# Patient Record
Sex: MALE | Race: BLACK OR AFRICAN AMERICAN | Employment: OTHER | URBAN - METROPOLITAN AREA
[De-identification: names, ages, dates, MRNs, and addresses within clinical notes are randomized per-mention and may not be internally consistent; named-entity substitution may affect disease eponyms.]

---

## 2017-01-04 ENCOUNTER — CARE COORDINATION (OUTPATIENT)
Dept: CASE MANAGEMENT | Age: 62
End: 2017-01-04

## 2017-01-11 ENCOUNTER — CARE COORDINATION (OUTPATIENT)
Dept: CASE MANAGEMENT | Age: 62
End: 2017-01-11

## 2017-01-13 ENCOUNTER — CARE COORDINATION (OUTPATIENT)
Dept: CASE MANAGEMENT | Age: 62
End: 2017-01-13

## 2017-01-19 ENCOUNTER — HOSPITAL ENCOUNTER (OUTPATIENT)
Age: 62
Discharge: HOME | End: 2017-01-19
Payer: COMMERCIAL

## 2017-01-19 ENCOUNTER — HOSPITAL ENCOUNTER (OUTPATIENT)
Dept: GENERAL RADIOLOGY | Age: 62
Discharge: HOME | End: 2017-01-19
Payer: COMMERCIAL

## 2017-01-19 DIAGNOSIS — R05.9 COUGH: ICD-10-CM

## 2017-01-19 PROCEDURE — 71020 XR CHEST STANDARD TWO VW: CPT

## 2017-01-27 ENCOUNTER — TELEPHONE (OUTPATIENT)
Dept: FAMILY MEDICINE CLINIC | Age: 62
End: 2017-01-27

## 2017-01-27 ENCOUNTER — OFFICE VISIT (OUTPATIENT)
Dept: FAMILY MEDICINE CLINIC | Age: 62
End: 2017-01-27

## 2017-01-27 VITALS
RESPIRATION RATE: 20 BRPM | BODY MASS INDEX: 36.58 KG/M2 | WEIGHT: 276 LBS | TEMPERATURE: 98.4 F | OXYGEN SATURATION: 96 % | DIASTOLIC BLOOD PRESSURE: 76 MMHG | HEIGHT: 73 IN | HEART RATE: 99 BPM | SYSTOLIC BLOOD PRESSURE: 128 MMHG

## 2017-01-27 DIAGNOSIS — J44.9 CHRONIC OBSTRUCTIVE PULMONARY DISEASE, UNSPECIFIED COPD TYPE (HCC): Primary | Chronic | ICD-10-CM

## 2017-01-27 DIAGNOSIS — R93.89 ABNORMAL FINDING ON CHEST XRAY: ICD-10-CM

## 2017-01-27 DIAGNOSIS — K21.9 GASTROESOPHAGEAL REFLUX DISEASE, ESOPHAGITIS PRESENCE NOT SPECIFIED: ICD-10-CM

## 2017-01-27 DIAGNOSIS — E78.5 HYPERLIPIDEMIA, UNSPECIFIED HYPERLIPIDEMIA TYPE: Chronic | ICD-10-CM

## 2017-01-27 DIAGNOSIS — R91.1 NODULE OF RIGHT LUNG: ICD-10-CM

## 2017-01-27 DIAGNOSIS — I10 ESSENTIAL HYPERTENSION: Chronic | ICD-10-CM

## 2017-01-27 DIAGNOSIS — R73.03 PRE-DIABETES: Chronic | ICD-10-CM

## 2017-01-27 DIAGNOSIS — Z12.11 SCREENING FOR COLON CANCER: Primary | ICD-10-CM

## 2017-01-27 PROCEDURE — 99214 OFFICE O/P EST MOD 30 MIN: CPT | Performed by: FAMILY MEDICINE

## 2017-01-27 RX ORDER — OMEPRAZOLE 20 MG/1
20 CAPSULE, DELAYED RELEASE ORAL DAILY
Qty: 30 CAPSULE | Refills: 1 | Status: SHIPPED | OUTPATIENT
Start: 2017-01-27 | End: 2017-04-17 | Stop reason: SDUPTHER

## 2017-01-27 ASSESSMENT — ENCOUNTER SYMPTOMS
DIARRHEA: 0
WHEEZING: 1
CONSTIPATION: 0
COUGH: 1
VOMITING: 0
CHEST TIGHTNESS: 0
NAUSEA: 0
ABDOMINAL PAIN: 0
SHORTNESS OF BREATH: 0
BLOOD IN STOOL: 0

## 2017-03-10 ENCOUNTER — HOSPITAL ENCOUNTER (OUTPATIENT)
Dept: CT IMAGING | Age: 62
Discharge: HOME | End: 2017-03-10
Payer: COMMERCIAL

## 2017-03-10 VITALS — BODY MASS INDEX: 35.78 KG/M2 | HEIGHT: 73 IN | WEIGHT: 270 LBS

## 2017-03-10 DIAGNOSIS — R93.89 ABNORMAL FINDING ON CHEST XRAY: ICD-10-CM

## 2017-03-10 DIAGNOSIS — R91.1 NODULE OF RIGHT LUNG: ICD-10-CM

## 2017-03-10 DIAGNOSIS — R91.8 ABNORMAL CT SCAN OF LUNG: Primary | ICD-10-CM

## 2017-03-10 PROCEDURE — 71250 CT THORAX DX C-: CPT

## 2017-03-13 DIAGNOSIS — J44.9 CHRONIC OBSTRUCTIVE PULMONARY DISEASE, UNSPECIFIED COPD TYPE (HCC): ICD-10-CM

## 2017-03-13 RX ORDER — ALBUTEROL SULFATE 2.5 MG/3ML
SOLUTION RESPIRATORY (INHALATION)
Qty: 150 ML | Refills: 2 | Status: SHIPPED | OUTPATIENT
Start: 2017-03-13 | End: 2017-06-02 | Stop reason: SDUPTHER

## 2017-03-15 ENCOUNTER — TELEPHONE (OUTPATIENT)
Dept: FAMILY MEDICINE CLINIC | Age: 62
End: 2017-03-15

## 2017-04-05 ENCOUNTER — HOSPITAL ENCOUNTER (OUTPATIENT)
Dept: GENERAL RADIOLOGY | Age: 62
Discharge: HOME | End: 2017-04-05
Payer: COMMERCIAL

## 2017-04-05 DIAGNOSIS — M17.12 PRIMARY OSTEOARTHRITIS OF LEFT KNEE: ICD-10-CM

## 2017-04-05 PROCEDURE — 73560 X-RAY EXAM OF KNEE 1 OR 2: CPT

## 2017-04-12 ENCOUNTER — OFFICE VISIT (OUTPATIENT)
Dept: FAMILY MEDICINE CLINIC | Age: 62
End: 2017-04-12

## 2017-04-12 VITALS
HEART RATE: 80 BPM | RESPIRATION RATE: 22 BRPM | OXYGEN SATURATION: 97 % | WEIGHT: 288.8 LBS | HEIGHT: 73 IN | BODY MASS INDEX: 38.28 KG/M2 | TEMPERATURE: 98.1 F | DIASTOLIC BLOOD PRESSURE: 82 MMHG | SYSTOLIC BLOOD PRESSURE: 136 MMHG

## 2017-04-12 DIAGNOSIS — Z91.09 HOUSE DUST MITE ALLERGY: ICD-10-CM

## 2017-04-12 DIAGNOSIS — J44.9 CHRONIC OBSTRUCTIVE PULMONARY DISEASE, UNSPECIFIED COPD TYPE (HCC): Primary | Chronic | ICD-10-CM

## 2017-04-12 DIAGNOSIS — G47.30 SEVERE SLEEP APNEA: Chronic | ICD-10-CM

## 2017-04-12 DIAGNOSIS — Z12.11 COLON CANCER SCREENING: ICD-10-CM

## 2017-04-12 DIAGNOSIS — R93.89 ABNORMAL FINDING ON CT SCAN: ICD-10-CM

## 2017-04-12 PROCEDURE — 99213 OFFICE O/P EST LOW 20 MIN: CPT | Performed by: FAMILY MEDICINE

## 2017-04-12 RX ORDER — HYDROXYZINE HYDROCHLORIDE 25 MG/1
25 TABLET, FILM COATED ORAL EVERY 8 HOURS PRN
Qty: 90 TABLET | Refills: 3 | Status: SHIPPED | OUTPATIENT
Start: 2017-04-12 | End: 2017-10-03

## 2017-04-12 RX ORDER — TADALAFIL 20 MG
TABLET ORAL
Refills: 5 | COMMUNITY
Start: 2017-03-27 | End: 2017-04-25 | Stop reason: SDUPTHER

## 2017-04-12 RX ORDER — IPRATROPIUM BROMIDE AND ALBUTEROL SULFATE 2.5; .5 MG/3ML; MG/3ML
1 SOLUTION RESPIRATORY (INHALATION) EVERY 6 HOURS PRN
Qty: 360 ML | Refills: 3 | Status: SHIPPED | OUTPATIENT
Start: 2017-04-12 | End: 2017-07-31 | Stop reason: SDUPTHER

## 2017-04-12 ASSESSMENT — ENCOUNTER SYMPTOMS
COUGH: 0
NAUSEA: 0
DIARRHEA: 0
WHEEZING: 0
CHEST TIGHTNESS: 0
SHORTNESS OF BREATH: 0
ABDOMINAL PAIN: 0
VOMITING: 0

## 2017-04-21 DIAGNOSIS — J45.52 SEVERE PERSISTENT ASTHMA WITH STATUS ASTHMATICUS: ICD-10-CM

## 2017-04-25 RX ORDER — TADALAFIL 20 MG
TABLET ORAL
Qty: 12 TABLET | Refills: 3 | Status: SHIPPED | OUTPATIENT
Start: 2017-04-25 | End: 2017-09-14 | Stop reason: SDUPTHER

## 2017-05-26 ENCOUNTER — TELEPHONE (OUTPATIENT)
Dept: FAMILY MEDICINE CLINIC | Age: 62
End: 2017-05-26

## 2017-06-02 DIAGNOSIS — J44.9 CHRONIC OBSTRUCTIVE PULMONARY DISEASE, UNSPECIFIED COPD TYPE (HCC): ICD-10-CM

## 2017-06-02 RX ORDER — ALBUTEROL SULFATE 2.5 MG/3ML
SOLUTION RESPIRATORY (INHALATION)
Qty: 150 ML | Refills: 2 | Status: SHIPPED | OUTPATIENT
Start: 2017-06-02 | End: 2017-07-31 | Stop reason: SDUPTHER

## 2017-06-14 ENCOUNTER — HOSPITAL ENCOUNTER (EMERGENCY)
Age: 62
Discharge: HOME | End: 2017-06-14
Attending: EMERGENCY MEDICINE
Payer: COMMERCIAL

## 2017-06-14 VITALS
DIASTOLIC BLOOD PRESSURE: 88 MMHG | OXYGEN SATURATION: 96 % | BODY MASS INDEX: 36.57 KG/M2 | HEIGHT: 72 IN | WEIGHT: 270 LBS | SYSTOLIC BLOOD PRESSURE: 154 MMHG | TEMPERATURE: 98.4 F | RESPIRATION RATE: 22 BRPM | HEART RATE: 87 BPM

## 2017-06-14 DIAGNOSIS — J45.901 ASTHMA FLARE: Primary | ICD-10-CM

## 2017-06-14 DIAGNOSIS — Z76.0 ENCOUNTER FOR MEDICATION REFILL: ICD-10-CM

## 2017-06-14 DIAGNOSIS — J44.9 CHRONIC OBSTRUCTIVE PULMONARY DISEASE, UNSPECIFIED COPD TYPE (HCC): Primary | Chronic | ICD-10-CM

## 2017-06-14 PROCEDURE — 6370000000 HC RX 637 (ALT 250 FOR IP): Performed by: EMERGENCY MEDICINE

## 2017-06-14 PROCEDURE — 99284 EMERGENCY DEPT VISIT MOD MDM: CPT

## 2017-06-14 PROCEDURE — 6360000002 HC RX W HCPCS: Performed by: EMERGENCY MEDICINE

## 2017-06-14 PROCEDURE — 94640 AIRWAY INHALATION TREATMENT: CPT

## 2017-06-14 RX ORDER — IPRATROPIUM BROMIDE AND ALBUTEROL SULFATE 2.5; .5 MG/3ML; MG/3ML
1 SOLUTION RESPIRATORY (INHALATION) ONCE
Status: COMPLETED | OUTPATIENT
Start: 2017-06-14 | End: 2017-06-14

## 2017-06-14 RX ORDER — DEXAMETHASONE 4 MG/1
8 TABLET ORAL ONCE
Status: COMPLETED | OUTPATIENT
Start: 2017-06-14 | End: 2017-06-14

## 2017-06-14 RX ORDER — PREDNISONE 20 MG/1
20 TABLET ORAL DAILY
Qty: 5 TABLET | Refills: 0 | Status: SHIPPED | OUTPATIENT
Start: 2017-06-14 | End: 2017-06-19

## 2017-06-14 RX ADMIN — IPRATROPIUM BROMIDE AND ALBUTEROL SULFATE 1 AMPULE: .5; 3 SOLUTION RESPIRATORY (INHALATION) at 14:53

## 2017-06-14 RX ADMIN — DEXAMETHASONE 8 MG: 4 TABLET ORAL at 14:50

## 2017-06-14 ASSESSMENT — ENCOUNTER SYMPTOMS
CHEST TIGHTNESS: 0
BLOOD IN STOOL: 0
VOICE CHANGE: 0
EYE DISCHARGE: 0
DIARRHEA: 0
STRIDOR: 0
SINUS PRESSURE: 0
WHEEZING: 1
FACIAL SWELLING: 0
SORE THROAT: 0
TROUBLE SWALLOWING: 0
EYE PAIN: 0
VOMITING: 0
ABDOMINAL PAIN: 0
COUGH: 1
SHORTNESS OF BREATH: 1
CHOKING: 0
EYE REDNESS: 0
BACK PAIN: 0
CONSTIPATION: 0

## 2017-06-15 RX ORDER — BUDESONIDE AND FORMOTEROL FUMARATE DIHYDRATE 160; 4.5 UG/1; UG/1
AEROSOL RESPIRATORY (INHALATION)
Qty: 10.2 INHALER | Refills: 2 | OUTPATIENT
Start: 2017-06-15

## 2017-06-15 RX ORDER — BUDESONIDE AND FORMOTEROL FUMARATE DIHYDRATE 160; 4.5 UG/1; UG/1
2 AEROSOL RESPIRATORY (INHALATION) 2 TIMES DAILY
Qty: 1 INHALER | Refills: 5 | Status: SHIPPED | OUTPATIENT
Start: 2017-06-15 | End: 2017-07-31 | Stop reason: SDUPTHER

## 2017-06-20 ENCOUNTER — HOSPITAL ENCOUNTER (EMERGENCY)
Age: 62
Discharge: HOME | End: 2017-06-20
Payer: COMMERCIAL

## 2017-06-20 VITALS
OXYGEN SATURATION: 95 % | DIASTOLIC BLOOD PRESSURE: 81 MMHG | TEMPERATURE: 98.7 F | HEART RATE: 88 BPM | BODY MASS INDEX: 36.98 KG/M2 | WEIGHT: 273 LBS | SYSTOLIC BLOOD PRESSURE: 126 MMHG | RESPIRATION RATE: 14 BRPM | HEIGHT: 72 IN

## 2017-06-20 DIAGNOSIS — K02.9 PAIN DUE TO DENTAL CARIES: Primary | ICD-10-CM

## 2017-06-20 PROCEDURE — 99282 EMERGENCY DEPT VISIT SF MDM: CPT

## 2017-06-20 RX ORDER — CLINDAMYCIN HYDROCHLORIDE 300 MG/1
300 CAPSULE ORAL 4 TIMES DAILY
Qty: 40 CAPSULE | Refills: 0 | Status: SHIPPED | OUTPATIENT
Start: 2017-06-20 | End: 2017-06-30

## 2017-06-20 RX ORDER — NAPROXEN 500 MG/1
500 TABLET ORAL 2 TIMES DAILY
Qty: 20 TABLET | Refills: 0 | Status: SHIPPED | OUTPATIENT
Start: 2017-06-20 | End: 2017-07-31 | Stop reason: ALTCHOICE

## 2017-06-20 ASSESSMENT — PAIN SCALES - GENERAL: PAINLEVEL_OUTOF10: 10

## 2017-06-20 ASSESSMENT — PAIN DESCRIPTION - ORIENTATION: ORIENTATION: LEFT;LOWER

## 2017-06-20 ASSESSMENT — ENCOUNTER SYMPTOMS
TROUBLE SWALLOWING: 0
SHORTNESS OF BREATH: 0
SORE THROAT: 0
VOICE CHANGE: 0
ABDOMINAL PAIN: 0
COUGH: 0
BACK PAIN: 0

## 2017-06-20 ASSESSMENT — PAIN DESCRIPTION - PAIN TYPE: TYPE: ACUTE PAIN

## 2017-06-20 ASSESSMENT — PAIN DESCRIPTION - FREQUENCY: FREQUENCY: CONTINUOUS

## 2017-06-20 ASSESSMENT — PAIN DESCRIPTION - LOCATION: LOCATION: TEETH

## 2017-06-22 ASSESSMENT — ENCOUNTER SYMPTOMS
VOMITING: 0
EYE DISCHARGE: 0
CONSTIPATION: 0
FACIAL SWELLING: 0
VOICE CHANGE: 0
CHOKING: 0
COUGH: 1
SHORTNESS OF BREATH: 1
SINUS PRESSURE: 0
BLOOD IN STOOL: 0
DIARRHEA: 0
STRIDOR: 0
EYE REDNESS: 0
BACK PAIN: 0
EYE PAIN: 0
ABDOMINAL PAIN: 0
CHEST TIGHTNESS: 0
SORE THROAT: 0
WHEEZING: 1
TROUBLE SWALLOWING: 0

## 2017-07-15 DIAGNOSIS — J45.52 SEVERE PERSISTENT ASTHMA WITH STATUS ASTHMATICUS: ICD-10-CM

## 2017-07-31 ENCOUNTER — OFFICE VISIT (OUTPATIENT)
Dept: FAMILY MEDICINE CLINIC | Age: 62
End: 2017-07-31

## 2017-07-31 ENCOUNTER — TELEPHONE (OUTPATIENT)
Dept: FAMILY MEDICINE CLINIC | Age: 62
End: 2017-07-31

## 2017-07-31 VITALS
DIASTOLIC BLOOD PRESSURE: 80 MMHG | BODY MASS INDEX: 33.93 KG/M2 | SYSTOLIC BLOOD PRESSURE: 136 MMHG | HEART RATE: 100 BPM | HEIGHT: 73 IN | RESPIRATION RATE: 20 BRPM | OXYGEN SATURATION: 95 % | WEIGHT: 256 LBS

## 2017-07-31 DIAGNOSIS — Z01.818 PREOPERATIVE CLEARANCE: ICD-10-CM

## 2017-07-31 DIAGNOSIS — J92.9 PLEURAL THICKENING: ICD-10-CM

## 2017-07-31 DIAGNOSIS — E66.01 MORBID OBESITY DUE TO EXCESS CALORIES (HCC): Chronic | ICD-10-CM

## 2017-07-31 DIAGNOSIS — R73.03 PRE-DIABETES: Chronic | ICD-10-CM

## 2017-07-31 DIAGNOSIS — J44.9 CHRONIC OBSTRUCTIVE PULMONARY DISEASE, UNSPECIFIED COPD TYPE (HCC): ICD-10-CM

## 2017-07-31 DIAGNOSIS — Z13.220 SCREENING CHOLESTEROL LEVEL: ICD-10-CM

## 2017-07-31 DIAGNOSIS — I10 ESSENTIAL HYPERTENSION: Chronic | ICD-10-CM

## 2017-07-31 DIAGNOSIS — I47.1 SUPRAVENTRICULAR TACHYCARDIA (HCC): Chronic | ICD-10-CM

## 2017-07-31 DIAGNOSIS — M17.0 PRIMARY OSTEOARTHRITIS OF BOTH KNEES: Primary | Chronic | ICD-10-CM

## 2017-07-31 DIAGNOSIS — G47.30 SEVERE SLEEP APNEA: Chronic | ICD-10-CM

## 2017-07-31 DIAGNOSIS — J44.9 CHRONIC OBSTRUCTIVE PULMONARY DISEASE, UNSPECIFIED COPD TYPE (HCC): Chronic | ICD-10-CM

## 2017-07-31 DIAGNOSIS — Z12.11 COLON CANCER SCREENING: ICD-10-CM

## 2017-07-31 DIAGNOSIS — R91.8 ABNORMAL CT SCAN OF LUNG: ICD-10-CM

## 2017-07-31 PROCEDURE — 99214 OFFICE O/P EST MOD 30 MIN: CPT | Performed by: FAMILY MEDICINE

## 2017-07-31 RX ORDER — BUDESONIDE AND FORMOTEROL FUMARATE DIHYDRATE 160; 4.5 UG/1; UG/1
2 AEROSOL RESPIRATORY (INHALATION) 2 TIMES DAILY
Qty: 1 INHALER | Refills: 11 | Status: SHIPPED | OUTPATIENT
Start: 2017-07-31 | End: 2017-10-03

## 2017-07-31 RX ORDER — IPRATROPIUM BROMIDE AND ALBUTEROL SULFATE 2.5; .5 MG/3ML; MG/3ML
1 SOLUTION RESPIRATORY (INHALATION) EVERY 6 HOURS PRN
Qty: 360 ML | Refills: 1 | Status: SHIPPED | OUTPATIENT
Start: 2017-07-31 | End: 2017-10-02 | Stop reason: SDUPTHER

## 2017-07-31 ASSESSMENT — ENCOUNTER SYMPTOMS
NAUSEA: 0
SINUS PRESSURE: 0
SHORTNESS OF BREATH: 0
CHEST TIGHTNESS: 0
ABDOMINAL PAIN: 0
TROUBLE SWALLOWING: 0
APNEA: 1
BLOOD IN STOOL: 0
WHEEZING: 0
CONSTIPATION: 0
DIARRHEA: 0
COUGH: 0
VOMITING: 0
SORE THROAT: 0

## 2017-08-01 RX ORDER — ALBUTEROL SULFATE 2.5 MG/3ML
SOLUTION RESPIRATORY (INHALATION)
Qty: 150 ML | Refills: 1 | Status: SHIPPED | OUTPATIENT
Start: 2017-08-01 | End: 2017-10-03

## 2017-08-07 ENCOUNTER — TELEPHONE (OUTPATIENT)
Dept: FAMILY MEDICINE CLINIC | Age: 62
End: 2017-08-07

## 2017-08-08 RX ORDER — NEBULIZER ACCESSORIES
1 KIT MISCELLANEOUS DAILY PRN
Qty: 1 KIT | Refills: 1 | Status: SHIPPED | OUTPATIENT
Start: 2017-08-08 | End: 2017-10-03

## 2017-08-10 ENCOUNTER — HOSPITAL ENCOUNTER (OUTPATIENT)
Dept: CT IMAGING | Age: 62
Discharge: HOME | End: 2017-08-10
Payer: COMMERCIAL

## 2017-08-10 VITALS — HEIGHT: 73 IN | BODY MASS INDEX: 34.85 KG/M2 | WEIGHT: 263 LBS

## 2017-08-10 DIAGNOSIS — Z12.11 COLON CANCER SCREENING: ICD-10-CM

## 2017-08-10 DIAGNOSIS — R91.8 ABNORMAL CT SCAN OF LUNG: ICD-10-CM

## 2017-08-10 PROCEDURE — 71250 CT THORAX DX C-: CPT

## 2017-08-15 DIAGNOSIS — J44.9 CHRONIC OBSTRUCTIVE PULMONARY DISEASE, UNSPECIFIED COPD TYPE (HCC): Primary | Chronic | ICD-10-CM

## 2017-08-21 ENCOUNTER — HOSPITAL ENCOUNTER (OUTPATIENT)
Dept: LAB | Age: 62
Discharge: HOME | End: 2017-08-21
Payer: COMMERCIAL

## 2017-08-21 DIAGNOSIS — R73.03 PRE-DIABETES: Chronic | ICD-10-CM

## 2017-08-21 DIAGNOSIS — Z13.220 SCREENING CHOLESTEROL LEVEL: ICD-10-CM

## 2017-08-21 DIAGNOSIS — Z01.818 PREOPERATIVE CLEARANCE: ICD-10-CM

## 2017-08-21 LAB
ALBUMIN SERPL-MCNC: 3.8 G/DL (ref 3.9–4.9)
ALP BLD-CCNC: 94 U/L (ref 35–104)
ALT SERPL-CCNC: 12 U/L (ref 0–41)
ANION GAP SERPL CALCULATED.3IONS-SCNC: 16 MEQ/L (ref 7–13)
AST SERPL-CCNC: 16 U/L (ref 0–40)
BACTERIA: NORMAL /HPF
BASOPHILS ABSOLUTE: 0.1 K/UL (ref 0–0.2)
BASOPHILS RELATIVE PERCENT: 0.6 %
BILIRUB SERPL-MCNC: 0.3 MG/DL (ref 0–1.2)
BILIRUBIN URINE: NEGATIVE
BLOOD, URINE: NEGATIVE
BUN BLDV-MCNC: 16 MG/DL (ref 6–20)
CALCIUM SERPL-MCNC: 9 MG/DL (ref 8.6–10.2)
CHLORIDE BLD-SCNC: 106 MEQ/L (ref 98–107)
CHOLESTEROL, TOTAL: 183 MG/DL (ref 0–199)
CLARITY: CLEAR
CO2: 22 MEQ/L (ref 22–29)
COLOR: YELLOW
CREAT SERPL-MCNC: 1.01 MG/DL (ref 0.7–1.2)
EOSINOPHILS ABSOLUTE: 0.4 K/UL (ref 0–0.7)
EOSINOPHILS RELATIVE PERCENT: 3.3 %
GFR AFRICAN AMERICAN: >60
GFR NON-AFRICAN AMERICAN: >60
GLOBULIN: 3.2 G/DL (ref 2.3–3.5)
GLUCOSE BLD-MCNC: 93 MG/DL (ref 74–109)
GLUCOSE URINE: NEGATIVE MG/DL
HBA1C MFR BLD: 5.3 % (ref 4.8–5.9)
HCT VFR BLD CALC: 49.6 % (ref 42–52)
HDLC SERPL-MCNC: 50 MG/DL (ref 40–59)
HEMOGLOBIN: 15.8 G/DL (ref 14–18)
INR BLD: 1
KETONES, URINE: NEGATIVE MG/DL
LDL CHOLESTEROL CALCULATED: 115 MG/DL (ref 0–129)
LEUKOCYTE ESTERASE, URINE: ABNORMAL
LYMPHOCYTES ABSOLUTE: 1.8 K/UL (ref 1–4.8)
LYMPHOCYTES RELATIVE PERCENT: 16.6 %
MCH RBC QN AUTO: 31.9 PG (ref 27–31.3)
MCHC RBC AUTO-ENTMCNC: 31.9 % (ref 33–37)
MCV RBC AUTO: 99.8 FL (ref 80–100)
MONOCYTES ABSOLUTE: 0.9 K/UL (ref 0.2–0.8)
MONOCYTES RELATIVE PERCENT: 8.4 %
MUCUS: PRESENT
NEUTROPHILS ABSOLUTE: 7.5 K/UL (ref 1.4–6.5)
NEUTROPHILS RELATIVE PERCENT: 71.1 %
NITRITE, URINE: NEGATIVE
PDW BLD-RTO: 16 % (ref 11.5–14.5)
PH UA: 5.5 (ref 5–9)
PLATELET # BLD: 354 K/UL (ref 130–400)
POTASSIUM SERPL-SCNC: 4.5 MEQ/L (ref 3.5–5.1)
PROTEIN UA: NEGATIVE MG/DL
PROTHROMBIN TIME: 10.7 SEC (ref 8.1–13.7)
RBC # BLD: 4.97 M/UL (ref 4.7–6.1)
RBC UA: NORMAL /HPF (ref 0–2)
SODIUM BLD-SCNC: 144 MEQ/L (ref 132–144)
SPECIFIC GRAVITY UA: 1.02 (ref 1–1.03)
TOTAL PROTEIN: 7 G/DL (ref 6.4–8.1)
TRIGL SERPL-MCNC: 91 MG/DL (ref 0–200)
URINE REFLEX TO CULTURE: YES
UROBILINOGEN, URINE: 0.2 E.U./DL
WBC # BLD: 10.6 K/UL (ref 4.8–10.8)
WBC UA: NORMAL /HPF (ref 0–5)

## 2017-08-21 PROCEDURE — 85610 PROTHROMBIN TIME: CPT

## 2017-08-21 PROCEDURE — 83036 HEMOGLOBIN GLYCOSYLATED A1C: CPT

## 2017-08-21 PROCEDURE — 80053 COMPREHEN METABOLIC PANEL: CPT

## 2017-08-21 PROCEDURE — 80061 LIPID PANEL: CPT

## 2017-08-21 PROCEDURE — 36415 COLL VENOUS BLD VENIPUNCTURE: CPT

## 2017-08-21 PROCEDURE — 81001 URINALYSIS AUTO W/SCOPE: CPT

## 2017-08-21 PROCEDURE — 85025 COMPLETE CBC W/AUTO DIFF WBC: CPT

## 2017-09-05 DIAGNOSIS — J45.52 SEVERE PERSISTENT ASTHMA WITH STATUS ASTHMATICUS: ICD-10-CM

## 2017-09-05 RX ORDER — MONTELUKAST SODIUM 10 MG/1
TABLET ORAL
Qty: 30 TABLET | Refills: 5 | Status: SHIPPED | OUTPATIENT
Start: 2017-09-05 | End: 2017-10-03

## 2017-09-13 DIAGNOSIS — E78.5 HYPERLIPIDEMIA, UNSPECIFIED HYPERLIPIDEMIA TYPE: Chronic | ICD-10-CM

## 2017-09-13 RX ORDER — LOVASTATIN 10 MG/1
TABLET ORAL
Qty: 30 TABLET | Refills: 5 | Status: SHIPPED | OUTPATIENT
Start: 2017-09-13 | End: 2017-10-03

## 2017-09-14 RX ORDER — TADALAFIL 20 MG
TABLET ORAL
Qty: 12 TABLET | Refills: 3 | Status: SHIPPED | OUTPATIENT
Start: 2017-09-14 | End: 2017-10-03

## 2017-09-20 ENCOUNTER — OFFICE VISIT (OUTPATIENT)
Dept: FAMILY MEDICINE CLINIC | Age: 62
End: 2017-09-20

## 2017-09-20 VITALS
TEMPERATURE: 97.7 F | RESPIRATION RATE: 18 BRPM | BODY MASS INDEX: 32.58 KG/M2 | WEIGHT: 245.8 LBS | DIASTOLIC BLOOD PRESSURE: 76 MMHG | SYSTOLIC BLOOD PRESSURE: 110 MMHG | HEIGHT: 73 IN | HEART RATE: 60 BPM

## 2017-09-20 DIAGNOSIS — J44.9 CHRONIC OBSTRUCTIVE PULMONARY DISEASE, UNSPECIFIED COPD TYPE (HCC): Chronic | ICD-10-CM

## 2017-09-20 DIAGNOSIS — Z12.11 COLON CANCER SCREENING: ICD-10-CM

## 2017-09-20 DIAGNOSIS — L73.2 HIDRADENITIS SUPPURATIVA OF LEFT AXILLA: Primary | ICD-10-CM

## 2017-09-20 DIAGNOSIS — F14.10 COCAINE ABUSE (HCC): Chronic | ICD-10-CM

## 2017-09-20 DIAGNOSIS — Z23 NEED FOR VACCINATION: ICD-10-CM

## 2017-09-20 DIAGNOSIS — I10 ESSENTIAL HYPERTENSION: Chronic | ICD-10-CM

## 2017-09-20 DIAGNOSIS — F10.10 ALCOHOL ABUSE: Chronic | ICD-10-CM

## 2017-09-20 DIAGNOSIS — I47.1 SUPRAVENTRICULAR TACHYCARDIA (HCC): Chronic | ICD-10-CM

## 2017-09-20 PROCEDURE — 90471 IMMUNIZATION ADMIN: CPT | Performed by: FAMILY MEDICINE

## 2017-09-20 PROCEDURE — 90688 IIV4 VACCINE SPLT 0.5 ML IM: CPT | Performed by: FAMILY MEDICINE

## 2017-09-20 PROCEDURE — 99214 OFFICE O/P EST MOD 30 MIN: CPT | Performed by: FAMILY MEDICINE

## 2017-09-20 RX ORDER — DOXYCYCLINE HYCLATE 100 MG
100 TABLET ORAL 2 TIMES DAILY
Qty: 56 TABLET | Refills: 0 | Status: SHIPPED | OUTPATIENT
Start: 2017-09-20 | End: 2017-10-03

## 2017-09-20 ASSESSMENT — ENCOUNTER SYMPTOMS
WHEEZING: 0
VOMITING: 0
CHEST TIGHTNESS: 0
COUGH: 0
SHORTNESS OF BREATH: 0
DIARRHEA: 0
NAUSEA: 0
ABDOMINAL PAIN: 0

## 2017-09-28 ENCOUNTER — TELEPHONE (OUTPATIENT)
Dept: FAMILY MEDICINE CLINIC | Age: 62
End: 2017-09-28

## 2017-09-28 DIAGNOSIS — Z02.71 ENCOUNTER FOR DISABILITY DETERMINATION: Primary | ICD-10-CM

## 2017-10-02 DIAGNOSIS — J44.9 CHRONIC OBSTRUCTIVE PULMONARY DISEASE, UNSPECIFIED COPD TYPE (HCC): Chronic | ICD-10-CM

## 2017-10-02 RX ORDER — IPRATROPIUM BROMIDE AND ALBUTEROL SULFATE 2.5; .5 MG/3ML; MG/3ML
SOLUTION RESPIRATORY (INHALATION)
Qty: 360 ML | Refills: 1 | Status: SHIPPED | OUTPATIENT
Start: 2017-10-02 | End: 2017-10-03

## 2017-10-03 ENCOUNTER — OFFICE VISIT (OUTPATIENT)
Dept: FAMILY MEDICINE CLINIC | Age: 62
End: 2017-10-03

## 2017-10-03 VITALS
HEART RATE: 98 BPM | SYSTOLIC BLOOD PRESSURE: 122 MMHG | HEIGHT: 73 IN | OXYGEN SATURATION: 98 % | DIASTOLIC BLOOD PRESSURE: 80 MMHG | RESPIRATION RATE: 18 BRPM | TEMPERATURE: 96.6 F | WEIGHT: 245 LBS | BODY MASS INDEX: 32.47 KG/M2

## 2017-10-03 DIAGNOSIS — Z01.818 PREOPERATIVE CLEARANCE: Primary | ICD-10-CM

## 2017-10-03 DIAGNOSIS — N52.9 ERECTILE DYSFUNCTION, UNSPECIFIED ERECTILE DYSFUNCTION TYPE: Chronic | ICD-10-CM

## 2017-10-03 DIAGNOSIS — M17.0 PRIMARY OSTEOARTHRITIS OF BOTH KNEES: Chronic | ICD-10-CM

## 2017-10-03 DIAGNOSIS — K21.9 GASTROESOPHAGEAL REFLUX DISEASE, ESOPHAGITIS PRESENCE NOT SPECIFIED: ICD-10-CM

## 2017-10-03 DIAGNOSIS — J44.9 CHRONIC OBSTRUCTIVE PULMONARY DISEASE, UNSPECIFIED COPD TYPE (HCC): ICD-10-CM

## 2017-10-03 DIAGNOSIS — E78.5 HYPERLIPIDEMIA, UNSPECIFIED HYPERLIPIDEMIA TYPE: Chronic | ICD-10-CM

## 2017-10-03 DIAGNOSIS — G47.30 SEVERE SLEEP APNEA: Chronic | ICD-10-CM

## 2017-10-03 DIAGNOSIS — Z91.09 HOUSE DUST MITE ALLERGY: ICD-10-CM

## 2017-10-03 DIAGNOSIS — I10 ESSENTIAL HYPERTENSION: ICD-10-CM

## 2017-10-03 DIAGNOSIS — D64.9 ANEMIA, UNSPECIFIED TYPE: Chronic | ICD-10-CM

## 2017-10-03 DIAGNOSIS — F10.10 ALCOHOL ABUSE: Chronic | ICD-10-CM

## 2017-10-03 DIAGNOSIS — M10.9 GOUT, UNSPECIFIED CAUSE, UNSPECIFIED CHRONICITY, UNSPECIFIED SITE: Chronic | ICD-10-CM

## 2017-10-03 DIAGNOSIS — R73.03 PRE-DIABETES: Chronic | ICD-10-CM

## 2017-10-03 DIAGNOSIS — J30.2 SEASONAL ALLERGIC RHINITIS, UNSPECIFIED ALLERGIC RHINITIS TRIGGER: Chronic | ICD-10-CM

## 2017-10-03 DIAGNOSIS — I47.1 SUPRAVENTRICULAR TACHYCARDIA (HCC): Chronic | ICD-10-CM

## 2017-10-03 DIAGNOSIS — E66.01 MORBID OBESITY DUE TO EXCESS CALORIES (HCC): Chronic | ICD-10-CM

## 2017-10-03 DIAGNOSIS — L73.2 HIDRADENITIS SUPPURATIVA OF LEFT AXILLA: ICD-10-CM

## 2017-10-03 DIAGNOSIS — F14.10 COCAINE ABUSE (HCC): Chronic | ICD-10-CM

## 2017-10-03 PROCEDURE — 99214 OFFICE O/P EST MOD 30 MIN: CPT | Performed by: FAMILY MEDICINE

## 2017-10-03 RX ORDER — BUDESONIDE AND FORMOTEROL FUMARATE DIHYDRATE 160; 4.5 UG/1; UG/1
2 AEROSOL RESPIRATORY (INHALATION) 2 TIMES DAILY
Qty: 3 INHALER | Refills: 3 | Status: SHIPPED | OUTPATIENT
Start: 2017-10-03 | End: 2018-09-24 | Stop reason: SDUPTHER

## 2017-10-03 RX ORDER — ALBUTEROL SULFATE 90 UG/1
2 AEROSOL, METERED RESPIRATORY (INHALATION) EVERY 6 HOURS PRN
COMMUNITY
End: 2017-10-03 | Stop reason: SDUPTHER

## 2017-10-03 RX ORDER — MONTELUKAST SODIUM 10 MG/1
TABLET ORAL
Qty: 90 TABLET | Refills: 3 | Status: SHIPPED | OUTPATIENT
Start: 2017-10-03 | End: 2018-01-15 | Stop reason: SDUPTHER

## 2017-10-03 RX ORDER — HYDROXYZINE HYDROCHLORIDE 25 MG/1
25 TABLET, FILM COATED ORAL 3 TIMES DAILY PRN
COMMUNITY
End: 2017-10-03 | Stop reason: SDUPTHER

## 2017-10-03 RX ORDER — NEBULIZER ACCESSORIES
1 KIT MISCELLANEOUS DAILY PRN
Qty: 1 KIT | Refills: 1 | Status: CANCELLED | OUTPATIENT
Start: 2017-10-03

## 2017-10-03 RX ORDER — AMLODIPINE BESYLATE 5 MG/1
5 TABLET ORAL DAILY
COMMUNITY
End: 2017-10-03 | Stop reason: SDUPTHER

## 2017-10-03 RX ORDER — ALBUTEROL SULFATE 2.5 MG/3ML
2.5 SOLUTION RESPIRATORY (INHALATION) EVERY 4 HOURS PRN
COMMUNITY
End: 2017-10-03 | Stop reason: SDUPTHER

## 2017-10-03 RX ORDER — BUDESONIDE AND FORMOTEROL FUMARATE DIHYDRATE 160; 4.5 UG/1; UG/1
2 AEROSOL RESPIRATORY (INHALATION) 2 TIMES DAILY
COMMUNITY
End: 2017-10-03 | Stop reason: SDUPTHER

## 2017-10-03 RX ORDER — ALBUTEROL SULFATE 2.5 MG/3ML
SOLUTION RESPIRATORY (INHALATION)
Qty: 150 ML | Refills: 1 | Status: SHIPPED | OUTPATIENT
Start: 2017-10-03 | End: 2017-12-22 | Stop reason: SDUPTHER

## 2017-10-03 RX ORDER — OMEPRAZOLE 20 MG/1
20 CAPSULE, DELAYED RELEASE ORAL DAILY
COMMUNITY
End: 2017-10-03 | Stop reason: SDUPTHER

## 2017-10-03 RX ORDER — TADALAFIL 20 MG
20 TABLET ORAL PRN
Qty: 12 TABLET | Refills: 3 | Status: SHIPPED | OUTPATIENT
Start: 2017-10-03 | End: 2018-12-27 | Stop reason: SDUPTHER

## 2017-10-03 RX ORDER — FLUTICASONE PROPIONATE 50 MCG
2 SPRAY, SUSPENSION (ML) NASAL DAILY
Qty: 3 BOTTLE | Refills: 3 | Status: SHIPPED | OUTPATIENT
Start: 2017-10-03 | End: 2018-03-09 | Stop reason: SDUPTHER

## 2017-10-03 RX ORDER — MONTELUKAST SODIUM 10 MG/1
10 TABLET ORAL NIGHTLY
COMMUNITY
End: 2017-10-03 | Stop reason: SDUPTHER

## 2017-10-03 RX ORDER — DOXYCYCLINE HYCLATE 100 MG
100 TABLET ORAL 2 TIMES DAILY
COMMUNITY
End: 2018-01-15 | Stop reason: ALTCHOICE

## 2017-10-03 RX ORDER — FLUTICASONE PROPIONATE 50 MCG
2 SPRAY, SUSPENSION (ML) NASAL DAILY
COMMUNITY
End: 2017-10-03 | Stop reason: SDUPTHER

## 2017-10-03 RX ORDER — TADALAFIL 20 MG/1
20 TABLET ORAL PRN
COMMUNITY
End: 2017-10-03 | Stop reason: SDUPTHER

## 2017-10-03 RX ORDER — AMLODIPINE BESYLATE 5 MG/1
5 TABLET ORAL DAILY
Qty: 90 TABLET | Refills: 3 | Status: ON HOLD | OUTPATIENT
Start: 2017-10-03 | End: 2018-06-28

## 2017-10-03 RX ORDER — HYDROXYZINE HYDROCHLORIDE 25 MG/1
25 TABLET, FILM COATED ORAL EVERY 8 HOURS PRN
Qty: 90 TABLET | Refills: 3 | Status: SHIPPED | OUTPATIENT
Start: 2017-10-03 | End: 2018-07-29 | Stop reason: SDUPTHER

## 2017-10-03 RX ORDER — LOVASTATIN 10 MG/1
10 TABLET ORAL NIGHTLY
COMMUNITY
End: 2017-10-03 | Stop reason: SDUPTHER

## 2017-10-03 RX ORDER — LOVASTATIN 10 MG/1
TABLET ORAL
Qty: 90 TABLET | Refills: 3 | Status: SHIPPED | OUTPATIENT
Start: 2017-10-03 | End: 2018-09-24 | Stop reason: SDUPTHER

## 2017-10-03 RX ORDER — OMEPRAZOLE 20 MG/1
20 CAPSULE, DELAYED RELEASE ORAL DAILY
Qty: 90 CAPSULE | Refills: 3 | Status: SHIPPED | OUTPATIENT
Start: 2017-10-03 | End: 2018-01-15

## 2017-10-03 ASSESSMENT — ENCOUNTER SYMPTOMS
BLOOD IN STOOL: 0
VOMITING: 0
WHEEZING: 0
ABDOMINAL PAIN: 0
NAUSEA: 0
CHEST TIGHTNESS: 0
COUGH: 0
DIARRHEA: 0
ANAL BLEEDING: 0
CONSTIPATION: 0
SHORTNESS OF BREATH: 0
SINUS PRESSURE: 0
SORE THROAT: 0

## 2017-10-03 NOTE — PROGRESS NOTES
Subjective:      Patient ID: Vaibhav Garcia is a 63 y.o. male who presents today for:  Chief Complaint   Patient presents with    Pre-op Exam     pt is having left knee replacement by dr Oscar Jones on 10/13/2017 at [address] [place] [address]     Surgical Clearance     The patient is being seen at the request of the surgeon for a preoperative evaluation. NEED FOR SURGERY IS routine. Denise [place]en[place] - Dr. Merline Myers - Left total knee arthroplasty - scheduled 10/13/2017  ANESTHESIA REACTIONS  - Patient denies any prior history of reactions to anesthesia, Patient denies any family history of reactions to anesthesia  INFECTION  - Patient denies cough, dysuria, fevers, chills. He is on a course of doxycycline for hidradenitis suppurativa of left axilla and reports great improvement with resolution of pain and swelling to the axilla. BLEEDING - Patient currently denies any source of blood loss (epistaxis, hematuria, hematochezia, hematemesis, or melena). Patient denies any bleeding tendencies, bruising, or bleeding gums. Patient denies any family history of bleeding dyscrasias. PULMONARY - Patient denies cough, sputum production, fevers   CARDIAC  - patient denies chest pain, dyspnea, orthopnea, edema  MAJOR CLINICAL PREDICTORS - none (no unstable coronary syndromes, decompensated HF, significant arrhythmias, or severe valvular disease). INTERMEDIATE CLINICAL PREDICTORS - none (no angina, prior MI, HF, diabetes, or renal insufficiency). MINOR CLINICAL PREDICTORS - rhythym other than sinus. - intermittent SVT  SURGICAL RISK  - Intermediate (carotid endarterectomy, head and neck surgery, intraabdominal and intrathoracic procedures, orthopedic surgeries, prostate surgery).    FUNCTIONAL CAPACITY - 3 to 6 METs (moderate intensity physical activity such as swimming, scrubbing floors, and walking briskly)           Past Medical History:   Diagnosis Date    Alcohol abuse 10/27/2016    Anemia     Asthma     Cocaine abuse 10/27/2016    COPD [facility] obstructive [facility]) [facility]     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    Drug-seeking behavior 4/14/2015    Edema 12/10/2015    Erectile dysfunction     Gout 10/27/2016    History of arthroscopy of both knees 10/27/2016    House dust mite allergy 4/21/2014    Hyperlipidemia     Hypertension 10/27/2016    Injury to heart 10/27/2016    Medical non-compliance 2/22/2014    Morbid obesity due to excess calories ([facility] 12/8/2016    Osteoarthritis of both knees 12/8/2016    Pneumonia 12/04/2016    caused hospital admission    Pre-diabetes 10/27/2016    Seasonal allergies 4/21/2014    Severe persistent asthma 10/27/2016    Severe sleep apnea 4/14/2015    Supraventricular tachycardia ([facility] 10/27/2016    SV[facility]ricular tachycardia) ([facility]      Past Surgical History:   Procedure Laterality Date    ANTERIOR CRUCIATE LIGAMENT REPAIR      CARDIAC CATHETERIZATION       Family History   Problem Relation Age of Onset    Arthritis Mother     Asthma Mother     High Cholesterol Mother     Diabetes Father     Stroke Maternal Grandmother     Cancer Maternal Grandfather     Hypertension Other     COPD Neg Hx      Social History     Social History    Marital status: Single     Spouse name: n/a    Number of children: 1    Years of education: 12     Occupational History    Disability None     Social History Main Topics    Smoking status: Former Smoker     Packs/day: 0.25     Types: Cigarettes, Cigars     Quit date: 10/1/2013    Smokeless tobacco: Never Used    Alcohol use 6.0 oz/week     10 Cans of beer per week    Drug use: Yes     Special: Cocaine      Comment: intermittent use    Sexual activity: Not Currently     Partners: Female     Other Topics Concern    Not on file     Social History Narrative     No current outpatient prescriptions on file prior to visit. No current facility-administered medications on file prior to visit.         Allergies:  Pcn [penicillins]    Review of Systems   Constitutional: Negative for appetite change, chills, diaphoresis, fatigue, fever and unexpected weight change. HENT: Negative for congestion, ear pain, sinus pressure and sore throat. Eyes: Negative for visual disturbance. Respiratory: Negative for cough, chest tightness, shortness of breath and wheezing. Cardiovascular: Negative for chest pain, palpitations and leg swelling. No orthopnea, No PND   Gastrointestinal: Negative for abdominal pain, anal bleeding, blood in stool, constipation, diarrhea, nausea and vomiting. No heartburn, No melena   Endocrine: Negative for cold intolerance, heat intolerance, polydipsia, polyphagia and polyuria. Genitourinary: Negative for dysuria and hematuria. Musculoskeletal: Positive for arthralgias. Negative for myalgias. Skin: Negative for rash. Neurological: Negative for dizziness, syncope, speech difficulty, weakness, light-headedness, numbness and headaches. Objective:     /80 (Site: Left Arm, Position: Sitting, Cuff Size: Large Adult)  Pulse 98  Temp 96.6 °F (35.9 °C) (Temporal)   Resp 18  Ht 6' 1\" (1.854 m)  Wt 245 lb (111.1 kg)  SpO2 98%  BMI 32.32 kg/m2    Physical Exam   Constitutional: He is oriented to person, place, and time. He appears well-developed and well-nourished. HENT:   Head: Normocephalic and atraumatic. Right Ear: Tympanic membrane, external ear and ear canal normal. No middle ear effusion. Left Ear: Tympanic membrane, external ear and ear canal normal.  No middle ear effusion. Nose: Nose normal. Right sinus exhibits no maxillary sinus tenderness and no frontal sinus tenderness. Left sinus exhibits no maxillary sinus tenderness and no frontal sinus tenderness. Mouth/Throat: Uvula is midline, oropharynx is clear and moist and mucous membranes are normal. No oropharyngeal exudate or posterior oropharyngeal erythema.    Eyes: Conjunctivae are normal. Pupils are equal, round, and reactive to light. Right eye exhibits no discharge. Left eye exhibits no discharge. No scleral icterus. Neck: Neck supple. Carotid bruit is not present. No thyromegaly present. Cardiovascular: Normal rate, regular rhythm, normal heart sounds and intact distal pulses. No murmur heard. Pulmonary/Chest: Effort normal and breath sounds normal. No accessory muscle usage. No tachypnea. No respiratory distress. He has no decreased breath sounds. He has no wheezes. He has no rhonchi. He has no rales. Abdominal: Soft. Bowel sounds are normal. He exhibits no distension. There is no tenderness. There is no rebound and no guarding. Musculoskeletal: Normal range of motion. He exhibits no edema. Lymphadenopathy:     He has no cervical adenopathy. Neurological: He is alert and oriented to person, place, and time. Skin: Skin is warm. No rash noted. Psychiatric: He has a normal mood and affect. His behavior is normal. Thought content normal.       Ortho Exam (If Applicable)      Assessment & Plan:      1. Preoperative clearance  Pending stable labs and EKG results the patient is medically cleared for surgery. I discussed with the patient the ACC peroperative clearance guidelines. The patient is low risk for cardiovascular events. I discussed with the patient that there will always be a risk of fatal cardiopulmonary events. I advised the patient to obtain complete informed consent from the surgeon, but in general all surgical procedures have a risk of infection, non-healing, bleeding, death, and other undesired outcomes. I advised patient to abstain from all NSAID's and ASA products until cleared by the surgeon to use them. I advised the patient to contact me or the surgeon for any new complaints of fevers, chills, cough, chest pain, dyspnea, nausea, or vomiting. 2. Primary osteoarthritis of both knees  Further definitive management pending per orthopedic surgeon    3.  Chronic obstructive pulmonary disease, unspecified COPD type ([facility]  No reported respiratory complaints today in the office. Continue current medication    - albuterol (PROVENTIL) (2.5 MG/3ML) 0.083% nebulizer solution; INHALE 1 VIAL VIA NEBULIZER EVERY 6 HOURS AS NEEDED FOR WHEEZING OR DYSPNEA  Dispense: 150 mL; Refill: 1  - budesonide-formoterol (SYMBICORT) 160-4.5 MCG/ACT AERO; Inhale 2 puffs into the lungs 2 times daily  Dispense: 3 Inhaler; Refill: 3  - fluticasone (FLONASE) 50 MCG/ACT nasal spray; 2 sprays by Nasal route daily  Dispense: 3 Bottle; Refill: 3  - PROAIR  (90 Base) MCG/ACT inhaler; Inhale 2 puffs into the lungs every 6 hours as needed for Wheezing or Shortness of Breath  Dispense: 1 Inhaler; Refill: 1  - tiotropium (SPIRIVA HANDIHALER) 18 MCG inhalation capsule; Inhale 1 capsule into the lungs daily  Dispense: 90 capsule; Refill: 3  - montelukast (SINGULAIR) 10 MG tablet; TAKE 1 TABLET BY MOUTH NIGHTLY  Dispense: 90 tablet; Refill: 3    4. Severe sleep apnea  Patient refuses any further management of sleep apnea with use of the CPAP machine. I reviewed with him the dangers of untreated sleep apnea long-term including risk for worsening arrhythmias, pulmonary hypertension, heart failure, and more difficult to manage hypertension. Patient was instructed to notify the office should he change his mind    5. Supraventricular tachycardia (HCC)  Currently asymptomatic, no reported palpitations or chest discomfort    6. Essential hypertension  Well-controlled. Continue current medication    - amLODIPine (NORVASC) 5 MG tablet; Take 1 tablet by mouth daily  Dispense: 90 tablet; Refill: 3    7. Gastroesophageal reflux disease, esophagitis presence not specified  No reported gastrointestinal complaints. Continue current management    - omeprazole (PRILOSEC) 20 MG delayed release capsule; Take 1 capsule by mouth Daily  Dispense: 90 capsule; Refill: 3    8.  Hyperlipidemia, unspecified hyperlipidemia type  Most recent lipid panel

## 2017-10-05 ENCOUNTER — HOSPITAL ENCOUNTER (OUTPATIENT)
Dept: PREADMISSION TESTING | Age: 62
Discharge: HOME | End: 2017-10-05
Payer: COMMERCIAL

## 2017-10-05 VITALS
HEART RATE: 84 BPM | DIASTOLIC BLOOD PRESSURE: 81 MMHG | WEIGHT: 245 LBS | BODY MASS INDEX: 33.18 KG/M2 | SYSTOLIC BLOOD PRESSURE: 134 MMHG | RESPIRATION RATE: 16 BRPM | HEIGHT: 72 IN | OXYGEN SATURATION: 95 % | TEMPERATURE: 98.1 F

## 2017-10-05 LAB
ABO/RH: NORMAL
ANION GAP SERPL CALCULATED.3IONS-SCNC: 12 MEQ/L (ref 7–13)
ANTIBODY SCREEN: NORMAL
BACTERIA: NORMAL /HPF
BILIRUBIN URINE: NEGATIVE
BLOOD, URINE: ABNORMAL
BUN BLDV-MCNC: 10 MG/DL (ref 6–20)
CALCIUM SERPL-MCNC: 9.8 MG/DL (ref 8.6–10.2)
CHLORIDE BLD-SCNC: 104 MEQ/L (ref 98–107)
CLARITY: CLEAR
CO2: 27 MEQ/L (ref 22–29)
COLOR: YELLOW
CREAT SERPL-MCNC: 1.08 MG/DL (ref 0.7–1.2)
EKG ATRIAL RATE: 74 BPM
EKG P AXIS: 28 DEGREES
EKG P-R INTERVAL: 148 MS
EKG Q-T INTERVAL: 402 MS
EKG QRS DURATION: 96 MS
EKG QTC CALCULATION (BAZETT): 446 MS
EKG R AXIS: 56 DEGREES
EKG T AXIS: 69 DEGREES
EKG VENTRICULAR RATE: 74 BPM
GFR AFRICAN AMERICAN: >60
GFR NON-AFRICAN AMERICAN: >60
GLUCOSE BLD-MCNC: 101 MG/DL (ref 74–109)
GLUCOSE URINE: NEGATIVE MG/DL
HCT VFR BLD CALC: 49 % (ref 42–52)
HEMOGLOBIN: 16 G/DL (ref 14–18)
KETONES, URINE: NEGATIVE MG/DL
LEUKOCYTE ESTERASE, URINE: NEGATIVE
MCH RBC QN AUTO: 32.4 PG (ref 27–31.3)
MCHC RBC AUTO-ENTMCNC: 32.8 % (ref 33–37)
MCV RBC AUTO: 98.8 FL (ref 80–100)
MUCUS: PRESENT
NITRITE, URINE: NEGATIVE
PDW BLD-RTO: 15.1 % (ref 11.5–14.5)
PH UA: 5.5 (ref 5–9)
PLATELET # BLD: 346 K/UL (ref 130–400)
POTASSIUM SERPL-SCNC: 4.4 MEQ/L (ref 3.5–5.1)
PROTEIN UA: NEGATIVE MG/DL
RBC # BLD: 4.95 M/UL (ref 4.7–6.1)
RBC UA: NORMAL /HPF (ref 0–2)
SODIUM BLD-SCNC: 143 MEQ/L (ref 132–144)
SPECIFIC GRAVITY UA: 1.02 (ref 1–1.03)
URINE REFLEX TO CULTURE: YES
URINE TYPE: ABNORMAL
UROBILINOGEN, URINE: 0.2 E.U./DL
WBC # BLD: 10.1 K/UL (ref 4.8–10.8)
WBC UA: NORMAL /HPF (ref 0–5)

## 2017-10-05 PROCEDURE — 86900 BLOOD TYPING SEROLOGIC ABO: CPT

## 2017-10-05 PROCEDURE — 81001 URINALYSIS AUTO W/SCOPE: CPT

## 2017-10-05 PROCEDURE — 86850 RBC ANTIBODY SCREEN: CPT

## 2017-10-05 PROCEDURE — 85027 COMPLETE CBC AUTOMATED: CPT

## 2017-10-05 PROCEDURE — 80048 BASIC METABOLIC PNL TOTAL CA: CPT

## 2017-10-05 PROCEDURE — 93005 ELECTROCARDIOGRAM TRACING: CPT

## 2017-10-05 PROCEDURE — 86901 BLOOD TYPING SEROLOGIC RH(D): CPT

## 2017-10-05 PROCEDURE — 87086 URINE CULTURE/COLONY COUNT: CPT

## 2017-10-05 RX ORDER — SODIUM CHLORIDE 0.9 % (FLUSH) 0.9 %
10 SYRINGE (ML) INJECTION EVERY 12 HOURS SCHEDULED
Status: CANCELLED | OUTPATIENT
Start: 2017-10-05

## 2017-10-05 RX ORDER — LIDOCAINE HYDROCHLORIDE 10 MG/ML
1 INJECTION, SOLUTION EPIDURAL; INFILTRATION; INTRACAUDAL; PERINEURAL
Status: CANCELLED | OUTPATIENT
Start: 2017-10-05 | End: 2017-10-05

## 2017-10-05 RX ORDER — SODIUM CHLORIDE, SODIUM LACTATE, POTASSIUM CHLORIDE, CALCIUM CHLORIDE 600; 310; 30; 20 MG/100ML; MG/100ML; MG/100ML; MG/100ML
INJECTION, SOLUTION INTRAVENOUS CONTINUOUS
Status: CANCELLED | OUTPATIENT
Start: 2017-10-05

## 2017-10-05 RX ORDER — SODIUM CHLORIDE 0.9 % (FLUSH) 0.9 %
10 SYRINGE (ML) INJECTION PRN
Status: CANCELLED | OUTPATIENT
Start: 2017-10-05

## 2017-10-05 NOTE — PROGRESS NOTES
Intermittent cocaine and marijuana user. Pt. States last cocaine was one month ago. Contraindications to TXA Therapy  no  1) Hypersensitivity to tranexamic acid?    no  2) Defective color vision at baseline?    no  3) Chronic degenerative conjunctivitis with fibrin deposits?    no  4) Blood clot WITHIN the vessels of the eye?     no  5) Recent hx of thromboembolism (DVT, PE, MI, CABG, stents, CVA, etc.)?     no  6) Heart valve disease?    no  7) Hemorrhage in the subarachnoid space of brain?  no  8) Fluid accumulation in the brain?  no  9) Recent hx of seizures (well controlled seizures on meds are not a contraindication)?  no  10) Hypercoagulable state?  no  11) Kidney impairment?   no   1. Do you have a recent history of cancer?    no  2. Recent chemotherapy?    no  3. Low Ejection Fraction?    no  4. Pulmonary Hypertension?          Patients with serum creat gr than 1.5 will receive ONLY a single dose of TXA in O.R

## 2017-10-07 LAB — URINE CULTURE, ROUTINE: NORMAL

## 2017-11-08 RX ORDER — ASPIRIN 81 MG/1
81 TABLET ORAL DAILY
Qty: 30 TABLET | Refills: 5 | Status: SHIPPED | OUTPATIENT
Start: 2017-11-08 | End: 2018-07-05 | Stop reason: SDUPTHER

## 2017-11-15 ENCOUNTER — TELEPHONE (OUTPATIENT)
Dept: OTHER | Facility: CLINIC | Age: 62
End: 2017-11-15

## 2017-11-29 ENCOUNTER — TELEPHONE (OUTPATIENT)
Dept: CARDIOLOGY | Age: 62
End: 2017-11-29

## 2017-11-29 NOTE — TELEPHONE ENCOUNTER
Patient came in about 10:30am requesting a same day appointment. I put him on the schedule for 11:00 and asked how he wanted to do his copay. He asked how much time he had before his appointment and when I told him it would be about 1/2 hour he said he would take care of it when he came back. Then he left and never came back. Around 11:30 I tried calling his number and his emergency contact number but was unable to find a way to reach him. Klever Mcghee sent him an active [employer] as well.

## 2017-12-02 DIAGNOSIS — J44.9 CHRONIC OBSTRUCTIVE PULMONARY DISEASE, UNSPECIFIED COPD TYPE (HCC): Chronic | ICD-10-CM

## 2017-12-02 NOTE — LETTER
[address]  [address]  Phone: [phone]  Fax: [phone]      December 7, 2017    Carmen Sarmiento  [address]  [place] [address]      Dear Mika Murphy,         We have been unable to contact you by telephone to discuss a Medication Question. Please contact our office to discuss this matter as soon as possible.           Sincerely,    [facility]

## 2017-12-03 NOTE — TELEPHONE ENCOUNTER
Last seen 10/3/2017. Has follow up scheduled 12/4/2017. Medication is pending if okay. Please advise.

## 2017-12-04 RX ORDER — IPRATROPIUM BROMIDE AND ALBUTEROL SULFATE 2.5; .5 MG/3ML; MG/3ML
SOLUTION RESPIRATORY (INHALATION)
Qty: 360 ML | Refills: 1 | OUTPATIENT
Start: 2017-12-04

## 2017-12-04 NOTE — TELEPHONE ENCOUNTER
Patient is using albuterol nebulizer at home and should not be doing both the albuterol and the DuoNeb

## 2017-12-22 DIAGNOSIS — J44.9 CHRONIC OBSTRUCTIVE PULMONARY DISEASE, UNSPECIFIED COPD TYPE (HCC): ICD-10-CM

## 2017-12-22 RX ORDER — ALBUTEROL SULFATE 2.5 MG/3ML
SOLUTION RESPIRATORY (INHALATION)
Qty: 150 ML | Refills: 1 | Status: SHIPPED | OUTPATIENT
Start: 2017-12-22 | End: 2018-01-15

## 2018-01-15 ENCOUNTER — OFFICE VISIT (OUTPATIENT)
Dept: FAMILY MEDICINE CLINIC | Age: 63
End: 2018-01-15

## 2018-01-15 ENCOUNTER — TELEPHONE (OUTPATIENT)
Dept: FAMILY MEDICINE CLINIC | Age: 63
End: 2018-01-15

## 2018-01-15 VITALS
HEIGHT: 72 IN | BODY MASS INDEX: 32.15 KG/M2 | HEART RATE: 106 BPM | SYSTOLIC BLOOD PRESSURE: 138 MMHG | OXYGEN SATURATION: 98 % | TEMPERATURE: 97.9 F | DIASTOLIC BLOOD PRESSURE: 86 MMHG | RESPIRATION RATE: 16 BRPM | WEIGHT: 237.4 LBS

## 2018-01-15 DIAGNOSIS — F14.10 COCAINE ABUSE (HCC): Chronic | ICD-10-CM

## 2018-01-15 DIAGNOSIS — D64.9 ANEMIA, UNSPECIFIED TYPE: Chronic | ICD-10-CM

## 2018-01-15 DIAGNOSIS — J30.2 CHRONIC SEASONAL ALLERGIC RHINITIS, UNSPECIFIED TRIGGER: Chronic | ICD-10-CM

## 2018-01-15 DIAGNOSIS — R91.8 ABNORMAL CT SCAN OF LUNG: ICD-10-CM

## 2018-01-15 DIAGNOSIS — M10.9 GOUT, UNSPECIFIED CAUSE, UNSPECIFIED CHRONICITY, UNSPECIFIED SITE: Chronic | ICD-10-CM

## 2018-01-15 DIAGNOSIS — R73.03 PRE-DIABETES: Chronic | ICD-10-CM

## 2018-01-15 DIAGNOSIS — F10.10 ALCOHOL ABUSE: Chronic | ICD-10-CM

## 2018-01-15 DIAGNOSIS — Z91.09 HOUSE DUST MITE ALLERGY: Chronic | ICD-10-CM

## 2018-01-15 DIAGNOSIS — G47.30 SEVERE SLEEP APNEA: Chronic | ICD-10-CM

## 2018-01-15 DIAGNOSIS — Z13.31 POSITIVE DEPRESSION SCREENING: Primary | ICD-10-CM

## 2018-01-15 DIAGNOSIS — I10 ESSENTIAL HYPERTENSION: Chronic | ICD-10-CM

## 2018-01-15 DIAGNOSIS — I47.1 SUPRAVENTRICULAR TACHYCARDIA (HCC): Chronic | ICD-10-CM

## 2018-01-15 DIAGNOSIS — E78.5 HYPERLIPIDEMIA, UNSPECIFIED HYPERLIPIDEMIA TYPE: Chronic | ICD-10-CM

## 2018-01-15 DIAGNOSIS — E66.01 MORBID OBESITY DUE TO EXCESS CALORIES (HCC): Chronic | ICD-10-CM

## 2018-01-15 DIAGNOSIS — J44.9 CHRONIC OBSTRUCTIVE PULMONARY DISEASE, UNSPECIFIED COPD TYPE (HCC): Chronic | ICD-10-CM

## 2018-01-15 PROCEDURE — 99214 OFFICE O/P EST MOD 30 MIN: CPT | Performed by: FAMILY MEDICINE

## 2018-01-15 PROCEDURE — G8431 POS CLIN DEPRES SCRN F/U DOC: HCPCS | Performed by: FAMILY MEDICINE

## 2018-01-15 RX ORDER — IPRATROPIUM BROMIDE AND ALBUTEROL SULFATE 2.5; .5 MG/3ML; MG/3ML
1 SOLUTION RESPIRATORY (INHALATION) EVERY 6 HOURS PRN
Qty: 360 ML | Refills: 1 | Status: SHIPPED | OUTPATIENT
Start: 2018-01-15 | End: 2018-04-09 | Stop reason: SDUPTHER

## 2018-01-15 RX ORDER — MONTELUKAST SODIUM 10 MG/1
10 TABLET ORAL NIGHTLY
Qty: 90 TABLET | Refills: 3 | Status: SHIPPED | OUTPATIENT
Start: 2018-01-15 | End: 2018-09-24 | Stop reason: SDUPTHER

## 2018-01-15 ASSESSMENT — ENCOUNTER SYMPTOMS
NAUSEA: 0
SHORTNESS OF BREATH: 0
VOMITING: 0
DIARRHEA: 0
CHEST TIGHTNESS: 0
WHEEZING: 1
ABDOMINAL PAIN: 0
COUGH: 1

## 2018-01-15 ASSESSMENT — PATIENT HEALTH QUESTIONNAIRE - PHQ9
SUM OF ALL RESPONSES TO PHQ9 QUESTIONS 1 & 2: 6
4. FEELING TIRED OR HAVING LITTLE ENERGY: 1
7. TROUBLE CONCENTRATING ON THINGS, SUCH AS READING THE NEWSPAPER OR WATCHING TELEVISION: 3
6. FEELING BAD ABOUT YOURSELF - OR THAT YOU ARE A FAILURE OR HAVE LET YOURSELF OR YOUR FAMILY DOWN: 2
8. MOVING OR SPEAKING SO SLOWLY THAT OTHER PEOPLE COULD HAVE NOTICED. OR THE OPPOSITE, BEING SO FIGETY OR RESTLESS THAT YOU HAVE BEEN MOVING AROUND A LOT MORE THAN USUAL: 2
2. FEELING DOWN, DEPRESSED OR HOPELESS: 3
SUM OF ALL RESPONSES TO PHQ QUESTIONS 1-9: 17
5. POOR APPETITE OR OVEREATING: 0
9. THOUGHTS THAT YOU WOULD BE BETTER OFF DEAD, OR OF HURTING YOURSELF: 1
3. TROUBLE FALLING OR STAYING ASLEEP: 2
1. LITTLE INTEREST OR PLEASURE IN DOING THINGS: 3
10. IF YOU CHECKED OFF ANY PROBLEMS, HOW DIFFICULT HAVE THESE PROBLEMS MADE IT FOR YOU TO DO YOUR WORK, TAKE CARE OF THINGS AT HOME, OR GET ALONG WITH OTHER PEOPLE: 0

## 2018-01-15 NOTE — TELEPHONE ENCOUNTER
Dr. Oksana Farley wanted me to find out if there is a patient assistance program that we could refer patient to to help with Spiriva. Maegan Dash do you have any suggestions?

## 2018-01-16 NOTE — TELEPHONE ENCOUNTER
No phone number on file for patient. No personal rep on file. I have contacted Ariana Muñoz his Emergency Contact and she does not have a way of getting ahold of patient either but, said that she would let him know to call us if she heard from him.

## 2018-01-16 NOTE — TELEPHONE ENCOUNTER
Form has been sign. Prescription has been printed and signed.  Please contact patient to finish the forms and provide the necessary information

## 2018-03-09 DIAGNOSIS — J30.2 CHRONIC SEASONAL ALLERGIC RHINITIS, UNSPECIFIED TRIGGER: Primary | Chronic | ICD-10-CM

## 2018-03-09 RX ORDER — FLUTICASONE PROPIONATE 50 MCG
2 SPRAY, SUSPENSION (ML) NASAL DAILY
Qty: 1 BOTTLE | Refills: 5 | OUTPATIENT
Start: 2018-03-09

## 2018-03-09 RX ORDER — FLUTICASONE PROPIONATE 50 MCG
2 SPRAY, SUSPENSION (ML) NASAL DAILY
Qty: 3 BOTTLE | Refills: 3 | Status: SHIPPED | OUTPATIENT
Start: 2018-03-09 | End: 2018-09-24 | Stop reason: SDUPTHER

## 2018-03-14 DIAGNOSIS — J44.9 CHRONIC OBSTRUCTIVE PULMONARY DISEASE, UNSPECIFIED COPD TYPE (HCC): ICD-10-CM

## 2018-03-14 RX ORDER — ALBUTEROL SULFATE 2.5 MG/3ML
SOLUTION RESPIRATORY (INHALATION)
Qty: 150 ML | Refills: 1 | OUTPATIENT
Start: 2018-03-14

## 2018-03-14 NOTE — TELEPHONE ENCOUNTER
Patient should be using duoneb with his nebulizer and not albuterol.  He was given rx for duoneb in January 2018

## 2018-03-20 DIAGNOSIS — J44.9 CHRONIC OBSTRUCTIVE PULMONARY DISEASE, UNSPECIFIED COPD TYPE (HCC): Primary | Chronic | ICD-10-CM

## 2018-03-20 RX ORDER — ALBUTEROL SULFATE 90 UG/1
2 AEROSOL, METERED RESPIRATORY (INHALATION) EVERY 6 HOURS PRN
Qty: 1 INHALER | Refills: 0 | Status: SHIPPED | OUTPATIENT
Start: 2018-03-20 | End: 2018-06-06 | Stop reason: SDUPTHER

## 2018-03-20 NOTE — TELEPHONE ENCOUNTER
We received a fax from the pharmacy that the pt is requesting a refill on pro air--this requires a prior auth--they prefer ventolin, if this is okay can you send in or do you want me to do prior auth---please advise----ah

## 2018-03-20 NOTE — TELEPHONE ENCOUNTER
Prescription for Ventolin sent to the pharmacy.   Please call patient to inform him that this is the same medication just a different

## 2018-05-06 ENCOUNTER — HOSPITAL ENCOUNTER (EMERGENCY)
Age: 63
Discharge: HOME | End: 2018-05-06
Attending: EMERGENCY MEDICINE
Payer: COMMERCIAL

## 2018-05-06 VITALS
SYSTOLIC BLOOD PRESSURE: 142 MMHG | BODY MASS INDEX: 35.55 KG/M2 | DIASTOLIC BLOOD PRESSURE: 81 MMHG | RESPIRATION RATE: 24 BRPM | WEIGHT: 240 LBS | HEART RATE: 83 BPM | OXYGEN SATURATION: 96 % | TEMPERATURE: 98.9 F | HEIGHT: 69 IN

## 2018-05-06 DIAGNOSIS — J01.11 ACUTE RECURRENT FRONTAL SINUSITIS: ICD-10-CM

## 2018-05-06 DIAGNOSIS — J44.1 COPD EXACERBATION (HCC): Primary | ICD-10-CM

## 2018-05-06 PROCEDURE — 6360000002 HC RX W HCPCS: Performed by: EMERGENCY MEDICINE

## 2018-05-06 PROCEDURE — 99284 EMERGENCY DEPT VISIT MOD MDM: CPT

## 2018-05-06 PROCEDURE — 6370000000 HC RX 637 (ALT 250 FOR IP): Performed by: EMERGENCY MEDICINE

## 2018-05-06 PROCEDURE — 94640 AIRWAY INHALATION TREATMENT: CPT

## 2018-05-06 RX ORDER — ALBUTEROL SULFATE 90 UG/1
AEROSOL, METERED RESPIRATORY (INHALATION)
Qty: 1 INHALER | Refills: 0 | Status: SHIPPED | OUTPATIENT
Start: 2018-05-06 | End: 2018-06-07 | Stop reason: SDUPTHER

## 2018-05-06 RX ORDER — PREDNISONE 20 MG/1
20 TABLET ORAL DAILY
Qty: 5 TABLET | Refills: 0 | Status: SHIPPED | OUTPATIENT
Start: 2018-05-06 | End: 2018-05-11

## 2018-05-06 RX ORDER — AZITHROMYCIN 250 MG/1
TABLET, FILM COATED ORAL
Qty: 6 TABLET | Refills: 0 | Status: SHIPPED | OUTPATIENT
Start: 2018-05-06 | End: 2018-05-14 | Stop reason: ALTCHOICE

## 2018-05-06 RX ORDER — IPRATROPIUM BROMIDE AND ALBUTEROL SULFATE 2.5; .5 MG/3ML; MG/3ML
1 SOLUTION RESPIRATORY (INHALATION) ONCE
Status: COMPLETED | OUTPATIENT
Start: 2018-05-06 | End: 2018-05-06

## 2018-05-06 RX ORDER — DEXAMETHASONE 4 MG/1
8 TABLET ORAL ONCE
Status: COMPLETED | OUTPATIENT
Start: 2018-05-06 | End: 2018-05-06

## 2018-05-06 RX ORDER — AZITHROMYCIN 250 MG/1
500 TABLET, FILM COATED ORAL ONCE
Status: COMPLETED | OUTPATIENT
Start: 2018-05-06 | End: 2018-05-06

## 2018-05-06 RX ADMIN — IPRATROPIUM BROMIDE AND ALBUTEROL SULFATE 1 AMPULE: .5; 3 SOLUTION RESPIRATORY (INHALATION) at 02:47

## 2018-05-06 RX ADMIN — AZITHROMYCIN 500 MG: 250 TABLET, FILM COATED ORAL at 02:55

## 2018-05-06 RX ADMIN — DEXAMETHASONE 8 MG: 4 TABLET ORAL at 02:55

## 2018-05-06 ASSESSMENT — ENCOUNTER SYMPTOMS
EYE REDNESS: 0
EYE PAIN: 0
TROUBLE SWALLOWING: 0
VOMITING: 0
EYE DISCHARGE: 0
STRIDOR: 0
COUGH: 1
ABDOMINAL PAIN: 0
BACK PAIN: 0
WHEEZING: 1
FACIAL SWELLING: 0
VOICE CHANGE: 0
CONSTIPATION: 0
BLOOD IN STOOL: 0
SORE THROAT: 0
DIARRHEA: 0
RHINORRHEA: 1
SHORTNESS OF BREATH: 1
CHEST TIGHTNESS: 0
SINUS PRESSURE: 1
SINUS PAIN: 1
CHOKING: 0

## 2018-05-06 ASSESSMENT — PAIN DESCRIPTION - DESCRIPTORS: DESCRIPTORS: ACHING

## 2018-05-06 ASSESSMENT — PAIN DESCRIPTION - FREQUENCY: FREQUENCY: CONTINUOUS

## 2018-05-06 ASSESSMENT — PAIN SCALES - GENERAL: PAINLEVEL_OUTOF10: 8

## 2018-05-06 ASSESSMENT — PAIN DESCRIPTION - PAIN TYPE: TYPE: CHRONIC PAIN

## 2018-05-06 ASSESSMENT — PAIN DESCRIPTION - LOCATION: LOCATION: KNEE

## 2018-05-06 ASSESSMENT — PAIN DESCRIPTION - ORIENTATION: ORIENTATION: LEFT

## 2018-05-11 RX ORDER — IPRATROPIUM BROMIDE AND ALBUTEROL SULFATE 2.5; .5 MG/3ML; MG/3ML
1 SOLUTION RESPIRATORY (INHALATION) EVERY 6 HOURS PRN
Qty: 360 ML | Refills: 0 | Status: SHIPPED | OUTPATIENT
Start: 2018-05-11 | End: 2018-06-12 | Stop reason: SDUPTHER

## 2018-05-14 ENCOUNTER — OFFICE VISIT (OUTPATIENT)
Dept: FAMILY MEDICINE CLINIC | Age: 63
End: 2018-05-14
Payer: COMMERCIAL

## 2018-05-14 VITALS
BODY MASS INDEX: 33.41 KG/M2 | DIASTOLIC BLOOD PRESSURE: 68 MMHG | HEART RATE: 100 BPM | WEIGHT: 225.6 LBS | OXYGEN SATURATION: 96 % | HEIGHT: 69 IN | SYSTOLIC BLOOD PRESSURE: 124 MMHG | RESPIRATION RATE: 16 BRPM | TEMPERATURE: 96.8 F

## 2018-05-14 DIAGNOSIS — I47.1 SUPRAVENTRICULAR TACHYCARDIA (HCC): Chronic | ICD-10-CM

## 2018-05-14 DIAGNOSIS — J44.9 CHRONIC OBSTRUCTIVE PULMONARY DISEASE, UNSPECIFIED COPD TYPE (HCC): Primary | Chronic | ICD-10-CM

## 2018-05-14 DIAGNOSIS — E66.01 MORBID OBESITY DUE TO EXCESS CALORIES (HCC): Chronic | ICD-10-CM

## 2018-05-14 DIAGNOSIS — I10 ESSENTIAL HYPERTENSION: Chronic | ICD-10-CM

## 2018-05-14 DIAGNOSIS — R91.8 ABNORMAL CT SCAN OF LUNG: ICD-10-CM

## 2018-05-14 DIAGNOSIS — E78.5 HYPERLIPIDEMIA, UNSPECIFIED HYPERLIPIDEMIA TYPE: Chronic | ICD-10-CM

## 2018-05-14 DIAGNOSIS — R73.03 PRE-DIABETES: Chronic | ICD-10-CM

## 2018-05-14 DIAGNOSIS — M10.9 GOUT, UNSPECIFIED CAUSE, UNSPECIFIED CHRONICITY, UNSPECIFIED SITE: Chronic | ICD-10-CM

## 2018-05-14 DIAGNOSIS — F10.10 ALCOHOL ABUSE: Chronic | ICD-10-CM

## 2018-05-14 DIAGNOSIS — F14.10 COCAINE ABUSE (HCC): Chronic | ICD-10-CM

## 2018-05-14 DIAGNOSIS — Z12.11 COLON CANCER SCREENING: ICD-10-CM

## 2018-05-14 PROCEDURE — 99214 OFFICE O/P EST MOD 30 MIN: CPT | Performed by: FAMILY MEDICINE

## 2018-05-14 RX ORDER — BUDESONIDE AND FORMOTEROL FUMARATE DIHYDRATE 160; 4.5 UG/1; UG/1
2 AEROSOL RESPIRATORY (INHALATION) 2 TIMES DAILY
Qty: 3 INHALER | Refills: 3 | Status: CANCELLED | OUTPATIENT
Start: 2018-05-14

## 2018-05-14 ASSESSMENT — ENCOUNTER SYMPTOMS
NAUSEA: 0
CONSTIPATION: 0
BLOOD IN STOOL: 0
VOMITING: 0
WHEEZING: 1
SHORTNESS OF BREATH: 1
CHEST TIGHTNESS: 0
ABDOMINAL PAIN: 0
DIARRHEA: 0
COUGH: 0
ANAL BLEEDING: 0

## 2018-05-18 ENCOUNTER — NURSE ONLY (OUTPATIENT)
Dept: FAMILY MEDICINE CLINIC | Age: 63
End: 2018-05-18
Payer: COMMERCIAL

## 2018-05-18 DIAGNOSIS — Z12.11 COLON CANCER SCREENING: ICD-10-CM

## 2018-05-18 LAB
CONTROL: NORMAL
HEMOCCULT STL QL: POSITIVE

## 2018-05-18 PROCEDURE — 82274 ASSAY TEST FOR BLOOD FECAL: CPT | Performed by: FAMILY MEDICINE

## 2018-05-19 ENCOUNTER — HOSPITAL ENCOUNTER (EMERGENCY)
Age: 63
Discharge: HOME | End: 2018-05-19
Attending: EMERGENCY MEDICINE
Payer: COMMERCIAL

## 2018-05-19 VITALS
SYSTOLIC BLOOD PRESSURE: 137 MMHG | RESPIRATION RATE: 20 BRPM | DIASTOLIC BLOOD PRESSURE: 83 MMHG | HEART RATE: 70 BPM | BODY MASS INDEX: 31.7 KG/M2 | HEIGHT: 69 IN | WEIGHT: 214 LBS | TEMPERATURE: 98.6 F | OXYGEN SATURATION: 95 %

## 2018-05-19 DIAGNOSIS — J44.1 COPD EXACERBATION (HCC): Primary | ICD-10-CM

## 2018-05-19 PROCEDURE — 99284 EMERGENCY DEPT VISIT MOD MDM: CPT

## 2018-05-19 PROCEDURE — 6370000000 HC RX 637 (ALT 250 FOR IP): Performed by: EMERGENCY MEDICINE

## 2018-05-19 PROCEDURE — 6360000002 HC RX W HCPCS: Performed by: EMERGENCY MEDICINE

## 2018-05-19 PROCEDURE — 94640 AIRWAY INHALATION TREATMENT: CPT

## 2018-05-19 RX ORDER — IPRATROPIUM BROMIDE AND ALBUTEROL SULFATE 2.5; .5 MG/3ML; MG/3ML
1 SOLUTION RESPIRATORY (INHALATION) ONCE
Status: COMPLETED | OUTPATIENT
Start: 2018-05-19 | End: 2018-05-19

## 2018-05-19 RX ORDER — AZITHROMYCIN 250 MG/1
TABLET, FILM COATED ORAL
Qty: 6 TABLET | Refills: 0 | Status: SHIPPED | OUTPATIENT
Start: 2018-05-19 | End: 2018-05-29

## 2018-05-19 RX ORDER — BENZONATATE 100 MG/1
100 CAPSULE ORAL 3 TIMES DAILY PRN
Qty: 20 CAPSULE | Refills: 0 | Status: SHIPPED | OUTPATIENT
Start: 2018-05-19 | End: 2019-05-20

## 2018-05-19 RX ORDER — DEXAMETHASONE 4 MG/1
8 TABLET ORAL ONCE
Status: COMPLETED | OUTPATIENT
Start: 2018-05-19 | End: 2018-05-19

## 2018-05-19 RX ORDER — PREDNISONE 20 MG/1
20 TABLET ORAL DAILY
Qty: 5 TABLET | Refills: 0 | Status: SHIPPED | OUTPATIENT
Start: 2018-05-19 | End: 2018-05-24

## 2018-05-19 RX ADMIN — IPRATROPIUM BROMIDE AND ALBUTEROL SULFATE 1 AMPULE: .5; 3 SOLUTION RESPIRATORY (INHALATION) at 09:54

## 2018-05-19 RX ADMIN — DEXAMETHASONE 8 MG: 4 TABLET ORAL at 09:52

## 2018-05-19 ASSESSMENT — PAIN DESCRIPTION - PAIN TYPE: TYPE: CHRONIC PAIN

## 2018-05-19 ASSESSMENT — ENCOUNTER SYMPTOMS
TROUBLE SWALLOWING: 0
BACK PAIN: 0
COUGH: 1
EYE PAIN: 0
DIARRHEA: 0
SHORTNESS OF BREATH: 1
VOICE CHANGE: 0
CONSTIPATION: 0
SINUS PRESSURE: 0
FACIAL SWELLING: 0
CHOKING: 0
EYE DISCHARGE: 0
VOMITING: 0
CHEST TIGHTNESS: 0
BLOOD IN STOOL: 0
SORE THROAT: 0
WHEEZING: 1
STRIDOR: 0
ABDOMINAL PAIN: 0
EYE REDNESS: 0
RHINORRHEA: 1

## 2018-05-19 ASSESSMENT — PAIN SCALES - GENERAL: PAINLEVEL_OUTOF10: 4

## 2018-05-19 ASSESSMENT — PAIN DESCRIPTION - DESCRIPTORS: DESCRIPTORS: ACHING

## 2018-05-19 ASSESSMENT — PAIN DESCRIPTION - ORIENTATION: ORIENTATION: LEFT

## 2018-05-19 ASSESSMENT — PAIN DESCRIPTION - LOCATION: LOCATION: KNEE

## 2018-05-20 DIAGNOSIS — R19.5 POSITIVE FIT (FECAL IMMUNOCHEMICAL TEST): Primary | ICD-10-CM

## 2018-05-29 ENCOUNTER — HOSPITAL ENCOUNTER (EMERGENCY)
Age: 63
Discharge: HOME | End: 2018-05-29
Attending: EMERGENCY MEDICINE
Payer: COMMERCIAL

## 2018-05-29 ENCOUNTER — APPOINTMENT (OUTPATIENT)
Dept: GENERAL RADIOLOGY | Age: 63
End: 2018-05-29
Payer: COMMERCIAL

## 2018-05-29 VITALS
WEIGHT: 240 LBS | TEMPERATURE: 98.7 F | OXYGEN SATURATION: 92 % | SYSTOLIC BLOOD PRESSURE: 135 MMHG | RESPIRATION RATE: 20 BRPM | HEIGHT: 73 IN | HEART RATE: 63 BPM | BODY MASS INDEX: 31.81 KG/M2 | DIASTOLIC BLOOD PRESSURE: 72 MMHG

## 2018-05-29 DIAGNOSIS — J44.1 COPD, FREQUENT EXACERBATIONS (HCC): Primary | ICD-10-CM

## 2018-05-29 PROCEDURE — 6370000000 HC RX 637 (ALT 250 FOR IP): Performed by: EMERGENCY MEDICINE

## 2018-05-29 PROCEDURE — 6360000002 HC RX W HCPCS: Performed by: EMERGENCY MEDICINE

## 2018-05-29 PROCEDURE — 71046 X-RAY EXAM CHEST 2 VIEWS: CPT

## 2018-05-29 PROCEDURE — 94640 AIRWAY INHALATION TREATMENT: CPT

## 2018-05-29 PROCEDURE — 99283 EMERGENCY DEPT VISIT LOW MDM: CPT

## 2018-05-29 PROCEDURE — 96372 THER/PROPH/DIAG INJ SC/IM: CPT

## 2018-05-29 RX ORDER — PREDNISONE 10 MG/1
60 TABLET ORAL DAILY
Qty: 30 TABLET | Refills: 0 | Status: SHIPPED | OUTPATIENT
Start: 2018-05-29 | End: 2018-06-03

## 2018-05-29 RX ORDER — GUAIFENESIN AND PSEUDOEPHEDRINE HCL 1200; 120 MG/1; MG/1
1 TABLET, EXTENDED RELEASE ORAL 2 TIMES DAILY
Qty: 20 TABLET | Refills: 0 | Status: SHIPPED | OUTPATIENT
Start: 2018-05-29 | End: 2018-11-09 | Stop reason: ALTCHOICE

## 2018-05-29 RX ORDER — IPRATROPIUM BROMIDE AND ALBUTEROL SULFATE 2.5; .5 MG/3ML; MG/3ML
1 SOLUTION RESPIRATORY (INHALATION) ONCE
Status: COMPLETED | OUTPATIENT
Start: 2018-05-29 | End: 2018-05-29

## 2018-05-29 RX ORDER — METHYLPREDNISOLONE SODIUM SUCCINATE 125 MG/2ML
125 INJECTION, POWDER, LYOPHILIZED, FOR SOLUTION INTRAMUSCULAR; INTRAVENOUS ONCE
Status: COMPLETED | OUTPATIENT
Start: 2018-05-29 | End: 2018-05-29

## 2018-05-29 RX ADMIN — IPRATROPIUM BROMIDE AND ALBUTEROL SULFATE 1 AMPULE: .5; 3 SOLUTION RESPIRATORY (INHALATION) at 05:58

## 2018-05-29 RX ADMIN — METHYLPREDNISOLONE SODIUM SUCCINATE 125 MG: 125 INJECTION, POWDER, FOR SOLUTION INTRAMUSCULAR; INTRAVENOUS at 05:53

## 2018-05-29 ASSESSMENT — ENCOUNTER SYMPTOMS
WHEEZING: 1
SINUS PRESSURE: 0
ABDOMINAL DISTENTION: 0
APNEA: 0
VOMITING: 0
BACK PAIN: 0
PHOTOPHOBIA: 0
CHEST TIGHTNESS: 1
RHINORRHEA: 0
NAUSEA: 0
COUGH: 1
COLOR CHANGE: 0
ABDOMINAL PAIN: 0
SHORTNESS OF BREATH: 1
EYE PAIN: 0
DIARRHEA: 0
CONSTIPATION: 0
SORE THROAT: 0

## 2018-06-06 DIAGNOSIS — J44.9 CHRONIC OBSTRUCTIVE PULMONARY DISEASE, UNSPECIFIED COPD TYPE (HCC): Chronic | ICD-10-CM

## 2018-06-07 RX ORDER — ALBUTEROL SULFATE 90 UG/1
2 AEROSOL, METERED RESPIRATORY (INHALATION) EVERY 6 HOURS PRN
Qty: 1 INHALER | Refills: 0 | Status: ON HOLD | OUTPATIENT
Start: 2018-06-07 | End: 2018-06-28

## 2018-06-12 RX ORDER — IPRATROPIUM BROMIDE AND ALBUTEROL SULFATE 2.5; .5 MG/3ML; MG/3ML
SOLUTION RESPIRATORY (INHALATION)
Qty: 360 ML | Refills: 0 | Status: SHIPPED | OUTPATIENT
Start: 2018-06-12 | End: 2018-07-30 | Stop reason: SDUPTHER

## 2018-06-24 ENCOUNTER — HOSPITAL ENCOUNTER (INPATIENT)
Age: 63
LOS: 4 days | Discharge: HOME | DRG: 190 | End: 2018-06-28
Attending: EMERGENCY MEDICINE | Admitting: INTERNAL MEDICINE
Payer: COMMERCIAL

## 2018-06-24 ENCOUNTER — APPOINTMENT (OUTPATIENT)
Dept: GENERAL RADIOLOGY | Age: 63
DRG: 190 | End: 2018-06-24
Payer: COMMERCIAL

## 2018-06-24 DIAGNOSIS — R09.02 HYPOXIA: ICD-10-CM

## 2018-06-24 DIAGNOSIS — J44.9 CHRONIC OBSTRUCTIVE PULMONARY DISEASE, UNSPECIFIED COPD TYPE (HCC): Chronic | ICD-10-CM

## 2018-06-24 DIAGNOSIS — J18.9 PNEUMONIA DUE TO ORGANISM: Primary | ICD-10-CM

## 2018-06-24 DIAGNOSIS — S20.211A CONTUSION OF RIGHT CHEST WALL, INITIAL ENCOUNTER: ICD-10-CM

## 2018-06-24 DIAGNOSIS — I10 ESSENTIAL HYPERTENSION: ICD-10-CM

## 2018-06-24 DIAGNOSIS — J44.1 COPD EXACERBATION (HCC): ICD-10-CM

## 2018-06-24 LAB
ALBUMIN SERPL-MCNC: 3.4 G/DL (ref 3.9–4.9)
ALP BLD-CCNC: 107 U/L (ref 35–104)
ALT SERPL-CCNC: 16 U/L (ref 0–41)
AMPHETAMINE SCREEN, URINE: ABNORMAL
ANION GAP SERPL CALCULATED.3IONS-SCNC: 15 MEQ/L (ref 7–13)
AST SERPL-CCNC: 19 U/L (ref 0–40)
BARBITURATE SCREEN URINE: ABNORMAL
BASE EXCESS ARTERIAL: 0
BASOPHILS ABSOLUTE: 0.1 K/UL (ref 0–0.2)
BASOPHILS RELATIVE PERCENT: 0.4 %
BENZODIAZEPINE SCREEN, URINE: ABNORMAL
BILIRUB SERPL-MCNC: 0.6 MG/DL (ref 0–1.2)
BUN BLDV-MCNC: 9 MG/DL (ref 8–23)
CALCIUM SERPL-MCNC: 9.2 MG/DL (ref 8.6–10.2)
CANNABINOID SCREEN URINE: ABNORMAL
CHLORIDE BLD-SCNC: 102 MEQ/L (ref 98–107)
CO2: 24 MEQ/L (ref 22–29)
COCAINE METABOLITE SCREEN URINE: POSITIVE
CREAT SERPL-MCNC: 0.9 MG/DL (ref 0.7–1.2)
EKG ATRIAL RATE: 108 BPM
EKG P AXIS: 85 DEGREES
EKG P-R INTERVAL: 144 MS
EKG Q-T INTERVAL: 340 MS
EKG QRS DURATION: 90 MS
EKG QTC CALCULATION (BAZETT): 455 MS
EKG R AXIS: 82 DEGREES
EKG T AXIS: 78 DEGREES
EKG VENTRICULAR RATE: 108 BPM
EOSINOPHILS ABSOLUTE: 0.3 K/UL (ref 0–0.7)
EOSINOPHILS RELATIVE PERCENT: 1.6 %
GFR AFRICAN AMERICAN: >60
GFR NON-AFRICAN AMERICAN: >60
GLOBULIN: 4.3 G/DL (ref 2.3–3.5)
GLUCOSE BLD-MCNC: 146 MG/DL (ref 74–109)
HCO3 ARTERIAL: 24.3 MMOL/L (ref 21–29)
HCT VFR BLD CALC: 43.7 % (ref 42–52)
HEMOGLOBIN: 14.7 G/DL (ref 14–18)
LACTIC ACID: 0.9 MMOL/L (ref 0.5–2.2)
LYMPHOCYTES ABSOLUTE: 1.9 K/UL (ref 1–4.8)
LYMPHOCYTES RELATIVE PERCENT: 11.9 %
Lab: ABNORMAL
MCH RBC QN AUTO: 32.4 PG (ref 27–31.3)
MCHC RBC AUTO-ENTMCNC: 33.6 % (ref 33–37)
MCV RBC AUTO: 96.4 FL (ref 80–100)
MONOCYTES ABSOLUTE: 1.5 K/UL (ref 0.2–0.8)
MONOCYTES RELATIVE PERCENT: 9.1 %
NEUTROPHILS ABSOLUTE: 12.4 K/UL (ref 1.4–6.5)
NEUTROPHILS RELATIVE PERCENT: 77 %
O2 SAT, ARTERIAL: 92 % (ref 93–100)
OPIATE SCREEN URINE: ABNORMAL
PCO2 ARTERIAL: 36 MM HG (ref 35–45)
PDW BLD-RTO: 16.1 % (ref 11.5–14.5)
PERFORMED ON: ABNORMAL
PH ARTERIAL: 7.44 (ref 7.35–7.45)
PHENCYCLIDINE SCREEN URINE: ABNORMAL
PLATELET # BLD: 527 K/UL (ref 130–400)
PO2 ARTERIAL: 61 MM HG (ref 75–108)
POC FIO2: 50
POC SAMPLE TYPE: ABNORMAL
POTASSIUM SERPL-SCNC: 3.7 MEQ/L (ref 3.5–5.1)
RBC # BLD: 4.54 M/UL (ref 4.7–6.1)
SODIUM BLD-SCNC: 141 MEQ/L (ref 132–144)
TCO2 ARTERIAL: 25
TOTAL PROTEIN: 7.7 G/DL (ref 6.4–8.1)
TRICYCLIC, URINE: ABNORMAL
TROPONIN: <0.01 NG/ML (ref 0–0.01)
WBC # BLD: 16.1 K/UL (ref 4.8–10.8)

## 2018-06-24 PROCEDURE — 93005 ELECTROCARDIOGRAM TRACING: CPT

## 2018-06-24 PROCEDURE — 82803 BLOOD GASES ANY COMBINATION: CPT

## 2018-06-24 PROCEDURE — 94640 AIRWAY INHALATION TREATMENT: CPT

## 2018-06-24 PROCEDURE — 36415 COLL VENOUS BLD VENIPUNCTURE: CPT

## 2018-06-24 PROCEDURE — 2580000003 HC RX 258: Performed by: EMERGENCY MEDICINE

## 2018-06-24 PROCEDURE — 84484 ASSAY OF TROPONIN QUANT: CPT

## 2018-06-24 PROCEDURE — 83605 ASSAY OF LACTIC ACID: CPT

## 2018-06-24 PROCEDURE — 80053 COMPREHEN METABOLIC PANEL: CPT

## 2018-06-24 PROCEDURE — 36600 WITHDRAWAL OF ARTERIAL BLOOD: CPT

## 2018-06-24 PROCEDURE — 99285 EMERGENCY DEPT VISIT HI MDM: CPT

## 2018-06-24 PROCEDURE — 87040 BLOOD CULTURE FOR BACTERIA: CPT

## 2018-06-24 PROCEDURE — 6360000002 HC RX W HCPCS: Performed by: INTERNAL MEDICINE

## 2018-06-24 PROCEDURE — 1210000000 HC MED SURG R&B

## 2018-06-24 PROCEDURE — 6370000000 HC RX 637 (ALT 250 FOR IP): Performed by: INTERNAL MEDICINE

## 2018-06-24 PROCEDURE — 6360000002 HC RX W HCPCS: Performed by: EMERGENCY MEDICINE

## 2018-06-24 PROCEDURE — 71046 X-RAY EXAM CHEST 2 VIEWS: CPT

## 2018-06-24 PROCEDURE — 96375 TX/PRO/DX INJ NEW DRUG ADDON: CPT

## 2018-06-24 PROCEDURE — 73130 X-RAY EXAM OF HAND: CPT

## 2018-06-24 PROCEDURE — 2580000003 HC RX 258: Performed by: INTERNAL MEDICINE

## 2018-06-24 PROCEDURE — 80307 DRUG TEST PRSMV CHEM ANLYZR: CPT

## 2018-06-24 PROCEDURE — 96374 THER/PROPH/DIAG INJ IV PUSH: CPT

## 2018-06-24 PROCEDURE — 2700000000 HC OXYGEN THERAPY PER DAY

## 2018-06-24 PROCEDURE — 6370000000 HC RX 637 (ALT 250 FOR IP): Performed by: EMERGENCY MEDICINE

## 2018-06-24 PROCEDURE — 85025 COMPLETE CBC W/AUTO DIFF WBC: CPT

## 2018-06-24 RX ORDER — TRAMADOL HYDROCHLORIDE 50 MG/1
50 TABLET ORAL EVERY 6 HOURS PRN
Status: DISCONTINUED | OUTPATIENT
Start: 2018-06-24 | End: 2018-06-28 | Stop reason: HOSPADM

## 2018-06-24 RX ORDER — SODIUM CHLORIDE 0.9 % (FLUSH) 0.9 %
10 SYRINGE (ML) INJECTION PRN
Status: DISCONTINUED | OUTPATIENT
Start: 2018-06-24 | End: 2018-06-28 | Stop reason: HOSPADM

## 2018-06-24 RX ORDER — SIMVASTATIN 5 MG
5 TABLET ORAL NIGHTLY
Status: DISCONTINUED | OUTPATIENT
Start: 2018-06-24 | End: 2018-06-28 | Stop reason: HOSPADM

## 2018-06-24 RX ORDER — GUAIFENESIN AND PSEUDOEPHEDRINE HCL 1200; 120 MG/1; MG/1
1 TABLET, EXTENDED RELEASE ORAL 2 TIMES DAILY
Status: DISCONTINUED | OUTPATIENT
Start: 2018-06-24 | End: 2018-06-24 | Stop reason: RX

## 2018-06-24 RX ORDER — IPRATROPIUM BROMIDE AND ALBUTEROL SULFATE 2.5; .5 MG/3ML; MG/3ML
1 SOLUTION RESPIRATORY (INHALATION)
Status: DISCONTINUED | OUTPATIENT
Start: 2018-06-24 | End: 2018-06-28 | Stop reason: HOSPADM

## 2018-06-24 RX ORDER — MONTELUKAST SODIUM 10 MG/1
10 TABLET ORAL NIGHTLY
Status: DISCONTINUED | OUTPATIENT
Start: 2018-06-24 | End: 2018-06-28 | Stop reason: HOSPADM

## 2018-06-24 RX ORDER — IPRATROPIUM BROMIDE AND ALBUTEROL SULFATE 2.5; .5 MG/3ML; MG/3ML
1 SOLUTION RESPIRATORY (INHALATION) ONCE
Status: COMPLETED | OUTPATIENT
Start: 2018-06-24 | End: 2018-06-24

## 2018-06-24 RX ORDER — SODIUM CHLORIDE 0.9 % (FLUSH) 0.9 %
10 SYRINGE (ML) INJECTION EVERY 12 HOURS SCHEDULED
Status: DISCONTINUED | OUTPATIENT
Start: 2018-06-24 | End: 2018-06-28 | Stop reason: HOSPADM

## 2018-06-24 RX ORDER — METHYLPREDNISOLONE SODIUM SUCCINATE 40 MG/ML
40 INJECTION, POWDER, LYOPHILIZED, FOR SOLUTION INTRAMUSCULAR; INTRAVENOUS ONCE
Status: DISCONTINUED | OUTPATIENT
Start: 2018-06-24 | End: 2018-06-24

## 2018-06-24 RX ORDER — ASPIRIN 81 MG/1
81 TABLET ORAL DAILY
Status: DISCONTINUED | OUTPATIENT
Start: 2018-06-24 | End: 2018-06-28 | Stop reason: HOSPADM

## 2018-06-24 RX ORDER — HYDROXYZINE HYDROCHLORIDE 25 MG/1
25 TABLET, FILM COATED ORAL EVERY 8 HOURS PRN
Status: DISCONTINUED | OUTPATIENT
Start: 2018-06-24 | End: 2018-06-28 | Stop reason: HOSPADM

## 2018-06-24 RX ORDER — AMLODIPINE BESYLATE 5 MG/1
5 TABLET ORAL DAILY
Status: DISCONTINUED | OUTPATIENT
Start: 2018-06-24 | End: 2018-06-26

## 2018-06-24 RX ORDER — METHYLPREDNISOLONE SODIUM SUCCINATE 125 MG/2ML
80 INJECTION, POWDER, LYOPHILIZED, FOR SOLUTION INTRAMUSCULAR; INTRAVENOUS EVERY 8 HOURS
Status: DISCONTINUED | OUTPATIENT
Start: 2018-06-24 | End: 2018-06-27

## 2018-06-24 RX ORDER — ACETAMINOPHEN 325 MG/1
650 TABLET ORAL EVERY 4 HOURS PRN
Status: DISCONTINUED | OUTPATIENT
Start: 2018-06-24 | End: 2018-06-28 | Stop reason: HOSPADM

## 2018-06-24 RX ORDER — ONDANSETRON 2 MG/ML
4 INJECTION INTRAMUSCULAR; INTRAVENOUS EVERY 6 HOURS PRN
Status: DISCONTINUED | OUTPATIENT
Start: 2018-06-24 | End: 2018-06-28 | Stop reason: HOSPADM

## 2018-06-24 RX ORDER — BENZONATATE 100 MG/1
100 CAPSULE ORAL 3 TIMES DAILY PRN
Status: DISCONTINUED | OUTPATIENT
Start: 2018-06-24 | End: 2018-06-28 | Stop reason: HOSPADM

## 2018-06-24 RX ADMIN — MONTELUKAST SODIUM 10 MG: 10 TABLET, FILM COATED ORAL at 21:15

## 2018-06-24 RX ADMIN — METHYLPREDNISOLONE SODIUM SUCCINATE 80 MG: 125 INJECTION, POWDER, FOR SOLUTION INTRAMUSCULAR; INTRAVENOUS at 21:16

## 2018-06-24 RX ADMIN — IPRATROPIUM BROMIDE AND ALBUTEROL SULFATE 1 AMPULE: .5; 3 SOLUTION RESPIRATORY (INHALATION) at 18:25

## 2018-06-24 RX ADMIN — SIMVASTATIN 5 MG: 5 TABLET, FILM COATED ORAL at 21:15

## 2018-06-24 RX ADMIN — IPRATROPIUM BROMIDE AND ALBUTEROL SULFATE 1 AMPULE: .5; 3 SOLUTION RESPIRATORY (INHALATION) at 11:09

## 2018-06-24 RX ADMIN — WATER 1 G: 1 INJECTION INTRAMUSCULAR; INTRAVENOUS; SUBCUTANEOUS at 12:12

## 2018-06-24 RX ADMIN — ASPIRIN 81 MG: 81 TABLET, COATED ORAL at 17:16

## 2018-06-24 RX ADMIN — AMLODIPINE BESYLATE 5 MG: 5 TABLET ORAL at 17:16

## 2018-06-24 RX ADMIN — IPRATROPIUM BROMIDE AND ALBUTEROL SULFATE 1 AMPULE: .5; 3 SOLUTION RESPIRATORY (INHALATION) at 22:20

## 2018-06-24 RX ADMIN — ENOXAPARIN SODIUM 40 MG: 100 INJECTION SUBCUTANEOUS at 17:15

## 2018-06-24 RX ADMIN — Medication 10 ML: at 21:15

## 2018-06-24 RX ADMIN — ALBUTEROL SULFATE 5 MG: 2.5 SOLUTION RESPIRATORY (INHALATION) at 12:58

## 2018-06-24 RX ADMIN — TRAMADOL HYDROCHLORIDE 50 MG: 50 TABLET, FILM COATED ORAL at 23:45

## 2018-06-24 RX ADMIN — TRAMADOL HYDROCHLORIDE 50 MG: 50 TABLET, FILM COATED ORAL at 17:44

## 2018-06-24 RX ADMIN — AZITHROMYCIN MONOHYDRATE 500 MG: 500 INJECTION, POWDER, LYOPHILIZED, FOR SOLUTION INTRAVENOUS at 12:17

## 2018-06-24 ASSESSMENT — PAIN SCALES - GENERAL
PAINLEVEL_OUTOF10: 8
PAINLEVEL_OUTOF10: 8
PAINLEVEL_OUTOF10: 10
PAINLEVEL_OUTOF10: 6
PAINLEVEL_OUTOF10: 8
PAINLEVEL_OUTOF10: 6
PAINLEVEL_OUTOF10: 10
PAINLEVEL_OUTOF10: 10
PAINLEVEL_OUTOF10: 6

## 2018-06-24 ASSESSMENT — ENCOUNTER SYMPTOMS
COLOR CHANGE: 0
BACK PAIN: 1
ABDOMINAL PAIN: 0
COUGH: 1
SHORTNESS OF BREATH: 1

## 2018-06-24 ASSESSMENT — PAIN DESCRIPTION - DESCRIPTORS
DESCRIPTORS_2: CONSTANT
DESCRIPTORS: ACHING;CONSTANT
DESCRIPTORS: ACHING;CONSTANT
DESCRIPTORS: CONSTANT
DESCRIPTORS: ACHING;CONSTANT
DESCRIPTORS: ACHING;CONSTANT
DESCRIPTORS: ACHING;SORE
DESCRIPTORS: ACHING;CONSTANT
DESCRIPTORS_2: ACHING;CONSTANT
DESCRIPTORS_2: ACHING;CONSTANT
DESCRIPTORS: ACHING;CONSTANT
DESCRIPTORS: ACHING;SORE
DESCRIPTORS_2: ACHING;CONSTANT

## 2018-06-24 ASSESSMENT — PAIN DESCRIPTION - LOCATION
LOCATION_2: WRIST
LOCATION: CHEST;WRIST
LOCATION_2: WRIST
LOCATION: CHEST
LOCATION_2: HAND
LOCATION_2: HAND
LOCATION: CHEST

## 2018-06-24 ASSESSMENT — PAIN DESCRIPTION - ONSET
ONSET: SUDDEN
ONSET: ON-GOING
ONSET_2: ON-GOING
ONSET_2: ON-GOING
ONSET: ON-GOING
ONSET: ON-GOING
ONSET_2: SUDDEN
ONSET: ON-GOING

## 2018-06-24 ASSESSMENT — PAIN DESCRIPTION - ORIENTATION
ORIENTATION: RIGHT
ORIENTATION_2: RIGHT
ORIENTATION: RIGHT
ORIENTATION_2: RIGHT

## 2018-06-24 ASSESSMENT — PAIN DESCRIPTION - DURATION
DURATION_2: CONTINUOUS

## 2018-06-24 ASSESSMENT — PAIN DESCRIPTION - FREQUENCY
FREQUENCY: CONTINUOUS

## 2018-06-24 ASSESSMENT — PAIN DESCRIPTION - INTENSITY
RATING_2: 6
RATING_2: 8
RATING_2: 6
RATING_2: 10

## 2018-06-24 ASSESSMENT — PAIN DESCRIPTION - PROGRESSION
CLINICAL_PROGRESSION_2: NOT CHANGED
CLINICAL_PROGRESSION_2: NOT CHANGED
CLINICAL_PROGRESSION: OTHER (COMMENT)
CLINICAL_PROGRESSION_2: GRADUALLY IMPROVING

## 2018-06-24 ASSESSMENT — PAIN DESCRIPTION - PAIN TYPE
TYPE: ACUTE PAIN
TYPE_2: ACUTE PAIN
TYPE: ACUTE PAIN
TYPE: ACUTE PAIN
TYPE_2: ACUTE PAIN
TYPE: ACUTE PAIN
TYPE_2: ACUTE PAIN
TYPE: ACUTE PAIN

## 2018-06-24 NOTE — ED NOTES
Attempt to reach [facility], [facility] on patients behalf. The office is closed.       Tere Lehman RN  06/24/18 [id number]

## 2018-06-24 NOTE — ED NOTES
Report to [employer].  All belongings sent with patient to Wilbert Pritchett at [facility]  06/24/18 [id number]
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
## 2018-06-25 LAB
ANION GAP SERPL CALCULATED.3IONS-SCNC: 15 MEQ/L (ref 7–13)
BUN BLDV-MCNC: 10 MG/DL (ref 8–23)
CALCIUM SERPL-MCNC: 9.3 MG/DL (ref 8.6–10.2)
CHLORIDE BLD-SCNC: 102 MEQ/L (ref 98–107)
CO2: 24 MEQ/L (ref 22–29)
CREAT SERPL-MCNC: 0.77 MG/DL (ref 0.7–1.2)
GFR AFRICAN AMERICAN: >60
GFR NON-AFRICAN AMERICAN: >60
GLUCOSE BLD-MCNC: 164 MG/DL (ref 74–109)
HCT VFR BLD CALC: 43.6 % (ref 42–52)
HEMOGLOBIN: 14 G/DL (ref 14–18)
INR BLD: 1.1
MCH RBC QN AUTO: 31.3 PG (ref 27–31.3)
MCHC RBC AUTO-ENTMCNC: 32.1 % (ref 33–37)
MCV RBC AUTO: 97.4 FL (ref 80–100)
PDW BLD-RTO: 16 % (ref 11.5–14.5)
PLATELET # BLD: 520 K/UL (ref 130–400)
POTASSIUM REFLEX MAGNESIUM: 4.3 MEQ/L (ref 3.5–5.1)
PROTHROMBIN TIME: 10.8 SEC (ref 9.6–12.3)
RBC # BLD: 4.48 M/UL (ref 4.7–6.1)
SODIUM BLD-SCNC: 141 MEQ/L (ref 132–144)
WBC # BLD: 16.8 K/UL (ref 4.8–10.8)

## 2018-06-25 PROCEDURE — 6370000000 HC RX 637 (ALT 250 FOR IP): Performed by: INTERNAL MEDICINE

## 2018-06-25 PROCEDURE — 97162 PT EVAL MOD COMPLEX 30 MIN: CPT

## 2018-06-25 PROCEDURE — G8979 MOBILITY GOAL STATUS: HCPCS

## 2018-06-25 PROCEDURE — 85610 PROTHROMBIN TIME: CPT

## 2018-06-25 PROCEDURE — 94761 N-INVAS EAR/PLS OXIMETRY MLT: CPT

## 2018-06-25 PROCEDURE — G8978 MOBILITY CURRENT STATUS: HCPCS

## 2018-06-25 PROCEDURE — 2700000000 HC OXYGEN THERAPY PER DAY

## 2018-06-25 PROCEDURE — 2580000003 HC RX 258: Performed by: INTERNAL MEDICINE

## 2018-06-25 PROCEDURE — 94640 AIRWAY INHALATION TREATMENT: CPT

## 2018-06-25 PROCEDURE — 85027 COMPLETE CBC AUTOMATED: CPT

## 2018-06-25 PROCEDURE — 97530 THERAPEUTIC ACTIVITIES: CPT

## 2018-06-25 PROCEDURE — 36415 COLL VENOUS BLD VENIPUNCTURE: CPT

## 2018-06-25 PROCEDURE — 1210000000 HC MED SURG R&B

## 2018-06-25 PROCEDURE — 80048 BASIC METABOLIC PNL TOTAL CA: CPT

## 2018-06-25 PROCEDURE — 6360000002 HC RX W HCPCS: Performed by: INTERNAL MEDICINE

## 2018-06-25 RX ORDER — GUAIFENESIN 600 MG/1
600 TABLET, EXTENDED RELEASE ORAL 2 TIMES DAILY
Status: DISCONTINUED | OUTPATIENT
Start: 2018-06-25 | End: 2018-06-28 | Stop reason: HOSPADM

## 2018-06-25 RX ORDER — LORAZEPAM 0.5 MG/1
0.5 TABLET ORAL EVERY 4 HOURS PRN
Status: DISCONTINUED | OUTPATIENT
Start: 2018-06-25 | End: 2018-06-28 | Stop reason: HOSPADM

## 2018-06-25 RX ADMIN — IPRATROPIUM BROMIDE AND ALBUTEROL SULFATE 1 AMPULE: .5; 3 SOLUTION RESPIRATORY (INHALATION) at 13:52

## 2018-06-25 RX ADMIN — GUAIFENESIN 600 MG: 600 TABLET, EXTENDED RELEASE ORAL at 20:00

## 2018-06-25 RX ADMIN — GUAIFENESIN 600 MG: 600 TABLET, EXTENDED RELEASE ORAL at 12:17

## 2018-06-25 RX ADMIN — TRAMADOL HYDROCHLORIDE 50 MG: 50 TABLET, FILM COATED ORAL at 20:00

## 2018-06-25 RX ADMIN — IPRATROPIUM BROMIDE AND ALBUTEROL SULFATE 1 AMPULE: .5; 3 SOLUTION RESPIRATORY (INHALATION) at 22:23

## 2018-06-25 RX ADMIN — AZITHROMYCIN MONOHYDRATE 500 MG: 500 INJECTION, POWDER, LYOPHILIZED, FOR SOLUTION INTRAVENOUS at 08:38

## 2018-06-25 RX ADMIN — IPRATROPIUM BROMIDE AND ALBUTEROL SULFATE 1 AMPULE: .5; 3 SOLUTION RESPIRATORY (INHALATION) at 18:02

## 2018-06-25 RX ADMIN — METHYLPREDNISOLONE SODIUM SUCCINATE 80 MG: 125 INJECTION, POWDER, FOR SOLUTION INTRAMUSCULAR; INTRAVENOUS at 13:21

## 2018-06-25 RX ADMIN — ENOXAPARIN SODIUM 40 MG: 100 INJECTION SUBCUTANEOUS at 20:07

## 2018-06-25 RX ADMIN — CEFTRIAXONE 1 G: 1 INJECTION, POWDER, FOR SOLUTION INTRAMUSCULAR; INTRAVENOUS at 11:30

## 2018-06-25 RX ADMIN — MONTELUKAST SODIUM 10 MG: 10 TABLET, FILM COATED ORAL at 20:00

## 2018-06-25 RX ADMIN — Medication 10 ML: at 08:38

## 2018-06-25 RX ADMIN — METHYLPREDNISOLONE SODIUM SUCCINATE 80 MG: 125 INJECTION, POWDER, FOR SOLUTION INTRAMUSCULAR; INTRAVENOUS at 05:07

## 2018-06-25 RX ADMIN — AMLODIPINE BESYLATE 5 MG: 5 TABLET ORAL at 08:39

## 2018-06-25 RX ADMIN — Medication 10 ML: at 20:00

## 2018-06-25 RX ADMIN — IPRATROPIUM BROMIDE AND ALBUTEROL SULFATE 1 AMPULE: .5; 3 SOLUTION RESPIRATORY (INHALATION) at 06:04

## 2018-06-25 RX ADMIN — TIOTROPIUM BROMIDE 18 MCG: 18 CAPSULE ORAL; RESPIRATORY (INHALATION) at 09:54

## 2018-06-25 RX ADMIN — BENZONATATE 100 MG: 100 CAPSULE ORAL at 08:39

## 2018-06-25 RX ADMIN — IPRATROPIUM BROMIDE AND ALBUTEROL SULFATE 1 AMPULE: .5; 3 SOLUTION RESPIRATORY (INHALATION) at 09:53

## 2018-06-25 RX ADMIN — ASPIRIN 81 MG: 81 TABLET, COATED ORAL at 08:39

## 2018-06-25 RX ADMIN — TRAMADOL HYDROCHLORIDE 50 MG: 50 TABLET, FILM COATED ORAL at 08:39

## 2018-06-25 RX ADMIN — METHYLPREDNISOLONE SODIUM SUCCINATE 80 MG: 125 INJECTION, POWDER, FOR SOLUTION INTRAMUSCULAR; INTRAVENOUS at 19:59

## 2018-06-25 RX ADMIN — SIMVASTATIN 5 MG: 5 TABLET, FILM COATED ORAL at 19:59

## 2018-06-25 ASSESSMENT — PAIN DESCRIPTION - ONSET
ONSET: ON-GOING
ONSET: ON-GOING

## 2018-06-25 ASSESSMENT — PAIN SCALES - GENERAL
PAINLEVEL_OUTOF10: 8
PAINLEVEL_OUTOF10: 8
PAINLEVEL_OUTOF10: 4

## 2018-06-25 ASSESSMENT — PAIN DESCRIPTION - LOCATION
LOCATION: CHEST
LOCATION: CHEST

## 2018-06-25 ASSESSMENT — PAIN DESCRIPTION - ORIENTATION: ORIENTATION: RIGHT

## 2018-06-25 ASSESSMENT — PAIN DESCRIPTION - PAIN TYPE
TYPE: ACUTE PAIN
TYPE: ACUTE PAIN

## 2018-06-25 ASSESSMENT — PAIN DESCRIPTION - DESCRIPTORS
DESCRIPTORS: ACHING
DESCRIPTORS: ACHING

## 2018-06-25 ASSESSMENT — PAIN DESCRIPTION - FREQUENCY
FREQUENCY: INTERMITTENT
FREQUENCY: INTERMITTENT
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
## 2018-06-25 NOTE — PROGRESS NOTES
Occupational Therapy  Facility/Department: [facility] MED SURG UNIT  Daily Treatment Note  NAME: Nataliia Moore  : 1955  MRN: [id number]    Date of Service: 2018    Discharge Recommendations:          Patient Diagnosis(es): The primary encounter diagnosis was Pneumonia due to organism. Diagnoses of COPD exacerbation ([address] Hypoxia, and Contusion of right chest wall, initial encounter were also pertinent to this visit. has a past medical history of Alcohol abuse; Anemia; Asthma; Cocaine abuse; COPD (chronic obstructive pulmonary disease) ([address] Disorder of pharynx; Drug-seeking behavior; Edema; Erectile dysfunction; Gout; History of arthroscopy of both knees; House dust mite allergy; Hyperlipidemia; Hypertension; Injury to heart; Medical non-compliance; Morbid obesity due to excess calories ([address] Osteoarthritis of both knees; Pneumonia; Pre-diabetes; Seasonal allergies; Severe persistent asthma; Severe sleep apnea; Supraventricular tachycardia ([address] and SVT (supraventricular tachycardia) ([address] has a past surgical history that includes Anterior cruciate ligament repair and Cardiac catheterization. Restrictions  Position Activity Restriction  Other position/activity restrictions: drug seeking behavior history, telemetry  Subjective   General  Referring Practitioner: Dr. Tim Nicole  Diagnosis: Pneumonia (primary), COPD exacerbation  \"I don't want anymore therapy no more\"  Orientation     Objective           Pt agitated and refused OT eval/initial assessment stating he did not want therapy anymore and \"I already did that and walked all around\", \"I don't want anymore therapy no more\".  Therapist educated on purpose of OT; however, pt still refused and stated \"Come back tomorrow, I'm done today\"                          Assessment                 Plan      G-Code     OutComes Score                                           AM-PAC Score             Goals          Therapy Time   Individual Concurrent Group Co-treatment   Time In  0         Time Out  0         Minutes  [place], [place]
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
## 2018-06-26 PROCEDURE — 1210000000 HC MED SURG R&B

## 2018-06-26 PROCEDURE — 6360000002 HC RX W HCPCS: Performed by: INTERNAL MEDICINE

## 2018-06-26 PROCEDURE — 2700000000 HC OXYGEN THERAPY PER DAY

## 2018-06-26 PROCEDURE — G8998 SWALLOW D/C STATUS: HCPCS

## 2018-06-26 PROCEDURE — 2580000003 HC RX 258: Performed by: INTERNAL MEDICINE

## 2018-06-26 PROCEDURE — 6370000000 HC RX 637 (ALT 250 FOR IP): Performed by: INTERNAL MEDICINE

## 2018-06-26 PROCEDURE — 94762 N-INVAS EAR/PLS OXIMTRY CONT: CPT

## 2018-06-26 PROCEDURE — 92610 EVALUATE SWALLOWING FUNCTION: CPT

## 2018-06-26 PROCEDURE — G8996 SWALLOW CURRENT STATUS: HCPCS

## 2018-06-26 PROCEDURE — 94640 AIRWAY INHALATION TREATMENT: CPT

## 2018-06-26 PROCEDURE — 94761 N-INVAS EAR/PLS OXIMETRY MLT: CPT

## 2018-06-26 RX ORDER — AMLODIPINE BESYLATE 10 MG/1
10 TABLET ORAL DAILY
Status: DISCONTINUED | OUTPATIENT
Start: 2018-06-26 | End: 2018-06-28 | Stop reason: HOSPADM

## 2018-06-26 RX ORDER — ZOLPIDEM TARTRATE 5 MG/1
5 TABLET ORAL NIGHTLY PRN
Status: DISCONTINUED | OUTPATIENT
Start: 2018-06-26 | End: 2018-06-28 | Stop reason: HOSPADM

## 2018-06-26 RX ADMIN — GUAIFENESIN 600 MG: 600 TABLET, EXTENDED RELEASE ORAL at 09:34

## 2018-06-26 RX ADMIN — Medication 10 ML: at 09:34

## 2018-06-26 RX ADMIN — TIOTROPIUM BROMIDE 18 MCG: 18 CAPSULE ORAL; RESPIRATORY (INHALATION) at 06:27

## 2018-06-26 RX ADMIN — MONTELUKAST SODIUM 10 MG: 10 TABLET, FILM COATED ORAL at 20:44

## 2018-06-26 RX ADMIN — METHYLPREDNISOLONE SODIUM SUCCINATE 80 MG: 125 INJECTION, POWDER, FOR SOLUTION INTRAMUSCULAR; INTRAVENOUS at 06:05

## 2018-06-26 RX ADMIN — ASPIRIN 81 MG: 81 TABLET, COATED ORAL at 09:34

## 2018-06-26 RX ADMIN — GUAIFENESIN 600 MG: 600 TABLET, EXTENDED RELEASE ORAL at 20:44

## 2018-06-26 RX ADMIN — IPRATROPIUM BROMIDE AND ALBUTEROL SULFATE 1 AMPULE: .5; 3 SOLUTION RESPIRATORY (INHALATION) at 13:58

## 2018-06-26 RX ADMIN — BENZONATATE 100 MG: 100 CAPSULE ORAL at 09:34

## 2018-06-26 RX ADMIN — TRAMADOL HYDROCHLORIDE 50 MG: 50 TABLET, FILM COATED ORAL at 02:36

## 2018-06-26 RX ADMIN — TRAMADOL HYDROCHLORIDE 50 MG: 50 TABLET, FILM COATED ORAL at 20:44

## 2018-06-26 RX ADMIN — IPRATROPIUM BROMIDE AND ALBUTEROL SULFATE 1 AMPULE: .5; 3 SOLUTION RESPIRATORY (INHALATION) at 18:09

## 2018-06-26 RX ADMIN — AMLODIPINE BESYLATE 10 MG: 10 TABLET ORAL at 09:34

## 2018-06-26 RX ADMIN — SIMVASTATIN 5 MG: 5 TABLET, FILM COATED ORAL at 20:44

## 2018-06-26 RX ADMIN — Medication 10 ML: at 20:45

## 2018-06-26 RX ADMIN — IPRATROPIUM BROMIDE AND ALBUTEROL SULFATE 1 AMPULE: .5; 3 SOLUTION RESPIRATORY (INHALATION) at 06:27

## 2018-06-26 RX ADMIN — AZITHROMYCIN MONOHYDRATE 500 MG: 500 INJECTION, POWDER, LYOPHILIZED, FOR SOLUTION INTRAVENOUS at 09:34

## 2018-06-26 RX ADMIN — ENOXAPARIN SODIUM 40 MG: 100 INJECTION SUBCUTANEOUS at 23:59

## 2018-06-26 RX ADMIN — IPRATROPIUM BROMIDE AND ALBUTEROL SULFATE 1 AMPULE: .5; 3 SOLUTION RESPIRATORY (INHALATION) at 22:05

## 2018-06-26 RX ADMIN — CEFTRIAXONE 1 G: 1 INJECTION, POWDER, FOR SOLUTION INTRAMUSCULAR; INTRAVENOUS at 11:32

## 2018-06-26 RX ADMIN — IPRATROPIUM BROMIDE AND ALBUTEROL SULFATE 1 AMPULE: .5; 3 SOLUTION RESPIRATORY (INHALATION) at 09:57

## 2018-06-26 RX ADMIN — METHYLPREDNISOLONE SODIUM SUCCINATE 80 MG: 125 INJECTION, POWDER, FOR SOLUTION INTRAMUSCULAR; INTRAVENOUS at 20:44

## 2018-06-26 RX ADMIN — METHYLPREDNISOLONE SODIUM SUCCINATE 80 MG: 125 INJECTION, POWDER, FOR SOLUTION INTRAMUSCULAR; INTRAVENOUS at 13:22

## 2018-06-26 RX ADMIN — TRAMADOL HYDROCHLORIDE 50 MG: 50 TABLET, FILM COATED ORAL at 09:34

## 2018-06-26 ASSESSMENT — PAIN DESCRIPTION - DESCRIPTORS: DESCRIPTORS: ACHING

## 2018-06-26 ASSESSMENT — PAIN SCALES - GENERAL
PAINLEVEL_OUTOF10: 0
PAINLEVEL_OUTOF10: 6
PAINLEVEL_OUTOF10: 7
PAINLEVEL_OUTOF10: 6
PAINLEVEL_OUTOF10: 6

## 2018-06-26 ASSESSMENT — PAIN DESCRIPTION - PAIN TYPE: TYPE: ACUTE PAIN

## 2018-06-26 ASSESSMENT — PAIN DESCRIPTION - ONSET: ONSET: ON-GOING

## 2018-06-26 ASSESSMENT — PAIN DESCRIPTION - LOCATION: LOCATION: CHEST

## 2018-06-26 NOTE — PROGRESS NOTES
G-Code     OutComes Score                                                    AM-PAC Score             Goals  Short term goals  Time Frame for Short term goals: 3-5 days, medical patient  Short term goal 1: transfers and bed mobility modified independent  Short term goal 2: ambulate >= 100ft without AD modified indpendent improved pace and mild SOB post  Short term goal 3: particpate in 1-2 sets of LE WILLARD to increase strength and endurance  Short term goal 4: assist with discharge planning if needed  Long term goals  Time Frame for Long term goals : same as STG medical patient  Patient Goals   Patient goals : Get better and get to rehab.     Plan    Plan  Times per week: 5-7 x/wk   Times per day:  (1-2 x/day )  Plan weeks: <1 medical patient  Current Treatment Recommendations: Strengthening, Gait Training, Balance Training, Endurance Training, Transfer Training, Functional Mobility Training, Home Exercise Program, Patient/Caregiver Education & Training, Safety Education & Training  Safety Devices  Type of devices: Call light within reach  Restraints  Initially in place: Yes     Therapy Time   Individual Concurrent Group Co-treatment   Time In           Time Out           Minutes  [address] [place], [place]  License and [address] Number: [id number]

## 2018-06-26 NOTE — PROGRESS NOTES
Speech Language Pathology  Facility/Department: [facility] MED SURG UNIT   BEDSIDE SWALLOW EVALUATION    NAME: German Love  : 1955  MRN: [id number]    ADMISSION DATE: 2018  ADMITTING DIAGNOSIS: has Anemia; Medical non-compliance; House dust mite allergy; Seasonal allergies; Severe sleep apnea; Drug-seeking behavior; Hyperlipidemia; Alcohol abuse; Cocaine abuse; Gout; History of arthroscopy of both knees; Hypertension; Supraventricular tachycardia ([address] Erectile dysfunction; Pre-diabetes; Morbid obesity due to excess calories ([address] Osteoarthritis of both knees; COPD (chronic obstructive pulmonary disease) ([address] Chest wall pain; Pleurisy;  Loculated pleural effusion; and COPD with acute exacerbation ([address] on his problem list.  ONSET DATE:     Recent Chest Xray/CT of Chest: ( Date  )    Date of Eval: 2018  Evaluating Therapist: Laurita Marking    Current Diet level:     Regular with thin    Primary Complaint   pt reports occasional difficulty swallowing    Pain:  Pain Assessment  Patient Currently in Pain: Yes  Pain Assessment: 0-10  Pain Level: 7  Pain Type: Acute pain  Pain Location: Chest  Pain Orientation: Right  Pain Radiating Towards:  (chest right, left knee and low back)  Pain Descriptors: Aching  Pain Frequency: Intermittent  Clinical Progression:  (worse with cough or deep breathing)  Patient's Stated Pain Goal: No pain  Effect of Pain on Daily Activities: worse with deep breath or cough  Pain Intervention(s): Emotional support, Rest  Multiple Pain Sites: Yes  Pain 2  Pain Rating 2: 8  Pain Type 2: Acute Pain  Pain Location 2: Wrist  Pain Orientation 2: Right  Pain Descriptors 2: Aching, Constant  Pain Duration 2: Continuous  Pain Onset 2: On-going  Clinical Progression 2: Gradually improving  Patient's Stated Pain Goal 2: no pain    Reason for Referral  German Love was referred for a bedside swallow evaluation to assess the efficiency of his swallow function, identify signs and symptoms of aspiration and make recommendations regarding safe dietary consistencies, effective compensatory strategies, and safe eating environment. Impression  Dysphagia Diagnosis: Swallow function appears grossly intact  Dysphagia Outcome Severity Scale: Level 6: Within functional limits/Modified independence     Treatment Plan  Requires SLP Intervention: No             Recommended Diet and Intervention  Diet Solids Recommendation: Regular  Liquid Consistency Recommendation: Thin          Compensatory Swallowing Strategies  Compensatory Swallowing Strategies: Alternate solids and liquids, limit distractions while eating/drinking, add moisture to dry foods when able    Treatment/Goals       General   pt may need further evaluation by GI if continues to display difficulty with swallowing. Appears to be texture specific (dry foods) and when distracted. Vision/Hearing       Oral Motor Deficits  Oral/Motor  Oral Motor:  Within functional limits    Oral Phase Dysfunction  Oral Phase  Oral Phase: WNL     Indicators of Pharyngeal Phase Dysfunction   Pharyngeal Phase  Pharyngeal Phase: WNL    Prognosis       Education  Patient Education Response: Verbalizes understanding      G-Code            Therapy Time               REINA Arambula  6/26/2018 4:11 PM

## 2018-06-26 NOTE — PROGRESS NOTES
Hospitalist Progress Note      PCP: Elba Pruitt MD    Date of Admission: 6/24/2018    Chief Complaint: dyspnea/SOB    Subjective: pt awake/alert     Medications:  Reviewed    Infusion Medications   Scheduled Medications    guaiFENesin  600 mg Oral BID    amLODIPine  5 mg Oral Daily    aspirin  81 mg Oral Daily    simvastatin  5 mg Oral Nightly    montelukast  10 mg Oral Nightly    tiotropium  18 mcg Inhalation Daily    cefTRIAXone (ROCEPHIN) IV  1 g Intravenous Q24H    azithromycin  500 mg Intravenous Q24H    methylPREDNISolone  80 mg Intravenous Q8H    ipratropium-albuterol  1 ampule Inhalation Q4H WA    sodium chloride flush  10 mL Intravenous 2 times per day    enoxaparin  40 mg Subcutaneous Daily     PRN Meds: zolpidem, LORazepam, benzonatate, hydrOXYzine, sodium chloride flush, magnesium hydroxide, ondansetron, traMADol, acetaminophen      Intake/Output Summary (Last 24 hours) at 06/26/18 0903  Last data filed at 06/25/18 1232   Gross per 24 hour   Intake              480 ml   Output                0 ml   Net              480 ml       Exam:    BP (!) 150/91   Pulse 68   Temp 98.1 °F (36.7 °C) (Oral)   Resp 20   Ht 6' 3\" (1.905 m)   Wt 245 lb (111.1 kg)   SpO2 97%   BMI 30.62 kg/m²     General appearance: No apparent distress, appears stated age and cooperative. HEENT: Pupils equal, round, and reactive to light. Conjunctivae/corneas clear. Neck: Supple, with full range of motion. No jugular venous distention. Trachea midline. Respiratory:  Normal respiratory effort. bilaterally with Rhonchi. Cardiovascular: Regular rate and rhythm with normal S1/S2 without murmurs, rubs or gallops. Abdomen: Soft, non-tender, non-distended with normal bowel sounds. Musculoskeletal: No clubbing, cyanosis or edema bilaterally. Full range of motion without deformity. Skin: Skin color, texture, turgor normal.  No rashes or lesions.   Neurologic:  Neurovascularly intact without any focal sensory/motor deficits. Cranial nerves: II-XII intact, grossly non-focal.  Psychiatric: Alert and oriented, thought content appropriate, normal insight  Capillary Refill: Brisk,< 3 seconds   Peripheral Pulses: +2 palpable, equal bilaterally       Labs:   Recent Labs      06/24/18   1101  06/25/18   0527   WBC  16.1*  16.8*   HGB  14.7  14.0   HCT  43.7  43.6   PLT  527*  520*     Recent Labs      06/24/18   1101  06/25/18   0527   NA  141  141   K  3.7  4.3   CL  102  102   CO2  24  24   BUN  9  10   CREATININE  0.90  0.77   CALCIUM  9.2  9.3     Recent Labs      06/24/18   1101   AST  19   ALT  16   BILITOT  0.6   ALKPHOS  107*     Recent Labs      06/25/18   0527   INR  1.1     Recent Labs      06/24/18   1101   TROPONINI  <0.010       Urinalysis:    Lab Results   Component Value Date    NITRU Negative 10/05/2017    WBCUA None seen 10/05/2017    BACTERIA None 10/05/2017    RBCUA 0-2 10/05/2017    BLOODU TRACE 10/05/2017    SPECGRAV 1.025 10/05/2017    GLUCOSEU Negative 10/05/2017       Radiology:          Assessment/Plan:    Active Hospital Problems    Diagnosis Date Noted    COPD with acute exacerbation ([facility] [address] [facility] [J44.1] 06/24/2018         DVT Prophylaxis: lovenox  Diet: DIET GENERAL;  Code Status: Full Code    PT/OT Eval Status:     Dispo - Dyspnea, COB- continue with treatment for COPD exacerbation aggravated by CAP- better,.  Continue atbs/O2, steroids   HTN-increase norvasc to 10 mg  Cocaine abuse- advise to stop  CP- atypical, reproducible, negative troponins   Respiratory failure- continue with respiratory Tx, O2, home O2 evaluation before DC  Pt needs to follow with pulmonary service after DC  Stable for admission at [facility] from medical stand point       Electronically signed by Blima Goodell, MD on 6/26/2018 at 9:03 AM

## 2018-06-27 ENCOUNTER — APPOINTMENT (OUTPATIENT)
Dept: GENERAL RADIOLOGY | Age: 63
DRG: 190 | End: 2018-06-27
Payer: COMMERCIAL

## 2018-06-27 LAB
BASOPHILS ABSOLUTE: 0 K/UL (ref 0–0.2)
BASOPHILS RELATIVE PERCENT: 0 %
EOSINOPHILS ABSOLUTE: 0 K/UL (ref 0–0.7)
EOSINOPHILS RELATIVE PERCENT: 0 %
HCT VFR BLD CALC: 42.2 % (ref 42–52)
HEMOGLOBIN: 13.7 G/DL (ref 14–18)
LYMPHOCYTES ABSOLUTE: 1 K/UL (ref 1–4.8)
LYMPHOCYTES RELATIVE PERCENT: 6.1 %
MCH RBC QN AUTO: 31.6 PG (ref 27–31.3)
MCHC RBC AUTO-ENTMCNC: 32.5 % (ref 33–37)
MCV RBC AUTO: 97.4 FL (ref 80–100)
MONOCYTES ABSOLUTE: 0.6 K/UL (ref 0.2–0.8)
MONOCYTES RELATIVE PERCENT: 3.6 %
NEUTROPHILS ABSOLUTE: 15.3 K/UL (ref 1.4–6.5)
NEUTROPHILS RELATIVE PERCENT: 90.3 %
PDW BLD-RTO: 15.4 % (ref 11.5–14.5)
PLATELET # BLD: 496 K/UL (ref 130–400)
RBC # BLD: 4.33 M/UL (ref 4.7–6.1)
WBC # BLD: 17 K/UL (ref 4.8–10.8)

## 2018-06-27 PROCEDURE — 2580000003 HC RX 258: Performed by: INTERNAL MEDICINE

## 2018-06-27 PROCEDURE — 71046 X-RAY EXAM CHEST 2 VIEWS: CPT

## 2018-06-27 PROCEDURE — 6370000000 HC RX 637 (ALT 250 FOR IP): Performed by: INTERNAL MEDICINE

## 2018-06-27 PROCEDURE — 85025 COMPLETE CBC W/AUTO DIFF WBC: CPT

## 2018-06-27 PROCEDURE — 36415 COLL VENOUS BLD VENIPUNCTURE: CPT

## 2018-06-27 PROCEDURE — 6360000002 HC RX W HCPCS: Performed by: INTERNAL MEDICINE

## 2018-06-27 PROCEDURE — 1210000000 HC MED SURG R&B

## 2018-06-27 PROCEDURE — 94640 AIRWAY INHALATION TREATMENT: CPT

## 2018-06-27 RX ORDER — PREDNISONE 20 MG/1
20 TABLET ORAL DAILY
Status: DISCONTINUED | OUTPATIENT
Start: 2018-06-27 | End: 2018-06-28 | Stop reason: HOSPADM

## 2018-06-27 RX ADMIN — SIMVASTATIN 5 MG: 5 TABLET, FILM COATED ORAL at 20:33

## 2018-06-27 RX ADMIN — AMLODIPINE BESYLATE 10 MG: 10 TABLET ORAL at 09:28

## 2018-06-27 RX ADMIN — TRAMADOL HYDROCHLORIDE 50 MG: 50 TABLET, FILM COATED ORAL at 09:27

## 2018-06-27 RX ADMIN — IPRATROPIUM BROMIDE AND ALBUTEROL SULFATE 1 AMPULE: .5; 3 SOLUTION RESPIRATORY (INHALATION) at 18:10

## 2018-06-27 RX ADMIN — Medication 10 ML: at 09:30

## 2018-06-27 RX ADMIN — MONTELUKAST SODIUM 10 MG: 10 TABLET, FILM COATED ORAL at 20:33

## 2018-06-27 RX ADMIN — ENOXAPARIN SODIUM 40 MG: 100 INJECTION SUBCUTANEOUS at 20:32

## 2018-06-27 RX ADMIN — ASPIRIN 81 MG: 81 TABLET, COATED ORAL at 09:27

## 2018-06-27 RX ADMIN — CEFTRIAXONE 1 G: 1 INJECTION, POWDER, FOR SOLUTION INTRAMUSCULAR; INTRAVENOUS at 13:37

## 2018-06-27 RX ADMIN — GUAIFENESIN 600 MG: 600 TABLET, EXTENDED RELEASE ORAL at 09:28

## 2018-06-27 RX ADMIN — METHYLPREDNISOLONE SODIUM SUCCINATE 80 MG: 125 INJECTION, POWDER, FOR SOLUTION INTRAMUSCULAR; INTRAVENOUS at 05:27

## 2018-06-27 RX ADMIN — TIOTROPIUM BROMIDE 18 MCG: 18 CAPSULE ORAL; RESPIRATORY (INHALATION) at 06:13

## 2018-06-27 RX ADMIN — IPRATROPIUM BROMIDE AND ALBUTEROL SULFATE 1 AMPULE: .5; 3 SOLUTION RESPIRATORY (INHALATION) at 06:13

## 2018-06-27 RX ADMIN — LORAZEPAM 0.5 MG: 0.5 TABLET ORAL at 15:38

## 2018-06-27 RX ADMIN — GUAIFENESIN 600 MG: 600 TABLET, EXTENDED RELEASE ORAL at 20:33

## 2018-06-27 RX ADMIN — TRAMADOL HYDROCHLORIDE 50 MG: 50 TABLET, FILM COATED ORAL at 22:32

## 2018-06-27 RX ADMIN — IPRATROPIUM BROMIDE AND ALBUTEROL SULFATE 1 AMPULE: .5; 3 SOLUTION RESPIRATORY (INHALATION) at 22:06

## 2018-06-27 RX ADMIN — IPRATROPIUM BROMIDE AND ALBUTEROL SULFATE 1 AMPULE: .5; 3 SOLUTION RESPIRATORY (INHALATION) at 09:37

## 2018-06-27 RX ADMIN — Medication 10 ML: at 20:33

## 2018-06-27 RX ADMIN — PREDNISONE 20 MG: 20 TABLET ORAL at 13:35

## 2018-06-27 RX ADMIN — IPRATROPIUM BROMIDE AND ALBUTEROL SULFATE 1 AMPULE: .5; 3 SOLUTION RESPIRATORY (INHALATION) at 14:39

## 2018-06-27 RX ADMIN — AZITHROMYCIN MONOHYDRATE 500 MG: 500 INJECTION, POWDER, LYOPHILIZED, FOR SOLUTION INTRAVENOUS at 09:27

## 2018-06-27 RX ADMIN — TRAMADOL HYDROCHLORIDE 50 MG: 50 TABLET, FILM COATED ORAL at 15:37

## 2018-06-27 ASSESSMENT — PAIN SCALES - GENERAL
PAINLEVEL_OUTOF10: 0
PAINLEVEL_OUTOF10: 7
PAINLEVEL_OUTOF10: 6
PAINLEVEL_OUTOF10: 7
PAINLEVEL_OUTOF10: 7

## 2018-06-27 ASSESSMENT — PAIN DESCRIPTION - ORIENTATION
ORIENTATION: RIGHT
ORIENTATION: RIGHT

## 2018-06-27 ASSESSMENT — PAIN DESCRIPTION - DESCRIPTORS
DESCRIPTORS: ACHING
DESCRIPTORS: ACHING

## 2018-06-27 ASSESSMENT — PAIN DESCRIPTION - PAIN TYPE
TYPE: ACUTE PAIN
TYPE: ACUTE PAIN

## 2018-06-27 ASSESSMENT — PAIN DESCRIPTION - LOCATION
LOCATION: CHEST
LOCATION: CHEST

## 2018-06-27 ASSESSMENT — PAIN DESCRIPTION - ONSET: ONSET: ON-GOING
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
## 2018-06-27 NOTE — PROGRESS NOTES
Occupational Therapy  Facility/Department: [facility] MED SURG UNIT  Daily Treatment Note  NAME: Chiqui Goss  : 1955  MRN: [id number]    Date of Service: 2018    Discharge Recommendations:          Patient Diagnosis(es): The primary encounter diagnosis was Pneumonia due to organism. Diagnoses of COPD exacerbation ([address] Hypoxia, and Contusion of right chest wall, initial encounter were also pertinent to this visit. has a past medical history of Alcohol abuse; Anemia; Asthma; Cocaine abuse; COPD (chronic obstructive pulmonary disease) ([address] Disorder of pharynx; Drug-seeking behavior; Edema; Erectile dysfunction; Gout; History of arthroscopy of both knees; House dust mite allergy; Hyperlipidemia; Hypertension; Injury to heart; Medical non-compliance; Morbid obesity due to excess calories ([address] Osteoarthritis of both knees; Pneumonia; Pre-diabetes; Seasonal allergies; Severe persistent asthma; Severe sleep apnea; Supraventricular tachycardia ([address] and SVT (supraventricular tachycardia) ([address] has a past surgical history that includes Anterior cruciate ligament repair and Cardiac catheterization. Restrictions  Position Activity Restriction  Other position/activity restrictions: drug seeking behavior history, telemetry  Subjective   General  Referring Practitioner: Dr. Armida Martins  Diagnosis: Pneumonia (primary), COPD exacerbation   \"No, not really\"       Objective              3rd day in a row attempting for OT eval- pt refused for 3rd time.  As a result, OT eval cancelled 2* pt stating he does not want OT, \"No not really, No I don't\"                                                                       Assessment                 Plan      G-Code     OutComes Score                                           AM-PAC Score             Goals          Therapy Time   Individual Concurrent Group Co-treatment   Time In  0         Time Out  0         Minutes  [address] [place], [place]

## 2018-06-27 NOTE — PROGRESS NOTES
Nocturnal pulse ox study performed on room air 6/26/18. Results show patient had a saturation period where pulse ox was below 89% for more than 21 minutes consistently.

## 2018-06-28 VITALS
WEIGHT: 245 LBS | TEMPERATURE: 97.7 F | HEIGHT: 75 IN | RESPIRATION RATE: 20 BRPM | OXYGEN SATURATION: 100 % | HEART RATE: 71 BPM | SYSTOLIC BLOOD PRESSURE: 145 MMHG | BODY MASS INDEX: 30.46 KG/M2 | DIASTOLIC BLOOD PRESSURE: 80 MMHG

## 2018-06-28 PROCEDURE — 6370000000 HC RX 637 (ALT 250 FOR IP): Performed by: INTERNAL MEDICINE

## 2018-06-28 PROCEDURE — 94640 AIRWAY INHALATION TREATMENT: CPT

## 2018-06-28 PROCEDURE — 6360000002 HC RX W HCPCS: Performed by: INTERNAL MEDICINE

## 2018-06-28 PROCEDURE — 2580000003 HC RX 258: Performed by: INTERNAL MEDICINE

## 2018-06-28 RX ORDER — PREDNISONE 20 MG/1
20 TABLET ORAL DAILY
Qty: 10 TABLET | Refills: 0 | Status: SHIPPED | OUTPATIENT
Start: 2018-06-29 | End: 2018-07-05 | Stop reason: ALTCHOICE

## 2018-06-28 RX ORDER — AZITHROMYCIN 250 MG/1
250 TABLET, FILM COATED ORAL DAILY
Qty: 7 TABLET | Refills: 0 | Status: SHIPPED | OUTPATIENT
Start: 2018-06-28 | End: 2018-07-05

## 2018-06-28 RX ORDER — IPRATROPIUM BROMIDE AND ALBUTEROL SULFATE 2.5; .5 MG/3ML; MG/3ML
1 SOLUTION RESPIRATORY (INHALATION) ONCE
Status: COMPLETED | OUTPATIENT
Start: 2018-06-28 | End: 2018-06-28

## 2018-06-28 RX ORDER — AMLODIPINE BESYLATE 10 MG/1
10 TABLET ORAL DAILY
Qty: 30 TABLET | Refills: 0 | Status: SHIPPED | OUTPATIENT
Start: 2018-06-28 | End: 2018-08-01 | Stop reason: SDUPTHER

## 2018-06-28 RX ORDER — ALBUTEROL SULFATE 90 UG/1
2 AEROSOL, METERED RESPIRATORY (INHALATION) EVERY 6 HOURS PRN
Qty: 3 INHALER | Refills: 3 | Status: SHIPPED | OUTPATIENT
Start: 2018-06-28 | End: 2018-07-02 | Stop reason: SDUPTHER

## 2018-06-28 RX ADMIN — GUAIFENESIN 600 MG: 600 TABLET, EXTENDED RELEASE ORAL at 08:37

## 2018-06-28 RX ADMIN — TIOTROPIUM BROMIDE 18 MCG: 18 CAPSULE ORAL; RESPIRATORY (INHALATION) at 05:54

## 2018-06-28 RX ADMIN — IPRATROPIUM BROMIDE AND ALBUTEROL SULFATE 1 AMPULE: .5; 3 SOLUTION RESPIRATORY (INHALATION) at 05:54

## 2018-06-28 RX ADMIN — IPRATROPIUM BROMIDE AND ALBUTEROL SULFATE 1 AMPULE: .5; 3 SOLUTION RESPIRATORY (INHALATION) at 12:02

## 2018-06-28 RX ADMIN — Medication 10 ML: at 08:37

## 2018-06-28 RX ADMIN — AZITHROMYCIN MONOHYDRATE 500 MG: 500 INJECTION, POWDER, LYOPHILIZED, FOR SOLUTION INTRAVENOUS at 08:37

## 2018-06-28 RX ADMIN — ASPIRIN 81 MG: 81 TABLET, COATED ORAL at 08:37

## 2018-06-28 RX ADMIN — PREDNISONE 20 MG: 20 TABLET ORAL at 08:37

## 2018-06-28 RX ADMIN — IPRATROPIUM BROMIDE AND ALBUTEROL SULFATE 1 AMPULE: .5; 3 SOLUTION RESPIRATORY (INHALATION) at 09:27

## 2018-06-28 RX ADMIN — AMLODIPINE BESYLATE 10 MG: 10 TABLET ORAL at 08:37

## 2018-06-28 RX ADMIN — ENOXAPARIN SODIUM 40 MG: 100 INJECTION SUBCUTANEOUS at 08:37

## 2018-06-28 NOTE — PROGRESS NOTES
Pt requests IV be removed so that he may shower. Asked him where he will be going to stay after discharge so that we can provide a taxi voucher. Pt states he will be going to [employer]. Requests that he can have an hour to get ready, then leave between 1130 and 1200. Will continue to monitor.
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
## 2018-06-29 LAB
BLOOD CULTURE, ROUTINE: NORMAL
CULTURE, BLOOD 2: NORMAL

## 2018-07-01 DIAGNOSIS — J44.9 CHRONIC OBSTRUCTIVE PULMONARY DISEASE, UNSPECIFIED COPD TYPE (HCC): Chronic | ICD-10-CM

## 2018-07-02 RX ORDER — ALBUTEROL SULFATE 90 UG/1
2 AEROSOL, METERED RESPIRATORY (INHALATION) EVERY 6 HOURS PRN
Qty: 1 INHALER | Refills: 0 | Status: SHIPPED | OUTPATIENT
Start: 2018-07-02 | End: 2018-07-05 | Stop reason: SDUPTHER

## 2018-07-05 ENCOUNTER — OFFICE VISIT (OUTPATIENT)
Dept: FAMILY MEDICINE CLINIC | Age: 63
End: 2018-07-05
Payer: COMMERCIAL

## 2018-07-05 VITALS
BODY MASS INDEX: 30.96 KG/M2 | DIASTOLIC BLOOD PRESSURE: 80 MMHG | OXYGEN SATURATION: 95 % | HEART RATE: 74 BPM | HEIGHT: 75 IN | RESPIRATION RATE: 20 BRPM | TEMPERATURE: 96.4 F | WEIGHT: 249 LBS | SYSTOLIC BLOOD PRESSURE: 136 MMHG

## 2018-07-05 DIAGNOSIS — E78.5 HYPERLIPIDEMIA, UNSPECIFIED HYPERLIPIDEMIA TYPE: Chronic | ICD-10-CM

## 2018-07-05 DIAGNOSIS — Z23 NEED FOR VACCINATION: ICD-10-CM

## 2018-07-05 DIAGNOSIS — R73.03 PRE-DIABETES: Chronic | ICD-10-CM

## 2018-07-05 DIAGNOSIS — J44.9 CHRONIC OBSTRUCTIVE PULMONARY DISEASE, UNSPECIFIED COPD TYPE (HCC): Chronic | ICD-10-CM

## 2018-07-05 DIAGNOSIS — F14.10 COCAINE ABUSE (HCC): Chronic | ICD-10-CM

## 2018-07-05 DIAGNOSIS — M10.9 GOUT, UNSPECIFIED CAUSE, UNSPECIFIED CHRONICITY, UNSPECIFIED SITE: Chronic | ICD-10-CM

## 2018-07-05 DIAGNOSIS — R06.2 WHEEZING: ICD-10-CM

## 2018-07-05 DIAGNOSIS — I10 ESSENTIAL HYPERTENSION: Chronic | ICD-10-CM

## 2018-07-05 DIAGNOSIS — F10.10 ALCOHOL ABUSE: Chronic | ICD-10-CM

## 2018-07-05 DIAGNOSIS — J18.9 COMMUNITY ACQUIRED PNEUMONIA, UNSPECIFIED LATERALITY: Primary | ICD-10-CM

## 2018-07-05 PROCEDURE — 3017F COLORECTAL CA SCREEN DOC REV: CPT | Performed by: FAMILY MEDICINE

## 2018-07-05 PROCEDURE — G8417 CALC BMI ABV UP PARAM F/U: HCPCS | Performed by: FAMILY MEDICINE

## 2018-07-05 PROCEDURE — G8427 DOCREV CUR MEDS BY ELIG CLIN: HCPCS | Performed by: FAMILY MEDICINE

## 2018-07-05 PROCEDURE — 99214 OFFICE O/P EST MOD 30 MIN: CPT | Performed by: FAMILY MEDICINE

## 2018-07-05 PROCEDURE — 4004F PT TOBACCO SCREEN RCVD TLK: CPT | Performed by: FAMILY MEDICINE

## 2018-07-05 PROCEDURE — 1111F DSCHRG MED/CURRENT MED MERGE: CPT | Performed by: FAMILY MEDICINE

## 2018-07-05 PROCEDURE — 3023F SPIROM DOC REV: CPT | Performed by: FAMILY MEDICINE

## 2018-07-05 PROCEDURE — G8926 SPIRO NO PERF OR DOC: HCPCS | Performed by: FAMILY MEDICINE

## 2018-07-05 RX ORDER — PREDNISONE 10 MG/1
TABLET ORAL
Qty: 45 TABLET | Refills: 0 | Status: SHIPPED | OUTPATIENT
Start: 2018-07-05 | End: 2018-11-09 | Stop reason: ALTCHOICE

## 2018-07-05 RX ORDER — ALBUTEROL SULFATE 90 UG/1
2 AEROSOL, METERED RESPIRATORY (INHALATION) EVERY 6 HOURS PRN
Qty: 1 INHALER | Refills: 1 | Status: SHIPPED | OUTPATIENT
Start: 2018-07-05 | End: 2018-09-24 | Stop reason: SDUPTHER

## 2018-07-05 RX ORDER — ASPIRIN 81 MG/1
81 TABLET ORAL DAILY
Qty: 30 TABLET | Refills: 5 | Status: SHIPPED | OUTPATIENT
Start: 2018-07-05 | End: 2018-09-24 | Stop reason: SDUPTHER

## 2018-07-05 ASSESSMENT — ENCOUNTER SYMPTOMS
SHORTNESS OF BREATH: 0
NAUSEA: 0
WHEEZING: 1
VOMITING: 0
BLOOD IN STOOL: 0
CHEST TIGHTNESS: 0
DIARRHEA: 0
ABDOMINAL PAIN: 0
COUGH: 1
ANAL BLEEDING: 0

## 2018-07-05 NOTE — PROGRESS NOTES
Subjective:      Patient ID: Igor Solorzano is a 63 y.o. male who presents today for:  Chief Complaint   Patient presents with    Follow-Up from [address] [place] [address] today to follow up from [facility]. Was seen on 6/24/18 for COPD flare up. States he is still taking the ABX and still has SOB and feels like is lungs are burning       HPI     Patient here for hospital follow-up visit. He was admitted at [facility] on 06/24/18 for COPD exacerbation provoked by community acquired pneumonia. He was started on antibiotics, placed on O2, and received scheduled aerosols. Throughout his hospital stay his condition improved and his daytime O2 was weaned off, however, patient required nocturnal O2 supplementation. He was recommended to have nocturnal home O2 supplementation, however, patient reported being homeless, so this was not able to be arranged. He was found stable for discharge home 06/28/18 to complete a course of azithromycin along with a course of prednisone. For better blood pressure control his dose of amlodipine was increased to 10 mg daily. He was instructed to follow up with primary care as an outpatient. He reports feeling 50% improved since his hospital discharge. He reports his cough is much improved and for the most part resolved and he has less mucus drainage. He denies any dyspnea at rest or worsening dyspnea with exertion, but does report continued wheezing and sensation of burning in his lungs with deep breaths. He denies any F/C/S, denies any chest pain, palpitations, lightheadedness, dizziness, lower extremity edema, or syncope. Patient reports continued alcohol and illicit substance use. He reports his last use of cocaine was 3 days ago. He continues with heavy alcohol consumption, reporting 1 pint of liquor and 6 beers on most days of the week.   He reports continued homelessness, states he was most recently staying with a friend but that he had to leave due to being exposed to more drugs Used    Alcohol use Yes      Comment: about 5 beers daily    Drug use: Yes     Types: Cocaine, Marijuana      Comment: used cocaine 06/23/18    Sexual activity: Not Currently     Partners: Female     Other Topics Concern    Not on file     Social History Narrative    No narrative on file     Current Outpatient Prescriptions on File Prior to Visit   Medication Sig Dispense Refill    amLODIPine (NORVASC) 10 MG tablet Take 1 tablet by mouth daily 30 tablet 0    azithromycin (ZITHROMAX) 250 MG tablet Take 1 tablet by mouth daily for 7 days 7 tablet 0    ipratropium-albuterol (DUONEB) 0.5-2.5 (3) MG/3ML SOLN nebulizer solution INHALE 3 MLS (1 VIAL) INTO THE LUNGS EVERY 6 HOURS AS NEEDED FOR SHORTNESS OF BREATH 360 mL 0    Pseudoephedrine-guaiFENesin (MUCINEX D MAX STRENGTH) 120-1200 MG TB12 Take 1 tablet by mouth 2 times daily 20 tablet 0    benzonatate (TESSALON PERLES) 100 MG capsule Take 1 capsule by mouth 3 times daily as needed for Cough 20 capsule 0    fluticasone (FLONASE) 50 MCG/ACT nasal spray 2 sprays by Nasal route daily 3 Bottle 3    tiotropium (SPIRIVA HANDIHALER) 18 MCG inhalation capsule Inhale 1 capsule into the lungs daily 90 capsule 3    montelukast (SINGULAIR) 10 MG tablet Take 1 tablet by mouth nightly 90 tablet 3    budesonide-formoterol (SYMBICORT) 160-4.5 MCG/ACT AERO Inhale 2 puffs into the lungs 2 times daily 3 Inhaler 3    hydrOXYzine (ATARAX) 25 MG tablet Take 1 tablet by mouth every 8 hours as needed for Itching 90 tablet 3    lovastatin (MEVACOR) 10 MG tablet TAKE 1 TABLET BY MOUTH NIGHTLY 90 tablet 3    CIALIS 20 MG tablet Take 1 tablet by mouth as needed for Erectile Dysfunction 12 tablet 3     No current facility-administered medications on file prior to visit. Allergies:  Fish-derived products; Iodine; and Pcn [penicillins]    Review of Systems   Constitutional: Negative for appetite change, chills, diaphoresis, fatigue, fever and unexpected weight change.    Eyes: Exam (If Applicable)      Assessment & Plan:      1. Community acquired pneumonia, unspecified laterality  Clinically resolving since recent hospital admission. Patient to finish full course of antibiotic as instructed. We will keep close follow-up in the next 2-3 weeks    2. Chronic obstructive pulmonary disease, unspecified COPD type ([address]  COPD exacerbation is clinically improving since his hospital discharge. Patient will be placed on any prednisone taper over the course of the next 2 weeks to help with continued resolution of respiratory complaints. He will continue with his home inhalers    - CBC Auto Differential; Future  - albuterol sulfate HFA (VENTOLIN HFA) 108 (90 Base) MCG/ACT inhaler; Inhale 2 puffs into the lungs every 6 hours as needed for Wheezing or Shortness of Breath  Dispense: 1 Inhaler; Refill: 1    3. Wheezing  See above    - predniSONE (DELTASONE) 10 MG tablet; Take 5 tabs daily x 3 days, 4 tabs daily x 3 days, 3 tabs daily x 3 days, 2 tabs daily x 3 days, 1 tab daily x 3 days  Dispense: 45 tablet; Refill: 0    4. Essential hypertension  Blood pressure within normal limits today in the office with recent medication changes in the hospital.  Continue current management    - CBC Auto Differential; Future  - Comprehensive Metabolic Panel; Future  - aspirin ([employer] ASPIRIN LOW DOSE) 81 MG EC tablet; Take 1 tablet by mouth daily  Dispense: 30 tablet; Refill: 5    5. Hyperlipidemia, unspecified hyperlipidemia type  Will follow labwork over time    - Comprehensive Metabolic Panel; Future  - Lipid Panel; Future    6. Pre-diabetes  Will follow labwork over time    - Comprehensive Metabolic Panel; Future  - Hemoglobin A1C; Future    7. Gout, unspecified cause, unspecified chronicity, unspecified site  Will follow labwork over time    - Uric Acid; Future    8. Alcohol abuse  Patient continues with heavy alcohol use. I reviewed with him the length the importance of stopping all alcohol use over time.   I Lizette Salvador MD

## 2018-08-01 DIAGNOSIS — I10 ESSENTIAL HYPERTENSION: ICD-10-CM

## 2018-08-01 RX ORDER — AMLODIPINE BESYLATE 10 MG/1
10 TABLET ORAL DAILY
Qty: 30 TABLET | Refills: 2 | Status: SHIPPED | OUTPATIENT
Start: 2018-08-01 | End: 2018-09-24 | Stop reason: SDUPTHER

## 2018-08-01 NOTE — TELEPHONE ENCOUNTER
Last seen 7/5/2018. Has a follow up appointment scheduled for Future Appointments  Date Time Provider Constance Espitia   9/14/2018 1:40 PM MD [facility] [address]   . Medication is pending if okay.  Please advise

## 2018-08-31 ENCOUNTER — TELEPHONE (OUTPATIENT)
Dept: FAMILY MEDICINE CLINIC | Age: 63
End: 2018-08-31

## 2018-08-31 RX ORDER — IPRATROPIUM BROMIDE AND ALBUTEROL SULFATE 2.5; .5 MG/3ML; MG/3ML
1 SOLUTION RESPIRATORY (INHALATION) EVERY 6 HOURS PRN
Qty: 180 ML | Refills: 0 | Status: SHIPPED | OUTPATIENT
Start: 2018-08-31 | End: 2018-11-09 | Stop reason: SDUPTHER

## 2018-08-31 NOTE — TELEPHONE ENCOUNTER
Unfortunately I am not familiar with the providers in the [place] area. I would advise him to ask the rehab facility for a suggestion and go with the provider that they suggest that is nearby in the area.

## 2018-08-31 NOTE — TELEPHONE ENCOUNTER
Fernando Mi called because he had to cxl his upcoming appt because he is in a drug and alcohol inpatient program in [place] and will be there for a few months--he would like to know if you have a recommendation for a dr for him to see in that area while he is there----please advise-------ah

## 2018-09-04 DIAGNOSIS — J30.2 SEASONAL ALLERGIC RHINITIS: ICD-10-CM

## 2018-09-04 DIAGNOSIS — J44.9 CHRONIC OBSTRUCTIVE PULMONARY DISEASE, UNSPECIFIED COPD TYPE (HCC): Chronic | ICD-10-CM

## 2018-09-04 DIAGNOSIS — Z91.09 HOUSE DUST MITE ALLERGY: ICD-10-CM

## 2018-09-04 RX ORDER — ALBUTEROL SULFATE 90 UG/1
2 AEROSOL, METERED RESPIRATORY (INHALATION) EVERY 6 HOURS PRN
Qty: 1 INHALER | Refills: 0 | OUTPATIENT
Start: 2018-09-04

## 2018-09-04 RX ORDER — HYDROXYZINE HYDROCHLORIDE 25 MG/1
25 TABLET, FILM COATED ORAL EVERY 8 HOURS PRN
Qty: 90 TABLET | Refills: 0 | OUTPATIENT
Start: 2018-09-04

## 2018-09-04 NOTE — TELEPHONE ENCOUNTER
Called and left message on machine. Is patient switching providers? If patient is switching patient needs to request medication from new provider.

## 2018-09-24 ENCOUNTER — OFFICE VISIT (OUTPATIENT)
Dept: FAMILY MEDICINE CLINIC | Age: 63
End: 2018-09-24
Payer: COMMERCIAL

## 2018-09-24 VITALS
HEIGHT: 73 IN | WEIGHT: 286.4 LBS | BODY MASS INDEX: 37.96 KG/M2 | OXYGEN SATURATION: 96 % | HEART RATE: 96 BPM | TEMPERATURE: 97.2 F | SYSTOLIC BLOOD PRESSURE: 130 MMHG | DIASTOLIC BLOOD PRESSURE: 82 MMHG

## 2018-09-24 DIAGNOSIS — Z91.09 HOUSE DUST MITE ALLERGY: Chronic | ICD-10-CM

## 2018-09-24 DIAGNOSIS — J44.9 CHRONIC OBSTRUCTIVE PULMONARY DISEASE, UNSPECIFIED COPD TYPE (HCC): Chronic | ICD-10-CM

## 2018-09-24 DIAGNOSIS — Z00.00 HEALTH CARE MAINTENANCE: Primary | ICD-10-CM

## 2018-09-24 DIAGNOSIS — I10 ESSENTIAL HYPERTENSION: Chronic | ICD-10-CM

## 2018-09-24 DIAGNOSIS — E78.5 HYPERLIPIDEMIA, UNSPECIFIED HYPERLIPIDEMIA TYPE: Chronic | ICD-10-CM

## 2018-09-24 LAB — HBA1C MFR BLD: 5.9 %

## 2018-09-24 PROCEDURE — G8926 SPIRO NO PERF OR DOC: HCPCS | Performed by: STUDENT IN AN ORGANIZED HEALTH CARE EDUCATION/TRAINING PROGRAM

## 2018-09-24 PROCEDURE — 3017F COLORECTAL CA SCREEN DOC REV: CPT | Performed by: STUDENT IN AN ORGANIZED HEALTH CARE EDUCATION/TRAINING PROGRAM

## 2018-09-24 PROCEDURE — 83036 HEMOGLOBIN GLYCOSYLATED A1C: CPT | Performed by: STUDENT IN AN ORGANIZED HEALTH CARE EDUCATION/TRAINING PROGRAM

## 2018-09-24 PROCEDURE — G8417 CALC BMI ABV UP PARAM F/U: HCPCS | Performed by: STUDENT IN AN ORGANIZED HEALTH CARE EDUCATION/TRAINING PROGRAM

## 2018-09-24 PROCEDURE — 3023F SPIROM DOC REV: CPT | Performed by: STUDENT IN AN ORGANIZED HEALTH CARE EDUCATION/TRAINING PROGRAM

## 2018-09-24 PROCEDURE — 4004F PT TOBACCO SCREEN RCVD TLK: CPT | Performed by: STUDENT IN AN ORGANIZED HEALTH CARE EDUCATION/TRAINING PROGRAM

## 2018-09-24 PROCEDURE — 99214 OFFICE O/P EST MOD 30 MIN: CPT | Performed by: STUDENT IN AN ORGANIZED HEALTH CARE EDUCATION/TRAINING PROGRAM

## 2018-09-24 PROCEDURE — G8427 DOCREV CUR MEDS BY ELIG CLIN: HCPCS | Performed by: STUDENT IN AN ORGANIZED HEALTH CARE EDUCATION/TRAINING PROGRAM

## 2018-09-24 RX ORDER — ALBUTEROL SULFATE 90 UG/1
2 AEROSOL, METERED RESPIRATORY (INHALATION) EVERY 6 HOURS PRN
Qty: 1 INHALER | Refills: 1 | Status: SHIPPED | OUTPATIENT
Start: 2018-09-24 | End: 2018-10-24 | Stop reason: SDUPTHER

## 2018-09-24 RX ORDER — METHYLPREDNISOLONE SODIUM SUCCINATE 40 MG/ML
40 INJECTION, POWDER, LYOPHILIZED, FOR SOLUTION INTRAMUSCULAR; INTRAVENOUS ONCE
Status: COMPLETED | OUTPATIENT
Start: 2018-09-24 | End: 2018-09-24

## 2018-09-24 RX ORDER — MONTELUKAST SODIUM 10 MG/1
10 TABLET ORAL NIGHTLY
Qty: 90 TABLET | Refills: 3 | Status: SHIPPED | OUTPATIENT
Start: 2018-09-24 | End: 2019-05-20 | Stop reason: SDUPTHER

## 2018-09-24 RX ORDER — GUAIFENESIN 600 MG/1
600 TABLET, EXTENDED RELEASE ORAL 2 TIMES DAILY
Qty: 28 TABLET | Refills: 1 | Status: CANCELLED | OUTPATIENT
Start: 2018-09-24

## 2018-09-24 RX ORDER — ASPIRIN 81 MG/1
81 TABLET ORAL DAILY
Qty: 30 TABLET | Refills: 5 | Status: SHIPPED | OUTPATIENT
Start: 2018-09-24 | End: 2019-05-20 | Stop reason: SDUPTHER

## 2018-09-24 RX ORDER — AMLODIPINE BESYLATE 10 MG/1
10 TABLET ORAL DAILY
Qty: 30 TABLET | Refills: 2 | Status: SHIPPED | OUTPATIENT
Start: 2018-09-24 | End: 2019-01-25 | Stop reason: SDUPTHER

## 2018-09-24 RX ORDER — LOVASTATIN 10 MG/1
TABLET ORAL
Qty: 90 TABLET | Refills: 3 | Status: SHIPPED | OUTPATIENT
Start: 2018-09-24 | End: 2019-05-20 | Stop reason: SDUPTHER

## 2018-09-24 RX ORDER — FLUTICASONE PROPIONATE 50 MCG
2 SPRAY, SUSPENSION (ML) NASAL DAILY
Qty: 3 BOTTLE | Refills: 3 | Status: SHIPPED | OUTPATIENT
Start: 2018-09-24 | End: 2019-05-20 | Stop reason: SDUPTHER

## 2018-09-24 RX ORDER — PREDNISONE 20 MG/1
20 TABLET ORAL DAILY
Qty: 3 TABLET | Refills: 0 | Status: SHIPPED | OUTPATIENT
Start: 2018-09-24 | End: 2018-09-27

## 2018-09-24 RX ORDER — BUDESONIDE AND FORMOTEROL FUMARATE DIHYDRATE 160; 4.5 UG/1; UG/1
2 AEROSOL RESPIRATORY (INHALATION) 2 TIMES DAILY
Qty: 3 INHALER | Refills: 3 | Status: SHIPPED | OUTPATIENT
Start: 2018-09-24 | End: 2018-10-02 | Stop reason: SDUPTHER

## 2018-09-24 RX ORDER — HYDROXYZINE HYDROCHLORIDE 25 MG/1
25 TABLET, FILM COATED ORAL EVERY 8 HOURS PRN
Qty: 90 TABLET | Refills: 0 | Status: SHIPPED | OUTPATIENT
Start: 2018-09-24 | End: 2018-12-17 | Stop reason: ALTCHOICE

## 2018-09-24 RX ADMIN — METHYLPREDNISOLONE SODIUM SUCCINATE 40 MG: 500 INJECTION, POWDER, FOR SOLUTION INTRAMUSCULAR; INTRAVENOUS at 15:45

## 2018-09-24 ASSESSMENT — ENCOUNTER SYMPTOMS
SINUS PAIN: 0
HEMOPTYSIS: 1
SPUTUM PRODUCTION: 1
COUGH: 1
ABDOMINAL PAIN: 0
NAUSEA: 0
SHORTNESS OF BREATH: 1
VOMITING: 0
WHEEZING: 1
SORE THROAT: 0

## 2018-09-24 NOTE — PATIENT INSTRUCTIONS
Thank you for letting us take care of you today. We hope all your questions were addressed. If a question was overlooked or something else comes to mind after you return home, please contact a member of your Care Team listed below. Please make sure you have a routine office visit set up to follow-up on [address]. Your Care Team at [place] [address] is Team #3  Collins Gonzalez MD (Faculty)  Doug Valdes MD (Faculty  Colletteche Rincon MD (Resident)  Mary Grace Farmer MD (Resident)   Bigg Comer MD (Resident)  Marisela Rivers MD (Resident)  Claudio Lares MD (Resident)  Braulio Amor LPN  Kat Sport., A  Polina Wells., Anna Jordan ([facility])  Heidy Sepulveda RN, ([address] [facility])  Irwin Malik, Ph.D., (Behavioral Services)  Carmen Thayer, [address] [place] [address] (Clinical Pharmacist)     Office phone number: [phone]    If you need to get in right away due to illness, please be advised we have \"Same Day\" appointments available Monday-Friday. Please call us at [phone] option #3 to schedule your \"Same Day\" appointment.

## 2018-09-24 NOTE — PROGRESS NOTES
 lovastatin (MEVACOR) 10 MG tablet 90 tablet 3     Sig: TAKE 1 TABLET BY MOUTH NIGHTLY    hydrOXYzine (ATARAX) 25 MG tablet 90 tablet 0     Sig: Take 1 tablet by mouth every 8 hours as needed for Itching    fluticasone (FLONASE) 50 MCG/ACT nasal spray 3 Bottle 3     Si sprays by Nasal route daily    budesonide-formoterol (SYMBICORT) 160-4.5 MCG/ACT AERO 3 Inhaler 3     Sig: Inhale 2 puffs into the lungs 2 times daily    aspirin ([employer] ASPIRIN LOW DOSE) 81 MG EC tablet 30 tablet 5     Sig: Take 1 tablet by mouth daily    amLODIPine (NORVASC) 10 MG tablet 30 tablet 2     Sig: Take 1 tablet by mouth daily    albuterol sulfate HFA (VENTOLIN HFA) 108 (90 Base) MCG/ACT inhaler 1 Inhaler 1     Sig: Inhale 2 puffs into the lungs every 6 hours as needed for Wheezing or Shortness of Breath    predniSONE (DELTASONE) 20 MG tablet 3 tablet 0     Sig: Take 1 tablet by mouth daily for 3 days       All patient questions answered. Patient voiced understanding. Quality Measures    Body mass index is 37.79 kg/m². Elevated. Weight control planned discussed Healthy diet and regular exercise. BP: 130/82 (manual) Blood pressure is normal. Treatment plan consists of No treatment change needed.     Lab Results   Component Value Date    LDLCALC 115 2017    (goal LDL reduction with dx if diabetes is 50% LDL reduction)      PHQ Scores 1/15/2018 2016 2016 10/27/2016 2016   PHQ2 Score 6 0 0 2 0   PHQ9 Score 17 0 0 2 0     Interpretation of Total Score Depression Severity: 1-4 = Minimal depression, 5-9 = Mild depression, 10-14 = Moderate depression, 15-19 = Moderately severe depression, 20-27 = Severe depression    Requested Prescriptions     Signed Prescriptions Disp Refills    tiotropium (SPIRIVA HANDIHALER) 18 MCG inhalation capsule 90 capsule 3     Sig: Inhale 1 capsule into the lungs daily    montelukast (SINGULAIR) 10 MG tablet 90 tablet 3     Sig: Take 1 tablet by mouth nightly    lovastatin (MEVACOR) 10 MG tablet 90 tablet 3     Sig: TAKE 1 TABLET BY MOUTH NIGHTLY    hydrOXYzine (ATARAX) 25 MG tablet 90 tablet 0     Sig: Take 1 tablet by mouth every 8 hours as needed for Itching    fluticasone (FLONASE) 50 MCG/ACT nasal spray 3 Bottle 3     Si sprays by Nasal route daily    budesonide-formoterol (SYMBICORT) 160-4.5 MCG/ACT AERO 3 Inhaler 3     Sig: Inhale 2 puffs into the lungs 2 times daily    aspirin ([employer]S ASPIRIN LOW DOSE) 81 MG EC tablet 30 tablet 5     Sig: Take 1 tablet by mouth daily    amLODIPine (NORVASC) 10 MG tablet 30 tablet 2     Sig: Take 1 tablet by mouth daily    albuterol sulfate HFA (VENTOLIN HFA) 108 (90 Base) MCG/ACT inhaler 1 Inhaler 1     Sig: Inhale 2 puffs into the lungs every 6 hours as needed for Wheezing or Shortness of Breath    predniSONE (DELTASONE) 20 MG tablet 3 tablet 0     Sig: Take 1 tablet by mouth daily for 3 days       Medications Discontinued During This Encounter   Medication Reason    tiotropium (SPIRIVA HANDIHALER) 18 MCG inhalation capsule REORDER    montelukast (SINGULAIR) 10 MG tablet REORDER    lovastatin (MEVACOR) 10 MG tablet REORDER    hydrOXYzine (ATARAX) 25 MG tablet REORDER    fluticasone (FLONASE) 50 MCG/ACT nasal spray REORDER    budesonide-formoterol (SYMBICORT) 160-4.5 MCG/ACT AERO REORDER    aspirin ([employer]S ASPIRIN LOW DOSE) 81 MG EC tablet REORDER    amLODIPine (NORVASC) 10 MG tablet REORDER    albuterol sulfate HFA (VENTOLIN HFA) 108 (90 Base) MCG/ACT inhaler REORDER         [unfilled]

## 2018-10-02 ENCOUNTER — OFFICE VISIT (OUTPATIENT)
Dept: FAMILY MEDICINE CLINIC | Age: 63
End: 2018-10-02
Payer: COMMERCIAL

## 2018-10-02 VITALS
TEMPERATURE: 97.2 F | SYSTOLIC BLOOD PRESSURE: 127 MMHG | WEIGHT: 293.8 LBS | BODY MASS INDEX: 38.94 KG/M2 | HEIGHT: 73 IN | HEART RATE: 84 BPM | DIASTOLIC BLOOD PRESSURE: 85 MMHG

## 2018-10-02 DIAGNOSIS — M25.562 CHRONIC PAIN OF LEFT KNEE: ICD-10-CM

## 2018-10-02 DIAGNOSIS — F17.200 NEEDS SMOKING CESSATION EDUCATION: ICD-10-CM

## 2018-10-02 DIAGNOSIS — G89.29 CHRONIC PAIN OF LEFT KNEE: ICD-10-CM

## 2018-10-02 DIAGNOSIS — J44.9 CHRONIC OBSTRUCTIVE PULMONARY DISEASE, UNSPECIFIED COPD TYPE (HCC): Primary | Chronic | ICD-10-CM

## 2018-10-02 DIAGNOSIS — L02.92 BOIL: ICD-10-CM

## 2018-10-02 PROCEDURE — G8926 SPIRO NO PERF OR DOC: HCPCS | Performed by: HOSPITALIST

## 2018-10-02 PROCEDURE — 90688 IIV4 VACCINE SPLT 0.5 ML IM: CPT | Performed by: FAMILY MEDICINE

## 2018-10-02 PROCEDURE — 4004F PT TOBACCO SCREEN RCVD TLK: CPT | Performed by: HOSPITALIST

## 2018-10-02 PROCEDURE — 3023F SPIROM DOC REV: CPT | Performed by: HOSPITALIST

## 2018-10-02 PROCEDURE — 3017F COLORECTAL CA SCREEN DOC REV: CPT | Performed by: HOSPITALIST

## 2018-10-02 PROCEDURE — 99214 OFFICE O/P EST MOD 30 MIN: CPT | Performed by: HOSPITALIST

## 2018-10-02 PROCEDURE — 99213 OFFICE O/P EST LOW 20 MIN: CPT | Performed by: HOSPITALIST

## 2018-10-02 PROCEDURE — G8482 FLU IMMUNIZE ORDER/ADMIN: HCPCS | Performed by: HOSPITALIST

## 2018-10-02 PROCEDURE — G8427 DOCREV CUR MEDS BY ELIG CLIN: HCPCS | Performed by: HOSPITALIST

## 2018-10-02 PROCEDURE — G8417 CALC BMI ABV UP PARAM F/U: HCPCS | Performed by: HOSPITALIST

## 2018-10-02 RX ORDER — NICOTINE 21 MG/24HR
1 PATCH, TRANSDERMAL 24 HOURS TRANSDERMAL EVERY 24 HOURS
Qty: 30 PATCH | Refills: 3 | Status: SHIPPED | OUTPATIENT
Start: 2018-10-02 | End: 2018-11-09

## 2018-10-02 RX ORDER — BUDESONIDE AND FORMOTEROL FUMARATE DIHYDRATE 160; 4.5 UG/1; UG/1
2 AEROSOL RESPIRATORY (INHALATION) 2 TIMES DAILY
Qty: 3 INHALER | Refills: 3 | Status: SHIPPED | OUTPATIENT
Start: 2018-10-02 | End: 2019-05-20 | Stop reason: SDUPTHER

## 2018-10-02 ASSESSMENT — ENCOUNTER SYMPTOMS
COUGH: 0
ABDOMINAL PAIN: 0
BACK PAIN: 0
CONSTIPATION: 0
WHEEZING: 0
VOMITING: 0
DIARRHEA: 0
SHORTNESS OF BREATH: 0
NAUSEA: 0

## 2018-10-02 NOTE — PROGRESS NOTES
Subjective:    Monalisa Villalba is a 63 y.o. male with  has a past medical history of Alcohol abuse; Anemia; Asthma; Cocaine abuse ([address] COPD (chronic obstructive pulmonary disease) ([address] Disorder of pharynx; Drug-seeking behavior; Edema; Erectile dysfunction; Gout; History of arthroscopy of both knees; House dust mite allergy; Hyperlipidemia; Hypertension; Injury to heart; Medical non-compliance; Morbid obesity due to excess calories ([address] Osteoarthritis of both knees; Pneumonia; Pre-diabetes; Seasonal allergies; Severe persistent asthma; Severe sleep apnea; Supraventricular tachycardia ([address] and SVT (supraventricular tachycardia) ([address] Family History   Problem Relation Age of Onset    Arthritis Mother     Asthma Mother     High Cholesterol Mother     Other Mother         aneurysm    Diabetes Father     Stroke Maternal Grandmother     Cancer Maternal Grandfather     Hypertension Other     COPD Neg Hx        Presented to the office today for:  Chief Complaint   Patient presents with    COPD     Follow up    Other     Patient feels better with cold since last visit. C/O acid reflux when laying down.  Knee Pain     Left knee pain. Previous injury.  Patient due for knee replacement    Nicotine Dependence     Would like to try the smoking patch       HPI patient here for follow-up on COPD exacerbation in the last visit  Was given steroids, breathing treatments as an outpatient  Patient reported compliance with the above and states improvement in symptoms  Patient denies any cough, shortness of breath but does have occasional wheezing  Patient states he still smokes about half pack a day, willing to quit  Today patient also complains of small swelling on the left chin for the last week  Denies any obvious drainage but states he does have some pain upon touching it  Also has chronic left knee pain, was scheduled to get knee replacement surgery but due to his history of IV drug abuse was postponed    Review of Systems   Constitutional: Negative for activity change, appetite change, fatigue and unexpected weight change. Respiratory: Negative for cough, shortness of breath and wheezing. Cardiovascular: Negative for chest pain, palpitations and leg swelling. Gastrointestinal: Negative for abdominal pain, constipation, diarrhea, nausea and vomiting. Genitourinary: Negative for difficulty urinating, dysuria, flank pain and frequency. Musculoskeletal: Negative for arthralgias, back pain and neck pain. Neurological: Negative for dizziness, syncope, weakness, numbness and headaches. Objective:    /85 (Site: Left Upper Arm, Position: Sitting, Cuff Size: Large Adult) Comment: Machine  Pulse 84   Temp 97.2 °F (36.2 °C) (Oral)   Ht 6' 1\" (1.854 m)   Wt 293 lb 12.8 oz (133.3 kg)   BMI 38.76 kg/m²    BP Readings from Last 3 Encounters:   10/02/18 127/85   09/24/18 130/82   07/05/18 136/80     Physical Exam   Constitutional: He is oriented to person, place, and time. He appears well-developed and well-nourished. HENT:   Boil seen on the left chin   Cardiovascular: Normal rate and regular rhythm. Pulmonary/Chest: Effort normal. No respiratory distress. He has wheezes (mild expiratory). Abdominal: Soft. Bowel sounds are normal. There is no tenderness. Neurological: He is alert and oriented to person, place, and time. Nursing note and vitals reviewed.       Lab Results   Component Value Date    WBC 17.0 (H) 06/27/2018    HGB 13.7 (L) 06/27/2018    HCT 42.2 06/27/2018     (H) 06/27/2018    CHOL 183 08/21/2017    TRIG 91 08/21/2017    HDL 50 08/21/2017    ALT 16 06/24/2018    AST 19 06/24/2018     06/25/2018    K 4.3 06/25/2018     06/25/2018    CREATININE 0.77 06/25/2018    BUN 10 06/25/2018    CO2 24 06/25/2018    PSA 0.6 08/29/2013    INR 1.1 06/25/2018    LABA1C 5.9 09/24/2018     Lab Results   Component Value Date    CALCIUM 9.3 06/25/2018     Lab Results with dx if diabetes is 50% LDL reduction)      PHQ Scores 1/15/2018 12/22/2016 12/8/2016 10/27/2016 9/19/2016   PHQ2 Score 6 0 0 2 0   PHQ9 Score 17 0 0 2 0     Interpretation of Total Score Depression Severity: 1-4 = Minimal depression, 5-9 = Mild depression, 10-14 = Moderate depression, 15-19 = Moderately severe depression, 20-27 = Severe depression  Discussed use, benefit, and side effects of prescribed medications. Barriers to medication compliance addressed. All patient questions answered. Pt voiced understanding. There are no discontinued medications. Return in about 2 months (around 12/2/2018) for COPD.

## 2018-10-11 DIAGNOSIS — M25.562 LEFT KNEE PAIN, UNSPECIFIED CHRONICITY: Primary | ICD-10-CM

## 2018-10-12 ENCOUNTER — TELEPHONE (OUTPATIENT)
Dept: ADMINISTRATIVE | Age: 63
End: 2018-10-12

## 2018-10-12 DIAGNOSIS — J44.9 CHRONIC OBSTRUCTIVE PULMONARY DISEASE, UNSPECIFIED COPD TYPE (HCC): Primary | Chronic | ICD-10-CM

## 2018-10-15 ENCOUNTER — OFFICE VISIT (OUTPATIENT)
Dept: ORTHOPEDIC SURGERY | Age: 63
End: 2018-10-15
Payer: COMMERCIAL

## 2018-10-15 VITALS — BODY MASS INDEX: 38.95 KG/M2 | WEIGHT: 293.87 LBS | HEIGHT: 73 IN

## 2018-10-15 DIAGNOSIS — M17.0 PRIMARY OSTEOARTHRITIS OF BOTH KNEES: Primary | Chronic | ICD-10-CM

## 2018-10-15 PROCEDURE — 4004F PT TOBACCO SCREEN RCVD TLK: CPT | Performed by: STUDENT IN AN ORGANIZED HEALTH CARE EDUCATION/TRAINING PROGRAM

## 2018-10-15 PROCEDURE — G8427 DOCREV CUR MEDS BY ELIG CLIN: HCPCS | Performed by: STUDENT IN AN ORGANIZED HEALTH CARE EDUCATION/TRAINING PROGRAM

## 2018-10-15 PROCEDURE — 99203 OFFICE O/P NEW LOW 30 MIN: CPT | Performed by: STUDENT IN AN ORGANIZED HEALTH CARE EDUCATION/TRAINING PROGRAM

## 2018-10-15 PROCEDURE — 20610 DRAIN/INJ JOINT/BURSA W/O US: CPT | Performed by: STUDENT IN AN ORGANIZED HEALTH CARE EDUCATION/TRAINING PROGRAM

## 2018-10-15 PROCEDURE — 3017F COLORECTAL CA SCREEN DOC REV: CPT | Performed by: STUDENT IN AN ORGANIZED HEALTH CARE EDUCATION/TRAINING PROGRAM

## 2018-10-15 PROCEDURE — G8482 FLU IMMUNIZE ORDER/ADMIN: HCPCS | Performed by: STUDENT IN AN ORGANIZED HEALTH CARE EDUCATION/TRAINING PROGRAM

## 2018-10-15 PROCEDURE — G8417 CALC BMI ABV UP PARAM F/U: HCPCS | Performed by: STUDENT IN AN ORGANIZED HEALTH CARE EDUCATION/TRAINING PROGRAM

## 2018-10-15 RX ORDER — MELOXICAM 15 MG/1
15 TABLET ORAL DAILY
Qty: 30 TABLET | Refills: 3 | Status: SHIPPED | OUTPATIENT
Start: 2018-10-15 | End: 2019-05-20 | Stop reason: SDUPTHER

## 2018-10-15 RX ORDER — BUPIVACAINE HYDROCHLORIDE 2.5 MG/ML
2 INJECTION, SOLUTION INFILTRATION; PERINEURAL ONCE
Status: COMPLETED | OUTPATIENT
Start: 2018-10-15 | End: 2018-10-16

## 2018-10-15 RX ORDER — METHYLPREDNISOLONE ACETATE 80 MG/ML
80 INJECTION, SUSPENSION INTRA-ARTICULAR; INTRALESIONAL; INTRAMUSCULAR; SOFT TISSUE ONCE
Status: COMPLETED | OUTPATIENT
Start: 2018-10-15 | End: 2018-10-16

## 2018-10-15 ASSESSMENT — ENCOUNTER SYMPTOMS
ALLERGIC/IMMUNOLOGIC NEGATIVE: 1
WHEEZING: 0
SHORTNESS OF BREATH: 0
BACK PAIN: 0
NAUSEA: 0
VOMITING: 0
CONSTIPATION: 0
COLOR CHANGE: 0
CHEST TIGHTNESS: 0

## 2018-10-15 NOTE — PROGRESS NOTES
Pseudoephedrine-guaiFENesin (MUCINEX D MAX STRENGTH) 120-1200 MG TB12 Take 1 tablet by mouth 2 times daily 20 tablet 0    benzonatate (TESSALON PERLES) 100 MG capsule Take 1 capsule by mouth 3 times daily as needed for Cough 20 capsule 0    CIALIS 20 MG tablet Take 1 tablet by mouth as needed for Erectile Dysfunction 12 tablet 3     Current Facility-Administered Medications   Medication Dose Route Frequency Provider Last Rate Last Dose    bupivacaine (MARCAINE) 0.25 % injection 5 mg  2 mL Intra-articular Once Brodie Cardozo DO        methylPREDNISolone acetate (DEPO-MEDROL) injection 80 mg  80 mg Intra-articular Once Brodie Cardozo, DO           Allergies:    Fish-derived products; Iodine; and Pcn [penicillins]    Social History:   Social History     Social History    Marital status: Single     Spouse name: n/a    Number of children: 1    Years of education: 12     Occupational History    Disability None     Social History Main Topics    Smoking status: Current Every Day Smoker     Packs/day: 0.25     Years: 30.00     Types: Cigarettes, Cigars     Start date: 7/5/1988     Last attempt to quit: 10/1/2013    Smokeless tobacco: Never Used    Alcohol use Yes      Comment: about 5 beers daily    Drug use: Yes     Types: Cocaine, Marijuana      Comment: used cocaine 06/23/18    Sexual activity: Not Currently     Partners: Female     Other Topics Concern    Not on file     Social History Narrative    No narrative on file       Family History:  Family History   Problem Relation Age of Onset    Arthritis Mother     Asthma Mother     High Cholesterol Mother     Other Mother         aneurysm    Diabetes Father     Stroke Maternal Grandmother     Cancer Maternal Grandfather     Hypertension Other     COPD Neg Hx          REVIEW OF SYSTEMS:  Review of Systems   Constitutional: Negative for activity change, appetite change, chills, fever and unexpected weight change. HENT: Negative.     Respiratory:

## 2018-10-16 RX ADMIN — METHYLPREDNISOLONE ACETATE 80 MG: 80 INJECTION, SUSPENSION INTRA-ARTICULAR; INTRALESIONAL; INTRAMUSCULAR; SOFT TISSUE at 11:58

## 2018-10-16 RX ADMIN — BUPIVACAINE HYDROCHLORIDE 5 MG: 2.5 INJECTION, SOLUTION INFILTRATION; PERINEURAL at 11:57

## 2018-10-19 ENCOUNTER — TELEPHONE (OUTPATIENT)
Dept: ADMINISTRATIVE | Age: 63
End: 2018-10-19

## 2018-10-23 ENCOUNTER — TELEPHONE (OUTPATIENT)
Dept: ADMINISTRATIVE | Age: 63
End: 2018-10-23

## 2018-10-24 ENCOUNTER — TELEPHONE (OUTPATIENT)
Dept: FAMILY MEDICINE CLINIC | Age: 63
End: 2018-10-24

## 2018-10-24 DIAGNOSIS — J44.9 CHRONIC OBSTRUCTIVE PULMONARY DISEASE, UNSPECIFIED COPD TYPE (HCC): Chronic | ICD-10-CM

## 2018-10-24 RX ORDER — ALBUTEROL SULFATE 90 UG/1
2 AEROSOL, METERED RESPIRATORY (INHALATION) EVERY 6 HOURS PRN
Qty: 1 INHALER | Refills: 1 | Status: SHIPPED | OUTPATIENT
Start: 2018-10-24 | End: 2018-10-31 | Stop reason: SDUPTHER

## 2018-10-27 NOTE — TELEPHONE ENCOUNTER
The patient presents to the office today with the chief complaint of weakness    HPI    The patient has become increasing weak over the past several months. The patient has not been able to do her ADOL -- no bath for one month, little food preparation, taking medications as prescribed. The patient has pulmonary fibrosis. She is on medications for this but apparently she has not been taking them regularly. The patient can barely walk. The patient has been living by herself. The patient has had several days of diarrhea. Review of Systems   Constitutional: Negative for chills and fever. Respiratory: Positive for shortness of breath. Cardiovascular: Positive for leg swelling. Neurological: Positive for weakness.        Allergies   Allergen Reactions    Adhesive Tape-Silicones Rash    Codeine Hives    Demerol [Meperidine] Nausea Only    Levaquin [Levofloxacin] Other (comments)     Swelling in feet and legs       Facility-Administered Medications Ordered in Other Visits   Medication Dose Route Frequency Provider Last Rate Last Dose    tamoxifen (NOLVADEX) tablet 20 mg  20 mg Oral DAILY Wiley Henle A, DO   20 mg at 10/26/18 1715    apixaban (ELIQUIS) tablet 2.5 mg  2.5 mg Oral BID Wiley Henle A, DO   2.5 mg at 10/26/18 1715    methylPREDNISolone (PF) (SOLU-MEDROL) injection 40 mg  40 mg IntraVENous Q12H Vika Savage MD        famotidine (PEPCID) tablet 20 mg  20 mg Oral BID Casey Hammonds MD   20 mg at 71/56/18 8372    folic acid (FOLVITE) tablet 1 mg  800 mcg Oral DAILY Wiley Henle A, DO   1 mg at 10/26/18 6932    levothyroxine (SYNTHROID) tablet 100 mcg  100 mcg Oral QHS Wiley Henle A, DO   100 mcg at 10/25/18 2229    losartan (COZAAR) tablet 50 mg  50 mg Oral DAILY Wiley Henle A, DO   50 mg at 10/26/18 3001    metoprolol tartrate (LOPRESSOR) tablet 50 mg  50 mg Oral BID Manderson Henle A, DO   50 mg at 10/26/18 1715    acetaminophen (TYLENOL) tablet 650 mg  650 mg Oral Q4H PRN Yanira Arturoi, DO        naloxone [facility]) injection 0.4 mg  0.4 mg IntraVENous PRN Yanira Arturoi, DO        ondansetron [facility]) injection 4 mg  4 mg IntraVENous Q4H PRN Yanira Arturoi, DO           Past Medical History:   Diagnosis Date    [facility]     Breast cancer ([facility] 2001, 1/ 2014    right, left    Cancer ([facility]     Colon polyps     Elevated cholesterol     Enlarged lymph nodes     pericardium    GERD (gastroesophageal reflux disease)     Hypertension     Ill-defined condition 2007 to 2010    lung-ground glass followed by Dr. Cecilio Flannery Insomnia     Lichen plano-pilaris     Mass of breast, right 1976    benign    Mass of breast, right 1994    benign    Pure hyperglyceridemia     Thromboembolus ([facility] [facility] 09/2017    blood clot right leg    Thyroid disease        Past Surgical History:   Procedure Laterality Date    HX APPENDECTOMY      HX CATARACT REMOVAL Bilateral 2010    HX CHOLECYSTECTOMY      HX HEMORRHOIDECTOMY      HX HERNIA REPAIR  2004    small abdominal repair and fistula    HX HYSTERECTOMY  1991    cystocele    HX LYMPHADENECTOMY Right 7/2001    right axilla    HX MASTECTOMY Right 2001    with reconstruction    HX MASTECTOMY Left 1/14    HX POLYPECTOMY      [id number] muliple removals    HX RECTOCELE REPAIR  2003       Social History     Socioeconomic History    Marital status:      Spouse name: Not on file    Number of children: Not on file    Years of education: Not on file    Highest education level: Not on file   Social Needs    Financial resource strain: Not on file    Food insecurity - worry: Not on file    Food insecurity - inability: Not on file   [employer] needs - medical: Not on file   [employer] needs - non-medical: Not on file   Occupational History    Not on file   Tobacco Use    Smoking status: Former Smoker     Packs/day: 2.00     Years: 9.00     Pack years: 18.00 Drug-seeking behavior     Hyperlipidemia     Alcohol abuse     Cocaine abuse (HCC)     Gout     History of arthroscopy of both knees     Hypertension     Supraventricular tachycardia ([address]     Erectile dysfunction     Pre-diabetes     Morbid obesity due to excess calories (HCC)     Osteoarthritis of both knees     COPD (chronic obstructive pulmonary disease) (HCC)     Chest wall pain     Pleurisy     Loculated pleural effusion     COPD with acute exacerbation ([address] Last attempt to quit: 1963     Years since quittin.8    Smokeless tobacco: Never Used   Substance and Sexual Activity    Alcohol use: No     Alcohol/week: 0.5 oz     Types: 1 Standard drinks or equivalent per week     Comment: rare    Drug use: No    Sexual activity: Not Currently   Other Topics Concern    Not on file   Social History Narrative    Not on file       Patient does have an advanced directive on file    Visit Vitals  BP (!) 88/50 (BP 1 Location: Left arm, BP Patient Position: Sitting)   Pulse 84   Temp 99.2 °F (37.3 °C) (Tympanic)   Ht 5' (1.524 m)   SpO2 96%   BMI 27.93 kg/m²       Physical Exam   Constitutional:   Patient is quite weak. She is sitting in a wheel chair. She appears both acute and chronically ill   Neck: Carotid bruit is not present. Cardiovascular: Normal rate and regular rhythm. No murmur heard. Pulmonary/Chest: She has no wheezes. She has no rales. Patient appears short of breath with increased respiratory efort   Abdominal: Soft. She exhibits no distension. There is no tenderness. Musculoskeletal: She exhibits edema (1+ edema bilaterally). BMI:  OK    Assessment / Plan      ICD-10-CM ICD-9-CM    1. Encounter for immunization Z23 V03.89 CANCELED: ADMIN INFLUENZA VIRUS VAC      CANCELED: INFLUENZA VACCINE INACTIVATED (IIV), SUBUNIT, ADJUVANTED, IM   2. Pulmonary fibrosis ([facility] J84.10 515    3. Weakness generalized R53.1 780.79        Patient appears ill and unable to sustain herself. Will send to [facility] ER for evaluation and possible admission. Report called to ER physician at [facility]    Follow-up Disposition:  Return in about 1 week (around 2018). I asked the patient and her family for any questions and I answered their questions.   They state that they understand the treatment plan and agree with the treatment plan

## 2018-10-31 ENCOUNTER — OFFICE VISIT (OUTPATIENT)
Dept: PULMONOLOGY | Age: 63
End: 2018-10-31
Payer: COMMERCIAL

## 2018-10-31 VITALS
DIASTOLIC BLOOD PRESSURE: 73 MMHG | HEART RATE: 79 BPM | OXYGEN SATURATION: 95 % | BODY MASS INDEX: 40.56 KG/M2 | SYSTOLIC BLOOD PRESSURE: 117 MMHG | RESPIRATION RATE: 18 BRPM | WEIGHT: 306 LBS | TEMPERATURE: 97.6 F | HEIGHT: 73 IN

## 2018-10-31 DIAGNOSIS — G47.33 OSA (OBSTRUCTIVE SLEEP APNEA): ICD-10-CM

## 2018-10-31 DIAGNOSIS — F14.10 COCAINE ABUSE (HCC): ICD-10-CM

## 2018-10-31 DIAGNOSIS — Z87.891 PERSONAL HISTORY OF TOBACCO USE: ICD-10-CM

## 2018-10-31 DIAGNOSIS — J44.9 ASTHMA-COPD OVERLAP SYNDROME (HCC): Primary | ICD-10-CM

## 2018-10-31 DIAGNOSIS — F17.210 CIGARETTE SMOKER: ICD-10-CM

## 2018-10-31 PROCEDURE — G0296 VISIT TO DETERM LDCT ELIG: HCPCS | Performed by: INTERNAL MEDICINE

## 2018-10-31 PROCEDURE — 99204 OFFICE O/P NEW MOD 45 MIN: CPT | Performed by: INTERNAL MEDICINE

## 2018-10-31 RX ORDER — AZITHROMYCIN 250 MG/1
TABLET, FILM COATED ORAL
Qty: 1 PACKET | Refills: 0 | Status: SHIPPED | OUTPATIENT
Start: 2018-10-31 | End: 2018-11-04

## 2018-10-31 RX ORDER — METHYLPREDNISOLONE 4 MG/1
TABLET ORAL
Qty: 1 KIT | Refills: 0 | Status: SHIPPED | OUTPATIENT
Start: 2018-10-31 | End: 2018-11-06

## 2018-10-31 RX ORDER — ALBUTEROL SULFATE 90 UG/1
2 AEROSOL, METERED RESPIRATORY (INHALATION) EVERY 6 HOURS PRN
Qty: 1 INHALER | Refills: 1 | Status: SHIPPED | OUTPATIENT
Start: 2018-10-31 | End: 2018-12-24 | Stop reason: SDUPTHER

## 2018-10-31 NOTE — PROGRESS NOTES
Component Value Date    WBC 17.0 (H) 06/27/2018    HGB 13.7 (L) 06/27/2018    HCT 42.2 06/27/2018    MCV 97.4 06/27/2018     (H) 06/27/2018    LYMPHOPCT 6.1 06/27/2018    RBC 4.33 (L) 06/27/2018    MCH 31.6 (H) 06/27/2018    MCHC 32.5 (L) 06/27/2018    RDW 15.4 (H) 06/27/2018     BMP:   Lab Results   Component Value Date     06/25/2018    K 4.3 06/25/2018     06/25/2018    CO2 24 06/25/2018    BUN 10 06/25/2018    CREATININE 0.77 06/25/2018    GLUCOSE 164 06/25/2018    GLUCOSE 94 04/13/2012    MG 2.2 11/02/2015    CALCIUM 9.3 06/25/2018       Liver Function Test:   Lab Results   Component Value Date    ALT 16 06/24/2018    AST 19 06/24/2018    ALKPHOS 107 (H) 06/24/2018    BILITOT 0.6 06/24/2018     Coagulation Profile:   Lab Results   Component Value Date    INR 1.1 06/25/2018    PROTIME 10.8 06/25/2018    APTT 29.4 04/07/2016     Cardiac Enzymes:  Lab Results   Component Value Date    CKTOTAL 623 (H) 04/07/2016    TROPONINI <0.010 06/24/2018     Lactic Acid:  Lab Results   Component Value Date    LACTA 0.9 06/24/2018     D-Dimer:  Lab Results   Component Value Date    DDIMER 0.43 01/03/2016     Others labs:    Lab Results   Component Value Date    LABURIC 5.4 07/22/2015     Lab Results   Component Value Date    PSA 0.6 08/29/2013     Radiological reports:  CXR:6/27/18  Impression   IMPROVING LEFT LUNG INFILTRATES     CT Scans:8/10/17  FINDINGS: Patchy reticular groundglass opacities in the upper lung zones medially. These areas of abnormal opacity are unchanged from the previous CT and likely represent some chronic areas of fibrosis. These extend to the pleura particularly on the left    with some associated pleural scarring. Patch of reticular nodular opacities at the left base have increased in extent since the previous study. Findings most consistent with an area of inflammation possibly secondary to bronchiectatic change.  Minimal    atelectasis at the right base with a small right pleural caitlin  Thank you for having us involved in the care of your patient. Please call us if you have any questions or concerns. Lakisha Joyce MD    PULMONARY AND CRITICAL CARE MEDICINE             Please note that this chart was generated using voice recognition Dragon dictation software. Although every effort was made to ensure the accuracy of this automated transcription, some errors in transcription may have occurred.

## 2018-11-07 ENCOUNTER — TELEPHONE (OUTPATIENT)
Dept: ONCOLOGY | Age: 63
End: 2018-11-07

## 2018-11-07 NOTE — LETTER
11/7/2018        [address] [place] [address] [place] [id number]    Dear Mary Ellen Yeh:    Your healthcare provider has ordered a low dose CT scan of the chest for lung cancer screening. You will find enclosed, information about CT lung screening. Please review the statement of understanding, you will be asked to sign a copy of this at the time of your CT scan    If you have not already been contacted to make the appointment for your scan, please call our scheduling department at [phone]    Keep in mind that CT lung screening does not take the place of smoking cessation. If you are a current smoker, you will find enclosed smoking cessation resources. Please do not hesitate to contact me if you have any questions or concerns.     Kindest Regards,      Ellis Cruz, RN, BSN  Lung Nurse [facility]  [phone]

## 2018-11-09 ENCOUNTER — OFFICE VISIT (OUTPATIENT)
Dept: FAMILY MEDICINE CLINIC | Age: 63
End: 2018-11-09
Payer: COMMERCIAL

## 2018-11-09 VITALS
OXYGEN SATURATION: 94 % | RESPIRATION RATE: 22 BRPM | HEIGHT: 73 IN | TEMPERATURE: 99.1 F | BODY MASS INDEX: 40.08 KG/M2 | DIASTOLIC BLOOD PRESSURE: 78 MMHG | WEIGHT: 302.4 LBS | SYSTOLIC BLOOD PRESSURE: 118 MMHG | HEART RATE: 104 BPM

## 2018-11-09 DIAGNOSIS — F14.10 COCAINE ABUSE (HCC): Chronic | ICD-10-CM

## 2018-11-09 DIAGNOSIS — I47.1 SUPRAVENTRICULAR TACHYCARDIA (HCC): Chronic | ICD-10-CM

## 2018-11-09 DIAGNOSIS — Z23 NEED FOR VACCINATION: ICD-10-CM

## 2018-11-09 DIAGNOSIS — E78.5 HYPERLIPIDEMIA, UNSPECIFIED HYPERLIPIDEMIA TYPE: Chronic | ICD-10-CM

## 2018-11-09 DIAGNOSIS — Z87.891 PERSONAL HISTORY OF TOBACCO USE: ICD-10-CM

## 2018-11-09 DIAGNOSIS — R73.03 PRE-DIABETES: Chronic | ICD-10-CM

## 2018-11-09 DIAGNOSIS — J44.9 CHRONIC OBSTRUCTIVE PULMONARY DISEASE, UNSPECIFIED COPD TYPE (HCC): Primary | Chronic | ICD-10-CM

## 2018-11-09 DIAGNOSIS — E66.01 MORBID OBESITY DUE TO EXCESS CALORIES (HCC): Chronic | ICD-10-CM

## 2018-11-09 DIAGNOSIS — D64.9 ANEMIA, UNSPECIFIED TYPE: Chronic | ICD-10-CM

## 2018-11-09 DIAGNOSIS — G47.30 SEVERE SLEEP APNEA: Chronic | ICD-10-CM

## 2018-11-09 DIAGNOSIS — Z72.0 TOBACCO USE: ICD-10-CM

## 2018-11-09 DIAGNOSIS — F10.10 ALCOHOL ABUSE: Chronic | ICD-10-CM

## 2018-11-09 DIAGNOSIS — I10 ESSENTIAL HYPERTENSION: Chronic | ICD-10-CM

## 2018-11-09 DIAGNOSIS — M10.9 GOUT, UNSPECIFIED CAUSE, UNSPECIFIED CHRONICITY, UNSPECIFIED SITE: Chronic | ICD-10-CM

## 2018-11-09 PROCEDURE — 3017F COLORECTAL CA SCREEN DOC REV: CPT | Performed by: FAMILY MEDICINE

## 2018-11-09 PROCEDURE — G0296 VISIT TO DETERM LDCT ELIG: HCPCS | Performed by: FAMILY MEDICINE

## 2018-11-09 PROCEDURE — G8417 CALC BMI ABV UP PARAM F/U: HCPCS | Performed by: FAMILY MEDICINE

## 2018-11-09 PROCEDURE — 4004F PT TOBACCO SCREEN RCVD TLK: CPT | Performed by: FAMILY MEDICINE

## 2018-11-09 PROCEDURE — G8427 DOCREV CUR MEDS BY ELIG CLIN: HCPCS | Performed by: FAMILY MEDICINE

## 2018-11-09 PROCEDURE — 3023F SPIROM DOC REV: CPT | Performed by: FAMILY MEDICINE

## 2018-11-09 PROCEDURE — 99214 OFFICE O/P EST MOD 30 MIN: CPT | Performed by: FAMILY MEDICINE

## 2018-11-09 PROCEDURE — G8926 SPIRO NO PERF OR DOC: HCPCS | Performed by: FAMILY MEDICINE

## 2018-11-09 PROCEDURE — G8482 FLU IMMUNIZE ORDER/ADMIN: HCPCS | Performed by: FAMILY MEDICINE

## 2018-11-09 RX ORDER — IPRATROPIUM BROMIDE AND ALBUTEROL SULFATE 2.5; .5 MG/3ML; MG/3ML
1 SOLUTION RESPIRATORY (INHALATION) EVERY 6 HOURS PRN
Qty: 180 ML | Refills: 0 | Status: SHIPPED | OUTPATIENT
Start: 2018-11-09 | End: 2018-12-17 | Stop reason: SDUPTHER

## 2018-11-09 ASSESSMENT — ENCOUNTER SYMPTOMS
DIARRHEA: 0
CONSTIPATION: 0
CHEST TIGHTNESS: 0
WHEEZING: 1
COUGH: 1
ABDOMINAL PAIN: 0
SHORTNESS OF BREATH: 1
VOMITING: 0
NAUSEA: 0

## 2018-11-09 NOTE — PROGRESS NOTES
(LDCT) Lung Screening criteria met   Age 50-69   Pack year smoking >30   Still smoking or less than 15 year since quit   No sign or symptoms of lung cancer   > 11 months since last LDCT     Risks and benefits of lung cancer screening with LDCT scans discussed:    Significance of positive screen - False-positive LDCT results often occur. 95% of all positive results do not lead to a diagnosis of cancer. Usually further imaging can resolve most false-positive results; however, some patients may require invasive procedures. Over diagnosis risk - 10% to 12% of screen-detected lung cancer cases are over diagnosed--that is, the cancer would not have been detected in the patient's lifetime without the screening. Need for follow up screens annually to continue lung cancer screening effectiveness     Risks associated with radiation from annual LDCT- Radiation exposure is about the same as for a mammogram, which is about 1/3 of the annual background radiation exposure from everyday life. Starting screening at age 54 is not likely to increase cancer risk from radiation exposure. Patients with comorbidities resulting in life expectancy of < 10 years, or that would preclude treatment of an abnormality identified on CT, should not be screened due to lack of benefit. To obtain maximal benefit from this screening, smoking cessation and long-term abstinence from smoking is critical      - VT VISIT TO DISCUSS LUNG CA SCREEN W LDCT  - CT Lung Screening (Annual); Future  - Ambulatory referral to Pulmonology    4. Essential hypertension  Blood pressure within normal limits today in the office. Continue current medication    - Comprehensive Metabolic Panel; Future    5. Supraventricular tachycardia ([address]  No reported issues with palpitations, chest pain, or lightheadedness. We will continue to follow up time    6.  Hyperlipidemia, unspecified hyperlipidemia type  Will follow labwork over time    - Comprehensive Metabolic Panel; Future  - Lipid Panel; Future    7. Pre-diabetes  Will follow labwork over time    - Comprehensive Metabolic Panel; Future    8. Morbid obesity due to excess calories ([address]  Pt counseled on healthy dietary choices and the benefits of a lower salt and carbohydrate diet. Pt also counseled on benefits of moderate intensity cardiovascular exercise for 20-30 minutes at least 3-5 days a week. Advice was given to make small changes over time, setting smaller achievable goals until recommended lifestyle changes are reached. He refuses referral to a dietitian      9. Anemia, unspecified type  Will follow labwork over time    - CBC Auto Differential; Future    10. Gout, unspecified cause, unspecified chronicity, unspecified site  Will follow labwork over time    - Uric Acid; Future    11. Alcohol abuse  Patient reports sobriety since August 15, 2008. He was congratulated and encouraged to continue with abstinence    12. Cocaine abuse [facility])  Patient reports sobriety since August 15, 2008. He was congratulated and encouraged to continue with abstinence    13. Tobacco use  Patient pre-contemplative and is aware that smoking cessation would be the best thing for overall health. We reviewed cessation aids including medication and nicotine replacement therapy. Will continue to encourage complete smoking cessation at subsequent visits. - Ambulatory referral to Pulmonology    14. Need for vaccination    - zoster recombinant adjuvanted vaccine [facility]) 50 MCG/0.5ML SUSR injection;  Inject 0.5 mLs into the muscle once for 1 dose - dose #2 indicated 2-6 months after dose #1  Dispense: 0.5 mL; Refill: 0      Modified Medications    Modified Medication Previous Medication    IPRATROPIUM-ALBUTEROL (DUONEB) 0.5-2.5 (3) MG/3ML SOLN NEBULIZER SOLUTION ipratropium-albuterol (DUONEB) 0.5-2.5 (3) MG/3ML SOLN nebulizer solution       Take 3 mLs by nebulization every 6 hours as needed for Shortness of Breath    Take 3 mLs by

## 2018-12-07 LAB
ANION GAP SERPL CALCULATED.3IONS-SCNC: 9 MMOL/L (ref 10–20)
BICARBONATE: 31 MMOL/L (ref 21–32)
BUN / CREAT RATIO: 17 (ref 5–25)
CALCIUM SERPL-MCNC: 9.6 MG/DL (ref 8.6–10.3)
CHLORIDE BLD-SCNC: 105 MMOL/L (ref 98–107)
CREAT SERPL-MCNC: 0.94 MG/DL (ref 0.5–1.3)
ERYTHROCYTE [DISTWIDTH] IN BLOOD BY AUTOMATED COUNT: 16.3 % (ref 12–15.4)
ERYTHROCYTE [DISTWIDTH] IN BLOOD BY AUTOMATED COUNT: 58.4 FL (ref 39.3–48.6)
GFR CALCULATED: >60
GLUCOSE: 90 MG/DL (ref 70–100)
HCT VFR BLD CALC: 37.8 % (ref 38.4–54.9)
HEMOGLOBIN: 11.8 G/DL (ref 12.8–17.7)
MCH RBC QN AUTO: 31.1 PG (ref 27.5–32.9)
MCHC RBC AUTO-ENTMCNC: 31.2 G/DL (ref 30.5–35.4)
MCV RBC AUTO: 99.5 FL (ref 83.3–98.2)
NUCLEATED RBCS: 0 /100{WBCS}
PLATELET # BLD: 380 10*3/UL (ref 155–404)
PMV BLD AUTO: 9.5 FL (ref 9.9–12.1)
POTASSIUM SERPL-SCNC: 4 MMOL/L (ref 3.5–5.1)
RBC: 3.8 10*6/UL (ref 4.08–6.37)
RBCS COUNTED: 0 10*3/UL
SODIUM BLD-SCNC: 141 MMOL/L (ref 136–145)
UREA NITROGEN: 16 MG/DL (ref 6–23)
WBC: 16.9 10*3/UL (ref 4.2–11)

## 2018-12-14 ENCOUNTER — APPOINTMENT (OUTPATIENT)
Dept: GENERAL RADIOLOGY | Age: 63
End: 2018-12-14
Payer: COMMERCIAL

## 2018-12-14 ENCOUNTER — TELEPHONE (OUTPATIENT)
Dept: FAMILY MEDICINE CLINIC | Age: 63
End: 2018-12-14

## 2018-12-14 ENCOUNTER — HOSPITAL ENCOUNTER (EMERGENCY)
Age: 63
Discharge: HOME | End: 2018-12-14
Attending: EMERGENCY MEDICINE
Payer: COMMERCIAL

## 2018-12-14 VITALS
DIASTOLIC BLOOD PRESSURE: 88 MMHG | OXYGEN SATURATION: 95 % | WEIGHT: 300 LBS | BODY MASS INDEX: 40.63 KG/M2 | SYSTOLIC BLOOD PRESSURE: 138 MMHG | HEIGHT: 72 IN | TEMPERATURE: 98 F | HEART RATE: 89 BPM | RESPIRATION RATE: 18 BRPM

## 2018-12-14 DIAGNOSIS — J44.1 COPD EXACERBATION (HCC): Primary | ICD-10-CM

## 2018-12-14 LAB
ANION GAP SERPL CALCULATED.3IONS-SCNC: 14 MMOL/L (ref 10–20)
BASOPHILS ABSOLUTE: 0.1 K/UL (ref 0–0.2)
BASOPHILS RELATIVE PERCENT: 0.5 %
BICARBONATE: 28 MMOL/L (ref 21–32)
BUN / CREAT RATIO: 14 (ref 5–25)
CALCIUM SERPL-MCNC: 10.1 MG/DL (ref 8.6–10.3)
CHLORIDE BLD-SCNC: 103 MMOL/L (ref 98–107)
CREAT SERPL-MCNC: 1 MG/DL (ref 0.5–1.3)
EOSINOPHILS ABSOLUTE: 0.1 K/UL (ref 0–0.7)
EOSINOPHILS RELATIVE PERCENT: 1.4 %
ERYTHROCYTE [DISTWIDTH] IN BLOOD BY AUTOMATED COUNT: 16.1 % (ref 12–15.4)
ERYTHROCYTE [DISTWIDTH] IN BLOOD BY AUTOMATED COUNT: 59.3 FL (ref 39.3–48.6)
GFR CALCULATED: >60
GLUCOSE: 141 MG/DL (ref 70–100)
HCT VFR BLD CALC: 35.4 % (ref 42–52)
HCT VFR BLD CALC: 37.8 % (ref 38.4–54.9)
HEMOGLOBIN: 11.7 G/DL (ref 14–18)
HEMOGLOBIN: 11.9 G/DL (ref 12.8–17.7)
LYMPHOCYTES ABSOLUTE: 2 K/UL (ref 1–4.8)
LYMPHOCYTES RELATIVE PERCENT: 20.3 %
MCH RBC QN AUTO: 31.2 PG (ref 27–31.3)
MCH RBC QN AUTO: 31.3 PG (ref 27.5–32.9)
MCHC RBC AUTO-ENTMCNC: 31.5 G/DL (ref 30.5–35.4)
MCHC RBC AUTO-ENTMCNC: 33.1 % (ref 33–37)
MCV RBC AUTO: 94.4 FL (ref 80–100)
MCV RBC AUTO: 99.5 FL (ref 83.3–98.2)
MONOCYTES ABSOLUTE: 0.9 K/UL (ref 0.2–0.8)
MONOCYTES RELATIVE PERCENT: 8.8 %
NEUTROPHILS ABSOLUTE: 6.8 K/UL (ref 1.4–6.5)
NEUTROPHILS RELATIVE PERCENT: 69 %
NUCLEATED RBCS: 0 /100{WBCS}
PDW BLD-RTO: 16.9 % (ref 11.5–14.5)
PLATELET # BLD: 207 10*3/UL (ref 155–404)
PLATELET # BLD: 240 K/UL (ref 130–400)
PMV BLD AUTO: 9.7 FL (ref 9.9–12.1)
POTASSIUM SERPL-SCNC: 4.1 MMOL/L (ref 3.5–5.1)
RBC # BLD: 3.75 M/UL (ref 4.7–6.1)
RBC: 3.8 10*6/UL (ref 4.08–6.37)
RBCS COUNTED: 0 10*3/UL
SODIUM BLD-SCNC: 141 MMOL/L (ref 136–145)
UREA NITROGEN: 14 MG/DL (ref 6–23)
WBC # BLD: 9.9 K/UL (ref 4.8–10.8)
WBC: 9.8 10*3/UL (ref 4.2–11)

## 2018-12-14 PROCEDURE — 96374 THER/PROPH/DIAG INJ IV PUSH: CPT

## 2018-12-14 PROCEDURE — 85025 COMPLETE CBC W/AUTO DIFF WBC: CPT

## 2018-12-14 PROCEDURE — 6360000002 HC RX W HCPCS: Performed by: EMERGENCY MEDICINE

## 2018-12-14 PROCEDURE — 99285 EMERGENCY DEPT VISIT HI MDM: CPT

## 2018-12-14 PROCEDURE — 94640 AIRWAY INHALATION TREATMENT: CPT

## 2018-12-14 PROCEDURE — 71045 X-RAY EXAM CHEST 1 VIEW: CPT

## 2018-12-14 PROCEDURE — 36415 COLL VENOUS BLD VENIPUNCTURE: CPT

## 2018-12-14 PROCEDURE — 6370000000 HC RX 637 (ALT 250 FOR IP): Performed by: EMERGENCY MEDICINE

## 2018-12-14 RX ORDER — METHYLPREDNISOLONE 4 MG/1
TABLET ORAL
Qty: 1 KIT | Refills: 0 | Status: SHIPPED | OUTPATIENT
Start: 2018-12-14 | End: 2018-12-20

## 2018-12-14 RX ORDER — FAMOTIDINE 40 MG/1
40 TABLET, FILM COATED ORAL DAILY
Qty: 30 TABLET | Refills: 0 | Status: SHIPPED | OUTPATIENT
Start: 2018-12-14 | End: 2018-12-17 | Stop reason: SDUPTHER

## 2018-12-14 RX ORDER — IPRATROPIUM BROMIDE AND ALBUTEROL SULFATE 2.5; .5 MG/3ML; MG/3ML
1 SOLUTION RESPIRATORY (INHALATION) ONCE
Status: COMPLETED | OUTPATIENT
Start: 2018-12-14 | End: 2018-12-14

## 2018-12-14 RX ORDER — METHYLPREDNISOLONE SODIUM SUCCINATE 125 MG/2ML
125 INJECTION, POWDER, LYOPHILIZED, FOR SOLUTION INTRAMUSCULAR; INTRAVENOUS ONCE
Status: COMPLETED | OUTPATIENT
Start: 2018-12-14 | End: 2018-12-14

## 2018-12-14 RX ADMIN — METHYLPREDNISOLONE SODIUM SUCCINATE 125 MG: 125 INJECTION, POWDER, FOR SOLUTION INTRAMUSCULAR; INTRAVENOUS at 15:30

## 2018-12-14 RX ADMIN — IPRATROPIUM BROMIDE AND ALBUTEROL SULFATE 1 AMPULE: .5; 3 SOLUTION RESPIRATORY (INHALATION) at 15:29

## 2018-12-14 ASSESSMENT — ENCOUNTER SYMPTOMS
CONSTIPATION: 0
CHEST TIGHTNESS: 0
EYE REDNESS: 0
COUGH: 0
WHEEZING: 1
ABDOMINAL PAIN: 0
FACIAL SWELLING: 0
BLOOD IN STOOL: 0
SHORTNESS OF BREATH: 1
SINUS PRESSURE: 0
CHOKING: 0
DIARRHEA: 0
TROUBLE SWALLOWING: 0
SORE THROAT: 0
VOICE CHANGE: 0
EYE DISCHARGE: 0
BACK PAIN: 0
EYE PAIN: 0
STRIDOR: 0
VOMITING: 0

## 2018-12-14 ASSESSMENT — PAIN DESCRIPTION - FREQUENCY: FREQUENCY: INTERMITTENT

## 2018-12-14 ASSESSMENT — PAIN SCALES - GENERAL: PAINLEVEL_OUTOF10: 5

## 2018-12-14 ASSESSMENT — PAIN DESCRIPTION - PAIN TYPE: TYPE: ACUTE PAIN

## 2018-12-14 ASSESSMENT — PAIN DESCRIPTION - DESCRIPTORS: DESCRIPTORS: STABBING

## 2018-12-14 ASSESSMENT — PAIN DESCRIPTION - ORIENTATION: ORIENTATION: RIGHT

## 2018-12-14 ASSESSMENT — PAIN DESCRIPTION - LOCATION: LOCATION: RIB CAGE

## 2018-12-14 NOTE — ED PROVIDER NOTES
pain, joint swelling, neck pain and neck stiffness. Skin: Negative for pallor and rash. Neurological: Negative for tremors, seizures, syncope, weakness, numbness and headaches. Hematological: Negative for adenopathy. Does not bruise/bleed easily. Psychiatric/Behavioral: Negative for agitation, behavioral problems, hallucinations and sleep disturbance. The patient is not hyperactive. All other systems reviewed and are negative. Except as noted above the remainder of the review of systems was reviewed and negative.        PAST MEDICAL HISTORY     Past Medical History:   Diagnosis Date    Alcohol abuse 10/27/2016    Anemia     Asthma     Cocaine abuse ([facility] 10/27/2016    COPD (chronic obstructive pulmonary disease) ([facility]     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    Drug-seeking behavior 4/14/2015    Edema 12/10/2015    Erectile dysfunction     Gout 10/27/2016    History of arthroscopy of both knees 10/27/2016    House dust mite allergy 4/21/2014    Hyperlipidemia     Hypertension 10/27/2016    Injury to heart 10/27/2016    Medical non-compliance 2/22/2014    Morbid obesity due to excess calories ([facility] 12/8/2016    Osteoarthritis of both knees 12/8/2016    Personal history of tobacco use     Pneumonia 12/04/2016    caused hospital admission    Pre-diabetes 10/27/2016    Seasonal allergies 4/21/2014    Severe persistent asthma 10/27/2016    Severe sleep apnea 4/14/2015    Supraventricular tachycardia ([address] 10/27/2016    SVT (supraventricular tachycardia) (HCC)          SURGICALHISTORY       Past Surgical History:   Procedure Laterality Date    ANTERIOR CRUCIATE LIGAMENT REPAIR      CARDIAC CATHETERIZATION           CURRENT MEDICATIONS       Previous Medications    ALBUTEROL SULFATE HFA (VENTOLIN HFA) 108 (90 BASE) MCG/ACT INHALER    Inhale 2 puffs into the lungs every 6 hours as needed for Wheezing or Shortness of Breath    AMLODIPINE (NORVASC) 10 MG TABLET    Take 1 tablet by mouth daily    ASPIRIN ([employer] ASPIRIN LOW DOSE) 81 MG EC TABLET    Take 1 tablet by mouth daily    BENZONATATE (TESSALON PERLES) 100 MG CAPSULE    Take 1 capsule by mouth 3 times daily as needed for Cough    BUDESONIDE-FORMOTEROL (SYMBICORT) 160-4.5 MCG/ACT AERO    Inhale 2 puffs into the lungs 2 times daily    CIALIS 20 MG TABLET    Take 1 tablet by mouth as needed for Erectile Dysfunction    FLUTICASONE (FLONASE) 50 MCG/ACT NASAL SPRAY    2 sprays by Nasal route daily    HYDROXYZINE (ATARAX) 25 MG TABLET    Take 1 tablet by mouth every 8 hours as needed for Itching    IPRATROPIUM-ALBUTEROL (DUONEB) 0.5-2.5 (3) MG/3ML SOLN NEBULIZER SOLUTION    Take 3 mLs by nebulization every 6 hours as needed for Shortness of Breath    LOVASTATIN (MEVACOR) 10 MG TABLET    TAKE 1 TABLET BY MOUTH NIGHTLY    MELOXICAM (MOBIC) 15 MG TABLET    Take 1 tablet by mouth daily    MONTELUKAST (SINGULAIR) 10 MG TABLET    Take 1 tablet by mouth nightly    TIOTROPIUM (SPIRIVA HANDIHALER) 18 MCG INHALATION CAPSULE    Inhale 1 capsule into the lungs daily       ALLERGIES     Fish-derived products;  Iodine; and Pcn [penicillins]    FAMILY HISTORY       Family History   Problem Relation Age of Onset    Arthritis Mother     Asthma Mother     High Cholesterol Mother     Other Mother         aneurysm    Diabetes Father     Stroke Maternal Grandmother     Cancer Maternal Grandfather     Hypertension Other     COPD Neg Hx           SOCIAL HISTORY       Social History     Social History    Marital status: Single     Spouse name: n/a    Number of children: 1    Years of education: 15     Occupational History    Disability None     Social History Main Topics    Smoking status: Current Every Day Smoker     Packs/day: 1.00     Years: 30.00     Types: Cigarettes, Cigars     Start date: 7/5/1988     Last attempt to quit: 10/1/2013    Smokeless tobacco: Never Used    Alcohol use Yes      Comment: about 5 beers daily    Drug use: Yes     Types: Cocaine, Marijuana      Comment: used cocaine 06/23/18    Sexual activity: Not Currently     Partners: Female     Other Topics Concern    Not on file     Social History Narrative    No narrative on file       SCREENINGS      @[facility]([id number])@      PHYSICAL EXAM    (up to 7 for level 4, 8 or more for level 5)     ED Triage Vitals   BP Temp Temp Source Pulse Resp SpO2 Height Weight   12/14/18 1500 12/14/18 1503 12/14/18 1500 12/14/18 1500 12/14/18 1500 12/14/18 1500 12/14/18 1500 12/14/18 1500   (!) 143/72 97.9 °F (36.6 °C) Oral 89 18 97 % 6' (1.829 m) 300 lb (136.1 kg)       Physical Exam   Constitutional: He is oriented to person, place, and time. He appears well-developed. HENT:   Head: Normocephalic. Right Ear: External ear normal.   Left Ear: External ear normal.   Mouth/Throat: Oropharynx is clear and moist.   Eyes: Pupils are equal, round, and reactive to light. Neck: Normal range of motion. Neck supple. Cardiovascular: Normal rate and normal heart sounds. Exam reveals no gallop. No murmur heard. Pulmonary/Chest: No respiratory distress. He has wheezes. Abdominal: Soft. Bowel sounds are normal. He exhibits no mass. There is no rebound. Musculoskeletal: Normal range of motion. He exhibits no edema or tenderness. Neurological: He is alert and oriented to person, place, and time. No cranial nerve deficit. He exhibits normal muscle tone. Skin: Skin is warm. No rash noted. No erythema.    Psychiatric: His behavior is normal. Thought content normal.       DIAGNOSTIC RESULTS     EKG: All EKG's are interpreted by the Emergency Department Physician who either signs or Co-signsthis chart in the absence of a cardiologist.        RADIOLOGY:   Non-plain filmimages such as CT, Ultrasound and MRI are read by the radiologist. Plain radiographic images are visualized and preliminarily interpreted by the emergency physician with the below findings:        Interpretation per the Radiologist below, if available at the

## 2018-12-14 NOTE — TELEPHONE ENCOUNTER
We received the records,you do not have any availability until January, can he wait that long or did you want to squeeze him in??? Please advise--------------ah

## 2018-12-14 NOTE — TELEPHONE ENCOUNTER
Patient needs to follow up from two hospital visits at [facility]; one from middle of November for double pneumonia/ 2nd hospital discharge was 12/1/2018; for cyst in lung; patient needs to be scheduled 40 minutes, where should I schedule appointment; I don't see any availability until January. I called [facility] to get medical records.  Please call patient back at [phone]

## 2018-12-17 ENCOUNTER — TELEPHONE (OUTPATIENT)
Dept: FAMILY MEDICINE CLINIC | Age: 63
End: 2018-12-17

## 2018-12-17 ENCOUNTER — OFFICE VISIT (OUTPATIENT)
Dept: FAMILY MEDICINE CLINIC | Age: 63
End: 2018-12-17
Payer: COMMERCIAL

## 2018-12-17 VITALS
BODY MASS INDEX: 41.75 KG/M2 | DIASTOLIC BLOOD PRESSURE: 80 MMHG | TEMPERATURE: 98.7 F | HEART RATE: 86 BPM | RESPIRATION RATE: 22 BRPM | OXYGEN SATURATION: 96 % | WEIGHT: 315 LBS | SYSTOLIC BLOOD PRESSURE: 158 MMHG | HEIGHT: 73 IN

## 2018-12-17 DIAGNOSIS — K21.9 GASTROESOPHAGEAL REFLUX DISEASE, ESOPHAGITIS PRESENCE NOT SPECIFIED: ICD-10-CM

## 2018-12-17 DIAGNOSIS — F19.10 POLYSUBSTANCE ABUSE (HCC): ICD-10-CM

## 2018-12-17 DIAGNOSIS — Z45.2 PICC (PERIPHERALLY INSERTED CENTRAL CATHETER) IN PLACE: ICD-10-CM

## 2018-12-17 DIAGNOSIS — J44.1 COPD WITH ACUTE EXACERBATION (HCC): ICD-10-CM

## 2018-12-17 DIAGNOSIS — J85.1 ABSCESS OF LOWER LOBE OF RIGHT LUNG WITH PNEUMONIA (HCC): ICD-10-CM

## 2018-12-17 DIAGNOSIS — J18.9 PNEUMONIA DUE TO INFECTIOUS ORGANISM, UNSPECIFIED LATERALITY, UNSPECIFIED PART OF LUNG: Primary | ICD-10-CM

## 2018-12-17 DIAGNOSIS — I10 ESSENTIAL HYPERTENSION: Chronic | ICD-10-CM

## 2018-12-17 DIAGNOSIS — G47.00 INSOMNIA, UNSPECIFIED TYPE: ICD-10-CM

## 2018-12-17 DIAGNOSIS — G47.30 SEVERE SLEEP APNEA: Chronic | ICD-10-CM

## 2018-12-17 PROCEDURE — G8417 CALC BMI ABV UP PARAM F/U: HCPCS | Performed by: FAMILY MEDICINE

## 2018-12-17 PROCEDURE — G8926 SPIRO NO PERF OR DOC: HCPCS | Performed by: FAMILY MEDICINE

## 2018-12-17 PROCEDURE — 3017F COLORECTAL CA SCREEN DOC REV: CPT | Performed by: FAMILY MEDICINE

## 2018-12-17 PROCEDURE — 4004F PT TOBACCO SCREEN RCVD TLK: CPT | Performed by: FAMILY MEDICINE

## 2018-12-17 PROCEDURE — 99214 OFFICE O/P EST MOD 30 MIN: CPT | Performed by: FAMILY MEDICINE

## 2018-12-17 PROCEDURE — G8482 FLU IMMUNIZE ORDER/ADMIN: HCPCS | Performed by: FAMILY MEDICINE

## 2018-12-17 PROCEDURE — G8427 DOCREV CUR MEDS BY ELIG CLIN: HCPCS | Performed by: FAMILY MEDICINE

## 2018-12-17 PROCEDURE — 3023F SPIROM DOC REV: CPT | Performed by: FAMILY MEDICINE

## 2018-12-17 RX ORDER — IPRATROPIUM BROMIDE AND ALBUTEROL SULFATE 2.5; .5 MG/3ML; MG/3ML
1 SOLUTION RESPIRATORY (INHALATION) EVERY 6 HOURS PRN
Qty: 180 ML | Refills: 0 | Status: SHIPPED | OUTPATIENT
Start: 2018-12-17 | End: 2018-12-18 | Stop reason: SDUPTHER

## 2018-12-17 RX ORDER — TRAZODONE HYDROCHLORIDE 50 MG/1
50 TABLET ORAL NIGHTLY
Qty: 60 TABLET | Refills: 2 | Status: SHIPPED | OUTPATIENT
Start: 2018-12-17 | End: 2019-04-18 | Stop reason: SDUPTHER

## 2018-12-17 RX ORDER — FAMOTIDINE 40 MG/1
40 TABLET, FILM COATED ORAL DAILY
Qty: 30 TABLET | Refills: 2 | Status: SHIPPED | OUTPATIENT
Start: 2018-12-17 | End: 2019-05-20 | Stop reason: SDUPTHER

## 2018-12-17 RX ORDER — UREA 10 %
1 LOTION (ML) TOPICAL NIGHTLY PRN
Qty: 30 TABLET | Refills: 2 | Status: SHIPPED | OUTPATIENT
Start: 2018-12-17 | End: 2019-05-20 | Stop reason: SDUPTHER

## 2018-12-17 ASSESSMENT — ENCOUNTER SYMPTOMS
SHORTNESS OF BREATH: 0
VOMITING: 0
CHEST TIGHTNESS: 0
NAUSEA: 0
DIARRHEA: 0
BLOOD IN STOOL: 0
WHEEZING: 0
COUGH: 0
ABDOMINAL PAIN: 0

## 2018-12-17 NOTE — PROGRESS NOTES
Subjective:      Patient ID: Arvin Dumont is a 66 y.o. male who presents today for:  Chief Complaint   Patient presents with    Follow-Up from Hospital     Patient presents with f/u from [facility] 12/14/2018 for COPD exacerbation. HPI     Patient presents for multiple Hospital visit follow-up. He was initially admitted to [facility] on 11/12/18 for bibasilar pneumonia on CXR with acute COPD exacerbation. He was placed on IV antibiotics, steroids, and given breathing treatments. He improved over time was transitioned to oral medication was found stable for discharge home on 11/17/18 to finish course of doxycycline and a Medrol Dosepak. He subsequently presented again to [facility] ER and was admitted to [facility] on 11/26/18 with continued dyspnea and cough. He was found to have pulmonary infiltrates similar to findings on his recent admission and a CT scan of the chest showed interval development of a 7.3 x 6.8 x 6.8 cm multi chambered gas and fluid containing space occupying lesion in the right lower lobe of the lung suggesting abscess versus necrotic neoplasm with superimposed infection with bronchial wall thickening and layering of fluid in the right mainstem bronchus. He was seen by pulmonology (Dr. Ying Maciel) and by infectious disease and started on IV antibiotics. A PICC line was placed for long-term antibiotic treatment. He was found stable for discharge home on 12/01/18 on daily Invanz IV and a medrol dosepack to F/U with Dr. Ying Maciel (pulm) in 2-4 weeks, Dr. Margo Pickett (ID) in 2-3 weeks, and primary care as an outpatient. Patient reports feeling improved overall since his hospital admission. He continues living at sober living home in [facility] and continued with AA/NA meetings and group therapy. He reports taking his medication as prescribed since his most recent visit and reports continued sobriety from alcohol or ilicit drug use since 08/15/18.  He reports going for daily IV antibiotic treatments at [facility] and has visit scheduled with ID (Dr. Rei Cleary) in 3 days. He has pulmonology visit scheduled later today with his chronic pulmonologist (Dr. Chaya Gutiérrez) at [facility]. He denies any F/C/S, chest pain, dyspnea, worsening wheezing, chest tightness, or cough. He denies any N/V or diarrhea, no reported abdominal pain. He denies any palpitations, lightheadedness, dizziness, lower extremity edema, or syncope. He has a sleep study scheduled 12/18/18 for CPAP titration.        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    Drug-seeking behavior 4/14/2015    Edema 12/10/2015    Erectile dysfunction     Gastroesophageal reflux disease 12/17/2018    Gout 10/27/2016    History of arthroscopy of both knees 10/27/2016    House dust mite allergy 4/21/2014    Hyperlipidemia     Hypertension 10/27/2016    Injury to heart 10/27/2016    Insomnia 12/17/2018    Medical non-compliance 2/22/2014    Morbid obesity due to excess calories ([address] 12/8/2016    Osteoarthritis of both knees 12/8/2016    Personal history of tobacco use     Pneumonia 12/04/2016    caused hospital admission    Pre-diabetes 10/27/2016    Seasonal allergies 4/21/2014    Severe persistent asthma 10/27/2016    Severe sleep apnea 4/14/2015    Supraventricular tachycardia ([address] 10/27/2016    SVT [facility]supraventricular tachycardia) ([facility])      Past Surgical History:   Procedure Laterality Date    ANTERIOR CRUCIATE LIGAMENT REPAIR      CARDIAC CATHETERIZATION       Family History   Problem Relation Age of Onset    Arthritis Mother     Asthma Mother     High Cholesterol Mother     Other Mother         aneurysm    Diabetes Father     Stroke Maternal Grandmother     Cancer Maternal Grandfather     Hypertension Other     COPD Neg Hx      Social History     Social History    Marital

## 2018-12-18 DIAGNOSIS — J44.9 CHRONIC OBSTRUCTIVE PULMONARY DISEASE, UNSPECIFIED COPD TYPE (HCC): Primary | Chronic | ICD-10-CM

## 2018-12-18 RX ORDER — IPRATROPIUM BROMIDE AND ALBUTEROL SULFATE 2.5; .5 MG/3ML; MG/3ML
1 SOLUTION RESPIRATORY (INHALATION) EVERY 6 HOURS PRN
Qty: 180 ML | Refills: 0 | Status: SHIPPED | OUTPATIENT
Start: 2018-12-18 | End: 2019-01-21 | Stop reason: SDUPTHER

## 2018-12-18 RX ORDER — IPRATROPIUM BROMIDE AND ALBUTEROL SULFATE 2.5; .5 MG/3ML; MG/3ML
1 SOLUTION RESPIRATORY (INHALATION) EVERY 6 HOURS PRN
Qty: 360 ML | Refills: 1 | OUTPATIENT
Start: 2018-12-18

## 2018-12-20 ENCOUNTER — OFFICE VISIT (OUTPATIENT)
Dept: INFECTIOUS DISEASES | Age: 63
End: 2018-12-20
Payer: COMMERCIAL

## 2018-12-20 VITALS
RESPIRATION RATE: 20 BRPM | TEMPERATURE: 98.7 F | BODY MASS INDEX: 42.66 KG/M2 | WEIGHT: 315 LBS | OXYGEN SATURATION: 97 % | DIASTOLIC BLOOD PRESSURE: 75 MMHG | HEART RATE: 74 BPM | SYSTOLIC BLOOD PRESSURE: 139 MMHG | HEIGHT: 72 IN

## 2018-12-20 DIAGNOSIS — J85.1 ABSCESS OF LOWER LOBE OF RIGHT LUNG WITH PNEUMONIA (HCC): Primary | ICD-10-CM

## 2018-12-20 PROCEDURE — 99213 OFFICE O/P EST LOW 20 MIN: CPT | Performed by: INTERNAL MEDICINE

## 2018-12-20 PROCEDURE — G8482 FLU IMMUNIZE ORDER/ADMIN: HCPCS | Performed by: INTERNAL MEDICINE

## 2018-12-20 PROCEDURE — 4004F PT TOBACCO SCREEN RCVD TLK: CPT | Performed by: INTERNAL MEDICINE

## 2018-12-20 PROCEDURE — 3017F COLORECTAL CA SCREEN DOC REV: CPT | Performed by: INTERNAL MEDICINE

## 2018-12-20 PROCEDURE — G8427 DOCREV CUR MEDS BY ELIG CLIN: HCPCS | Performed by: INTERNAL MEDICINE

## 2018-12-20 PROCEDURE — G8417 CALC BMI ABV UP PARAM F/U: HCPCS | Performed by: INTERNAL MEDICINE

## 2018-12-20 ASSESSMENT — ENCOUNTER SYMPTOMS
GASTROINTESTINAL NEGATIVE: 1
WHEEZING: 1
SHORTNESS OF BREATH: 1

## 2018-12-21 LAB
ANION GAP SERPL CALCULATED.3IONS-SCNC: 10 MMOL/L (ref 10–20)
BICARBONATE: 29 MMOL/L (ref 21–32)
BUN / CREAT RATIO: 14 (ref 5–25)
CALCIUM SERPL-MCNC: 8.8 MG/DL (ref 8.6–10.3)
CHLORIDE BLD-SCNC: 104 MMOL/L (ref 98–107)
CREAT SERPL-MCNC: 0.93 MG/DL (ref 0.5–1.3)
ERYTHROCYTE [DISTWIDTH] IN BLOOD BY AUTOMATED COUNT: 16.5 % (ref 12–15.4)
ERYTHROCYTE [DISTWIDTH] IN BLOOD BY AUTOMATED COUNT: 61 FL (ref 39.3–48.6)
GFR CALCULATED: >60
GLUCOSE: 126 MG/DL (ref 70–100)
HCT VFR BLD CALC: 36.5 % (ref 38.4–54.9)
HEMOGLOBIN: 11.1 G/DL (ref 12.8–17.7)
MCH RBC QN AUTO: 30.7 PG (ref 27.5–32.9)
MCHC RBC AUTO-ENTMCNC: 30.4 G/DL (ref 30.5–35.4)
MCV RBC AUTO: 100.8 FL (ref 83.3–98.2)
NUCLEATED RBCS: 0 /100{WBCS}
PLATELET # BLD: 143 10*3/UL (ref 155–404)
PMV BLD AUTO: 10.6 FL (ref 9.9–12.1)
POTASSIUM SERPL-SCNC: 3.4 MMOL/L (ref 3.5–5.1)
RBC: 3.62 10*6/UL (ref 4.08–6.37)
RBCS COUNTED: 0 10*3/UL
SODIUM BLD-SCNC: 140 MMOL/L (ref 136–145)
UREA NITROGEN: 13 MG/DL (ref 6–23)
WBC: 11.4 10*3/UL (ref 4.2–11)

## 2018-12-27 ENCOUNTER — TELEPHONE (OUTPATIENT)
Dept: FAMILY MEDICINE CLINIC | Age: 63
End: 2018-12-27

## 2018-12-27 ENCOUNTER — NURSE ONLY (OUTPATIENT)
Dept: FAMILY MEDICINE CLINIC | Age: 63
End: 2018-12-27

## 2018-12-27 VITALS — SYSTOLIC BLOOD PRESSURE: 130 MMHG | DIASTOLIC BLOOD PRESSURE: 78 MMHG

## 2018-12-27 DIAGNOSIS — N52.9 ERECTILE DYSFUNCTION, UNSPECIFIED ERECTILE DYSFUNCTION TYPE: Chronic | ICD-10-CM

## 2018-12-27 DIAGNOSIS — Z01.30 BLOOD PRESSURE CHECK: Primary | ICD-10-CM

## 2018-12-27 RX ORDER — TADALAFIL 20 MG
TABLET ORAL
Qty: 12 TABLET | Refills: 0 | Status: SHIPPED | OUTPATIENT
Start: 2018-12-27 | End: 2019-01-30 | Stop reason: SDUPTHER

## 2018-12-28 LAB
ANION GAP SERPL CALCULATED.3IONS-SCNC: 8 MMOL/L (ref 10–20)
BICARBONATE: 27 MMOL/L (ref 21–32)
BUN / CREAT RATIO: 18 (ref 5–25)
CALCIUM SERPL-MCNC: 8.9 MG/DL (ref 8.6–10.3)
CHLORIDE BLD-SCNC: 110 MMOL/L (ref 98–107)
CREAT SERPL-MCNC: 1.13 MG/DL (ref 0.5–1.3)
ERYTHROCYTE [DISTWIDTH] IN BLOOD BY AUTOMATED COUNT: 16.1 % (ref 12–15.4)
ERYTHROCYTE [DISTWIDTH] IN BLOOD BY AUTOMATED COUNT: 60.1 FL (ref 39.3–48.6)
GFR CALCULATED: >60
GLUCOSE: 120 MG/DL (ref 70–100)
HCT VFR BLD CALC: 36.9 % (ref 38.4–54.9)
HEMOGLOBIN: 11.3 G/DL (ref 12.8–17.7)
MCH RBC QN AUTO: 30.8 PG (ref 27.5–32.9)
MCHC RBC AUTO-ENTMCNC: 30.6 G/DL (ref 30.5–35.4)
MCV RBC AUTO: 100.5 FL (ref 83.3–98.2)
NUCLEATED RBCS: 0 /100{WBCS}
PLATELET # BLD: 251 10*3/UL (ref 155–404)
PMV BLD AUTO: 10.2 FL (ref 9.9–12.1)
POTASSIUM SERPL-SCNC: 3.9 MMOL/L (ref 3.5–5.1)
RBC: 3.67 10*6/UL (ref 4.08–6.37)
RBCS COUNTED: 0 10*3/UL
SODIUM BLD-SCNC: 141 MMOL/L (ref 136–145)
UREA NITROGEN: 20 MG/DL (ref 6–23)
WBC: 9.9 10*3/UL (ref 4.2–11)

## 2019-01-02 ENCOUNTER — TELEPHONE (OUTPATIENT)
Dept: INFECTIOUS DISEASES | Age: 64
End: 2019-01-02

## 2019-01-02 DIAGNOSIS — J85.1 ABSCESS OF LOWER LOBE OF RIGHT LUNG WITH PNEUMONIA (HCC): Primary | ICD-10-CM

## 2019-01-04 LAB
ANION GAP SERPL CALCULATED.3IONS-SCNC: 11 MMOL/L (ref 10–20)
BICARBONATE: 27 MMOL/L (ref 21–32)
BUN / CREAT RATIO: 12 (ref 5–25)
CALCIUM SERPL-MCNC: 9.2 MG/DL (ref 8.6–10.3)
CHLORIDE BLD-SCNC: 107 MMOL/L (ref 98–107)
CREAT SERPL-MCNC: 0.96 MG/DL (ref 0.5–1.3)
ERYTHROCYTE [DISTWIDTH] IN BLOOD BY AUTOMATED COUNT: 16 % (ref 12–15.4)
ERYTHROCYTE [DISTWIDTH] IN BLOOD BY AUTOMATED COUNT: 59.1 FL (ref 39.3–48.6)
GFR CALCULATED: >60
GLUCOSE: 78 MG/DL (ref 70–100)
HCT VFR BLD CALC: 39.3 % (ref 38.4–54.9)
HEMOGLOBIN: 12 G/DL (ref 12.8–17.7)
MCH RBC QN AUTO: 30.5 PG (ref 27.5–32.9)
MCHC RBC AUTO-ENTMCNC: 30.5 G/DL (ref 30.5–35.4)
MCV RBC AUTO: 99.7 FL (ref 83.3–98.2)
NUCLEATED RBCS: 0 /100{WBCS}
PLATELET # BLD: 259 10*3/UL (ref 155–404)
PMV BLD AUTO: 10.5 FL (ref 9.9–12.1)
POTASSIUM SERPL-SCNC: 4 MMOL/L (ref 3.5–5.1)
RBC: 3.94 10*6/UL (ref 4.08–6.37)
RBCS COUNTED: 0 10*3/UL
SODIUM BLD-SCNC: 141 MMOL/L (ref 136–145)
UREA NITROGEN: 12 MG/DL (ref 6–23)
WBC: 8.9 10*3/UL (ref 4.2–11)

## 2019-01-07 ENCOUNTER — HOSPITAL ENCOUNTER (OUTPATIENT)
Dept: CT IMAGING | Age: 64
Discharge: HOME | End: 2019-01-09

## 2019-01-07 DIAGNOSIS — J85.1 ABSCESS OF LOWER LOBE OF RIGHT LUNG WITH PNEUMONIA (HCC): ICD-10-CM

## 2019-01-07 PROCEDURE — 71250 CT THORAX DX C-: CPT

## 2019-01-10 ENCOUNTER — OFFICE VISIT (OUTPATIENT)
Dept: INFECTIOUS DISEASES | Age: 64
End: 2019-01-10

## 2019-01-10 VITALS
DIASTOLIC BLOOD PRESSURE: 79 MMHG | SYSTOLIC BLOOD PRESSURE: 134 MMHG | TEMPERATURE: 99 F | WEIGHT: 315 LBS | HEART RATE: 77 BPM | RESPIRATION RATE: 22 BRPM | BODY MASS INDEX: 41.75 KG/M2 | HEIGHT: 73 IN

## 2019-01-10 DIAGNOSIS — J85.1 ABSCESS OF LOWER LOBE OF RIGHT LUNG WITH PNEUMONIA (HCC): Primary | ICD-10-CM

## 2019-01-10 PROCEDURE — 99213 OFFICE O/P EST LOW 20 MIN: CPT | Performed by: INTERNAL MEDICINE

## 2019-01-10 ASSESSMENT — ENCOUNTER SYMPTOMS
WHEEZING: 1
SHORTNESS OF BREATH: 1
GASTROINTESTINAL NEGATIVE: 1

## 2019-01-21 DIAGNOSIS — J44.9 CHRONIC OBSTRUCTIVE PULMONARY DISEASE, UNSPECIFIED COPD TYPE (HCC): Chronic | ICD-10-CM

## 2019-01-21 RX ORDER — IPRATROPIUM BROMIDE AND ALBUTEROL SULFATE 2.5; .5 MG/3ML; MG/3ML
1 SOLUTION RESPIRATORY (INHALATION) EVERY 6 HOURS PRN
Qty: 180 ML | Refills: 1 | Status: SHIPPED | OUTPATIENT
Start: 2019-01-21 | End: 2019-01-30 | Stop reason: SDUPTHER

## 2019-01-25 DIAGNOSIS — I10 ESSENTIAL HYPERTENSION: Chronic | ICD-10-CM

## 2019-01-25 RX ORDER — AMLODIPINE BESYLATE 10 MG/1
10 TABLET ORAL DAILY
Qty: 30 TABLET | Refills: 5 | Status: SHIPPED | OUTPATIENT
Start: 2019-01-25 | End: 2019-05-20 | Stop reason: SDUPTHER

## 2019-01-30 ENCOUNTER — TELEPHONE (OUTPATIENT)
Dept: FAMILY MEDICINE CLINIC | Age: 64
End: 2019-01-30

## 2019-01-30 DIAGNOSIS — M17.0 PRIMARY OSTEOARTHRITIS OF BOTH KNEES: Primary | Chronic | ICD-10-CM

## 2019-01-30 DIAGNOSIS — J44.9 CHRONIC OBSTRUCTIVE PULMONARY DISEASE, UNSPECIFIED COPD TYPE (HCC): Chronic | ICD-10-CM

## 2019-01-30 DIAGNOSIS — N52.9 ERECTILE DYSFUNCTION, UNSPECIFIED ERECTILE DYSFUNCTION TYPE: Chronic | ICD-10-CM

## 2019-01-30 RX ORDER — TADALAFIL 20 MG/1
20 TABLET ORAL PRN
Qty: 12 TABLET | Refills: 0 | Status: SHIPPED | OUTPATIENT
Start: 2019-01-30 | End: 2019-07-22 | Stop reason: CLARIF

## 2019-01-30 RX ORDER — ALBUTEROL SULFATE 90 UG/1
2 AEROSOL, METERED RESPIRATORY (INHALATION) EVERY 6 HOURS PRN
Qty: 1 INHALER | Refills: 1 | Status: SHIPPED | OUTPATIENT
Start: 2019-01-30 | End: 2019-04-15 | Stop reason: SDUPTHER

## 2019-02-01 RX ORDER — IPRATROPIUM BROMIDE AND ALBUTEROL SULFATE 2.5; .5 MG/3ML; MG/3ML
1 SOLUTION RESPIRATORY (INHALATION) EVERY 6 HOURS PRN
Qty: 180 ML | Refills: 0 | Status: SHIPPED | OUTPATIENT
Start: 2019-02-01 | End: 2019-02-18 | Stop reason: SDUPTHER

## 2019-02-18 DIAGNOSIS — J44.9 CHRONIC OBSTRUCTIVE PULMONARY DISEASE, UNSPECIFIED COPD TYPE (HCC): Chronic | ICD-10-CM

## 2019-02-18 RX ORDER — IPRATROPIUM BROMIDE AND ALBUTEROL SULFATE 2.5; .5 MG/3ML; MG/3ML
1 SOLUTION RESPIRATORY (INHALATION) EVERY 6 HOURS PRN
Qty: 180 ML | Refills: 1 | Status: SHIPPED | OUTPATIENT
Start: 2019-02-18 | End: 2019-04-15 | Stop reason: SDUPTHER

## 2019-04-15 DIAGNOSIS — G47.00 INSOMNIA, UNSPECIFIED TYPE: ICD-10-CM

## 2019-04-15 DIAGNOSIS — J44.9 CHRONIC OBSTRUCTIVE PULMONARY DISEASE, UNSPECIFIED COPD TYPE (HCC): Chronic | ICD-10-CM

## 2019-04-15 NOTE — TELEPHONE ENCOUNTER
Pharmacy requesting medication refill.  Please approve or deny this request.    Rx requested:  Requested Prescriptions     Pending Prescriptions Disp Refills    ipratropium-albuterol (DUONEB) 0.5-2.5 (3) MG/3ML SOLN nebulizer solution [Pharmacy Med Name: Ipratropium-Albuterol Inhalation Solution 0.5-2.5 (3) MG/3ML] 180 mL 0     Sig: INHALE ONE VIAL VIA NEBULZIER EVERY 6 HOURS AS NEEDED FOR SHORTNESS OF BREATH         Last Office Visit:   12/17/2018      Next Visit Date:  Future Appointments   Date Time Provider Constance Espitia   5/20/2019  4:00 PM Madi Lopez MD [facility]

## 2019-04-15 NOTE — TELEPHONE ENCOUNTER
Patient is requesting medication refill. Please approve or deny this request.    Rx requested:  Requested Prescriptions     Pending Prescriptions Disp Refills    VENTOLIN  (90 Base) MCG/ACT inhaler [Pharmacy Med Name: Ventolin HFA Inhalation Aerosol Solution 108 (90 Base) MCG/ACT] 18 g 0     Sig: INHALE TWO PUFFS BY MOUTH INTO THE LUNGS EVERY 6 HOURS AS NEEDED FOR WHEEZING OR SHORTNESS OF BREATH    traZODone (DESYREL) 50 MG tablet 60 tablet 2     Sig: Take 1 tablet by mouth nightly    ipratropium-albuterol (DUONEB) 0.5-2.5 (3) MG/3ML SOLN nebulizer solution 180 mL 1     Sig: Take 3 mLs by nebulization every 6 hours as needed for Shortness of Breath         Last Office Visit:   10/31/2018      Next Visit Date:  Future Appointments   Date Time Provider Constance Espitia   5/20/2019  4:00 PM MD Romero Skinner [address]       Patient called didn't know that he had insurance now so he needs to get his medication. cp

## 2019-04-16 RX ORDER — IPRATROPIUM BROMIDE AND ALBUTEROL SULFATE 2.5; .5 MG/3ML; MG/3ML
SOLUTION RESPIRATORY (INHALATION)
Qty: 180 ML | Refills: 0 | Status: SHIPPED | OUTPATIENT
Start: 2019-04-16 | End: 2019-05-21 | Stop reason: SDUPTHER

## 2019-04-18 RX ORDER — IPRATROPIUM BROMIDE AND ALBUTEROL SULFATE 2.5; .5 MG/3ML; MG/3ML
1 SOLUTION RESPIRATORY (INHALATION) EVERY 6 HOURS PRN
Qty: 180 ML | Refills: 1 | Status: SHIPPED | OUTPATIENT
Start: 2019-04-18 | End: 2019-05-20 | Stop reason: SDUPTHER

## 2019-04-18 RX ORDER — TRAZODONE HYDROCHLORIDE 50 MG/1
50 TABLET ORAL NIGHTLY
Qty: 60 TABLET | Refills: 2 | Status: SHIPPED | OUTPATIENT
Start: 2019-04-18 | End: 2019-05-20 | Stop reason: SDUPTHER

## 2019-05-20 ENCOUNTER — OFFICE VISIT (OUTPATIENT)
Dept: FAMILY MEDICINE CLINIC | Age: 64
End: 2019-05-20
Payer: MEDICARE

## 2019-05-20 VITALS
OXYGEN SATURATION: 97 % | HEIGHT: 73 IN | WEIGHT: 302.8 LBS | SYSTOLIC BLOOD PRESSURE: 138 MMHG | HEART RATE: 89 BPM | DIASTOLIC BLOOD PRESSURE: 82 MMHG | BODY MASS INDEX: 40.13 KG/M2

## 2019-05-20 DIAGNOSIS — K21.9 GASTROESOPHAGEAL REFLUX DISEASE, ESOPHAGITIS PRESENCE NOT SPECIFIED: ICD-10-CM

## 2019-05-20 DIAGNOSIS — J44.9 CHRONIC OBSTRUCTIVE PULMONARY DISEASE, UNSPECIFIED COPD TYPE (HCC): Chronic | ICD-10-CM

## 2019-05-20 DIAGNOSIS — E78.5 HYPERLIPIDEMIA, UNSPECIFIED HYPERLIPIDEMIA TYPE: Chronic | ICD-10-CM

## 2019-05-20 DIAGNOSIS — I47.1 SUPRAVENTRICULAR TACHYCARDIA (HCC): Chronic | ICD-10-CM

## 2019-05-20 DIAGNOSIS — M17.0 PRIMARY OSTEOARTHRITIS OF BOTH KNEES: Chronic | ICD-10-CM

## 2019-05-20 DIAGNOSIS — D64.9 ANEMIA, UNSPECIFIED TYPE: Chronic | ICD-10-CM

## 2019-05-20 DIAGNOSIS — I10 ESSENTIAL HYPERTENSION: Primary | Chronic | ICD-10-CM

## 2019-05-20 DIAGNOSIS — G47.30 SEVERE SLEEP APNEA: Chronic | ICD-10-CM

## 2019-05-20 DIAGNOSIS — M10.9 GOUT, UNSPECIFIED CAUSE, UNSPECIFIED CHRONICITY, UNSPECIFIED SITE: Chronic | ICD-10-CM

## 2019-05-20 DIAGNOSIS — R73.03 PRE-DIABETES: Chronic | ICD-10-CM

## 2019-05-20 DIAGNOSIS — G47.00 INSOMNIA, UNSPECIFIED TYPE: ICD-10-CM

## 2019-05-20 DIAGNOSIS — Z91.09 HOUSE DUST MITE ALLERGY: Chronic | ICD-10-CM

## 2019-05-20 DIAGNOSIS — Z72.0 TOBACCO USE: ICD-10-CM

## 2019-05-20 PROCEDURE — G8417 CALC BMI ABV UP PARAM F/U: HCPCS | Performed by: FAMILY MEDICINE

## 2019-05-20 PROCEDURE — G8926 SPIRO NO PERF OR DOC: HCPCS | Performed by: FAMILY MEDICINE

## 2019-05-20 PROCEDURE — 3023F SPIROM DOC REV: CPT | Performed by: FAMILY MEDICINE

## 2019-05-20 PROCEDURE — 3017F COLORECTAL CA SCREEN DOC REV: CPT | Performed by: FAMILY MEDICINE

## 2019-05-20 PROCEDURE — 99214 OFFICE O/P EST MOD 30 MIN: CPT | Performed by: FAMILY MEDICINE

## 2019-05-20 PROCEDURE — 4004F PT TOBACCO SCREEN RCVD TLK: CPT | Performed by: FAMILY MEDICINE

## 2019-05-20 PROCEDURE — G8427 DOCREV CUR MEDS BY ELIG CLIN: HCPCS | Performed by: FAMILY MEDICINE

## 2019-05-20 RX ORDER — ASPIRIN 81 MG/1
81 TABLET ORAL DAILY
Qty: 90 TABLET | Refills: 1 | Status: SHIPPED | OUTPATIENT
Start: 2019-05-20 | End: 2019-07-22 | Stop reason: SDUPTHER

## 2019-05-20 RX ORDER — NICOTINE 21 MG/24HR
1 PATCH, TRANSDERMAL 24 HOURS TRANSDERMAL EVERY 24 HOURS
Qty: 42 PATCH | Refills: 0 | Status: SHIPPED | OUTPATIENT
Start: 2019-05-20 | End: 2019-07-22 | Stop reason: ALTCHOICE

## 2019-05-20 RX ORDER — ALBUTEROL SULFATE 90 UG/1
AEROSOL, METERED RESPIRATORY (INHALATION)
Qty: 3 INHALER | Refills: 1 | Status: CANCELLED | OUTPATIENT
Start: 2019-05-20

## 2019-05-20 RX ORDER — BUDESONIDE AND FORMOTEROL FUMARATE DIHYDRATE 160; 4.5 UG/1; UG/1
2 AEROSOL RESPIRATORY (INHALATION) 2 TIMES DAILY
Qty: 3 INHALER | Refills: 1 | Status: SHIPPED | OUTPATIENT
Start: 2019-05-20 | End: 2019-07-22 | Stop reason: SDUPTHER

## 2019-05-20 RX ORDER — AMLODIPINE BESYLATE 10 MG/1
10 TABLET ORAL DAILY
Qty: 90 TABLET | Refills: 1 | Status: SHIPPED | OUTPATIENT
Start: 2019-05-20 | End: 2019-07-22 | Stop reason: SDUPTHER

## 2019-05-20 RX ORDER — MONTELUKAST SODIUM 10 MG/1
10 TABLET ORAL NIGHTLY
Qty: 90 TABLET | Refills: 1 | Status: SHIPPED | OUTPATIENT
Start: 2019-05-20 | End: 2019-07-22 | Stop reason: SDUPTHER

## 2019-05-20 RX ORDER — ALBUTEROL SULFATE 90 UG/1
2 AEROSOL, METERED RESPIRATORY (INHALATION) EVERY 6 HOURS PRN
Qty: 3 INHALER | Refills: 0 | Status: SHIPPED | OUTPATIENT
Start: 2019-05-20 | End: 2019-06-12 | Stop reason: SDUPTHER

## 2019-05-20 RX ORDER — UREA 10 %
1 LOTION (ML) TOPICAL NIGHTLY PRN
Qty: 90 TABLET | Refills: 1 | Status: SHIPPED | OUTPATIENT
Start: 2019-05-20 | End: 2020-03-23 | Stop reason: SDUPTHER

## 2019-05-20 RX ORDER — FAMOTIDINE 40 MG/1
40 TABLET, FILM COATED ORAL DAILY
Qty: 90 TABLET | Refills: 1 | Status: SHIPPED | OUTPATIENT
Start: 2019-05-20 | End: 2019-07-22 | Stop reason: SDUPTHER

## 2019-05-20 RX ORDER — LOVASTATIN 10 MG/1
10 TABLET ORAL NIGHTLY
Qty: 90 TABLET | Refills: 1 | Status: SHIPPED | OUTPATIENT
Start: 2019-05-20 | End: 2019-07-22 | Stop reason: SDUPTHER

## 2019-05-20 RX ORDER — MELOXICAM 15 MG/1
15 TABLET ORAL DAILY
Qty: 90 TABLET | Refills: 1 | Status: SHIPPED | OUTPATIENT
Start: 2019-05-20 | End: 2019-07-22 | Stop reason: SDUPTHER

## 2019-05-20 RX ORDER — TRAZODONE HYDROCHLORIDE 50 MG/1
50 TABLET ORAL NIGHTLY
Qty: 90 TABLET | Refills: 1 | Status: SHIPPED | OUTPATIENT
Start: 2019-05-20 | End: 2019-07-22 | Stop reason: SDUPTHER

## 2019-05-20 RX ORDER — FLUTICASONE PROPIONATE 50 MCG
2 SPRAY, SUSPENSION (ML) NASAL DAILY
Qty: 3 BOTTLE | Refills: 1 | Status: SHIPPED | OUTPATIENT
Start: 2019-05-20 | End: 2019-12-17 | Stop reason: SDUPTHER

## 2019-05-20 ASSESSMENT — ENCOUNTER SYMPTOMS
BLOOD IN STOOL: 0
ANAL BLEEDING: 0
COUGH: 0
WHEEZING: 0
NAUSEA: 0
DIARRHEA: 0
VOMITING: 0
CONSTIPATION: 0
SHORTNESS OF BREATH: 0
ABDOMINAL PAIN: 0
CHEST TIGHTNESS: 0

## 2019-05-20 ASSESSMENT — PATIENT HEALTH QUESTIONNAIRE - PHQ9
SUM OF ALL RESPONSES TO PHQ9 QUESTIONS 1 & 2: 0
SUM OF ALL RESPONSES TO PHQ QUESTIONS 1-9: 0
1. LITTLE INTEREST OR PLEASURE IN DOING THINGS: 0
SUM OF ALL RESPONSES TO PHQ QUESTIONS 1-9: 0
2. FEELING DOWN, DEPRESSED OR HOPELESS: 0

## 2019-05-20 NOTE — TELEPHONE ENCOUNTER
Patient came in today for his office visit. Patient stated that he needs a prior autho on his nebulizer solution medication. sylvester

## 2019-05-20 NOTE — PROGRESS NOTES
calories ([facility] 2016    Osteoarthritis of both knees 2016    Personal history of tobacco use     Pneumonia 2016    caused hospital admission    Pre-diabetes 10/27/2016    Seasonal allergies 2014    Severe persistent asthma 10/27/2016    Severe sleep apnea 2015    [facility] 10/27/2016    SVT (supraventricular tachycardia) (HCC)      Past Surgical History:   Procedure Laterality Date    ANTERIOR CRUCIATE LIGAMENT REPAIR      CARDIAC CATHETERIZATION       Family History   Problem Relation Age of Onset    Arthritis Mother     Asthma Mother     High Cholesterol Mother     Other Mother         aneurysm    Diabetes Father     Stroke Maternal Grandmother     Cancer Maternal Grandfather     Hypertension Other     COPD Neg Hx      Social History     Socioeconomic History    Marital status: Single     Spouse name: n/a    Number of children: 1    Years of education: 15    Highest education level: Not on file   Occupational History    Occupation: Disability     Employer: NONE   Social Needs    Financial resource strain: Not on file    Food insecurity:     Worry: Not on file     Inability: Not on file    Transportation needs:     Medical: Not on file     Non-medical: Not on file   Tobacco Use    Smoking status: Current Every Day Smoker     Packs/day: 0.50     Years: 30.00     Pack years: 15.00     Types: Cigarettes, Cigars     Start date: 1988     Last attempt to quit: 10/1/2013     Years since quittin.6    Smokeless tobacco: Never Used   Substance and Sexual Activity    Alcohol use: No     Comment: In Recovery    Drug use: Yes     Types: Cocaine, Marijuana     Comment: used cocaine 18/ In recovery sober over 130 days(18    Sexual activity: Not Currently     Partners: Female   Lifestyle    Physical activity:     Days per week: Not on file     Minutes per session: Not on file    Stress: Not on file   Relationships    Social connections:     Talks on phone: Not on file     Gets together: Not on file     Attends Judaism service: Not on file     Active member of club or organization: Not on file     Attends meetings of clubs or organizations: Not on file     Relationship status: Not on file    Intimate partner violence:     Fear of current or ex partner: Not on file     Emotionally abused: Not on file     Physically abused: Not on file     Forced sexual activity: Not on file   Other Topics Concern    Not on file   Social History Narrative    Not on file     Current Outpatient Medications on File Prior to Visit   Medication Sig Dispense Refill    Mitra Gomez [id number] [facility] by Does not apply route DX: OSTEOARTHRITIS OF BOTH KNEES (M17.0), COPD (J44.9)     EXPIRES: 02/2024 1 each 0    tadalafil (CIALIS) 20 MG tablet Take 1 tablet by mouth as needed for Erectile Dysfunction 12 tablet 0    ipratropium-albuterol (DUONEB) 0.5-2.5 (3) MG/3ML SOLN nebulizer solution INHALE ONE VIAL VIA NEBULZIER EVERY 6 HOURS AS NEEDED FOR SHORTNESS OF BREATH  180 mL 0     No current facility-administered medications on file prior to visit. Allergies:  Fish-derived products; Iodine; and Pcn [penicillins]    Review of Systems   Constitutional: Negative for appetite change, chills, diaphoresis, fatigue, fever and unexpected weight change. Eyes: Negative for visual disturbance. Respiratory: Negative for cough, chest tightness, shortness of breath and wheezing. Cardiovascular: Negative for chest pain, palpitations and leg swelling. No orthopnea, No PND   Gastrointestinal: Negative for abdominal pain, anal bleeding, blood in stool, constipation, diarrhea, nausea and vomiting. No heartburn, No melena   Genitourinary: Negative for dysuria. Neurological: Negative for dizziness, syncope, weakness, light-headedness, numbness and headaches. Psychiatric/Behavioral: Negative for dysphoric mood and sleep disturbance.        Objective:     BP 138/82 (Site: Left Upper Arm, Position: Sitting, Cuff Size: Large Adult)   Pulse 89   Ht 6' 1\" (1.854 m)   Wt (!) 302 lb 12.8 oz (137.3 kg)   SpO2 97%   BMI 39.95 kg/m²     Physical Exam   Constitutional: He is oriented to person, place, and time. He appears well-developed and well-nourished. Neck: Neck supple. Carotid bruit is not present. No thyromegaly present. Cardiovascular: Normal rate, regular rhythm, normal heart sounds and intact distal pulses. No murmur heard. Pulmonary/Chest: Effort normal. No accessory muscle usage. No tachypnea. No respiratory distress. He has no decreased breath sounds. He has wheezes (expiratory wheezing throughout). He has no rhonchi. He has no rales. Abdominal: Soft. Bowel sounds are normal. He exhibits no distension. There is no tenderness. There is no rebound and no guarding. Musculoskeletal: Normal range of motion. He exhibits no edema (bilateral lower extremities). Lymphadenopathy:     He has no cervical adenopathy. Neurological: He is alert and oriented to person, place, and time. Skin: Skin is warm. No rash noted. Psychiatric: He has a normal mood and affect. His speech is normal and behavior is normal. Thought content normal.        Ortho Exam (If Applicable)      Assessment & Plan:      1. Essential hypertension  Blood pressure within normal limits today in the office. Continue current medication    - amLODIPine (NORVASC) 10 MG tablet; Take 1 tablet by mouth daily  Dispense: 90 tablet; Refill: 1  - aspirin ([facility] ASPIRIN LOW DOSE) 81 MG EC tablet; Take 1 tablet by mouth daily  Dispense: 90 tablet; Refill: 1  - CBC Auto Differential; Future  - Comprehensive Metabolic Panel; Future    2. Supraventricular tachycardia (HCC)  Currently asymptomatic. Stable. We will continue to follow over time    - CBC Auto Differential; Future    3. Hyperlipidemia, unspecified hyperlipidemia type    - lovastatin (MEVACOR) 10 MG tablet;  Take 1 tablet by mouth nightly Dispense: 90 tablet; Refill: 1  - Comprehensive Metabolic Panel; Future  - Lipid Panel; Future    4. Chronic obstructive pulmonary disease, unspecified [facility] ([facility]  Will start patient on Tudorza and continue with Symbicort. Patient instructed to call the office if he is not able to  this new prescription at the pharmacy. - budesonide-formoterol (SYMBICORT) 160-4.5 MCG/ACT AERO; Inhale 2 puffs into the lungs 2 times daily  Dispense: 3 Inhaler; Refill: 1  - montelukast (SINGULAIR) 10 MG tablet; Take 1 tablet by mouth nightly  Dispense: 90 tablet; Refill: 1  - aclidinium (TUDORZA PRESSAIR) 400 MCG/ACT AEPB inhaler; Inhale 1 puff into the lungs 2 times daily  Dispense: 3 each; Refill: 1  - albuterol sulfate HFA (VENTOLIN HFA) 108 (90 Base) MCG/ACT inhaler; Inhale 2 puffs into the lungs every 6 hours as needed for Wheezing or Shortness of Breath  Dispense: 3 Inhaler; Refill: 0    5. Severe sleep apnea  I stressed with patient the importance of managing sleep apnea long-term. I reviewed with him the risks of untreated sleep apnea including pulmonary hypertension, cardiac arrhythmias, and increased risk for heart failure. 6. Insomnia, unspecified type    - traZODone (DESYREL) 50 MG tablet; Take 1 tablet by mouth nightly  Dispense: 90 tablet; Refill: 1  - melatonin 1 MG tablet; Take 1 tablet by mouth nightly as needed for Sleep  Dispense: 90 tablet; Refill: 1    7. Gastroesophageal reflux disease, esophagitis presence not specified    - famotidine (PEPCID) 40 MG tablet; Take 1 tablet by mouth daily  Dispense: 90 tablet; Refill: 1    8. Primary osteoarthritis of both knees    - meloxicam (MOBIC) 15 MG tablet; Take 1 tablet by mouth daily  Dispense: 90 tablet; Refill: 1    9. House dust mite allergy    - montelukast (SINGULAIR) 10 MG tablet; Take 1 tablet by mouth nightly  Dispense: 90 tablet;  Refill: 1  - fluticasone (FLONASE) 50 MCG/ACT nasal spray; 2 sprays by Nasal route daily  Dispense: 3 Bottle; Refill: 1    10. Gout, unspecified cause, unspecified chronicity, unspecified site    - Uric Acid; Future    11. Anemia, unspecified type    - CBC Auto Differential; Future    12. Pre-diabetes    - Comprehensive Metabolic Panel; Future  - Hemoglobin A1C; Future    13. Tobacco use  Patient contemplative and is aware that smoking cessation would be the best thing for overall health. We reviewed cessation aids including medication and nicotine replacement therapy. He requests prescriptions for nicotine patches. Will continue to encourage complete smoking cessation at subsequent visits. - nicotine (NICODERM CQ) 21 MG/24HR; Place 1 patch onto the skin every 24 hours  Dispense: 42 patch; Refill: 0  - nicotine (NICODERM CQ) 14 MG/24HR; Place 1 patch onto the skin every 24 hours  Dispense: 42 patch; Refill: 0  - nicotine (NICODERM CQ) 7 MG/24HR; Place 1 patch onto the skin every 24 hours  Dispense: 42 patch;  Refill: 0      Modified Medications    Modified Medication Previous Medication    ALBUTEROL SULFATE HFA (VENTOLIN HFA) 108 (90 BASE) MCG/ACT INHALER VENTOLIN  (90 Base) MCG/ACT inhaler       Inhale 2 puffs into the lungs every 6 hours as needed for Wheezing or Shortness of Breath    INHALE TWO PUFFS BY MOUTH INTO THE LUNGS EVERY 6 HOURS AS NEEDED FOR WHEEZING OR SHORTNESS OF BREATH    AMLODIPINE (NORVASC) 10 MG TABLET amLODIPine (NORVASC) 10 MG tablet       Take 1 tablet by mouth daily    Take 1 tablet by mouth daily    ASPIRIN ([facility] ASPIRIN LOW DOSE) 81 MG EC TABLET aspirin ([facility] ASPIRIN LOW DOSE) 81 MG EC tablet       Take 1 tablet by mouth daily    Take 1 tablet by mouth daily    BUDESONIDE-FORMOTEROL (SYMBICORT) 160-4.5 MCG/ACT AERO budesonide-formoterol (SYMBICORT) 160-4.5 MCG/ACT AERO       Inhale 2 puffs into the lungs 2 times daily    Inhale 2 puffs into the lungs 2 times daily    FAMOTIDINE (PEPCID) 40 MG TABLET famotidine (PEPCID) 40 MG tablet       Take 1 tablet by mouth daily    Take 1 tablet by mouth daily    FLUTICASONE (FLONASE) 50 MCG/ACT NASAL SPRAY fluticasone (FLONASE) 50 MCG/ACT nasal spray       2 sprays by Nasal route daily    2 sprays by Nasal route daily    LOVASTATIN (MEVACOR) 10 MG TABLET lovastatin (MEVACOR) 10 MG tablet       Take 1 tablet by mouth nightly    TAKE 1 TABLET BY MOUTH NIGHTLY    MELATONIN 1 MG TABLET melatonin 1 MG tablet       Take 1 tablet by mouth nightly as needed for Sleep    Take 1 tablet by mouth nightly as needed for Sleep    MELOXICAM (MOBIC) 15 MG TABLET meloxicam (MOBIC) 15 MG tablet       Take 1 tablet by mouth daily    Take 1 tablet by mouth daily    MONTELUKAST (SINGULAIR) 10 MG TABLET montelukast (SINGULAIR) 10 MG tablet       Take 1 tablet by mouth nightly    Take 1 tablet by mouth nightly    TRAZODONE (DESYREL) 50 MG TABLET traZODone (DESYREL) 50 MG tablet       Take 1 tablet by mouth nightly    Take 1 tablet by mouth nightly          New Prescriptions    ACLIDINIUM (TUDORZA PRESSAIR) 400 MCG/ACT AEPB INHALER    Inhale 1 puff into the lungs 2 times daily    NICOTINE (NICODERM CQ) 14 MG/24HR    Place 1 patch onto the skin every 24 hours    NICOTINE (NICODERM CQ) 21 MG/24HR    Place 1 patch onto the skin every 24 hours    NICOTINE (NICODERM CQ) 7 MG/24HR    Place 1 patch onto the skin every 24 hours          Medications Discontinued During This Encounter   Medication Reason    ipratropium-albuterol (DUONEB) 0.5-2.5 (3) MG/3ML SOLN nebulizer solution DUPLICATE    tiotropium (SPIRIVA HANDIHALER) 18 MCG inhalation capsule Formulary change    Ertapenem Sodium (INVANZ IV) Therapy completed    benzonatate (TESSALON PERLES) 100 MG capsule LIST CLEANUP    traZODone (DESYREL) 50 MG tablet REORDER    amLODIPine (NORVASC) 10 MG tablet REORDER    famotidine (PEPCID) 40 MG tablet REORDER    melatonin 1 MG tablet REORDER    meloxicam (MOBIC) 15 MG tablet REORDER    budesonide-formoterol (SYMBICORT) 160-4.5 MCG/ACT AERO REORDER    montelukast (SINGULAIR) 10 MG tablet

## 2019-05-21 RX ORDER — IPRATROPIUM BROMIDE AND ALBUTEROL SULFATE 2.5; .5 MG/3ML; MG/3ML
SOLUTION RESPIRATORY (INHALATION)
Qty: 180 ML | Refills: 0 | Status: SHIPPED | OUTPATIENT
Start: 2019-05-21 | End: 2019-07-22 | Stop reason: SDUPTHER

## 2019-05-22 NOTE — TELEPHONE ENCOUNTER
Patient called said that the pharmacy does not have all of his medication so I called and spoke with Chris Cote at [facility] she said there is 4 for him to  and he has gotten some of the inhaler but there is one that they are going to fax over that he needs to try first before he can get this one filled aclidinium (TUDORZA PRESSAIR) 400 MCG/ACT AEPB inhaler. cp

## 2019-05-28 NOTE — TELEPHONE ENCOUNTER
Patient called to advise his insurance needs us to call Customer Service at [phone] to advise patient needs medication ipratropium-albuterol, patient states cost is $65.00

## 2019-06-07 NOTE — TELEPHONE ENCOUNTER
I called pharmacy and and the med goes through on his insurance with a 65$ co pay---it does not require a prior auth----also pharmacy ran it under there discount card instead so it was only 30$---I called and explained this to patient and he still would like for me to call [employer] to see if he can get it cheaper-- I called [employer] and they said that this is not covered under [employer] it should be billed under medicare part b--I called pharmacy back and they said they are billing b and its 65$---pt notiified----ah

## 2019-06-12 DIAGNOSIS — J44.9 CHRONIC OBSTRUCTIVE PULMONARY DISEASE, UNSPECIFIED COPD TYPE (HCC): Chronic | ICD-10-CM

## 2019-06-12 RX ORDER — ALBUTEROL SULFATE 90 UG/1
2 AEROSOL, METERED RESPIRATORY (INHALATION) EVERY 6 HOURS PRN
Qty: 3 INHALER | Refills: 0 | Status: SHIPPED | OUTPATIENT
Start: 2019-06-12 | End: 2019-07-22 | Stop reason: SDUPTHER

## 2019-07-22 ENCOUNTER — OFFICE VISIT (OUTPATIENT)
Dept: FAMILY MEDICINE CLINIC | Age: 64
End: 2019-07-22
Payer: MEDICARE

## 2019-07-22 VITALS
HEART RATE: 80 BPM | BODY MASS INDEX: 40.69 KG/M2 | HEIGHT: 73 IN | DIASTOLIC BLOOD PRESSURE: 80 MMHG | OXYGEN SATURATION: 96 % | WEIGHT: 307 LBS | SYSTOLIC BLOOD PRESSURE: 126 MMHG | TEMPERATURE: 97.2 F | RESPIRATION RATE: 20 BRPM

## 2019-07-22 DIAGNOSIS — E78.5 HYPERLIPIDEMIA, UNSPECIFIED HYPERLIPIDEMIA TYPE: Chronic | ICD-10-CM

## 2019-07-22 DIAGNOSIS — M10.9 GOUT, UNSPECIFIED CAUSE, UNSPECIFIED CHRONICITY, UNSPECIFIED SITE: Chronic | ICD-10-CM

## 2019-07-22 DIAGNOSIS — Z91.09 HOUSE DUST MITE ALLERGY: Chronic | ICD-10-CM

## 2019-07-22 DIAGNOSIS — G47.00 INSOMNIA, UNSPECIFIED TYPE: ICD-10-CM

## 2019-07-22 DIAGNOSIS — D64.9 ANEMIA, UNSPECIFIED TYPE: Chronic | ICD-10-CM

## 2019-07-22 DIAGNOSIS — G47.30 SEVERE SLEEP APNEA: Chronic | ICD-10-CM

## 2019-07-22 DIAGNOSIS — I10 ESSENTIAL HYPERTENSION: Chronic | ICD-10-CM

## 2019-07-22 DIAGNOSIS — E66.01 MORBID OBESITY DUE TO EXCESS CALORIES (HCC): Chronic | ICD-10-CM

## 2019-07-22 DIAGNOSIS — J44.9 CHRONIC OBSTRUCTIVE PULMONARY DISEASE, UNSPECIFIED COPD TYPE (HCC): Chronic | ICD-10-CM

## 2019-07-22 DIAGNOSIS — F10.10 ALCOHOL ABUSE: Chronic | ICD-10-CM

## 2019-07-22 DIAGNOSIS — K21.9 GASTROESOPHAGEAL REFLUX DISEASE, ESOPHAGITIS PRESENCE NOT SPECIFIED: ICD-10-CM

## 2019-07-22 DIAGNOSIS — I47.1 SUPRAVENTRICULAR TACHYCARDIA (HCC): Chronic | ICD-10-CM

## 2019-07-22 DIAGNOSIS — R73.03 PRE-DIABETES: Chronic | ICD-10-CM

## 2019-07-22 DIAGNOSIS — M17.0 PRIMARY OSTEOARTHRITIS OF BOTH KNEES: Primary | Chronic | ICD-10-CM

## 2019-07-22 DIAGNOSIS — Z01.818 PRE-OPERATIVE CLEARANCE: ICD-10-CM

## 2019-07-22 DIAGNOSIS — Z72.0 TOBACCO USE: ICD-10-CM

## 2019-07-22 DIAGNOSIS — F14.10 COCAINE ABUSE (HCC): Chronic | ICD-10-CM

## 2019-07-22 PROCEDURE — 93000 ELECTROCARDIOGRAM COMPLETE: CPT | Performed by: FAMILY MEDICINE

## 2019-07-22 PROCEDURE — 4004F PT TOBACCO SCREEN RCVD TLK: CPT | Performed by: FAMILY MEDICINE

## 2019-07-22 PROCEDURE — 3023F SPIROM DOC REV: CPT | Performed by: FAMILY MEDICINE

## 2019-07-22 PROCEDURE — G8926 SPIRO NO PERF OR DOC: HCPCS | Performed by: FAMILY MEDICINE

## 2019-07-22 PROCEDURE — G8417 CALC BMI ABV UP PARAM F/U: HCPCS | Performed by: FAMILY MEDICINE

## 2019-07-22 PROCEDURE — G8427 DOCREV CUR MEDS BY ELIG CLIN: HCPCS | Performed by: FAMILY MEDICINE

## 2019-07-22 PROCEDURE — 99214 OFFICE O/P EST MOD 30 MIN: CPT | Performed by: FAMILY MEDICINE

## 2019-07-22 PROCEDURE — 3017F COLORECTAL CA SCREEN DOC REV: CPT | Performed by: FAMILY MEDICINE

## 2019-07-22 RX ORDER — ALBUTEROL SULFATE 90 UG/1
2 AEROSOL, METERED RESPIRATORY (INHALATION) EVERY 6 HOURS PRN
Qty: 3 INHALER | Refills: 0 | Status: SHIPPED | OUTPATIENT
Start: 2019-07-22 | End: 2019-11-01 | Stop reason: SDUPTHER

## 2019-07-22 RX ORDER — LOVASTATIN 10 MG/1
10 TABLET ORAL NIGHTLY
Qty: 90 TABLET | Refills: 1 | Status: SHIPPED | OUTPATIENT
Start: 2019-07-22 | End: 2019-12-17 | Stop reason: SDUPTHER

## 2019-07-22 RX ORDER — MONTELUKAST SODIUM 10 MG/1
10 TABLET ORAL NIGHTLY
Qty: 90 TABLET | Refills: 1 | Status: SHIPPED | OUTPATIENT
Start: 2019-07-22 | End: 2019-12-17 | Stop reason: SDUPTHER

## 2019-07-22 RX ORDER — ASPIRIN 81 MG/1
81 TABLET ORAL DAILY
Qty: 90 TABLET | Refills: 1 | Status: ON HOLD | OUTPATIENT
Start: 2019-07-22 | End: 2019-08-12 | Stop reason: HOSPADM

## 2019-07-22 RX ORDER — IPRATROPIUM BROMIDE AND ALBUTEROL SULFATE 2.5; .5 MG/3ML; MG/3ML
SOLUTION RESPIRATORY (INHALATION)
Qty: 180 ML | Refills: 0 | Status: SHIPPED | OUTPATIENT
Start: 2019-07-22 | End: 2019-12-17 | Stop reason: SDUPTHER

## 2019-07-22 RX ORDER — MELOXICAM 15 MG/1
15 TABLET ORAL DAILY
Qty: 90 TABLET | Refills: 1 | Status: ON HOLD | OUTPATIENT
Start: 2019-07-22 | End: 2019-08-12 | Stop reason: HOSPADM

## 2019-07-22 RX ORDER — FAMOTIDINE 40 MG/1
40 TABLET, FILM COATED ORAL DAILY
Qty: 90 TABLET | Refills: 1 | Status: SHIPPED | OUTPATIENT
Start: 2019-07-22 | End: 2019-12-17 | Stop reason: SDUPTHER

## 2019-07-22 RX ORDER — AMLODIPINE BESYLATE 10 MG/1
10 TABLET ORAL DAILY
Qty: 90 TABLET | Refills: 1 | Status: SHIPPED | OUTPATIENT
Start: 2019-07-22 | End: 2019-12-17 | Stop reason: SDUPTHER

## 2019-07-22 RX ORDER — SILDENAFIL 100 MG/1
100 TABLET, FILM COATED ORAL PRN
Qty: 30 TABLET | Refills: 3 | Status: CANCELLED | OUTPATIENT
Start: 2019-07-22

## 2019-07-22 RX ORDER — BUDESONIDE AND FORMOTEROL FUMARATE DIHYDRATE 160; 4.5 UG/1; UG/1
2 AEROSOL RESPIRATORY (INHALATION) 2 TIMES DAILY
Qty: 3 INHALER | Refills: 1 | Status: SHIPPED | OUTPATIENT
Start: 2019-07-22 | End: 2019-12-17 | Stop reason: SDUPTHER

## 2019-07-22 RX ORDER — TRAZODONE HYDROCHLORIDE 50 MG/1
50 TABLET ORAL NIGHTLY
Qty: 90 TABLET | Refills: 1 | Status: SHIPPED | OUTPATIENT
Start: 2019-07-22 | End: 2019-12-17 | Stop reason: SDUPTHER

## 2019-07-22 ASSESSMENT — ENCOUNTER SYMPTOMS
RHINORRHEA: 0
CONSTIPATION: 0
EYE PAIN: 0
SORE THROAT: 0
ANAL BLEEDING: 0
ABDOMINAL PAIN: 0
EYE DISCHARGE: 0
COUGH: 0
DIARRHEA: 0
SHORTNESS OF BREATH: 0
CHEST TIGHTNESS: 0
NAUSEA: 0
VOMITING: 0
BLOOD IN STOOL: 0
SINUS PRESSURE: 0
FACIAL SWELLING: 0
PHOTOPHOBIA: 0
WHEEZING: 1

## 2019-07-22 NOTE — PROGRESS NOTES
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    Drug-seeking behavior 4/14/2015    Edema 12/10/2015    Erectile dysfunction     Gastroesophageal reflux disease 12/17/2018    Gout 10/27/2016    History of arthroscopy of both knees 10/27/2016    House dust mite allergy 4/21/2014    Hyperlipidemia     Hypertension 10/27/2016    Injury to heart 10/27/2016    Insomnia 12/17/2018    Medical non-compliance 2/22/2014    Morbid obesity due to excess calories ([address] 12/8/2016    Osteoarthritis of both knees 12/8/2016    Personal history of tobacco use     Pneumonia 12/04/2016    caused hospital admission    Pre-diabetes 10/27/2016    Seasonal allergies 4/21/2014    Severe persistent asthma 10/27/2016    Severe sleep apnea 4/14/2015    Supraventricular tachycardia ([address] 10/27/2016    SVT (supraventricular tachycardia) ([facility])      Past Surgical History:   Procedure Laterality Date    ANTERIOR CRUCIATE LIGAMENT REPAIR      CARDIAC CATHETERIZATION       Family History   Problem Relation Age of Onset    Arthritis Mother     Asthma Mother     High Cholesterol Mother     Other Mother         aneurysm    Diabetes Father     Stroke Maternal Grandmother     Cancer Maternal Grandfather     Hypertension Other     COPD Neg Hx      Social History     Socioeconomic History    Marital status: Single     Spouse name: n/a    Number of children: 1    Years of education: 15    Highest education level: Not on file   Occupational History    Occupation: Disability     Employer: NONE   Social Needs    Financial resource strain: Not on file    Food insecurity:     Worry: Not on file and fever. HENT: Negative for congestion, ear discharge, ear pain, facial swelling, nosebleeds, postnasal drip, rhinorrhea, sinus pressure and sore throat. Eyes: Negative for photophobia, pain, discharge and visual disturbance. Respiratory: Positive for wheezing (intermittent, chronic). Negative for cough, chest tightness and shortness of breath. Cardiovascular: Negative for chest pain, palpitations and leg swelling. No orthopnea, No PND   Gastrointestinal: Negative for abdominal pain, anal bleeding, blood in stool, constipation, diarrhea, nausea and vomiting. No heartburn, No melena   Endocrine: Negative for cold intolerance, heat intolerance, polydipsia, polyphagia and polyuria. Genitourinary: Negative for dysuria, flank pain, frequency, hematuria and urgency. Musculoskeletal: Negative for myalgias. Skin: Negative for rash and wound. Neurological: Negative for dizziness, syncope, weakness, light-headedness, numbness and headaches. Psychiatric/Behavioral: Negative for dysphoric mood and sleep disturbance. The patient is not nervous/anxious. Objective:     /80 (Site: Left Upper Arm, Position: Sitting, Cuff Size: Large Adult)   Pulse 80   Temp 97.2 °F (36.2 °C) (Temporal)   Resp 20   Ht 6' 1\" (1.854 m)   Wt (!) 307 lb (139.3 kg)   SpO2 96%   BMI 40.50 kg/m²     Physical Exam   Constitutional: He is oriented to person, place, and time. He appears well-developed and well-nourished. HENT:   Head: Normocephalic and atraumatic. Right Ear: Tympanic membrane, external ear and ear canal normal. Tympanic membrane is not erythematous. Left Ear: Tympanic membrane, external ear and ear canal normal. Tympanic membrane is not erythematous. Nose: No mucosal edema. Right sinus exhibits no maxillary sinus tenderness and no frontal sinus tenderness. Left sinus exhibits no maxillary sinus tenderness and no frontal sinus tenderness.    Mouth/Throat: Uvula is midline and mucous membranes are normal. No oral lesions. No oropharyngeal exudate or posterior oropharyngeal erythema. Eyes: Pupils are equal, round, and reactive to light. Conjunctivae and EOM are normal. Right eye exhibits no discharge. Left eye exhibits no discharge. Neck: Neck supple. Carotid bruit is not present. No thyromegaly present. Cardiovascular: Normal rate, regular rhythm, normal heart sounds and intact distal pulses. No murmur heard. Pulmonary/Chest: Effort normal. No accessory muscle usage. No tachypnea. No respiratory distress. He has no decreased breath sounds. He has wheezes (expiratory wheezing scattered throughout). He has no rhonchi. He has no rales. Abdominal: Soft. Bowel sounds are normal. He exhibits no distension. There is no tenderness. There is no rebound and no guarding. Musculoskeletal: Normal range of motion. He exhibits no edema (bilateral lower extremities). Lymphadenopathy:     He has no cervical adenopathy. Neurological: He is alert and oriented to person, place, and time. He has normal strength. No sensory deficit. Gait normal.   Skin: Skin is warm. No rash noted. Psychiatric: He has a normal mood and affect. His speech is normal and behavior is normal. Thought content normal.        Ortho Exam (If Applicable)      Assessment & Plan:      1. Primary osteoarthritis of both knees  Further definitive management plan per orthopedic surgeon in the next 2 weeks. See below    - meloxicam (MOBIC) 15 MG tablet; Take 1 tablet by mouth daily  Dispense: 90 tablet; Refill: 1    2. Pre-operative clearance  I discussed with the patient the [facility] peroperative clearance guidelines. With unremarkable/stable labwork, CXR, and EKG the patient would be acceptable risk for surgery. I discussed with the patient that there will always be a risk of fatal cardiopulmonary events.  I advised the patient to obtain complete informed consent from the surgeon, but in general all surgical procedures have a risk of

## 2019-07-24 ENCOUNTER — HOSPITAL ENCOUNTER (OUTPATIENT)
Dept: GENERAL RADIOLOGY | Age: 64
Discharge: HOME | End: 2019-07-26
Payer: MEDICARE

## 2019-07-24 ENCOUNTER — HOSPITAL ENCOUNTER (OUTPATIENT)
Dept: PREADMISSION TESTING | Age: 64
Discharge: HOME | End: 2019-07-28
Payer: MEDICARE

## 2019-07-24 VITALS
SYSTOLIC BLOOD PRESSURE: 151 MMHG | BODY MASS INDEX: 39.89 KG/M2 | HEART RATE: 71 BPM | RESPIRATION RATE: 20 BRPM | WEIGHT: 301 LBS | OXYGEN SATURATION: 97 % | DIASTOLIC BLOOD PRESSURE: 78 MMHG | HEIGHT: 73 IN | TEMPERATURE: 97.8 F

## 2019-07-24 DIAGNOSIS — Z01.818 PRE-OPERATIVE CLEARANCE: ICD-10-CM

## 2019-07-24 DIAGNOSIS — E78.5 HYPERLIPIDEMIA, UNSPECIFIED HYPERLIPIDEMIA TYPE: Chronic | ICD-10-CM

## 2019-07-24 DIAGNOSIS — I10 ESSENTIAL HYPERTENSION: Chronic | ICD-10-CM

## 2019-07-24 DIAGNOSIS — D64.9 ANEMIA, UNSPECIFIED TYPE: Chronic | ICD-10-CM

## 2019-07-24 DIAGNOSIS — J44.9 CHRONIC OBSTRUCTIVE PULMONARY DISEASE, UNSPECIFIED COPD TYPE (HCC): Chronic | ICD-10-CM

## 2019-07-24 DIAGNOSIS — M10.9 GOUT, UNSPECIFIED CAUSE, UNSPECIFIED CHRONICITY, UNSPECIFIED SITE: Chronic | ICD-10-CM

## 2019-07-24 DIAGNOSIS — F10.10 ALCOHOL ABUSE: Chronic | ICD-10-CM

## 2019-07-24 DIAGNOSIS — I47.1 SUPRAVENTRICULAR TACHYCARDIA (HCC): Chronic | ICD-10-CM

## 2019-07-24 DIAGNOSIS — R73.03 PRE-DIABETES: Chronic | ICD-10-CM

## 2019-07-24 LAB
ALBUMIN SERPL-MCNC: 3.9 G/DL (ref 3.5–4.6)
ALP BLD-CCNC: 103 U/L (ref 35–104)
ALT SERPL-CCNC: 21 U/L (ref 0–41)
ANION GAP SERPL CALCULATED.3IONS-SCNC: 11 MEQ/L (ref 9–15)
AST SERPL-CCNC: 18 U/L (ref 0–40)
BASOPHILS ABSOLUTE: 0 K/UL (ref 0–0.2)
BASOPHILS RELATIVE PERCENT: 0.4 %
BILIRUB SERPL-MCNC: 0.3 MG/DL (ref 0.2–0.7)
BILIRUBIN URINE: NEGATIVE
BLOOD, URINE: NEGATIVE
BUN BLDV-MCNC: 14 MG/DL (ref 8–23)
CALCIUM SERPL-MCNC: 9.1 MG/DL (ref 8.5–9.9)
CHLORIDE BLD-SCNC: 108 MEQ/L (ref 95–107)
CHOLESTEROL, TOTAL: 176 MG/DL (ref 0–199)
CLARITY: CLEAR
CO2: 25 MEQ/L (ref 20–31)
COLOR: YELLOW
CREAT SERPL-MCNC: 0.98 MG/DL (ref 0.7–1.2)
EOSINOPHILS ABSOLUTE: 0.2 K/UL (ref 0–0.7)
EOSINOPHILS RELATIVE PERCENT: 1.7 %
GFR AFRICAN AMERICAN: >60
GFR NON-AFRICAN AMERICAN: >60
GLOBULIN: 3.1 G/DL (ref 2.3–3.5)
GLUCOSE BLD-MCNC: 90 MG/DL (ref 70–99)
GLUCOSE URINE: NEGATIVE MG/DL
HCT VFR BLD CALC: 38.7 % (ref 42–52)
HDLC SERPL-MCNC: 38 MG/DL (ref 40–59)
HEMOGLOBIN: 13.3 G/DL (ref 14–18)
KETONES, URINE: NEGATIVE MG/DL
LDL CHOLESTEROL CALCULATED: 115 MG/DL (ref 0–129)
LEUKOCYTE ESTERASE, URINE: NEGATIVE
LYMPHOCYTES ABSOLUTE: 1.6 K/UL (ref 1–4.8)
LYMPHOCYTES RELATIVE PERCENT: 16.8 %
MCH RBC QN AUTO: 32.2 PG (ref 27–31.3)
MCHC RBC AUTO-ENTMCNC: 34.4 % (ref 33–37)
MCV RBC AUTO: 93.6 FL (ref 80–100)
MONOCYTES ABSOLUTE: 0.8 K/UL (ref 0.2–0.8)
MONOCYTES RELATIVE PERCENT: 8.8 %
NEUTROPHILS ABSOLUTE: 6.9 K/UL (ref 1.4–6.5)
NEUTROPHILS RELATIVE PERCENT: 72.3 %
NITRITE, URINE: NEGATIVE
PDW BLD-RTO: 16.2 % (ref 11.5–14.5)
PH UA: 5 (ref 5–9)
PLATELET # BLD: 327 K/UL (ref 130–400)
POTASSIUM SERPL-SCNC: 4.1 MEQ/L (ref 3.4–4.9)
PROTEIN UA: NEGATIVE MG/DL
RBC # BLD: 4.13 M/UL (ref 4.7–6.1)
SODIUM BLD-SCNC: 144 MEQ/L (ref 135–144)
SPECIFIC GRAVITY UA: 1.02 (ref 1–1.03)
TOTAL PROTEIN: 7 G/DL (ref 6.3–8)
TRIGL SERPL-MCNC: 115 MG/DL (ref 0–150)
URIC ACID, SERUM: 5 MG/DL (ref 3.4–7)
UROBILINOGEN, URINE: 0.2 E.U./DL
WBC # BLD: 9.6 K/UL (ref 4.8–10.8)

## 2019-07-24 PROCEDURE — 80053 COMPREHEN METABOLIC PANEL: CPT

## 2019-07-24 PROCEDURE — 87086 URINE CULTURE/COLONY COUNT: CPT

## 2019-07-24 PROCEDURE — 84550 ASSAY OF BLOOD/URIC ACID: CPT

## 2019-07-24 PROCEDURE — 80061 LIPID PANEL: CPT

## 2019-07-24 PROCEDURE — 71046 X-RAY EXAM CHEST 2 VIEWS: CPT

## 2019-07-24 PROCEDURE — 85025 COMPLETE CBC W/AUTO DIFF WBC: CPT

## 2019-07-24 PROCEDURE — 81003 URINALYSIS AUTO W/O SCOPE: CPT

## 2019-07-24 RX ORDER — SODIUM CHLORIDE 0.9 % (FLUSH) 0.9 %
10 SYRINGE (ML) INJECTION PRN
Status: CANCELLED | OUTPATIENT
Start: 2019-08-09

## 2019-07-24 RX ORDER — IBUPROFEN 200 MG
200 TABLET ORAL EVERY 6 HOURS PRN
Status: ON HOLD | COMMUNITY
End: 2019-08-12 | Stop reason: HOSPADM

## 2019-07-24 RX ORDER — SODIUM CHLORIDE 0.9 % (FLUSH) 0.9 %
10 SYRINGE (ML) INJECTION EVERY 12 HOURS SCHEDULED
Status: CANCELLED | OUTPATIENT
Start: 2019-08-09

## 2019-07-24 RX ORDER — SODIUM CHLORIDE, SODIUM LACTATE, POTASSIUM CHLORIDE, CALCIUM CHLORIDE 600; 310; 30; 20 MG/100ML; MG/100ML; MG/100ML; MG/100ML
INJECTION, SOLUTION INTRAVENOUS CONTINUOUS
Status: CANCELLED | OUTPATIENT
Start: 2019-08-09

## 2019-07-24 RX ORDER — LIDOCAINE HYDROCHLORIDE 10 MG/ML
1 INJECTION, SOLUTION EPIDURAL; INFILTRATION; INTRACAUDAL; PERINEURAL
Status: CANCELLED | OUTPATIENT
Start: 2019-08-09 | End: 2019-08-09

## 2019-07-26 LAB — URINE CULTURE, ROUTINE: NORMAL

## 2019-08-09 ENCOUNTER — TELEPHONE (OUTPATIENT)
Dept: FAMILY MEDICINE CLINIC | Age: 64
End: 2019-08-09

## 2019-08-09 ENCOUNTER — ANESTHESIA (OUTPATIENT)
Dept: OPERATING ROOM | Age: 64
DRG: 470 | End: 2019-08-09
Payer: MEDICARE

## 2019-08-09 ENCOUNTER — ANESTHESIA EVENT (OUTPATIENT)
Dept: OPERATING ROOM | Age: 64
DRG: 470 | End: 2019-08-09
Payer: MEDICARE

## 2019-08-09 ENCOUNTER — HOSPITAL ENCOUNTER (INPATIENT)
Age: 64
LOS: 3 days | Discharge: NURSING, REHAB OR LONG-TERM CARE | DRG: 470 | End: 2019-08-12
Attending: ORTHOPAEDIC SURGERY | Admitting: ORTHOPAEDIC SURGERY
Payer: MEDICARE

## 2019-08-09 ENCOUNTER — APPOINTMENT (OUTPATIENT)
Dept: GENERAL RADIOLOGY | Age: 64
DRG: 470 | End: 2019-08-09
Attending: ORTHOPAEDIC SURGERY
Payer: MEDICARE

## 2019-08-09 VITALS — SYSTOLIC BLOOD PRESSURE: 121 MMHG | OXYGEN SATURATION: 99 % | DIASTOLIC BLOOD PRESSURE: 62 MMHG

## 2019-08-09 DIAGNOSIS — N52.9 ERECTILE DYSFUNCTION, UNSPECIFIED ERECTILE DYSFUNCTION TYPE: Primary | Chronic | ICD-10-CM

## 2019-08-09 PROBLEM — Z96.652 STATUS POST TOTAL KNEE REPLACEMENT, LEFT: Status: ACTIVE | Noted: 2019-08-09

## 2019-08-09 LAB
ABO/RH: NORMAL
AMPHETAMINE SCREEN, URINE: NORMAL
ANTIBODY SCREEN: NORMAL
BARBITURATE SCREEN URINE: NORMAL
BENZODIAZEPINE SCREEN, URINE: NORMAL
CANNABINOID SCREEN URINE: NORMAL
COCAINE METABOLITE SCREEN URINE: NORMAL
Lab: NORMAL
OPIATE SCREEN URINE: NORMAL
PHENCYCLIDINE SCREEN URINE: NORMAL
TRICYCLIC, URINE: NORMAL

## 2019-08-09 PROCEDURE — 97162 PT EVAL MOD COMPLEX 30 MIN: CPT

## 2019-08-09 PROCEDURE — 86850 RBC ANTIBODY SCREEN: CPT

## 2019-08-09 PROCEDURE — 2500000003 HC RX 250 WO HCPCS: Performed by: NURSE ANESTHETIST, CERTIFIED REGISTERED

## 2019-08-09 PROCEDURE — 7100000001 HC PACU RECOVERY - ADDTL 15 MIN: Performed by: ORTHOPAEDIC SURGERY

## 2019-08-09 PROCEDURE — 6370000000 HC RX 637 (ALT 250 FOR IP): Performed by: ORTHOPAEDIC SURGERY

## 2019-08-09 PROCEDURE — 3600000004 HC SURGERY LEVEL 4 BASE: Performed by: ORTHOPAEDIC SURGERY

## 2019-08-09 PROCEDURE — 6370000000 HC RX 637 (ALT 250 FOR IP): Performed by: INTERNAL MEDICINE

## 2019-08-09 PROCEDURE — 86900 BLOOD TYPING SEROLOGIC ABO: CPT

## 2019-08-09 PROCEDURE — 2709999900 HC NON-CHARGEABLE SUPPLY: Performed by: ORTHOPAEDIC SURGERY

## 2019-08-09 PROCEDURE — 2580000003 HC RX 258: Performed by: ORTHOPAEDIC SURGERY

## 2019-08-09 PROCEDURE — 73560 X-RAY EXAM OF KNEE 1 OR 2: CPT

## 2019-08-09 PROCEDURE — C1776 JOINT DEVICE (IMPLANTABLE): HCPCS | Performed by: ORTHOPAEDIC SURGERY

## 2019-08-09 PROCEDURE — 0SRD0J9 REPLACEMENT OF LEFT KNEE JOINT WITH SYNTHETIC SUBSTITUTE, CEMENTED, OPEN APPROACH: ICD-10-PCS | Performed by: ORTHOPAEDIC SURGERY

## 2019-08-09 PROCEDURE — 97166 OT EVAL MOD COMPLEX 45 MIN: CPT

## 2019-08-09 PROCEDURE — 97530 THERAPEUTIC ACTIVITIES: CPT

## 2019-08-09 PROCEDURE — 6360000002 HC RX W HCPCS: Performed by: ANESTHESIOLOGY

## 2019-08-09 PROCEDURE — 80306 DRUG TEST PRSMV INSTRMNT: CPT

## 2019-08-09 PROCEDURE — 6360000002 HC RX W HCPCS: Performed by: NURSE ANESTHETIST, CERTIFIED REGISTERED

## 2019-08-09 PROCEDURE — 7100000000 HC PACU RECOVERY - FIRST 15 MIN: Performed by: ORTHOPAEDIC SURGERY

## 2019-08-09 PROCEDURE — 94640 AIRWAY INHALATION TREATMENT: CPT

## 2019-08-09 PROCEDURE — 6360000002 HC RX W HCPCS: Performed by: ORTHOPAEDIC SURGERY

## 2019-08-09 PROCEDURE — 2720000010 HC SURG SUPPLY STERILE: Performed by: ORTHOPAEDIC SURGERY

## 2019-08-09 PROCEDURE — 3700000000 HC ANESTHESIA ATTENDED CARE: Performed by: ORTHOPAEDIC SURGERY

## 2019-08-09 PROCEDURE — C1713 ANCHOR/SCREW BN/BN,TIS/BN: HCPCS | Performed by: ORTHOPAEDIC SURGERY

## 2019-08-09 PROCEDURE — 3700000001 HC ADD 15 MINUTES (ANESTHESIA): Performed by: ORTHOPAEDIC SURGERY

## 2019-08-09 PROCEDURE — 3600000014 HC SURGERY LEVEL 4 ADDTL 15MIN: Performed by: ORTHOPAEDIC SURGERY

## 2019-08-09 PROCEDURE — 2580000003 HC RX 258: Performed by: NURSE ANESTHETIST, CERTIFIED REGISTERED

## 2019-08-09 PROCEDURE — 64447 NJX AA&/STRD FEMORAL NRV IMG: CPT | Performed by: ANESTHESIOLOGY

## 2019-08-09 PROCEDURE — 86901 BLOOD TYPING SEROLOGIC RH(D): CPT

## 2019-08-09 PROCEDURE — 6360000002 HC RX W HCPCS

## 2019-08-09 PROCEDURE — 1210000000 HC MED SURG R&B

## 2019-08-09 DEVICE — IMPL KNEE PSN ALL POLY PAT 38MM: Type: IMPLANTABLE DEVICE | Site: PATELLA | Status: FUNCTIONAL

## 2019-08-09 DEVICE — EXTENSION STEM SZ G 5DEG L TIBL KNEE CEM NAT TIB: Type: IMPLANTABLE DEVICE | Site: KNEE | Status: FUNCTIONAL

## 2019-08-09 DEVICE — COMPONENT FEM SZ 10 STD L KNEE CO CHROM CEM POST STBL MID: Type: IMPLANTABLE DEVICE | Site: KNEE | Status: FUNCTIONAL

## 2019-08-09 DEVICE — SYSTEM TOT KNEE: Type: IMPLANTABLE DEVICE | Site: KNEE | Status: FUNCTIONAL

## 2019-08-09 DEVICE — CEMENT BNE 40GM HI VISC RADPQ FOR REV SURG: Type: IMPLANTABLE DEVICE | Site: KNEE | Status: FUNCTIONAL

## 2019-08-09 DEVICE — EXTENSION STEM L30MM DIA14MM KNEE TAPR CEM PERSONA: Type: IMPLANTABLE DEVICE | Site: KNEE | Status: FUNCTIONAL

## 2019-08-09 DEVICE — IMPLANTABLE DEVICE: Type: IMPLANTABLE DEVICE | Site: KNEE | Status: FUNCTIONAL

## 2019-08-09 RX ORDER — SODIUM CHLORIDE 0.9 % (FLUSH) 0.9 %
10 SYRINGE (ML) INJECTION EVERY 12 HOURS SCHEDULED
Status: DISCONTINUED | OUTPATIENT
Start: 2019-08-09 | End: 2019-08-09 | Stop reason: HOSPADM

## 2019-08-09 RX ORDER — TRANEXAMIC ACID 650 1/1
1950 TABLET ORAL ONCE
Status: COMPLETED | OUTPATIENT
Start: 2019-08-09 | End: 2019-08-09

## 2019-08-09 RX ORDER — MIDAZOLAM HYDROCHLORIDE 1 MG/ML
INJECTION INTRAMUSCULAR; INTRAVENOUS PRN
Status: DISCONTINUED | OUTPATIENT
Start: 2019-08-09 | End: 2019-08-09 | Stop reason: SDUPTHER

## 2019-08-09 RX ORDER — TRAZODONE HYDROCHLORIDE 50 MG/1
50 TABLET ORAL NIGHTLY
Status: DISCONTINUED | OUTPATIENT
Start: 2019-08-09 | End: 2019-08-12 | Stop reason: HOSPADM

## 2019-08-09 RX ORDER — TRANEXAMIC ACID 650 1/1
1950 TABLET ORAL EVERY 8 HOURS
Status: DISCONTINUED | OUTPATIENT
Start: 2019-08-09 | End: 2019-08-09

## 2019-08-09 RX ORDER — ONDANSETRON 2 MG/ML
4 INJECTION INTRAMUSCULAR; INTRAVENOUS EVERY 6 HOURS PRN
Status: DISCONTINUED | OUTPATIENT
Start: 2019-08-09 | End: 2019-08-12 | Stop reason: HOSPADM

## 2019-08-09 RX ORDER — LIDOCAINE HYDROCHLORIDE 20 MG/ML
INJECTION, SOLUTION INFILTRATION; PERINEURAL PRN
Status: DISCONTINUED | OUTPATIENT
Start: 2019-08-09 | End: 2019-08-09 | Stop reason: SDUPTHER

## 2019-08-09 RX ORDER — SENNA AND DOCUSATE SODIUM 50; 8.6 MG/1; MG/1
1 TABLET, FILM COATED ORAL 2 TIMES DAILY
Status: DISCONTINUED | OUTPATIENT
Start: 2019-08-09 | End: 2019-08-10 | Stop reason: SDUPTHER

## 2019-08-09 RX ORDER — ROPIVACAINE HYDROCHLORIDE 5 MG/ML
INJECTION, SOLUTION EPIDURAL; INFILTRATION; PERINEURAL
Status: COMPLETED
Start: 2019-08-09 | End: 2019-08-09

## 2019-08-09 RX ORDER — MEPERIDINE HYDROCHLORIDE 50 MG/ML
12.5 INJECTION INTRAMUSCULAR; INTRAVENOUS; SUBCUTANEOUS EVERY 5 MIN PRN
Status: DISCONTINUED | OUTPATIENT
Start: 2019-08-09 | End: 2019-08-09

## 2019-08-09 RX ORDER — DIPHENHYDRAMINE HYDROCHLORIDE 50 MG/ML
12.5 INJECTION INTRAMUSCULAR; INTRAVENOUS
Status: DISCONTINUED | OUTPATIENT
Start: 2019-08-09 | End: 2019-08-09

## 2019-08-09 RX ORDER — SODIUM CHLORIDE 9 MG/ML
INJECTION, SOLUTION INTRAVENOUS CONTINUOUS
Status: DISCONTINUED | OUTPATIENT
Start: 2019-08-09 | End: 2019-08-10

## 2019-08-09 RX ORDER — HYDROCODONE BITARTRATE AND ACETAMINOPHEN 5; 325 MG/1; MG/1
2 TABLET ORAL PRN
Status: DISCONTINUED | OUTPATIENT
Start: 2019-08-09 | End: 2019-08-09

## 2019-08-09 RX ORDER — DEXAMETHASONE SODIUM PHOSPHATE 10 MG/ML
INJECTION INTRAMUSCULAR; INTRAVENOUS PRN
Status: DISCONTINUED | OUTPATIENT
Start: 2019-08-09 | End: 2019-08-09 | Stop reason: SDUPTHER

## 2019-08-09 RX ORDER — BUPIVACAINE HYDROCHLORIDE 7.5 MG/ML
INJECTION, SOLUTION INTRASPINAL PRN
Status: DISCONTINUED | OUTPATIENT
Start: 2019-08-09 | End: 2019-08-09 | Stop reason: SDUPTHER

## 2019-08-09 RX ORDER — MORPHINE SULFATE 2 MG/ML
2 INJECTION, SOLUTION INTRAMUSCULAR; INTRAVENOUS
Status: DISCONTINUED | OUTPATIENT
Start: 2019-08-09 | End: 2019-08-12 | Stop reason: HOSPADM

## 2019-08-09 RX ORDER — MIDAZOLAM HYDROCHLORIDE 2 MG/ML
SYRUP ORAL
Status: DISPENSED
Start: 2019-08-09 | End: 2019-08-09

## 2019-08-09 RX ORDER — IPRATROPIUM BROMIDE AND ALBUTEROL SULFATE 2.5; .5 MG/3ML; MG/3ML
1 SOLUTION RESPIRATORY (INHALATION) 3 TIMES DAILY
Status: DISCONTINUED | OUTPATIENT
Start: 2019-08-09 | End: 2019-08-09

## 2019-08-09 RX ORDER — SODIUM CHLORIDE 0.9 % (FLUSH) 0.9 %
10 SYRINGE (ML) INJECTION PRN
Status: DISCONTINUED | OUTPATIENT
Start: 2019-08-09 | End: 2019-08-09 | Stop reason: HOSPADM

## 2019-08-09 RX ORDER — MONTELUKAST SODIUM 10 MG/1
10 TABLET ORAL NIGHTLY
Status: DISCONTINUED | OUTPATIENT
Start: 2019-08-09 | End: 2019-08-12 | Stop reason: HOSPADM

## 2019-08-09 RX ORDER — SODIUM CHLORIDE, SODIUM LACTATE, POTASSIUM CHLORIDE, CALCIUM CHLORIDE 600; 310; 30; 20 MG/100ML; MG/100ML; MG/100ML; MG/100ML
INJECTION, SOLUTION INTRAVENOUS CONTINUOUS PRN
Status: DISCONTINUED | OUTPATIENT
Start: 2019-08-09 | End: 2019-08-09 | Stop reason: SDUPTHER

## 2019-08-09 RX ORDER — TRANEXAMIC ACID 650 1/1
1950 TABLET ORAL ONCE
Status: COMPLETED | OUTPATIENT
Start: 2019-08-10 | End: 2019-08-10

## 2019-08-09 RX ORDER — ALBUTEROL SULFATE 2.5 MG/3ML
SOLUTION RESPIRATORY (INHALATION)
Status: COMPLETED
Start: 2019-08-09 | End: 2019-08-09

## 2019-08-09 RX ORDER — LIDOCAINE HYDROCHLORIDE 10 MG/ML
INJECTION, SOLUTION EPIDURAL; INFILTRATION; INTRACAUDAL; PERINEURAL PRN
Status: DISCONTINUED | OUTPATIENT
Start: 2019-08-09 | End: 2019-08-09 | Stop reason: SDUPTHER

## 2019-08-09 RX ORDER — FAMOTIDINE 20 MG/1
40 TABLET, FILM COATED ORAL DAILY
Status: DISCONTINUED | OUTPATIENT
Start: 2019-08-09 | End: 2019-08-12 | Stop reason: HOSPADM

## 2019-08-09 RX ORDER — FENTANYL CITRATE 50 UG/ML
50 INJECTION, SOLUTION INTRAMUSCULAR; INTRAVENOUS EVERY 10 MIN PRN
Status: DISCONTINUED | OUTPATIENT
Start: 2019-08-09 | End: 2019-08-09

## 2019-08-09 RX ORDER — SODIUM CHLORIDE 0.9 % (FLUSH) 0.9 %
10 SYRINGE (ML) INJECTION EVERY 12 HOURS SCHEDULED
Status: DISCONTINUED | OUTPATIENT
Start: 2019-08-09 | End: 2019-08-12 | Stop reason: HOSPADM

## 2019-08-09 RX ORDER — SODIUM CHLORIDE 0.9 % (FLUSH) 0.9 %
10 SYRINGE (ML) INJECTION PRN
Status: DISCONTINUED | OUTPATIENT
Start: 2019-08-09 | End: 2019-08-12 | Stop reason: HOSPADM

## 2019-08-09 RX ORDER — AMLODIPINE BESYLATE 10 MG/1
10 TABLET ORAL DAILY
Status: DISCONTINUED | OUTPATIENT
Start: 2019-08-09 | End: 2019-08-12 | Stop reason: HOSPADM

## 2019-08-09 RX ORDER — ALBUTEROL SULFATE 2.5 MG/3ML
2.5 SOLUTION RESPIRATORY (INHALATION) ONCE
Status: COMPLETED | OUTPATIENT
Start: 2019-08-09 | End: 2019-08-09

## 2019-08-09 RX ORDER — OXYCODONE HYDROCHLORIDE 5 MG/1
5 TABLET ORAL EVERY 4 HOURS PRN
Status: DISCONTINUED | OUTPATIENT
Start: 2019-08-09 | End: 2019-08-12 | Stop reason: HOSPADM

## 2019-08-09 RX ORDER — ONDANSETRON 2 MG/ML
4 INJECTION INTRAMUSCULAR; INTRAVENOUS
Status: DISCONTINUED | OUTPATIENT
Start: 2019-08-09 | End: 2019-08-09

## 2019-08-09 RX ORDER — LIDOCAINE HYDROCHLORIDE 10 MG/ML
1 INJECTION, SOLUTION EPIDURAL; INFILTRATION; INTRACAUDAL; PERINEURAL
Status: DISCONTINUED | OUTPATIENT
Start: 2019-08-09 | End: 2019-08-09 | Stop reason: HOSPADM

## 2019-08-09 RX ORDER — METOCLOPRAMIDE HYDROCHLORIDE 5 MG/ML
10 INJECTION INTRAMUSCULAR; INTRAVENOUS
Status: DISCONTINUED | OUTPATIENT
Start: 2019-08-09 | End: 2019-08-09

## 2019-08-09 RX ORDER — HYDROMORPHONE HCL 110MG/55ML
0.5 PATIENT CONTROLLED ANALGESIA SYRINGE INTRAVENOUS EVERY 10 MIN PRN
Status: DISCONTINUED | OUTPATIENT
Start: 2019-08-09 | End: 2019-08-09

## 2019-08-09 RX ORDER — MORPHINE SULFATE 4 MG/ML
4 INJECTION, SOLUTION INTRAMUSCULAR; INTRAVENOUS
Status: DISCONTINUED | OUTPATIENT
Start: 2019-08-09 | End: 2019-08-12 | Stop reason: HOSPADM

## 2019-08-09 RX ORDER — HYDROCODONE BITARTRATE AND ACETAMINOPHEN 5; 325 MG/1; MG/1
1 TABLET ORAL PRN
Status: DISCONTINUED | OUTPATIENT
Start: 2019-08-09 | End: 2019-08-09

## 2019-08-09 RX ORDER — ROPIVACAINE HYDROCHLORIDE 5 MG/ML
INJECTION, SOLUTION EPIDURAL; INFILTRATION; PERINEURAL PRN
Status: DISCONTINUED | OUTPATIENT
Start: 2019-08-09 | End: 2019-08-09 | Stop reason: SDUPTHER

## 2019-08-09 RX ORDER — TRANEXAMIC ACID 650 1/1
1950 TABLET ORAL ONCE
Status: DISCONTINUED | OUTPATIENT
Start: 2019-08-09 | End: 2019-08-09

## 2019-08-09 RX ORDER — PROPOFOL 10 MG/ML
INJECTION, EMULSION INTRAVENOUS CONTINUOUS PRN
Status: DISCONTINUED | OUTPATIENT
Start: 2019-08-09 | End: 2019-08-09 | Stop reason: SDUPTHER

## 2019-08-09 RX ORDER — IPRATROPIUM BROMIDE AND ALBUTEROL SULFATE 2.5; .5 MG/3ML; MG/3ML
1 SOLUTION RESPIRATORY (INHALATION)
Status: DISCONTINUED | OUTPATIENT
Start: 2019-08-09 | End: 2019-08-09

## 2019-08-09 RX ORDER — OXYCODONE HYDROCHLORIDE 5 MG/1
10 TABLET ORAL EVERY 4 HOURS PRN
Status: DISCONTINUED | OUTPATIENT
Start: 2019-08-09 | End: 2019-08-12 | Stop reason: HOSPADM

## 2019-08-09 RX ORDER — SIMVASTATIN 5 MG
5 TABLET ORAL NIGHTLY
Status: DISCONTINUED | OUTPATIENT
Start: 2019-08-09 | End: 2019-08-12 | Stop reason: HOSPADM

## 2019-08-09 RX ORDER — ROPIVACAINE HYDROCHLORIDE 5 MG/ML
INJECTION, SOLUTION EPIDURAL; INFILTRATION; PERINEURAL
Status: DISCONTINUED | OUTPATIENT
Start: 2019-08-09 | End: 2019-08-09

## 2019-08-09 RX ORDER — MAGNESIUM HYDROXIDE 1200 MG/15ML
LIQUID ORAL CONTINUOUS PRN
Status: COMPLETED | OUTPATIENT
Start: 2019-08-09 | End: 2019-08-09

## 2019-08-09 RX ORDER — IPRATROPIUM BROMIDE AND ALBUTEROL SULFATE 2.5; .5 MG/3ML; MG/3ML
1 SOLUTION RESPIRATORY (INHALATION) 3 TIMES DAILY
Status: DISCONTINUED | OUTPATIENT
Start: 2019-08-09 | End: 2019-08-12 | Stop reason: HOSPADM

## 2019-08-09 RX ORDER — SODIUM CHLORIDE, SODIUM LACTATE, POTASSIUM CHLORIDE, CALCIUM CHLORIDE 600; 310; 30; 20 MG/100ML; MG/100ML; MG/100ML; MG/100ML
INJECTION, SOLUTION INTRAVENOUS CONTINUOUS
Status: DISCONTINUED | OUTPATIENT
Start: 2019-08-09 | End: 2019-08-09

## 2019-08-09 RX ORDER — LIDOCAINE HYDROCHLORIDE 10 MG/ML
INJECTION, SOLUTION INFILTRATION; PERINEURAL
Status: DISPENSED
Start: 2019-08-09 | End: 2019-08-09

## 2019-08-09 RX ORDER — ASPIRIN 81 MG/1
81 TABLET ORAL 2 TIMES DAILY
Status: DISCONTINUED | OUTPATIENT
Start: 2019-08-10 | End: 2019-08-12 | Stop reason: HOSPADM

## 2019-08-09 RX ADMIN — IPRATROPIUM BROMIDE AND ALBUTEROL SULFATE 1 AMPULE: .5; 3 SOLUTION RESPIRATORY (INHALATION) at 18:08

## 2019-08-09 RX ADMIN — OXYCODONE HYDROCHLORIDE 10 MG: 5 TABLET ORAL at 21:27

## 2019-08-09 RX ADMIN — OXYCODONE HYDROCHLORIDE 10 MG: 5 TABLET ORAL at 12:10

## 2019-08-09 RX ADMIN — FAMOTIDINE 40 MG: 20 TABLET, FILM COATED ORAL at 17:12

## 2019-08-09 RX ADMIN — PROPOFOL 50 MCG/KG/MIN: 10 INJECTION, EMULSION INTRAVENOUS at 08:28

## 2019-08-09 RX ADMIN — LIDOCAINE HYDROCHLORIDE 3 ML: 10 INJECTION, SOLUTION EPIDURAL; INFILTRATION; INTRACAUDAL; PERINEURAL at 08:22

## 2019-08-09 RX ADMIN — SODIUM CHLORIDE, POTASSIUM CHLORIDE, SODIUM LACTATE AND CALCIUM CHLORIDE: 600; 310; 30; 20 INJECTION, SOLUTION INTRAVENOUS at 08:15

## 2019-08-09 RX ADMIN — VANCOMYCIN HYDROCHLORIDE 2000 MG: 1 INJECTION, POWDER, LYOPHILIZED, FOR SOLUTION INTRAVENOUS at 21:27

## 2019-08-09 RX ADMIN — SENNOSIDES AND DOCUSATE SODIUM 1 TABLET: 8.6; 5 TABLET ORAL at 21:28

## 2019-08-09 RX ADMIN — SIMVASTATIN 5 MG: 5 TABLET, FILM COATED ORAL at 21:27

## 2019-08-09 RX ADMIN — AMLODIPINE BESYLATE 10 MG: 10 TABLET ORAL at 17:12

## 2019-08-09 RX ADMIN — ROPIVACAINE HYDROCHLORIDE 20 ML: 5 INJECTION, SOLUTION EPIDURAL; INFILTRATION; PERINEURAL at 07:50

## 2019-08-09 RX ADMIN — ALBUTEROL SULFATE 2.5 MG: 2.5 SOLUTION RESPIRATORY (INHALATION) at 10:33

## 2019-08-09 RX ADMIN — MORPHINE SULFATE 4 MG: 4 INJECTION INTRAVENOUS at 15:57

## 2019-08-09 RX ADMIN — BUPIVACAINE HYDROCHLORIDE IN DEXTROSE 2 ML: 7.5 INJECTION, SOLUTION SUBARACHNOID at 08:22

## 2019-08-09 RX ADMIN — VANCOMYCIN HYDROCHLORIDE 1000 MG: 1 INJECTION, POWDER, LYOPHILIZED, FOR SOLUTION INTRAVENOUS at 08:24

## 2019-08-09 RX ADMIN — TRANEXAMIC ACID 1950 MG: 650 TABLET ORAL at 15:45

## 2019-08-09 RX ADMIN — TRAZODONE HYDROCHLORIDE 50 MG: 50 TABLET ORAL at 21:27

## 2019-08-09 RX ADMIN — OXYCODONE HYDROCHLORIDE 10 MG: 5 TABLET ORAL at 17:13

## 2019-08-09 RX ADMIN — MOMETASONE FUROATE AND FORMOTEROL FUMARATE DIHYDRATE 2 PUFF: 100; 5 AEROSOL RESPIRATORY (INHALATION) at 18:08

## 2019-08-09 RX ADMIN — LIDOCAINE HYDROCHLORIDE 50 MG: 20 INJECTION, SOLUTION INFILTRATION; PERINEURAL at 08:28

## 2019-08-09 RX ADMIN — SODIUM CHLORIDE: 9 INJECTION, SOLUTION INTRAVENOUS at 12:10

## 2019-08-09 RX ADMIN — MIDAZOLAM HYDROCHLORIDE 4 MG: 2 INJECTION, SOLUTION INTRAMUSCULAR; INTRAVENOUS at 07:45

## 2019-08-09 RX ADMIN — SODIUM CHLORIDE, POTASSIUM CHLORIDE, SODIUM LACTATE AND CALCIUM CHLORIDE: 600; 310; 30; 20 INJECTION, SOLUTION INTRAVENOUS at 10:02

## 2019-08-09 RX ADMIN — TRANEXAMIC ACID 1950 MG: 650 TABLET ORAL at 07:06

## 2019-08-09 RX ADMIN — DEXAMETHASONE SODIUM PHOSPHATE 10 MG: 10 INJECTION INTRAMUSCULAR; INTRAVENOUS at 07:50

## 2019-08-09 RX ADMIN — SODIUM CHLORIDE, POTASSIUM CHLORIDE, SODIUM LACTATE AND CALCIUM CHLORIDE: 600; 310; 30; 20 INJECTION, SOLUTION INTRAVENOUS at 09:19

## 2019-08-09 RX ADMIN — MONTELUKAST SODIUM 10 MG: 10 TABLET, FILM COATED ORAL at 21:28

## 2019-08-09 RX ADMIN — SENNOSIDES AND DOCUSATE SODIUM 1 TABLET: 8.6; 5 TABLET ORAL at 12:11

## 2019-08-09 ASSESSMENT — PULMONARY FUNCTION TESTS
PIF_VALUE: 1
PIF_VALUE: 1
PIF_VALUE: 0
PIF_VALUE: 1
PIF_VALUE: 0
PIF_VALUE: 0
PIF_VALUE: 1
PIF_VALUE: 0
PIF_VALUE: 1
PIF_VALUE: 0
PIF_VALUE: 1
PIF_VALUE: 0
PIF_VALUE: 1
PIF_VALUE: 0
PIF_VALUE: 1
PIF_VALUE: 1
PIF_VALUE: 0
PIF_VALUE: 1
PIF_VALUE: 0
PIF_VALUE: 1
PIF_VALUE: 0
PIF_VALUE: 1
PIF_VALUE: 0
PIF_VALUE: 1
PIF_VALUE: 0
PIF_VALUE: 1

## 2019-08-09 ASSESSMENT — PAIN DESCRIPTION - DESCRIPTORS
DESCRIPTORS: CONSTANT
DESCRIPTORS: DULL

## 2019-08-09 ASSESSMENT — PAIN DESCRIPTION - PAIN TYPE: TYPE: SURGICAL PAIN

## 2019-08-09 ASSESSMENT — PAIN SCALES - GENERAL
PAINLEVEL_OUTOF10: 0
PAINLEVEL_OUTOF10: 8
PAINLEVEL_OUTOF10: 9
PAINLEVEL_OUTOF10: 8
PAINLEVEL_OUTOF10: 7
PAINLEVEL_OUTOF10: 9
PAINLEVEL_OUTOF10: 0
PAINLEVEL_OUTOF10: 8

## 2019-08-09 ASSESSMENT — PAIN DESCRIPTION - FREQUENCY: FREQUENCY: CONTINUOUS

## 2019-08-09 ASSESSMENT — PAIN DESCRIPTION - ONSET: ONSET: ON-GOING

## 2019-08-09 ASSESSMENT — PAIN DESCRIPTION - ORIENTATION: ORIENTATION: LEFT

## 2019-08-09 ASSESSMENT — PAIN DESCRIPTION - PROGRESSION: CLINICAL_PROGRESSION: NOT CHANGED

## 2019-08-09 ASSESSMENT — PAIN - FUNCTIONAL ASSESSMENT
PAIN_FUNCTIONAL_ASSESSMENT: 0-10
PAIN_FUNCTIONAL_ASSESSMENT: PREVENTS OR INTERFERES SOME ACTIVE ACTIVITIES AND ADLS

## 2019-08-09 ASSESSMENT — PAIN DESCRIPTION - LOCATION: LOCATION: KNEE

## 2019-08-10 LAB
ANION GAP SERPL CALCULATED.3IONS-SCNC: 11 MEQ/L (ref 9–15)
BUN BLDV-MCNC: 16 MG/DL (ref 8–23)
CALCIUM SERPL-MCNC: 9 MG/DL (ref 8.5–9.9)
CHLORIDE BLD-SCNC: 103 MEQ/L (ref 95–107)
CO2: 24 MEQ/L (ref 20–31)
CREAT SERPL-MCNC: 0.89 MG/DL (ref 0.7–1.2)
GFR AFRICAN AMERICAN: >60
GFR NON-AFRICAN AMERICAN: >60
GLUCOSE BLD-MCNC: 148 MG/DL (ref 60–115)
GLUCOSE BLD-MCNC: 187 MG/DL (ref 70–99)
GLUCOSE BLD-MCNC: 201 MG/DL (ref 60–115)
HBA1C MFR BLD: 5.9 % (ref 4.8–5.9)
HCT VFR BLD CALC: 33.9 % (ref 42–52)
HEMOGLOBIN: 11 G/DL (ref 14–18)
MCH RBC QN AUTO: 30.7 PG (ref 27–31.3)
MCHC RBC AUTO-ENTMCNC: 32.4 % (ref 33–37)
MCV RBC AUTO: 94.6 FL (ref 80–100)
PDW BLD-RTO: 16.2 % (ref 11.5–14.5)
PERFORMED ON: ABNORMAL
PERFORMED ON: ABNORMAL
PLATELET # BLD: 279 K/UL (ref 130–400)
POTASSIUM REFLEX MAGNESIUM: 4.3 MEQ/L (ref 3.4–4.9)
RBC # BLD: 3.58 M/UL (ref 4.7–6.1)
SODIUM BLD-SCNC: 138 MEQ/L (ref 135–144)
WBC # BLD: 18.1 K/UL (ref 4.8–10.8)

## 2019-08-10 PROCEDURE — 6370000000 HC RX 637 (ALT 250 FOR IP): Performed by: ORTHOPAEDIC SURGERY

## 2019-08-10 PROCEDURE — 6370000000 HC RX 637 (ALT 250 FOR IP): Performed by: INTERNAL MEDICINE

## 2019-08-10 PROCEDURE — 80048 BASIC METABOLIC PNL TOTAL CA: CPT

## 2019-08-10 PROCEDURE — 94640 AIRWAY INHALATION TREATMENT: CPT

## 2019-08-10 PROCEDURE — 85027 COMPLETE CBC AUTOMATED: CPT

## 2019-08-10 PROCEDURE — 6360000002 HC RX W HCPCS: Performed by: ORTHOPAEDIC SURGERY

## 2019-08-10 PROCEDURE — 97116 GAIT TRAINING THERAPY: CPT

## 2019-08-10 PROCEDURE — 1210000000 HC MED SURG R&B

## 2019-08-10 PROCEDURE — 2580000003 HC RX 258: Performed by: ORTHOPAEDIC SURGERY

## 2019-08-10 PROCEDURE — 36415 COLL VENOUS BLD VENIPUNCTURE: CPT

## 2019-08-10 PROCEDURE — 97110 THERAPEUTIC EXERCISES: CPT

## 2019-08-10 PROCEDURE — 83036 HEMOGLOBIN GLYCOSYLATED A1C: CPT

## 2019-08-10 RX ORDER — SENNA PLUS 8.6 MG/1
1 TABLET ORAL NIGHTLY
Status: DISCONTINUED | OUTPATIENT
Start: 2019-08-10 | End: 2019-08-12 | Stop reason: HOSPADM

## 2019-08-10 RX ORDER — HYDROCHLOROTHIAZIDE 25 MG/1
25 TABLET ORAL DAILY
Status: DISCONTINUED | OUTPATIENT
Start: 2019-08-10 | End: 2019-08-12 | Stop reason: HOSPADM

## 2019-08-10 RX ORDER — NICOTINE POLACRILEX 4 MG
15 LOZENGE BUCCAL PRN
Status: DISCONTINUED | OUTPATIENT
Start: 2019-08-10 | End: 2019-08-12 | Stop reason: HOSPADM

## 2019-08-10 RX ORDER — NICOTINE 21 MG/24HR
1 PATCH, TRANSDERMAL 24 HOURS TRANSDERMAL DAILY
Status: DISCONTINUED | OUTPATIENT
Start: 2019-08-10 | End: 2019-08-12 | Stop reason: HOSPADM

## 2019-08-10 RX ORDER — POLYETHYLENE GLYCOL 3350 17 G/17G
17 POWDER, FOR SOLUTION ORAL DAILY
Status: DISCONTINUED | OUTPATIENT
Start: 2019-08-10 | End: 2019-08-12 | Stop reason: HOSPADM

## 2019-08-10 RX ORDER — DEXTROSE MONOHYDRATE 25 G/50ML
12.5 INJECTION, SOLUTION INTRAVENOUS PRN
Status: DISCONTINUED | OUTPATIENT
Start: 2019-08-10 | End: 2019-08-12 | Stop reason: HOSPADM

## 2019-08-10 RX ORDER — DEXTROSE MONOHYDRATE 50 MG/ML
100 INJECTION, SOLUTION INTRAVENOUS PRN
Status: DISCONTINUED | OUTPATIENT
Start: 2019-08-10 | End: 2019-08-12 | Stop reason: HOSPADM

## 2019-08-10 RX ORDER — FLUTICASONE PROPIONATE 50 MCG
1 SPRAY, SUSPENSION (ML) NASAL DAILY
Status: DISCONTINUED | OUTPATIENT
Start: 2019-08-10 | End: 2019-08-12 | Stop reason: HOSPADM

## 2019-08-10 RX ORDER — PREDNISONE 20 MG/1
20 TABLET ORAL 2 TIMES DAILY
Status: DISCONTINUED | OUTPATIENT
Start: 2019-08-10 | End: 2019-08-12 | Stop reason: HOSPADM

## 2019-08-10 RX ADMIN — OXYCODONE HYDROCHLORIDE 10 MG: 5 TABLET ORAL at 17:28

## 2019-08-10 RX ADMIN — TRAZODONE HYDROCHLORIDE 50 MG: 50 TABLET ORAL at 20:53

## 2019-08-10 RX ADMIN — AMLODIPINE BESYLATE 10 MG: 10 TABLET ORAL at 08:30

## 2019-08-10 RX ADMIN — MAGNESIUM HYDROXIDE 30 ML: 400 SUSPENSION ORAL at 13:04

## 2019-08-10 RX ADMIN — PREDNISONE 20 MG: 20 TABLET ORAL at 20:53

## 2019-08-10 RX ADMIN — IPRATROPIUM BROMIDE AND ALBUTEROL SULFATE 1 AMPULE: .5; 3 SOLUTION RESPIRATORY (INHALATION) at 06:08

## 2019-08-10 RX ADMIN — FAMOTIDINE 40 MG: 20 TABLET, FILM COATED ORAL at 08:30

## 2019-08-10 RX ADMIN — ASPIRIN 81 MG: 81 TABLET ORAL at 08:30

## 2019-08-10 RX ADMIN — PREDNISONE 20 MG: 20 TABLET ORAL at 13:05

## 2019-08-10 RX ADMIN — MORPHINE SULFATE 4 MG: 4 INJECTION INTRAVENOUS at 05:03

## 2019-08-10 RX ADMIN — SENNOSIDES 8.6 MG: 8.6 TABLET, FILM COATED ORAL at 20:53

## 2019-08-10 RX ADMIN — TRANEXAMIC ACID 1950 MG: 650 TABLET ORAL at 05:04

## 2019-08-10 RX ADMIN — HYDROCHLOROTHIAZIDE 25 MG: 25 TABLET ORAL at 13:04

## 2019-08-10 RX ADMIN — MORPHINE SULFATE 4 MG: 4 INJECTION INTRAVENOUS at 19:43

## 2019-08-10 RX ADMIN — MORPHINE SULFATE 4 MG: 4 INJECTION INTRAVENOUS at 13:16

## 2019-08-10 RX ADMIN — IPRATROPIUM BROMIDE AND ALBUTEROL SULFATE 1 AMPULE: .5; 3 SOLUTION RESPIRATORY (INHALATION) at 18:00

## 2019-08-10 RX ADMIN — MONTELUKAST SODIUM 10 MG: 10 TABLET, FILM COATED ORAL at 20:53

## 2019-08-10 RX ADMIN — Medication 10 ML: at 20:55

## 2019-08-10 RX ADMIN — MORPHINE SULFATE 4 MG: 4 INJECTION INTRAVENOUS at 00:12

## 2019-08-10 RX ADMIN — OXYCODONE HYDROCHLORIDE 10 MG: 5 TABLET ORAL at 08:30

## 2019-08-10 RX ADMIN — MOMETASONE FUROATE AND FORMOTEROL FUMARATE DIHYDRATE 2 PUFF: 100; 5 AEROSOL RESPIRATORY (INHALATION) at 18:00

## 2019-08-10 RX ADMIN — ASPIRIN 81 MG: 81 TABLET ORAL at 20:53

## 2019-08-10 RX ADMIN — IPRATROPIUM BROMIDE AND ALBUTEROL SULFATE 1 AMPULE: .5; 3 SOLUTION RESPIRATORY (INHALATION) at 11:51

## 2019-08-10 RX ADMIN — OXYCODONE HYDROCHLORIDE 10 MG: 5 TABLET ORAL at 23:41

## 2019-08-10 RX ADMIN — MOMETASONE FUROATE AND FORMOTEROL FUMARATE DIHYDRATE 2 PUFF: 100; 5 AEROSOL RESPIRATORY (INHALATION) at 06:08

## 2019-08-10 RX ADMIN — SIMVASTATIN 5 MG: 5 TABLET, FILM COATED ORAL at 20:53

## 2019-08-10 RX ADMIN — SENNOSIDES AND DOCUSATE SODIUM 1 TABLET: 8.6; 5 TABLET ORAL at 08:30

## 2019-08-10 ASSESSMENT — PAIN DESCRIPTION - ONSET
ONSET: ON-GOING
ONSET: ON-GOING

## 2019-08-10 ASSESSMENT — PAIN DESCRIPTION - FREQUENCY
FREQUENCY: CONTINUOUS

## 2019-08-10 ASSESSMENT — PAIN DESCRIPTION - PROGRESSION
CLINICAL_PROGRESSION: NOT CHANGED

## 2019-08-10 ASSESSMENT — PAIN DESCRIPTION - DESCRIPTORS
DESCRIPTORS: ACHING;CONSTANT
DESCRIPTORS: DULL
DESCRIPTORS: DULL
DESCRIPTORS: CONSTANT
DESCRIPTORS: CONSTANT;ACHING

## 2019-08-10 ASSESSMENT — PAIN DESCRIPTION - ORIENTATION
ORIENTATION: LEFT

## 2019-08-10 ASSESSMENT — PAIN SCALES - GENERAL
PAINLEVEL_OUTOF10: 10
PAINLEVEL_OUTOF10: 8
PAINLEVEL_OUTOF10: 9
PAINLEVEL_OUTOF10: 8
PAINLEVEL_OUTOF10: 9
PAINLEVEL_OUTOF10: 8
PAINLEVEL_OUTOF10: 10
PAINLEVEL_OUTOF10: 10
PAINLEVEL_OUTOF10: 8
PAINLEVEL_OUTOF10: 9
PAINLEVEL_OUTOF10: 10
PAINLEVEL_OUTOF10: 9

## 2019-08-10 ASSESSMENT — PAIN - FUNCTIONAL ASSESSMENT
PAIN_FUNCTIONAL_ASSESSMENT: PREVENTS OR INTERFERES SOME ACTIVE ACTIVITIES AND ADLS

## 2019-08-10 ASSESSMENT — PAIN DESCRIPTION - PAIN TYPE
TYPE: SURGICAL PAIN

## 2019-08-10 ASSESSMENT — PAIN DESCRIPTION - LOCATION
LOCATION: KNEE

## 2019-08-10 NOTE — PROGRESS NOTES
Physical Therapy  Facility/Department: [facility] MED SURG UNIT  Daily Treatment Note  NAME: Theo Nation  : 1955  MRN: [id number]    Date of Service: 8/10/2019    Discharge Recommendations:  Continue to assess pending progress, Subacute/Skilled Nursing Facility        Assessment   Body structures, Functions, Activity limitations: Decreased functional mobility ; Decreased ROM; Decreased strength;Decreased safe awareness;Decreased endurance  Assessment: Pt ambulated further distance  for 15' 'x 2 trials with  follow for safety. Pt transfered [facility] and [facility] mobility with supervision. Pt verbilizes 8-10/10 L knee pain this visit and able to complete seated ther ex. Pt left in seated reclined position with ice wrap around knee pack plugged to machine. Treatment Diagnosis: S/P L TKA difficulty walking, weakness  Prognosis: Good  REQUIRES PT FOLLOW UP: Yes     Patient Diagnosis(es): There were no encounter diagnoses. has a past medical history of Alcohol abuse, Anemia, Asthma, Cocaine abuse ([address] COPD (chronic obstructive pulmonary disease) ([address] Disorder of pharynx, Drug-seeking behavior, Edema, Erectile dysfunction, Gastroesophageal reflux disease, Gout, History of arthroscopy of both knees, House dust mite allergy, Hyperlipidemia, Hypertension, Injury to heart, Insomnia, Medical non-compliance, Morbid obesity due to excess calories ([address] Osteoarthritis of both knees, Personal history of tobacco use, Pneumonia, Pre-diabetes, Seasonal allergies, Severe persistent asthma, Severe sleep apnea, Supraventricular tachycardia ([address] and SVT (supraventricular tachycardia) ([address] has a past surgical history that includes Anterior cruciate ligament repair and Cardiac catheterization.     Restrictions  Restrictions/Precautions  Restrictions/Precautions: Weight Bearing  Required Braces or Orthoses?: Yes  Implants present? : Metal implants  Lower Extremity Weight Bearing Restrictions  Left Lower Extremity Weight Bearing: Weight Bearing As Tolerated  Required Braces or Orthoses  Left Lower Extremity Brace: Knee Immobilizer  Subjective   General  Chart Reviewed: Yes  Pain Screening  Patient Currently in Pain: Yes  Pain Assessment  Pain Assessment: 0-10  Pain Level: 10  Pain Type: Surgical pain  Pain Location: Knee  Pain Orientation: Left  Pain Descriptors: Constant  Pain Frequency: Continuous  Clinical Progression: Not changed  Functional Pain Assessment: Prevents or interferes some active activities and ADLs  Response to Pain Intervention: Asleep with RR greater than 10  Vital Signs  Patient Currently in Pain: Yes       Orientation     Cognition      Objective      Transfers  Sit to Stand: Contact guard assistance  Stand to sit: Contact guard assistance  Bed to Chair: Contact guard assistance  Ambulation  Ambulation?: Yes  Ambulation 1  Surface: level tile  Device: Rolling Walker  Other Apparatus: Knee Immobilizer; Wheelchair follow  Assistance: Contact guard assistance  Quality of Gait: slow cadance, step to gait pattern  Gait Deviations: Decreased step length  Distance: 15' x 2  Comments: pt leads with LLE, VC's required for upright posture.   Stairs/Curb  Stairs?: No        Exercises  Quad Sets: 2x10 w 5 sec H  Heelslides: x10 LLE  Gluteal Sets: 20 x w/ 5 sec H  Ankle Pumps: x20   Other exercises  Other exercises?: Yes  Other exercises 1: pillow squeeze x20 w/ 5 sec H   AROM LLE (degrees)  LLE General AROM: lacking 13 degrees l knee extension in reclined seated and 69 degrees l knee flexion in seated                    G-Code     OutComes Score                                                     AM-PAC Score             Goals  Short term goals  Time Frame for Short term goals: 3days  Short term goal 1: Transfers with CGA  Short term goal 2: Ambulate 50 feet with ww CGA  Short term goal 3: AROM 10-50 deg knee flex or greater  Long term goals  Long term goal 1: Determine D/C plan- probable [facility]  Patient Goals   Patient goals : Go home    Plan    Plan  Times per week: 5-7  Times per day: (1-2)  Plan weeks: Recommend [facility] stay 1-2 weeks  Current Treatment Recommendations: Strengthening, ROM, Transfer Training, Functional Mobility Training, Stair training, Gait Training, Neuromuscular Re-education, Home Exercise Program, Safety Education & Training, Patient/Caregiver Education & Training, Manual Therapy - Soft Tissue Mobilization, Modalities, Positioning  Safety Devices  Type of devices: Chair alarm in place, Call light within reach, Patient at risk for falls, Gait belt, Left in chair, Nurse notified     Therapy Time   Individual Concurrent Group Co-treatment   Time In  1040         Time Out  1125         Minutes  [address], [place]

## 2019-08-10 NOTE — CONSULTS
nasal spray 2 sprays by Nasal route daily 3 Bottle 1    Handicap Placard MISC by Does not apply route DX: OSTEOARTHRITIS OF BOTH KNEES (M17.0), COPD (J44.9)     EXPIRES: 02/2024 1 each 0    melatonin 1 MG tablet Take 1 tablet by mouth nightly as needed for Sleep 90 tablet 1       Allergies:  Fish-derived products; Iodine; and Pcn [penicillins]    Social History:    reports that he has been smoking cigarettes and cigars. He started smoking about 31 years ago. He has a 15.00 pack-year smoking history. He has never used smokeless tobacco. He reports that he drank alcohol. He reports that he has current or past drug history. Drugs: Cocaine and Marijuana. Occupation:     Family History:       Problem Relation Age of Onset    Arthritis Mother     Asthma Mother     High Cholesterol Mother     Other Mother         aneurysm    Diabetes Father     Stroke Maternal Grandmother     Cancer Maternal Grandfather     Hypertension Other     COPD Neg Hx        Review of systems:  Constitutional: no fever, no night sweats, no fatigue  Head: no headache, no head injury, no migranes. Eye: no blurring of vision, no double vision.   Ears: no hearing difficulty, no tinnitus  Mouth/throat: no ulceration, dental caries, dysphagia  Lungs:   wheeze  CVS: no palpitation, no chest pain, no shortness of breath  GI: no abdominal pain, no nausea , no vomiting, no constipation  KEIKO: no dysuria, frequency and urgency, no hematuria, no kidney stones  Musculoskeletal: no joint pain, swelling , stiffness  Endocrine: no polyuria, polydypsia, no cold or heat intolerence  Hematology: no anemia, no easy brusing or bleeding, no hx of clotting disorder  Dermatology: no skin rash, no eczema, no prurities,  Psychiatry: no depression, no anxiety,no panic attacks, no suicide ideation  Neurology: no syncope, no seizures, no numbness or tingling of hands, no numbness or tingling of feet, no paresis               PHYSICAL EXAM:  BP (!) 150/76   Pulse

## 2019-08-11 ENCOUNTER — APPOINTMENT (OUTPATIENT)
Dept: ULTRASOUND IMAGING | Age: 64
DRG: 470 | End: 2019-08-11
Attending: ORTHOPAEDIC SURGERY
Payer: MEDICARE

## 2019-08-11 LAB
GLUCOSE BLD-MCNC: 128 MG/DL (ref 60–115)
GLUCOSE BLD-MCNC: 143 MG/DL (ref 60–115)
GLUCOSE BLD-MCNC: 159 MG/DL (ref 60–115)
GLUCOSE BLD-MCNC: 183 MG/DL (ref 60–115)
GLUCOSE BLD-MCNC: 202 MG/DL (ref 60–115)
HCT VFR BLD CALC: 34.5 % (ref 42–52)
HEMOGLOBIN: 11.3 G/DL (ref 14–18)
MCH RBC QN AUTO: 30.9 PG (ref 27–31.3)
MCHC RBC AUTO-ENTMCNC: 32.7 % (ref 33–37)
MCV RBC AUTO: 94.6 FL (ref 80–100)
PDW BLD-RTO: 15.9 % (ref 11.5–14.5)
PERFORMED ON: ABNORMAL
PLATELET # BLD: 281 K/UL (ref 130–400)
RBC # BLD: 3.65 M/UL (ref 4.7–6.1)
WBC # BLD: 18.1 K/UL (ref 4.8–10.8)

## 2019-08-11 PROCEDURE — 2580000003 HC RX 258: Performed by: ORTHOPAEDIC SURGERY

## 2019-08-11 PROCEDURE — 97116 GAIT TRAINING THERAPY: CPT

## 2019-08-11 PROCEDURE — 36415 COLL VENOUS BLD VENIPUNCTURE: CPT

## 2019-08-11 PROCEDURE — 6370000000 HC RX 637 (ALT 250 FOR IP): Performed by: INTERNAL MEDICINE

## 2019-08-11 PROCEDURE — 93970 EXTREMITY STUDY: CPT

## 2019-08-11 PROCEDURE — 6370000000 HC RX 637 (ALT 250 FOR IP): Performed by: ORTHOPAEDIC SURGERY

## 2019-08-11 PROCEDURE — 97110 THERAPEUTIC EXERCISES: CPT

## 2019-08-11 PROCEDURE — 94640 AIRWAY INHALATION TREATMENT: CPT

## 2019-08-11 PROCEDURE — 85027 COMPLETE CBC AUTOMATED: CPT

## 2019-08-11 PROCEDURE — 1210000000 HC MED SURG R&B

## 2019-08-11 PROCEDURE — 97530 THERAPEUTIC ACTIVITIES: CPT

## 2019-08-11 RX ADMIN — ASPIRIN 81 MG: 81 TABLET ORAL at 20:46

## 2019-08-11 RX ADMIN — ASPIRIN 81 MG: 81 TABLET ORAL at 08:46

## 2019-08-11 RX ADMIN — AMLODIPINE BESYLATE 10 MG: 10 TABLET ORAL at 08:47

## 2019-08-11 RX ADMIN — OXYCODONE HYDROCHLORIDE 10 MG: 5 TABLET ORAL at 18:37

## 2019-08-11 RX ADMIN — OXYCODONE HYDROCHLORIDE 10 MG: 5 TABLET ORAL at 13:53

## 2019-08-11 RX ADMIN — MAGNESIUM CITRATE 150 ML: 1.75 LIQUID ORAL at 17:20

## 2019-08-11 RX ADMIN — IPRATROPIUM BROMIDE AND ALBUTEROL SULFATE 1 AMPULE: .5; 3 SOLUTION RESPIRATORY (INHALATION) at 11:53

## 2019-08-11 RX ADMIN — FAMOTIDINE 40 MG: 20 TABLET, FILM COATED ORAL at 08:46

## 2019-08-11 RX ADMIN — SIMVASTATIN 5 MG: 5 TABLET, FILM COATED ORAL at 20:46

## 2019-08-11 RX ADMIN — OXYCODONE HYDROCHLORIDE 10 MG: 5 TABLET ORAL at 09:38

## 2019-08-11 RX ADMIN — METOPROLOL TARTRATE 25 MG: 25 TABLET ORAL at 20:46

## 2019-08-11 RX ADMIN — MOMETASONE FUROATE AND FORMOTEROL FUMARATE DIHYDRATE 2 PUFF: 100; 5 AEROSOL RESPIRATORY (INHALATION) at 06:21

## 2019-08-11 RX ADMIN — PREDNISONE 20 MG: 20 TABLET ORAL at 08:46

## 2019-08-11 RX ADMIN — Medication 10 ML: at 20:58

## 2019-08-11 RX ADMIN — SENNOSIDES 8.6 MG: 8.6 TABLET, FILM COATED ORAL at 20:46

## 2019-08-11 RX ADMIN — PREDNISONE 20 MG: 20 TABLET ORAL at 20:46

## 2019-08-11 RX ADMIN — MOMETASONE FUROATE AND FORMOTEROL FUMARATE DIHYDRATE 2 PUFF: 100; 5 AEROSOL RESPIRATORY (INHALATION) at 18:01

## 2019-08-11 RX ADMIN — OXYCODONE HYDROCHLORIDE 10 MG: 5 TABLET ORAL at 05:18

## 2019-08-11 RX ADMIN — TRAZODONE HYDROCHLORIDE 50 MG: 50 TABLET ORAL at 20:46

## 2019-08-11 RX ADMIN — IPRATROPIUM BROMIDE AND ALBUTEROL SULFATE 1 AMPULE: .5; 3 SOLUTION RESPIRATORY (INHALATION) at 18:01

## 2019-08-11 RX ADMIN — IPRATROPIUM BROMIDE AND ALBUTEROL SULFATE 1 AMPULE: .5; 3 SOLUTION RESPIRATORY (INHALATION) at 06:21

## 2019-08-11 RX ADMIN — HYDROCHLOROTHIAZIDE 25 MG: 25 TABLET ORAL at 08:47

## 2019-08-11 RX ADMIN — OXYCODONE HYDROCHLORIDE 10 MG: 5 TABLET ORAL at 22:37

## 2019-08-11 RX ADMIN — POLYETHYLENE GLYCOL 3350 17 G: 17 POWDER, FOR SOLUTION ORAL at 08:46

## 2019-08-11 RX ADMIN — MONTELUKAST SODIUM 10 MG: 10 TABLET, FILM COATED ORAL at 20:46

## 2019-08-11 RX ADMIN — METOPROLOL TARTRATE 25 MG: 25 TABLET ORAL at 11:03

## 2019-08-11 ASSESSMENT — PAIN SCALES - GENERAL
PAINLEVEL_OUTOF10: 8
PAINLEVEL_OUTOF10: 7
PAINLEVEL_OUTOF10: 10
PAINLEVEL_OUTOF10: 7
PAINLEVEL_OUTOF10: 8
PAINLEVEL_OUTOF10: 7

## 2019-08-11 NOTE — PROGRESS NOTES
Physical Therapy  Facility/Department: [facility] MED SURG UNIT  Daily Treatment Note  NAME: Guillaume Paz  : 1955  MRN: [id number]    Date of Service: 2019    Discharge Recommendations:  Continue to assess pending progress, Subacute/Skilled Nursing Facility        Assessment   Body structures, Functions, Activity limitations: Decreased functional mobility ; Decreased strength  Assessment: Patient able to stand with fair balance while immobilizer was applied ; pain limited ambulation and needed light assistance (min) to get into bed following walk,   Activity Tolerance  Activity Tolerance: Patient limited by pain     Patient Diagnosis(es): There were no encounter diagnoses. has a past medical history of Alcohol abuse, Anemia, Asthma, Cocaine abuse ([address] COPD (chronic obstructive pulmonary disease) ([address] Disorder of pharynx, Drug-seeking behavior, Edema, Erectile dysfunction, Gastroesophageal reflux disease, Gout, History of arthroscopy of both knees, House dust mite allergy, Hyperlipidemia, Hypertension, Injury to heart, Insomnia, Medical non-compliance, Morbid obesity due to excess calories ([address] Osteoarthritis of both knees, Personal history of tobacco use, Pneumonia, Pre-diabetes, Seasonal allergies, Severe persistent asthma, Severe sleep apnea, Supraventricular tachycardia ([address] and SVT (supraventricular tachycardia) ([address] has a past surgical history that includes Anterior cruciate ligament repair and Cardiac catheterization. Restrictions  Restrictions/Precautions  Restrictions/Precautions: Weight Bearing  Required Braces or Orthoses?: Yes  Implants present? : Metal implants  Lower Extremity Weight Bearing Restrictions  Left Lower Extremity Weight Bearing: Weight Bearing As Tolerated  Required Braces or Orthoses  Left Lower Extremity Brace: Knee Immobilizer  Subjective   Subjective  Subjective: Patient states that he hasn't been doing much due to pain level ; sitting up in chair at present.

## 2019-08-11 NOTE — PROGRESS NOTES
Pt pm assessment complete. Pt alert and oriented. Pt states he has pain in his left leg. Pt up to the chair with a walker and a one assist. Medicated for pain per mar. Will continue to monitor pt.   Electronically signed by Charmaine Mcbride RN on 8/11/2019 at 12:06 AM

## 2019-08-11 NOTE — PROGRESS NOTES
Patient status post total knee arthroplasty, left. Issues with pain control yesterday and and specific issues with pain to the calf. He reports that this is better today. He denies fevers chills chest pain or shortness of breath        Dressing clean and dry. Distally neurovascular intact. Nontender to palpation to the left calf. No calf pain with passive dorsiflexion of the ankle. Status post left total knee arthroplasty, doing well      Continue current orthopedic care  Venous duplex was ordered yesterday but still has not been done. Apparently is being done this morning. Await results. Discharge planning with plans for transition to [facility] rehab tomorrow.

## 2019-08-12 VITALS
OXYGEN SATURATION: 94 % | HEART RATE: 80 BPM | DIASTOLIC BLOOD PRESSURE: 86 MMHG | RESPIRATION RATE: 18 BRPM | TEMPERATURE: 98.9 F | SYSTOLIC BLOOD PRESSURE: 146 MMHG

## 2019-08-12 LAB
ANION GAP SERPL CALCULATED.3IONS-SCNC: 10 MEQ/L (ref 9–15)
BUN BLDV-MCNC: 17 MG/DL (ref 8–23)
CALCIUM SERPL-MCNC: 9.4 MG/DL (ref 8.5–9.9)
CHLORIDE BLD-SCNC: 98 MEQ/L (ref 95–107)
CO2: 30 MEQ/L (ref 20–31)
CREAT SERPL-MCNC: 0.87 MG/DL (ref 0.7–1.2)
GFR AFRICAN AMERICAN: >60
GFR NON-AFRICAN AMERICAN: >60
GLUCOSE BLD-MCNC: 134 MG/DL (ref 60–115)
GLUCOSE BLD-MCNC: 156 MG/DL (ref 60–115)
GLUCOSE BLD-MCNC: 156 MG/DL (ref 70–99)
HCT VFR BLD CALC: 33.3 % (ref 42–52)
HEMOGLOBIN: 10.9 G/DL (ref 14–18)
MCH RBC QN AUTO: 31 PG (ref 27–31.3)
MCHC RBC AUTO-ENTMCNC: 32.8 % (ref 33–37)
MCV RBC AUTO: 94.4 FL (ref 80–100)
PDW BLD-RTO: 16.3 % (ref 11.5–14.5)
PERFORMED ON: ABNORMAL
PERFORMED ON: ABNORMAL
PLATELET # BLD: 281 K/UL (ref 130–400)
POTASSIUM SERPL-SCNC: 4.5 MEQ/L (ref 3.4–4.9)
RBC # BLD: 3.53 M/UL (ref 4.7–6.1)
SODIUM BLD-SCNC: 138 MEQ/L (ref 135–144)
WBC # BLD: 16.3 K/UL (ref 4.8–10.8)

## 2019-08-12 PROCEDURE — 97535 SELF CARE MNGMENT TRAINING: CPT

## 2019-08-12 PROCEDURE — 6370000000 HC RX 637 (ALT 250 FOR IP): Performed by: INTERNAL MEDICINE

## 2019-08-12 PROCEDURE — 97530 THERAPEUTIC ACTIVITIES: CPT

## 2019-08-12 PROCEDURE — 6370000000 HC RX 637 (ALT 250 FOR IP): Performed by: ORTHOPAEDIC SURGERY

## 2019-08-12 PROCEDURE — 94640 AIRWAY INHALATION TREATMENT: CPT

## 2019-08-12 PROCEDURE — 97116 GAIT TRAINING THERAPY: CPT

## 2019-08-12 PROCEDURE — 2580000003 HC RX 258: Performed by: ORTHOPAEDIC SURGERY

## 2019-08-12 PROCEDURE — 80048 BASIC METABOLIC PNL TOTAL CA: CPT

## 2019-08-12 PROCEDURE — 97110 THERAPEUTIC EXERCISES: CPT

## 2019-08-12 PROCEDURE — 85027 COMPLETE CBC AUTOMATED: CPT

## 2019-08-12 PROCEDURE — 36415 COLL VENOUS BLD VENIPUNCTURE: CPT

## 2019-08-12 RX ORDER — OXYCODONE HYDROCHLORIDE 5 MG/1
10 TABLET ORAL EVERY 4 HOURS PRN
Status: CANCELLED | OUTPATIENT
Start: 2019-08-12

## 2019-08-12 RX ORDER — ASPIRIN 81 MG/1
81 TABLET ORAL 2 TIMES DAILY
Status: CANCELLED | OUTPATIENT
Start: 2019-08-12 | End: 2019-09-09

## 2019-08-12 RX ORDER — SODIUM CHLORIDE 0.9 % (FLUSH) 0.9 %
10 SYRINGE (ML) INJECTION EVERY 12 HOURS SCHEDULED
Status: CANCELLED | OUTPATIENT
Start: 2019-08-12

## 2019-08-12 RX ORDER — SENNA PLUS 8.6 MG/1
1 TABLET ORAL NIGHTLY
Status: CANCELLED | OUTPATIENT
Start: 2019-08-12

## 2019-08-12 RX ORDER — NICOTINE 21 MG/24HR
1 PATCH, TRANSDERMAL 24 HOURS TRANSDERMAL DAILY
Status: CANCELLED | OUTPATIENT
Start: 2019-08-12

## 2019-08-12 RX ORDER — POLYETHYLENE GLYCOL 3350 17 G/17G
17 POWDER, FOR SOLUTION ORAL DAILY
Status: CANCELLED | OUTPATIENT
Start: 2019-08-12

## 2019-08-12 RX ORDER — MONTELUKAST SODIUM 10 MG/1
10 TABLET ORAL NIGHTLY
Status: CANCELLED | OUTPATIENT
Start: 2019-08-12

## 2019-08-12 RX ORDER — SODIUM CHLORIDE 0.9 % (FLUSH) 0.9 %
10 SYRINGE (ML) INJECTION PRN
Status: CANCELLED | OUTPATIENT
Start: 2019-08-12

## 2019-08-12 RX ORDER — HYDROCHLOROTHIAZIDE 25 MG/1
25 TABLET ORAL DAILY
Status: CANCELLED | OUTPATIENT
Start: 2019-08-12

## 2019-08-12 RX ORDER — NICOTINE POLACRILEX 4 MG
15 LOZENGE BUCCAL PRN
Status: CANCELLED | OUTPATIENT
Start: 2019-08-12

## 2019-08-12 RX ORDER — ONDANSETRON 2 MG/ML
4 INJECTION INTRAMUSCULAR; INTRAVENOUS EVERY 6 HOURS PRN
Status: CANCELLED | OUTPATIENT
Start: 2019-08-12

## 2019-08-12 RX ORDER — IPRATROPIUM BROMIDE AND ALBUTEROL SULFATE 2.5; .5 MG/3ML; MG/3ML
1 SOLUTION RESPIRATORY (INHALATION) 3 TIMES DAILY
Status: CANCELLED | OUTPATIENT
Start: 2019-08-12

## 2019-08-12 RX ORDER — DEXTROSE MONOHYDRATE 25 G/50ML
12.5 INJECTION, SOLUTION INTRAVENOUS PRN
Status: CANCELLED | OUTPATIENT
Start: 2019-08-12

## 2019-08-12 RX ORDER — PREDNISONE 20 MG/1
20 TABLET ORAL 2 TIMES DAILY
Status: CANCELLED | OUTPATIENT
Start: 2019-08-12

## 2019-08-12 RX ORDER — DEXTROSE MONOHYDRATE 50 MG/ML
100 INJECTION, SOLUTION INTRAVENOUS PRN
Status: CANCELLED | OUTPATIENT
Start: 2019-08-12

## 2019-08-12 RX ORDER — FLUTICASONE PROPIONATE 50 MCG
1 SPRAY, SUSPENSION (ML) NASAL DAILY
Status: CANCELLED | OUTPATIENT
Start: 2019-08-12

## 2019-08-12 RX ORDER — ASPIRIN 81 MG/1
81 TABLET ORAL 2 TIMES DAILY
Qty: 28 TABLET | Refills: 0 | Status: SHIPPED | OUTPATIENT
Start: 2019-08-12 | End: 2019-09-03 | Stop reason: ALTCHOICE

## 2019-08-12 RX ORDER — AMLODIPINE BESYLATE 10 MG/1
10 TABLET ORAL DAILY
Status: CANCELLED | OUTPATIENT
Start: 2019-08-12

## 2019-08-12 RX ORDER — TRAZODONE HYDROCHLORIDE 50 MG/1
50 TABLET ORAL NIGHTLY
Status: CANCELLED | OUTPATIENT
Start: 2019-08-12

## 2019-08-12 RX ORDER — OXYCODONE HYDROCHLORIDE 5 MG/1
5 TABLET ORAL EVERY 4 HOURS PRN
Status: CANCELLED | OUTPATIENT
Start: 2019-08-12

## 2019-08-12 RX ORDER — FAMOTIDINE 20 MG/1
40 TABLET, FILM COATED ORAL DAILY
Status: CANCELLED | OUTPATIENT
Start: 2019-08-12

## 2019-08-12 RX ORDER — SIMVASTATIN 5 MG
5 TABLET ORAL NIGHTLY
Status: CANCELLED | OUTPATIENT
Start: 2019-08-12

## 2019-08-12 RX ADMIN — OXYCODONE HYDROCHLORIDE 10 MG: 5 TABLET ORAL at 09:03

## 2019-08-12 RX ADMIN — MOMETASONE FUROATE AND FORMOTEROL FUMARATE DIHYDRATE 2 PUFF: 100; 5 AEROSOL RESPIRATORY (INHALATION) at 06:27

## 2019-08-12 RX ADMIN — POLYETHYLENE GLYCOL 3350 17 G: 17 POWDER, FOR SOLUTION ORAL at 09:00

## 2019-08-12 RX ADMIN — PREDNISONE 20 MG: 20 TABLET ORAL at 09:04

## 2019-08-12 RX ADMIN — AMLODIPINE BESYLATE 10 MG: 10 TABLET ORAL at 09:04

## 2019-08-12 RX ADMIN — OXYCODONE HYDROCHLORIDE 10 MG: 5 TABLET ORAL at 13:04

## 2019-08-12 RX ADMIN — METOPROLOL TARTRATE 25 MG: 25 TABLET ORAL at 09:04

## 2019-08-12 RX ADMIN — IPRATROPIUM BROMIDE AND ALBUTEROL SULFATE 1 AMPULE: .5; 3 SOLUTION RESPIRATORY (INHALATION) at 06:27

## 2019-08-12 RX ADMIN — IPRATROPIUM BROMIDE AND ALBUTEROL SULFATE 1 AMPULE: .5; 3 SOLUTION RESPIRATORY (INHALATION) at 11:35

## 2019-08-12 RX ADMIN — FAMOTIDINE 40 MG: 20 TABLET, FILM COATED ORAL at 09:04

## 2019-08-12 RX ADMIN — Medication 10 ML: at 09:06

## 2019-08-12 RX ADMIN — HYDROCHLOROTHIAZIDE 25 MG: 25 TABLET ORAL at 09:04

## 2019-08-12 RX ADMIN — MAGNESIUM CITRATE 150 ML: 1.75 LIQUID ORAL at 09:03

## 2019-08-12 RX ADMIN — OXYCODONE HYDROCHLORIDE 10 MG: 5 TABLET ORAL at 03:03

## 2019-08-12 RX ADMIN — ASPIRIN 81 MG: 81 TABLET ORAL at 09:04

## 2019-08-12 ASSESSMENT — PAIN SCALES - GENERAL
PAINLEVEL_OUTOF10: 8
PAINLEVEL_OUTOF10: 8
PAINLEVEL_OUTOF10: 7
PAINLEVEL_OUTOF10: 9

## 2019-08-12 ASSESSMENT — PAIN DESCRIPTION - LOCATION: LOCATION: KNEE

## 2019-08-12 ASSESSMENT — PAIN DESCRIPTION - PAIN TYPE: TYPE: SURGICAL PAIN

## 2019-08-12 ASSESSMENT — PAIN DESCRIPTION - ORIENTATION: ORIENTATION: LEFT

## 2019-08-12 NOTE — PROGRESS NOTES
Patient resting comfortably. Seated in the chair eating lunch. Dressing clean and dry. Distally neurovascular intact        Status post total knee arthroplasty, doing well      Plan for discharge later today to skilled nursing facility. Continue current orthopedic care and postoperative protocols. Follow-up with Dr. Staral Tabares as previously scheduled.

## 2019-08-12 NOTE — PROGRESS NOTES
Pt refusing to use ice machine on his leg at this time. Will continue to monitor pt.   Electronically signed by Elena Palm RN on 8/12/2019 at 3:02 AM

## 2019-08-12 NOTE — PROGRESS NOTES
Physical Therapy  Facility/Department: [facility] MED SURG UNIT  Daily Treatment Note  NAME: Joss Lisa  : 1955  MRN: [id number]    Date of Service: 2019    Discharge Recommendations:  Continue to assess pending progress, Subacute/Skilled Nursing Facility        Assessment   Body structures, Functions, Activity limitations: Decreased functional mobility ; Decreased ROM; Decreased strength;Decreased safe awareness;Decreased endurance  Assessment: Pt performed supine and seated exercises with mod increase in pain. Pt almost has +SLR. Pt limited with ambulation distance due to increased pain. Treatment Diagnosis: S/P L TKA difficulty walking, weakness  Prognosis: Good  REQUIRES PT FOLLOW UP: Yes     Patient Diagnosis(es): There were no encounter diagnoses. has a past medical history of Alcohol abuse, Anemia, Asthma, Cocaine abuse ([address] COPD (chronic obstructive pulmonary disease) ([address] Disorder of pharynx, Drug-seeking behavior, Edema, Erectile dysfunction, Gastroesophageal reflux disease, Gout, History of arthroscopy of both knees, House dust mite allergy, Hyperlipidemia, Hypertension, Injury to heart, Insomnia, Medical non-compliance, Morbid obesity due to excess calories ([address] Osteoarthritis of both knees, Personal history of tobacco use, Pneumonia, Pre-diabetes, Seasonal allergies, Severe persistent asthma, Severe sleep apnea, Supraventricular tachycardia ([address] and SVT (supraventricular tachycardia) ([address] has a past surgical history that includes Anterior cruciate ligament repair and Cardiac catheterization.     Restrictions  Restrictions/Precautions  Restrictions/Precautions: Weight Bearing  Required Braces or Orthoses?: Yes  Implants present? : Metal implants(L TKR)  Lower Extremity Weight Bearing Restrictions  Left Lower Extremity Weight Bearing: Weight Bearing As Tolerated  Required Braces or Orthoses  Left Lower Extremity Brace: Knee Immobilizer(until +SLR)  Subjective   General  Chart

## 2019-08-12 NOTE — PROGRESS NOTES
Per patient's request this  assisted patient in gathering belongings. This  looked in each drawer wardrobe, bathroom and room for belongings. Patient has a clear plastic bag from hospital with belongings along with a duffel bag with belonging. Patient cell phone , head phones, walker extensions and books were gathered and put in patient's duffle bag. Patient reports not having any more belongings and no other personal items observed in room.

## 2019-08-13 RX ORDER — SILDENAFIL 100 MG/1
100 TABLET, FILM COATED ORAL DAILY PRN
Qty: 10 TABLET | Refills: 5 | Status: SHIPPED | OUTPATIENT
Start: 2019-08-13 | End: 2019-12-17 | Stop reason: SDUPTHER

## 2019-08-14 NOTE — DISCHARGE SUMMARY
Discharge summary  This patient Cathy Lin was admitted to the hospital on 8/9/2019  after undergoing Procedure(s) (LRB):  LEFT TOTAL KNEE ARTHROPLASTY (Left) without complications that morning. During the postoperative period,while in hospital, patient was medically managed by the hospitalist. Please see medial notes and H&P for patients additional diagnoses. Ortho agrees with all medical diagnoses and treatments while patient in hospital.  No significant or unexpected findings or abnormal ortho imaging were noted during the hospital stay    Hospital course  Patient tolerated surgical procedure well and there was no complications. Patient progressed adequtly through their recovery during hospital stay including PT and rehabilitation. DVT prophylaxis was implemented POD#1  Patient was then D/C on 8/12/2019 to skilled nursing facility in stable condition. Patient was instructed on the use of pain medications, the signs and symptoms of infection, and was given our number to call should they have any questions or concerns following discharge.

## 2019-09-03 ENCOUNTER — OFFICE VISIT (OUTPATIENT)
Dept: FAMILY MEDICINE CLINIC | Age: 64
End: 2019-09-03
Payer: MEDICARE

## 2019-09-03 VITALS
BODY MASS INDEX: 39.49 KG/M2 | HEIGHT: 73 IN | RESPIRATION RATE: 18 BRPM | TEMPERATURE: 97.8 F | WEIGHT: 298 LBS | OXYGEN SATURATION: 96 % | DIASTOLIC BLOOD PRESSURE: 68 MMHG | SYSTOLIC BLOOD PRESSURE: 112 MMHG | HEART RATE: 73 BPM

## 2019-09-03 DIAGNOSIS — Z96.652 STATUS POST TOTAL KNEE REPLACEMENT, LEFT: Primary | ICD-10-CM

## 2019-09-03 PROCEDURE — 3017F COLORECTAL CA SCREEN DOC REV: CPT | Performed by: FAMILY MEDICINE

## 2019-09-03 PROCEDURE — 1111F DSCHRG MED/CURRENT MED MERGE: CPT | Performed by: FAMILY MEDICINE

## 2019-09-03 PROCEDURE — G8427 DOCREV CUR MEDS BY ELIG CLIN: HCPCS | Performed by: FAMILY MEDICINE

## 2019-09-03 PROCEDURE — G8417 CALC BMI ABV UP PARAM F/U: HCPCS | Performed by: FAMILY MEDICINE

## 2019-09-03 PROCEDURE — 99214 OFFICE O/P EST MOD 30 MIN: CPT | Performed by: FAMILY MEDICINE

## 2019-09-03 PROCEDURE — 4004F PT TOBACCO SCREEN RCVD TLK: CPT | Performed by: FAMILY MEDICINE

## 2019-09-08 ASSESSMENT — ENCOUNTER SYMPTOMS
NAUSEA: 0
DIARRHEA: 0
BLOOD IN STOOL: 0
APNEA: 0
SHORTNESS OF BREATH: 0
CHEST TIGHTNESS: 0
CONSTIPATION: 0
COUGH: 0
ABDOMINAL PAIN: 0
VOMITING: 0

## 2019-09-08 NOTE — PROGRESS NOTES
Emotionally abused: Not on file     Physically abused: Not on file     Forced sexual activity: Not on file   Other Topics Concern    Not on file   Social History Narrative    Not on file     Current Outpatient Medications on File Prior to Visit   Medication Sig Dispense Refill    Handicap Placard MISC by NOT APPLICABLE route      sildenafil (VIAGRA) 100 MG tablet Take 1 tablet by mouth daily as needed for Erectile Dysfunction 10 tablet 5    metoprolol tartrate (LOPRESSOR) 25 MG tablet Take 1 tablet by mouth 2 times daily 60 tablet 3    albuterol sulfate HFA (VENTOLIN HFA) 108 (90 Base) MCG/ACT inhaler Inhale 2 puffs into the lungs every 6 hours as needed for Wheezing or Shortness of Breath 3 Inhaler 0    aclidinium (TUDORZA PRESSAIR) 400 MCG/ACT AEPB inhaler Inhale 1 puff into the lungs 2 times daily 3 each 1    ipratropium-albuterol (DUONEB) 0.5-2.5 (3) MG/3ML SOLN nebulizer solution INHALE ONE VIAL VIA NEBULZIER EVERY 6 HOURS AS NEEDED FOR SHORTNESS OF BREATH 180 mL 0    traZODone (DESYREL) 50 MG tablet Take 1 tablet by mouth nightly 90 tablet 1    amLODIPine (NORVASC) 10 MG tablet Take 1 tablet by mouth daily 90 tablet 1    famotidine (PEPCID) 40 MG tablet Take 1 tablet by mouth daily 90 tablet 1    budesonide-formoterol (SYMBICORT) 160-4.5 MCG/ACT AERO Inhale 2 puffs into the lungs 2 times daily 3 Inhaler 1    montelukast (SINGULAIR) 10 MG tablet Take 1 tablet by mouth nightly 90 tablet 1    lovastatin (MEVACOR) 10 MG tablet Take 1 tablet by mouth nightly 90 tablet 1    melatonin 1 MG tablet Take 1 tablet by mouth nightly as needed for Sleep 90 tablet 1    fluticasone (FLONASE) 50 MCG/ACT nasal spray 2 sprays by Nasal route daily 3 Bottle 1    Handicap Placard MISC by Does not apply route DX: OSTEOARTHRITIS OF BOTH KNEES (M17.0), COPD (J44.9)     EXPIRES: 02/2024 1 each 0     No current facility-administered medications on file prior to visit. Allergies:  Fish-derived products;  Iodine;

## 2019-10-07 ENCOUNTER — OFFICE VISIT (OUTPATIENT)
Dept: FAMILY MEDICINE CLINIC | Age: 64
End: 2019-10-07
Payer: MEDICARE

## 2019-10-07 VITALS
HEIGHT: 73 IN | SYSTOLIC BLOOD PRESSURE: 130 MMHG | BODY MASS INDEX: 40.85 KG/M2 | TEMPERATURE: 97.2 F | WEIGHT: 308.2 LBS | DIASTOLIC BLOOD PRESSURE: 80 MMHG | OXYGEN SATURATION: 96 % | RESPIRATION RATE: 20 BRPM | HEART RATE: 90 BPM

## 2019-10-07 DIAGNOSIS — J44.1 CHRONIC OBSTRUCTIVE PULMONARY DISEASE WITH ACUTE EXACERBATION (HCC): Chronic | ICD-10-CM

## 2019-10-07 DIAGNOSIS — F10.10 ALCOHOL ABUSE: Chronic | ICD-10-CM

## 2019-10-07 DIAGNOSIS — E78.5 HYPERLIPIDEMIA, UNSPECIFIED HYPERLIPIDEMIA TYPE: Chronic | ICD-10-CM

## 2019-10-07 DIAGNOSIS — I10 ESSENTIAL HYPERTENSION: Primary | Chronic | ICD-10-CM

## 2019-10-07 DIAGNOSIS — F14.10 COCAINE ABUSE (HCC): Chronic | ICD-10-CM

## 2019-10-07 DIAGNOSIS — Z23 NEED FOR VACCINATION: ICD-10-CM

## 2019-10-07 DIAGNOSIS — I47.1 SUPRAVENTRICULAR TACHYCARDIA (HCC): Chronic | ICD-10-CM

## 2019-10-07 DIAGNOSIS — Z72.0 TOBACCO USE: ICD-10-CM

## 2019-10-07 DIAGNOSIS — R73.03 PRE-DIABETES: Chronic | ICD-10-CM

## 2019-10-07 PROCEDURE — 99214 OFFICE O/P EST MOD 30 MIN: CPT | Performed by: FAMILY MEDICINE

## 2019-10-07 PROCEDURE — G0008 ADMIN INFLUENZA VIRUS VAC: HCPCS | Performed by: FAMILY MEDICINE

## 2019-10-07 PROCEDURE — G8417 CALC BMI ABV UP PARAM F/U: HCPCS | Performed by: FAMILY MEDICINE

## 2019-10-07 PROCEDURE — G8482 FLU IMMUNIZE ORDER/ADMIN: HCPCS | Performed by: FAMILY MEDICINE

## 2019-10-07 PROCEDURE — G8427 DOCREV CUR MEDS BY ELIG CLIN: HCPCS | Performed by: FAMILY MEDICINE

## 2019-10-07 PROCEDURE — 90688 IIV4 VACCINE SPLT 0.5 ML IM: CPT | Performed by: FAMILY MEDICINE

## 2019-10-07 PROCEDURE — 3017F COLORECTAL CA SCREEN DOC REV: CPT | Performed by: FAMILY MEDICINE

## 2019-10-07 PROCEDURE — 3023F SPIROM DOC REV: CPT | Performed by: FAMILY MEDICINE

## 2019-10-07 PROCEDURE — 4004F PT TOBACCO SCREEN RCVD TLK: CPT | Performed by: FAMILY MEDICINE

## 2019-10-07 PROCEDURE — G8926 SPIRO NO PERF OR DOC: HCPCS | Performed by: FAMILY MEDICINE

## 2019-10-07 RX ORDER — SILDENAFIL 100 MG/1
100 TABLET, FILM COATED ORAL DAILY PRN
Qty: 10 TABLET | Refills: 5 | Status: CANCELLED | OUTPATIENT
Start: 2019-10-07

## 2019-10-07 RX ORDER — PREDNISONE 50 MG/1
50 TABLET ORAL DAILY
Qty: 5 TABLET | Refills: 0 | Status: SHIPPED | OUTPATIENT
Start: 2019-10-07 | End: 2019-10-12

## 2019-10-07 RX ORDER — AZITHROMYCIN 250 MG/1
250 TABLET, FILM COATED ORAL SEE ADMIN INSTRUCTIONS
Qty: 6 TABLET | Refills: 0 | Status: SHIPPED | OUTPATIENT
Start: 2019-10-07 | End: 2019-10-12

## 2019-10-07 ASSESSMENT — ENCOUNTER SYMPTOMS
DIARRHEA: 0
BLOOD IN STOOL: 0
TROUBLE SWALLOWING: 0
SHORTNESS OF BREATH: 1
ANAL BLEEDING: 0
SINUS PAIN: 0
RHINORRHEA: 0
WHEEZING: 1
CONSTIPATION: 0
ABDOMINAL PAIN: 0
VOMITING: 0
SINUS PRESSURE: 0
CHEST TIGHTNESS: 1
SORE THROAT: 0
COUGH: 1
NAUSEA: 0

## 2019-11-01 DIAGNOSIS — J44.9 CHRONIC OBSTRUCTIVE PULMONARY DISEASE, UNSPECIFIED COPD TYPE (HCC): Chronic | ICD-10-CM

## 2019-11-03 RX ORDER — ALBUTEROL SULFATE 90 UG/1
2 AEROSOL, METERED RESPIRATORY (INHALATION) EVERY 6 HOURS PRN
Qty: 1 INHALER | Refills: 0 | Status: SHIPPED | OUTPATIENT
Start: 2019-11-03 | End: 2019-12-06 | Stop reason: SDUPTHER

## 2019-12-16 ENCOUNTER — NURSE TRIAGE (OUTPATIENT)
Dept: OTHER | Facility: CLINIC | Age: 64
End: 2019-12-16

## 2019-12-17 ENCOUNTER — OFFICE VISIT (OUTPATIENT)
Dept: FAMILY MEDICINE CLINIC | Age: 64
End: 2019-12-17
Payer: MEDICARE

## 2019-12-17 VITALS
SYSTOLIC BLOOD PRESSURE: 126 MMHG | TEMPERATURE: 97.1 F | RESPIRATION RATE: 16 BRPM | HEART RATE: 72 BPM | OXYGEN SATURATION: 95 % | WEIGHT: 315 LBS | DIASTOLIC BLOOD PRESSURE: 70 MMHG | BODY MASS INDEX: 42.8 KG/M2

## 2019-12-17 DIAGNOSIS — J44.9 CHRONIC OBSTRUCTIVE PULMONARY DISEASE, UNSPECIFIED COPD TYPE (HCC): Primary | Chronic | ICD-10-CM

## 2019-12-17 DIAGNOSIS — Z76.0 MEDICATION REFILL: ICD-10-CM

## 2019-12-17 DIAGNOSIS — G47.00 INSOMNIA, UNSPECIFIED TYPE: ICD-10-CM

## 2019-12-17 DIAGNOSIS — R06.2 WHEEZING: ICD-10-CM

## 2019-12-17 DIAGNOSIS — N52.9 ERECTILE DYSFUNCTION, UNSPECIFIED ERECTILE DYSFUNCTION TYPE: Chronic | ICD-10-CM

## 2019-12-17 DIAGNOSIS — K21.9 GASTROESOPHAGEAL REFLUX DISEASE, ESOPHAGITIS PRESENCE NOT SPECIFIED: ICD-10-CM

## 2019-12-17 DIAGNOSIS — J44.9 CHRONIC OBSTRUCTIVE PULMONARY DISEASE, UNSPECIFIED COPD TYPE (HCC): Chronic | ICD-10-CM

## 2019-12-17 DIAGNOSIS — Z91.09 HOUSE DUST MITE ALLERGY: Chronic | ICD-10-CM

## 2019-12-17 DIAGNOSIS — E78.5 HYPERLIPIDEMIA, UNSPECIFIED HYPERLIPIDEMIA TYPE: Chronic | ICD-10-CM

## 2019-12-17 DIAGNOSIS — I10 ESSENTIAL HYPERTENSION: Chronic | ICD-10-CM

## 2019-12-17 PROCEDURE — 4004F PT TOBACCO SCREEN RCVD TLK: CPT | Performed by: PHYSICIAN ASSISTANT

## 2019-12-17 PROCEDURE — G8482 FLU IMMUNIZE ORDER/ADMIN: HCPCS | Performed by: PHYSICIAN ASSISTANT

## 2019-12-17 PROCEDURE — 3023F SPIROM DOC REV: CPT | Performed by: PHYSICIAN ASSISTANT

## 2019-12-17 PROCEDURE — 96372 THER/PROPH/DIAG INJ SC/IM: CPT | Performed by: PHYSICIAN ASSISTANT

## 2019-12-17 PROCEDURE — G8417 CALC BMI ABV UP PARAM F/U: HCPCS | Performed by: PHYSICIAN ASSISTANT

## 2019-12-17 PROCEDURE — G8926 SPIRO NO PERF OR DOC: HCPCS | Performed by: PHYSICIAN ASSISTANT

## 2019-12-17 PROCEDURE — 3017F COLORECTAL CA SCREEN DOC REV: CPT | Performed by: PHYSICIAN ASSISTANT

## 2019-12-17 PROCEDURE — 99213 OFFICE O/P EST LOW 20 MIN: CPT | Performed by: PHYSICIAN ASSISTANT

## 2019-12-17 PROCEDURE — G8427 DOCREV CUR MEDS BY ELIG CLIN: HCPCS | Performed by: PHYSICIAN ASSISTANT

## 2019-12-17 RX ORDER — FLUTICASONE PROPIONATE 50 MCG
2 SPRAY, SUSPENSION (ML) NASAL DAILY
Qty: 3 BOTTLE | Refills: 1 | Status: SHIPPED | OUTPATIENT
Start: 2019-12-17 | End: 2020-03-23 | Stop reason: SDUPTHER

## 2019-12-17 RX ORDER — ALBUTEROL SULFATE 90 UG/1
2 AEROSOL, METERED RESPIRATORY (INHALATION) EVERY 6 HOURS PRN
Qty: 18 G | Refills: 0 | Status: SHIPPED | OUTPATIENT
Start: 2019-12-17 | End: 2020-02-10 | Stop reason: SDUPTHER

## 2019-12-17 RX ORDER — MONTELUKAST SODIUM 10 MG/1
10 TABLET ORAL NIGHTLY
Qty: 90 TABLET | Refills: 1 | Status: SHIPPED | OUTPATIENT
Start: 2019-12-17 | End: 2020-03-23 | Stop reason: SDUPTHER

## 2019-12-17 RX ORDER — ALBUTEROL SULFATE 90 UG/1
2 AEROSOL, METERED RESPIRATORY (INHALATION) EVERY 6 HOURS PRN
Qty: 18 G | Refills: 0 | Status: SHIPPED | OUTPATIENT
Start: 2019-12-17 | End: 2019-12-17 | Stop reason: SDUPTHER

## 2019-12-17 RX ORDER — LOVASTATIN 10 MG/1
10 TABLET ORAL NIGHTLY
Qty: 90 TABLET | Refills: 1 | Status: SHIPPED | OUTPATIENT
Start: 2019-12-17 | End: 2020-02-18 | Stop reason: SDUPTHER

## 2019-12-17 RX ORDER — TRAZODONE HYDROCHLORIDE 50 MG/1
50 TABLET ORAL NIGHTLY
Qty: 90 TABLET | Refills: 1 | Status: SHIPPED | OUTPATIENT
Start: 2019-12-17 | End: 2020-02-10 | Stop reason: SDUPTHER

## 2019-12-17 RX ORDER — BUDESONIDE AND FORMOTEROL FUMARATE DIHYDRATE 160; 4.5 UG/1; UG/1
2 AEROSOL RESPIRATORY (INHALATION) 2 TIMES DAILY
Qty: 3 INHALER | Refills: 1 | Status: SHIPPED | OUTPATIENT
Start: 2019-12-17 | End: 2020-03-23 | Stop reason: SDUPTHER

## 2019-12-17 RX ORDER — TRIAMCINOLONE ACETONIDE 40 MG/ML
80 INJECTION, SUSPENSION INTRA-ARTICULAR; INTRAMUSCULAR ONCE
Status: COMPLETED | OUTPATIENT
Start: 2019-12-17 | End: 2019-12-17

## 2019-12-17 RX ORDER — IPRATROPIUM BROMIDE AND ALBUTEROL SULFATE 2.5; .5 MG/3ML; MG/3ML
SOLUTION RESPIRATORY (INHALATION)
Qty: 180 ML | Refills: 0 | Status: SHIPPED | OUTPATIENT
Start: 2019-12-17 | End: 2020-01-13

## 2019-12-17 RX ORDER — AMLODIPINE BESYLATE 10 MG/1
10 TABLET ORAL DAILY
Qty: 90 TABLET | Refills: 1 | Status: SHIPPED | OUTPATIENT
Start: 2019-12-17 | End: 2020-03-23 | Stop reason: SDUPTHER

## 2019-12-17 RX ORDER — SILDENAFIL 100 MG/1
100 TABLET, FILM COATED ORAL DAILY PRN
Qty: 10 TABLET | Refills: 5 | Status: SHIPPED | OUTPATIENT
Start: 2019-12-17 | End: 2020-03-23 | Stop reason: SDUPTHER

## 2019-12-17 RX ORDER — FAMOTIDINE 40 MG/1
40 TABLET, FILM COATED ORAL DAILY
Qty: 90 TABLET | Refills: 1 | Status: SHIPPED | OUTPATIENT
Start: 2019-12-17 | End: 2019-12-17 | Stop reason: SDUPTHER

## 2019-12-17 RX ORDER — FAMOTIDINE 40 MG/1
40 TABLET, FILM COATED ORAL DAILY
Qty: 90 TABLET | Refills: 1 | Status: SHIPPED | OUTPATIENT
Start: 2019-12-17 | End: 2020-01-08 | Stop reason: ALTCHOICE

## 2019-12-17 RX ORDER — AZITHROMYCIN 250 MG/1
TABLET, FILM COATED ORAL
Qty: 1 PACKET | Refills: 0 | Status: SHIPPED | OUTPATIENT
Start: 2019-12-17 | End: 2020-02-10 | Stop reason: ALTCHOICE

## 2019-12-17 RX ORDER — BUDESONIDE AND FORMOTEROL FUMARATE DIHYDRATE 160; 4.5 UG/1; UG/1
2 AEROSOL RESPIRATORY (INHALATION) 2 TIMES DAILY
Qty: 3 INHALER | Refills: 1 | Status: SHIPPED | OUTPATIENT
Start: 2019-12-17 | End: 2019-12-17 | Stop reason: SDUPTHER

## 2019-12-17 RX ORDER — PREDNISONE 10 MG/1
TABLET ORAL
Qty: 45 TABLET | Refills: 0 | Status: SHIPPED | OUTPATIENT
Start: 2019-12-17 | End: 2020-02-10 | Stop reason: ALTCHOICE

## 2019-12-17 RX ADMIN — TRIAMCINOLONE ACETONIDE 80 MG: 40 INJECTION, SUSPENSION INTRA-ARTICULAR; INTRAMUSCULAR at 12:41

## 2019-12-17 ASSESSMENT — ENCOUNTER SYMPTOMS
RHINORRHEA: 0
COUGH: 1
WHEEZING: 1
SHORTNESS OF BREATH: 1
STRIDOR: 0
CHOKING: 0
SORE THROAT: 0
ABDOMINAL PAIN: 0

## 2020-01-07 NOTE — TELEPHONE ENCOUNTER
Miguel Angel Clifton called to request alternative for famotidine (PEPCID) 40 MG tablet, famotidine is on back order, please call pharmacist back at [phone]

## 2020-01-08 RX ORDER — PANTOPRAZOLE SODIUM 40 MG/1
40 TABLET, DELAYED RELEASE ORAL
Qty: 30 TABLET | Refills: 5 | Status: SHIPPED | OUTPATIENT
Start: 2020-01-08 | End: 2020-03-23 | Stop reason: SDUPTHER

## 2020-01-08 NOTE — TELEPHONE ENCOUNTER
Patient called back, he wants to get an alternative prescription so prescription could be paid by insurance, he stated he can't afford over the counter medications.

## 2020-01-13 RX ORDER — IPRATROPIUM BROMIDE AND ALBUTEROL SULFATE 2.5; .5 MG/3ML; MG/3ML
SOLUTION RESPIRATORY (INHALATION)
Qty: 180 ML | Refills: 0 | Status: SHIPPED | OUTPATIENT
Start: 2020-01-13 | End: 2020-02-10 | Stop reason: SDUPTHER

## 2020-01-14 NOTE — TELEPHONE ENCOUNTER
1st attempt to reach patient. Called patient @ [phone]    and left message on machine for patient to return call during normal business hours of 8:30 AM and 5 PM @ [phone] option 2.

## 2020-01-17 NOTE — TELEPHONE ENCOUNTER
3rd attempt to reach patient. Called patient @ [phone] and left message on machine for patient to return call during normal business hours of 8:30 AM and 5 PM @ [phone] option 2. Unable to reach letter sent.

## 2020-02-10 ENCOUNTER — OFFICE VISIT (OUTPATIENT)
Dept: FAMILY MEDICINE CLINIC | Age: 65
End: 2020-02-10
Payer: MEDICARE

## 2020-02-10 VITALS
OXYGEN SATURATION: 97 % | HEIGHT: 73 IN | DIASTOLIC BLOOD PRESSURE: 80 MMHG | SYSTOLIC BLOOD PRESSURE: 130 MMHG | RESPIRATION RATE: 18 BRPM | HEART RATE: 75 BPM | BODY MASS INDEX: 41.75 KG/M2 | TEMPERATURE: 98.3 F | WEIGHT: 315 LBS

## 2020-02-10 PROCEDURE — 99214 OFFICE O/P EST MOD 30 MIN: CPT | Performed by: FAMILY MEDICINE

## 2020-02-10 RX ORDER — TRAZODONE HYDROCHLORIDE 50 MG/1
50 TABLET ORAL NIGHTLY
Qty: 90 TABLET | Refills: 1 | Status: SHIPPED | OUTPATIENT
Start: 2020-02-10 | End: 2020-03-23 | Stop reason: SDUPTHER

## 2020-02-10 RX ORDER — ALBUTEROL SULFATE 90 UG/1
2 AEROSOL, METERED RESPIRATORY (INHALATION) EVERY 6 HOURS PRN
Qty: 18 G | Refills: 1 | Status: SHIPPED | OUTPATIENT
Start: 2020-02-10 | End: 2020-03-23 | Stop reason: SDUPTHER

## 2020-02-10 RX ORDER — IPRATROPIUM BROMIDE AND ALBUTEROL SULFATE 2.5; .5 MG/3ML; MG/3ML
1 SOLUTION RESPIRATORY (INHALATION) EVERY 6 HOURS PRN
Qty: 180 ML | Refills: 1 | Status: SHIPPED | OUTPATIENT
Start: 2020-02-10 | End: 2020-03-05

## 2020-02-10 ASSESSMENT — ENCOUNTER SYMPTOMS
WHEEZING: 1
ABDOMINAL PAIN: 0
SHORTNESS OF BREATH: 0
ANAL BLEEDING: 0
CONSTIPATION: 0
BLOOD IN STOOL: 0
DIARRHEA: 0
NAUSEA: 0
VOMITING: 0
COUGH: 0
CHEST TIGHTNESS: 0

## 2020-02-10 ASSESSMENT — PATIENT HEALTH QUESTIONNAIRE - PHQ9
SUM OF ALL RESPONSES TO PHQ QUESTIONS 1-9: 1
SUM OF ALL RESPONSES TO PHQ9 QUESTIONS 1 & 2: 1
1. LITTLE INTEREST OR PLEASURE IN DOING THINGS: 0
2. FEELING DOWN, DEPRESSED OR HOPELESS: 1
SUM OF ALL RESPONSES TO PHQ QUESTIONS 1-9: 1

## 2020-02-10 NOTE — PROGRESS NOTES
Subjective:      Patient ID: Monica Winn is a 60 y.o. male who presents today for:     Chief Complaint   Patient presents with    Hypertension     Presents today for his routine chronic check up    Hyperlipidemia       HPI     Patient presents for hospital F/U visit. He was admitted at [facility] for influenza with COPD exacerbation. He was discharged home 02/04/2020 with continuous home O2. He reports feeling much improved overall and states he is only using his O2 with more activity and to sleep at night. He was seen by pulmonology earlier today (Dr. Gabi Flores) and he denies any changes were made to his medication. He states that he was advised again to get a sleep study done, but denies that this has been ordered. Patient presents for chronic hypertension, supraventricular tachycardia, hyperlipidemia, COPD, sleep apnea, anemia, prediabetes, gout follow-up visit. Patient reports taking medications as prescribed since the most recent visit. They deny adhering to any specific lower carbohydrate or lower salt diet. They deny any routine exercise outside of normal day-to-day activity. Patient denies any chest pain, palpitations, lightheadedness, dizziness, worsening lower extremity edema, or syncope.       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    Drug-seeking behavior 4/14/2015    Edema 12/10/2015    Erectile dysfunction     Gastroesophageal reflux disease 12/17/2018    Gout 10/27/2016    History of arthroscopy of both knees 10/27/2016    House dust mite allergy 4/21/2014    Hyperlipidemia     Hypertension 10/27/2016    Injury to heart 10/27/2016    Insomnia 12/17/2018    Medical non-compliance 2/22/2014    Morbid obesity due to excess calories ([address] 12/8/2016    Osteoarthritis of both knees 12/8/2016    Personal Relationships    Social connections:     Talks on phone: Not on file     Gets together: Not on file     Attends Congregation service: Not on file     Active member of club or organization: Not on file     Attends meetings of clubs or organizations: Not on file     Relationship status: Not on file    Intimate partner violence:     Fear of current or ex partner: Not on file     Emotionally abused: Not on file     Physically abused: Not on file     Forced sexual activity: Not on file   Other Topics Concern    Not on file   Social History Narrative    Not on file     Current Outpatient Medications on File Prior to Visit   Medication Sig Dispense Refill    pantoprazole (PROTONIX) 40 MG tablet Take 1 tablet by mouth every morning (before breakfast) 30 tablet 5    montelukast (SINGULAIR) 10 MG tablet Take 1 tablet by mouth nightly 90 tablet 1    aclidinium (TUDORZA PRESSAIR) 400 MCG/ACT AEPB inhaler Inhale 1 puff into the lungs 2 times daily 3 each 1    amLODIPine (NORVASC) 10 MG tablet Take 1 tablet by mouth daily 90 tablet 1    budesonide-formoterol (SYMBICORT) 160-4.5 MCG/ACT AERO Inhale 2 puffs into the lungs 2 times daily 3 Inhaler 1    fluticasone (FLONASE) 50 MCG/ACT nasal spray 2 sprays by Nasal route daily 3 Bottle 1    lovastatin (MEVACOR) 10 MG tablet Take 1 tablet by mouth nightly 90 tablet 1    metoprolol tartrate (LOPRESSOR) 25 MG tablet Take 1 tablet by mouth 2 times daily 60 tablet 3    sildenafil (VIAGRA) 100 MG tablet Take 1 tablet by mouth daily as needed for Erectile Dysfunction 10 tablet 5    melatonin 1 MG tablet Take 1 tablet by mouth nightly as needed for Sleep 90 tablet 1    Handicap Placard MISC by Does not apply route DX: OSTEOARTHRITIS OF BOTH KNEES (M17.0), COPD (J44.9)     EXPIRES: 02/2024 1 each 0     No current facility-administered medications on file prior to visit. Allergies:  Fish-derived products;  Iodine; and Pcn [penicillins]    Review of Systems   Constitutional: There is no abdominal tenderness. There is no right CVA tenderness, left CVA tenderness, guarding or rebound. Musculoskeletal: Normal range of motion. Right lower leg: No edema. Left lower leg: No edema. Lymphadenopathy:      Cervical: No cervical adenopathy. Skin:     General: Skin is warm. Findings: No rash. Neurological:      Mental Status: He is alert and oriented to person, place, and time. Psychiatric:         Mood and Affect: Mood normal.         Behavior: Behavior normal.          Ortho Exam (If Applicable)      Assessment & Plan:      1. Chronic obstructive pulmonary disease, unspecified COPD type ([facility]  Patient reports feeling back to his usual baseline in terms of breathing following his recent hospital admission for influenza and COPD exacerbation. He is established with pulmonology and was seen earlier today. No changes were made to his medication. We will continue to follow peripherally over time    - albuterol sulfate  (90 Base) MCG/ACT inhaler; Inhale 2 puffs into the lungs every 6 hours as needed for Wheezing or Shortness of Breath  Dispense: 18 g; Refill: 1  - ipratropium-albuterol (DUONEB) 0.5-2.5 (3) MG/3ML SOLN nebulizer solution; Take 3 mLs by nebulization every 6 hours as needed for Shortness of Breath  Dispense: 180 mL; Refill: 1    2. Severe sleep apnea  I stressed with patient the importance of having sleep study done and the need to treat sleep apnea if it is confirmed once again. New order was given again today in the office.    - Baseline Diagnostic Sleep Study; Future    3. Essential hypertension  Blood pressure within normal limits today in the office. Continue current medication    4. Supraventricular tachycardia (HCC)  Stable. Patient currently asymptomatic and denies any episodes of palpitations. 5. Hyperlipidemia, unspecified hyperlipidemia type  Most recent lipid panel stable. We will follow lab work over time.   Continue current

## 2020-02-18 RX ORDER — LOVASTATIN 10 MG/1
10 TABLET ORAL NIGHTLY
Qty: 90 TABLET | Refills: 1 | Status: SHIPPED | OUTPATIENT
Start: 2020-02-18 | End: 2020-03-23 | Stop reason: SDUPTHER

## 2020-03-03 ENCOUNTER — TELEPHONE (OUTPATIENT)
Dept: FAMILY MEDICINE CLINIC | Age: 65
End: 2020-03-03

## 2020-03-03 NOTE — TELEPHONE ENCOUNTER
If patient is having an acute COPD exacerbation he needs seen by a provider.   Please instruct him to come to the walk-in clinic for urgent evaluation and management

## 2020-03-04 NOTE — TELEPHONE ENCOUNTER
Patient reports sx not bad at this moment but if the don't improve he's going to come to the walk in clinic or call to schedule an appointment

## 2020-03-17 ENCOUNTER — HOSPITAL ENCOUNTER (EMERGENCY)
Age: 65
Discharge: HOME | End: 2020-03-18
Attending: EMERGENCY MEDICINE
Payer: MEDICARE

## 2020-03-17 ENCOUNTER — APPOINTMENT (OUTPATIENT)
Dept: GENERAL RADIOLOGY | Age: 65
End: 2020-03-17
Payer: MEDICARE

## 2020-03-17 PROCEDURE — 71045 X-RAY EXAM CHEST 1 VIEW: CPT

## 2020-03-17 PROCEDURE — 99283 EMERGENCY DEPT VISIT LOW MDM: CPT

## 2020-03-17 PROCEDURE — 6360000002 HC RX W HCPCS: Performed by: EMERGENCY MEDICINE

## 2020-03-17 PROCEDURE — 94640 AIRWAY INHALATION TREATMENT: CPT

## 2020-03-17 RX ORDER — SODIUM CHLORIDE 0.9 % (FLUSH) 0.9 %
3 SYRINGE (ML) INJECTION EVERY 8 HOURS
Status: DISCONTINUED | OUTPATIENT
Start: 2020-03-17 | End: 2020-03-18 | Stop reason: HOSPADM

## 2020-03-17 RX ORDER — AZITHROMYCIN 250 MG/1
500 TABLET, FILM COATED ORAL ONCE
Status: COMPLETED | OUTPATIENT
Start: 2020-03-17 | End: 2020-03-18

## 2020-03-17 RX ORDER — METHYLPREDNISOLONE SODIUM SUCCINATE 125 MG/2ML
125 INJECTION, POWDER, LYOPHILIZED, FOR SOLUTION INTRAMUSCULAR; INTRAVENOUS ONCE
Status: COMPLETED | OUTPATIENT
Start: 2020-03-17 | End: 2020-03-18

## 2020-03-17 RX ORDER — 0.9 % SODIUM CHLORIDE 0.9 %
500 INTRAVENOUS SOLUTION INTRAVENOUS ONCE
Status: COMPLETED | OUTPATIENT
Start: 2020-03-17 | End: 2020-03-18

## 2020-03-17 RX ADMIN — ALBUTEROL SULFATE 5 MG: 2.5 SOLUTION RESPIRATORY (INHALATION) at 23:40

## 2020-03-18 ENCOUNTER — TELEPHONE (OUTPATIENT)
Dept: FAMILY MEDICINE CLINIC | Age: 65
End: 2020-03-18

## 2020-03-18 ENCOUNTER — CARE COORDINATION (OUTPATIENT)
Dept: CASE MANAGEMENT | Age: 65
End: 2020-03-18

## 2020-03-18 VITALS
HEIGHT: 73 IN | OXYGEN SATURATION: 93 % | WEIGHT: 315 LBS | HEART RATE: 107 BPM | TEMPERATURE: 101.2 F | DIASTOLIC BLOOD PRESSURE: 70 MMHG | SYSTOLIC BLOOD PRESSURE: 122 MMHG | BODY MASS INDEX: 41.75 KG/M2 | RESPIRATION RATE: 22 BRPM

## 2020-03-18 LAB
ALBUMIN SERPL-MCNC: 3.1 G/DL (ref 3.5–4.6)
ALP BLD-CCNC: 80 U/L (ref 35–104)
ALT SERPL-CCNC: 17 U/L (ref 0–41)
ANION GAP SERPL CALCULATED.3IONS-SCNC: 10 MEQ/L (ref 9–15)
AST SERPL-CCNC: 30 U/L (ref 0–40)
BASOPHILS ABSOLUTE: 0 K/UL (ref 0–0.2)
BASOPHILS RELATIVE PERCENT: 0.3 %
BILIRUB SERPL-MCNC: 0.4 MG/DL (ref 0.2–0.7)
BUN BLDV-MCNC: 10 MG/DL (ref 8–23)
CALCIUM SERPL-MCNC: 9.3 MG/DL (ref 8.5–9.9)
CHLORIDE BLD-SCNC: 101 MEQ/L (ref 95–107)
CO2: 28 MEQ/L (ref 20–31)
CREAT SERPL-MCNC: 0.96 MG/DL (ref 0.7–1.2)
EOSINOPHILS ABSOLUTE: 0.3 K/UL (ref 0–0.7)
EOSINOPHILS RELATIVE PERCENT: 2.3 %
GFR AFRICAN AMERICAN: >60
GFR NON-AFRICAN AMERICAN: >60
GLOBULIN: 4 G/DL (ref 2.3–3.5)
GLUCOSE BLD-MCNC: 123 MG/DL (ref 70–99)
HCT VFR BLD CALC: 30.5 % (ref 42–52)
HEMOGLOBIN: 9.8 G/DL (ref 14–18)
INFLUENZA A BY PCR: NEGATIVE
INFLUENZA B BY PCR: NEGATIVE
LYMPHOCYTES ABSOLUTE: 1.8 K/UL (ref 1–4.8)
LYMPHOCYTES RELATIVE PERCENT: 11.8 %
MCH RBC QN AUTO: 30 PG (ref 27–31.3)
MCHC RBC AUTO-ENTMCNC: 32.2 % (ref 33–37)
MCV RBC AUTO: 93.2 FL (ref 80–100)
MONOCYTES ABSOLUTE: 1.7 K/UL (ref 0.2–0.8)
MONOCYTES RELATIVE PERCENT: 10.8 %
NEUTROPHILS ABSOLUTE: 11.4 K/UL (ref 1.4–6.5)
NEUTROPHILS RELATIVE PERCENT: 74.8 %
PDW BLD-RTO: 17.1 % (ref 11.5–14.5)
PLATELET # BLD: 434 K/UL (ref 130–400)
POTASSIUM SERPL-SCNC: 4.4 MEQ/L (ref 3.4–4.9)
RBC # BLD: 3.27 M/UL (ref 4.7–6.1)
SODIUM BLD-SCNC: 139 MEQ/L (ref 135–144)
TOTAL PROTEIN: 7.1 G/DL (ref 6.3–8)
WBC # BLD: 15.3 K/UL (ref 4.8–10.8)

## 2020-03-18 PROCEDURE — 87040 BLOOD CULTURE FOR BACTERIA: CPT

## 2020-03-18 PROCEDURE — 96365 THER/PROPH/DIAG IV INF INIT: CPT

## 2020-03-18 PROCEDURE — 87502 INFLUENZA DNA AMP PROBE: CPT

## 2020-03-18 PROCEDURE — 6370000000 HC RX 637 (ALT 250 FOR IP): Performed by: EMERGENCY MEDICINE

## 2020-03-18 PROCEDURE — 96375 TX/PRO/DX INJ NEW DRUG ADDON: CPT

## 2020-03-18 PROCEDURE — 2580000003 HC RX 258: Performed by: EMERGENCY MEDICINE

## 2020-03-18 PROCEDURE — 85025 COMPLETE CBC W/AUTO DIFF WBC: CPT

## 2020-03-18 PROCEDURE — 6360000002 HC RX W HCPCS: Performed by: EMERGENCY MEDICINE

## 2020-03-18 PROCEDURE — 80053 COMPREHEN METABOLIC PANEL: CPT

## 2020-03-18 RX ORDER — METHYLPREDNISOLONE 4 MG/1
TABLET ORAL
Qty: 1 KIT | Refills: 0 | Status: SHIPPED | OUTPATIENT
Start: 2020-03-18 | End: 2020-03-23 | Stop reason: SDUPTHER

## 2020-03-18 RX ORDER — ALBUTEROL SULFATE 90 UG/1
2 AEROSOL, METERED RESPIRATORY (INHALATION) EVERY 6 HOURS PRN
Qty: 1 INHALER | Refills: 3 | Status: SHIPPED | OUTPATIENT
Start: 2020-03-18 | End: 2020-03-23 | Stop reason: SDUPTHER

## 2020-03-18 RX ORDER — AZITHROMYCIN 250 MG/1
TABLET, FILM COATED ORAL
Qty: 1 PACKET | Refills: 0 | Status: SHIPPED | OUTPATIENT
Start: 2020-03-18 | End: 2020-03-23 | Stop reason: SDUPTHER

## 2020-03-18 RX ADMIN — ALBUTEROL SULFATE 5 MG: 2.5 SOLUTION RESPIRATORY (INHALATION) at 00:51

## 2020-03-18 RX ADMIN — Medication 3 ML: at 00:07

## 2020-03-18 RX ADMIN — AZITHROMYCIN MONOHYDRATE 500 MG: 250 TABLET ORAL at 00:06

## 2020-03-18 RX ADMIN — CEFTRIAXONE 1 G: 1 INJECTION, POWDER, FOR SOLUTION INTRAMUSCULAR; INTRAVENOUS at 00:06

## 2020-03-18 RX ADMIN — SODIUM CHLORIDE 500 ML: 9 INJECTION, SOLUTION INTRAVENOUS at 00:06

## 2020-03-18 RX ADMIN — METHYLPREDNISOLONE SODIUM SUCCINATE 125 MG: 125 INJECTION, POWDER, FOR SOLUTION INTRAMUSCULAR; INTRAVENOUS at 00:05

## 2020-03-18 NOTE — ED PROVIDER NOTES
([facility]     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    Drug-seeking behavior 4/14/2015    Edema 12/10/2015    Erectile dysfunction     Gastroesophageal reflux disease 12/17/2018    Gout 10/27/2016    History of arthroscopy of both knees 10/27/2016    House dust mite allergy 4/21/2014    Hyperlipidemia     Hypertension 10/27/2016    Injury to heart 10/27/2016    Insomnia 12/17/2018    Medical non-compliance 2/22/2014    Morbid obesity due to excess calories ([facility] 12/8/2016    Osteoarthritis of both knees 12/8/2016    Personal history of tobacco use     Pneumonia 12/04/2016    caused hospital admission    Pre-diabetes 10/27/2016    Seasonal allergies 4/21/2014    Severe persistent asthma 10/27/2016    Severe sleep apnea 4/14/2015    Supra[facility]ricular tachycardia ([facility] 10/27/2016    [facility]ricular tachy[facility] ([facility]          SURGICALHISTORY       Past Surgical History:   Procedure Laterality Date    ANTERIOR CRUCIATE LIGAMENT REPAIR      CARDIAC CATHETERIZATION      TOTAL KNEE ARTHROPLASTY Left 8/9/2019    LEFT TOTAL KNEE ARTHROPLASTY performed by Zaid Cortez MD at [facility]       Previous Medications    ACLIDINIUM (TUDORZA PRESSAIR) 400 MCG/ACT AEPB INHALER    Inhale 1 puff into the lungs 2 times daily    ALBUTEROL SULFATE  (90 BASE) MCG/ACT INHALER    Inhale 2 puffs into the lungs every 6 hours as needed for Wheezing or Shortness of Breath    AMLODIPINE (NORVASC) 10 MG TABLET    Take 1 tablet by mouth daily    BUDESONIDE-FORMOTEROL (SYMBICORT) 160-4.5 MCG/ACT AERO    Inhale 2 puffs into the lungs 2 times daily    FLUTICASONE (FLONASE) 50 MCG/ACT NASAL SPRAY    2 sprays by Nasal route daily    HANDICAP PLACARD MISC    by Does not apply route DX: OSTEOARTHRITIS OF BOTH KNEES (M17.0), COPD (J44.9)     EXPIRES: 02/2024    IPRATROPIUM-ALBUTEROL (DUONEB) 0.5-2.5 (3) MG/3ML SOLN NEBULIZER SOLUTION    Take 3 mLs by nebulization every 6 hours as needed for Shortness Physical activity     Days per week: None     Minutes per session: None    Stress: None   Relationships    Social connections     Talks on phone: None     Gets together: None     Attends Shinto service: None     Active member of club or organization: None     Attends meetings of clubs or organizations: None     Relationship status: None    Intimate partner violence     Fear of current or ex partner: None     Emotionally abused: None     Physically abused: None     Forced sexual activity: None   Other Topics Concern    None   Social History Narrative    None       SCREENINGS      @FLOW([id number])@      PHYSICAL EXAM    (up to 7 for level 4, 8 or more for level 5)     ED Triage Vitals   BP Temp Temp Source Pulse Resp SpO2 Height Weight   03/17/20 2325 03/17/20 2318 03/17/20 2318 03/17/20 2318 03/17/20 2318 03/17/20 2318 03/17/20 2318 03/17/20 2318   122/70 101.2 °F (38.4 °C) Temporal 113 20 91 % 6' 1\" (1.854 m) (!) 333 lb (151 kg)       Physical Exam     CONST: -Well-developed well-nourished ;                -In no acute distress. -Vitals reviewed. EYES: -EOM intact, BRADY:              -Sclera normal and conjunctiva: clear bilaterally. ENT: - Normal pharynx pink and moist.    NECK: -Supple (chin-to-chest). CARD: -Rate and rhythm: Regular              -Murmurs: No  RESP: -Respiratory effort and chest excursion with respirations: Normal             -Breath sounds equal bilaterally: Slight wheezing             -Wheezes: Positive             -Rales: No    BACK: -Flank pain: No              -Pain on palpation: No    ABD: -Distended: No           -Bruits: No           -Bowel sounds: Normal.           -Deep palpation: Non-tender           -Organomegaly palpable: No           -Abnormal masses: No    EXT: Gross appearance and use of all four extremities: Normal     SKIN: -Good turgor warm and dry.             -Apparent lesions or rashes: No    NEURO: -Patient: alert                -Oriented to: DEPARTMENT COURSE and DIFFERENTIAL DIAGNOSIS/MDM:   Vitals:    Vitals:    03/17/20 2318 03/17/20 2325 03/18/20 0042   BP:  122/70    Pulse: 113  107   Resp: 20  22   Temp: 101.2 °F (38.4 °C)     TempSrc: Temporal     SpO2: 91%  91%   Weight: (!) 333 lb (151 kg)     Height: 6' 1\" (1.854 m)             MDM    CRITICAL CARE TIME   Total Critical Care time was  minutes, excluding separately reportableprocedures. There was a high probability of clinicallysignificant/life threatening deterioration in the patient's condition which required my urgent intervention. CONSULTS:  None    PROCEDURES:  Unless otherwise noted below, none     Procedures    FINAL IMPRESSION      1. Pneumonia due to organism          DISPOSITION/PLAN   DISPOSITION Decision To Discharge 03/18/2020 01:23:32 AM      PATIENT REFERRED TO:  Mariana Davis MD  [address] [place] [address],[address] [place] [address] Road  [phone]    In 2 days  Return for weakening, worsening, persistent fevers      DISCHARGE MEDICATIONS:  New Prescriptions    ALBUTEROL (PROVENTIL) (5 MG/ML) 0.5% NEBULIZER SOLUTION    Take 0.5 mLs by nebulization every 6 hours as needed for Wheezing    ALBUTEROL SULFATE HFA (PROVENTIL HFA) 108 (90 BASE) MCG/ACT INHALER    Inhale 2 puffs into the lungs every 6 hours as needed for Wheezing    AZITHROMYCIN (ZITHROMAX) 250 MG TABLET    Take 2 tablets (500 mg) on Day 1, followed by 1 tablet (250 mg) once daily on Days 2 through 5. METHYLPREDNISOLONE (MEDROL, PORTER,) 4 MG TABLET    Take by mouth.           (Please note that portions of this note were completed with a voice recognition program.  Efforts were made to edit the dictations but occasionally words are mis-transcribed.)    Lauren Abad MD (electronically signed)  Attending Emergency Physician          Lauren Abad MD  03/18/20 0618

## 2020-03-18 NOTE — TELEPHONE ENCOUNTER
Patient would like an appointment to be seen for a hospital f/u due to SOB. Would you like patient to come in or can you do a telephone visit. Please advise.

## 2020-03-18 NOTE — CARE COORDINATION
Denies fever, current chills, or flu-like symptoms. Influenza results negative. Denies sore throat, runny nose, or joint pain. Advised on symptoms to report to physician and provided Influenza Clinic information. Denies any needs/concerns.

## 2020-03-19 ENCOUNTER — CARE COORDINATION (OUTPATIENT)
Dept: CARE COORDINATION | Age: 65
End: 2020-03-19

## 2020-03-19 NOTE — TELEPHONE ENCOUNTER
Chronic steroids would be prescribed by pulmonology if they are indicated.  He should contact pulmonology office to discuss treatment options long term RE: steroids

## 2020-03-22 ENCOUNTER — HOSPITAL ENCOUNTER (EMERGENCY)
Age: 65
Discharge: HOME | End: 2020-03-22
Attending: EMERGENCY MEDICINE
Payer: MEDICARE

## 2020-03-22 ENCOUNTER — APPOINTMENT (OUTPATIENT)
Dept: GENERAL RADIOLOGY | Age: 65
End: 2020-03-22
Payer: MEDICARE

## 2020-03-22 VITALS
DIASTOLIC BLOOD PRESSURE: 85 MMHG | SYSTOLIC BLOOD PRESSURE: 168 MMHG | RESPIRATION RATE: 20 BRPM | HEIGHT: 78 IN | HEART RATE: 94 BPM | BODY MASS INDEX: 36.45 KG/M2 | OXYGEN SATURATION: 92 % | TEMPERATURE: 98.4 F | WEIGHT: 315 LBS

## 2020-03-22 LAB
ALBUMIN SERPL-MCNC: 3.6 G/DL (ref 3.5–4.6)
ALP BLD-CCNC: 83 U/L (ref 35–104)
ALT SERPL-CCNC: 29 U/L (ref 0–41)
ANION GAP SERPL CALCULATED.3IONS-SCNC: 11 MEQ/L (ref 9–15)
AST SERPL-CCNC: 25 U/L (ref 0–40)
BASOPHILS ABSOLUTE: 0.1 K/UL (ref 0–0.2)
BASOPHILS RELATIVE PERCENT: 0.4 %
BILIRUB SERPL-MCNC: <0.2 MG/DL (ref 0.2–0.7)
BUN BLDV-MCNC: 14 MG/DL (ref 8–23)
CALCIUM SERPL-MCNC: 9.7 MG/DL (ref 8.5–9.9)
CHLORIDE BLD-SCNC: 103 MEQ/L (ref 95–107)
CO2: 29 MEQ/L (ref 20–31)
CREAT SERPL-MCNC: 0.89 MG/DL (ref 0.7–1.2)
EKG ATRIAL RATE: 89 BPM
EKG P AXIS: 79 DEGREES
EKG P-R INTERVAL: 144 MS
EKG Q-T INTERVAL: 370 MS
EKG QRS DURATION: 94 MS
EKG QTC CALCULATION (BAZETT): 450 MS
EKG R AXIS: 63 DEGREES
EKG T AXIS: 63 DEGREES
EKG VENTRICULAR RATE: 89 BPM
EOSINOPHILS ABSOLUTE: 0.3 K/UL (ref 0–0.7)
EOSINOPHILS RELATIVE PERCENT: 2 %
GFR AFRICAN AMERICAN: >60
GFR NON-AFRICAN AMERICAN: >60
GLOBULIN: 3.8 G/DL (ref 2.3–3.5)
GLUCOSE BLD-MCNC: 110 MG/DL (ref 70–99)
HCT VFR BLD CALC: 35.7 % (ref 42–52)
HEMOGLOBIN: 11.4 G/DL (ref 14–18)
INFLUENZA A BY PCR: NEGATIVE
INFLUENZA B BY PCR: NEGATIVE
LACTIC ACID: 0.9 MMOL/L (ref 0.5–2.2)
LYMPHOCYTES ABSOLUTE: 1.9 K/UL (ref 1–4.8)
LYMPHOCYTES RELATIVE PERCENT: 11.7 %
MAGNESIUM: 2.3 MG/DL (ref 1.7–2.4)
MCH RBC QN AUTO: 29.9 PG (ref 27–31.3)
MCHC RBC AUTO-ENTMCNC: 31.9 % (ref 33–37)
MCV RBC AUTO: 93.6 FL (ref 80–100)
MONOCYTES ABSOLUTE: 1 K/UL (ref 0.2–0.8)
MONOCYTES RELATIVE PERCENT: 6.4 %
NEUTROPHILS ABSOLUTE: 12.6 K/UL (ref 1.4–6.5)
NEUTROPHILS RELATIVE PERCENT: 79.5 %
PDW BLD-RTO: 17.8 % (ref 11.5–14.5)
PLATELET # BLD: 571 K/UL (ref 130–400)
POTASSIUM SERPL-SCNC: 4 MEQ/L (ref 3.4–4.9)
RBC # BLD: 3.81 M/UL (ref 4.7–6.1)
SODIUM BLD-SCNC: 143 MEQ/L (ref 135–144)
TOTAL PROTEIN: 7.4 G/DL (ref 6.3–8)
TROPONIN: <0.01 NG/ML (ref 0–0.01)
WBC # BLD: 15.8 K/UL (ref 4.8–10.8)

## 2020-03-22 PROCEDURE — 80053 COMPREHEN METABOLIC PANEL: CPT

## 2020-03-22 PROCEDURE — 96374 THER/PROPH/DIAG INJ IV PUSH: CPT

## 2020-03-22 PROCEDURE — 93005 ELECTROCARDIOGRAM TRACING: CPT

## 2020-03-22 PROCEDURE — 94640 AIRWAY INHALATION TREATMENT: CPT

## 2020-03-22 PROCEDURE — 2580000003 HC RX 258: Performed by: EMERGENCY MEDICINE

## 2020-03-22 PROCEDURE — 6370000000 HC RX 637 (ALT 250 FOR IP): Performed by: EMERGENCY MEDICINE

## 2020-03-22 PROCEDURE — 85025 COMPLETE CBC W/AUTO DIFF WBC: CPT

## 2020-03-22 PROCEDURE — 71045 X-RAY EXAM CHEST 1 VIEW: CPT

## 2020-03-22 PROCEDURE — 87040 BLOOD CULTURE FOR BACTERIA: CPT

## 2020-03-22 PROCEDURE — 84484 ASSAY OF TROPONIN QUANT: CPT

## 2020-03-22 PROCEDURE — 83735 ASSAY OF MAGNESIUM: CPT

## 2020-03-22 PROCEDURE — 87502 INFLUENZA DNA AMP PROBE: CPT

## 2020-03-22 PROCEDURE — 99285 EMERGENCY DEPT VISIT HI MDM: CPT

## 2020-03-22 PROCEDURE — 6360000002 HC RX W HCPCS: Performed by: EMERGENCY MEDICINE

## 2020-03-22 PROCEDURE — 83605 ASSAY OF LACTIC ACID: CPT

## 2020-03-22 RX ORDER — LEVOFLOXACIN 750 MG/1
750 TABLET ORAL DAILY
Qty: 7 TABLET | Refills: 0 | Status: SHIPPED | OUTPATIENT
Start: 2020-03-22 | End: 2020-03-23 | Stop reason: SDUPTHER

## 2020-03-22 RX ORDER — METHYLPREDNISOLONE SODIUM SUCCINATE 125 MG/2ML
125 INJECTION, POWDER, LYOPHILIZED, FOR SOLUTION INTRAMUSCULAR; INTRAVENOUS ONCE
Status: COMPLETED | OUTPATIENT
Start: 2020-03-22 | End: 2020-03-22

## 2020-03-22 RX ORDER — IPRATROPIUM BROMIDE AND ALBUTEROL SULFATE 2.5; .5 MG/3ML; MG/3ML
1 SOLUTION RESPIRATORY (INHALATION) ONCE
Status: COMPLETED | OUTPATIENT
Start: 2020-03-22 | End: 2020-03-22

## 2020-03-22 RX ORDER — PREDNISONE 10 MG/1
60 TABLET ORAL DAILY
Qty: 30 TABLET | Refills: 0 | Status: SHIPPED | OUTPATIENT
Start: 2020-03-22 | End: 2020-03-23 | Stop reason: SDUPTHER

## 2020-03-22 RX ORDER — 0.9 % SODIUM CHLORIDE 0.9 %
1000 INTRAVENOUS SOLUTION INTRAVENOUS ONCE
Status: COMPLETED | OUTPATIENT
Start: 2020-03-22 | End: 2020-03-22

## 2020-03-22 RX ORDER — GUAIFENESIN 600 MG/1
1200 TABLET, EXTENDED RELEASE ORAL 2 TIMES DAILY
Qty: 40 TABLET | Refills: 0 | Status: SHIPPED | OUTPATIENT
Start: 2020-03-22 | End: 2020-03-23 | Stop reason: SDUPTHER

## 2020-03-22 RX ADMIN — LEVOFLOXACIN 750 MG: 500 TABLET, FILM COATED ORAL at 21:26

## 2020-03-22 RX ADMIN — METHYLPREDNISOLONE SODIUM SUCCINATE 125 MG: 125 INJECTION, POWDER, FOR SOLUTION INTRAMUSCULAR; INTRAVENOUS at 19:28

## 2020-03-22 RX ADMIN — IPRATROPIUM BROMIDE AND ALBUTEROL SULFATE 1 AMPULE: .5; 3 SOLUTION RESPIRATORY (INHALATION) at 19:28

## 2020-03-22 RX ADMIN — SODIUM CHLORIDE 1000 ML: 9 INJECTION, SOLUTION INTRAVENOUS at 19:28

## 2020-03-22 ASSESSMENT — ENCOUNTER SYMPTOMS
SORE THROAT: 0
VOMITING: 0
APNEA: 0
ABDOMINAL PAIN: 0
ABDOMINAL DISTENTION: 0
CONSTIPATION: 0
RHINORRHEA: 0
SHORTNESS OF BREATH: 0
CHEST TIGHTNESS: 1
EYE PAIN: 0
DIARRHEA: 0
NAUSEA: 0
COLOR CHANGE: 0
WHEEZING: 1
BACK PAIN: 0
SINUS PRESSURE: 0
PHOTOPHOBIA: 0
COUGH: 1

## 2020-03-22 NOTE — ED PROVIDER NOTES
[facility] ED  eMERGENCY dEPARTMENT eNCOUnter      Pt Name: Donzell Sicard  MRN: [id number]  [place] 1955  Date of evaluation: 3/22/2020  Provider: Rita Steven MD    CHIEF COMPLAINT       Chief Complaint   Patient presents with    Shortness of Breath     HX COPD been feeling SOB for the last week          HISTORY OF PRESENT ILLNESS   (Location/Symptom, Timing/Onset,Context/Setting, Quality, Duration, Modifying Factors, Severity)  Note limiting factors. Donzell Sicard is a 60 y.o. male who presents to the emergency department with complaint of shortness of breath, cough with expectoration of purulent sputum. Was seen here 2 days ago and diagnosed with acute exacerbation of COPD, at that time he refused admission. He has history of COPD and is on home oxygen 3 L 24 hours. Denies chest pain, palpitations, orthopnea, PND. He denies fever or chills. Denies any other systemic symptoms. HPI    Nursing Notes were reviewed. REVIEW OF SYSTEMS    (2-9 systems for level 4, 10 or more for level 5)     Review of Systems   Constitutional: Positive for activity change and fatigue. Negative for appetite change, chills and fever. HENT: Positive for congestion. Negative for ear discharge, ear pain, hearing loss, rhinorrhea, sinus pressure and sore throat. Eyes: Negative for photophobia, pain and visual disturbance. Respiratory: Positive for cough, chest tightness and wheezing. Negative for apnea and shortness of breath. Cardiovascular: Negative for chest pain, palpitations and leg swelling. Gastrointestinal: Negative for abdominal distention, abdominal pain, constipation, diarrhea, nausea and vomiting. Endocrine: Negative for cold intolerance, heat intolerance and polyuria. Genitourinary: Negative for dysuria, flank pain, frequency and urgency. Musculoskeletal: Positive for arthralgias and myalgias. Negative for back pain, gait problem and neck stiffness.    Skin: Negative for color change, pallor and rash. Allergic/Immunologic: Negative for food allergies and immunocompromised state. Neurological: Negative for dizziness, tremors, syncope, weakness, light-headedness and headaches. Psychiatric/Behavioral: Negative for agitation, confusion and hallucinations. All other systems reviewed and are negative. Except as noted above the remainder of the review of systems was reviewed and negative.        PAST MEDICAL HISTORY     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    Drug-seeking behavior 4/14/2015    Edema 12/10/2015    Erectile dysfunction     Gastroesophageal reflux disease 12/17/2018    Gout 10/27/2016    History of arthroscopy of both knees 10/27/2016    House dust mite allergy 4/21/2014    Hyperlipidemia     Hypertension 10/27/2016    Injury to heart 10/27/2016    Insomnia 12/17/2018    Medical non-compliance 2/22/2014    Morbid obesity due to excess calories ([address] 12/8/2016    Osteoarthritis of both knees 12/8/2016    Personal history of tobacco use     Pneumonia 12/04/2016    caused hospital admission    Pre-diabetes 10/27/2016    Seasonal allergies 4/21/2014    Severe persistent asthma 10/27/2016    Severe sleep apnea 4/14/2015    Supraventricular tachycardia ([address] 10/27/2016    SVT [facility]supraventricular tachycardia) ([facility])          SURGICAL HISTORY       Past Surgical History:   Procedure Laterality Date    ANTERIOR CRUCIATE LIGAMENT REPAIR      CARDIAC CATHETERIZATION      TOTAL KNEE ARTHROPLASTY Left 8/9/2019    LEFT TOTAL KNEE ARTHROPLASTY performed by Tomi Tolliver MD at [address] [place] [address]       Discharge Medication List as of 3/22/2020  9:16 PM      CONTINUE these medications which have NOT CHANGED    Details   azithromycin (ZITHROMAX) 250 MG tablet for Erectile Dysfunction, Disp-10 tablet, R-5Normal      melatonin 1 MG tablet Take 1 tablet by mouth nightly as needed for Sleep, Disp-90 tablet, R-1Normal      Handicap Placard MISC Starting 2019, Disp-1 each, R-0, PrintDX: OSTEOARTHRITIS OF BOTH KNEES (M17.0), COPD (J44.9)     EXPIRES: 2024       !! - Potential duplicate medications found. Please discuss with provider. ALLERGIES     Fish-derived products;  Iodine; and Pcn [penicillins]    FAMILY HISTORY       Family History   Problem Relation Age of Onset    Arthritis Mother     Asthma Mother     High Cholesterol Mother     Other Mother         aneurysm    Diabetes Father     Stroke Maternal Grandmother     Cancer Maternal Grandfather     Hypertension Other     COPD Neg Hx           SOCIAL HISTORY       Social History     Socioeconomic History    Marital status: Single     Spouse name: n/a    Number of children: 1    Years of education: 15    Highest education level: Not on file   Occupational History    Occupation: Disability     Employer: NONE   Social Needs    Financial resource strain: Not on file    Food insecurity     Worry: Not on file     Inability: Not on file   [employer] needs     Medical: Not on file     Non-medical: Not on file   Tobacco Use    Smoking status: Current Every Day Smoker     Packs/day: 0.50     Years: 30.00     Pack years: 15.00     Types: Cigarettes, Cigars     Start date: 1988     Last attempt to quit: 10/1/2013     Years since quittin.4    Smokeless tobacco: Never Used   Substance and Sexual Activity    Alcohol use: Not Currently     Comment: sober since 2018    Drug use: Not Currently     Types: Cocaine, Marijuana     Comment: sober since 2018    Sexual activity: Not Currently     Partners: Female   Lifestyle    Physical activity     Days per week: Not on file     Minutes per session: Not on file    Stress: Not on file   Relationships    Social connections     Talks on phone: Not on file     Gets together: Not on file     Attends Quaker service: Not on file     Active member of club or organization: Not on file     Attends meetings of clubs or organizations: Not on file     Relationship status: Not on file    Intimate partner violence     Fear of current or ex partner: Not on file     Emotionally abused: Not on file     Physically abused: Not on file     Forced sexual activity: Not on file   Other Topics Concern    Not on file   Social History Narrative    Not on file       SCREENINGS             PHYSICAL EXAM    (up to 7 for level 4, 8 or more for level 5)     ED Triage Vitals [03/22/20 1908]   BP Temp Temp Source Pulse Resp SpO2 Height Weight   (!) 168/85 98.2 °F (36.8 °C) Oral 104 24 90 % 6' 6\" (1.981 m) (!) 333 lb (151 kg)       Physical Exam  Vitals signs and nursing note reviewed. Constitutional:       General: He is not in acute distress. Appearance: He is well-developed. He is obese. He is not ill-appearing, toxic-appearing or diaphoretic. HENT:      Head: Normocephalic and atraumatic. Nose: Nose normal. No congestion or rhinorrhea. Mouth/Throat:      Mouth: Mucous membranes are moist.      Pharynx: Oropharynx is clear. No oropharyngeal exudate or posterior oropharyngeal erythema. Eyes:      General: No scleral icterus. Right eye: No discharge. Left eye: No discharge. Extraocular Movements: Extraocular movements intact. Conjunctiva/sclera: Conjunctivae normal.      Pupils: Pupils are equal, round, and reactive to light. Neck:      Musculoskeletal: Normal range of motion and neck supple. No neck rigidity or muscular tenderness. Thyroid: No thyromegaly. Vascular: No carotid bruit or JVD. Trachea: No tracheal deviation. Cardiovascular:      Rate and Rhythm: Normal rate and regular rhythm. Heart sounds: Normal heart sounds. No murmur. No friction rub. No gallop.     Pulmonary:      Effort: Pulmonary effort is normal. No respiratory distress. Breath sounds: No stridor. Wheezing present. No rhonchi or rales. Comments: Prolonged expiration  Chest:      Chest wall: No tenderness. Abdominal:      General: Bowel sounds are normal. There is no distension. Palpations: Abdomen is soft. There is no mass. Tenderness: There is no abdominal tenderness. There is no right CVA tenderness, left CVA tenderness, guarding or rebound. Hernia: No hernia is present. Musculoskeletal: Normal range of motion. General: No swelling, tenderness, deformity or signs of injury. Right lower leg: No edema. Left lower leg: No edema. Lymphadenopathy:      Cervical: No cervical adenopathy. Skin:     General: Skin is warm and dry. Capillary Refill: Capillary refill takes less than 2 seconds. Coloration: Skin is not jaundiced or pale. Findings: No bruising, erythema, lesion or rash. Neurological:      Mental Status: He is alert and oriented to person, place, and time. Cranial Nerves: No cranial nerve deficit. Sensory: No sensory deficit. Motor: No weakness or abnormal muscle tone. Coordination: Coordination normal.      Gait: Gait normal.      Deep Tendon Reflexes: Reflexes are normal and symmetric. Reflexes normal.   Psychiatric:         Behavior: Behavior normal.         Thought Content: Thought content normal.         Judgment: Judgment normal.         DIAGNOSTIC RESULTS     EKG: All EKG's are interpreted by the Emergency Department Physician who either signs or Co-signs this chart in the absence of a cardiologist.    Twelve-lead EKG shows normal sinus rhythm, rate 89 bpm, normal electrical axis, normal intervals, no acute ST-T wave changes.     RADIOLOGY:   Non-plain film images such as CT, Ultrasound and MRI are read by the radiologist. Iline Elliot radiographicimages are visualized and preliminarily interpreted by the emergency physician with the below findings:    Chest x-ray showed no acute cardiopulmonary process. Interpretation per the Radiologist below, if available at the time of this note:    XR CHEST PORTABLE   Final Result    [facility]. RADIOGRAPHIC FINDINGS OF COPD            ED BEDSIDE ULTRASOUND:   Performed by ED Physician - none    LABS:  Labs Reviewed   COMPREHENSIVE METABOLIC PANEL - Abnormal; Notable for the following components:       Result Value    Glucose 110 (*)     Globulin 3.8 (*)     All other components within normal limits   CBC WITH AUTO DIFFERENTIAL - Abnormal; Notable for the following components:    WBC 15.8 (*)     RBC 3.81 (*)     Hemoglobin 11.4 (*)     Hematocrit 35.7 (*)     MCHC 31.9 (*)     RDW 17.8 (*)     Platelets 524 (*)     Neutrophils Absolute 12.6 (*)     Monocytes Absolute 1.0 (*)     All other components within normal limits   RAPID INFLUENZA A/B ANTIGENS   CULTURE, BLOOD 1   LACTIC ACID, PLASMA   MAGNESIUM   TROPONIN       All other labs were within normal range or not returned as of this dictation. EMERGENCY DEPARTMENT COURSE and DIFFERENTIALDIAGNOSIS/MDM:   Vitals:    Vitals:    03/22/20 1908 03/22/20 2219   BP: (!) 168/85    Pulse: 104 94   Resp: 24 20   Temp: 98.2 °F (36.8 °C) 98.4 °F (36.9 °C)   TempSrc: Oral Oral   SpO2: 90% 92%   Weight: (!) 333 lb (151 kg)    Height: 6' 6\" (1.981 m)            MDM  Number of Diagnoses or Management Options     Amount and/or Complexity of Data Reviewed  Clinical lab tests: reviewed and ordered  Tests in the radiology section of CPT®: reviewed and ordered  Tests in the medicine section of CPT®: ordered and reviewed    Risk of Complications, Morbidity, and/or Mortality  Presenting problems: moderate  Diagnostic procedures: moderate  Management options: moderate    Patient Progress  Patient progress: improved      CRITICAL CARE TIME   Total Critical Care time was  minutes, excluding separately reportable procedures.   There was a high probability of clinically significant/life threatening deterioration in the patient's condition which required my urgentintervention. CONSULTS:  None    PROCEDURES:  Unless otherwise noted below, none     Procedures    FINAL IMPRESSION      1. Acute bronchitis, unspecified organism    2.  COPD exacerbation [facility])          DISPOSITION/PLAN   DISPOSITION        PATIENT REFERRED TO:  Asher Santiago MD  [address]  [phone]    In 3 days        DISCHARGE MEDICATIONS:  Discharge Medication List as of 3/22/2020  9:16 PM      START taking these medications    Details   levoFLOXacin (LEVAQUIN) 750 MG tablet Take 1 tablet by mouth daily for 7 days, Disp-7 tablet, R-0Print      predniSONE (DELTASONE) 10 MG tablet Take 6 tablets by mouth daily for 5 doses, Disp-30 tablet, R-0Print      guaiFENesin (MUCINEX) 600 MG extended release tablet Take 2 tablets by mouth 2 times daily for 10 days, Disp-40 tablet, R-0Print                (Please note that portions of this note were completed with a voice recognitionprogram.  Efforts were made to edit the dictations but occasionally words are mis-transcribed.)    Jayla Mckeon MD (electronically signed)  Attending Emergency Physician         Jyala Mckeon MD  03/23/20 5256

## 2020-03-23 ENCOUNTER — CARE COORDINATION (OUTPATIENT)
Dept: CARE COORDINATION | Age: 65
End: 2020-03-23

## 2020-03-23 LAB
BLOOD CULTURE, ROUTINE: NORMAL
CULTURE, BLOOD 2: NORMAL

## 2020-03-23 RX ORDER — METHYLPREDNISOLONE 4 MG/1
TABLET ORAL
Qty: 1 KIT | Refills: 0 | Status: SHIPPED | OUTPATIENT
Start: 2020-03-23 | End: 2020-04-27 | Stop reason: ALTCHOICE

## 2020-03-23 RX ORDER — UREA 10 %
1 LOTION (ML) TOPICAL NIGHTLY PRN
Qty: 90 TABLET | Refills: 1 | Status: SHIPPED | OUTPATIENT
Start: 2020-03-23 | End: 2020-06-09

## 2020-03-23 RX ORDER — SILDENAFIL 100 MG/1
100 TABLET, FILM COATED ORAL DAILY PRN
Qty: 10 TABLET | Refills: 5 | Status: SHIPPED | OUTPATIENT
Start: 2020-03-23 | End: 2020-06-29

## 2020-03-23 RX ORDER — AMLODIPINE BESYLATE 10 MG/1
10 TABLET ORAL DAILY
Qty: 90 TABLET | Refills: 1 | Status: SHIPPED | OUTPATIENT
Start: 2020-03-23 | End: 2020-07-14

## 2020-03-23 RX ORDER — LEVOFLOXACIN 750 MG/1
750 TABLET ORAL DAILY
Qty: 7 TABLET | Refills: 0 | Status: SHIPPED | OUTPATIENT
Start: 2020-03-23 | End: 2020-03-30

## 2020-03-23 RX ORDER — BUDESONIDE AND FORMOTEROL FUMARATE DIHYDRATE 160; 4.5 UG/1; UG/1
2 AEROSOL RESPIRATORY (INHALATION) 2 TIMES DAILY
Qty: 3 INHALER | Refills: 1 | Status: SHIPPED | OUTPATIENT
Start: 2020-03-23 | End: 2020-12-15 | Stop reason: SDUPTHER

## 2020-03-23 RX ORDER — PANTOPRAZOLE SODIUM 40 MG/1
40 TABLET, DELAYED RELEASE ORAL
Qty: 30 TABLET | Refills: 5 | Status: SHIPPED | OUTPATIENT
Start: 2020-03-23 | End: 2020-09-29

## 2020-03-23 RX ORDER — ALBUTEROL SULFATE 90 UG/1
2 AEROSOL, METERED RESPIRATORY (INHALATION) EVERY 6 HOURS PRN
Qty: 1 INHALER | Refills: 3 | Status: SHIPPED | OUTPATIENT
Start: 2020-03-23 | End: 2020-07-30 | Stop reason: SDUPTHER

## 2020-03-23 RX ORDER — LOVASTATIN 10 MG/1
10 TABLET ORAL NIGHTLY
Qty: 90 TABLET | Refills: 1 | Status: SHIPPED | OUTPATIENT
Start: 2020-03-23 | End: 2020-08-21

## 2020-03-23 RX ORDER — ACLIDINIUM BROMIDE 400 UG/1
1 POWDER, METERED RESPIRATORY (INHALATION) 2 TIMES DAILY
Qty: 3 EACH | Refills: 1 | Status: SHIPPED | OUTPATIENT
Start: 2020-03-23 | End: 2020-06-09 | Stop reason: SDUPTHER

## 2020-03-23 RX ORDER — AZITHROMYCIN 250 MG/1
TABLET, FILM COATED ORAL
Qty: 1 PACKET | Refills: 0 | Status: ON HOLD | OUTPATIENT
Start: 2020-03-23 | End: 2020-05-27 | Stop reason: HOSPADM

## 2020-03-23 RX ORDER — GUAIFENESIN 600 MG/1
1200 TABLET, EXTENDED RELEASE ORAL 2 TIMES DAILY
Qty: 40 TABLET | Refills: 0 | Status: SHIPPED | OUTPATIENT
Start: 2020-03-23 | End: 2020-04-02

## 2020-03-23 RX ORDER — FLUTICASONE PROPIONATE 50 MCG
2 SPRAY, SUSPENSION (ML) NASAL DAILY
Qty: 3 BOTTLE | Refills: 1 | Status: SHIPPED | OUTPATIENT
Start: 2020-03-23 | End: 2020-06-09 | Stop reason: SDUPTHER

## 2020-03-23 RX ORDER — MONTELUKAST SODIUM 10 MG/1
10 TABLET ORAL NIGHTLY
Qty: 90 TABLET | Refills: 1 | Status: SHIPPED | OUTPATIENT
Start: 2020-03-23 | End: 2020-12-08

## 2020-03-23 RX ORDER — IPRATROPIUM BROMIDE AND ALBUTEROL SULFATE 2.5; .5 MG/3ML; MG/3ML
1 SOLUTION RESPIRATORY (INHALATION) EVERY 6 HOURS PRN
Qty: 180 ML | Refills: 0 | Status: SHIPPED | OUTPATIENT
Start: 2020-03-23 | End: 2020-04-13

## 2020-03-23 RX ORDER — PREDNISONE 10 MG/1
60 TABLET ORAL DAILY
Qty: 30 TABLET | Refills: 0 | Status: SHIPPED | OUTPATIENT
Start: 2020-03-23 | End: 2020-03-28

## 2020-03-23 RX ORDER — ALBUTEROL SULFATE 90 UG/1
2 AEROSOL, METERED RESPIRATORY (INHALATION) EVERY 6 HOURS PRN
Qty: 18 G | Refills: 1 | Status: SHIPPED | OUTPATIENT
Start: 2020-03-23 | End: 2020-04-27 | Stop reason: SDUPTHER

## 2020-03-23 RX ORDER — TRAZODONE HYDROCHLORIDE 50 MG/1
50 TABLET ORAL NIGHTLY
Qty: 90 TABLET | Refills: 1 | Status: SHIPPED | OUTPATIENT
Start: 2020-03-23 | End: 2020-12-15 | Stop reason: SDUPTHER

## 2020-03-23 NOTE — CARE COORDINATION
1 tablet (250 mg) once daily on Days 2 through 5. 3/18/20   Mary Damon MD   albuterol sulfate HFA (PROVENTIL HFA) 108 (90 Base) MCG/ACT inhaler Inhale 2 puffs into the lungs every 6 hours as needed for Wheezing 3/18/20   Mary Damon MD   albuterol (PROVENTIL) (5 MG/ML) 0.5% nebulizer solution Take 0.5 mLs by nebulization every 6 hours as needed for Wheezing 3/18/20   Mary Damon MD   methylPREDNISolone (MEDROL, PORTER,) 4 MG tablet Take by mouth.  3/18/20   Mary Damon MD   ipratropium-albuterol (DUONEB) 0.5-2.5 (3) MG/3ML SOLN nebulizer solution Take 3 mLs by nebulization every 6 hours as needed for Shortness of Breath 3/5/20   [place], MD   lovastatin (MEVACOR) 10 MG tablet Take 1 tablet by mouth nightly 2/18/20   [place], MD   albuterol sulfate  (90 Base) MCG/ACT inhaler Inhale 2 puffs into the lungs every 6 hours as needed for Wheezing or Shortness of Breath 2/10/20   [place], MD   traZODone (DESYREL) 50 MG tablet Take 1 tablet by mouth nightly 2/10/20   [place], MD   pantoprazole (PROTONIX) 40 MG tablet Take 1 tablet by mouth every morning (before breakfast) 1/8/20   [facility], MD   montelukast (SINGULAIR) 10 MG tablet Take 1 tablet by mouth nightly 12/17/19   VITALIY Rosenberg   aclidinium (TUDORZA PRESSAIR) 400 MCG/ACT AEPB inhaler Inhale 1 puff into the lungs 2 times daily 12/17/19   [place], MD   amLODIPine (NORVASC) 10 MG tablet Take 1 tablet by mouth daily 12/17/19   [place], MD   budesonide-formoterol [facility]) 160-4.5 MCG/ACT AERO Inhale 2 puffs into the lungs 2 times daily 12/17/19   [place], MD   fluticasone (FLONASE) 50 MCG/ACT nasal spray 2 sprays by Nasal route daily 12/17/19   [place], MD   metoprolol tartrate (LOPRESSOR) 25 MG tablet Take 1 tablet by mouth 2 times daily 12/17/19   [place], MD   sildenafil (VIAGRA) 100 MG tablet Take 1 tablet by mouth daily as needed for Erectile

## 2020-03-23 NOTE — TELEPHONE ENCOUNTER
Patient requesting medication refill. Please approve or deny this request.    Patient is now living in [facility] and is requesting 90 day supplies of his medications. Rx requested:  Requested Prescriptions     Pending Prescriptions Disp Refills    albuterol sulfate HFA (PROVENTIL HFA) 108 (90 Base) MCG/ACT inhaler 1 Inhaler 3     Sig: Inhale 2 puffs into the lungs every 6 hours as needed for Wheezing    albuterol sulfate  (90 Base) MCG/ACT inhaler 18 g 1     Sig: Inhale 2 puffs into the lungs every 6 hours as needed for Wheezing or Shortness of Breath    aclidinium (TUDORZA PRESSAIR) 400 MCG/ACT AEPB inhaler 3 each 1     Sig: Inhale 1 puff into the lungs 2 times daily    albuterol (PROVENTIL) (5 MG/ML) 0.5% nebulizer solution 120 each 0     Sig: Take 0.5 mLs by nebulization every 6 hours as needed for Wheezing    traZODone (DESYREL) 50 MG tablet 90 tablet 1     Sig: Take 1 tablet by mouth nightly    sildenafil (VIAGRA) 100 MG tablet 10 tablet 5     Sig: Take 1 tablet by mouth daily as needed for Erectile Dysfunction    predniSONE (DELTASONE) 10 MG tablet 30 tablet 0     Sig: Take 6 tablets by mouth daily for 5 doses    azithromycin (ZITHROMAX) 250 MG tablet 1 packet 0     Sig: Take 2 tablets (500 mg) on Day 1, followed by 1 tablet (250 mg) once daily on Days 2 through 5.  pantoprazole (PROTONIX) 40 MG tablet 30 tablet 5     Sig: Take 1 tablet by mouth every morning (before breakfast)    montelukast (SINGULAIR) 10 MG tablet 90 tablet 1     Sig: Take 1 tablet by mouth nightly    metoprolol tartrate (LOPRESSOR) 25 MG tablet 60 tablet 3     Sig: Take 1 tablet by mouth 2 times daily    methylPREDNISolone (MEDROL, PORTER,) 4 MG tablet 1 kit 0     Sig: Take by mouth.     melatonin 1 MG tablet 90 tablet 1     Sig: Take 1 tablet by mouth nightly as needed for Sleep    lovastatin (MEVACOR) 10 MG tablet 90 tablet 1     Sig: Take 1 tablet by mouth nightly    levoFLOXacin (LEVAQUIN) 750 MG tablet 7 tablet 0 Sig: Take 1 tablet by mouth daily for 7 days    ipratropium-albuterol (DUONEB) 0.5-2.5 (3) MG/3ML SOLN nebulizer solution 180 mL 0     Sig: Take 3 mLs by nebulization every 6 hours as needed for Shortness of Breath    guaiFENesin (MUCINEX) 600 MG extended release tablet 40 tablet 0     Sig: Take 2 tablets by mouth 2 times daily for 10 days    fluticasone (FLONASE) 50 MCG/ACT nasal spray 3 Bottle 1     Si sprays by Nasal route daily    budesonide-formoterol (SYMBICORT) 160-4.5 MCG/ACT AERO 3 Inhaler 1     Sig: Inhale 2 puffs into the lungs 2 times daily    amLODIPine (NORVASC) 10 MG tablet 90 tablet 1     Sig: Take 1 tablet by mouth daily         Last Office Visit:   2/10/2020      Next Visit Date:  Future Appointments   Date Time Provider Constance Espitia   2020  5:40 PM [place] [facility]

## 2020-03-28 LAB — BLOOD CULTURE, ROUTINE: NORMAL

## 2020-04-01 ENCOUNTER — CARE COORDINATION (OUTPATIENT)
Dept: CARE COORDINATION | Age: 65
End: 2020-04-01

## 2020-04-01 NOTE — CARE COORDINATION
You Patient resolved from the Care Transitions episode on  4/1/2020  Patient/family has been provided the following resources and education related to COVID-19:                         Signs, symptoms and red flags related to COVID-19            CDC exposure and quarantine guidelines            Conduit exposure contact - [phone]            Contact for their local Department of Health                 Patient currently reports that the following symptoms have improved:  no new/worsening symptoms     No further outreach scheduled with this CTN/ACM. Episode of Care resolved. Patient has this CTN/ACM contact information if future needs arise.

## 2020-04-23 ENCOUNTER — CARE COORDINATION (OUTPATIENT)
Dept: CARE COORDINATION | Age: 65
End: 2020-04-23

## 2020-04-23 NOTE — CARE COORDINATION
Leena Montilla tells me that his heartburn \"is acting up again. \"  He asks if I can have Dr Jackie Hubbard reorder his \"stomach pills. \"  I reviewed the chart and made him aware that he does have pantoprazole order and there are 5 refills. He tells me that he threw the bottle out. He will call the pharmacy to have them refill.

## 2020-04-27 ENCOUNTER — TELEPHONE (OUTPATIENT)
Dept: FAMILY MEDICINE CLINIC | Age: 65
End: 2020-04-27

## 2020-04-27 PROBLEM — Z91.013 ALLERGY TO SEAFOOD: Status: ACTIVE | Noted: 2018-05-20

## 2020-04-27 PROBLEM — F32.A DEPRESSION: Status: ACTIVE | Noted: 2020-04-27

## 2020-04-27 RX ORDER — IPRATROPIUM BROMIDE AND ALBUTEROL SULFATE 2.5; .5 MG/3ML; MG/3ML
1 SOLUTION RESPIRATORY (INHALATION) EVERY 6 HOURS PRN
Qty: 180 ML | Refills: 1 | Status: SHIPPED | OUTPATIENT
Start: 2020-04-27 | End: 2020-05-14

## 2020-05-13 NOTE — TELEPHONE ENCOUNTER
pharmacy requesting medication refill.  Please approve or deny this request.    Rx requested:  Requested Prescriptions     Pending Prescriptions Disp Refills    ipratropium-albuterol (DUONEB) 0.5-2.5 (3) MG/3ML SOLN nebulizer solution [Pharmacy Med Name: IPRAT-ALBUT 0.5-3(2.5) MG/3 ML] 180 mL 1     Sig: INHALE 1 VIAL VIA NEBULIZER EVERY 6 HOURS AS NEEDED FOR SHORTNESS OF BREATH         Last Office Visit:   2/10/2020      Next Visit Date:  Future Appointments   Date Time Provider Department Center   6/11/2020  5:40 PM MD [facility]   6/15/2020  3:40 PM MD [facility] [address]

## 2020-05-14 RX ORDER — IPRATROPIUM BROMIDE AND ALBUTEROL SULFATE 2.5; .5 MG/3ML; MG/3ML
SOLUTION RESPIRATORY (INHALATION)
Qty: 180 ML | Refills: 1 | Status: SHIPPED | OUTPATIENT
Start: 2020-05-14 | End: 2020-07-14 | Stop reason: SDUPTHER

## 2020-05-22 ENCOUNTER — CARE COORDINATION (OUTPATIENT)
Dept: CASE MANAGEMENT | Age: 65
End: 2020-05-22

## 2020-05-22 NOTE — CARE COORDINATION
Although there have not been reports of pets or other animals becoming sick with COVID-19, it is still recommended that people sick with COVID-19 limit contact with animals until more information is known about the virus. When possible, have another member of your household care for your animals while you are sick. If you are sick with COVID-19, avoid contact with your pet, including petting, snuggling, being kissed or licked, and sharing food. If you must care for your pet or be around animals while you are sick, wash your hands before and after you interact with pets and wear a facemask. Call ahead before visiting your doctor  If you have a medical appointment, call the healthcare provider and tell them that you have or may have COVID-19. This will help the healthcare providers office take steps to keep other people from getting infected or exposed. Wear a facemask  You should wear a facemask when you are around other people (e.g., sharing a room or vehicle) or pets and before you enter a healthcare providers office. If you are not able to wear a facemask (for example, because it causes trouble breathing), then people who live with you should not stay in the same room with you, or they should wear a facemask if they enter your room. Cover your coughs and sneezes  Cover your mouth and nose with a tissue when you cough or sneeze. Throw used tissues in a lined trash can. Immediately wash your hands with soap and water for at least 20 seconds or, if soap and water are not available, clean your hands with an alcohol-based hand  that contains at least 60% alcohol. Clean your hands often  Wash your hands often with soap and water for at least 20 seconds, especially after blowing your nose, coughing, or sneezing; going to the bathroom; and before eating or preparing food.  If soap and water are not readily available, use an alcohol-based hand  with at least 60% alcohol, covering all surfaces of your emergency medical services arrive. Discontinuing home isolation  Patients with confirmed COVID-19 should remain under home isolation precautions until the risk of secondary transmission to others is thought to be low. The decision to discontinue home isolation precautions should be made on a case-by-case basis, in consultation with healthcare providers and state and local health departments.

## 2020-05-25 ENCOUNTER — HOSPITAL ENCOUNTER (INPATIENT)
Age: 65
LOS: 2 days | Discharge: HOME | DRG: 192 | End: 2020-05-27
Attending: EMERGENCY MEDICINE | Admitting: INTERNAL MEDICINE
Payer: MEDICARE

## 2020-05-25 ENCOUNTER — APPOINTMENT (OUTPATIENT)
Dept: GENERAL RADIOLOGY | Age: 65
DRG: 192 | End: 2020-05-25
Payer: MEDICARE

## 2020-05-25 PROBLEM — J45.901 ASTHMA WITH ACUTE EXACERBATION: Status: ACTIVE | Noted: 2020-05-25

## 2020-05-25 LAB
ALBUMIN SERPL-MCNC: 3.2 G/DL (ref 3.5–4.6)
ALP BLD-CCNC: 101 U/L (ref 35–104)
ALT SERPL-CCNC: 16 U/L (ref 0–41)
AMORPHOUS: NORMAL
AMPHETAMINE SCREEN, URINE: ABNORMAL
ANION GAP SERPL CALCULATED.3IONS-SCNC: 13 MEQ/L (ref 9–15)
AST SERPL-CCNC: 13 U/L (ref 0–40)
BARBITURATE SCREEN URINE: ABNORMAL
BASOPHILS ABSOLUTE: 0.1 K/UL (ref 0–0.2)
BASOPHILS RELATIVE PERCENT: 0.3 %
BENZODIAZEPINE SCREEN, URINE: ABNORMAL
BILIRUB SERPL-MCNC: 1.2 MG/DL (ref 0.2–0.7)
BILIRUBIN URINE: ABNORMAL
BLOOD, URINE: NEGATIVE
BUN BLDV-MCNC: 10 MG/DL (ref 8–23)
CALCIUM SERPL-MCNC: 9.7 MG/DL (ref 8.5–9.9)
CANNABINOID SCREEN URINE: ABNORMAL
CHLORIDE BLD-SCNC: 101 MEQ/L (ref 95–107)
CLARITY: CLEAR
CO2: 25 MEQ/L (ref 20–31)
COCAINE METABOLITE SCREEN URINE: POSITIVE
COLOR: ABNORMAL
CREAT SERPL-MCNC: 0.97 MG/DL (ref 0.7–1.2)
EKG ATRIAL RATE: 82 BPM
EKG P AXIS: 67 DEGREES
EKG P-R INTERVAL: 146 MS
EKG Q-T INTERVAL: 372 MS
EKG QRS DURATION: 102 MS
EKG QTC CALCULATION (BAZETT): 434 MS
EKG R AXIS: 53 DEGREES
EKG T AXIS: 51 DEGREES
EKG VENTRICULAR RATE: 82 BPM
EOSINOPHILS ABSOLUTE: 0.2 K/UL (ref 0–0.7)
EOSINOPHILS RELATIVE PERCENT: 1.4 %
EPITHELIAL CELLS, UA: NORMAL /HPF
GFR AFRICAN AMERICAN: >60
GFR NON-AFRICAN AMERICAN: >60
GLOBULIN: 4 G/DL (ref 2.3–3.5)
GLUCOSE BLD-MCNC: 107 MG/DL (ref 70–99)
GLUCOSE URINE: NEGATIVE MG/DL
HCT VFR BLD CALC: 37.9 % (ref 42–52)
HEMOGLOBIN: 12.2 G/DL (ref 14–18)
INR BLD: 1.1
KETONES, URINE: 40 MG/DL
LACTIC ACID: 1.1 MMOL/L (ref 0.5–2.2)
LEUKOCYTE ESTERASE, URINE: NEGATIVE
LYMPHOCYTES ABSOLUTE: 1.5 K/UL (ref 1–4.8)
LYMPHOCYTES RELATIVE PERCENT: 8.7 %
Lab: ABNORMAL
MAGNESIUM: 2 MG/DL (ref 1.7–2.4)
MCH RBC QN AUTO: 30.6 PG (ref 27–31.3)
MCHC RBC AUTO-ENTMCNC: 32.1 % (ref 33–37)
MCV RBC AUTO: 95.3 FL (ref 80–100)
MONOCYTES ABSOLUTE: 1.7 K/UL (ref 0.2–0.8)
MONOCYTES RELATIVE PERCENT: 10.4 %
MUCUS: PRESENT /LPF
NEUTROPHILS ABSOLUTE: 13.3 K/UL (ref 1.4–6.5)
NEUTROPHILS RELATIVE PERCENT: 79.2 %
NITRITE, URINE: NEGATIVE
OPIATE SCREEN URINE: ABNORMAL
PDW BLD-RTO: 16.8 % (ref 11.5–14.5)
PH UA: 5.5 (ref 5–9)
PHENCYCLIDINE SCREEN URINE: ABNORMAL
PLATELET # BLD: 434 K/UL (ref 130–400)
POTASSIUM SERPL-SCNC: 3.4 MEQ/L (ref 3.4–4.9)
PRO-BNP: 93 PG/ML
PROTEIN UA: 100 MG/DL
PROTHROMBIN TIME: 14.1 SEC (ref 12.3–14.9)
RBC # BLD: 3.98 M/UL (ref 4.7–6.1)
RBC UA: NORMAL /HPF (ref 0–2)
SARS-COV-2, NAAT: NOT DETECTED
SODIUM BLD-SCNC: 139 MEQ/L (ref 135–144)
SPECIFIC GRAVITY UA: 1.02 (ref 1–1.03)
TOTAL PROTEIN: 7.2 G/DL (ref 6.3–8)
TRICYCLIC, URINE: ABNORMAL
TROPONIN: 0.01 NG/ML (ref 0–0.01)
TROPONIN: <0.01 NG/ML (ref 0–0.01)
TROPONIN: <0.01 NG/ML (ref 0–0.01)
UROBILINOGEN, URINE: >=8 E.U./DL
WBC # BLD: 16.8 K/UL (ref 4.8–10.8)

## 2020-05-25 PROCEDURE — 83880 ASSAY OF NATRIURETIC PEPTIDE: CPT

## 2020-05-25 PROCEDURE — 81001 URINALYSIS AUTO W/SCOPE: CPT

## 2020-05-25 PROCEDURE — 36415 COLL VENOUS BLD VENIPUNCTURE: CPT

## 2020-05-25 PROCEDURE — 6370000000 HC RX 637 (ALT 250 FOR IP): Performed by: INTERNAL MEDICINE

## 2020-05-25 PROCEDURE — 80053 COMPREHEN METABOLIC PANEL: CPT

## 2020-05-25 PROCEDURE — 94640 AIRWAY INHALATION TREATMENT: CPT

## 2020-05-25 PROCEDURE — 96375 TX/PRO/DX INJ NEW DRUG ADDON: CPT

## 2020-05-25 PROCEDURE — 83735 ASSAY OF MAGNESIUM: CPT

## 2020-05-25 PROCEDURE — 6370000000 HC RX 637 (ALT 250 FOR IP): Performed by: EMERGENCY MEDICINE

## 2020-05-25 PROCEDURE — U0002 COVID-19 LAB TEST NON-CDC: HCPCS

## 2020-05-25 PROCEDURE — 84484 ASSAY OF TROPONIN QUANT: CPT

## 2020-05-25 PROCEDURE — 71045 X-RAY EXAM CHEST 1 VIEW: CPT

## 2020-05-25 PROCEDURE — 83605 ASSAY OF LACTIC ACID: CPT

## 2020-05-25 PROCEDURE — 1210000000 HC MED SURG R&B

## 2020-05-25 PROCEDURE — 99222 1ST HOSP IP/OBS MODERATE 55: CPT | Performed by: INTERNAL MEDICINE

## 2020-05-25 PROCEDURE — 85025 COMPLETE CBC W/AUTO DIFF WBC: CPT

## 2020-05-25 PROCEDURE — 93010 ELECTROCARDIOGRAM REPORT: CPT | Performed by: INTERNAL MEDICINE

## 2020-05-25 PROCEDURE — 93005 ELECTROCARDIOGRAM TRACING: CPT

## 2020-05-25 PROCEDURE — 6360000002 HC RX W HCPCS: Performed by: EMERGENCY MEDICINE

## 2020-05-25 PROCEDURE — 2580000003 HC RX 258: Performed by: INTERNAL MEDICINE

## 2020-05-25 PROCEDURE — 85610 PROTHROMBIN TIME: CPT

## 2020-05-25 PROCEDURE — 6360000002 HC RX W HCPCS: Performed by: INTERNAL MEDICINE

## 2020-05-25 PROCEDURE — 80306 DRUG TEST PRSMV INSTRMNT: CPT

## 2020-05-25 PROCEDURE — 2580000003 HC RX 258: Performed by: EMERGENCY MEDICINE

## 2020-05-25 PROCEDURE — 87040 BLOOD CULTURE FOR BACTERIA: CPT

## 2020-05-25 PROCEDURE — 99285 EMERGENCY DEPT VISIT HI MDM: CPT

## 2020-05-25 PROCEDURE — 96365 THER/PROPH/DIAG IV INF INIT: CPT

## 2020-05-25 RX ORDER — IPRATROPIUM BROMIDE AND ALBUTEROL SULFATE 2.5; .5 MG/3ML; MG/3ML
1 SOLUTION RESPIRATORY (INHALATION) ONCE
Status: COMPLETED | OUTPATIENT
Start: 2020-05-25 | End: 2020-05-25

## 2020-05-25 RX ORDER — SODIUM CHLORIDE 0.9 % (FLUSH) 0.9 %
10 SYRINGE (ML) INJECTION PRN
Status: DISCONTINUED | OUTPATIENT
Start: 2020-05-25 | End: 2020-05-27 | Stop reason: HOSPADM

## 2020-05-25 RX ORDER — AMLODIPINE BESYLATE 10 MG/1
10 TABLET ORAL DAILY
Status: DISCONTINUED | OUTPATIENT
Start: 2020-05-25 | End: 2020-05-27 | Stop reason: HOSPADM

## 2020-05-25 RX ORDER — TRAZODONE HYDROCHLORIDE 50 MG/1
50 TABLET ORAL NIGHTLY
Status: DISCONTINUED | OUTPATIENT
Start: 2020-05-25 | End: 2020-05-27 | Stop reason: HOSPADM

## 2020-05-25 RX ORDER — SODIUM CHLORIDE 0.9 % (FLUSH) 0.9 %
10 SYRINGE (ML) INJECTION EVERY 12 HOURS SCHEDULED
Status: DISCONTINUED | OUTPATIENT
Start: 2020-05-25 | End: 2020-05-27 | Stop reason: HOSPADM

## 2020-05-25 RX ORDER — BUDESONIDE AND FORMOTEROL FUMARATE DIHYDRATE 160; 4.5 UG/1; UG/1
2 AEROSOL RESPIRATORY (INHALATION) 2 TIMES DAILY
Status: DISCONTINUED | OUTPATIENT
Start: 2020-05-25 | End: 2020-05-27 | Stop reason: HOSPADM

## 2020-05-25 RX ORDER — PROMETHAZINE HYDROCHLORIDE 12.5 MG/1
12.5 TABLET ORAL EVERY 6 HOURS PRN
Status: DISCONTINUED | OUTPATIENT
Start: 2020-05-25 | End: 2020-05-27 | Stop reason: HOSPADM

## 2020-05-25 RX ORDER — ONDANSETRON 2 MG/ML
4 INJECTION INTRAMUSCULAR; INTRAVENOUS EVERY 6 HOURS PRN
Status: DISCONTINUED | OUTPATIENT
Start: 2020-05-25 | End: 2020-05-27 | Stop reason: HOSPADM

## 2020-05-25 RX ORDER — MONTELUKAST SODIUM 10 MG/1
10 TABLET ORAL NIGHTLY
Status: DISCONTINUED | OUTPATIENT
Start: 2020-05-25 | End: 2020-05-27 | Stop reason: HOSPADM

## 2020-05-25 RX ORDER — METHYLPREDNISOLONE SODIUM SUCCINATE 40 MG/ML
40 INJECTION, POWDER, LYOPHILIZED, FOR SOLUTION INTRAMUSCULAR; INTRAVENOUS EVERY 8 HOURS
Status: DISCONTINUED | OUTPATIENT
Start: 2020-05-25 | End: 2020-05-27 | Stop reason: HOSPADM

## 2020-05-25 RX ORDER — IPRATROPIUM BROMIDE AND ALBUTEROL SULFATE 2.5; .5 MG/3ML; MG/3ML
1 SOLUTION RESPIRATORY (INHALATION) 3 TIMES DAILY
Status: DISCONTINUED | OUTPATIENT
Start: 2020-05-25 | End: 2020-05-27 | Stop reason: HOSPADM

## 2020-05-25 RX ORDER — UREA 10 %
1 LOTION (ML) TOPICAL NIGHTLY PRN
Status: DISCONTINUED | OUTPATIENT
Start: 2020-05-25 | End: 2020-05-27 | Stop reason: HOSPADM

## 2020-05-25 RX ORDER — ACETAMINOPHEN 650 MG/1
650 SUPPOSITORY RECTAL EVERY 6 HOURS PRN
Status: DISCONTINUED | OUTPATIENT
Start: 2020-05-25 | End: 2020-05-27 | Stop reason: HOSPADM

## 2020-05-25 RX ORDER — PANTOPRAZOLE SODIUM 40 MG/1
40 TABLET, DELAYED RELEASE ORAL
Status: DISCONTINUED | OUTPATIENT
Start: 2020-05-26 | End: 2020-05-27 | Stop reason: HOSPADM

## 2020-05-25 RX ORDER — METHYLPREDNISOLONE SODIUM SUCCINATE 125 MG/2ML
125 INJECTION, POWDER, LYOPHILIZED, FOR SOLUTION INTRAMUSCULAR; INTRAVENOUS ONCE
Status: COMPLETED | OUTPATIENT
Start: 2020-05-25 | End: 2020-05-25

## 2020-05-25 RX ORDER — IPRATROPIUM BROMIDE AND ALBUTEROL SULFATE 2.5; .5 MG/3ML; MG/3ML
1 SOLUTION RESPIRATORY (INHALATION) EVERY 4 HOURS PRN
Status: DISCONTINUED | OUTPATIENT
Start: 2020-05-25 | End: 2020-05-27 | Stop reason: HOSPADM

## 2020-05-25 RX ORDER — POLYETHYLENE GLYCOL 3350 17 G/17G
17 POWDER, FOR SOLUTION ORAL DAILY PRN
Status: DISCONTINUED | OUTPATIENT
Start: 2020-05-25 | End: 2020-05-27 | Stop reason: HOSPADM

## 2020-05-25 RX ORDER — ATORVASTATIN CALCIUM 10 MG/1
10 TABLET, FILM COATED ORAL DAILY
Status: DISCONTINUED | OUTPATIENT
Start: 2020-05-25 | End: 2020-05-27 | Stop reason: HOSPADM

## 2020-05-25 RX ORDER — ACETAMINOPHEN 325 MG/1
650 TABLET ORAL EVERY 6 HOURS PRN
Status: DISCONTINUED | OUTPATIENT
Start: 2020-05-25 | End: 2020-05-27 | Stop reason: HOSPADM

## 2020-05-25 RX ADMIN — IPRATROPIUM BROMIDE AND ALBUTEROL SULFATE 1 AMPULE: .5; 3 SOLUTION RESPIRATORY (INHALATION) at 14:12

## 2020-05-25 RX ADMIN — ATORVASTATIN CALCIUM 10 MG: 10 TABLET, FILM COATED ORAL at 20:04

## 2020-05-25 RX ADMIN — IPRATROPIUM BROMIDE AND ALBUTEROL SULFATE 1 AMPULE: .5; 3 SOLUTION RESPIRATORY (INHALATION) at 17:40

## 2020-05-25 RX ADMIN — ENOXAPARIN SODIUM 40 MG: 40 INJECTION SUBCUTANEOUS at 20:04

## 2020-05-25 RX ADMIN — DEXTROSE MONOHYDRATE 1 G: 50 INJECTION, SOLUTION INTRAVENOUS at 14:54

## 2020-05-25 RX ADMIN — METHYLPREDNISOLONE SODIUM SUCCINATE 125 MG: 125 INJECTION, POWDER, FOR SOLUTION INTRAMUSCULAR; INTRAVENOUS at 13:39

## 2020-05-25 RX ADMIN — MONTELUKAST SODIUM 10 MG: 10 TABLET, FILM COATED ORAL at 20:35

## 2020-05-25 RX ADMIN — ACETAMINOPHEN 650 MG: 325 TABLET ORAL at 18:29

## 2020-05-25 RX ADMIN — IPRATROPIUM BROMIDE AND ALBUTEROL SULFATE 1 AMPULE: .5; 3 SOLUTION RESPIRATORY (INHALATION) at 13:19

## 2020-05-25 RX ADMIN — Medication 10 ML: at 20:04

## 2020-05-25 RX ADMIN — TRAZODONE HYDROCHLORIDE 50 MG: 50 TABLET ORAL at 20:04

## 2020-05-25 ASSESSMENT — ENCOUNTER SYMPTOMS
BACK PAIN: 0
FACIAL SWELLING: 0
DIARRHEA: 0
ABDOMINAL PAIN: 0
CHEST TIGHTNESS: 0
VOMITING: 0
COUGH: 0
CHOKING: 0
SORE THROAT: 0
COUGH: 1
CONSTIPATION: 0
EYE PAIN: 0
EYE REDNESS: 0
WHEEZING: 0
NAUSEA: 0
SHORTNESS OF BREATH: 1
STRIDOR: 0
WHEEZING: 1
SINUS PRESSURE: 0
VOICE CHANGE: 0
EYE DISCHARGE: 0
COLOR CHANGE: 0
ABDOMINAL DISTENTION: 0
TROUBLE SWALLOWING: 0
BLOOD IN STOOL: 0
APNEA: 0

## 2020-05-25 ASSESSMENT — PAIN SCALES - GENERAL
PAINLEVEL_OUTOF10: 0
PAINLEVEL_OUTOF10: 7

## 2020-05-25 NOTE — CONSULTS
[facility] CONSULT NOTE    Patient: Zoltan Ashby  Unit/Bed: 4905/9372-26  YOB: 1955  MRN: [id number]  Acct: [de-identified]   Admitting Diagnosis: Asthma with acute exacerbation [J45.901]  Admit Date:  5/25/2020  Hospital Day: 0      Chief Complaint: dyspnea    History of Present Illness: 60year old male with history of tobacco, COPD, HTN, HPL, cocaine user, recently used. Patient reports recent chills, shortness of breath, fatigue and chest tightness. No fevers, COVID test negative.     PMHx:  Past Medical History:   Diagnosis Date    Alcohol abuse 10/27/2016    Anemia     Asthma     Cocaine abuse ([address] 10/27/2016    [facility]     Depression 4/27/2020    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    Drug-seeking behavior 4/14/2015    Edema 12/10/2015    Erectile dysfunction     Gastroesophageal reflux disease 12/17/2018    Gout 10/27/2016    History of arthroscopy of both knees 10/27/2016    House dust mite allergy 4/21/2014    Hyperlipidemia     Hypertension 10/27/2016    Injury to heart 10/27/2016    Insomnia 12/17/2018    Medical non-compliance 2/22/2014    Morbid obesity due to excess calories ([address] 12/8/2016    Osteoarthritis of both knees 12/8/2016    Personal history of tobacco use     Pneumonia 12/04/2016    caused hospital admission    Pre-diabetes 10/27/2016    Seasonal allergies 4/21/2014    Severe persistent asthma 10/27/2016    Severe sleep apnea 4/14/2015    Supraventricular tachycardia ([address] 10/27/2016    SVT [facility]supraventricular tachycardia) ([facility])        PSHx:  Past Surgical History:   Procedure Laterality Date    ANTERIOR CRUCIATE LIGAMENT REPAIR      CARDIAC CATHETERIZATION      TOTAL KNEE ARTHROPLASTY Left 8/9/2019    LEFT TOTAL KNEE ARTHROPLASTY performed by Morelia Caraballo MD at [address] [place] [address] Hx:  Social History     Socioeconomic History    Marital status: Single     Spouse name: n/a    Number of children: 1    Years of education: 15    Highest education level: None   Occupational History    Occupation: Disability     Employer: NONE   Social Needs    Financial resource strain: None    Food insecurity     Worry: None     Inability: None    Transportation needs     Medical: None     Non-medical: None   Tobacco Use    Smoking status: Current Every Day Smoker     Packs/day: 0.50     Years: 30.00     Pack years: 15.00     Types: Cigarettes, Cigars     Start date: 1988     Last attempt to quit: 10/1/2013     Years since quittin.6    Smokeless tobacco: Never Used   Substance and Sexual Activity    Alcohol use: Yes     Comment: social    Drug use: Not Currently     Types: Cocaine, Marijuana     Comment: social    Sexual activity: Not Currently     Partners: Female   Lifestyle    Physical activity     Days per week: None     Minutes per session: None    Stress: None   Relationships    Social connections     Talks on phone: None     Gets together: None     Attends Baptism service: None     Active member of club or organization: None     Attends meetings of clubs or organizations: None     Relationship status: None    Intimate partner violence     Fear of current or ex partner: None     Emotionally abused: None     Physically abused: None     Forced sexual activity: None   Other Topics Concern    None   Social History Narrative    None       Family Hx:  Family History   Problem Relation Age of Onset    Arthritis Mother     Asthma Mother     High Cholesterol Mother     Other Mother         aneurysm    Diabetes Father     Stroke Maternal Grandmother     Cancer Maternal Grandfather     Hypertension Other     COPD Neg Hx        Review of Systems:   Review of Systems   Constitutional: Negative. HENT: Negative for congestion and trouble swallowing. Eyes: Negative for pain, discharge and visual disturbance. Respiratory: Positive for shortness of breath.  Negative for apnea, cough, choking, in 50 mL D5W minibag, 1 g, Intravenous, Q24H, Mague Fitzpatrick MD    sodium chloride flush 0.9 % injection 10 mL, 10 mL, Intravenous, 2 times per day, Mague Fitzpatrick MD    sodium chloride flush 0.9 % injection 10 mL, 10 mL, Intravenous, PRN, Mague Fitzpatrick MD    acetaminophen (TYLENOL) tablet 650 mg, 650 mg, Oral, Q6H PRN **OR** acetaminophen (TYLENOL) suppository 650 mg, 650 mg, Rectal, Q6H PRN, Mague Fitzpatrick MD    polyethylene glycol [facility]) packet 17 g, 17 g, Oral, Daily PRN, Mague Fitzpatrick MD    promethazine (PHENERGAN) tablet 12.5 mg, 12.5 mg, Oral, Q6H PRN **OR** ondansetron (ZOFRAN) injection 4 mg, 4 mg, Intravenous, Q6H PRN, Mague Fitzpatrick MD    enoxaparin (LOVENOX) injection 40 mg, 40 mg, Subcutaneous, Daily, Mague Fitzpatrick MD    Physical Examination:    BP (!) 134/57   Pulse 80   Temp 98.7 °F (37.1 °C) (Oral)   Resp 18   Ht 6' (1.829 m)   Wt 290 lb (131.5 kg)   SpO2 98%   BMI 39.33 kg/m²    Physical Exam  Constitutional:       General: He is not in acute distress. Appearance: He is well-developed. He is not diaphoretic. HENT:      Head: Normocephalic and atraumatic. Eyes:      General:         Right eye: No discharge. Conjunctiva/sclera: Conjunctivae normal.      Pupils: Pupils are equal, round, and reactive to light. Neck:      Musculoskeletal: Normal range of motion and neck supple. Vascular: No JVD. Trachea: No tracheal deviation. Cardiovascular:      Rate and Rhythm: Normal rate and regular rhythm. Heart sounds: Normal heart sounds. No murmur. No friction rub. No gallop. Pulmonary:      Effort: Pulmonary effort is normal. No respiratory distress. Breath sounds: Wheezing and rhonchi present. No rales. Abdominal:      General: Bowel sounds are normal. There is no distension. Palpations: Abdomen is soft. Tenderness: There is no abdominal tenderness. Musculoskeletal: Normal range of motion. Skin:     General: Skin is warm. Findings: No rash. Component Value Date    TROPONINI 0.014 05/25/2020       EKG: EKG: normal EKG, normal sinus rhythm. ASSESSMENT/PLAN:         Active Hospital Problems    Diagnosis Date Noted    Asthma with acute exacerbation [J45.901] 05/25/2020        Elevated troponin could be related to recent cocaine use. Could also be due to demand ischemia from hypoxia. I have ordered an echo. Recommending tobacco, alcohol and cocaine cessation. Electronically signed by Tonny Tyler MD [facility] Director of Cardiology Services and CardiacCatheterization Laboratory  [facility], [place]   on 5/25/2020 at 6:06 PM

## 2020-05-25 NOTE — ED NOTES
Report given to Zackary Becerril RN at bedside. Patient transported to Med/Surg via wheelchair with belongings.      Emily Machuca RN  05/25/20 2396

## 2020-05-25 NOTE — ED PROVIDER NOTES
Mother         aneurysm    Diabetes Father     Stroke Maternal Grandmother     Cancer Maternal Grandfather     Hypertension Other     COPD Neg Hx           SOCIAL HISTORY       Social History     Socioeconomic History    Marital status: Single     Spouse name: n/a    Number of children: 1    Years of education: 15    Highest education level: None   Occupational History    Occupation: Disability     Employer: NONE   Social Needs    Financial resource strain: None    Food insecurity     Worry: None     Inability: None    Transportation needs     Medical: None     Non-medical: None   Tobacco Use    Smoking status: Current Every Day Smoker     Packs/day: 0.50     Years: 30.00     Pack years: 15.00     Types: Cigarettes, Cigars     Start date: 1988     Last attempt to quit: 10/1/2013     Years since quittin.6    Smokeless tobacco: Never Used   Substance and Sexual Activity    Alcohol use: Yes     Comment: social    Drug use: Not Currently     Types: Cocaine, Marijuana     Comment: social    Sexual activity: Not Currently     Partners: Female   Lifestyle    Physical activity     Days per week: None     Minutes per session: None    Stress: None   Relationships    Social connections     Talks on phone: None     Gets together: None     Attends Restoration service: None     Active member of club or organization: None     Attends meetings of clubs or organizations: None     Relationship status: None    Intimate partner violence     Fear of current or ex partner: None     Emotionally abused: None     Physically abused: None     Forced sexual activity: None   Other Topics Concern    None   Social History Narrative    None       SCREENINGS      @FLOW([id number])@      PHYSICAL EXAM    (up to 7 for level 4, 8 or more for level 5)     ED Triage Vitals [20 1246]   BP Temp Temp Source Pulse Resp SpO2 Height Weight   121/61 98.6 °F (37 °C) Oral 95 18 95 % 6' (1.829 m) 290 lb (131.5 kg)       Physical Exam  Vitals signs and nursing note reviewed. Constitutional:       Appearance: Normal appearance. He is obese. HENT:      Head: Normocephalic and atraumatic. Right Ear: Tympanic membrane, ear canal and external ear normal. There is no impacted cerumen. Left Ear: Tympanic membrane, ear canal and external ear normal. There is no impacted cerumen. Nose: Nose normal. No congestion. Mouth/Throat:      Mouth: Mucous membranes are moist.      Pharynx: No oropharyngeal exudate or posterior oropharyngeal erythema. Eyes:      General:         Right eye: No discharge. Left eye: Discharge present. Extraocular Movements: Extraocular movements intact. Pupils: Pupils are equal, round, and reactive to light. Neck:      Musculoskeletal: Normal range of motion and neck supple. No neck rigidity. Cardiovascular:      Rate and Rhythm: Normal rate and regular rhythm. Pulses: Normal pulses. Heart sounds: Normal heart sounds. Pulmonary:      Effort: No respiratory distress. Breath sounds: Wheezing present. Abdominal:      General: There is no distension. Tenderness: There is no abdominal tenderness. There is no right CVA tenderness or guarding. Lymphadenopathy:      Cervical: No cervical adenopathy. Skin:     General: Skin is warm. Capillary Refill: Capillary refill takes less than 2 seconds. Neurological:      General: No focal deficit present. Mental Status: He is alert. Cranial Nerves: No cranial nerve deficit. Motor: No weakness.    Psychiatric:         Mood and Affect: Mood normal.         DIAGNOSTIC RESULTS     EKG: All EKG's are interpreted by the Emergency Department Physician who either signs or Co-signsthis chart in the absence of a cardiologist.        RADIOLOGY:   Non-plain filmimages such as CT, Ultrasound and MRI are read by the radiologist. Plain radiographic images are visualized and preliminarily interpreted by the emergency physician with the below findings:        Interpretation per the Radiologist below, if available at the time ofthis note:    XR CHEST PORTABLE    (Results Pending)         ED BEDSIDE ULTRASOUND:   Performed by ED Physician - none    LABS:  Labs Reviewed   COMPREHENSIVE METABOLIC PANEL - Abnormal; Notable for the following components:       Result Value    Glucose 107 (*)     Alb 3.2 (*)     Total Bilirubin 1.2 (*)     Globulin 4.0 (*)     All other components within normal limits   CBC WITH AUTO DIFFERENTIAL - Abnormal; Notable for the following components:    WBC 16.8 (*)     RBC 3.98 (*)     Hemoglobin 12.2 (*)     Hematocrit 37.9 (*)     MCHC 32.1 (*)     RDW 16.8 (*)     Platelets 426 (*)     Neutrophils Absolute 13.3 (*)     Monocytes Absolute 1.7 (*)     All other components within normal limits   TROPONIN - Abnormal; Notable for the following components:    Troponin 0.014 (*)     All other components within normal limits    Narrative:     CALL  [facility] tel. [phone],  Trop results called to and read back by [facility], 05/25/2020 13:41, by [facility]   URINE RT REFLEX TO CULTURE - Abnormal; Notable for the following components:    Ketones, Urine 40 (*)     Protein,  (*)     Urobilinogen, Urine >=8.0 (*)     All other components within normal limits   URINE DRUG SCREEN, COMPREHENSIVE - Abnormal; Notable for the following components:    Cocaine Metabolite Screen, Urine POSITIVE (*)     All other components within normal limits   CULTURE, BLOOD 1   CULTURE, BLOOD 2   MAGNESIUM   BRAIN NATRIURETIC PEPTIDE   LACTIC ACID, PLASMA   PROTIME-INR   MICROSCOPIC URINALYSIS   COVID-19       All other labs were within normal range or not returned as of this dictation.     EMERGENCY DEPARTMENT COURSE and DIFFERENTIAL DIAGNOSIS/MDM:   Vitals:    Vitals:    05/25/20 1246 05/25/20 1456   BP: 121/61 132/71   Pulse: 95 87   Resp: 18 19   Temp: 98.6 °F (37 °C)    TempSrc: Oral    SpO2: 95% 95%   Weight: 290 lb (131.5 kg) Height: 6' (1.829 m)            MDM  Number of Diagnoses or Management Options  COPD exacerbation ([facility]):   Nondependent cocaine abuse [facility]):   Diagnosis management comments: Patient well-known to me from previous encounters to this emergency for similar situation bilateral wheezing short of breath for the last 3 to 4 to 4 days time no documented fever mostly dry cough not able to produce much phlegm no chest tightness no chest pain on exertion but slightly short of breath more on exertion patient does take oxygen at nighttime history of sleep apnea EKG performed patient has normal sinus rhythm rate of 82/min VA interval 146 ms QRS duration 102 ms and QT interval 372 ms has no PVCs no ST elevation ischemia on EKG patient received 2 aerosol treatment much better with the breathing at this time talks in full sentences patient tropes are slightly +0.014 patient without any EKG changes patient does have history of cocaine abuse in the past patient drug screening is pending at this time as well as coronavirus testing. Patient educated about their test results which include potential coronavirus. Given that the patient has fever/chills, cough, n/v, myalgias, sob coronavirus was listed as one of her potential etiologies. Patient informed that the current [facility] Board recommendations are that if you symptoms are mild to go home and self quarantine for 2 weeks. Patient understands this and is in agreement of this plan. Patient given prescription for , given infection warning signs and will go home and self quarantine. Follow up with pcp. Amount and/or Complexity of Data Reviewed  Clinical lab tests: ordered and reviewed  Tests in the radiology section of CPT®: ordered and reviewed        CRITICAL CARE TIME   Total Critical Care time was  minutes, excluding separately reportableprocedures.   There was a high probability of clinicallysignificant/life threatening deterioration in the patient's condition

## 2020-05-25 NOTE — ED TRIAGE NOTES
Patient in with SOB on exhurstion  x 2 weeks. He has allergies asthma and copd hx.  Lungs bilat wheeze

## 2020-05-26 LAB
ANION GAP SERPL CALCULATED.3IONS-SCNC: 14 MEQ/L (ref 9–15)
BUN BLDV-MCNC: 11 MG/DL (ref 8–23)
CALCIUM SERPL-MCNC: 9.5 MG/DL (ref 8.5–9.9)
CHLORIDE BLD-SCNC: 101 MEQ/L (ref 95–107)
CO2: 24 MEQ/L (ref 20–31)
CREAT SERPL-MCNC: 0.84 MG/DL (ref 0.7–1.2)
GFR AFRICAN AMERICAN: >60
GFR NON-AFRICAN AMERICAN: >60
GLUCOSE BLD-MCNC: 118 MG/DL (ref 60–115)
GLUCOSE BLD-MCNC: 196 MG/DL (ref 60–115)
GLUCOSE BLD-MCNC: 227 MG/DL (ref 60–115)
GLUCOSE BLD-MCNC: 250 MG/DL (ref 70–99)
HCT VFR BLD CALC: 37.5 % (ref 42–52)
HEMOGLOBIN: 12.1 G/DL (ref 14–18)
INR BLD: 1.1
LV EF: 60 %
LVEF MODALITY: NORMAL
MCH RBC QN AUTO: 30.6 PG (ref 27–31.3)
MCHC RBC AUTO-ENTMCNC: 32.2 % (ref 33–37)
MCV RBC AUTO: 95 FL (ref 80–100)
PDW BLD-RTO: 16.8 % (ref 11.5–14.5)
PERFORMED ON: ABNORMAL
PLATELET # BLD: 456 K/UL (ref 130–400)
POTASSIUM REFLEX MAGNESIUM: 3.6 MEQ/L (ref 3.4–4.9)
PROTHROMBIN TIME: 14.4 SEC (ref 12.3–14.9)
RBC # BLD: 3.95 M/UL (ref 4.7–6.1)
SODIUM BLD-SCNC: 139 MEQ/L (ref 135–144)
TROPONIN: <0.01 NG/ML (ref 0–0.01)
WBC # BLD: 11.4 K/UL (ref 4.8–10.8)

## 2020-05-26 PROCEDURE — 85610 PROTHROMBIN TIME: CPT

## 2020-05-26 PROCEDURE — 6370000000 HC RX 637 (ALT 250 FOR IP): Performed by: INTERNAL MEDICINE

## 2020-05-26 PROCEDURE — 97530 THERAPEUTIC ACTIVITIES: CPT

## 2020-05-26 PROCEDURE — 94760 N-INVAS EAR/PLS OXIMETRY 1: CPT

## 2020-05-26 PROCEDURE — 2580000003 HC RX 258: Performed by: INTERNAL MEDICINE

## 2020-05-26 PROCEDURE — 85027 COMPLETE CBC AUTOMATED: CPT

## 2020-05-26 PROCEDURE — 1210000000 HC MED SURG R&B

## 2020-05-26 PROCEDURE — 94761 N-INVAS EAR/PLS OXIMETRY MLT: CPT

## 2020-05-26 PROCEDURE — 36415 COLL VENOUS BLD VENIPUNCTURE: CPT

## 2020-05-26 PROCEDURE — 97161 PT EVAL LOW COMPLEX 20 MIN: CPT

## 2020-05-26 PROCEDURE — 80048 BASIC METABOLIC PNL TOTAL CA: CPT

## 2020-05-26 PROCEDURE — 97166 OT EVAL MOD COMPLEX 45 MIN: CPT

## 2020-05-26 PROCEDURE — 84484 ASSAY OF TROPONIN QUANT: CPT

## 2020-05-26 PROCEDURE — 6360000002 HC RX W HCPCS: Performed by: INTERNAL MEDICINE

## 2020-05-26 PROCEDURE — 97116 GAIT TRAINING THERAPY: CPT

## 2020-05-26 PROCEDURE — 93306 TTE W/DOPPLER COMPLETE: CPT

## 2020-05-26 PROCEDURE — 94640 AIRWAY INHALATION TREATMENT: CPT

## 2020-05-26 RX ORDER — DEXTROSE MONOHYDRATE 25 G/50ML
12.5 INJECTION, SOLUTION INTRAVENOUS PRN
Status: DISCONTINUED | OUTPATIENT
Start: 2020-05-26 | End: 2020-05-27 | Stop reason: HOSPADM

## 2020-05-26 RX ORDER — DEXTROSE MONOHYDRATE 50 MG/ML
100 INJECTION, SOLUTION INTRAVENOUS PRN
Status: DISCONTINUED | OUTPATIENT
Start: 2020-05-26 | End: 2020-05-27 | Stop reason: HOSPADM

## 2020-05-26 RX ORDER — NICOTINE POLACRILEX 4 MG
15 LOZENGE BUCCAL PRN
Status: DISCONTINUED | OUTPATIENT
Start: 2020-05-26 | End: 2020-05-27 | Stop reason: HOSPADM

## 2020-05-26 RX ADMIN — Medication 10 ML: at 20:53

## 2020-05-26 RX ADMIN — ATORVASTATIN CALCIUM 10 MG: 10 TABLET, FILM COATED ORAL at 20:51

## 2020-05-26 RX ADMIN — CEFTRIAXONE 1 G: 1 INJECTION, POWDER, FOR SOLUTION INTRAMUSCULAR; INTRAVENOUS at 15:41

## 2020-05-26 RX ADMIN — ENOXAPARIN SODIUM 40 MG: 40 INJECTION SUBCUTANEOUS at 20:53

## 2020-05-26 RX ADMIN — METHYLPREDNISOLONE SODIUM SUCCINATE 40 MG: 40 INJECTION, POWDER, FOR SOLUTION INTRAMUSCULAR; INTRAVENOUS at 08:43

## 2020-05-26 RX ADMIN — BUDESONIDE AND FORMOTEROL FUMARATE DIHYDRATE 2 PUFF: 160; 4.5 AEROSOL RESPIRATORY (INHALATION) at 17:57

## 2020-05-26 RX ADMIN — BUDESONIDE AND FORMOTEROL FUMARATE DIHYDRATE 2 PUFF: 160; 4.5 AEROSOL RESPIRATORY (INHALATION) at 11:42

## 2020-05-26 RX ADMIN — METHYLPREDNISOLONE SODIUM SUCCINATE 40 MG: 40 INJECTION, POWDER, FOR SOLUTION INTRAMUSCULAR; INTRAVENOUS at 17:31

## 2020-05-26 RX ADMIN — MONTELUKAST SODIUM 10 MG: 10 TABLET, FILM COATED ORAL at 20:51

## 2020-05-26 RX ADMIN — IPRATROPIUM BROMIDE AND ALBUTEROL SULFATE 1 AMPULE: .5; 3 SOLUTION RESPIRATORY (INHALATION) at 17:57

## 2020-05-26 RX ADMIN — METHYLPREDNISOLONE SODIUM SUCCINATE 40 MG: 40 INJECTION, POWDER, FOR SOLUTION INTRAMUSCULAR; INTRAVENOUS at 01:55

## 2020-05-26 RX ADMIN — PANTOPRAZOLE SODIUM 40 MG: 40 TABLET, DELAYED RELEASE ORAL at 06:04

## 2020-05-26 RX ADMIN — IPRATROPIUM BROMIDE AND ALBUTEROL SULFATE 1 AMPULE: .5; 3 SOLUTION RESPIRATORY (INHALATION) at 11:42

## 2020-05-26 RX ADMIN — AMLODIPINE BESYLATE 10 MG: 10 TABLET ORAL at 08:43

## 2020-05-26 RX ADMIN — Medication 10 ML: at 08:45

## 2020-05-26 RX ADMIN — TRAZODONE HYDROCHLORIDE 50 MG: 50 TABLET ORAL at 20:51

## 2020-05-26 RX ADMIN — IPRATROPIUM BROMIDE AND ALBUTEROL SULFATE 1 AMPULE: .5; 3 SOLUTION RESPIRATORY (INHALATION) at 04:01

## 2020-05-26 RX ADMIN — IPRATROPIUM BROMIDE AND ALBUTEROL SULFATE 1 AMPULE: .5; 3 SOLUTION RESPIRATORY (INHALATION) at 06:21

## 2020-05-26 ASSESSMENT — PAIN SCALES - GENERAL
PAINLEVEL_OUTOF10: 0
PAINLEVEL_OUTOF10: 0

## 2020-05-26 NOTE — H&P
Hospital Medicine History & Physical      PCP: Alem Man MD    Date of Admission: 5/25/2020    Date of Service: 5/26/20      Chief Complaint:  Dyspnea/SOB      History Of Present Illness:  60 y.o. male who presented to [facility] with above complains for the past 3-4 days without CP. Was using nebulizer at home QID with minimal improvement. Denied fever/CP. Since his dyspnea wasn't improved came to ER for further evaluation and treatment     Past Medical History:          Diagnosis Date    Alcohol abuse 10/27/2016    Anemia     Asthma     Cocaine abuse ([address] 10/27/2016    COPD (chronic obstructive [facility] disease) ([facility]     Depression 4/27/2020    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    Drug-seeking behavior 4/14/2015    Edema 12/10/2015    Erectile dysfunction     Gastroesophageal reflux disease 12/17/2018    Gout 10/27/2016    History of arthroscopy of both knees 10/27/2016    House dust mite allergy 4/21/2014    Hyperlipidemia     Hypertension 10/27/2016    Injury to heart 10/27/2016    Insomnia 12/17/2018    Medical non-compliance 2/22/2014    Morbid obesity due to excess calories ([address] 12/8/2016    Osteoarthritis of both knees 12/8/2016    Personal history of tobacco use     Pneumonia 12/04/2016    caused hospital admission    Pre-diabetes 10/27/2016    Seasonal allergies 4/21/2014    Severe persistent asthma 10/27/2016    Severe sleep apnea 4/14/2015    Supraventricular tachycardia ([facility] 10/27/2016    SVT (supraventricular tachycardia) ([facility])        Past Surgical History:          Procedure Laterality Date    ANTERIOR CRUCIATE LIGAMENT REPAIR      CARDIAC CATHETERIZATION      TOTAL KNEE ARTHROPLASTY Left 8/9/2019    LEFT TOTAL KNEE ARTHROPLASTY performed by Carl Nunes MD at [facility]       Medications Prior to Admission:      Prior to Admission medications    Medication Sig Start Date End Date Taking?  Authorizing Provider   ipratropium-albuterol (DUONEB) 0.5-2.5

## 2020-05-27 ENCOUNTER — CARE COORDINATION (OUTPATIENT)
Dept: CARE COORDINATION | Age: 65
End: 2020-05-27

## 2020-05-27 VITALS
BODY MASS INDEX: 39.28 KG/M2 | HEIGHT: 72 IN | HEART RATE: 70 BPM | SYSTOLIC BLOOD PRESSURE: 139 MMHG | DIASTOLIC BLOOD PRESSURE: 67 MMHG | RESPIRATION RATE: 18 BRPM | OXYGEN SATURATION: 98 % | WEIGHT: 290 LBS | TEMPERATURE: 97.5 F

## 2020-05-27 LAB
GLUCOSE BLD-MCNC: 129 MG/DL (ref 60–115)
GLUCOSE BLD-MCNC: 195 MG/DL (ref 60–115)
PERFORMED ON: ABNORMAL
PERFORMED ON: ABNORMAL

## 2020-05-27 PROCEDURE — 94640 AIRWAY INHALATION TREATMENT: CPT

## 2020-05-27 PROCEDURE — 97530 THERAPEUTIC ACTIVITIES: CPT

## 2020-05-27 PROCEDURE — 97116 GAIT TRAINING THERAPY: CPT

## 2020-05-27 PROCEDURE — 6370000000 HC RX 637 (ALT 250 FOR IP): Performed by: INTERNAL MEDICINE

## 2020-05-27 PROCEDURE — 6360000002 HC RX W HCPCS: Performed by: INTERNAL MEDICINE

## 2020-05-27 PROCEDURE — 2580000003 HC RX 258: Performed by: INTERNAL MEDICINE

## 2020-05-27 RX ORDER — PREDNISONE 20 MG/1
20 TABLET ORAL 2 TIMES DAILY
Qty: 14 TABLET | Refills: 0 | Status: SHIPPED | OUTPATIENT
Start: 2020-05-27 | End: 2020-06-03

## 2020-05-27 RX ORDER — AZITHROMYCIN 500 MG/1
500 TABLET, FILM COATED ORAL DAILY
Qty: 3 TABLET | Refills: 0 | Status: SHIPPED | OUTPATIENT
Start: 2020-05-27 | End: 2020-05-30

## 2020-05-27 RX ADMIN — IPRATROPIUM BROMIDE AND ALBUTEROL SULFATE 1 AMPULE: .5; 3 SOLUTION RESPIRATORY (INHALATION) at 11:49

## 2020-05-27 RX ADMIN — METHYLPREDNISOLONE SODIUM SUCCINATE 40 MG: 40 INJECTION, POWDER, FOR SOLUTION INTRAMUSCULAR; INTRAVENOUS at 01:58

## 2020-05-27 RX ADMIN — METHYLPREDNISOLONE SODIUM SUCCINATE 40 MG: 40 INJECTION, POWDER, FOR SOLUTION INTRAMUSCULAR; INTRAVENOUS at 07:54

## 2020-05-27 RX ADMIN — AMLODIPINE BESYLATE 10 MG: 10 TABLET ORAL at 07:55

## 2020-05-27 RX ADMIN — IPRATROPIUM BROMIDE AND ALBUTEROL SULFATE 1 AMPULE: .5; 3 SOLUTION RESPIRATORY (INHALATION) at 07:55

## 2020-05-27 RX ADMIN — ACETAMINOPHEN 650 MG: 325 TABLET ORAL at 11:54

## 2020-05-27 RX ADMIN — BUDESONIDE AND FORMOTEROL FUMARATE DIHYDRATE 2 PUFF: 160; 4.5 AEROSOL RESPIRATORY (INHALATION) at 05:12

## 2020-05-27 RX ADMIN — PANTOPRAZOLE SODIUM 40 MG: 40 TABLET, DELAYED RELEASE ORAL at 06:02

## 2020-05-27 RX ADMIN — ENOXAPARIN SODIUM 40 MG: 40 INJECTION SUBCUTANEOUS at 07:56

## 2020-05-27 RX ADMIN — Medication 10 ML: at 07:56

## 2020-05-27 RX ADMIN — IPRATROPIUM BROMIDE AND ALBUTEROL SULFATE 1 AMPULE: .5; 3 SOLUTION RESPIRATORY (INHALATION) at 05:12

## 2020-05-27 ASSESSMENT — PAIN SCALES - GENERAL: PAINLEVEL_OUTOF10: 6

## 2020-05-27 NOTE — CARE COORDINATION
Call to pt to initiate CTN referral for medication assistance. Pt reported that he receives samples of Symbicort from physician, however needs assistance with Albuterol and Spiriva. Indicated that Albuterol is $35 and unsure of Spiriva cost due to not filling rx. Inquired about use of community programs that offers prescription assistance, pt aware of community center that offers occasional assistance. SW offered to identify additional local and rx assistance programs and f/u.   REGINALDO Montero, [facility]   [phone]

## 2020-05-27 NOTE — DISCHARGE SUMMARY
Urine Negative Negative    Leukocyte Esterase, Urine Negative Negative   Urine Drug Screen, Comprehensive   Result Value Ref Range    PCP Screen, Urine Neg Negative <25 ng/mL    Benzodiazepine Screen, Urine Neg Negative <150 ng/mL    Cocaine Metabolite Screen, Urine POSITIVE (A) Negative <150 ng/mL    Amphetamine Screen, Urine Neg Negative <500 ng/mL    Cannabinoid Scrn, Ur Neg Negative <50 ng/mL    Opiate Scrn, Ur Neg Negative <100 ng/mL    Barbiturate Screen, Ur Neg Negative <734 ng/mL    Tricyclic Neg Negative <966 ng/mL    Drug Screen Comment: see below    COVID-19   Result Value Ref Range    SARS-CoV-2, NAAT Not Detected Not Detected   Microscopic Urinalysis   Result Value Ref Range    Mucus, UA Present None Seen /LPF    RBC, UA 0-2 0 - 2 /HPF    Epithelial Cells, UA 0-2 /HPF    Amorphous, UA 2+    Troponin   Result Value Ref Range    Troponin <0.010 0.000 - 0.010 ng/mL   Troponin   Result Value Ref Range    Troponin <0.010 0.000 - 0.010 ng/mL   Troponin   Result Value Ref Range    Troponin <0.010 0.000 - 0.010 ng/mL   CBC   Result Value Ref Range    WBC 11.4 (H) 4.8 - 10.8 K/uL    RBC 3.95 (L) 4.70 - 6.10 M/uL    Hemoglobin 12.1 (L) 14.0 - 18.0 g/dL    Hematocrit 37.5 (L) 42.0 - 52.0 %    MCV 95.0 80.0 - 100.0 fL    MCH 30.6 27.0 - 31.3 pg    MCHC 32.2 (L) 33.0 - 37.0 %    RDW 16.8 (H) 11.5 - 14.5 %    Platelets 199 (H) 368 - 400 K/uL   Protime-INR   Result Value Ref Range    Protime 14.4 12.3 - 14.9 sec    INR 1.1    Basic Metabolic Panel w/ Reflex to MG   Result Value Ref Range    Sodium 139 135 - 144 mEq/L    Potassium reflex Magnesium 3.6 3.4 - 4.9 mEq/L    Chloride 101 95 - 107 mEq/L    CO2 24 20 - 31 mEq/L    Anion Gap 14 9 - 15 mEq/L    Glucose 250 (H) 70 - 99 mg/dL    BUN 11 8 - 23 mg/dL    CREATININE 0.84 0.70 - 1.20 mg/dL    GFR Non-African American >60.0 >60    GFR  >60.0 >60    Calcium 9.5 8.5 - 9.9 mg/dL   POCT Glucose   Result Value Ref Range    POC Glucose 118 (H) 60 - 115 mg/dl

## 2020-05-27 NOTE — PROGRESS NOTES
Physical Therapy  Medical Daily Treatment Note  NAME: Abbe Oconnor  : 1955  MRN: [id number]    Date of Service: 2020    Patient Diagnosis(es):   Patient Active Problem List    Diagnosis Date Noted    Severe sleep apnea 2015     Priority: High    Drug-seeking behavior 2015     Priority: Medium    Medical non-compliance 2014     Priority: Medium    Asthma with acute exacerbation 2020    Depression 2020    Status post total knee replacement, left 2019    Tobacco use 2019    Insomnia 2018    Gastroesophageal reflux disease 2018    Allergy to seafood 2018    Loculated pleural effusion     Chest wall pain 2016    Pleurisy 2016    Morbid obesity due to excess calories ([address] 2016    COPD (chronic obstructive pulmonary disease) ([address]     Alcohol abuse 10/27/2016    Cocaine abuse ([address] 10/27/2016    Gout 10/27/2016    History of arthroscopy of both knees 10/27/2016    Hypertension 10/27/2016    Supraventricular tachycardia ([address] 10/27/2016    Erectile dysfunction 10/27/2016    Pre-diabetes 10/27/2016    Hyperlipidemia 12/10/2015    House dust mite allergy 2014    Seasonal allergies 2014    Anemia 2013       Past Medical History:   Diagnosis Date    Alcohol abuse 10/27/2016    Anemia     Asthma     Cocaine abuse ([address] 10/27/2016    COPD (chronic obstructive pulmonary disease) ([address]     Depression 2020    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    Drug-seeking behavior 2015    Edema 12/10/2015    Erectile dysfunction     Gastroesophageal reflux disease 2018    Gout 10/27/2016    History of arthroscopy of both knees 10/27/2016    House dust mite allergy 2014    Hyperlipidemia     Hypertension 10/27/2016    Injury to heart 10/27/2016    Insomnia 2018    Medical non-compliance 2014    Morbid obesity due to excess calories ([address] 2016   
catheterization; and Total knee arthroplasty (Left, 8/9/2019). Restrictions  Restrictions/Precautions  Implants present? : Metal implants(L TKA ~1yr ago)  Vision/Hearing        Subjective  General  Chart Reviewed: Yes  Patient assessed for rehabilitation services?: Yes  Family / Caregiver Present: No  Referring Practitioner: Dr Jack Garcia   Referral Date : 05/26/20  Diagnosis: COPD exacerbation   Pain Screening  Patient Currently in Pain: No  Pain Assessment  Pain Assessment: 0-10  Pain Level: 0  Vital Signs  Patient Currently in Pain: No       Orientation     Social/Functional History  Social/Functional History  Lives With: Friend(s)  Type of Home: House  Home Layout: One level  Home Access: Stairs to enter with rails  Entrance Stairs - Number of Steps: 2-3  Entrance Stairs - Rails: Both  Bathroom Shower/Tub: Tub/Shower unit  Bathroom Toilet: Standard  Bathroom Equipment: (none)  Bathroom Accessibility: Walker accessible  Home Equipment: [address] Help From: (None)  ADL Assistance: Independent  Homemaking Assistance: Independent  Homemaking Responsibilities: Yes  Ambulation Assistance: Independent(w/o AD)  Transfer Assistance: Independent  Active : No  Occupation: On disability  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[employer]: \"Just trying to survive\"  Additional Comments: PLOF on 3L O2 via NC at night  Cognition        Objective          AROM RLE (degrees)  RLE AROM: WNL  AROM LLE (degrees)  LLE AROM : WNL  AROM RUE (degrees)  RUE AROM : WNL  AROM LUE (degrees)  LUE AROM : WNL  Strength RLE  Strength RLE: WFL  Strength LLE  Comment: Grossly 4+/5   Strength RUE  Strength RUE: WFL  Strength LUE  Strength LUE: WFL           Transfers  Sit to Stand: Modified independent  Stand to sit:  Independent  Ambulation  Ambulation?: Yes  Ambulation 1  Surface: level tile  Device: No Device  Assistance: Modified Independent  Quality of Gait: Pt ambulated with a slow but steady gait

## 2020-05-28 ENCOUNTER — CARE COORDINATION (OUTPATIENT)
Dept: CASE MANAGEMENT | Age: 65
End: 2020-05-28

## 2020-05-28 PROCEDURE — 1111F DSCHRG MED/CURRENT MED MERGE: CPT | Performed by: FAMILY MEDICINE

## 2020-05-28 NOTE — CARE COORDINATION
often.   Avoid close contact (6 feet, which is about two arm lengths) with people who are sick.  Put distance between yourself and other people if COVID-19 is spreading in your community.  Clean and disinfect frequently touched surfaces.  Avoid all cruise travel and non-essential air travel.  Call your healthcare professional if you have concerns about COVID-19 and your underlying condition or if you are sick. For more information on steps you can take to protect yourself, see CDC's How to [address] [place] [address] for follow-up call in 5-7 days based on severity of symptoms and risk factors. Denies fevr, chills, or flu-like symptoms. Advance Care Planning  People with COVID-19 may have no symptoms, mild symptoms, such as fever, cough, and shortness of breath or they may have more severe illness, developing severe and fatal pneumonia. As a result, Advance Care Planning with attention to naming a health care decision maker (someone you trust to make healthcare decisions for you if you could not speak for yourself) and sharing other health care preferences is important BEFORE a possible health crisis. Please contact your Primary Care Provider to discuss Advance Care Planning. Preventing the Spread of Coronavirus Disease 2019 in Homes and Residential Communities  For the most recent information go to [email or web address]    Prevention steps for People with confirmed or suspected COVID-19 (including persons under investigation) who do not need to be hospitalized  and   People with confirmed COVID-19 who were hospitalized and determined to be medically stable to go home    Your healthcare provider and public health staff will evaluate whether you can be cared for at home. If it is determined that you do not need to be hospitalized and can be isolated at home, you will be monitored by staff from your local or state health department.  You should breathing), then people who live with you should not stay in the same room with you, or they should wear a facemask if they enter your room. Cover your coughs and sneezes  Cover your mouth and nose with a tissue when you cough or sneeze. Throw used tissues in a lined trash can. Immediately wash your hands with soap and water for at least 20 seconds or, if soap and water are not available, clean your hands with an alcohol-based hand  that contains at least 60% alcohol. Clean your hands often  Wash your hands often with soap and water for at least 20 seconds, especially after blowing your nose, coughing, or sneezing; going to the bathroom; and before eating or preparing food. If soap and water are not readily available, use an alcohol-based hand  with at least 60% alcohol, covering all surfaces of your hands and rubbing them together until they feel dry. Soap and water are the best option if hands are visibly dirty. Avoid touching your eyes, nose, and mouth with unwashed hands. Avoid sharing personal household items  You should not share dishes, drinking glasses, cups, eating utensils, towels, or bedding with other people or pets in your home. After using these items, they should be washed thoroughly with soap and water. Clean all high-touch surfaces everyday  High touch surfaces include counters, tabletops, doorknobs, bathroom fixtures, toilets, phones, keyboards, tablets, and bedside tables. Also, clean any surfaces that may have blood, stool, or body fluids on them. Use a household cleaning spray or wipe, according to the label instructions. Labels contain instructions for safe and effective use of the cleaning product including precautions you should take when applying the product, such as wearing gloves and making sure you have good ventilation during use of the product. Monitor your symptoms  Seek prompt medical attention if your illness is worsening (e.g., difficulty breathing).  Before seeking care, call your healthcare provider and tell them that you have, or are being evaluated for, COVID-19. Put on a facemask before you enter the facility. These steps will help the healthcare providers office to keep other people in the office or waiting room from getting infected or exposed. Ask your healthcare provider to call the local or state health department. Persons who are placed under active monitoring or facilitated self-monitoring should follow instructions provided by their local health department or occupational health professionals, as appropriate. When working with your local health department check their available hours. If you have a medical emergency and need to call 911, notify the dispatch personnel that you have, or are being evaluated for COVID-19. If possible, put on a facemask before emergency medical services arrive. Discontinuing home isolation  Patients with confirmed COVID-19 should remain under home isolation precautions until the risk of secondary transmission to others is thought to be low. The decision to discontinue home isolation precautions should be made on a case-by-case basis, in consultation with healthcare providers and state and local health departments.

## 2020-05-30 LAB
BLOOD CULTURE, ROUTINE: NORMAL
CULTURE, BLOOD 2: NORMAL

## 2020-06-02 ENCOUNTER — CARE COORDINATION (OUTPATIENT)
Dept: CARE COORDINATION | Age: 65
End: 2020-06-02

## 2020-06-03 NOTE — CARE COORDINATION
F/u call to pt. Educated him on Kimber. Offered to assist him calling to obtain info on medication assistance programs and Extra Help. Pt stated that he would like to call tomorrow. SW will assist pt with calling tomorrow.   Blayne Britton MSW, [facility]   [phone]

## 2020-06-05 ENCOUNTER — CARE COORDINATION (OUTPATIENT)
Dept: CASE MANAGEMENT | Age: 65
End: 2020-06-05

## 2020-06-05 NOTE — CARE COORDINATION
sick.    For more information on steps you can take to protect yourself, see CDC's How to Protect Yourself      Denies fever, chills, cough, wheezing, or flu-like symptoms. CTN s/o.

## 2020-06-09 ENCOUNTER — HOSPITAL ENCOUNTER (OUTPATIENT)
Dept: LAB | Age: 65
Discharge: HOME | End: 2020-06-09
Payer: MEDICARE

## 2020-06-09 ENCOUNTER — OFFICE VISIT (OUTPATIENT)
Dept: FAMILY MEDICINE CLINIC | Age: 65
End: 2020-06-09
Payer: MEDICARE

## 2020-06-09 VITALS
HEART RATE: 98 BPM | OXYGEN SATURATION: 93 % | TEMPERATURE: 96.7 F | SYSTOLIC BLOOD PRESSURE: 118 MMHG | HEIGHT: 72 IN | RESPIRATION RATE: 18 BRPM | BODY MASS INDEX: 38.49 KG/M2 | DIASTOLIC BLOOD PRESSURE: 70 MMHG | WEIGHT: 284.2 LBS

## 2020-06-09 LAB — HBA1C MFR BLD: 6.1 %

## 2020-06-09 PROCEDURE — 1111F DSCHRG MED/CURRENT MED MERGE: CPT | Performed by: FAMILY MEDICINE

## 2020-06-09 PROCEDURE — 99495 TRANSJ CARE MGMT MOD F2F 14D: CPT | Performed by: FAMILY MEDICINE

## 2020-06-09 PROCEDURE — 83036 HEMOGLOBIN GLYCOSYLATED A1C: CPT | Performed by: FAMILY MEDICINE

## 2020-06-09 RX ORDER — ACLIDINIUM BROMIDE 400 UG/1
1 POWDER, METERED RESPIRATORY (INHALATION) 2 TIMES DAILY
Qty: 3 EACH | Refills: 1 | Status: SHIPPED | OUTPATIENT
Start: 2020-06-09 | End: 2020-07-30 | Stop reason: ALTCHOICE

## 2020-06-09 RX ORDER — FLUTICASONE PROPIONATE 50 MCG
2 SPRAY, SUSPENSION (ML) NASAL DAILY
Qty: 3 BOTTLE | Refills: 1 | Status: SHIPPED | OUTPATIENT
Start: 2020-06-09 | End: 2020-12-15 | Stop reason: SDUPTHER

## 2020-06-09 ASSESSMENT — ENCOUNTER SYMPTOMS
BLOOD IN STOOL: 0
VOMITING: 0
COUGH: 0
DIARRHEA: 0
NAUSEA: 0
ANAL BLEEDING: 0
ABDOMINAL PAIN: 0
CHEST TIGHTNESS: 0
SHORTNESS OF BREATH: 1
WHEEZING: 1
CONSTIPATION: 0

## 2020-06-09 ASSESSMENT — PATIENT HEALTH QUESTIONNAIRE - PHQ9
1. LITTLE INTEREST OR PLEASURE IN DOING THINGS: 0
2. FEELING DOWN, DEPRESSED OR HOPELESS: 1
SUM OF ALL RESPONSES TO PHQ QUESTIONS 1-9: 1
SUM OF ALL RESPONSES TO PHQ9 QUESTIONS 1 & 2: 1
SUM OF ALL RESPONSES TO PHQ QUESTIONS 1-9: 1

## 2020-06-10 ENCOUNTER — HOSPITAL ENCOUNTER (OUTPATIENT)
Dept: LAB | Age: 65
Discharge: HOME | End: 2020-06-10
Payer: MEDICARE

## 2020-06-10 ENCOUNTER — CARE COORDINATION (OUTPATIENT)
Dept: CARE COORDINATION | Age: 65
End: 2020-06-10

## 2020-06-10 LAB
CHOLESTEROL, TOTAL: 184 MG/DL (ref 0–199)
HDLC SERPL-MCNC: 44 MG/DL (ref 40–59)
LDL CHOLESTEROL CALCULATED: 112 MG/DL (ref 0–129)
TRIGL SERPL-MCNC: 141 MG/DL (ref 0–150)
URIC ACID, SERUM: 8.7 MG/DL (ref 3.4–7)

## 2020-06-10 PROCEDURE — 84550 ASSAY OF BLOOD/URIC ACID: CPT

## 2020-06-10 PROCEDURE — 80061 LIPID PANEL: CPT

## 2020-06-10 PROCEDURE — 36415 COLL VENOUS BLD VENIPUNCTURE: CPT

## 2020-06-10 NOTE — CARE COORDINATION
F/u call attempted, unable to leave VM, mailbox full.   Blayne Britton, REGINALDO, [facility]   [phone]

## 2020-06-11 ENCOUNTER — TELEPHONE (OUTPATIENT)
Dept: FAMILY MEDICINE CLINIC | Age: 65
End: 2020-06-11

## 2020-06-11 NOTE — TELEPHONE ENCOUNTER
Patient is requesting medication refill. Please approve or deny this request.    Rx requested:  Requested Prescriptions     Pending Prescriptions Disp Refills    predniSONE (DELTASONE) 10 MG tablet 30 tablet 0     Sig: Take 6 tablets by mouth daily for 5 doses         Last Office Visit:   6/9/2020      Next Visit Date:  Future Appointments   Date Time Provider Constance Nupur   6/15/2020  3:40 PM Tristen Bro MD [place] [id number]   6/17/2020 11:15 AM Dennys Dowd MD [address] [place] [address]   7/9/2020  3:00 PM SCHEDULE, [address]       Patient asthma is acting up and he is wheezing left his windows open last night. cp

## 2020-06-12 RX ORDER — PREDNISONE 10 MG/1
60 TABLET ORAL DAILY
Qty: 30 TABLET | Refills: 0 | OUTPATIENT
Start: 2020-06-12 | End: 2020-06-17

## 2020-06-15 ENCOUNTER — NURSE TRIAGE (OUTPATIENT)
Dept: OTHER | Facility: CLINIC | Age: 65
End: 2020-06-15

## 2020-06-15 ENCOUNTER — TELEPHONE (OUTPATIENT)
Dept: OTHER | Facility: CLINIC | Age: 65
End: 2020-06-15

## 2020-06-15 ENCOUNTER — HOSPITAL ENCOUNTER (EMERGENCY)
Age: 65
Discharge: HOME | End: 2020-06-15
Attending: EMERGENCY MEDICINE
Payer: MEDICARE

## 2020-06-15 ENCOUNTER — TELEPHONE (OUTPATIENT)
Dept: FAMILY MEDICINE CLINIC | Age: 65
End: 2020-06-15

## 2020-06-15 VITALS
WEIGHT: 285 LBS | BODY MASS INDEX: 38.6 KG/M2 | HEART RATE: 69 BPM | TEMPERATURE: 98.2 F | HEIGHT: 72 IN | RESPIRATION RATE: 22 BRPM | OXYGEN SATURATION: 97 % | SYSTOLIC BLOOD PRESSURE: 114 MMHG | DIASTOLIC BLOOD PRESSURE: 74 MMHG

## 2020-06-15 PROCEDURE — 99284 EMERGENCY DEPT VISIT MOD MDM: CPT

## 2020-06-15 RX ORDER — PREDNISONE 20 MG/1
40 TABLET ORAL DAILY
Qty: 10 TABLET | Refills: 0 | Status: SHIPPED | OUTPATIENT
Start: 2020-06-15 | End: 2020-06-20

## 2020-06-15 RX ORDER — AZITHROMYCIN 250 MG/1
TABLET, FILM COATED ORAL
Qty: 6 TABLET | Refills: 0 | Status: SHIPPED | OUTPATIENT
Start: 2020-06-15 | End: 2020-06-25 | Stop reason: ALTCHOICE

## 2020-06-15 ASSESSMENT — ENCOUNTER SYMPTOMS
TROUBLE SWALLOWING: 0
DIARRHEA: 0
BACK PAIN: 0
FACIAL SWELLING: 0
CHEST TIGHTNESS: 0
EYE REDNESS: 0
EYE DISCHARGE: 0
CHOKING: 0
ABDOMINAL PAIN: 0
CONSTIPATION: 0
SINUS PRESSURE: 0
VOMITING: 0
WHEEZING: 1
SHORTNESS OF BREATH: 1
VOICE CHANGE: 0
BLOOD IN STOOL: 0
SORE THROAT: 0
COUGH: 0
EYE PAIN: 0
STRIDOR: 0

## 2020-06-15 NOTE — TELEPHONE ENCOUNTER
RN Access contacted Dr. Kaylah Crump Office to schedule ED f/u appt. Spoke with Lynette Rich, appt scheduled for Thursday 6-25 @11:20am with Dr. Kaylah Crump.

## 2020-06-15 NOTE — TELEPHONE ENCOUNTER
I called patient per Dr. Jeancarlos Schafer. Patient had called the nurse line and wants to be prescribed chronic steroids. He was on our schedule at 3:40 today for his AWV. Dr. Jeancarlos Schafer said that he can not prescribe him chronic steroids and needs to be seen with Pulmonology ASAP. I called patient to find out if he is having any worsening SOB and to help him schedule with pulmonology. He states that yes he is having worsening SOB and is at the ER now and has to keep going to the ER for treatment and this is getting very expensive. States he can't get in with  pulmonology until July.        FYI

## 2020-06-15 NOTE — TELEPHONE ENCOUNTER
1st attempt to reach patient. I tried calling patient at [phone] And also to let him know this message and there is another message about his SOB/Pulmmonology in the chart I needed to let him know about. His phone went straight to voicemail and mailbox has not been set up yet.

## 2020-06-15 NOTE — ED PROVIDER NOTES
pain, joint swelling, neck pain and neck stiffness. Skin: Negative for pallor and rash. Neurological: Negative for tremors, seizures, syncope, weakness, numbness and headaches. Hematological: Negative for adenopathy. Does not bruise/bleed easily. Psychiatric/Behavioral: Negative for agitation, behavioral problems, hallucinations and sleep disturbance. The patient is not hyperactive. All other systems reviewed and are negative. Except as noted above the remainder of the review of systems was reviewed and negative.        PAST MEDICAL HISTORY     Past Medical History:   Diagnosis Date    Alcohol abuse 10/27/2016    Anemia     Asthma     Cocaine abuse ([address] 10/27/2016    COPD (chronic obstructive pulmonary disease) ([address]     Depression 4/27/2020    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    Drug-seeking behavior 4/14/2015    Edema 12/10/2015    Erectile dysfunction     Gastroesophageal reflux disease 12/17/2018    Gout 10/27/2016    History of arthroscopy of both knees 10/27/2016    House dust mite allergy 4/21/2014    Hyperlipidemia     Hypertension 10/27/2016    Injury to heart 10/27/2016    Insomnia 12/17/2018    Medical non-compliance 2/22/2014    Morbid obesity due to excess calories ([address] 12/8/2016    Osteoarthritis of both knees 12/8/2016    Personal history of tobacco use     Pneumonia 12/04/2016    caused hospital admission    Pre-diabetes 10/27/2016    Seasonal allergies 4/21/2014    Severe persistent asthma 10/27/2016    Severe sleep apnea 4/14/2015    Supraventricular tachycardia ([address] 10/27/2016    SVT (supraventricular tachycardia) (HCC)          SURGICALHISTORY       Past Surgical History:   Procedure Laterality Date    ANTERIOR CRUCIATE LIGAMENT REPAIR      CARDIAC CATHETERIZATION      TOTAL KNEE ARTHROPLASTY Left 8/9/2019    LEFT TOTAL KNEE ARTHROPLASTY performed by Philomena Aguilar MD at [address] [facility]       Previous Medications    ACLIDINIUM (TUDORZA PRESSAIR) 400 MCG/ACT AEPB INHALER    Inhale 1 puff into the lungs 2 times daily    ALBUTEROL (PROVENTIL) (5 MG/ML) 0.5% NEBULIZER SOLUTION    Take 0.5 mLs by nebulization every 6 hours as needed for Wheezing or Shortness of Breath    ALBUTEROL SULFATE HFA (PROVENTIL HFA) 108 (90 BASE) MCG/ACT INHALER    Inhale 2 puffs into the lungs every 6 hours as needed for Wheezing    AMLODIPINE (NORVASC) 10 MG TABLET    Take 1 tablet by mouth daily    BUDESONIDE-FORMOTEROL (SYMBICORT) 160-4.5 MCG/ACT AERO    Inhale 2 puffs into the lungs 2 times daily    FLUTICASONE (FLONASE) 50 MCG/ACT NASAL SPRAY    2 sprays by Nasal route daily    HANDICAP PLACARD MISC    by Does not apply route DX: OSTEOARTHRITIS OF BOTH KNEES (M17.0), COPD (J44.9)     EXPIRES: 02/2024    IPRATROPIUM-ALBUTEROL (DUONEB) 0.5-2.5 (3) MG/3ML SOLN NEBULIZER SOLUTION    INHALE 1 VIAL VIA NEBULIZER EVERY 6 HOURS AS NEEDED FOR SHORTNESS OF BREATH    LOVASTATIN (MEVACOR) 10 MG TABLET    Take 1 tablet by mouth nightly    MONTELUKAST (SINGULAIR) 10 MG TABLET    Take 1 tablet by mouth nightly    PANTOPRAZOLE (PROTONIX) 40 MG TABLET    Take 1 tablet by mouth every morning (before breakfast)    SILDENAFIL (VIAGRA) 100 MG TABLET    Take 1 tablet by mouth daily as needed for Erectile Dysfunction    TRAZODONE (DESYREL) 50 MG TABLET    Take 1 tablet by mouth nightly       ALLERGIES     Fish-derived products;  Iodine; and Pcn [penicillins]    FAMILY HISTORY       Family History   Problem Relation Age of Onset    Arthritis Mother     Asthma Mother     High Cholesterol Mother     Other Mother         aneurysm    Diabetes Father     Stroke Maternal Grandmother     Cancer Maternal Grandfather     Hypertension Other     COPD Neg Hx           SOCIAL HISTORY       Social History     Socioeconomic History    Marital status: Single     Spouse name: n/a    Number of children: 1    Years of education: 15    Highest education level: None   Occupational History    Occupation: Disability     Employer: NONE   Social Needs    Financial resource strain: None    Food insecurity     Worry: None     Inability: None    Transportation needs     Medical: None     Non-medical: None   Tobacco Use    Smoking status: Current Every Day Smoker     Packs/day: 0.50     Years: 30.00     Pack years: 15.00     Types: Cigarettes, Cigars     Start date: 1988     Last attempt to quit: 10/1/2013     Years since quittin.7    Smokeless tobacco: Never Used   Substance and Sexual Activity    Alcohol use: Yes     Comment: social    Drug use: Not Currently     Types: Cocaine, Marijuana     Comment: social    Sexual activity: Not Currently     Partners: Female   Lifestyle    Physical activity     Days per week: None     Minutes per session: None    Stress: None   Relationships    Social connections     Talks on phone: None     Gets together: None     Attends Protestant service: None     Active member of club or organization: None     Attends meetings of clubs or organizations: None     Relationship status: None    Intimate partner violence     Fear of current or ex partner: None     Emotionally abused: None     Physically abused: None     Forced sexual activity: None   Other Topics Concern    None   Social History Narrative    None       SCREENINGS      @FLOW([id number])@      PHYSICAL EXAM    (up to 7 for level 4, 8 or more for level 5)     ED Triage Vitals [06/15/20 1406]   BP Temp Temp Source Pulse Resp SpO2 Height Weight   114/74 98.2 °F (36.8 °C) Oral 69 22 97 % 6' (1.829 m) 285 lb (129.3 kg)       Physical Exam  Vitals signs and nursing note reviewed. Constitutional:       Appearance: Normal appearance. He is well-developed. He is obese. Comments: Active alert cooperative patient talks in full sentences at this time ambulatory   HENT:      Head: Normocephalic and atraumatic.       Right Ear: Tympanic membrane, ear canal and external ear normal.      Left Ear: Tympanic

## 2020-06-15 NOTE — ED TRIAGE NOTES
Pt c/o SOB with congestion and cough x 3 days. Pt took Allergy medicine with minimal relief. Pt denies sinus drainage at this time. Pt took Ibuprofen 400 mg today for pain, lower back pian and knee pain rated 7/10. Pt asking for a steroid and Z-pack to help with symptoms.

## 2020-06-15 NOTE — TELEPHONE ENCOUNTER
1st attempt to reach patient. I tried calling patient at [phone] to confirm that his Pulmonologist is Dr. Neal Castaneda as we have in his are teams. And also to let him know this message and there is another message about his refll in the chart I needed to let him know about. His phone went straight to voicemail and mailbox has not been set up yet.

## 2020-06-16 ENCOUNTER — CARE COORDINATION (OUTPATIENT)
Dept: CASE MANAGEMENT | Age: 65
End: 2020-06-16

## 2020-06-16 ENCOUNTER — CARE COORDINATION (OUTPATIENT)
Dept: CARE COORDINATION | Age: 65
End: 2020-06-16

## 2020-06-16 NOTE — CARE COORDINATION
[facility]. Telephone call to patient. He indicated that he would like an air conditioner to help his breathing. He indicated that he does have a window which an air conditioner would fit in. Discussed plan to do some research regarding obtaining an air conditioner.

## 2020-06-16 NOTE — CARE COORDINATION
Telephone call to [employer] [place]. Representative indicated there are no organizations who are providing air conditioners at this time.

## 2020-06-16 NOTE — CARE COORDINATION
other people in your home. Also, you should use a separate bathroom, if available. Animals: You should restrict contact with pets and other animals while you are sick with COVID-19, just like you would around other people. Although there have not been reports of pets or other animals becoming sick with COVID-19, it is still recommended that people sick with COVID-19 limit contact with animals until more information is known about the virus. When possible, have another member of your household care for your animals while you are sick. If you are sick with COVID-19, avoid contact with your pet, including petting, snuggling, being kissed or licked, and sharing food. If you must care for your pet or be around animals while you are sick, wash your hands before and after you interact with pets and wear a facemask. Call ahead before visiting your doctor  If you have a medical appointment, call the healthcare provider and tell them that you have or may have COVID-19. This will help the healthcare providers office take steps to keep other people from getting infected or exposed. Wear a facemask  You should wear a facemask when you are around other people (e.g., sharing a room or vehicle) or pets and before you enter a healthcare providers office. If you are not able to wear a facemask (for example, because it causes trouble breathing), then people who live with you should not stay in the same room with you, or they should wear a facemask if they enter your room. Cover your coughs and sneezes  Cover your mouth and nose with a tissue when you cough or sneeze. Throw used tissues in a lined trash can. Immediately wash your hands with soap and water for at least 20 seconds or, if soap and water are not available, clean your hands with an alcohol-based hand  that contains at least 60% alcohol.   Clean your hands often  Wash your hands often with soap and water for at least 20 seconds, especially after blowing your nose, coughing, or sneezing; going to the bathroom; and before eating or preparing food. If soap and water are not readily available, use an alcohol-based hand  with at least 60% alcohol, covering all surfaces of your hands and rubbing them together until they feel dry. Soap and water are the best option if hands are visibly dirty. Avoid touching your eyes, nose, and mouth with unwashed hands. Avoid sharing personal household items  You should not share dishes, drinking glasses, cups, eating utensils, towels, or bedding with other people or pets in your home. After using these items, they should be washed thoroughly with soap and water. Clean all high-touch surfaces everyday  High touch surfaces include counters, tabletops, doorknobs, bathroom fixtures, toilets, phones, keyboards, tablets, and bedside tables. Also, clean any surfaces that may have blood, stool, or body fluids on them. Use a household cleaning spray or wipe, according to the label instructions. Labels contain instructions for safe and effective use of the cleaning product including precautions you should take when applying the product, such as wearing gloves and making sure you have good ventilation during use of the product. Monitor your symptoms  Seek prompt medical attention if your illness is worsening (e.g., difficulty breathing). Before seeking care, call your healthcare provider and tell them that you have, or are being evaluated for, COVID-19. Put on a facemask before you enter the facility. These steps will help the healthcare providers office to keep other people in the office or waiting room from getting infected or exposed. Ask your healthcare provider to call the local or [facility] health department. Persons who are placed under active monitoring or facilitated self-monitoring should follow instructions provided by their local health department or occupational health professionals, as appropriate.  When working with your local

## 2020-06-17 ENCOUNTER — OFFICE VISIT (OUTPATIENT)
Dept: CARDIOLOGY CLINIC | Age: 65
End: 2020-06-17
Payer: MEDICARE

## 2020-06-17 VITALS
DIASTOLIC BLOOD PRESSURE: 74 MMHG | BODY MASS INDEX: 41.61 KG/M2 | OXYGEN SATURATION: 95 % | SYSTOLIC BLOOD PRESSURE: 122 MMHG | HEART RATE: 74 BPM | WEIGHT: 306.8 LBS | RESPIRATION RATE: 18 BRPM

## 2020-06-17 PROCEDURE — 3017F COLORECTAL CA SCREEN DOC REV: CPT | Performed by: INTERNAL MEDICINE

## 2020-06-17 PROCEDURE — 1111F DSCHRG MED/CURRENT MED MERGE: CPT | Performed by: INTERNAL MEDICINE

## 2020-06-17 PROCEDURE — 99213 OFFICE O/P EST LOW 20 MIN: CPT | Performed by: INTERNAL MEDICINE

## 2020-06-17 PROCEDURE — G8427 DOCREV CUR MEDS BY ELIG CLIN: HCPCS | Performed by: INTERNAL MEDICINE

## 2020-06-17 PROCEDURE — G8417 CALC BMI ABV UP PARAM F/U: HCPCS | Performed by: INTERNAL MEDICINE

## 2020-06-17 PROCEDURE — 4004F PT TOBACCO SCREEN RCVD TLK: CPT | Performed by: INTERNAL MEDICINE

## 2020-06-17 NOTE — PROGRESS NOTES
LEFT TOTAL KNEE ARTHROPLASTY performed by Verena Hardy MD at [address] History   Problem Relation Age of Onset    Arthritis Mother     Asthma Mother     High Cholesterol Mother     Other Mother         aneurysm    Diabetes Father     Stroke Maternal Grandmother     Cancer Maternal Grandfather     Hypertension Other     COPD Neg Hx        Social History     Socioeconomic History    Marital status: Single     Spouse name: n/a    Number of children: 1    Years of education: 15    Highest education level: None   Occupational History    Occupation: Disability     Employer: NONE   Social Needs    Financial resource strain: None    Food insecurity     Worry: None     Inability: None    Transportation needs     Medical: None     Non-medical: None   Tobacco Use    Smoking status: Current Every Day Smoker     Packs/day: 0.50     Years: 30.00     Pack years: 15.00     Types: Cigarettes, Cigars     Start date: 1988     Last attempt to quit: 10/1/2013     Years since quittin.7    Smokeless tobacco: Never Used   Substance and Sexual Activity    Alcohol use: Yes     Comment: social    Drug use: Not Currently     Types: Cocaine, Marijuana     Comment: social    Sexual activity: Not Currently     Partners: Female   Lifestyle    Physical activity     Days per week: None     Minutes per session: None    Stress: None   Relationships    Social connections     Talks on phone: None     Gets together: None     Attends Congregational service: None     Active member of club or organization: None     Attends meetings of clubs or organizations: None     Relationship status: None    Intimate partner violence     Fear of current or ex partner: None     Emotionally abused: None     Physically abused: None     Forced sexual activity: None   Other Topics Concern    None   Social History Narrative    None       Allergies   Allergen Reactions    Fish-Derived Products Anaphylaxis    Iodine Anaphylaxis 05/26/2020     05/26/2020    MPV 10.5 01/04/2019     Lipids:  Lab Results   Component Value Date    CHOL 184 06/10/2020    CHOL 176 07/24/2019    CHOL 183 08/21/2017     Lab Results   Component Value Date    TRIG 141 06/10/2020    TRIG 115 07/24/2019    TRIG 91 08/21/2017     Lab Results   Component Value Date    HDL 44 06/10/2020    HDL 38 (L) 07/24/2019    HDL 50 08/21/2017     Lab Results   Component Value Date    LDLCALC 112 06/10/2020    LDLCALC 115 07/24/2019    LDLCALC 115 08/21/2017     No results found for: LABVLDL, VLDL  Lab Results   Component Value Date    CHOLHDLRATIO 4.1 04/13/2012     CMP:    Lab Results   Component Value Date     05/26/2020    K 3.6 05/26/2020     05/26/2020    CO2 24 05/26/2020    BUN 11 05/26/2020    CREATININE 0.84 05/26/2020    GFRAA >60.0 05/26/2020    LABGLOM >60.0 05/26/2020    GLUCOSE 250 05/26/2020    GLUCOSE 211 02/02/2020    PROT 7.2 05/25/2020    LABALBU 3.2 05/25/2020    LABALBU 3.7 01/28/2020    CALCIUM 9.5 05/26/2020    BILITOT 1.2 05/25/2020    ALKPHOS 101 05/25/2020    AST 13 05/25/2020    ALT 16 05/25/2020     BMP:    Lab Results   Component Value Date     05/26/2020    K 3.6 05/26/2020     05/26/2020    CO2 24 05/26/2020    BUN 11 05/26/2020    LABALBU 3.2 05/25/2020    LABALBU 3.7 01/28/2020    CREATININE 0.84 05/26/2020    CALCIUM 9.5 05/26/2020    GFRAA >60.0 05/26/2020    LABGLOM >60.0 05/26/2020    GLUCOSE 250 05/26/2020    GLUCOSE 211 02/02/2020     Magnesium:    Lab Results   Component Value Date    MG 2.0 05/25/2020     TSH:No results found for: TSHFT4, TSH    Patient Active Problem List   Diagnosis    Anemia    Medical non-compliance    House dust mite allergy    Seasonal allergies    Severe sleep apnea    Drug-seeking behavior    Hyperlipidemia    Alcohol abuse    Cocaine abuse ([address]    Gout    History of arthroscopy of both knees    Hypertension    Supraventricular tachycardia ([address]    Erectile dysfunction    Pre-diabetes    Morbid obesity due to excess calories [facility])    COPD (chronic obstructive pulmonary disease) ([facility])    Chest wall pain    Pleurisy    Loculated pleural effusion    Insomnia    Gastroesophageal reflux disease    Tobacco use    Status post total knee replacement, left    Allergy to seafood    Depression    Asthma with acute exacerbation       Medications:  Current Outpatient Medications   Medication Sig Dispense Refill    azithromycin (ZITHROMAX) 250 MG tablet 2 TABS DAY 1 THEN 1 TAB DAYS 2-5 6 tablet 0    predniSONE (DELTASONE) 20 MG tablet Take 2 tablets by mouth daily for 5 doses 10 tablet 0    fluticasone (FLONASE) 50 MCG/ACT nasal spray 2 sprays by Nasal route daily 3 Bottle 1    aclidinium (TUDORZA PRESSAIR) 400 MCG/ACT AEPB inhaler Inhale 1 puff into the lungs 2 times daily 3 each 1    ipratropium-albuterol (DUONEB) 0.5-2.5 (3) MG/3ML SOLN nebulizer solution INHALE 1 VIAL VIA NEBULIZER EVERY 6 HOURS AS NEEDED FOR SHORTNESS OF BREATH 180 mL 1    albuterol (PROVENTIL) (5 MG/ML) 0.5% nebulizer solution Take 0.5 mLs by nebulization every 6 hours as needed for Wheezing or Shortness of Breath 120 each 1    albuterol sulfate HFA (PROVENTIL HFA) 108 (90 Base) MCG/ACT inhaler Inhale 2 puffs into the lungs every 6 hours as needed for Wheezing 1 Inhaler 3    traZODone (DESYREL) 50 MG tablet Take 1 tablet by mouth nightly 90 tablet 1    sildenafil (VIAGRA) 100 MG tablet Take 1 tablet by mouth daily as needed for Erectile Dysfunction 10 tablet 5    pantoprazole (PROTONIX) 40 MG tablet Take 1 tablet by mouth every morning (before breakfast) 30 tablet 5    montelukast (SINGULAIR) 10 MG tablet Take 1 tablet by mouth nightly 90 tablet 1    lovastatin (MEVACOR) 10 MG tablet Take 1 tablet by mouth nightly 90 tablet 1    budesonide-formoterol (SYMBICORT) 160-4.5 MCG/ACT AERO Inhale 2 puffs into the lungs 2 times daily 3 Inhaler 1    amLODIPine (NORVASC) 10 MG tablet Take 1 tablet by mouth daily 90 tablet 1    Handicap Placard MISC by Does not apply route DX: OSTEOARTHRITIS OF BOTH KNEES (M17.0), COPD (J44.9)     EXPIRES: 02/2024 1 each 0     No current facility-administered medications for this visit. Assessment/Plan:    1. Essential hypertension    - NM MYOCARDIAL SPECT REST EXERCISE OR RX; Future    2. Hyperlipidemia, unspecified hyperlipidemia type      3. Supraventricular tachycardia (HCC)      4. Elevated troponin    - NM MYOCARDIAL SPECT REST EXERCISE OR RX; Future    5. Abnormal electrocardiogram (ECG) (EKG)     - NM MYOCARDIAL SPECT REST EXERCISE OR RX; Future       Counseling: the patient was counseled regarding diet, exercise, weight control and heart healthy lifestyle. Return in about 4 weeks (around 7/15/2020). Electronically signed by   Arleen Guajardo.  Micah Salvador MD [facility] Director of Cardiology Services and Cardiac Catheterization Laboratory  [facility], [place]    on 6/17/2020 at 11:46 AM

## 2020-06-23 ENCOUNTER — HOSPITAL ENCOUNTER (OUTPATIENT)
Dept: SLEEP CENTER | Age: 65
Discharge: HOME | End: 2020-06-25
Payer: MEDICARE

## 2020-06-23 ENCOUNTER — TELEPHONE (OUTPATIENT)
Dept: FAMILY MEDICINE CLINIC | Age: 65
End: 2020-06-23

## 2020-06-23 ENCOUNTER — CARE COORDINATION (OUTPATIENT)
Dept: CARE COORDINATION | Age: 65
End: 2020-06-23

## 2020-06-23 ENCOUNTER — CARE COORDINATION (OUTPATIENT)
Dept: CASE MANAGEMENT | Age: 65
End: 2020-06-23

## 2020-06-23 PROCEDURE — 95810 POLYSOM 6/> YRS 4/> PARAM: CPT

## 2020-06-23 PROCEDURE — 95810 POLYSOM 6/> YRS 4/> PARAM: CPT | Performed by: INTERNAL MEDICINE

## 2020-06-23 NOTE — CARE COORDINATION
community.  Clean and disinfect frequently touched surfaces.  Avoid all cruise travel and non-essential air travel.  Call your healthcare professional if you have concerns about COVID-19 and your underlying condition or if you are sick. For more information on steps you can take to protect yourself, see CDC's How to Protect Yourself      MSW referral for med cost assist. CTN s/o.

## 2020-06-23 NOTE — CARE COORDINATION
Requested by RYAN Mello to contact patient about insurance question. Telephone call to patient who indicated that he is in need of [place] [employer] [place]. His insurance is denying to pay for it. He believes they need a prior authorization for this medication. He switched insurance providers and his previous insurance required a prior authorization. Discussed plan to contact Dr. Joey Zheng office about prior authorization for [place] [employer] [place].

## 2020-06-23 NOTE — TELEPHONE ENCOUNTER
I spoke to patient . Contacted Dr. Kaya Enriquez office and they will work on prior authorization for his Hailey Sosa.

## 2020-06-23 NOTE — TELEPHONE ENCOUNTER
Max Aquino ( for [facility]) called to state that she spoke with patient and he needs a PA for his Tudorza inhaler.

## 2020-06-25 ENCOUNTER — OFFICE VISIT (OUTPATIENT)
Dept: FAMILY MEDICINE CLINIC | Age: 65
End: 2020-06-25
Payer: MEDICARE

## 2020-06-25 ENCOUNTER — TELEPHONE (OUTPATIENT)
Dept: FAMILY MEDICINE CLINIC | Age: 65
End: 2020-06-25

## 2020-06-25 VITALS
TEMPERATURE: 98 F | HEIGHT: 72 IN | RESPIRATION RATE: 22 BRPM | WEIGHT: 306 LBS | HEART RATE: 68 BPM | OXYGEN SATURATION: 93 % | DIASTOLIC BLOOD PRESSURE: 80 MMHG | SYSTOLIC BLOOD PRESSURE: 124 MMHG | BODY MASS INDEX: 41.45 KG/M2

## 2020-06-25 PROCEDURE — 1111F DSCHRG MED/CURRENT MED MERGE: CPT | Performed by: FAMILY MEDICINE

## 2020-06-25 PROCEDURE — 99214 OFFICE O/P EST MOD 30 MIN: CPT | Performed by: FAMILY MEDICINE

## 2020-06-25 RX ORDER — METHYLPREDNISOLONE 4 MG/1
TABLET ORAL
COMMUNITY
Start: 2020-06-22 | End: 2020-07-30 | Stop reason: ALTCHOICE

## 2020-06-25 ASSESSMENT — ENCOUNTER SYMPTOMS
CHEST TIGHTNESS: 0
ABDOMINAL PAIN: 0
NAUSEA: 0
CONSTIPATION: 0
ANAL BLEEDING: 0
SHORTNESS OF BREATH: 1
COUGH: 0
WHEEZING: 1
DIARRHEA: 0
BLOOD IN STOOL: 0
VOMITING: 0

## 2020-06-25 NOTE — PROGRESS NOTES
1 puff into the lungs 2 times daily             albuterol (PROVENTIL) (5 MG/ML) 0.5% nebulizer solution  Take 0.5 mLs by nebulization every 6 hours as needed for Wheezing or Shortness of Breath             albuterol sulfate HFA (PROVENTIL HFA) 108 (90 Base) MCG/ACT inhaler  Inhale 2 puffs into the lungs every 6 hours as needed for Wheezing             amLODIPine (NORVASC) 10 MG tablet  Take 1 tablet by mouth daily             budesonide-formoterol (SYMBICORT) 160-4.5 MCG/ACT AERO  Inhale 2 puffs into the lungs 2 times daily             fluticasone (FLONASE) 50 MCG/ACT nasal spray  2 sprays by Nasal route daily             Handicap Placard MISC  by Does not apply route DX: OSTEOARTHRITIS OF BOTH KNEES (M17.0), COPD (J44.9)     EXPIRES: 02/2024             ipratropium-albuterol (DUONEB) 0.5-2.5 (3) MG/3ML SOLN nebulizer solution  INHALE 1 VIAL VIA NEBULIZER EVERY 6 HOURS AS NEEDED FOR SHORTNESS OF BREATH             lovastatin (MEVACOR) 10 MG tablet  Take 1 tablet by mouth nightly             methylPREDNISolone (MEDROL DOSEPACK) 4 MG tablet               montelukast (SINGULAIR) 10 MG tablet  Take 1 tablet by mouth nightly             pantoprazole (PROTONIX) 40 MG tablet  Take 1 tablet by mouth every morning (before breakfast)             sertraline (ZOLOFT) 50 MG tablet  Take 1 tablet by mouth daily             sildenafil (VIAGRA) 100 MG tablet  Take 1 tablet by mouth daily as needed for Erectile Dysfunction             traZODone (DESYREL) 50 MG tablet  Take 1 tablet by mouth nightly                   Medications marked \"taking\" at this time  Outpatient Medications Marked as Taking for the 6/25/20 encounter (Office Visit) with [place], MD   Medication Sig Dispense Refill    methylPREDNISolone (MEDROL DOSEPACK) 4 MG tablet       sertraline (ZOLOFT) 50 MG tablet Take 1 tablet by mouth daily 30 tablet 1    fluticasone (FLONASE) 50 MCG/ACT nasal spray 2 sprays by Nasal route daily 3 Bottle 1   

## 2020-06-29 ENCOUNTER — TELEPHONE (OUTPATIENT)
Dept: FAMILY MEDICINE CLINIC | Age: 65
End: 2020-06-29

## 2020-06-29 PROBLEM — F41.8 ANXIETY WITH DEPRESSION: Status: ACTIVE | Noted: 2020-04-27

## 2020-06-29 RX ORDER — SILDENAFIL 100 MG/1
100 TABLET, FILM COATED ORAL DAILY PRN
Qty: 10 TABLET | Refills: 1 | Status: SHIPPED | OUTPATIENT
Start: 2020-06-29 | End: 2020-08-18

## 2020-06-29 RX ORDER — ESCITALOPRAM OXALATE 10 MG/1
10 TABLET ORAL DAILY
Qty: 30 TABLET | Refills: 1 | Status: SHIPPED | OUTPATIENT
Start: 2020-06-29 | End: 2020-07-30 | Stop reason: SDUPTHER

## 2020-06-30 ENCOUNTER — CARE COORDINATION (OUTPATIENT)
Dept: CARE COORDINATION | Age: 65
End: 2020-06-30

## 2020-07-07 ENCOUNTER — TELEPHONE (OUTPATIENT)
Dept: ENDOSCOPY | Age: 65
End: 2020-07-07

## 2020-07-07 NOTE — TELEPHONE ENCOUNTER
Problem: Self Care Deficits Care Plan (Adult)  Goal: *Acute Goals and Plan of Care (Insert Text)  Description    FUNCTIONAL STATUS PRIOR TO ADMISSION: Patient required up to maximal assistance for basic ADLs and total A I for instrumental ADLs. HOME SUPPORT: The patient lived with family and has a caregiver who comes 8-630 everyday to assist with needs. Patient has all needed DME. Occupational Therapy Goals  Initiated 12/29/2019  1. Patient will perform self-feeding with supervision/set-up within 7 day(s). MET 12/30/19  2. Patient will perform upper body dressing with supervision/set-up within 7 day(s). 3.  Patient will perform lower body dressing with minimal assistance/contact guard assist within 7 day(s). 4.  Patient will perform toilet transfers with supervision/set-up within 7 day(s). 5.  Patient will perform all aspects of toileting with minimal assistance/contact guard assist within 7 day(s). 6.  Patient will participate in upper extremity therapeutic exercise/activities with independence for 5 minutes within 7 day(s). 7.  Patient will utilize energy conservation techniques during functional activities with verbal cues within 7 day(s). Outcome: Progressing Towards Goal   OCCUPATIONAL THERAPY TREATMENT  Patient: Muna Ibanez (82 y.o. female)  Date: 1/3/2020  Diagnosis: Septic shock ([facility] [address] [A41.9, R65.21]   <principal problem not specified>       Precautions: Fall  Chart, occupational therapy assessment, plan of care, and goals were reviewed. ASSESSMENT  Patient continues with skilled OT services and is progressing towards goals. Participation impacted by impaired static and dynamic standing balance, impaired reach to LEs, impaired ability to manage bowel hygiene, impaired memory.       Current Level of Function Impacting Discharge (ADLs): Self care with set up to mod assist, functional transfers with min to CGA    Other factors to consider for discharge: Lives with son who called in prescription for Trilyte bowel prep kit to [facility] pharmacy in [place] 7/7/20 at 8:01am travels for work per son         PLAN :  Patient continues to benefit from skilled intervention to address the above impairments. Continue treatment per established plan of care. to address goals. Recommend with staff: Osiris  in chair for all meals, self care with set up to max assist, functional transfers with min to mod assist, BSC over toilet to increase height for ease of transfer    Recommend next OT session: dynamic standing, use of AE for LE dressing    Recommendation for discharge: (in order for the patient to meet his/her long term goals)  Therapy up to 5 days/week in SNF setting    This discharge recommendation:  Has been made in collaboration with the attending provider and/or case management    IF patient discharges home will need the following DME: none       SUBJECTIVE:   Patient stated I couldn't change it before. That's why I got a urinary infection.  referring to her brief    OBJECTIVE DATA SUMMARY:   Cognitive/Behavioral Status:  Neurologic State: Alert  Orientation Level: Oriented X4  Cognition: Follows commands; Appropriate for age attention/concentration  Perception: Appears intact  Perseveration: No perseveration noted  Safety/Judgement: Awareness of environment    Functional Mobility and Transfers for ADLs:  Bed Mobility:  Supine to Sit: Stand-by assistance  Sit to Supine: Stand-by assistance  Scooting: Stand-by assistance    Transfers:  Sit to Stand: Contact guard assistance;Assist x1  Functional Transfers  Toilet Transfer : Moderate assistance;Assist x1(low seat; BSC added to toilet to elevate seat and ease sit to/from stand)  Bed to Chair: Contact guard assistance    Balance:  Sitting: Intact; Without support  Standing: Impaired; With support  Standing - Static: Good;Constant support  Standing - Dynamic : Fair;Constant support    ADL Intervention:       Grooming  Position Performed: Seated in chair  Washing Hands: Modified independent       Lower Body Dressing Assistance  Protective Undergarmet: Moderate assistance(pull up)  Leg Crossed Method Used: No  Position Performed: Bending forward method;Standing  Adaptive Equipment Used: Walker    Toileting  Bladder Hygiene: Modified independent  Bowel Hygiene: Total assistance (dependent)  Clothing Management: Moderate assistance  Cues: Verbal cues provided  Adaptive Equipment: Grab bars; Walker    Cognitive Retraining  Safety/Judgement: Awareness of environment      Activity Tolerance:   Fair  Please refer to the flowsheet for vital signs taken during this treatment.     After treatment patient left in no apparent distress:   Sitting in chair, Call bell within reach, and Caregiver / family present    COMMUNICATION/COLLABORATION:   The patients plan of care was discussed with: Physical Therapist and Registered Nurse    ENEDELIA Fair  Time Calculation: 26 mins

## 2020-07-09 ENCOUNTER — TELEPHONE (OUTPATIENT)
Dept: PULMONOLOGY | Age: 65
End: 2020-07-09

## 2020-07-09 ENCOUNTER — NURSE ONLY (OUTPATIENT)
Dept: PRIMARY CARE CLINIC | Age: 65
End: 2020-07-09

## 2020-07-09 ENCOUNTER — CARE COORDINATION (OUTPATIENT)
Dept: CARE COORDINATION | Age: 65
End: 2020-07-09

## 2020-07-09 NOTE — CARE COORDINATION
Telephone call to patient. Unable to leave message as voicemail was full and not accepting messages at this time.

## 2020-07-09 NOTE — TELEPHONE ENCOUNTER
LEFT MESSAGE FOR PT TO CALL BACK TO MAKE AN APPOINTMENT TO GO OVER SLEEP STUDY RESULTS WITH DR MORALES [facility]

## 2020-07-10 ENCOUNTER — HOSPITAL ENCOUNTER (OUTPATIENT)
Age: 65
Setting detail: SPECIMEN
Discharge: HOME | End: 2020-07-10
Payer: MEDICARE

## 2020-07-10 PROCEDURE — U0003 INFECTIOUS AGENT DETECTION BY NUCLEIC ACID (DNA OR RNA); SEVERE ACUTE RESPIRATORY SYNDROME CORONAVIRUS 2 (SARS-COV-2) (CORONAVIRUS DISEASE [COVID-19]), AMPLIFIED PROBE TECHNIQUE, MAKING USE OF HIGH THROUGHPUT TECHNOLOGIES AS DESCRIBED BY CMS-2020-01-R: HCPCS

## 2020-07-14 ENCOUNTER — ANESTHESIA EVENT (OUTPATIENT)
Dept: OPERATING ROOM | Age: 65
End: 2020-07-14
Payer: MEDICARE

## 2020-07-14 LAB
SARS-COV-2: NOT DETECTED
SOURCE: NORMAL

## 2020-07-14 RX ORDER — AMLODIPINE BESYLATE 10 MG/1
10 TABLET ORAL DAILY
Qty: 90 TABLET | Refills: 1 | Status: SHIPPED | OUTPATIENT
Start: 2020-07-14 | End: 2020-11-05 | Stop reason: SDUPTHER

## 2020-07-14 NOTE — TELEPHONE ENCOUNTER
Patient requesting medication refill. Please approve or deny this request.    Patient requests a higher dosage as he is taking more doses with the weather and with his apnea.     Rx requested:  Requested Prescriptions     Pending Prescriptions Disp Refills    ipratropium-albuterol (DUONEB) 0.5-2.5 (3) MG/3ML SOLN nebulizer solution 180 mL 1         Last Office Visit:   6/25/2020      Next Visit Date:  Future Appointments   Date Time Provider \[facility] [place] [facility]\""   7/22/2020 11:00 AM Alfredo Jeffers MD [address] [place] [address]   7/30/2020  2:40 PM Annie Gamez MD [place] [id number]

## 2020-07-15 ENCOUNTER — HOSPITAL ENCOUNTER (OUTPATIENT)
Age: 65
Setting detail: OUTPATIENT SURGERY
Discharge: HOME | End: 2020-07-15
Attending: SPECIALIST | Admitting: SPECIALIST
Payer: MEDICARE

## 2020-07-15 ENCOUNTER — ANESTHESIA (OUTPATIENT)
Dept: OPERATING ROOM | Age: 65
End: 2020-07-15
Payer: MEDICARE

## 2020-07-15 ENCOUNTER — CARE COORDINATION (OUTPATIENT)
Dept: CARE COORDINATION | Age: 65
End: 2020-07-15

## 2020-07-15 VITALS
WEIGHT: 309 LBS | TEMPERATURE: 98.8 F | BODY MASS INDEX: 41.85 KG/M2 | SYSTOLIC BLOOD PRESSURE: 193 MMHG | OXYGEN SATURATION: 94 % | HEART RATE: 73 BPM | HEIGHT: 72 IN | DIASTOLIC BLOOD PRESSURE: 92 MMHG

## 2020-07-15 LAB
AMPHETAMINE SCREEN, URINE: ABNORMAL
BARBITURATE SCREEN URINE: ABNORMAL
BENZODIAZEPINE SCREEN, URINE: ABNORMAL
CANNABINOID SCREEN URINE: ABNORMAL
COCAINE METABOLITE SCREEN URINE: POSITIVE
Lab: ABNORMAL
OPIATE SCREEN URINE: ABNORMAL
PHENCYCLIDINE SCREEN URINE: ABNORMAL
TRICYCLIC, URINE: ABNORMAL

## 2020-07-15 PROCEDURE — 80306 DRUG TEST PRSMV INSTRMNT: CPT

## 2020-07-15 PROCEDURE — 45380 COLONOSCOPY AND BIOPSY: CPT | Performed by: SPECIALIST

## 2020-07-15 PROCEDURE — 7100000011 HC PHASE II RECOVERY - ADDTL 15 MIN: Performed by: SPECIALIST

## 2020-07-15 PROCEDURE — 2580000003 HC RX 258: Performed by: SPECIALIST

## 2020-07-15 PROCEDURE — 88305 TISSUE EXAM BY PATHOLOGIST: CPT

## 2020-07-15 PROCEDURE — 3609027000 HC COLONOSCOPY: Performed by: SPECIALIST

## 2020-07-15 PROCEDURE — 45385 COLONOSCOPY W/LESION REMOVAL: CPT | Performed by: SPECIALIST

## 2020-07-15 PROCEDURE — 2720000010 HC SURG SUPPLY STERILE: Performed by: SPECIALIST

## 2020-07-15 PROCEDURE — 6370000000 HC RX 637 (ALT 250 FOR IP): Performed by: SPECIALIST

## 2020-07-15 PROCEDURE — 7100000010 HC PHASE II RECOVERY - FIRST 15 MIN: Performed by: SPECIALIST

## 2020-07-15 PROCEDURE — 2709999900 HC NON-CHARGEABLE SUPPLY: Performed by: SPECIALIST

## 2020-07-15 RX ORDER — MAGNESIUM HYDROXIDE 1200 MG/15ML
LIQUID ORAL PRN
Status: DISCONTINUED | OUTPATIENT
Start: 2020-07-15 | End: 2020-07-15 | Stop reason: ALTCHOICE

## 2020-07-15 RX ORDER — SODIUM CHLORIDE, SODIUM LACTATE, POTASSIUM CHLORIDE, CALCIUM CHLORIDE 600; 310; 30; 20 MG/100ML; MG/100ML; MG/100ML; MG/100ML
INJECTION, SOLUTION INTRAVENOUS CONTINUOUS
Status: DISCONTINUED | OUTPATIENT
Start: 2020-07-15 | End: 2020-07-15 | Stop reason: HOSPADM

## 2020-07-15 RX ORDER — SODIUM CHLORIDE, SODIUM LACTATE, POTASSIUM CHLORIDE, CALCIUM CHLORIDE 600; 310; 30; 20 MG/100ML; MG/100ML; MG/100ML; MG/100ML
INJECTION, SOLUTION INTRAVENOUS
Status: DISCONTINUED
Start: 2020-07-15 | End: 2020-07-15 | Stop reason: HOSPADM

## 2020-07-15 RX ORDER — SIMETHICONE 20 MG/.3ML
EMULSION ORAL PRN
Status: DISCONTINUED | OUTPATIENT
Start: 2020-07-15 | End: 2020-07-15 | Stop reason: ALTCHOICE

## 2020-07-15 RX ADMIN — SODIUM CHLORIDE, POTASSIUM CHLORIDE, SODIUM LACTATE AND CALCIUM CHLORIDE: 600; 310; 30; 20 INJECTION, SOLUTION INTRAVENOUS at 11:54

## 2020-07-15 ASSESSMENT — PAIN - FUNCTIONAL ASSESSMENT: PAIN_FUNCTIONAL_ASSESSMENT: 0-10

## 2020-07-15 NOTE — PROGRESS NOTES
Dr Thu Trivedi spoke with patient regarding positive drug screen. Pt given option to proceed with procedure without anesthesia or pain meds. Pt agreed. Consent updated and initialed by pt, Dr Thu Trivedi and pre-op nurse. Otezla Counseling: The side effects of Otezla were discussed with the patient, including but not limited to worsening or new depression, weight loss, diarrhea, nausea, upper respiratory tract infection, and headache. Patient instructed to call the office should any adverse effect occur.  The patient verbalized understanding of the proper use and possible adverse effects of Otezla.  All the patient's questions and concerns were addressed.

## 2020-07-15 NOTE — PROGRESS NOTES
Dr. Modesta Kingsley, [facility] are aware of patient's positive urine drug screen. Dr. Mukherjee See spoke with patient regarding the results & options for procedure.

## 2020-07-16 RX ORDER — IPRATROPIUM BROMIDE AND ALBUTEROL SULFATE 2.5; .5 MG/3ML; MG/3ML
1 SOLUTION RESPIRATORY (INHALATION) EVERY 6 HOURS PRN
Qty: 180 ML | Refills: 1 | Status: SHIPPED | OUTPATIENT
Start: 2020-07-16 | End: 2020-08-07 | Stop reason: SDUPTHER

## 2020-07-20 ENCOUNTER — TELEPHONE (OUTPATIENT)
Dept: FAMILY MEDICINE CLINIC | Age: 65
End: 2020-07-20

## 2020-07-24 ENCOUNTER — CARE COORDINATION (OUTPATIENT)
Dept: CARE COORDINATION | Age: 65
End: 2020-07-24

## 2020-07-24 NOTE — CARE COORDINATION
Telephone call to patient. He claimed that he has not heard anything about his [place] of [place]. Reviewed patient's chart. Explained that patient's insurance will not cover [place] [employer] [place]. Drug formulary alternatives were relayed. Explained he needs to check with his pulmonologist about which alternative would be recommended. Discussed investigating drug manufacture of [place] [employer] [place] to see they have a patient assistance program if none of the alternatives work. Also, patient relayed his income is about 1,300 a month. Discussed option of Social Security Extra Help Program to assist with medication expenses. Patient is to inform this writer about what her finds about formulary alternatives.

## 2020-07-28 ENCOUNTER — HOSPITAL ENCOUNTER (OUTPATIENT)
Dept: SLEEP CENTER | Age: 65
Discharge: HOME | End: 2020-07-30
Payer: MEDICARE

## 2020-07-28 PROCEDURE — 95811 POLYSOM 6/>YRS CPAP 4/> PARM: CPT | Performed by: INTERNAL MEDICINE

## 2020-07-28 PROCEDURE — 95811 POLYSOM 6/>YRS CPAP 4/> PARM: CPT

## 2020-07-29 ENCOUNTER — CARE COORDINATION (OUTPATIENT)
Dept: CARE COORDINATION | Age: 65
End: 2020-07-29

## 2020-07-29 NOTE — CARE COORDINATION
Final f/u attempt to reach pt. Unable to leave message. SW signing off.   REGINALDO Gallagher, [facility]   [phone]

## 2020-07-29 NOTE — CARE COORDINATION
Incoming call from pt. He reported that he received a letter from [employer] requesting proof of income and he did not respond. Explained that he meets income requirement however he just needs to show proof of income. Indicated that he has all his meds however wishes they were cheaper. Will ask physician for cheaper alternatives and samples.

## 2020-07-30 ENCOUNTER — OFFICE VISIT (OUTPATIENT)
Dept: FAMILY MEDICINE CLINIC | Age: 65
End: 2020-07-30
Payer: MEDICARE

## 2020-07-30 VITALS
WEIGHT: 306 LBS | HEART RATE: 69 BPM | RESPIRATION RATE: 18 BRPM | OXYGEN SATURATION: 94 % | BODY MASS INDEX: 41.45 KG/M2 | TEMPERATURE: 98.1 F | SYSTOLIC BLOOD PRESSURE: 110 MMHG | HEIGHT: 72 IN | DIASTOLIC BLOOD PRESSURE: 60 MMHG

## 2020-07-30 PROCEDURE — 99214 OFFICE O/P EST MOD 30 MIN: CPT | Performed by: FAMILY MEDICINE

## 2020-07-30 RX ORDER — ESCITALOPRAM OXALATE 10 MG/1
10 TABLET ORAL DAILY
Qty: 90 TABLET | Refills: 1 | Status: SHIPPED | OUTPATIENT
Start: 2020-07-30 | End: 2020-12-15 | Stop reason: SDUPTHER

## 2020-07-30 RX ORDER — ACETAMINOPHEN 500 MG
500 TABLET ORAL EVERY 8 HOURS PRN
Qty: 120 TABLET | Refills: 1 | Status: SHIPPED | OUTPATIENT
Start: 2020-07-30 | End: 2020-11-05 | Stop reason: SDUPTHER

## 2020-07-30 RX ORDER — PREDNISONE 10 MG/1
TABLET ORAL
Qty: 45 TABLET | Refills: 0 | Status: SHIPPED | OUTPATIENT
Start: 2020-07-30 | End: 2020-10-05 | Stop reason: ALTCHOICE

## 2020-07-30 RX ORDER — TIOTROPIUM BROMIDE 18 UG/1
18 CAPSULE ORAL; RESPIRATORY (INHALATION) DAILY
Qty: 90 CAPSULE | Refills: 1 | Status: SHIPPED | OUTPATIENT
Start: 2020-07-30 | End: 2020-12-15 | Stop reason: SDUPTHER

## 2020-07-30 RX ORDER — ALBUTEROL SULFATE 90 UG/1
2 AEROSOL, METERED RESPIRATORY (INHALATION) EVERY 6 HOURS PRN
Qty: 1 INHALER | Refills: 1 | Status: SHIPPED | OUTPATIENT
Start: 2020-07-30 | End: 2020-10-03

## 2020-07-30 ASSESSMENT — ENCOUNTER SYMPTOMS
COUGH: 0
DIARRHEA: 0
ABDOMINAL PAIN: 0
WHEEZING: 0
CHEST TIGHTNESS: 0
SHORTNESS OF BREATH: 0
NAUSEA: 0
BACK PAIN: 1
VOMITING: 0

## 2020-07-30 ASSESSMENT — PATIENT HEALTH QUESTIONNAIRE - PHQ9
2. FEELING DOWN, DEPRESSED OR HOPELESS: 0
SUM OF ALL RESPONSES TO PHQ9 QUESTIONS 1 & 2: 0
1. LITTLE INTEREST OR PLEASURE IN DOING THINGS: 0
SUM OF ALL RESPONSES TO PHQ QUESTIONS 1-9: 0
SUM OF ALL RESPONSES TO PHQ QUESTIONS 1-9: 0

## 2020-07-30 NOTE — PROGRESS NOTES
Subjective:      Patient ID: Nikki Santiago is a 60 y.o. male who presents today for:     Chief Complaint   Patient presents with    Anxiety    Depression    Discuss Medications     patient would like to discuss the spiriva and the turdoza inhalers.  Lower Back Pain     patient lower back pain has been getting worse. HPI     Patient presents for follow-up visit regarding depression and anxiety. He reports feeling much improved with use of lexapro on a daily basis since his most recent visit. He reports his ability to fall sleep is improved and he has more energy overall. He denies any problems with worsening depression, SI/HI, or self harming behaviors. He denies any problems with worsening anxiety and denies any panic attacks. He states he has tolerated the medication well with no noted side effects. He reports 1 month history of worsening low back pain, bilateral, described as aching and rated 5-9/10 in severity and non-radiating. He reports pain is worse with prolonged walking and standing and with lifting/carrying. He denies any paresthesias or weakness to the lower extremities. He denies any F/C/S, N/V, dysuria, hematuria, flank pain, or loss of bowel or bladder control. Patient also reports being told by his insurance that Darrold Dearth is no longer covered under his formulary and that he will need a medication alternative. He states he was told that Spiriva would be covered. He has continued with his Symbicort as previously prescribed and denies any current respiratory complaints. \"My breathing is actually better than it has been in a lot of years. \"        Past Medical History:   Diagnosis Date    Alcohol abuse 10/27/2016    Anemia     Asthma     Cocaine abuse ([facility] 10/27/2016    COPD (chronic obstructive pulmonary disease) ([facility]     Depression 4/27/2020    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    Drug-seeking behavior 4/14/2015    Edema 12/10/2015    Erectile dysfunction     Gastroesophageal reflux disease 12/17/2018    Gout 10/27/2016    History of arthroscopy of both knees 10/27/2016    House dust mite allergy 4/21/2014    Hyperlipidemia     Hypertension 10/27/2016    Injury to heart 10/27/2016    Insomnia 12/17/2018    Medical non-compliance 2/22/2014    Morbid obesity due to excess calories ([address] 12/8/2016    Osteoarthritis of both knees 12/8/2016    Personal history of tobacco use     Pneumonia 12/04/2016    caused hospital admission    Pre-diabetes 10/27/2016    Seasonal allergies 4/21/2014    Severe persistent asthma 10/27/2016    Severe sleep apnea 4/14/2015    Supraventricular tachycardia ([address] 10/27/2016    SVT [facility]supraventricular tachycardia) ([facility])      Past Surgical History:   Procedure Laterality Date    ANTERIOR CRUCIATE LIGAMENT REPAIR      CARDIAC CATHETERIZATION      COLONOSCOPY N/A 7/15/2020    COLONOSCOPY WITH POLYPECTOMY performed by Dione Ramey MD at [facility] Left 8/9/2019    LEFT TOTAL KNEE ARTHROPLASTY performed by Manjula Wilks MD at [place] [address] History   Problem Relation Age of Onset    Arthritis Mother     Asthma Mother     High Cholesterol Mother     Other Mother         aneurysm    Diabetes Father     Stroke Maternal Grandmother     Cancer Maternal Grandfather     Hypertension Other     COPD Neg Hx      Social History     Socioeconomic History    Marital status: Single     Spouse name: n/a    Number of children: 1    Years of education: 15    Highest education level: Not on file   Occupational History    Occupation: Disability     Employer: NONE   Social Needs    Financial resource strain: Not on file    Food insecurity     Worry: Not on file     Inability: Not on file   [employer] needs     Medical: Not on file     Non-medical: Not on file   Tobacco Use    Smoking status: Current Every Day Smoker     Packs/day: 0.50     Years: 30.00     Pack years: 15.00     Types: Cigarettes, Cigars     Start date: 1988     Last attempt to quit: 10/1/2013     Years since quittin.8    Smokeless tobacco: Never Used   Substance and Sexual Activity    Alcohol use: Yes     Comment: social    Drug use: Not Currently     Types: Cocaine, Marijuana     Comment: social    Sexual activity: Not Currently     Partners: Female   Lifestyle    Physical activity     Days per week: Not on file     Minutes per session: Not on file    Stress: Not on file   Relationships    Social connections     Talks on phone: Not on file     Gets together: Not on file     Attends Orthodox service: Not on file     Active member of club or organization: Not on file     Attends meetings of clubs or organizations: Not on file     Relationship status: Not on file    Intimate partner violence     Fear of current or ex partner: Not on file     Emotionally abused: Not on file     Physically abused: Not on file     Forced sexual activity: Not on file   Other Topics Concern    Not on file   Social History Narrative    Not on file     Current Outpatient Medications on File Prior to Visit   Medication Sig Dispense Refill    ipratropium-albuterol (DUONEB) 0.5-2.5 (3) MG/3ML SOLN nebulizer solution Take 3 mLs by nebulization every 6 hours as needed for Shortness of Breath 180 mL 1    amLODIPine (NORVASC) 10 MG tablet Take 1 tablet by mouth daily 90 tablet 1    sildenafil (VIAGRA) 100 MG tablet Take 1 tablet by mouth daily as needed for Erectile Dysfunction 10 tablet 1    fluticasone (FLONASE) 50 MCG/ACT nasal spray 2 sprays by Nasal route daily 3 Bottle 1    albuterol (PROVENTIL) (5 MG/ML) 0.5% nebulizer solution Take 0.5 mLs by nebulization every 6 hours as needed for Wheezing or Shortness of Breath 120 each 1    traZODone (DESYREL) 50 MG tablet Take 1 tablet by mouth nightly 90 tablet 1    pantoprazole (PROTONIX) 40 MG tablet Take 1 tablet by mouth every morning (before breakfast) 30 tablet 5    montelukast (SINGULAIR) 10 MG tablet Take 1 tablet by mouth nightly 90 tablet 1    lovastatin (MEVACOR) 10 MG tablet Take 1 tablet by mouth nightly 90 tablet 1    budesonide-formoterol (SYMBICORT) 160-4.5 MCG/ACT AERO Inhale 2 puffs into the lungs 2 times daily 3 Inhaler 1    Handicap Placard MISC by Does not apply route DX: OSTEOARTHRITIS OF BOTH KNEES (M17.0), COPD (J44.9)     EXPIRES: 02/2024 1 each 0     No current facility-administered medications on file prior to visit. Allergies:  Fish-derived products; Iodine; and Pcn [penicillins]    Review of Systems   Constitutional: Negative for appetite change, chills, diaphoresis, fatigue, fever and unexpected weight change. Respiratory: Negative for cough, chest tightness, shortness of breath and wheezing. Cardiovascular: Negative for chest pain and palpitations. Gastrointestinal: Negative for abdominal pain, diarrhea, nausea and vomiting. Genitourinary: Negative for dysuria, flank pain and hematuria. Musculoskeletal: Positive for back pain. Negative for gait problem and neck pain. Skin: Negative for rash. Neurological: Negative for weakness and numbness. Psychiatric/Behavioral: Negative for dysphoric mood, hallucinations, self-injury, sleep disturbance and suicidal ideas. The patient is not nervous/anxious. Objective:     /60 (Site: Right Upper Arm, Position: Sitting, Cuff Size: Large Adult)   Pulse 69   Temp 98.1 °F (36.7 °C) (Temporal)   Resp 18   Ht 6' (1.829 m)   Wt (!) 306 lb (138.8 kg)   SpO2 94%   BMI 41.50 kg/m²     Physical Exam  Vitals signs reviewed. Constitutional:       General: He is not in acute distress. Appearance: He is well-developed. Neck:      Musculoskeletal: Neck supple. Thyroid: No thyromegaly. Vascular: No carotid bruit. Cardiovascular:      Rate and Rhythm: Normal rate and regular rhythm. Heart sounds: Normal heart sounds. No murmur.    Pulmonary:      Effort: Pulmonary effort is (PROVENTIL HFA) 108 (90 Base) MCG/ACT inhaler; Inhale 2 puffs into the lungs every 6 hours as needed for Wheezing or Shortness of Breath  Dispense: 1 Inhaler; Refill: 1  - tiotropium (SPIRIVA HANDIHALER) 18 MCG inhalation capsule; Inhale 1 capsule into the lungs daily  Dispense: 90 capsule; Refill: 1      Modified Medications    Modified Medication Previous Medication    ALBUTEROL SULFATE HFA (PROVENTIL HFA) 108 (90 BASE) MCG/ACT INHALER albuterol sulfate HFA (PROVENTIL HFA) 108 (90 Base) MCG/ACT inhaler       Inhale 2 puffs into the lungs every 6 hours as needed for Wheezing or Shortness of Breath    Inhale 2 puffs into the lungs every 6 hours as needed for Wheezing    ESCITALOPRAM (LEXAPRO) 10 MG TABLET escitalopram (LEXAPRO) 10 MG tablet       Take 1 tablet by mouth daily    Take 1 tablet by mouth daily          New Prescriptions    ACETAMINOPHEN (TYLENOL) 500 MG TABLET    Take 1 tablet by mouth every 8 hours as needed for Pain    PREDNISONE (DELTASONE) 10 MG TABLET    Take 5 tabs daily x 3 days, 4 tabs daily x 3 days, 3 tabs daily x 3 days, 2 tabs daily x 3 days, 1 tab daily x 3 days    TIOTROPIUM (SPIRIVA HANDIHALER) 18 MCG INHALATION CAPSULE    Inhale 1 capsule into the lungs daily          Medications Discontinued During This Encounter   Medication Reason    tiotropium (SPIRIVA) 18 MCG inhalation capsule Therapy completed    aclidinium (TUDORZA PRESSAIR) 400 MCG/ACT AEPB inhaler Therapy completed    methylPREDNISolone (MEDROL DOSEPACK) 4 MG tablet Therapy completed    albuterol sulfate HFA (PROVENTIL HFA) 108 (90 Base) MCG/ACT inhaler REORDER    escitalopram (LEXAPRO) 10 MG tablet REORDER       Return in about 4 months (around 11/30/2020) for Chronic Disease Check.     Syl Clarke MD

## 2020-08-03 ENCOUNTER — CARE COORDINATION (OUTPATIENT)
Dept: CARE COORDINATION | Age: 65
End: 2020-08-03

## 2020-08-03 NOTE — LETTER
[facility]      Dear Adiel Scherer,    I hope this letter finds you doing well! I am sending you resource information on the Social Security Extra Help Application confirmation. I hope you find the information helpful. Please look them over and let me know if you have any questions or need further assistance. You can contact me at any of the numbers listed below. Sincerely,    Mihai Liriano, [address]  [place]  Cell Phone: [phone]  Email: [email or web address]

## 2020-08-07 RX ORDER — IPRATROPIUM BROMIDE AND ALBUTEROL SULFATE 2.5; .5 MG/3ML; MG/3ML
1 SOLUTION RESPIRATORY (INHALATION) EVERY 6 HOURS PRN
Qty: 180 ML | Refills: 1 | Status: SHIPPED | OUTPATIENT
Start: 2020-08-07 | End: 2020-12-15 | Stop reason: SDUPTHER

## 2020-08-07 NOTE — TELEPHONE ENCOUNTER
Patient called and stated he would like a 90 day supply of his nebulizer solutions. Please advise.     Rx requested:  Requested Prescriptions     Pending Prescriptions Disp Refills    ipratropium-albuterol (DUONEB) 0.5-2.5 (3) MG/3ML SOLN nebulizer solution 180 mL 1     Sig: Take 3 mLs by nebulization every 6 hours as needed for Shortness of Breath         Last Office Visit:   7/30/2020      Next Visit Date:  Future Appointments   Date Time Provider Constance Cardonai   9/2/2020 11:45 AM Dex Keys MD [address] [place] [address]   12/1/2020  4:20 PM David Naylor MD [place] [address]

## 2020-08-12 ENCOUNTER — TELEPHONE (OUTPATIENT)
Dept: PULMONOLOGY | Age: 65
End: 2020-08-12

## 2020-08-18 NOTE — TELEPHONE ENCOUNTER
Pharmacy is requesting medication refill.  Please approve or deny this request.    Rx requested:  Requested Prescriptions     Pending Prescriptions Disp Refills    sildenafil (VIAGRA) 100 MG tablet [Pharmacy Med Name: Sildenafil Citrate Oral Tablet 100 MG] 10 tablet 0     Sig: Take 1 tablet by mouth as needed for Erectile Dysfunction         Last Office Visit:   7/30/2020      Next Visit Date:  Future Appointments   Date Time Provider Constance Cardonai   9/2/2020 11:45 AM Ashutosh Choudhury MD [address] [place] [address]   12/1/2020  4:20 PM Hitesh Dick MD [facility]

## 2020-08-19 RX ORDER — SILDENAFIL 100 MG/1
100 TABLET, FILM COATED ORAL PRN
Qty: 10 TABLET | Refills: 0 | Status: SHIPPED | OUTPATIENT
Start: 2020-08-19 | End: 2020-09-22 | Stop reason: SDUPTHER

## 2020-08-22 ENCOUNTER — HOSPITAL ENCOUNTER (EMERGENCY)
Age: 65
Discharge: HOME | End: 2020-08-22
Attending: EMERGENCY MEDICINE
Payer: MEDICARE

## 2020-08-22 VITALS
OXYGEN SATURATION: 98 % | SYSTOLIC BLOOD PRESSURE: 176 MMHG | TEMPERATURE: 98.4 F | HEART RATE: 85 BPM | RESPIRATION RATE: 22 BRPM | WEIGHT: 290 LBS | BODY MASS INDEX: 39.28 KG/M2 | HEIGHT: 72 IN | DIASTOLIC BLOOD PRESSURE: 84 MMHG

## 2020-08-22 PROCEDURE — 99284 EMERGENCY DEPT VISIT MOD MDM: CPT

## 2020-08-22 RX ORDER — POLYETHYLENE GLYCOL 3350 17 G/17G
17 POWDER, FOR SOLUTION ORAL DAILY
Qty: 1 BOTTLE | Refills: 1 | Status: SHIPPED | OUTPATIENT
Start: 2020-08-22 | End: 2020-08-29

## 2020-08-22 ASSESSMENT — ENCOUNTER SYMPTOMS
CHOKING: 0
CONSTIPATION: 0
VOMITING: 0
FACIAL SWELLING: 0
WHEEZING: 0
DIARRHEA: 0
ABDOMINAL PAIN: 1
EYE DISCHARGE: 0
BLOOD IN STOOL: 0
SINUS PRESSURE: 0
EYE PAIN: 0
TROUBLE SWALLOWING: 0
EYE REDNESS: 0
CHEST TIGHTNESS: 0
SHORTNESS OF BREATH: 1
SORE THROAT: 0
COUGH: 0
STRIDOR: 0
BACK PAIN: 0
VOICE CHANGE: 0

## 2020-08-22 NOTE — ED PROVIDER NOTES
[facility] ED  eMERGENCY dEPARTMENT eNCOUnter      Pt Name: Lewis Roldan  MRN: [id number]  [place] 1955  Date of evaluation: 8/22/2020  Provider: Xuan Andrews MD    [address] [place] [address]       Chief Complaint   Patient presents with    Hernia     patient states he felt a bubble by his belly button.  COPD     excerbation of patients COPD         HISTORY OF PRESENT ILLNESS   (Location/Symptom, Timing/Onset,Context/Setting, Quality, Duration, Modifying Factors, Severity)  Note limiting factors. Lewis Roldan is a 60 y.o. male who presents to the emergency department patient well-known to me from previous multiple encounter for mostly short of breath history of obstructive sleep apnea obesity anemia substance abuse alcohol abuse hyperlipidemia SVT and COPD come to this emergency because of thinking that he is getting a abdominal wall bellybutton hernia as he felt a bubble and increased swelling to the bellybutton no nausea no vomiting no projectile vomiting no fever no chills denies any chest tightness chest pain    HPI    NursingNotes were reviewed. REVIEW OF SYSTEMS    (2-9 systems for level 4, 10 or more for level 5)     Review of Systems   Constitutional: Negative. Negative for activity change and fever. HENT: Negative for congestion, drooling, facial swelling, mouth sores, nosebleeds, sinus pressure, sore throat, trouble swallowing and voice change. Eyes: Negative for pain, discharge, redness and visual disturbance. Respiratory: Positive for shortness of breath. Negative for cough, choking, chest tightness, wheezing and stridor. Cardiovascular: Negative for chest pain, palpitations and leg swelling. Gastrointestinal: Positive for abdominal pain. Negative for blood in stool, constipation, diarrhea and vomiting. Endocrine: Negative for cold intolerance, polyphagia and polyuria. Genitourinary: Negative for dysuria, flank pain, frequency, genital sores and urgency. Musculoskeletal: Negative for back pain, joint swelling, neck pain and neck stiffness. Skin: Negative for pallor and rash. Neurological: Negative for tremors, seizures, syncope, weakness, numbness and headaches. Hematological: Negative for adenopathy. Does not bruise/bleed easily. Psychiatric/Behavioral: Negative for agitation, behavioral problems, hallucinations and sleep disturbance. The patient is not hyperactive. All other systems reviewed and are negative. Except as noted above the remainder of the review of systems was reviewed and negative.        PAST MEDICAL HISTORY     Past Medical History:   Diagnosis Date    Alcohol abuse 10/27/2016    Anemia     Asthma     Cocaine abuse ([address] 10/27/2016    COPD (chronic obstructive pulmonary disease) ([address]     Depression 4/27/2020    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    Drug-seeking behavior 4/14/2015    Edema 12/10/2015    Erectile dysfunction     Gastroesophageal reflux disease 12/17/2018    Gout 10/27/2016    History of arthroscopy of both knees 10/27/2016    House dust mite allergy 4/21/2014    Hyperlipidemia     Hypertension 10/27/2016    Injury to heart 10/27/2016    Insomnia 12/17/2018    Medical non-compliance 2/22/2014    Morbid obesity due to excess calories ([address] 12/8/2016    Osteoarthritis of both knees 12/8/2016    Personal history of tobacco use     Pneumonia 12/04/2016    caused hospital admission    Pre-diabetes 10/27/2016    Seasonal allergies 4/21/2014    Severe persistent asthma 10/27/2016    Severe sleep apnea 4/14/2015    Supraventricular tachycardia ([address] 10/27/2016    SVT (supraventricular tachycardia) ([facility])          SURGICALHISTORY       Past Surgical History:   Procedure Laterality Date    ANTERIOR CRUCIATE LIGAMENT REPAIR      CARDIAC CATHETERIZATION      COLONOSCOPY N/A 7/15/2020    COLONOSCOPY WITH POLYPECTOMY performed by Lobito Rausch MD at [address] [place] [address] 8/9/2019 LEFT TOTAL KNEE ARTHROPLASTY performed by Kristin Tovar MD at [facility]       Previous Medications    ACETAMINOPHEN (TYLENOL) 500 MG TABLET    Take 1 tablet by mouth every 8 hours as needed for Pain    ALBUTEROL (PROVENTIL) (5 MG/ML) 0.5% NEBULIZER SOLUTION    Take 0.5 mLs by nebulization every 6 hours as needed for Wheezing or Shortness of Breath    ALBUTEROL SULFATE HFA (PROVENTIL HFA) 108 (90 BASE) MCG/ACT INHALER    Inhale 2 puffs into the lungs every 6 hours as needed for Wheezing or Shortness of Breath    AMLODIPINE (NORVASC) 10 MG TABLET    Take 1 tablet by mouth daily    BUDESONIDE-FORMOTEROL (SYMBICORT) 160-4.5 MCG/ACT AERO    Inhale 2 puffs into the lungs 2 times daily    ESCITALOPRAM (LEXAPRO) 10 MG TABLET    Take 1 tablet by mouth daily    FLUTICASONE (FLONASE) 50 MCG/ACT NASAL SPRAY    2 sprays by Nasal route daily    HANDICAP PLACARD MISC    by Does not apply route DX: OSTEOARTHRITIS OF BOTH KNEES (M17.0), COPD (J44.9)     EXPIRES: 02/2024    IPRATROPIUM-ALBUTEROL (DUONEB) 0.5-2.5 (3) MG/3ML SOLN NEBULIZER SOLUTION    Take 3 mLs by nebulization every 6 hours as needed for Shortness of Breath    LOVASTATIN (MEVACOR) 10 MG TABLET    Take 1 tablet by mouth nightly    MONTELUKAST (SINGULAIR) 10 MG TABLET    Take 1 tablet by mouth nightly    PANTOPRAZOLE (PROTONIX) 40 MG TABLET    Take 1 tablet by mouth every morning (before breakfast)    PREDNISONE (DELTASONE) 10 MG TABLET    Take 5 tabs daily x 3 days, 4 tabs daily x 3 days, 3 tabs daily x 3 days, 2 tabs daily x 3 days, 1 tab daily x 3 days    SILDENAFIL (VIAGRA) 100 MG TABLET    Take 1 tablet by mouth as needed for Erectile Dysfunction    TIOTROPIUM (SPIRIVA HANDIHALER) 18 MCG INHALATION CAPSULE    Inhale 1 capsule into the lungs daily    TRAZODONE (DESYREL) 50 MG TABLET    Take 1 tablet by mouth nightly       ALLERGIES     Fish-derived products;  Iodine; and Pcn [penicillins]    FAMILY HISTORY       Family History   Problem Relation Age of Onset    Arthritis Mother     Asthma Mother     High Cholesterol Mother     Other Mother         aneurysm    Diabetes Father     Stroke Maternal Grandmother     Cancer Maternal Grandfather     Hypertension Other     COPD Neg Hx           SOCIAL HISTORY       Social History     Socioeconomic History    Marital status: Single     Spouse name: n/a    Number of children: 1    Years of education: 15    Highest education level: None   Occupational History    Occupation: Disability     Employer: NONE   Social Needs    Financial resource strain: None    Food insecurity     Worry: None     Inability: None    Transportation needs     Medical: None     Non-medical: None   Tobacco Use    Smoking status: Current Every Day Smoker     Packs/day: 0.50     Years: 30.00     Pack years: 15.00     Types: Cigarettes, Cigars     Start date: 1988     Last attempt to quit: 10/1/2013     Years since quittin.8    Smokeless tobacco: Never Used   Substance and Sexual Activity    Alcohol use: Yes     Comment: social    Drug use: Not Currently     Types: Cocaine, Marijuana     Comment: social    Sexual activity: Not Currently     Partners: Female   Lifestyle    Physical activity     Days per week: None     Minutes per session: None    Stress: None   Relationships    Social connections     Talks on phone: None     Gets together: None     Attends Taoist service: None     Active member of club or organization: None     Attends meetings of clubs or organizations: None     Relationship status: None    Intimate partner violence     Fear of current or ex partner: None     Emotionally abused: None     Physically abused: None     Forced sexual activity: None   Other Topics Concern    None   Social History Narrative    None       SCREENINGS      @FLOW([id number])@      PHYSICAL EXAM    (up to 7 for level 4, 8 or more for level 5)     ED Triage Vitals   BP Temp Temp src Pulse Resp SpO2 Height Weight   -- -- -- -- -- -- -- --       Physical Exam  Vitals signs and nursing note reviewed. Constitutional:       General: He is not in acute distress. Appearance: He is well-developed. He is obese. He is not toxic-appearing. Comments: Alert cooperative patient obesity noted ambulatory   HENT:      Head: Normocephalic and atraumatic. Mouth/Throat:      Pharynx: No pharyngeal swelling. Eyes:      Extraocular Movements: Extraocular movements intact. Pupils: Pupils are equal, round, and reactive to light. Neck:      Musculoskeletal: Neck supple. Thyroid: No thyromegaly. Vascular: No hepatojugular reflux. Trachea: No tracheal deviation. Cardiovascular:      Rate and Rhythm: Normal rate and regular rhythm. Heart sounds: Normal heart sounds. No murmur. No gallop. Pulmonary:      Effort: Pulmonary effort is normal. No respiratory distress. Breath sounds: Normal breath sounds. No decreased breath sounds or rales. Chest:      Chest wall: No mass, tenderness, crepitus or edema. There is no dullness to percussion. Abdominal:      General: Bowel sounds are normal.      Palpations: Abdomen is soft. There is no mass. Tenderness: There is abdominal tenderness. There is no rebound. Comments: Patient going to the abdomen patient is abdomen is soft minimal distention due to obesity patient had no previous scar of the old surgery patient has a small umbilical hernia easily reducible   Musculoskeletal: Normal range of motion. General: No tenderness. Right lower leg: He exhibits no tenderness. No edema. Left lower leg: No edema. Skin:     General: Skin is warm. Capillary Refill: Capillary refill takes less than 2 seconds. Findings: No erythema or rash. Neurological:      General: No focal deficit present. Mental Status: He is alert and oriented to person, place, and time. Cranial Nerves: No cranial nerve deficit.       Motor: No abnormal muscle tone. Psychiatric:         Mood and Affect: Mood is not anxious. Behavior: Behavior normal.         Thought Content: Thought content normal.       DIAGNOSTIC RESULTS     EKG: All EKG's are interpreted by the Emergency Department Physician who either signs or Co-signsthis chart in the absence of a cardiologist.        RADIOLOGY:   Laretta Cosier such as CT, Ultrasound and MRI are read by the radiologist. Plain radiographic images are visualized and preliminarily interpreted by the emergency physician with the below findings:        Interpretation per the Radiologist below, if available at the time ofthis note:    No orders to display         ED BEDSIDE ULTRASOUND:   Performed by ED Physician - none    LABS:  Labs Reviewed - No data to display    All other labs were within normal range or not returned as of this dictation. EMERGENCY DEPARTMENT COURSE and DIFFERENTIAL DIAGNOSIS/MDM:   Vitals:    Vitals:    08/22/20 1332   BP: (!) 176/84   Pulse: 85   Resp: 22   Temp: 98.4 °F (36.9 °C)   TempSrc: Oral   SpO2: 98%   Weight: 290 lb (131.5 kg)   Height: 6' (1.829 m)           MDM    CRITICAL CARE TIME   Total Critical Care time was minutes, excluding separately reportableprocedures. There was a high probability of clinicallysignificant/life threatening deterioration in the patient's condition which required my urgent intervention. CONSULTS:  None    PROCEDURES:  Unless otherwise noted below, none     Procedures    FINAL IMPRESSION      1. Chronic bronchitis, unspecified chronic bronchitis type ([address]    2.  Umbilical hernia without obstruction and without gangrene          DISPOSITION/PLAN   DISPOSITION Decision To Discharge 08/22/2020 01:55:50 PM      PATIENT REFERRED TO:  Alla Spangler MD  [place] [address]  [phone]      As needed    Mary Ortez MD  [address] [facility] [phone]    In 1 week        DISCHARGE MEDICATIONS:  New Prescriptions    POLYETHYLENE GLYCOL (MIRALAX) 17 GM/SCOOP POWDER    Take 17 g by mouth daily for 7 days PRN constipation          (Please note that portions of this note were completed with a voice recognition program.  Efforts were made to edit the dictations but occasionally words are mis-transcribed.)    Polina Wilson MD (electronically signed)  Attending Emergency Physician       Polina Wilson MD  08/22/20 1400

## 2020-08-22 NOTE — ED NOTES
Patient presents with umbilical hernia and shortness of breath, patient states he has COPD and because of the am weather being cooler he gets short of breath. Patient denies pain. Noted approx 2 cm umbilical hernia, patient states he does not really know when he noticed. The patient denies problems with bowel or bladder. Patient denies nausea.       Christopher Victoria RN  08/22/20 0443

## 2020-08-24 ENCOUNTER — CARE COORDINATION (OUTPATIENT)
Dept: CARE COORDINATION | Age: 65
End: 2020-08-24

## 2020-08-24 NOTE — CARE COORDINATION
Patient contacted regarding Edgar Aguayo. Discussed COVID-19 related testing which was available at this time. Test results were negative. Patient informed of results, if available? Yes and Tests completed 2020 and 7/10/2020 both test results = Not detected    Care Transition Nurse/ Ambulatory Care Manager contacted the patient by telephone to perform post discharge assessment. Verified name and  with patient as identifiers. Provided introduction to self, and explanation of the CTN/ACM role, and reason for call due to risk factors for infection and/or exposure to COVID-19. Symptoms reviewed with patient who verbalized the following symptoms: no new symptoms and no worsening symptoms. Due to no new or worsening symptoms encounter was not routed to provider for escalation. Discussed follow-up appointments. If no appointment was previously scheduled, appointment scheduling offered: Yes  [facility] follow up appointment(s):   Future Appointments   Date Time Provider Constance Espitia   2020 11:45 AM Jaylin Shah  [facility]   2020  4:20 PM MD Romero Pinon [place] [address]     [id number] [address] follow up appointment(s):      Advance Care Planning:   Does patient have an Advance Directive:  reviewed and current. Patient has following risk factors of: COPD and asthma. CTN/ACM reviewed discharge instructions, medical action plan and red flags such as increased shortness of breath, increasing fever and signs of decompensation with patient who verbalized understanding. Discussed exposure protocols and quarantine with CDC Guidelines What to do if you are sick with coronavirus disease .  Patient was given an opportunity for questions and concerns. The patient agrees to contact the [facility] exposure line [phone], [facility]: ([phone]) and PCP office for questions related to their healthcare.  CTN/ACM provided contact information for future needs. Reviewed and educated patient on any new and changed medications related to discharge diagnosis     Patient/family/caregiver given information for [employer] Loop and agrees to enroll no  Patient's preferred e-mail:  Patient's preferred phone number:   Based on Loop alert triggers, patient will be contacted by nurse care manager for worsening symptoms. ACM to follow up in 14 days based on severity of symptoms and risk factors.

## 2020-08-25 ENCOUNTER — CARE COORDINATION (OUTPATIENT)
Dept: CARE COORDINATION | Age: 65
End: 2020-08-25

## 2020-08-25 NOTE — CARE COORDINATION
Telephone call to [address] [place] [address]. Left voicemail of nature of call with request for return phone call. Call back number was provided.

## 2020-08-25 NOTE — CARE COORDINATION
Telephone call to patient. Explained that referral to [facility] was made and he maybe hearing from Ld Romero. Also, discussed that this writer investigated patient assistance program [place] Symbicort ([place] need to have spent 2% of income on prescriptions, [address]). Patient believes he has spent 2% on income on medications. Discussed if [facility] cannot assist with inhalers will look at patient assistance programs.

## 2020-08-26 NOTE — TELEPHONE ENCOUNTER
Patient requesting medication refill. Please approve or deny this request.    Patient would like a 90 day refill.     Rx requested:  Requested Prescriptions     Pending Prescriptions Disp Refills    albuterol (PROVENTIL) (5 MG/ML) 0.5% nebulizer solution 120 each 1     Sig: Take 0.5 mLs by nebulization every 6 hours as needed for Wheezing or Shortness of Breath         Last Office Visit:   7/30/2020      Next Visit Date:  Future Appointments   Date Time Provider Constance Cardonai   9/2/2020 11:45 AM Tom Bills  [facility]   9/4/2020  3:00 PM Leroy Daugherty  [address]   12/1/2020  4:20 PM Mary Ellen Moody MD [facility] 94

## 2020-08-27 RX ORDER — IPRATROPIUM BROMIDE AND ALBUTEROL SULFATE 2.5; .5 MG/3ML; MG/3ML
1 SOLUTION RESPIRATORY (INHALATION) EVERY 6 HOURS PRN
Qty: 180 ML | Refills: 1 | OUTPATIENT
Start: 2020-08-27

## 2020-08-27 NOTE — TELEPHONE ENCOUNTER
Patient is requesting medication refill.  Please approve or deny this request.    Rx requested:  Requested Prescriptions     Pending Prescriptions Disp Refills    ipratropium-albuterol (DUONEB) 0.5-2.5 (3) MG/3ML SOLN nebulizer solution 180 mL 1     Sig: Take 3 mLs by nebulization every 6 hours as needed for Shortness of Breath         Last Office Visit:   7/30/2020      Next Visit Date:  Future Appointments   Date Time Provider Constance Nupur   9/2/2020 11:45 AM Alfredo Jeffers  [facility]   9/4/2020  3:00 PM Julian Kelley MD [address] [place] [address]   12/1/2020  4:20 PM [facility]

## 2020-08-30 ENCOUNTER — CARE COORDINATION (OUTPATIENT)
Dept: CARE COORDINATION | Age: 65
End: 2020-08-30

## 2020-09-03 ENCOUNTER — CARE COORDINATION (OUTPATIENT)
Dept: CARE COORDINATION | Age: 65
End: 2020-09-03

## 2020-09-04 ENCOUNTER — OFFICE VISIT (OUTPATIENT)
Dept: SURGERY | Age: 65
End: 2020-09-04
Payer: MEDICARE

## 2020-09-04 VITALS
BODY MASS INDEX: 35.89 KG/M2 | WEIGHT: 304 LBS | DIASTOLIC BLOOD PRESSURE: 76 MMHG | SYSTOLIC BLOOD PRESSURE: 128 MMHG | HEIGHT: 77 IN | TEMPERATURE: 97.6 F

## 2020-09-04 PROCEDURE — G8417 CALC BMI ABV UP PARAM F/U: HCPCS | Performed by: SURGERY

## 2020-09-04 PROCEDURE — G8427 DOCREV CUR MEDS BY ELIG CLIN: HCPCS | Performed by: SURGERY

## 2020-09-04 PROCEDURE — 3017F COLORECTAL CA SCREEN DOC REV: CPT | Performed by: SURGERY

## 2020-09-04 PROCEDURE — 99203 OFFICE O/P NEW LOW 30 MIN: CPT | Performed by: SURGERY

## 2020-09-04 PROCEDURE — 4004F PT TOBACCO SCREEN RCVD TLK: CPT | Performed by: SURGERY

## 2020-09-04 NOTE — PROGRESS NOTES
2014    Hyperlipidemia     Hypertension 10/27/2016    Injury to heart 10/27/2016    Insomnia 2018    Medical non-compliance 2014    Morbid obesity due to excess calories ([facility] [address] 2016    Osteoarthritis of both knees 2016    Personal history of tobacco use     Pneumonia 2016    caused hospital admission    Pre-diabetes 10/27/2016    Seasonal allergies 2014    Severe persistent asthma 10/27/2016    Severe sleep apnea 2015    Supraventricular tachycardia ([facility] 10/27/2016    SVT (supraventricular tachycardia) (HCC)      Past Surgical History:   Procedure Laterality Date    ANTERIOR CRUCIATE LIGAMENT REPAIR      CARDIAC CATHETERIZATION      COLONOSCOPY N/A 7/15/2020    COLONOSCOPY WITH POLYPECTOMY performed by Jordy Burdick MD at [address] [place] [address] Left 2019    LEFT TOTAL KNEE ARTHROPLASTY performed by Connie Marquez MD at [address] [place] [address] History     Tobacco Use    Smoking status: Current Every Day Smoker     Packs/day: 0.50     Years: 30.00     Pack years: 15.00     Types: Cigarettes, Cigars     Start date: 1988     Last attempt to quit: 10/1/2013     Years since quittin.9    Smokeless tobacco: Never Used   Substance Use Topics    Alcohol use: Yes     Comment: social    Drug use: Not Currently     Types: Cocaine, Marijuana     Comment: social     Family History   Problem Relation Age of Onset    Arthritis Mother     Asthma Mother     High Cholesterol Mother     Other Mother         aneurysm    Diabetes Father     Stroke Maternal Grandmother     Cancer Maternal Grandfather     Hypertension Other     COPD Neg Hx      Fish-derived products;  Iodine; and Pcn [penicillins]  Allergies   Allergen Reactions    Fish-Derived Products Anaphylaxis    Iodine Anaphylaxis     Iodine-containing products, dyes, contrast dye    Pcn [Penicillins] Nausea And Vomiting     Current Outpatient Medications   Medication Sig Dispense Refill    albuterol (PROVENTIL) (5 MG/ML) 0.5% nebulizer solution Take 0.5 mLs by nebulization every 6 hours as needed for Wheezing or Shortness of Breath 120 each 1    lovastatin (MEVACOR) 10 MG tablet Take 1 tablet by mouth nightly 90 tablet 1    sildenafil (VIAGRA) 100 MG tablet Take 1 tablet by mouth as needed for Erectile Dysfunction 10 tablet 0    ipratropium-albuterol (DUONEB) 0.5-2.5 (3) MG/3ML SOLN nebulizer solution Take 3 mLs by nebulization every 6 hours as needed for Shortness of Breath 180 mL 1    albuterol sulfate HFA (PROVENTIL HFA) 108 (90 Base) MCG/ACT inhaler Inhale 2 puffs into the lungs every 6 hours as needed for Wheezing or Shortness of Breath 1 Inhaler 1    tiotropium (SPIRIVA HANDIHALER) 18 MCG inhalation capsule Inhale 1 capsule into the lungs daily 90 capsule 1    predniSONE (DELTASONE) 10 MG tablet Take 5 tabs daily x 3 days, 4 tabs daily x 3 days, 3 tabs daily x 3 days, 2 tabs daily x 3 days, 1 tab daily x 3 days 45 tablet 0    acetaminophen (TYLENOL) 500 MG tablet Take 1 tablet by mouth every 8 hours as needed for Pain 120 tablet 1    escitalopram (LEXAPRO) 10 MG tablet Take 1 tablet by mouth daily 90 tablet 1    amLODIPine (NORVASC) 10 MG tablet Take 1 tablet by mouth daily 90 tablet 1    fluticasone (FLONASE) 50 MCG/ACT nasal spray 2 sprays by Nasal route daily 3 Bottle 1    traZODone (DESYREL) 50 MG tablet Take 1 tablet by mouth nightly 90 tablet 1    pantoprazole (PROTONIX) 40 MG tablet Take 1 tablet by mouth every morning (before breakfast) 30 tablet 5    montelukast (SINGULAIR) 10 MG tablet Take 1 tablet by mouth nightly 90 tablet 1    budesonide-formoterol (SYMBICORT) 160-4.5 MCG/ACT AERO Inhale 2 puffs into the lungs 2 times daily 3 Inhaler 1    Handicap Placard MISC by Does not apply route DX: OSTEOARTHRITIS OF BOTH KNEES (M17.0), COPD (J44.9)     EXPIRES: 02/2024 1 each 0     No current facility-administered medications for this visit.       /76   Temp their procedure. The patient was made aware that steven Covid-19  may worsen their prognosis for recovering from their procedure  and lend to a higher morbidity and/or mortality risk. The patient was given the options of postponing their procedure. All material risks, benefits, and alternatives were discussed. The patient does wish to proceed with the procedure at this time.        Catracho Ceron MD

## 2020-09-06 ENCOUNTER — PREP FOR PROCEDURE (OUTPATIENT)
Dept: SURGERY | Age: 65
End: 2020-09-06

## 2020-09-06 PROBLEM — K42.9 UMBILICAL HERNIA WITHOUT OBSTRUCTION AND WITHOUT GANGRENE: Status: ACTIVE | Noted: 2020-09-06

## 2020-09-06 ASSESSMENT — ENCOUNTER SYMPTOMS
SHORTNESS OF BREATH: 1
COUGH: 1
ABDOMINAL DISTENTION: 0
CONSTIPATION: 0
BLOOD IN STOOL: 0
ABDOMINAL PAIN: 1
COLOR CHANGE: 0
CHEST TIGHTNESS: 0
RHINORRHEA: 0
ALLERGIC/IMMUNOLOGIC NEGATIVE: 1
EYES NEGATIVE: 1

## 2020-09-06 NOTE — H&P (VIEW-ONLY)
LANNY Roldan is a 60 y.o. male  who is requested to be seen for [facility] ER  for an umbilical hernia. The has been present 6 month(s). . The patient has pain. Pain is rated as a 3  It's size is increasing. The pain is increasing. Patient does not have nausea/vomiting. The hernia mass is reducible. The hernia does not effect normal everyday activities. The patient has had no abdominal surgery. He has had no radiologic imaging done. He is not on anticoagulants. Review of Systems   Constitutional: Negative for activity change, appetite change and unexpected weight change. HENT: Negative for congestion, nosebleeds, postnasal drip, rhinorrhea and sneezing. Eyes: Negative. Negative for visual disturbance. Respiratory: Positive for cough and shortness of breath. Negative for chest tightness. Cardiovascular: Negative for chest pain and leg swelling. Gastrointestinal: Positive for abdominal pain. Negative for abdominal distention, blood in stool and constipation. Endocrine: Negative. Genitourinary: Negative for difficulty urinating. Musculoskeletal: Negative for arthralgias, gait problem and myalgias. Skin: Negative for color change. Allergic/Immunologic: Negative. Neurological: Negative for dizziness, light-headedness, numbness and headaches. Hematological: Does not bruise/bleed easily. Psychiatric/Behavioral: Negative for sleep disturbance.      Past Medical History:   Diagnosis Date    Alcohol abuse 10/27/2016    Anemia     Asthma     Cocaine abuse ([facility] [address] 10/27/2016    COPD (chronic obstructive pulmonary disease) ([facility] [address] [facility]     Depression 4/27/2020    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    Drug-seeking behavior 4/14/2015    Edema 12/10/2015    Erectile dysfunction     Gastroesophageal reflux disease 12/17/2018    Gout 10/27/2016    History of arthroscopy of both knees 10/27/2016    House dust mite allergy 4/21/2014    Hyperlipidemia     Hypertension 10/27/2016    Injury to heart 10/27/2016    Insomnia 2018    Medical non-compliance 2014    Morbid obesity due to excess calories ([facility] [address] 2016    Osteoarthritis of both knees 2016    Personal history of tobacco use     Pneumonia 2016    caused hospital admission    Pre-diabetes 10/27/2016    Seasonal allergies 2014    Severe persistent asthma 10/27/2016    Severe sleep apnea 2015    Supraventricular tachycardia ([address] 10/27/2016    SVT (supraventricular tachycardia) (HCC)      Past Surgical History:   Procedure Laterality Date    ANTERIOR CRUCIATE LIGAMENT REPAIR      CARDIAC CATHETERIZATION      COLONOSCOPY N/A 7/15/2020    COLONOSCOPY WITH POLYPECTOMY performed by Lara Johansen MD at [address] [place] [address] 2019    LEFT TOTAL KNEE ARTHROPLASTY performed by Kristin Tovar MD at [address] [place] [address] History     Tobacco Use    Smoking status: Current Every Day Smoker     Packs/day: 0.50     Years: 30.00     Pack years: 15.00     Types: Cigarettes, Cigars     Start date: 1988     Last attempt to quit: 10/1/2013     Years since quittin.9    Smokeless tobacco: Never Used   Substance Use Topics    Alcohol use: Yes     Comment: social    Drug use: Not Currently     Types: Cocaine, Marijuana     Comment: social     Family History   Problem Relation Age of Onset    Arthritis Mother     Asthma Mother     High Cholesterol Mother     Other Mother         aneurysm    Diabetes Father     Stroke Maternal Grandmother     Cancer Maternal Grandfather     Hypertension Other     COPD Neg Hx      Fish-derived products;  Iodine; and Pcn [penicillins]  Allergies   Allergen Reactions    Fish-Derived Products Anaphylaxis    Iodine Anaphylaxis     Iodine-containing products, dyes, contrast dye    Pcn [Penicillins] Nausea And Vomiting     Current Outpatient Medications   Medication Sig Dispense Refill    albuterol (PROVENTIL) (5 MG/ML) 0.5% nebulizer solution Take 0.5

## 2020-09-08 ENCOUNTER — CARE COORDINATION (OUTPATIENT)
Dept: CARE COORDINATION | Age: 65
End: 2020-09-08

## 2020-09-08 ENCOUNTER — HOSPITAL ENCOUNTER (OUTPATIENT)
Dept: NON INVASIVE DIAGNOSTICS | Age: 65
Discharge: HOME | End: 2020-09-08
Payer: MEDICARE

## 2020-09-08 ENCOUNTER — HOSPITAL ENCOUNTER (OUTPATIENT)
Dept: NUCLEAR MEDICINE | Age: 65
Discharge: HOME | End: 2020-09-10
Payer: MEDICARE

## 2020-09-08 VITALS
HEIGHT: 73 IN | DIASTOLIC BLOOD PRESSURE: 80 MMHG | HEART RATE: 75 BPM | BODY MASS INDEX: 40.95 KG/M2 | SYSTOLIC BLOOD PRESSURE: 144 MMHG | WEIGHT: 309 LBS

## 2020-09-08 PROCEDURE — 3430000000 HC RX DIAGNOSTIC RADIOPHARMACEUTICAL: Performed by: INTERNAL MEDICINE

## 2020-09-08 PROCEDURE — 6360000002 HC RX W HCPCS: Performed by: INTERNAL MEDICINE

## 2020-09-08 PROCEDURE — 2580000003 HC RX 258: Performed by: INTERNAL MEDICINE

## 2020-09-08 PROCEDURE — 93017 CV STRESS TEST TRACING ONLY: CPT

## 2020-09-08 PROCEDURE — 78452 HT MUSCLE IMAGE SPECT MULT: CPT

## 2020-09-08 PROCEDURE — A9502 TC99M TETROFOSMIN: HCPCS | Performed by: INTERNAL MEDICINE

## 2020-09-08 RX ORDER — SODIUM CHLORIDE 0.9 % (FLUSH) 0.9 %
10 SYRINGE (ML) INJECTION PRN
Status: DISCONTINUED | OUTPATIENT
Start: 2020-09-08 | End: 2020-09-11 | Stop reason: HOSPADM

## 2020-09-08 RX ADMIN — Medication 10 ML: at 10:58

## 2020-09-08 RX ADMIN — TETROFOSMIN 10.7 MILLICURIE: 1.38 INJECTION, POWDER, LYOPHILIZED, FOR SOLUTION INTRAVENOUS at 10:58

## 2020-09-08 RX ADMIN — REGADENOSON 0.4 MG: 0.08 INJECTION, SOLUTION INTRAVENOUS at 12:25

## 2020-09-08 RX ADMIN — Medication 10 ML: at 12:26

## 2020-09-08 RX ADMIN — Medication 10 ML: at 12:25

## 2020-09-08 RX ADMIN — TETROFOSMIN 33.4 MILLICURIE: 1.38 INJECTION, POWDER, LYOPHILIZED, FOR SOLUTION INTRAVENOUS at 12:26

## 2020-09-09 ENCOUNTER — CARE COORDINATION (OUTPATIENT)
Dept: CARE COORDINATION | Age: 65
End: 2020-09-09

## 2020-09-09 NOTE — CARE COORDINATION
Telephone call with patient. He indicated that he is still working on application for [employer] and [employer]. He also stated that he is going to contact his medical insurance about decreasing his co-pays. Discussed Social Security Extra Help Program and he claims he still needs to contact Social Security. He has contact information for [employer]. Discussed Advance Directives and patient did not want to complete at this time.

## 2020-09-09 NOTE — CARE COORDINATION
You Patient resolved from the Care Transitions episode on 9/9/2020  Discussed COVID-19 related testing which was available at this time. Test results were negative. Patient informed of results, if available? Yes and Testing completed 5/25/2020 and 7/10/2020 but resulted not detected. Patient/family has been provided the following resources and education related to COVID-19:                         Signs, symptoms and red flags related to COVID-19            CDC exposure and quarantine guidelines            [facility] exposure contact - [phone]            Contact for their local Department of Health                 Patient currently reports that the following symptoms have improved:  no new/worsening symptoms     No further outreach scheduled with this CTN/ACM. Episode of Care resolved. Patient has this CTN/ACM contact information if future needs arise.

## 2020-09-10 ENCOUNTER — TELEPHONE (OUTPATIENT)
Dept: FAMILY MEDICINE CLINIC | Age: 65
End: 2020-09-10

## 2020-09-10 PROCEDURE — 93018 CV STRESS TEST I&R ONLY: CPT | Performed by: INTERNAL MEDICINE

## 2020-09-10 PROCEDURE — 93016 CV STRESS TEST SUPVJ ONLY: CPT | Performed by: INTERNAL MEDICINE

## 2020-09-10 NOTE — TELEPHONE ENCOUNTER
Received a fax from [facility] requesting an alternative for albuterol 5 mg/ml solution. Alternative is Duoneb 3 MG/3 ML Soln.

## 2020-09-12 NOTE — TELEPHONE ENCOUNTER
Spoke with pharmcist and she does not know why the fax was sent over.  She was unable to find the fax information due a computer update

## 2020-09-15 ENCOUNTER — TELEPHONE (OUTPATIENT)
Dept: FAMILY MEDICINE CLINIC | Age: 65
End: 2020-09-15

## 2020-09-15 NOTE — TELEPHONE ENCOUNTER
Patient is stating that he has been having shortness of breath and coughing. He is requesting a Rx for prednisone. He uses [facility] in [place]. Please advise.

## 2020-09-15 NOTE — TELEPHONE ENCOUNTER
As I have discussed with him in the past, if he is having respiratory symptoms that are acute he needs to be evaluated either with a virtual visit, in office visit, or through the walk-in clinic. We need to make sure that he receives the proper treatment. Please help him schedule a visit for evaluation.

## 2020-09-21 ENCOUNTER — CARE COORDINATION (OUTPATIENT)
Dept: CARE COORDINATION | Age: 65
End: 2020-09-21

## 2020-09-21 ENCOUNTER — TELEPHONE (OUTPATIENT)
Dept: SURGERY | Age: 65
End: 2020-09-21

## 2020-09-21 ENCOUNTER — HOSPITAL ENCOUNTER (OUTPATIENT)
Dept: PREADMISSION TESTING | Age: 65
Discharge: HOME | End: 2020-09-25
Payer: MEDICARE

## 2020-09-21 VITALS
HEART RATE: 96 BPM | RESPIRATION RATE: 16 BRPM | DIASTOLIC BLOOD PRESSURE: 78 MMHG | SYSTOLIC BLOOD PRESSURE: 143 MMHG | HEIGHT: 72 IN | WEIGHT: 305.8 LBS | OXYGEN SATURATION: 97 % | BODY MASS INDEX: 41.42 KG/M2 | TEMPERATURE: 99.2 F

## 2020-09-21 LAB
ANION GAP SERPL CALCULATED.3IONS-SCNC: 10 MEQ/L (ref 9–15)
BUN BLDV-MCNC: 17 MG/DL (ref 8–23)
CALCIUM SERPL-MCNC: 9.6 MG/DL (ref 8.5–9.9)
CHLORIDE BLD-SCNC: 104 MEQ/L (ref 95–107)
CO2: 25 MEQ/L (ref 20–31)
CREAT SERPL-MCNC: 1.01 MG/DL (ref 0.7–1.2)
GFR AFRICAN AMERICAN: >60
GFR NON-AFRICAN AMERICAN: >60
GLUCOSE BLD-MCNC: 94 MG/DL (ref 70–99)
HCT VFR BLD CALC: 40.9 % (ref 42–52)
HEMOGLOBIN: 13.2 G/DL (ref 14–18)
MCH RBC QN AUTO: 30.2 PG (ref 27–31.3)
MCHC RBC AUTO-ENTMCNC: 32.3 % (ref 33–37)
MCV RBC AUTO: 93.5 FL (ref 80–100)
PDW BLD-RTO: 16.4 % (ref 11.5–14.5)
PLATELET # BLD: 370 K/UL (ref 130–400)
POTASSIUM SERPL-SCNC: 4.3 MEQ/L (ref 3.4–4.9)
RBC # BLD: 4.37 M/UL (ref 4.7–6.1)
SODIUM BLD-SCNC: 139 MEQ/L (ref 135–144)
WBC # BLD: 10.1 K/UL (ref 4.8–10.8)

## 2020-09-21 PROCEDURE — 80048 BASIC METABOLIC PNL TOTAL CA: CPT

## 2020-09-21 PROCEDURE — 85027 COMPLETE CBC AUTOMATED: CPT

## 2020-09-21 PROCEDURE — U0003 INFECTIOUS AGENT DETECTION BY NUCLEIC ACID (DNA OR RNA); SEVERE ACUTE RESPIRATORY SYNDROME CORONAVIRUS 2 (SARS-COV-2) (CORONAVIRUS DISEASE [COVID-19]), AMPLIFIED PROBE TECHNIQUE, MAKING USE OF HIGH THROUGHPUT TECHNOLOGIES AS DESCRIBED BY CMS-2020-01-R: HCPCS

## 2020-09-21 RX ORDER — LIDOCAINE HYDROCHLORIDE 10 MG/ML
1 INJECTION, SOLUTION EPIDURAL; INFILTRATION; INTRACAUDAL; PERINEURAL
Status: CANCELLED | OUTPATIENT
Start: 2020-09-25 | End: 2020-09-25

## 2020-09-21 RX ORDER — SODIUM CHLORIDE, SODIUM LACTATE, POTASSIUM CHLORIDE, CALCIUM CHLORIDE 600; 310; 30; 20 MG/100ML; MG/100ML; MG/100ML; MG/100ML
INJECTION, SOLUTION INTRAVENOUS CONTINUOUS
Status: CANCELLED | OUTPATIENT
Start: 2020-09-25

## 2020-09-21 RX ORDER — SODIUM CHLORIDE 0.9 % (FLUSH) 0.9 %
10 SYRINGE (ML) INJECTION EVERY 12 HOURS SCHEDULED
Status: CANCELLED | OUTPATIENT
Start: 2020-09-25

## 2020-09-21 RX ORDER — CEFAZOLIN SODIUM 2 G/50ML
2 SOLUTION INTRAVENOUS
Status: CANCELLED | OUTPATIENT
Start: 2020-09-25 | End: 2020-09-25

## 2020-09-21 RX ORDER — SODIUM CHLORIDE 0.9 % (FLUSH) 0.9 %
10 SYRINGE (ML) INJECTION PRN
Status: CANCELLED | OUTPATIENT
Start: 2020-09-25

## 2020-09-21 RX ORDER — KETOROLAC TROMETHAMINE 30 MG/ML
30 INJECTION, SOLUTION INTRAMUSCULAR; INTRAVENOUS ONCE
Status: CANCELLED | OUTPATIENT
Start: 2020-09-25 | End: 2020-09-30

## 2020-09-21 NOTE — PROGRESS NOTES
Yellow PAT instructions reviewed with patient, who verbalizes understanding. Sent for COVID test, and will self Quarantine until [employer].

## 2020-09-22 DIAGNOSIS — N52.9 ERECTILE DYSFUNCTION, UNSPECIFIED ERECTILE DYSFUNCTION TYPE: Chronic | ICD-10-CM

## 2020-09-22 RX ORDER — SILDENAFIL 100 MG/1
100 TABLET, FILM COATED ORAL PRN
Qty: 10 TABLET | Refills: 2 | Status: SHIPPED | OUTPATIENT
Start: 2020-09-22 | End: 2020-12-15 | Stop reason: SDUPTHER

## 2020-09-23 LAB
SARS-COV-2: NOT DETECTED
SOURCE: NORMAL

## 2020-09-25 ENCOUNTER — ANESTHESIA EVENT (OUTPATIENT)
Dept: OPERATING ROOM | Age: 65
End: 2020-09-25

## 2020-09-25 ENCOUNTER — HOSPITAL ENCOUNTER (OUTPATIENT)
Age: 65
Setting detail: OUTPATIENT SURGERY
Discharge: HOME | End: 2020-09-25
Attending: SURGERY | Admitting: SURGERY
Payer: MEDICARE

## 2020-09-25 ENCOUNTER — ANESTHESIA (OUTPATIENT)
Dept: OPERATING ROOM | Age: 65
End: 2020-09-25

## 2020-09-25 VITALS — OXYGEN SATURATION: 97 %

## 2020-09-25 LAB
AMPHETAMINE SCREEN, URINE: ABNORMAL
BARBITURATE SCREEN URINE: ABNORMAL
BENZODIAZEPINE SCREEN, URINE: ABNORMAL
CANNABINOID SCREEN URINE: ABNORMAL
COCAINE METABOLITE SCREEN URINE: POSITIVE
Lab: ABNORMAL
METHADONE SCREEN, URINE: ABNORMAL
OPIATE SCREEN URINE: ABNORMAL
OXYCODONE URINE: ABNORMAL
PHENCYCLIDINE SCREEN URINE: ABNORMAL
PROPOXYPHENE SCREEN: ABNORMAL

## 2020-09-25 PROCEDURE — 94640 AIRWAY INHALATION TREATMENT: CPT

## 2020-09-25 PROCEDURE — 6360000002 HC RX W HCPCS: Performed by: STUDENT IN AN ORGANIZED HEALTH CARE EDUCATION/TRAINING PROGRAM

## 2020-09-25 PROCEDURE — 2580000003 HC RX 258: Performed by: NURSE PRACTITIONER

## 2020-09-25 PROCEDURE — 94761 N-INVAS EAR/PLS OXIMETRY MLT: CPT

## 2020-09-25 PROCEDURE — 80307 DRUG TEST PRSMV CHEM ANLYZR: CPT

## 2020-09-25 PROCEDURE — 6360000002 HC RX W HCPCS

## 2020-09-25 RX ORDER — KETOROLAC TROMETHAMINE 30 MG/ML
30 INJECTION, SOLUTION INTRAMUSCULAR; INTRAVENOUS ONCE
Status: DISCONTINUED | OUTPATIENT
Start: 2020-09-25 | End: 2020-09-25 | Stop reason: HOSPADM

## 2020-09-25 RX ORDER — SODIUM CHLORIDE, SODIUM LACTATE, POTASSIUM CHLORIDE, CALCIUM CHLORIDE 600; 310; 30; 20 MG/100ML; MG/100ML; MG/100ML; MG/100ML
INJECTION, SOLUTION INTRAVENOUS CONTINUOUS
Status: DISCONTINUED | OUTPATIENT
Start: 2020-09-25 | End: 2020-09-25 | Stop reason: HOSPADM

## 2020-09-25 RX ORDER — ALBUTEROL SULFATE 2.5 MG/3ML
2.5 SOLUTION RESPIRATORY (INHALATION) EVERY 6 HOURS PRN
Status: DISCONTINUED | OUTPATIENT
Start: 2020-09-25 | End: 2020-09-25 | Stop reason: HOSPADM

## 2020-09-25 RX ORDER — SODIUM CHLORIDE 0.9 % (FLUSH) 0.9 %
10 SYRINGE (ML) INJECTION PRN
Status: DISCONTINUED | OUTPATIENT
Start: 2020-09-25 | End: 2020-09-25 | Stop reason: HOSPADM

## 2020-09-25 RX ORDER — LIDOCAINE HYDROCHLORIDE 10 MG/ML
1 INJECTION, SOLUTION EPIDURAL; INFILTRATION; INTRACAUDAL; PERINEURAL
Status: DISCONTINUED | OUTPATIENT
Start: 2020-09-25 | End: 2020-09-25 | Stop reason: HOSPADM

## 2020-09-25 RX ORDER — ALBUTEROL SULFATE 2.5 MG/3ML
SOLUTION RESPIRATORY (INHALATION)
Status: COMPLETED
Start: 2020-09-25 | End: 2020-09-25

## 2020-09-25 RX ORDER — SODIUM CHLORIDE 0.9 % (FLUSH) 0.9 %
10 SYRINGE (ML) INJECTION EVERY 12 HOURS SCHEDULED
Status: DISCONTINUED | OUTPATIENT
Start: 2020-09-25 | End: 2020-09-25 | Stop reason: HOSPADM

## 2020-09-25 RX ADMIN — SODIUM CHLORIDE, POTASSIUM CHLORIDE, SODIUM LACTATE AND CALCIUM CHLORIDE: 600; 310; 30; 20 INJECTION, SOLUTION INTRAVENOUS at 09:41

## 2020-09-25 RX ADMIN — ALBUTEROL SULFATE 2.5 MG: 2.5 SOLUTION RESPIRATORY (INHALATION) at 09:39

## 2020-09-25 RX ADMIN — ALBUTEROL SULFATE 2.5 MG: 2.5 SOLUTION RESPIRATORY (INHALATION) at 09:40

## 2020-09-25 NOTE — PROGRESS NOTES
Patient states that his breathing does not feel very well today. Lungs have rhonchi and wheezing through out. Oxygen sats on room air are 98%. Dr. Malini Gilliland from anesthesia called and to come see patient.

## 2020-09-25 NOTE — PROGRESS NOTES
During patient's hair clipping, a red area with some drainage noted in the right groin. Patient  States that the area is tender and he did notice some blood on his underwear today.

## 2020-09-25 NOTE — INTERVAL H&P NOTE
Update History & Physical    History and Physical exam reviewed, the patient interviewed and re examined. There have been no interval changes. Patient admits to cocaine use and test positive on urine test for cocaine. Surgery is canceled and he can return at a later date if he can stay off the cocaine for at least a week.   Electronically signed by Michelle Jacinto MD on 9/25/2020 at 10:19 AM

## 2020-09-25 NOTE — PROGRESS NOTES
Patient has tested positive for cocaine and surgery has been canceled. He will reschedule with the office when he can stay off his cocaine for at least 7 days.

## 2020-10-03 NOTE — TELEPHONE ENCOUNTER
Pharmacy is requesting medication refill.  Please approve or deny this request.    Rx requested:  Requested Prescriptions     Pending Prescriptions Disp Refills    albuterol sulfate  (90 Base) MCG/ACT inhaler [Pharmacy Med Name: ALBUTEROL HFA (PROVENTIL) INH] 6.7 Inhaler 3     Sig: Inhale 2 puffs into the lungs every 6 hours as needed for Wheezing         Last Office Visit:   Visit date not found      Next Visit Date:  Future Appointments   Date Time Provider [facility]   10/5/2020  3:30 PM Judie Espinoza  [address]   10/12/2020 10:30 AM Kam Isabel  [facility]   12/1/2020  4:20 PM Ros Diaz MD [facility] 94

## 2020-10-05 ENCOUNTER — OFFICE VISIT (OUTPATIENT)
Dept: SURGERY | Age: 65
End: 2020-10-05
Payer: MEDICARE

## 2020-10-05 ENCOUNTER — CARE COORDINATION (OUTPATIENT)
Dept: CARE COORDINATION | Age: 65
End: 2020-10-05

## 2020-10-05 VITALS
DIASTOLIC BLOOD PRESSURE: 82 MMHG | BODY MASS INDEX: 42.39 KG/M2 | SYSTOLIC BLOOD PRESSURE: 124 MMHG | TEMPERATURE: 97.4 F | HEIGHT: 72 IN | WEIGHT: 313 LBS

## 2020-10-05 PROCEDURE — 99213 OFFICE O/P EST LOW 20 MIN: CPT | Performed by: SURGERY

## 2020-10-05 PROCEDURE — 4004F PT TOBACCO SCREEN RCVD TLK: CPT | Performed by: SURGERY

## 2020-10-05 PROCEDURE — 3017F COLORECTAL CA SCREEN DOC REV: CPT | Performed by: SURGERY

## 2020-10-05 PROCEDURE — G8484 FLU IMMUNIZE NO ADMIN: HCPCS | Performed by: SURGERY

## 2020-10-05 PROCEDURE — G8427 DOCREV CUR MEDS BY ELIG CLIN: HCPCS | Performed by: SURGERY

## 2020-10-05 PROCEDURE — G8417 CALC BMI ABV UP PARAM F/U: HCPCS | Performed by: SURGERY

## 2020-10-05 ASSESSMENT — ENCOUNTER SYMPTOMS
COLOR CHANGE: 0
RHINORRHEA: 0
ALLERGIC/IMMUNOLOGIC NEGATIVE: 1
COUGH: 1
SHORTNESS OF BREATH: 1
BLOOD IN STOOL: 0
EYES NEGATIVE: 1
ABDOMINAL PAIN: 1
CONSTIPATION: 0
ABDOMINAL DISTENTION: 0
CHEST TIGHTNESS: 0

## 2020-10-05 NOTE — PROGRESS NOTES
Subjective:      Patient ID: Melissa Armas is a 60 y.o. male. Melissa Armas is a 60 y.o. male  who is being seen in follow-up for an umbilical hernia. The has been present 6 month(s). . The patient has pain. Pain is rated as a 3  It's size is increasing. The pain is increasing. Patient does not have nausea/vomiting. The hernia mass is reducible. The hernia does not effect normal everyday activities. The patient has had no abdominal surgery. He has had no radiologic imaging done. He is not on anticoagulants. He was originally scheduled for surgery last week but he tested positive for cocaine the morning of surgery. His surgery has been postponed. Review of Systems   Constitutional: Negative for activity change, appetite change and unexpected weight change. HENT: Negative for congestion, nosebleeds, postnasal drip, rhinorrhea and sneezing. Eyes: Negative. Negative for visual disturbance. Respiratory: Positive for cough and shortness of breath. Negative for chest tightness. Cardiovascular: Negative for chest pain and leg swelling. Gastrointestinal: Positive for abdominal pain. Negative for abdominal distention, blood in stool and constipation. Endocrine: Negative. Genitourinary: Negative for difficulty urinating. Musculoskeletal: Negative for arthralgias, gait problem and myalgias. Skin: Negative for color change. Allergic/Immunologic: Negative. Neurological: Negative for dizziness, light-headedness, numbness and headaches. Hematological: Does not bruise/bleed easily. Psychiatric/Behavioral: Negative for sleep disturbance.      Past Medical History:   Diagnosis Date    Alcohol abuse 10/27/2016    Anemia     Asthma     Cocaine abuse ([facility] 10/27/2016    COPD (chronic obstructive pulmonary disease) ([facility]     Depression 4/27/2020    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    Drug-seeking behavior 4/14/2015    Edema 12/10/2015    Erectile dysfunction     Gastroesophageal reflux disease 2018    Gout 10/27/2016    History of arthroscopy of both knees 10/27/2016    House dust mite allergy 2014    Hyperlipidemia     Hypertension 10/27/2016    Injury to heart 10/27/2016    Insomnia 2018    Medical non-compliance 2014    Morbid obesity due to excess calories ([address] 2016    Osteoarthritis of both knees 2016    Personal history of tobacco use     Pneumonia 2016    caused hospital admission    Pre-diabetes 10/27/2016    Seasonal allergies 2014    Severe persistent asthma 10/27/2016    Severe sleep apnea 2015    Supraventricular tachycardia ([address] 10/27/2016    SVT [facility]ricular tachy[facility]) [facility])      Past Surgical History:   Procedure Laterality Date    ANTERIOR CRUCIATE LIGAMENT REPAIR      CARDIAC CATHETERIZATION      COLONOSCOPY N/A 7/15/2020    COLONOSCOPY WITH POLYPECTOMY performed by Mika Lizarraga MD at [address] [place] [address] 2019    LEFT TOTAL KNEE ARTHROPLASTY performed by Claudean Glaser, MD at [address] [place] [address] History     Tobacco Use    Smoking status: Current Every Day Smoker     Packs/day: 0.50     Years: 30.00     Pack years: 15.00     Types: Cigarettes, Cigars     Start date: 1988     Last attempt to quit: 10/1/2013     Years since quittin.0    Smokeless tobacco: Never Used   Substance Use Topics    Alcohol use: Yes     Comment: social 1 -2 x week    Drug use: Not Currently     Types: Cocaine, Marijuana     Comment: social     Family History   Problem Relation Age of Onset    Arthritis Mother     Asthma Mother     High Cholesterol Mother     Other Mother         aneurysm    Diabetes Father     Stroke Maternal Grandmother     Cancer Maternal Grandfather     Hypertension Other     COPD Neg Hx      Fish-derived products;  Iodine; and Pcn [penicillins]  Allergies   Allergen Reactions    Fish-Derived Products Anaphylaxis    Iodine Anaphylaxis     Iodine-containing products, dyes, contrast dye    Pcn [Penicillins] Nausea And Vomiting     Current Outpatient Medications   Medication Sig Dispense Refill    pantoprazole (PROTONIX) 40 MG tablet Take 1 tablet by mouth daily 90 tablet 1    sildenafil (VIAGRA) 100 MG tablet Take 1 tablet by mouth as needed for Erectile Dysfunction 10 tablet 2    albuterol (PROVENTIL) (5 MG/ML) 0.5% nebulizer solution Take 0.5 mLs by nebulization every 6 hours as needed for Wheezing or Shortness of Breath 120 each 1    lovastatin (MEVACOR) 10 MG tablet Take 1 tablet by mouth nightly 90 tablet 1    ipratropium-albuterol (DUONEB) 0.5-2.5 (3) MG/3ML SOLN nebulizer solution Take 3 mLs by nebulization every 6 hours as needed for Shortness of Breath 180 mL 1    albuterol sulfate HFA (PROVENTIL HFA) 108 (90 Base) MCG/ACT inhaler Inhale 2 puffs into the lungs every 6 hours as needed for Wheezing or Shortness of Breath 1 Inhaler 1    tiotropium (SPIRIVA HANDIHALER) 18 MCG inhalation capsule Inhale 1 capsule into the lungs daily 90 capsule 1    acetaminophen (TYLENOL) 500 MG tablet Take 1 tablet by mouth every 8 hours as needed for Pain 120 tablet 1    escitalopram (LEXAPRO) 10 MG tablet Take 1 tablet by mouth daily 90 tablet 1    amLODIPine (NORVASC) 10 MG tablet Take 1 tablet by mouth daily 90 tablet 1    fluticasone (FLONASE) 50 MCG/ACT nasal spray 2 sprays by Nasal route daily 3 Bottle 1    traZODone (DESYREL) 50 MG tablet Take 1 tablet by mouth nightly 90 tablet 1    montelukast (SINGULAIR) 10 MG tablet Take 1 tablet by mouth nightly 90 tablet 1    budesonide-formoterol (SYMBICORT) 160-4.5 MCG/ACT AERO Inhale 2 puffs into the lungs 2 times daily 3 Inhaler 1    Handicap Placard MISC by Does not apply route DX: OSTEOARTHRITIS OF BOTH KNEES (M17.0), COPD (J44.9)     EXPIRES: 02/2024 1 each 0     No current facility-administered medications for this visit.       /82   Temp 97.4 °F (36.3 °C) (Temporal)   Ht 6' (1.829 m)   Wt (!) 313 lb (142 kg)   BMI 42.45 kg/m²     Objective:   Physical Exam  Constitutional:       General: He is not in acute distress. Appearance: Normal appearance. He is obese. HENT:      Mouth/Throat:      Mouth: Mucous membranes are moist.      Pharynx: Oropharynx is clear. Eyes:      Pupils: Pupils are equal, round, and reactive to light. Neck:      Comments: Neck is supple without any masses, no thyromegaly, trachea midline  Cardiovascular:      Rate and Rhythm: Normal rate and regular rhythm. Heart sounds: No murmur. Pulmonary:      Effort: Pulmonary effort is normal. No respiratory distress. Breath sounds: Normal breath sounds. Abdominal:      Palpations: There is no hepatomegaly or splenomegaly. Tenderness: There is abdominal tenderness in the periumbilical area. Hernia: A hernia is present. Hernia is present in the umbilical area (mildly tender reducible hernia). Musculoskeletal:      Comments: Normal gait   Skin:     Findings: No bruising, lesion or rash. Neurological:      Mental Status: He is alert and oriented to person, place, and time. Psychiatric:         Mood and Affect: Mood normal.         Judgment: Judgment normal.         Assessment:      Umbilical hernia  History of cocaine abuse      Plan:      Umbilical hernia repair    The options of therapy were discussed with the patient. The procedure of the repair with the probable use of mesh was discussed. The potential risks and complications including but not exclusive to infection, blood loss, damage to surrounding structures and chronic pain. If any of these occur it could require another surgery. The expected recovery was discussed. The patient's questions were answered and has decided to proceed with hernia repair. It will be scheduled at their convenience. The patient was counseled at length about the risks of steven Covid-19 in the perioperative period and any recovery window from their procedure.   The patient was made aware that steven Covid-19  may worsen their prognosis for recovering from their procedure  and lend to a higher morbidity and/or mortality risk. The patient was given the options of postponing their procedure. All material risks, benefits, and alternatives were discussed. The patient does wish to proceed with the procedure at this time.        Mariella Koroma MD

## 2020-10-08 RX ORDER — ALBUTEROL SULFATE 90 UG/1
2 AEROSOL, METERED RESPIRATORY (INHALATION) EVERY 6 HOURS PRN
Qty: 18 G | Refills: 1 | Status: SHIPPED | OUTPATIENT
Start: 2020-10-08 | End: 2020-10-12 | Stop reason: SDUPTHER

## 2020-10-12 ENCOUNTER — HOSPITAL ENCOUNTER (EMERGENCY)
Age: 65
Discharge: HOME | End: 2020-10-12
Attending: EMERGENCY MEDICINE
Payer: MEDICARE

## 2020-10-12 ENCOUNTER — TELEPHONE (OUTPATIENT)
Dept: FAMILY MEDICINE CLINIC | Age: 65
End: 2020-10-12

## 2020-10-12 ENCOUNTER — HOSPITAL ENCOUNTER (OUTPATIENT)
Dept: GENERAL RADIOLOGY | Age: 65
Discharge: HOME | End: 2020-10-14
Payer: MEDICARE

## 2020-10-12 ENCOUNTER — APPOINTMENT (OUTPATIENT)
Dept: GENERAL RADIOLOGY | Age: 65
End: 2020-10-12
Payer: MEDICARE

## 2020-10-12 ENCOUNTER — OFFICE VISIT (OUTPATIENT)
Dept: CARDIOLOGY CLINIC | Age: 65
End: 2020-10-12
Payer: MEDICARE

## 2020-10-12 VITALS
WEIGHT: 308 LBS | SYSTOLIC BLOOD PRESSURE: 120 MMHG | DIASTOLIC BLOOD PRESSURE: 80 MMHG | HEART RATE: 79 BPM | OXYGEN SATURATION: 95 % | BODY MASS INDEX: 41.77 KG/M2

## 2020-10-12 VITALS
HEART RATE: 84 BPM | OXYGEN SATURATION: 94 % | DIASTOLIC BLOOD PRESSURE: 84 MMHG | BODY MASS INDEX: 41.85 KG/M2 | TEMPERATURE: 98.5 F | RESPIRATION RATE: 19 BRPM | HEIGHT: 72 IN | WEIGHT: 309 LBS | SYSTOLIC BLOOD PRESSURE: 179 MMHG

## 2020-10-12 LAB
ALBUMIN SERPL-MCNC: 3.9 G/DL (ref 3.5–4.6)
ALP BLD-CCNC: 101 U/L (ref 35–104)
ALT SERPL-CCNC: 41 U/L (ref 0–41)
AMPHETAMINE SCREEN, URINE: ABNORMAL
ANION GAP SERPL CALCULATED.3IONS-SCNC: 15 MEQ/L (ref 9–15)
AST SERPL-CCNC: 25 U/L (ref 0–40)
BARBITURATE SCREEN URINE: ABNORMAL
BASOPHILS ABSOLUTE: 0 K/UL (ref 0–0.2)
BASOPHILS RELATIVE PERCENT: 0.3 %
BENZODIAZEPINE SCREEN, URINE: ABNORMAL
BILIRUB SERPL-MCNC: <0.2 MG/DL (ref 0.2–0.7)
BILIRUBIN URINE: NEGATIVE
BLOOD, URINE: NEGATIVE
BUN BLDV-MCNC: 17 MG/DL (ref 8–23)
CALCIUM SERPL-MCNC: 8.8 MG/DL (ref 8.5–9.9)
CANNABINOID SCREEN URINE: ABNORMAL
CHLORIDE BLD-SCNC: 103 MEQ/L (ref 95–107)
CLARITY: CLEAR
CO2: 22 MEQ/L (ref 20–31)
COCAINE METABOLITE SCREEN URINE: POSITIVE
COLOR: YELLOW
CREAT SERPL-MCNC: 1.01 MG/DL (ref 0.7–1.2)
EKG ATRIAL RATE: 87 BPM
EKG P AXIS: 73 DEGREES
EKG P-R INTERVAL: 156 MS
EKG Q-T INTERVAL: 378 MS
EKG QRS DURATION: 98 MS
EKG QTC CALCULATION (BAZETT): 454 MS
EKG R AXIS: 51 DEGREES
EKG T AXIS: 65 DEGREES
EKG VENTRICULAR RATE: 87 BPM
EOSINOPHILS ABSOLUTE: 0.2 K/UL (ref 0–0.7)
EOSINOPHILS RELATIVE PERCENT: 1.5 %
GFR AFRICAN AMERICAN: >60
GFR NON-AFRICAN AMERICAN: >60
GLOBULIN: 2.9 G/DL (ref 2.3–3.5)
GLUCOSE BLD-MCNC: 126 MG/DL (ref 70–99)
GLUCOSE URINE: NEGATIVE MG/DL
HCT VFR BLD CALC: 38.3 % (ref 42–52)
HEMOGLOBIN: 12.8 G/DL (ref 14–18)
KETONES, URINE: NEGATIVE MG/DL
LEUKOCYTE ESTERASE, URINE: NEGATIVE
LYMPHOCYTES ABSOLUTE: 2.5 K/UL (ref 1–4.8)
LYMPHOCYTES RELATIVE PERCENT: 16.3 %
Lab: ABNORMAL
MAGNESIUM: 2.2 MG/DL (ref 1.7–2.4)
MCH RBC QN AUTO: 31.1 PG (ref 27–31.3)
MCHC RBC AUTO-ENTMCNC: 33.3 % (ref 33–37)
MCV RBC AUTO: 93.3 FL (ref 80–100)
METHADONE SCREEN, URINE: ABNORMAL
METHAMPHETAMINE, URINE: ABNORMAL
MONOCYTES ABSOLUTE: 0.8 K/UL (ref 0.2–0.8)
MONOCYTES RELATIVE PERCENT: 5.6 %
NEUTROPHILS ABSOLUTE: 11.5 K/UL (ref 1.4–6.5)
NEUTROPHILS RELATIVE PERCENT: 76.3 %
NITRITE, URINE: NEGATIVE
OPIATE SCREEN URINE: ABNORMAL
PDW BLD-RTO: 16.3 % (ref 11.5–14.5)
PH UA: 5 (ref 5–9)
PHENCYCLIDINE SCREEN URINE: ABNORMAL
PLATELET # BLD: 356 K/UL (ref 130–400)
POTASSIUM REFLEX MAGNESIUM: 3.4 MEQ/L (ref 3.4–4.9)
PRO-BNP: 12 PG/ML
PROPOXYPHENE SCREEN, URINE: ABNORMAL
PROTEIN UA: NEGATIVE MG/DL
RBC # BLD: 4.11 M/UL (ref 4.7–6.1)
SODIUM BLD-SCNC: 140 MEQ/L (ref 135–144)
SPECIFIC GRAVITY UA: 1.02 (ref 1–1.03)
TOTAL PROTEIN: 6.8 G/DL (ref 6.3–8)
TRICYCLIC, URINE: ABNORMAL
TROPONIN: <0.01 NG/ML (ref 0–0.01)
UR OXYCODONE RAPID SCREEN: ABNORMAL
UROBILINOGEN, URINE: 0.2 E.U./DL
WBC # BLD: 15.1 K/UL (ref 4.8–10.8)

## 2020-10-12 PROCEDURE — G8417 CALC BMI ABV UP PARAM F/U: HCPCS | Performed by: INTERNAL MEDICINE

## 2020-10-12 PROCEDURE — 36415 COLL VENOUS BLD VENIPUNCTURE: CPT

## 2020-10-12 PROCEDURE — 93005 ELECTROCARDIOGRAM TRACING: CPT

## 2020-10-12 PROCEDURE — G8926 SPIRO NO PERF OR DOC: HCPCS | Performed by: INTERNAL MEDICINE

## 2020-10-12 PROCEDURE — 71046 X-RAY EXAM CHEST 2 VIEWS: CPT

## 2020-10-12 PROCEDURE — 3023F SPIROM DOC REV: CPT | Performed by: INTERNAL MEDICINE

## 2020-10-12 PROCEDURE — 3017F COLORECTAL CA SCREEN DOC REV: CPT | Performed by: INTERNAL MEDICINE

## 2020-10-12 PROCEDURE — 4004F PT TOBACCO SCREEN RCVD TLK: CPT | Performed by: INTERNAL MEDICINE

## 2020-10-12 PROCEDURE — G8427 DOCREV CUR MEDS BY ELIG CLIN: HCPCS | Performed by: INTERNAL MEDICINE

## 2020-10-12 PROCEDURE — 85025 COMPLETE CBC W/AUTO DIFF WBC: CPT

## 2020-10-12 PROCEDURE — 80053 COMPREHEN METABOLIC PANEL: CPT

## 2020-10-12 PROCEDURE — 6370000000 HC RX 637 (ALT 250 FOR IP): Performed by: EMERGENCY MEDICINE

## 2020-10-12 PROCEDURE — 84484 ASSAY OF TROPONIN QUANT: CPT

## 2020-10-12 PROCEDURE — 96374 THER/PROPH/DIAG INJ IV PUSH: CPT

## 2020-10-12 PROCEDURE — 83880 ASSAY OF NATRIURETIC PEPTIDE: CPT

## 2020-10-12 PROCEDURE — 99213 OFFICE O/P EST LOW 20 MIN: CPT | Performed by: INTERNAL MEDICINE

## 2020-10-12 PROCEDURE — G8484 FLU IMMUNIZE NO ADMIN: HCPCS | Performed by: INTERNAL MEDICINE

## 2020-10-12 PROCEDURE — 81003 URINALYSIS AUTO W/O SCOPE: CPT

## 2020-10-12 PROCEDURE — 80306 DRUG TEST PRSMV INSTRMNT: CPT

## 2020-10-12 PROCEDURE — 99282 EMERGENCY DEPT VISIT SF MDM: CPT

## 2020-10-12 PROCEDURE — 6360000002 HC RX W HCPCS: Performed by: EMERGENCY MEDICINE

## 2020-10-12 PROCEDURE — 99283 EMERGENCY DEPT VISIT LOW MDM: CPT

## 2020-10-12 PROCEDURE — 94640 AIRWAY INHALATION TREATMENT: CPT

## 2020-10-12 PROCEDURE — 83735 ASSAY OF MAGNESIUM: CPT

## 2020-10-12 RX ORDER — ALBUTEROL SULFATE 90 UG/1
2 AEROSOL, METERED RESPIRATORY (INHALATION) EVERY 4 HOURS PRN
Qty: 1 INHALER | Refills: 0 | Status: SHIPPED | OUTPATIENT
Start: 2020-10-12 | End: 2020-11-18

## 2020-10-12 RX ORDER — LEVOFLOXACIN 750 MG/1
750 TABLET ORAL DAILY
Qty: 7 TABLET | Refills: 0 | Status: SHIPPED | OUTPATIENT
Start: 2020-10-12 | End: 2020-10-19

## 2020-10-12 RX ORDER — PREDNISONE 20 MG/1
40 TABLET ORAL DAILY
Qty: 10 TABLET | Refills: 0 | Status: SHIPPED | OUTPATIENT
Start: 2020-10-12 | End: 2020-10-17

## 2020-10-12 RX ORDER — BENZONATATE 100 MG/1
100 CAPSULE ORAL 3 TIMES DAILY PRN
Qty: 20 CAPSULE | Refills: 0 | Status: SHIPPED | OUTPATIENT
Start: 2020-10-12 | End: 2020-10-19

## 2020-10-12 RX ORDER — IPRATROPIUM BROMIDE AND ALBUTEROL SULFATE 2.5; .5 MG/3ML; MG/3ML
1 SOLUTION RESPIRATORY (INHALATION)
Status: DISCONTINUED | OUTPATIENT
Start: 2020-10-12 | End: 2020-10-12 | Stop reason: HOSPADM

## 2020-10-12 RX ORDER — METHYLPREDNISOLONE SODIUM SUCCINATE 125 MG/2ML
125 INJECTION, POWDER, LYOPHILIZED, FOR SOLUTION INTRAMUSCULAR; INTRAVENOUS ONCE
Status: COMPLETED | OUTPATIENT
Start: 2020-10-12 | End: 2020-10-12

## 2020-10-12 RX ADMIN — IPRATROPIUM BROMIDE AND ALBUTEROL SULFATE 1 AMPULE: .5; 3 SOLUTION RESPIRATORY (INHALATION) at 20:04

## 2020-10-12 RX ADMIN — LEVOFLOXACIN 750 MG: 500 TABLET, FILM COATED ORAL at 21:23

## 2020-10-12 RX ADMIN — METHYLPREDNISOLONE SODIUM SUCCINATE 125 MG: 125 INJECTION, POWDER, FOR SOLUTION INTRAMUSCULAR; INTRAVENOUS at 20:13

## 2020-10-12 NOTE — TELEPHONE ENCOUNTER
CXR result reviewed. Please make sure patient has visit with pulmonology scheduled in the near future. We will discuss further at his next scheduled office visit in the next 1-2 months.

## 2020-10-12 NOTE — TELEPHONE ENCOUNTER
I received this over the fax machine I asked Chas Chinchilla if I should scanned this to you and so I did [place] went to see Dr Frankey Magnus cardiology and he did a chest x-ray and wants [place] to do a f/u with you I have scanned this in med[facility] and put the original on your desk. cp

## 2020-10-12 NOTE — PROGRESS NOTES
[facility] CARDIOLOGY OFFICE FOLLOW-UP      Patient: Hyacinth Kruse  YOB: 1955  MRN: [id number]    Chief Complaint:  Chief Complaint   Patient presents with    Results       Subjective/HPI    The patient is followed in the cardiology office chronically for the following problems: HTN urgency, elevated troponin, cocaine use    The last encounter focused on the following: followup    Testing/hospitalizations/procedures performed since last encounter: stress test negative with inferior attenuation artifact    Since the last encounter, the patient has not had new symptoms/events.     Past Medical History:   Diagnosis Date    Alcohol abuse 10/27/2016    Anemia     Asthma     Cocaine abuse ([address] 10/27/2016    COPD (chronic obstructive pulmonary disease) ([address]     Depression 4/27/2020    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    Drug-seeking behavior 4/14/2015    Edema 12/10/2015    Erectile dysfunction     Gastroesophageal reflux disease 12/17/2018    Gout 10/27/2016    History of arthroscopy of both knees 10/27/2016    House dust mite allergy 4/21/2014    Hyperlipidemia     Hypertension 10/27/2016    Injury to heart 10/27/2016    Insomnia 12/17/2018    Medical non-compliance 2/22/2014    Morbid obesity due to excess calories ([address] 12/8/2016    Osteoarthritis of both knees 12/8/2016    Personal history of tobacco use     Pneumonia 12/04/2016    caused hospital admission    Pre-diabetes 10/27/2016    Seasonal allergies 4/21/2014    Severe persistent asthma 10/27/2016    Severe sleep apnea 4/14/2015    Supraventricular tachycardia ([address] 10/27/2016    SVT (supraventricular tachycardia) ([facility])        Past Surgical History:   Procedure Laterality Date    ANTERIOR CRUCIATE LIGAMENT REPAIR      CARDIAC CATHETERIZATION      COLONOSCOPY N/A 7/15/2020    COLONOSCOPY WITH POLYPECTOMY performed by Corine De Oliveira MD at [address] [place] [address] Left 8/9/2019    LEFT TOTAL KNEE Date    WBC 15.1 10/12/2020    RBC 4.11 10/12/2020    RBC 3.94 01/04/2019    HGB 12.8 10/12/2020    HCT 38.3 10/12/2020    MCV 93.3 10/12/2020    MCH 31.1 10/12/2020    MCHC 33.3 10/12/2020    RDW 16.3 10/12/2020     10/12/2020    MPV 10.5 01/04/2019     Lipids:  Lab Results   Component Value Date    CHOL 184 06/10/2020    CHOL 176 07/24/2019    CHOL 183 08/21/2017     Lab Results   Component Value Date    TRIG 141 06/10/2020    TRIG 115 07/24/2019    TRIG 91 08/21/2017     Lab Results   Component Value Date    HDL 44 06/10/2020    HDL 38 (L) 07/24/2019    HDL 50 08/21/2017     Lab Results   Component Value Date    LDLCALC 112 06/10/2020    LDLCALC 115 07/24/2019    LDLCALC 115 08/21/2017     No results found for: LABVLDL, VLDL  Lab Results   Component Value Date    CHOLHDLRATIO 4.1 04/13/2012     CMP:    Lab Results   Component Value Date     10/12/2020    K 3.4 10/12/2020     10/12/2020    CO2 22 10/12/2020    BUN 17 10/12/2020    CREATININE 1.01 10/12/2020    GFRAA >60.0 10/12/2020    LABGLOM >60.0 10/12/2020    GLUCOSE 126 10/12/2020    GLUCOSE 211 02/02/2020    PROT 6.8 10/12/2020    LABALBU 3.9 10/12/2020    LABALBU 3.7 01/28/2020    CALCIUM 8.8 10/12/2020    BILITOT <0.2 10/12/2020    ALKPHOS 101 10/12/2020    AST 25 10/12/2020    ALT 41 10/12/2020     BMP:    Lab Results   Component Value Date     10/12/2020    K 3.4 10/12/2020     10/12/2020    CO2 22 10/12/2020    BUN 17 10/12/2020    LABALBU 3.9 10/12/2020    LABALBU 3.7 01/28/2020    CREATININE 1.01 10/12/2020    CALCIUM 8.8 10/12/2020    GFRAA >60.0 10/12/2020    LABGLOM >60.0 10/12/2020    GLUCOSE 126 10/12/2020    GLUCOSE 211 02/02/2020     Magnesium:    Lab Results   Component Value Date    MG 2.2 10/12/2020     TSH:No results found for: TSHFT4, TSH    Patient Active Problem List   Diagnosis    Anemia    Medical non-compliance    House dust mite allergy    Seasonal allergies    GASPER (obstructive sleep apnea)    Drug-seeking behavior    Hyperlipidemia    Alcohol abuse    Cocaine abuse ([facility]    Gout    History of arthroscopy of both knees    Hypertension    Supraventricular tachycardia ([facility]    Erectile dysfunction    Pre-diabetes    [facility]bid [facility] to [facility] calories [facility]    COPD (chronic obstructive pulmonary disease) ([facility])    Chest wall pain    Pleurisy    Loculated pleural effusion    Insomnia    Gastroesophageal reflux disease    Tobacco use    Status post total knee replacement, left    Allergy to seafood    Anxiety with depression    Asthma with acute exacerbation    Adenomatous polyp of sigmoid colon    Adenomatous polyp of descending colon    Adenomatous polyp of colon    Umbilical hernia without obstruction and without gangrene       Medications:  Current Outpatient Medications   Medication Sig Dispense Refill    pantoprazole (PROTONIX) 40 MG tablet Take 1 tablet by mouth daily 90 tablet 1    sildenafil (VIAGRA) 100 MG tablet Take 1 tablet by mouth as needed for Erectile Dysfunction (Patient not taking: Reported on 10/16/2020) 10 tablet 2    lovastatin (MEVACOR) 10 MG tablet Take 1 tablet by mouth nightly 90 tablet 1    ipratropium-albuterol (DUONEB) 0.5-2.5 (3) MG/3ML SOLN nebulizer solution Take 3 mLs by nebulization every 6 hours as needed for Shortness of Breath 180 mL 1    tiotropium (SPIRIVA HANDIHALER) 18 MCG inhalation capsule Inhale 1 capsule into the lungs daily 90 capsule 1    acetaminophen (TYLENOL) 500 MG tablet Take 1 tablet by mouth every 8 hours as needed for Pain 120 tablet 1    escitalopram (LEXAPRO) 10 MG tablet Take 1 tablet by mouth daily 90 tablet 1    amLODIPine (NORVASC) 10 MG tablet Take 1 tablet by mouth daily 90 tablet 1    fluticasone (FLONASE) 50 MCG/ACT nasal spray 2 sprays by Nasal route daily 3 Bottle 1    traZODone (DESYREL) 50 MG tablet Take 1 tablet by mouth nightly 90 tablet 1    montelukast (SINGULAIR) 10 MG tablet Take 1 tablet by mouth nightly 90 tablet 1    budesonide-formoterol (SYMBICORT) 160-4.5 MCG/ACT AERO Inhale 2 puffs into the lungs 2 times daily 3 Inhaler 1    Handicap Placard MISC by Does not apply route DX: OSTEOARTHRITIS OF BOTH KNEES (M17.0), COPD (J44.9)     EXPIRES: 02/2024 1 each 0    albuterol sulfate  (90 Base) MCG/ACT inhaler Inhale 2 puffs into the lungs every 4 hours as needed for Wheezing 1 Inhaler 0    benzonatate (TESSALON) 100 MG capsule Take 1 capsule by mouth 3 times daily as needed for Cough 20 capsule 0    levoFLOXacin (LEVAQUIN) 750 MG tablet Take 1 tablet by mouth daily for 7 days 7 tablet 0     No current facility-administered medications for this visit. Assessment/Plan:    1. Essential hypertension      2. Hyperlipidemia, unspecified hyperlipidemia type      3. Elevated troponin      4. Abnormal lung sounds    - XR CHEST (2 VW); Future    5. Chronic bronchitis, unspecified chronic bronchitis type (HCC)    - XR CHEST (2 VW); Future       Counseling: the patient was counseled regarding diet, exercise, weight control and heart healthy lifestyle. Return in about 6 months (around 4/12/2021). Electronically signed by   Ronnell Shukla.  Mark Collins MD [facility] Director of Cardiology Services and Cardiac Catheterization Laboratory  [facility], [place]    on 10/18/2020 at 11:53 PM

## 2020-10-13 ENCOUNTER — TELEPHONE (OUTPATIENT)
Dept: SURGERY | Age: 65
End: 2020-10-13

## 2020-10-13 ENCOUNTER — CARE COORDINATION (OUTPATIENT)
Dept: CARE COORDINATION | Age: 65
End: 2020-10-13

## 2020-10-13 NOTE — ED PROVIDER NOTES
eMERGENCY dEPARTMENT eNCOUnter      [facility]    Chief Complaint   Patient presents with    Shortness of Breath     hx of asthma and COPD, sob x a couple weeks       HPI    Raj Curry is a 60 y.o. male who presentsto ED from home  By private car  With complaint of shortness of breath  Onset 2 weeks  Intensity of symptoms moderate  Patient has a history of asthma and COPD. Patient called his pulmonologist on Friday and was started on a course of prednisone. Patient continues to have cough with yellowish expectoration. Patient also continues to smoke. Patient is scheduled for an umbilical hernia surgery and wants to be started on some antibiotics. Patient denies any chest pain, fever, chills. Patient denies any  recent contacts positive with corona virus or recent travel outside of [facility].       PAST MEDICAL HISTORY    Past Medical History:   Diagnosis Date    Alcohol abuse 10/27/2016    Anemia     Asthma     Cocaine abuse ([address] 10/27/2016    COPD (chronic obstructive pulmonary disease) ([address]     Depression 4/27/2020    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    Drug-seeking behavior 4/14/2015    Edema 12/10/2015    Erectile dysfunction     Gastroesophageal reflux disease 12/17/2018    Gout 10/27/2016    History of arthroscopy of both knees 10/27/2016    House dust mite allergy 4/21/2014    Hyperlipidemia     Hypertension 10/27/2016    Injury to heart 10/27/2016    Insomnia 12/17/2018    Medical non-compliance 2/22/2014    Morbid obesity due to excess calories ([address] 12/8/2016    Osteoarthritis of both knees 12/8/2016    Personal history of tobacco use     Pneumonia 12/04/2016    caused hospital admission    Pre-diabetes 10/27/2016    Seasonal allergies 4/21/2014    Severe persistent asthma 10/27/2016    Severe sleep apnea 4/14/2015    Supraventricular tachycardia ([address] 10/27/2016    SVT [facility]supraventricular tachycardia) ([facility])        SURGICAL HISTORY    Past Surgical History: Procedure Laterality Date    ANTERIOR CRUCIATE LIGAMENT REPAIR      CARDIAC CATHETERIZATION      COLONOSCOPY N/A 7/15/2020    COLONOSCOPY WITH POLYPECTOMY performed by Ashwin Estrada MD at [address] Left 8/9/2019    LEFT TOTAL KNEE ARTHROPLASTY performed by Collins Holliday MD at [address] [place] [address]    Current Outpatient Rx   Medication Sig Dispense Refill    albuterol sulfate  (90 Base) MCG/ACT inhaler Inhale 2 puffs into the lungs every 4 hours as needed for Wheezing 1 Inhaler 0    benzonatate (TESSALON) 100 MG capsule Take 1 capsule by mouth 3 times daily as needed for Cough 20 capsule 0    levoFLOXacin (LEVAQUIN) 750 MG tablet Take 1 tablet by mouth daily for 7 days 7 tablet 0    pantoprazole (PROTONIX) 40 MG tablet Take 1 tablet by mouth daily 90 tablet 1    sildenafil (VIAGRA) 100 MG tablet Take 1 tablet by mouth as needed for Erectile Dysfunction (Patient not taking: Reported on 10/16/2020) 10 tablet 2    lovastatin (MEVACOR) 10 MG tablet Take 1 tablet by mouth nightly 90 tablet 1    ipratropium-albuterol (DUONEB) 0.5-2.5 (3) MG/3ML SOLN nebulizer solution Take 3 mLs by nebulization every 6 hours as needed for Shortness of Breath 180 mL 1    tiotropium (SPIRIVA HANDIHALER) 18 MCG inhalation capsule Inhale 1 capsule into the lungs daily 90 capsule 1    acetaminophen (TYLENOL) 500 MG tablet Take 1 tablet by mouth every 8 hours as needed for Pain 120 tablet 1    escitalopram (LEXAPRO) 10 MG tablet Take 1 tablet by mouth daily 90 tablet 1    amLODIPine (NORVASC) 10 MG tablet Take 1 tablet by mouth daily 90 tablet 1    fluticasone (FLONASE) 50 MCG/ACT nasal spray 2 sprays by Nasal route daily 3 Bottle 1    traZODone (DESYREL) 50 MG tablet Take 1 tablet by mouth nightly 90 tablet 1    montelukast (SINGULAIR) 10 MG tablet Take 1 tablet by mouth nightly 90 tablet 1    budesonide-formoterol (SYMBICORT) 160-4.5 MCG/ACT AERO Inhale 2 puffs into the lungs 2 times daily 3 Inhaler 1    Handicap Placard MISC by Does not apply route DX: OSTEOARTHRITIS OF BOTH KNEES (M17.0), COPD (J44.9)     EXPIRES: 2024 1 each 0       ALLERGIES    Allergies   Allergen Reactions    Fish-Derived Products Anaphylaxis    Iodine Anaphylaxis     Iodine-containing products, dyes, contrast dye    Seasonal Shortness Of Breath    Pcn [Penicillins] Nausea And Vomiting       FAMILY HISTORY    Family History   Problem Relation Age of Onset    Arthritis Mother     Asthma Mother     High Cholesterol Mother     Other Mother         aneurysm    Diabetes Father     Stroke Maternal Grandmother     Cancer Maternal Grandfather     Hypertension Other     COPD Neg Hx        SOCIAL HISTORY    Social History     Socioeconomic History    Marital status: Single     Spouse name: n/a    Number of children: 1    Years of education: 15    Highest education level: None   Occupational History    Occupation: Disability     Employer: NONE   Social Needs    Financial resource strain: None    Food insecurity     Worry: None     Inability: None    Transportation needs     Medical: None     Non-medical: None   Tobacco Use    Smoking status: Current Every Day Smoker     Packs/day: 0.50     Years: 30.00     Pack years: 15.00     Types: Cigarettes, Cigars     Start date: 1988     Last attempt to quit: 10/1/2013     Years since quittin.0    Smokeless tobacco: Never Used   Substance and Sexual Activity    Alcohol use: Yes     Comment: social 1 -2 x week    Drug use: Not Currently     Types: Cocaine, Marijuana     Comment: social    Sexual activity: Not Currently     Partners: Female   Lifestyle    Physical activity     Days per week: None     Minutes per session: None    Stress: None   Relationships    Social connections     Talks on phone: None     Gets together: None     Attends Latter day service: None     Active member of club or organization: None     Attends meetings of clubs or organizations: None     Relationship status: None    Intimate partner violence     Fear of current or ex partner: None     Emotionally abused: None     Physically abused: None     Forced sexual activity: None   Other Topics Concern    None   Social History Narrative    None       REVIEW OF SYSTEMS    Constitutional:  Denies fever, chills, weight loss or weakness   Eyes:  Denies photophobia or discharge   HENT:  Denies sore throat or ear pain   Respiratory: Complains of cough and shortness of breath   Cardiovascular:  Denies chest pain, palpitations or swelling   GI:  Denies abdominal pain, nausea, vomiting, or diarrhea   Musculoskeletal:  Denies back pain   Skin:  Denies rash   Neurologic:  Denies headache, focal weakness or sensory changes   Endocrine:  Denies polyuria or polydypsia   Lymphatic:  Denies swollen glands   Psychiatric:  Denies depression, suicidal ideation or homicidal ideation   All systems negative except as marked. PHYSICAL EXAM    VITAL SIGNS: BP (!) 179/84   Pulse 84   Temp 98.5 °F (36.9 °C) (Oral)   Resp 19   Ht 6' (1.829 m)   Wt (!) 309 lb (140.2 kg)   SpO2 94%   BMI 41.91 kg/m²    Constitutional:  Obese, No acute distress, Non-toxic appearance. HENT:  Normocephalic, Atraumatic, Bilateral external ears normal, Oropharynx moist, No oral exudates, Nose normal. Neck- Normal range of motion, No tenderness, Supple, No stridor. Eyes:  PERRL, EOMI, Conjunctiva normal, No discharge. Respiratory:  Normal breath sounds, No respiratory distress, mild diffuse wheezing, No chest tenderness. Cardiovascular:  Normal heart rate, Normal rhythm, No murmurs, No rubs, No gallops. GI:  Bowel sounds normal, Soft, umbilical hernia present, no tenderness around the hernia site, no tenderness, No masses, No pulsatile masses. :  No CVA tenderness. Musculoskeletal:  Intact distal pulses, No edema, No tenderness, No cyanosis, No clubbing. Good range of motion in all major joints.  No tenderness to palpation or major deformities noted. Back- No tenderness. Integument:  Warm, Dry, No erythema, No rash. Lymphatic:  No lymphadenopathy noted. Neurologic:  Alert & oriented x 3, Normal motor function, Normal sensory function, No focal deficits noted. Psychiatric:  Affect normal, Judgment normal, Mood normal.     EKG    NSR, HR 87 , Normal Axis, No ST- T wave changes, QTc 454    RADIOLOGY    No orders to display       REEVALUATION   Patient was updated the results of labs and Radiology. Patient feels better, wants to go home. Patient was counseled to quit smoking including medical help . The patient verbalized understanding, but is not ready to quit smoking.       Labs  Labs Reviewed   CBC WITH AUTO DIFFERENTIAL - Abnormal; Notable for the following components:       Result Value    WBC 15.1 (*)     RBC 4.11 (*)     Hemoglobin 12.8 (*)     Hematocrit 38.3 (*)     RDW 16.3 (*)     Neutrophils Absolute 11.5 (*)     All other components within normal limits   COMPREHENSIVE METABOLIC PANEL W/ REFLEX TO MG FOR LOW K - Abnormal; Notable for the following components:    Glucose 126 (*)     All other components within normal limits   URINE DRUG SCREEN, COMPREHENSIVE - Abnormal; Notable for the following components:    Cocaine Metabolite Screen, Urine POSITIVE (*)     All other components within normal limits   TROPONIN   BRAIN NATRIURETIC PEPTIDE   URINALYSIS   MAGNESIUM             Summation      Patient Course:     ED Medications administered this visit:    Medications   methylPREDNISolone sodium (SOLU-MEDROL) injection 125 mg (125 mg Intravenous Given 10/12/20 2013)   levoFLOXacin (LEVAQUIN) tablet 750 mg (750 mg Oral Given 10/12/20 2123)       New Prescriptions from this visit:    Discharge Medication List as of 10/12/2020  9:22 PM      START taking these medications    Details   albuterol sulfate  (90 Base) MCG/ACT inhaler Inhale 2 puffs into the lungs every 4 hours as needed for Wheezing, Disp-1 Inhaler,R-0Print      benzonatate (TESSALON) 100 MG capsule Take 1 capsule by mouth 3 times daily as needed for Cough, Disp-20 capsule,R-0Print      predniSONE (DELTASONE) 20 MG tablet Take 2 tablets by mouth daily for 5 days, Disp-10 tablet,R-0Print      levoFLOXacin (LEVAQUIN) 750 MG tablet Take 1 tablet by mouth daily for 7 days, Disp-7 tablet,R-0Print             Follow-up:  Maty Novak MD  [place] [address]  [phone]    Call in 1 day          Final Impression:   1. Bronchitis    2.  COPD exacerbation ([facility]    3. Smoking               (Please note that portions of this note were completed with a voice recognition program.  Efforts were made to edit the dictations but occasionally words are mis-transcribed.)          Shannan Cramer MD  10/18/20 0939

## 2020-10-13 NOTE — CARE COORDINATION
Patient contacted regarding Madonna Ha. Discussed COVID-19 related testing which was available at this time. Test results were negative. Patient informed of results, if available? Yes and Testing completed 2020, 2020, and 2020 = Not Detected    Care Transition Nurse/ Ambulatory Care Manager contacted the patient by telephone to perform post discharge assessment. Call within 2 business days of discharge: Yes. Verified name and  with patient as identifiers. Provided introduction to self, and explanation of the CTN/ACM role, and reason for call due to risk factors for infection and/or exposure to COVID-19. Symptoms reviewed with patient who verbalized the following symptoms: cough, shortness of breath, sweating, no new symptoms and no worsening symptoms. Due to no new or worsening symptoms encounter was not routed to provider for escalation. Discussed follow-up appointments. If no appointment was previously scheduled, appointment scheduling offered: Yes and Declined  [facility] follow up appointment(s):   Future Appointments   Date Time Provider Constance Espitia   10/14/2020  9:00 AM MLOZ PAT RM 3 MLOZ PAT [facility]   10/14/2020 10:00 AM SCHEDULE, [facility] [place] [facility]   2020  4:20 PM [facility]   2021 10:15 AM Delisa Thacker MD [facility]     [address] [address]  follow up appointment(s):     Non-face-to-face services provided:  Scheduled appointment with PCP-Declined  Obtained and reviewed discharge summary and/or continuity of care documents     Advance Care Planning:   Does patient have an Advance Directive:  reviewed and current. Patient has following risk factors of: COPD. CTN/ACM reviewed discharge instructions, medical action plan and red flags such as increased shortness of breath, increasing fever and signs of decompensation with patient who verbalized understanding.    Discussed exposure protocols and quarantine with CDC Guidelines What to do if you are sick with coronavirus disease 2019.  Patient was given an opportunity for questions and concerns. The patient agrees to contact the [facility] exposure line [phone], local [facility] department [place] [facility]: ([phone]) and PCP office for questions related to their healthcare. CTN/ACM provided contact information for future needs. Reviewed and educated patient on any new and changed medications related to discharge diagnosis     Patient/family/caregiver given information for [employer] Loop and agrees to enroll no  Patient's preferred e-mail:   Patient's preferred phone number:   Based on Loop alert triggers, patient will be contacted by nurse care manager for worsening symptoms. Plan for follow-up call in 3-5 days based on severity of symptoms and risk factors. Tara Moreno tells me that he is feeling better. He continues to have fatigue, sweating, cough, and SOB. He has not picked up his prescriptions as the pharmacy was closed at his release from ER. He will  his prescriptions later this morning. I again reviewed symptoms related to COVID along with preventative protocols. He does have the phone numbers for [facility] ([facility]) and [employer] for Flu Screening. He declines the [place] Symptom Tracker. He verbalizes understanding of the information discussed.

## 2020-10-13 NOTE — TELEPHONE ENCOUNTER
Mr.Jackson called P.BOOstating he was ill, he went to the ER on 10-13-20 where he was diagnosed with Bronchitis, Exacerbation and COPD. P.SEN.T. 20-12 20 & Umbilical Hernia repair [id number] have been cancelled until he is feeling better. He will call us when he is ready to reschedule surgery.

## 2020-10-15 PROCEDURE — 93010 ELECTROCARDIOGRAM REPORT: CPT | Performed by: INTERNAL MEDICINE

## 2020-10-15 NOTE — TELEPHONE ENCOUNTER
Patient reports sx are improving and currently taking antibiotics .  Patient will call pulmonology office  tomorrow

## 2020-10-15 NOTE — TELEPHONE ENCOUNTER
If he is having more baseline respiratory complaints then please help him move up his pulmonology visit and help him reschedule for next available

## 2020-10-16 ENCOUNTER — CARE COORDINATION (OUTPATIENT)
Dept: CARE COORDINATION | Age: 65
End: 2020-10-16

## 2020-10-16 ENCOUNTER — TELEPHONE (OUTPATIENT)
Dept: FAMILY MEDICINE CLINIC | Age: 65
End: 2020-10-16

## 2020-10-16 ASSESSMENT — PATIENT HEALTH QUESTIONNAIRE - PHQ9
SUM OF ALL RESPONSES TO PHQ QUESTIONS 1-9: 9
SUM OF ALL RESPONSES TO PHQ QUESTIONS 1-9: 9

## 2020-10-16 NOTE — CARE COORDINATION
that much better. He was due to have his PAT completed Wednesday and this was canceled along with surgery which was scheduled for this coming Tuesday. He says that he is very fatigued, has a frequent cough and at times when he is able to bring mucus up it is yellow in color. He says that he is SOB and has issues with walking to the door because of the SOB. He is speaking in short simple sentences due to his SOB. He called Dr Justine Hall and was told to call pulmonology. He tells me that the earliest appointment that he can get is November 2nd.

## 2020-10-16 NOTE — TELEPHONE ENCOUNTER
Prednisone is being prescribed by pulmonology office. He should call them to discuss any potential refill of this medication even before his scheduled visit.

## 2020-10-16 NOTE — TELEPHONE ENCOUNTER
If he is more short of breath and not able to speak in normal complete sentences then he should be seen in ER ASAP for evaluation of potential lower respiratory tract infection +/- airway exacerbation.

## 2020-10-16 NOTE — CARE COORDINATION
Ambulatory Care Coordination Note  10/16/2020  CM Risk Score: 6  Charlson 10 Year Mortality Risk Score: 23%     ACC: Mary Joy RN    Summary Note:     I discussed care coordination and the process of care coordination. Zainab Capps is interested in this program.  We reviewed current allergies, current medical history, medications, depression screening completed PHQ-9 score = 9 = Mildly depressed. We have reviewed travel screening and advanced care planning,  He does not have Advanced directives but will think about this process as we continue to work together. Pharmacy referral and  referral initiated. COPD zone tool and education packet to mailed to Zainab Capps. I have discussed his breathing issues at length. I offered to call 911 regarding his SOB. He tells me \"I can do that myself. \"  He adds that he wants to wait until tomorrow and if he is not improving he will go to the ER. I have provided him with my contact information and I have asked him to call me with any questions or concerns. Care Coordination Interventions    Program Enrollment:  Complex Care  Referral from Primary Care Provider:  No  Suggested Interventions and Community Resources  Disease Association: In Process  Medication Assistance Program:  In Process  Pharmacist:  In Process  Social Work:  In Process  Zone Management Tools: In Process  Other Services or Interventions:  Pharmacy referral initiate,  referral re: medication assistance and home needs  COPD zone tool and education packet initiated,           Goals Addressed    None         Prior to Admission medications    Medication Sig Start Date End Date Taking?  Authorizing Provider   albuterol sulfate  (90 Base) MCG/ACT inhaler Inhale 2 puffs into the lungs every 4 hours as needed for Wheezing 10/12/20 10/19/20 Yes Herman Angeles MD   predniSONE (DELTASONE) 20 MG tablet Take 2 tablets by mouth daily for 5 days 10/12/20 10/17/20 Yes Alex Rosa Mayra Dewitt MD   levoFLOXacin (LEVAQUIN) 750 MG tablet Take 1 tablet by mouth daily for 7 days 10/12/20 10/19/20 Yes Tone Cox MD   pantoprazole (PROTONIX) 40 MG tablet Take 1 tablet by mouth daily 9/29/20  Yes Lidia Maldonado MD   lovastatin (MEVACOR) 10 MG tablet Take 1 tablet by mouth nightly 8/21/20  Yes Lidia Maldonado MD   ipratropium-albuterol (DUONEB) 0.5-2.5 (3) MG/3ML SOLN nebulizer solution Take 3 mLs by nebulization every 6 hours as needed for Shortness of Breath 8/7/20  Yes Lidia Maldonado MD   tiotropium (Carmencita Tara) 18 MCG inhalation capsule Inhale 1 capsule into the lungs daily 7/30/20  Yes Lidia Maldonado MD   acetaminophen (TYLENOL) 500 MG tablet Take 1 tablet by mouth every 8 hours as needed for Pain 7/30/20  Yes Lidia Maldonado MD   escitalopram (LEXAPRO) 10 MG tablet Take 1 tablet by mouth daily 7/30/20  Yes Lidia Maldonado MD   amLODIPine (NORVASC) 10 MG tablet Take 1 tablet by mouth daily 7/14/20  Yes Lidia Maldonado MD   fluticasone [facility]) 50 MCG/ACT nasal spray 2 sprays by Nasal route daily 6/9/20  Yes Lidia Maldonado MD   traZODone (DESYREL) 50 MG tablet Take 1 tablet by mouth nightly 3/23/20  Yes BHARATI Joseph CNP   montelukast (SINGULAIR) 10 MG tablet Take 1 tablet by mouth nightly 3/23/20  Yes BHARATI Joseph CNP   budesonide-formoterol (SYMBICORT) 160-4.5 MCG/ACT AERO Inhale 2 puffs into the lungs 2 times daily 3/23/20  Yes BHARATI Joseph CNP   Handicap Placard MISC by Does not apply route DX: OSTEOARTHRITIS OF BOTH KNEES (M17.0), COPD (J44.9)     EXPIRES: 02/2024 2/1/19  Yes Lidia Maldonado MD   benzonatate (TESSALON) 100 MG capsule Take 1 capsule by mouth 3 times daily as needed for Cough 10/12/20 10/19/20  Tone Cox MD   sildenafil (VIAGRA) 100 MG tablet Take 1 tablet by mouth as needed for Erectile Dysfunction  Patient not taking: Reported on 10/16/2020 9/22/20   Lidia Maldonado MD       Future Appointments Date Time Provider [facility] Nupur   12/1/2020  4:20 PM Luis Walls MD \[facility] [place] [facility]\""ro 94   4/14/2021 10:15 AM [facility]

## 2020-10-16 NOTE — CARE COORDINATION
Attempted to contact patient for care coordination enrollment at specified time per patient request.   Unable to reach patient by phone. Unable to leave a message as the mailbox is full. I will attempt to call Toro Avila back on Monday.

## 2020-10-16 NOTE — TELEPHONE ENCOUNTER
Patient wants to apprise Dr Aminata Arredondo of his upcoming Pulmonary visit. His appt is set for Nov 2nd. He is concerned that he may need a refill of prednisone before he can be seen by pulmonary. Please advise.

## 2020-10-16 NOTE — TELEPHONE ENCOUNTER
Patient reports he will see how is sx are over the weekend if any changes he will schedule an appointment or come to the walk in clinic to get more prednisone

## 2020-10-19 ENCOUNTER — TELEPHONE (OUTPATIENT)
Dept: PHARMACY | Facility: CLINIC | Age: 65
End: 2020-10-19

## 2020-10-19 ENCOUNTER — CARE COORDINATION (OUTPATIENT)
Dept: CARE COORDINATION | Age: 65
End: 2020-10-19

## 2020-10-19 NOTE — TELEPHONE ENCOUNTER
----- Message from Bryanna Rivas RN sent at 10/16/2020  4:51 PM EDT -----  Regarding: Medication Reconciliation  Please review medications and make any recommendations. He does rely on symbicort samples from the PCP due to cost.  He adds his rescue inhaler is costly and he tries to use this medication \"only when I absolutely have to. \"     Thank you for your help,  Sharmon Lennox

## 2020-10-19 NOTE — CARE COORDINATION
Ambulatory Care Coordination Note  10/19/2020  CM Risk Score: 6  Charlson 10 Year Mortality Risk Score: 23%     ACC: Chapo Doherty RN    Summary Note:     I called to continue care coordination with [facility]. He states that he has had a couple of calls today and asks that I call back in a couple of days. He states that his breathing is much better although I still hear him have some issues with breathing while I am speaking with him. He does have a lot of background noise and he tells me that he has company. I will call him back in 2-3 days. Care Coordination Interventions    Program Enrollment:  Complex Care  Referral from Primary Care Provider:  No  Suggested Interventions and Community Resources  Disease Association: In Process  Medication Assistance Program:  In Process  Pharmacist:  In Process  Social Work:  In Process  Zone Management Tools: In Process  Other Services or Interventions:  Pharmacy referral initiate,  referral re: medication assistance and home needs  COPD zone tool and education packet initiated,           Goals Addressed    None         Prior to Admission medications    Medication Sig Start Date End Date Taking?  Authorizing Provider   albuterol sulfate  (90 Base) MCG/ACT inhaler Inhale 2 puffs into the lungs every 4 hours as needed for Wheezing 10/12/20 10/19/20  Nick Orosco MD   benzonatate (TESSALON) 100 MG capsule Take 1 capsule by mouth 3 times daily as needed for Cough 10/12/20 10/19/20  Nick Orosco MD   levoFLOXacin (LEVAQUIN) 750 MG tablet Take 1 tablet by mouth daily for 7 days 10/12/20 10/19/20  Nick Orosco MD   pantoprazole (PROTONIX) 40 MG tablet Take 1 tablet by mouth daily 9/29/20   Luis Walls MD   sildenafil (VIAGRA) 100 MG tablet Take 1 tablet by mouth as needed for Erectile Dysfunction  Patient not taking: Reported on 10/16/2020 9/22/20   Luis Walls MD   lovastatin (MEVACOR) 10 MG tablet Take 1 tablet by mouth nightly 8/21/20   Lidia Maldonado MD   ipratropium-albuterol (DUONEB) 0.5-2.5 (3) MG/3ML SOLN nebulizer solution Take 3 mLs by nebulization every 6 hours as needed for Shortness of Breath 8/7/20   Lidia Maldonado MD   tiotropium (Carmencita Tara) 18 MCG inhalation capsule Inhale 1 capsule into the lungs daily 7/30/20   Lidia Maldonado MD   acetaminophen (TYLENOL) 500 MG tablet Take 1 tablet by mouth every 8 hours as needed for Pain 7/30/20   Lidia Maldonado MD   escitalopram (LEXAPRO) 10 MG tablet Take 1 tablet by mouth daily 7/30/20   Lidia Maldonado MD   amLODIPine (NORVASC) 10 MG tablet Take 1 tablet by mouth daily 7/14/20   Lidia Maldonado MD   fluticasone [facility]) 50 MCG/ACT nasal spray 2 sprays by Nasal route daily 6/9/20   Lidia Maldonado MD   traZODone (DESYREL) 50 MG tablet Take 1 tablet by mouth nightly 3/23/20   BHARATI Joseph CNP   montelukast (SINGULAIR) 10 MG tablet Take 1 tablet by mouth nightly 3/23/20   BHARATI Joseph CNP   budesonide-formoterol [facility]) 160-4.5 MCG/ACT AERO Inhale 2 puffs into the lungs 2 times daily 3/23/20   BHARATI Joseph CNP   Handicap Placard MISC by Does not apply route DX: OSTEOARTHRITIS OF BOTH KNEES (M17.0), COPD (J44.9)     EXPIRES: 02/2024 2/1/19   Lidia Maldonado MD       Future Appointments   Date Time Provider Constance Espitia   10/20/2020 10:00 AM SCHEDULE, [facility] CLINICAL PHARMACY [facility] Clin Rx None   12/1/2020  4:20 PM Lidia Maldonado MD [address]   4/14/2021 10:15 AM Ashlee Chahal MD [facility]

## 2020-10-19 NOTE — CARE COORDINATION
Telephone call with patient. He indicated that he never completed the application for [address] [place] [address]. He claims that he has all of his prescribed medications. His one physician is supplying him with an inhaler with samples. He stated that he changed insurance to [facility] and is hoping this will help with cost of medications. He did not contact Social Security about Extra Help Application and need for proof of income. Jose Tellez

## 2020-10-19 NOTE — TELEPHONE ENCOUNTER
Received a referral:  from Care Coordinator to review patients medications. Called patient to schedule a time to speak with a pharmacist over the telephone. Spoke to patient and advised them of the above message. Patient verified understanding and scheduled their appointment: tomorrow at [address]   Clinical Pharmacy   Toll free: [phone], option 7

## 2020-10-20 NOTE — TELEPHONE ENCOUNTER
CLINICAL PHARMACY NOTE - Medication Review    Chanel Mccann is a 60 y.o. male referred to a clinical pharmacy specialist by care coordination. Spoke with Flor Yang at appointment time 10/20/2020. He did not seem interested in completing medication review and stated then was not a good time. Agreeable to reschedule to next day. Called 10/21/2020 at predetermined time and was told right now is not a good time he just woke up try back in a little bit. 2 more attempts made to contact patient. Unable to leave voice message. YongChehart message sent. No further outreach planned at this time.     Korin Baxter, PharmD, [address], [address] [place] [address] Pharmacist  O: [phone]  Department, toll free: [phone], option 7

## 2020-10-21 ENCOUNTER — SCHEDULED TELEPHONE ENCOUNTER (OUTPATIENT)
Dept: PHARMACY | Facility: CLINIC | Age: 65
End: 2020-10-21

## 2020-10-21 ENCOUNTER — CARE COORDINATION (OUTPATIENT)
Dept: CARE COORDINATION | Age: 65
End: 2020-10-21

## 2020-10-21 SDOH — HEALTH STABILITY: PHYSICAL HEALTH: ON AVERAGE, HOW MANY MINUTES DO YOU ENGAGE IN EXERCISE AT THIS LEVEL?: 0 MIN

## 2020-10-21 SDOH — HEALTH STABILITY: PHYSICAL HEALTH: ON AVERAGE, HOW MANY DAYS PER WEEK DO YOU ENGAGE IN MODERATE TO STRENUOUS EXERCISE (LIKE A BRISK WALK)?: 0 DAYS

## 2020-10-21 SDOH — SOCIAL STABILITY: SOCIAL NETWORK
DO YOU BELONG TO ANY CLUBS OR ORGANIZATIONS SUCH AS CHURCH GROUPS UNIONS, FRATERNAL OR ATHLETIC GROUPS, OR SCHOOL GROUPS?: NOT ASKED

## 2020-10-21 SDOH — ECONOMIC STABILITY: FOOD INSECURITY: WITHIN THE PAST 12 MONTHS, YOU WORRIED THAT YOUR FOOD WOULD RUN OUT BEFORE YOU GOT MONEY TO BUY MORE.: NEVER TRUE

## 2020-10-21 SDOH — SOCIAL STABILITY: SOCIAL NETWORK: IN A TYPICAL WEEK, HOW MANY TIMES DO YOU TALK ON THE PHONE WITH FAMILY, FRIENDS, OR NEIGHBORS?: ONCE A WEEK

## 2020-10-21 SDOH — HEALTH STABILITY: MENTAL HEALTH: HOW OFTEN DO YOU HAVE A DRINK CONTAINING ALCOHOL?: 4 OR MORE TIMES A WEEK

## 2020-10-21 SDOH — HEALTH STABILITY: MENTAL HEALTH: HOW MANY STANDARD DRINKS CONTAINING ALCOHOL DO YOU HAVE ON A TYPICAL DAY?: 1 OR 2

## 2020-10-21 SDOH — ECONOMIC STABILITY: FOOD INSECURITY: WITHIN THE PAST 12 MONTHS, THE FOOD YOU BOUGHT JUST DIDN'T LAST AND YOU DIDN'T HAVE MONEY TO GET MORE.: NEVER TRUE

## 2020-10-21 SDOH — SOCIAL STABILITY: SOCIAL NETWORK: HOW OFTEN DO YOU GET TOGETHER WITH FRIENDS OR RELATIVES?: ONCE A WEEK

## 2020-10-21 SDOH — ECONOMIC STABILITY: TRANSPORTATION INSECURITY
IN THE PAST 12 MONTHS, HAS THE LACK OF TRANSPORTATION KEPT YOU FROM MEDICAL APPOINTMENTS OR FROM GETTING MEDICATIONS?: NO

## 2020-10-21 SDOH — SOCIAL STABILITY: SOCIAL NETWORK: HOW OFTEN DO YOU ATTENT MEETINGS OF THE CLUB OR ORGANIZATION YOU BELONG TO?: NEVER

## 2020-10-21 SDOH — ECONOMIC STABILITY: TRANSPORTATION INSECURITY
IN THE PAST 12 MONTHS, HAS LACK OF TRANSPORTATION KEPT YOU FROM MEETINGS, WORK, OR FROM GETTING THINGS NEEDED FOR DAILY LIVING?: NO

## 2020-10-21 SDOH — ECONOMIC STABILITY: INCOME INSECURITY: HOW HARD IS IT FOR YOU TO PAY FOR THE VERY BASICS LIKE FOOD, HOUSING, MEDICAL CARE, AND HEATING?: SOMEWHAT HARD

## 2020-10-21 SDOH — SOCIAL STABILITY: SOCIAL NETWORK: HOW OFTEN DO YOU ATTEND CHURCH OR RELIGIOUS SERVICES?: 1 TO 4 TIMES PER YEAR

## 2020-10-21 SDOH — HEALTH STABILITY: MENTAL HEALTH
STRESS IS WHEN SOMEONE FEELS TENSE, NERVOUS, ANXIOUS, OR CAN'T SLEEP AT NIGHT BECAUSE THEIR MIND IS TROUBLED. HOW STRESSED ARE YOU?: TO SOME EXTENT

## 2020-10-21 SDOH — SOCIAL STABILITY: SOCIAL NETWORK: ARE YOU MARRIED, WIDOWED, DIVORCED, SEPARATED, NEVER MARRIED, OR LIVING WITH A PARTNER?: DIVORCED

## 2020-10-21 NOTE — TELEPHONE ENCOUNTER
CLINICAL PHARMACY NOTE - Medication Review  Patient outreach to review medications - Spoke with patient. SUBJECTIVE/OBJECTIVE:   Lincoln Hough is a 60 y.o. male referred to a clinical pharmacy specialist by care coordination. Medications:  Medication Sig Notes    albuterol sulfate  (90 Base) MCG/ACT inhaler Inhale 2 puffs into the lungs every 4 hours as needed for Wheezing     pantoprazole (PROTONIX) 40 MG tablet Take 1 tablet by mouth daily     sildenafil (VIAGRA) 100 MG tablet Take 1 tablet by mouth as needed for Erectile Dysfunction  States has on hand for prn use    lovastatin (MEVACOR) 10 MG tablet Take 1 tablet by mouth nightly     ipratropium-albuterol (DUONEB) 0.5-2.5 (3) MG/3ML SOLN nebulizer solution Take 3 mLs by nebulization every 6 hours as needed for Shortness of Breath     tiotropium (SPIRIVA HANDIHALER) 18 MCG inhalation capsule Inhale 1 capsule into the lungs daily     acetaminophen (TYLENOL) 500 MG tablet Take 1 tablet by mouth every 8 hours as needed for Pain     escitalopram (LEXAPRO) 10 MG tablet Take 1 tablet by mouth daily     amLODIPine (NORVASC) 10 MG tablet Take 1 tablet by mouth daily     fluticasone (FLONASE) 50 MCG/ACT nasal spray 2 sprays by Nasal route daily     traZODone (DESYREL) 50 MG tablet Take 1 tablet by mouth nightly     montelukast (SINGULAIR) 10 MG tablet Take 1 tablet by mouth nightly     budesonide-formoterol (SYMBICORT) 160-4.5 MCG/ACT AERO Inhale 2 puffs into the lungs 2 times daily      He has completed prednisone and antibiotic from 10/12/2020 ED visit. He states his breathing is improved. Additional Medications (including OTC/Herbal Supplements):  · None per patient    Allergies:    Allergies   Allergen Reactions    Fish-Derived Products Anaphylaxis    Iodine Anaphylaxis     Iodine-containing products, dyes, contrast dye    Seasonal Shortness Of Breath    Pcn [Penicillins] Nausea And Vomiting       Pertinent Labs/Vitals:  BP Readings from Last 3 Encounters:   10/12/20 (!) 179/84   10/12/20 120/80   10/05/20 124/82     No results found for: Keshia Huang  Lab Results   Component Value Date    LABA1C 6.1 (H) 2020    LABA1C 5.9 08/10/2019    LABA1C 5.8 2019     Lab Results   Component Value Date    CHOL 184 06/10/2020    TRIG 141 06/10/2020    HDL 44 06/10/2020    LDLCALC 112 06/10/2020     ALT   Date Value Ref Range Status   10/12/2020 41 0 - 41 U/L Final     AST   Date Value Ref Range Status   10/12/2020 25 0 - 40 U/L Final     The 10-year ASCVD risk score (Berta Canales, et al., 2013) is: 40.6%    Values used to calculate the score:      Age: 60 years      Sex: Male      Is Non- : Yes      Diabetic: No      Tobacco smoker: Yes      Systolic Blood Pressure: 582 mmHg      Is BP treated: Yes      HDL Cholesterol: 44 mg/dL      Total Cholesterol: 184 mg/dL     Lab Results   Component Value Date    CREATININE 1.01 10/12/2020       CrCl cannot be calculated (Unknown ideal weight. ).   eGFR: >60 mL/min/1.73 m^2    Social History:   Social History     Tobacco Use    Smoking status: Current Every Day Smoker     Packs/day: 0.50     Years: 30.00     Pack years: 15.00     Types: Cigarettes, Cigars     Start date: 1988     Last attempt to quit: 10/1/2013     Years since quittin.0    Smokeless tobacco: Never Used   Substance Use Topics    Alcohol use: Yes     Comment: social 1 -2 x week       Immunizations:   Most Recent Immunizations   Administered Date(s) Administered    Influenza Virus Vaccine 2015    Influenza, Quadv, 6 mo and older, IM (Fluzone, Flulaval) 10/02/2018    Influenza, Quadv, IM, (6 mo and older Fluzone, Flulaval, Fluarix and 3 yrs and older Afluria) 10/07/2019    Influenza, Quadv, IM, PF (6 mo and older Fluzone, Flulaval, Fluarix, and 3 yrs and older Afluria) 10/27/2016    Pneumococcal Polysaccharide ([id number]) 10/27/2016    Tdap (Boostrix, Adacel) 10/27/2016    Zoster Recombinant (Shingrix) 11/10/2019     ASSESSMENT/PLAN:   - General Assessment:   · Cost: States currently no troubles. Brought up how inhalers can be expensive, patient states it is currently no trouble with him and he has never missed a dose due to cost of medication. [facility] and Medicare Extra Help Application (from social work note). Patient declines the need for these at this time. · Current pharmacy/pharmacies: [facility]  · Drug interactions: No clinically significant interactions identified via [employer] Interaction Analysis as category D or higher. · Renal dosing: No renal adjustments necessary. · Immunizations: Reminded to get influenza vaccine. He states he has a follow up with pulmonology 11/3 and plans to get then. If his office does not have in stock, he plans to get at his [facility] pharmacy  · Smoking status: currently smokes 4-5 cigarettes per day. Would like to quit. States he has nicotine patches at home. Reviewed use of the patches. Patient unsure when will start. Gave encouragement, told to contact provider if needs any assistance. - COPD:   Inhaler technique: reviewed each inhaler with patient.  He states he is using correctly   Pneumonia vaccine: [id number] 10/27/16   States has follow-up with pulmonology 11/3/2020.    - Upcoming appointments:   Future Appointments   Date Time Provider Constance Nupur   12/1/2020  4:20 PM Apryl Rudolph MD [place] [id number]   4/14/2021 10:15 AM Jean Pickard MD [facility], PharmD, [address] [place], [id number]  [facility] Pharmacist  O: [phone]  Department, toll free: [phone], option 7     CLINICAL PHARMACY CONSULT: MED RECONCILIATION/REVIEW ADDENDUM  For Pharmacy Admin Tracking Only  PHSO: Yes  Recommended intervention potential cost savings: 1  Time Spent (min): 25

## 2020-10-21 NOTE — CARE COORDINATION
Ambulatory Care Coordination Note  10/21/2020  CM Risk Score: 6  Charlson 10 Year Mortality Risk Score: 23%     ACC: Markos Kunz RN    Summary Note:     Care coordination protocol completed including:  SDOH, education evaluation, and goal setting. Damaris Jhaveri is looking for a few additional support items at home including a hospital bed to help with his breathing and a shower chair. He does have some financial insecurity. He does received food stamps which have been increased from $15.00/ month to $178.00. He says that he is stocking up on canned goods. I spoke with him about COPD education and the zone tool sheet. He says that he has not received this in the mail. I will have Liane resend this to him. He says that once he completes this second round of antibiotics he will try to get his surgery rescheduled. I again have provided him with my contact information and I have asked him to call me with any questions or concerns. Ambulatory Care Coordination Assessment    Care Coordination Protocol  Program Enrollment:  Complex Care  Referral from Primary Care Provider:  No  Week 1 - Initial Assessment     Do you have all of your prescriptions and are they filled?:  Yes  Barriers to medication adherence:  None  Are you able to afford your medications?:  No  How often do you have trouble taking your medications the way you have been told to take them?:  I always take them as prescribed. Do you have Home O2 Therapy?:  Yes   Oxygen Regimen: At night/Sleep Flow - Enter rate/FIO2:  3   Method of Delivery:  Nasal Cannula, Concentrater   CPAP Use:  CPAP      Ability to seek help/take action for Emergent Urgent situations i.e. fire, crime, inclement weather or health crisis. :  Independent  Ability to ambulate to restroom:  Independent  Ability handle personal hygeine needs (bathing/dressing/grooming): Independent  Ability to manage Medications:   Independent  Ability to prepare Food Preparation: Independent  Ability to maintain home (clean home, laundry):  Needs Assistance  Ability to drive and/or has transportation:  Dependent  Ability to do shopping:  Independent  Ability to manage finances:  Needs Assistance     Current Housing:  Apartment        Per the Fall Risk Screening, did the patient have 2 or more falls or 1 fall with injury in the past year?:  No     Frequent urination at night?:  Yes  Do you use rails/bars?:  No  Do you have a non-slip tub mat?:  No     Are you experiencing loss of meaning?:  No  Are you experiencing loss of hope and peace?:  No     Thinking about your patient's physical health needs, are there any symptoms or problems (risk indicators) you are unsure about that require further investigation?:  Mild vague physical symptoms or problems; but do not impact on daily life or are not of concern to patient   Are the patients physical health problems impacting on their mental well-being?:  Mild impact on mental well-being e.g. \"\"feeling fed-up\"\", \"\"reduced enjoyment\"\"   Are there any problems with your patients lifestyle behaviors (alcohol, drugs, diet, exercise) that are impacting on physical or mental well-being?:  Some mild concern of potential negative impact on well-being   Do you have any other concerns about your patients mental well-being? How would you rate their severity and impact on the patient?:  Mild problems - don't interfere with function   How would you rate their home environment in terms of safety and stability (including domestic violence, insecure housing, neighbor harassment)?:  Safety/stability questionable   How do daily activities impact on the patient's well-being? (include current or anticipated unemployment, work, caregiving, access to transportation or other):   Contributes to low mood or stress at times   How would you rate their social network (family, work, friends)?:  Restricted participation with some degree of social isolation   How would you rate their financial resources (including ability to afford all required medical care)?:  Financially insecure, some resource challenges   How wells does the patient now understand their health and well-being (symptoms, signs or risk factors) and what they need to do to manage their health?:  Little understanding which impacts on their ability to undertake better management   How well do you think your patient can engage in healthcare discussions? (Barriers include language, deafness, aphasia, alcohol or drug problems, learning difficulties, concentration): Adequate communication, with or without minor barriers   Do other services need to be involved to help this patient?:  Other care/services in place and adequate   Suggested Interventions and Community Resources   Disease Assocation: In Process   Other Services or Interventions:  Pharmacy referral initiate,  referral re: medication assistance and home needs  COPD zone tool and education packet initiated,     Medication Assistance Program:  In Process   Pharmacist:  In Process   Social Work:  In Process   Zone Management Tools: In Process         Set up/Review an Education Plan, Set up/Review Goals              Prior to Admission medications    Medication Sig Start Date End Date Taking?  Authorizing Provider   albuterol sulfate  (90 Base) MCG/ACT inhaler Inhale 2 puffs into the lungs every 4 hours as needed for Wheezing 10/12/20 10/19/20  [place] MD Jnoatan   pantoprazole (PROTONIX) 40 MG tablet Take 1 tablet by mouth daily 9/29/20   Apryl Rudolph MD   sildenafil (VIAGRA) 100 MG tablet Take 1 tablet by mouth as needed for Erectile Dysfunction  Patient not taking: Reported on 10/16/2020 9/22/20   Apryl Rudolph MD   lovastatin (MEVACOR) 10 MG tablet Take 1 tablet by mouth nightly 8/21/20   Apryl Rudolph MD   ipratropium-albuterol (DUONEB) 0.5-2.5 (3) MG/3ML SOLN nebulizer solution Take 3 mLs by nebulization every 6 hours as needed for Shortness of Breath 8/7/20   Eden Reina MD   tiotropium (SPIRIVA HANDIHALER) 18 MCG inhalation capsule Inhale 1 capsule into the lungs daily 7/30/20   Eden Reina MD   acetaminophen (TYLENOL) 500 MG tablet Take 1 tablet by mouth every 8 hours as needed for Pain 7/30/20   Eden Reina MD   escitalopram (LEXAPRO) 10 MG tablet Take 1 tablet by mouth daily 7/30/20   Eden Reina MD   amLODIPine (NORVASC) 10 MG tablet Take 1 tablet by mouth daily 7/14/20   Eden Reina MD   fluticasone Tiff Charity) 50 MCG/ACT nasal spray 2 sprays by Nasal route daily 6/9/20   Eden Reina MD   traZODone (DESYREL) 50 MG tablet Take 1 tablet by mouth nightly 3/23/20   Rea Opitz Riedy, APRN - CNP   montelukast (SINGULAIR) 10 MG tablet Take 1 tablet by mouth nightly 3/23/20   Rea Opitz Riedy, APRN - SANDRA   budesonide-formoterol [facility]) 160-4.5 MCG/ACT AERO Inhale 2 puffs into the lungs 2 times daily 3/23/20   Rea Opitz Riedy, APRN - CNP   Handicap Placard MISC by Does not apply route DX: OSTEOARTHRITIS OF BOTH KNEES (M17.0), COPD (J44.9)     EXPIRES: 02/2024 2/1/19   Eden Reina MD       Future Appointments   Date Time Provider Constance Wrightisti   12/1/2020  4:20 PM Eden Reina MD [place] [id number]   4/14/2021 10:15 AM Cheryle Boas, MD [facility]      and   COPD Assessment    Does the patient understand envrionmental exposure?:  No  Is the patient able to verbalize Rescue vs. Long Acting medications?:  Yes  Does the patient have a nebulizer?:  Yes  Does the patient use a space with inhaled medications?:  No            Symptoms:      Change in chronic cough?:  Increased  Change in sputum?:  Increased  Sputum characteristics:  Clear  Self Monitoring - SaO2:  No

## 2020-10-27 ENCOUNTER — CARE COORDINATION (OUTPATIENT)
Dept: CARE COORDINATION | Age: 65
End: 2020-10-27

## 2020-10-27 NOTE — CARE COORDINATION
You Patient resolved from the Care Transitions episode on 10/27/2020  Discussed COVID-19 related testing which was available at this time. Test results were negative. Patient informed of results, if available? Yes and Test completed 5/25/2020 and 7/10/2020 = Not Detected. Patient/family has been provided the following resources and education related to COVID-19:                         Signs, symptoms and red flags related to COVID-19            CDC exposure and quarantine guidelines            [facility] exposure contact - [phone]            Contact for their local Department of Health                 Patient currently reports that the following symptoms have improved:  no new/worsening symptoms     No further outreach scheduled with this CTN/ACM. Episode of Care resolved. Patient has this CTN/ACM contact information if future needs arise. Jose Tellez

## 2020-10-28 ENCOUNTER — CARE COORDINATION (OUTPATIENT)
Dept: CARE COORDINATION | Age: 65
End: 2020-10-28

## 2020-11-02 ENCOUNTER — CARE COORDINATION (OUTPATIENT)
Dept: CARE COORDINATION | Age: 65
End: 2020-11-02

## 2020-11-02 NOTE — CARE COORDINATION
Telephone call to patient. Discussed medication affordability. He stated that he has all of his prescribed medications. He stated that the only medication which is difficult to afford is Spiriva. He stated that a lady friend gives him Spiriva. Discussed him contacting [address] [place] [address], about doing application for assistance with medications. Discussed doing this instead of relying on others for medication. Patient stated that he had contact information for [facility].

## 2020-11-05 RX ORDER — ACETAMINOPHEN 500 MG
500 TABLET ORAL EVERY 8 HOURS PRN
Qty: 120 TABLET | Refills: 1 | Status: SHIPPED | OUTPATIENT
Start: 2020-11-05 | End: 2020-12-15 | Stop reason: SDUPTHER

## 2020-11-05 RX ORDER — LOVASTATIN 10 MG/1
10 TABLET ORAL NIGHTLY
Qty: 90 TABLET | Refills: 1 | Status: SHIPPED | OUTPATIENT
Start: 2020-11-05 | End: 2020-12-15 | Stop reason: SDUPTHER

## 2020-11-05 RX ORDER — AMLODIPINE BESYLATE 10 MG/1
10 TABLET ORAL DAILY
Qty: 90 TABLET | Refills: 1 | Status: SHIPPED | OUTPATIENT
Start: 2020-11-05 | End: 2020-12-15 | Stop reason: SDUPTHER

## 2020-11-05 NOTE — TELEPHONE ENCOUNTER
Patient is requesting medication refill.  Please approve or deny this request.    Rx requested:  Requested Prescriptions     Pending Prescriptions Disp Refills    amLODIPine (NORVASC) 10 MG tablet 90 tablet 1     Sig: Take 1 tablet by mouth daily    lovastatin (MEVACOR) 10 MG tablet 90 tablet 1     Sig: Take 1 tablet by mouth nightly    acetaminophen (TYLENOL) 500 MG tablet 120 tablet 1     Sig: Take 1 tablet by mouth every 8 hours as needed for Pain         Last Office Visit:   7/30/2020      Next Visit Date:  Future Appointments   Date Time Provider Constance Wrightisti   11/19/2020  2:45 PM Heather Velasquez  [address] [place] [address]   12/1/2020  4:20 PM Enmanuel Siomn MD [facility]   4/14/2021 10:15 AM Lauren Hunt MD [facility]

## 2020-11-09 ENCOUNTER — CARE COORDINATION (OUTPATIENT)
Dept: CARE COORDINATION | Age: 65
End: 2020-11-09

## 2020-11-09 ASSESSMENT — ENCOUNTER SYMPTOMS: DYSPNEA ASSOCIATED WITH: EXERTION

## 2020-11-09 NOTE — CARE COORDINATION
Ambulatory Care Coordination Note  11/9/2020  CM Risk Score: 6  Charlson 10 Year Mortality Risk Score: 23%     ACC: Alex Becerril, RN    Summary Note:     Tracy oNlan states that he is struggling with SOB in the morning. He tells me that he is wearing the CPAP every night for 4-6 hours. He denies any issues with wearing the CPAP. He states that he is SOB in the morning and with activity. He is using his nebulizer 4 times each day to help with the SOB  He states that he had a follow up appointment with Dr Luis Jones last week. He states that he noted the SOB as well and discontinued his Symbicort and spriva   He states that he is now on another inhaler which he started a couple of days ago and he says \"it doesn't seem to be helping. \"  He then tells me that he is \"cheating using my old inhalers. \"   He denies fever, chills, change in mucus. I have asked him to call Dr Luis Jones back but he wants to wait for a couple of days. PLAN:   Tracy Nolan will review and monitor his symptoms using the COPD zone tool sheet. Tracy Nolan will call me with any worsening of symptoms. I will follow up with Tracy Nolan in one week due to changes in symptoms    Care Coordination Interventions    Program Enrollment:  Complex Care  Referral from Primary Care Provider:  No  Suggested Interventions and Community Resources  Disease Association: In Process  Medication Assistance Program:  In Process  Pharmacist:  In Process  Social Work:  In Process  Zone Management Tools: In Process  Other Services or Interventions:  Pharmacy referral initiate,  referral re: medication assistance and home needs  COPD zone tool and education packet initiated,           Goals Addressed                 This Visit's Progress     Conditions and Symptoms   Improving     I will schedule office visits, as directed by my provider. I will keep my appointment or reschedule if I have to cancel. I will notify my provider of any barriers to my plan of care.   I will follow my Zone Management tool to seek urgent or emergent care. I will notify my provider of any symptoms that indicate a worsening of my condition. Barriers: lack of motivation, lack of support, overwhelmed by complexity of regimen, and lack of education  Plan for overcoming my barriers: I will work with my ACM to increase my knowledge and self management skills for my overall health focusing on COPD. Confidence: 8/10  Anticipated Goal Completion Date: 03/30/2021              Prior to Admission medications    Medication Sig Start Date End Date Taking?  Authorizing Provider   amLODIPine (NORVASC) 10 MG tablet Take 1 tablet by mouth daily 11/5/20   Mimi Pennington MD   lovastatin (MEVACOR) 10 MG tablet Take 1 tablet by mouth nightly 11/5/20   Mimi Pennington MD   acetaminophen (TYLENOL) 500 MG tablet Take 1 tablet by mouth every 8 hours as needed for Pain 11/5/20   Mimi Pennington MD   albuterol sulfate  (90 Base) MCG/ACT inhaler Inhale 2 puffs into the lungs every 4 hours as needed for Wheezing 10/12/20 10/19/20  José Bone MD   pantoprazole (PROTONIX) 40 MG tablet Take 1 tablet by mouth daily 9/29/20   Mimi Pennington MD   sildenafil (VIAGRA) 100 MG tablet Take 1 tablet by mouth as needed for Erectile Dysfunction  Patient not taking: Reported on 10/16/2020 9/22/20   Mimi Pennington MD   ipratropium-albuterol (DUONEB) 0.5-2.5 (3) MG/3ML SOLN nebulizer solution Take 3 mLs by nebulization every 6 hours as needed for Shortness of Breath 8/7/20   Mimi Pennington MD   tiotropium (SPIRIVA HANDIHALER) 18 MCG inhalation capsule Inhale 1 capsule into the lungs daily 7/30/20   Mimi Pennington MD   escitalopram (LEXAPRO) 10 MG tablet Take 1 tablet by mouth daily 7/30/20   Mimi Pennington MD   fluticasone [facility]) 50 MCG/ACT nasal spray 2 sprays by Nasal route daily 6/9/20   Mimi Pennington MD   traZODone (DESYREL) 50 MG tablet Take 1 tablet by mouth nightly 3/23/20   William Weiss

## 2020-11-12 ENCOUNTER — CARE COORDINATION (OUTPATIENT)
Dept: CARE COORDINATION | Age: 65
End: 2020-11-12

## 2020-11-12 NOTE — CARE COORDINATION
Attempted to contact patient for care coordination and COPD follow up. Unable to reach patient by phone. Mailbox is full and I am unable to leave a message. I will attempt a follow up call next week.

## 2020-11-19 ENCOUNTER — PREP FOR PROCEDURE (OUTPATIENT)
Dept: SURGERY | Age: 65
End: 2020-11-19

## 2020-11-19 ENCOUNTER — OFFICE VISIT (OUTPATIENT)
Dept: SURGERY | Age: 65
End: 2020-11-19
Payer: MEDICARE

## 2020-11-19 VITALS
TEMPERATURE: 98.2 F | SYSTOLIC BLOOD PRESSURE: 132 MMHG | WEIGHT: 309 LBS | HEIGHT: 73 IN | DIASTOLIC BLOOD PRESSURE: 80 MMHG | BODY MASS INDEX: 40.95 KG/M2

## 2020-11-19 PROCEDURE — 3017F COLORECTAL CA SCREEN DOC REV: CPT | Performed by: SURGERY

## 2020-11-19 PROCEDURE — G8427 DOCREV CUR MEDS BY ELIG CLIN: HCPCS | Performed by: SURGERY

## 2020-11-19 PROCEDURE — 4004F PT TOBACCO SCREEN RCVD TLK: CPT | Performed by: SURGERY

## 2020-11-19 PROCEDURE — G8484 FLU IMMUNIZE NO ADMIN: HCPCS | Performed by: SURGERY

## 2020-11-19 PROCEDURE — 99214 OFFICE O/P EST MOD 30 MIN: CPT | Performed by: SURGERY

## 2020-11-19 PROCEDURE — G8417 CALC BMI ABV UP PARAM F/U: HCPCS | Performed by: SURGERY

## 2020-11-19 RX ORDER — SULFAMETHOXAZOLE AND TRIMETHOPRIM 800; 160 MG/1; MG/1
1 TABLET ORAL 2 TIMES DAILY
Qty: 28 TABLET | Refills: 1 | Status: SHIPPED | OUTPATIENT
Start: 2020-11-19 | End: 2020-12-03

## 2020-11-19 ASSESSMENT — ENCOUNTER SYMPTOMS
COLOR CHANGE: 0
BLOOD IN STOOL: 0
CONSTIPATION: 0
ALLERGIC/IMMUNOLOGIC NEGATIVE: 1
COUGH: 1
ABDOMINAL DISTENTION: 0
CHEST TIGHTNESS: 0
ABDOMINAL PAIN: 1
EYES NEGATIVE: 1
RHINORRHEA: 0
SHORTNESS OF BREATH: 1

## 2020-11-19 NOTE — PROGRESS NOTES
Subjective:      Patient ID: Delaney Flores is a 60 y.o. male. Delaney Flores is a 60 y.o. male  who is being seen in follow-up for an umbilical hernia. The has been present 6 month(s). . The patient has pain. Pain is rated as a 3  It's size is increasing. The pain is increasing. Patient does not have nausea/vomiting. The hernia mass is reducible. The hernia does not effect normal everyday activities. The patient has had no abdominal surgery. He has had no radiologic imaging done. He is not on anticoagulants. He was originally scheduled for surgery but he tested positive for cocaine the morning of surgery. His surgery has been postponed. Review of Systems   Constitutional: Negative for activity change, appetite change and unexpected weight change. HENT: Negative for congestion, nosebleeds, postnasal drip, rhinorrhea and sneezing. Eyes: Negative. Negative for visual disturbance. Respiratory: Positive for cough and shortness of breath. Negative for chest tightness. Cardiovascular: Negative for chest pain and leg swelling. Gastrointestinal: Positive for abdominal pain. Negative for abdominal distention, blood in stool and constipation. Endocrine: Negative. Genitourinary: Negative for difficulty urinating. Musculoskeletal: Negative for arthralgias, gait problem and myalgias. Skin: Negative for color change. Allergic/Immunologic: Negative. Neurological: Negative for dizziness, light-headedness, numbness and headaches. Hematological: Does not bruise/bleed easily. Psychiatric/Behavioral: Negative for sleep disturbance.      Past Medical History:   Diagnosis Date    Alcohol abuse 10/27/2016    Anemia     Asthma     Cocaine abuse ([facility] [address] 10/27/2016    COPD (chronic obstructive pulmonary disease) ([facility] [address]     Depression 4/27/2020    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    Drug-seeking behavior 4/14/2015    Edema 12/10/2015    Erectile dysfunction     Gastroesophageal reflux disease 2018    Gout 10/27/2016    History of arthroscopy of both knees 10/27/2016    House dust mite allergy 2014    Hyperlipidemia     Hypertension 10/27/2016    Injury to heart 10/27/2016    Insomnia 2018    Medical non-compliance 2014    Morbid obesity due to excess calories ([facility] [address] 2016    Osteoarthritis of both knees 2016    Personal history of tobacco use     Pneumonia 2016    caused hospital admission    Pre-diabetes 10/27/2016    Seasonal allergies 2014    Severe persistent asthma 10/27/2016    Severe sleep apnea 2015    Supraventricular tachycardia ([facility] [address] 10/27/2016    SVT (supraventricular tachycardia) (HCC)      Past Surgical History:   Procedure Laterality Date    ANTERIOR CRUCIATE LIGAMENT REPAIR      CARDIAC CATHETERIZATION      COLONOSCOPY N/A 7/15/2020    COLONOSCOPY WITH POLYPECTOMY performed by Binu Mark MD at [facility] 32 Left 2019    LEFT TOTAL KNEE ARTHROPLASTY performed by Javid Panda MD at [address] [place] [address] History     Tobacco Use    Smoking status: Current Every Day Smoker     Packs/day: 0.25     Years: 30.00     Pack years: 7.50     Types: Cigarettes, Cigars     Start date: 1988     Last attempt to quit: 10/1/2013     Years since quittin.1    Smokeless tobacco: Never Used   Substance Use Topics    Alcohol use:  Yes     Alcohol/week: 4.0 standard drinks     Types: 2 Cans of beer, 2 Shots of liquor per week     Frequency: 4 or more times a week     Drinks per session: 1 or 2     Comment: social 1 -2 x week    Drug use: Not Currently     Types: Cocaine, Marijuana     Comment: social     Family History   Problem Relation Age of Onset    Arthritis Mother     Asthma Mother     High Cholesterol Mother     Other Mother         aneurysm    Diabetes Father     Stroke Maternal Grandmother     Cancer Maternal Grandfather     Hypertension Other     COPD Neg Hx      Fish-derived products; Iodine; Seasonal; and Pcn [penicillins]  Allergies   Allergen Reactions    Fish-Derived Products Anaphylaxis    Iodine Anaphylaxis     Iodine-containing products, dyes, contrast dye    Seasonal Shortness Of Breath    Pcn [Penicillins] Nausea And Vomiting     Current Outpatient Medications   Medication Sig Dispense Refill    amLODIPine (NORVASC) 10 MG tablet Take 1 tablet by mouth daily 90 tablet 1    lovastatin (MEVACOR) 10 MG tablet Take 1 tablet by mouth nightly 90 tablet 1    acetaminophen (TYLENOL) 500 MG tablet Take 1 tablet by mouth every 8 hours as needed for Pain 120 tablet 1    pantoprazole (PROTONIX) 40 MG tablet Take 1 tablet by mouth daily 90 tablet 1    sildenafil (VIAGRA) 100 MG tablet Take 1 tablet by mouth as needed for Erectile Dysfunction 10 tablet 2    ipratropium-albuterol (DUONEB) 0.5-2.5 (3) MG/3ML SOLN nebulizer solution Take 3 mLs by nebulization every 6 hours as needed for Shortness of Breath 180 mL 1    tiotropium (SPIRIVA HANDIHALER) 18 MCG inhalation capsule Inhale 1 capsule into the lungs daily 90 capsule 1    escitalopram (LEXAPRO) 10 MG tablet Take 1 tablet by mouth daily 90 tablet 1    fluticasone (FLONASE) 50 MCG/ACT nasal spray 2 sprays by Nasal route daily 3 Bottle 1    traZODone (DESYREL) 50 MG tablet Take 1 tablet by mouth nightly 90 tablet 1    montelukast (SINGULAIR) 10 MG tablet Take 1 tablet by mouth nightly 90 tablet 1    budesonide-formoterol (SYMBICORT) 160-4.5 MCG/ACT AERO Inhale 2 puffs into the lungs 2 times daily 3 Inhaler 1    Handicap Placard MISC by Does not apply route DX: OSTEOARTHRITIS OF BOTH KNEES (M17.0), COPD (J44.9)     EXPIRES: 02/2024 1 each 0    albuterol sulfate  (90 Base) MCG/ACT inhaler Inhale 2 puffs into the lungs every 4 hours as needed for Wheezing 1 Inhaler 0     No current facility-administered medications for this visit.       /80   Temp 98.2 °F (36.8 °C) (Temporal)   Ht 6' 1\" (1.854 m)   Wt (!) 309 lb (140.2 kg)   BMI 40.77 kg/m²     Objective:   Physical Exam  Constitutional:       General: He is not in acute distress. Appearance: Normal appearance. He is obese. HENT:      Mouth/Throat:      Mouth: Mucous membranes are moist.      Pharynx: Oropharynx is clear. Eyes:      Pupils: Pupils are equal, round, and reactive to light. Neck:      Comments: Neck is supple without any masses, no thyromegaly, trachea midline  Cardiovascular:      Rate and Rhythm: Normal rate and regular rhythm. Heart sounds: No murmur. Pulmonary:      Effort: Pulmonary effort is normal. No respiratory distress. Breath sounds: Normal breath sounds. Abdominal:      Palpations: There is no hepatomegaly or splenomegaly. Tenderness: There is abdominal tenderness in the periumbilical area. Hernia: A hernia is present. Hernia is present in the umbilical area (mildly tender reducible hernia). Musculoskeletal:      Comments: Normal gait   Skin:     Findings: No bruising, lesion or rash. Neurological:      Mental Status: He is alert and oriented to person, place, and time. Psychiatric:         Mood and Affect: Mood normal.         Judgment: Judgment normal.         Assessment:      Umbilical hernia  History of cocaine abuse      Plan:      Umbilical hernia repair    The options of therapy were discussed with the patient. The procedure of the repair with the probable use of mesh was discussed. The potential risks and complications including but not exclusive to infection, blood loss, damage to surrounding structures and chronic pain. If any of these occur it could require another surgery. The expected recovery was discussed. The patient's questions were answered and has decided to proceed with hernia repair. It will be scheduled at their convenience. The patient was counseled at length about the risks of steven Covid-19 in the perioperative period and any recovery window from their procedure.   The

## 2020-11-20 ENCOUNTER — CARE COORDINATION (OUTPATIENT)
Dept: CARE COORDINATION | Age: 65
End: 2020-11-20

## 2020-11-20 ASSESSMENT — ENCOUNTER SYMPTOMS: DYSPNEA ASSOCIATED WITH: MINIMAL EXERTION

## 2020-11-20 NOTE — CARE COORDINATION
Ambulatory Care Coordination Note  11/20/2020  CM Risk Score: 10  Charlson 10 Year Mortality Risk Score: 23%     ACC: Tavia Jimenez RN    Summary Note:     Edu Paulino says that he is doing okay. He states that he had his follow up with Dr Maria Alejandra Atkinson and he will have the hernia repair on 11/30/2020. He says that he will have his COVID testing, pre op testing next week and then he will quarantine. He says that he is SOB with walking and it is much worse in the morning. He states that he has times when he cannot sleep because laying flat is difficult and adds to his SOB  He tells me that he feels he needs a hospital bed so that he can have his head elevated to help with breathing. He does sleep with three pillows and he tells me that he is wearing his CPAP every night. He that walking short distances from the bed to bathroom cause him to be SOB. He states that he had a follow up with Dr Radha Mcmanus  (his pulmonologist) at [facility] and he did change his daily inhaler to an inhaler that has 3 products in one and it is helping. He says that his breathing gradually improves throughout the day. I spoke with him about pulmonary rehab but he does not want to do this at this time. He says that he is using his nebulizer 3-4 times per day and his rescue inhaler 3-4 times per day depending on his breathing. He denies fever, chills, or colored mucus. He tells me that he did get his flu shot this week as well. PLAN:  Edu Paulino will continue to take all medication as prescribed. Edu Paulino will continue to monitor his symptoms using the COPD zone tools  If he has any worsening of symptoms he will call myself or the office for recommendations    Care Coordination Interventions    Program Enrollment:  Complex Care  Referral from Primary Care Provider:  No  Suggested Interventions and Community Resources  Disease Association:   In Process  Medication Assistance Program:  In Process  Pharmacist:  In Process  Social Work:  In Process  Zone Management Tools: In Process  Other Services or Interventions:  Pharmacy referral initiate,  referral re: medication assistance and home needs  COPD zone tool and education packet initiated,           Goals Addressed    None         Prior to Admission medications    Medication Sig Start Date End Date Taking?  Authorizing Provider   albuterol sulfate  (90 Base) MCG/ACT inhaler Inhale 2 puffs into the lungs every 6 hours as needed for Wheezing or Shortness of Breath 11/19/20   Patricia Dominguez MD   polyethylene glycol [facility]) 17 GM/SCOOP powder Take 17 g by mouth daily 11/19/20   Patricia Dominguez MD   sulfamethoxazole-trimethoprim (BACTRIM DS) 800-160 MG per tablet Take 1 tablet by mouth 2 times daily for 14 days 11/19/20 12/3/20  Navdeep Holden MD   amLODIPine (NORVASC) 10 MG tablet Take 1 tablet by mouth daily 11/5/20   Patricia Dominguez MD   lovastatin (MEVACOR) 10 MG tablet Take 1 tablet by mouth nightly 11/5/20   Patricia Dominguez MD   acetaminophen (TYLENOL) 500 MG tablet Take 1 tablet by mouth every 8 hours as needed for Pain 11/5/20   Patricia Dominguez MD   pantoprazole (PROTONIX) 40 MG tablet Take 1 tablet by mouth daily 9/29/20   Patricia Dominguez MD   sildenafil (VIAGRA) 100 MG tablet Take 1 tablet by mouth as needed for Erectile Dysfunction 9/22/20   Patricia Dominguez MD   ipratropium-albuterol (DUONEB) 0.5-2.5 (3) MG/3ML SOLN nebulizer solution Take 3 mLs by nebulization every 6 hours as needed for Shortness of Breath 8/7/20   Patricia Dominguez MD   tiotropium (SPIRIVA HANDIHALER) 18 MCG inhalation capsule Inhale 1 capsule into the lungs daily 7/30/20   Patricia Dominguez MD   escitalopram (LEXAPRO) 10 MG tablet Take 1 tablet by mouth daily 7/30/20   Patricia Dominguez MD   fluticasone [facility]) 50 MCG/ACT nasal spray 2 sprays by Nasal route daily 6/9/20   Patricia Dominguez MD   traZODone (DESYREL) 50 MG tablet Take 1 tablet by mouth nightly 3/23/20   BHARATI Gamboa CNP montelukast (SINGULAIR) 10 MG tablet Take 1 tablet by mouth nightly 3/23/20   BHARATI Smith - SANDRA   budesonide-formoterol [facility]) 160-4.5 MCG/ACT AERO Inhale 2 puffs into the lungs 2 times daily 3/23/20   BHARATI Smith CNP   Handicap Placard MISC by Does not apply route DX: OSTEOARTHRITIS OF BOTH KNEES (M17.0), COPD (J44.9)     EXPIRES: 02/2024 2/1/19   Luis Walls MD       Future Appointments   Date Time Provider Constance Nupur   11/24/2020  9:00 AM MLOZ PAT [facility] [address]   11/24/2020 10:15 AM SCHEDULE, [facility] [place] [facility]   12[facility]/2020  4:20 [facility]   4/14/2021 10:15 AM Socrates Kim MD [facility]      and   COPD Assessment    Does the patient understand envrionmental exposure?:  No  Is the patient able to verbalize Rescue vs. Long Acting medications?:  Yes  Does the patient have a nebulizer?:  Yes  Does the patient use a space with inhaled medications?:  No     No patient-reported symptoms         Symptoms:      Symptom course:  no change  Breathlessness:  minimal exertion  Increase use of rapid acting/rescue inhaled medications?:  Yes  Change in chronic cough?:  No/At Baseline  Change in sputum?:  No/At Baseline  Sputum characteristics:  White  Self Monitoring - SaO2:  No

## 2020-11-20 NOTE — CARE COORDINATION
Telephone call with patient. Discussed medication affordability and he feels that he can afford his medications at this time. He claimed that he had all of his prescribed medications at time of phone call. He stated that he never contact [address] [place] [address]/[facility]. Discussed rent and utilities and he denied having problems affording. However, he did mention that he was trying to get on [employer] Housing. Offered to send him list of affordable housing in [facility] and he did not want this information at this time. He feels that he is comfortable where he is living. Discussed food and he feels that he has adequate food supply.

## 2020-11-20 NOTE — H&P (VIEW-ONLY)
Evita Elizabeth is a 60 y.o. male  who is being seen in follow-up for an umbilical hernia. The has been present 6 month(s). . The patient has pain. Pain is rated as a 3  It's size is increasing. The pain is increasing. Patient does not have nausea/vomiting. The hernia mass is reducible. The hernia does not effect normal everyday activities. The patient has had no abdominal surgery. He has had no radiologic imaging done. He is not on anticoagulants. He was originally scheduled for surgery but he tested positive for cocaine the morning of surgery. His surgery has been postponed. Review of Systems   Constitutional: Negative for activity change, appetite change and unexpected weight change. HENT: Negative for congestion, nosebleeds, postnasal drip, rhinorrhea and sneezing. Eyes: Negative. Negative for visual disturbance. Respiratory: Positive for cough and shortness of breath. Negative for chest tightness. Cardiovascular: Negative for chest pain and leg swelling. Gastrointestinal: Positive for abdominal pain. Negative for abdominal distention, blood in stool and constipation. Endocrine: Negative. Genitourinary: Negative for difficulty urinating. Musculoskeletal: Negative for arthralgias, gait problem and myalgias. Skin: Negative for color change. Allergic/Immunologic: Negative. Neurological: Negative for dizziness, light-headedness, numbness and headaches. Hematological: Does not bruise/bleed easily. Psychiatric/Behavioral: Negative for sleep disturbance.      Past Medical History:   Diagnosis Date    Alcohol abuse 10/27/2016    Anemia     Asthma     Cocaine abuse ([facility] 10/27/2016    COPD (chronic obstructive pulmonary disease) ([facility]     Depression 4/27/2020    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    Drug-seeking behavior 4/14/2015    Edema 12/10/2015    Erectile dysfunction     Gastroesophageal reflux disease 12/17/2018    Gout 10/27/2016    History of arthroscopy of both Reactions    Fish-Derived Products Anaphylaxis    Iodine Anaphylaxis     Iodine-containing products, dyes, contrast dye    Seasonal Shortness Of Breath    Pcn [Penicillins] Nausea And Vomiting     Current Outpatient Medications   Medication Sig Dispense Refill    amLODIPine (NORVASC) 10 MG tablet Take 1 tablet by mouth daily 90 tablet 1    lovastatin (MEVACOR) 10 MG tablet Take 1 tablet by mouth nightly 90 tablet 1    acetaminophen (TYLENOL) 500 MG tablet Take 1 tablet by mouth every 8 hours as needed for Pain 120 tablet 1    pantoprazole (PROTONIX) 40 MG tablet Take 1 tablet by mouth daily 90 tablet 1    sildenafil (VIAGRA) 100 MG tablet Take 1 tablet by mouth as needed for Erectile Dysfunction 10 tablet 2    ipratropium-albuterol (DUONEB) 0.5-2.5 (3) MG/3ML SOLN nebulizer solution Take 3 mLs by nebulization every 6 hours as needed for Shortness of Breath 180 mL 1    tiotropium (SPIRIVA HANDIHALER) 18 MCG inhalation capsule Inhale 1 capsule into the lungs daily 90 capsule 1    escitalopram (LEXAPRO) 10 MG tablet Take 1 tablet by mouth daily 90 tablet 1    fluticasone (FLONASE) 50 MCG/ACT nasal spray 2 sprays by Nasal route daily 3 Bottle 1    traZODone (DESYREL) 50 MG tablet Take 1 tablet by mouth nightly 90 tablet 1    montelukast (SINGULAIR) 10 MG tablet Take 1 tablet by mouth nightly 90 tablet 1    budesonide-formoterol (SYMBICORT) 160-4.5 MCG/ACT AERO Inhale 2 puffs into the lungs 2 times daily 3 Inhaler 1    Handicap Placard MISC by Does not apply route DX: OSTEOARTHRITIS OF BOTH KNEES (M17.0), COPD (J44.9)     EXPIRES: 02/2024 1 each 0    albuterol sulfate  (90 Base) MCG/ACT inhaler Inhale 2 puffs into the lungs every 4 hours as needed for Wheezing 1 Inhaler 0     No current facility-administered medications for this visit.       /80   Temp 98.2 °F (36.8 °C) (Temporal)   Ht 6' 1\" (1.854 m)   Wt (!) 309 lb (140.2 kg)   BMI 40.77 kg/m²     Objective:   Physical Exam  Constitutional:       General: He is not in acute distress. Appearance: Normal appearance. He is obese. HENT:      Mouth/Throat:      Mouth: Mucous membranes are moist.      Pharynx: Oropharynx is clear. Eyes:      Pupils: Pupils are equal, round, and reactive to light. Neck:      Comments: Neck is supple without any masses, no thyromegaly, trachea midline  Cardiovascular:      Rate and Rhythm: Normal rate and regular rhythm. Heart sounds: No murmur. Pulmonary:      Effort: Pulmonary effort is normal. No respiratory distress. Breath sounds: Normal breath sounds. Abdominal:      Palpations: There is no hepatomegaly or splenomegaly. Tenderness: There is abdominal tenderness in the periumbilical area. Hernia: A hernia is present. Hernia is present in the umbilical area (mildly tender reducible hernia). Musculoskeletal:      Comments: Normal gait   Skin:     Findings: No bruising, lesion or rash. Neurological:      Mental Status: He is alert and oriented to person, place, and time. Psychiatric:         Mood and Affect: Mood normal.         Judgment: Judgment normal.         Assessment:      Umbilical hernia  History of cocaine abuse      Plan:      Umbilical hernia repair    The options of therapy were discussed with the patient. The procedure of the repair with the probable use of mesh was discussed. The potential risks and complications including but not exclusive to infection, blood loss, damage to surrounding structures and chronic pain. If any of these occur it could require another surgery. The expected recovery was discussed. The patient's questions were answered and has decided to proceed with hernia repair. It will be scheduled at their convenience. The patient was counseled at length about the risks of steven Covid-19 in the perioperative period and any recovery window from their procedure.   The patient was made aware that steven Covid-19  may worsen their prognosis for recovering from their procedure  and lend to a higher morbidity and/or mortality risk. The patient was given the options of postponing their procedure. All material risks, benefits, and alternatives were discussed. The patient does wish to proceed with the procedure at this time. Please note this report has been partially produced using speech recognition software and may cause contain errors related to that system including grammar, punctuation and spelling as well as words and phrases that may seem inappropriate.  If there are questions or concerns please feel free to contact me to clarify       Heather Velasquez MD

## 2020-11-24 ENCOUNTER — HOSPITAL ENCOUNTER (OUTPATIENT)
Dept: PREADMISSION TESTING | Age: 65
Discharge: HOME | End: 2020-11-28
Payer: MEDICARE

## 2020-11-24 VITALS
OXYGEN SATURATION: 98 % | RESPIRATION RATE: 16 BRPM | HEIGHT: 72 IN | TEMPERATURE: 98.6 F | HEART RATE: 67 BPM | WEIGHT: 309.6 LBS | DIASTOLIC BLOOD PRESSURE: 78 MMHG | SYSTOLIC BLOOD PRESSURE: 138 MMHG | BODY MASS INDEX: 41.93 KG/M2

## 2020-11-24 LAB
ANION GAP SERPL CALCULATED.3IONS-SCNC: 9 MEQ/L (ref 9–15)
BUN BLDV-MCNC: 15 MG/DL (ref 8–23)
CALCIUM SERPL-MCNC: 9.1 MG/DL (ref 8.5–9.9)
CHLORIDE BLD-SCNC: 107 MEQ/L (ref 95–107)
CO2: 25 MEQ/L (ref 20–31)
CREAT SERPL-MCNC: 1.17 MG/DL (ref 0.7–1.2)
GFR AFRICAN AMERICAN: >60
GFR NON-AFRICAN AMERICAN: >60
GLUCOSE BLD-MCNC: 88 MG/DL (ref 70–99)
HCT VFR BLD CALC: 39.8 % (ref 42–52)
HEMOGLOBIN: 13.1 G/DL (ref 14–18)
MCH RBC QN AUTO: 31.2 PG (ref 27–31.3)
MCHC RBC AUTO-ENTMCNC: 32.8 % (ref 33–37)
MCV RBC AUTO: 95 FL (ref 80–100)
PDW BLD-RTO: 17 % (ref 11.5–14.5)
PLATELET # BLD: 333 K/UL (ref 130–400)
POTASSIUM SERPL-SCNC: 4.3 MEQ/L (ref 3.4–4.9)
RBC # BLD: 4.19 M/UL (ref 4.7–6.1)
SODIUM BLD-SCNC: 141 MEQ/L (ref 135–144)
WBC # BLD: 10.1 K/UL (ref 4.8–10.8)

## 2020-11-24 PROCEDURE — 80048 BASIC METABOLIC PNL TOTAL CA: CPT

## 2020-11-24 PROCEDURE — 85027 COMPLETE CBC AUTOMATED: CPT

## 2020-11-24 PROCEDURE — U0003 INFECTIOUS AGENT DETECTION BY NUCLEIC ACID (DNA OR RNA); SEVERE ACUTE RESPIRATORY SYNDROME CORONAVIRUS 2 (SARS-COV-2) (CORONAVIRUS DISEASE [COVID-19]), AMPLIFIED PROBE TECHNIQUE, MAKING USE OF HIGH THROUGHPUT TECHNOLOGIES AS DESCRIBED BY CMS-2020-01-R: HCPCS

## 2020-11-24 RX ORDER — SODIUM CHLORIDE 0.9 % (FLUSH) 0.9 %
10 SYRINGE (ML) INJECTION PRN
Status: CANCELLED | OUTPATIENT
Start: 2020-11-30

## 2020-11-24 RX ORDER — LIDOCAINE HYDROCHLORIDE 10 MG/ML
1 INJECTION, SOLUTION EPIDURAL; INFILTRATION; INTRACAUDAL; PERINEURAL
Status: CANCELLED | OUTPATIENT
Start: 2020-11-30 | End: 2020-11-30

## 2020-11-24 RX ORDER — SODIUM CHLORIDE, SODIUM LACTATE, POTASSIUM CHLORIDE, CALCIUM CHLORIDE 600; 310; 30; 20 MG/100ML; MG/100ML; MG/100ML; MG/100ML
INJECTION, SOLUTION INTRAVENOUS CONTINUOUS
Status: CANCELLED | OUTPATIENT
Start: 2020-11-30

## 2020-11-24 RX ORDER — SODIUM CHLORIDE 0.9 % (FLUSH) 0.9 %
10 SYRINGE (ML) INJECTION EVERY 12 HOURS SCHEDULED
Status: CANCELLED | OUTPATIENT
Start: 2020-11-30

## 2020-11-24 NOTE — PROGRESS NOTES
Yellow PAT instruction sheet reviewed with patient , who verbalized understanding. Sent for COVID test and will self Quarantine until [employer].

## 2020-11-27 LAB
SARS-COV-2: NOT DETECTED
SOURCE: NORMAL

## 2020-11-30 ENCOUNTER — ANESTHESIA EVENT (OUTPATIENT)
Dept: OPERATING ROOM | Age: 65
End: 2020-11-30
Payer: MEDICARE

## 2020-11-30 ENCOUNTER — ANESTHESIA (OUTPATIENT)
Dept: OPERATING ROOM | Age: 65
End: 2020-11-30
Payer: MEDICARE

## 2020-11-30 ENCOUNTER — HOSPITAL ENCOUNTER (OUTPATIENT)
Age: 65
Setting detail: OUTPATIENT SURGERY
Discharge: HOME | End: 2020-11-30
Attending: SURGERY | Admitting: SURGERY
Payer: MEDICARE

## 2020-11-30 VITALS
DIASTOLIC BLOOD PRESSURE: 87 MMHG | SYSTOLIC BLOOD PRESSURE: 175 MMHG | TEMPERATURE: 97.5 F | OXYGEN SATURATION: 95 % | HEART RATE: 78 BPM | RESPIRATION RATE: 18 BRPM

## 2020-11-30 VITALS — OXYGEN SATURATION: 100 % | TEMPERATURE: 96.1 F | SYSTOLIC BLOOD PRESSURE: 109 MMHG | DIASTOLIC BLOOD PRESSURE: 83 MMHG

## 2020-11-30 LAB
AMPHETAMINE SCREEN, URINE: NORMAL
BARBITURATE SCREEN URINE: NORMAL
BENZODIAZEPINE SCREEN, URINE: NORMAL
CANNABINOID SCREEN URINE: NORMAL
COCAINE METABOLITE SCREEN URINE: NORMAL
GLUCOSE BLD-MCNC: 93 MG/DL (ref 60–115)
GLUCOSE BLD-MCNC: 95 MG/DL (ref 60–115)
Lab: NORMAL
METHADONE SCREEN, URINE: NORMAL
OPIATE SCREEN URINE: NORMAL
OXYCODONE URINE: NORMAL
PERFORMED ON: NORMAL
PERFORMED ON: NORMAL
PHENCYCLIDINE SCREEN URINE: NORMAL
PROPOXYPHENE SCREEN: NORMAL

## 2020-11-30 PROCEDURE — C1781 MESH (IMPLANTABLE): HCPCS | Performed by: SURGERY

## 2020-11-30 PROCEDURE — 6360000002 HC RX W HCPCS: Performed by: SURGERY

## 2020-11-30 PROCEDURE — 7100000000 HC PACU RECOVERY - FIRST 15 MIN: Performed by: SURGERY

## 2020-11-30 PROCEDURE — 7100000010 HC PHASE II RECOVERY - FIRST 15 MIN: Performed by: SURGERY

## 2020-11-30 PROCEDURE — 6370000000 HC RX 637 (ALT 250 FOR IP): Performed by: ANESTHESIOLOGY

## 2020-11-30 PROCEDURE — 80307 DRUG TEST PRSMV CHEM ANLYZR: CPT

## 2020-11-30 PROCEDURE — 6360000002 HC RX W HCPCS: Performed by: NURSE ANESTHETIST, CERTIFIED REGISTERED

## 2020-11-30 PROCEDURE — 3600000013 HC SURGERY LEVEL 3 ADDTL 15MIN: Performed by: SURGERY

## 2020-11-30 PROCEDURE — 49585 REPAIR UMBILICAL HERN,5+Y/O,REDUC: CPT | Performed by: SURGERY

## 2020-11-30 PROCEDURE — 2580000003 HC RX 258: Performed by: NURSE PRACTITIONER

## 2020-11-30 PROCEDURE — 2580000003 HC RX 258: Performed by: SURGERY

## 2020-11-30 PROCEDURE — 7100000011 HC PHASE II RECOVERY - ADDTL 15 MIN: Performed by: SURGERY

## 2020-11-30 PROCEDURE — 2709999900 HC NON-CHARGEABLE SUPPLY: Performed by: SURGERY

## 2020-11-30 PROCEDURE — 3700000000 HC ANESTHESIA ATTENDED CARE: Performed by: SURGERY

## 2020-11-30 PROCEDURE — 3600000003 HC SURGERY LEVEL 3 BASE: Performed by: SURGERY

## 2020-11-30 PROCEDURE — 7100000001 HC PACU RECOVERY - ADDTL 15 MIN: Performed by: SURGERY

## 2020-11-30 PROCEDURE — 6370000000 HC RX 637 (ALT 250 FOR IP): Performed by: SURGERY

## 2020-11-30 PROCEDURE — 3700000001 HC ADD 15 MINUTES (ANESTHESIA): Performed by: SURGERY

## 2020-11-30 PROCEDURE — 2500000003 HC RX 250 WO HCPCS: Performed by: NURSE ANESTHETIST, CERTIFIED REGISTERED

## 2020-11-30 PROCEDURE — 6360000002 HC RX W HCPCS: Performed by: ANESTHESIOLOGY

## 2020-11-30 DEVICE — IMPLANTABLE DEVICE: Type: IMPLANTABLE DEVICE | Status: FUNCTIONAL

## 2020-11-30 RX ORDER — SODIUM CHLORIDE 0.9 % (FLUSH) 0.9 %
10 SYRINGE (ML) INJECTION PRN
Status: DISCONTINUED | OUTPATIENT
Start: 2020-11-30 | End: 2020-11-30 | Stop reason: HOSPADM

## 2020-11-30 RX ORDER — SODIUM CHLORIDE 0.9 % (FLUSH) 0.9 %
10 SYRINGE (ML) INJECTION EVERY 12 HOURS SCHEDULED
Status: DISCONTINUED | OUTPATIENT
Start: 2020-11-30 | End: 2020-11-30 | Stop reason: HOSPADM

## 2020-11-30 RX ORDER — MAGNESIUM HYDROXIDE 1200 MG/15ML
LIQUID ORAL CONTINUOUS PRN
Status: COMPLETED | OUTPATIENT
Start: 2020-11-30 | End: 2020-11-30

## 2020-11-30 RX ORDER — MEPERIDINE HYDROCHLORIDE 25 MG/ML
12.5 INJECTION INTRAMUSCULAR; INTRAVENOUS; SUBCUTANEOUS EVERY 5 MIN PRN
Status: DISCONTINUED | OUTPATIENT
Start: 2020-11-30 | End: 2020-11-30 | Stop reason: HOSPADM

## 2020-11-30 RX ORDER — ONDANSETRON 2 MG/ML
4 INJECTION INTRAMUSCULAR; INTRAVENOUS EVERY 6 HOURS PRN
Status: DISCONTINUED | OUTPATIENT
Start: 2020-11-30 | End: 2020-11-30 | Stop reason: HOSPADM

## 2020-11-30 RX ORDER — LIDOCAINE HYDROCHLORIDE 20 MG/ML
INJECTION, SOLUTION EPIDURAL; INFILTRATION; INTRACAUDAL; PERINEURAL PRN
Status: DISCONTINUED | OUTPATIENT
Start: 2020-11-30 | End: 2020-11-30 | Stop reason: SDUPTHER

## 2020-11-30 RX ORDER — HYDROCODONE BITARTRATE AND ACETAMINOPHEN 5; 325 MG/1; MG/1
2 TABLET ORAL PRN
Status: COMPLETED | OUTPATIENT
Start: 2020-11-30 | End: 2020-11-30

## 2020-11-30 RX ORDER — LABETALOL HYDROCHLORIDE 5 MG/ML
5 INJECTION, SOLUTION INTRAVENOUS EVERY 10 MIN PRN
Status: DISCONTINUED | OUTPATIENT
Start: 2020-11-30 | End: 2020-11-30 | Stop reason: HOSPADM

## 2020-11-30 RX ORDER — LIDOCAINE HYDROCHLORIDE 10 MG/ML
1 INJECTION, SOLUTION EPIDURAL; INFILTRATION; INTRACAUDAL; PERINEURAL
Status: DISCONTINUED | OUTPATIENT
Start: 2020-11-30 | End: 2020-11-30 | Stop reason: HOSPADM

## 2020-11-30 RX ORDER — SODIUM CHLORIDE, SODIUM LACTATE, POTASSIUM CHLORIDE, CALCIUM CHLORIDE 600; 310; 30; 20 MG/100ML; MG/100ML; MG/100ML; MG/100ML
INJECTION, SOLUTION INTRAVENOUS CONTINUOUS
Status: DISCONTINUED | OUTPATIENT
Start: 2020-11-30 | End: 2020-11-30 | Stop reason: HOSPADM

## 2020-11-30 RX ORDER — OXYCODONE HYDROCHLORIDE 5 MG/1
5 TABLET ORAL EVERY 4 HOURS PRN
Status: DISCONTINUED | OUTPATIENT
Start: 2020-11-30 | End: 2020-11-30 | Stop reason: HOSPADM

## 2020-11-30 RX ORDER — 0.9 % SODIUM CHLORIDE 0.9 %
500 INTRAVENOUS SOLUTION INTRAVENOUS
Status: DISCONTINUED | OUTPATIENT
Start: 2020-11-30 | End: 2020-11-30 | Stop reason: HOSPADM

## 2020-11-30 RX ORDER — METOCLOPRAMIDE HYDROCHLORIDE 5 MG/ML
10 INJECTION INTRAMUSCULAR; INTRAVENOUS
Status: DISCONTINUED | OUTPATIENT
Start: 2020-11-30 | End: 2020-11-30 | Stop reason: HOSPADM

## 2020-11-30 RX ORDER — EPHEDRINE SULFATE 50 MG/ML
INJECTION, SOLUTION INTRAVENOUS PRN
Status: DISCONTINUED | OUTPATIENT
Start: 2020-11-30 | End: 2020-11-30 | Stop reason: SDUPTHER

## 2020-11-30 RX ORDER — MORPHINE SULFATE 2 MG/ML
1 INJECTION, SOLUTION INTRAMUSCULAR; INTRAVENOUS EVERY 5 MIN PRN
Status: DISCONTINUED | OUTPATIENT
Start: 2020-11-30 | End: 2020-11-30 | Stop reason: HOSPADM

## 2020-11-30 RX ORDER — OXYCODONE HYDROCHLORIDE AND ACETAMINOPHEN 5; 325 MG/1; MG/1
1 TABLET ORAL EVERY 6 HOURS PRN
Qty: 25 TABLET | Refills: 0 | Status: SHIPPED | OUTPATIENT
Start: 2020-11-30 | End: 2020-12-07

## 2020-11-30 RX ORDER — ROCURONIUM BROMIDE 10 MG/ML
INJECTION, SOLUTION INTRAVENOUS PRN
Status: DISCONTINUED | OUTPATIENT
Start: 2020-11-30 | End: 2020-11-30 | Stop reason: SDUPTHER

## 2020-11-30 RX ORDER — MORPHINE SULFATE 2 MG/ML
1 INJECTION, SOLUTION INTRAMUSCULAR; INTRAVENOUS
Status: DISCONTINUED | OUTPATIENT
Start: 2020-11-30 | End: 2020-11-30 | Stop reason: HOSPADM

## 2020-11-30 RX ORDER — PROMETHAZINE HYDROCHLORIDE 12.5 MG/1
12.5 TABLET ORAL EVERY 6 HOURS PRN
Status: DISCONTINUED | OUTPATIENT
Start: 2020-11-30 | End: 2020-11-30 | Stop reason: HOSPADM

## 2020-11-30 RX ORDER — FENTANYL CITRATE 50 UG/ML
25 INJECTION, SOLUTION INTRAMUSCULAR; INTRAVENOUS EVERY 5 MIN PRN
Status: DISCONTINUED | OUTPATIENT
Start: 2020-11-30 | End: 2020-11-30 | Stop reason: HOSPADM

## 2020-11-30 RX ORDER — ONDANSETRON 2 MG/ML
INJECTION INTRAMUSCULAR; INTRAVENOUS PRN
Status: DISCONTINUED | OUTPATIENT
Start: 2020-11-30 | End: 2020-11-30 | Stop reason: SDUPTHER

## 2020-11-30 RX ORDER — ONDANSETRON 2 MG/ML
4 INJECTION INTRAMUSCULAR; INTRAVENOUS
Status: DISCONTINUED | OUTPATIENT
Start: 2020-11-30 | End: 2020-11-30 | Stop reason: HOSPADM

## 2020-11-30 RX ORDER — FENTANYL CITRATE 50 UG/ML
INJECTION, SOLUTION INTRAMUSCULAR; INTRAVENOUS PRN
Status: DISCONTINUED | OUTPATIENT
Start: 2020-11-30 | End: 2020-11-30 | Stop reason: SDUPTHER

## 2020-11-30 RX ORDER — HYDROCODONE BITARTRATE AND ACETAMINOPHEN 5; 325 MG/1; MG/1
1 TABLET ORAL PRN
Status: COMPLETED | OUTPATIENT
Start: 2020-11-30 | End: 2020-11-30

## 2020-11-30 RX ORDER — PROPOFOL 10 MG/ML
INJECTION, EMULSION INTRAVENOUS PRN
Status: DISCONTINUED | OUTPATIENT
Start: 2020-11-30 | End: 2020-11-30 | Stop reason: SDUPTHER

## 2020-11-30 RX ORDER — DIPHENHYDRAMINE HYDROCHLORIDE 50 MG/ML
12.5 INJECTION INTRAMUSCULAR; INTRAVENOUS
Status: DISCONTINUED | OUTPATIENT
Start: 2020-11-30 | End: 2020-11-30 | Stop reason: HOSPADM

## 2020-11-30 RX ADMIN — SODIUM CHLORIDE, POTASSIUM CHLORIDE, SODIUM LACTATE AND CALCIUM CHLORIDE: 600; 310; 30; 20 INJECTION, SOLUTION INTRAVENOUS at 09:48

## 2020-11-30 RX ADMIN — FENTANYL CITRATE 50 MCG: 50 INJECTION, SOLUTION INTRAMUSCULAR; INTRAVENOUS at 08:45

## 2020-11-30 RX ADMIN — ROCURONIUM BROMIDE 50 MG: 10 INJECTION INTRAVENOUS at 08:45

## 2020-11-30 RX ADMIN — PROPOFOL 250 MG: 10 INJECTION, EMULSION INTRAVENOUS at 08:45

## 2020-11-30 RX ADMIN — ONDANSETRON 4 MG: 2 INJECTION INTRAMUSCULAR; INTRAVENOUS at 09:09

## 2020-11-30 RX ADMIN — HYDROCODONE BITARTRATE AND ACETAMINOPHEN 2 TABLET: 5; 325 TABLET ORAL at 10:47

## 2020-11-30 RX ADMIN — EPHEDRINE SULFATE 10 MG: 50 INJECTION, SOLUTION INTRAVENOUS at 09:27

## 2020-11-30 RX ADMIN — SUGAMMADEX 200 MG: 100 INJECTION, SOLUTION INTRAVENOUS at 09:33

## 2020-11-30 RX ADMIN — SUGAMMADEX 200 MG: 100 INJECTION, SOLUTION INTRAVENOUS at 09:24

## 2020-11-30 RX ADMIN — SODIUM CHLORIDE, POTASSIUM CHLORIDE, SODIUM LACTATE AND CALCIUM CHLORIDE: 600; 310; 30; 20 INJECTION, SOLUTION INTRAVENOUS at 07:49

## 2020-11-30 RX ADMIN — LIDOCAINE HYDROCHLORIDE 50 MG: 20 INJECTION, SOLUTION EPIDURAL; INFILTRATION; INTRACAUDAL; PERINEURAL at 08:45

## 2020-11-30 RX ADMIN — CEFAZOLIN 3 G: 10 INJECTION, POWDER, FOR SOLUTION INTRAVENOUS at 08:42

## 2020-11-30 RX ADMIN — HYDROMORPHONE HYDROCHLORIDE 0.5 MG: 1 INJECTION, SOLUTION INTRAMUSCULAR; INTRAVENOUS; SUBCUTANEOUS at 10:14

## 2020-11-30 RX ADMIN — EPHEDRINE SULFATE 10 MG: 50 INJECTION, SOLUTION INTRAVENOUS at 09:33

## 2020-11-30 RX ADMIN — HYDROMORPHONE HYDROCHLORIDE 0.5 MG: 1 INJECTION, SOLUTION INTRAMUSCULAR; INTRAVENOUS; SUBCUTANEOUS at 09:57

## 2020-11-30 RX ADMIN — SODIUM CHLORIDE, POTASSIUM CHLORIDE, SODIUM LACTATE AND CALCIUM CHLORIDE: 600; 310; 30; 20 INJECTION, SOLUTION INTRAVENOUS at 08:23

## 2020-11-30 ASSESSMENT — PAIN DESCRIPTION - LOCATION
LOCATION: ABDOMEN
LOCATION: ABDOMEN

## 2020-11-30 ASSESSMENT — PULMONARY FUNCTION TESTS
PIF_VALUE: 23
PIF_VALUE: 24
PIF_VALUE: 2
PIF_VALUE: 23
PIF_VALUE: 23
PIF_VALUE: 41
PIF_VALUE: 22
PIF_VALUE: 23
PIF_VALUE: 25
PIF_VALUE: 21
PIF_VALUE: 22
PIF_VALUE: 24
PIF_VALUE: 24
PIF_VALUE: 28
PIF_VALUE: 23
PIF_VALUE: 23
PIF_VALUE: 24
PIF_VALUE: 14
PIF_VALUE: 22
PIF_VALUE: 2
PIF_VALUE: 24
PIF_VALUE: 25
PIF_VALUE: 22
PIF_VALUE: 25
PIF_VALUE: 18
PIF_VALUE: 20
PIF_VALUE: 24
PIF_VALUE: 20
PIF_VALUE: 3
PIF_VALUE: 23
PIF_VALUE: 2
PIF_VALUE: 2
PIF_VALUE: 12
PIF_VALUE: 25
PIF_VALUE: 22
PIF_VALUE: 25
PIF_VALUE: 23
PIF_VALUE: 3
PIF_VALUE: 20
PIF_VALUE: 2
PIF_VALUE: 24
PIF_VALUE: 26
PIF_VALUE: 0
PIF_VALUE: 22
PIF_VALUE: 18
PIF_VALUE: 1
PIF_VALUE: 22
PIF_VALUE: 2
PIF_VALUE: 25
PIF_VALUE: 27
PIF_VALUE: 25
PIF_VALUE: 24
PIF_VALUE: 21
PIF_VALUE: 29
PIF_VALUE: 23
PIF_VALUE: 5
PIF_VALUE: 24
PIF_VALUE: 25
PIF_VALUE: 26

## 2020-11-30 ASSESSMENT — PAIN SCALES - GENERAL
PAINLEVEL_OUTOF10: 10
PAINLEVEL_OUTOF10: 10
PAINLEVEL_OUTOF10: 7
PAINLEVEL_OUTOF10: 10
PAINLEVEL_OUTOF10: 10
PAINLEVEL_OUTOF10: 7

## 2020-11-30 ASSESSMENT — PAIN DESCRIPTION - PAIN TYPE
TYPE: SURGICAL PAIN
TYPE: SURGICAL PAIN

## 2020-11-30 NOTE — OP NOTE
Operative Note      PATIENT NAME: Fish Mitchell  MEDICAL RECORD NO. [id number]  DATE: 11/30/2020  SURGEON: Kade Ray MD [facility]  PRIMARY CARE PHYSICIAN: Alpa Weinberg MD     PREOPERATIVE DIAGNOSIS:  Umbilical hernia. POSTOPERATIVE DIAGNOSIS:  Umbilical hernia. PROCEDURE PERFORMED:  Umbilical hernia repair with mesh. SURGEON:  Dr. Gildardo Fregoso: General with tap block  ESTIMATED BLOOD LOSS: Minimal  SPECIMEN:  None. COMPLICATIONS:  None immediately appreciated. DISCUSSION: Ani Feliciano is a 60y.o.-year-old male who presented to my office and seen in evaluation at the request of Alpa Weinberg MD regarding umbilical hernia. After history and physical examination was performed, potential diagnostic and therapeutic modalities discussed with the patient, operative and nonoperative management was discussed, risks, complications, and benefits reviewed. He was given the opportunity to ask questions, and once answered, informed consent was obtained. He was brought to the Operating Room on 11/30/2020 for the procedure. OPERATIVE FINDINGS: At the time of exploration, the patient had a 1.5 cm defect in the fascia with herniated preperitoneal fat which was noted to be viable and a mesh repair was performed as described below. PROCEDURE:  The patient was brought to the Operating Room and placed in a supine position, placed under continuous cardiac telemetry, blood pressure, and pulse oximetry monitoring and placed under general by the Anesthesia Department. The anterior abdominal wall was prepped and draped in a sterile fashion. A time out called and the patient and the procedure were properly identified. A curvilinear incision was made along the right border of the umbilicus and carried down to subcutaneous tissues. Subcutaneous tissue dissection with electrocautery, elevating the umbilicus off of the underlying fascia.  The herniated contents were dissected free from surrounding tissues and reduced back within the defect. The defect was then approximated using a small Bard plug in the properitoneal space using 0 Prolene to secure it. Good approximation was appreciated using interrupted figure-of-eight's of 0 Prolene. No additional pathology identified. The umbilicus was reattached to fascia using 2-0 Vicryl suture and deep tissue approximated using 3-0 Vicryl suture and the skin closed using 4-0 Monocryl suture in a running subcuticular fashion. Skin glue was applied to the skin edges. The patient was brought out of anesthesia, transferred to PACU in stable and condition. No immediate complication evident. All sponge, instrument and needle counts were correct at the completion of the procedure. Operative findings were discussed with the patient's friend by phone with the patient's permission. He was given discharge instructions, prescription for analgesics and will follow up in my office in a 2 weeks period of time for reevaluation.       Electronically signed by Calvin Diamond MD on 11/30/20 at 9:18 AM EST

## 2020-11-30 NOTE — ANESTHESIA PRE PROCEDURE
Department of Anesthesiology  Preprocedure Note       Name:  Asim Gilbert   Age:  60 y.o.  :  1955                                          MRN:  [id number]         Date:  2020      Surgeon: Justin Garrison):  Margot Kamara MD    Procedure: Procedure(s): UMBILICAL HERNIA REPAIR    Medications prior to admission:   Prior to Admission medications    Medication Sig Start Date End Date Taking?  Authorizing Provider   albuterol sulfate  (90 Base) MCG/ACT inhaler Inhale 2 puffs into the lungs every 6 hours as needed for Wheezing or Shortness of Breath 20  Yes Maty Novak MD   polyethylene glycol [facility]) 17 GM/SCOOP powder Take 17 g by mouth daily 20  Yes Maty Novak MD   sulfamethoxazole-trimethoprim (BACTRIM DS) 800-160 MG per tablet Take 1 tablet by mouth 2 times daily for 14 days 11/19/20 12/3/20 Yes Margot Kamara MD   amLODIPine (NORVASC) 10 MG tablet Take 1 tablet by mouth daily 20  Yes Maty Novak MD   lovastatin (MEVACOR) 10 MG tablet Take 1 tablet by mouth nightly 20  Yes Maty Novak MD   acetaminophen (TYLENOL) 500 MG tablet Take 1 tablet by mouth every 8 hours as needed for Pain 20  Yes Maty Novak MD   pantoprazole (PROTONIX) 40 MG tablet Take 1 tablet by mouth daily 20  Yes Maty Novak MD   sildenafil (VIAGRA) 100 MG tablet Take 1 tablet by mouth as needed for Erectile Dysfunction 20  Yes Maty Novak MD   ipratropium-albuterol (DUONEB) 0.5-2.5 (3) MG/3ML SOLN nebulizer solution Take 3 mLs by nebulization every 6 hours as needed for Shortness of Breath 20  Yes Maty Novak MD   escitalopram (LEXAPRO) 10 MG tablet Take 1 tablet by mouth daily 20  Yes Maty Novak MD   fluticasone [place]) 50 MCG/ACT nasal spray 2 sprays by Nasal route daily 20  Yes Maty Novak MD   traZODone (DESYREL) 50 MG tablet Take 1 tablet by mouth nightly 3/23/20  Yes BHARATI Zurita - SANDRA sanchez (SINGULAIR) 10 MG tablet Take 1 tablet by mouth nightly 3/23/20  Yes BHARATI Hernandez CNP   budesonide-formoterol (SYMBICORT) 160-4.5 MCG/ACT AERO Inhale 2 puffs into the lungs 2 times daily 3/23/20  Yes BHARATI Hernandez - CNP   tiotropium (Rojas Ling) 18 MCG inhalation capsule Inhale 1 capsule into the lungs daily 7/30/20   Domingo Wick MD   Handicap Placard MISC by Does not apply route DX: OSTEOARTHRITIS OF BOTH KNEES (M17.0), COPD (J44.9)     EXPIRES: 02/2024 2/1/19   Domingo Wick MD       Current medications:    Current Facility-Administered Medications   Medication Dose Route Frequency Provider Last Rate Last Dose    lactated ringers infusion   Intravenous Continuous Claudean Drop, APRN -  mL/hr at 11/30/20 0749      lidocaine PF 1 % injection 1 mL  1 mL Intradermal Once PRN Claudean Drop, APRN - CNP        sodium chloride flush 0.9 % injection 10 mL  10 mL Intravenous 2 times per day Claudean Drop, APRN - CNP        sodium chloride flush 0.9 % injection 10 mL  10 mL Intravenous PRN Claudean Drop, APRN - CNP        ceFAZolin (ANCEF) 3 g in dextrose 5 % 100 mL IVPB  3 g Intravenous On Call to Ermias Solano MD           Allergies:     Allergies   Allergen Reactions    Fish-Derived Products Anaphylaxis    Iodine Anaphylaxis     Iodine-containing products, dyes, contrast dye    Seasonal Shortness Of Breath    Pcn [Penicillins] Nausea And Vomiting       Problem List:    Patient Active Problem List   Diagnosis Code    Anemia D64.9    Medical non-compliance Z91.19    House dust mite allergy Z91.09    Seasonal allergies J30.2    GASPER (obstructive sleep apnea) G47.33    Drug-seeking behavior Z76.5    Hyperlipidemia E78.5    Alcohol abuse F10.10    Cocaine abuse ([address] F14.10    Gout M10.9    History of arthroscopy of both knees Z98.890    Hypertension I10    Supraventricular tachycardia (HCC) I47.1    Erectile dysfunction N52.9    Pre-diabetes R73.03    Morbid obesity due to excess calories ([facility]) E66.01    COPD (chronic obstructive pulmonary disease) ([facility]) J44.9    Chest wall pain R07.89    Pleurisy R09.1    Loculated pleural effusion J90    Insomnia G47.00    Gastroesophageal reflux disease K21.9    Tobacco use Z72.0    Status post total knee replacement, left Y90.474    Allergy to seafood Z91.013    Anxiety with depression F41.8    Asthma with acute exacerbation J45. 0    Adenomatous polyp of sigmoid colon D12.5    Adenomatous polyp of descending colon D12.4    Adenomatous polyp of colon X68.2    Umbilical hernia without obstruction and without gangrene K42.9       Past Medical History:        Diagnosis Date    Alcohol abuse 10/27/2016    Anemia     Asthma     Cocaine abuse ([address] 10/27/2016    COPD (chronic obstructive pulmonary disease) ([address]     Depression 4/27/2020    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    Drug-seeking behavior 4/14/2015    Edema 12/10/2015    Erectile dysfunction     Gastroesophageal reflux disease 12/17/2018    Gout 10/27/2016    History of arthroscopy of both knees 10/27/2016    House dust mite allergy 4/21/2014    Hyperlipidemia     Hypertension 10/27/2016    Injury to heart 10/27/2016    Insomnia 12/17/2018    Medical non-compliance 2/22/2014    Morbid obesity due to excess calories ([address] 12/8/2016    Osteoarthritis of both knees 12/8/2016    Personal history of tobacco use     Pneumonia 12/04/2016    caused hospital admission    Pre-diabetes 10/27/2016    Seasonal allergies 4/21/2014    Severe persistent asthma 10/27/2016    Severe sleep apnea 4/14/2015    Supraventricular tachycardia ([address] 10/27/2016    SVT (supraventricular tachycardia) ([facility])        Past Surgical History:        Procedure Laterality Date    ANTERIOR CRUCIATE LIGAMENT REPAIR      CARDIAC CATHETERIZATION      COLONOSCOPY N/A 7/15/2020    COLONOSCOPY WITH POLYPECTOMY performed by Antonette Cristina MD [facility] OR    TOTAL KNEE ARTHROPLASTY Left 2019    LEFT TOTAL KNEE ARTHROPLASTY performed by Ubaldo Gil MD at [address] History:    Social History     Tobacco Use    Smoking status: Current Every Day Smoker     Packs/day: 0.25     Years: 30.00     Pack years: 7.50     Types: Cigarettes, Cigars     Start date: 1988     Last attempt to quit: 10/1/2013     Years since quittin.1    Smokeless tobacco: Never Used   Substance Use Topics    Alcohol use:  Yes     Alcohol/week: 4.0 standard drinks     Types: 2 Cans of beer, 2 Shots of liquor per week     Frequency: 4 or more times a week     Drinks per session: 1 or 2     Comment: social 1 -2 x week                                Ready to quit: Not Answered  Counseling given: Not Answered      Vital Signs (Current):   Vitals:    20 0700   BP: (!) 144/83   Pulse: 70   Resp: 16   Temp: 98 °F (36.7 °C)   TempSrc: Temporal   SpO2: 96%                                              BP Readings from Last 3 Encounters:   20 (!) 144/83   20 138/78   20 132/80       NPO Status: Time of last liquid consumption: 2200                        Time of last solid consumption: 1800                        Date of last liquid consumption: 20                        Date of last solid food consumption: 20    BMI:   Wt Readings from Last 3 Encounters:   20 (!) 309 lb 9.6 oz (140.4 kg)   20 (!) 309 lb (140.2 kg)   10/12/20 (!) 309 lb (140.2 kg)     There is no height or weight on file to calculate BMI.    CBC:   Lab Results   Component Value Date    WBC 10.1 2020    RBC 4.19 2020    RBC 3.94 2019    HGB 13.1 2020    HCT 39.8 2020    MCV 95.0 2020    RDW 17.0 2020     2020       CMP:   Lab Results   Component Value Date     2020    K 4.3 2020    K 3.4 10/12/2020     2020    CO2 25 2020    BUN 15 2020    CREATININE 1.17 2020 GFRAA >60.0 11/24/2020    LABGLOM >60.0 11/24/2020    GLUCOSE 88 11/24/2020    GLUCOSE 211 02/02/2020    PROT 6.8 10/12/2020    CALCIUM 9.1 11/24/2020    BILITOT <0.2 10/12/2020    ALKPHOS 101 10/12/2020    AST 25 10/12/2020    ALT 41 10/12/2020       POC Tests: No results for input(s): POCGLU, POCNA, POCK, POCCL, POCBUN, POCHEMO, POCHCT in the last 72 hours. Coags:   Lab Results   Component Value Date    PROTIME 14.4 05/26/2020    INR 1.1 05/26/2020    APTT 29.4 04/07/2016       HCG (If Applicable): No results found for: PREGTESTUR, PREGSERUM, HCG, HCGQUANT     ABGs:   Lab Results   Component Value Date    PHART 7.42 01/28/2020    PO2ART 68 01/28/2020    DKX3VDR 41 01/28/2020    XVU7DQI 24.3 06/24/2018    BEART 0 06/24/2018    G6CKPJWQ 94 01/28/2020        Type & Screen (If Applicable):  No results found for: LABABO, LABRH    Drug/Infectious Status (If Applicable):  No results found for: HIV, HEPCAB    COVID-19 Screening (If Applicable):   Lab Results   Component Value Date    COVID19 Not Detected 11/24/2020         Anesthesia Evaluation  Patient summary reviewed and Nursing notes reviewed no history of anesthetic complications:   Airway: Mallampati: II  TM distance: >3 FB   Neck ROM: full  Mouth opening: > = 3 FB Dental: normal exam         Pulmonary:Negative Pulmonary ROS and normal exam    (+) COPD:  asthma:                            Cardiovascular:Negative CV ROS  Exercise tolerance: good (>4 METS),   (+) hypertension:,       ECG reviewed      Echocardiogram reviewed  Stress test reviewed       Beta Blocker:  Not on Beta Blocker         Neuro/Psych:   Negative Neuro/Psych ROS              GI/Hepatic/Renal: Neg GI/Hepatic/Renal ROS  (+) GERD:,           Endo/Other: Negative Endo/Other ROS             Pt had PAT visit. Abdominal:           Vascular: negative vascular ROS. Anesthesia Plan      general and regional     ASA 3     (ETT)  Induction: intravenous.     MIPS: Postoperative opioids intended and Prophylactic antiemetics administered. Anesthetic plan and risks discussed with patient. Plan discussed with CRNA.     Attending anesthesiologist reviewed and agrees with Pre Eval content              Mali Ahuja MD   11/30/2020

## 2020-11-30 NOTE — PROGRESS NOTES
Pt states his pain is a 10 but thinks it may be because hes hungry, pt is laughing and having normal convo and does not appear to be in 10/10 pain, will cont to monitor , pt is interested in going to post op so he can have a snack and drink

## 2020-11-30 NOTE — PROGRESS NOTES
Pt resting comfortably after bilat tap block by dr Monty Dover, pox on finger, o2 2l nc infusing, pt denies needs at this time

## 2020-11-30 NOTE — ANESTHESIA POSTPROCEDURE EVALUATION
Department of Anesthesiology  Postprocedure Note    Patient: Mahesh Tellez  MRN: [id number]  YOB: 1955  Date of evaluation: 11/30/2020  Time:  9:44 AM     Procedure Summary     Date:  11/30/20 Room / Location:  [address]    Anesthesia Start:  0236 Anesthesia Stop:      Procedure:  UMBILICAL HERNIA REPAIR WITH MESH (N/A Abdomen) Diagnosis:  (UMBILICAL HERNIA)    Surgeon:  Calvin Diamond MD Responsible Provider:  BHARATI Thacker CRNA    Anesthesia Type:  general, regional ASA Status:  3          Anesthesia Type: general, regional    Lor Phase I:      Lor Phase II:      Last vitals: Reviewed and per EMR flowsheets.        Anesthesia Post Evaluation    Patient location during evaluation: PACU  Patient participation: complete - patient participated  Level of consciousness: awake and alert and sleepy but conscious  Pain score: 1  Airway patency: patent  Nausea & Vomiting: no nausea and no vomiting  Complications: no  Cardiovascular status: hemodynamically stable  Respiratory status: acceptable, oral airway and face mask  Hydration status: euvolemic  Comments: Report to RN, normal sinus rhythm

## 2020-11-30 NOTE — BRIEF OP NOTE
Brief Postoperative Note      Patient: Laure Baldwin  YOB: 1955  MRN: [id number]    Date of Procedure: 11/30/2020    Pre-Op Diagnosis: UMBILICAL HERNIA    Post-Op Diagnosis: Same       Procedure(s): UMBILICAL HERNIA REPAIR WITH MESH    Surgeon(s):  Patriciaann Denver, MD    Assistant:  First Assistant: Anabell Osborne    Anesthesia: General    Estimated Blood Loss (mL): Minimal    Complications: None    Specimens:   * No specimens in log *    Implants:  Implant Name Type Inv. Item Serial No.  Lot No. LRB No. Used Action   PLUG MARIA A  [id number]. 35IN INGUINAL POLYPR REP PRESHAPED ONLAY  PLUG MARIA A  W1XL1. 35IN INGUINAL POLYPR REP PRESHAPED ONLAY  [facility] [id number] N/A 1 Implanted         Drains: * No LDAs found *    Findings: 1.5 cm defect    Electronically signed by Patriciaann Denver, MD on 11/30/2020 at 9:16 AM

## 2020-12-07 ENCOUNTER — TELEPHONE (OUTPATIENT)
Dept: SURGERY | Age: 65
End: 2020-12-07

## 2020-12-07 ENCOUNTER — CARE COORDINATION (OUTPATIENT)
Dept: CARE COORDINATION | Age: 65
End: 2020-12-07

## 2020-12-07 RX ORDER — OXYCODONE HYDROCHLORIDE AND ACETAMINOPHEN 5; 325 MG/1; MG/1
1 TABLET ORAL EVERY 6 HOURS PRN
Qty: 25 TABLET | Refills: 0 | Status: SHIPPED | OUTPATIENT
Start: 2020-12-07 | End: 2020-12-14 | Stop reason: SDUPTHER

## 2020-12-07 NOTE — TELEPHONE ENCOUNTER
Patient needs refill on pain medication sent to [facility] in [place].     Last Rx:  11/30/20  Percocet 5-325mg 1 q6h prn pain for up to 7 days, #25

## 2020-12-07 NOTE — CARE COORDINATION
Telephone call with patient. He denied having problems affording medications. He claims that he has all of his prescribed medications. No problems affording food or rent at time of phone call. No other concerns or needs were voiced at time of phone call. Discussed plan to discharge from [address] [place] [address]  and patient was in agreement. Confirmed that patient had this writer's contact information should needs change.

## 2020-12-08 ENCOUNTER — CARE COORDINATION (OUTPATIENT)
Dept: CARE COORDINATION | Age: 65
End: 2020-12-08

## 2020-12-08 RX ORDER — MONTELUKAST SODIUM 10 MG/1
10 TABLET ORAL NIGHTLY
Qty: 90 TABLET | Refills: 1 | Status: SHIPPED | OUTPATIENT
Start: 2020-12-08 | End: 2020-12-15 | Stop reason: SDUPTHER

## 2020-12-08 NOTE — TELEPHONE ENCOUNTER
Pharmacy is requesting medication refill.  Please approve or deny this request.    Rx requested:  Requested Prescriptions     Pending Prescriptions Disp Refills    montelukast (SINGULAIR) 10 MG tablet [Pharmacy Med Name: MONTELUKAST SOD 10 MG TABLET] 90 tablet 1     Sig: Take 1 tablet by mouth nightly         Last Office Visit:   Visit date not found      Next Visit Date:  Future Appointments   Date Time Provider [facility] Nupur   12/14/2020  1:45 PM Dillon Aguilar  [address] [place] [address]   12/15/2020 10:00 AM Ole Jordan MD [place] [address]   4/14/2021 10:15 AM Chata Rivers MD [facility]

## 2020-12-08 NOTE — CARE COORDINATION
Attempted to contact patient for Surgical follow up, COPD follow up and general care coordination. Unable to reach patient by phone. Mailbox is full. I will attempt to follow up with the patient next week.

## 2020-12-09 ENCOUNTER — APPOINTMENT (OUTPATIENT)
Dept: CT IMAGING | Age: 65
End: 2020-12-09
Payer: MEDICARE

## 2020-12-09 ENCOUNTER — APPOINTMENT (OUTPATIENT)
Dept: GENERAL RADIOLOGY | Age: 65
End: 2020-12-09
Payer: MEDICARE

## 2020-12-09 ENCOUNTER — HOSPITAL ENCOUNTER (EMERGENCY)
Age: 65
Discharge: HOME | End: 2020-12-09
Attending: EMERGENCY MEDICINE
Payer: MEDICARE

## 2020-12-09 ENCOUNTER — CARE COORDINATION (OUTPATIENT)
Dept: CARE COORDINATION | Age: 65
End: 2020-12-09

## 2020-12-09 VITALS
SYSTOLIC BLOOD PRESSURE: 154 MMHG | HEART RATE: 82 BPM | BODY MASS INDEX: 37.63 KG/M2 | TEMPERATURE: 98.5 F | OXYGEN SATURATION: 96 % | RESPIRATION RATE: 18 BRPM | DIASTOLIC BLOOD PRESSURE: 92 MMHG | WEIGHT: 309 LBS | HEIGHT: 76 IN

## 2020-12-09 LAB
ALBUMIN SERPL-MCNC: 3.9 G/DL (ref 3.5–4.6)
ALP BLD-CCNC: 108 U/L (ref 35–104)
ALT SERPL-CCNC: 28 U/L (ref 0–41)
ANION GAP SERPL CALCULATED.3IONS-SCNC: 11 MEQ/L (ref 9–15)
AST SERPL-CCNC: 26 U/L (ref 0–40)
BASOPHILS ABSOLUTE: 0 K/UL (ref 0–0.2)
BASOPHILS RELATIVE PERCENT: 0.5 %
BILIRUB SERPL-MCNC: 0.3 MG/DL (ref 0.2–0.7)
BUN BLDV-MCNC: 13 MG/DL (ref 8–23)
CALCIUM SERPL-MCNC: 9.4 MG/DL (ref 8.5–9.9)
CHLORIDE BLD-SCNC: 106 MEQ/L (ref 95–107)
CO2: 22 MEQ/L (ref 20–31)
CREAT SERPL-MCNC: 1.18 MG/DL (ref 0.7–1.2)
EKG ATRIAL RATE: 79 BPM
EKG P AXIS: 61 DEGREES
EKG P-R INTERVAL: 162 MS
EKG Q-T INTERVAL: 390 MS
EKG QRS DURATION: 96 MS
EKG QTC CALCULATION (BAZETT): 447 MS
EKG R AXIS: 61 DEGREES
EKG T AXIS: 65 DEGREES
EKG VENTRICULAR RATE: 79 BPM
EOSINOPHILS ABSOLUTE: 0.3 K/UL (ref 0–0.7)
EOSINOPHILS RELATIVE PERCENT: 3.7 %
GFR AFRICAN AMERICAN: >60
GFR NON-AFRICAN AMERICAN: >60
GLOBULIN: 3.3 G/DL (ref 2.3–3.5)
GLUCOSE BLD-MCNC: 111 MG/DL (ref 70–99)
HCT VFR BLD CALC: 38.7 % (ref 42–52)
HEMOGLOBIN: 12.8 G/DL (ref 14–18)
INR BLD: 1
LYMPHOCYTES ABSOLUTE: 1.4 K/UL (ref 1–4.8)
LYMPHOCYTES RELATIVE PERCENT: 14.9 %
MAGNESIUM: 2.2 MG/DL (ref 1.7–2.4)
MCH RBC QN AUTO: 31.1 PG (ref 27–31.3)
MCHC RBC AUTO-ENTMCNC: 33.1 % (ref 33–37)
MCV RBC AUTO: 94.1 FL (ref 80–100)
MONOCYTES ABSOLUTE: 0.7 K/UL (ref 0.2–0.8)
MONOCYTES RELATIVE PERCENT: 7.6 %
NEUTROPHILS ABSOLUTE: 6.8 K/UL (ref 1.4–6.5)
NEUTROPHILS RELATIVE PERCENT: 73.3 %
PDW BLD-RTO: 16.5 % (ref 11.5–14.5)
PLATELET # BLD: 356 K/UL (ref 130–400)
POTASSIUM SERPL-SCNC: 4.3 MEQ/L (ref 3.4–4.9)
PRO-BNP: 33 PG/ML
PROTHROMBIN TIME: 13 SEC (ref 12.3–14.9)
RBC # BLD: 4.11 M/UL (ref 4.7–6.1)
SODIUM BLD-SCNC: 139 MEQ/L (ref 135–144)
TOTAL PROTEIN: 7.2 G/DL (ref 6.3–8)
WBC # BLD: 9.3 K/UL (ref 4.8–10.8)

## 2020-12-09 PROCEDURE — 71045 X-RAY EXAM CHEST 1 VIEW: CPT

## 2020-12-09 PROCEDURE — 6360000002 HC RX W HCPCS: Performed by: EMERGENCY MEDICINE

## 2020-12-09 PROCEDURE — 6370000000 HC RX 637 (ALT 250 FOR IP): Performed by: EMERGENCY MEDICINE

## 2020-12-09 PROCEDURE — 94640 AIRWAY INHALATION TREATMENT: CPT

## 2020-12-09 PROCEDURE — 6360000004 HC RX CONTRAST MEDICATION: Performed by: EMERGENCY MEDICINE

## 2020-12-09 PROCEDURE — 83735 ASSAY OF MAGNESIUM: CPT

## 2020-12-09 PROCEDURE — 99283 EMERGENCY DEPT VISIT LOW MDM: CPT

## 2020-12-09 PROCEDURE — 85025 COMPLETE CBC W/AUTO DIFF WBC: CPT

## 2020-12-09 PROCEDURE — 96375 TX/PRO/DX INJ NEW DRUG ADDON: CPT

## 2020-12-09 PROCEDURE — 93010 ELECTROCARDIOGRAM REPORT: CPT | Performed by: INTERNAL MEDICINE

## 2020-12-09 PROCEDURE — 93005 ELECTROCARDIOGRAM TRACING: CPT

## 2020-12-09 PROCEDURE — 36415 COLL VENOUS BLD VENIPUNCTURE: CPT

## 2020-12-09 PROCEDURE — 80053 COMPREHEN METABOLIC PANEL: CPT

## 2020-12-09 PROCEDURE — 2580000003 HC RX 258: Performed by: EMERGENCY MEDICINE

## 2020-12-09 PROCEDURE — 96374 THER/PROPH/DIAG INJ IV PUSH: CPT

## 2020-12-09 PROCEDURE — 71275 CT ANGIOGRAPHY CHEST: CPT

## 2020-12-09 PROCEDURE — 85610 PROTHROMBIN TIME: CPT

## 2020-12-09 PROCEDURE — 83880 ASSAY OF NATRIURETIC PEPTIDE: CPT

## 2020-12-09 RX ORDER — LEVOFLOXACIN 500 MG/1
500 TABLET, FILM COATED ORAL DAILY
Qty: 10 TABLET | Refills: 0 | Status: SHIPPED | OUTPATIENT
Start: 2020-12-09 | End: 2020-12-19

## 2020-12-09 RX ORDER — LEVOFLOXACIN 500 MG/1
500 TABLET, FILM COATED ORAL ONCE
Status: COMPLETED | OUTPATIENT
Start: 2020-12-09 | End: 2020-12-09

## 2020-12-09 RX ORDER — IPRATROPIUM BROMIDE AND ALBUTEROL SULFATE 2.5; .5 MG/3ML; MG/3ML
1 SOLUTION RESPIRATORY (INHALATION)
Status: DISCONTINUED | OUTPATIENT
Start: 2020-12-09 | End: 2020-12-09 | Stop reason: HOSPADM

## 2020-12-09 RX ORDER — FUROSEMIDE 20 MG/1
20 TABLET ORAL DAILY
Qty: 10 TABLET | Refills: 0 | Status: SHIPPED | OUTPATIENT
Start: 2020-12-09 | End: 2021-07-06

## 2020-12-09 RX ORDER — DIPHENHYDRAMINE HYDROCHLORIDE 50 MG/ML
50 INJECTION INTRAMUSCULAR; INTRAVENOUS ONCE
Status: COMPLETED | OUTPATIENT
Start: 2020-12-09 | End: 2020-12-09

## 2020-12-09 RX ORDER — METHYLPREDNISOLONE SODIUM SUCCINATE 125 MG/2ML
125 INJECTION, POWDER, LYOPHILIZED, FOR SOLUTION INTRAMUSCULAR; INTRAVENOUS ONCE
Status: COMPLETED | OUTPATIENT
Start: 2020-12-09 | End: 2020-12-09

## 2020-12-09 RX ORDER — IPRATROPIUM BROMIDE AND ALBUTEROL SULFATE 2.5; .5 MG/3ML; MG/3ML
1 SOLUTION RESPIRATORY (INHALATION) ONCE
Status: COMPLETED | OUTPATIENT
Start: 2020-12-09 | End: 2020-12-09

## 2020-12-09 RX ORDER — SODIUM CHLORIDE 0.9 % (FLUSH) 0.9 %
3 SYRINGE (ML) INJECTION EVERY 8 HOURS
Status: DISCONTINUED | OUTPATIENT
Start: 2020-12-09 | End: 2020-12-09 | Stop reason: HOSPADM

## 2020-12-09 RX ORDER — FUROSEMIDE 10 MG/ML
20 INJECTION INTRAMUSCULAR; INTRAVENOUS ONCE
Status: COMPLETED | OUTPATIENT
Start: 2020-12-09 | End: 2020-12-09

## 2020-12-09 RX ORDER — PREDNISONE 20 MG/1
20 TABLET ORAL DAILY
Qty: 5 TABLET | Refills: 0 | Status: SHIPPED | OUTPATIENT
Start: 2020-12-09 | End: 2020-12-14

## 2020-12-09 RX ADMIN — Medication 3 ML: at 16:12

## 2020-12-09 RX ADMIN — DIPHENHYDRAMINE HYDROCHLORIDE 50 MG: 50 INJECTION INTRAMUSCULAR; INTRAVENOUS at 17:01

## 2020-12-09 RX ADMIN — FUROSEMIDE 20 MG: 10 INJECTION, SOLUTION INTRAMUSCULAR; INTRAVENOUS at 16:12

## 2020-12-09 RX ADMIN — IPRATROPIUM BROMIDE AND ALBUTEROL SULFATE 1 AMPULE: .5; 3 SOLUTION RESPIRATORY (INHALATION) at 16:12

## 2020-12-09 RX ADMIN — IPRATROPIUM BROMIDE AND ALBUTEROL SULFATE 1 AMPULE: .5; 3 SOLUTION RESPIRATORY (INHALATION) at 18:44

## 2020-12-09 RX ADMIN — METHYLPREDNISOLONE SODIUM SUCCINATE 125 MG: 125 INJECTION, POWDER, FOR SOLUTION INTRAMUSCULAR; INTRAVENOUS at 16:12

## 2020-12-09 RX ADMIN — IOPAMIDOL 100 ML: 755 INJECTION, SOLUTION INTRAVENOUS at 17:28

## 2020-12-09 RX ADMIN — LEVOFLOXACIN 500 MG: 500 TABLET, FILM COATED ORAL at 18:59

## 2020-12-09 ASSESSMENT — ENCOUNTER SYMPTOMS
EYE PAIN: 0
BACK PAIN: 0
TROUBLE SWALLOWING: 0
STRIDOR: 0
EYE DISCHARGE: 0
VOMITING: 0
WHEEZING: 1
SORE THROAT: 1
SINUS PRESSURE: 0
SHORTNESS OF BREATH: 1
DIARRHEA: 0
ABDOMINAL PAIN: 0
BLOOD IN STOOL: 0
FACIAL SWELLING: 0
EYE REDNESS: 0
VOICE CHANGE: 0
CHOKING: 0
DYSPNEA ASSOCIATED WITH: MINIMAL EXERTION
CHEST TIGHTNESS: 0
CONSTIPATION: 0
COUGH: 1

## 2020-12-09 NOTE — CARE COORDINATION
Lena Ovalle called me back. He tells me that he called his  and he is available to take him to the walk in clinic for evaluation. He states he will call after he returns home.

## 2020-12-09 NOTE — CARE COORDINATION
Ambulatory Care Coordination Note  12/9/2020  CM Risk Score: 10  Charlson 10 Year Mortality Risk Score: 23%     ACC: Olga Metz RN    Summary Note:     Hiral Barry tells me that he is not feeling well. He states that he has been SOB since Sunday. He says that he woke up on Sunday and noticed that his breathing was \"off\" he was more SOB and had \"mucus in his chest.\"  He denies fever and chills. He says that he is having increasing difficulty with walking as the SOB is worse. He is currently on an antibiotic for \"lump on his thigh. \"  He states that he is using his nebulizer 4 times per day. He adds that he is using his rescue inhaler a couple of times per day. I have asked him to go to the emergency room. He states that he does not have a ride. I suggested that he call the squad but he does not feel his breathing is \"that bad. \"  He tells me that he will call his  to see if he can take him to the ER today. He states that his incision is good and it is beginning to [facility] \"  He says that the [facility] nurse came out on Monday \"check me out\" and she told him that he was good. I have asked him to rest, take slow deep breathes, and drink plenty of water. PLAN:  Hiral Barry will continue to monitor his breathing and symptoms closely. If he has any worsening of symptoms he will call 911. He will call his  to see if he can take him to the ER today for evaluation of his breathing. Care Coordination Interventions    Program Enrollment:  Complex Care  Referral from Primary Care Provider:  No  Suggested Interventions and Community Resources  Disease Association: In Process  Medication Assistance Program:  In Process  Pharmacist:  In Process  Social Work:  In Process  Zone Management Tools:   In Process  Other Services or Interventions:  Pharmacy referral initiate,  referral re: medication assistance and home needs  COPD zone tool and education packet initiated,           Goals Addressed This Visit's Progress     Conditions and Symptoms   Improving     I will schedule office visits, as directed by my provider. I will keep my appointment or reschedule if I have to cancel. I will notify my provider of any barriers to my plan of care. I will follow my Zone Management tool to seek urgent or emergent care. I will notify my provider of any symptoms that indicate a worsening of my condition. Barriers: lack of motivation, lack of support, overwhelmed by complexity of regimen, and lack of education  Plan for overcoming my barriers: I will work with my ACM to increase my knowledge and self management skills for my overall health focusing on COPD. Confidence: 8/10  Anticipated Goal Completion Date: 03/30/2021              Prior to Admission medications    Medication Sig Start Date End Date Taking? Authorizing Provider   montelukast (SINGULAIR) 10 MG tablet Take 1 tablet by mouth nightly 12/8/20   Savage Hicks MD   oxyCODONE-acetaminophen (PERCOCET) 5-325 MG per tablet Take 1 tablet by mouth every 6 hours as needed for Pain for up to 7 days.  12/7/20 12/14/20  Ashley Joya MD   albuterol sulfate  (90 Base) MCG/ACT inhaler Inhale 2 puffs into the lungs every 6 hours as needed for Wheezing or Shortness of Breath 11/19/20   Savage Hicks MD   polyethylene glycol [facility]) 17 GM/SCOOP powder Take 17 g by mouth daily 11/19/20   Savage Hicks MD   amLODIPine (NORVASC) 10 MG tablet Take 1 tablet by mouth daily 11/5/20   Savage Hicks MD   lovastatin (MEVACOR) 10 MG tablet Take 1 tablet by mouth nightly 11/5/20   Savage Hicks MD   acetaminophen (TYLENOL) 500 MG tablet Take 1 tablet by mouth every 8 hours as needed for Pain 11/5/20   Savage Hicks MD   pantoprazole (PROTONIX) 40 MG tablet Take 1 tablet by mouth daily 9/29/20   Savage Hicks MD   sildenafil (VIAGRA) 100 MG tablet Take 1 tablet by mouth as needed for Erectile Dysfunction 9/22/20   Savage Hicks MD

## 2020-12-09 NOTE — ED TRIAGE NOTES
Pt reporting Mild sore throat on Sunday that has improved still having SOB, is using breathing treatments at home but as soon as he gets up and moves around he is short of breath

## 2020-12-09 NOTE — ED PROVIDER NOTES
performed by Bertha Altamirano MD at [address] Left 8/9/2019    LEFT TOTAL KNEE ARTHROPLASTY performed by Jim Duncan MD at [address] [facility] N/A 48/91/0040    UMBILICAL HERNIA REPAIR WITH MESH performed by Ryan Velazco MD at [address] [facility]       Previous Medications    ACETAMINOPHEN (TYLENOL) 500 MG TABLET    Take 1 tablet by mouth every 8 hours as needed for Pain    ALBUTEROL SULFATE  (90 BASE) MCG/ACT INHALER    Inhale 2 puffs into the lungs every 6 hours as needed for Wheezing or Shortness of Breath    AMLODIPINE (NORVASC) 10 MG TABLET    Take 1 tablet by mouth daily    BUDESONIDE-FORMOTEROL (SYMBICORT) 160-4.5 MCG/ACT AERO    Inhale 2 puffs into the lungs 2 times daily    ESCITALOPRAM (LEXAPRO) 10 MG TABLET    Take 1 tablet by mouth daily    FLUTICASONE (FLONASE) 50 MCG/ACT NASAL SPRAY    2 sprays by Nasal route daily    HANDICAP PLACARD MISC    by Does not apply route DX: OSTEOARTHRITIS OF BOTH KNEES (M17.0), COPD (J44.9)     EXPIRES: 02/2024    IPRATROPIUM-ALBUTEROL (DUONEB) 0.5-2.5 (3) MG/3ML SOLN NEBULIZER SOLUTION    Take 3 mLs by nebulization every 6 hours as needed for Shortness of Breath    LOVASTATIN (MEVACOR) 10 MG TABLET    Take 1 tablet by mouth nightly    MONTELUKAST (SINGULAIR) 10 MG TABLET    Take 1 tablet by mouth nightly    OXYCODONE-ACETAMINOPHEN (PERCOCET) 5-325 MG PER TABLET    Take 1 tablet by mouth every 6 hours as needed for Pain for up to 7 days.     PANTOPRAZOLE (PROTONIX) 40 MG TABLET    Take 1 tablet by mouth daily    POLYETHYLENE GLYCOL (GLYCOLAX) 17 GM/SCOOP POWDER    Take 17 g by mouth daily    SILDENAFIL (VIAGRA) 100 MG TABLET    Take 1 tablet by mouth as needed for Erectile Dysfunction    TIOTROPIUM (SPIRIVA HANDIHALER) 18 MCG INHALATION CAPSULE    Inhale 1 capsule into the lungs daily    TRAZODONE (DESYREL) 50 MG TABLET    Take 1 tablet by mouth nightly       ALLERGIES Fish-derived products; Iodine; Seasonal; and Pcn [penicillins]    FAMILY HISTORY       Family History   Problem Relation Age of Onset    Arthritis Mother     Asthma Mother     High Cholesterol Mother     Other Mother         aneurysm    Diabetes Father     Stroke Maternal Grandmother     Cancer Maternal Grandfather     Hypertension Other     COPD Neg Hx           SOCIAL HISTORY       Social History     Socioeconomic History    Marital status: Single     Spouse name: n/a    Number of children: 1    Years of education: 15    Highest education level: None   Occupational History    Occupation: Disability     Employer: NONE   Social Needs    Financial resource strain: Somewhat hard    Food insecurity     Worry: Never true     Inability: Never true    Transportation needs     Medical: No     Non-medical: No   Tobacco Use    Smoking status: Current Every Day Smoker     Packs/day: 0.25     Years: 30.00     Pack years: 7.50     Types: Cigarettes, Cigars     Start date: 1988     Last attempt to quit: 10/1/2013     Years since quittin.1    Smokeless tobacco: Never Used   Substance and Sexual Activity    Alcohol use: Yes     Alcohol/week: 4.0 standard drinks     Types: 2 Cans of beer, 2 Shots of liquor per week     Frequency: 4 or more times a week     Drinks per session: 1 or 2     Comment: social 1 -2 x week    Drug use: Yes     Types: Cocaine, Marijuana     Comment: social 3 days ago    Sexual activity: Not Currently     Partners: Female   Lifestyle    Physical activity     Days per week: 0 days     Minutes per session: 0 min    Stress:  To some extent   Relationships    Social connections     Talks on phone: Once a week     Gets together: Once a week     Attends Holiness service: 1 to 4 times per year     Active member of club or organization: None     Attends meetings of clubs or organizations: Never     Relationship status:     Intimate partner violence     Fear of current or ex takes less than 2 seconds. Neurological:      General: No focal deficit present. Mental Status: He is alert. Psychiatric:         Mood and Affect: Mood normal.         DIAGNOSTIC RESULTS     EKG: All EKG's are interpreted by the Emergency Department Physician who either signs or Co-signsthis chart in the absence of a cardiologist.        RADIOLOGY:   [place] [facility] such as CT, Ultrasound and MRI are read by the radiologist. Plain radiographic images are visualized and preliminarily interpreted by the emergency physician with the below findings:        Interpretation per the Radiologist below, if available at the time ofthis note:    CTA Chest W WO  (PE study)   Final Result      No CT evidence of pulmonary embolism. Bronchiectasis with mucus plugging within the left lower lobe with possible associated aspiration pneumonitis                     XR CHEST PORTABLE   Final Result   No acute cardiopulmonary process, findings are suggestive of COPD. The left costophrenic angle is visualized. ED BEDSIDE ULTRASOUND:   Performed by ED Physician - none    LABS:  Labs Reviewed   COMPREHENSIVE METABOLIC PANEL - Abnormal; Notable for the following components:       Result Value    Glucose 111 (*)     Alkaline Phosphatase 108 (*)     All other components within normal limits   CBC WITH AUTO DIFFERENTIAL - Abnormal; Notable for the following components:    RBC 4.11 (*)     Hemoglobin 12.8 (*)     Hematocrit 38.7 (*)     RDW 16.5 (*)     Neutrophils Absolute 6.8 (*)     All other components within normal limits   MAGNESIUM   PROTIME-INR   BRAIN NATRIURETIC PEPTIDE       All other labs were within normal range or not returned as of this dictation.     EMERGENCY DEPARTMENT COURSE and DIFFERENTIAL DIAGNOSIS/MDM:   Vitals:    Vitals:    12/09/20 1538 12/09/20 1640 12/09/20 1829 12/09/20 1830   BP: (!) 154/80 138/81  (!) 154/92   Pulse: 82 80  82   Resp: 18      Temp: 98.5 °F (36.9 °C)      TempSrc: Oral PM      PATIENT REFERRED TO:  Savage Hicks MD  [address]  [phone]    In 2 days        DISCHARGE MEDICATIONS:  New Prescriptions    FUROSEMIDE (LASIX) 20 MG TABLET    Take 1 tablet by mouth daily    LEVOFLOXACIN (LEVAQUIN) 500 MG TABLET    Take 1 tablet by mouth daily for 10 days    PREDNISONE (DELTASONE) 20 MG TABLET    Take 1 tablet by mouth daily for 5 doses          (Please note that portions of this note were completed with a voice recognition program.  Efforts were made to edit the dictations but occasionally words are mis-transcribed.)    Adolfo Horn MD (electronically signed)  Attending Emergency Physician       Adolfo Horn MD  12/09/20 0148

## 2020-12-10 ENCOUNTER — CARE COORDINATION (OUTPATIENT)
Dept: CARE COORDINATION | Age: 65
End: 2020-12-10

## 2020-12-14 ENCOUNTER — OFFICE VISIT (OUTPATIENT)
Dept: SURGERY | Age: 65
End: 2020-12-14

## 2020-12-14 VITALS
DIASTOLIC BLOOD PRESSURE: 84 MMHG | SYSTOLIC BLOOD PRESSURE: 136 MMHG | TEMPERATURE: 97.7 F | BODY MASS INDEX: 40.29 KG/M2 | WEIGHT: 304 LBS | HEIGHT: 73 IN

## 2020-12-14 PROCEDURE — 99024 POSTOP FOLLOW-UP VISIT: CPT | Performed by: SURGERY

## 2020-12-14 RX ORDER — OXYCODONE HYDROCHLORIDE AND ACETAMINOPHEN 5; 325 MG/1; MG/1
1 TABLET ORAL EVERY 6 HOURS PRN
Qty: 25 TABLET | Refills: 0 | Status: SHIPPED | OUTPATIENT
Start: 2020-12-14 | End: 2020-12-21

## 2020-12-15 ENCOUNTER — CARE COORDINATION (OUTPATIENT)
Dept: CARE COORDINATION | Age: 65
End: 2020-12-15

## 2020-12-15 ENCOUNTER — VIRTUAL VISIT (OUTPATIENT)
Dept: FAMILY MEDICINE CLINIC | Age: 65
End: 2020-12-15
Payer: MEDICARE

## 2020-12-15 PROCEDURE — 99443 PR PHYS/QHP TELEPHONE EVALUATION 21-30 MIN: CPT | Performed by: FAMILY MEDICINE

## 2020-12-15 RX ORDER — MONTELUKAST SODIUM 10 MG/1
10 TABLET ORAL NIGHTLY
Qty: 90 TABLET | Refills: 1 | Status: SHIPPED | OUTPATIENT
Start: 2020-12-15 | End: 2021-04-29 | Stop reason: SDUPTHER

## 2020-12-15 RX ORDER — SILDENAFIL 100 MG/1
100 TABLET, FILM COATED ORAL PRN
Qty: 10 TABLET | Refills: 2 | Status: SHIPPED | OUTPATIENT
Start: 2020-12-15 | End: 2021-02-12

## 2020-12-15 RX ORDER — FLUTICASONE PROPIONATE 50 MCG
2 SPRAY, SUSPENSION (ML) NASAL DAILY
Qty: 3 BOTTLE | Refills: 1 | Status: SHIPPED | OUTPATIENT
Start: 2020-12-15 | End: 2021-03-15 | Stop reason: SDUPTHER

## 2020-12-15 RX ORDER — ALBUTEROL SULFATE 90 UG/1
2 AEROSOL, METERED RESPIRATORY (INHALATION) EVERY 6 HOURS PRN
Qty: 18 G | Refills: 1 | Status: SHIPPED | OUTPATIENT
Start: 2020-12-15 | End: 2021-02-09

## 2020-12-15 RX ORDER — LOVASTATIN 10 MG/1
10 TABLET ORAL NIGHTLY
Qty: 90 TABLET | Refills: 1 | Status: SHIPPED | OUTPATIENT
Start: 2020-12-15 | End: 2021-04-29 | Stop reason: SDUPTHER

## 2020-12-15 RX ORDER — TRAZODONE HYDROCHLORIDE 50 MG/1
50 TABLET ORAL NIGHTLY
Qty: 90 TABLET | Refills: 1 | Status: SHIPPED | OUTPATIENT
Start: 2020-12-15 | End: 2021-04-29 | Stop reason: SDUPTHER

## 2020-12-15 RX ORDER — IPRATROPIUM BROMIDE AND ALBUTEROL SULFATE 2.5; .5 MG/3ML; MG/3ML
1 SOLUTION RESPIRATORY (INHALATION) EVERY 6 HOURS PRN
Qty: 180 ML | Refills: 1 | Status: SHIPPED | OUTPATIENT
Start: 2020-12-15 | End: 2021-04-29 | Stop reason: SDUPTHER

## 2020-12-15 RX ORDER — ACETAMINOPHEN 500 MG
500 TABLET ORAL EVERY 8 HOURS PRN
Qty: 120 TABLET | Refills: 1 | Status: SHIPPED | OUTPATIENT
Start: 2020-12-15 | End: 2021-04-29 | Stop reason: SDUPTHER

## 2020-12-15 RX ORDER — AMLODIPINE BESYLATE 10 MG/1
10 TABLET ORAL DAILY
Qty: 90 TABLET | Refills: 1 | Status: SHIPPED | OUTPATIENT
Start: 2020-12-15 | End: 2021-04-29 | Stop reason: SDUPTHER

## 2020-12-15 RX ORDER — BUDESONIDE AND FORMOTEROL FUMARATE DIHYDRATE 160; 4.5 UG/1; UG/1
2 AEROSOL RESPIRATORY (INHALATION) 2 TIMES DAILY
Qty: 3 INHALER | Refills: 1 | Status: SHIPPED | OUTPATIENT
Start: 2020-12-15 | End: 2021-02-04 | Stop reason: CLARIF

## 2020-12-15 RX ORDER — ESCITALOPRAM OXALATE 10 MG/1
10 TABLET ORAL DAILY
Qty: 90 TABLET | Refills: 1 | Status: SHIPPED | OUTPATIENT
Start: 2020-12-15 | End: 2021-04-29 | Stop reason: SDUPTHER

## 2020-12-15 RX ORDER — PANTOPRAZOLE SODIUM 40 MG/1
40 TABLET, DELAYED RELEASE ORAL DAILY
Qty: 90 TABLET | Refills: 1 | Status: SHIPPED | OUTPATIENT
Start: 2020-12-15 | End: 2021-04-29 | Stop reason: SDUPTHER

## 2020-12-15 RX ORDER — POLYETHYLENE GLYCOL 3350 17 G/17G
17 POWDER, FOR SOLUTION ORAL DAILY
Qty: 510 G | Refills: 1 | Status: SHIPPED | OUTPATIENT
Start: 2020-12-15 | End: 2021-04-23 | Stop reason: SDUPTHER

## 2020-12-15 RX ORDER — TIOTROPIUM BROMIDE 18 UG/1
18 CAPSULE ORAL; RESPIRATORY (INHALATION) DAILY
Qty: 90 CAPSULE | Refills: 1 | Status: SHIPPED | OUTPATIENT
Start: 2020-12-15 | End: 2021-03-15

## 2020-12-15 ASSESSMENT — PATIENT HEALTH QUESTIONNAIRE - PHQ9
SUM OF ALL RESPONSES TO PHQ QUESTIONS 1-9: 0
1. LITTLE INTEREST OR PLEASURE IN DOING THINGS: 0
SUM OF ALL RESPONSES TO PHQ QUESTIONS 1-9: 0
SUM OF ALL RESPONSES TO PHQ QUESTIONS 1-9: 0
SUM OF ALL RESPONSES TO PHQ9 QUESTIONS 1 & 2: 0
2. FEELING DOWN, DEPRESSED OR HOPELESS: 0

## 2020-12-15 ASSESSMENT — ENCOUNTER SYMPTOMS
ABDOMINAL PAIN: 0
SHORTNESS OF BREATH: 0
CONSTIPATION: 0
ANAL BLEEDING: 0
COUGH: 0
CHEST TIGHTNESS: 0
NAUSEA: 0
VOMITING: 0
BLOOD IN STOOL: 0
WHEEZING: 0
DIARRHEA: 0

## 2020-12-15 NOTE — ASSESSMENT & PLAN NOTE
Patient contemplative and is aware that smoking cessation would be the best thing for overall health. He is weaned down on his smoking use but is not ready to set a quit date. We reviewed cessation aids including medication and nicotine replacement therapy. Will continue to encourage complete smoking cessation at subsequent visits.

## 2020-12-15 NOTE — PROGRESS NOTES
Sandra Carroll is a 63 y.o. male evaluated via telephone on 12/15/2020. Consent:  He and/or health care decision maker is aware that that he may receive a bill for this telephone service, depending on his insurance coverage, and has provided verbal consent to proceed: Yes      Documentation:  I communicated with the patient and/or health care decision maker about recent ER visit and chronic disease management     Details of this discussion including any medical advice provided:     Patient presents for virtual telephone visit for ER follow-up and chronic disease management. He is status post umbilical hernia repair per Dr. Nikita Morin on 11/30/2020. He presented to [facility] on 12/09/2020 with complaints of worsening dyspnea, wheezing, sore throat, cough, and congestion. Vital signs showed /80, otherwise unremarkable. He was noted to have nasal congestion, and wheezing to the lung fields on examination. An EKG was reported to show sinus rhythm with PACs, but no ST elevations. An initial chest x-ray was reported to show no acute cardiopulmonary process, only findings suggestive of COPD. Due to history of recent surgery a CTA of the chest was obtained which was reported negative for any signs of pulmonary embolism, but did show diffuse bronchial wall thickening, bronchiectasis, and mucous plugging in the left lower lobe/left lung base potentially associated with aspiration pneumonitis. He was prescribed a course of levofloxacin x10 days and prednisone x5 days to empirically manage a COPD exacerbation associated with potential lower respiratory tract infection. He was prescribed Lasix due to noted peripheral edema. He was found stable for discharge home to follow-up with primary care and pulmonology as an outpatient. Patient reports feeling improved overall since his ER visit. He continues with antibiotic and has finished full course of prednisone as prescribed. He denies any chest pain, worsening dyspnea, worsening wheezing, or change in his normal day-to-day activity tolerance. He denies any fever/chills/sweats, nasal congestion, sore throat, sinus pain, ear pain, malaise, myalgias, chest pain, nausea/vomiting, or diarrhea. He reports he has cut back on his cigarette smoking and states he is only smoking 3 days a week. He is status post follow-up with general surgeon yesterday following his umbilical hernia repair. He was thought to be healing well and good pain control was reported abdominally. He denies any change in appetite and denies any drainage or bleeding from his incision sites. Follow-up with the surgeon was scheduled in another 2 weeks for reassessment. Patient denies any worsening mood, SI/HI, or self harming behaviors. They deny any worsening anxiety or panic attacks. They deny any worsening fatigue, decreased appetite, or problems with sleep. Patient reports last cocaine use was in September 2020. He reports continued sobriety from alcohol. Review of Systems   Constitutional: Negative for appetite change, chills, diaphoresis, fatigue, fever and unexpected weight change. Eyes: Negative for visual disturbance. Respiratory: Negative for cough, chest tightness, shortness of breath and wheezing. Cardiovascular: Negative for chest pain, palpitations and leg swelling. No orthopnea, No PND   Gastrointestinal: Negative for abdominal pain, anal bleeding, blood in stool, constipation, diarrhea, nausea and vomiting. No heartburn, No melena, +DYSPHAGIA (at times reports food feeling stuck in throat)    Endocrine: Negative for cold intolerance, heat intolerance, polydipsia, polyphagia and polyuria. Genitourinary: Negative for dysuria and hematuria. Musculoskeletal: Negative for myalgias. Skin: Negative for rash. Neurological: Negative for dizziness, syncope, weakness, light-headedness, numbness and headaches. Psychiatric/Behavioral: Negative for dysphoric mood. The patient is not nervous/anxious. Problem List Items Addressed This Visit        Circulatory    Hypertension (Chronic)     Blood pressure within normal range on most recent testing. We will continue to follow over time. Continue current medication         Relevant Medications    amLODIPine (NORVASC) 10 MG tablet    Other Relevant Orders    Basic Metabolic Panel    Supraventricular tachycardia (HCC) (Chronic)     Stable. Patient currently asymptomatic. We will continue to follow over time and he will continue with his current medication            Respiratory    COPD (chronic obstructive pulmonary disease) (HCC) (Chronic)     Patient improving clinically over time following his ER discharge. He will finish full course of antibiotic and continue with his chronic baseline inhalers. He is established with pulmonology and has a follow-up visit scheduled in the next month with the specialist.         Relevant Medications    tiotropium (SPIRIVA HANDIHALER) 18 MCG inhalation capsule    montelukast (SINGULAIR) 10 MG tablet    ipratropium-albuterol (DUONEB) 0.5-2.5 (3) MG/3ML SOLN nebulizer solution    fluticasone (FLONASE) 50 MCG/ACT nasal spray    budesonide-formoterol (SYMBICORT) 160-4.5 MCG/ACT AERO    albuterol sulfate  (90 Base) MCG/ACT inhaler       Digestive    Gastroesophageal reflux disease     I stressed with patient the importance of avoiding dietary triggers and continue with his current medication. Due to his reported dysphagia and suspected aspiration related pneumonia I have referred him to gastroenterology for further evaluation and potential further management.          Relevant Medications    polyethylene glycol (GLYCOLAX) 17 GM/SCOOP powder pantoprazole (PROTONIX) 40 MG tablet    Other Relevant Orders    [address] Tomlinson MD, Gastroenterology, [place]       Other    Insomnia    Relevant Medications    traZODone (DESYREL) 50 MG tablet    Tobacco use     Patient contemplative and is aware that smoking cessation would be the best thing for overall health. He is weaned down on his smoking use but is not ready to set a quit date. We reviewed cessation aids including medication and nicotine replacement therapy. Will continue to encourage complete smoking cessation at subsequent visits. Status post total knee replacement, left    Relevant Medications    acetaminophen (TYLENOL) 500 MG tablet    Anxiety with depression     No reported worsening mood, SI/HI or self harming behaviors. No reported worsening anxiety. Managed with use of medication. Continue current management. Relevant Medications    escitalopram (LEXAPRO) 10 MG tablet    traZODone (DESYREL) 50 MG tablet    Seasonal allergies (Chronic)    Relevant Medications    montelukast (SINGULAIR) 10 MG tablet    fluticasone (FLONASE) 50 MCG/ACT nasal spray    Hyperlipidemia (Chronic)    Relevant Medications    lovastatin (MEVACOR) 10 MG tablet    amLODIPine (NORVASC) 10 MG tablet    sildenafil (VIAGRA) 100 MG tablet    Other Relevant Orders    Lipid Panel    Cocaine abuse (HCC) (Chronic)     I stressed with patient the importance of continued sobriety and avoidance of cocaine use, particularly with known history of supraventricular tachycardia. We will continue to follow peripherally over time.          Gout (Chronic)    Relevant Orders    Uric Acid    Erectile dysfunction (Chronic)    Relevant Medications    sildenafil (VIAGRA) 100 MG tablet    Pre-diabetes (Chronic)    Relevant Orders    Hemoglobin A1C    Morbid obesity due to excess calories (HCC) (Chronic) Patient counseled on healthy dietary choices and the benefits of a lower salt and/or lower carbohydrate diet as appropriate. Patient also counseled on benefits of moderate intensity cardiovascular exercise for 20-30 minutes at least 3-5 days a week. Advice was given to make small changes over time, setting smaller achievable goals until recommended lifestyle changes are reached. Other Visit Diagnoses     LRTI (lower respiratory tract infection)    -  Primary    Dysphagia, unspecified type        Relevant Orders    [address] Alonso Tomlinson MD, Gastroenterology, [place]    Constipation, unspecified constipation type        Relevant Medications    polyethylene glycol (GLYCOLAX) 17 GM/SCOOP powder        Follow-up in 3 months for chronic disease check    I affirm this is a Patient Initiated Episode with a Patient who has not had a related appointment within my department in the past 7 days or scheduled within the next 24 hours.     Patient identification was verified at the start of the visit: Yes    Total Time: minutes: 21-30 minutes     Patient location: Individual Home  Provider location: Office      Note: not billable if this call serves to triage the patient into an appointment for the relevant concern      RISA Easley

## 2020-12-15 NOTE — ASSESSMENT & PLAN NOTE
Blood pressure within normal range on most recent testing. We will continue to follow over time.   Continue current medication

## 2020-12-15 NOTE — CARE COORDINATION
Attempted to contact patient for 6 day follow up post ED COVID-19 Monitoring. Unable to reach patient by phone. Mailbox is full and I am unable to leave a message.

## 2020-12-15 NOTE — ASSESSMENT & PLAN NOTE
Patient improving clinically over time following his ER discharge. He will finish full course of antibiotic and continue with his chronic baseline inhalers.   He is established with pulmonology and has a follow-up visit scheduled in the next month with the specialist.

## 2020-12-15 NOTE — ASSESSMENT & PLAN NOTE
Stable. Patient currently asymptomatic.   We will continue to follow over time and he will continue with his current medication

## 2020-12-15 NOTE — ASSESSMENT & PLAN NOTE
No reported worsening mood, SI/HI or self harming behaviors. No reported worsening anxiety. Managed with use of medication. Continue current management.

## 2020-12-15 NOTE — ASSESSMENT & PLAN NOTE
I stressed with patient the importance of avoiding dietary triggers and continue with his current medication. Due to his reported dysphagia and suspected aspiration related pneumonia I have referred him to gastroenterology for further evaluation and potential further management.

## 2020-12-15 NOTE — ASSESSMENT & PLAN NOTE
I stressed with patient the importance of continued sobriety and avoidance of cocaine use, particularly with known history of supraventricular tachycardia. We will continue to follow peripherally over time.

## 2020-12-17 ENCOUNTER — HOSPITAL ENCOUNTER (OUTPATIENT)
Dept: LAB | Age: 65
Discharge: HOME | End: 2020-12-17
Payer: MEDICARE

## 2020-12-17 LAB
ANION GAP SERPL CALCULATED.3IONS-SCNC: 12 MEQ/L (ref 9–15)
BUN BLDV-MCNC: 9 MG/DL (ref 8–23)
CALCIUM SERPL-MCNC: 8.8 MG/DL (ref 8.5–9.9)
CHLORIDE BLD-SCNC: 103 MEQ/L (ref 95–107)
CHOLESTEROL, TOTAL: 163 MG/DL (ref 0–199)
CO2: 23 MEQ/L (ref 20–31)
CREAT SERPL-MCNC: 1.05 MG/DL (ref 0.7–1.2)
GFR AFRICAN AMERICAN: >60
GFR NON-AFRICAN AMERICAN: >60
GLUCOSE BLD-MCNC: 72 MG/DL (ref 70–99)
HBA1C MFR BLD: 6.1 % (ref 4.8–5.9)
HDLC SERPL-MCNC: 53 MG/DL (ref 40–59)
LDL CHOLESTEROL CALCULATED: 91 MG/DL (ref 0–129)
POTASSIUM SERPL-SCNC: 4.1 MEQ/L (ref 3.4–4.9)
SODIUM BLD-SCNC: 138 MEQ/L (ref 135–144)
TRIGL SERPL-MCNC: 96 MG/DL (ref 0–150)
URIC ACID, SERUM: 5.6 MG/DL (ref 3.4–7)

## 2020-12-17 PROCEDURE — 80061 LIPID PANEL: CPT

## 2020-12-17 PROCEDURE — 80048 BASIC METABOLIC PNL TOTAL CA: CPT

## 2020-12-17 PROCEDURE — 83036 HEMOGLOBIN GLYCOSYLATED A1C: CPT

## 2020-12-17 PROCEDURE — 84550 ASSAY OF BLOOD/URIC ACID: CPT

## 2020-12-17 PROCEDURE — 36415 COLL VENOUS BLD VENIPUNCTURE: CPT

## 2020-12-21 RX ORDER — OXYCODONE HYDROCHLORIDE AND ACETAMINOPHEN 5; 325 MG/1; MG/1
1 TABLET ORAL EVERY 6 HOURS PRN
Qty: 15 TABLET | Refills: 0 | Status: SHIPPED | OUTPATIENT
Start: 2020-12-21 | End: 2020-12-26

## 2020-12-23 ENCOUNTER — CARE COORDINATION (OUTPATIENT)
Dept: CARE COORDINATION | Age: 65
End: 2020-12-23

## 2020-12-23 ASSESSMENT — ENCOUNTER SYMPTOMS: DYSPNEA ASSOCIATED WITH: EXERTION

## 2020-12-23 NOTE — CARE COORDINATION
You Patient resolved from the Care Transitions episode on 12/23/2020  Discussed COVID-19 related testing which was available at this time. Test results were negative. Patient informed of results, if available? Yes    Patient/family has been provided the following resources and education related to COVID-19:                         Signs, symptoms and red flags related to COVID-19            CDC exposure and quarantine guidelines            [facility] exposure contact - [phone]            Contact for their local Department of Health                 Patient currently reports that the following symptoms have improved:  no new/worsening symptoms     No further outreach scheduled with this CTN/ACM. Episode of Care resolved. Patient has this CTN/ACM contact information if future needs arise.

## 2020-12-23 NOTE — CARE COORDINATION
Ambulatory Care Coordination Note  12/23/2020  CM Risk Score: 10  Charlson 10 Year Mortality Risk Score: 23%     ACC: Massiel Leyva, RN    Summary Note:     Thu Damon says that he is not doing too bad. He states that he is sleeping well with his CPAP and using it for about 9 hours on average each night. He feels that he feels a little better during the day but adds that he is still taking daily naps. He also says that he is struggling with dry mouth when using the CPAP. He feels that this is because of mouth breathing. He does have a chin strap but he does not feel that this works. I also asked about the humidity level and water levels  He says that he is adding water everyday and feels the humidity level is good. He denies and increase in SOB, cough, fever, or chills. He does continue to smoke cigarettes intermittently. He feels that he is eating and drinking well. He has had post op follow up and feels that he is doing well from surgery. PLAN:  Thu Damon will use his CPAP daily and if he has any issue he will call Medical Services to speak with respiratory specialist.  Thu Damon will monitor his symptoms utilizing the COPD zone tools. If he has any worsening of symptoms he will call myself or the office for recommendations. Care Coordination Interventions    Program Enrollment: Complex Care  Referral from Primary Care Provider: No  Suggested Interventions and Community Resources  Disease Association: In Process  Medication Assistance Program: In Process  Pharmacist: In Process  Social Work: In Process  Zone Management Tools: In Process  Other Services or Interventions: Pharmacy referral initiate,  referral re: medication assistance and home needs  COPD zone tool and education packet initiated,           Goals Addressed                 This Visit's Progress     Conditions and Symptoms   Improving     I will schedule office visits, as directed by my provider. I will keep my appointment or reschedule if I have to cancel. I will notify my provider of any barriers to my plan of care. I will follow my Zone Management tool to seek urgent or emergent care. I will notify my provider of any symptoms that indicate a worsening of my condition. Barriers: lack of motivation, lack of support, overwhelmed by complexity of regimen, and lack of education  Plan for overcoming my barriers: I will work with my ACM to increase my knowledge and self management skills for my overall health focusing on COPD. Confidence: 8/10  Anticipated Goal Completion Date: 03/30/2021              Prior to Admission medications    Medication Sig Start Date End Date Taking? Authorizing Provider   oxyCODONE-acetaminophen (PERCOCET) 5-325 MG per tablet Take 1 tablet by mouth every 6 hours as needed for Pain for up to 5 days.  12/21/20 12/26/20  Keerthi Baldwin MD   tiotropium (SPIRIVA HANDIHALER) 18 MCG inhalation capsule Inhale 1 capsule into the lungs daily 12/15/20   Monegasque Republic, MD   polyethylene glycol [facility]) 17 GM/SCOOP powder Take 17 g by mouth daily 12/15/20   [place]que Republic, MD   pantoprazole (PROTONIX) 40 MG tablet Take 1 tablet by mouth daily 12/15/20   [facility], MD   montelukast (SINGULAIR) 10 MG tablet Take 1 tablet by mouth nightly 12/15/20   [place]que [place], MD   lovastatin (MEVACOR) 10 MG tablet Take 1 tablet by mouth nightly 12/15/20   [place]que [place], MD   ipratropium-albuterol (DUONEB) 0.5-2.5 (3) MG/3ML SOLN nebulizer solution Take 3 mLs by nebulization every 6 hours as needed for Shortness of Breath 12/15/20   [place], MD   fluticasone [facility]) 50 MCG/ACT nasal spray 2 sprays by Nasal route daily 12/15/20   [place], MD   escitalopram (LEXAPRO) 10 MG tablet Take 1 tablet by mouth daily 12/15/20   [place]que [place], MD   amLODIPine (NORVASC) 10 MG tablet Take 1 tablet by mouth daily 12/15/20   [place]que Republic, MD

## 2020-12-31 ENCOUNTER — VIRTUAL VISIT (OUTPATIENT)
Dept: GASTROENTEROLOGY | Age: 65
End: 2020-12-31
Payer: MEDICARE

## 2020-12-31 PROCEDURE — 99443 PR PHYS/QHP TELEPHONE EVALUATION 21-30 MIN: CPT | Performed by: SPECIALIST

## 2020-12-31 NOTE — PROGRESS NOTES
 Hyperlipidemia     Hypertension 10/27/2016    Injury to heart 10/27/2016    Insomnia 12/17/2018    Medical non-compliance 2/22/2014    Morbid obesity due to excess calories ([facility] [address] 12/8/2016    Osteoarthritis of both knees 12/8/2016    Personal history of tobacco use     Pneumonia 12/04/2016    caused hospital admission    Pre-diabetes 10/27/2016    Seasonal allergies 4/21/2014    Severe persistent asthma 10/27/2016    Severe sleep apnea 4/14/2015    Supraventricular tachycardia ([facility] [address] 10/27/2016    SVT (supraventricular tachycardia) (HCC)       Past Surgical History:   Procedure Laterality Date    ANTERIOR CRUCIATE LIGAMENT REPAIR      CARDIAC CATHETERIZATION      COLONOSCOPY N/A 7/15/2020    COLONOSCOPY WITH POLYPECTOMY performed by Naya Harvey MD at [facility] Left 8/9/2019    LEFT TOTAL KNEE ARTHROPLASTY performed by Andery Johnson MD at [facility] N/A [id number]    [facility] performed by Fernando Marvin MD at [facility]     Current Outpatient Medications on File Prior to Visit   Medication Sig Dispense Refill    tiotropium (SPIRIVA HANDIHALER) 18 MCG inhalation capsule Inhale 1 capsule into the lungs daily 90 capsule 1    polyethylene glycol (GLYCOLAX) 17 GM/SCOOP powder Take 17 g by mouth daily 510 g 1    pantoprazole (PROTONIX) 40 MG tablet Take 1 tablet by mouth daily 90 tablet 1    montelukast (SINGULAIR) 10 MG tablet Take 1 tablet by mouth nightly 90 tablet 1    lovastatin (MEVACOR) 10 MG tablet Take 1 tablet by mouth nightly 90 tablet 1    ipratropium-albuterol (DUONEB) 0.5-2.5 (3) MG/3ML SOLN nebulizer solution Take 3 mLs by nebulization every 6 hours as needed for Shortness of Breath 180 mL 1    fluticasone (FLONASE) 50 MCG/ACT nasal spray 2 sprays by Nasal route daily 3 Bottle 1    escitalopram (LEXAPRO) 10 MG tablet Take 1 tablet by mouth daily 90 tablet 1  amLODIPine (NORVASC) 10 MG tablet Take 1 tablet by mouth daily 90 tablet 1    budesonide-formoterol (SYMBICORT) 160-4.5 MCG/ACT AERO Inhale 2 puffs into the lungs 2 times daily 3 Inhaler 1    albuterol sulfate  (90 Base) MCG/ACT inhaler Inhale 2 puffs into the lungs every 6 hours as needed for Wheezing or Shortness of Breath 18 g 1    acetaminophen (TYLENOL) 500 MG tablet Take 1 tablet by mouth every 8 hours as needed for Pain 120 tablet 1    sildenafil (VIAGRA) 100 MG tablet Take 1 tablet by mouth as needed for Erectile Dysfunction 10 tablet 2    traZODone (DESYREL) 50 MG tablet Take 1 tablet by mouth nightly 90 tablet 1    furosemide (LASIX) 20 MG tablet Take 1 tablet by mouth daily 10 tablet 0    Handicap Placard MISC by Does not apply route DX: OSTEOARTHRITIS OF BOTH KNEES (M17.0), COPD (J44.9)     EXPIRES: 2024 1 each 0     No current facility-administered medications on file prior to visit.       Family History   Problem Relation Age of Onset    Arthritis Mother     Asthma Mother     High Cholesterol Mother     Other Mother         aneurysm    Diabetes Father     Stroke Maternal Grandmother     Cancer Maternal Grandfather     Hypertension Other     COPD Neg Hx       Social History     Socioeconomic History    Marital status: Single     Spouse name: n/a    Number of children: 1    Years of education: 15    Highest education level: Not on file   Occupational History    Occupation: Disability     Employer: NONE   Social Needs    Financial resource strain: Somewhat hard    Food insecurity     Worry: Never true     Inability: Never true    Transportation needs     Medical: No     Non-medical: No   Tobacco Use    Smoking status: Current Every Day Smoker     Packs/day: 0.25     Years: 30.00     Pack years: 7.50     Types: Cigarettes, Cigars     Start date: 1988     Last attempt to quit: 10/1/2013     Years since quittin.2    Smokeless tobacco: Never Used EXAMINATION:  CHEST CTA WITH CONTRAST (PULMONARY EMBOLISM PROTOCOL) CLINICAL HISTORY:   sob  recent  surgery    SHORTNESS OF BREATH . Recent hernia surgery. Shortness of breath. COPD. Technique:  Spiral axial CT acquisition of the chest from the thoracic inlet to the upper abdomen following IV contrast.  2-D images were reconstructed in the sagittal and coronal planes. 3-D MIPS images were generated in the coronal and axial planes. Images were reviewed on the PACS workstation. All images including the 3-D MIPS were personally archived. Contrast:  IV administration of 100 ml Isovue 370 All CT scans at this facility use dose modulation, iterative reconstruction, and/or weight based dosing when appropriate to reduce radiation dose to as low as reasonably achievable. Comparison:  None. RESULT: Limitations:  None. Evaluation for thromboembolic disease:      - Right heart chambers:  No thromboembolic disease.      - Main pulmonary arteries:  No thromboembolic disease.      - Lobar pulmonary arteries:  No thromboembolic disease.        - Segmental pulmonary arteries:  No thromboembolic disease.        - Subsegmental pulmonary arteries:  No thromboembolic disease in visualized portions. Lines, tubes, and devices:  None. Lung parenchyma and pleura:  Diffuse bronchial wall thickening. Bronchiectasis. Mucous plugging especially involving left lower lobe bronchioles at the left lung base. Associated tiny nodular opacities within the left lower lobe adjacent to the mucous plugging. Other areas of probable scarring/atelectasis. Mild centrilobular eczematous changes. Mild biapical pleural parenchymal thickening/scarring. Calcified granuloma left lung base. No suspicious pulmonary nodules. No pleural effusion or pneumothorax. Thoracic inlet, heart, and mediastinum: Visualized thyroid unremarkable. No axillary, mediastinal, or hilar lymphadenopathy. Mild thoracic aorta atherosclerotic calcifications without aneurysm.  Aortic root calcifications. Normal pulmonary artery size. Normal heart size. Scattered coronary artery calcifications. No pericardial effusion or thickening. Small hiatal hernia. Bones and soft tissues:  No destructive bone lesion or acute osseous findings. Multilevel bridging endplate osteophytes within the thoracic spine. Degenerative changes. Chest wall unremarkable. Upper abdomen:  No acute abnormality in the imaged upper abdomen. Lower neck: Unremarkable. No CT evidence of pulmonary embolism. Bronchiectasis with mucus plugging within the left lower lobe with possible associated aspiration pneumonitis     Xr Chest Portable    Result Date: 12/9/2020  COMPARISON: May 25, 2020 HISTORY: sob TECHNIQUE: AP view FINDINGS: No consolidating pneumonia, pleural effusion or pneumothorax is seen on the right. The left costophrenic angle is not well seen. The lungs are hyperinflated and there is coarsening of the interstitium. The cardiac silhouette is within normal limits of size. Degenerative changes are seen in the dorsal spine. A sclerotic area is again seen in the scapula on the left. This is thought to be a bone island. .     No acute cardiopulmonary process, findings are suggestive of COPD. The left costophrenic angle is visualized. Endoscopic investigations:     Assessmentand Plan:  63 y.o. male with history of intermittent episodes of dysphagia. Rule out esophageal stricture esophagitis or possible motility disorder. We will schedule the patient for EGD. Diagnosis Orders   1. Esophageal dysphagia       Return in about 5 weeks (around 2/4/2021).     Abraham Trotter MD   Staff Gastroenterologist  [facility] Please note this report has been partially produced using speech recognition software and may cause contain errors related to thatsystem including grammar, punctuation and spelling as well as words and phrases that may seem inappropriate. If there are questions or concerns please feel free to contact me to clarify.

## 2021-01-06 ENCOUNTER — HOSPITAL ENCOUNTER (OUTPATIENT)
Age: 66
Setting detail: SPECIMEN
Discharge: HOME | End: 2021-01-06
Payer: MEDICARE

## 2021-01-06 ENCOUNTER — NURSE ONLY (OUTPATIENT)
Dept: PRIMARY CARE CLINIC | Age: 66
End: 2021-01-06

## 2021-01-06 DIAGNOSIS — Z01.818 PRE-OP TESTING: Primary | ICD-10-CM

## 2021-01-07 DIAGNOSIS — Z01.818 PRE-OP TESTING: ICD-10-CM

## 2021-01-08 ENCOUNTER — OFFICE VISIT (OUTPATIENT)
Dept: SURGERY | Age: 66
End: 2021-01-08

## 2021-01-08 VITALS
DIASTOLIC BLOOD PRESSURE: 88 MMHG | BODY MASS INDEX: 39.49 KG/M2 | HEIGHT: 73 IN | TEMPERATURE: 98 F | SYSTOLIC BLOOD PRESSURE: 136 MMHG | WEIGHT: 298 LBS

## 2021-01-08 DIAGNOSIS — G89.18 POST-OP PAIN: Primary | ICD-10-CM

## 2021-01-08 LAB
SARS-COV-2: NOT DETECTED
SOURCE: NORMAL

## 2021-01-08 PROCEDURE — 99024 POSTOP FOLLOW-UP VISIT: CPT | Performed by: SURGERY

## 2021-01-08 RX ORDER — OXYCODONE HYDROCHLORIDE AND ACETAMINOPHEN 5; 325 MG/1; MG/1
1 TABLET ORAL EVERY 6 HOURS PRN
Qty: 25 TABLET | Refills: 0 | Status: SHIPPED | OUTPATIENT
Start: 2021-01-08 | End: 2021-01-15

## 2021-01-08 NOTE — PROGRESS NOTES
Subjective:      Patient ID: Lamin Perez is a 63 y.o. male. HPI   Lamin Perez is status post repair of umbilical hernia with mesh on November 30,2020. The patient is convalescing well. Pain control is excellent using Percocet. Appetite is excellent. GI function is normal.   function is normal.  His pain is 3 out of 10 he is requesting more Percocet. He has not returned to normal activities. He has not returned to work. Review of Systems    Objective:   Physical Exam  Constitutional:       General: He is not in acute distress. Appearance: Normal appearance. HENT:      Mouth/Throat:      Mouth: Mucous membranes are moist.      Pharynx: Oropharynx is clear. Eyes:      Pupils: Pupils are equal, round, and reactive to light. Neck:      Comments: Neck is supple without any masses, no thyromegaly, trachea midline  Abdominal:       Musculoskeletal:      Comments: Normal gait   Skin:     Findings: No bruising, lesion or rash. Neurological:      Mental Status: He is alert and oriented to person, place, and time. Psychiatric:         Mood and Affect: Mood normal.         Judgment: Judgment normal.       /88   Temp 98 °F (36.7 °C) (Temporal)   Ht 6' 0.5\" (1.842 m)   Wt 298 lb (135.2 kg)   BMI 39.86 kg/m²   Assessment:      Post op pain  Surgically he is doing well      Plan:      Percocet  The patient is instructed to return to see me in 2 weeks.    Cleanse umbilical area daily with soap and water        Colby Samano MD

## 2021-01-08 NOTE — PROGRESS NOTES
Subjective:      Patient ID: Amarilys Love is a 63 y.o. male. HPI   Amarilys Love is status post repair of umbilical hernia with mesh on November 30,2020. The patient is convalescing well. Pain control is excellent using Percocet. Appetite is excellent. GI function is normal.   function is normal.  His pain is 3 out of 10 he is requesting more Percocet. He has not returned to normal activities. He has not returned to work. Review of Systems    Objective:   Physical Exam  Constitutional:       General: He is not in acute distress. Appearance: Normal appearance. HENT:      Mouth/Throat:      Mouth: Mucous membranes are moist.      Pharynx: Oropharynx is clear. Eyes:      Pupils: Pupils are equal, round, and reactive to light. Neck:      Comments: Neck is supple without any masses, no thyromegaly, trachea midline  Abdominal:       Musculoskeletal:      Comments: Normal gait   Skin:     Findings: No bruising, lesion or rash. Neurological:      Mental Status: He is alert and oriented to person, place, and time. Psychiatric:         Mood and Affect: Mood normal.         Judgment: Judgment normal.       /88   Temp 98 °F (36.7 °C) (Temporal)   Ht 6' 0.5\" (1.842 m)   Wt 298 lb (135.2 kg)   BMI 39.86 kg/m²   Assessment:      Post op pain  Surgically he is doing well      Plan:      Percocet  The patient is instructed to return to see me in 4 weeks.    Cleanse umbilical area daily with soap and water        Dario Fitzgerald MD

## 2021-01-22 ENCOUNTER — CARE COORDINATION (OUTPATIENT)
Dept: CARE COORDINATION | Age: 66
End: 2021-01-22

## 2021-01-22 NOTE — CARE COORDINATION
Attempted to contact patient for care coordination follow up regarding post op hernia repair and COPD  Unable to reach patient by phone.   Unable to leave a message as the mailbox is full

## 2021-01-28 DIAGNOSIS — G89.18 POST-OP PAIN: Primary | ICD-10-CM

## 2021-01-28 RX ORDER — OXYCODONE HYDROCHLORIDE AND ACETAMINOPHEN 5; 325 MG/1; MG/1
1 TABLET ORAL EVERY 6 HOURS PRN
Qty: 15 TABLET | Refills: 0 | Status: SHIPPED | OUTPATIENT
Start: 2021-01-28 | End: 2021-02-02

## 2021-02-04 DIAGNOSIS — J44.9 CHRONIC OBSTRUCTIVE PULMONARY DISEASE, UNSPECIFIED COPD TYPE (HCC): Primary | Chronic | ICD-10-CM

## 2021-02-04 RX ORDER — FLUTICASONE FUROATE AND VILANTEROL 200; 25 UG/1; UG/1
1 POWDER RESPIRATORY (INHALATION) DAILY
Qty: 28 EACH | Refills: 5 | Status: SHIPPED | OUTPATIENT
Start: 2021-02-04 | End: 2021-04-29 | Stop reason: SDUPTHER

## 2021-02-04 NOTE — TELEPHONE ENCOUNTER
Please call patient to inform him that due to insurance formulary change his Symbicort is no longer covered. I have changed his Symbicort to Kirstie Bergamo which is covered by his insurance. The new dosing is 1 puff once a day of the Breo.   A new prescription has been sent to his local pharmacy

## 2021-02-04 NOTE — TELEPHONE ENCOUNTER
Please help with this. Preferred alternative is needed before I can do anything to substitute. Once these are obtained send me a message and I can write a replacement.      -Dr. Kamar Clark, the covered alternatives are:      anoro ellipta 62.5-25 MCG quantity 60 for 30 days. breo ellipta 100-25 MCG quantity 60 for 30 days. breo ellipta 200-25 MCG quantity 60 for 30 days     incruse ellipta 62.5 MCG quantity 30 for 30 days.       Ref#: [id number]

## 2021-02-05 ENCOUNTER — CARE COORDINATION (OUTPATIENT)
Dept: CARE COORDINATION | Age: 66
End: 2021-02-05

## 2021-02-05 NOTE — CARE COORDINATION
Ambulatory Care Coordination Note  2/5/2021  CM Risk Score: 6  Charlson 10 Year Mortality Risk Score: 23%     ACC: Wilhemena Merlin, SAMANTHA    Summary Note:     Marie Antony tells me that he is doing pretty good. I asked about his breathing and he feels that he is doing well,   He denies any issues with SOB with activity or at rest, he does have an occasional cough but this is mostly on waking from sleep, he denies any issues with wheezing or increasing fatigue. He denies any recent use of his rescue inhaler. I spoke with him about the Symbicort being changed to breo. He tells me that he is not going to pick this up as his co-pay is $400.00 and he cannot afford this on his budget. I explained that I will have our  reach out to him to discuss options. He adds that he is struggling with his rent as well and I explained that I will include this in my message to our . He tells me that he is not doing much in the way of exercise as the weather is too cold. He admits to spending most of the day on the couch sleeping intermittently. He says that he is eating well and denies any issues with obtaining food as he is on SNAP. PLAN:  Lavinia Shaw is stable from a coordination/COPD stand point and if he remains stable over the next month I will check with Dr Rylee Banks to see if graduation is appropriate. Marie Antony will continue to monitor his symptoms related to COPD. If he has any worsening of symptoms he will call myself or the office for recommendations. I will reach out to our social workers to have them assist with housing and cost of medication issues. Care Coordination Interventions    Program Enrollment: Complex Care  Referral from Primary Care Provider: No  Suggested Interventions and Community Resources  Disease Association: In Process  Medication Assistance Program: In Process  Pharmacist: In Process  Social Work: In Process  Zone Management Tools:  In Process Other Services or Interventions: Pharmacy referral initiate,  referral re: medication assistance and home needs  COPD zone tool and education packet initiated,           Goals Addressed                 This Visit's Progress     Conditions and Symptoms   On track     I will schedule office visits, as directed by my provider. I will keep my appointment or reschedule if I have to cancel. I will notify my provider of any barriers to my plan of care. I will follow my Zone Management tool to seek urgent or emergent care. I will notify my provider of any symptoms that indicate a worsening of my condition. Barriers: lack of motivation, lack of support, overwhelmed by complexity of regimen, and lack of education  Plan for overcoming my barriers: I will work with my ACM to increase my knowledge and self management skills for my overall health focusing on COPD. Confidence: 8/10  Anticipated Goal Completion Date: 03/30/2021              Prior to Admission medications    Medication Sig Start Date End Date Taking?  Authorizing Provider   Fluticasone furoate-vilanterol (BREO ELLIPTA) 200-25 MCG/INH AEPB inhaler Inhale 1 puff into the lungs daily 2/4/21   Luxembourger Republic, MD   tiotropium (SPIRIVA HANDIHALER) 18 MCG inhalation capsule Inhale 1 capsule into the lungs daily 12/15/20   Luxembourger Republic, MD   polyethylene glycol [facility]) 17 GM/SCOOP powder Take 17 g by mouth daily 12/15/20   Luxembourger Republic, MD   pantoprazole (PROTONIX) 40 MG tablet Take 1 tablet by mouth daily 12/15/20   Luxembourger Republic, MD   montelukast (SINGULAIR) 10 MG tablet Take 1 tablet by mouth nightly 12/15/20   Luxembourger Republic, MD   lovastatin (MEVACOR) 10 MG tablet Take 1 tablet by mouth nightly 12/15/20   Luxembourger Republic, MD   ipratropium-albuterol (DUONEB) 0.5-2.5 (3) MG/3ML SOLN nebulizer solution Take 3 mLs by nebulization every 6 hours as needed for Shortness of Breath 12/15/20   Luxembourger Republic, MD fluticasone (FLONASE) 50 MCG/ACT nasal spray 2 sprays by Nasal route daily 12/15/20   Diane Winchester MD   escitalopram (LEXAPRO) 10 MG tablet Take 1 tablet by mouth daily 12/15/20   Diane Winchester MD   amLODIPine (NORVASC) 10 MG tablet Take 1 tablet by mouth daily 12/15/20   Diane Winchester MD   albuterol sulfate  (90 Base) MCG/ACT inhaler Inhale 2 puffs into the lungs every 6 hours as needed for Wheezing or Shortness of Breath 12/15/20   Diane Winchester MD   acetaminophen (TYLENOL) 500 MG tablet Take 1 tablet by mouth every 8 hours as needed for Pain 12/15/20   Diane Winchester MD   sildenafil (VIAGRA) 100 MG tablet Take 1 tablet by mouth as needed for Erectile Dysfunction 12/15/20   Diane Winchester MD   traZODone (DESYREL) 50 MG tablet Take 1 tablet by mouth nightly 12/15/20   Diane Winchester MD   furosemide (LASIX) 20 MG tablet Take 1 tablet by mouth daily 12/9/20   Rob Wong MD   Handicap Placard MISC by Does not apply route DX: OSTEOARTHRITIS OF BOTH KNEES (M17.0), COPD (J44.9)     EXPIRES: 02/2024 2/1/19   Diane Winchester MD       Future Appointments   Date Time Provider [facility] Nupur   2/8/2021  1:15 PM Alirio Miranda  [address] [place] [address]   3/15/2021 10:40 AM Diane Winchester MD [facility]     ,   COPD Assessment    Does the patient understand envrionmental exposure?: No  Is the patient able to verbalize Rescue vs. Long Acting medications?: Yes  Does the patient have a nebulizer?: Yes  Does the patient use a space with inhaled medications?: No     No patient-reported symptoms         Symptoms:  None: Yes      Symptom course: stable  Breathlessness: none  Increase use of rapid acting/rescue inhaled medications?: No  Change in chronic cough?: No/At Baseline  Change in sputum?: No/At Baseline  Sputum characteristics: Clear  Self Monitoring - SaO2: No      Charlson Mortality Risk Score: 23%

## 2021-02-08 ENCOUNTER — OFFICE VISIT (OUTPATIENT)
Dept: SURGERY | Age: 66
End: 2021-02-08

## 2021-02-08 ENCOUNTER — CARE COORDINATION (OUTPATIENT)
Dept: CARE COORDINATION | Age: 66
End: 2021-02-08

## 2021-02-08 VITALS
BODY MASS INDEX: 38.97 KG/M2 | TEMPERATURE: 97.4 F | WEIGHT: 294 LBS | HEIGHT: 73 IN | SYSTOLIC BLOOD PRESSURE: 124 MMHG | DIASTOLIC BLOOD PRESSURE: 76 MMHG

## 2021-02-08 DIAGNOSIS — G89.18 POST-OP PAIN: Primary | ICD-10-CM

## 2021-02-08 PROCEDURE — 99024 POSTOP FOLLOW-UP VISIT: CPT | Performed by: SURGERY

## 2021-02-08 RX ORDER — OXYCODONE HYDROCHLORIDE AND ACETAMINOPHEN 5; 325 MG/1; MG/1
1 TABLET ORAL EVERY 6 HOURS PRN
Qty: 25 TABLET | Refills: 0 | Status: SHIPPED | OUTPATIENT
Start: 2021-02-08 | End: 2021-02-15

## 2021-02-08 NOTE — CARE COORDINATION
Case referred to this writer by JOVANNI Medrano to assist patient with medication costs and housing. Telephone call to patient. Message indicated that mailbox was full and not accepting messages at this time.

## 2021-02-08 NOTE — PROGRESS NOTES
Subjective:      Patient ID: Marciana Sacks is a 63 y.o. male. HPI   Marciana Sacks is status post repair of umbilical hernia with mesh on November 30,2020. The patient is convalescing well. Pain control is excellent using Percocet. Appetite is excellent. GI function is normal.   function is normal.  His pain is 3 out of 10 he is requesting more Percocet. He has returned to normal activities. He has returned to work. Review of Systems    Objective:   Physical Exam  Constitutional:       General: He is not in acute distress. Appearance: Normal appearance. HENT:      Mouth/Throat:      Mouth: Mucous membranes are moist.      Pharynx: Oropharynx is clear. Eyes:      Pupils: Pupils are equal, round, and reactive to light. Neck:      Comments: Neck is supple without any masses, no thyromegaly, trachea midline  Abdominal:       Musculoskeletal:      Comments: Normal gait   Skin:     Findings: No bruising, lesion or rash. Neurological:      Mental Status: He is alert and oriented to person, place, and time. Psychiatric:         Mood and Affect: Mood normal.         Judgment: Judgment normal.       /76   Temp 97.4 °F (36.3 °C) (Temporal)   Ht 6' 0.5\" (1.842 m)   Wt 294 lb (133.4 kg)   BMI 39.33 kg/m²   Assessment:      Post op pain  Surgically he is doing well      Plan:       I have recommended the pain management consult for him but he refuses at this time. He is requesting 1 more prescription for Percocet and he will not ask for another. If he calls for another prescription he understands that we will refer him to pain management. Percocet  The patient is instructed to return to see me as needed.            Demi Rdz MD

## 2021-02-08 NOTE — CARE COORDINATION
Telephone call with patient who indicated that he needs help with affording his inhalers. He stated that he has a deductible but did not know the amount at time of phone call. He is relying on samples from physician. Discussed Breo and he stated that he never got it filled. Discussed that their is a patient assistance program for [facility] but he would need to spend 600 dollars out of pocket. He has not met this out of pocket expense. Discussed Social Security Extra Help Program that was tried in past but patient never followed up with them regarding information requested. Patient was in favor of doing another Social Security Extra Help Program Application but was getting ready for an appointment. Patient expressed plan to contact this writer at a better time to do this application. Provided patient with contact information for [facility]. Discussed they could help with obtaining some of his prescribed medications. Discussed his housing situation. Patient would like to stay where he is living and get his rent reduced. He believes that their are people in his apartment that are Section 8 recipients. Discussed that patient would need to contact his landlorkenisha and [place] about his present apartment. Offered to send patient information on [id number], Public Housing for Older Adults and [employer]. Patient was not interested in this information at this time.

## 2021-02-09 ENCOUNTER — CARE COORDINATION (OUTPATIENT)
Dept: CARE COORDINATION | Age: 66
End: 2021-02-09

## 2021-02-09 DIAGNOSIS — J44.1 CHRONIC OBSTRUCTIVE PULMONARY DISEASE WITH ACUTE EXACERBATION (HCC): ICD-10-CM

## 2021-02-09 NOTE — TELEPHONE ENCOUNTER
Pharmacy is requesting medication refill.  Please approve or deny this request.    Rx requested:  Requested Prescriptions     Pending Prescriptions Disp Refills    albuterol sulfate  (90 Base) MCG/ACT inhaler [Pharmacy Med Name: ALBUTEROL HFA (PROAIR) INHALER] 8.5 Inhaler 1     Sig: INHALE 2 PUFFS INTO THE LUNGS EVERY 6 HOURS AS NEEDED FOR WHEEZING OR SHORTNESS OF BREATH         Last Office Visit:   12/15/2020      Next Visit Date:  Future Appointments   Date Time Provider Constance Espitia   3/15/2021 10:40 AM MD Romero [address]

## 2021-02-09 NOTE — CARE COORDINATION
Telephone call to patient. Message indicated that voicemail box was full and not accepting messages at this time.

## 2021-02-10 ENCOUNTER — CARE COORDINATION (OUTPATIENT)
Dept: CARE COORDINATION | Age: 66
End: 2021-02-10

## 2021-02-10 RX ORDER — ALBUTEROL SULFATE 90 UG/1
2 AEROSOL, METERED RESPIRATORY (INHALATION) EVERY 6 HOURS PRN
Qty: 8.5 INHALER | Refills: 1 | Status: SHIPPED | OUTPATIENT
Start: 2021-02-10 | End: 2021-03-29

## 2021-02-10 NOTE — CARE COORDINATION
Telephone call with patient. Discussed doing Social Security Extra Help Application today. Patient stated he was not feeling well and did not want to complete today. He asked that this writer call him next week in the afternoon to do application. Asked if he had talked to [facility]. He indicated that he did not call Delroytia Meek.

## 2021-02-12 DIAGNOSIS — N52.9 ERECTILE DYSFUNCTION, UNSPECIFIED ERECTILE DYSFUNCTION TYPE: Chronic | ICD-10-CM

## 2021-02-12 RX ORDER — SILDENAFIL 100 MG/1
100 TABLET, FILM COATED ORAL PRN
Qty: 10 TABLET | Refills: 0 | Status: SHIPPED | OUTPATIENT
Start: 2021-02-12 | End: 2021-03-15

## 2021-02-12 NOTE — TELEPHONE ENCOUNTER
Pharmacy is requesting medication refill.  Please approve or deny this request.    Rx requested:  Requested Prescriptions     Pending Prescriptions Disp Refills    sildenafil (VIAGRA) 100 MG tablet [Pharmacy Med Name: Sildenafil Citrate Oral Tablet 100 MG] 10 tablet 0     Sig: Take 1 tablet by mouth as needed for Erectile Dysfunction         Last Office Visit:   12/15/2020      Next Visit Date:  Future Appointments   Date Time Provider Constance Espitia   3/15/2021 10:40 AM MD Romero [address]

## 2021-02-15 ENCOUNTER — CARE COORDINATION (OUTPATIENT)
Dept: CARE COORDINATION | Age: 66
End: 2021-02-15

## 2021-02-16 ENCOUNTER — CARE COORDINATION (OUTPATIENT)
Dept: CARE COORDINATION | Age: 66
End: 2021-02-16

## 2021-02-16 NOTE — LETTER
[address]  [address]      Dear Taiwo Navarro,    I hope this letter finds you doing well! I am sending you confirmation of completing Social Security Extra Help Application. I hope you find the information helpful. Please look them over and let me know if you have any questions or need further assistance. You can contact me at any of the numbers listed below. Sincerely,    Uli Brantley, [address]  , [employer]  Cell Phone: [phone]  Email: [email or web address]

## 2021-03-01 ENCOUNTER — CARE COORDINATION (OUTPATIENT)
Dept: CARE COORDINATION | Age: 66
End: 2021-03-01

## 2021-03-01 NOTE — CARE COORDINATION
Telephone call to patient. Message indicated that mailbox was full and not accepting messages at this time.

## 2021-03-05 ENCOUNTER — CARE COORDINATION (OUTPATIENT)
Dept: CARE COORDINATION | Age: 66
End: 2021-03-05

## 2021-03-05 ASSESSMENT — ENCOUNTER SYMPTOMS: DYSPNEA ASSOCIATED WITH: EXERTION

## 2021-03-05 NOTE — CARE COORDINATION
Ambulatory Care Coordination Note  3/5/2021  CM Risk Score: 6  Charlson 10 Year Mortality Risk Score: 23%     ACC: Olga Lizarraga, RN    Summary Note:     Taiwo Navarro tells me that he is doing [place]. \"  He says that Dr Dane Nash switched him from the Symbicort to breo. I asked if he felt this was helping. He tells me that he thinks they are same. He does tell me that he is SOB but most this occurs at night. He tells me that he uses the nebulizer at [facility] and when he gets up to go to the bathroom at night he will either use his rescue inhaler or he will do a \"partial breathing treatment. \"  I asked about his CPAP and he states that he is wearing the CPAP every night but he does not feel that this is helping. He says that he will be seeing Dr Keri Lawton in a few weeks and he will talk with him about his breathing at night. He denies that he has any change in his daytime fatigue. He says that he does take a nap everyday but this has been a part of his routine for quite a few years. He continues to work with Alexsander Harrison our  regarding the cost of his medications. He states that he does have paperwork at the house and he needs to get this filled out. PLAN:  Taiwo Navarro will take all medications as prescribed. Taiwo Navarro will complete all paperwork for financial assistance to receive help if he qualifies. Taiwo Navarro will follow up with Dr Keri Lawton to discuss his SOB at [facility]. Taiwo Navarro will continue to monitor his symptoms utilizing the COPD zone tools. Care Coordination Interventions    Program Enrollment: Complex Care  Referral from Primary Care Provider: No  Suggested Interventions and Community Resources  Disease Association: In Process  Medication Assistance Program: In Process  Pharmacist: In Process  Social Work: In Process  Zone Management Tools:  In Process  Other Services or Interventions: Pharmacy referral initiate,  referral re: medication assistance and home needs  COPD zone tool and education packet initiated,           Goals Addressed                 This Visit's Progress     Conditions and Symptoms   On track     I will schedule office visits, as directed by my provider. I will keep my appointment or reschedule if I have to cancel. I will notify my provider of any barriers to my plan of care. I will follow my Zone Management tool to seek urgent or emergent care. I will notify my provider of any symptoms that indicate a worsening of my condition. Barriers: lack of motivation, lack of support, overwhelmed by complexity of regimen, and lack of education  Plan for overcoming my barriers: I will work with my ACM to increase my knowledge and self management skills for my overall health focusing on COPD. Confidence: 8/10  Anticipated Goal Completion Date: 03/30/2021              Prior to Admission medications    Medication Sig Start Date End Date Taking?  Authorizing Provider   sildenafil (VIAGRA) 100 MG tablet Take 1 tablet by mouth as needed for Erectile Dysfunction 2/12/21   Krista Velazquez MD   albuterol sulfate  (90 Base) MCG/ACT inhaler INHALE 2 PUFFS INTO THE LUNGS EVERY 6 HOURS AS NEEDED FOR WHEEZING OR SHORTNESS OF BREATH 2/10/21   Krista Velazquez MD   Fluticasone furoate-vilanterol (BREO ELLIPTA) 200-25 MCG/INH AEPB inhaler Inhale 1 puff into the lungs daily 2/4/21   Krista Velazquez MD   tiotropium (SPIRIVA HANDIHALER) 18 MCG inhalation capsule Inhale 1 capsule into the lungs daily 12/15/20   Krista Velazquez MD   polyethylene glycol [facility]) 17 GM/SCOOP powder Take 17 g by mouth daily 12/15/20   Krista Velazquez MD   pantoprazole (PROTONIX) 40 MG tablet Take 1 tablet by mouth daily 12/15/20   Krista Velazquez MD   montelukast (SINGULAIR) 10 MG tablet Take 1 tablet by mouth nightly 12/15/20   Krista Velazquez MD   lovastatin (MEVACOR) 10 MG tablet Take 1 tablet by mouth nightly 12/15/20   Krista Velazquez MD   ipratropium-albuterol (DUONEB) 0.5-2.5 (3) MG/3ML SOLN nebulizer solution Take 3 mLs by nebulization every 6 hours as needed for Shortness of Breath 12/15/20   Rojas Moss MD   fluticasone [facility]) 50 MCG/ACT nasal spray 2 sprays by Nasal route daily 12/15/20   Rojas Moss MD   escitalopram (LEXAPRO) 10 MG tablet Take 1 tablet by mouth daily 12/15/20   Rojas Moss MD   amLODIPine (NORVASC) 10 MG tablet Take 1 tablet by mouth daily 12/15/20   Rojas Moss MD   acetaminophen (TYLENOL) 500 MG tablet Take 1 tablet by mouth every 8 hours as needed for Pain 12/15/20   Rojas Moss MD   traZODone (DESYREL) 50 MG tablet Take 1 tablet by mouth nightly 12/15/20   Rojas Moss MD   furosemide (LASIX) 20 MG tablet Take 1 tablet by mouth daily 12/9/20   Susan Keith MD   Handicap Placard MISC by Does not apply route DX: OSTEOARTHRITIS OF BOTH KNEES (M17.0), COPD (J44.9)     EXPIRES: 02/2024 2/1/19   Rojas Moss MD       Future Appointments   Date Time Provider Constance Espitia   3/15/2021 10:40 AM Rojas Moss MD [address]     ,   COPD Assessment    Does the patient understand envrionmental exposure?: No  Is the patient able to verbalize Rescue vs. Long Acting medications?: Yes  Does the patient have a nebulizer?: Yes  Does the patient use a space with inhaled medications?: No     Shortness of breath (worse than baseline)         Symptoms:  COPD associated increased fatigue: Pos      Symptom course: no change  Breathlessness: exertion  Increase use of rapid acting/rescue inhaled medications?: No  Change in chronic cough?: No/At Baseline  Change in sputum?: No/At Baseline  Sputum characteristics: Clear  Self Monitoring - SaO2: No     ,   General Assessment    Do you have any symptoms that are causing concern?: No     , 6 and Charlson Mortality Risk Score: 23%

## 2021-03-15 ENCOUNTER — VIRTUAL VISIT (OUTPATIENT)
Dept: FAMILY MEDICINE CLINIC | Age: 66
End: 2021-03-15
Payer: MEDICARE

## 2021-03-15 ENCOUNTER — CARE COORDINATION (OUTPATIENT)
Dept: CARE COORDINATION | Age: 66
End: 2021-03-15

## 2021-03-15 DIAGNOSIS — E66.01 MORBID OBESITY DUE TO EXCESS CALORIES (HCC): Chronic | ICD-10-CM

## 2021-03-15 DIAGNOSIS — D64.9 ANEMIA, UNSPECIFIED TYPE: Chronic | ICD-10-CM

## 2021-03-15 DIAGNOSIS — Z72.0 TOBACCO USE: ICD-10-CM

## 2021-03-15 DIAGNOSIS — E78.5 HYPERLIPIDEMIA, UNSPECIFIED HYPERLIPIDEMIA TYPE: Chronic | ICD-10-CM

## 2021-03-15 DIAGNOSIS — Z12.5 SCREENING FOR PROSTATE CANCER: ICD-10-CM

## 2021-03-15 DIAGNOSIS — I10 ESSENTIAL HYPERTENSION: Primary | Chronic | ICD-10-CM

## 2021-03-15 DIAGNOSIS — K21.9 GASTROESOPHAGEAL REFLUX DISEASE, UNSPECIFIED WHETHER ESOPHAGITIS PRESENT: ICD-10-CM

## 2021-03-15 DIAGNOSIS — M10.9 GOUT, UNSPECIFIED CAUSE, UNSPECIFIED CHRONICITY, UNSPECIFIED SITE: Chronic | ICD-10-CM

## 2021-03-15 DIAGNOSIS — F41.8 ANXIETY WITH DEPRESSION: ICD-10-CM

## 2021-03-15 DIAGNOSIS — F14.10 COCAINE ABUSE (HCC): Chronic | ICD-10-CM

## 2021-03-15 DIAGNOSIS — I47.1 SUPRAVENTRICULAR TACHYCARDIA (HCC): Chronic | ICD-10-CM

## 2021-03-15 DIAGNOSIS — J44.9 CHRONIC OBSTRUCTIVE PULMONARY DISEASE, UNSPECIFIED COPD TYPE (HCC): Chronic | ICD-10-CM

## 2021-03-15 DIAGNOSIS — F10.10 ALCOHOL ABUSE: Chronic | ICD-10-CM

## 2021-03-15 DIAGNOSIS — R73.03 PRE-DIABETES: Chronic | ICD-10-CM

## 2021-03-15 DIAGNOSIS — J30.2 SEASONAL ALLERGIES: Chronic | ICD-10-CM

## 2021-03-15 DIAGNOSIS — N52.9 ERECTILE DYSFUNCTION, UNSPECIFIED ERECTILE DYSFUNCTION TYPE: Chronic | ICD-10-CM

## 2021-03-15 PROCEDURE — 99442 PR PHYS/QHP TELEPHONE EVALUATION 11-20 MIN: CPT | Performed by: FAMILY MEDICINE

## 2021-03-15 RX ORDER — MELOXICAM 15 MG/1
TABLET ORAL
Status: ON HOLD | COMMUNITY
Start: 2021-03-03 | End: 2021-09-19 | Stop reason: HOSPADM

## 2021-03-15 RX ORDER — FLUTICASONE PROPIONATE 50 MCG
2 SPRAY, SUSPENSION (ML) NASAL DAILY
Qty: 3 BOTTLE | Refills: 1 | Status: SHIPPED | OUTPATIENT
Start: 2021-03-15 | End: 2021-04-29 | Stop reason: SDUPTHER

## 2021-03-15 ASSESSMENT — ENCOUNTER SYMPTOMS
VOMITING: 0
NAUSEA: 0
DIARRHEA: 0
CONSTIPATION: 0
SINUS PAIN: 0
BLOOD IN STOOL: 0
ANAL BLEEDING: 0
TROUBLE SWALLOWING: 0
RHINORRHEA: 1
CHEST TIGHTNESS: 0
WHEEZING: 1
SHORTNESS OF BREATH: 0
SORE THROAT: 0
COUGH: 1
ABDOMINAL PAIN: 0

## 2021-03-15 ASSESSMENT — PATIENT HEALTH QUESTIONNAIRE - PHQ9
SUM OF ALL RESPONSES TO PHQ9 QUESTIONS 1 & 2: 0
2. FEELING DOWN, DEPRESSED OR HOPELESS: 0
SUM OF ALL RESPONSES TO PHQ QUESTIONS 1-9: 0

## 2021-03-15 NOTE — ASSESSMENT & PLAN NOTE
Stable. Respiratory status currently at baseline with no reported change in activity tolerance. Continue current medication.   I again stressed with patient the importance of complete tobacco cessation

## 2021-03-15 NOTE — ASSESSMENT & PLAN NOTE
Stable. Patient reports continued sobriety since September 2020. I stressed with him the importance of avoidance of cocaine use, particularly with his known history of supraventricular tachycardia.   We will continue to follow over time

## 2021-03-15 NOTE — PROGRESS NOTES
Boni Harada (:  1955) is a 63 y.o. male,Established patient, here for evaluation of the following chief complaint(s): Hyperlipidemia (Phone visit today for a chronic disease check. ) and Hypertension      ASSESSMENT/PLAN:  1. Essential hypertension  Assessment & Plan:  Blood pressure within normal range on most recent testing. Continue current medication  Orders:  -     CBC Auto Differential; Future  -     Comprehensive Metabolic Panel; Future  -     TSH with Reflex; Future  2. Supraventricular tachycardia ([address]  Assessment & Plan:  Stable. Patient currently asymptomatic. We will continue to follow over time  Orders:  -     CBC Auto Differential; Future  -     TSH with Reflex; Future  3. Hyperlipidemia, unspecified hyperlipidemia type  -     Comprehensive Metabolic Panel; Future  -     Lipid Panel; Future  4. Chronic obstructive pulmonary disease, unspecified COPD type ([facility]  Assessment & Plan:  Stable. Respiratory status currently at baseline with no reported change in activity tolerance. Continue current medication. I again stressed with patient the importance of complete tobacco cessation  5. Tobacco use  Assessment & Plan:  Patient contemplative and is aware that smoking cessation would be the best thing for overall health. He is weaned down on his smoking use but is not ready to set a quit date. We reviewed cessation aids including medication and nicotine replacement therapy. Will continue to encourage complete smoking cessation at subsequent visits. 6. Pre-diabetes  -     Comprehensive Metabolic Panel; Future  -     Hemoglobin A1C; Future  7. Gastroesophageal reflux disease, unspecified whether esophagitis present  Assessment & Plan:  Stable. Managed well with use of medication and avoidance of dietary triggers. 8. Alcohol abuse  Assessment & Plan:  Patient reports returning to regular consumption of alcohol several days a week.   I reviewed with him that his reported consumption of 48 ounces of alcohol 2 to 4 days a week was excessive, particularly in light of his known history of alcohol abuse. I stressed with him the importance of remaining sober and abstaining from any alcohol use. Patient is not ready to change his behavior, we will continue to follow over time and we will continue to encourage complete sobriety. Orders:  -     CBC Auto Differential; Future  -     Comprehensive Metabolic Panel; Future  9. Cocaine abuse ([address]  Assessment & Plan:  Stable. Patient reports continued sobriety since September 2020. I stressed with him the importance of avoidance of cocaine use, particularly with his known history of supraventricular tachycardia. We will continue to follow over time  10. Anemia, unspecified type  -     CBC Auto Differential; Future  -     Comprehensive Metabolic Panel; Future  11. Gout, unspecified cause, unspecified chronicity, unspecified site  -     Comprehensive Metabolic Panel; Future  -     Uric Acid; Future  12. Anxiety with depression  -     TSH with Reflex; Future  13. Erectile dysfunction, unspecified erectile dysfunction type  -     TSH with Reflex; Future  14. Seasonal allergies  -     fluticasone (FLONASE) 50 MCG/ACT nasal spray; 2 sprays by Nasal route daily, Disp-3 Bottle, R-1Normal  15. Morbid obesity due to excess calories [facility])  Assessment & Plan:  Patient counseled on healthy dietary choices and the benefits of a lower salt and/or lower carbohydrate diet as appropriate. Patient also counseled on benefits of moderate intensity cardiovascular exercise for 20-30 minutes at least 3-5 days a week. Advice was given to make small changes over time, setting smaller achievable goals until recommended lifestyle changes are reached. Orders:  -     TSH with Reflex; Future  16. Screening for prostate cancer  -     PSA screening; Future      Return in about 6 months (around 9/15/2021) for Annual Wellness Visit in 4-6 Months.     SUBJECTIVE/OBJECTIVE:  HPI    Patient presents for virtual telephone visit for chronic hypertension, SVT, hyperlipidemia, COPD, prediabetes, GERD, anemia, gout, EtOH abuse, anxiety/depression, and obesity follow-up. Patient reports taking medications as prescribed since the most recent visit. They deny adhering to any specific lower carbohydrate or lower salt diet. They deny any routine exercise outside of normal day-to-day activity. Patient denies any chest pain, dyspnea, palpitations, lightheadedness, dizziness, worsening lower extremity edema, or syncope. Patient denies any dyspnea at rest, persistent wheezing, worsening cough, chest tightness, or limitation in normal day-to-day activity due to breathing. Patient denies any change in appetite, weight changes, dysphagia, early satiety, increased belching, sour brash/heartburn, abdominal pain, nausea/vomiting, diarrhea, constipation, melena, rectal bleeding, or hematochezia. They deny any polyuria or polydipsia, new onset vision changes, or new onset paresthesias. Patient denies any worsening mood, SI/HI, or self harming behaviors. They deny any worsening anxiety or panic attacks. They deny any worsening fatigue, decreased appetite, or problems with sleep. Patient reports last cocaine use was in September 2020. They report drinking continued sobriety from alcohol since August 2018. Review of Systems   Constitutional: Negative for appetite change, chills, diaphoresis, fatigue, fever and unexpected weight change. HENT: Positive for congestion (chronic, intermittent with seasonal change), postnasal drip (chronic, intermittent with seasonal change), rhinorrhea (chronic, intermittent with seasonal change) and sneezing. Negative for ear pain, sinus pain, sore throat and trouble swallowing. Eyes: Negative for visual disturbance. Respiratory: Positive for cough (intermittent, chronic) and wheezing (intermittent, chronic). Negative for chest tightness and shortness of breath. Cardiovascular: Negative for chest pain, palpitations and leg swelling. No orthopnea, No PND   Gastrointestinal: Negative for abdominal pain, anal bleeding, blood in stool, constipation, diarrhea, nausea and vomiting. No heartburn, No melena   Endocrine: Negative for cold intolerance, heat intolerance, polydipsia, polyphagia and polyuria. Genitourinary: Negative for dysuria and hematuria. Musculoskeletal: Negative for myalgias. Skin: Negative for rash. Neurological: Negative for dizziness, syncope, weakness, light-headedness, numbness and headaches. Psychiatric/Behavioral: Negative for dysphoric mood, self-injury and suicidal ideas. The patient is not nervous/anxious. No flowsheet data found. Physical Exam    \plain    On this date 3/15/2021 I have spent 16 minutes reviewing previous notes, test results and face to face (virtual) with the patient discussing the diagnosis and importance of compliance with the treatment plan as well as documenting on the day of the visit. Consuello Danger, was evaluated through a synchronous (real-time) audio-video encounter. The patient (or guardian if applicable) is aware that this is a billable service. Verbal consent to proceed has been obtained within the past 12 months. The visit was conducted pursuant to the emergency declaration under the [address] [place] [address] authority and the [employer] General Act. Patient identification was verified, and a caregiver was present when appropriate. The patient was located in a state where the provider was credentialed to provide care. An electronic signature was used to authenticate this note.     --Ryan Burton MD

## 2021-03-15 NOTE — ASSESSMENT & PLAN NOTE
Patient reports returning to regular consumption of alcohol several days a week. I reviewed with him that his reported consumption of 48 ounces of alcohol 2 to 4 days a week was excessive, particularly in light of his known history of alcohol abuse. I stressed with him the importance of remaining sober and abstaining from any alcohol use. Patient is not ready to change his behavior, we will continue to follow over time and we will continue to encourage complete sobriety.

## 2021-03-15 NOTE — CARE COORDINATION
Telephone call to patient. Message indicated that mail box was full and not accepting messages at this time.

## 2021-03-17 ENCOUNTER — TELEPHONE (OUTPATIENT)
Dept: FAMILY MEDICINE CLINIC | Age: 66
End: 2021-03-17

## 2021-03-17 DIAGNOSIS — K42.9 UMBILICAL HERNIA WITHOUT OBSTRUCTION AND WITHOUT GANGRENE: Primary | ICD-10-CM

## 2021-03-17 NOTE — TELEPHONE ENCOUNTER
Yee Carroll from Dr. David Lemus office called in they are requesting a referral for a past appt 9-4-2020 pt.  Was seen for umbilical hernia

## 2021-03-29 ENCOUNTER — CARE COORDINATION (OUTPATIENT)
Dept: CARE COORDINATION | Age: 66
End: 2021-03-29

## 2021-03-29 NOTE — CARE COORDINATION
Telephone call to patient. Unable to leave message as voicemail box was full and not accepting messages.

## 2021-04-02 ENCOUNTER — TELEPHONE (OUTPATIENT)
Dept: FAMILY MEDICINE CLINIC | Age: 66
End: 2021-04-02

## 2021-04-02 ENCOUNTER — OFFICE VISIT (OUTPATIENT)
Dept: FAMILY MEDICINE CLINIC | Age: 66
End: 2021-04-02
Payer: MEDICARE

## 2021-04-02 VITALS
SYSTOLIC BLOOD PRESSURE: 120 MMHG | TEMPERATURE: 98 F | DIASTOLIC BLOOD PRESSURE: 70 MMHG | WEIGHT: 288 LBS | RESPIRATION RATE: 12 BRPM | HEART RATE: 72 BPM | OXYGEN SATURATION: 98 % | HEIGHT: 72 IN | BODY MASS INDEX: 39.01 KG/M2

## 2021-04-02 DIAGNOSIS — L30.9 DERMATITIS: ICD-10-CM

## 2021-04-02 DIAGNOSIS — R06.02 SOB (SHORTNESS OF BREATH): Primary | ICD-10-CM

## 2021-04-02 DIAGNOSIS — J44.1 COPD EXACERBATION (HCC): ICD-10-CM

## 2021-04-02 PROCEDURE — 99214 OFFICE O/P EST MOD 30 MIN: CPT | Performed by: NURSE PRACTITIONER

## 2021-04-02 RX ORDER — AZITHROMYCIN 250 MG/1
250 TABLET, FILM COATED ORAL SEE ADMIN INSTRUCTIONS
Qty: 6 TABLET | Refills: 0 | Status: SHIPPED | OUTPATIENT
Start: 2021-04-02 | End: 2021-04-07

## 2021-04-02 RX ORDER — METHYLPREDNISOLONE 4 MG/1
TABLET ORAL
Qty: 1 KIT | Refills: 0 | Status: SHIPPED | OUTPATIENT
Start: 2021-04-02 | End: 2021-06-23

## 2021-04-02 NOTE — PATIENT INSTRUCTIONS
steroid pills. Preventing an exacerbation  · Do not smoke. This is the most important step you can take to prevent more damage to your lungs and prevent problems. If you already smoke, it is never too late to stop. If you need help quitting, talk to your doctor about stop-smoking programs and medicines. These can increase your chances of quitting for good. · Take your daily medicines as prescribed. · Avoid colds and flu. ? Get a pneumococcal vaccine. ? Get a flu vaccine each year, as soon as it is available. Ask those you live or work with to do the same, so they will not get the flu and infect you. ? Try to stay away from people with colds or the flu. ? Wash your hands often. · Avoid secondhand smoke; air pollution; cold, dry air; hot, humid air; and high altitudes. Stay at home with your windows closed when air pollution is bad. · Learn breathing techniques for COPD, such as breathing through pursed lips. These techniques can help you breathe easier during an exacerbation. When should you call for help? Call 911 anytime you think you may need emergency care. For example, call if:    · You have severe trouble breathing.     · You have severe chest pain. Call your doctor now or seek immediate medical care if:    · You have new or worse shortness of breath.     · You develop new chest pain.     · You are coughing more deeply or more often, especially if you notice more mucus or a change in the color of your mucus.     · You cough up blood.     · You have new or increased swelling in your legs or belly.     · You have a fever. Watch closely for changes in your health, and be sure to contact your doctor if:    · You need to use your antibiotic or steroid pills.     · Your symptoms are getting worse. Where can you learn more? Go to [email or web address]://[email or web address].De[employer]licks. org and sign in to your [employer] account.  Enter U867 in the [employer] box to learn more about \"COPD Exacerbation Plan: Care Instructions. \"     If you do not have an account, please click on the \"Sign Up Now\" link. Current as of: October 26, 2020               Content Version: 12.8  © 1448-2465 Healthwise, Incorporated. Care instructions adapted under license by [facility] ([facility]). If you have questions about a medical condition or this instruction, always ask your healthcare professional. [address] any warranty or liability for your use of this information.

## 2021-04-02 NOTE — PROGRESS NOTES
[facility] [address] Encounter  CHIEF COMPLAINT       Chief Complaint   Patient presents with    Insect Bite     C/o bug bite left foot 1x week     Cough    Shortness of Breath     HISTORY OF PRESENT ILLNESS   Mateo Zazueta is a 63 y.o. male who presents with:  HPI   Dry skin  Patient reports a bug bite on his left ankle, not sure when. However, reports dry itchy skin (LE and UE). Dry skin, for months. Seen PCP in march, ordered blood work, but  did not get it  done. Patient reports no change in his urine habits (no  Oliguria, hematuria, or flank pain). COPD/SOB/Cough  Patient reports x2-3 days of increased cough with mucus-(thick white). +shortness of breath,especially with activity. H/o COPD. Using nebulizer and inhalers with minimal response. Reports experiencing the above sx  with season change, especially in the spring. No F/C. No congestion/Rhinorrhea. No body aches or fatigue. No sore throat or otalgia. He denies CP, palpitations,or syncope. REVIEW OF SYSTEMS     Review of Systems   All other systems reviewed and are negative.     PAST MEDICAL HISTORY         Diagnosis Date    Alcohol abuse 10/27/2016    Anemia     Asthma     Cocaine abuse ([address] 10/27/2016    COPD ([facility] obstructive [facility] disease) [facility]     Depression 04/27/2020    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    Drug-seeking behavior 04/14/2015    Edema 12/10/2015    Erectile dysfunction     Gastroesophageal reflux disease 12/17/2018    Gout 10/27/2016    History of arthroscopy of both knees 10/27/2016    House dust mite allergy 04/21/2014    Hyperlipidemia     Hypertension 10/27/2016    Injury to heart 10/27/2016    Insomnia 12/17/2018    Medical non-compliance 02/22/2014    Morbid obesity due to excess calories ([address] 12/08/2016    Osteoarthritis of both knees 12/08/2016    Personal history of tobacco use     Pneumonia 12/04/2016    caused hospital admission    Pre-diabetes 10/27/2016    Seasonal allergies 04/21/2014    Severe persistent asthma 10/27/2016    Severe sleep apnea 04/14/2015    Supraventricular tachycardia ([facility] [address] 10/27/2016     SURGICAL HISTORY     Patient  has a past surgical history that includes Anterior cruciate ligament repair; Cardiac catheterization; Total knee arthroplasty (Left, 8/9/2019); Colonoscopy (N/A, 7/15/2020); Umbilical hernia repair (N/A, 11/30/2020); and hernia repair.   CURRENT MEDICATIONS       Previous Medications    ACETAMINOPHEN (TYLENOL) 500 MG TABLET    Take 1 tablet by mouth every 8 hours as needed for Pain    ALBUTEROL SULFATE  (90 BASE) MCG/ACT INHALER    Inhale 2 puffs into the lungs every 6 hours as needed for Wheezing or Shortness of Breath    AMLODIPINE (NORVASC) 10 MG TABLET    Take 1 tablet by mouth daily    ESCITALOPRAM (LEXAPRO) 10 MG TABLET    Take 1 tablet by mouth daily    FLUTICASONE (FLONASE) 50 MCG/ACT NASAL SPRAY    2 sprays by Nasal route daily    FLUTICASONE FUROATE-VILANTEROL (BREO ELLIPTA) 200-25 MCG/INH AEPB INHALER    Inhale 1 puff into the lungs daily    FUROSEMIDE (LASIX) 20 MG TABLET    Take 1 tablet by mouth daily    HANDICAP PLACARD MISC    by Does not apply route DX: OSTEOARTHRITIS OF BOTH KNEES (M17.0), COPD (J44.9)     EXPIRES: 02/2024    IPRATROPIUM-ALBUTEROL (DUONEB) 0.5-2.5 (3) MG/3ML SOLN NEBULIZER SOLUTION    Take 3 mLs by nebulization every 6 hours as needed for Shortness of Breath    LOVASTATIN (MEVACOR) 10 MG TABLET    Take 1 tablet by mouth nightly    MELOXICAM (MOBIC) 15 MG TABLET    TAKE 1 TABLET BY MOUTH EVERY DAY WITH FOOD    MONTELUKAST (SINGULAIR) 10 MG TABLET    Take 1 tablet by mouth nightly    PANTOPRAZOLE (PROTONIX) 40 MG TABLET    Take 1 tablet by mouth daily    POLYETHYLENE GLYCOL (GLYCOLAX) 17 GM/SCOOP POWDER    Take 17 g by mouth daily    SILDENAFIL (VIAGRA) 100 MG TABLET    Take 1 tablet by mouth as needed for Erectile Dysfunction    TRAZODONE (DESYREL) 50 MG TABLET    Take 1 tablet by mouth nightly     ALLERGIES     Patient is is allergic to fish-derived products; iodine; seasonal; and pcn [penicillins]. FAMILY HISTORY     Patient'sfamily history includes Arthritis in his mother; Asthma in his mother; Cancer in his maternal grandfather; Diabetes in his father; High Cholesterol in his mother; Hypertension in an other family member; Other in his mother; Stroke in his maternal grandmother. HISTORY     Patient  reports that he has been smoking cigarettes and cigars. He started smoking about 32 years ago. He has a 7.50 pack-year smoking history. He has never used smokeless tobacco. He reports current alcohol use of about 4.0 standard drinks of alcohol per week. He reports current drug use. Drugs: Cocaine and Marijuana. PHYSICAL EXAM     VITALS  BP: 120/70, Temp: 98 °F (36.7 °C), Pulse: 72, Resp: 12, SpO2: 98 %  Physical Exam  Vitals signs and nursing note reviewed. Constitutional:       General: He is not in acute distress. Appearance: Normal appearance. He is well-developed. He is obese. He is not ill-appearing, toxic-appearing or diaphoretic. HENT:      Head: Normocephalic and atraumatic. Right Ear: Tympanic membrane, ear canal and external ear normal. There is no impacted cerumen. Left Ear: Tympanic membrane, ear canal and external ear normal. There is no impacted cerumen. Nose: Nose normal. No congestion or rhinorrhea. Mouth/Throat:      Mouth: Mucous membranes are moist.      Pharynx: Oropharynx is clear. No oropharyngeal exudate or posterior oropharyngeal erythema. Eyes:      General: No scleral icterus. Right eye: No discharge. Left eye: No discharge. Extraocular Movements: Extraocular movements intact. Conjunctiva/sclera: Conjunctivae normal.      Pupils: Pupils are equal, round, and reactive to light. Neck:      Musculoskeletal: Normal range of motion and neck supple.    Cardiovascular:      Rate and Rhythm: Normal rate and regular rhythm. Pulses: Normal pulses. Heart sounds: Normal heart sounds. No murmur. Pulmonary:      Effort: Pulmonary effort is normal. No tachypnea, bradypnea, accessory muscle usage, prolonged expiration or respiratory distress. Breath sounds: No stridor or decreased air movement. Examination of the right-upper field reveals rales. Examination of the left-upper field reveals rales. Examination of the right-lower field reveals rales. Examination of the left-lower field reveals rales. Wheezing (throughout ) and rales present. No rhonchi. Chest:      Chest wall: No tenderness. Abdominal:      General: Abdomen is protuberant. Bowel sounds are normal. There is no distension. Palpations: Abdomen is soft. There is no mass. Tenderness: There is no abdominal tenderness. There is no guarding or rebound. Genitourinary:     Comments: deferred  Musculoskeletal: Normal range of motion. Lymphadenopathy:      Cervical: No cervical adenopathy. Skin:     General: Skin is warm and dry. Coloration: Skin is not ashen, cyanotic, jaundiced or pale. Findings: Rash present. No abscess, bruising, erythema, signs of injury or lesion. Rash is scaling and urticarial.      Comments: Dry scaly skin noted on bilateral lower and upper extremities. No erythema or drainage noted. Neurological:      General: No focal deficit present. Mental Status: He is alert and oriented to person, place, and time. Motor: No weakness. Gait: Gait normal.   Psychiatric:         Behavior: Behavior normal.         Thought Content: Thought content normal.         Judgment: Judgment normal.       READY CARE COURSE   Labs:  No results found for this visit on 04/02/21. IMAGING:  XR CHEST STANDARD (2 VW)    (Results Pending)     Scheduled Meds:  Continuous Infusions:  PRN Meds:. PROCEDURES:  FINAL IMPRESSION      1. SOB (shortness of breath)    2. COPD exacerbation ([address]    3.  Dermatitis      DISPOSITION/PLAN COPD exacerbation:  - Antibiotic Instructions: Complete the full course of antibiotics as ordered. Take each dose with a small snack or meal to lessen potential GI upset. To prevent antibiotic resistance, please take medication as ordered and for the full duration even if you start to feel better. Consider intake of yogurt or probiotic during antibiotic use and for a few days after to help reduce the risk of developing a secondary infection. Take the yogurt or probiotic at least 2 hours after taking the antibiotic. - Medrol dose pack  - CXR pending.  - Nebulizer every 6 hours  - Albuterol inhaler every 6 hours as needed for SOB/wheezing  - ED if SOB worsens    Dermatitis:  - Use mild soaps/detergents---unscented. - Antihistamine  - Ammonium Lactate lotion twice daily.  - Off note---patient recommended to get labs drawn that was ordered by PCP ASAP. Return if symptoms worsen or fail to improve by tomorrow, for follow-up with PCP. Side effects and adverse effects of any medication prescribed today, as well as treatment plan/rationale, follow-up care, and result expectations have been discussed with the patient. Expresses understanding and desires to proceed with treatment plan. Discussed signs and symptoms which require immediate follow-up in ED/call to 911. Understanding verbalized. I have reviewed and updated the electronic medical record. PATIENT REFERRED TO:  Return in about 1 week (around 4/9/2021). - with PCP    DISCHARGE MEDICATIONS:  New Prescriptions    AMMONIUM LACTATE 10 % CREA    Rub into skin thoroughly. Apply twice daily to skin    AZITHROMYCIN (ZITHROMAX) 250 MG TABLET    Take 1 tablet by mouth See Admin Instructions for 5 days 500mg on day 1 followed by 250mg on days 2 - 5    METHYLPREDNISOLONE (MEDROL, PORTER,) 4 MG TABLET    Take by mouth. Cannot display discharge medications since this is not an admission.        Darrel Villatoro, APRN - CNP

## 2021-04-06 ENCOUNTER — TELEPHONE (OUTPATIENT)
Dept: FAMILY MEDICINE CLINIC | Age: 66
End: 2021-04-06

## 2021-04-06 NOTE — TELEPHONE ENCOUNTER
Patient calling to follow up. [facility] doesn't have the lotion that was prescribed to him. They called on Friday, but I see no record of that call. Please advise and resend for a different cream if possible.  Ab

## 2021-04-06 NOTE — TELEPHONE ENCOUNTER
Ammonium Lactate cream Rx changed from 10% to 12%, spoke with [facility] pharmacist.     Please make sure the patient has a F/U with Dr. Vita Shearer to see if the medication is helping and he should also F/U after being treated for COPD exac in [facility] on 4/2.

## 2021-04-06 NOTE — TELEPHONE ENCOUNTER
Patient was seen in [facility] on Friday 4/2, with multiple complaints. Patient with dermatitis/eczema. He was prescribed Ammonium Lactate cream. Patient reports [facility] does not have this in stock. ---Recommend he request [facility] to order the cream or find another pharmacy that carries it. - If he needs to have this Rx sent to a different pharmacy he will call back.

## 2021-04-12 ENCOUNTER — CARE COORDINATION (OUTPATIENT)
Dept: CARE COORDINATION | Age: 66
End: 2021-04-12

## 2021-04-12 NOTE — CARE COORDINATION
Telephone call with patient who indicated that he was approved for Social Security Extra Help Program.  He indicated that he is able to afford his medications. He did state that his Duane Fly is costing him 50 dollars. Encouraged him to contact [facility] for assistance with Duane Fly. Discussed with patient if Ely Barber cannot help him with Duane Fly could look into patient assistance program with drug . Discussed rent and he denied being behind at this time. No other concerns were voiced at time of phone call.

## 2021-04-12 NOTE — CARE COORDINATION
Telephone call to patient. Unable to leave message as voicemail was full and not accepting messages.

## 2021-04-15 ENCOUNTER — HOSPITAL ENCOUNTER (OUTPATIENT)
Dept: LAB | Age: 66
Discharge: HOME | End: 2021-04-15
Payer: MEDICARE

## 2021-04-15 DIAGNOSIS — I10 ESSENTIAL HYPERTENSION: Chronic | ICD-10-CM

## 2021-04-15 DIAGNOSIS — F41.8 ANXIETY WITH DEPRESSION: ICD-10-CM

## 2021-04-15 DIAGNOSIS — D64.9 ANEMIA, UNSPECIFIED TYPE: Chronic | ICD-10-CM

## 2021-04-15 DIAGNOSIS — F10.10 ALCOHOL ABUSE: Chronic | ICD-10-CM

## 2021-04-15 DIAGNOSIS — I47.1 SUPRAVENTRICULAR TACHYCARDIA (HCC): Chronic | ICD-10-CM

## 2021-04-15 DIAGNOSIS — R73.03 PRE-DIABETES: Chronic | ICD-10-CM

## 2021-04-15 DIAGNOSIS — E78.5 HYPERLIPIDEMIA, UNSPECIFIED HYPERLIPIDEMIA TYPE: Chronic | ICD-10-CM

## 2021-04-15 DIAGNOSIS — M10.9 GOUT, UNSPECIFIED CAUSE, UNSPECIFIED CHRONICITY, UNSPECIFIED SITE: Chronic | ICD-10-CM

## 2021-04-15 DIAGNOSIS — Z12.5 SCREENING FOR PROSTATE CANCER: ICD-10-CM

## 2021-04-15 DIAGNOSIS — N52.9 ERECTILE DYSFUNCTION, UNSPECIFIED ERECTILE DYSFUNCTION TYPE: Chronic | ICD-10-CM

## 2021-04-15 DIAGNOSIS — E66.01 MORBID OBESITY DUE TO EXCESS CALORIES (HCC): Chronic | ICD-10-CM

## 2021-04-15 LAB
ALBUMIN SERPL-MCNC: 3.9 G/DL (ref 3.5–4.6)
ALP BLD-CCNC: 112 U/L (ref 35–104)
ALT SERPL-CCNC: 21 U/L (ref 0–41)
ANION GAP SERPL CALCULATED.3IONS-SCNC: 11 MEQ/L (ref 9–15)
AST SERPL-CCNC: 23 U/L (ref 0–40)
BASOPHILS ABSOLUTE: 0.1 K/UL (ref 0–0.2)
BASOPHILS RELATIVE PERCENT: 0.6 %
BILIRUB SERPL-MCNC: 0.5 MG/DL (ref 0.2–0.7)
BUN BLDV-MCNC: 8 MG/DL (ref 8–23)
CALCIUM SERPL-MCNC: 8.6 MG/DL (ref 8.5–9.9)
CHLORIDE BLD-SCNC: 104 MEQ/L (ref 95–107)
CHOLESTEROL, TOTAL: 172 MG/DL (ref 0–199)
CO2: 23 MEQ/L (ref 20–31)
CREAT SERPL-MCNC: 1 MG/DL (ref 0.7–1.2)
EOSINOPHILS ABSOLUTE: 0.5 K/UL (ref 0–0.7)
EOSINOPHILS RELATIVE PERCENT: 5.3 %
GFR AFRICAN AMERICAN: >60
GFR NON-AFRICAN AMERICAN: >60
GLOBULIN: 3 G/DL (ref 2.3–3.5)
GLUCOSE BLD-MCNC: 78 MG/DL (ref 70–99)
HBA1C MFR BLD: 5.7 % (ref 4.8–5.9)
HCT VFR BLD CALC: 41.4 % (ref 42–52)
HDLC SERPL-MCNC: 50 MG/DL (ref 40–59)
HEMOGLOBIN: 13.9 G/DL (ref 14–18)
LDL CHOLESTEROL CALCULATED: 100 MG/DL (ref 0–129)
LYMPHOCYTES ABSOLUTE: 2.2 K/UL (ref 1–4.8)
LYMPHOCYTES RELATIVE PERCENT: 23.3 %
MCH RBC QN AUTO: 31.8 PG (ref 27–31.3)
MCHC RBC AUTO-ENTMCNC: 33.5 % (ref 33–37)
MCV RBC AUTO: 95 FL (ref 80–100)
MONOCYTES ABSOLUTE: 1 K/UL (ref 0.2–0.8)
MONOCYTES RELATIVE PERCENT: 11.1 %
NEUTROPHILS ABSOLUTE: 5.6 K/UL (ref 1.4–6.5)
NEUTROPHILS RELATIVE PERCENT: 59.7 %
PDW BLD-RTO: 16.1 % (ref 11.5–14.5)
PLATELET # BLD: 344 K/UL (ref 130–400)
POTASSIUM SERPL-SCNC: 4.4 MEQ/L (ref 3.4–4.9)
PROSTATE SPECIFIC ANTIGEN: 1.53 NG/ML (ref 0–5.4)
RBC # BLD: 4.36 M/UL (ref 4.7–6.1)
SODIUM BLD-SCNC: 138 MEQ/L (ref 135–144)
TOTAL PROTEIN: 6.9 G/DL (ref 6.3–8)
TRIGL SERPL-MCNC: 112 MG/DL (ref 0–150)
TSH REFLEX: 1.54 UIU/ML (ref 0.44–3.86)
URIC ACID, SERUM: 4.8 MG/DL (ref 3.4–7)
WBC # BLD: 9.4 K/UL (ref 4.8–10.8)

## 2021-04-15 PROCEDURE — 36415 COLL VENOUS BLD VENIPUNCTURE: CPT

## 2021-04-15 PROCEDURE — 84443 ASSAY THYROID STIM HORMONE: CPT

## 2021-04-15 PROCEDURE — 84550 ASSAY OF BLOOD/URIC ACID: CPT

## 2021-04-15 PROCEDURE — 80061 LIPID PANEL: CPT

## 2021-04-15 PROCEDURE — 85025 COMPLETE CBC W/AUTO DIFF WBC: CPT

## 2021-04-15 PROCEDURE — 80053 COMPREHEN METABOLIC PANEL: CPT

## 2021-04-15 PROCEDURE — 83036 HEMOGLOBIN GLYCOSYLATED A1C: CPT

## 2021-04-15 PROCEDURE — 84153 ASSAY OF PSA TOTAL: CPT

## 2021-04-22 ENCOUNTER — CARE COORDINATION (OUTPATIENT)
Dept: CARE COORDINATION | Age: 66
End: 2021-04-22

## 2021-04-22 NOTE — CARE COORDINATION
Telephone call to patient. Discussed medication affordability. Patient stated that he is able to afford his medications. Specifically asked about Breo Inhaler and he indicated that he could afford. Patient asking about agencies who could help with rent due to Tre. He stated that he has a friend who is getting her rent paid for four months. Provided patient with contact information for [facility] and [facility].

## 2021-04-23 DIAGNOSIS — K59.00 CONSTIPATION, UNSPECIFIED CONSTIPATION TYPE: ICD-10-CM

## 2021-04-23 RX ORDER — POLYETHYLENE GLYCOL 3350 17 G/17G
17 POWDER, FOR SOLUTION ORAL DAILY
Qty: 510 G | Refills: 1 | Status: SHIPPED | OUTPATIENT
Start: 2021-04-23 | End: 2021-04-29 | Stop reason: SDUPTHER

## 2021-04-29 ENCOUNTER — OFFICE VISIT (OUTPATIENT)
Dept: FAMILY MEDICINE CLINIC | Age: 66
End: 2021-04-29
Payer: MEDICARE

## 2021-04-29 VITALS
SYSTOLIC BLOOD PRESSURE: 134 MMHG | HEIGHT: 72 IN | RESPIRATION RATE: 20 BRPM | WEIGHT: 282.4 LBS | OXYGEN SATURATION: 94 % | TEMPERATURE: 99.1 F | HEART RATE: 90 BPM | DIASTOLIC BLOOD PRESSURE: 78 MMHG | BODY MASS INDEX: 38.25 KG/M2

## 2021-04-29 DIAGNOSIS — G47.00 INSOMNIA, UNSPECIFIED TYPE: ICD-10-CM

## 2021-04-29 DIAGNOSIS — N52.9 ERECTILE DYSFUNCTION, UNSPECIFIED ERECTILE DYSFUNCTION TYPE: Chronic | ICD-10-CM

## 2021-04-29 DIAGNOSIS — K59.00 CONSTIPATION, UNSPECIFIED CONSTIPATION TYPE: ICD-10-CM

## 2021-04-29 DIAGNOSIS — R21 RASH AND OTHER NONSPECIFIC SKIN ERUPTION: ICD-10-CM

## 2021-04-29 DIAGNOSIS — K21.9 GASTROESOPHAGEAL REFLUX DISEASE, UNSPECIFIED WHETHER ESOPHAGITIS PRESENT: ICD-10-CM

## 2021-04-29 DIAGNOSIS — F10.10 ALCOHOL ABUSE: Chronic | ICD-10-CM

## 2021-04-29 DIAGNOSIS — Z72.0 TOBACCO USE: ICD-10-CM

## 2021-04-29 DIAGNOSIS — J44.9 CHRONIC OBSTRUCTIVE PULMONARY DISEASE, UNSPECIFIED COPD TYPE (HCC): Chronic | ICD-10-CM

## 2021-04-29 DIAGNOSIS — J30.2 SEASONAL ALLERGIES: Chronic | ICD-10-CM

## 2021-04-29 DIAGNOSIS — F14.10 COCAINE ABUSE (HCC): Chronic | ICD-10-CM

## 2021-04-29 DIAGNOSIS — F41.8 ANXIETY WITH DEPRESSION: ICD-10-CM

## 2021-04-29 DIAGNOSIS — E78.5 HYPERLIPIDEMIA, UNSPECIFIED HYPERLIPIDEMIA TYPE: Chronic | ICD-10-CM

## 2021-04-29 DIAGNOSIS — Z96.652 STATUS POST TOTAL KNEE REPLACEMENT, LEFT: ICD-10-CM

## 2021-04-29 DIAGNOSIS — E66.01 MORBID OBESITY DUE TO EXCESS CALORIES (HCC): Chronic | ICD-10-CM

## 2021-04-29 DIAGNOSIS — I10 ESSENTIAL HYPERTENSION: Primary | Chronic | ICD-10-CM

## 2021-04-29 PROCEDURE — 99214 OFFICE O/P EST MOD 30 MIN: CPT | Performed by: FAMILY MEDICINE

## 2021-04-29 RX ORDER — LOVASTATIN 10 MG/1
10 TABLET ORAL NIGHTLY
Qty: 90 TABLET | Refills: 1 | Status: SHIPPED | OUTPATIENT
Start: 2021-04-29 | End: 2021-12-22

## 2021-04-29 RX ORDER — POLYETHYLENE GLYCOL 3350 17 G/17G
17 POWDER, FOR SOLUTION ORAL DAILY
Qty: 510 G | Refills: 1 | Status: SHIPPED | OUTPATIENT
Start: 2021-04-29

## 2021-04-29 RX ORDER — ALBUTEROL SULFATE 90 UG/1
2 AEROSOL, METERED RESPIRATORY (INHALATION) EVERY 6 HOURS PRN
Qty: 18 G | Refills: 1 | Status: SHIPPED | OUTPATIENT
Start: 2021-04-29 | End: 2021-05-24

## 2021-04-29 RX ORDER — PERMETHRIN 50 MG/G
CREAM TOPICAL
Qty: 60 G | Refills: 0 | Status: SHIPPED | OUTPATIENT
Start: 2021-04-29 | End: 2021-05-01 | Stop reason: SDUPTHER

## 2021-04-29 RX ORDER — FLUTICASONE PROPIONATE 50 MCG
2 SPRAY, SUSPENSION (ML) NASAL DAILY
Qty: 3 BOTTLE | Refills: 1 | Status: SHIPPED | OUTPATIENT
Start: 2021-04-29 | End: 2021-12-22

## 2021-04-29 RX ORDER — PREDNISONE 10 MG/1
TABLET ORAL
Qty: 45 TABLET | Refills: 0 | Status: SHIPPED | OUTPATIENT
Start: 2021-04-29 | End: 2021-05-24

## 2021-04-29 RX ORDER — IPRATROPIUM BROMIDE AND ALBUTEROL SULFATE 2.5; .5 MG/3ML; MG/3ML
1 SOLUTION RESPIRATORY (INHALATION) EVERY 6 HOURS PRN
Qty: 180 ML | Refills: 1 | Status: SHIPPED | OUTPATIENT
Start: 2021-04-29 | End: 2021-07-02

## 2021-04-29 RX ORDER — ESCITALOPRAM OXALATE 10 MG/1
10 TABLET ORAL DAILY
Qty: 90 TABLET | Refills: 1 | Status: SHIPPED | OUTPATIENT
Start: 2021-04-29 | End: 2021-11-24

## 2021-04-29 RX ORDER — SILDENAFIL 100 MG/1
100 TABLET, FILM COATED ORAL PRN
Qty: 10 TABLET | Refills: 2 | Status: SHIPPED | OUTPATIENT
Start: 2021-04-29 | End: 2021-07-17

## 2021-04-29 RX ORDER — AMLODIPINE BESYLATE 10 MG/1
10 TABLET ORAL DAILY
Qty: 90 TABLET | Refills: 1 | Status: SHIPPED | OUTPATIENT
Start: 2021-04-29 | End: 2021-12-22

## 2021-04-29 RX ORDER — PANTOPRAZOLE SODIUM 40 MG/1
40 TABLET, DELAYED RELEASE ORAL DAILY
Qty: 90 TABLET | Refills: 1 | Status: SHIPPED | OUTPATIENT
Start: 2021-04-29 | End: 2021-12-08

## 2021-04-29 RX ORDER — TRAZODONE HYDROCHLORIDE 50 MG/1
50 TABLET ORAL NIGHTLY
Qty: 90 TABLET | Refills: 1 | Status: SHIPPED | OUTPATIENT
Start: 2021-04-29 | End: 2021-11-15

## 2021-04-29 RX ORDER — FLUTICASONE FUROATE AND VILANTEROL 200; 25 UG/1; UG/1
1 POWDER RESPIRATORY (INHALATION) DAILY
Qty: 72 EACH | Refills: 1 | Status: ON HOLD | OUTPATIENT
Start: 2021-04-29 | End: 2021-09-16

## 2021-04-29 RX ORDER — MONTELUKAST SODIUM 10 MG/1
10 TABLET ORAL NIGHTLY
Qty: 90 TABLET | Refills: 1 | Status: SHIPPED | OUTPATIENT
Start: 2021-04-29 | End: 2021-11-15

## 2021-04-29 RX ORDER — ACETAMINOPHEN 500 MG
500 TABLET ORAL EVERY 8 HOURS PRN
Qty: 120 TABLET | Refills: 1 | Status: SHIPPED | OUTPATIENT
Start: 2021-04-29 | End: 2021-08-16

## 2021-04-29 RX ORDER — AMMONIUM LACTATE 12 G/100G
CREAM TOPICAL
COMMUNITY
Start: 2021-04-06 | End: 2021-07-07

## 2021-04-29 ASSESSMENT — ENCOUNTER SYMPTOMS
WHEEZING: 1
SORE THROAT: 0
DIARRHEA: 0
CONSTIPATION: 0
COUGH: 0
ABDOMINAL PAIN: 0
SHORTNESS OF BREATH: 0
VOMITING: 0
BLOOD IN STOOL: 0
SINUS PAIN: 0
CHEST TIGHTNESS: 0
NAUSEA: 0
ANAL BLEEDING: 0

## 2021-04-29 NOTE — PROGRESS NOTES
Chris Montanez (: 1955) is a 63 y.o. male, Established patient, who presents today for:    Chief Complaint   Patient presents with    Skin Problem     Pt presents today to discuss breaking out/itching on both arms, legs and back. Pt did go to walk in and was given Ammonium Lactate cream and reports no relief with cream.          ASSESSMENT/PLAN    1. Essential hypertension  -     amLODIPine (NORVASC) 10 MG tablet; Take 1 tablet by mouth daily, Disp-90 tablet, R-1Normal  2. Hyperlipidemia, unspecified hyperlipidemia type  -     lovastatin (MEVACOR) 10 MG tablet; Take 1 tablet by mouth nightly, Disp-90 tablet, R-1Normal  3. Chronic obstructive pulmonary disease, unspecified COPD type (HCC)  -     albuterol sulfate  (90 Base) MCG/ACT inhaler; Inhale 2 puffs into the lungs every 6 hours as needed for Wheezing or Shortness of Breath, Disp-18 g, R-1Normal  -     Fluticasone furoate-vilanterol (BREO ELLIPTA) 200-25 MCG/INH AEPB inhaler; Inhale 1 puff into the lungs daily, Disp-72 each, R-1Normal  -     ipratropium-albuterol (DUONEB) 0.5-2.5 (3) MG/3ML SOLN nebulizer solution; Take 3 mLs by nebulization every 6 hours as needed for Shortness of Breath, Disp-180 mL, R-1Normal  4. Gastroesophageal reflux disease, unspecified whether esophagitis present  -     pantoprazole (PROTONIX) 40 MG tablet; Take 1 tablet by mouth daily, Disp-90 tablet, R-1Normal  5. Constipation, unspecified constipation type  -     polyethylene glycol (GLYCOLAX) 17 GM/SCOOP powder; Take 17 g by mouth daily, Disp-510 g, R-1Normal  6. Anxiety with depression  Assessment & Plan:  No reported SI/HI or self harming behaviors. Stable mood on current medication. Patient encouraged to continue with daily dosing of medication as prescribed. Orders:  -     escitalopram (LEXAPRO) 10 MG tablet; Take 1 tablet by mouth daily, Disp-90 tablet, R-1Normal  -     traZODone (DESYREL) 50 MG tablet;  Take 1 tablet by mouth nightly, Disp-90 tablet, R-1Normal  7. Insomnia, unspecified type  -     traZODone (DESYREL) 50 MG tablet; Take 1 tablet by mouth nightly, Disp-90 tablet, R-1Normal  8. Erectile dysfunction, unspecified erectile dysfunction type  -     sildenafil (VIAGRA) 100 MG tablet; Take 1 tablet by mouth as needed for Erectile Dysfunction, Disp-10 tablet, R-2Normal  9. Seasonal allergies  -     fluticasone (FLONASE) 50 MCG/ACT nasal spray; 2 sprays by Nasal route daily, Disp-3 Bottle, R-1Normal  -     montelukast (SINGULAIR) 10 MG tablet; Take 1 tablet by mouth nightly, Disp-90 tablet, R-1PT REQUEST REFILLSNormal  10. Rash and other nonspecific skin eruption  Comments: Will treat empirically for dermatitis with potential scabies based on history and exam. Next step would be dermatology referral INB. Orders:  -     predniSONE (DELTASONE) 10 MG tablet; Take 5 tabs daily x 3 days, 4 tabs daily x 3 days, 3 tabs daily x 3 days, 2 tabs daily x 3 days, 1 tab daily x 3 days, Disp-45 tablet, R-0Normal  -     permethrin (ELIMITE) 5 % cream; To treat scabies:  Apply cream to all areas of the body from the neck down and wash off after 8-14 hours. , Disp-60 g, R-0, Normal  11. Alcohol abuse  Assessment & Plan:  Patient reports returning to regular consumption of alcohol several days a week. I reviewed with him again that with his known history of alcohol abuse he should remain sober and abstain from any alcohol consumption. Patient is not ready to change his behavior, we will continue to encourage complete sobriety at routine office visits. 12. Cocaine abuse ([facility] [address]  Assessment & Plan:  Stable. Patient reports continued sobriety since September 2020. I stressed with him again the importance of avoiding all cocaine use, particularly with his known history of supraventricular tachycardia. We will continue to follow over time.   13. Morbid obesity due to excess calories [facility])  Assessment & Plan:  Patient counseled on healthy dietary choices and the benefits of a lower salt and/or lower carbohydrate diet as appropriate. Patient also counseled on benefits of moderate intensity cardiovascular exercise for 20-30 minutes at least 3-5 days a week. Advice was given to make small changes over time, setting smaller achievable goals until recommended lifestyle changes are reached. 14. Tobacco use  Assessment & Plan:   Patient contemplative and aware that smoking cessation would be the best thing for overall health. He has weaned down on his smoking but is not ready to set a quit date. We will continue to encourage complete smoking cessation at subsequent visits. 15. Status post total knee replacement, left  -     acetaminophen (TYLENOL) 500 MG tablet; Take 1 tablet by mouth every 8 hours as needed for Pain, Disp-120 tablet, R-1Normal      Return for Annual Wellness Visit in 3-4 Months. SUBJECTIVE/OBJECTIVE:    HPI    Patient presents for chronic hypertension, hyperlipidemia, COPD, GERD, and anxiety/depression follow-up. Patient reports taking medications as prescribed since the most recent visit. They deny adhering to any specific lower carbohydrate or lower salt diet. They deny any routine exercise outside of normal day-to-day activity. Patient's main concern is 2 month history of pruritic rash, initially over the LLE and then spreading to bilateral legs, back, and arms. He denies any new lotions, detergents, colognes, foods, or recent travel. He reports a previous girlfriend having similar rash when symptoms first started. He reports using moisurizer and vaseline with no improvement at home. He was seen by walk-in clinic and was thought to have a dermatitis for which he was instructed to use ammonium lactate lotion BID. He denies any improvement despite use of the lotion and requests further management. Patient denies any chest pain, dyspnea, palpitations, lightheadedness, dizziness, worsening lower extremity edema, or syncope.   Patient denies any dyspnea at rest, worsening cough, chest tightness, or limitation in normal day-to-day activity due to breathing. He reports intermittent wheezing at his normal baseline level that is managed well with use of albuterol inhaler or his nebulizer. Patient denies any change in appetite, weight changes, dysphagia, early satiety, increased belching, sour brash/heartburn, abdominal pain, nausea/vomiting, diarrhea, constipation, melena, rectal bleeding, or hematochezia. Patient denies any worsening mood, SI/HI, or self harming behaviors. They deny any worsening anxiety or panic attacks. They deny any worsening fatigue, decreased appetite, or problems with sleep. He admits to continued alcohol consumption of 2-3 beers at least 2-3 days a week. He reports intermittent marijuana use, but denies any cocaine use since 09/2020. Current Outpatient Medications on File Prior to Visit   Medication Sig Dispense Refill    ammonium lactate (AMLACTIN) 12 % cream RUB INTO SKIN THOROUGHLY. APPLY TWICE DAILY TO SKIN      methylPREDNISolone (MEDROL, PORTER,) 4 MG tablet Take by mouth. 1 kit 0    meloxicam (MOBIC) 15 MG tablet TAKE 1 TABLET BY MOUTH EVERY DAY WITH FOOD      furosemide (LASIX) 20 MG tablet Take 1 tablet by mouth daily 10 tablet 0    Handicap Placard MISC by Does not apply route DX: OSTEOARTHRITIS OF BOTH KNEES (M17.0), COPD (J44.9)     EXPIRES: 02/2024 1 each 0     No current facility-administered medications on file prior to visit. Allergies   Allergen Reactions    Fish-Derived Products Anaphylaxis    Iodine Anaphylaxis     Iodine-containing products, dyes, contrast dye    Seasonal Shortness Of Breath    Pcn [Penicillins] Nausea And Vomiting        Review of Systems   Constitutional: Negative for appetite change, chills, diaphoresis, fatigue, fever and unexpected weight change. HENT: Negative for congestion, ear pain, sinus pain and sore throat. Eyes: Negative for visual disturbance.    Respiratory: Positive for wheezing (intermittent). Negative for cough, chest tightness and shortness of breath. Cardiovascular: Negative for chest pain, palpitations and leg swelling. No orthopnea, No PND   Gastrointestinal: Negative for abdominal pain, anal bleeding, blood in stool, constipation, diarrhea, nausea and vomiting. No heartburn, No melena   Endocrine: Negative for cold intolerance, heat intolerance, polydipsia, polyphagia and polyuria. Genitourinary: Negative for dysuria and hematuria. Musculoskeletal: Negative for myalgias. Skin: Positive for rash. Neurological: Negative for dizziness, syncope, weakness, light-headedness, numbness and headaches. Psychiatric/Behavioral: Negative for dysphoric mood, self-injury and suicidal ideas. The patient is not nervous/anxious. Vitals:  /78 (Site: Right Upper Arm, Position: Sitting, Cuff Size: Large Adult)   Pulse 90   Temp 99.1 °F (37.3 °C) (Temporal)   Resp 20   Ht 6' (1.829 m)   Wt 282 lb 6.4 oz (128.1 kg)   SpO2 94%   BMI 38.30 kg/m²     Physical Exam  Vitals signs reviewed. Constitutional:       General: He is not in acute distress. Appearance: He is well-developed. Neck:      Musculoskeletal: Neck supple. Thyroid: No thyromegaly. Vascular: No carotid bruit. Cardiovascular:      Rate and Rhythm: Normal rate and regular rhythm. Heart sounds: Normal heart sounds. No murmur. Pulmonary:      Effort: Pulmonary effort is normal. No accessory muscle usage or respiratory distress. Breath sounds: No decreased air movement. Wheezing (expiratory, scattered over all lung fields) present. No decreased breath sounds, rhonchi or rales. Abdominal:      General: Bowel sounds are normal. There is no distension. Palpations: Abdomen is soft. Tenderness: There is no abdominal tenderness. There is no right CVA tenderness, left CVA tenderness, guarding or rebound. Musculoskeletal: Normal range of motion. Right lower leg: No edema. Left lower leg: No edema. Lymphadenopathy:      Cervical: No cervical adenopathy. Skin:     General: Skin is warm. Findings: No rash. Comments: Maculopapular rash with dry skin and excoriations noted over bilateral lower legs. Scattered papular rash with scabbing over bilateral arms. No erythema or warmth throughout, no bleeding areas    Neurological:      Mental Status: He is alert and oriented to person, place, and time. Psychiatric:         Mood and Affect: Mood normal.         Behavior: Behavior normal.         Ortho Exam (If Applicable)              An electronic signature was used to authenticate this note.      Junior Walter MD

## 2021-04-29 NOTE — ASSESSMENT & PLAN NOTE
No reported SI/HI or self harming behaviors. Stable mood on current medication. Patient encouraged to continue with daily dosing of medication as prescribed.

## 2021-04-29 NOTE — ASSESSMENT & PLAN NOTE
Patient reports returning to regular consumption of alcohol several days a week. I reviewed with him again that with his known history of alcohol abuse he should remain sober and abstain from any alcohol consumption. Patient is not ready to change his behavior, we will continue to encourage complete sobriety at routine office visits.

## 2021-04-29 NOTE — ASSESSMENT & PLAN NOTE
Stable. Patient reports continued sobriety since September 2020. I stressed with him again the importance of avoiding all cocaine use, particularly with his known history of supraventricular tachycardia. We will continue to follow over time.

## 2021-04-30 DIAGNOSIS — R21 RASH AND OTHER NONSPECIFIC SKIN ERUPTION: ICD-10-CM

## 2021-04-30 RX ORDER — PERMETHRIN 50 MG/G
CREAM TOPICAL
Qty: 60 G | Refills: 0 | OUTPATIENT
Start: 2021-04-30

## 2021-04-30 NOTE — TELEPHONE ENCOUNTER
Requesting medication refill. Please approve or deny this request.    Rx requested:  Requested Prescriptions     Pending Prescriptions Disp Refills    permethrin (ELIMITE) 5 % cream 60 g 0     Sig: To treat scabies:  Apply cream to all areas of the body from the neck down and wash off after 8-14 hours.        Last Office Visit:   4/29/2021    Last Filled:      Last Labs:      Next Visit Date:  Future Appointments   Date Time Provider Constance Espitia   6/23/2021 10:00 AM MD Romero Silva 94

## 2021-05-01 DIAGNOSIS — R21 RASH AND OTHER NONSPECIFIC SKIN ERUPTION: ICD-10-CM

## 2021-05-01 RX ORDER — PERMETHRIN 50 MG/G
CREAM TOPICAL
Qty: 60 G | Refills: 0 | Status: SHIPPED | OUTPATIENT
Start: 2021-05-01 | End: 2021-05-01 | Stop reason: SDUPTHER

## 2021-05-01 RX ORDER — PERMETHRIN 50 MG/G
CREAM TOPICAL
Qty: 60 G | Refills: 0 | Status: SHIPPED | OUTPATIENT
Start: 2021-05-01 | End: 2021-07-07

## 2021-05-04 ENCOUNTER — CARE COORDINATION (OUTPATIENT)
Dept: CARE COORDINATION | Age: 66
End: 2021-05-04

## 2021-05-04 NOTE — CARE COORDINATION
Telephone call with patient who stated he was \"down in the dumps. \"  He is sad because his girlfriend went to longterm and he misses her. Discussed the agencies that this writer recommended to get assistance with rent due to Tre. He stated that a lady in his apartment building got her rent paid for  four months and he would like that benefit. He stated he called a few places with no luck. He expressed plan to contact the agencies that this writer provided. Patient denied having problems affording medications. He stated that Social Security called him about his Social Security Extra Help Program.  Patient expressed plan to call them back.

## 2021-05-12 ENCOUNTER — CARE COORDINATION (OUTPATIENT)
Dept: CARE COORDINATION | Age: 66
End: 2021-05-12

## 2021-05-24 ENCOUNTER — HOSPITAL ENCOUNTER (EMERGENCY)
Age: 66
Discharge: HOME | End: 2021-05-24
Attending: EMERGENCY MEDICINE
Payer: MEDICARE

## 2021-05-24 ENCOUNTER — APPOINTMENT (OUTPATIENT)
Dept: GENERAL RADIOLOGY | Age: 66
End: 2021-05-24
Payer: MEDICARE

## 2021-05-24 VITALS
OXYGEN SATURATION: 95 % | BODY MASS INDEX: 41.45 KG/M2 | HEIGHT: 72 IN | WEIGHT: 306 LBS | SYSTOLIC BLOOD PRESSURE: 128 MMHG | DIASTOLIC BLOOD PRESSURE: 72 MMHG | RESPIRATION RATE: 18 BRPM | HEART RATE: 72 BPM | TEMPERATURE: 98.3 F
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
## 2021-05-24 DIAGNOSIS — T14.8XXA SKIN AVULSION: ICD-10-CM

## 2021-05-24 DIAGNOSIS — J44.1 COPD EXACERBATION (HCC): ICD-10-CM

## 2021-05-24 DIAGNOSIS — S80.02XA CONTUSION OF LEFT KNEE, INITIAL ENCOUNTER: ICD-10-CM

## 2021-05-24 PROCEDURE — 6360000002 HC RX W HCPCS: Performed by: EMERGENCY MEDICINE

## 2021-05-24 PROCEDURE — 96374 THER/PROPH/DIAG INJ IV PUSH: CPT

## 2021-05-24 PROCEDURE — 99284 EMERGENCY DEPT VISIT MOD MDM: CPT

## 2021-05-24 PROCEDURE — 73564 X-RAY EXAM KNEE 4 OR MORE: CPT

## 2021-05-24 PROCEDURE — 73630 X-RAY EXAM OF FOOT: CPT

## 2021-05-24 PROCEDURE — 94640 AIRWAY INHALATION TREATMENT: CPT

## 2021-05-24 PROCEDURE — 6370000000 HC RX 637 (ALT 250 FOR IP): Performed by: EMERGENCY MEDICINE

## 2021-05-24 PROCEDURE — 96375 TX/PRO/DX INJ NEW DRUG ADDON: CPT

## 2021-05-24 RX ORDER — TRAMADOL HYDROCHLORIDE 50 MG/1
50 TABLET ORAL EVERY 4 HOURS PRN
Qty: 20 TABLET | Refills: 0 | Status: SHIPPED | OUTPATIENT
Start: 2021-05-24 | End: 2021-05-27

## 2021-05-24 RX ORDER — ONDANSETRON 2 MG/ML
4 INJECTION INTRAMUSCULAR; INTRAVENOUS ONCE
Status: COMPLETED | OUTPATIENT
Start: 2021-05-24 | End: 2021-05-24

## 2021-05-24 RX ORDER — METHYLPREDNISOLONE SODIUM SUCCINATE 125 MG/2ML
125 INJECTION, POWDER, LYOPHILIZED, FOR SOLUTION INTRAMUSCULAR; INTRAVENOUS ONCE
Status: COMPLETED | OUTPATIENT
Start: 2021-05-24 | End: 2021-05-24

## 2021-05-24 RX ORDER — IPRATROPIUM BROMIDE AND ALBUTEROL SULFATE 2.5; .5 MG/3ML; MG/3ML
1 SOLUTION RESPIRATORY (INHALATION) ONCE
Status: COMPLETED | OUTPATIENT
Start: 2021-05-24 | End: 2021-05-24

## 2021-05-24 RX ORDER — PREDNISONE 20 MG/1
40 TABLET ORAL DAILY
Qty: 10 TABLET | Refills: 0 | Status: SHIPPED | OUTPATIENT
Start: 2021-05-24 | End: 2021-05-29

## 2021-05-24 RX ORDER — MORPHINE SULFATE 4 MG/ML
4 INJECTION, SOLUTION INTRAMUSCULAR; INTRAVENOUS ONCE
Status: COMPLETED | OUTPATIENT
Start: 2021-05-24 | End: 2021-05-24

## 2021-05-24 RX ADMIN — MORPHINE SULFATE 4 MG: 4 INJECTION, SOLUTION INTRAMUSCULAR; INTRAVENOUS at 09:48

## 2021-05-24 RX ADMIN — ONDANSETRON 4 MG: 2 INJECTION INTRAMUSCULAR; INTRAVENOUS at 09:45

## 2021-05-24 RX ADMIN — METHYLPREDNISOLONE SODIUM SUCCINATE 125 MG: 125 INJECTION, POWDER, FOR SOLUTION INTRAMUSCULAR; INTRAVENOUS at 09:46

## 2021-05-24 RX ADMIN — IPRATROPIUM BROMIDE AND ALBUTEROL SULFATE 1 AMPULE: .5; 2.5 SOLUTION RESPIRATORY (INHALATION) at 10:31

## 2021-05-24 ASSESSMENT — PAIN DESCRIPTION - ORIENTATION: ORIENTATION: RIGHT;LEFT

## 2021-05-24 ASSESSMENT — ENCOUNTER SYMPTOMS
EYE PAIN: 0
SHORTNESS OF BREATH: 0
WHEEZING: 1
COUGH: 1
SORE THROAT: 0
VOICE CHANGE: 0
EYE DISCHARGE: 0
BACK PAIN: 0
VOMITING: 0
EYE REDNESS: 0
FACIAL SWELLING: 0
CHOKING: 0
CONSTIPATION: 0
ABDOMINAL PAIN: 0
SINUS PRESSURE: 0
STRIDOR: 0
BLOOD IN STOOL: 0
TROUBLE SWALLOWING: 0
CHEST TIGHTNESS: 0
DIARRHEA: 0

## 2021-05-24 ASSESSMENT — PAIN SCALES - GENERAL: PAINLEVEL_OUTOF10: 10

## 2021-05-24 ASSESSMENT — PAIN DESCRIPTION - DESCRIPTORS: DESCRIPTORS: ACHING

## 2021-05-24 NOTE — ED PROVIDER NOTES
[facility] ED  eMERGENCY dEPARTMENT eNCOUnter      Pt Name: Alejandro Hernandez  MRN: [id number]  [place] 1955  Date of evaluation: 5/24/2021  Provider: Manuela White MD    CHIEF COMPLAINT       Chief Complaint   Patient presents with    Fall     Pt fell last night, trip and stumble, avulsion to Right great toe, abrasions to bilat knees, agravated lower back pain    Shortness of Breath     SOB x 3 days, used inhaler and resp tx this morning       HISTORY OF PRESENT ILLNESS   (Location/Symptom, Timing/Onset,Context/Setting, Quality, Duration, Modifying Factors, Severity)  Note limiting factors. Alejandro Hernandez is a 63 y.o. male who presents to the emergency department patient well-known to me from previous multiple visits to this emergency for COPD asthma exacerbation patient is at home live by himself retired patient tripped and fell last night injuring left knee as well as left big toe with a skin tear chronic back pain fall meeting back pain to exacerbate and cause more pain no numbness tingling to the toes patient has no head neck injury. His pain is 7-8 out of 10 paramedics at the scene brought it here for 9 stable condition    HPI    NursingNotes were reviewed. REVIEW OF SYSTEMS    (2-9 systems for level 4, 10 or more for level 5)     Review of Systems   Constitutional: Positive for activity change. Negative for fever. HENT: Negative for congestion, drooling, facial swelling, mouth sores, nosebleeds, sinus pressure, sore throat, trouble swallowing and voice change. Eyes: Negative for pain, discharge, redness and visual disturbance. Respiratory: Positive for cough and wheezing. Negative for choking, chest tightness, shortness of breath and stridor. Cardiovascular: Negative for chest pain, palpitations and leg swelling. Gastrointestinal: Negative for abdominal pain, blood in stool, constipation, diarrhea and vomiting.    Endocrine: Negative for cold intolerance, polyphagia and polyuria. Genitourinary: Negative for dysuria, flank pain, frequency, genital sores and urgency. Musculoskeletal: Positive for arthralgias, gait problem, joint swelling and myalgias. Negative for back pain, neck pain and neck stiffness. Skin: Negative for pallor and rash. Neurological: Negative for tremors, seizures, syncope, weakness, numbness and headaches. Hematological: Negative for adenopathy. Does not bruise/bleed easily. Psychiatric/Behavioral: Negative for agitation, behavioral problems, hallucinations and sleep disturbance. The patient is nervous/anxious. The patient is not hyperactive. All other systems reviewed and are negative. Except as noted above the remainder of the review of systems was reviewed and negative.        PAST MEDICAL HISTORY     Past Medical History:   Diagnosis Date    Alcohol abuse 10/27/2016    Anemia     Asthma     Cocaine abuse ([address] 10/27/2016    COPD [facility]chronic obstructive [facility] disease) [facility]     Depression 04/27/2020    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    Drug-seeking behavior 04/14/2015    Edema 12/10/2015    Erectile dysfunction     Gastroesophageal reflux disease 12/17/2018    Gout 10/27/2016    History of arthroscopy of both knees 10/27/2016    House dust mite allergy 04/21/2014    Hyperlipidemia     Hypertension 10/27/2016    Injury to heart 10/27/2016    Insomnia 12/17/2018    Medical non-compliance 02/22/2014    Morbid obesity due to excess calories ([address] 12/08/2016    Osteoarthritis of both knees 12/08/2016    Personal history of tobacco use     Pneumonia 12/04/2016    caused hospital admission    Pre-diabetes 10/27/2016    Seasonal allergies 04/21/2014    Severe persistent asthma 10/27/2016    Severe sleep apnea 04/14/2015    Supraventricular tachycardia ([address] 10/27/2016         SURGICALHISTORY       Past Surgical History:   Procedure Laterality Date    ANTERIOR CRUCIATE LIGAMENT REPAIR      CARDIAC CATHETERIZATION      COLONOSCOPY N/A 7/15/2020    COLONOSCOPY WITH POLYPECT[facility]Y [facility]rown[facility] 8/9/2019    LEFT TOTAL KNEE ARTHROPLASTY performed by Sammy Fontaine MD at [address] N/A 95/40/1441    UMBILICAL HERNIA REPAIR WITH MESH performed by Fernie Giles MD at [address] [place] [address]       Previous Medications    ACETAMINOPHEN (TYLENOL) 500 MG TABLET    Take 1 tablet by mouth every 8 hours as needed for Pain    AMLODIPINE (NORVASC) 10 MG TABLET    Take 1 tablet by mouth daily    AMMONIUM LACTATE (AMLACTIN) 12 % CREAM    RUB INTO SKIN THOROUGHLY. APPLY TWICE DAILY TO SKIN    ESCITALOPRAM (LEXAPRO) 10 MG TABLET    Take 1 tablet by mouth daily    FLUTICASONE (FLONASE) 50 MCG/ACT NASAL SPRAY    2 sprays by Nasal route daily    FLUTICASONE FUROATE-VILANTEROL (BREO ELLIPTA) 200-25 MCG/INH AEPB INHALER    Inhale 1 puff into the lungs daily    FUROSEMIDE (LASIX) 20 MG TABLET    Take 1 tablet by mouth daily    HANDICAP PLACARD MISC    by Does not apply route DX: OSTEOARTHRITIS OF BOTH KNEES (M17.0), COPD (J44.9)     EXPIRES: 02/2024    IPRATROPIUM-ALBUTEROL (DUONEB) 0.5-2.5 (3) MG/3ML SOLN NEBULIZER SOLUTION    Take 3 mLs by nebulization every 6 hours as needed for Shortness of Breath    LOVASTATIN (MEVACOR) 10 MG TABLET    Take 1 tablet by mouth nightly    MELOXICAM (MOBIC) 15 MG TABLET    TAKE 1 TABLET BY MOUTH EVERY DAY WITH FOOD    METHYLPREDNISOLONE (MEDROL, PORTER,) 4 MG TABLET    Take by mouth. MONTELUKAST (SINGULAIR) 10 MG TABLET    Take 1 tablet by mouth nightly    PANTOPRAZOLE (PROTONIX) 40 MG TABLET    Take 1 tablet by mouth daily    PERMETHRIN (ELIMITE) 5 % CREAM    To treat scabies: Apply cream to all areas of the body from the neck down and wash off after 8-14 hours.     POLYETHYLENE GLYCOL (GLYCOLAX) 17 GM/SCOOP POWDER    Take 17 g by mouth daily    SILDENAFIL (VIAGRA) 100 MG TABLET    Take 1 tablet by mouth as needed for Erectile Dysfunction    TRAZODONE (DESYREL) 50 MG TABLET    Take 1 tablet by mouth nightly       ALLERGIES     Fish-derived products, Iodine, Seasonal, and Pcn [penicillins]    FAMILY HISTORY       Family History   Problem Relation Age of Onset    Arthritis Mother     Asthma Mother     High Cholesterol Mother     Other Mother         aneurysm    Diabetes Father     Stroke Maternal Grandmother     Cancer Maternal Grandfather     Hypertension Other     COPD Neg Hx           SOCIAL HISTORY       Social History     Socioeconomic History    Marital status: Single     Spouse name: n/a    Number of children: 1    Years of education: 15    Highest education level: None   Occupational History    Occupation: Disability     Employer: NONE   Tobacco Use    Smoking status: Current Every Day Smoker     Packs/day: 0.25     Years: 30.00     Pack years: 7.50     Types: Cigarettes, Cigars     Start date: 1988     Last attempt to quit: 10/1/2013     Years since quittin.6    Smokeless tobacco: Never Used   Vaping Use    Vaping Use: Never used   Substance and Sexual Activity    Alcohol use:  Yes     Alcohol/week: 4.0 standard drinks     Types: 2 Cans of beer, 2 Shots of liquor per week     Comment: social 1 -2 x week    Drug use: Yes     Types: Cocaine, Marijuana     Comment: no cocaine x 1 year, marijuana weekly    Sexual activity: Not Currently     Partners: Female   Other Topics Concern    None   Social History Narrative    Lives in an apartment    15 steps to get up to the apartment    Asking for hospital bed and shower chair      Social Determinants of Health     Financial Resource Strain: Medium Risk    Difficulty of Paying Living Expenses: Somewhat hard   Food Insecurity: No Food Insecurity    Worried About Running Out of Food in the Last Year: Never true    Jonnie of Food in the Last Year: Never true   Transportation Needs: No Transportation Needs    Lack of Transportation (Medical): No    Lack of Transportation (Non-Medical): No   Physical Activity: Inactive    Days of Exercise per Week: 0 days    Minutes of Exercise per Session: 0 min   Stress: Stress Concern Present    Feeling of Stress : To some extent   Social Connections: Socially Isolated    Frequency of Communication with Friends and Family: Once a week    Frequency of Social Gatherings with Friends and Family: Once a week    Attends Orthodoxy Services: 1 to 4 times per year    Active Member of bookletmobile Group or Organizations: Not asked    Attends Club or Organization Meetings: Never    Marital Status:    Intimate Partner Violence:     Fear of Current or Ex-Partner:     Emotionally Abused:     Physically Abused:     Sexually Abused:        SCREENINGS      @FLOW([id number])@      PHYSICAL EXAM    (up to 7 for level 4, 8 or more for level 5)     ED Triage Vitals [05/24/21 0924]   BP Temp Temp Source Pulse Resp SpO2 Height Weight   128/72 98.3 °F (36.8 °C) Oral 72 18 95 % 6' (1.829 m) (!) 306 lb (138.8 kg)       Physical Exam  Constitutional:       General: He is in acute distress. Appearance: He is well-developed. Interventions: He is not intubated. Comments: Active alert cooperative somewhat slightly uncomfortable because the left knee big toe and back discomfort   HENT:      Head: Normocephalic and atraumatic. Comments: Attention come to the scalp and scalp hematoma no evidence of head injury  Eyes:      Extraocular Movements: Extraocular movements intact. Pupils: Pupils are equal, round, and reactive to light. Neck:      Thyroid: No thyromegaly. Vascular: No hepatojugular reflux or JVD. Trachea: No tracheal deviation. Cardiovascular:      Rate and Rhythm: Normal rate and regular rhythm. No extrasystoles are present. Pulses: No decreased pulses. Heart sounds: No friction rub. No gallop.     Pulmonary:      Effort: Pulmonary effort is normal. No bradypnea, accessory muscle usage or respiratory distress. He is not intubated. Breath sounds: Examination of the right-lower field reveals wheezing. Examination of the left-lower field reveals wheezing. Wheezing present. No decreased breath sounds or rales. Comments: Patient come to the respiratory system patient air entry good in the both lung fields with minimal bilateral wheezing patient has no retraction work of breathing not labored patient talks in full sentences  Chest:      Chest wall: No mass, deformity or tenderness. There is no dullness to percussion. Abdominal:      Palpations: There is no hepatomegaly, splenomegaly or mass. Tenderness: There is no abdominal tenderness. There is no guarding or rebound. Musculoskeletal:         General: Normal range of motion. Cervical back: Normal range of motion. Right lower leg: No tenderness. No edema. Left lower leg: Tenderness present. No edema. Comments: Attention to both extremities patient has no hematoma no joint effusion to the left knee patient has old scar of surgery to the left knee patient has a diffuse tenderness range of motion slightly diminished attention given to the big toe patient has a skin tear which is still intact no gapping wound no active bleeding no foreign body visible neurovascular intact   Lymphadenopathy:      Cervical: No cervical adenopathy. Skin:     Capillary Refill: Capillary refill takes less than 2 seconds. Coloration: Skin is not cyanotic or pale. Findings: No ecchymosis, erythema or rash. Nails: There is no clubbing. Neurological:      General: No focal deficit present. Mental Status: He is alert. Motor: No weakness. Psychiatric:         Mood and Affect: Mood is anxious.          DIAGNOSTIC RESULTS     EKG: All EKG's are interpreted by the Emergency Department Physician who either signs or Co-signsthis chart in the absence of a cardiologist.        RADIOLOGY:   Fabi[facility] Pedro such as CT, Ultrasound and MRI are read by the radiologist. Plain radiographic images are visualized and preliminarily interpreted by the emergency physician with the below findings:        Interpretation per the Radiologist below, if available at the time ofthis note:    XR FOOT RIGHT (MIN 3 VIEWS)    (Results Pending)   XR KNEE LEFT (MIN 4 VIEWS)    (Results Pending)         ED BEDSIDE ULTRASOUND:   Performed by ED Physician - none    LABS:  Labs Reviewed - No data to display    All other labs were within normal range or not returned as of this dictation. EMERGENCY DEPARTMENT COURSE and DIFFERENTIAL DIAGNOSIS/MDM:   Vitals:    Vitals:    05/24/21 0924   BP: 128/72   Pulse: 72   Resp: 18   Temp: 98.3 °F (36.8 °C)   TempSrc: Oral   SpO2: 95%   Weight: (!) 306 lb (138.8 kg)   Height: 6' (1.829 m)           MDM    CRITICAL CARE TIME   Total Critical Care time was  minutes, excluding separately reportableprocedures. There was a high probability of clinicallysignificant/life threatening deterioration in the patient's condition which required my urgent intervention. CONSULTS:  None    PROCEDURES:  Unless otherwise noted below, none     Procedures    FINAL IMPRESSION      1. Fall, initial encounter    2. COPD exacerbation ([address]    3. Skin avulsion    4. Contusion of left knee, initial encounter          DISPOSITION/PLAN   DISPOSITION        PATIENT REFERRED TO:  Leonila Gomez MD  [facility]  [phone]    In 1 week        DISCHARGE MEDICATIONS:  New Prescriptions    ALBUTEROL (PROVENTIL) (5 MG/ML) 0.5% NEBULIZER SOLUTION    Take 0.5 mLs by nebulization every 6 hours as needed for Wheezing    PREDNISONE (DELTASONE) 20 MG TABLET    Take 2 tablets by mouth daily for 5 doses    TRAMADOL (ULTRAM) 50 MG TABLET    Take 1 tablet by mouth every 4 hours as needed for Pain (MAY TAKE 2 TABS EVERY 6 HOURS FOR SEVERE PAIN) for up to 3 days.           (Please note that portions of this note were completed with a voice recognition program.  Efforts were made to edit the dictations but occasionally words are mis-transcribed.)    Clement Yun MD (electronically signed)  Attending Emergency Physician       Clement Yun MD  05/24/21 1100       Clement Yun MD  05/24/21 111

## 2021-05-24 NOTE — ED TRIAGE NOTES
Pt arrived from home via EMS for SOB x 3 days, fell last night and injury to right great toe, avulsion, abrasions and pain to bilat knees. Pain to lower back. Per EMS VS: 145/82, HR 72, 97% RA, . Pt states when he fell it was trip and stumble, not sure what he injured the great toe on , avulsion, skin flap over great toe and no active bleeding upon arrival to ED. Pt plan of care explained to Pt during bedside exam by Kristina Hands.

## 2021-05-26 ENCOUNTER — CARE COORDINATION (OUTPATIENT)
Dept: CARE COORDINATION | Age: 66
End: 2021-05-26

## 2021-05-26 NOTE — CARE COORDINATION
Telephone call to patient. He spoke of his recent trip to the Emergency Room. He indicated that he has all of his medications. He indicated that he gets food stamps and he adequate food supply. He did not find a resource to pay his rent for few months because he is not behind in his rent. Yenni Mai He was just looking for a few months of free rent.  Discussed that he received a letter that he was approved for the Social Security Extra Help Program.

## 2021-05-28 ENCOUNTER — APPOINTMENT (OUTPATIENT)
Dept: GENERAL RADIOLOGY | Age: 66
End: 2021-05-28
Payer: MEDICARE

## 2021-05-28 ENCOUNTER — HOSPITAL ENCOUNTER (EMERGENCY)
Age: 66
Discharge: HOME | End: 2021-05-28
Attending: EMERGENCY MEDICINE
Payer: MEDICARE

## 2021-05-28 VITALS
TEMPERATURE: 97.4 F | OXYGEN SATURATION: 93 % | DIASTOLIC BLOOD PRESSURE: 86 MMHG | WEIGHT: 306 LBS | RESPIRATION RATE: 18 BRPM | HEART RATE: 68 BPM | SYSTOLIC BLOOD PRESSURE: 145 MMHG | BODY MASS INDEX: 41.45 KG/M2 | HEIGHT: 72 IN

## 2021-05-28 DIAGNOSIS — S39.012A STRAIN OF LUMBAR REGION, INITIAL ENCOUNTER: ICD-10-CM

## 2021-05-28 DIAGNOSIS — G89.29 ACUTE EXACERBATION OF CHRONIC LOW BACK PAIN: Primary | ICD-10-CM

## 2021-05-28 DIAGNOSIS — M62.838 SPASM OF MUSCLE: ICD-10-CM

## 2021-05-28 DIAGNOSIS — M54.50 ACUTE EXACERBATION OF CHRONIC LOW BACK PAIN: Primary | ICD-10-CM

## 2021-05-28 DIAGNOSIS — R21 RASH AND OTHER NONSPECIFIC SKIN ERUPTION: ICD-10-CM

## 2021-05-28 DIAGNOSIS — S81.811S: ICD-10-CM

## 2021-05-28 PROCEDURE — 99284 EMERGENCY DEPT VISIT MOD MDM: CPT

## 2021-05-28 PROCEDURE — 6360000002 HC RX W HCPCS: Performed by: EMERGENCY MEDICINE

## 2021-05-28 PROCEDURE — 6370000000 HC RX 637 (ALT 250 FOR IP): Performed by: EMERGENCY MEDICINE

## 2021-05-28 PROCEDURE — 72110 X-RAY EXAM L-2 SPINE 4/>VWS: CPT

## 2021-05-28 PROCEDURE — 96372 THER/PROPH/DIAG INJ SC/IM: CPT

## 2021-05-28 PROCEDURE — 94640 AIRWAY INHALATION TREATMENT: CPT

## 2021-05-28 RX ORDER — IPRATROPIUM BROMIDE AND ALBUTEROL SULFATE 2.5; .5 MG/3ML; MG/3ML
1 SOLUTION RESPIRATORY (INHALATION) ONCE
Status: COMPLETED | OUTPATIENT
Start: 2021-05-28 | End: 2021-05-28

## 2021-05-28 RX ORDER — HYDROCODONE BITARTRATE AND ACETAMINOPHEN 5; 325 MG/1; MG/1
1 TABLET ORAL EVERY 6 HOURS PRN
Qty: 10 TABLET | Refills: 0 | Status: SHIPPED | OUTPATIENT
Start: 2021-05-28 | End: 2021-05-31

## 2021-05-28 RX ORDER — ONDANSETRON 4 MG/1
4 TABLET, ORALLY DISINTEGRATING ORAL ONCE
Status: COMPLETED | OUTPATIENT
Start: 2021-05-28 | End: 2021-05-28

## 2021-05-28 RX ORDER — DIAPER,BRIEF,INFANT-TODD,DISP
EACH MISCELLANEOUS ONCE
Status: COMPLETED | OUTPATIENT
Start: 2021-05-28 | End: 2021-05-28

## 2021-05-28 RX ORDER — TRAMADOL HYDROCHLORIDE 50 MG/1
50 TABLET ORAL EVERY 4 HOURS PRN
Qty: 20 TABLET | Refills: 0 | Status: SHIPPED | OUTPATIENT
Start: 2021-05-28 | End: 2021-05-28

## 2021-05-28 RX ORDER — DIAPER,BRIEF,INFANT-TODD,DISP
EACH MISCELLANEOUS
Qty: 1 TUBE | Refills: 1 | Status: SHIPPED | OUTPATIENT
Start: 2021-05-28 | End: 2021-06-04

## 2021-05-28 RX ORDER — MORPHINE SULFATE 4 MG/ML
6 INJECTION, SOLUTION INTRAMUSCULAR; INTRAVENOUS ONCE
Status: COMPLETED | OUTPATIENT
Start: 2021-05-28 | End: 2021-05-28

## 2021-05-28 RX ADMIN — ONDANSETRON 4 MG: 4 TABLET, ORALLY DISINTEGRATING ORAL at 10:29

## 2021-05-28 RX ADMIN — IPRATROPIUM BROMIDE AND ALBUTEROL SULFATE 1 AMPULE: .5; 2.5 SOLUTION RESPIRATORY (INHALATION) at 10:32

## 2021-05-28 RX ADMIN — BACITRACIN: 500 OINTMENT TOPICAL at 10:54

## 2021-05-28 RX ADMIN — MORPHINE SULFATE 6 MG: 4 INJECTION, SOLUTION INTRAMUSCULAR; INTRAVENOUS at 10:29

## 2021-05-28 ASSESSMENT — ENCOUNTER SYMPTOMS
BLOOD IN STOOL: 0
BACK PAIN: 1
CONSTIPATION: 0
SINUS PRESSURE: 0
CHEST TIGHTNESS: 0
SHORTNESS OF BREATH: 0
FACIAL SWELLING: 0
ABDOMINAL PAIN: 0
STRIDOR: 0
CHOKING: 0
WHEEZING: 1
TROUBLE SWALLOWING: 0
EYE REDNESS: 0
EYE DISCHARGE: 0
EYE PAIN: 0
DIARRHEA: 0
VOICE CHANGE: 0
VOMITING: 0
COUGH: 1
SORE THROAT: 0

## 2021-05-28 ASSESSMENT — PAIN DESCRIPTION - ONSET: ONSET: ON-GOING

## 2021-05-28 ASSESSMENT — PAIN SCALES - GENERAL
PAINLEVEL_OUTOF10: 10
PAINLEVEL_OUTOF10: 10

## 2021-05-28 ASSESSMENT — PAIN DESCRIPTION - PROGRESSION: CLINICAL_PROGRESSION: GRADUALLY WORSENING

## 2021-05-28 ASSESSMENT — PAIN DESCRIPTION - PAIN TYPE: TYPE: ACUTE PAIN

## 2021-05-28 ASSESSMENT — PAIN DESCRIPTION - FREQUENCY: FREQUENCY: CONTINUOUS

## 2021-05-28 ASSESSMENT — PAIN DESCRIPTION - ORIENTATION: ORIENTATION: LOWER

## 2021-05-28 ASSESSMENT — PAIN DESCRIPTION - DESCRIPTORS: DESCRIPTORS: ACHING;SORE;SPASM;THROBBING

## 2021-05-28 ASSESSMENT — PAIN DESCRIPTION - LOCATION: LOCATION: BACK

## 2021-05-28 NOTE — ED PROVIDER NOTES
[facility] ED  eMERGENCY dEPARTMENT eNCOUnter      Pt Name: Melvi Vargas  MRN: [id number]  [place] 1955  Date of evaluation: 5/28/2021  Provider: Flash Burgos MD    [address] [place] [address]       Chief Complaint   Patient presents with    Back Pain     s/p fall two days ago, been having 10 out 10 pain since        HISTORY OF PRESENT ILLNESS   (Location/Symptom, Timing/Onset,Context/Setting, Quality, Duration, Modifying Factors, Severity)  Note limiting factors. Melvi Vargas is a 63 y.o. male who presents to the emergency department patient well-known to us from previous multiple encounters this emergency because of recurrent of COPD patient history of gout hypertension obesity chronic anemia obstructive sleep apnea history of noncompliance GE reflux patient dental history of alcohol abuse and substance abuse in the past denies any recent use of cocaine patient was seen here last week because he fell and has multiple pain and had x-rays done to his foot and chest x-ray as well as abrasions to the both knees patient is a dressing to the right big toe for skin tear no fever patient was given a steroid for his wheezing and lung condition patient come to this emergency as requesting a back x-ray because ever since he is a fall his back is acting up and has more pain no numbness into the toes or to the legs worse with movement rated pain a 7-8 out of 10    HPI    NursingNotes were reviewed. REVIEW OF SYSTEMS    (2-9 systems for level 4, 10 or more for level 5)     Review of Systems   Constitutional: Positive for activity change and appetite change. Negative for fever. HENT: Negative for congestion, drooling, facial swelling, mouth sores, nosebleeds, sinus pressure, sore throat, trouble swallowing and voice change. Eyes: Negative for pain, discharge, redness and visual disturbance. Respiratory: Positive for cough and wheezing.  Negative for choking, chest tightness, shortness of breath and stridor. Cardiovascular: Negative for chest pain, palpitations and leg swelling. Gastrointestinal: Negative for abdominal pain, blood in stool, constipation, diarrhea and vomiting. Endocrine: Negative for cold intolerance, polyphagia and polyuria. Genitourinary: Negative for dysuria, flank pain, frequency, genital sores and urgency. Musculoskeletal: Positive for arthralgias, back pain, gait problem and joint swelling. Negative for neck pain and neck stiffness. Skin: Positive for rash and wound. Negative for pallor. Neurological: Negative for tremors, seizures, syncope, weakness, numbness and headaches. Hematological: Negative for adenopathy. Does not bruise/bleed easily. Psychiatric/Behavioral: Negative for agitation, behavioral problems, hallucinations and sleep disturbance. The patient is not hyperactive. All other systems reviewed and are negative. Except as noted above the remainder of the review of systems was reviewed and negative.        PAST MEDICAL HISTORY     Past Medical History:   Diagnosis Date    Alcohol abuse 10/27/2016    Anemia     Asthma     Cocaine abuse ([facility] 10/27/2016    COPD (chronic obstructive pulmonary disease) ([facility]     Depression 04/27/2020    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    Drug-seeking behavior 04/14/2015    Edema 12/10/2015    Erectile dysfunction     Gastroesophageal reflux disease 12/17/2018    Gout 10/27/2016    History of arthroscopy of both knees 10/27/2016    House dust mite allergy 04/21/2014    Hyperlipidemia     Hypertension 10/27/2016    Injury to heart 10/27/2016    Insomnia 12/17/2018    Medical non-compliance 02/22/2014    Morbid obesity due to excess calories ([facility] 12/08/2016    Osteoarthritis of both knees 12/08/2016    Personal history of tobacco use     Pneumonia 12/04/2016    caused hospital admission    Pre-diabetes 10/27/2016    Seasonal allergies 04/21/2014    Severe persistent asthma 10/27/2016    Severe sleep apnea 04/14/2015    Supraventricular tachycardia ([address] 10/27/2016         SURGICALHISTORY       Past Surgical History:   Procedure Laterality Date    ANTERIOR CRUCIATE LIGAMENT REPAIR      CARDIAC CATHETERIZATION      COLONOSCOPY N/A 7/15/2020    COLONOSCOPY WITH POLYPECTOMY performed by Bal Whitlock MD at [facility] Left 8/9/2019    LEFT TOTAL KNEE ARTHROPLASTY performed by Fabio Grayson MD at [facility] N/A 94/98/2005    [facility] performed by Douglas Perez MD at [address]       Discharge Medication List as of 5/28/2021 11:18 AM      CONTINUE these medications which have NOT CHANGED    Details   predniSONE (DELTASONE) 20 MG tablet Take 2 tablets by mouth daily for 5 doses, Disp-10 tablet, R-0Print      albuterol (PROVENTIL) (5 MG/ML) 0.5% nebulizer solution Take 0.5 mLs by nebulization every 6 hours as needed for Wheezing, Disp-25 Package, R-0Print      permethrin (ELIMITE) 5 % cream To treat scabies: Apply cream to all areas of the body from the neck down and wash off after 8-14 hours. , Disp-60 g, R-0, Normal      ammonium lactate (AMLACTIN) 12 % cream RUB INTO SKIN THOROUGHLY.  APPLY TWICE DAILY TO SKIN, Historical Med      acetaminophen (TYLENOL) 500 MG tablet Take 1 tablet by mouth every 8 hours as needed for Pain, Disp-120 tablet, R-1Normal      amLODIPine (NORVASC) 10 MG tablet Take 1 tablet by mouth daily, Disp-90 tablet, R-1Normal      escitalopram (LEXAPRO) 10 MG tablet Take 1 tablet by mouth daily, Disp-90 tablet, R-1Normal      fluticasone (FLONASE) 50 MCG/ACT nasal spray 2 sprays by Nasal route daily, Disp-3 Bottle, R-1Normal      Fluticasone furoate-vilanterol (BREO ELLIPTA) 200-25 MCG/INH AEPB inhaler Inhale 1 puff into the lungs daily, Disp-72 each, R-1Normal      ipratropium-albuterol (DUONEB) 0.5-2.5 (3) MG/3ML SOLN nebulizer solution Take 3 mLs by nebulization every 6 hours as needed for Shortness of Breath, Disp-180 mL, R-1Normal      lovastatin (MEVACOR) 10 MG tablet Take 1 tablet by mouth nightly, Disp-90 tablet, R-1Normal      pantoprazole (PROTONIX) 40 MG tablet Take 1 tablet by mouth daily, Disp-90 tablet, R-1Normal      polyethylene glycol (GLYCOLAX) 17 GM/SCOOP powder Take 17 g by mouth daily, Disp-510 g, R-1Normal      sildenafil (VIAGRA) 100 MG tablet Take 1 tablet by mouth as needed for Erectile Dysfunction, Disp-10 tablet, R-2Normal      traZODone (DESYREL) 50 MG tablet Take 1 tablet by mouth nightly, Disp-90 tablet, R-1Normal      meloxicam (MOBIC) 15 MG tablet TAKE 1 TABLET BY MOUTH EVERY DAY WITH FOODHistorical Med      furosemide (LASIX) 20 MG tablet Take 1 tablet by mouth daily, Disp-10 tablet,R-0Print      Handicap Placard MISC Starting Fri 2/1/2019, Disp-1 each, R-0, PrintDX: OSTEOARTHRITIS OF BOTH KNEES (M17.0), COPD (J44.9)     EXPIRES: 02/2024      montelukast (SINGULAIR) 10 MG tablet Take 1 tablet by mouth nightly, Disp-90 tablet, R-1PT REQUEST REFILLSNormal      methylPREDNISolone (MEDROL, PORTER,) 4 MG tablet Take by mouth., Disp-1 kit, R-0Normal             ALLERGIES     Fish-derived products, Iodine, Seasonal, and Pcn [penicillins]    FAMILY HISTORY       Family History   Problem Relation Age of Onset    Arthritis Mother     Asthma Mother     High Cholesterol Mother     Other Mother         aneurysm    Diabetes Father     Stroke Maternal Grandmother     Cancer Maternal Grandfather     Hypertension Other     COPD Neg Hx           SOCIAL HISTORY       Social History     Socioeconomic History    Marital status: Single     Spouse name: n/a    Number of children: 1    Years of education: 15    Highest education level: None   Occupational History    Occupation: Disability     Employer: NONE   Tobacco Use    Smoking status: Current Every Day Smoker     Packs/day: 0.25     Years: 30.00     Pack years: 7.50     Types: Cigarettes, Cigars     Start lower extremities attention come to the back patient has no CVA tenderness no point tenderness no hematoma no bruise noted diffuse paralumbar spasm and tenderness elicited on examination   Lymphadenopathy:      Cervical: No cervical adenopathy. Skin:     General: Skin is warm. Capillary Refill: Capillary refill takes less than 2 seconds. Findings: Rash present. Comments: Attention of the skin patient has a chronic maculopapular raised rash few of them in the back and extremities some scratch marks   Neurological:      General: No focal deficit present. Mental Status: He is alert. Cranial Nerves: No cranial nerve deficit. Sensory: No sensory deficit. Motor: No weakness. Coordination: Coordination normal.      Deep Tendon Reflexes: Reflexes normal.   Psychiatric:         Mood and Affect: Mood normal.         DIAGNOSTIC RESULTS     EKG: All EKG's are interpreted by the Emergency Department Physician who either signs or Co-signsthis chart in the absence of a cardiologist.        RADIOLOGY:   [facility] such as CT, Ultrasound and MRI are read by the radiologist. [facility] radiographic images are visualized and preliminarily interpreted by the emergency physician with the below findings:        Interpretation per the Radiologist below, if available at the time ofthis note:    XR LUMBAR SPINE (MIN 4 VIEWS)   Final Result       There are severe degenerative changes of the lumbar spine without fracture or subluxation. ED BEDSIDE ULTRASOUND:   Performed by ED Physician - none    LABS:  Labs Reviewed - No data to display    All other labs were within normal range or not returned as of this dictation.     EMERGENCY DEPARTMENT COURSE and DIFFERENTIAL DIAGNOSIS/MDM:   Vitals:    Vitals:    05/28/21 1009   BP: (!) 145/86   Pulse: 68   Resp: 18   Temp: 97.4 °F (36.3 °C)   TempSrc: Oral   SpO2: 93%   Weight: (!) 306 lb (138.8 kg)   Height: 6' (1.829 m) MDM  Number of Diagnoses or Management Options  Diagnosis management comments: Attention of the right foot and proximal right big toe neurovascular is intact patient skin tear healing satisfactory there is no erythema no drainage noted wound care initiated and dressing mupirocin given      CRITICAL CARE TIME   Total Critical Care time was  minutes, excluding separately reportableprocedures. There was a high probability of clinicallysignificant/life threatening deterioration in the patient's condition which required my urgent intervention. NSULTS:  None    PROCEDURES:  Unless otherwise noted below, none     Procedures    FINAL IMPRESSION      1. Acute exacerbation of chronic low back pain    2. Strain of lumbar region, initial encounter    3. Spasm of muscle    4. Noninfected skin tear of leg, right, sequela    5. Rash and other nonspecific skin eruption          DISPOSITION/PLAN   DISPOSITION        PATIENT REFERRED TO:  Anay Apple MD  [place] [address]  [phone]    In 1 week        DISCHARGE MEDICATIONS:  Discharge Medication List as of 5/28/2021 11:18 AM      START taking these medications    Details   hydrocortisone (ALA-JIMBO) 1 % cream Apply topically 2 times daily. , Disp-1 Tube, R-1, Print      HYDROcodone-acetaminophen (NORCO) 5-325 MG per tablet Take 1 tablet by mouth every 6 hours as needed for Pain for up to 3 days. , Disp-10 tablet, R-0Print                (Please note that portions of this note were completed with a voice recognition program.  Efforts were made to edit the dictations but occasionally words are mis-transcribed.)    Priscila Davis MD (electronically signed)  Attending Emergency Physician       Priscila Davis MD  05/28/21 [id number]

## 2021-05-28 NOTE — Clinical Note
Reviewed home care instructions for Chronic Low back pain, Skin tear of the right great toe and rash Rest the back no lifting of weight greater than 15 lbs. Rx for Norco 5/325 mg for pain as directed. Hoydrcortisone cream to the sonia as directed. Follow  up with Dr. Bee Grimes next week. Change the toe dressing daily or when soiled. Home by family car in stable condition.

## 2021-05-28 NOTE — ED TRIAGE NOTES
Patient has continuing back pain level 10 of 10  From a fall earlier this week. Pain is the left lower back.

## 2021-06-10 ENCOUNTER — CARE COORDINATION (OUTPATIENT)
Dept: CARE COORDINATION | Age: 66
End: 2021-06-10

## 2021-06-11 ENCOUNTER — HOSPITAL ENCOUNTER (EMERGENCY)
Age: 66
Discharge: HOME | End: 2021-06-11
Attending: EMERGENCY MEDICINE
Payer: MEDICARE

## 2021-06-11 VITALS
RESPIRATION RATE: 18 BRPM | OXYGEN SATURATION: 97 % | DIASTOLIC BLOOD PRESSURE: 83 MMHG | WEIGHT: 300 LBS | BODY MASS INDEX: 40.63 KG/M2 | HEIGHT: 72 IN | HEART RATE: 86 BPM | SYSTOLIC BLOOD PRESSURE: 137 MMHG | TEMPERATURE: 98.2 F

## 2021-06-11 DIAGNOSIS — S39.012A STRAIN OF LUMBAR REGION, INITIAL ENCOUNTER: ICD-10-CM

## 2021-06-11 DIAGNOSIS — S80.01XA CONTUSION OF RIGHT KNEE, INITIAL ENCOUNTER: Primary | ICD-10-CM

## 2021-06-11 DIAGNOSIS — R52 PAIN MANAGEMENT: ICD-10-CM

## 2021-06-11 PROCEDURE — 94640 AIRWAY INHALATION TREATMENT: CPT

## 2021-06-11 PROCEDURE — 6370000000 HC RX 637 (ALT 250 FOR IP): Performed by: EMERGENCY MEDICINE

## 2021-06-11 PROCEDURE — 99283 EMERGENCY DEPT VISIT LOW MDM: CPT

## 2021-06-11 RX ORDER — HYDROCODONE BITARTRATE AND ACETAMINOPHEN 5; 325 MG/1; MG/1
1 TABLET ORAL EVERY 6 HOURS PRN
Qty: 12 TABLET | Refills: 0 | Status: SHIPPED | OUTPATIENT
Start: 2021-06-11 | End: 2021-06-15

## 2021-06-11 RX ORDER — IPRATROPIUM BROMIDE AND ALBUTEROL SULFATE 2.5; .5 MG/3ML; MG/3ML
SOLUTION RESPIRATORY (INHALATION)
Status: DISCONTINUED
Start: 2021-06-11 | End: 2021-06-11 | Stop reason: HOSPADM

## 2021-06-11 RX ORDER — IPRATROPIUM BROMIDE AND ALBUTEROL SULFATE 2.5; .5 MG/3ML; MG/3ML
1 SOLUTION RESPIRATORY (INHALATION) ONCE
Status: COMPLETED | OUTPATIENT
Start: 2021-06-11 | End: 2021-06-11

## 2021-06-11 RX ORDER — PREDNISONE 20 MG/1
20 TABLET ORAL DAILY
Qty: 5 TABLET | Refills: 0 | Status: SHIPPED | OUTPATIENT
Start: 2021-06-11 | End: 2021-06-16

## 2021-06-11 RX ADMIN — IPRATROPIUM BROMIDE AND ALBUTEROL SULFATE 1 AMPULE: .5; 2.5 SOLUTION RESPIRATORY (INHALATION) at 10:44

## 2021-06-11 ASSESSMENT — ENCOUNTER SYMPTOMS
CONSTIPATION: 0
CHEST TIGHTNESS: 0
EYE DISCHARGE: 0
SINUS PRESSURE: 0
EYE REDNESS: 0
VOMITING: 0
ABDOMINAL PAIN: 0
WHEEZING: 1
BACK PAIN: 0
COUGH: 0
EYE PAIN: 0
CHOKING: 0
SHORTNESS OF BREATH: 1
DIARRHEA: 0
TROUBLE SWALLOWING: 0
STRIDOR: 0
FACIAL SWELLING: 0
VOICE CHANGE: 0
BLOOD IN STOOL: 0
SORE THROAT: 0

## 2021-06-11 ASSESSMENT — PAIN DESCRIPTION - PROGRESSION: CLINICAL_PROGRESSION: NOT CHANGED

## 2021-06-11 ASSESSMENT — PAIN DESCRIPTION - ONSET: ONSET: SUDDEN

## 2021-06-11 ASSESSMENT — PAIN SCALES - GENERAL: PAINLEVEL_OUTOF10: 10

## 2021-06-11 ASSESSMENT — PAIN DESCRIPTION - FREQUENCY: FREQUENCY: CONTINUOUS

## 2021-06-11 ASSESSMENT — PAIN DESCRIPTION - LOCATION: LOCATION: BACK;KNEE

## 2021-06-11 ASSESSMENT — PAIN DESCRIPTION - ORIENTATION: ORIENTATION: RIGHT;LOWER

## 2021-06-11 ASSESSMENT — PAIN DESCRIPTION - PAIN TYPE: TYPE: ACUTE PAIN

## 2021-06-11 NOTE — ED PROVIDER NOTES
[facility] ED  eMERGENCY dEPARTMENT eNCOUnter      Pt Name: Jad Wiley  MRN: [id number]  [place] 1955  Date of evaluation: 6/11/2021  Provider: Bari Heath MD    CHIEF COMPLAINT       Chief Complaint   Patient presents with    Shortness of Breath     last couple of days states he has a HX of COPD but has no releif from home meds     Fall     mechanical fall no LOC no blood thinners     Knee Pain     right knee from fall abrasion on the knee cap     Arm Injury     left forearm bruise from fall     Back Pain     lower back pain from the fall          HISTORY OF PRESENT ILLNESS   (Location/Symptom, Timing/Onset,Context/Setting, Quality, Duration, Modifying Factors, Severity)  Note limiting factors. Jad Wiley is a 63 y.o. male who presents to the emergency department patient well-known to me previous multiple encounters emergency history of frequent followed by himself history of substance abuse obstructive sleep apnea asthma history of gout hyperlipidemia obesity and GE reflux patient seen in this emergency a few weeks ago for similar nature of injury for fall and short of breath patient any fever chills as per patient he fell again and injuring his left knee arm and urine and his backache acting up again    HPI    NursingNotes were reviewed. REVIEW OF SYSTEMS    (2-9 systems for level 4, 10 or more for level 5)     Review of Systems   Constitutional: Negative. Negative for activity change and fever. HENT: Negative for congestion, drooling, facial swelling, mouth sores, nosebleeds, sinus pressure, sore throat, trouble swallowing and voice change. Eyes: Negative for pain, discharge, redness and visual disturbance. Respiratory: Positive for shortness of breath and wheezing. Negative for cough, choking, chest tightness and stridor. Cardiovascular: Negative for chest pain, palpitations and leg swelling.    Gastrointestinal: Negative for abdominal pain, blood in stool, constipation, diarrhea and vomiting. Endocrine: Negative for cold intolerance, polyphagia and polyuria. Genitourinary: Negative for dysuria, flank pain, frequency, genital sores and urgency. Musculoskeletal: Negative for back pain, joint swelling, neck pain and neck stiffness. Skin: Negative for pallor and rash. Neurological: Negative for tremors, seizures, syncope, weakness, numbness and headaches. Hematological: Negative for adenopathy. Does not bruise/bleed easily. Psychiatric/Behavioral: Negative for agitation, behavioral problems, hallucinations and sleep disturbance. The patient is not hyperactive. All other systems reviewed and are negative. Except as noted above the remainder of the review of systems was reviewed and negative.        PAST MEDICAL HISTORY     Past Medical History:   Diagnosis Date    Alcohol abuse 10/27/2016    Anemia     Asthma     Cocaine abuse ([address] 10/27/2016    COPD (chronic obstructive pulmonary disease) ([address]     Depression 04/27/2020    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    Drug-seeking behavior 04/14/2015    Edema 12/10/2015    Erectile dysfunction     Gastroesophageal reflux disease 12/17/2018    Gout 10/27/2016    History of arthroscopy of both knees 10/27/2016    House dust mite allergy 04/21/2014    Hyperlipidemia     Hypertension 10/27/2016    Injury to heart 10/27/2016    Insomnia 12/17/2018    Medical non-compliance 02/22/2014    Morbid obesity due to excess calories ([address] 12/08/2016    Osteoarthritis of both knees 12/08/2016    Personal history of tobacco use     Pneumonia 12/04/2016    caused hospital admission    Pre-diabetes 10/27/2016    Seasonal allergies 04/21/2014    Severe persistent asthma 10/27/2016    Severe sleep apnea 04/14/2015    Supraventricular tachycardia ([address] 10/27/2016         SURGICALHISTORY       Past Surgical History:   Procedure Laterality Date    ANTERIOR CRUCIATE LIGAMENT REPAIR      CARDIAC hours.    POLYETHYLENE GLYCOL (GLYCOLAX) 17 GM/SCOOP POWDER    Take 17 g by mouth daily    SILDENAFIL (VIAGRA) 100 MG TABLET    Take 1 tablet by mouth as needed for Erectile Dysfunction    TRAZODONE (DESYREL) 50 MG TABLET    Take 1 tablet by mouth nightly       ALLERGIES     Fish-derived products, Iodine, Seasonal, and Pcn [penicillins]    FAMILY HISTORY       Family History   Problem Relation Age of Onset    Arthritis Mother     Asthma Mother     High Cholesterol Mother     Other Mother         aneurysm    Diabetes Father     Stroke Maternal Grandmother     Cancer Maternal Grandfather     Hypertension Other     COPD Neg Hx           SOCIAL HISTORY       Social History     Socioeconomic History    Marital status: Single     Spouse name: n/a    Number of children: 1    Years of education: 15    Highest education level: None   Occupational History    Occupation: Disability     Employer: NONE   Tobacco Use    Smoking status: Current Every Day Smoker     Packs/day: 0.25     Years: 30.00     Pack years: 7.50     Types: Cigarettes, Cigars     Start date: 1988     Last attempt to quit: 10/1/2013     Years since quittin.6    Smokeless tobacco: Never Used   Vaping Use    Vaping Use: Never used   Substance and Sexual Activity    Alcohol use:  Yes     Alcohol/week: 4.0 standard drinks     Types: 2 Cans of beer, 2 Shots of liquor per week     Comment: social 1 -2 x week    Drug use: Yes     Types: Cocaine, Marijuana     Comment: no cocaine x 1 year, marijuana weekly    Sexual activity: Not Currently     Partners: Female   Other Topics Concern    None   Social History Narrative    Lives in an apartment    15 steps to get up to the apartment    Asking for hospital bed and shower chair      Social Determinants of Health     Financial Resource Strain: Medium Risk    Difficulty of Paying Living Expenses: Somewhat hard   Food Insecurity: No Food Insecurity    Worried About Running Out of Food in the Last Year: Never true    Ran Out of Food in the Last Year: Never true   Transportation Needs: No Transportation Needs    Lack of Transportation (Medical): No    Lack of Transportation (Non-Medical): No   Physical Activity: Inactive    Days of Exercise per Week: 0 days    Minutes of Exercise per Session: 0 min   Stress: Stress Concern Present    Feeling of Stress : To some extent   Social Connections: Socially Isolated    Frequency of Communication with Friends and Family: Once a week    Frequency of Social Gatherings with Friends and Family: Once a week    Attends Confucianist Services: 1 to 4 times per year    Active Member of [employer] or Organizations: Not asked    Attends Club or Organization Meetings: Never    Marital Status:    Intimate Partner Violence:     Fear of Current or Ex-Partner:     Emotionally Abused:     Physically Abused:     Sexually Abused:        SCREENINGS      @FLOW([id number])@      PHYSICAL EXAM    (up to 7 for level 4, 8 or more for level 5)     ED Triage Vitals   BP Temp Temp src Pulse Resp SpO2 Height Weight   -- -- -- -- -- -- -- --       Physical Exam  Vitals and nursing note reviewed. Constitutional:       General: He is not in acute distress. Appearance: He is well-developed. He is not ill-appearing, toxic-appearing or diaphoretic. HENT:      Head: Atraumatic. Mouth/Throat:      Pharynx: No pharyngeal swelling or oropharyngeal exudate. Neck:      Thyroid: No thyromegaly. Vascular: No hepatojugular reflux or JVD. Trachea: No tracheal deviation. Cardiovascular:      Rate and Rhythm: Normal rate and regular rhythm. No extrasystoles are present. Pulses: No decreased pulses. Heart sounds: No murmur heard. No friction rub. Pulmonary:      Effort: No tachypnea or accessory muscle usage. Breath sounds: Examination of the right-lower field reveals wheezing. Examination of the left-lower field reveals wheezing. Wheezing present.  No decreased breath sounds, rhonchi or rales. Chest:      Chest wall: No mass, deformity, tenderness, crepitus or edema. Musculoskeletal:         General: Normal range of motion. Right lower leg: Tenderness present. Comments: Regular extremities patient no deformity patient has an active bleeding patient superficial abrasion to the arm as well as left knee has some swelling and tenderness range of motion slightly diminished diffuse tenderness elicited on examination neurovascular is intact   Lymphadenopathy:      Cervical: No cervical adenopathy. Skin:     Capillary Refill: Capillary refill takes less than 2 seconds. Neurological:      General: No focal deficit present. Mental Status: He is alert. Psychiatric:         Mood and Affect: Mood normal.         DIAGNOSTIC RESULTS     EKG: All EKG's are interpreted by the Emergency Department Physician who either signs or Co-signsthis chart in the absence of a cardiologist.        RADIOLOGY:   [facility] such as CT, Ultrasound and MRI are read by the radiologist. Plain radiographic images are visualized and preliminarily interpreted by the emergency physician with the below findings:        Interpretation per the Radiologist below, if available at the time ofthis note:    No orders to display         ED BEDSIDE ULTRASOUND:   Performed by ED Physician - none    LABS:  Labs Reviewed - No data to display    All other labs were within normal range or not returned as of this dictation.     EMERGENCY DEPARTMENT COURSE and DIFFERENTIAL DIAGNOSIS/MDM:   Vitals:    Vitals:    06/11/21 1026 06/11/21 1044   BP: 137/83    Pulse: 86    Resp: 16 18   Temp: 98.2 °F (36.8 °C)    TempSrc: Oral    SpO2: 95% 97%   Weight: 300 lb (136.1 kg)    Height: 6' (1.829 m)            MDM  Number of Diagnoses or Management Options  Contusion of right knee, initial encounter: established and improving  Pain management  Strain of lumbar region, initial encounter  Diagnosis management comments: Well-known to me from previous encounters this emergency for similar situation patient fell again patiently by himself history of substance abuse in the past not sure if he still doing cocaine or not at this time patient stated tramadol is not helping him and he requesting referral to the pain management advised to go to the primary care physician patient understood very well       CRITICAL CARE TIME   Total Critical Care time was minutes, excluding separately reportableprocedures. There was a high probability of clinicallysignificant/life threatening deterioration in the patient's condition which required my urgent intervention. CONSULTS:  None    PROCEDURES:  Unless otherwise noted below, none     Procedures    FINAL IMPRESSION      1. Contusion of right knee, initial encounter    2. Strain of lumbar region, initial encounter    3. Pain management          DISPOSITION/PLAN   DISPOSITION        PATIENT REFERRED TO:  Lacy Hammonds MD  [facility]  [phone]    In 1 week      MD Brijesh [facility]Saint [facility] Servicespatricia [phone] [id number] [phone]    In 1 week        DISCHARGE MEDICATIONS:  New Prescriptions    HYDROCODONE-ACETAMINOPHEN (NORCO) 5-325 MG PER TABLET    Take 1 tablet by mouth every 6 hours as needed for Pain for up to 4 days.     PREDNISONE (DELTASONE) 20 MG TABLET    Take 1 tablet by mouth daily for 5 doses          (Please note that portions of this note were completed with a voice recognition program.  Efforts were made to edit the dictations but occasionally words are mis-transcribed.)    Gennaro Lowry MD (electronically signed)  Attending Emergency Physician       Gennaro Lowry MD  06/11/21 [phone]

## 2021-06-11 NOTE — ED TRIAGE NOTES
Patient in with back and knee pain that he states happeneded when he fell on Wednesday. He denies LOC or head injury. He also states he has copd and has been using his nebulizer tx.  Denies chest pain or SOB

## 2021-06-21 ENCOUNTER — TELEPHONE (OUTPATIENT)
Dept: FAMILY MEDICINE CLINIC | Age: 66
End: 2021-06-21

## 2021-06-21 DIAGNOSIS — L30.9 DERMATITIS, UNSPECIFIED: ICD-10-CM

## 2021-06-21 DIAGNOSIS — J44.9 CHRONIC OBSTRUCTIVE PULMONARY DISEASE, UNSPECIFIED COPD TYPE (HCC): Chronic | ICD-10-CM

## 2021-06-21 DIAGNOSIS — R21 RASH AND OTHER NONSPECIFIC SKIN ERUPTION: Primary | ICD-10-CM

## 2021-06-21 NOTE — TELEPHONE ENCOUNTER
Patient was given a 2-week taper of prednisone and topical treatment for potential scabies. If his rash and itching persists he needs to be evaluated by dermatology. Please help him schedule a visit for next available with dermatology in the area. I have left a referral in his chart. Ok to try different dermatology office if referred office does not take his insurance or if patient needs a different location. Albuterol RX sent to his pharmacy.

## 2021-06-21 NOTE — TELEPHONE ENCOUNTER
Patient needs a new presciption for the allergic skin reaction, itching (or scabies) the original cream did not work. He would like a more aggressive cure. He also needs to have a 3 month supply for his nebulizer solution: Albuterol (PROVENTIL) (5 MG/ML) 0.5% nebulizer solution  Thank you.

## 2021-06-22 NOTE — TELEPHONE ENCOUNTER
Called Dermatology in  [place] Rafiq Hinton have an appt 6/23/2021 @ 9:30 Called Jerzy Fam and let him know and cancelled appt with Tylor Nielsen for Friday. cp

## 2021-06-22 NOTE — PROGRESS NOTES
2021    Calros Leyva (:  1955) is a 63 y.o. male, here for evaluation of the following medical concerns:  Chief Complaint   Patient presents with    Skin Problem    Rash     x3 months Pt seen Dr. Day Klein and thought it was scabies, and was prescribed a cream. Pt states the cream has not helped and it still spreading and itching. Pt states he believes he has bed bugs. Pt states he has seen bugs and has killed some. There is blood stains on the bed. Pt states he is really itchy at night. HPI  Rash  Seen by PCP  due to rash  Rash occurring for 2 months  Initially started on the left lower leg and then spread to his right leg, back and arms  Denied any environmental changes  He was seen at the walk-in clinic who thought it was dermatitis and he was instructed to use ammonium lactate lotion twice daily  He had no improvement with this treatment  Patient treated with permethrin 5% cream and prednisone taper for possible scabies    Used permetherin from the neck down and showered off in the am  He did not really feel like the cream helped  The oral steroid helps a little for itching    Patient with history of seasonal allergies  He does take Singulair on a daily basis  He does not take an antihistamine  States he remembers as a kid being very itchy and having a rash    Pt has recently found bugs in his bed  He does not know what kind of bug it was  States he did have his girlfriend over a few times and after she visited she did have a rash and some bumps  Also with a neighbor in his apartment complex that needed to terminate her for bedbugs    Review of Systems   Constitutional: Negative for chills and fever. HENT: Negative for congestion, sinus pressure and sore throat. Respiratory: Negative for cough and shortness of breath. Cardiovascular: Negative for chest pain and palpitations. Gastrointestinal: Negative for abdominal pain and vomiting.    Musculoskeletal: Negative for arthralgias and myalgias. Skin: Positive for rash. Negative for wound. Neurological: Negative for speech difficulty and light-headedness. Psychiatric/Behavioral: Negative for suicidal ideas. The patient is not nervous/anxious. Prior to Visit Medications    Medication Sig Taking? Authorizing Provider   cetirizine (ZYRTEC) 10 MG tablet Take 1 tablet by mouth daily Yes Terrea Sloop, DO   diphenhydrAMINE (BENADRYL) 25 MG capsule Take 1 capsule by mouth nightly as needed for Itching Yes Terrea Sloop, DO   albuterol (PROVENTIL) (5 MG/ML) 0.5% nebulizer solution Take 0.5 mLs by nebulization every 6 hours as needed for Wheezing or Shortness of Breath Yes [place], MD   permethrin (ELIMITE) 5 % cream To treat scabies: Apply cream to all areas of the body from the neck down and wash off after 8-14 hours. Yes Yemi Duke MD   ammonium lactate (AMLACTIN) 12 % cream RUB INTO SKIN THOROUGHLY.  APPLY TWICE DAILY TO SKIN Yes Historical Provider, MD   acetaminophen (TYLENOL) 500 MG tablet Take 1 tablet by mouth every 8 hours as needed for Pain Yes [place], MD   amLODIPine (NORVASC) 10 MG tablet Take 1 tablet by mouth daily Yes [place], MD   escitalopram (LEXAPRO) 10 MG tablet Take 1 tablet by mouth daily Yes [place], MD   fluticasone (FLONASE) 50 MCG/ACT nasal spray 2 sprays by Nasal route daily Yes [place], MD   Fluticasone furoate-vilanterol (BREO ELLIPTA) 200-25 MCG/INH AEPB inhaler Inhale 1 puff into the lungs daily Yes [place], MD   ipratropium-albuterol (DUONEB) 0.5-2.5 (3) MG/3ML SOLN nebulizer solution Take 3 mLs by nebulization every 6 hours as needed for Shortness of Breath Yes [place], MD   lovastatin (MEVACOR) 10 MG tablet Take 1 tablet by mouth nightly Yes [place], MD   montelukast (SINGULAIR) 10 MG tablet Take 1 tablet by mouth nightly Yes [place], MD   pantoprazole (PROTONIX) 40 MG tablet Take 1 tablet by mouth daily Yes Anil Novak MD   polyethylene glycol (GLYCOLAX) 17 GM/SCOOP powder Take 17 g by mouth daily Yes Anil Novak MD   sildenafil (VIAGRA) 100 MG tablet Take 1 tablet by mouth as needed for Erectile Dysfunction Yes Anil Novak MD   traZODone (DESYREL) 50 MG tablet Take 1 tablet by mouth nightly Yes Anil Novak MD   meloxicam (MOBIC) 15 MG tablet TAKE 1 TABLET BY MOUTH EVERY DAY WITH FOOD Yes Acosta Payne MD   furosemide (LASIX) 20 MG tablet Take 1 tablet by mouth daily Yes Yessica Aguilar MD   Handicap Placard MISC by Does not apply route DX: OSTEOARTHRITIS OF BOTH KNEES (M17.0), COPD (J44.9)     EXPIRES: 02/2024 Yes Anil Novak MD        Allergies   Allergen Reactions    Fish-Derived Products Anaphylaxis    Iodine Anaphylaxis     Iodine-containing products, dyes, contrast dye    Seasonal Shortness Of Breath    Pcn [Penicillins] Nausea And Vomiting       Past Medical History:   Diagnosis Date    Alcohol abuse 10/27/2016    Anemia     Asthma     Cocaine abuse ([facility] [address] 10/27/2016    COPD (chronic obstructive pulmonary disease) ([facility] [address] [facility]     Depression 04/27/2020    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    Drug-seeking behavior 04/14/2015    Edema 12/10/2015    Erectile dysfunction     Gastroesophageal reflux disease 12/17/2018    Gout 10/27/2016    History of arthroscopy of both knees 10/27/2016    House dust mite allergy 04/21/2014    Hyperlipidemia     Hypertension 10/27/2016    Injury to heart 10/27/2016    Insomnia 12/17/2018    Medical non-compliance 02/22/2014    Morbid obesity due to excess calories ([address] 12/08/2016    Osteoarthritis of both knees 12/08/2016    Personal history of tobacco use     Pneumonia 12/04/2016    caused hospital admission    Pre-diabetes 10/27/2016    Seasonal allergies 04/21/2014    Severe persistent asthma 10/27/2016    Severe sleep apnea 04/14/2015    Supraventricular tachycardia ([address] 10/27/2016       Past Surgical History:   Procedure Laterality Date    ANTERIOR CRUCIATE LIGAMENT REPAIR      CARDIAC CATHETERIZATION      COLONOSCOPY N/A 7/15/2020    COLONOSCOPY WITH POLYPECTOMY performed by Bal Whitlock MD at [facility] Left 2019    LEFT TOTAL KNEE ARTHROPLASTY performed by Fabio Grayson MD at [facility] N/A     UMBILICAL HERNIA REPAIR WITH MESH performed by Douglas Perez MD at [facility] History     Socioeconomic History    Marital status: Single     Spouse name: n/a    Number of children: 1    Years of education: 12    Highest education level: Not on file   Occupational History    Occupation: Disability     Employer: NONE   Tobacco Use    Smoking status: Current Every Day Smoker     Packs/day: 0.25     Years: 30.00     Pack years: 7.50     Types: Cigarettes, Cigars     Start date: 1988     Last attempt to quit: 10/1/2013     Years since quittin.7    Smokeless tobacco: Never Used   Vaping Use    Vaping Use: Never used   Substance and Sexual Activity    Alcohol use: Yes     Alcohol/week: 4.0 standard drinks     Types: 2 Cans of beer, 2 Shots of liquor per week     Comment: social 1 -2 x week    Drug use: Yes     Types: Cocaine, Marijuana     Comment: no cocaine x 1 year, marijuana weekly    Sexual activity: Not Currently     Partners: Female   Other Topics Concern    Not on file   Social History Narrative    Lives in an apartment    15 steps to get up to the apartment    Asking for hospital bed and shower chair      Social Determinants of Health     Financial Resource Strain: Medium Risk    Difficulty of Paying Living Expenses: Somewhat hard   Food Insecurity: No Food Insecurity    Worried About Running Out of Food in the Last Year: Never true    Jonnie of Food in the Last Year: Never true   Transportation Needs: No Transportation Needs    Lack of Transportation (Medical):  No    Lack of Transportation (Non-Medical): No   Physical Activity: Inactive    Days of Exercise per Week: 0 days    Minutes of Exercise per Session: 0 min   Stress: Stress Concern Present    Feeling of Stress : To some extent   Social Connections: Socially Isolated    Frequency of Communication with Friends and Family: Once a week    Frequency of Social Gatherings with Friends and Family: Once a week    Attends [employer] Services: 1 to 4 times per year    Active Member of [employer] or Organizations: Not asked    Attends Club or Organization Meetings: Never    Marital Status:    Intimate Partner Violence:     Fear of Current or Ex-Partner:     Emotionally Abused:     Physically Abused:     Sexually Abused:         Family History   Problem Relation Age of Onset    Arthritis Mother     Asthma Mother     High Cholesterol Mother     Other Mother         aneurysm    Diabetes Father     Stroke Maternal Grandmother     Cancer Maternal Grandfather     Hypertension Other     COPD Neg Hx        Vitals:    06/23/21 0941   Pulse: 82   Temp: 97.1 °F (36.2 °C)   SpO2: 98%   Weight: 300 lb (136.1 kg)   Height: 6' (1.829 m)       Estimated body mass index is 40.69 kg/m² as calculated from the following:    Height as of this encounter: 6' (1.829 m). Weight as of this encounter: 300 lb (136.1 kg). No results for input(s): WBC, RBC, HGB, HCT, MCV, MCH, MCHC, RDW, PLT, MPV in the last 72 hours. No results for input(s): NA, K, CL, CO2, BUN, CREATININE, GLUCOSE, CALCIUM, PROT, LABALBU, BILITOT, ALKPHOS, AST, ALT in the last 72 hours. Lab Results   Component Value Date    LABA1C 5.7 04/15/2021       XR LUMBAR SPINE (MIN 4 VIEWS)    Result Date: 5/28/2021   There are severe degenerative changes of the lumbar spine without fracture or subluxation. XR KNEE LEFT (MIN 4 VIEWS)    Result Date: 5/24/2021  There are no acute osseous injuries. Status post complete joint replacement of the left knee.      XR FOOT RIGHT (MIN 3 VIEWS)    Result Date: 5/24/2021  There are no acute osseous injuries. Physical Exam  Constitutional:       Appearance: Normal appearance. He is normal weight. HENT:      Head: Normocephalic and atraumatic. Nose: No rhinorrhea. Mouth/Throat:      Mouth: Mucous membranes are moist.      Pharynx: Oropharynx is clear. Eyes:      Extraocular Movements: Extraocular movements intact. Conjunctiva/sclera: Conjunctivae normal.   Skin:     Findings: No lesion or rash. Comments: Diffuse skin colored to erythematous papules on the upper extremities, back and legs, areas of excoriations, no blisters or ulcerations   Neurological:      General: No focal deficit present. Mental Status: He is alert and oriented to person, place, and time. Mental status is at baseline. Psychiatric:         Mood and Affect: Mood normal.         Thought Content: Thought content normal.         Judgment: Judgment normal.                 ASSESSMENT/PLAN:  1. Rash and other nonspecific skin eruption  -Concern for bedbugs as patient has noticed several bugs in his bed but is unable to identify them as bedbugs after being shown pictures on [email or web address] of what bedbugs look like  -He did have a neighbor at his apartment complex who recently needed an  for bedbugs  -When his girlfriend stayed overnight she complained of itching and bumps afterwards  -Patient will call his landlord and request an  to evaluate his apartment  -In the meantime we will start Zyrtec daily and Benadryl at nighttime for itching  -He is to use CeraVe moisturizer after showering on a daily basis  -He does have a history of seasonal allergies and asthma, and remembers having rashes and severe itching as a child, possible underlying eczema    - cetirizine (ZYRTEC) 10 MG tablet; Take 1 tablet by mouth daily  Dispense: 30 tablet; Refill: 0  - diphenhydrAMINE (BENADRYL) 25 MG capsule;  Take 1 capsule by mouth nightly as needed for Itching  Dispense: 30 capsule; Refill: 0      ---------------------------------------------------------------------  Side effects, adverse effects of the medication prescribed today, as well as treatment plan/ rationale and result expectations have been discussed with the patient who expresses understanding and desires to proceed. Close follow up to evaluate treatment results and for coordination of care. I have reviewed the patient's medical history in detail and updated the computerized patient record. As always, patient is advised that if symptoms worsen in any way they will proceed to the nearest emergency room. --------------------------------------------------------------------    Return in about 2 weeks (around 7/7/2021) for Skin check w/Dr. Lynn Mendez. An  electronic signature was used to authenticate this note.     --Sameer Jiménez DO on 6/23/2021 at 10:21 AM

## 2021-06-22 NOTE — TELEPHONE ENCOUNTER
Pt made aware. I was able to make pt an appointment with [facility] but was unable to get him in for a new patient appointment until August 5th at 9:30 at the [facility] office. Pt unwilling to go to [place] office, I offered possible sooner appointment with them. Pt has appointment with Daniele Blas on 06/25.

## 2021-06-23 ENCOUNTER — OFFICE VISIT (OUTPATIENT)
Dept: FAMILY MEDICINE CLINIC | Age: 66
End: 2021-06-23
Payer: MEDICARE

## 2021-06-23 ENCOUNTER — CARE COORDINATION (OUTPATIENT)
Dept: CARE COORDINATION | Age: 66
End: 2021-06-23

## 2021-06-23 VITALS
HEART RATE: 82 BPM | BODY MASS INDEX: 40.63 KG/M2 | OXYGEN SATURATION: 98 % | HEIGHT: 72 IN | TEMPERATURE: 97.1 F | WEIGHT: 300 LBS

## 2021-06-23 DIAGNOSIS — R21 RASH AND OTHER NONSPECIFIC SKIN ERUPTION: Primary | ICD-10-CM

## 2021-06-23 PROCEDURE — 99213 OFFICE O/P EST LOW 20 MIN: CPT | Performed by: STUDENT IN AN ORGANIZED HEALTH CARE EDUCATION/TRAINING PROGRAM

## 2021-06-23 RX ORDER — DIPHENHYDRAMINE HCL 25 MG
25 CAPSULE ORAL NIGHTLY PRN
Qty: 30 CAPSULE | Refills: 0 | Status: SHIPPED | OUTPATIENT
Start: 2021-06-23 | End: 2021-07-21 | Stop reason: SDUPTHER

## 2021-06-23 RX ORDER — CETIRIZINE HYDROCHLORIDE 10 MG/1
10 TABLET ORAL DAILY
Qty: 30 TABLET | Refills: 0 | Status: SHIPPED | OUTPATIENT
Start: 2021-06-23 | End: 2021-07-07 | Stop reason: DRUGHIGH

## 2021-06-23 ASSESSMENT — ENCOUNTER SYMPTOMS
SORE THROAT: 0
SHORTNESS OF BREATH: 0
ABDOMINAL PAIN: 0
VOMITING: 0
COUGH: 0
SINUS PRESSURE: 0

## 2021-06-23 NOTE — PATIENT INSTRUCTIONS
Dermatitis  Recommended skin care products:  - Gentle cleansers/soaps (Dove sensitive skin soap, Cetaphil or Cerave). - [place] use of mild/fragrance free lotions/creams (CeraVe or Cetaphil). - Free and Clear Laundry detergent, fabric softener, dryer sheets. Avoid personal products with fragrances or dyes as this may make dry/ itchy skin worse.

## 2021-07-01 DIAGNOSIS — J44.9 CHRONIC OBSTRUCTIVE PULMONARY DISEASE, UNSPECIFIED COPD TYPE (HCC): Chronic | ICD-10-CM

## 2021-07-01 NOTE — TELEPHONE ENCOUNTER
Requesting medication refill.  Please approve or deny this request.    Rx requested:  Requested Prescriptions     Pending Prescriptions Disp Refills    ipratropium-albuterol (DUONEB) 0.5-2.5 (3) MG/3ML SOLN nebulizer solution [Pharmacy Med Name: IPRAT-ALBUT 0.5-3(2.5) MG/3 ML] 360 mL      Sig: TAKE 3 MLS BY NEBULIZATION EVERY 6 HOURS AS NEEDED FOR SHORTNESS OF BREATH       Last Office Visit:   4/29/2021    Last Filled:      Last Labs:      Next Visit Date:  Future Appointments   Date Time Provider Constance Espitia   7/6/2021 11:20 AM MD Romero Trevino [place] [address]   7/7/2021 10:00 [facility] [place]

## 2021-07-02 RX ORDER — IPRATROPIUM BROMIDE AND ALBUTEROL SULFATE 2.5; .5 MG/3ML; MG/3ML
1 SOLUTION RESPIRATORY (INHALATION) EVERY 6 HOURS PRN
Qty: 360 ML | Refills: 0 | Status: SHIPPED | OUTPATIENT
Start: 2021-07-02 | End: 2021-07-27

## 2021-07-06 ENCOUNTER — VIRTUAL VISIT (OUTPATIENT)
Dept: FAMILY MEDICINE CLINIC | Age: 66
End: 2021-07-06
Payer: MEDICARE

## 2021-07-06 DIAGNOSIS — J44.9 CHRONIC OBSTRUCTIVE PULMONARY DISEASE, UNSPECIFIED COPD TYPE (HCC): Chronic | ICD-10-CM

## 2021-07-06 DIAGNOSIS — Z00.00 ROUTINE GENERAL MEDICAL EXAMINATION AT A HEALTH CARE FACILITY: Primary | ICD-10-CM

## 2021-07-06 DIAGNOSIS — Z72.0 TOBACCO USE: ICD-10-CM

## 2021-07-06 DIAGNOSIS — M48.061 SPINAL STENOSIS OF LUMBAR REGION, UNSPECIFIED WHETHER NEUROGENIC CLAUDICATION PRESENT: ICD-10-CM

## 2021-07-06 PROCEDURE — G0438 PPPS, INITIAL VISIT: HCPCS | Performed by: FAMILY MEDICINE

## 2021-07-06 RX ORDER — HYDROCORTISONE 10 MG/G
CREAM TOPICAL DAILY PRN
COMMUNITY
Start: 2021-05-28 | End: 2021-11-05 | Stop reason: ALTCHOICE

## 2021-07-06 ASSESSMENT — LIFESTYLE VARIABLES
HOW OFTEN DO YOU HAVE SIX OR MORE DRINKS ON ONE OCCASION: 2
HOW OFTEN DURING THE LAST YEAR HAVE YOU BEEN UNABLE TO REMEMBER WHAT HAPPENED THE NIGHT BEFORE BECAUSE YOU HAD BEEN DRINKING: 0
HOW OFTEN DURING THE LAST YEAR HAVE YOU HAD A FEELING OF GUILT OR REMORSE AFTER DRINKING: 0
HOW OFTEN DURING THE LAST YEAR HAVE YOU FAILED TO DO WHAT WAS NORMALLY EXPECTED FROM YOU BECAUSE OF DRINKING: 0
AUDIT TOTAL SCORE: 6
HOW OFTEN DO YOU HAVE A DRINK CONTAINING ALCOHOL: 4
HOW OFTEN DURING THE LAST YEAR HAVE YOU FOUND THAT YOU WERE NOT ABLE TO STOP DRINKING ONCE YOU HAD STARTED: 0
HOW MANY STANDARD DRINKS CONTAINING ALCOHOL DO YOU HAVE ON A TYPICAL DAY: 0
AUDIT-C TOTAL SCORE: 6
HOW OFTEN DURING THE LAST YEAR HAVE YOU NEEDED AN ALCOHOLIC DRINK FIRST THING IN THE MORNING TO GET YOURSELF GOING AFTER A NIGHT OF HEAVY DRINKING: 0
HAS A RELATIVE, FRIEND, DOCTOR, OR ANOTHER HEALTH PROFESSIONAL EXPRESSED CONCERN ABOUT YOUR DRINKING OR SUGGESTED YOU CUT DOWN: 0
HAVE YOU OR SOMEONE ELSE BEEN INJURED AS A RESULT OF YOUR DRINKING: 0

## 2021-07-06 ASSESSMENT — PATIENT HEALTH QUESTIONNAIRE - PHQ9
SUM OF ALL RESPONSES TO PHQ QUESTIONS 1-9: 0
2. FEELING DOWN, DEPRESSED OR HOPELESS: 0
SUM OF ALL RESPONSES TO PHQ9 QUESTIONS 1 & 2: 0
SUM OF ALL RESPONSES TO PHQ QUESTIONS 1-9: 0
1. LITTLE INTEREST OR PLEASURE IN DOING THINGS: 0
SUM OF ALL RESPONSES TO PHQ QUESTIONS 1-9: 0

## 2021-07-06 NOTE — PATIENT INSTRUCTIONS
Personalized Preventive Plan for Evita Elizabeth - 7/6/2021  Medicare offers a range of preventive health benefits. Some of the tests and screenings are paid in full while other may be subject to a deductible, co-insurance, and/or copay. Some of these benefits include a comprehensive review of your medical history including lifestyle, illnesses that may run in your family, and various assessments and screenings as appropriate. After reviewing your medical record and screening and assessments performed today your provider may have ordered immunizations, labs, imaging, and/or referrals for you. A list of these orders (if applicable) as well as your Preventive Care list are included within your After Visit Summary for your review. Other Preventive Recommendations:    · A preventive eye exam performed by an eye specialist is recommended every 1-2 years to screen for glaucoma; cataracts, macular degeneration, and other eye disorders. · A preventive dental visit is recommended every 6 months. · Try to get at least 150 minutes of exercise per week or 10,000 steps per day on a pedometer . · Order or download the FREE \"Exercise & Physical Activity: Your Everyday Guide\" from [employer]. Call 7-167.991.6729 or search [employer] online. · You need 7031-2550 mg of calcium and 3187-9101 IU of vitamin D per day. It is possible to meet your calcium requirement with diet alone, but a vitamin D supplement is usually necessary to meet this goal.  · When exposed to the sun, use a sunscreen that protects against both UVA and UVB radiation with an SPF of 30 or greater. Reapply every 2 to 3 hours or after sweating, drying off with a towel, or swimming. · Always wear a seat belt when traveling in a car. Always wear a helmet when riding a bicycle or motorcycle. Heart-Healthy Diet   Sodium, Fat, and Cholesterol Controlled Diet       What Is a Heart Healthy Diet?    A heart-healthy diet is one that limits sodium , certain types of fat , and cholesterol . This type of diet is recommended for:   People with any form of cardiovascular disease (eg, coronary heart disease , peripheral vascular disease , previous heart attack , previous stroke )   People with risk factors for cardiovascular disease, such as high blood pressure , high cholesterol , or diabetes   Anyone who wants to lower their risk of developing cardiovascular disease   Sodium    Sodium is a mineral found in many foods. In general, most people consume much more sodium than they need. Diets high in sodium can increase blood pressure and lead to edema (water retention). On a heart-healthy diet, you should consume no more than 2,300 mg (milligrams) of sodium per dayabout the amount in one teaspoon of table salt. The foods highest in sodium include table salt (about 50% sodium), processed foods, convenience foods, and preserved foods. Cholesterol    Cholesterol is a fat-like, waxy substance in your blood. Our bodies make some cholesterol. It is also found in animal products, with the highest amounts in fatty meat, egg yolks, whole milk, cheese, shellfish, and organ meats. On a heart-healthy diet, you should limit your cholesterol intake to less than 200 mg per day. It is normal and important to have some cholesterol in your bloodstream. But too much cholesterol can cause plaque to build up within your arteries, which can eventually lead to a heart attack or stroke. The two types of cholesterol that are most commonly referred to are:   Low-density lipoprotein (LDL) cholesterol  Also known as bad cholesterol, this is the cholesterol that tends to build up along your arteries. Bad cholesterol levels are increased by eating fats that are saturated or hydrogenated. Optimal level of this cholesterol is less than 100. Over 130 starts to get risky for heart disease.    High-density lipoprotein (HDL) cholesterol  Also known as good cholesterol, this type of cholesterol actually carries cholesterol away from your arteries and may, therefore, help lower your risk of having a heart attack. You want this level to be high (ideally greater than 60). It is a risk to have a level less than 40. You can raise this good cholesterol by eating olive oil, canola oil, avocados, or nuts. Exercise raises this level, too. Fat    Fat is calorie dense and packs a lot of calories into a small amount of food. Even though fats should be limited due to their high calorie content, not all fats are bad. In fact, some fats are quite healthful. Fat can be broken down into four main types. The good-for-you fats are:   Monounsaturated fat  found in oils such as olive and canola, avocados, and nuts and natural nut butters; can decrease cholesterol levels, while keeping levels of HDL cholesterol high   Polyunsaturated fat  found in oils such as safflower, sunflower, soybean, corn, and sesame; can decrease total cholesterol and LDL cholesterol   Omega-3 fatty acids  particularly those found in fatty fish (such as salmon, trout, tuna, mackerel, herring, and sardines); can decrease risk of arrhythmias, decrease triglyceride levels, and slightly lower blood pressure   The fats that you want to limit are:   Saturated fat  found in animal products, many fast foods, and a few vegetables; increases total blood cholesterol, including LDL levels   Animal fats that are saturated include: butter, lard, whole-milk dairy products, meat fat, and poultry skin   Vegetable fats that are saturated include: hydrogenated shortening, palm oil, coconut oil, cocoa butter   Hydrogenated or trans fat  found in margarine and vegetable shortening, most shelf stable snack foods, and fried foods; increases LDL and decreases HDL     It is generally recommended that you limit your total fat for the day to less than 30% of your total calories.  If you follow an 1800-calorie heart healthy diet, for example, this would mean 60 grams of fat or less per day. Saturated fat and trans fat in your diet raises your blood cholesterol the most, much more than dietary cholesterol does. For this reason, on a heart-healthy diet, less than 7% of your calories should come from saturated fat and ideally 0% from trans fat. On an 1800-calorie diet, this translates into less than 14 grams of saturated fat per day, leaving 46 grams of fat to come from mono- and polyunsaturated fats.    Food Choices on a Heart Healthy Diet   Food Category   Foods Recommended   Foods to Avoid   Grains   Breads and rolls without salted tops Most dry and cooked cereals Unsalted crackers and breadsticks Low-sodium or homemade breadcrumbs or stuffing All rice and pastas   Breads, rolls, and crackers with salted tops High-fat baked goods (eg, muffins, donuts, pastries) Quick breads, self-rising flour, and biscuit mixes Regular bread crumbs Instant hot cereals Commercially prepared rice, pasta, or stuffing mixes   Vegetables   Most fresh, frozen, and low-sodium canned vegetables Low-sodium and salt-free vegetable juices Canned vegetables if unsalted or rinsed   Regular canned vegetables and juices, including sauerkraut and pickled vegetables Frozen vegetables with sauces Commercially prepared potato and vegetable mixes   Fruits   Most fresh, frozen, and canned fruits All fruit juices   Fruits processed with salt or sodium   Milk   Nonfat or low-fat (1%) milk Nonfat or low-fat yogurt Cottage cheese, low-fat ricotta, cheeses labeled as low-fat and low-sodium   Whole milk Reduced-fat (2%) milk Malted and chocolate milk Full fat yogurt Most cheeses (unless low-fat and low salt) Buttermilk (no more than 1 cup per week)   Meats and Beans   Lean cuts of fresh or frozen beef, veal, lamb, or pork (look for the word loin) Fresh or frozen poultry without the skin Fresh or frozen fish and some shellfish Egg whites and egg substitutes (Limit whole eggs to three per week) Tofu Nuts or seeds (unsalted, dry-roasted), low-sodium peanut butter Dried peas, beans, and lentils   Any smoked, cured, salted, or canned meat, fish, or poultry (including samuels, chipped beef, cold cuts, hot dogs, sausages, sardines, and anchovies) Poultry skins Breaded and/or fried fish or meats Canned peas, beans, and lentils Salted nuts   Fats and Oils   Olive oil and canola oil Low-sodium, low-fat salad dressings and mayonnaise   Butter, margarine, coconut and palm oils, samuels fat   Snacks, Sweets, and Condiments   Low-sodium or unsalted versions of broths, soups, soy sauce, and condiments Pepper, herbs, and spices; vinegar, lemon, or lime juice Low-fat frozen desserts (yogurt, sherbet, fruit bars) Sugar, cocoa powder, honey, syrup, jam, and preserves Low-fat, trans-fat free cookies, cakes, and pies Narendra and animal crackers, fig bars, manuel snaps   High-fat desserts Broth, soups, gravies, and sauces, made from instant mixes or other high-sodium ingredients Salted snack foods Canned olives Meat tenderizers, seasoning salt, and most flavored vinegars   Beverages   Low-sodium carbonated beverages Tea and coffee in moderation Soy milk   Commercially softened water   Suggestions   Make whole grains, fruits, and vegetables the base of your diet. Choose heart-healthy fats such as canola, olive, and flaxseed oil, and foods high in heart-healthy fats, such as nuts, seeds, soybeans, tofu, and fish. Eat fish at least twice per week; the fish highest in omega-3 fatty acids and lowest in mercury include salmon, herring, mackerel, sardines, and canned chunk light tuna. If you eat fish less than twice per week or have high triglycerides, talk to your doctor about taking fish oil supplements. Read food labels.    For products low in fat and cholesterol, look for fat free, low-fat, cholesterol free, saturated fat free, and trans fat freeAlso scan the Nutrition Facts Label, which lists saturated fat, trans fat, and cholesterol amounts. For products low in sodium, look for sodium free, very low sodium, low sodium, no added salt, and unsalted   Skip the salt when cooking or at the table; if food needs more flavor, get creative and try out different herbs and spices. Garlic and onion also add substantial flavor to foods. Trim any visible fat off meat and poultry before cooking, and drain the fat off after kumar. Use cooking methods that require little or no added fat, such as grilling, boiling, baking, poaching, broiling, roasting, steaming, stir-frying, and sauting. Avoid fast food and convenience food. They tend to be high in saturated and trans fat and have a lot of added salt. Talk to a registered dietitian for individualized diet advice. Last Reviewed: March 2011 Katia Ruiz MS, MPH, RD   Updated: 3/29/2011   ·   Patient information: Weight loss treatments    INTRODUCTION  Obesity is a major international problem, and Americans are among the heaviest people in the world. The percentage of obese people in the [place] has risen steadily. Many people find that although they initially lose weight by dieting, they quickly regain the weight after the diet ends. Because it so hard to keep weight off over time, it is important to have as much information and support as possible before starting a diet. You are most likely to be successful in losing weight and keeping it off when you believe that your body weight can be controlled. STARTING A WEIGHT LOSS PROGRAM  Some people like to talk to their doctor or nurse to get help choosing the best plan, monitoring progress, and getting advice and support along the way. To know what treatment (or combination of treatments) will work best, determine your body mass index (BMI) and waist circumference (measurement). The BMI is calculated from your height and weight.   A person with a BMI between 25 and 29.9 is considered overweight   A person with a BMI of 30 or greater is considered to be obese  A waist circumference greater than 35 inches (88 cm) in women and 40 inches (102 cm) in men increases the risk of obesity-related complications, such as heart disease and diabetes. People who are obese and who have a larger waist size may need more aggressive weight loss treatment than others. Talk to your doctor or nurse for advice. Types of treatment  Based on your measurements and your medical history, your doctor or nurse can determine what combination of weight loss treatments would work best for you. Treatments may include changes in lifestyle, exercise, dieting, and, in some cases, weight loss medicines or weight loss surgery. Weight loss surgery, also called bariatric surgery, is reserved for people with severe obesity who have not responded to other weight loss treatments. SETTING A WEIGHT LOSS GOAL  It is important to set a realistic weight loss goal. Your first goal should be to avoid gaining more weight and staying at your current weight (or within 5 percent). Many people have a \"dream\" weight that is difficult or impossible to achieve. People at high risk of developing diabetes who are able to lose 5 percent of their body weight and maintain this weight will reduce their risk of developing diabetes by about 50 percent and reduce their blood pressure. This is a success. Losing more than 15 percent of your body weight and staying at this weight is an extremely good result, even if you never reach your \"dream\" or \"ideal\" weight. LIFESTYLE CHANGES  Programs that help you to change your lifestyle are usually run by psychologists or other professionals. The goals of lifestyle changes are to help you change your eating habits, become more active, and be more aware of how much you eat and exercise, helping you to make healthier choices. This type of treatment can be broken down into three steps:   The triggers that make you want to eat   Eating   What happens after you eat  Triggers to eat  Determining what triggers you to eat involves figuring out what foods you eat and where and when you eat. To figure out what triggers you to eat, keep a record for a few days of everything you eat, the places where you eat, how often you eat, and the emotions you were feeling when you ate. For some people, the trigger is related to a certain time of day or night. For others, the trigger is related to a certain place, like sitting at a desk working. Eating  You can change your eating habits by breaking the chain of events between the trigger for eating and eating itself. There are many ways to do this. For instance, you can:  Limit where you eat to a few places (eg, dining room)   Restrict the number of utensils (eg, only a fork) used for eating   Drink a sip of water between each bite   Chew your food a certain number of times   Get up and stop eating every few minutes  What happens after you eat  Rewarding yourself for good eating behaviors can help you to develop better habits. This is not a reward for weight loss; instead, it is a reward for changing unhealthy behaviors. Do not use food as a reward. Some people find money, clothing, or personal care (eg, a hair cut, manicure, or massage) to be effective rewards. Treat yourself immediately after making better eating choices to reinforce the value of the good behavior. You need to have clear behavior goals, and you must have a time frame for reaching your goals. Reward small changes along the way to your final goal.  Other factors that contribute to successful weight loss  Changing your behavior involves more than just changing unhealthy eating habits; it also involves finding people around you to support your weight loss, reducing stress, and learning to be strong when tempted by food. Establish a \"gregory\" system  Having a friend or family member available to provide support and reinforce good behavior is very helpful.  The support person needs to understand your goals. Learn to be strong  Learning to be strong when tempted by food is an important part of losing weight. As an example, you will need to learn how to say \"no\" and continue to say no when urged to eat at parties and social gatherings. Develop strategies for events before you go, such as eating before you go or taking low-calorie snacks and drinks with you. Develop a support system  Having a support system is helpful when losing weight. This is why many [employer] groups are successful. Family support is also essential; if your family does not support your efforts to lose weight, this can slow your progress or even keep you from losing weight. Positive thinking  People often have conversations with themselves in their head; these conversations can be positive or negative. If you eat a piece of cake that was not planned, you may respond by thinking, \"Oh, you stupid idiot, you've blown your diet! \" and as a result, you may eat more cake. A positive thought for the same event could be, \"Well, I ate cake when it was not on my plan. Now I should do something to get back on track. \" A positive approach is much more likely to be successful than a negative one. Reduce stress  Although stress is a part of everyday life, it can trigger uncontrolled eating in some people. It is important to find a way to get through these difficult times without eating or by eating low-calorie food, like raw vegetables. It may be helpful to imagine a relaxing place that allows you to temporarily escape from stress. With deep breaths and closed eyes, you can imagine this relaxing place for a few minutes. Self-help programs  Self-help programs like [employer] Watchers®, Overeaters Anonymous®, and Take Off [place] (TOPS)© work for some people. As with all weight loss programs, you are most likely to be successful with these plans if you make long-term changes in how you eat.   CHOOSING A DIET  A calorie is a unit of energy found in food. Your body needs calories to function. The goal of any diet is to burn up more calories than you eat. How quickly you lose weight depends upon several factors, such as your age, gender, and starting weight. Older people have a slower metabolism than young people, so they lose weight more slowly. Men lose more weight than women of similar height and weight when dieting because they use more energy. People who are extremely overweight lose weight more quickly than those who are only mildly overweight. Try not to drink alcohol or drinks with added sugar, and most sweets (candy, cakes, cookies), since they rarely contain important nutrients. Portion-controlled diets  One simple way to diet is to buy packaged foods, like frozen low-calorie meals or meal-replacement canned drinks. A typical meal plan for one day may include:  A meal-replacement drink or breakfast bar for breakfast   A meal-replacement drink or a frozen low-calorie (250 to 350 calories) meal for lunch   A frozen low-calorie meal or other prepackaged, calorie-controlled meal, along with extra vegetables for dinner  This would give you 1000 to 1500 calories per day. Low-fat diet  To reduce the amount of fat in your diet, you can:  Eat low-fat foods. Low-fat foods are those that contain less than 30 percent of calories from fat. Fat is listed on the food facts label (figure 1). Count fat grams. For a 1500 calorie diet, this would mean about 45 g or fewer of fat per day. Low-carbohydrate diet  Low- and very-low-carbohydrate diets (eg, Atkins diet, [employer]) have become popular ways to lose weight quickly.   With a very-low-carbohydrate diet, you eat between 0 and 60 grams of carbohydrates per day (a standard diet contains 200 to 300 grams of carbohydrates)   With a low-carbohydrate diet, you eat between 60 and 130 grams of carbohydrates per day  Carbohydrates are found in fruits, vegetables, and grains (including breads, rice, pasta, and cereal), alcoholic beverages, and in dairy products. Meat and fish do not contain carbohydrates. Side effects of very-low-carbohydrate diets can include constipation, headache, bad breath, muscle cramps, diarrhea, and weakness. Mediterranean diet  The term \"Mediterranean diet\" refers to a way of eating that is common in [place] regions around the [facility]. Although there is some variation in Mediterranean diets, there are some similarities. Most Mediterranean diets include:  A high level of monounsaturated fats (from olive or canola oil, walnuts, pecans, almonds) and a low level of saturated fats (from butter)   A high amount of vegetables, fruits, legumes, and grains (7 to 10 servings of fruits and vegetables per day)   A moderate amount of milk and dairy products, mostly in the form of cheese. Use low-fat dairy products (skim milk, fat-free yogurt, low-fat cheese). A relatively low amount of red meat and meat products. Substitute fish or poultry for red meat. For those who drink alcohol, a modest amount (mainly as red wine) may help to protect against cardiovascular disease. A modest amount is up to one (4 ounce) glass per day for women and up to two glasses per day for men. Which diet is best?  Studies have compared different diets, including:  Very-low-carbohydrate (Atkins)   Macronutrient balance controlling glycemic load (Zone®)   Reduced-calorie (Weight Watchers®)   Very-low-fat ([place]ish)  No one diet is \"best\" for weight loss. Any diet will help you to lose weight if you stick with the diet. Therefore, it is important to choose a diet that includes foods you like. Fad diets  Fad diets often promise quick weight loss (more than 1 to 2 pounds per week) and may claim that you do not need to exercise or give up favorite foods. Some fad diets cost a lot of money, because you have to pay for seminars or pills.  Fad diets generally lack any scientific evidence that they are safe and effective, but instead rely on \"before\" and \"after\" photos or testimonials. Diets that sound too good to be true usually are. These plans are a waste of time and money and are not recommended. A doctor, nurse, or nutritionist can help you find a safe and effective way to lose weight and keep it off. WEIGHT LOSS [place] a weight loss medicine may be helpful when used in combination with diet, exercise, and lifestyle changes. However, it is important to understand the risks and benefits of these medicines. It is also important to be realistic about your goal weight using a weight loss medicine; you may not reach your \"dream\" weight, but you may be able to reduce your risk of diabetes or heart disease. Weight loss medicines may be recommended for people who have not been able to lose weight with diet and exercise who have a:  BMI of 30 or more    BMI between 27 and 29.9 and have other medical problems, such as diabetes, high cholesterol, or high blood pressure  Two weight loss medicines are approved in the [place] for long-term use. These are sibutramine and orlistat. Other weight loss medicines (phentermine, diethylpropion) are available but are only approved for short-term use (up to 12 weeks). Sibutramine  Sibutramine (Meridia®, Reductil®) is a medicine that reduces your appetite. In people who take the medicine for one year, the average weight loss is 10 percent of the initial body weight (5 percent more than those who took a placebo treatment). Side effects of sibutramine include insomnia, dry mouth, and constipation. Increases in blood pressure can occur. Therefore, blood pressure is usually monitored during treatment. There is no evidence that sibutramine causes heart or lung problems (like dexfenfluramine and fenfluramine (Phen/Fen)).  However, experts agree that sibutramine should not used by people with coronary heart disease, heart failure, uncontrolled hypertension, stroke, irregular heart rhythms, or peripheral vascular disease (poor circulation in the legs). Orlistat  Orlistat (Xenical® 120 mg capsules) is a medicine that reduces the amount of fat your body absorbs from the foods you eat. A lower-dose version is now available without a prescription (Saud® 60 mg capsules) in many countries, including the [place]. The medicine is recommended three times per day, taken with a meal; you can skip a dose if you skip a meal or if the meal contains no fat. After one year of treatment with orlistat, the average weight loss is approximately 8 to 10 percent of initial body weight (4 percent more than in those who took a placebo). Cholesterol levels often improve, and blood pressure sometimes falls. In people with diabetes, orlistat may help control blood sugar levels. Side effects occur in 15 to 10 percent of people and may include stomach cramps, gas, diarrhea, leakage of stool, or oily stools. These problems are more likely when you take orlistat with a high-fat meal (if more than 30 percent of calories in the meal are from fat). Side effects usually improve as you learn to avoid high-fat foods. Severe liver injury has been reported rarely in patients taking orlistat, but it is not known if orlistat caused the liver problems. Diet supplements  Diet supplements are widely used by people who are trying to lose weight, although the safety and efficacy of these supplements are often unproven. A few of the more common diet supplements are discussed below; none of these are recommended because they have not been studied carefully, and there is no proof they are safe or effective. Chitosan and wheat dextrin are ineffective for weight loss, and their use is not recommended. Ephedra, a compound related to ephedrine, is no longer available in the [place] due to safety concerns. Many nonprescription diet pills previously contained ephedra. Although some studies have shown that ephedra helps with weight loss, there can be serious side effects (psychiatric symptoms, palpitations, and stomach upset), including death. There are not enough data about the safety and efficacy of chromium, ginseng, glucomannan, green tea, hydroxycitric acid, L carnitine, psyllium, pyruvate supplements, [place] wort, and conjugated linoleic acid. Two supplements from [facility], [address] [place] (also known as the Fern[address] 15 pill) and Herbathin dietary supplement, have been shown to contain prescription drugs. Hoodia gordonii is a dietary supplement derived from a plant in [place]. It is not recommended because there is no proof that it is safe or effective. Bitter orange (Citrus aurantium) can increase your heart rate and blood pressure and is not recommended. SHOULD I HAVE SURGERY TO LOSE WEIGHT?  Weight loss surgery is recommended ONLY for people with one of the following:  Severe obesity (body mass index above 40) (calculator 1 and calculator 2) who have not responded to diet, exercise, or weight loss medicines   Body mass index between 35 and 40, along with a serious medical problem (including diabetes, severe joint pain, or sleep apnea) that would improve with weight loss  You should be sure that you understand the potential risks and benefits of weight loss surgery. You must be motivated and willing to make lifelong changes in how you eat to reach and maintain a healthier weight after surgery. You must also be realistic about weight loss after surgery (see 'Effectiveness of weight loss surgery' below). PREPARING FOR WEIGHT LOSS SURGERY  Most people who have weight loss surgery will meet with several specialists before surgery is scheduled. This often includes a dietitian, mental health counselor, a doctor who specializes in care of obese people, and a surgeon who performs weight loss surgery (bariatric surgeon).  You may need to work with these providers for several weeks or months before surgery. The nutritionist will explain what and how much you will be able to eat after surgery. You may also need to lose a small amount of weight before surgery. The mental health specialist will help you to cope with stress and other factors that can make it harder to lose weight or trigger you to eat   The medical doctor will determine whether you need other tests, counseling, or treatment before surgery. He or she might also help you begin a medical weight loss program so that you can lose some weight before surgery. The bariatric surgeon will meet with you to discuss the surgeries available to treat obesity. He or she will also make sure you are a good candidate for surgery. TYPES OF WEIGHT LOSS SURGERY  There are several types of weight loss surgeries, the most common being lap banding, gastric bypass, and gastric sleeve. Lap banding  Laparoscopic adjustable gastric banding (LAGB), or lap banding, is a surgery that uses an adjustable band around the opening to the stomach (figure 1). This reduces the amount of food that you can eat at one time. Lap banding is done through small incisions, with a laparoscope. The band can be adjusted after surgery, allowing you to eat more or less food. Adjustments to the size and tightness of the band are made by using a needle to add or remove fluid from a port (a small container under the skin that is connected to the band). Adding fluid to the band makes it tighter which restricts the amount of food you can eat and may help you to lose more weight. Lap banding is a popular choice because it is relatively simple to perform, can be adjusted or removed, and has a low risk of serious complications immediately after surgery. However, weight loss with the lap band depends on your ability to follow the program closely. You will need to prepare nutritious meals that [facility] with\" the band, not against it.  For example, the lap band will not work well if you eat or drink a large amount of liquid calories (like ice cream). The band will not help you to feel full when you eat/drink liquid calories. Weight loss ranges from 45 to 75 percent after two years. As an example, a person who is 120 pounds overweight could expect to lose approximately 54 to 90 pounds in the two years after lap banding. Gastric bypass  Heidy-en-Y gastric bypass, also called gastric bypass, helps you to lose weight by reducing the amount of food you can eat and reducing the number of calories and nutrients you absorb from the food you eat. To perform gastric bypass, a surgeon creates a small stomach pouch by dividing the stomach and attaching it to the small intestine. This helps you to lose weight in two ways: The smaller stomach can hold less food than before surgery. This causes you to feel full after eating a very small amount of food or liquid. Over time, the pouch might stretch, allowing you to eat more food. The body absorbs fewer calories, since food bypasses most of the stomach as well as the upper small intestine. This new arrangement seems to decrease your appetite and change how you break down foods by changing the release of various hormones. Gastric bypass can be performed as open surgery (through an incision on the abdomen) or laparoscopically, which uses smaller incisions and smaller instruments. Both the laparoscopic and open techniques have risks and benefits. You and your surgeon should work together to decide which surgery, if any, is right for you. Gastric bypass has a high success rate, and people lose an average of 62 to 68 percent of their excess body weight in the first year. Weight loss typically levels off after one to two years, with an overall excess weight loss between 50 and 75 percent. For a person who is 120 pounds overweight, an average of 60 to 90 pounds of weight loss would be expected.   Gastric sleeve  Gastric sleeve, also known as associated with obesity. Weight loss surgery can also help you to feel and look better, reduce the amount of money you spend on medicines, and cut down on sick days. As an example, weight loss surgery can improve health problems related to obesity (diabetes, high blood pressure, high cholesterol, sleep apnea) to the point that you need less or no medicine. Finally, weight loss surgery might reduce your risk of developing heart disease, cancer, and certain infections. AFTER WEIGHT LOSS SURGERY  You will need to stay in the hospital until your team feels that it is safe for you to leave (on average, one to three days). Do not drive if you are taking prescription pain medicine. Begin exercising as soon as possible once you have healed; most weight loss centers will design an exercise program for you. Once you are home, it is important to eat and drink exactly what your doctor and dietitian recommend. You will see your doctor, nurse, and dietitian on a regular basis after surgery to monitor your health, diet, and weight loss. You will be able to slowly increase how much you eat over time, although it will always be important to:  Eat small, frequent meals and not skip meals   Chew your food slowly and completely   Avoid eating while \"distracted\" (such as eating while watching TV)   Stop eating when you feel full   Drink liquids at least 30 minutes before or after eating   Avoid foods high in fat or sugar   Take vitamin supplements, as recommended  It can take several months to learn to listen to your body so that you know when you are hungry and when you are full. You may dislike foods you previously loved, and you may begin to prefer new foods. This can be a frustrating process for some people, so talk to your dietitian if you are having trouble. It usually takes between one and two years to lose weight after surgery.  After reaching their goal weight, some people have plastic surgery (called \"body contouring\") to remove excess skin from the body, particularly in the abdominal area. Before you decide to have weight loss surgery, you must commit to staying healthy for life. This includes following up with your healthcare team, exercising most days of the week, and eating a sensible diet every day. It can be difficult to develop new eating and exercise habits after weight loss surgery, and you will have to work hard to stick to your goals. Recovering from surgery and losing weight can be stressful and emotional, and it is important to have the support of family and friends. Working with a , therapist, or support group can help you through the ups and downs. WHERE TO GET MORE INFORMATION  Your healthcare provider is the best source of information for questions and concerns related to your medical problem. This article will be updated as needed every four months on our Web site ([email or web address])  ·   Smoking Cessation  This document explains the best ways for you to quit smoking and new treatments to help. It lists new medicines that can double or triple your chances of quitting and quitting for good. It also considers ways to avoid relapses and concerns you may have about quitting, including weight gain. NICOTINE: A POWERFUL ADDICTION  If you have tried to quit smoking, you know how hard it can be. It is hard because nicotine is a very addictive drug. For some people, it can be as addictive as heroin or cocaine. Usually, people make 2 or 3 tries, or more, before finally being able to quit. Each time you try to quit, you can learn about what helps and what hurts. Quitting takes hard work and a lot of effort, but you can quit smoking. QUITTING SMOKING IS ONE OF THE MOST IMPORTANT THINGS YOU WILL EVER DO. You will live longer, feel better, and live better. The impact on your body of quitting smoking is felt almost immediately:  Within 20 minutes, blood pressure decreases.  Pulse returns to its normal level. After 8 hours, carbon monoxide levels in the blood return to normal. Oxygen level increases. After 24 hours, chance of heart attack starts to decrease. Breath, hair, and body stop smelling like smoke. After 48 hours, damaged nerve endings begin to recover. Sense of taste and smell improve. After 72 hours, the body is virtually free of nicotine. Bronchial tubes relax and breathing becomes easier. After 2 to 12 weeks, lungs can hold more air. Exercise becomes easier and circulation improves. Quitting will reduce your risk of having a heart attack, stroke, cancer, or lung disease:  After 1 year, the risk of coronary heart disease is cut in half. After 5 years, the risk of stroke falls to the same as a nonsmoker. After 10 years, the risk of lung cancer is cut in half and the risk of other cancers decreases significantly. After 15 years, the risk of coronary heart disease drops, usually to the level of a nonsmoker. If you are pregnant, quitting smoking will improve your chances of having a healthy baby. The people you live with, especially your children, will be healthier. You will have extra money to spend on things other than cigarettes. FIVE KEYS TO QUITTING  Studies have shown that these 5 steps will help you quit smoking and quit for good. You have the best chances of quitting if you use them together:  Get ready. Get support and encouragement. Learn new skills and behaviors. Get medicine to reduce your nicotine addiction and use it correctly. Be prepared for relapse or difficult situations. Be determined to continue trying to quit, even if you do not succeed at first.  1. GET READY  Set a quit date. Change your environment. Get rid of ALL cigarettes, ashtrays, matches, and lighters in your home, car, and place of work. Do not let people smoke in your home. Review your past attempts to quit. Think about what worked and what did not. Once you quit, do not smoke.  NOT EVEN A PUFF! 2. GET SUPPORT AND ENCOURAGEMENT  Studies have shown that you have a better chance of being successful if you have help. You can get support in many ways. Tell your family, friends, and coworkers that you are going to quit and need their support. Ask them not to smoke around you. Talk to your caregivers (doctor, dentist, nurse, pharmacist, psychologist, and/or smoking counselor). Get individual, group, or telephone counseling and support. The more counseling you have, the better your chances are of quitting. Programs are available at [facility]. Call your local health department for information about programs in your area. Spiritual beliefs and practices may help some smokers quit. Quit meters are small computer programs online or downloadable that keep track of quit statistics, such as amount of \"quit-time,\" cigarettes not smoked, and money saved. Many smokers find one or more of the many self-help books available useful in helping them quit and stay off tobacco.  3. LEARN NEW SKILLS AND BEHAVIORS  Try to distract yourself from urges to smoke. Talk to someone, go for a walk, or occupy your time with a task. When you first try to quit, change your routine. Take a different route to work. Drink tea instead of coffee. Eat breakfast in a different place. Do something to reduce your stress. Take a hot bath, exercise, or read a book. Plan something enjoyable to do every day. Reward yourself for not smoking. Explore interactive web-based programs that specialize in helping you quit. 4. GET MEDICINE AND USE IT CORRECTLY  Medicines can help you stop smoking and decrease the urge to smoke. Combining medicine with the above behavioral methods and support can quadruple your chances of successfully quitting smoking. The [place] Food and Drug Administration (FDA) has approved 7 medicines to help you quit smoking. These medicines fall into 3 categories.   Nicotine replacement therapy (delivers nicotine to your body without the negative effects and risks of smoking):  Nicotine gum: Available over-the-counter. Nicotine lozenges: Available over-the-counter. Nicotine inhaler: Available by prescription. Nicotine nasal spray: Available by prescription. Nicotine skin patches (transdermal): Available by prescription and over-the-counter. Antidepressant medicine (helps people abstain from smoking, but how this works is unknown): Bupropion sustained-release (SR) tablets: Available by prescription. Nicotinic receptor partial agonist (simulates the effect of nicotine in your brain):  Varenicline tartrate tablets: Available by prescription. Ask your caregiver for advice about which medicines to use and how to use them. Carefully read the information on the package. Everyone who is trying to quit may benefit from using a medicine. If you are pregnant or trying to become pregnant, nursing an infant, you are under age 25, or you smoke fewer than 10 cigarettes per day, talk to your caregiver before taking any nicotine replacement medicines. You should stop using a nicotine replacement product and call your caregiver if you experience nausea, dizziness, weakness, vomiting, fast or irregular heartbeat, mouth problems with the lozenge or gum, or redness or swelling of the skin around the patch that does not go away. Do not use any other product containing nicotine while using a nicotine replacement product. Talk to your caregiver before using these products if you have diabetes, heart disease, asthma, stomach ulcers, you had a recent heart attack, you have high blood pressure that is not controlled with medicine, a history of irregular heartbeat, or you have been prescribed medicine to help you quit smoking. 5. BE PREPARED FOR RELAPSE OR DIFFICULT SITUATIONS  Most relapses occur within the first 3 months after quitting. Do not be discouraged if you start smoking again.  Remember, most people try several times before they finally quit. You may have symptoms of withdrawal because your body is used to nicotine. You may crave cigarettes, be irritable, feel very hungry, cough often, get headaches, or have difficulty concentrating. The withdrawal symptoms are only temporary. They are strongest when you first quit, but they will go away within 10 to 14 days. Here are some difficult situations to watch for:  Alcohol. Avoid drinking alcohol. Drinking lowers your chances of successfully quitting. Caffeine. Try to reduce the amount of caffeine you consume. It also lowers your chances of successfully quitting. Other smokers. Being around smoking can make you want to smoke. Avoid smokers. Weight gain. Many smokers will gain weight when they quit, usually less than 10 pounds. Eat a healthy diet and stay active. Do not let weight gain distract you from your main goal, quitting smoking. Some medicines that help you quit smoking may also help delay weight gain. You can always lose the weight gained after you quit. Bad mood or depression. There are a lot of ways to improve your mood other than smoking. If you are having problems with any of these situations, talk to your caregiver. SPECIAL SITUATIONS AND CONDITIONS  Studies suggest that everyone can quit smoking. Your situation or condition can give you a special reason to quit. Pregnant women/new mothers: By quitting, you protect your baby's health and your own. Hospitalized patients: By quitting, you reduce health problems and help healing. Heart attack patients: By quitting, you reduce your risk of a second heart attack. Lung, head, and neck cancer patients: By quitting, you reduce your chance of a second cancer. Parents of children and adolescents: By quitting, you protect your children from illnesses caused by secondhand smoke. QUESTIONS TO THINK ABOUT  Think about the following questions before you try to stop smoking.  You may want to talk about your answers with your caregiver. Why do you want to quit? If you tried to quit in the past, what helped and what did not? What will be the most difficult situations for you after you quit? How will you plan to handle them? Who can help you through the tough times? Your family? Friends? Caregiver? What pleasures do you get from smoking? What ways can you still get pleasure if you quit? Here are some questions to ask your caregiver: How can you help me to be successful at quitting? What medicine do you think would be best for me and how should I take it? What should I do if I need more help? What is smoking withdrawal like? How can I get information on withdrawal?  Quitting takes hard work and a lot of effort, but you can quit smoking. FOR MORE INFORMATION   Smokefree. gov ([employer]) provides free, accurate, evidence-based information and professional assistance to help support the immediate and long-term needs of people trying to quit smoking. Document Released: 12/12/2002 Document Revised: 12/06/2012 Document Reviewed: 10/04/2010  [facility] Patient Information ©2012 Tomi Rodríguez. ·     Keeping Home a [facility]       As we get older, changes in balance, gait, strength, vision, hearing, and cognition make even the most youthful senior more prone to accidents. Falls are one of the leading health risks for older people. This increased risk of falling is related to:   Aging process (eg, decreased muscle strength, slowed reflexes)   Higher incidence of chronic health problems (eg, arthritis, diabetes) that may limit mobility, agility or sensory awareness   Side effects of medicine (eg, dizziness, blurred vision)especially medicines like prescription pain medicines and drugs used to treat mental health conditions   Depending on the brittleness of your bones, the consequences of a fall can be serious and long lasting.    Home Life   Research by the Association of Aging [facility]) shows that some home accidents among older adults can be prevented by making simple lifestyle changes and basic modifications and repairs to the home environment. Here are some lifestyle changes that experts recommend:   Have your hearing and vision checked regularly. Be sure to wear prescription glasses that are right for you. Speak to your doctor or pharmacist about the possible side effects of your medicines. A number of medicines can cause dizziness. If you have problems with sleep, talk to your doctor. Limit your intake of alcohol. If necessary, use a cane or walker to help maintain your balance. Wear supportive, rubber-soled shoes, even at home. If you live in a region that gets wintry weather, you may want to put special cleats on your shoes to prevent you from slipping on the snow and ice. Exercise regularly to help maintain muscle tone, agility, and balance. Always hold the banister when going up or down stairs. Also, use  bars when getting in or out of the bath or shower, or using the toilet. To avoid dizziness, get up slowly from a lying down position. Sit up first, dangling your legs for a minute or two before rising to a standing position. Overall Home Safety Check   According to the Consumer Product Safety Commision's \"Older Consumer Home Safety Checklist,\" it is important to check for potential hazards in each room. And remember, proper lighting is an essential factor in home safety. If you cannot see clearly, you are more likely to fall. Important questions to ask yourself include:   Are lamp, electric, extension, and telephone cords placed out of the flow of traffic and maintained in good condition? Have frayed cords been replaced? Are all small rugs and runners slip resistant? If not, you can secure them to the floor with a special double-sided carpet tape. Are smoke detectors properly locatedone on every floor of your home and one outside of every sleeping area? Are they in good working order?  Are batteries replaced at least once a year? Do you have a well-maintained carbon monoxide detector outside every sleeping are in your home? Does your furniture layout leave plenty of space to maneuver between and around chairs, tables, beds, and sofas? Are hallways, stairs and passages between rooms well lit? Can you reach a lamp without getting out of bed? Are floor surfaces well maintained? Shag rugs, high-pile carpeting, tile floors, and polished wood floors can be particularly slippery. Stairs should always have handrails and be carpeted or fitted with a non-skid tread. Is your telephone easily reachable. Is the cord safely tucked away? Room by Room   According to the Association of Aging, bathrooms and santos are the two most potentially hazardous rooms in your home. In the Kitchen    Be sure your stove is in proper working order and always make sure burners and the oven are off before you go out or go to sleep. Keep pots on the back burners, turn handles away from the front of the stove, and keep stove clean and free of grease build-up. Kitchen ventilation systems and range exhausts should be working properly. Keep flammable objects such as towels and pot holders away from the cooking area except when in use. Make sure kitchen curtains are tied back. Move cords and appliances away from the sink and hot surfaces. If extension cords are needed, install wiring guides so they do not hang over the sink, range, or working areas. Look for coffee pots, kettles and toaster ovens with automatic shut-offs. Keep a mop handy in the kitchen so you can wipe up spills instantly. You should also have a small fire extinguisher. Arrange your kitchen with frequently used items on lower shelves to avoid the need to stand on a stepstool to reach them. Make sure countertops are well-lit to avoid injuries while cutting and preparing food.     In the Bathroom    Use a non-slip mat or decals in the tub and shower, since wet, soapy tile or porcelain surfaces are extremely slippery. Make sure bathroom rugs are non-skid or tape them firmly to the floor. Bathtubs should have at least one, preferably two, grab bars, firmly attached to structural supports in the wall. (Do not use built-in soap holders or glass shower doors as grab bars.)    Tub seats fitted with non-slip material on the legs allow you to wash sitting down. For people with limited mobility, bathtub transfer benches allow you to slide safely into the tub. Raised toilet seats and toilet safety rails are helpful for those with knee or hip problems. In the [facility]    Make sure you use a nightlight and that the area around your bed is clear of potential obstacles. Be careful with electric blankets and never go to sleep with a heating pad, which can cause serious burns even if on a low setting. Use fire-resistant mattress covers and pillows, and NEVER smoke in bed. Keep a phone next to the bed that is programmed to dial 911 at the push of a button. If you have a chronic condition, you may want to sign on with an automatic call-in service. Typically the system includes a small pendant that connects directly to an emergency medical voice-response system. You should also make arrangements to stay in contact with someonefriend, neighbor, family memberon a regular schedule. Fire Prevention   According to the [employer]. (Smoke Alarms for Every) [address] [place] [address], senior citizens are one of the two highest risk groups for death and serious injuries due to residential fires. When cooking, wear short-sleeved items, never a bulky long-sleeved robe. The [facility]'s Safety Checklist for Older Consumers emphasizes the importance of checking basements, garages, workshops and storage areas for fire hazards, such as volatile liquids, piles of old rags or clothing and overloaded circuits.     Never smoke in bed or when lying down on a couch or recliner chair. Small portable electric or kerosene heaters are responsible for many home fires and should be used cautiously if at all. If you do use one, be sure to keep them away from flammable materials. In case of fire, make sure you have a pre-established emergency exit plan. Have a professional check your fireplace and other fuel-burning appliances yearly. Helping Hands   Baby boomers entering the cervantes years will continue to see the development of new products to help older adults live safely and independently in spite of age-related changes. Making Life More Livable  , by Josselyngene Alegria, lists over 1,000 products for \"living well in the mature years,\" such as bathing and mobility aids, household security devices, ergonomically designed knives and peelers, and faucet valves and knobs for temperature control. Medical supply stores and organizations are good sources of information about products that improve your quality of life and insure your safety. Last Reviewed: November 2009 Susan Mojica MD   Updated: 3/7/2011     ·        Advance Care Planning: Care Instructions  Your Care Instructions     It can be hard to live with an illness that cannot be cured. But if your health is getting worse, you may want to make decisions about end-of-life care. Planning for the end of your life does not mean that you are giving up. It is a way to make sure that your wishes are met. Clearly stating your wishes can make it easier for your loved ones. Making plans while you are still able may also ease your mind and make your final days less stressful and more meaningful. Follow-up care is a key part of your treatment and safety. Be sure to make and go to all appointments, and call your doctor if you are having problems. It's also a good idea to know your test results and keep a list of the medicines you take. What can you do to plan for the end of life? You can bring these issues up with your doctor.  You do not need to wait until your doctor starts the conversation. You might start with, \"What makes life worth living for me is. Deion Umaña \" And then follow it with, \"I would not be willing to live with . Deion Umaña \" When you complete this sentence it helps your doctor understand your wishes. Talk openly and honestly with your doctor. This is the best way to understand the decisions you will need to make as your health changes. Know that you can always change your mind. Ask your doctor about commonly used life-support measures. These include tube feedings, breathing machines, and fluids given through a vein (IV). Understanding these treatments will help you decide whether you want them. You may choose to have these life-supporting treatments for a limited time. This allows a trial period to see whether they will help you. You may also decide that you want your doctor to take only certain measures to keep you alive. It may help to think about the big picture, like what makes life worth living for you or what your values and goals are. Talk to your doctor about how long you are likely to live. Your doctor may be able to give you an idea of what usually happens with your specific illness. Think about preparing papers that state your wishes. These papers are called advance directives. If you do this early and review them often, there will not be any confusion about what you want. You can change your instructions at any time. Which papers should you prepare? Advance directives are legal papers that tell doctors how you want to be cared for at the end of your life. You do not need a  to write these papers. Ask your doctor or your state health department for information on how to write your advance directives. They may have the forms for each of these types of papers. Make sure your doctor has a copy of these on file, and give a copy to a family member or close friend. Consider a do-not-resuscitate order (DNR).  This order asks that no extra treatments be done if your heart stops or you stop breathing. Extra treatments may include cardiopulmonary resuscitation (CPR), electrical shock to restart your heart, or a machine to breathe for you. If you decide to have a DNR order, ask your doctor to explain and write it. Place the order in your home where everyone can easily see it. Consider a living will. A living will explains your wishes about life support and other treatments at the end of your life if you become unable to speak for yourself. Living herrera tell doctors to use or not use treatments that would keep you alive. You must have one or two witnesses or a notary present when you sign this form. A living will may be called something else in your state. Consider a medical power of . This form allows you to name a person to make decisions about your care if you are not able to. Most people ask a close friend or family member. Talk to this person about the kinds of treatments you want and those that you do not want. Make sure this person understands your wishes. A medical power of  may be called something else in your state. These legal papers are simple to change. Tell your doctor what you want to change, and ask him or her to make a note in your medical file. Give your family updated copies of the papers. Where can you learn more? Go to https://HD Trade Services.Student Retention Solutions. org and sign in to your Andel account. Enter P184 in the [facility] box to learn more about \"Advance Care Planning: Care Instructions. \"     If you do not have an account, please click on the \"Sign Up Now\" link. Current as of: March 17, 2021               Content Version: 12.9  © 6269-3947 Healthwise, Droplet [employer]. Care instructions adapted under license by [facility] ([facility]).  If you have questions about a medical condition or this instruction, always ask your healthcare professional. Aquilino Peterson disclaims any warranty or health history is complicated or your family can't agree on what should be in your living will. You can change your living will at any time. Some people find that their wishes about end-of-life care change as their health changes. If you make big changes to your living will, complete a new form. If you move to another state, make sure that your living will is legal in the state where you now live. In most cases, doctors will respect your wishes even if you have a form from a different state. You might use a universal form that has been approved by many states. This kind of form can sometimes be filled out and stored online. Your digital copy will then be available wherever you have a connection to the internet. The doctors and nurses who need to treat you can find it right away. Your state may offer an online registry. This is another place where you can store your living will online. It's a good idea to get your living will notarized. This means using a person called a Gridle.in to watch two people sign, or witness, your living will. What should you know when you create a living will? Here are some questions to ask yourself as you make your living will:  Do you know enough about life support methods that might be used? If not, talk to your doctor so you know what might be done if you can't breathe on your own, your heart stops, or you can't swallow. What things would you still want to be able to do after you receive life-support methods? Would you want to be able to walk? To speak? To eat on your own? To live without the help of machines? Do you want certain Caodaism practices performed if you become very ill? If you have a choice, where do you want to be cared for? In your home? At a hospital or nursing home? If you have a choice at the end of your life, where would you prefer to die? At home? In a hospital or nursing home? Somewhere else? Would you prefer to be buried or cremated?   Do you want your organs to be donated after you die? What should you do with your living will? Make sure that your family members and your health care agent have copies of your living will (also called a declaration). Give your doctor a copy of your living will. Ask him or her to keep it as part of your medical record. If you have more than one doctor, make sure that each one has a copy. Put a copy of your living will where it can be easily found. For example, some people may put a copy on their refrigerator door. If you are using a digital copy, be sure your doctor, family members, and health care agent know how to find and access it. Where can you learn more? Go to https://Dimers Labpepiceweb.Neo Technology. org and sign in to your Pluralsight account. Enter Y650 in the [employer] box to learn more about \"Learning About Living Perroy. \"     If you do not have an account, please click on the \"Sign Up Now\" link. Current as of: March 17, 2021               Content Version: 12.9  © 7132-1336 HealthSouk. Care instructions adapted under license by [employer]. If you have questions about a medical condition or this instruction, always ask your healthcare professional. [place] [address] any warranty or liability for your use of this information. ·        Learning About Medical Power of   What is a medical power of ? A medical power of , also called a durable power of  for health care, is one type of the legal forms called advance directives. It lets you name the person you want to make treatment decisions for you if you can't speak or decide for yourself. The person you choose is called your health care agent. This person is also called a health care proxy or health care surrogate. A medical power of  may be called something else in your state. How do you choose a health care agent? Choose your health care agent carefully.  This person may or may not be a family member. Talk to the person before you make your final decision. Make sure he or she is comfortable with this responsibility. It's a good idea to choose someone who:  Is at least 25years old. Knows you well and understands what makes life meaningful for you. Understands your Caodaism and moral values. Will do what you want, not what he or she wants. Will be able to make difficult choices at a stressful time. Will be able to refuse or stop treatment, if that is what you would want, even if you could die. Will be firm and confident with health professionals if needed. Will ask questions to get needed information. Lives near you or agrees to travel to you if needed. Your family may help you make medical decisions while you can still be part of that process. But it's important to choose one person to be your health care agent in case you aren't able to make decisions for yourself. If you don't fill out the legal form and name a health care agent, the decisions your family can make may be limited. A health care agent may be called something else in your state. Who will make decisions for you if you don't have a health care agent? If you don't have a health care agent or a living will, you may not get the care you want. Decisions may be made by family members who disagree about your medical care. Or decisions may be made by a medical professional who doesn't know you well. In some cases, a  makes the decisions. When you name a health care agent, it is very clear who has the power to make health decisions for you. How do you name a health care agent? You name your health care agent on a legal form. This form is usually called a medical power of . Ask your hospital, state bar association, or office on aging where to find these forms. You must sign the form to make it legal. Some states require you to get the form notarized.  This means that a person called a  watches you sign the form and then he or she signs the form. Some states also require that two or more witnesses sign the form. Be sure to tell your family members and doctors who your health care agent is. Where can you learn more? Go to https://chpepiceweb.Intercytex Group. org and sign in to your FUNGO STUDIOS account. Enter [id number] in the [facility] box to learn more about \"Learning About [address]. \"     If you do not have an account, please click on the \"Sign Up Now\" link. Current as of: March 17, 2021               Content Version: 12.9  © 2006-2021 Giftly. Care instructions adapted under license by [facility] ([place] [facility]). If you have questions about a medical condition or this instruction, always ask your healthcare professional. [place] [address] any warranty or liability for your use of this information. ·   Patient Education        Preventing Outdoor Falls: Care Instructions  Your Care Instructions     Worries about falls don't need to keep you indoors. Outdoor activities like walking have big benefits for your health. You will need to watch your step and learn a few safety measures. If you are worried about having a fall outdoors, ask your doctor about exercises, classes, or physical therapy that may help. You can learn ways to gain strength, flexibility, and balance. Ask if it might help to use a cane or walker. You can make your time outdoors safer with a few simple measures. Follow-up care is a key part of your treatment and safety. Be sure to make and go to all appointments, and call your doctor if you are having problems. It's also a good idea to know your test results and keep a list of the medicines you take. How can you prevent falls outdoors? Wear shoes with firm soles and low heels. If you have to walk on an icy surface, use grippers that can be worn over your shoes in bad weather. Be extra careful if weather is bad.  Walk on the grass when the sidewalks are slick. If you live in a place that gets snow and ice in the winter, sprinkle salt on slippery stairs and sidewalks. Be careful getting on or off buses and trains or getting in and out of cars. If handrails are available, use them. Be careful when you cross the street. Look for crosswalks or places where curb cuts or ramps are present. Try not to hurry, especially if you are carrying something. Be cautious in parking lots or garages. There may be curbs or changes in pavement, or the height of the pavement may vary. Make sure to wear the correct eyeglasses, if you need them. Reading glasses or bifocals can make it harder to see hazards that might be in your way. If you are walking outdoors for exercise, try to: Walk in well-lighted, well-maintained areas. These include high school or college tracks, shopping malls, and public spaces. Walk with a partner. Watch out for cracked sidewalks, curbs, changes in the height of the pavement, exposed tree roots, and debris such as fallen leaves or branches. Where can you learn more? Go to https://VendRxpeStealz.StarbuckLabs2. org and sign in to your AdKeeper account. Enter R872 in the [employer] box to learn more about \"Preventing Outdoor Falls: Care Instructions. \"     If you do not have an account, please click on the \"Sign Up Now\" link. Current as of: December 7, 2020               Content Version: 12.9  © 2006-2021 Healthwise, righTune. Care instructions adapted under license by [facility] ([facility]). If you have questions about a medical condition or this instruction, always ask your healthcare professional. [place] [address] any warranty or liability for your use of this information.          Patient Education        How to Get Up Safely After a Fall: Care Instructions  Your Care Instructions     If you have injuries, health problems, or other reasons that may make it easy for you to fall at home, it is a good idea to learn how to get up safely after a fall. Learning how to get up correctly can help you avoid making an injury worse. Also, knowing what to do if you cannot get up can help you stay safe until help arrives. Follow-up care is a key part of your treatment and safety. Be sure to make and go to all appointments, and call your doctor if you are having problems. It's also a good idea to know your test results and keep a list of the medicines you take. How can you care for yourself after a fall? If you think you can get up  First lie still for a few minutes and think about how you feel. If your body feels okay and you think you can get up safely, follow the rest of the steps below:  Look for a chair or other piece of furniture that is close to you. Roll onto your side and rest. Roll by turning your head in the direction you want to roll, move your shoulder and arm, then hip and leg in the same direction. Lie still for a moment to let your blood pressure adjust.  Slowly push your upper body up, lift your head, and take a moment to rest.  Slowly get up on your hands and knees, and crawl to the chair or other stable piece of furniture. Put your hands on the chair. Move one foot forward, and place it flat on the floor. Your other leg should be bent with the knee on the floor. Rise slowly, turn your body, and sit in the chair. Stay seated for a bit and think about how you feel. Call for help. Even if you feel okay, let someone know what happened to you. You might not know that you have a serious injury. If you cannot get up  If you think you are injured after a fall or you cannot get up, try not to panic. Call out for help. If you have a phone within reach or you have an emergency call device, use it to call for help. If you do not have a phone within reach, try to slide yourself toward it. If you cannot get to the phone, try to slide toward a door or window or a place where you think you can be heard.   [place] or use an object to make noise so someone might hear you. If you can reach something that you can use for a pillow, place it under your head. Try to stay warm by covering yourself with a blanket or clothing while you wait for help. When should you call for help? Call 911 anytime you think you may need emergency care. For example, call if:    You passed out (lost consciousness).     You cannot get up after a fall.     You have severe pain. Call your doctor now or seek immediate medical care if:    You have new or worse pain.     You are dizzy or lightheaded.     You hit your head. Watch closely for changes in your health, and be sure to contact your doctor if:    You do not get better as expected. Where can you learn more? Go to https://MyStreampeReleadeb.Flat[employer] Health. org and sign in to your Futurlink account. Enter I692 in the TuManitas box to learn more about \"How to Get Up Safely After a Fall: Care Instructions. \"     If you do not have an account, please click on the \"Sign Up Now\" link. Current as of: December 7, 2020               Content Version: 12.9  © 5005-6271 [email or web address]. Care instructions adapted under license by [facility] ([facility]). If you have questions about a medical condition or this instruction, always ask your healthcare professional. [address] any warranty or liability for your use of this information. Patient Education         COPD: Exercises for Building Strength (01:56)  Your health professional recommends that you watch this short online health video. Try a few gentle and safe exercises to build your strength. Purpose:  Teaches viewer how to do leg lifts, step-ups, arm extensions, and elbow circles to start building strength. Goal:  The user will learn how to do some gentle and safe exercises to build strength. How to watch the video    Scan the QR code   OR Visit the website    [email or web address]   Current as of: October 26, 2020               Content Version: 12.9  © 2006-2021 Kwan Mobile. Care instructions adapted under license by [employer] ([facility]). If you have questions about a medical condition or this instruction, always ask your healthcare professional. [place] [address] any warranty or liability for your use of this information. Patient Education         COPD: Exercises for Easier Breathing (02:06)  Your health professional recommends that you watch this short online health video. Learn some exercises for your lungs to make it easier to breathe. Purpose:  Teaches breathing exercises, including pursed-lip and diaphragm breathing, and breathing while bending forward. Goal:  The user will learn some exercises for the lungs that make it easier to breathe. How to watch the video    Scan the QR code   OR Visit the website    [email or web address]   Current as of: October 26, 2020               Content Version: 12.9 © 2006-2021 Kwan Mobile. Care instructions adapted under license by [employer] ([facility]). If you have questions about a medical condition or this instruction, always ask your healthcare professional. [address] any warranty or liability for your use of this information. Patient Education         COPD: You Can Still Be Active (01:57)  Your health professional recommends that you watch this short online health video. Commit to taking small steps to stay active with COPD. Purpose:  Encourages patients to make a commitment to staying active even with the diagnosis of COPD. Goal:  The user will commit to being active with COPD. How to watch the video    Scan the QR code   OR Visit the website    [email or web address]   Current as of: October 26, 2020               Content Version: 12.9  © 2006-2021 Kwan Mobile. Care instructions adapted under license by [employer] ([facility]).  If you have questions about a medical condition or this instruction, always ask your healthcare professional. [place] [id number] any warranty or liability for your use of this information.

## 2021-07-06 NOTE — PROGRESS NOTES
ELLIPTA) 200-25 MCG/INH AEPB inhaler Inhale 1 puff into the lungs daily Yes Dougie Sandhu MD   lovastatin (MEVACOR) 10 MG tablet Take 1 tablet by mouth nightly Yes Dougie Sandhu MD   montelukast (SINGULAIR) 10 MG tablet Take 1 tablet by mouth nightly Yes Dougie Sandhu MD   pantoprazole (PROTONIX) 40 MG tablet Take 1 tablet by mouth daily Yes Dougie Sandhu MD   polyethylene glycol (GLYCOLAX) 17 GM/SCOOP powder Take 17 g by mouth daily Yes Dougie Sandhu MD   sildenafil (VIAGRA) 100 MG tablet Take 1 tablet by mouth as needed for Erectile Dysfunction Yes Dougie Sandhu MD   traZODone (DESYREL) 50 MG tablet Take 1 tablet by mouth nightly Yes Dougie Sandhu MD   meloxicam (MOBIC) 15 MG tablet TAKE 1 TABLET BY MOUTH EVERY DAY WITH FOOD Yes Historical Provider, MD   Handicap Placard MISC by Does not apply route DX: OSTEOARTHRITIS OF BOTH KNEES (M17.0), COPD (J44.9)     EXPIRES: 02/2024 Yes Dougie Sandhu MD        Allergies   Allergen Reactions    Fish-Derived Products Anaphylaxis    Iodine Anaphylaxis     Iodine-containing products, dyes, contrast dye    Seasonal Shortness Of Breath    Pcn [Penicillins] Nausea And Vomiting       Past Medical History:   Diagnosis Date    Alcohol abuse 10/27/2016    Anemia     Asthma     Cocaine abuse ([facility] 10/27/2016    COPD (chronic obstructive pulmonary disease) ([facility]     Depression 04/27/2020    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    Drug-seeking behavior 04/14/2015    Edema 12/10/2015    Erectile dysfunction     Gastroesophageal reflux disease 12/17/2018    Gout 10/27/2016    History of arthroscopy of both knees 10/27/2016    House dust mite allergy 04/21/2014    Hyperlipidemia     Hypertension 10/27/2016    Injury to heart 10/27/2016    Insomnia 12/17/2018    Medical non-compliance 02/22/2014    Morbid obesity due to excess calories ([facility] 12/08/2016    Osteoarthritis of both knees 12/08/2016    Personal history of tobacco use     Pneumonia 2016    caused hospital admission    Pre-diabetes 10/27/2016    Seasonal allergies 2014    Severe persistent asthma 10/27/2016    Severe sleep apnea 2015    Supraventricular tachycardia ([address] 10/27/2016       Past Surgical History:   Procedure Laterality Date    ANTERIOR CRUCIATE LIGAMENT REPAIR      CARDIAC CATHETERIZATION      COLONOSCOPY N/A 7/15/2020    [facility]    LEFT TOTAL KNEE ARTHROPLASTY performed by [facility]elio Lawrence MD at [facility] N/A     UMBILICAL HERNIA REPAIR WITH MESH performed by Yolanda Miller MD at [facility] History     Socioeconomic History    Marital status: Single     Spouse name: n/a    Number of children: 1    Years of education: 12    Highest education level: Not on file   Occupational History    Occupation: Disability     Employer: NONE   Tobacco Use    Smoking status: Current Every Day Smoker     Packs/day: 0.25     Years: 30.00     Pack years: 7.50     Types: Cigarettes, Cigars     Start date: 1988     Last attempt to quit: 10/1/2013     Years since quittin.7    Smokeless tobacco: Never Used   Vaping Use    Vaping Use: Never used   Substance and Sexual Activity    Alcohol use:  Yes     Alcohol/week: 4.0 standard drinks     Types: 2 Cans of beer, 2 Shots of liquor per week     Comment: social 1 -2 x week    Drug use: Yes     Types: Cocaine, Marijuana     Comment: no cocaine x 1 year, marijuana weekly    Sexual activity: Not Currently     Partners: Female   Other Topics Concern    Not on file   Social History Narrative    Lives in an apartment    15 steps to get up to the apartment    Asking for hospital bed and shower chair      Social Determinants of Health     Financial Resource Strain: Medium Risk    Difficulty of Paying Living Expenses: Somewhat hard   Food Insecurity: No Food Insecurity    Worried About Running Out of Food in the Last Year: Never true    Ran Out of Food in the Last Year: Never true   Transportation Needs: No Transportation Needs    Lack of Transportation (Medical): No    Lack of Transportation (Non-Medical): No   Physical Activity: Inactive    Days of Exercise per Week: 0 days    Minutes of Exercise per Session: 0 min   Stress: Stress Concern Present    Feeling of Stress : To some extent   Social Connections: Socially Isolated    Frequency of Communication with Friends and Family: Once a week    Frequency of Social Gatherings with Friends and Family: Once a week    Attends Evangelical Services: 1 to 4 times per year    Active Member of [employer] or Organizations: Not asked    Attends Club or Organization Meetings: Never    Marital Status:    Intimate Partner Violence:     Fear of Current or Ex-Partner:     Emotionally Abused:     Physically Abused:     Sexually Abused:         Family History   Problem Relation Age of Onset    Arthritis Mother     Asthma Mother     High Cholesterol Mother     Other Mother         aneurysm    Diabetes Father     Stroke Maternal Grandmother     Cancer Maternal Grandfather     Hypertension Other     COPD Neg Hx        Vitals:    07/07/21 1015   Pulse: 86   Temp: 98.2 °F (36.8 °C)   SpO2: 96%   Weight: 300 lb (136.1 kg)   Height: 6' (1.829 m)       Estimated body mass index is 40.69 kg/m² as calculated from the following:    Height as of this encounter: 6' (1.829 m). Weight as of this encounter: 300 lb (136.1 kg). No results for input(s): WBC, RBC, HGB, HCT, MCV, MCH, MCHC, RDW, PLT, MPV in the last 72 hours. No results for input(s): NA, K, CL, CO2, BUN, CREATININE, GLUCOSE, CALCIUM, PROT, LABALBU, BILITOT, ALKPHOS, AST, ALT in the last 72 hours. Lab Results   Component Value Date    LABA1C 5.7 04/15/2021       No results found.      Physical Exam  Constitutional:       Appearance: Normal appearance. He is normal weight. HENT:      Head: Normocephalic and atraumatic. Nose: No rhinorrhea. Mouth/Throat:      Mouth: Mucous membranes are moist.      Pharynx: Oropharynx is clear. Eyes:      Extraocular Movements: Extraocular movements intact. Conjunctiva/sclera: Conjunctivae normal.   Skin:     Findings: No lesion or rash. Comments: Diffuse skin colored to erythematous papules on the upper extremities, back and legs, areas of excoriations, no blisters or ulcerations   Neurological:      General: No focal deficit present. Mental Status: He is alert and oriented to person, place, and time. Mental status is at baseline. Psychiatric:         Mood and Affect: Mood normal.         Thought Content: Thought content normal.         Judgment: Judgment normal.         ASSESSMENT/PLAN:  1. Rash and other nonspecific skin eruption  -No evaluation has been done on patient's apartment complex with check for bedbugs  -Patient does state that his curtains and carpet have not been vacuumed or cleaned in a long time  -At this time will increase Zyrtec to 20 mg twice daily dosing  -Increase hydrocortisone to Kenalog cream  -Patient is to talk with his manager today to see if  can come out, patient declined physicians note discussed the importance of having  check his apartment  -Patient does have more papular lesions on the elbows however denies any GI symptoms that would indicate celiac's disease  -Patient would like to follow-up in 2 weeks  -Patient already on PPI otherwise would add Pepcid  -Continue nighttime Benadryl  -Discussed today he should clean his sheets and curtains in hot water in a hot setting on the dryer, also to vacuum the carpeting    - triamcinolone (KENALOG) 0.025 % cream; Apply topically 2 times daily. Dispense: 80 g; Refill: 1  - cetirizine (ZYRTEC) 10 MG tablet; Take 2 tablets by mouth 2 times daily  Dispense: 120 tablet;  Refill: 0      ---------------------------------------------------------------------  Side effects, adverse effects of the medication prescribed today, as well as treatment plan/ rationale and result expectations have been discussed with the patient who expresses understanding and desires to proceed. Close follow up to evaluate treatment results and for coordination of care. I have reviewed the patient's medical history in detail and updated the computerized patient record. As always, patient is advised that if symptoms worsen in any way they will proceed to the nearest emergency room. --------------------------------------------------------------------    Return in about 2 weeks (around 7/21/2021) for f/u rash. An  electronic signature was used to authenticate this note.     --Alexey White DO on 7/7/2021 at 11:06 AM

## 2021-07-06 NOTE — ASSESSMENT & PLAN NOTE
Patient is established with pulmonology for long-term medication management. Due to reported need to use rescue inhaler/nebulizer 4+ times daily on a chronic basis I will have office staff help him schedule follow-up visit with the specialist to discuss further medication adjustments.

## 2021-07-06 NOTE — PROGRESS NOTES
Medicare Annual Wellness Visit  Are Name: Jamey Gomez Date: 2021   MRN: [id number] Sex: Male   Age: 63 y.o. Ethnicity: Non-/Non    : 1955 Race: Hugh Leonard is here for Medicare AWV    Screenings for behavioral, psychosocial and functional/safety risks, and cognitive dysfunction are all negative except as indicated below. These results, as well as other patient data from the [address] form, are documented in Flowsheets linked to this Encounter. Allergies   Allergen Reactions    Fish-Derived Products Anaphylaxis    Iodine Anaphylaxis     Iodine-containing products, dyes, contrast dye    Seasonal Shortness Of Breath    Pcn [Penicillins] Nausea And Vomiting         Prior to Visit Medications    Medication Sig Taking? Authorizing Provider   Hydrocortisone, Perianal, 1 % cream Hydrocortisone (Perianal) 1 % External Cream Quantity: 28 Refills: 0 Ordered: 28-May-2021 DO Start : 28-May-2021 Active Yes Historical Provider, MD   ipratropium-albuterol (DUONEB) 0.5-2.5 (3) MG/3ML SOLN nebulizer solution TAKE 3 MLS BY NEBULIZATION EVERY 6 HOURS AS NEEDED FOR SHORTNESS OF BREATH Yes Donaldo Aparicio MD   albuterol sulfate  (90 Base) MCG/ACT inhaler Inhale 2 puffs into the lungs every 6 hours as needed for Wheezing or Shortness of Breath Yes [place], MD   cetirizine (ZYRTEC) 10 MG tablet Take 1 tablet by mouth daily Yes Aurelio Hernandez DO   diphenhydrAMINE (BENADRYL) 25 MG capsule Take 1 capsule by mouth nightly as needed for Itching Yes Aurelio Hernandez DO   albuterol (PROVENTIL) (5 MG/ML) 0.5% nebulizer solution Take 0.5 mLs by nebulization every 6 hours as needed for Wheezing or Shortness of Breath Yes [place], MD   permethrin (ELIMITE) 5 % cream To treat scabies: Apply cream to all areas of the body from the neck down and wash off after 8-14 hours. Yes Donaldo Aparicio MD   ammonium lactate (AMLACTIN) 12 % cream RUB INTO SKIN THOROUGHLY. APPLY TWICE DAILY TO SKIN Yes Historical Provider, MD   acetaminophen (TYLENOL) 500 MG tablet Take 1 tablet by mouth every 8 hours as needed for Pain Yes Dougie Sandhu MD   amLODIPine (NORVASC) 10 MG tablet Take 1 tablet by mouth daily Yes Dougie Sandhu MD   escitalopram (LEXAPRO) 10 MG tablet Take 1 tablet by mouth daily Yes Dougie Sandhu MD   fluticasone (FLONASE) 50 MCG/ACT nasal spray 2 sprays by Nasal route daily Yes Dougie Sandhu MD   Fluticasone furoate-vilanterol (BREO ELLIPTA) 200-25 MCG/INH AEPB inhaler Inhale 1 puff into the lungs daily Yes Dougie Sandhu MD   lovastatin (MEVACOR) 10 MG tablet Take 1 tablet by mouth nightly Yes Dougie Sandhu MD   montelukast (SINGULAIR) 10 MG tablet Take 1 tablet by mouth nightly Yes Dougie Sandhu MD   pantoprazole (PROTONIX) 40 MG tablet Take 1 tablet by mouth daily Yes Dougie Sandhu MD   polyethylene glycol (GLYCOLAX) 17 GM/SCOOP powder Take 17 g by mouth daily Yes Dougie Sandhu MD   sildenafil (VIAGRA) 100 MG tablet Take 1 tablet by mouth as needed for Erectile Dysfunction Yes Dougie Sandhu MD   traZODone (DESYREL) 50 MG tablet Take 1 tablet by mouth nightly Yes Dougie Sandhu MD   meloxicam (MOBIC) 15 MG tablet TAKE 1 TABLET BY MOUTH EVERY DAY WITH FOOD Yes Historical Provider, MD   Handicap Placard MISC by Does not apply route DX: OSTEOARTHRITIS OF BOTH KNEES (M17.0), COPD (J44.9)     EXPIRES: 02/2024 Yes Dougie Sandhu MD         Past Medical History:   Diagnosis Date    Alcohol abuse 10/27/2016    Anemia     Asthma     Cocaine abuse ([facility] 10/27/2016    COPD (chronic obstructive pulmonary disease) ([facility]     Depression 04/27/2020    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    Drug-seeking behavior 04/14/2015    Edema 12/10/2015    Erectile dysfunction     Gastroesophageal reflux disease 12/17/2018    Gout 10/27/2016    History of arthroscopy of both knees 10/27/2016    House dust mite allergy 04/21/2014    Hyperlipidemia     Hypertension 10/27/2016    Injury to heart 10/27/2016    Insomnia 12/17/2018    Medical non-compliance 02/22/2014    Morbid obesity due to excess calories ([facility] 12/08/2016    Osteoarthritis of both knees 12/08/2016    Personal history of tobacco use     Pneumonia 12/04/2016    caused hospital admission    Pre-diabetes 10/27/2016    Seasonal allergies 04/21/2014    Severe persistent asthma 10/27/2016    Severe sleep apnea 04/14/2015    Supraventricular tachycardia ([facility] 10/27/2016       Past Surgical History:   Procedure Laterality Date    ANTERIOR CRUCIATE LIGAMENT REPAIR      CARDIAC CATHETERIZATION      COLONOSCOPY N/A 7/15/2020    COLONOSCOPY WITH POLYPECTOMY performed by Alger Soulier, MD at [facility] Left 8/9/2019    LEFT TOTAL KNEE ARTHROPLASTY performed by Ben Vance MD at [facility] N/A 55/70/0169    UMBILICAL HERNIA REPAIR WITH MESH performed by Navdeep Holden MD at [address] History   Problem Relation Age of Onset    Arthritis Mother     Asthma Mother     High Cholesterol Mother     Other Mother         aneurysm    Diabetes Father     Stroke Maternal Grandmother     Cancer Maternal Grandfather     Hypertension Other     COPD Neg Hx        CareTeam (Including outside providers/suppliers regularly involved in providing care):   Patient Care Team:  Patricia Dominguez MD as PCP - General (Family Medicine)  Patricia Dominguez MD [facility] PCP [facility]  Milana Curran MD as Consulting Physician (Pulmonary Disease)  Estrellita Angeles MD as Consulting Physician (Pain Management)  Ben Vance MD as Surgeon (Orthopedic Surgery)  Isma Horne RN as Nurse Waldo King MD as Consulting Physician (Pulmonology)  Sol Sam MD as Cardiologist (Interventional Cardiology)    Wt Readings from Last 3 Encounters:   06/23/21 300 lb (136.1 kg) 06/11/21 300 lb (136.1 kg)   05/28/21 (!) 306 lb (138.8 kg)      No flowsheet data found. There is no height or weight on file to calculate BMI. Based upon direct observation of the patient, evaluation of cognition reveals recent and remote memory intact. Patient's complete Health Risk Assessment and screening values have been reviewed and are found in Flowsheets. The following problems were reviewed today and where indicated follow up appointments were made and/or referrals ordered. Positive Risk Factor Screenings with Interventions:     Fall Risk:  2 or more falls in past year?: (!) yes  Fall with injury in past year?: (!) yes (Pt fell and scraped knee and also injured his back)  Fall Risk Interventions:    · Home safety tips provided  · Patient declines any further evaluation/treatment for this issue  · Falls were mechanical, tripping over a cord and slipping on a towel in the bathroom. Substance History:  Social History     Tobacco History     Smoking Status  Current Every Day Smoker Smoking Start Date  7/5/1988 Last attempt to quit  10/1/2013 Smoking Frequency  0.25 packs/day for 30 years (7.5 pk yrs)    Smoking Tobacco Type  Cigarettes, Cigars    Smokeless Tobacco Use  Never Used          Alcohol History     Alcohol Use Status  Yes Drinks/Week  2 Cans of beer, 2 Shots of liquor per week Amount  4.0 standard drinks of alcohol/wk Comment  social 1 -2 x week          Drug Use     Drug Use Status  Yes Types  Cocaine, Marijuana Comment  no cocaine x 1 year, marijuana weekly          Sexual Activity     Sexually Active  Not Currently Partners  Female               Alcohol Screening: Audit-C Score: 6  Total Score: 6    A score of 8 or more is associated with harmful or hazardous drinking. A score of 13 or more in women, and 15 or more in men, is likely to indicate alcohol dependence.   Substance Abuse Interventions:  · Tobacco abuse:  tobacco cessation tips and resources provided, patient is not ready to work toward tobacco cessation at this time    [address] and ACP:  General  In general, how would you say your health is?: Fair  In the past 7 days, have you experienced any of the following?  New or Increased Pain, New or Increased Fatigue, Loneliness, Social Isolation, Stress or Anger?: None of These  Do you get the social and emotional support that you need?: Yes  Do you have a Living Will?: (!) No  Advance Directives     Power of [address] Will ACP-Advance Directive ACP-Power of     Not on File Not on File Not on File Not on File      General Health Risk Interventions:  · No Living Will: Patient referred to [place] Habits/Nutrition:  Health Habits/Nutrition  Do you exercise for at least 20 minutes 2-3 times per week?: (!) No  Have you lost any weight without trying in the past 3 months?: No  Do you eat only one meal per day?: No  Have you seen the dentist within the past year?: (!) No     Health Habits/Nutrition Interventions:  · Inadequate physical activity:  educational materials provided to promote increased physical activity  · Nutritional issues:  educational materials for healthy, well-balanced diet provided, educational materials to promote weight loss provided  · Dental exam overdue:  patient encouraged to make appointment with his/her dentist    Hearing/Vision:  No exam data present  Hearing/Vision  Do you or your family notice any trouble with your hearing that hasn't been managed with hearing aids?: No  Do you have difficulty driving, watching TV, or doing any of your daily activities because of your eyesight?: No  Have you had an eye exam within the past year?: (!) No  Hearing/Vision Interventions:  · Vision concerns:  patient encouraged to make appointment with his/her eye specialist    Safety:  Safety  Do you have working smoke detectors?: Yes  Have all throw rugs been removed or fastened?: Yes  Do you have non-slip mats or surfaces in all bathtubs/showers?: (!) No  Do all of your stairways have a railing or banister?: Yes  Are your doorways, halls and stairs free of clutter?: Yes  Do you always fasten your seatbelt when you are in a car?: Yes  Safety Interventions:  · Home safety tips provided     Personalized Preventive Plan   Current Health Maintenance Status  Immunization History   Administered Date(s) Administered    COVID-19, Pfizer, PF, 30mcg/0.3mL 06/19/2021    Influenza Virus Vaccine 10/06/2014, 09/11/2015, 11/20/2020    Influenza, Quadv, 6 mo and older, IM (Fluzone, Flulaval) 10/02/2018    Influenza, Quadv, IM, (6 mo and older Fluzone, Flulaval, Fluarix and 3 yrs and older Afluria) 09/20/2017, 10/07/2019    Influenza, Quadv, IM, PF (6 mo and older Fluzone, Flulaval, Fluarix, and 3 yrs and older Afluria) 10/27/2016, 11/20/2020    Pneumococcal Polysaccharide (Ehnxsrong89) 10/27/2016    Tdap (Boostrix, Adacel) 01/01/2002, 10/27/2016    Zoster Recombinant (Shingrix) 11/10/2019        Health Maintenance   Topic Date Due    Shingles Vaccine (2 of 2) 01/05/2020    Annual Wellness Visit (AWV)  Never done    COVID-19 Vaccine (2 - Pfizer 2-dose series) 07/10/2021    Flu vaccine (1) 09/01/2021    A1C test (Diabetic or Prediabetic)  04/15/2022    Lipid screen  04/15/2022    Pneumococcal 0-64 years Vaccine (2 of 2 - PPSV23) 12/02/2022    Colon cancer screen colonoscopy  07/15/2023    DTaP/Tdap/Td vaccine (3 - Td or Tdap) 10/27/2026    Hepatitis C screen  Completed    HIV screen  Completed    Hepatitis A vaccine  Aged Out    Hepatitis B vaccine  Aged Out    Hib vaccine  Aged Out    Meningococcal (ACWY) vaccine  Aged Out     Recommendations for [place] Due: see orders and patient instructions/AVS.  . Recommended screening schedule for the next 5-10 years is provided to the patient in written form: see Patient Instructions/AVS.    [place] was seen today for medicare awv. Diagnoses and all orders for this visit:    1. Routine general medical examination at a health care facility  [facility] Referral to [facility] Specialist  2. Tobacco use  Assessment & Plan:   Patient continues with daily cigarette smoking. I stressed with him again the importance of complete tobacco cessation with known COPD and frequent exacerbations. Patient is not yet ready to set a quit date, but is contemplative and will continue making best efforts to wean down cigarette use. We will continue to follow over time  3. Chronic obstructive pulmonary disease, unspecified COPD type ([address]  Assessment & Plan:   Patient is established with pulmonology for long-term medication management. Due to reported need to use rescue inhaler/nebulizer 4+ times daily on a chronic basis I will have office staff help him schedule follow-up visit with the specialist to discuss further medication adjustments. 4. Spinal stenosis of lumbar region, unspecified whether neurogenic claudication present  Assessment & Plan:  Patient established with pain management. I instructed him to call the specialist and keep regular follow-up to discuss long-term pain control options. Return in 4 to 6 months for chronic disease check    Shwetha Hernandez is a 63 y.o. male being evaluated by a Virtual Visit (phone) encounter to address concerns as mentioned above. A caregiver was present when appropriate. Due to this being a TeleHealth encounter (During [facility]- public health emergency), evaluation of the following organ systems was limited: Vitals/Constitutional/EENT/Resp/CV/GI//MS/Neuro/Skin/Heme-Lymph-Imm. Pursuant to the emergency declaration under the [facility], [address] [place] [address] authority and the [employer] Act, this Virtual Visit was conducted with patient's (and/or legal guardian's) consent, to reduce the patient's risk of exposure to COVID-19 and provide necessary medical care.   The patient (and/or legal guardian) has also been advised to contact this office for worsening conditions or problems, and seek emergency medical treatment and/or call 911 if deemed necessary. Patient identification was verified at the start of the visit: Yes    Services were provided through phone to substitute for in-person clinic visit. Patient and provider were located at their individual homes. --Luz Iyer MD on 7/6/2021 at 12:01 PM    An electronic signature was used to authenticate this note.

## 2021-07-06 NOTE — ASSESSMENT & PLAN NOTE
Patient established with pain management. I instructed him to call the specialist and keep regular follow-up to discuss long-term pain control options.

## 2021-07-06 NOTE — ASSESSMENT & PLAN NOTE
Patient continues with daily cigarette smoking. I stressed with him again the importance of complete tobacco cessation with known COPD and frequent exacerbations. Patient is not yet ready to set a quit date, but is contemplative and will continue making best efforts to wean down cigarette use.   We will continue to follow over time

## 2021-07-07 ENCOUNTER — CLINICAL DOCUMENTATION (OUTPATIENT)
Dept: SPIRITUAL SERVICES | Age: 66
End: 2021-07-07

## 2021-07-07 ENCOUNTER — OFFICE VISIT (OUTPATIENT)
Dept: FAMILY MEDICINE CLINIC | Age: 66
End: 2021-07-07
Payer: MEDICARE

## 2021-07-07 VITALS
TEMPERATURE: 98.2 F | HEIGHT: 72 IN | WEIGHT: 300 LBS | BODY MASS INDEX: 40.63 KG/M2 | HEART RATE: 86 BPM | OXYGEN SATURATION: 96 %

## 2021-07-07 DIAGNOSIS — R21 RASH AND OTHER NONSPECIFIC SKIN ERUPTION: Primary | ICD-10-CM

## 2021-07-07 PROCEDURE — 99213 OFFICE O/P EST LOW 20 MIN: CPT | Performed by: STUDENT IN AN ORGANIZED HEALTH CARE EDUCATION/TRAINING PROGRAM

## 2021-07-07 RX ORDER — TRIAMCINOLONE ACETONIDE 0.25 MG/G
CREAM TOPICAL
Qty: 80 G | Refills: 1 | Status: ON HOLD | OUTPATIENT
Start: 2021-07-07 | End: 2021-09-28 | Stop reason: HOSPADM

## 2021-07-07 RX ORDER — CETIRIZINE HYDROCHLORIDE 10 MG/1
20 TABLET ORAL 2 TIMES DAILY
Qty: 120 TABLET | Refills: 0 | Status: SHIPPED | OUTPATIENT
Start: 2021-07-07 | End: 2021-07-21 | Stop reason: SDUPTHER

## 2021-07-07 ASSESSMENT — ENCOUNTER SYMPTOMS
SHORTNESS OF BREATH: 0
COUGH: 0
VOMITING: 0
SINUS PRESSURE: 0
SORE THROAT: 0
ABDOMINAL PAIN: 0

## 2021-07-07 NOTE — PROGRESS NOTES
Advance Care Planning    Ambulatory ACP Specialist Patient Outreach    Date:  7/7/2021  ACP Specialist:  Emmanuelle Kennedy    Outreach call to patient in follow-up to ACP Specialist referral from: Emeterio Jones MD    [x] PCP  [] Provider   [] Ambulatory Care Management [] Other for Reason:    [x] Advance Directive Assistance  [] Code Status Discussion  [] Complete Portable DNR Order  [] Discuss Goals of Care  [] Complete POST/MOST  [] Early ACP Decision-Making  [] Other    Date Referral Received:7/7/21    Today's Outreach:  [x] First   [] Second  [] Third                               Third outreach made by [x]  phone  [] email []   [facility]     Intervention:  [] Spoke with Patient  [x] Left VM requesting return call      Outcome: Called Patient. Left Detailed Message and asked to call back. Next Step:   [] ACP scheduled conversation  [x] Outreach again in two week               [] Email / Mail ACP Info Sheets  [] Email / Mail Advance Directive            [] Close Referral. Routing closure to referring provider/staff and to ACP Specialist .      Thank you for this referral.

## 2021-07-16 DIAGNOSIS — N52.9 ERECTILE DYSFUNCTION, UNSPECIFIED ERECTILE DYSFUNCTION TYPE: Chronic | ICD-10-CM

## 2021-07-16 NOTE — TELEPHONE ENCOUNTER
Pt is calling in regards to getting a refill on his viagra . Nile Bravo Patient  requesting medication refill.  Please approve or deny this request.    Rx requested:  Requested Prescriptions     Pending Prescriptions Disp Refills    sildenafil (VIAGRA) 100 MG tablet 10 tablet 2     Sig: Take 1 tablet by mouth as needed for Erectile Dysfunction         Last Office Visit:   7/6/2021      Next Visit Date:  Future Appointments   Date Time Provider Constance Espitia   7/21/2021 10:00 AM [facility]   1/6/2022  1:40 PM [place] [facility]

## 2021-07-17 RX ORDER — SILDENAFIL 100 MG/1
100 TABLET, FILM COATED ORAL PRN
Qty: 10 TABLET | Refills: 2 | OUTPATIENT
Start: 2021-07-17

## 2021-07-18 DIAGNOSIS — J44.9 CHRONIC OBSTRUCTIVE PULMONARY DISEASE, UNSPECIFIED COPD TYPE (HCC): Chronic | ICD-10-CM

## 2021-07-19 RX ORDER — ALBUTEROL SULFATE 90 UG/1
2 AEROSOL, METERED RESPIRATORY (INHALATION) EVERY 6 HOURS PRN
Qty: 18 INHALER | Refills: 0 | Status: SHIPPED | OUTPATIENT
Start: 2021-07-19 | End: 2021-08-25

## 2021-07-19 NOTE — TELEPHONE ENCOUNTER
Requesting medication refill.  Please approve or deny this request.    Rx requested:  Requested Prescriptions     Pending Prescriptions Disp Refills    albuterol sulfate  (90 Base) MCG/ACT inhaler [Pharmacy Med Name: ALBUTEROL HFA (VENTOLIN) INH] 18 Inhaler 0     Sig: INHALE 2 PUFFS INTO THE LUNGS EVERY 6 HOURS AS NEEDED FOR WHEEZING OR SHORTNESS OF BREATH       Last Office Visit:   7/6/2021    Last Filled:      Last Labs:      Next Visit Date:  Future Appointments   Date Time Provider Constance Espitia   7[facility]21/2021 10:00 [facility]   1/6/2022  1:40 PM [facility]

## 2021-07-20 NOTE — PROGRESS NOTES
2021    Raj Curry (:  1955) is a 63 y.o. male, here for evaluation of the following medical concerns:  Chief Complaint   Patient presents with    2 Week Follow-Up    Rash     States rash  is almost gone.         HPI  Rash    Seen by PCP  due to rash  Rash occurring for 2 months  Initially started on the left lower leg and then spread to his right leg, back and arms  Denied any environmental changes  He was seen at the walk-in clinic who thought it was dermatitis and he was instructed to use ammonium lactate lotion twice daily  He had no improvement with this treatment  Patient treated with permethrin 5% cream and prednisone taper for possible scabies     Used permetherin from the neck down and showered off in the am  He did not really feel like the cream helped  The oral steroid helps a little for itching     Patient with history of seasonal allergies  He does take Singulair on a daily basis  He does not take an antihistamine  States he remembers as a kid being very itchy and having a rash     Pt has recently found bugs in his bed  He does not know what kind of bug it was  States he did have his girlfriend over a few times and after she visited she did have a rash and some bumps  Also with a neighbor in his apartment complex that needed to terminate her for bedbugs     Started Zyrtec and Benadryl  Patient was to contact his apartment complex to ask about an  for possible bedbugs       Patient has been taking Zyrtec in the morning and Benadryl before bedtime   Continues to be itchy   States itching is usually worse at night when he goes into his bedroom   Is using some hydrocortisone occasionally on the arms   Talk to his apartment  however no  has been out to his apartment     Recommended patient wash his bedding, curtains and carpets  He is also to speak with his  about evaluation for bedbugs  Increase Zyrtec to 20 mg twice daily and start Kenalog ointment    7/21  Pt's supervisor did come out and saw a lot of bed bugs in patient's bed  Threw away his bed and been sleeping on his couch  2 days ago the  came out and treated his apartment  Since he has been sleeping on the couch he is not having any more new bites or itching  States the rash is improving  Taking Zyrtec 10 mg twice a day and Benadryl at nighttime      Review of Systems   Constitutional: Negative for chills and fever. HENT: Negative for congestion, sinus pressure and sore throat. Respiratory: Negative for cough and shortness of breath. Cardiovascular: Negative for chest pain and palpitations. Gastrointestinal: Negative for abdominal pain and vomiting. Musculoskeletal: Negative for arthralgias and myalgias. Skin: Positive for rash (Improving). Negative for wound. Neurological: Negative for speech difficulty and light-headedness. Psychiatric/Behavioral: Negative for suicidal ideas. The patient is not nervous/anxious. Prior to Visit Medications    Medication Sig Taking? Authorizing Provider   diphenhydrAMINE (BENADRYL) 25 MG capsule Take 1 capsule by mouth nightly as needed for Itching Yes Leander Felix DO   cetirizine (ZYRTEC) 10 MG tablet Take 1 tablet by mouth 2 times daily Yes Leander Felix DO   albuterol sulfate  (90 Base) MCG/ACT inhaler INHALE 2 PUFFS INTO THE LUNGS EVERY 6 HOURS AS NEEDED FOR WHEEZING OR SHORTNESS OF BREATH Yes Ning Santillan MD   sildenafil (VIAGRA) 100 MG tablet Take 1 tablet by mouth as needed for Erectile Dysfunction Yes Ning Santillan MD   triamcinolone (KENALOG) 0.025 % cream Apply topically 2 times daily.  Yes Leander Felix DO   Hydrocortisone, Perianal, 1 % cream Hydrocortisone (Perianal) 1 % External Cream Quantity: 28 Refills: 0 Ordered: 28-May-2021 DO Start : 28-May-2021 Active Yes Historical Provider, MD   ipratropium-albuterol (DUONEB) 0.5-2.5 (3) MG/3ML SOLN nebulizer solution TAKE 3 MLS BY NEBULIZATION EVERY 6 HOURS AS NEEDED FOR SHORTNESS OF BREATH Yes Augustine Alarcon MD   albuterol (PROVENTIL) (5 MG/ML) 0.5% nebulizer solution Take 0.5 mLs by nebulization every 6 hours as needed for Wheezing or Shortness of Breath Yes Danielle Caraballo MD   acetaminophen (TYLENOL) 500 MG tablet Take 1 tablet by mouth every 8 hours as needed for Pain Yes Danielle Caraballo MD   amLODIPine (NORVASC) 10 MG tablet Take 1 tablet by mouth daily Yes Danielle Caraballo MD   escitalopram (LEXAPRO) 10 MG tablet Take 1 tablet by mouth daily Yes Danielle Caraballo MD   fluticasone (FLONASE) 50 MCG/ACT nasal spray 2 sprays by Nasal route daily Yes Danielle Caraballo MD   Fluticasone furoate-vilanterol (BREO ELLIPTA) 200-25 MCG/INH AEPB inhaler Inhale 1 puff into the lungs daily Yes Danielle Caraballo MD   lovastatin (MEVACOR) 10 MG tablet Take 1 tablet by mouth nightly Yes Danielle Caraballo MD   montelukast (SINGULAIR) 10 MG tablet Take 1 tablet by mouth nightly Yes Danielle Caraballo MD   pantoprazole (PROTONIX) 40 MG tablet Take 1 tablet by mouth daily Yes Danielle Caraballo MD   polyethylene glycol (GLYCOLAX) 17 GM/SCOOP powder Take 17 g by mouth daily Yes Danielle Caraballo MD   traZODone (DESYREL) 50 MG tablet Take 1 tablet by mouth nightly Yes Danielle Caraballo MD   meloxicam (MOBIC) 15 MG tablet TAKE 1 TABLET BY MOUTH EVERY DAY WITH FOOD Yes Historical Provider, MD   Handicap Placard MISC by Does not apply route DX: OSTEOARTHRITIS OF BOTH KNEES (M17.0), COPD (J44.9)     EXPIRES: 02/2024 Yes Danielle Caraballo MD        Allergies   Allergen Reactions    Fish-Derived Products Anaphylaxis    Iodine Anaphylaxis     Iodine-containing products, dyes, contrast dye    Seasonal Shortness Of Breath    Pcn [Penicillins] Nausea And Vomiting       Past Medical History:   Diagnosis Date    Alcohol abuse 10/27/2016    Anemia     Asthma     [facility] 10/27/2016    COPD (chronic obstructive pulmonary disease) [facility]     Depression 2020    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    Drug-seeking behavior 2015    Edema 12/10/2015    Erectile dysfunction     Gastroesophageal reflux disease 2018    Gout 10/27/2016    History of arthroscopy of both knees 10/27/2016    House dust mite allergy 2014    Hyperlipidemia     Hypertension 10/27/2016    Injury to heart 10/27/2016    Insomnia 2018    Medical non-compliance 2014    Morbid obesity due to excess calories ([facility] 2016    Osteoarthritis of both knees 2016    Personal history of tobacco use     Pneumonia 2016    caused hospital admission    Pre-diabetes 10/27/2016    Seasonal allergies 2014    Severe persistent asthma 10/27/2016    Severe sleep apnea 2015    Supraventricular tachycardia ([facility] 10/27/2016       Past Surgical History:   Procedure Laterality Date    ANTERIOR CRUCIATE LIGAMENT REPAIR      CARDIAC CATHETERIZATION      COLONOSCOPY N/A 7/15/2020    COLONOSCOPY WITH POLYPECTOMY performed by Antonette Cristina MD at [facility] Left 2019    LEFT TOTAL KNEE ARTHROPLASTY performed by Lizet Oliva MD at [address] [place] [address] N/A     [facility] performed by Magnus Valladares MD at [facility] History     Socioeconomic History    Marital status: Single     Spouse name: n/a    Number of children: 1    Years of education: 12    Highest education level: Not on file   Occupational History    Occupation: Disability     Employer: NONE   Tobacco Use    Smoking status: Current Every Day Smoker     Packs/day: 0.25     Years: 30.00     Pack years: 7.50     Types: Cigarettes, Cigars     Start date: 1988     Last attempt to quit: 10/1/2013     Years since quittin.8    Smokeless tobacco: Never Used   Vaping Use    Vaping Use: Never used   Substance and Sexual Activity    Alcohol use:  Yes Alcohol/week: 4.0 standard drinks     Types: 2 Cans of beer, 2 Shots of liquor per week     Comment: social 1 -2 x week    Drug use: Yes     Types: Cocaine, Marijuana     Comment: no cocaine x 1 year, marijuana weekly    Sexual activity: Not Currently     Partners: Female   Other Topics Concern    Not on file   Social History Narrative    Lives in an apartment    15 steps to get up to the apartment    Asking for hospital bed and shower chair      Social Determinants of Health     Financial Resource Strain: Medium Risk    Difficulty of Paying Living Expenses: Somewhat hard   Food Insecurity: No Food Insecurity    Worried About Running Out of Food in the Last Year: Never true    Jonnie of Food in the Last Year: Never true   Transportation Needs: No Transportation Needs    Lack of Transportation (Medical): No    Lack of Transportation (Non-Medical): No   Physical Activity: Inactive    Days of Exercise per Week: 0 days    Minutes of Exercise per Session: 0 min   Stress: Stress Concern Present    Feeling of Stress :  To some extent   Social Connections: Socially Isolated    Frequency of Communication with Friends and Family: Once a week    Frequency of Social Gatherings with Friends and Family: Once a week    Attends Jewish Services: 1 to 4 times per year    Active Member of [employer] or Organizations: Not asked    Attends Club or Organization Meetings: Never    Marital Status:    Intimate Partner Violence:     Fear of Current or Ex-Partner:     Emotionally Abused:     Physically Abused:     Sexually Abused:         Family History   Problem Relation Age of Onset    Arthritis Mother     Asthma Mother     High Cholesterol Mother     Other Mother         aneurysm    Diabetes Father     Stroke Maternal Grandmother     Cancer Maternal Grandfather     Hypertension Other     COPD Neg Hx        Vitals:    07/21/21 1013   Pulse: 67   Temp: 96.7 °F (35.9 °C)   SpO2: 96%   Weight: 300 lb (136.1 kg)   Height: 6' (1.829 m)       Estimated body mass index is 40.69 kg/m² as calculated from the following:    Height as of this encounter: 6' (1.829 m). Weight as of this encounter: 300 lb (136.1 kg). No results for input(s): WBC, RBC, HGB, HCT, MCV, MCH, MCHC, RDW, PLT, MPV in the last 72 hours. No results for input(s): NA, K, CL, CO2, BUN, CREATININE, GLUCOSE, CALCIUM, PROT, LABALBU, BILITOT, ALKPHOS, AST, ALT in the last 72 hours. Lab Results   Component Value Date    LABA1C 5.7 04/15/2021       No results found. Physical Exam  Constitutional:       Appearance: Normal appearance. He is normal weight. HENT:      Head: Normocephalic and atraumatic. Nose: No rhinorrhea. Mouth/Throat:      Mouth: Mucous membranes are moist.      Pharynx: Oropharynx is clear. Eyes:      Extraocular Movements: Extraocular movements intact. Conjunctiva/sclera: Conjunctivae normal.   Skin:     Findings: Rash (Fading excoriations and bug bites) present. No lesion. Neurological:      General: No focal deficit present. Mental Status: He is alert and oriented to person, place, and time. Mental status is at baseline. Psychiatric:         Mood and Affect: Mood normal.         Thought Content: Thought content normal.         Judgment: Judgment normal.         ASSESSMENT/PLAN:  1. Rash and other nonspecific skin eruption  -Patient supervisor came out and did confirm bedbugs, patient's condo was treated 2 days ago by the   -Rash overall has significantly improved  -Did recommend that patient may want to continue Zyrtec and Benadryl for another month due to severity of reaction he had  -Follow-up as needed    - diphenhydrAMINE (BENADRYL) 25 MG capsule; Take 1 capsule by mouth nightly as needed for Itching  Dispense: 30 capsule; Refill: 2  - cetirizine (ZYRTEC) 10 MG tablet; Take 1 tablet by mouth 2 times daily  Dispense: 60 tablet;  Refill: 2      ---------------------------------------------------------------------  Side effects, adverse effects of the medication prescribed today, as well as treatment plan/ rationale and result expectations have been discussed with the patient who expresses understanding and desires to proceed. Close follow up to evaluate treatment results and for coordination of care. I have reviewed the patient's medical history in detail and updated the computerized patient record. As always, patient is advised that if symptoms worsen in any way they will proceed to the nearest emergency room. --------------------------------------------------------------------    Return if symptoms worsen or fail to improve. An  electronic signature was used to authenticate this note.     --Danielle Gomes DO on 7/21/2021 at 10:27 AM

## 2021-07-21 ENCOUNTER — CLINICAL DOCUMENTATION (OUTPATIENT)
Dept: SPIRITUAL SERVICES | Age: 66
End: 2021-07-21

## 2021-07-21 ENCOUNTER — OFFICE VISIT (OUTPATIENT)
Dept: FAMILY MEDICINE CLINIC | Age: 66
End: 2021-07-21
Payer: MEDICARE

## 2021-07-21 VITALS
HEART RATE: 67 BPM | TEMPERATURE: 96.7 F | WEIGHT: 300 LBS | HEIGHT: 72 IN | OXYGEN SATURATION: 96 % | BODY MASS INDEX: 40.63 KG/M2

## 2021-07-21 DIAGNOSIS — R21 RASH AND OTHER NONSPECIFIC SKIN ERUPTION: ICD-10-CM

## 2021-07-21 PROCEDURE — 99213 OFFICE O/P EST LOW 20 MIN: CPT | Performed by: STUDENT IN AN ORGANIZED HEALTH CARE EDUCATION/TRAINING PROGRAM

## 2021-07-21 PROCEDURE — 4004F PT TOBACCO SCREEN RCVD TLK: CPT | Performed by: STUDENT IN AN ORGANIZED HEALTH CARE EDUCATION/TRAINING PROGRAM

## 2021-07-21 PROCEDURE — G8427 DOCREV CUR MEDS BY ELIG CLIN: HCPCS | Performed by: STUDENT IN AN ORGANIZED HEALTH CARE EDUCATION/TRAINING PROGRAM

## 2021-07-21 PROCEDURE — G8417 CALC BMI ABV UP PARAM F/U: HCPCS | Performed by: STUDENT IN AN ORGANIZED HEALTH CARE EDUCATION/TRAINING PROGRAM

## 2021-07-21 PROCEDURE — 3017F COLORECTAL CA SCREEN DOC REV: CPT | Performed by: STUDENT IN AN ORGANIZED HEALTH CARE EDUCATION/TRAINING PROGRAM

## 2021-07-21 RX ORDER — CETIRIZINE HYDROCHLORIDE 10 MG/1
10 TABLET ORAL 2 TIMES DAILY
Qty: 60 TABLET | Refills: 2 | Status: SHIPPED | OUTPATIENT
Start: 2021-07-21 | End: 2021-08-20

## 2021-07-21 RX ORDER — DIPHENHYDRAMINE HCL 25 MG
25 CAPSULE ORAL NIGHTLY PRN
Qty: 30 CAPSULE | Refills: 2 | Status: SHIPPED | OUTPATIENT
Start: 2021-07-21 | End: 2021-08-20

## 2021-07-21 ASSESSMENT — ENCOUNTER SYMPTOMS
SHORTNESS OF BREATH: 0
ABDOMINAL PAIN: 0
COUGH: 0
SORE THROAT: 0
VOMITING: 0
SINUS PRESSURE: 0

## 2021-07-21 NOTE — PROGRESS NOTES
Advance Care Planning    Ambulatory ACP Specialist Patient Outreach    Date:  7/21/2021  ACP Specialist:  Pee Johnson    Outreach call to patient in follow-up to ACP Specialist referral from: Isma Pearson MD    [x] PCP  [] Provider   [] Ambulatory Care Management [] Other for Reason:    [x] Advance Directive Assistance  [] Code Status Discussion  [] Complete Portable DNR Order  [] Discuss Goals of Care  [] Complete POST/MOST  [] Early ACP Decision-Making  [] Other    Date Referral Received:7/7/21    Today's Outreach:  [] First   [x] Second  [] Third                               Third outreach made by [x]  phone  [] email []   [facility]     Intervention:  [] Spoke with Patient  [x] Left VM requesting return call      Outcome:Left detailed VM. Next Step:   [] ACP scheduled conversation  [x] Outreach again in two week               [] Email / Mail ACP Info Sheets  [] Email / Mail Advance Directive            [] Close Referral. Routing closure to referring provider/staff and to ACP Specialist .      Thank you for this referral.

## 2021-07-27 DIAGNOSIS — J44.9 CHRONIC OBSTRUCTIVE PULMONARY DISEASE, UNSPECIFIED COPD TYPE (HCC): Chronic | ICD-10-CM

## 2021-07-27 NOTE — TELEPHONE ENCOUNTER
Pharmacy is requesting medication refill.  Please approve or deny this request.    Rx requested:  Requested Prescriptions     Pending Prescriptions Disp Refills    ipratropium-albuterol (DUONEB) 0.5-2.5 (3) MG/3ML SOLN nebulizer solution [Pharmacy Med Name: IPRAT-ALBUT 0.5-3(2.5) MG/3 ML] 360 mL 0     Sig: Inhale 3 mLs into the lungs every 6 hours as needed for Shortness of Breath         Last Office Visit:   9/3/2019      Next Visit Date:  Future Appointments   Date Time Provider Constance Espitia   1/6/2022  1:40 PM Lidia Maldonado MD [facility]

## 2021-07-28 RX ORDER — IPRATROPIUM BROMIDE AND ALBUTEROL SULFATE 2.5; .5 MG/3ML; MG/3ML
1 SOLUTION RESPIRATORY (INHALATION) EVERY 6 HOURS PRN
Qty: 360 ML | Refills: 0 | Status: SHIPPED | OUTPATIENT
Start: 2021-07-28 | End: 2021-08-26

## 2021-07-29 DIAGNOSIS — R21 RASH AND OTHER NONSPECIFIC SKIN ERUPTION: ICD-10-CM

## 2021-07-29 RX ORDER — CETIRIZINE HYDROCHLORIDE 10 MG/1
TABLET ORAL
Qty: 120 TABLET | OUTPATIENT
Start: 2021-07-29

## 2021-08-19 DIAGNOSIS — R21 RASH AND OTHER NONSPECIFIC SKIN ERUPTION: ICD-10-CM

## 2021-08-19 RX ORDER — CETIRIZINE HYDROCHLORIDE 10 MG/1
TABLET ORAL
Qty: 60 TABLET | Refills: 2 | OUTPATIENT
Start: 2021-08-19

## 2021-08-25 ENCOUNTER — HOSPITAL ENCOUNTER (EMERGENCY)
Age: 66
Discharge: HOME | End: 2021-08-25
Attending: EMERGENCY MEDICINE
Payer: MEDICARE

## 2021-08-25 VITALS
TEMPERATURE: 98.5 F | SYSTOLIC BLOOD PRESSURE: 144 MMHG | OXYGEN SATURATION: 96 % | RESPIRATION RATE: 20 BRPM | DIASTOLIC BLOOD PRESSURE: 86 MMHG | HEIGHT: 73 IN | HEART RATE: 84 BPM | WEIGHT: 293 LBS | BODY MASS INDEX: 38.83 KG/M2

## 2021-08-25 DIAGNOSIS — Z76.0 ENCOUNTER FOR MEDICATION REFILL: Primary | ICD-10-CM

## 2021-08-25 DIAGNOSIS — M54.50 ACUTE EXACERBATION OF CHRONIC LOW BACK PAIN: ICD-10-CM

## 2021-08-25 DIAGNOSIS — J44.9 CHRONIC OBSTRUCTIVE PULMONARY DISEASE, UNSPECIFIED COPD TYPE (HCC): Chronic | ICD-10-CM

## 2021-08-25 DIAGNOSIS — G89.29 ACUTE EXACERBATION OF CHRONIC LOW BACK PAIN: ICD-10-CM

## 2021-08-25 PROCEDURE — 93005 ELECTROCARDIOGRAM TRACING: CPT

## 2021-08-25 PROCEDURE — 99285 EMERGENCY DEPT VISIT HI MDM: CPT

## 2021-08-25 PROCEDURE — 99284 EMERGENCY DEPT VISIT MOD MDM: CPT

## 2021-08-25 RX ORDER — HYDROCODONE BITARTRATE AND ACETAMINOPHEN 5; 325 MG/1; MG/1
1 TABLET ORAL EVERY 6 HOURS PRN
Qty: 12 TABLET | Refills: 0 | Status: SHIPPED | OUTPATIENT
Start: 2021-08-25 | End: 2021-08-29

## 2021-08-25 RX ORDER — ALBUTEROL SULFATE 90 UG/1
AEROSOL, METERED RESPIRATORY (INHALATION)
Qty: 1 INHALER | Refills: 0 | Status: SHIPPED | OUTPATIENT
Start: 2021-08-25 | End: 2021-09-29

## 2021-08-25 RX ORDER — FLUTICASONE FUROATE, UMECLIDINIUM BROMIDE AND VILANTEROL TRIFENATATE 200; 62.5; 25 UG/1; UG/1; UG/1
1 POWDER RESPIRATORY (INHALATION) DAILY
COMMUNITY
Start: 2021-08-17 | End: 2021-11-03 | Stop reason: SDUPTHER

## 2021-08-25 RX ORDER — PREDNISONE 20 MG/1
40 TABLET ORAL DAILY
Qty: 10 TABLET | Refills: 0 | Status: SHIPPED | OUTPATIENT
Start: 2021-08-25 | End: 2021-08-30

## 2021-08-25 ASSESSMENT — ENCOUNTER SYMPTOMS
COUGH: 1
BACK PAIN: 1
ABDOMINAL PAIN: 0
SHORTNESS OF BREATH: 0
VOICE CHANGE: 0
STRIDOR: 0
CONSTIPATION: 0
EYE DISCHARGE: 0
CHOKING: 0
VOMITING: 0
EYE PAIN: 0
BLOOD IN STOOL: 0
DIARRHEA: 0
EYE REDNESS: 0
FACIAL SWELLING: 0
SINUS PRESSURE: 0
CHEST TIGHTNESS: 0
TROUBLE SWALLOWING: 0
WHEEZING: 0
SORE THROAT: 0

## 2021-08-25 ASSESSMENT — PAIN DESCRIPTION - LOCATION: LOCATION: BACK

## 2021-08-25 ASSESSMENT — PAIN DESCRIPTION - PROGRESSION: CLINICAL_PROGRESSION: GRADUALLY WORSENING

## 2021-08-25 ASSESSMENT — PAIN DESCRIPTION - ORIENTATION: ORIENTATION: LEFT;RIGHT;LOWER

## 2021-08-25 ASSESSMENT — PAIN DESCRIPTION - DESCRIPTORS: DESCRIPTORS: SHARP

## 2021-08-25 ASSESSMENT — PAIN SCALES - GENERAL: PAINLEVEL_OUTOF10: 10

## 2021-08-25 ASSESSMENT — PAIN DESCRIPTION - PAIN TYPE: TYPE: CHRONIC PAIN

## 2021-08-25 ASSESSMENT — PAIN DESCRIPTION - ONSET: ONSET: SUDDEN

## 2021-08-25 NOTE — ED PROVIDER NOTES
[facility] ED  eMERGENCY dEPARTMENT eNCOUnter      Pt Name: Celeste Garcia  MRN: [id number]  [place] 1955  Date of evaluation: 8/25/2021  Provider: Jolynn Blake MD    [address] [place] [address]       Chief Complaint   Patient presents with    Shortness of Breath     Patient complains of being Short of breath since Monday  Celinen has a history of COPD. Worsening symptons the last 2 days. Patient also compkains of back pain after a fall at home. Lower bilateral back pain. Pain is a 10 of 10 . Sharp.  Back Pain         HISTORY OF PRESENT ILLNESS   (Location/Symptom, Timing/Onset,Context/Setting, Quality, Duration, Modifying Factors, Severity)  Note limiting factors. Celeste Garcia is a 63 y.o. male who presents to the emergency department patient well-known to me from previous encounter this emergency for similar situation in the past multiple medical issues including noncompliance patient has history of asthma/COPD hypertension anxiety obesity chronic back pain gout patient fell down few times in the past requesting pain management also requesting a inhaler refill patient unable to see primary care physician as per patient rated pain 7-8 out of 10 no fever no chills    HPI    NursingNotes were reviewed. REVIEW OF SYSTEMS    (2-9 systems for level 4, 10 or more for level 5)     Review of Systems   Constitutional: Positive for activity change. Negative for fever. HENT: Negative for congestion, drooling, facial swelling, mouth sores, nosebleeds, sinus pressure, sore throat, trouble swallowing and voice change. Eyes: Negative for pain, discharge, redness and visual disturbance. Respiratory: Positive for cough. Negative for choking, chest tightness, shortness of breath, wheezing and stridor. Cardiovascular: Negative for chest pain, palpitations and leg swelling. Gastrointestinal: Negative for abdominal pain, blood in stool, constipation, diarrhea and vomiting.    Endocrine: Negative for cold intolerance, polyphagia and polyuria. Genitourinary: Negative for dysuria, flank pain, frequency, genital sores and urgency. Musculoskeletal: Positive for arthralgias, back pain, gait problem, joint swelling and myalgias. Negative for neck pain and neck stiffness. Skin: Negative for pallor and rash. Neurological: Negative for tremors, seizures, syncope, weakness, numbness and headaches. Hematological: Negative for adenopathy. Does not bruise/bleed easily. Psychiatric/Behavioral: Negative for agitation, behavioral problems, hallucinations and sleep disturbance. The patient is nervous/anxious. The patient is not hyperactive. All other systems reviewed and are negative. Except as noted above the remainder of the review of systems was reviewed and negative.        PAST MEDICAL HISTORY     Past Medical History:   Diagnosis Date    Alcohol abuse 10/27/2016    Anemia     Asthma     Cocaine abuse ([address] 10/27/2016    COPD (chronic obstructive pulmonary disease) ([address]     Depression 04/27/2020    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    Drug-seeking behavior 04/14/2015    Edema 12/10/2015    Erectile dysfunction     Gastroesophageal reflux disease 12/17/2018    Gout 10/27/2016    History of arthroscopy of both knees 10/27/2016    House dust mite allergy 04/21/2014    Hyperlipidemia     Hypertension 10/27/2016    Injury to heart 10/27/2016    Insomnia 12/17/2018    Medical non-compliance 02/22/2014    Morbid obesity due to excess calories ([address] 12/08/2016    Osteoarthritis of both knees 12/08/2016    Personal history of tobacco use     Pneumonia 12/04/2016    caused hospital admission    Pre-diabetes 10/27/2016    Seasonal allergies 04/21/2014    Severe persistent asthma 10/27/2016    Severe sleep apnea 04/14/2015    Supraventricular tachycardia ([address] 10/27/2016         SURGICALHISTORY       Past Surgical History:   Procedure Laterality Date    ANTERIOR CRUCIATE LIGAMENT REPAIR      CARDIAC CATHETERIZATION      COLONOSCOPY N/A 7/15/2020    COLONOSCOPY WITH POLYPECTOMY performed by Aimee Masters MD at [facility] [address] Left 8/9/2019    LEFT TOTAL KNEE ARTHROPLASTY performed by Rogelio Lawrence MD at [facility] N/A 57/55/5777    UMBILICAL HERNIA REPAIR WITH MESH performed by Yolanda Miller MD at [address] [place] [address]       Previous Medications    ACETAMINOPHEN ([address]) 500 MG TABLET    Take 1 tablet by mouth every 8 hours as needed for Pain    AMLODIPINE (NORVASC) 10 MG TABLET    Take 1 tablet by mouth daily    ESCITALOPRAM (LEXAPRO) 10 MG TABLET    Take 1 tablet by mouth daily    FLUTICASONE (FLONASE) 50 MCG/ACT NASAL SPRAY    2 sprays by Nasal route daily    FLUTICASONE FUROATE-VILANTEROL (BREO ELLIPTA) 200-25 MCG/INH AEPB INHALER    Inhale 1 puff into the lungs daily    HANDICAP PLACARD MISC    by Does not apply route DX: OSTEOARTHRITIS OF BOTH KNEES (M17.0), COPD (J44.9)     EXPIRES: 02/2024    HYDROCORTISONE, PERIANAL, 1 % CREAM    Hydrocortisone (Perianal) 1 % External Cream Quantity: 28 Refills: 0 Ordered: 28-May-2021 DO Start : 28-May-2021 Active    IPRATROPIUM-ALBUTEROL (DUONEB) 0.5-2.5 (3) MG/3ML SOLN NEBULIZER SOLUTION    Inhale 3 mLs into the lungs every 6 hours as needed for Shortness of Breath    LOVASTATIN (MEVACOR) 10 MG TABLET    Take 1 tablet by mouth nightly    MELOXICAM (MOBIC) 15 MG TABLET    TAKE 1 TABLET BY MOUTH EVERY DAY WITH FOOD    MONTELUKAST (SINGULAIR) 10 MG TABLET    Take 1 tablet by mouth nightly    PANTOPRAZOLE (PROTONIX) 40 MG TABLET    Take 1 tablet by mouth daily    POLYETHYLENE GLYCOL (GLYCOLAX) 17 GM/SCOOP POWDER    Take 17 g by mouth daily    SILDENAFIL (VIAGRA) 100 MG TABLET    Take 1 tablet by mouth as needed for Erectile Dysfunction    TRAZODONE (DESYREL) 50 MG TABLET    Take 1 tablet by mouth nightly    TRELEGY ELLIPTA 200-62.5-25 MCG/INH AEPB    Take 1 Inhaler by mouth daily    TRIAMCINOLONE (KENALOG) 0.025 % CREAM    Apply topically 2 times daily. ALLERGIES     Fish-derived products, Iodine, Seasonal, and Pcn [penicillins]    FAMILY HISTORY       Family History   Problem Relation Age of Onset    Arthritis Mother     Asthma Mother     High Cholesterol Mother     Other Mother         aneurysm    Diabetes Father     Stroke Maternal Grandmother     Cancer Maternal Grandfather     Hypertension Other     COPD Neg Hx           SOCIAL HISTORY       Social History     Socioeconomic History    Marital status: Single     Spouse name: n/a    Number of children: 1    Years of education: 15    Highest education level: None   Occupational History    Occupation: Disability     Employer: NONE   Tobacco Use    Smoking status: Current Some Day Smoker     Packs/day: 0.25     Years: 30.00     Pack years: 7.50     Types: Cigarettes, Cigars     Start date: 1988     Last attempt to quit: 10/1/2013     Years since quittin.9    Smokeless tobacco: Never Used   Vaping Use    Vaping Use: Never used   Substance and Sexual Activity    Alcohol use: Yes     Alcohol/week: 4.0 standard drinks     Types: 2 Cans of beer, 2 Shots of liquor per week     Comment: social 1 -2 x week    Drug use: Yes     Types: Cocaine, Marijuana     Comment: no cocaine x 1 year, marijuana weekly    Sexual activity: Not Currently     Partners: Female   Other Topics Concern    None   Social History Narrative    Lives in an apartment    15 steps to get up to the apartment    Asking for hospital bed and shower chair      Social Determinants of Health     Financial Resource Strain: Medium Risk    Difficulty of Paying Living Expenses: Somewhat hard   Food Insecurity: No Food Insecurity    Worried About Running Out of Food in the Last Year: Never true    Jonnie of Food in the Last Year: Never true   Transportation Needs: No Transportation Needs    Lack of Transportation (Medical):  No gallop. Pulmonary:      Effort: Pulmonary effort is normal. No respiratory distress. Breath sounds: Normal breath sounds. No decreased breath sounds, wheezing, rhonchi or rales. Chest:      Chest wall: No mass, deformity, tenderness, crepitus or edema. There is no dullness to percussion. Abdominal:      General: Bowel sounds are normal.      Palpations: Abdomen is soft. There is no mass. Tenderness: There is no rebound. Musculoskeletal:         General: Normal range of motion. Cervical back: Neck supple. Right lower leg: No tenderness. No edema. Left lower leg: No edema. Comments: Attention come to the back examined in supine and sitting position paralumbar spine tenderness elicited on examination no point tenderness no hematoma no bruise noted patient no CVA tenderness   Skin:     General: Skin is warm. Capillary Refill: Capillary refill takes less than 2 seconds. Findings: No erythema or rash. Neurological:      General: No focal deficit present. Mental Status: He is alert and oriented to person, place, and time. Cranial Nerves: No cranial nerve deficit. Motor: No abnormal muscle tone. Psychiatric:         Behavior: Behavior normal.         Thought Content:  Thought content normal.         DIAGNOSTIC RESULTS     EKG: All EKG's are interpreted by the Emergency Department Physician who either signs or Co-signsthis chart in the absence of a cardiologist.        RADIOLOGY:   Charma Bourdon such as CT, Ultrasound and MRI are read by the radiologist. Plain radiographic images are visualized and preliminarily interpreted by the emergency physician with the below findings:      Interpretation per the Radiologist below, if available at the time ofthis note:    No orders to display         ED BEDSIDE ULTRASOUND:   Performed by ED Physician - none    LABS:  Labs Reviewed - No data to display    All other labs were within normal range or not returned as of this dictation. EMERGENCY DEPARTMENT COURSE and DIFFERENTIAL DIAGNOSIS/MDM:   Vitals:    Vitals:    08/25/21 1727 08/25/21 1751   BP:  (!) 141/83   Pulse: 85    Resp: 20    Temp: 98.5 °F (36.9 °C)    TempSrc: Oral    SpO2: 95%    Weight: 293 lb (132.9 kg)    Height: 6' 0.5\" (1.842 m)            MDM  Number of Diagnoses or Management Options  Acute exacerbation of chronic low back pain  Encounter for medication refill  Diagnosis management comments: Patient with history of COPD chronic back pain denies any chest tightness chest pain denies any fever chills EKG performed patient normal sinus rhythm rate of 79/min WA interval 142 ms QRS duration 102 ms QT interval 384 ms patient has no ischemia no ST elevation on EKG no PVCs noted on EKG      CRITICAL CARE TIME   Total Critical Care time was  minutes, excluding separately reportableprocedures. There was a high probability of clinicallysignificant/life threatening deterioration in the patient's condition which required my urgent intervention. CONSULTS:  None    PROCEDURES:  Unless otherwise noted below, none     Procedures    FINAL IMPRESSION      1. Encounter for medication refill    2. Acute exacerbation of chronic low back pain          DISPOSITION/PLAN   DISPOSITION Decision To Discharge 08/25/2021 06:19:17 PM      PATIENT REFERRED TO:  Gino Tomlin MD  [place] [phone]    In 1 week  As needed      DISCHARGE MEDICATIONS:  New Prescriptions    ALBUTEROL SULFATE HFA (PROAIR HFA) 108 (90 BASE) MCG/ACT INHALER    Use every 4 hours while awake for 7-10 days then PRN wheezing  Dispense with SPACER and Instruct on use. May sub Ventolin or Proventil as needed per [employer]. HYDROCODONE-ACETAMINOPHEN (NORCO) 5-325 MG PER TABLET    Take 1 tablet by mouth every 6 hours as needed for Pain for up to 4 days.           (Please note that portions of this note were completed with a voice recognition program.  Efforts were made to edit the dictations but occasionally words are mis-transcribed.)    Amaris Villalba MD (electronically signed)  Attending Emergency Physician       Amaris Villalba MD  08/25/21 [id number]

## 2021-08-25 NOTE — ED TRIAGE NOTES
Patient complains of being Short of breath since Monday  Patimelindan has a history of COPD. Worsening symptons the last 2 days. Sonyn also compkains of back pain after a fall at home. Lower bilateral back pain. Pain is a 10 of 10 . Sharp. Patient has bilateral expiratory wheezing. Has a cough that is bringing up some tan colored Mucus. Patient is not using accessory muscles to breath.

## 2021-08-25 NOTE — TELEPHONE ENCOUNTER
pharmacy requesting medication refill.  Please approve or deny this request.    Rx requested:  Requested Prescriptions     Pending Prescriptions Disp Refills    albuterol sulfate  (90 Base) MCG/ACT inhaler [Pharmacy Med Name: ALBUTEROL HFA (VENTOLIN) INH] 18 Inhaler 0     Sig: INHALE 2 PUFFS INTO THE LUNGS EVERY 6 HOURS AS NEEDED FOR WHEEZING OR SHORTNESS OF BREATH         Last Office Visit:   7/6/2021      Next Visit Date:  Future Appointments   Date Time Provider Constance Espitia   1/6/2022  1:40 PM MD [facility] [address]

## 2021-08-26 DIAGNOSIS — J44.9 CHRONIC OBSTRUCTIVE PULMONARY DISEASE, UNSPECIFIED COPD TYPE (HCC): Chronic | ICD-10-CM

## 2021-08-26 LAB
EKG ATRIAL RATE: 79 BPM
EKG P AXIS: 62 DEGREES
EKG P-R INTERVAL: 142 MS
EKG Q-T INTERVAL: 384 MS
EKG QRS DURATION: 102 MS
EKG QTC CALCULATION (BAZETT): 440 MS
EKG R AXIS: 66 DEGREES
EKG T AXIS: 73 DEGREES
EKG VENTRICULAR RATE: 79 BPM

## 2021-08-26 RX ORDER — ALBUTEROL SULFATE 90 UG/1
2 AEROSOL, METERED RESPIRATORY (INHALATION) EVERY 6 HOURS PRN
Qty: 18 INHALER | Refills: 0 | Status: ON HOLD | OUTPATIENT
Start: 2021-08-26 | End: 2021-09-16

## 2021-08-26 RX ORDER — IPRATROPIUM BROMIDE AND ALBUTEROL SULFATE 2.5; .5 MG/3ML; MG/3ML
SOLUTION RESPIRATORY (INHALATION)
Qty: 360 ML | Refills: 0 | Status: SHIPPED | OUTPATIENT
Start: 2021-08-26 | End: 2021-09-13

## 2021-08-26 NOTE — TELEPHONE ENCOUNTER
pharmacy requesting medication refill.  Please approve or deny this request.    Rx requested:  Requested Prescriptions     Pending Prescriptions Disp Refills    ipratropium-albuterol (DUONEB) 0.5-2.5 (3) MG/3ML SOLN nebulizer solution [Pharmacy Med Name: IPRAT-ALBUT 0.5-3(2.5) MG/3 ML] 360 mL 0     Sig: INHALE 1 VIAL (3 MLS) INTO THE LUNGS EVERY 6 HOURS AS NEEDED FOR SHORTNESS OF BREATH         Last Office Visit:   7/6/2021      Next Visit Date:  Future Appointments   Date Time Provider Constance Espitia   1/6/2022  1:40 PM [place], MD [facility] [address]

## 2021-08-28 ENCOUNTER — CLINICAL DOCUMENTATION (OUTPATIENT)
Dept: SPIRITUAL SERVICES | Age: 66
End: 2021-08-28

## 2021-08-28 NOTE — PROGRESS NOTES
Advance Care Planning   Ambulatory ACP Specialist Patient Outreach    Date:  8/28/2021  ACP Specialist:  Jess Quesada    Outreach call to patient in follow-up to ACP Specialist referral from: Ledy Espitia MD    [] PCP  [] Provider   [] Ambulatory Care Management [x] Other for Reason:    [x] Advance Directive Assistance  [] Code Status Discussion  [] Complete Portable DNR Order  [] Discuss Goals of Care  [] Complete POST/MOST  [] Early ACP Decision-Making  [] Other    Date Referral Received:7/6/21    Today's Outreach:  [x] First   [] Second  [] Third                               Third outreach made by []  phone  [] email []   Tal[place]     Intervention:  [x] Spoke with Patient  [] Left VM requesting return call      Outcome:Pt is interested in more information, and may wish to complete AD forms. He requests that we call again at another time. Next Step:   [] ACP scheduled conversation  [x] Outreach again in one week               [] Email / Mail ACP Info Sheets  [] Email / Mail Advance Directive            [] Close Referral. Routing closure to referring provider/staff and to ACP Specialist .      Thank you for this referral.

## 2021-08-29 ENCOUNTER — HOSPITAL ENCOUNTER (EMERGENCY)
Age: 66
Discharge: HOME | End: 2021-08-30
Attending: EMERGENCY MEDICINE
Payer: MEDICARE

## 2021-08-29 DIAGNOSIS — F32.A DEPRESSION, UNSPECIFIED DEPRESSION TYPE: Primary | ICD-10-CM

## 2021-08-29 DIAGNOSIS — J44.1 COPD EXACERBATION (HCC): ICD-10-CM
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
## 2021-08-29 PROCEDURE — 99283 EMERGENCY DEPT VISIT LOW MDM: CPT

## 2021-08-29 RX ORDER — ONDANSETRON 2 MG/ML
4 INJECTION INTRAMUSCULAR; INTRAVENOUS ONCE
Status: COMPLETED | OUTPATIENT
Start: 2021-08-29 | End: 2021-08-30

## 2021-08-29 RX ORDER — METHYLPREDNISOLONE SODIUM SUCCINATE 125 MG/2ML
125 INJECTION, POWDER, LYOPHILIZED, FOR SOLUTION INTRAMUSCULAR; INTRAVENOUS ONCE
Status: COMPLETED | OUTPATIENT
Start: 2021-08-29 | End: 2021-08-30

## 2021-08-29 RX ORDER — AZITHROMYCIN 500 MG/1
500 TABLET, FILM COATED ORAL DAILY
Qty: 3 TABLET | Refills: 0 | Status: SHIPPED | OUTPATIENT
Start: 2021-08-29 | End: 2021-09-01

## 2021-08-29 RX ORDER — PREDNISONE 20 MG/1
60 TABLET ORAL DAILY
Qty: 30 TABLET | Refills: 0 | Status: SHIPPED | OUTPATIENT
Start: 2021-08-29 | End: 2021-09-08

## 2021-08-29 RX ORDER — OXYCODONE HYDROCHLORIDE AND ACETAMINOPHEN 5; 325 MG/1; MG/1
1 TABLET ORAL EVERY 6 HOURS PRN
Qty: 5 TABLET | Refills: 0 | Status: SHIPPED | OUTPATIENT
Start: 2021-08-29 | End: 2021-09-01

## 2021-08-29 ASSESSMENT — ENCOUNTER SYMPTOMS
EYE REDNESS: 0
COUGH: 1
BACK PAIN: 1
WHEEZING: 1
NAUSEA: 0
VOMITING: 0
SORE THROAT: 0
ABDOMINAL PAIN: 0
SHORTNESS OF BREATH: 1
EYE DISCHARGE: 0

## 2021-08-29 ASSESSMENT — PAIN SCALES - GENERAL: PAINLEVEL_OUTOF10: 10

## 2021-08-29 ASSESSMENT — PAIN DESCRIPTION - DESCRIPTORS: DESCRIPTORS: CONSTANT

## 2021-08-29 ASSESSMENT — PAIN DESCRIPTION - PAIN TYPE: TYPE: CHRONIC PAIN;ACUTE PAIN

## 2021-08-29 ASSESSMENT — PAIN DESCRIPTION - LOCATION: LOCATION: BACK

## 2021-08-30 VITALS
OXYGEN SATURATION: 95 % | DIASTOLIC BLOOD PRESSURE: 83 MMHG | BODY MASS INDEX: 38.79 KG/M2 | SYSTOLIC BLOOD PRESSURE: 131 MMHG | HEART RATE: 72 BPM | RESPIRATION RATE: 20 BRPM | TEMPERATURE: 98 F | WEIGHT: 290 LBS

## 2021-08-30 PROCEDURE — 6360000002 HC RX W HCPCS: Performed by: EMERGENCY MEDICINE

## 2021-08-30 PROCEDURE — 96372 THER/PROPH/DIAG INJ SC/IM: CPT

## 2021-08-30 PROCEDURE — 6370000000 HC RX 637 (ALT 250 FOR IP): Performed by: EMERGENCY MEDICINE

## 2021-08-30 PROCEDURE — 96374 THER/PROPH/DIAG INJ IV PUSH: CPT

## 2021-08-30 RX ORDER — OXYCODONE HYDROCHLORIDE AND ACETAMINOPHEN 5; 325 MG/1; MG/1
1 TABLET ORAL ONCE
Status: COMPLETED | OUTPATIENT
Start: 2021-08-30 | End: 2021-08-30

## 2021-08-30 RX ADMIN — OXYCODONE HYDROCHLORIDE AND ACETAMINOPHEN 1 TABLET: 5; 325 TABLET ORAL at 00:17

## 2021-08-30 RX ADMIN — ONDANSETRON 4 MG: 2 INJECTION INTRAMUSCULAR; INTRAVENOUS at 00:17

## 2021-08-30 RX ADMIN — METHYLPREDNISOLONE SODIUM SUCCINATE 125 MG: 125 INJECTION, POWDER, FOR SOLUTION INTRAMUSCULAR; INTRAVENOUS at 00:19

## 2021-08-30 ASSESSMENT — PAIN SCALES - GENERAL: PAINLEVEL_OUTOF10: 10

## 2021-08-30 NOTE — ED PROVIDER NOTES
[address] [place] [address] ED  EMERGENCY DEPARTMENT ENCOUNTER      Pt Name: Chanel Mccann  MRN: [id number]  [place] 1955  Date of evaluation: 8/29/2021  Provider: Mahesh Verdin DO        HISTORY OF PRESENT ILLNESS    Chanel Mccann is a 63 y.o. male per chart review has ah/o anemia, etoh, asthma, cocaine, depression, disorder of pharynx, drug seeking behavior, edema, erectile dysfunction, GERD, Gout, hyperlipidemia, insomnia, obesity, OA  The history is provided by the patient. Back Pain  Location:  Generalized  Quality:  Aching  Radiates to:  Does not radiate  Pain severity:  Moderate  Pain is:  Same all the time  Onset quality:  Gradual  Timing:  Constant  Progression:  Unchanged  Chronicity:  Chronic  Relieved by:  Nothing  Worsened by:  Nothing  Ineffective treatments:  OTC medications and NSAIDs  Associated symptoms: no abdominal pain, no abdominal swelling, no bladder incontinence, no bowel incontinence, no chest pain, no dysuria, no fever, no headaches, no leg pain, no numbness, no paresthesias, no pelvic pain, no perianal numbness, no tingling, no weakness and no weight loss    Risk factors: lack of exercise and obesity             REVIEW OF SYSTEMS       Review of Systems   Constitutional: Negative for chills, fever and weight loss. HENT: Negative for ear pain and sore throat. Eyes: Negative for discharge and redness. Respiratory: Positive for cough, shortness of breath and wheezing. Cardiovascular: Negative for chest pain and palpitations. Gastrointestinal: Negative for abdominal pain, bowel incontinence, nausea and vomiting. Genitourinary: Negative for bladder incontinence, difficulty urinating, dysuria and pelvic pain. Musculoskeletal: Positive for back pain and myalgias. Negative for neck pain. Skin: Negative for rash and wound. Neurological: Negative for dizziness, tingling, syncope, weakness, numbness, headaches and paresthesias.    Psychiatric/Behavioral: Negative for confusion. The patient is not nervous/anxious. All other systems reviewed and are negative. Except as noted above the remainder of the review of systems was reviewed and negative.        PAST MEDICAL HISTORY     Past Medical History:   Diagnosis Date    Alcohol abuse 10/27/2016    Anemia     Asthma     Cocaine abuse ([facility] [address] 10/27/2016    COPD (chronic obstructive pulmonary disease) ([facility] [address]     Depression 04/27/2020    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    Drug-seeking behavior 04/14/2015    Edema 12/10/2015    Erectile dysfunction     Gastroesophageal reflux disease 12/17/2018    Gout 10/27/2016    History of arthroscopy of both knees 10/27/2016    House dust mite allergy 04/21/2014    Hyperlipidemia     Hypertension 10/27/2016    Injury to heart 10/27/2016    Insomnia 12/17/2018    Medical non-compliance 02/22/2014    Morbid obesity due to excess calories ([facility] [address] 12/08/2016    Osteoarthritis of both knees 12/08/2016    Personal history of tobacco use     Pneumonia 12/04/2016    caused hospital admission    Pre-diabetes 10/27/2016    Seasonal allergies 04/21/2014    Severe persistent asthma 10/27/2016    Severe sleep apnea 04/14/2015    Supraventricular tachycardia ([facility] [address] 10/27/2016         SURGICAL HISTORY       Past Surgical History:   Procedure Laterality Date    ANTERIOR CRUCIATE LIGAMENT REPAIR      CARDIAC CATHETERIZATION      COLONOSCOPY N/A 7/15/2020    COLONOSCOPY WITH POLYPECTOMY performed by Ashwin Estrada MD at [facility] Left 8/9/2019    LEFT TOTAL KNEE ARTHROPLASTY performed by Collins Holliday MD at Health system N/A 92/18/2203    [facility] performed by Manolo Alex MD at [facility]       Discharge Medication List as of 8/30/2021 12:34 AM      CONTINUE these medications which have NOT CHANGED    Details   !! albuterol sulfate  (90 Base) MCG/ACT inhaler INHALE 2 PUFFS INTO THE LUNGS EVERY 6 HOURS AS NEEDED FOR WHEEZING OR SHORTNESS OF BREATH, Disp-18 Inhaler, R-0Normal      ipratropium-albuterol (DUONEB) 0.5-2.5 (3) MG/3ML SOLN nebulizer solution INHALE 1 VIAL (3 MLS) INTO THE LUNGS EVERY 6 HOURS AS NEEDED FOR SHORTNESS OF BREATH, Disp-360 mL, R-0Normal      TRELEGY ELLIPTA 200-62.5-25 MCG/INH AEPB Take 1 Inhaler by mouth daily, DAWHistorical Med      !! albuterol sulfate HFA (PROAIR HFA) 108 (90 Base) MCG/ACT inhaler Use every 4 hours while awake for 7-10 days then PRN wheezing  Dispense with SPACER and Instruct on use. May sub Ventolin or Proventil as needed per Insurance., Disp-1 Inhaler, R-0Print      !! predniSONE (DELTASONE) 20 MG tablet Take 2 tablets by mouth daily for 5 doses, Disp-10 tablet, R-0Print      acetaminophen (ACETAMINOPHEN EXTRA STRENGTH) 500 MG tablet Take 1 tablet by mouth every 8 hours as needed for Pain, Disp-120 tablet, R-1DX Code Needed  . Normal      sildenafil (VIAGRA) 100 MG tablet Take 1 tablet by mouth as needed for Erectile Dysfunction, Disp-10 tablet, R-2Normal      triamcinolone (KENALOG) 0.025 % cream Apply topically 2 times daily. , Disp-80 g, R-1, Normal      Hydrocortisone, Perianal, 1 % cream Hydrocortisone (Perianal) 1 % External Cream Quantity: 28 Refills: 0 Ordered: 28-May-2021 DO Start : 28-May-2021 Active, Historical Med      amLODIPine (NORVASC) 10 MG tablet Take 1 tablet by mouth daily, Disp-90 tablet, R-1Normal      escitalopram (LEXAPRO) 10 MG tablet Take 1 tablet by mouth daily, Disp-90 tablet, R-1Normal      fluticasone (FLONASE) 50 MCG/ACT nasal spray 2 sprays by Nasal route daily, Disp-3 Bottle, R-1Normal      Fluticasone furoate-vilanterol (BREO ELLIPTA) 200-25 MCG/INH AEPB inhaler Inhale 1 puff into the lungs daily, Disp-72 each, R-1Normal      lovastatin (MEVACOR) 10 MG tablet Take 1 tablet by mouth nightly, Disp-90 tablet, R-1Normal      montelukast (SINGULAIR) 10 MG tablet Take 1 tablet by mouth nightly, Disp-90 tablet, R-1PT REQUEST REFILLSNormal      pantoprazole (PROTONIX) 40 MG tablet Take 1 tablet by mouth daily, Disp-90 tablet, R-1Normal      polyethylene glycol (GLYCOLAX) 17 GM/SCOOP powder Take 17 g by mouth daily, Disp-510 g, R-1Normal      traZODone (DESYREL) 50 MG tablet Take 1 tablet by mouth nightly, Disp-90 tablet, R-1Normal      meloxicam (MOBIC) 15 MG tablet TAKE 1 TABLET BY MOUTH EVERY DAY WITH FOODHistorical Med      Handicap Placard [facility] Starting 2019, Disp-1 each, R-0, PrintDX: OSTEOARTHRITIS OF BOTH KNEES (M17.0), COPD (J44.9)     EXPIRES: 2024       !! - Potential duplicate medications found. Please discuss with provider. ALLERGIES     Fish-derived products, Iodine, Seasonal, and Pcn [penicillins]    FAMILY HISTORY       Family History   Problem Relation Age of Onset    Arthritis Mother     Asthma Mother     High Cholesterol Mother     Other Mother         aneurysm    Diabetes Father     Stroke Maternal Grandmother     Cancer Maternal Grandfather     Hypertension Other     COPD Neg Hx           SOCIAL HISTORY       Social History     Socioeconomic History    Marital status: Single     Spouse name: n/a    Number of children: 1    Years of education: 15    Highest education level: None   Occupational History    Occupation: Disability     Employer: NONE   Tobacco Use    Smoking status: Current Some Day Smoker     Packs/day: 0.25     Years: 30.00     Pack years: 7.50     Types: Cigarettes, Cigars     Start date: 1988     Last attempt to quit: 10/1/2013     Years since quittin.9    Smokeless tobacco: Never Used   Vaping Use    Vaping Use: Never used   Substance and Sexual Activity    Alcohol use:  Yes     Alcohol/week: 4.0 standard drinks     Types: 2 Cans of beer, 2 Shots of liquor per week     Comment: social 1 -2 x week    Drug use: Yes     Types: Cocaine, Marijuana     Comment: no cocaine x 1 year, marijuana weekly    Sexual activity: Not Currently     Partners: Female   Other Topics Concern    None   Social History Narrative    Lives in an apartment    15 steps to get up to the apartment    Asking for hospital bed and shower chair      Social Determinants of Health     Financial Resource Strain: Medium Risk    Difficulty of Paying Living Expenses: Somewhat hard   Food Insecurity: No Food Insecurity    Worried About Running Out of Food in the Last Year: Never true    Jonnie of Food in the Last Year: Never true   Transportation Needs: No Transportation Needs    Lack of Transportation (Medical): No    Lack of Transportation (Non-Medical): No   Physical Activity: Inactive    Days of Exercise per Week: 0 days    Minutes of Exercise per Session: 0 min   Stress: Stress Concern Present    Feeling of Stress : To some extent   Social Connections: Socially Isolated    Frequency of Communication with Friends and Family: Once a week    Frequency of Social Gatherings with Friends and Family: Once a week    Attends [employer] Services: 1 to 4 times per year    Active Member of [employer] or Organizations: Not asked    Attends Club or Organization Meetings: Never    Marital Status:    Intimate Partner Violence:     Fear of Current or Ex-Partner:     Emotionally Abused:     Physically Abused:     Sexually Abused:          PHYSICAL EXAM       ED Triage Vitals [08/29/21 2307]   BP Temp Temp src Pulse Resp SpO2 Height Weight   131/80 -- -- 81 20 95 % -- --       Physical Exam  Vitals and nursing note reviewed. Constitutional:       Appearance: Normal appearance. HENT:      Head: Normocephalic and atraumatic. Right Ear: Tympanic membrane normal.      Left Ear: Tympanic membrane normal.      Nose: Nose normal.      Mouth/Throat:      Mouth: Mucous membranes are moist.      Pharynx: Oropharynx is clear. Eyes:      General: Lids are normal.      Extraocular Movements: Extraocular movements intact.       Conjunctiva/sclera: Conjunctivae normal.      Pupils: Pupils are equal, round, and reactive to light. Cardiovascular:      Rate and Rhythm: Normal rate and regular rhythm. Pulses: Normal pulses. Heart sounds: Normal heart sounds. Pulmonary:      Effort: Pulmonary effort is normal.      Breath sounds: Examination of the right-lower field reveals wheezing. Examination of the left-lower field reveals wheezing. Wheezing present. Abdominal:      General: Abdomen is flat. Bowel sounds are normal.      Palpations: Abdomen is soft. Musculoskeletal:      Cervical back: Full passive range of motion without pain, normal range of motion and neck supple. Lumbar back: Spasms and tenderness present. Decreased range of motion. Skin:     General: Skin is warm. Capillary Refill: Capillary refill takes less than 2 seconds. Neurological:      General: No focal deficit present. Mental Status: He is alert and oriented to person, place, and time. Deep Tendon Reflexes: Reflexes are normal and symmetric. Psychiatric:         Attention and Perception: Attention and perception normal.         Mood and Affect: Mood normal.         Behavior: Behavior normal. Behavior is cooperative. LABS:  Labs Reviewed - No data to display      MDM:   Vitals:    Vitals:    08/29/21 2307 08/30/21 0026   BP: 131/80 131/83   Pulse: 81 72   Resp: 20 20   Temp: 97.9 °F (36.6 °C) 98 °F (36.7 °C)   TempSrc: Temporal Oral   SpO2: 95% 95%   Weight:  290 lb (131.5 kg)       MDM  Number of Diagnoses or Management Options  COPD exacerbation ([facility] [address]  Fall, initial encounter  Diagnosis management comments: Patient presents from home with complaints of back pain, falling and SOB. He states he is just having trouble because of his pain walking. He denies any injury. He states he ran out of his pain medication yesterday and started having pain today. Then he fell at home and called EMS.     I ordered Solumedrol 125mg IM, Phenergan 12.5mg IV, Zofran 4mg IV and Dilaludid 1mg IV. The patient will be discharged home. He will follow up in 2 days with his primary care doctor. Serena Mata DO     The lab results, radiology and test results were reviewed with the patient and family. The patient will follow up in 2 days with their primary care doctor. If their symptoms change or get worse they will return to the ER. CRITICAL CARE TIME   Total CriticalCare time was 0 minutes, excluding separately reportable procedures. There was a high probability of clinically significant/life threatening deterioration in the patient's condition which required my urgent intervention. PROCEDURES:  Unlessotherwise noted below, none     Procedures      FINAL IMPRESSION      1. Depression, unspecified depression type    2. Fall, initial encounter    3.  COPD exacerbation [facility])          DISPOSITION/PLAN   DISPOSITION Decision To Discharge 08/29/2021 11:58:35 PM          Serena Mata DO (electronically signed)  Attending Emergency Physician          Uriel Alfonso DO  09/03/21 1048

## 2021-09-03 ASSESSMENT — ENCOUNTER SYMPTOMS
BOWEL INCONTINENCE: 0
ABDOMINAL SWELLING: 0

## 2021-09-07 DIAGNOSIS — N52.9 ERECTILE DYSFUNCTION, UNSPECIFIED ERECTILE DYSFUNCTION TYPE: Chronic | ICD-10-CM

## 2021-09-07 NOTE — TELEPHONE ENCOUNTER
Pharmacy is requesting medication refill.  Please approve or deny this request.    Rx requested:  Requested Prescriptions     Pending Prescriptions Disp Refills    sildenafil (VIAGRA) 100 MG tablet [Pharmacy Med Name: Sildenafil Citrate Oral Tablet 100 MG] 10 tablet 0     Sig: Take 1 tablet by mouth as needed for Erectile Dysfunction         Last Office Visit:   7/6/2021      Next Visit Date:  Future Appointments   Date Time Provider Constance Espitia   1/6/2022  1:40 PM MD Romero Trevino [place] [address]

## 2021-09-08 RX ORDER — SILDENAFIL 100 MG/1
100 TABLET, FILM COATED ORAL PRN
Qty: 10 TABLET | Refills: 0 | Status: SHIPPED | OUTPATIENT
Start: 2021-09-19 | End: 2021-10-08

## 2021-09-12 DIAGNOSIS — J44.9 CHRONIC OBSTRUCTIVE PULMONARY DISEASE, UNSPECIFIED COPD TYPE (HCC): Chronic | ICD-10-CM

## 2021-09-13 NOTE — TELEPHONE ENCOUNTER
Pharmacy is requesting medication refill.  Please approve or deny this request.    Rx requested:  Requested Prescriptions     Pending Prescriptions Disp Refills    albuterol sulfate  (90 Base) MCG/ACT inhaler [Pharmacy Med Name: ALBUTEROL HFA (VENTOLIN) INH] 18 each 0     Sig: INHALE 2 PUFFS INTO THE LUNGS EVERY 6 HOURS AS NEEDED FOR WHEEZING OR SHORTNESS OF BREATH    ipratropium-albuterol (DUONEB) 0.5-2.5 (3) MG/3ML SOLN nebulizer solution [Pharmacy Med Name: IPRAT-ALBUT 0.5-3(2.5) MG/3 ML] 360 mL 0     Sig: Inhale 3 mLs into the lungs every 6 hours         Last Office Visit:   7/6/2021      Next Visit Date:  Future Appointments   Date Time Provider Constance Espitia   1/6/2022  1:40 PM MD Romero Rendon [place] [address]

## 2021-09-14 RX ORDER — IPRATROPIUM BROMIDE AND ALBUTEROL SULFATE 2.5; .5 MG/3ML; MG/3ML
1 SOLUTION RESPIRATORY (INHALATION) EVERY 6 HOURS
Qty: 360 ML | Refills: 0 | Status: ON HOLD | OUTPATIENT
Start: 2021-09-14 | End: 2021-09-19 | Stop reason: SDUPTHER

## 2021-09-14 RX ORDER — ALBUTEROL SULFATE 90 UG/1
2 AEROSOL, METERED RESPIRATORY (INHALATION) EVERY 6 HOURS PRN
Qty: 18 EACH | Refills: 0 | Status: ON HOLD | OUTPATIENT
Start: 2021-09-14 | End: 2021-09-16

## 2021-09-16 ENCOUNTER — APPOINTMENT (OUTPATIENT)
Dept: GENERAL RADIOLOGY | Age: 66
DRG: 193 | End: 2021-09-16
Payer: MEDICARE

## 2021-09-16 ENCOUNTER — HOSPITAL ENCOUNTER (INPATIENT)
Age: 66
LOS: 3 days | Discharge: HOME | DRG: 193 | End: 2021-09-19
Attending: EMERGENCY MEDICINE | Admitting: INTERNAL MEDICINE
Payer: MEDICARE

## 2021-09-16 DIAGNOSIS — J44.9 CHRONIC OBSTRUCTIVE PULMONARY DISEASE, UNSPECIFIED COPD TYPE (HCC): Chronic | ICD-10-CM

## 2021-09-16 DIAGNOSIS — J18.9 PNEUMONIA OF LEFT LOWER LOBE DUE TO INFECTIOUS ORGANISM: ICD-10-CM

## 2021-09-16 DIAGNOSIS — J44.1 COPD EXACERBATION (HCC): ICD-10-CM

## 2021-09-16 DIAGNOSIS — J44.1 COPD WITH ACUTE EXACERBATION (HCC): Primary | ICD-10-CM

## 2021-09-16 LAB
ALBUMIN SERPL-MCNC: 3.9 G/DL (ref 3.5–4.6)
ALP BLD-CCNC: 109 U/L (ref 35–104)
ALT SERPL-CCNC: 24 U/L (ref 0–41)
AMPHETAMINE SCREEN, URINE: ABNORMAL
ANION GAP SERPL CALCULATED.3IONS-SCNC: 13 MEQ/L (ref 9–15)
APTT: 33.8 SEC (ref 24.4–36.8)
AST SERPL-CCNC: 14 U/L (ref 0–40)
BANDED NEUTROPHILS RELATIVE PERCENT: 10 %
BARBITURATE SCREEN URINE: ABNORMAL
BASOPHILS ABSOLUTE: 0 K/UL (ref 0–0.1)
BASOPHILS RELATIVE PERCENT: 0.1 % (ref 0.2–1.2)
BENZODIAZEPINE SCREEN, URINE: ABNORMAL
BILIRUB SERPL-MCNC: 2.7 MG/DL (ref 0.2–0.7)
BILIRUBIN URINE: NEGATIVE
BLOOD, URINE: ABNORMAL
BUN BLDV-MCNC: 16 MG/DL (ref 8–23)
CALCIUM SERPL-MCNC: 10.2 MG/DL (ref 8.5–9.9)
CANNABINOID SCREEN URINE: POSITIVE
CHLORIDE BLD-SCNC: 100 MEQ/L (ref 95–107)
CLARITY: CLEAR
CO2: 22 MEQ/L (ref 20–31)
COCAINE METABOLITE SCREEN URINE: POSITIVE
COLOR: ABNORMAL
CREAT SERPL-MCNC: 1.22 MG/DL (ref 0.7–1.2)
D DIMER: 0.51 MG/L FEU (ref 0–0.5)
EKG ATRIAL RATE: 110 BPM
EKG P AXIS: 74 DEGREES
EKG P-R INTERVAL: 152 MS
EKG Q-T INTERVAL: 312 MS
EKG QRS DURATION: 90 MS
EKG QTC CALCULATION (BAZETT): 422 MS
EKG R AXIS: 58 DEGREES
EKG T AXIS: 71 DEGREES
EKG VENTRICULAR RATE: 110 BPM
EOSINOPHILS ABSOLUTE: 0 K/UL (ref 0–0.5)
EOSINOPHILS RELATIVE PERCENT: 0.1 % (ref 0.8–7)
EPITHELIAL CELLS, UA: NORMAL /HPF
ETHANOL PERCENT: NORMAL G/DL
ETHANOL: <10 MG/DL (ref 0–0.08)
GFR AFRICAN AMERICAN: >60
GFR NON-AFRICAN AMERICAN: 59.8
GLOBULIN: 3.7 G/DL (ref 2.3–3.5)
GLUCOSE BLD-MCNC: 123 MG/DL (ref 70–99)
GLUCOSE URINE: 500 MG/DL
HCT VFR BLD CALC: 43.5 % (ref 42–52)
HEMOGLOBIN: 13.9 G/DL (ref 13.7–17.5)
IMMATURE GRANULOCYTES #: 0.1 K/UL
IMMATURE GRANULOCYTES %: 0.7 %
INR BLD: 1.3
KETONES, URINE: ABNORMAL MG/DL
LACTIC ACID: 2.1 MMOL/L (ref 0.5–2.2)
LEUKOCYTE ESTERASE, URINE: NEGATIVE
LYMPHOCYTES ABSOLUTE: 0.9 K/UL (ref 1.3–3.6)
LYMPHOCYTES RELATIVE PERCENT: 4 %
Lab: ABNORMAL
MCH RBC QN AUTO: 31 PG (ref 25.7–32.2)
MCHC RBC AUTO-ENTMCNC: 32 % (ref 32.3–36.5)
MCV RBC AUTO: 96.9 FL (ref 79–92.2)
METHADONE SCREEN, URINE: ABNORMAL
MONOCYTES ABSOLUTE: 1.9 K/UL (ref 0.3–0.8)
MONOCYTES RELATIVE PERCENT: 9 % (ref 5.3–12.2)
NEUTROPHILS ABSOLUTE: 18.6 K/UL (ref 1.8–5.4)
NEUTROPHILS RELATIVE PERCENT: 77 % (ref 34–67.9)
NITRITE, URINE: NEGATIVE
OPIATE SCREEN URINE: ABNORMAL
OXYCODONE URINE: ABNORMAL
PDW BLD-RTO: 15.2 % (ref 11.6–14.4)
PH UA: 5.5 (ref 5–9)
PHENCYCLIDINE SCREEN URINE: ABNORMAL
PLATELET # BLD: 283 K/UL (ref 163–337)
PLATELET SLIDE REVIEW: ADEQUATE
POTASSIUM REFLEX MAGNESIUM: 4.4 MEQ/L (ref 3.4–4.9)
PRO-BNP: 243 PG/ML
PROCALCITONIN: 1.92 NG/ML (ref 0–0.15)
PROPOXYPHENE SCREEN: ABNORMAL
PROTEIN UA: NEGATIVE MG/DL
PROTHROMBIN TIME: 15.6 SEC (ref 12.3–14.9)
RBC # BLD: 4.49 M/UL (ref 4.63–6.08)
RBC UA: NORMAL /HPF (ref 0–2)
SARS-COV-2, NAAT: NOT DETECTED
SLIDE REVIEW: ABNORMAL
SODIUM BLD-SCNC: 135 MEQ/L (ref 135–144)
SPECIFIC GRAVITY UA: 1.02 (ref 1–1.03)
TOTAL PROTEIN: 7.6 G/DL (ref 6.3–8)
TROPONIN: <0.01 NG/ML (ref 0–0.01)
UROBILINOGEN, URINE: 2 E.U./DL
WBC # BLD: 21.4 K/UL (ref 4.2–9)
WBC UA: NORMAL /HPF (ref 0–5)

## 2021-09-16 PROCEDURE — 94640 AIRWAY INHALATION TREATMENT: CPT

## 2021-09-16 PROCEDURE — 36415 COLL VENOUS BLD VENIPUNCTURE: CPT

## 2021-09-16 PROCEDURE — 6370000000 HC RX 637 (ALT 250 FOR IP): Performed by: EMERGENCY MEDICINE

## 2021-09-16 PROCEDURE — 93010 ELECTROCARDIOGRAM REPORT: CPT | Performed by: INTERNAL MEDICINE

## 2021-09-16 PROCEDURE — 6370000000 HC RX 637 (ALT 250 FOR IP): Performed by: INTERNAL MEDICINE

## 2021-09-16 PROCEDURE — 97166 OT EVAL MOD COMPLEX 45 MIN: CPT

## 2021-09-16 PROCEDURE — 97535 SELF CARE MNGMENT TRAINING: CPT

## 2021-09-16 PROCEDURE — 2580000003 HC RX 258: Performed by: INTERNAL MEDICINE

## 2021-09-16 PROCEDURE — 85025 COMPLETE CBC W/AUTO DIFF WBC: CPT

## 2021-09-16 PROCEDURE — 85610 PROTHROMBIN TIME: CPT

## 2021-09-16 PROCEDURE — 87635 SARS-COV-2 COVID-19 AMP PRB: CPT

## 2021-09-16 PROCEDURE — 96365 THER/PROPH/DIAG IV INF INIT: CPT

## 2021-09-16 PROCEDURE — 80307 DRUG TEST PRSMV CHEM ANLYZR: CPT

## 2021-09-16 PROCEDURE — 97162 PT EVAL MOD COMPLEX 30 MIN: CPT

## 2021-09-16 PROCEDURE — 93005 ELECTROCARDIOGRAM TRACING: CPT

## 2021-09-16 PROCEDURE — 85379 FIBRIN DEGRADATION QUANT: CPT

## 2021-09-16 PROCEDURE — 80053 COMPREHEN METABOLIC PANEL: CPT

## 2021-09-16 PROCEDURE — 6360000002 HC RX W HCPCS: Performed by: EMERGENCY MEDICINE

## 2021-09-16 PROCEDURE — 71045 X-RAY EXAM CHEST 1 VIEW: CPT

## 2021-09-16 PROCEDURE — 97530 THERAPEUTIC ACTIVITIES: CPT

## 2021-09-16 PROCEDURE — 83880 ASSAY OF NATRIURETIC PEPTIDE: CPT

## 2021-09-16 PROCEDURE — 82077 ASSAY SPEC XCP UR&BREATH IA: CPT

## 2021-09-16 PROCEDURE — 84484 ASSAY OF TROPONIN QUANT: CPT

## 2021-09-16 PROCEDURE — 2580000003 HC RX 258: Performed by: EMERGENCY MEDICINE

## 2021-09-16 PROCEDURE — 99285 EMERGENCY DEPT VISIT HI MDM: CPT

## 2021-09-16 PROCEDURE — 6360000002 HC RX W HCPCS: Performed by: INTERNAL MEDICINE

## 2021-09-16 PROCEDURE — 85730 THROMBOPLASTIN TIME PARTIAL: CPT

## 2021-09-16 PROCEDURE — 81001 URINALYSIS AUTO W/SCOPE: CPT

## 2021-09-16 PROCEDURE — 83605 ASSAY OF LACTIC ACID: CPT

## 2021-09-16 PROCEDURE — 87040 BLOOD CULTURE FOR BACTERIA: CPT

## 2021-09-16 PROCEDURE — 84145 PROCALCITONIN (PCT): CPT

## 2021-09-16 PROCEDURE — 1210000000 HC MED SURG R&B

## 2021-09-16 PROCEDURE — 96361 HYDRATE IV INFUSION ADD-ON: CPT

## 2021-09-16 RX ORDER — IPRATROPIUM BROMIDE AND ALBUTEROL SULFATE 2.5; .5 MG/3ML; MG/3ML
SOLUTION RESPIRATORY (INHALATION)
Status: DISPENSED
Start: 2021-09-16 | End: 2021-09-16

## 2021-09-16 RX ORDER — METHYLPREDNISOLONE SODIUM SUCCINATE 40 MG/ML
40 INJECTION, POWDER, LYOPHILIZED, FOR SOLUTION INTRAMUSCULAR; INTRAVENOUS EVERY 12 HOURS
Status: DISCONTINUED | OUTPATIENT
Start: 2021-09-16 | End: 2021-09-19 | Stop reason: HOSPADM

## 2021-09-16 RX ORDER — ACETAMINOPHEN 650 MG/1
650 SUPPOSITORY RECTAL EVERY 6 HOURS PRN
Status: DISCONTINUED | OUTPATIENT
Start: 2021-09-16 | End: 2021-09-17

## 2021-09-16 RX ORDER — SODIUM CHLORIDE 9 MG/ML
25 INJECTION, SOLUTION INTRAVENOUS PRN
Status: DISCONTINUED | OUTPATIENT
Start: 2021-09-16 | End: 2021-09-19 | Stop reason: HOSPADM

## 2021-09-16 RX ORDER — GABAPENTIN 100 MG/1
100 CAPSULE ORAL 3 TIMES DAILY PRN
Status: DISCONTINUED | OUTPATIENT
Start: 2021-09-16 | End: 2021-09-19 | Stop reason: HOSPADM

## 2021-09-16 RX ORDER — TRAMADOL HYDROCHLORIDE 50 MG/1
50 TABLET ORAL EVERY 6 HOURS PRN
Status: DISCONTINUED | OUTPATIENT
Start: 2021-09-16 | End: 2021-09-17

## 2021-09-16 RX ORDER — KETOROLAC TROMETHAMINE 30 MG/ML
30 INJECTION, SOLUTION INTRAMUSCULAR; INTRAVENOUS EVERY 6 HOURS PRN
Status: DISCONTINUED | OUTPATIENT
Start: 2021-09-16 | End: 2021-09-17

## 2021-09-16 RX ORDER — SODIUM CHLORIDE 0.9 % (FLUSH) 0.9 %
5-40 SYRINGE (ML) INJECTION PRN
Status: DISCONTINUED | OUTPATIENT
Start: 2021-09-16 | End: 2021-09-19 | Stop reason: HOSPADM

## 2021-09-16 RX ORDER — 0.9 % SODIUM CHLORIDE 0.9 %
1000 INTRAVENOUS SOLUTION INTRAVENOUS ONCE
Status: COMPLETED | OUTPATIENT
Start: 2021-09-16 | End: 2021-09-16

## 2021-09-16 RX ORDER — ONDANSETRON 4 MG/1
4 TABLET, ORALLY DISINTEGRATING ORAL EVERY 8 HOURS PRN
Status: DISCONTINUED | OUTPATIENT
Start: 2021-09-16 | End: 2021-09-19 | Stop reason: HOSPADM

## 2021-09-16 RX ORDER — POLYETHYLENE GLYCOL 3350 17 G/17G
17 POWDER, FOR SOLUTION ORAL DAILY PRN
Status: DISCONTINUED | OUTPATIENT
Start: 2021-09-16 | End: 2021-09-19 | Stop reason: HOSPADM

## 2021-09-16 RX ORDER — PANTOPRAZOLE SODIUM 40 MG/1
40 TABLET, DELAYED RELEASE ORAL DAILY
Status: DISCONTINUED | OUTPATIENT
Start: 2021-09-16 | End: 2021-09-19 | Stop reason: HOSPADM

## 2021-09-16 RX ORDER — GUAIFENESIN 600 MG/1
600 TABLET, EXTENDED RELEASE ORAL 2 TIMES DAILY
Status: DISCONTINUED | OUTPATIENT
Start: 2021-09-16 | End: 2021-09-19 | Stop reason: HOSPADM

## 2021-09-16 RX ORDER — CETIRIZINE HYDROCHLORIDE 10 MG/1
10 TABLET ORAL DAILY
COMMUNITY
End: 2022-01-27

## 2021-09-16 RX ORDER — AMLODIPINE BESYLATE 10 MG/1
10 TABLET ORAL DAILY
Status: DISCONTINUED | OUTPATIENT
Start: 2021-09-16 | End: 2021-09-19 | Stop reason: HOSPADM

## 2021-09-16 RX ORDER — GUAIFENESIN/DEXTROMETHORPHAN 100-10MG/5
5 SYRUP ORAL EVERY 4 HOURS PRN
Status: DISCONTINUED | OUTPATIENT
Start: 2021-09-16 | End: 2021-09-19 | Stop reason: HOSPADM

## 2021-09-16 RX ORDER — ONDANSETRON 2 MG/ML
4 INJECTION INTRAMUSCULAR; INTRAVENOUS EVERY 6 HOURS PRN
Status: DISCONTINUED | OUTPATIENT
Start: 2021-09-16 | End: 2021-09-19 | Stop reason: HOSPADM

## 2021-09-16 RX ORDER — ACETAMINOPHEN 500 MG
1000 TABLET ORAL ONCE
Status: COMPLETED | OUTPATIENT
Start: 2021-09-16 | End: 2021-09-16

## 2021-09-16 RX ORDER — TRAZODONE HYDROCHLORIDE 50 MG/1
50 TABLET ORAL NIGHTLY
Status: DISCONTINUED | OUTPATIENT
Start: 2021-09-16 | End: 2021-09-19 | Stop reason: HOSPADM

## 2021-09-16 RX ORDER — IPRATROPIUM BROMIDE AND ALBUTEROL SULFATE 2.5; .5 MG/3ML; MG/3ML
1 SOLUTION RESPIRATORY (INHALATION)
Status: DISCONTINUED | OUTPATIENT
Start: 2021-09-16 | End: 2021-09-16

## 2021-09-16 RX ORDER — SODIUM CHLORIDE 0.9 % (FLUSH) 0.9 %
5-40 SYRINGE (ML) INJECTION EVERY 12 HOURS SCHEDULED
Status: DISCONTINUED | OUTPATIENT
Start: 2021-09-16 | End: 2021-09-19 | Stop reason: HOSPADM

## 2021-09-16 RX ORDER — IPRATROPIUM BROMIDE AND ALBUTEROL SULFATE 2.5; .5 MG/3ML; MG/3ML
1 SOLUTION RESPIRATORY (INHALATION) 3 TIMES DAILY
Status: DISCONTINUED | OUTPATIENT
Start: 2021-09-16 | End: 2021-09-19 | Stop reason: HOSPADM

## 2021-09-16 RX ORDER — MONTELUKAST SODIUM 10 MG/1
10 TABLET ORAL NIGHTLY
Status: DISCONTINUED | OUTPATIENT
Start: 2021-09-16 | End: 2021-09-19 | Stop reason: HOSPADM

## 2021-09-16 RX ORDER — ATORVASTATIN CALCIUM 10 MG/1
10 TABLET, FILM COATED ORAL DAILY
Refills: 1 | Status: DISCONTINUED | OUTPATIENT
Start: 2021-09-16 | End: 2021-09-19 | Stop reason: HOSPADM

## 2021-09-16 RX ORDER — ESCITALOPRAM OXALATE 10 MG/1
10 TABLET ORAL DAILY
Status: DISCONTINUED | OUTPATIENT
Start: 2021-09-16 | End: 2021-09-19 | Stop reason: HOSPADM

## 2021-09-16 RX ORDER — IPRATROPIUM BROMIDE AND ALBUTEROL SULFATE 2.5; .5 MG/3ML; MG/3ML
1 SOLUTION RESPIRATORY (INHALATION) 3 TIMES DAILY
Status: DISCONTINUED | OUTPATIENT
Start: 2021-09-16 | End: 2021-09-16

## 2021-09-16 RX ORDER — POLYETHYLENE GLYCOL 3350 17 G/17G
17 POWDER, FOR SOLUTION ORAL DAILY
Status: DISCONTINUED | OUTPATIENT
Start: 2021-09-16 | End: 2021-09-19 | Stop reason: HOSPADM

## 2021-09-16 RX ORDER — ACETAMINOPHEN 325 MG/1
650 TABLET ORAL EVERY 6 HOURS PRN
Status: DISCONTINUED | OUTPATIENT
Start: 2021-09-16 | End: 2021-09-17

## 2021-09-16 RX ADMIN — IPRATROPIUM BROMIDE AND ALBUTEROL SULFATE 1 AMPULE: .5; 2.5 SOLUTION RESPIRATORY (INHALATION) at 18:02

## 2021-09-16 RX ADMIN — PANTOPRAZOLE SODIUM 40 MG: 40 TABLET, DELAYED RELEASE ORAL at 10:50

## 2021-09-16 RX ADMIN — ATORVASTATIN CALCIUM 10 MG: 10 TABLET, FILM COATED ORAL at 20:46

## 2021-09-16 RX ADMIN — IPRATROPIUM BROMIDE AND ALBUTEROL SULFATE 1 AMPULE: .5; 2.5 SOLUTION RESPIRATORY (INHALATION) at 05:17

## 2021-09-16 RX ADMIN — GUAIFENESIN 600 MG: 600 TABLET, EXTENDED RELEASE ORAL at 20:46

## 2021-09-16 RX ADMIN — GABAPENTIN 100 MG: 100 CAPSULE ORAL at 21:35

## 2021-09-16 RX ADMIN — MONTELUKAST SODIUM 10 MG: 10 TABLET ORAL at 20:46

## 2021-09-16 RX ADMIN — METHYLPREDNISOLONE SODIUM SUCCINATE 40 MG: 40 INJECTION, POWDER, FOR SOLUTION INTRAMUSCULAR; INTRAVENOUS at 21:35

## 2021-09-16 RX ADMIN — AZITHROMYCIN MONOHYDRATE 500 MG: 500 INJECTION, POWDER, LYOPHILIZED, FOR SOLUTION INTRAVENOUS at 04:56

## 2021-09-16 RX ADMIN — Medication 10 ML: at 20:46

## 2021-09-16 RX ADMIN — KETOROLAC TROMETHAMINE 30 MG: 30 INJECTION, SOLUTION INTRAMUSCULAR; INTRAVENOUS at 18:12

## 2021-09-16 RX ADMIN — IPRATROPIUM BROMIDE AND ALBUTEROL SULFATE 1 AMPULE: .5; 2.5 SOLUTION RESPIRATORY (INHALATION) at 10:20

## 2021-09-16 RX ADMIN — ENOXAPARIN SODIUM 40 MG: 40 INJECTION SUBCUTANEOUS at 10:55

## 2021-09-16 RX ADMIN — SODIUM CHLORIDE 1000 ML: 9 INJECTION, SOLUTION INTRAVENOUS at 04:55

## 2021-09-16 RX ADMIN — TRAZODONE HYDROCHLORIDE 50 MG: 50 TABLET ORAL at 20:46

## 2021-09-16 RX ADMIN — AMLODIPINE BESYLATE 10 MG: 10 TABLET ORAL at 10:50

## 2021-09-16 RX ADMIN — METHYLPREDNISOLONE SODIUM SUCCINATE 40 MG: 40 INJECTION, POWDER, FOR SOLUTION INTRAMUSCULAR; INTRAVENOUS at 10:50

## 2021-09-16 RX ADMIN — GUAIFENESIN 600 MG: 600 TABLET, EXTENDED RELEASE ORAL at 11:05

## 2021-09-16 RX ADMIN — GUAIFENESIN SYRUP AND DEXTROMETHORPHAN 5 ML: 100; 10 SYRUP ORAL at 11:04

## 2021-09-16 RX ADMIN — TRAMADOL HYDROCHLORIDE 50 MG: 50 TABLET, FILM COATED ORAL at 11:05

## 2021-09-16 RX ADMIN — ESCITALOPRAM OXALATE 10 MG: 10 TABLET ORAL at 20:46

## 2021-09-16 RX ADMIN — ACETAMINOPHEN 650 MG: 325 TABLET ORAL at 16:11

## 2021-09-16 RX ADMIN — Medication 10 ML: at 10:54

## 2021-09-16 RX ADMIN — ACETAMINOPHEN 1000 MG: 500 TABLET ORAL at 04:56

## 2021-09-16 RX ADMIN — GUAIFENESIN SYRUP AND DEXTROMETHORPHAN 5 ML: 100; 10 SYRUP ORAL at 17:12

## 2021-09-16 ASSESSMENT — PAIN DESCRIPTION - ORIENTATION
ORIENTATION: LOWER
ORIENTATION: RIGHT;LEFT

## 2021-09-16 ASSESSMENT — PAIN DESCRIPTION - LOCATION
LOCATION: BACK;CHEST
LOCATION: CHEST;BACK
LOCATION: BACK;CHEST

## 2021-09-16 ASSESSMENT — PAIN SCALES - GENERAL
PAINLEVEL_OUTOF10: 9
PAINLEVEL_OUTOF10: 7
PAINLEVEL_OUTOF10: 7
PAINLEVEL_OUTOF10: 9

## 2021-09-16 ASSESSMENT — PAIN DESCRIPTION - DESCRIPTORS
DESCRIPTORS: CONSTANT
DESCRIPTORS: ACHING
DESCRIPTORS: ACHING;DISCOMFORT

## 2021-09-16 ASSESSMENT — PAIN DESCRIPTION - PAIN TYPE
TYPE: ACUTE PAIN
TYPE: ACUTE PAIN;CHRONIC PAIN
TYPE: ACUTE PAIN

## 2021-09-16 ASSESSMENT — PAIN DESCRIPTION - ONSET: ONSET: OTHER (COMMENT)

## 2021-09-16 ASSESSMENT — PAIN DESCRIPTION - FREQUENCY
FREQUENCY: INTERMITTENT
FREQUENCY: INTERMITTENT

## 2021-09-16 NOTE — PROGRESS NOTES
Dr Joel Lubin notified per pt request for a change in pain meds. Per pt \"Tylenol isnt working, toradol gives me a headache, vicodin is what works for [facility]". Dr aware of levon tox screen results, order received for toradol 30 mg iv  Every 6 hours prn.

## 2021-09-16 NOTE — H&P
Hospital Medicine History & Physical      PCP: Alec Akbar MD    Date of Admission: 9/16/2021    Date of Service: 9/16/21      Chief Complaint:  Dyspnea, SOB, fever      History Of Present Illness:  63 y.o. male who presented to [facility] with above complains. He had dyspnea for the past 2 weeks, for the past 2-3 days he noted increased in dyspnea/SOB, fever. Despite respiratory Tx at home, he had no improvement. Still smoking. Since he didn't feel relieve, came to ER for further evaluation. After initial stabilization he was admitted for further management to the floor.      Past Medical History:          Diagnosis Date    Alcohol abuse 10/27/2016    Anemia     Asthma     Cocaine abuse ([address] 10/27/2016    COPD (chronic obstructive pulmonary disease) ([address]     Depression 04/27/2020    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    Drug-seeking behavior 04/14/2015    Edema 12/10/2015    Erectile dysfunction     Gastroesophageal reflux disease 12/17/2018    Gout 10/27/2016    History of arthroscopy of both knees 10/27/2016    House dust mite allergy 04/21/2014    Hyperlipidemia     Hypertension 10/27/2016    Injury to heart 10/27/2016    Insomnia 12/17/2018    Medical non-compliance 02/22/2014    Morbid obesity due to excess calories ([address] 12/08/2016    Osteoarthritis of both knees 12/08/2016    Personal history of tobacco use     Pneumonia 12/04/2016    caused hospital admission    Pre-diabetes 10/27/2016    Seasonal allergies 04/21/2014    Severe persistent asthma 10/27/2016    Severe sleep apnea 04/14/2015    Supraventricular tachycardia ([address] 10/27/2016       Past Surgical History:          Procedure Laterality Date    ANTERIOR CRUCIATE LIGAMENT REPAIR      CARDIAC CATHETERIZATION      COLONOSCOPY N/A 7/15/2020    COLONOSCOPY WITH POLYPECTOMY performed by Selina Jay MD at [facility] Left 8/9/2019    LEFT TOTAL KNEE ARTHROPLASTY performed by Robbie Verde MD at [address] [facility] N/A 25/55/5288    UMBILICAL HERNIA REPAIR WITH MESH performed by Dillon Aguilar MD at [facility]       Medications Prior to Admission:      Prior to Admission medications    Medication Sig Start Date End Date Taking? Authorizing Provider   cetirizine (ZYRTEC ALLERGY) 10 MG tablet Take 10 mg by mouth daily   Yes Historical Provider, MD   ipratropium-albuterol (DUONEB) 0.5-2.5 (3) MG/3ML SOLN nebulizer solution Inhale 3 mLs into the lungs every 6 hours  Patient taking differently: Inhale 1 vial into the lungs every 6 hours as needed  9/14/21  Yes Ole Jordan MD   sildenafil (VIAGRA) 100 MG tablet Take 1 tablet by mouth as needed for Erectile Dysfunction 9/19/21  Yes Ole Jordan MD   Auvegay [place]ard ELLIPTA 200-62.5-25 MCG/INH AEPB Take 1 Inhaler by mouth daily 8/17/21  Yes Historical Provider, MD   albuterol sulfate HFA (PROAIR HFA) 108 (90 Base) MCG/ACT inhaler Use every 4 hours while awake for 7-10 days then PRN wheezing  Dispense with SPACER and Instruct on use. May sub Ventolin or Proventil as needed per [employer]. 8/25/21  Yes Myrtle Lorenzo MD   acetaminophen (ACETAMINOPHEN EXTRA STRENGTH) 500 MG tablet Take 1 tablet by mouth every 8 hours as needed for Pain 8/16/21  Yes Ole Jordan MD   triamcinolone (KENALOG) 0.025 % cream Apply topically 2 times daily. Patient taking differently: 2 times daily as needed Apply topically 2 times daily.  7/7/21  Yes Claire Donohue DO   Hydrocortisone, Perianal, 1 % cream daily as needed  5/28/21  Yes Historical Provider, MD   amLODIPine (NORVASC) 10 MG tablet Take 1 tablet by mouth daily 4/29/21  Yes Ole Jordan MD   escitalopram (LEXAPRO) 10 MG tablet Take 1 tablet by mouth daily 4/29/21  Yes Ole Jordan MD   fluticasone [facility]) 50 MCG/ACT nasal spray 2 sprays by Nasal route daily 4/29/21  Yes Ole Jordan MD   lovastatin (MEVACOR) 10 MG tablet Take 1 tablet by mouth nightly 4/29/21  Yes Lieutenant Miguel Angel MD   montelukast (SINGULAIR) 10 MG tablet Take 1 tablet by mouth nightly 4/29/21  Yes Lieutenant Miguel Angel MD   pantoprazole (PROTONIX) 40 MG tablet Take 1 tablet by mouth daily 4/29/21  Yes Lieutenant Miguel Angel MD   polyethylene glycol (GLYCOLAX) 17 GM/SCOOP powder Take 17 g by mouth daily  Patient taking differently: Take 17 g by mouth daily as needed  4/29/21  Yes Lieutenant Miguel Angel MD   traZODone (DESYREL) 50 MG tablet Take 1 tablet by mouth nightly 4/29/21  Yes Lieutenant Miguel Angel MD   meloxicam (MOBIC) 15 MG tablet TAKE 1 TABLET BY MOUTH EVERY DAY WITH FOOD 3/3/21   Historical Provider, MD   Handicap Placard [facility] by Does not apply route DX: OSTEOARTHRITIS OF BOTH KNEES (M17.0), COPD (J44.9)     EXPIRES: 02/2024 2/1/19   Lieutenant Miguel Angel MD       Allergies:  Fish-derived products, Iodine, Seasonal, and Pcn [penicillins]    Social History:      The patient currently lives home    TOBACCO:   reports that he has been smoking cigarettes and cigars. He started smoking about 33 years ago. He has a 7.50 pack-year smoking history. He has never used smokeless tobacco.  ETOH:   reports current alcohol use of about 4.0 standard drinks of alcohol per week. Family History:       Reviewed in detail and negative for DM, CAD, Cancer, CVA. Positive as follows:        Problem Relation Age of Onset    Arthritis Mother     Asthma Mother     High Cholesterol Mother     Other Mother         aneurysm    Diabetes Father     Stroke Maternal Grandmother     Cancer Maternal Grandfather     Hypertension Other     COPD Neg Hx        REVIEW OF SYSTEMS:   Pertinent positives as noted in the HPI. All other systems reviewed and negative. PHYSICAL EXAM:    /76   Pulse 90   Temp 97.9 °F (36.6 °C) (Oral)   Resp 20   Ht 6' (1.829 m)   Wt 283 lb 3.2 oz (128.5 kg)   SpO2 98%   BMI 38.41 kg/m²     General appearance:  No apparent distress, appears stated age and cooperative.   HEENT:  Normal cephalic, atraumatic without obvious deformity. Pupils equal, round, and reactive to light. Extra ocular muscles intact. Conjunctivae/corneas clear. Neck: Supple, with full range of motion. No jugular venous distention. Trachea midline. Respiratory:  Normal respiratory effort. bilaterally with   Wheezes/rhonchi    Cardiovascular:  Regular rate and rhythm with normal S1/S2 without murmurs, rubs or gallops. Abdomen: Soft, non-tender, non-distended with normal bowel sounds. Musculoskeletal:  No clubbing, cyanosis or edema bilaterally. Full range of motion without deformity. Skin: Skin color, texture, turgor normal.  No rashes or lesions. Neurologic:  Neurovascularly intact without any focal sensory/motor deficits.  Cranial nerves: II-XII intact, grossly non-focal.  Psychiatric:  Alert and oriented, thought content appropriate, normal insight  Capillary Refill: Brisk,< 3 seconds   Peripheral Pulses: +2 palpable, equal bilaterally       Labs:     Recent Labs     09/16/21  0451   WBC 21.4*   HGB 13.9   HCT 43.5        Recent Labs     09/16/21 0451      K 4.4      CO2 22   BUN 16   CREATININE 1.22*   CALCIUM 10.2*     Recent Labs     09/16/21 0451   AST 14   ALT 24   BILITOT 2.7*   ALKPHOS 109*     Recent Labs     09/16/21 0455   INR 1.3     Recent Labs     09/16/21 0451   TROPONINI <0.010       Urinalysis:      Lab Results   Component Value Date    NITRU Negative 10/12/2020    WBCUA None seen 10/05/2017    BACTERIA None 10/05/2017    RBCUA 0-2 05/25/2020    BLOODU Negative 10/12/2020    SPECGRAV 1.025 10/12/2020    GLUCOSEU Negative 10/12/2020       Radiology:     CXR: I have reviewed the CXR with the following interpretation:   EKG:  I have reviewed the EKG with the following interpretation:     XR CHEST PORTABLE   Preliminary Result   QUESTION LEFT BASE INFILTRATE                      ASSESSMENT:    Active Hospital Problems    Diagnosis Date Noted    COPD exacerbation ([facility] [address] [J44.1]

## 2021-09-16 NOTE — PROGRESS NOTES
Physical Therapy    Facility/Department: [facility] MED SURG UNIT  Initial Assessment    NAME: Celeste Garcia  : 1955  MRN: [id number]    Date of Service: 2021    Discharge Recommendations:  Continue to assess pending progress, Home with Home health PT        Assessment   Body structures, Functions, Activity limitations: Decreased functional mobility ; Decreased endurance  Assessment: 63year old male who lives alone in 2nd floor apartment adm [facility] exacerbation of COPD currently demonstrating decreased endurance with SOB and decline in O2 sats during amb currently on 2 L O2 and would benefit from PT while hospitalized to improve endurance, ambulation and overall functinoal mobilty to allow safe D/C home. Treatment Diagnosis: COPD exacerbation, decreased endurance  Prognosis: Good  PT Education: Goals;PT Role;Plan of Care  REQUIRES PT FOLLOW UP: Yes       Patient Diagnosis(es): The primary encounter diagnosis was COPD with acute exacerbation ([address] A diagnosis of Pneumonia of left lower lobe due to infectious organism was also pertinent to this visit. has a past medical history of Alcohol abuse, Anemia, Asthma, Cocaine abuse ([address] COPD (chronic obstructive pulmonary disease) ([address] Depression, Disorder of pharynx, Drug-seeking behavior, Edema, Erectile dysfunction, Gastroesophageal reflux disease, Gout, History of arthroscopy of both knees, House dust mite allergy, Hyperlipidemia, Hypertension, Injury to heart, Insomnia, Medical non-compliance, Morbid obesity due to excess calories ([address] Osteoarthritis of both knees, Personal history of tobacco use, Pneumonia, Pre-diabetes, Seasonal allergies, Severe persistent asthma, Severe sleep apnea, and Supraventricular tachycardia ([address] has a past surgical history that includes Anterior cruciate ligament repair; Cardiac catheterization; Total knee arthroplasty (Left, 2019); Colonoscopy (N/A, 7/15/2020);  Umbilical hernia repair (N/A, 2020); and hernia repair. Restrictions  Restrictions/Precautions  Restrictions/Precautions: Fall Risk  Vision/Hearing  Vision: Impaired  Vision Exceptions: Wears glasses for reading  Hearing: Within functional limits     Subjective  General  Chart Reviewed: Yes  Patient assessed for rehabilitation services?: Yes  Diagnosis: COPD exacerbation  General Comment  Comments: Pt reports a few falls over the last year most recent fall about 3-4 weeks ago- lost balance and fell-twisted back and landed on knee to ER the next day XRAys neg for fracture per pt  Pain Screening  Patient Currently in Pain: Yes  Pain Assessment  Pain Assessment: 0-10  Pain Level: 9 (9/10 low back pain 2-3 /10 chest pain  (nursing notified of chest pain))  Pain Location: Back; Chest  Vital Signs  Patient Currently in Pain: Yes     Orientation  Orientation  Overall Orientation Status: Within Functional Limits  Social/Functional History  Social/Functional History  Lives With: Alone  Type of Home: Apartment  Home Layout: One level  Home Access: Stairs to enter with rails (2nd floor unit)  Entrance Stairs - Number of Steps: 15 (2nd floor appt)  Entrance Stairs - Rails: Left (ascending)  Bathroom Shower/Tub: Tub/Shower unit (reports \"I need a shower chair\")  Bathroom Toilet: Standard  Bathroom Equipment: Hand-held shower  Bathroom Accessibility: Walker accessible  Home Equipment: Cane, Oxygen (4L O2 PLOF at night only, L knee brace for long distance walking)  Receives Help From: Other (comment) (none)  ADL Assistance: Independent  Homemaking Assistance: Independent  Homemaking Responsibilities: Yes  Ambulation Assistance: Independent (w/o AD, occ used SPC \"after I fall\")  Transfer Assistance: Independent  Active : No  Occupation: Retired  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, play guitar    Objective   Observation/Palpation  Observation: IV L UE, 2 L O2    AROM RLE (degrees)  RLE AROM: WFL  AROM LLE (degrees)  LLE AROM : WFL  Strength RLE  Strength RLE: WFL  Strength LLE  Strength LLE: WFL      Bed mobility  Rolling to Left: Independent  Rolling to Right: Independent  Supine to Sit: Independent  Scooting: Independent  Transfers  Sit to Stand: Independent  Stand to sit: Independent  Bed to Chair: Supervision  Ambulation  Ambulation?: Yes  Ambulation 1  Surface: level tile  Device: No Device  Other Apparatus: O2 (2L O2)  Assistance: Supervision;Stand by assistance (assist for management of O2 line)  Quality of Gait: slow steady pace, near equal step and stride length, mild SOB, min A for O2 line management  Gait Deviations: Slow Angie; Increased ABIGAIL  Distance: 80 feet  Comments: O2 sats decreased from 100% at rest on 2 L O2 to 94% after amb and stairs with increased SOB; vcs for PLB  Stairs/Curb  Stairs?: Yes  Stairs  # Steps :  (3 6 inch steps and 2 4 inch steps)  Stairs Height:  (4 and 6 inch)  Rails: Bilateral  Assistance: Contact guard assistance  Comment: Pt ascended and descended reciprocal pattern bilat HRs mild SOB following sairclimbing  Gait Deviations  Gait Deviations: Slow Angie; Increased ABIGAIL (mild SOB; vcs for PLB)     Balance  Standing - Static: Good  Standing - Dynamic: Fair;+        Plan   Plan  Times per week: 5-7  Times per day:  (1-2)  Current Treatment Recommendations: Strengthening, ROM, Balance Training, Endurance Training, Gait Training, Functional Mobility Training, Neuromuscular Re-education, Home Exercise Program, Safety Education & Training     Goals  Short term goals  Time Frame for Short term goals: 3 days  Short term goal 1: Pt ambulate 150 feet wihtout device safely and I mod I maintaining O 2 sats greater than 90%  Short term goal 2: Pt ascend and descend 1 flight of steps mod I maintaining O2 sats greater than 90% to allow safe entrance into 2nd story apt  Short term goal 3: I with HEP to improve overall endurance  Patient Goals   Patient goals : Go home       Therapy Time   Individual Concurrent Group Co-treatment   Time In  1435         Time Out  1525         Minutes  [address] [place], [place], Jose De Jesus RUBALCAVA, [place]

## 2021-09-16 NOTE — ED NOTES
Patient accepted as in patient upstairs to room 75 [facility], [address] [place] [address]  09/16/21 0902

## 2021-09-16 NOTE — ED PROVIDER NOTES
eMERGENCY dEPARTMENT eNCOUnter      [address] [facility]    Chief Complaint   Patient presents with    Shortness of Breath     increasing yesterday, hx of COPD    Chest Pain       HPI    Leisa Weir is a 63 y.o. male with hx of COPD, Asthma who presentsto ED from home  By EMS  With complaint of SOB, fever  Onset 2 days  Intensity of symptoms moderate  Patient smokes a few cigarretes a day  On arrival of EMS O 2 was in mid 70's  He got 125 Solumedrol and albuterol en rout which helped him  Patient was c/o chest pain which got beet after breathing treatment   Chest pain was substernal with no radiation moderate in intensity    PAST MEDICAL HISTORY    Past Medical History:   Diagnosis Date    Alcohol abuse 10/27/2016    Anemia     Asthma     Cocaine abuse ([address] 10/27/2016    COPD (chronic obstructive pulmonary disease) ([address]     Depression 04/27/2020    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    Drug-seeking behavior 04/14/2015    Edema 12/10/2015    Erectile dysfunction     Gastroesophageal reflux disease 12/17/2018    Gout 10/27/2016    History of arthroscopy of both knees 10/27/2016    House dust mite allergy 04/21/2014    Hyperlipidemia     Hypertension 10/27/2016    Injury to heart 10/27/2016    Insomnia 12/17/2018    Medical non-compliance 02/22/2014    Morbid obesity due to excess calories ([address] 12/08/2016    Osteoarthritis of both knees 12/08/2016    Personal history of tobacco use     Pneumonia 12/04/2016    caused hospital admission    Pre-diabetes 10/27/2016    Seasonal allergies 04/21/2014    Severe persistent asthma 10/27/2016    Severe sleep apnea 04/14/2015    Supraventricular tachycardia ([address] 10/27/2016       SURGICAL HISTORY    Past Surgical History:   Procedure Laterality Date    ANTERIOR CRUCIATE LIGAMENT REPAIR      CARDIAC CATHETERIZATION      COLONOSCOPY N/A 7/15/2020    COLONOSCOPY WITH POLYPECTOMY performed by Alger Soulier, MD at [address] [place] [address] KNEE ARTHROPLASTY Left 8/9/2019    LEFT TOTAL KNEE ARTHROPLASTY performed by Lizet Oliva MD at [facility] N/A 43/64/0079    UMBILICAL HERNIA REPAIR WITH MESH performed by Magnus Valladares MD at [address] [place] [address]    Current Outpatient Rx   Medication Sig Dispense Refill    albuterol sulfate  (90 Base) MCG/ACT inhaler INHALE 2 PUFFS INTO THE LUNGS EVERY 6 HOURS AS NEEDED FOR WHEEZING OR SHORTNESS OF BREATH 18 each 0    ipratropium-albuterol (DUONEB) 0.5-2.5 (3) MG/3ML SOLN nebulizer solution Inhale 3 mLs into the lungs every 6 hours 360 mL 0    [START ON 9/19/2021] sildenafil (VIAGRA) 100 MG tablet Take 1 tablet by mouth as needed for Erectile Dysfunction 10 tablet 0    albuterol sulfate  (90 Base) MCG/ACT inhaler INHALE 2 PUFFS INTO THE LUNGS EVERY 6 HOURS AS NEEDED FOR WHEEZING OR SHORTNESS OF BREATH 18 Inhaler 0    TRELEGY ELLIPTA 200-62.5-25 MCG/INH AEPB Take 1 Inhaler by mouth daily      albuterol sulfate HFA (PROAIR HFA) 108 (90 Base) MCG/ACT inhaler Use every 4 hours while awake for 7-10 days then PRN wheezing  Dispense with SPACER and Instruct on use. May sub Ventolin or Proventil as needed per [employer]. 1 Inhaler 0    acetaminophen (ACETAMINOPHEN EXTRA STRENGTH) 500 MG tablet Take 1 tablet by mouth every 8 hours as needed for Pain 120 tablet 1    triamcinolone (KENALOG) 0.025 % cream Apply topically 2 times daily.  80 g 1    Hydrocortisone, Perianal, 1 % cream Hydrocortisone (Perianal) 1 % External Cream Quantity: 28 Refills: 0 Ordered: 28-May-2021 DO Start : 28-May-2021 Active      amLODIPine (NORVASC) 10 MG tablet Take 1 tablet by mouth daily 90 tablet 1    escitalopram (LEXAPRO) 10 MG tablet Take 1 tablet by mouth daily 90 tablet 1    fluticasone (FLONASE) 50 MCG/ACT nasal spray 2 sprays by Nasal route daily 3 Bottle 1    Fluticasone furoate-vilanterol (BREO ELLIPTA) 200-25 MCG/INH AEPB inhaler Inhale 1 puff into the lungs daily 72 each 1    lovastatin (MEVACOR) 10 MG tablet Take 1 tablet by mouth nightly 90 tablet 1    montelukast (SINGULAIR) 10 MG tablet Take 1 tablet by mouth nightly 90 tablet 1    pantoprazole (PROTONIX) 40 MG tablet Take 1 tablet by mouth daily 90 tablet 1    polyethylene glycol (GLYCOLAX) 17 GM/SCOOP powder Take 17 g by mouth daily 510 g 1    traZODone (DESYREL) 50 MG tablet Take 1 tablet by mouth nightly 90 tablet 1    meloxicam (MOBIC) 15 MG tablet TAKE 1 TABLET BY MOUTH EVERY DAY WITH FOOD      Handicap Placard MISC by Does not apply route DX: OSTEOARTHRITIS OF BOTH KNEES (M17.0), COPD (J44.9)     EXPIRES: 2024 1 each 0       ALLERGIES    Allergies   Allergen Reactions    Fish-Derived Products Anaphylaxis    Iodine Anaphylaxis     Iodine-containing products, dyes, contrast dye    Seasonal Shortness Of Breath    Pcn [Penicillins] Nausea And Vomiting       FAMILY HISTORY    Family History   Problem Relation Age of Onset    Arthritis Mother     Asthma Mother     High Cholesterol Mother     Other Mother         aneurysm    Diabetes Father     Stroke Maternal Grandmother     Cancer Maternal Grandfather     Hypertension Other     COPD Neg Hx        SOCIAL HISTORY    Social History     Socioeconomic History    Marital status: Single     Spouse name: n/a    Number of children: 1    Years of education: 15    Highest education level: None   Occupational History    Occupation: Disability     Employer: NONE   Tobacco Use    Smoking status: Current Some Day Smoker     Packs/day: 0.25     Years: 30.00     Pack years: 7.50     Types: Cigarettes, Cigars     Start date: 1988     Last attempt to quit: 10/1/2013     Years since quittin.9    Smokeless tobacco: Never Used   Vaping Use    Vaping Use: Never used   Substance and Sexual Activity    Alcohol use:  Yes     Alcohol/week: 4.0 standard drinks     Types: 2 Cans of beer, 2 Shots of liquor per week     Comment: social 1 -2 x week    Drug use: Yes     Types: Cocaine, Marijuana     Comment: no cocaine x 1 year, marijuana weekly    Sexual activity: Not Currently     Partners: Female   Other Topics Concern    None   Social History Narrative    Lives in an apartment    15 steps to get up to the apartment    Asking for hospital bed and shower chair      Social Determinants of Health     Financial Resource Strain: Medium Risk    Difficulty of Paying Living Expenses: Somewhat hard   Food Insecurity: No Food Insecurity    Worried About Running Out of Food in the Last Year: Never true    Jonnie of Food in the Last Year: Never true   Transportation Needs: No Transportation Needs    Lack of Transportation (Medical): No    Lack of Transportation (Non-Medical): No   Physical Activity: Inactive    Days of Exercise per Week: 0 days    Minutes of Exercise per Session: 0 min   Stress: Stress Concern Present    Feeling of Stress :  To some extent   Social Connections: Socially Isolated    Frequency of Communication with Friends and Family: Once a week    Frequency of Social Gatherings with Friends and Family: Once a week    Attends Buddhism Services: 1 to 4 times per year    Active Member of [employer] Group or Organizations: Not asked    Attends Club or Organization Meetings: Never    Marital Status:    Intimate Partner Violence:     Fear of Current or Ex-Partner:     Emotionally Abused:     Physically Abused:     Sexually Abused:        REVIEW OF SYSTEMS    Constitutional: c/o fever, chills, and weakness   Eyes:  Denies photophobia or discharge   HENT:  Denies sore throat or ear pain   Respiratory: c/o cough and shortness of breath   Cardiovascular:  c/o chest pain, palpitations but mayra swelling   GI:  Denies abdominal pain, nausea, vomiting, or diarrhea   Musculoskeletal:  Denies back pain   Skin:  Denies rash   Neurologic:  Denies headache, focal weakness or sensory changes   Endocrine:  Denies polyuria or polydypsia   Lymphatic:  Denies swollen glands   Psychiatric:  Denies depression, suicidal ideation or homicidal ideation   All systems negative except as marked. PHYSICAL EXAM    VITAL SIGNS: /66   Pulse 105   Temp 100.5 °F (38.1 °C) (Oral)   Resp 23   Ht 6' (1.829 m)   Wt 290 lb (131.5 kg)   SpO2 91%   BMI 39.33 kg/m²    Constitutional: obese, Moderate acute distress, Non-toxic appearance. HENT:  Normocephalic, Atraumatic, Bilateral external ears normal, Oropharynx moist, No oral exudates, Nose normal. Neck- Normal range of motion, No tenderness, Supple, No stridor. Eyes:  PERRL, EOMI, Conjunctiva normal, No discharge. Respiratory:  Normal breath sounds, moderate respiratory distress, diffuse expiratory wheezing, No chest tenderness. Cardiovascular: tachycardic, Normal rhythm, No murmurs, No rubs, No gallops. GI:  Bowel sounds normal, Soft, No tenderness, No masses, No pulsatile masses. : . No CVA tenderness. Musculoskeletal:  Intact distal pulses, No edema, No tenderness, No cyanosis, No clubbing. Good range of motion in all major joints. No tenderness to palpation or major deformities noted. Back- No tenderness. Integument:  Warm, Dry, No erythema, No rash. Lymphatic:  No lymphadenopathy noted. Neurologic:  Alert & oriented x 3, Normal motor function, Normal sensory function, No focal deficits noted. Psychiatric:  Affect normal, Judgment normal, Mood normal.     EKG    Sinus Tachycardia,  , Normal Axis, No ST- T wave changes, QTc 422    RADIOLOGY    XR CHEST PORTABLE    (Results Pending)       REEVALUATION   Patient was updated the results of labs and Radiology.   He feels better  Spoke with Dr Tarsha Cade accepted patient admission     Labs  Labs Reviewed   CBC WITH AUTO DIFFERENTIAL - Abnormal; Notable for the following components:       Result Value    WBC 21.4 (*)     RBC 4.49 (*)     MCV 96.9 (*)     MCHC 32.0 (*)     RDW 15.2 (*)     Neutrophils % 77.0 (*)     Eosinophils % 0.1 (*)     Basophils % 0.1 (*)     Neutrophils Absolute 18.6 (*)     Lymphocytes Absolute 0.9 (*)     Monocytes Absolute 1.9 (*)     All other components within normal limits   COMPREHENSIVE METABOLIC PANEL W/ REFLEX TO MG FOR LOW K - Abnormal; Notable for the following components:    Glucose 123 (*)     CREATININE 1.22 (*)     GFR Non- 59.8 (*)     Calcium 10.2 (*)     Total Bilirubin 2.7 (*)     Alkaline Phosphatase 109 (*)     Globulin 3.7 (*)     All other components within normal limits   PROTIME-INR - Abnormal; Notable for the following components:    Protime 15.6 (*)     All other components within normal limits   D-DIMER, QUANTITATIVE - Abnormal; Notable for the following components:    D-Dimer, Quant 0.51 (*)     All other components within normal limits    Narrative:     CALL  Christopher BERRIOS tel. [phone],  Chemistry results called to and read back by Sha HAYNES, 09/16/2021 05:38, by  CASJE   COVID-19, RAPID   CULTURE, BLOOD 1   CULTURE, BLOOD 2   TROPONIN   BRAIN NATRIURETIC PEPTIDE   APTT   LACTIC ACID, PLASMA   ETHANOL   URINALYSIS   PROCALCITONIN   URINE DRUG SCREEN             Summation      Patient Course:     ED Medications administered this visit:    Medications   ipratropium-albuterol (DUONEB) nebulizer solution 1 ampule (1 ampule Inhalation Given 9/16/21 0517)   ipratropium-albuterol (DUONEB) 0.5-2.5 (3) MG/3ML nebulizer solution (has no administration in time range)   0.9 % sodium chloride bolus (1,000 mLs IntraVENous New Bag 9/16/21 0455)   azithromycin (ZITHROMAX) 500 mg in D5W 250ml addavial (0 mg IntraVENous Stopped 9/16/21 0615)   acetaminophen (TYLENOL) tablet 1,000 mg (1,000 mg Oral Given 9/16/21 0456)       New Prescriptions from this visit:    New Prescriptions    No medications on file       Follow-up:  No follow-up provider specified. Final Impression:   1. COPD with acute exacerbation ([address]    2.  Pneumonia of left lower lobe due to infectious organism               Dr. López Stands addendum -discussed with Dr. Apurva Glynn. Will admit the patient for further evaluation and treatment. Patient ambulated to and from restroom did not appear dyspneic. (Please note that portions of this note were completed with a voice recognition program.  Efforts were made to edit the dictations but occasionally words are mis-transcribed. Geoffrey Ahuja,   09/16/21 5637

## 2021-09-16 NOTE — ED NOTES
Pt given breakfast tray.   Aware of plan of care with admit to [address] [place] [address] Flaco, SAMANTHA  09/16/21 2192

## 2021-09-16 NOTE — Clinical Note
Admit to [facility] [place] -Dr. Kody Burch service -full admit -[facility] telemetry

## 2021-09-16 NOTE — PROGRESS NOTES
Occupational Therapy   Occupational Therapy Initial Assessment- Med  Date: 2021   Patient Name: Brian Hutchinson  MRN: [id number]     : 1955    Date of Service: 2021    Discharge Recommendations:     OT Equipment Recommendations  Equipment Needed: Yes (shower chair)    Assessment   Assessment: Pt is a 65y/o male PLOF lives home alone, was I/MI with ADL, whom presents to [facility] [facility]* Pneumonia of L lower lobe d/t infectious organism, COPD with acute exacerbation. Pt demo'd ability to I/MI perform ADL and fxl mob during this assessment, no OT needs identified at this time. Pt does demo dec'd endurance and fatigues quickly- may benefit from skilled PT services. Treatment Diagnosis: Pneumonia of L lower lobe d/t infectious organism, COPD with acute exacerbation  Prognosis: Good  History: multi comorb  Exam: No OT needs identified at this time  OT Education: OT Role  Patient Education: Recommendations to exercise/move around more at home as pt admitted to sitting around most of the time. No Skilled OT: Independent with ADL's  REQUIRES OT FOLLOW UP: No           Patient Diagnosis(es): The primary encounter diagnosis was COPD with acute exacerbation ([address] A diagnosis of Pneumonia of left lower lobe due to infectious organism was also pertinent to this visit. has a past medical history of Alcohol abuse, Anemia, Asthma, Cocaine abuse ([address] COPD (chronic obstructive pulmonary disease) ([address] Depression, Disorder of pharynx, Drug-seeking behavior, Edema, Erectile dysfunction, Gastroesophageal reflux disease, Gout, History of arthroscopy of both knees, House dust mite allergy, Hyperlipidemia, Hypertension, Injury to heart, Insomnia, Medical non-compliance, Morbid obesity due to excess calories ([address] Osteoarthritis of both knees, Personal history of tobacco use, Pneumonia, Pre-diabetes, Seasonal allergies, Severe persistent asthma, Severe sleep apnea, and Supraventricular tachycardia ([address]    has a past surgical history that includes Anterior cruciate ligament repair; Cardiac catheterization; Total knee arthroplasty (Left, 8/9/2019); Colonoscopy (N/A, 7/15/2020); Umbilical hernia repair (N/A, 11/30/2020); and hernia repair. Treatment Diagnosis: Pneumonia of L lower lobe d/t infectious organism, COPD with acute exacerbation      Restrictions  Restrictions/Precautions  Restrictions/Precautions: Fall Risk    Subjective   General  Chart Reviewed: Yes  Patient assessed for rehabilitation services?: Yes  Additional Pertinent Hx: Reports 3 falls in the past 3mo- \"Most of the time they were alcohol related\"  Family / Caregiver Present: No  Referring Practitioner: Dr. Manfred Cartagena  Diagnosis: Pneumonia of L lower lobe d/t infectious organism, COPD with acute exacerbation  Subjective  Subjective: Pt agreeable to OT eval/tx  Patient Currently in Pain: Yes  Pain Assessment  Pain Assessment: 0-10  Pain Level: 9  Pain Type: Acute pain  Pain Location: Back; Chest  Pain Orientation: Right;Left  Pain Descriptors: Aching  Pain Frequency: Intermittent (\"Morning is the worst, then throughout the day it's intermittent\")  Vital Signs  Patient Currently in Pain: Yes     Social/Functional History  Social/Functional History  Lives With: Alone  Type of Home: Apartment  Home Layout: One level  Home Access: Stairs to enter with rails (2nd floor unit)  Entrance Stairs - Number of Steps: 15  Entrance Stairs - Rails: Left (ascending)  Bathroom Shower/Tub: Tub/Shower unit (reports \"I need a shower chair\")  Bathroom Toilet: Standard  Bathroom Equipment: Hand-held shower  Bathroom Accessibility: Walker accessible  Home Equipment: Cane, Oxygen (4L O2 PLOF at night only)  Receives Help From: Other (comment) (none)  ADL Assistance: Independent  Homemaking Assistance: Independent  Homemaking Responsibilities: Yes  Ambulation Assistance: Independent (w/o AD, occ used SPC \"after I fall\")  Transfer Assistance: Independent  Active : No  Occupation: Retired  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[employer], play guitar       Objective        Orientation  Overall Orientation Status: Within Functional Limits     Functional Mobility  Functional - Mobility Device: No device  Activity: Other (level tile surface)  Assist Level: Modified independent   Functional Mobility Comments: Pt reports fatigue quickly- Pt on O2, O2 sats 97% post fxl mob, HR 93 BPM  ADL  Feeding: Independent  Grooming: Independent  UE Bathing: Modified independent   LE Bathing: Modified independent   UE Dressing: Independent  LE Dressing: Modified independent   Toileting: Modified independent   Tone RUE  RUE Tone: Normotonic  Tone LUE  LUE Tone: Normotonic  Coordination  Movements Are Fluid And Coordinated: Yes     Bed mobility  Rolling to Left: Independent  Rolling to Right: Independent  Supine to Sit: Independent  Transfers  Sit to stand: Independent  Stand to sit:  Independent                       LUE AROM (degrees)  LUE AROM : WFL  Left Hand AROM (degrees)  Left Hand AROM: WFL  RUE AROM (degrees)  RUE AROM : WFL  Right Hand AROM (degrees)  Right Hand AROM: WFL  LUE Strength  Gross LUE Strength: WFL  RUE Strength  Gross RUE Strength: WFL                   Plan   Plan  Times per week: Initial OT eval/tx only          Goals  Short term goals  Time Frame for Short term goals: No OT goals identified at this time       Therapy Time   Individual Concurrent Group Co-treatment   Time In  11:00         Time Out  12:00         Minutes  Say Humphrey1, [place]

## 2021-09-17 LAB
ANION GAP SERPL CALCULATED.3IONS-SCNC: 10 MEQ/L (ref 9–15)
BUN BLDV-MCNC: 23 MG/DL (ref 8–23)
CALCIUM SERPL-MCNC: 10.3 MG/DL (ref 8.5–9.9)
CHLORIDE BLD-SCNC: 104 MEQ/L (ref 95–107)
CO2: 23 MEQ/L (ref 20–31)
CREAT SERPL-MCNC: 1.01 MG/DL (ref 0.7–1.2)
GFR AFRICAN AMERICAN: >60
GFR NON-AFRICAN AMERICAN: >60
GLUCOSE BLD-MCNC: 183 MG/DL (ref 70–99)
HCT VFR BLD CALC: 38.1 % (ref 42–52)
HEMOGLOBIN: 12.4 G/DL (ref 13.7–17.5)
MCH RBC QN AUTO: 31.6 PG (ref 25.7–32.2)
MCHC RBC AUTO-ENTMCNC: 32.5 % (ref 32.3–36.5)
MCV RBC AUTO: 97.2 FL (ref 79–92.2)
PDW BLD-RTO: 14.9 % (ref 11.6–14.4)
PLATELET # BLD: 255 K/UL (ref 163–337)
POTASSIUM REFLEX MAGNESIUM: 4.5 MEQ/L (ref 3.4–4.9)
RBC # BLD: 3.92 M/UL (ref 4.63–6.08)
SODIUM BLD-SCNC: 137 MEQ/L (ref 135–144)
WBC # BLD: 21 K/UL (ref 4.2–9)

## 2021-09-17 PROCEDURE — 6360000002 HC RX W HCPCS: Performed by: INTERNAL MEDICINE

## 2021-09-17 PROCEDURE — 6370000000 HC RX 637 (ALT 250 FOR IP): Performed by: INTERNAL MEDICINE

## 2021-09-17 PROCEDURE — 2580000003 HC RX 258: Performed by: INTERNAL MEDICINE

## 2021-09-17 PROCEDURE — 94640 AIRWAY INHALATION TREATMENT: CPT

## 2021-09-17 PROCEDURE — 6370000000 HC RX 637 (ALT 250 FOR IP): Performed by: ANESTHESIOLOGY

## 2021-09-17 PROCEDURE — 36415 COLL VENOUS BLD VENIPUNCTURE: CPT

## 2021-09-17 PROCEDURE — 1210000000 HC MED SURG R&B

## 2021-09-17 PROCEDURE — 80048 BASIC METABOLIC PNL TOTAL CA: CPT

## 2021-09-17 PROCEDURE — 85027 COMPLETE CBC AUTOMATED: CPT

## 2021-09-17 RX ORDER — IBUPROFEN 400 MG/1
600 TABLET ORAL EVERY 6 HOURS PRN
Status: DISCONTINUED | OUTPATIENT
Start: 2021-09-17 | End: 2021-09-19 | Stop reason: HOSPADM

## 2021-09-17 RX ORDER — LIDOCAINE 4 G/G
3 PATCH TOPICAL ONCE
Status: COMPLETED | OUTPATIENT
Start: 2021-09-17 | End: 2021-09-18

## 2021-09-17 RX ORDER — METAXALONE 800 MG/1
800 TABLET ORAL 3 TIMES DAILY
Status: DISCONTINUED | OUTPATIENT
Start: 2021-09-17 | End: 2021-09-19 | Stop reason: HOSPADM

## 2021-09-17 RX ORDER — ACETAMINOPHEN 325 MG/1
650 TABLET ORAL EVERY 4 HOURS PRN
Status: DISCONTINUED | OUTPATIENT
Start: 2021-09-17 | End: 2021-09-19 | Stop reason: HOSPADM

## 2021-09-17 RX ORDER — IPRATROPIUM BROMIDE AND ALBUTEROL SULFATE 2.5; .5 MG/3ML; MG/3ML
1 SOLUTION RESPIRATORY (INHALATION) EVERY 4 HOURS PRN
Status: DISCONTINUED | OUTPATIENT
Start: 2021-09-17 | End: 2021-09-19 | Stop reason: HOSPADM

## 2021-09-17 RX ORDER — TRAMADOL HYDROCHLORIDE 50 MG/1
50 TABLET ORAL 2 TIMES DAILY PRN
Status: DISCONTINUED | OUTPATIENT
Start: 2021-09-17 | End: 2021-09-19 | Stop reason: HOSPADM

## 2021-09-17 RX ADMIN — Medication 10 ML: at 08:22

## 2021-09-17 RX ADMIN — Medication 10 ML: at 20:00

## 2021-09-17 RX ADMIN — IPRATROPIUM BROMIDE AND ALBUTEROL SULFATE 1 AMPULE: .5; 2.5 SOLUTION RESPIRATORY (INHALATION) at 11:50

## 2021-09-17 RX ADMIN — METAXALONE 800 MG: 800 TABLET ORAL at 14:01

## 2021-09-17 RX ADMIN — ATORVASTATIN CALCIUM 10 MG: 10 TABLET, FILM COATED ORAL at 19:57

## 2021-09-17 RX ADMIN — IPRATROPIUM BROMIDE AND ALBUTEROL SULFATE 1 AMPULE: .5; 2.5 SOLUTION RESPIRATORY (INHALATION) at 15:59

## 2021-09-17 RX ADMIN — IPRATROPIUM BROMIDE AND ALBUTEROL SULFATE 1 AMPULE: .5; 2.5 SOLUTION RESPIRATORY (INHALATION) at 20:02

## 2021-09-17 RX ADMIN — MONTELUKAST SODIUM 10 MG: 10 TABLET ORAL at 19:57

## 2021-09-17 RX ADMIN — IPRATROPIUM BROMIDE AND ALBUTEROL SULFATE 1 AMPULE: .5; 2.5 SOLUTION RESPIRATORY (INHALATION) at 06:09

## 2021-09-17 RX ADMIN — ESCITALOPRAM OXALATE 10 MG: 10 TABLET ORAL at 22:13

## 2021-09-17 RX ADMIN — METHYLPREDNISOLONE SODIUM SUCCINATE 40 MG: 40 INJECTION, POWDER, FOR SOLUTION INTRAMUSCULAR; INTRAVENOUS at 12:38

## 2021-09-17 RX ADMIN — PANTOPRAZOLE SODIUM 40 MG: 40 TABLET, DELAYED RELEASE ORAL at 08:20

## 2021-09-17 RX ADMIN — IBUPROFEN 600 MG: 400 TABLET ORAL at 16:42

## 2021-09-17 RX ADMIN — AMLODIPINE BESYLATE 10 MG: 10 TABLET ORAL at 08:21

## 2021-09-17 RX ADMIN — ENOXAPARIN SODIUM 40 MG: 40 INJECTION SUBCUTANEOUS at 08:21

## 2021-09-17 RX ADMIN — GUAIFENESIN SYRUP AND DEXTROMETHORPHAN 5 ML: 100; 10 SYRUP ORAL at 16:42

## 2021-09-17 RX ADMIN — GABAPENTIN 100 MG: 100 CAPSULE ORAL at 14:29

## 2021-09-17 RX ADMIN — METAXALONE 800 MG: 800 TABLET ORAL at 19:59

## 2021-09-17 RX ADMIN — TRAZODONE HYDROCHLORIDE 50 MG: 50 TABLET ORAL at 22:13

## 2021-09-17 RX ADMIN — POLYETHYLENE GLYCOL 3350 17 G: 17 POWDER, FOR SOLUTION ORAL at 12:38

## 2021-09-17 RX ADMIN — AZITHROMYCIN MONOHYDRATE 500 MG: 500 INJECTION, POWDER, LYOPHILIZED, FOR SOLUTION INTRAVENOUS at 05:43

## 2021-09-17 RX ADMIN — GUAIFENESIN 600 MG: 600 TABLET, EXTENDED RELEASE ORAL at 08:20

## 2021-09-17 RX ADMIN — GABAPENTIN 100 MG: 100 CAPSULE ORAL at 08:20

## 2021-09-17 RX ADMIN — KETOROLAC TROMETHAMINE 30 MG: 30 INJECTION, SOLUTION INTRAMUSCULAR; INTRAVENOUS at 03:05

## 2021-09-17 RX ADMIN — METHYLPREDNISOLONE SODIUM SUCCINATE 40 MG: 40 INJECTION, POWDER, FOR SOLUTION INTRAMUSCULAR; INTRAVENOUS at 22:13

## 2021-09-17 RX ADMIN — GUAIFENESIN 600 MG: 600 TABLET, EXTENDED RELEASE ORAL at 19:57

## 2021-09-17 ASSESSMENT — PAIN SCALES - GENERAL
PAINLEVEL_OUTOF10: 0
PAINLEVEL_OUTOF10: 7

## 2021-09-17 ASSESSMENT — PAIN DESCRIPTION - LOCATION
LOCATION: BACK
LOCATION: BACK
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
## 2021-09-17 NOTE — PROGRESS NOTES
Dr Aman Moffett called in and acknowledged the consult for pain. Verbal orders received, I repeated back the orders. Orders placed. Primary nurse notified.

## 2021-09-17 NOTE — PROGRESS NOTES
Physical Therapy  Facility/Department: [facility] MED SURG UNIT  Daily Treatment Note  NAME: Devon Garcia  : 1955  MRN: [id number]    Date of Service: 2021    Discharge Recommendations:  Continue to assess pending progress, Home with Home health PT        Assessment            Patient Diagnosis(es): The primary encounter diagnosis was COPD with acute exacerbation ([address] A diagnosis of Pneumonia of left lower lobe due to infectious organism was also pertinent to this visit. has a past medical history of Alcohol abuse, Anemia, Asthma, Cocaine abuse ([address] COPD (chronic obstructive pulmonary disease) ([address] Depression, Disorder of pharynx, Drug-seeking behavior, Edema, Erectile dysfunction, Gastroesophageal reflux disease, Gout, History of arthroscopy of both knees, House dust mite allergy, Hyperlipidemia, Hypertension, Injury to heart, Insomnia, Medical non-compliance, Morbid obesity due to excess calories ([address] Osteoarthritis of both knees, Personal history of tobacco use, Pneumonia, Pre-diabetes, Seasonal allergies, Severe persistent asthma, Severe sleep apnea, and Supraventricular tachycardia ([address] has a past surgical history that includes Anterior cruciate ligament repair; Cardiac catheterization; Total knee arthroplasty (Left, 2019); Colonoscopy (N/A, 7/15/2020); Umbilical hernia repair (N/A, 2020); and hernia repair. Restrictions  Restrictions/Precautions  Restrictions/Precautions: Fall Risk  Subjective  Pt. Declined PT session. \"I'm tired right now and my back is hurting. \"I'm going to take a nap\".      Goals  Short term goals  Time Frame for Short term goals: 3 days  Short term goal 1: Pt ambulate 150 feet wihtout device safely and I mod I maintaining O 2 sats greater than 90%  Short term goal 2: Pt ascend and descend 1 flight of steps mod I maintaining O2 sats greater than 90% to allow safe entrance into 2nd story apt  Short term goal 3: I with HEP to improve overall endurance  Patient Goals   Patient goals : Go home    Plan    Plan  Times per week: 5-7  Times per day:  (1-2)  Current Treatment Recommendations: Strengthening, ROM, Balance Training, Endurance Training, Gait Training, Functional Mobility Training, Neuromuscular Re-education, Home Exercise Program, Safety Education & Training     Therapy Time   Individual Concurrent Group Co-treatment   Time In           Time Out           Minutes  [address] [facility], [facility]

## 2021-09-18 PROCEDURE — 2700000000 HC OXYGEN THERAPY PER DAY

## 2021-09-18 PROCEDURE — 6370000000 HC RX 637 (ALT 250 FOR IP): Performed by: ANESTHESIOLOGY

## 2021-09-18 PROCEDURE — 6370000000 HC RX 637 (ALT 250 FOR IP): Performed by: INTERNAL MEDICINE

## 2021-09-18 PROCEDURE — 6360000002 HC RX W HCPCS: Performed by: INTERNAL MEDICINE

## 2021-09-18 PROCEDURE — 97116 GAIT TRAINING THERAPY: CPT

## 2021-09-18 PROCEDURE — 1210000000 HC MED SURG R&B

## 2021-09-18 PROCEDURE — 94640 AIRWAY INHALATION TREATMENT: CPT

## 2021-09-18 PROCEDURE — 2580000003 HC RX 258: Performed by: INTERNAL MEDICINE

## 2021-09-18 RX ADMIN — IPRATROPIUM BROMIDE AND ALBUTEROL SULFATE 1 AMPULE: .5; 2.5 SOLUTION RESPIRATORY (INHALATION) at 06:33

## 2021-09-18 RX ADMIN — ESCITALOPRAM OXALATE 10 MG: 10 TABLET ORAL at 21:46

## 2021-09-18 RX ADMIN — GUAIFENESIN 600 MG: 600 TABLET, EXTENDED RELEASE ORAL at 08:48

## 2021-09-18 RX ADMIN — MONTELUKAST SODIUM 10 MG: 10 TABLET ORAL at 21:46

## 2021-09-18 RX ADMIN — ENOXAPARIN SODIUM 40 MG: 40 INJECTION SUBCUTANEOUS at 08:48

## 2021-09-18 RX ADMIN — TRAMADOL HYDROCHLORIDE 50 MG: 50 TABLET, FILM COATED ORAL at 03:29

## 2021-09-18 RX ADMIN — TRAZODONE HYDROCHLORIDE 50 MG: 50 TABLET ORAL at 21:46

## 2021-09-18 RX ADMIN — ATORVASTATIN CALCIUM 10 MG: 10 TABLET, FILM COATED ORAL at 21:47

## 2021-09-18 RX ADMIN — IBUPROFEN 600 MG: 400 TABLET ORAL at 08:48

## 2021-09-18 RX ADMIN — PANTOPRAZOLE SODIUM 40 MG: 40 TABLET, DELAYED RELEASE ORAL at 08:48

## 2021-09-18 RX ADMIN — IPRATROPIUM BROMIDE AND ALBUTEROL SULFATE 1 AMPULE: .5; 2.5 SOLUTION RESPIRATORY (INHALATION) at 23:32

## 2021-09-18 RX ADMIN — METAXALONE 800 MG: 800 TABLET ORAL at 14:21

## 2021-09-18 RX ADMIN — METHYLPREDNISOLONE SODIUM SUCCINATE 40 MG: 40 INJECTION, POWDER, FOR SOLUTION INTRAMUSCULAR; INTRAVENOUS at 09:06

## 2021-09-18 RX ADMIN — IPRATROPIUM BROMIDE AND ALBUTEROL SULFATE 1 AMPULE: .5; 2.5 SOLUTION RESPIRATORY (INHALATION) at 18:01

## 2021-09-18 RX ADMIN — Medication 10 ML: at 09:06

## 2021-09-18 RX ADMIN — AMLODIPINE BESYLATE 10 MG: 10 TABLET ORAL at 08:48

## 2021-09-18 RX ADMIN — IPRATROPIUM BROMIDE AND ALBUTEROL SULFATE 1 AMPULE: .5; 2.5 SOLUTION RESPIRATORY (INHALATION) at 11:43

## 2021-09-18 RX ADMIN — GABAPENTIN 100 MG: 100 CAPSULE ORAL at 08:47

## 2021-09-18 RX ADMIN — POLYETHYLENE GLYCOL 3350 17 G: 17 POWDER, FOR SOLUTION ORAL at 09:05

## 2021-09-18 RX ADMIN — METHYLPREDNISOLONE SODIUM SUCCINATE 40 MG: 40 INJECTION, POWDER, FOR SOLUTION INTRAMUSCULAR; INTRAVENOUS at 21:47

## 2021-09-18 RX ADMIN — AZITHROMYCIN MONOHYDRATE 500 MG: 500 INJECTION, POWDER, LYOPHILIZED, FOR SOLUTION INTRAVENOUS at 05:55

## 2021-09-18 RX ADMIN — METAXALONE 800 MG: 800 TABLET ORAL at 21:46

## 2021-09-18 RX ADMIN — GABAPENTIN 100 MG: 100 CAPSULE ORAL at 14:21

## 2021-09-18 RX ADMIN — METAXALONE 800 MG: 800 TABLET ORAL at 08:48

## 2021-09-18 RX ADMIN — IPRATROPIUM BROMIDE AND ALBUTEROL SULFATE 1 AMPULE: .5; 2.5 SOLUTION RESPIRATORY (INHALATION) at 02:30

## 2021-09-18 RX ADMIN — GUAIFENESIN SYRUP AND DEXTROMETHORPHAN 5 ML: 100; 10 SYRUP ORAL at 02:30

## 2021-09-18 RX ADMIN — GUAIFENESIN 600 MG: 600 TABLET, EXTENDED RELEASE ORAL at 21:47

## 2021-09-18 ASSESSMENT — PAIN DESCRIPTION - LOCATION: LOCATION: BACK

## 2021-09-18 ASSESSMENT — PAIN SCALES - GENERAL
PAINLEVEL_OUTOF10: 8
PAINLEVEL_OUTOF10: 7

## 2021-09-18 NOTE — PROGRESS NOTES
Physical Therapy  Facility/Department: [facility] MED SURG UNIT  Daily Treatment Note  NAME: Vinay Garay  : 1955  MRN: [id number]    Date of Service: 2021    Discharge Recommendations:  (P) Home with Home health PT        Assessment Pt is awake, alert, pleasant- passively agreeable to gait/stair training. Pt moves slowly, mild SOB noted with WBing activity, Sp02 98% with cont O2 @ 2L. Steady with transfers and gait unsupported. Pt returned to recliner at end of session, no pain noted, Sp02 98%. REQUIRES PT FOLLOW UP: (P) Yes     Patient Diagnosis(es): The primary encounter diagnosis was COPD with acute exacerbation ([address] A diagnosis of Pneumonia of left lower lobe due to infectious organism was also pertinent to this visit. has a past medical history of Alcohol abuse, Anemia, Asthma, Cocaine abuse ([address] COPD (chronic obstructive pulmonary disease) ([address] Depression, Disorder of pharynx, Drug-seeking behavior, Edema, Erectile dysfunction, Gastroesophageal reflux disease, Gout, History of arthroscopy of both knees, House dust mite allergy, Hyperlipidemia, Hypertension, Injury to heart, Insomnia, Medical non-compliance, Morbid obesity due to excess calories ([address] Osteoarthritis of both knees, Personal history of tobacco use, Pneumonia, Pre-diabetes, Seasonal allergies, Severe persistent asthma, Severe sleep apnea, and Supraventricular tachycardia ([address] has a past surgical history that includes Anterior cruciate ligament repair; Cardiac catheterization; Total knee arthroplasty (Left, 2019); Colonoscopy (N/A, 7/15/2020); Umbilical hernia repair (N/A, 2020); and hernia repair.     Restrictions  Restrictions/Precautions  Restrictions/Precautions: (P) Fall Risk  Subjective   General  Chart Reviewed: (P) Yes  Family / Caregiver Present: (P) No  General Comment  Comments: (P) Pt reports a few falls over the last year most recent fall about 3-4 weeks ago- lost balance and fell-twisted back and Training, Neuromuscular Re-education, Home Exercise Program, Safety Education & Training     Therapy Time   Individual Concurrent Group Co-treatment   Time In  12:50 pm         Time Out  1:20 pm         Minutes  [address] [facility]  License and Documentation Cosign  Therapy License Number: [id number]

## 2021-09-18 NOTE — PROGRESS NOTES
2nd IMM reviewed with patient and he verbalized understanding. Signature obtained. Error was made in documenting issuing time for 2nd IMM.  Correct time in 1515 pm, instead of 1445pm.

## 2021-09-18 NOTE — PROGRESS NOTES
Pt A&Ox4 sitting in chair. Pt ambulates independently. Assessment and vitals are as charted. Medications given per orders. Pt states no further needs at this time. Call light and table are within reach.
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
## 2021-09-19 VITALS
SYSTOLIC BLOOD PRESSURE: 161 MMHG | WEIGHT: 283.2 LBS | HEART RATE: 94 BPM | RESPIRATION RATE: 20 BRPM | OXYGEN SATURATION: 96 % | TEMPERATURE: 99.1 F | DIASTOLIC BLOOD PRESSURE: 70 MMHG | HEIGHT: 72 IN | BODY MASS INDEX: 38.36 KG/M2

## 2021-09-19 PROCEDURE — 6370000000 HC RX 637 (ALT 250 FOR IP): Performed by: INTERNAL MEDICINE

## 2021-09-19 PROCEDURE — 94640 AIRWAY INHALATION TREATMENT: CPT

## 2021-09-19 PROCEDURE — 6370000000 HC RX 637 (ALT 250 FOR IP): Performed by: ANESTHESIOLOGY

## 2021-09-19 PROCEDURE — 6360000002 HC RX W HCPCS: Performed by: INTERNAL MEDICINE

## 2021-09-19 PROCEDURE — 2580000003 HC RX 258: Performed by: INTERNAL MEDICINE

## 2021-09-19 RX ORDER — MELOXICAM 15 MG/1
15 TABLET ORAL DAILY
Qty: 30 TABLET | Refills: 0 | Status: SHIPPED | OUTPATIENT
Start: 2021-09-19 | End: 2021-10-26 | Stop reason: SDUPTHER

## 2021-09-19 RX ORDER — AZITHROMYCIN 500 MG/1
500 TABLET, FILM COATED ORAL DAILY
Qty: 3 TABLET | Refills: 0 | Status: SHIPPED | OUTPATIENT
Start: 2021-09-19 | End: 2021-09-22

## 2021-09-19 RX ORDER — PREDNISONE 10 MG/1
10 TABLET ORAL DAILY
Qty: 10 TABLET | Refills: 0 | Status: SHIPPED | OUTPATIENT
Start: 2021-09-19 | End: 2021-09-23 | Stop reason: ALTCHOICE

## 2021-09-19 RX ORDER — IPRATROPIUM BROMIDE AND ALBUTEROL SULFATE 2.5; .5 MG/3ML; MG/3ML
1 SOLUTION RESPIRATORY (INHALATION) EVERY 6 HOURS PRN
Qty: 360 ML | Refills: 3 | Status: SHIPPED | OUTPATIENT
Start: 2021-09-19 | End: 2022-02-09 | Stop reason: SDUPTHER

## 2021-09-19 RX ADMIN — ACETAMINOPHEN 650 MG: 325 TABLET ORAL at 06:02

## 2021-09-19 RX ADMIN — IPRATROPIUM BROMIDE AND ALBUTEROL SULFATE 1 AMPULE: .5; 2.5 SOLUTION RESPIRATORY (INHALATION) at 11:31

## 2021-09-19 RX ADMIN — TRAMADOL HYDROCHLORIDE 50 MG: 50 TABLET, FILM COATED ORAL at 04:07

## 2021-09-19 RX ADMIN — IPRATROPIUM BROMIDE AND ALBUTEROL SULFATE 1 AMPULE: .5; 2.5 SOLUTION RESPIRATORY (INHALATION) at 08:28

## 2021-09-19 RX ADMIN — AMLODIPINE BESYLATE 10 MG: 10 TABLET ORAL at 08:28

## 2021-09-19 RX ADMIN — POLYETHYLENE GLYCOL 3350 17 G: 17 POWDER, FOR SOLUTION ORAL at 08:28

## 2021-09-19 RX ADMIN — Medication 10 ML: at 08:29

## 2021-09-19 RX ADMIN — PANTOPRAZOLE SODIUM 40 MG: 40 TABLET, DELAYED RELEASE ORAL at 08:29

## 2021-09-19 RX ADMIN — METAXALONE 800 MG: 800 TABLET ORAL at 08:28

## 2021-09-19 RX ADMIN — AZITHROMYCIN MONOHYDRATE 500 MG: 500 INJECTION, POWDER, LYOPHILIZED, FOR SOLUTION INTRAVENOUS at 05:52

## 2021-09-19 RX ADMIN — IPRATROPIUM BROMIDE AND ALBUTEROL SULFATE 1 AMPULE: .5; 2.5 SOLUTION RESPIRATORY (INHALATION) at 04:21

## 2021-09-19 RX ADMIN — METHYLPREDNISOLONE SODIUM SUCCINATE 40 MG: 40 INJECTION, POWDER, FOR SOLUTION INTRAMUSCULAR; INTRAVENOUS at 12:25

## 2021-09-19 RX ADMIN — ENOXAPARIN SODIUM 40 MG: 40 INJECTION SUBCUTANEOUS at 08:28

## 2021-09-19 RX ADMIN — GUAIFENESIN 600 MG: 600 TABLET, EXTENDED RELEASE ORAL at 08:29

## 2021-09-19 ASSESSMENT — PAIN SCALES - GENERAL
PAINLEVEL_OUTOF10: 8
PAINLEVEL_OUTOF10: 7
PAINLEVEL_OUTOF10: 8

## 2021-09-19 ASSESSMENT — PAIN DESCRIPTION - LOCATION
LOCATION: BACK

## 2021-09-19 ASSESSMENT — PAIN DESCRIPTION - PAIN TYPE
TYPE: CHRONIC PAIN

## 2021-09-19 NOTE — DISCHARGE SUMMARY
Discharge Summary    Patient:  Doris Ellison  YOB: 1955    MRN: [id number]   Acct: [de-identified]    Primary Care Physician: Nomi Weinstein MD    Admit date:  9/16/2021    Discharge date:   09/19/21      Discharge Diagnoses:   <principal problem not specified>  Active Problems:    COPD exacerbation ([address]  Resolved Problems:    * No resolved hospital problems. *      Admitted for: [facility] Course: patient came with COPD exacerbation, CAP. Treated with IV atbs/steroids, O2 and respiratory Tx. Due to chronic back pain pain management were consulted as well. After completing his acute stay/treatment will be DC home with PO steroids/azithromycin. Will follow with pain clinic as outpatient. Advice to stop smoking, using cocaine      Consultants:  Pain management     Discharge Medications:       Medication List      START taking these medications    azithromycin 500 MG tablet  Commonly known as: ZITHROMAX  Take 1 tablet by mouth daily for 3 days     predniSONE 10 MG tablet  Commonly known as: DELTASONE  Take 1 tablet by mouth daily for 10 days        CHANGE how you take these medications    ipratropium-albuterol 0.5-2.5 (3) MG/3ML Soln nebulizer solution  Commonly known as: DUONEB  Inhale 3 mLs into the lungs every 6 hours as needed for Shortness of Breath  What changed:   · when to take this  · reasons to take this     meloxicam 15 MG tablet  Commonly known as: MOBIC  Take 1 tablet by mouth daily  What changed: See the new instructions. polyethylene glycol 17 GM/SCOOP powder  Commonly known as: GLYCOLAX  Take 17 g by mouth daily  What changed:   · when to take this  · reasons to take this     triamcinolone 0.025 % cream  Commonly known as: KENALOG  Apply topically 2 times daily.   What changed:   · when to take this  · reasons to take this        CONTINUE taking these medications    acetaminophen 500 MG tablet  Commonly known as: Acetaminophen Extra Strength  Take 1 tablet by mouth every 8 hours as needed for Pain     albuterol sulfate  (90 Base) MCG/ACT inhaler  Commonly known as: ProAir HFA  Use every 4 hours while awake for 7-10 days then PRN wheezing  Dispense with SPACER and Instruct on use. May sub Ventolin or Proventil as needed per [employer].      amLODIPine 10 MG tablet  Commonly known as: NORVASC  Take 1 tablet by mouth daily     escitalopram 10 MG tablet  Commonly known as: LEXAPRO  Take 1 tablet by mouth daily     fluticasone 50 MCG/ACT nasal spray  Commonly known as: FLONASE  2 sprays by Nasal route daily     Handicap Placard Misc  by Does not apply route DX: OSTEOARTHRITIS OF BOTH KNEES (M17.0), COPD (J44.9)     EXPIRES: 02/2024     Hydrocortisone (Perianal) 1 % cream     lovastatin 10 MG tablet  Commonly known as: MEVACOR  Take 1 tablet by mouth nightly     montelukast 10 MG tablet  Commonly known as: SINGULAIR  Take 1 tablet by mouth nightly     pantoprazole 40 MG tablet  Commonly known as: PROTONIX  Take 1 tablet by mouth daily     sildenafil 100 MG tablet  Commonly known as: VIAGRA  Take 1 tablet by mouth as needed for Erectile Dysfunction     traZODone 50 MG tablet  Commonly known as: DESYREL  Take 1 tablet by mouth nightly     Trelegy Ellipta 200-62.5-25 MCG/INH Aepb  Generic drug: Fluticasone-Umeclidin-Vilant     ZyrTEC Allergy 10 MG tablet  Generic drug: cetirizine           Where to Get Your Medications      These medications were sent to [facility]/pharmacy #[id number] [place], [place] [id number] [facility] [phone]  [address] [facility] [address]    Phone: [phone]   · azithromycin 500 MG tablet  · ipratropium-albuterol 0.5-2.5 (3) MG/3ML Soln nebulizer solution  · meloxicam 15 MG tablet  · predniSONE 10 MG tablet         Physical Exam:    Vitals:  Vitals:    09/18/21 2333 09/19/21 0422 09/19/21 0557 09/19/21 0915   BP:   (!) 161/70    Pulse:   94    Resp: 20 20 20    Temp:   99.1 °F (37.3 °C)    TempSrc:   Oral    SpO2: 97% 98% 100% 96%   Weight: Height:         Weight: Weight: 283 lb 3.2 oz (128.5 kg)     24 hour intake/output:No intake or output data in the 24 hours ending 09/19/21 0918    General appearance - alert, well appearing, and in no distress  Chest - mild expiratory wheezing   Heart - normal rate, regular rhythm, normal S1, S2, no murmurs, rubs, clicks or gallops  Abdomen - soft, nontender, nondistended, no masses or organomegaly  Obese: Yes; Protuberant: Yes   Neurological - alert, oriented, normal speech, no focal findings or movement disorder noted  Extremities - peripheral pulses normal, no pedal edema, no clubbing or cyanosis  Skin - normal coloration and turgor, no rashes, no suspicious skin lesions noted        Radiology reports as per the Radiologist  Radiology: XR CHEST PORTABLE    Result Date: 9/16/2021  EXAMINATION: XR CHEST PORTABLE. DATE AND TIME:9/16/2021 5:25 AM CLINICAL HISTORY: SHORTNESS OF BREATH   SOB  COMPARISONS: DECEMBER 9, 2020  FINDINGS: Heart and mediastinum appear normal. Increased haziness at left base may be technical artifact. Cannot exclude infiltrate. Scarring at right base. No overt pulmonary edema. No pneumothorax. QUESTION LEFT BASE INFILTRATE        Results for orders placed or performed during the hospital encounter of 09/16/21   COVID-19, Rapid    Specimen: Nasopharyngeal Swab   Result Value Ref Range    SARS-CoV-2, NAAT Not Detected Not Detected   Culture, Blood 1    Specimen: Blood   Result Value Ref Range    Blood Culture, Routine       No Growth to date. Any change in status will be called. Culture, Blood 2    Specimen: Blood   Result Value Ref Range    Culture, Blood 2       No Growth to date. Any change in status will be called.    CBC Auto Differential   Result Value Ref Range    WBC 21.4 (H) 4.2 - 9.0 K/uL    RBC 4.49 (L) 4.63 - 6.08 M/uL    Hemoglobin 13.9 13.7 - 17.5 g/dL    Hematocrit 43.5 42.0 - 52.0 %    MCV 96.9 (H) 79.0 - 92.2 fL    MCH 31.0 25.7 - 32.2 pg    MCHC 32.0 (L) 32.3 - 36.5 % RDW 15.2 (H) 11.6 - 14.4 %    Platelets 528 199 - 244 K/uL    PLATELET SLIDE REVIEW Adequate     SLIDE REVIEW see below     Neutrophils % 77.0 (H) 34.0 - 67.9 %    Immature Granulocytes % 0.7 %    Lymphocytes % 4.0 %    Monocytes % 9.0 5.3 - 12.2 %    Eosinophils % 0.1 (L) 0.8 - 7.0 %    Basophils % 0.1 (L) 0.2 - 1.2 %    Neutrophils Absolute 18.6 (H) 1.8 - 5.4 K/uL    Immature Granulocytes # 0.1 K/uL    Lymphocytes Absolute 0.9 (L) 1.3 - 3.6 K/uL    Monocytes Absolute 1.9 (H) 0.3 - 0.8 K/uL    Eosinophils Absolute 0.0 0.0 - 0.5 K/uL    Basophils Absolute 0.0 0.0 - 0.1 K/uL    Bands Relative 10 %   Comprehensive Metabolic Panel w/ Reflex to MG   Result Value Ref Range    Sodium 135 135 - 144 mEq/L    Potassium reflex Magnesium 4.4 3.4 - 4.9 mEq/L    Chloride 100 95 - 107 mEq/L    CO2 22 20 - 31 mEq/L    Anion Gap 13 9 - 15 mEq/L    Glucose 123 (H) 70 - 99 mg/dL    BUN 16 8 - 23 mg/dL    CREATININE 1.22 (H) 0.70 - 1.20 mg/dL    GFR Non-African American 59.8 (L) >60    GFR  >60.0 >60    Calcium 10.2 (H) 8.5 - 9.9 mg/dL    Total Protein 7.6 6.3 - 8.0 g/dL    Albumin 3.9 3.5 - 4.6 g/dL    Total Bilirubin 2.7 (H) 0.2 - 0.7 mg/dL    Alkaline Phosphatase 109 (H) 35 - 104 U/L    ALT 24 0 - 41 U/L    AST 14 0 - 40 U/L    Globulin 3.7 (H) 2.3 - 3.5 g/dL   Troponin   Result Value Ref Range    Troponin <0.010 0.000 - 0.010 ng/mL   Brain Natriuretic Peptide   Result Value Ref Range    Pro- pg/mL   Protime-INR   Result Value Ref Range    Protime 15.6 (H) 12.3 - 14.9 sec    INR 1.3    APTT   Result Value Ref Range    aPTT 33.8 24.4 - 36.8 sec   D-Dimer, Quantitative   Result Value Ref Range    D-Dimer, Quant 0.51 (HH) 0.00 - 0.50 mg/L FEU   Lactic Acid, Plasma   Result Value Ref Range    Lactic Acid 2.1 0.5 - 2.2 mmol/L   PROCALCITONIN   Result Value Ref Range    Procalcitonin 1.92 (H) 0.00 - 0.15 ng/mL   Ethanol   Result Value Ref Range    Ethanol Lvl <10 mg/dL    Ethanol percent Not indicated G/dL Urine Drug Screen   Result Value Ref Range    Amphetamine Screen, Urine Neg Negative <1000 ng/mL    Barbiturate Screen, Ur Neg Negative < 200 ng/mL    Benzodiazepine Screen, Urine Neg Negative < 200 ng/mL    Cannabinoid Scrn, Ur POSITIVE (A) Negative < 50 ng/mL    Cocaine Metabolite Screen, Urine POSITIVE (A) Negative < 300 ng/mL    Opiate Scrn, Ur Neg Negative < 300 ng/mL    PCP Screen, Urine Neg Negative < 25 ng/mL    Methadone Screen, Urine Neg Negative <300 ng/mL    Propoxyphene Scrn, Ur Neg Negative <300 ng/mL    Oxycodone Urine Neg Negative <100 ng/mL    Drug Screen Comment: see below    Urinalysis, reflex to microscopic   Result Value Ref Range    Color, UA Dark yellow Straw/Yellow    Clarity, UA Clear Clear    Glucose, Ur 500 (A) Negative mg/dL    Bilirubin Urine Negative Negative    Ketones, Urine Trace Negative mg/dL    Specific Gravity, UA 1.020 1.005 - 1.030    Blood, Urine Trace-intact Negative    pH, UA 5.5 5.0 - 9.0    Protein, UA Negative Negative mg/dL    Urobilinogen, Urine 2.0 (A) <2.0 E.U./dL    Nitrite, Urine Negative Negative    Leukocyte Esterase, Urine Negative Negative   Microscopic Urinalysis   Result Value Ref Range    WBC, UA 0-2 0 - 5 /HPF    RBC, UA 0-2 0 - 2 /HPF    Epithelial Cells, UA 0-2 /HPF   CBC   Result Value Ref Range    WBC 21.0 (H) 4.2 - 9.0 K/uL    RBC 3.92 (L) 4.63 - 6.08 M/uL    Hemoglobin 12.4 (L) 13.7 - 17.5 g/dL    Hematocrit 38.1 (L) 42.0 - 52.0 %    MCV 97.2 (H) 79.0 - 92.2 fL    MCH 31.6 25.7 - 32.2 pg    MCHC 32.5 32.3 - 36.5 %    RDW 14.9 (H) 11.6 - 14.4 %    Platelets 841 321 - 081 K/uL   Basic Metabolic Panel w/ Reflex to MG   Result Value Ref Range    Sodium 137 135 - 144 mEq/L    Potassium reflex Magnesium 4.5 3.4 - 4.9 mEq/L    Chloride 104 95 - 107 mEq/L    CO2 23 20 - 31 mEq/L    Anion Gap 10 9 - 15 mEq/L    Glucose 183 (H) 70 - 99 mg/dL    BUN 23 8 - 23 mg/dL    CREATININE 1.01 0.70 - 1.20 mg/dL    GFR Non-African American >60.0 >60    GFR  American >60.0 >60    Calcium 10.3 (H) 8.5 - 9.9 mg/dL   EKG 12 Lead   Result Value Ref Range    Ventricular Rate 110 BPM    Atrial Rate 110 BPM    P-R Interval 152 ms    QRS Duration 90 ms    Q-T Interval 312 ms    QTc Calculation (Bazett) 422 ms    P Axis 74 degrees    R Axis 58 degrees    T Axis 71 degrees       Diet:  ADULT DIET;  Regular    Activity:  Activity as tolerated (Patient may move about with assist as indicated or with supervision.)    Follow-up:  in 1 weeks with Janell Ibarra MD,      Disposition: home    Condition: Stable    Time Spent: 50 minutes    Electronically signed by Reena Lazo MD on 9/19/2021 at 9:18 AM    Discharging Hospitalist

## 2021-09-21 ENCOUNTER — TELEPHONE (OUTPATIENT)
Dept: FAMILY MEDICINE CLINIC | Age: 66
End: 2021-09-21

## 2021-09-21 LAB
BLOOD CULTURE, ROUTINE: NORMAL
CULTURE, BLOOD 2: NORMAL

## 2021-09-21 NOTE — TELEPHONE ENCOUNTER
.. [facility] Transitions Initial Follow Up Call    Outreach made within 2 business days of discharge: Yes    Patient: Shwetha Hernandez Patient : 1955   MRN: [id number]  Reason for Admission: There are no discharge diagnoses documented for the most recent discharge. Discharge Date: 21       Spoke with: Michel Willis     Discharge department/facility: [facility] Interactive Patient Contact:  Was patient able to fill all prescriptions: Yes  Was patient instructed to bring all medications to the follow-up visit: Yes  Is patient taking all medications as directed in the discharge summary?  Yes  Does patient understand their discharge instructions: Yes  Does patient have questions or concerns that need addressed prior to 7-14 day follow up office visit: no    Scheduled appointment with PCP within 7-14 days    Follow Up  Future Appointments   Date Time Provider Constance Espitia   2021  5:00 PM Mimi Pennington MD [facility]   2022  1:40 PM Mimi Pennington MD [address] [place] [address]

## 2021-09-23 ENCOUNTER — OFFICE VISIT (OUTPATIENT)
Dept: FAMILY MEDICINE CLINIC | Age: 66
End: 2021-09-23
Payer: MEDICARE

## 2021-09-23 ENCOUNTER — TELEPHONE (OUTPATIENT)
Dept: OTHER | Facility: CLINIC | Age: 66
End: 2021-09-23

## 2021-09-23 ENCOUNTER — HOSPITAL ENCOUNTER (INPATIENT)
Age: 66
LOS: 4 days | Discharge: HOME | DRG: 871 | End: 2021-09-28
Attending: EMERGENCY MEDICINE | Admitting: INTERNAL MEDICINE
Payer: MEDICARE

## 2021-09-23 ENCOUNTER — APPOINTMENT (OUTPATIENT)
Dept: GENERAL RADIOLOGY | Age: 66
DRG: 871 | End: 2021-09-23
Payer: MEDICARE

## 2021-09-23 VITALS
BODY MASS INDEX: 38.36 KG/M2 | WEIGHT: 289.4 LBS | TEMPERATURE: 97.6 F | RESPIRATION RATE: 20 BRPM | DIASTOLIC BLOOD PRESSURE: 50 MMHG | HEART RATE: 101 BPM | SYSTOLIC BLOOD PRESSURE: 110 MMHG | OXYGEN SATURATION: 95 % | HEIGHT: 73 IN

## 2021-09-23 DIAGNOSIS — R73.03 PRE-DIABETES: Chronic | ICD-10-CM

## 2021-09-23 DIAGNOSIS — I10 ESSENTIAL HYPERTENSION: Chronic | ICD-10-CM

## 2021-09-23 DIAGNOSIS — D72.829 LEUKOCYTOSIS, UNSPECIFIED TYPE: ICD-10-CM

## 2021-09-23 DIAGNOSIS — J44.1 CHRONIC OBSTRUCTIVE PULMONARY DISEASE WITH ACUTE EXACERBATION (HCC): Chronic | ICD-10-CM

## 2021-09-23 DIAGNOSIS — J18.9 COMMUNITY ACQUIRED PNEUMONIA OF LEFT LOWER LOBE OF LUNG: Primary | ICD-10-CM

## 2021-09-23 DIAGNOSIS — J18.9 PNEUMONIA OF LEFT LUNG DUE TO INFECTIOUS ORGANISM, UNSPECIFIED PART OF LUNG: Primary | ICD-10-CM

## 2021-09-23 DIAGNOSIS — Z72.0 TOBACCO USE: ICD-10-CM

## 2021-09-23 DIAGNOSIS — J44.1 COPD EXACERBATION (HCC): ICD-10-CM

## 2021-09-23 DIAGNOSIS — D64.9 ANEMIA, UNSPECIFIED TYPE: Chronic | ICD-10-CM

## 2021-09-23 LAB
ALBUMIN SERPL-MCNC: 2.8 G/DL (ref 3.5–4.6)
ALP BLD-CCNC: 156 U/L (ref 35–104)
ALT SERPL-CCNC: 23 U/L (ref 0–41)
ANION GAP SERPL CALCULATED.3IONS-SCNC: 12 MEQ/L (ref 9–15)
AST SERPL-CCNC: 20 U/L (ref 0–40)
BILIRUB SERPL-MCNC: 0.5 MG/DL (ref 0.2–0.7)
BUN BLDV-MCNC: 11 MG/DL (ref 8–23)
CALCIUM SERPL-MCNC: 8.6 MG/DL (ref 8.5–9.9)
CHLORIDE BLD-SCNC: 101 MEQ/L (ref 95–107)
CO2: 23 MEQ/L (ref 20–31)
CREAT SERPL-MCNC: 0.93 MG/DL (ref 0.7–1.2)
GFR AFRICAN AMERICAN: >60
GFR NON-AFRICAN AMERICAN: >60
GLOBULIN: 4 G/DL (ref 2.3–3.5)
GLUCOSE BLD-MCNC: 177 MG/DL (ref 70–99)
HCT VFR BLD CALC: 37.5 % (ref 42–52)
HEMOGLOBIN: 11.9 G/DL (ref 13.7–17.5)
MCH RBC QN AUTO: 30.5 PG (ref 25.7–32.2)
MCHC RBC AUTO-ENTMCNC: 31.7 % (ref 32.3–36.5)
MCV RBC AUTO: 96.2 FL (ref 79–92.2)
PDW BLD-RTO: 15.4 % (ref 11.6–14.4)
PLATELET # BLD: 364 K/UL (ref 163–337)
POTASSIUM SERPL-SCNC: 4.5 MEQ/L (ref 3.4–4.9)
RBC # BLD: 3.9 M/UL (ref 4.63–6.08)
SARS-COV-2, NAAT: NOT DETECTED
SODIUM BLD-SCNC: 136 MEQ/L (ref 135–144)
TOTAL PROTEIN: 6.8 G/DL (ref 6.3–8)
TROPONIN: <0.01 NG/ML (ref 0–0.01)
WBC # BLD: 25.7 K/UL (ref 4.2–9)

## 2021-09-23 PROCEDURE — 36415 COLL VENOUS BLD VENIPUNCTURE: CPT

## 2021-09-23 PROCEDURE — 96366 THER/PROPH/DIAG IV INF ADDON: CPT

## 2021-09-23 PROCEDURE — 6360000002 HC RX W HCPCS: Performed by: EMERGENCY MEDICINE

## 2021-09-23 PROCEDURE — 71045 X-RAY EXAM CHEST 1 VIEW: CPT

## 2021-09-23 PROCEDURE — 96365 THER/PROPH/DIAG IV INF INIT: CPT

## 2021-09-23 PROCEDURE — 93005 ELECTROCARDIOGRAM TRACING: CPT

## 2021-09-23 PROCEDURE — 94640 AIRWAY INHALATION TREATMENT: CPT

## 2021-09-23 PROCEDURE — 84484 ASSAY OF TROPONIN QUANT: CPT

## 2021-09-23 PROCEDURE — 80053 COMPREHEN METABOLIC PANEL: CPT

## 2021-09-23 PROCEDURE — 85027 COMPLETE CBC AUTOMATED: CPT

## 2021-09-23 PROCEDURE — 1111F DSCHRG MED/CURRENT MED MERGE: CPT | Performed by: FAMILY MEDICINE

## 2021-09-23 PROCEDURE — 99214 OFFICE O/P EST MOD 30 MIN: CPT | Performed by: FAMILY MEDICINE

## 2021-09-23 PROCEDURE — 87635 SARS-COV-2 COVID-19 AMP PRB: CPT

## 2021-09-23 RX ORDER — TRAMADOL HYDROCHLORIDE 50 MG/1
TABLET ORAL
Status: ON HOLD | COMMUNITY
Start: 2021-05-25 | End: 2021-10-20 | Stop reason: HOSPADM

## 2021-09-23 RX ORDER — LEVOFLOXACIN 5 MG/ML
500 INJECTION, SOLUTION INTRAVENOUS ONCE
Status: COMPLETED | OUTPATIENT
Start: 2021-09-23 | End: 2021-09-24

## 2021-09-23 RX ORDER — ALBUTEROL SULFATE 2.5 MG/3ML
2.5 SOLUTION RESPIRATORY (INHALATION) EVERY 6 HOURS PRN
Status: DISCONTINUED | OUTPATIENT
Start: 2021-09-23 | End: 2021-09-24 | Stop reason: DRUGHIGH

## 2021-09-23 RX ORDER — PREDNISONE 10 MG/1
TABLET ORAL
Qty: 45 TABLET | Refills: 0 | Status: ON HOLD | OUTPATIENT
Start: 2021-09-23 | End: 2021-09-28 | Stop reason: HOSPADM

## 2021-09-23 RX ADMIN — ALBUTEROL SULFATE 2.5 MG: 2.5 SOLUTION RESPIRATORY (INHALATION) at 22:51

## 2021-09-23 RX ADMIN — LEVOFLOXACIN 500 MG: 5 INJECTION, SOLUTION INTRAVENOUS at 22:44

## 2021-09-23 ASSESSMENT — ENCOUNTER SYMPTOMS
WHEEZING: 1
SINUS PAIN: 0
SORE THROAT: 0
ABDOMINAL PAIN: 0
CHEST TIGHTNESS: 1
SHORTNESS OF BREATH: 1
COUGH: 1
DIARRHEA: 0
VOMITING: 0
NAUSEA: 0
TROUBLE SWALLOWING: 0

## 2021-09-23 NOTE — ASSESSMENT & PLAN NOTE
She reports being tobacco free since day of hospital admission. He was encouraged to continue abstaining from all tobacco use.   We will continue to follow over time

## 2021-09-23 NOTE — PROGRESS NOTES
Bindu Montes (: 1955) is a 63 y.o. male, Established patient, who presents today for:    Chief Complaint   Patient presents with   [address] from Hospital     Pt presents today for a hospital follow up. Pt went to [facility] on - for COPD. Pt reports not feeling any better today. ASSESSMENT/PLAN    1. Community acquired pneumonia of left lower lobe of lung  Comments:  No reported increased mucus production, but continued dyspnea on exertion and chest tightness. Will repeat CXR to follow for resolution  Orders:  -     AL DISCHARGE MEDS RECONCILED W/ CURRENT OUTPATIENT MED LIST  -     XR CHEST STANDARD (2 VW); Future  -     CBC Auto Differential; Future  2. Chronic obstructive pulmonary disease with acute exacerbation [facility])  Assessment & Plan:  Patient with continued dyspnea on exertion and wheezing likely related to continued airway exacerbation. Will use tapered dose prednisone for management and patient will continue with home inhalers and nebulizer. I will have office staff help him arrange a new nebulizer. Patient was instructed to return to the emergency room for any worsening dyspnea at rest despite management or worsening dyspnea on exertion despite use of medication  Orders:  -     AL DISCHARGE MEDS RECONCILED W/ CURRENT OUTPATIENT MED LIST  -     XR CHEST STANDARD (2 VW); Future  -     CBC Auto Differential; Future  -     predniSONE (DELTASONE) 10 MG tablet; Take 5 tabs daily x 3 days, 4 tabs daily x 3 days, 3 tabs daily x 3 days, 2 tabs daily x 3 days, 1 tab daily x 3 days, Disp-45 tablet, R-0Normal  3. Tobacco use  Assessment & Plan:  She reports being tobacco free since day of hospital admission. He was encouraged to continue abstaining from all tobacco use. We will continue to follow over time  4. Leukocytosis, unspecified type  -     CBC Auto Differential; Future  5. Essential hypertension  -     Basic Metabolic Panel; Future  6.  Pre-diabetes  -     Basic Metabolic Panel; Future  -     Hemoglobin A1C; Future  7. Anemia, unspecified type  -     CBC Auto Differential; Future      Return for COPD exacerbation F/U in 7-10 days. SUBJECTIVE/OBJECTIVE:    HPI    Patient presents for hospital follow-up visit. He was admitted at [facility] on 09/16/2021 with COPD exacerbation and community-acquired pneumonia and improved with the use of IV antibiotics, steroids, supplemental oxygen, and aerosol treatments. Patient was found stable for discharge home on 09/19/2021 to follow-up with primary care and pulmonology as an outpatient. He was discharged home with a 10-day course of oral steroids and 3-day course azithromycin. Due to reported chronic low back pain he was given a referral to pain management and advised to follow-up with Dr. Radha Chong as an outpatient. Patient reports feeling improved overall, but reports continued dyspnea on exertion, wheezing, and harsh non-productive cough despite use of his home inhalers and the recent course of prednisone. He denies any fever, chills, sweats, nasal congestion, anosmia, sore throat, ear pain, headache, chest pain, dyspnea at rest, abdominal pain, heartburn, nausea, vomiting, or diarrhea. He reports his nebulizer at home does not appear to be working normally and he would like to get a new one. Current Outpatient Medications on File Prior to Visit   Medication Sig Dispense Refill    traMADol (ULTRAM) 50 MG tablet Take by mouth.       meloxicam (MOBIC) 15 MG tablet Take 1 tablet by mouth daily 30 tablet 0    ipratropium-albuterol (DUONEB) 0.5-2.5 (3) MG/3ML SOLN nebulizer solution Inhale 3 mLs into the lungs every 6 hours as needed for Shortness of Breath 360 mL 3    cetirizine (ZYRTEC ALLERGY) 10 MG tablet Take 10 mg by mouth daily      sildenafil (VIAGRA) 100 MG tablet Take 1 tablet by mouth as needed for Erectile Dysfunction 10 tablet 0    TRELEGY ELLIPTA 200-62.5-25 MCG/INH AEPB Take 1 Inhaler by mouth daily      albuterol sulfate HFA (PROAIR HFA) 108 (90 Base) MCG/ACT inhaler Use every 4 hours while awake for 7-10 days then PRN wheezing  Dispense with SPACER and Instruct on use. May sub Ventolin or Proventil as needed per [employer]. 1 Inhaler 0    acetaminophen (ACETAMINOPHEN EXTRA STRENGTH) 500 MG tablet Take 1 tablet by mouth every 8 hours as needed for Pain 120 tablet 1    triamcinolone (KENALOG) 0.025 % cream Apply topically 2 times daily. (Patient taking differently: 2 times daily as needed Apply topically 2 times daily.) 80 g 1    Hydrocortisone, Perianal, 1 % cream daily as needed       amLODIPine (NORVASC) 10 MG tablet Take 1 tablet by mouth daily 90 tablet 1    escitalopram (LEXAPRO) 10 MG tablet Take 1 tablet by mouth daily 90 tablet 1    fluticasone (FLONASE) 50 MCG/ACT nasal spray 2 sprays by Nasal route daily 3 Bottle 1    lovastatin (MEVACOR) 10 MG tablet Take 1 tablet by mouth nightly 90 tablet 1    montelukast (SINGULAIR) 10 MG tablet Take 1 tablet by mouth nightly 90 tablet 1    pantoprazole (PROTONIX) 40 MG tablet Take 1 tablet by mouth daily 90 tablet 1    polyethylene glycol (GLYCOLAX) 17 GM/SCOOP powder Take 17 g by mouth daily (Patient taking differently: Take 17 g by mouth daily as needed ) 510 g 1    traZODone (DESYREL) 50 MG tablet Take 1 tablet by mouth nightly 90 tablet 1    Handicap Placard MISC by Does not apply route DX: OSTEOARTHRITIS OF BOTH KNEES (M17.0), COPD (J44.9)     EXPIRES: 02/2024 1 each 0     No current facility-administered medications on file prior to visit. Allergies   Allergen Reactions    Fish-Derived Products Anaphylaxis    Iodine Anaphylaxis     Iodine-containing products, dyes, contrast dye    Seasonal Shortness Of Breath    Pcn [Penicillins] Nausea And Vomiting        Review of Systems   Constitutional: Positive for fatigue. Negative for appetite change, chills, diaphoresis and fever.    HENT: Negative for congestion, ear pain, sinus pain, sore throat and trouble swallowing. Respiratory: Positive for cough, chest tightness, shortness of breath (with exertion) and wheezing. Cardiovascular: Negative for chest pain, palpitations and leg swelling. Gastrointestinal: Negative for abdominal pain, diarrhea, nausea and vomiting. Neurological: Negative for dizziness, syncope, light-headedness and headaches. Vitals:  BP (!) 110/50 (Site: Right Upper Arm, Position: Sitting, Cuff Size: Large Adult)   Pulse 101   Temp 97.6 °F (36.4 °C) (Temporal)   Resp 20   Ht 6' 0.5\" (1.842 m)   Wt 289 lb 6.4 oz (131.3 kg)   SpO2 95%   BMI 38.71 kg/m²     Physical Exam  Vitals reviewed. Constitutional:       General: He is not in acute distress (speaking in complete sentences). Appearance: Normal appearance. He is obese. He is not ill-appearing, toxic-appearing or diaphoretic. Cardiovascular:      Rate and Rhythm: Normal rate and regular rhythm. Heart sounds: No murmur heard. Pulmonary:      Effort: Pulmonary effort is normal. No tachypnea, accessory muscle usage or respiratory distress. Breath sounds: No decreased air movement. Wheezing present. No decreased breath sounds, rhonchi or rales. Comments: Scattered coarse breath sounds throughout  Abdominal:      General: Bowel sounds are normal.      Palpations: Abdomen is soft. Tenderness: There is no abdominal tenderness. There is no guarding or rebound. Musculoskeletal:      Right lower leg: No edema. Left lower leg: No edema. Skin:     Findings: No rash. Neurological:      Mental Status: He is alert and oriented to person, place, and time. Psychiatric:         Mood and Affect: Mood normal.         Behavior: Behavior normal.         Thought Content: Thought content normal.         Ortho Exam (If Applicable)              An electronic signature was used to authenticate this note.      Norberto May MD

## 2021-09-23 NOTE — ASSESSMENT & PLAN NOTE
Patient with continued dyspnea on exertion and wheezing likely related to continued airway exacerbation. Will use tapered dose prednisone for management and patient will continue with home inhalers and nebulizer. I will have office staff help him arrange a new nebulizer.   Patient was instructed to return to the emergency room for any worsening dyspnea at rest despite management or worsening dyspnea on exertion despite use of medication

## 2021-09-24 ENCOUNTER — APPOINTMENT (OUTPATIENT)
Dept: CT IMAGING | Age: 66
DRG: 871 | End: 2021-09-24
Payer: MEDICARE

## 2021-09-24 ENCOUNTER — TELEPHONE (OUTPATIENT)
Dept: FAMILY MEDICINE CLINIC | Age: 66
End: 2021-09-24

## 2021-09-24 PROBLEM — J18.9 PNEUMONIA: Status: ACTIVE | Noted: 2021-09-24

## 2021-09-24 LAB
EKG ATRIAL RATE: 116 BPM
EKG P AXIS: 43 DEGREES
EKG P-R INTERVAL: 112 MS
EKG Q-T INTERVAL: 322 MS
EKG QRS DURATION: 96 MS
EKG QTC CALCULATION (BAZETT): 447 MS
EKG R AXIS: 60 DEGREES
EKG T AXIS: 52 DEGREES
EKG VENTRICULAR RATE: 116 BPM
LACTIC ACID: 1.7 MMOL/L (ref 0.5–2.2)

## 2021-09-24 PROCEDURE — 71250 CT THORAX DX C-: CPT

## 2021-09-24 PROCEDURE — 2580000003 HC RX 258: Performed by: INTERNAL MEDICINE

## 2021-09-24 PROCEDURE — 93010 ELECTROCARDIOGRAM REPORT: CPT | Performed by: INTERNAL MEDICINE

## 2021-09-24 PROCEDURE — 1210000000 HC MED SURG R&B

## 2021-09-24 PROCEDURE — 6370000000 HC RX 637 (ALT 250 FOR IP): Performed by: INTERNAL MEDICINE

## 2021-09-24 PROCEDURE — 87070 CULTURE OTHR SPECIMN AEROBIC: CPT

## 2021-09-24 PROCEDURE — 36415 COLL VENOUS BLD VENIPUNCTURE: CPT

## 2021-09-24 PROCEDURE — 89220 SPUTUM SPECIMEN COLLECTION: CPT

## 2021-09-24 PROCEDURE — 83605 ASSAY OF LACTIC ACID: CPT

## 2021-09-24 PROCEDURE — 94640 AIRWAY INHALATION TREATMENT: CPT

## 2021-09-24 PROCEDURE — 87205 SMEAR GRAM STAIN: CPT

## 2021-09-24 PROCEDURE — 87040 BLOOD CULTURE FOR BACTERIA: CPT

## 2021-09-24 PROCEDURE — 6360000002 HC RX W HCPCS: Performed by: INTERNAL MEDICINE

## 2021-09-24 PROCEDURE — 99285 EMERGENCY DEPT VISIT HI MDM: CPT

## 2021-09-24 RX ORDER — ATORVASTATIN CALCIUM 10 MG/1
20 TABLET, FILM COATED ORAL NIGHTLY
Status: DISCONTINUED | OUTPATIENT
Start: 2021-09-24 | End: 2021-09-28 | Stop reason: HOSPADM

## 2021-09-24 RX ORDER — MELOXICAM 7.5 MG/1
15 TABLET ORAL DAILY
Status: DISCONTINUED | OUTPATIENT
Start: 2021-09-24 | End: 2021-09-28 | Stop reason: HOSPADM

## 2021-09-24 RX ORDER — ACETAMINOPHEN 650 MG/1
650 SUPPOSITORY RECTAL EVERY 6 HOURS PRN
Status: DISCONTINUED | OUTPATIENT
Start: 2021-09-24 | End: 2021-09-28 | Stop reason: HOSPADM

## 2021-09-24 RX ORDER — AMLODIPINE BESYLATE 10 MG/1
10 TABLET ORAL DAILY
Status: DISCONTINUED | OUTPATIENT
Start: 2021-09-24 | End: 2021-09-28 | Stop reason: HOSPADM

## 2021-09-24 RX ORDER — METHYLPREDNISOLONE SODIUM SUCCINATE 40 MG/ML
40 INJECTION, POWDER, LYOPHILIZED, FOR SOLUTION INTRAMUSCULAR; INTRAVENOUS EVERY 12 HOURS
Status: DISCONTINUED | OUTPATIENT
Start: 2021-09-24 | End: 2021-09-25

## 2021-09-24 RX ORDER — ACETAMINOPHEN 325 MG/1
650 TABLET ORAL EVERY 6 HOURS PRN
Status: DISCONTINUED | OUTPATIENT
Start: 2021-09-24 | End: 2021-09-28 | Stop reason: HOSPADM

## 2021-09-24 RX ORDER — MONTELUKAST SODIUM 10 MG/1
10 TABLET ORAL NIGHTLY
Status: DISCONTINUED | OUTPATIENT
Start: 2021-09-24 | End: 2021-09-28 | Stop reason: HOSPADM

## 2021-09-24 RX ORDER — SODIUM CHLORIDE 0.9 % (FLUSH) 0.9 %
10 SYRINGE (ML) INJECTION EVERY 12 HOURS SCHEDULED
Status: DISCONTINUED | OUTPATIENT
Start: 2021-09-24 | End: 2021-09-28 | Stop reason: HOSPADM

## 2021-09-24 RX ORDER — GUAIFENESIN 600 MG/1
600 TABLET, EXTENDED RELEASE ORAL 2 TIMES DAILY PRN
Status: DISCONTINUED | OUTPATIENT
Start: 2021-09-24 | End: 2021-09-28 | Stop reason: HOSPADM

## 2021-09-24 RX ORDER — ONDANSETRON 2 MG/ML
4 INJECTION INTRAMUSCULAR; INTRAVENOUS EVERY 6 HOURS PRN
Status: DISCONTINUED | OUTPATIENT
Start: 2021-09-24 | End: 2021-09-28 | Stop reason: HOSPADM

## 2021-09-24 RX ORDER — ALBUTEROL SULFATE 2.5 MG/3ML
2.5 SOLUTION RESPIRATORY (INHALATION) EVERY 4 HOURS PRN
Status: DISCONTINUED | OUTPATIENT
Start: 2021-09-24 | End: 2021-09-28 | Stop reason: HOSPADM

## 2021-09-24 RX ORDER — PANTOPRAZOLE SODIUM 40 MG/1
40 TABLET, DELAYED RELEASE ORAL DAILY
Status: DISCONTINUED | OUTPATIENT
Start: 2021-09-24 | End: 2021-09-28 | Stop reason: HOSPADM

## 2021-09-24 RX ORDER — SODIUM CHLORIDE 0.9 % (FLUSH) 0.9 %
10 SYRINGE (ML) INJECTION PRN
Status: DISCONTINUED | OUTPATIENT
Start: 2021-09-24 | End: 2021-09-28 | Stop reason: HOSPADM

## 2021-09-24 RX ORDER — ESCITALOPRAM OXALATE 10 MG/1
10 TABLET ORAL DAILY
Status: DISCONTINUED | OUTPATIENT
Start: 2021-09-24 | End: 2021-09-28 | Stop reason: HOSPADM

## 2021-09-24 RX ORDER — TRAMADOL HYDROCHLORIDE 50 MG/1
50 TABLET ORAL EVERY 6 HOURS PRN
Status: DISCONTINUED | OUTPATIENT
Start: 2021-09-24 | End: 2021-09-28 | Stop reason: HOSPADM

## 2021-09-24 RX ORDER — SODIUM CHLORIDE 9 MG/ML
25 INJECTION, SOLUTION INTRAVENOUS PRN
Status: DISCONTINUED | OUTPATIENT
Start: 2021-09-24 | End: 2021-09-28 | Stop reason: HOSPADM

## 2021-09-24 RX ORDER — PROMETHAZINE HYDROCHLORIDE 12.5 MG/1
12.5 TABLET ORAL EVERY 6 HOURS PRN
Status: DISCONTINUED | OUTPATIENT
Start: 2021-09-24 | End: 2021-09-28 | Stop reason: HOSPADM

## 2021-09-24 RX ORDER — POLYETHYLENE GLYCOL 3350 17 G/17G
17 POWDER, FOR SOLUTION ORAL DAILY PRN
Status: DISCONTINUED | OUTPATIENT
Start: 2021-09-24 | End: 2021-09-28 | Stop reason: HOSPADM

## 2021-09-24 RX ORDER — TRAZODONE HYDROCHLORIDE 50 MG/1
50 TABLET ORAL NIGHTLY
Status: DISCONTINUED | OUTPATIENT
Start: 2021-09-24 | End: 2021-09-28 | Stop reason: HOSPADM

## 2021-09-24 RX ORDER — IPRATROPIUM BROMIDE AND ALBUTEROL SULFATE 2.5; .5 MG/3ML; MG/3ML
1 SOLUTION RESPIRATORY (INHALATION) 3 TIMES DAILY
Status: DISCONTINUED | OUTPATIENT
Start: 2021-09-24 | End: 2021-09-28 | Stop reason: HOSPADM

## 2021-09-24 RX ADMIN — ESCITALOPRAM OXALATE 10 MG: 10 TABLET ORAL at 01:51

## 2021-09-24 RX ADMIN — PIPERACILLIN AND TAZOBACTAM 3375 MG: 3; .375 INJECTION, POWDER, LYOPHILIZED, FOR SOLUTION INTRAVENOUS at 10:11

## 2021-09-24 RX ADMIN — ATORVASTATIN CALCIUM 20 MG: 10 TABLET, FILM COATED ORAL at 22:25

## 2021-09-24 RX ADMIN — MELOXICAM 15 MG: 7.5 TABLET ORAL at 09:16

## 2021-09-24 RX ADMIN — METHYLPREDNISOLONE SODIUM SUCCINATE 40 MG: 40 INJECTION, POWDER, FOR SOLUTION INTRAMUSCULAR; INTRAVENOUS at 22:24

## 2021-09-24 RX ADMIN — ENOXAPARIN SODIUM 40 MG: 40 INJECTION SUBCUTANEOUS at 09:16

## 2021-09-24 RX ADMIN — MONTELUKAST SODIUM 10 MG: 10 TABLET ORAL at 22:25

## 2021-09-24 RX ADMIN — TRAZODONE HYDROCHLORIDE 50 MG: 50 TABLET ORAL at 22:25

## 2021-09-24 RX ADMIN — AMLODIPINE BESYLATE 10 MG: 10 TABLET ORAL at 09:16

## 2021-09-24 RX ADMIN — IPRATROPIUM BROMIDE AND ALBUTEROL SULFATE 3 ML: .5; 2.5 SOLUTION RESPIRATORY (INHALATION) at 17:59

## 2021-09-24 RX ADMIN — TRAMADOL HYDROCHLORIDE 50 MG: 50 TABLET ORAL at 18:47

## 2021-09-24 RX ADMIN — TRAMADOL HYDROCHLORIDE 50 MG: 50 TABLET ORAL at 01:51

## 2021-09-24 RX ADMIN — TRAZODONE HYDROCHLORIDE 50 MG: 50 TABLET ORAL at 01:51

## 2021-09-24 RX ADMIN — SODIUM CHLORIDE, PRESERVATIVE FREE 10 ML: 5 INJECTION INTRAVENOUS at 22:25

## 2021-09-24 RX ADMIN — GUAIFENESIN 600 MG: 600 TABLET, EXTENDED RELEASE ORAL at 09:16

## 2021-09-24 RX ADMIN — PIPERACILLIN AND TAZOBACTAM 3375 MG: 3; .375 INJECTION, POWDER, LYOPHILIZED, FOR SOLUTION INTRAVENOUS at 01:56

## 2021-09-24 RX ADMIN — IPRATROPIUM BROMIDE AND ALBUTEROL SULFATE 3 ML: .5; 2.5 SOLUTION RESPIRATORY (INHALATION) at 11:44

## 2021-09-24 RX ADMIN — PIPERACILLIN AND TAZOBACTAM 3375 MG: 3; .375 INJECTION, POWDER, LYOPHILIZED, FOR SOLUTION INTRAVENOUS at 22:24

## 2021-09-24 RX ADMIN — IPRATROPIUM BROMIDE AND ALBUTEROL SULFATE 3 ML: .5; 2.5 SOLUTION RESPIRATORY (INHALATION) at 05:50

## 2021-09-24 RX ADMIN — METHYLPREDNISOLONE SODIUM SUCCINATE 40 MG: 40 INJECTION, POWDER, FOR SOLUTION INTRAMUSCULAR; INTRAVENOUS at 01:52

## 2021-09-24 RX ADMIN — ESCITALOPRAM OXALATE 10 MG: 10 TABLET ORAL at 22:25

## 2021-09-24 RX ADMIN — METHYLPREDNISOLONE SODIUM SUCCINATE 40 MG: 40 INJECTION, POWDER, FOR SOLUTION INTRAMUSCULAR; INTRAVENOUS at 12:47

## 2021-09-24 RX ADMIN — PANTOPRAZOLE SODIUM 40 MG: 40 TABLET, DELAYED RELEASE ORAL at 09:16

## 2021-09-24 ASSESSMENT — PAIN SCALES - GENERAL
PAINLEVEL_OUTOF10: 9
PAINLEVEL_OUTOF10: 8
PAINLEVEL_OUTOF10: 8

## 2021-09-24 NOTE — ED PROVIDER NOTES
[facility] ED  EMERGENCY DEPARTMENT ENCOUNTER      Pt Name: Yoselin Olivia  MRN: [id number]  [place] 1955  Date of evaluation: 9/23/2021  Provider: Andrey Kearney DO    CHIEF COMPLAINT       Chief Complaint   Patient presents with    Shortness of Breath     sob since d/c from hospital monday     Chief complaint: Shortness of breath  History of chief complaint: This 62-year-old gentleman presents the emergency department complaining of increased trouble breathing ongoing over the last couple weeks. Patient states he has underlying history of COPD for which he is on home O2 4 L nasal cannula and does regular breathing treatments. Patient states his breathing started going bad a couple weeks ago states he came into the hospital last week and was diagnosed with pneumonia states he stayed in the hospital for 3 days but his breathing never really felt much better throughout states he went home yesterday started with increased trouble breathing increased coughing bringing up thick yellow sputum patient states he took his last antibiotic yesterday. Patient denies any nausea he did have one small emesis and had some loose stools with the antibiotics. Patient denies any chest pain or palpitations no abdominal pain patient states he has felt hot and cold uncertain if fever patient denies any leg pain or swelling no history of DVT PE no travel. Patient denies any history of coronary artery disease states he did have a stress test less than 1 year ago which was okay. Patient denies any specific known sick contacts states he lives alone      Nursing Notes were reviewed. REVIEW OF SYSTEMS    (2-9 systems for level 4, 10 or more for level 5)     Review of Systems    Except as noted above the remainder of the review of systems was reviewed and negative.        PAST MEDICAL HISTORY     Past Medical History:   Diagnosis Date    Alcohol abuse 10/27/2016    Anemia     Asthma     Cocaine abuse ([facility] [address] 10/27/2016    COPD (chronic obstructive pulmonary disease) ([facility]     Depression 04/27/2020    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    Drug-seeking behavior 04/14/2015    Edema 12/10/2015    Erectile dysfunction     Gastroesophageal reflux disease 12/17/2018    Gout 10/27/2016    History of arthroscopy of both knees 10/27/2016    House dust mite allergy 04/21/2014    Hyperlipidemia     Hypertension 10/27/2016    Injury to heart 10/27/2016    Insomnia 12/17/2018    Medical non-compliance 02/22/2014    Morbid obesity due to excess calories (HCC) 12/08/2016    Osteoarthritis of both knees 12/08/2016    Personal history of tobacco use     Pneumonia 12/04/2016    caused hospital admission    Pre-diabetes 10/27/2016    Seasonal allergies 04/21/2014    Severe persistent asthma 10/27/2016    Severe sleep apnea 04/14/2015    Supraventricular tachycardia ([facility] 10/27/2016         SURGICAL HISTORY       Past Surgical History:   Procedure Laterality Date    ANTERIOR CRUCIATE LIGAMENT REPAIR      CARDIAC CATHETERIZATION      COLONOSCOPY N/A 7/15/2020    COLONOSCOPY WITH POLYPECTOMY performed by Jae Brown MD at [facility] 150 Left 8/9/2019    LEFT TOTAL KNEE ARTHROPLASTY performed by Jus Ohara MD at [facility] N/A 77/74/1166    UMBILICAL HERNIA REPAIR WITH MESH performed by Marcio Nieves MD at [address] [place] [address]       Previous Medications    ACETAMINOPHEN (ACETAMINOPHEN EXTRA STRENGTH) 500 MG TABLET    Take 1 tablet by mouth every 8 hours as needed for Pain    ALBUTEROL SULFATE HFA (PROAIR HFA) 108 (90 BASE) MCG/ACT INHALER    Use every 4 hours while awake for 7-10 days then PRN wheezing  Dispense with SPACER and Instruct on use. May sub Ventolin or Proventil as needed per [employer].     AMLODIPINE (NORVASC) 10 MG TABLET    Take 1 tablet by mouth daily    CETIRIZINE (ZYRTEC ALLERGY) 10 MG TABLET    Take 10 mg by mouth daily ESCITALOPRAM (LEXAPRO) 10 MG TABLET    Take 1 tablet by mouth daily    FLUTICASONE (FLONASE) 50 MCG/ACT NASAL SPRAY    2 sprays by Nasal route daily    HANDICAP PLACARD MISC    by Does not apply route DX: OSTEOARTHRITIS OF BOTH KNEES (M17.0), COPD (J44.9)     EXPIRES: 02/2024    HYDROCORTISONE, PERIANAL, 1 % CREAM    daily as needed     IPRATROPIUM-ALBUTEROL (DUONEB) 0.5-2.5 (3) MG/3ML SOLN NEBULIZER SOLUTION    Inhale 3 mLs into the lungs every 6 hours as needed for Shortness of Breath    LOVASTATIN (MEVACOR) 10 MG TABLET    Take 1 tablet by mouth nightly    MELOXICAM (MOBIC) 15 MG TABLET    Take 1 tablet by mouth daily    MONTELUKAST (SINGULAIR) 10 MG TABLET    Take 1 tablet by mouth nightly    PANTOPRAZOLE (PROTONIX) 40 MG TABLET    Take 1 tablet by mouth daily    POLYETHYLENE GLYCOL (GLYCOLAX) 17 GM/SCOOP POWDER    Take 17 g by mouth daily    PREDNISONE (DELTASONE) 10 MG TABLET    Take 5 tabs daily x 3 days, 4 tabs daily x 3 days, 3 tabs daily x 3 days, 2 tabs daily x 3 days, 1 tab daily x 3 days    SILDENAFIL (VIAGRA) 100 MG TABLET    Take 1 tablet by mouth as needed for Erectile Dysfunction    TRAMADOL (ULTRAM) 50 MG TABLET    Take by mouth. TRAZODONE (DESYREL) 50 MG TABLET    Take 1 tablet by mouth nightly    TRELEGY ELLIPTA 200-62.5-25 MCG/INH AEPB    Take 1 Inhaler by mouth daily    TRIAMCINOLONE (KENALOG) 0.025 % CREAM    Apply topically 2 times daily.        ALLERGIES     Fish-derived products, Iodine, Seasonal, and Pcn [penicillins]    FAMILY HISTORY       Family History   Problem Relation Age of Onset    Arthritis Mother     Asthma Mother     High Cholesterol Mother     Other Mother         aneurysm    Diabetes Father     Stroke Maternal Grandmother     Cancer Maternal Grandfather     Hypertension Other     COPD Neg Hx           SOCIAL HISTORY       Social History     Socioeconomic History    Marital status: Single     Spouse name: n/a    Number of children: 1    Years of education: 15    Highest education level: None   Occupational History    Occupation: Disability     Employer: NONE   Tobacco Use    Smoking status: Current Some Day Smoker     Packs/day: 0.25     Years: 30.00     Pack years: 7.50     Types: Cigarettes, Cigars     Start date: 1988     Last attempt to quit: 10/1/2013     Years since quittin.9    Smokeless tobacco: Never Used   Vaping Use    Vaping Use: Never used   Substance and Sexual Activity    Alcohol use: Yes     Alcohol/week: 4.0 standard drinks     Types: 2 Cans of beer, 2 Shots of liquor per week     Comment: social 1 -2 x week    Drug use: Yes     Types: Cocaine, Marijuana     Comment: no cocaine x 1 year, marijuana weekly    Sexual activity: Not Currently     Partners: Female   Other Topics Concern    None   Social History Narrative    Lives in an apartment    15 steps to get up to the apartment    Asking for hospital bed and shower chair      Social Determinants of Health     Financial Resource Strain: Medium Risk    Difficulty of Paying Living Expenses: Somewhat hard   Food Insecurity: No Food Insecurity    Worried About Running Out of Food in the Last Year: Never true    Jonnie of Food in the Last Year: Never true   Transportation Needs: No Transportation Needs    Lack of Transportation (Medical): No    Lack of Transportation (Non-Medical): No   Physical Activity: Inactive    Days of Exercise per Week: 0 days    Minutes of Exercise per Session: 0 min   Stress: Stress Concern Present    Feeling of Stress :  To some extent   Social Connections: Socially Isolated    Frequency of Communication with Friends and Family: Once a week    Frequency of Social Gatherings with Friends and Family: Once a week    Attends [employer] Services: 1 to 4 times per year    Active Member of [address] [place] [address] [employer] or Organizations: Not asked    Attends Club or Organization Meetings: Never    Marital Status:    Intimate Partner Violence:     Fear of Current or Ex-Partner:  Emotionally Abused:     Physically Abused:     Sexually Abused:               PHYSICAL EXAM    (up to 7 for level 4, 8 or more for level 5)     ED Triage Vitals [09/23/21 2101]   BP Temp Temp Source Pulse Resp SpO2 Height Weight   (!) 148/61 99.6 °F (37.6 °C) Oral 109 20 95 % 6' (1.829 m) 288 lb (130.6 kg)       Physical Exam   General appearance: Patient is awake alert interactive appropriate nontoxic in no acute distress  Head is atraumatic normocephalic  Eyes pupils are equal and reactive sclera white conjunctive are pink  Oral pharyngeal cavity is pink with good moisture, no exudates or ulcerations no asymmetry, the airway is widely patent  Neck: Supple no meningeal signs no adenopathy no JVD  Heart: Tachycardic at 100 regular no gross murmurs rubs or clicks appreciated  Lungs: Auscultation of the lungs reveals coarse breath sounds throughout there are scattered rales and wheezes throughout creased air movement on the left basilar region there is some intermittent rhonchorous cough noted during exam with production of thick yellow sputum. No use of accessory muscles or conversational dyspnea. Patient maintaining pulse ox greater than 95% on 4 L nasal cannula home O2   abdomen: Soft nontender with good bowel sounds no rebound rigidity or guarding no firm or pulsatile masses, no gross distention, femoral pulses full and symmetric  Back: Nontender to palpation no costovertebral angle tenderness  Skin: Warm and dry without rashes  Lower extremities: No edema or calf tenderness or asymmetry.     DIAGNOSTIC RESULTS     EKG: All EKG's are interpreted by the Emergency Department Physician who either signs or Co-signs this chart in the absence of a cardiologist.    EKG interpreted by ED physician indication shortness of breath: Sinus tachycardia at 116 with no significant ST-T change no acute infarction pattern    RADIOLOGY:   Non-plain film images such as CT, Ultrasound and MRI are read by the radiologist. Caprice Simmonds radiographic images are visualized and preliminarily interpreted by the emergency physician with the below findings:    Chest x-ray 1 view interpreted by ED physician indication shortness of breath: Heart mediastinum are within normal limits there is a worsening consolidation in the left lower lobe no gross vascular congestion or pneumothorax appreciated findings consistent with left lower lobe pneumonia. Official radiology report is pending    Interpretation per the Radiologist below, if available at the time of this note:    XR CHEST PORTABLE    (Results Pending)     LABS:  Labs Reviewed   CBC - Abnormal; Notable for the following components:       Result Value    WBC 25.7 (*)     RBC 3.90 (*)     Hemoglobin 11.9 (*)     Hematocrit 37.5 (*)     MCV 96.2 (*)     MCHC 31.7 (*)     RDW 15.4 (*)     Platelets 506 (*)     All other components within normal limits   COMPREHENSIVE METABOLIC PANEL - Abnormal; Notable for the following components:    Glucose 177 (*)     Albumin 2.8 (*)     Alkaline Phosphatase 156 (*)     Globulin 4.0 (*)     All other components within normal limits   COVID-19, RAPID   TROPONIN       All other labs were within normal range or not returned as of this dictation. EMERGENCY DEPARTMENT COURSE and DIFFERENTIAL DIAGNOSIS/MDM:   Vitals:    Vitals:    09/23/21 2145 09/23/21 2215 09/23/21 2230 09/24/21 0000   BP: 123/67 (!) 156/61 (!) 160/80    Pulse: 85 88 85 83   Resp:       Temp:       TempSrc:       SpO2: 96% 97% 98% 97%   Weight:       Height:         Treatment and course: Patient was placed on a cardiac monitor with continuous pulse ox monitoring and IV was established Levaquin 500 milligrams IV was given with findings consistent with worsening left lower lobe pneumonia albuterol nebulizer treatment was given following negative Covid screening. On repeat assessment breath sounds remain coarse no respiratory distress no active wheezing pulse ox 97% on home O2.     Coordination of CARE: A

## 2021-09-24 NOTE — PROGRESS NOTES
Both sets of blood cultures collected and sent to lab.   Electronically signed by Amy Duffy RN on 9/24/2021 at 3:09 AM

## 2021-09-24 NOTE — PROGRESS NOTES
Pt requesting something to help with cough, notified dr. Kisha Walls. Waiting on orders.    Electronically signed by Johnson Kenney RN on 9/24/2021 at 2:14 AM

## 2021-09-24 NOTE — TELEPHONE ENCOUNTER
----- Message from [place] Brennenkingston sent at 9/21/2021  2:04 PM EDT -----  Subject: Message to Provider    QUESTIONS  Information for Provider? patient needs another nebulizer machine and is   ok with picking up at his appointment   ---------------------------------------------------------------------------  --------------  [address]  What is the best way for the office to contact you? OK to leave message on   voicemail  Preferred Call Back Phone Number? [phone]  ---------------------------------------------------------------------------  --------------  SCRIPT ANSWERS  Relationship to Patient?  Self

## 2021-09-24 NOTE — ED NOTES
Supervisor and registration made aware of admission status. Per supervisor pt to go to room 206.       Veronica Hutchinson RN  09/24/21 9451

## 2021-09-24 NOTE — PLAN OF CARE
Problem: Breathing Pattern - Ineffective:  Goal: Ability to achieve and maintain a regular respiratory rate will improve  Description: Ability to achieve and maintain a regular respiratory rate will improve  Outcome: Ongoing     Problem: Pain:  Goal: Pain level will decrease  Description: Pain level will decrease  Outcome: Ongoing  Goal: Control of acute pain  Description: Control of acute pain  Outcome: Ongoing  Goal: Control of chronic pain  Description: Control of chronic pain  Outcome: Ongoing  Goal: Patient's pain/discomfort is manageable  Description: Patient's pain/discomfort is manageable  Outcome: Ongoing     Problem: Infection:  Goal: Will remain free from infection  Description: Will remain free from infection  Outcome: Ongoing     Problem: Safety:  Goal: Free from accidental physical injury  Description: Free from accidental physical injury  Outcome: Ongoing  Goal: Free from intentional harm  Description: Free from intentional harm  Outcome: Ongoing     Problem: Daily Care:  Goal: Daily care needs are met  Description: Daily care needs are met  Outcome: Ongoing     Problem: Skin Integrity:  Goal: Skin integrity will stabilize  Description: Skin integrity will stabilize  Outcome: Ongoing     Problem: Discharge Planning:  Goal: Patients continuum of care needs are met  Description: Patients continuum of care needs are met  Outcome: Ongoing

## 2021-09-24 NOTE — PROGRESS NOTES
Pt admitted to room 206. Pt alert and oriented. Pt independent in room. Pt has pain in back, medicated per mar. Pt denies any pain or needs at this time. Pt has oxygen on 4 L he wears as needed. Pt eating snack at this time. Will continue to monitor pt.   Electronically signed by José Miguel Jimenez RN on 9/24/2021 at 2:13 AM

## 2021-09-24 NOTE — H&P
COLONOSCOPY WITH POLYPECTOMY performed by Diaz Bethea MD at [facility] Left 8/9/2019    LEFT TOTAL KNEE ARTHROPLASTY performed by Mayo Rizo MD at [facility] N/A 87/24/2619    UMBILICAL HERNIA REPAIR WITH MESH performed by Candy Ngo MD at [facility]       Medications Prior to Admission:    Prior to Admission medications    Medication Sig Start Date End Date Taking? Authorizing Provider   traMADol (ULTRAM) 50 MG tablet Take by mouth. 5/25/21  Yes Historical Provider, MD   predniSONE (DELTASONE) 10 MG tablet Take 5 tabs daily x 3 days, 4 tabs daily x 3 days, 3 tabs daily x 3 days, 2 tabs daily x 3 days, 1 tab daily x 3 days 9/23/21  Yes Fritz Glass MD   meloxicam (MOBIC) 15 MG tablet Take 1 tablet by mouth daily 9/19/21  Yes Keisha Juares MD   ipratropium-albuterol (DUONEB) 0.5-2.5 (3) MG/3ML SOLN nebulizer solution Inhale 3 mLs into the lungs every 6 hours as needed for Shortness of Breath 9/19/21 10/19/21 Yes Keisha Juares MD   cetirizine (ZYRTEC ALLERGY) 10 MG tablet Take 10 mg by mouth daily   Yes Historical Provider, MD   sildenafil (VIAGRA) 100 MG tablet Take 1 tablet by mouth as needed for Erectile Dysfunction 9/19/21  Yes MD [employer] ELLIPTA 200-62.5-25 MCG/INH AEPB Take 1 Inhaler by mouth daily 8/17/21  Yes Historical Provider, MD   albuterol sulfate HFA (PROAIR HFA) 108 (90 Base) MCG/ACT inhaler Use every 4 hours while awake for 7-10 days then PRN wheezing  Dispense with SPACER and Instruct on use. May sub Ventolin or Proventil as needed per [employer].  8/25/21  Yes Kentrell Galicia MD   acetaminophen (ACETAMINOPHEN EXTRA STRENGTH) 500 MG tablet Take 1 tablet by mouth every 8 hours as needed for Pain 8/16/21  Yes Fritz Glass MD   amLODIPine (NORVASC) 10 MG tablet Take 1 tablet by mouth daily 4/29/21  Yes Fritz Glass MD   escitalopram (LEXAPRO) 10 MG tablet Take 1 tablet by mouth daily 4/29/21 Yes Ge Marley MD   fluticasone [facility]) 50 MCG/ACT nasal spray 2 sprays by Nasal route daily 4/29/21  Yes Ge Marley MD   lovastatin (MEVACOR) 10 MG tablet Take 1 tablet by mouth nightly 4/29/21  Yes Ge Marley MD   montelukast (SINGULAIR) 10 MG tablet Take 1 tablet by mouth nightly 4/29/21  Yes Ge Marley MD   pantoprazole (PROTONIX) 40 MG tablet Take 1 tablet by mouth daily 4/29/21  Yes Ge Marley MD   polyethylene glycol (GLYCOLAX) 17 GM/SCOOP powder Take 17 g by mouth daily  Patient taking differently: Take 17 g by mouth daily as needed  4/29/21  Yes Ge Marley MD   traZODone (DESYREL) 50 MG tablet Take 1 tablet by mouth nightly 4/29/21  Yes Ge Marley MD   triamcinolone (KENALOG) 0.025 % cream Apply topically 2 times daily. Patient taking differently: 2 times daily as needed Apply topically 2 times daily. 7/7/21   Sandra Dennison,    Hydrocortisone, Perianal, 1 % cream daily as needed  5/28/21   Historical Provider, MD   Handicap Placard MISC by Does not apply route DX: OSTEOARTHRITIS OF BOTH KNEES (M17.0), COPD (J44.9)     EXPIRES: 02/2024 2/1/19   Ge Marley MD       Allergies:  Fish-derived products, Iodine, Seasonal, and Pcn [penicillins]    Social History:   TOBACCO:   reports that he has been smoking cigarettes and cigars. He started smoking about 33 years ago. He has a 7.50 pack-year smoking history. He has never used smokeless tobacco.  ETOH:   reports current alcohol use of about 4.0 standard drinks of alcohol per week.       Family History:       Problem Relation Age of Onset    Arthritis Mother     Asthma Mother     High Cholesterol Mother     Other Mother         aneurysm    Diabetes Father     Stroke Maternal Grandmother     Cancer Maternal Grandfather     Hypertension Other     COPD Neg Hx        REVIEW OF SYSTEMS:  Ten systems reviewed and negative except for stated in HPI    Physical Exam:    Vitals: BP (!) 142/70   Pulse 61 Temp 97.9 °F (36.6 °C) (Oral)   Resp 20   Ht 6' (1.829 m)   Wt 288 lb (130.6 kg)   SpO2 98%   BMI 39.06 kg/m²   General appearance: alert, appears stated age and cooperative, no apparent distress at rest.  Morbidly obese. Skin: Skin color, texture, turgor normal. No rashes or lesions  HEENT: Head: Normocephalic, no lesions, without obvious abnormality. Neck: no adenopathy, no carotid bruit, no JVD, supple, symmetrical, trachea midline and thyroid not enlarged, symmetric, no tenderness/mass/nodules  Lungs: Expiratory wheezing and rhonchi mostly at the left base. Heart: regular rate and rhythm, S1, S2 normal, no murmur, click, rub or gallop  Abdomen: soft, non-tender; bowel sounds normal; no masses,  no organomegaly  Extremities: extremities normal, atraumatic, no cyanosis or edema  Neurologic: Mental status: Alert, oriented, thought content appropriate     Recent Labs     09/23/21 2120   WBC 25.7*   HGB 11.9*   *     Recent Labs     09/23/21 2120      K 4.5      CO2 23   BUN 11   CREATININE 0.93   GLUCOSE 177*   AST 20   ALT 23   BILITOT 0.5   ALKPHOS 156*     Troponin T:   Recent Labs     09/23/21 2251   TROPONINI <0.010       ABGs:   Lab Results   Component Value Date    PHART 7.42 01/28/2020    PO2ART 68 01/28/2020    YZB4ENB 41 01/28/2020     INR: No results for input(s): INR in the last 72 hours. URINALYSIS:No results for input(s): NITRITE, COLORU, PHUR, LABCAST, WBCUA, RBCUA, MUCUS, TRICHOMONAS, YEAST, BACTERIA, CLARITYU, SPECGRAV, LEUKOCYTESUR, UROBILINOGEN, BILIRUBINUR, BLOODU, GLUCOSEU, AMORPHOUS in the last 72 hours. Invalid input(s): Arlen Bills  -----------------------------------------------------------------   XR CHEST PORTABLE    Result Date: 9/24/2021  COMPARISON: December 9, 2020; September 16, 2021 HISTORY: sob TECHNIQUE: AP view FINDINGS: The left hemidiaphragm is now elevated. There is atelectasis or infiltrate seen in the left lower lobe.  The left costophrenic angle is not completely visualized. The cardiac silhouette does not appear to be enlarged. Degenerative changes are seen in the spine and the visualized portion of the right shoulder. Calcifications are again seen in the left axillary region. Left lower lobe infiltrate. The left costophrenic angle appears to be now elevated. PA and lateral chest should be obtained and assessment made after treatment. Assessment and Plan     *Sepsis on admission. Degree of his leukocytosis could be caused by corticosteroid. *Mild degree of hypoxic respiratory failure requiring 4 L of oxygen supplementation. Acute. *Pneumonia. Suspect obstructive pneumonia. Much larger on chest x-ray. Suspect a degree of parapneumonic effusion. Rule the possibility of underlying malignancy. Clinically the patient is not behaving as having empyema. *Hypertension. *Anemia, no evidence of acute blood loss. *DVT prophylaxis. Plan:  I had accepted to admit patient to the medical floor. Oxygen supplementation. Blood and sputum culture. I started the patient on Zosyn suspecting obstructive versus healthcare associated pneumonia. I ordered CT chest.  Lovenox for DVT prophylaxis. Oxygen supplementation. Monitor white count, hemoglobin, platelets, electrolytes and kidney function. Based on CT finding patient patient may need to have additional work-up and/or investigation such pulmonary evaluation, thoracentesis, bronchoscopy biopsy and others.     Patient Active Problem List   Diagnosis Code    Anemia D64.9    Medical non-compliance Z91.19    House dust mite allergy Z91.09    Seasonal allergies J30.2    GASPER (obstructive sleep apnea) G47.33    Drug-seeking behavior Z76.5    Hyperlipidemia E78.5    Alcohol abuse F10.10    Cocaine abuse ([address] F14.10    Gout M10.9    History of arthroscopy of both knees Z98.890    Hypertension I10    Supraventricular tachycardia ([address] I47.1    Erectile dysfunction N52.9    Pre-diabetes R73.03    Morbid obesity due to excess calories ([facility]) E66.01    COPD (chronic obstructive pulmonary disease) (HCC) J44.9    Chest wall pain R07.89    Pleurisy R09.1    Loculated pleural effusion J90    Insomnia G47.00    Gastroesophageal reflux disease K21.9    Tobacco use Z72.0    Status post total knee replacement, left J64.931    Allergy to seafood Z91.013    Anxiety with depression F41.8    Adenomatous polyp of sigmoid colon D12.5    Adenomatous polyp of descending colon D12.4    Adenomatous polyp of colon E53.0    Umbilical hernia without obstruction and without gangrene K42.9    Spinal stenosis of lumbar region M48.061    Pneumonia J18.9       Ryan Bravo MD, MD  Admitting Hospitalist    TTS: 85mins where I focused more than 75% of my attention on rendering care, and planning treatment course for this patient, in addition to talking to RN team, mid levels, consulting with other physicians and following up on labs and imaging. High Risk Readmission Screening Tool Score Noted.      Emergency Contact:

## 2021-09-25 LAB
ALBUMIN SERPL-MCNC: 2.6 G/DL (ref 3.5–4.6)
ALP BLD-CCNC: 134 U/L (ref 35–104)
ALT SERPL-CCNC: 30 U/L (ref 0–41)
ANION GAP SERPL CALCULATED.3IONS-SCNC: 11 MEQ/L (ref 9–15)
AST SERPL-CCNC: 22 U/L (ref 0–40)
BILIRUB SERPL-MCNC: 0.3 MG/DL (ref 0.2–0.7)
BUN BLDV-MCNC: 15 MG/DL (ref 8–23)
CALCIUM SERPL-MCNC: 9 MG/DL (ref 8.5–9.9)
CHLORIDE BLD-SCNC: 103 MEQ/L (ref 95–107)
CO2: 23 MEQ/L (ref 20–31)
CREAT SERPL-MCNC: 0.73 MG/DL (ref 0.7–1.2)
GFR AFRICAN AMERICAN: >60
GFR NON-AFRICAN AMERICAN: >60
GLOBULIN: 4.4 G/DL (ref 2.3–3.5)
GLUCOSE BLD-MCNC: 159 MG/DL (ref 70–99)
HCT VFR BLD CALC: 36.3 % (ref 42–52)
HEMOGLOBIN: 11.3 G/DL (ref 13.7–17.5)
MCH RBC QN AUTO: 30.5 PG (ref 25.7–32.2)
MCHC RBC AUTO-ENTMCNC: 31.1 % (ref 32.3–36.5)
MCV RBC AUTO: 97.8 FL (ref 79–92.2)
PDW BLD-RTO: 15.4 % (ref 11.6–14.4)
PLATELET # BLD: 304 K/UL (ref 163–337)
POTASSIUM REFLEX MAGNESIUM: 4.8 MEQ/L (ref 3.4–4.9)
RBC # BLD: 3.71 M/UL (ref 4.63–6.08)
SODIUM BLD-SCNC: 137 MEQ/L (ref 135–144)
TOTAL PROTEIN: 7 G/DL (ref 6.3–8)
WBC # BLD: 24.1 K/UL (ref 4.2–9)

## 2021-09-25 PROCEDURE — 1210000000 HC MED SURG R&B

## 2021-09-25 PROCEDURE — 80053 COMPREHEN METABOLIC PANEL: CPT

## 2021-09-25 PROCEDURE — 6370000000 HC RX 637 (ALT 250 FOR IP): Performed by: INTERNAL MEDICINE

## 2021-09-25 PROCEDURE — 94640 AIRWAY INHALATION TREATMENT: CPT

## 2021-09-25 PROCEDURE — 6360000002 HC RX W HCPCS: Performed by: INTERNAL MEDICINE

## 2021-09-25 PROCEDURE — 2580000003 HC RX 258: Performed by: INTERNAL MEDICINE

## 2021-09-25 PROCEDURE — 85027 COMPLETE CBC AUTOMATED: CPT

## 2021-09-25 PROCEDURE — 36415 COLL VENOUS BLD VENIPUNCTURE: CPT

## 2021-09-25 RX ADMIN — AMLODIPINE BESYLATE 10 MG: 10 TABLET ORAL at 08:42

## 2021-09-25 RX ADMIN — ATORVASTATIN CALCIUM 20 MG: 10 TABLET, FILM COATED ORAL at 20:48

## 2021-09-25 RX ADMIN — TRAZODONE HYDROCHLORIDE 50 MG: 50 TABLET ORAL at 20:48

## 2021-09-25 RX ADMIN — ALBUTEROL SULFATE 2.5 MG: 2.5 SOLUTION RESPIRATORY (INHALATION) at 21:33

## 2021-09-25 RX ADMIN — MONTELUKAST SODIUM 10 MG: 10 TABLET ORAL at 20:48

## 2021-09-25 RX ADMIN — ESCITALOPRAM OXALATE 10 MG: 10 TABLET ORAL at 20:48

## 2021-09-25 RX ADMIN — MELOXICAM 15 MG: 7.5 TABLET ORAL at 08:42

## 2021-09-25 RX ADMIN — ENOXAPARIN SODIUM 40 MG: 40 INJECTION SUBCUTANEOUS at 08:41

## 2021-09-25 RX ADMIN — IPRATROPIUM BROMIDE AND ALBUTEROL SULFATE 3 ML: .5; 2.5 SOLUTION RESPIRATORY (INHALATION) at 18:09

## 2021-09-25 RX ADMIN — PANTOPRAZOLE SODIUM 40 MG: 40 TABLET, DELAYED RELEASE ORAL at 08:41

## 2021-09-25 RX ADMIN — IPRATROPIUM BROMIDE AND ALBUTEROL SULFATE 3 ML: .5; 2.5 SOLUTION RESPIRATORY (INHALATION) at 05:59

## 2021-09-25 RX ADMIN — PIPERACILLIN AND TAZOBACTAM 3375 MG: 3; .375 INJECTION, POWDER, LYOPHILIZED, FOR SOLUTION INTRAVENOUS at 12:48

## 2021-09-25 RX ADMIN — PIPERACILLIN AND TAZOBACTAM 3375 MG: 3; .375 INJECTION, POWDER, LYOPHILIZED, FOR SOLUTION INTRAVENOUS at 20:48

## 2021-09-25 RX ADMIN — SODIUM CHLORIDE, PRESERVATIVE FREE 10 ML: 5 INJECTION INTRAVENOUS at 20:48

## 2021-09-25 RX ADMIN — PIPERACILLIN AND TAZOBACTAM 3375 MG: 3; .375 INJECTION, POWDER, LYOPHILIZED, FOR SOLUTION INTRAVENOUS at 06:07

## 2021-09-25 RX ADMIN — BENZOCAINE AND MENTHOL 1 LOZENGE: 15; 3.6 LOZENGE ORAL at 18:31

## 2021-09-25 RX ADMIN — TRAMADOL HYDROCHLORIDE 50 MG: 50 TABLET ORAL at 00:49

## 2021-09-25 RX ADMIN — TRAMADOL HYDROCHLORIDE 50 MG: 50 TABLET ORAL at 06:07

## 2021-09-25 RX ADMIN — POLYETHYLENE GLYCOL 3350 17 G: 17 POWDER, FOR SOLUTION ORAL at 08:42

## 2021-09-25 RX ADMIN — IPRATROPIUM BROMIDE AND ALBUTEROL SULFATE 3 ML: .5; 2.5 SOLUTION RESPIRATORY (INHALATION) at 11:48

## 2021-09-25 RX ADMIN — TRAMADOL HYDROCHLORIDE 50 MG: 50 TABLET ORAL at 12:48

## 2021-09-25 RX ADMIN — GUAIFENESIN 600 MG: 600 TABLET, EXTENDED RELEASE ORAL at 08:42

## 2021-09-25 ASSESSMENT — PAIN SCALES - GENERAL
PAINLEVEL_OUTOF10: 8

## 2021-09-25 NOTE — PROGRESS NOTES
Pt requesting that Dr. Ho Risk his solumedrol and gives him some throat lozenges before he discharges tomorrow. \" Dr. Alyson raygoza served about requests, see new orders.

## 2021-09-25 NOTE — PROGRESS NOTES
Patient is feeling better. Improvement of his shortness of breath and cough. No chest pain or palpitation. No abdominal pain, nausea, vomiting, diarrhea, dysuria or hematuria. ROS: 12 system review otherwise is negative for acute signs or symptoms over the last 24 hrs. General appearance: alert, appears stated age and cooperative, no apparent distress at rest.  Morbidly obese. Skin: Skin color, texture, turgor normal. No rashes or lesions  HEENT: Head: Normocephalic, no lesions, without obvious abnormality. Neck: no adenopathy, no carotid bruit, no JVD, supple, symmetrical, trachea midline and thyroid not enlarged, symmetric, no tenderness/mass/nodules  Lungs:  Resolution of the expiratory wheezing. Residual rhonchi at the left base. Heart: regular rate and rhythm, S1, S2 normal, no murmur, click, rub or gallop  Abdomen: soft, non-tender; bowel sounds normal; no masses,  no organomegaly  Extremities: extremities normal, atraumatic, no cyanosis or edema  Neurologic: Mental status: Alert, oriented, thought content appropriate     Assessment and plan:        *Sepsis on admission. Degree of his leukocytosis could be caused by corticosteroid.     *Mild degree of hypoxic respiratory failure requiring 4 L of oxygen supplementation. Acute.     *Pneumonia. Suspect obstructive pneumonia. CT scan confirmed the pneumonia with bronchial compromise. No clear evidence of tumor or cancer.     *Hypertension. Stable.     *Anemia, no evidence of acute blood loss.     *DVT prophylaxis. Plan:  Continue intravenous Zosyn. Continue BP meds. Discontinue Solu-Medrol due to resolution of his wheezing. Continue oxygen supplementation. Patient will definitely need to have follow-up with the pulmonary.   He would need to have repeat imaging of the chest in 4 to 6 weeks to ensure complete resolution of the infiltrate otherwise he will require to have additional investigation which may include but not limited to PET scan, bronchoscopy biopsy and others. Patient is feeling great and requesting to be discharged home. I was able to convince him to stay at least for another 2 days to give him more intravenous antibiotic. I hope that he will not decide to leave AMA.    I may or may not have addressed all of this pt symptoms, medical issues, abnormal labs and findings. Pt will need additional work up, investigation, testing, surveillance, and treatment to be done at a later time and date during this hospitalization or post discharge by PCP and other out pt providers.

## 2021-09-25 NOTE — PROGRESS NOTES
Pt provided miralax and mucinex per request with morning med pass. Water pitcher refilled and pt provided urinal. Pt very demanding with staff. Will continue to monitor.

## 2021-09-26 LAB
BASOPHILS ABSOLUTE: 0.1 K/UL (ref 0–0.1)
BASOPHILS RELATIVE PERCENT: 0.3 % (ref 0.2–1.2)
EOSINOPHILS ABSOLUTE: 0 K/UL (ref 0–0.5)
EOSINOPHILS RELATIVE PERCENT: 0 % (ref 0.8–7)
HCT VFR BLD CALC: 37.4 % (ref 42–52)
HEMOGLOBIN: 12.1 G/DL (ref 13.7–17.5)
IMMATURE GRANULOCYTES #: 1 K/UL
IMMATURE GRANULOCYTES %: 3.1 %
LYMPHOCYTES ABSOLUTE: 2.2 K/UL (ref 1.3–3.6)
LYMPHOCYTES RELATIVE PERCENT: 6.9 %
MCH RBC QN AUTO: 30.6 PG (ref 25.7–32.2)
MCHC RBC AUTO-ENTMCNC: 32.4 % (ref 32.3–36.5)
MCV RBC AUTO: 94.4 FL (ref 79–92.2)
MONOCYTES ABSOLUTE: 1.8 K/UL (ref 0.3–0.8)
MONOCYTES RELATIVE PERCENT: 5.8 % (ref 5.3–12.2)
NEUTROPHILS ABSOLUTE: 26 K/UL (ref 1.8–5.4)
NEUTROPHILS RELATIVE PERCENT: 83.9 % (ref 34–67.9)
PDW BLD-RTO: 15.1 % (ref 11.6–14.4)
PLATELET # BLD: 456 K/UL (ref 163–337)
RBC # BLD: 3.96 M/UL (ref 4.63–6.08)
WBC # BLD: 31 K/UL (ref 4.2–9)

## 2021-09-26 PROCEDURE — 2580000003 HC RX 258: Performed by: INTERNAL MEDICINE

## 2021-09-26 PROCEDURE — 36415 COLL VENOUS BLD VENIPUNCTURE: CPT

## 2021-09-26 PROCEDURE — 6360000002 HC RX W HCPCS: Performed by: INTERNAL MEDICINE

## 2021-09-26 PROCEDURE — 1210000000 HC MED SURG R&B

## 2021-09-26 PROCEDURE — 6370000000 HC RX 637 (ALT 250 FOR IP): Performed by: INTERNAL MEDICINE

## 2021-09-26 PROCEDURE — 85025 COMPLETE CBC W/AUTO DIFF WBC: CPT

## 2021-09-26 PROCEDURE — 2500000003 HC RX 250 WO HCPCS: Performed by: INTERNAL MEDICINE

## 2021-09-26 PROCEDURE — 94640 AIRWAY INHALATION TREATMENT: CPT

## 2021-09-26 PROCEDURE — 2700000000 HC OXYGEN THERAPY PER DAY

## 2021-09-26 RX ORDER — METHYLPREDNISOLONE SODIUM SUCCINATE 40 MG/ML
40 INJECTION, POWDER, LYOPHILIZED, FOR SOLUTION INTRAMUSCULAR; INTRAVENOUS EVERY 12 HOURS
Status: DISCONTINUED | OUTPATIENT
Start: 2021-09-26 | End: 2021-09-27

## 2021-09-26 RX ADMIN — ALBUTEROL SULFATE 2.5 MG: 2.5 SOLUTION RESPIRATORY (INHALATION) at 01:08

## 2021-09-26 RX ADMIN — SODIUM CHLORIDE, PRESERVATIVE FREE 10 ML: 5 INJECTION INTRAVENOUS at 22:10

## 2021-09-26 RX ADMIN — ESCITALOPRAM OXALATE 10 MG: 10 TABLET ORAL at 10:13

## 2021-09-26 RX ADMIN — TRAMADOL HYDROCHLORIDE 50 MG: 50 TABLET ORAL at 10:25

## 2021-09-26 RX ADMIN — IPRATROPIUM BROMIDE AND ALBUTEROL SULFATE 3 ML: .5; 2.5 SOLUTION RESPIRATORY (INHALATION) at 18:03

## 2021-09-26 RX ADMIN — ENOXAPARIN SODIUM 40 MG: 40 INJECTION SUBCUTANEOUS at 10:12

## 2021-09-26 RX ADMIN — MONTELUKAST SODIUM 10 MG: 10 TABLET ORAL at 22:07

## 2021-09-26 RX ADMIN — PANTOPRAZOLE SODIUM 40 MG: 40 TABLET, DELAYED RELEASE ORAL at 10:13

## 2021-09-26 RX ADMIN — METHYLPREDNISOLONE SODIUM SUCCINATE 40 MG: 40 INJECTION, POWDER, FOR SOLUTION INTRAMUSCULAR; INTRAVENOUS at 13:58

## 2021-09-26 RX ADMIN — PIPERACILLIN AND TAZOBACTAM 3375 MG: 3; .375 INJECTION, POWDER, LYOPHILIZED, FOR SOLUTION INTRAVENOUS at 06:23

## 2021-09-26 RX ADMIN — DOXYCYCLINE 100 MG: 100 INJECTION, POWDER, LYOPHILIZED, FOR SOLUTION INTRAVENOUS at 23:22

## 2021-09-26 RX ADMIN — SODIUM CHLORIDE, PRESERVATIVE FREE 10 ML: 5 INJECTION INTRAVENOUS at 10:14

## 2021-09-26 RX ADMIN — AMLODIPINE BESYLATE 10 MG: 10 TABLET ORAL at 10:13

## 2021-09-26 RX ADMIN — TRAZODONE HYDROCHLORIDE 50 MG: 50 TABLET ORAL at 22:07

## 2021-09-26 RX ADMIN — ESCITALOPRAM OXALATE 10 MG: 10 TABLET ORAL at 22:07

## 2021-09-26 RX ADMIN — IPRATROPIUM BROMIDE AND ALBUTEROL SULFATE 3 ML: .5; 2.5 SOLUTION RESPIRATORY (INHALATION) at 05:02

## 2021-09-26 RX ADMIN — PIPERACILLIN AND TAZOBACTAM 3375 MG: 3; .375 INJECTION, POWDER, LYOPHILIZED, FOR SOLUTION INTRAVENOUS at 15:17

## 2021-09-26 RX ADMIN — IPRATROPIUM BROMIDE AND ALBUTEROL SULFATE 3 ML: .5; 2.5 SOLUTION RESPIRATORY (INHALATION) at 09:45

## 2021-09-26 RX ADMIN — TRAMADOL HYDROCHLORIDE 50 MG: 50 TABLET ORAL at 22:10

## 2021-09-26 RX ADMIN — METHYLPREDNISOLONE SODIUM SUCCINATE 40 MG: 40 INJECTION, POWDER, FOR SOLUTION INTRAMUSCULAR; INTRAVENOUS at 23:22

## 2021-09-26 RX ADMIN — MELOXICAM 15 MG: 7.5 TABLET ORAL at 10:14

## 2021-09-26 RX ADMIN — DOXYCYCLINE 100 MG: 100 INJECTION, POWDER, LYOPHILIZED, FOR SOLUTION INTRAVENOUS at 13:59

## 2021-09-26 RX ADMIN — ATORVASTATIN CALCIUM 20 MG: 10 TABLET, FILM COATED ORAL at 22:07

## 2021-09-26 ASSESSMENT — PAIN DESCRIPTION - PAIN TYPE: TYPE: CHRONIC PAIN

## 2021-09-26 ASSESSMENT — PAIN SCALES - GENERAL
PAINLEVEL_OUTOF10: 6
PAINLEVEL_OUTOF10: 8
PAINLEVEL_OUTOF10: 8
PAINLEVEL_OUTOF10: 7

## 2021-09-26 ASSESSMENT — PAIN DESCRIPTION - FREQUENCY: FREQUENCY: CONTINUOUS

## 2021-09-26 ASSESSMENT — PAIN DESCRIPTION - LOCATION: LOCATION: BACK

## 2021-09-26 ASSESSMENT — PAIN DESCRIPTION - DESCRIPTORS: DESCRIPTORS: ACHING

## 2021-09-26 ASSESSMENT — PAIN DESCRIPTION - ORIENTATION: ORIENTATION: LOWER

## 2021-09-26 NOTE — PROGRESS NOTES
Patient c/o feeling more SOB Respiratory called to give PRN breathing tx. Patient states mild improvement.

## 2021-09-27 ENCOUNTER — CLINICAL DOCUMENTATION (OUTPATIENT)
Dept: SPIRITUAL SERVICES | Age: 66
End: 2021-09-27

## 2021-09-27 LAB
ANION GAP SERPL CALCULATED.3IONS-SCNC: 10 MEQ/L (ref 9–15)
BUN BLDV-MCNC: 16 MG/DL (ref 8–23)
CALCIUM SERPL-MCNC: 8.9 MG/DL (ref 8.5–9.9)
CHLORIDE BLD-SCNC: 101 MEQ/L (ref 95–107)
CO2: 25 MEQ/L (ref 20–31)
CREAT SERPL-MCNC: 0.72 MG/DL (ref 0.7–1.2)
CULTURE, RESPIRATORY: NORMAL
GFR AFRICAN AMERICAN: >60
GFR NON-AFRICAN AMERICAN: >60
GLUCOSE BLD-MCNC: 183 MG/DL (ref 70–99)
GRAM STAIN RESULT: NORMAL
POTASSIUM SERPL-SCNC: 4.3 MEQ/L (ref 3.4–4.9)
SODIUM BLD-SCNC: 136 MEQ/L (ref 135–144)

## 2021-09-27 PROCEDURE — 6370000000 HC RX 637 (ALT 250 FOR IP): Performed by: INTERNAL MEDICINE

## 2021-09-27 PROCEDURE — 94640 AIRWAY INHALATION TREATMENT: CPT

## 2021-09-27 PROCEDURE — 6360000002 HC RX W HCPCS: Performed by: INTERNAL MEDICINE

## 2021-09-27 PROCEDURE — 2500000003 HC RX 250 WO HCPCS: Performed by: INTERNAL MEDICINE

## 2021-09-27 PROCEDURE — 36415 COLL VENOUS BLD VENIPUNCTURE: CPT

## 2021-09-27 PROCEDURE — 1210000000 HC MED SURG R&B

## 2021-09-27 PROCEDURE — 2580000003 HC RX 258: Performed by: INTERNAL MEDICINE

## 2021-09-27 PROCEDURE — 80048 BASIC METABOLIC PNL TOTAL CA: CPT

## 2021-09-27 RX ORDER — PREDNISONE 20 MG/1
40 TABLET ORAL DAILY
Status: DISCONTINUED | OUTPATIENT
Start: 2021-09-27 | End: 2021-09-28 | Stop reason: HOSPADM

## 2021-09-27 RX ADMIN — MONTELUKAST SODIUM 10 MG: 10 TABLET ORAL at 21:10

## 2021-09-27 RX ADMIN — IPRATROPIUM BROMIDE AND ALBUTEROL SULFATE 3 ML: .5; 2.5 SOLUTION RESPIRATORY (INHALATION) at 06:17

## 2021-09-27 RX ADMIN — SALINE NASAL SPRAY 2 SPRAY: 1.5 SOLUTION NASAL at 21:11

## 2021-09-27 RX ADMIN — SALINE NASAL SPRAY 2 SPRAY: 1.5 SOLUTION NASAL at 16:29

## 2021-09-27 RX ADMIN — SODIUM CHLORIDE, PRESERVATIVE FREE 10 ML: 5 INJECTION INTRAVENOUS at 08:06

## 2021-09-27 RX ADMIN — TRAZODONE HYDROCHLORIDE 50 MG: 50 TABLET ORAL at 21:10

## 2021-09-27 RX ADMIN — AMLODIPINE BESYLATE 10 MG: 10 TABLET ORAL at 08:06

## 2021-09-27 RX ADMIN — ESCITALOPRAM OXALATE 10 MG: 10 TABLET ORAL at 21:10

## 2021-09-27 RX ADMIN — ENOXAPARIN SODIUM 40 MG: 40 INJECTION SUBCUTANEOUS at 08:05

## 2021-09-27 RX ADMIN — PIPERACILLIN AND TAZOBACTAM 3375 MG: 3; .375 INJECTION, POWDER, LYOPHILIZED, FOR SOLUTION INTRAVENOUS at 16:29

## 2021-09-27 RX ADMIN — PIPERACILLIN AND TAZOBACTAM 3375 MG: 3; .375 INJECTION, POWDER, LYOPHILIZED, FOR SOLUTION INTRAVENOUS at 08:07

## 2021-09-27 RX ADMIN — POLYETHYLENE GLYCOL 3350 17 G: 17 POWDER, FOR SOLUTION ORAL at 11:07

## 2021-09-27 RX ADMIN — TRAMADOL HYDROCHLORIDE 50 MG: 50 TABLET ORAL at 08:06

## 2021-09-27 RX ADMIN — SALINE NASAL SPRAY 2 SPRAY: 1.5 SOLUTION NASAL at 09:25

## 2021-09-27 RX ADMIN — PIPERACILLIN AND TAZOBACTAM 3375 MG: 3; .375 INJECTION, POWDER, LYOPHILIZED, FOR SOLUTION INTRAVENOUS at 00:32

## 2021-09-27 RX ADMIN — IPRATROPIUM BROMIDE AND ALBUTEROL SULFATE 3 ML: .5; 2.5 SOLUTION RESPIRATORY (INHALATION) at 18:02

## 2021-09-27 RX ADMIN — ATORVASTATIN CALCIUM 20 MG: 10 TABLET, FILM COATED ORAL at 21:10

## 2021-09-27 RX ADMIN — PANTOPRAZOLE SODIUM 40 MG: 40 TABLET, DELAYED RELEASE ORAL at 08:06

## 2021-09-27 RX ADMIN — SODIUM CHLORIDE, PRESERVATIVE FREE 10 ML: 5 INJECTION INTRAVENOUS at 21:10

## 2021-09-27 RX ADMIN — MELOXICAM 15 MG: 7.5 TABLET ORAL at 08:06

## 2021-09-27 RX ADMIN — PREDNISONE 40 MG: 20 TABLET ORAL at 12:48

## 2021-09-27 RX ADMIN — DOXYCYCLINE 100 MG: 100 INJECTION, POWDER, LYOPHILIZED, FOR SOLUTION INTRAVENOUS at 23:23

## 2021-09-27 RX ADMIN — IPRATROPIUM BROMIDE AND ALBUTEROL SULFATE 3 ML: .5; 2.5 SOLUTION RESPIRATORY (INHALATION) at 11:08

## 2021-09-27 RX ADMIN — DOXYCYCLINE 100 MG: 100 INJECTION, POWDER, LYOPHILIZED, FOR SOLUTION INTRAVENOUS at 12:48

## 2021-09-27 ASSESSMENT — PAIN SCALES - GENERAL
PAINLEVEL_OUTOF10: 7

## 2021-09-27 ASSESSMENT — ENCOUNTER SYMPTOMS
COUGH: 1
VOMITING: 0
SHORTNESS OF BREATH: 0
NAUSEA: 0
DIARRHEA: 0

## 2021-09-27 ASSESSMENT — PAIN DESCRIPTION - PAIN TYPE
TYPE: CHRONIC PAIN
TYPE: CHRONIC PAIN

## 2021-09-27 ASSESSMENT — PAIN DESCRIPTION - LOCATION
LOCATION: BACK
LOCATION: BACK

## 2021-09-27 ASSESSMENT — PAIN DESCRIPTION - PROGRESSION: CLINICAL_PROGRESSION: NOT CHANGED

## 2021-09-27 ASSESSMENT — PAIN DESCRIPTION - ONSET: ONSET: ON-GOING

## 2021-09-27 ASSESSMENT — PAIN DESCRIPTION - FREQUENCY: FREQUENCY: CONTINUOUS

## 2021-09-27 ASSESSMENT — PAIN DESCRIPTION - DESCRIPTORS: DESCRIPTORS: ACHING

## 2021-09-27 ASSESSMENT — PAIN DESCRIPTION - ORIENTATION: ORIENTATION: LOWER

## 2021-09-27 NOTE — PROGRESS NOTES
Progress Note  Date:2021       Room:0206/0206-01  Patient Nicolle Abdalla     YOB: 1955     Age:65 y.o. Subjective    Subjective:  Symptoms:  He reports cough. No shortness of breath, malaise, chest pain, weakness, headache, chest pressure, anorexia, diarrhea or anxiety. Diet:  No nausea or vomiting. Review of Systems   Respiratory: Positive for cough. Negative for shortness of breath. Cardiovascular: Negative for chest pain. Gastrointestinal: Negative for anorexia, diarrhea, nausea and vomiting. Neurological: Negative for weakness. Objective         Vitals Last 24 Hours:  TEMPERATURE:  Temp  Av.9 °F (37.2 °C)  Min: 98.4 °F (36.9 °C)  Max: 99.7 °F (37.6 °C)  RESPIRATIONS RANGE: Resp  Av.7  Min: 18  Max: 20  PULSE OXIMETRY RANGE: SpO2  Av.8 %  Min: 96 %  Max: 98 %  PULSE RANGE: Pulse  Av  Min: 64  Max: 106  BLOOD PRESSURE RANGE: Systolic (10IJX), HNO:158 , Min:121 , FZQ:529   ; Diastolic (21MIG), PV, Min:64, Max:88    I/O (24Hr): Intake/Output Summary (Last 24 hours) at 2021 4354  Last data filed at 2021 1803  Gross per 24 hour   Intake 240 ml   Output 475 ml   Net -235 ml     Objective:  General Appearance:  Comfortable, well-appearing and in no acute distress. Vital signs: (most recent): Blood pressure 122/64, pulse 64, temperature 98.4 °F (36.9 °C), temperature source Oral, resp. rate 18, height 6' (1.829 m), weight 288 lb (130.6 kg), SpO2 97 %. HEENT: Normal HEENT exam.    Lungs: There are decreased breath sounds and rhonchi. Heart: S1 normal and S2 normal.    Abdomen: Abdomen is soft. Bowel sounds are normal.   There is no epigastric area tenderness. Extremities: (Trace edema)  Pulses: Distal pulses are intact. Neurological: Patient is alert. Pupils:  Pupils are equal, round, and reactive to light. Skin:  Warm.       Labs/Imaging/Diagnostics    Labs:  CBC:  Recent Labs     21  0708 09/26/21  1124   WBC 24.1* 31.0*   RBC 3.71* 3.96*   HGB 11.3* 12.1*   HCT 36.3* 37.4*   MCV 97.8* 94.4*   RDW 15.4* 15.1*    456*     CHEMISTRIES:  Recent Labs     09/25/21  0708 09/27/21  0750    136   K 4.8 4.3    101   CO2 23 25   BUN 15 16   CREATININE 0.73 0.72   GLUCOSE 159* 183*     PT/INR:No results for input(s): PROTIME, INR in the last 72 hours. APTT:No results for input(s): APTT in the last 72 hours. LIVER PROFILE:  Recent Labs     09/25/21  0708   AST 22   ALT 30   BILITOT 0.3   ALKPHOS 134*       Imaging Last 24 Hours:  No results found. Assessment//Plan           Hospital Problems         Last Modified POA    Pneumonia 9/24/2021 Yes      sepsis POA  PNA  Chronic respiratory failure  HTN  Assessment & Plan  Will change steroids to p.o. Leukocytosis secondary to steroids use. Patient feeling a lot better. Wants to be discharged tomorrow. Patient back to baseline for oxygen therapy which is clear at home. Patient has runny nose will order [place]. Cultures so far negative.   Electronically signed by Jovanni Diaz MD on 9/27/21 at 9:27 AM EDT

## 2021-09-28 VITALS
HEART RATE: 59 BPM | DIASTOLIC BLOOD PRESSURE: 58 MMHG | HEIGHT: 72 IN | TEMPERATURE: 98.2 F | SYSTOLIC BLOOD PRESSURE: 124 MMHG | BODY MASS INDEX: 39.01 KG/M2 | RESPIRATION RATE: 20 BRPM | OXYGEN SATURATION: 98 % | WEIGHT: 288 LBS

## 2021-09-28 LAB
BANDED NEUTROPHILS RELATIVE PERCENT: 13 %
BASOPHILS ABSOLUTE: 0 K/UL (ref 0–0.1)
BASOPHILS RELATIVE PERCENT: 0.1 % (ref 0.2–1.2)
EOSINOPHILS ABSOLUTE: 0 K/UL (ref 0–0.5)
EOSINOPHILS RELATIVE PERCENT: 0 % (ref 0.8–7)
HCT VFR BLD CALC: 34 % (ref 42–52)
HEMOGLOBIN: 10.8 G/DL (ref 13.7–17.5)
IMMATURE GRANULOCYTES #: 0.3 K/UL
IMMATURE GRANULOCYTES %: 1 %
LYMPHOCYTES ABSOLUTE: 1.1 K/UL (ref 1.3–3.6)
LYMPHOCYTES RELATIVE PERCENT: 4 %
MCH RBC QN AUTO: 30.9 PG (ref 25.7–32.2)
MCHC RBC AUTO-ENTMCNC: 31.8 % (ref 32.3–36.5)
MCV RBC AUTO: 97.1 FL (ref 79–92.2)
MONOCYTES ABSOLUTE: 0.8 K/UL (ref 0.3–0.8)
MONOCYTES RELATIVE PERCENT: 3 % (ref 5.3–12.2)
NEUTROPHILS ABSOLUTE: 25.4 K/UL (ref 1.8–5.4)
NEUTROPHILS RELATIVE PERCENT: 80 % (ref 34–67.9)
PDW BLD-RTO: 14.9 % (ref 11.6–14.4)
PLATELET # BLD: 452 K/UL (ref 163–337)
RBC # BLD: 3.5 M/UL (ref 4.63–6.08)
WBC # BLD: 27.3 K/UL (ref 4.2–9)

## 2021-09-28 PROCEDURE — 6370000000 HC RX 637 (ALT 250 FOR IP): Performed by: INTERNAL MEDICINE

## 2021-09-28 PROCEDURE — 6360000002 HC RX W HCPCS: Performed by: INTERNAL MEDICINE

## 2021-09-28 PROCEDURE — 36415 COLL VENOUS BLD VENIPUNCTURE: CPT

## 2021-09-28 PROCEDURE — 85025 COMPLETE CBC W/AUTO DIFF WBC: CPT

## 2021-09-28 PROCEDURE — 94640 AIRWAY INHALATION TREATMENT: CPT

## 2021-09-28 PROCEDURE — 2580000003 HC RX 258: Performed by: INTERNAL MEDICINE

## 2021-09-28 RX ORDER — PREDNISONE 20 MG/1
20 TABLET ORAL DAILY
Qty: 5 TABLET | Refills: 0 | Status: SHIPPED | OUTPATIENT
Start: 2021-09-28 | End: 2021-10-03

## 2021-09-28 RX ORDER — GUAIFENESIN 600 MG/1
600 TABLET, EXTENDED RELEASE ORAL 2 TIMES DAILY
Qty: 20 TABLET | Refills: 1 | Status: SHIPPED | OUTPATIENT
Start: 2021-09-28 | End: 2021-10-08

## 2021-09-28 RX ORDER — LEVOFLOXACIN 750 MG/1
750 TABLET ORAL DAILY
Qty: 7 TABLET | Refills: 0 | Status: SHIPPED | OUTPATIENT
Start: 2021-09-28 | End: 2021-10-05

## 2021-09-28 RX ADMIN — SALINE NASAL SPRAY 2 SPRAY: 1.5 SOLUTION NASAL at 09:05

## 2021-09-28 RX ADMIN — PIPERACILLIN AND TAZOBACTAM 3375 MG: 3; .375 INJECTION, POWDER, LYOPHILIZED, FOR SOLUTION INTRAVENOUS at 02:27

## 2021-09-28 RX ADMIN — PREDNISONE 40 MG: 20 TABLET ORAL at 09:04

## 2021-09-28 RX ADMIN — ENOXAPARIN SODIUM 40 MG: 40 INJECTION SUBCUTANEOUS at 09:03

## 2021-09-28 RX ADMIN — TRAMADOL HYDROCHLORIDE 50 MG: 50 TABLET ORAL at 05:44

## 2021-09-28 RX ADMIN — AMLODIPINE BESYLATE 10 MG: 10 TABLET ORAL at 09:04

## 2021-09-28 RX ADMIN — PANTOPRAZOLE SODIUM 40 MG: 40 TABLET, DELAYED RELEASE ORAL at 09:04

## 2021-09-28 RX ADMIN — IPRATROPIUM BROMIDE AND ALBUTEROL SULFATE 3 ML: .5; 2.5 SOLUTION RESPIRATORY (INHALATION) at 11:41

## 2021-09-28 RX ADMIN — MELOXICAM 15 MG: 7.5 TABLET ORAL at 09:04

## 2021-09-28 RX ADMIN — GUAIFENESIN 600 MG: 600 TABLET, EXTENDED RELEASE ORAL at 09:04

## 2021-09-28 RX ADMIN — IPRATROPIUM BROMIDE AND ALBUTEROL SULFATE 3 ML: .5; 2.5 SOLUTION RESPIRATORY (INHALATION) at 06:16

## 2021-09-28 ASSESSMENT — PAIN SCALES - GENERAL
PAINLEVEL_OUTOF10: 7
PAINLEVEL_OUTOF10: 9

## 2021-09-28 NOTE — DISCHARGE SUMMARY
Discharge Summary    Date: 9/28/2021  Patient Name: Keturah Donovan YOB: 1955 Age: 63 y.o. Admit Date: 9/23/2021  Discharge Date: 9/28/2021  Discharge Condition:    Admission Diagnosis  Pneumonia (J18.9);COPD exacerbation ([facility] [address] (J44.1); Pneumonia of left lung due to infectious organism, unspecified part of lung (J18.9)     Discharge Diagnosis  Active Problems: PneumoniaResolved Problems: * No resolved hospital problems. [facility] Stay  Narrative of Hospital Course:  Patient comes with pneumonia and acute on chronic respiratory failure. Patient is doing a lot better compared to before. Elevated WBC secondary to steroids. Trending down. Patient attributes this to his home condition due to having mold in the house and fumes from the carpet. Recommended patient to change house living situation. Patient is in process of doing that. Talking with landlord and possible getting  to help him improve his living condition. Patient will be discharged on p.o. Levaquin for 7 days. Most likely atypical pneumonia. Patient is to follow with PCP as outpatient and pulmonary for resolution of pneumonia. Consultants:  None    Surgeries/procedures Performed:       Treatments:    Antibiotics    Zosyn    Discharge Plan/Disposition:  Home    Hospital/Incidental Findings Requiring Follow Up:    Patient Instructions:    Diet: Regular Diet    Activity:Activity as Tolerated  For number of days (if applicable): Other Instructions:    Provider Follow-Up:   No follow-ups on file.      Significant Diagnostic Studies:    Recent Labs:  Admission on 09/23/2021Ventricular Rate                              Date: 09/23/2021Value: 116         Ref range: BPM                Status: FinalAtrial Rate                                   Date: 09/23/2021Value: 116         Ref range: BPM                Status: FinalP-R Interval                                  Date: 09/23/2021Value: 112         Ref range: ms Status: FinalQRS Duration                                  Date: 09/23/2021Value: 96          Ref range: ms                 Status: FinalQ-T Interval                                  Date: 09/23/2021Value: 322         Ref range: ms                 Status: FinalQTc Calculation (Bazett)                      Date: 09/23/2021Value: 447         Ref range: ms                 Status: FinalP Axis                                        Date: 09/23/2021Value: 43          Ref range: degrees            Status: FinalR Axis                                        Date: 09/23/2021Value: 60          Ref range: degrees            Status: FinalT Axis                                        Date: 09/23/2021Value: 52          Ref range: degrees            Status: [address] [facility]                                           Date: 09/23/2021Value: 25.7*       Ref range: 4.2 - 9.0 K/uL     Status: FinalRBC                                           Date: 09/23/2021Value: 3.90*       Ref range: 4.63 - 6.08 M/uL   Status: FinalHemoglobin                                    Date: 09/23/2021Value: 11.9*       Ref range: 13.7 - 17.5 g/dL   Status: FinalHematocrit                                    Date: 09/23/2021Value: 37.5*       Ref range: 42.0 - 52.0 %      Status: FinalMCV                                           Date: 09/23/2021Value: 96.2*       Ref range: 79.0 - 92.2 fL     Status: [address]                                           Date: 09/23/2021Value: 30.5        Ref range: 25.7 - 32.2 pg     Status: [address]                                          Date: 09/23/2021Value: 31.7*       Ref range: 32.3 - 36.5 %      Status: FinalRDW                                           Date: 09/23/2021Value: 15.4*       Ref range: 11.6 - 14.4 %      Status: FinalPlatelets                                     Date: 09/23/2021Value: 364*        Ref range: 163 - 337 K/uL     Status: FinalSodium                                        Date: 09/23/2021Value: 136 Ref range: 135 - 144 mEq/L    Status: FinalPotassium                                     Date: 09/23/2021Value: 4.5         Ref range: 3.4 - 4.9 mEq/L    Status: FinalChloride                                      Date: 09/23/2021Value: 101         Ref range: 95 - 107 mEq/L     Status: FinalCO2                                           Date: 09/23/2021Value: 23          Ref range: 20 - 31 mEq/L      Status: FinalAnion Gap                                     Date: 09/23/2021Value: 12          Ref range: 9 - 15 mEq/L       Status: FinalGlucose                                       Date: 09/23/2021Value: 177*        Ref range: 70 - 99 mg/dL      Status: FinalBUN                                           Date: 09/23/2021Value: 11          Ref range: 8 - 23 mg/dL       Status: FinalCREATININE                                    Date: 09/23/2021Value: 0.93        Ref range: 0.70 - 1.20 mg/dL  Status: FinalGFR Non-                      Date: 09/23/2021Value: >60.0       Ref range: >60                Status: Final              Comment: >60 mL/min/1.73m2 EGFR, calc. for ages 25 and older using theMDRD formula (not corrected for weight), is valid for stablerenal function. GFR                           Date: 09/23/2021Value: >60.0       Ref range: >60                Status: Final              Comment: >60 mL/min/1.73m2 EGFR, calc. for ages 25 and older using theMDRD formula (not corrected for weight), is valid for stablerenal function. Calcium                                       Date: 09/23/2021Value: 8.6         Ref range: 8.5 - 9.9 mg/dL    Status: FinalTotal Protein                                 Date: 09/23/2021Value: 6.8         Ref range: 6.3 - 8.0 g/dL     Status: FinalAlbumin                                       Date: 09/23/2021Value: 2.8*        Ref range: 3.5 - 4.6 g/dL     Status: FinalTotal Bilirubin                               Date: 09/23/2021Value: 0.5         Ref range: 0.2 - 0.7 mg/dL    Status: FinalAlkaline Phosphatase                          Date: 09/23/2021Value: 156*        Ref range: 35 - 104 U/L       Status: FinalALT                                           Date: 09/23/2021Value: 23          Ref range: 0 - 41 U/L         Status: FinalAST                                           Date: 09/23/2021Value: 20          Ref range: 0 - 40 U/L         Status: FinalGlobulin                                      Date: 09/23/2021Value: 4.0*        Ref range: 2.3 - 3.5 g/dL     Status: UlkqaHMSA-FsK-5, NAAT                              Date: 09/23/2021Value: Not Detected                   Ref range: Not Detected       Status: Final              Comment: Rapid NAAT:   Negative results should be treated as presumptive and,if inconsistent with clinical signs and symptoms or necessary forpatient management, should be tested with an alternative molecularassay. Negative results do not preclude SARS-CoV-2 infection andshould not be used as the sole basis for patient management decisions. This test has been authorized by the FDA under an Emergency UseAuthorization (EUA) for use by authorized laboratories. Fact sheet for [facility] sheet for Patients: Brittney.dk: Isothermal Nucleic Acid AmplificationTroponin                                      Date: 09/23/2021Value: <0.010      Ref range: 0.000 - 0.010 ng*  Status: Final              Comment: Methodology by Troponin T.Blood Culture, Routine                        Date: 09/24/2021Value: No Growth to date. Any change in status will be *                     Status: PreliminaryCulture, Blood 2                              Date: 09/24/2021Value: No Growth to date.   Any change in status will be *                     Status: PreliminaryLactic Acid                                   Date: 09/24/2021Value: 1.7         Ref range: 0.5 - 2.2 mmol/L   Status: FinalCULTURE, RESPIRATORY                          Date: 09/24/2021Value: Usual respiratory kamar in 48 hrs                     Status: FinalGram Stain Result                             Date: 09/24/2021Value:               Status: Final                 Value: Many WBC'sNo epithelial cellsMany Gram positive cocci in pairsSodium                                        Date: 09/25/2021Value: 137         Ref range: 135 - 144 mEq/L    Status: FinalPotassium reflex Magnesium                    Date: 09/25/2021Value: 4.8         Ref range: 3.4 - 4.9 mEq/L    Status: FinalChloride                                      Date: 09/25/2021Value: 103         Ref range: 95 - 107 mEq/L     Status: FinalCO2                                           Date: 09/25/2021Value: 23          Ref range: 20 - 31 mEq/L      Status: FinalAnion Gap                                     Date: 09/25/2021Value: 11          Ref range: 9 - 15 mEq/L       Status: FinalGlucose                                       Date: 09/25/2021Value: 159*        Ref range: 70 - 99 mg/dL      Status: FinalBUN                                           Date: 09/25/2021Value: 15          Ref range: 8 - 23 mg/dL       Status: FinalCREATININE                                    Date: 09/25/2021Value: 0.73        Ref range: 0.70 - 1.20 mg/dL  Status: FinalGFR Non-                      Date: 09/25/2021Value: >60.0       Ref range: >60                Status: Final              Comment: >60 mL/min/1.73m2 EGFR, calc. for ages 25 and older using theMDRD formula (not corrected for weight), is valid for stablerenal function. GFR                           Date: 09/25/2021Value: >60.0       Ref range: >60                Status: Final              Comment: >60 mL/min/1.73m2 EGFR, calc. for ages 25 and older using theMDRD formula (not corrected for weight), is valid for stablerenal function. Calcium                                       Date: 09/25/2021Value: 9.0         Ref range: 8.5 - 9.9 mg/dL    Status: FinalTotal Protein                                 Date: 09/25/2021Value: 7.0         Ref range: 6.3 - 8.0 g/dL     Status: FinalAlbumin                                       Date: 09/25/2021Value: 2.6*        Ref range: 3.5 - 4.6 g/dL     Status: FinalTotal Bilirubin                               Date: 09/25/2021Value: 0.3         Ref range: 0.2 - 0.7 mg/dL    Status: FinalAlkaline Phosphatase                          Date: 09/25/2021Value: 134*        Ref range: 35 - 104 U/L       Status: FinalALT                                           Date: 09/25/2021Value: 30          Ref range: 0 - 41 U/L         Status: FinalAST                                           Date: 09/25/2021Value: 22          Ref range: 0 - 40 U/L         Status: FinalGlobulin                                      Date: 09/25/2021Value: 4.4*        Ref range: 2.3 - 3.5 g/dL     Status: [address] [facility]                                           Date: 09/25/2021Value: 24.1*       Ref range: 4.2 - 9.0 K/uL     Status: FinalRBC                                           Date: 09/25/2021Value: 3.71*       Ref range: 4.63 - 6.08 M/uL   Status: FinalHemoglobin                                    Date: 09/25/2021Value: 11.3*       Ref range: 13.7 - 17.5 g/dL   Status: FinalHematocrit                                    Date: 09/25/2021Value: 36.3*       Ref range: 42.0 - 52.0 %      Status: FinalMCV                                           Date: 09/25/2021Value: 97.8*       Ref range: 79.0 - 92.2 fL     Status: [facility]                                           Date: 09/25/2021Value: 30.5        Ref range: 25.7 - 32.2 pg     Status: [address]                                          Date: 09/25/2021Value: 31.1*       Ref range: 32.3 - 36.5 %      Status: FinalRDW                                           Date: 09/25/2021Value: 15.4*       Ref range: 11.6 - 14.4 %      Status: FinalPlatelets Date: 09/25/2021Value: 304         Ref range: 163 - 337 K/uL     Status: [address] [facility]                                           Date: 09/26/2021Value: 31.0*       Ref range: 4.2 - 9.0 K/uL     Status: FinalRBC                                           Date: 09/26/2021Value: 3.96*       Ref range: 4.63 - 6.08 M/uL   Status: FinalHemoglobin                                    Date: 09/26/2021Value: 12.1*       Ref range: 13.7 - 17.5 g/dL   Status: FinalHematocrit                                    Date: 09/26/2021Value: 37.4*       Ref range: 42.0 - 52.0 %      Status: FinalMCV                                           Date: 09/26/2021Value: 94.4*       Ref range: 79.0 - 92.2 fL     [facility]                                           Date: 09/26/2021Value: 30.6        Ref range: 25.7 - 32.2 pg     Status: [address]                                          Date: 09/26/2021Value: 32.4        Ref range: 32.3 - 36.5 %      Status: FinalRDW                                           Date: 09/26/2021Value: 15.1*       Ref range: 11.6 - 14.4 %      Status: FinalPlatelets                                     Date: 09/26/2021Value: 456*        Ref range: 163 - 337 K/uL     Status: FinalNeutrophils %                                 Date: 09/26/2021Value: 83.9*       Ref range: 34.0 - 67.9 %      Status: FinalImmature Granulocytes %                       Date: 09/26/2021Value: 3.1         Ref range: %                  Status: FinalLymphocytes %                                 Date: 09/26/2021Value: 6.9         Ref range: %                  Status: FinalMonocytes %                                   Date: 09/26/2021Value: 5.8         Ref range: 5.3 - 12.2 %       Status: FinalEosinophils %                                 Date: 09/26/2021Value: 0.0*        Ref range: 0.8 - 7.0 %        Status: FinalBasophils %                                   Date: 09/26/2021Value: 0.3         Ref range: 0.2 - 1.2 %        Status: FinalNeutrophils Absolute                          Date: 09/26/2021Value: 26.0*       Ref range: 1.8 - 5.4 K/uL     Status: FinalImmature Granulocytes #                       Date: 09/26/2021Value: 1.0         Ref range: K/uL               Status: FinalLymphocytes Absolute                          Date: 09/26/2021Value: 2.2         Ref range: 1.3 - 3.6 K/uL     Status: FinalMonocytes Absolute                            Date: 09/26/2021Value: 1.8*        Ref range: 0.3 - 0.8 K/uL     Status: FinalEosinophils Absolute                          Date: 09/26/2021Value: 0.0         Ref range: 0.0 - 0.5 K/uL     Status: FinalBasophils Absolute                            Date: 09/26/2021Value: 0.1         Ref range: 0.0 - 0.1 K/uL     Status: FinalSodium                                        Date: 09/27/2021Value: 136         Ref range: 135 - 144 mEq/L    Status: FinalPotassium                                     Date: 09/27/2021Value: 4.3         Ref range: 3.4 - 4.9 mEq/L    Status: FinalChloride                                      Date: 09/27/2021Value: 101         Ref range: 95 - 107 mEq/L     Status: FinalCO2                                           Date: 09/27/2021Value: 25          Ref range: 20 - 31 mEq/L      Status: FinalAnion Gap                                     Date: 09/27/2021Value: 10          Ref range: 9 - 15 mEq/L       Status: FinalGlucose                                       Date: 09/27/2021Value: 183*        Ref range: 70 - 99 mg/dL      Status: FinalBUN                                           Date: 09/27/2021Value: 16          Ref range: 8 - 23 mg/dL       Status: FinalCREATININE                                    Date: 09/27/2021Value: 0.72        Ref range: 0.70 - 1.20 mg/dL  Status: FinalGFR Non-                      Date: 09/27/2021Value: >60.0       Ref range: >60                Status: Final              Comment: >60 mL/min/1.73m2 EGFR, calc. for ages 25 and older using theMDRD formula (not corrected for weight), is valid for stablerenal function. GFR                           Date: 09/27/2021Value: >60.0       Ref range: >60                Status: Final              Comment: >60 mL/min/1.73m2 EGFR, calc. for ages 25 and older using theMDRD formula (not corrected for weight), is valid for stablerenal function. Calcium                                       Date: 09/27/2021Value: 8.9         Ref range: 8.5 - 9.9 mg/dL    Status: [address] [facility]                                           Date: 09/28/2021Value: 27.3*       Ref range: 4.2 - 9.0 K/uL     Status: FinalRBC                                           Date: 09/28/2021Value: 3.50*       Ref range: 4.63 - 6.08 M/uL   Status: FinalHemoglobin                                    Date: 09/28/2021Value: 10.8*       Ref range: 13.7 - 17.5 g/dL   Status: FinalHematocrit                                    Date: 09/28/2021Value: 34.0*       Ref range: 42.0 - 52.0 %      Status: FinalMCV                                           Date: 09/28/2021Value: 97.1*       Ref range: 79.0 - 92.2 fL     Status: [facility]                                           Date: 09/28/2021Value: 30.9        Ref range: 25.7 - 32.2 pg     Status: [address]                                          Date: 09/28/2021Value: 31.8*       Ref range: 32.3 - 36.5 %      Status: FinalRDW                                           Date: 09/28/2021Value: 14.9*       Ref range: 11.6 - 14.4 %      Status: FinalPlatelets                                     Date: 09/28/2021Value: 452*        Ref range: 163 - 337 K/uL     Status: FinalNeutrophils %                                 Date: 09/28/2021Value: 80.0*       Ref range: 34.0 - 67.9 %      Status: FinalImmature Granulocytes %                       Date: 09/28/2021Value: 1.0         Ref range: %                  Status: FinalLymphocytes %                                 Date: 09/28/2021Value: 4.0         Ref range: % Status: FinalMonocytes %                                   Date: 09/28/2021Value: 3.0*        Ref range: 5.3 - 12.2 %       Status: FinalEosinophils %                                 Date: 09/28/2021Value: 0.0*        Ref range: 0.8 - 7.0 %        Status: FinalBasophils %                                   Date: 09/28/2021Value: 0.1*        Ref range: 0.2 - 1.2 %        Status: FinalNeutrophils Absolute                          Date: 09/28/2021Value: 25.4*       Ref range: 1.8 - 5.4 K/uL     Status: FinalImmature Granulocytes #                       Date: 09/28/2021Value: 0.3         Ref range: K/uL               Status: FinalLymphocytes Absolute                          Date: 09/28/2021Value: 1.1*        Ref range: 1.3 - 3.6 K/uL     Status: FinalMonocytes Absolute                            Date: 09/28/2021Value: 0.8         Ref range: 0.3 - 0.8 K/uL     Status: FinalEosinophils Absolute                          Date: 09/28/2021Value: 0.0         Ref range: 0.0 - 0.5 K/uL     Status: FinalBasophils Absolute                            Date: 09/28/2021Value: 0.0         Ref range: 0.0 - 0.1 K/uL     Status: FinalBands Relative                                Date: 09/28/2021Value: 13          Ref range: %                  Status: Final------------    Radiology last 7 days:  CT CHEST WO CONTRASTResult Date: 9/24/2021Worsening infiltrate is seen on this examination. There is consolidation seen in the left lower lobe with compression of the bronchi. There is only a tiny pleural effusion. Groundglass opacities are again seen anteriorly in the upper lung fields. Recommend follow-up to resolution since malignancy is not excluded. All CT scans at this facility use dose modulation, iterative reconstruction, and/or weight based dosing when appropriate to reduce radiation dose to as low as reasonably achievable. XR CHEST PORTABLEResult Date: 9/24/2021Left lower lobe infiltrate.  The left costophrenic angle appears to be now elevated. PA and lateral chest should be obtained and assessment made after treatment. Pending Labs   Order Current Status  Culture, Blood 1 Preliminary result  Culture, Blood 2 Preliminary result      Discharge Medications    Current Discharge Medication ListSTART taking these medicationsguaiFENesin (MUCINEX) 600 MG extended release tabletTake 1 tablet by mouth 2 times daily for 10 daysQty: 20 tablet Refills: 1sodium chloride (OCEAN, BABY AYR) 0.65 % nasal spray2 sprays by Nasal route three times dailyQty: 1 each Refills: 0levoFLOXacin (LEVAQUIN) 750 MG tabletTake 1 tablet by mouth daily for 7 daysQty: 7 tablet Refills: 0    Current Discharge Medication ListCONTINUE these medications which have CHANGEDpredniSONE (DELTASONE) 20 MG tabletTake 1 tablet by mouth daily for 5 daysQty: 5 tablet Refills: 0    Current Discharge Medication ListCONTINUE these medications which have NOT CHANGEDtraMADol (ULTRAM) 50 MG tabletTake by mouth.meloxicam (MOBIC) 15 MG tabletTake 1 tablet by mouth dailyQty: 30 tablet Refills: 0ipratropium-albuterol (DUONEB) 0.5-2.5 (3) MG/3ML SOLN nebulizer solutionInhale 3 mLs into the lungs every 6 hours as needed for Shortness of BreathQty: 360 mL Refills: 3Associated Diagnoses:Chronic obstructive pulmonary disease, unspecified COPD type (HCC)cetirizine (ZYRTEC ALLERGY) 10 MG tabletTake 10 mg by mouth dailysildenafil (VIAGRA) 100 MG tabletTake 1 tablet by mouth as needed for Erectile DysfunctionQty: 10 tablet Refills: 0Associated Diagnoses:Erectile dysfunction, unspecified erectile dysfunction typeTRELEGY ELLIPTA 200-62.5-25 MCG/INH AEPBTake 1 Inhaler by mouth dailyalbuterol sulfate HFA (PROAIR HFA) 108 (90 Base) MCG/ACT inhalerUse every 4 hours while awake for 7-10 days then PRN wheezing  Dispense with SPACER and Instruct on use. May sub Ventolin or Proventil as needed per [employer]. Qty: 1 Inhaler Refills: 0acetaminophen (ACETAMINOPHEN EXTRA STRENGTH) 500 MG tabletTake 1 tablet by mouth every 8 hours as needed for PainQty: 120 tablet Refills: 1Comments: DX Code Needed  . Associated Diagnoses:Status post total knee replacement, leftamLODIPine (NORVASC) 10 MG tabletTake 1 tablet by mouth dailyQty: 90 tablet Refills: 1Associated Diagnoses:Essential hypertensionescitalopram (LEXAPRO) 10 MG tabletTake 1 tablet by mouth dailyQty: 90 tablet Refills: 1Associated Diagnoses:Anxiety with depressionfluticasone (FLONASE) 50 MCG/ACT nasal spray2 sprays by Nasal route dailyQty: 3 Bottle Refills: 1Associated Diagnoses:Seasonal allergieslovastatin (MEVACOR) 10 MG tabletTake 1 tablet by mouth nightlyQty: 90 tablet Refills: 1Associated Diagnoses:Hyperlipidemia, unspecified hyperlipidemia typemontelukast (SINGULAIR) 10 MG tabletTake 1 tablet by mouth nightlyQty: 90 tablet Refills: 1Comments: PT REQUEST REFILLSAssociated Diagnoses:Seasonal allergiespantoprazole (PROTONIX) 40 MG tabletTake 1 tablet by mouth dailyQty: 90 tablet Refills: 1Associated Diagnoses:Gastroesophageal reflux disease, unspecified whether esophagitis presentpolyethylene glycol (GLYCOLAX) 17 GM/SCOOP powderTake 17 g by mouth dailyQty: 510 g Refills: 1Associated Diagnoses:Constipation, unspecified constipation typetraZODone (DESYREL) 50 MG tabletTake 1 tablet by mouth nightlyQty: 90 tablet Refills: 1Associated Diagnoses:Insomnia, unspecified type; Anxiety with depressionHydrocortisone, Perianal, 1 % creamdaily as needed Handicap Placard MISCby Does not apply route DX: OSTEOARTHRITIS OF BOTH KNEES (M17.0), COPD (J44.9)     EXPIRES: 15/1931HPW: 1 each Refills: 0Associated Diagnoses:Primary osteoarthritis of both knees;  Chronic obstructive pulmonary disease, unspecified COPD type ([email or web address]    Current Discharge Medication ListSTOP taking these medicationstriamcinolone (KENALOG) 0.025 % creamComments:Reason for Stopping:    Time Spent on Discharge:E] minutes were spent in patient examination, evaluation, counseling as well as medication reconciliation,

## 2021-09-29 LAB
BLOOD CULTURE, ROUTINE: NORMAL
CULTURE, BLOOD 2: NORMAL

## 2021-10-16 ENCOUNTER — APPOINTMENT (OUTPATIENT)
Dept: GENERAL RADIOLOGY | Age: 66
DRG: 190 | End: 2021-10-16
Payer: MEDICARE

## 2021-10-16 ENCOUNTER — HOSPITAL ENCOUNTER (INPATIENT)
Age: 66
LOS: 4 days | Discharge: HOME | DRG: 190 | End: 2021-10-20
Attending: EMERGENCY MEDICINE | Admitting: INTERNAL MEDICINE
Payer: MEDICARE

## 2021-10-16 DIAGNOSIS — J18.9 PNEUMONIA OF LEFT LOWER LOBE DUE TO INFECTIOUS ORGANISM: ICD-10-CM

## 2021-10-16 DIAGNOSIS — E08.00 DIABETES MELLITUS DUE TO UNDERLYING CONDITION WITH HYPEROSMOLARITY WITHOUT COMA, WITHOUT LONG-TERM CURRENT USE OF INSULIN (HCC): ICD-10-CM

## 2021-10-16 DIAGNOSIS — J44.9 CHRONIC OBSTRUCTIVE PULMONARY DISEASE, UNSPECIFIED (HCC): ICD-10-CM

## 2021-10-16 DIAGNOSIS — J44.1 COPD EXACERBATION (HCC): Primary | ICD-10-CM

## 2021-10-16 DIAGNOSIS — M48.061 SPINAL STENOSIS OF LUMBAR REGION WITHOUT NEUROGENIC CLAUDICATION: ICD-10-CM

## 2021-10-16 DIAGNOSIS — R50.9 FEBRILE ILLNESS, ACUTE: ICD-10-CM

## 2021-10-16 LAB
ALBUMIN SERPL-MCNC: 2.7 G/DL (ref 3.5–4.6)
ALP BLD-CCNC: 107 U/L (ref 35–104)
ALT SERPL-CCNC: 19 U/L (ref 0–41)
ANION GAP SERPL CALCULATED.3IONS-SCNC: 12 MEQ/L (ref 9–15)
AST SERPL-CCNC: 12 U/L (ref 0–40)
BASOPHILS ABSOLUTE: 0 K/UL (ref 0–0.2)
BASOPHILS RELATIVE PERCENT: 0.2 %
BILIRUB SERPL-MCNC: 0.3 MG/DL (ref 0.2–0.7)
BILIRUBIN URINE: NEGATIVE
BLOOD, URINE: NEGATIVE
BUN BLDV-MCNC: 7 MG/DL (ref 8–23)
CALCIUM SERPL-MCNC: 9.2 MG/DL (ref 8.5–9.9)
CHLORIDE BLD-SCNC: 101 MEQ/L (ref 95–107)
CLARITY: CLEAR
CO2: 27 MEQ/L (ref 20–31)
COLOR: YELLOW
CREAT SERPL-MCNC: 0.78 MG/DL (ref 0.7–1.2)
EKG ATRIAL RATE: 103 BPM
EKG P AXIS: 73 DEGREES
EKG P-R INTERVAL: 132 MS
EKG Q-T INTERVAL: 322 MS
EKG QRS DURATION: 92 MS
EKG QTC CALCULATION (BAZETT): 421 MS
EKG R AXIS: 59 DEGREES
EKG T AXIS: 43 DEGREES
EKG VENTRICULAR RATE: 103 BPM
EOSINOPHILS ABSOLUTE: 0 K/UL (ref 0–0.7)
EOSINOPHILS RELATIVE PERCENT: 0.2 %
GFR AFRICAN AMERICAN: >60
GFR NON-AFRICAN AMERICAN: >60
GLOBULIN: 4.1 G/DL (ref 2.3–3.5)
GLUCOSE BLD-MCNC: 215 MG/DL (ref 60–115)
GLUCOSE BLD-MCNC: 81 MG/DL (ref 70–99)
GLUCOSE URINE: 500 MG/DL
HCT VFR BLD CALC: 32.7 % (ref 42–52)
HEMOGLOBIN: 10.5 G/DL (ref 14–18)
KETONES, URINE: NEGATIVE MG/DL
LACTIC ACID: 2.7 MMOL/L (ref 0.5–2.2)
LEUKOCYTE ESTERASE, URINE: NEGATIVE
LYMPHOCYTES ABSOLUTE: 2.4 K/UL (ref 1–4.8)
LYMPHOCYTES RELATIVE PERCENT: 11.2 %
MAGNESIUM: 1.8 MG/DL (ref 1.7–2.4)
MCH RBC QN AUTO: 30.9 PG (ref 27–31.3)
MCHC RBC AUTO-ENTMCNC: 32.1 % (ref 33–37)
MCV RBC AUTO: 96.2 FL (ref 80–100)
MONOCYTES ABSOLUTE: 2.1 K/UL (ref 0.2–0.8)
MONOCYTES RELATIVE PERCENT: 9.6 %
NEUTROPHILS ABSOLUTE: 16.8 K/UL (ref 1.4–6.5)
NEUTROPHILS RELATIVE PERCENT: 77.6 %
NITRITE, URINE: NEGATIVE
PDW BLD-RTO: 15 % (ref 11.5–14.5)
PERFORMED ON: ABNORMAL
PH UA: 5.5 (ref 5–9)
PLATELET # BLD: 456 K/UL (ref 130–400)
POTASSIUM SERPL-SCNC: 3.9 MEQ/L (ref 3.4–4.9)
PRO-BNP: 122 PG/ML
PROTEIN UA: NEGATIVE MG/DL
RBC # BLD: 3.4 M/UL (ref 4.7–6.1)
SODIUM BLD-SCNC: 140 MEQ/L (ref 135–144)
SPECIFIC GRAVITY UA: 1.01 (ref 1–1.03)
TOTAL PROTEIN: 6.8 G/DL (ref 6.3–8)
TROPONIN: <0.01 NG/ML (ref 0–0.01)
URINE REFLEX TO CULTURE: ABNORMAL
UROBILINOGEN, URINE: 0.2 E.U./DL
WBC # BLD: 21.7 K/UL (ref 4.8–10.8)

## 2021-10-16 PROCEDURE — 71045 X-RAY EXAM CHEST 1 VIEW: CPT

## 2021-10-16 PROCEDURE — 85610 PROTHROMBIN TIME: CPT

## 2021-10-16 PROCEDURE — 81003 URINALYSIS AUTO W/O SCOPE: CPT

## 2021-10-16 PROCEDURE — 6370000000 HC RX 637 (ALT 250 FOR IP): Performed by: EMERGENCY MEDICINE

## 2021-10-16 PROCEDURE — 85025 COMPLETE CBC W/AUTO DIFF WBC: CPT

## 2021-10-16 PROCEDURE — 83880 ASSAY OF NATRIURETIC PEPTIDE: CPT

## 2021-10-16 PROCEDURE — 87040 BLOOD CULTURE FOR BACTERIA: CPT

## 2021-10-16 PROCEDURE — 2500000003 HC RX 250 WO HCPCS: Performed by: INTERNAL MEDICINE

## 2021-10-16 PROCEDURE — 6360000002 HC RX W HCPCS: Performed by: INTERNAL MEDICINE

## 2021-10-16 PROCEDURE — 83735 ASSAY OF MAGNESIUM: CPT

## 2021-10-16 PROCEDURE — 83605 ASSAY OF LACTIC ACID: CPT

## 2021-10-16 PROCEDURE — 84484 ASSAY OF TROPONIN QUANT: CPT

## 2021-10-16 PROCEDURE — 36415 COLL VENOUS BLD VENIPUNCTURE: CPT

## 2021-10-16 PROCEDURE — 96374 THER/PROPH/DIAG INJ IV PUSH: CPT

## 2021-10-16 PROCEDURE — 2580000003 HC RX 258: Performed by: INTERNAL MEDICINE

## 2021-10-16 PROCEDURE — 93005 ELECTROCARDIOGRAM TRACING: CPT

## 2021-10-16 PROCEDURE — 1210000000 HC MED SURG R&B

## 2021-10-16 PROCEDURE — 2700000000 HC OXYGEN THERAPY PER DAY

## 2021-10-16 PROCEDURE — 6370000000 HC RX 637 (ALT 250 FOR IP): Performed by: INTERNAL MEDICINE

## 2021-10-16 PROCEDURE — 94640 AIRWAY INHALATION TREATMENT: CPT

## 2021-10-16 PROCEDURE — 2580000003 HC RX 258: Performed by: EMERGENCY MEDICINE

## 2021-10-16 PROCEDURE — 80053 COMPREHEN METABOLIC PANEL: CPT

## 2021-10-16 PROCEDURE — 99284 EMERGENCY DEPT VISIT MOD MDM: CPT

## 2021-10-16 PROCEDURE — 6360000002 HC RX W HCPCS: Performed by: EMERGENCY MEDICINE

## 2021-10-16 PROCEDURE — 6370000000 HC RX 637 (ALT 250 FOR IP)

## 2021-10-16 PROCEDURE — 93010 ELECTROCARDIOGRAM REPORT: CPT | Performed by: INTERNAL MEDICINE

## 2021-10-16 PROCEDURE — 85730 THROMBOPLASTIN TIME PARTIAL: CPT

## 2021-10-16 RX ORDER — MONTELUKAST SODIUM 10 MG/1
10 TABLET ORAL NIGHTLY
Status: DISCONTINUED | OUTPATIENT
Start: 2021-10-16 | End: 2021-10-20 | Stop reason: HOSPADM

## 2021-10-16 RX ORDER — PANTOPRAZOLE SODIUM 40 MG/1
40 TABLET, DELAYED RELEASE ORAL DAILY
Status: DISCONTINUED | OUTPATIENT
Start: 2021-10-16 | End: 2021-10-20 | Stop reason: HOSPADM

## 2021-10-16 RX ORDER — ONDANSETRON 2 MG/ML
4 INJECTION INTRAMUSCULAR; INTRAVENOUS EVERY 6 HOURS PRN
Status: DISCONTINUED | OUTPATIENT
Start: 2021-10-16 | End: 2021-10-20 | Stop reason: HOSPADM

## 2021-10-16 RX ORDER — POLYETHYLENE GLYCOL 3350 17 G/17G
17 POWDER, FOR SOLUTION ORAL DAILY PRN
Status: DISCONTINUED | OUTPATIENT
Start: 2021-10-16 | End: 2021-10-20 | Stop reason: HOSPADM

## 2021-10-16 RX ORDER — PROMETHAZINE HYDROCHLORIDE 12.5 MG/1
12.5 TABLET ORAL EVERY 6 HOURS PRN
Status: DISCONTINUED | OUTPATIENT
Start: 2021-10-16 | End: 2021-10-20 | Stop reason: HOSPADM

## 2021-10-16 RX ORDER — METHYLPREDNISOLONE SODIUM SUCCINATE 125 MG/2ML
125 INJECTION, POWDER, LYOPHILIZED, FOR SOLUTION INTRAMUSCULAR; INTRAVENOUS ONCE
Status: COMPLETED | OUTPATIENT
Start: 2021-10-16 | End: 2021-10-16

## 2021-10-16 RX ORDER — CETIRIZINE HYDROCHLORIDE 10 MG/1
10 TABLET ORAL DAILY
Status: DISCONTINUED | OUTPATIENT
Start: 2021-10-16 | End: 2021-10-20 | Stop reason: HOSPADM

## 2021-10-16 RX ORDER — GUAIFENESIN 600 MG/1
600 TABLET, EXTENDED RELEASE ORAL 2 TIMES DAILY
Status: DISCONTINUED | OUTPATIENT
Start: 2021-10-16 | End: 2021-10-20 | Stop reason: HOSPADM

## 2021-10-16 RX ORDER — TRAMADOL HYDROCHLORIDE 50 MG/1
50 TABLET ORAL EVERY 6 HOURS PRN
Status: DISCONTINUED | OUTPATIENT
Start: 2021-10-16 | End: 2021-10-20 | Stop reason: HOSPADM

## 2021-10-16 RX ORDER — GUAIFENESIN/DEXTROMETHORPHAN 100-10MG/5
5 SYRUP ORAL EVERY 4 HOURS PRN
Status: DISCONTINUED | OUTPATIENT
Start: 2021-10-16 | End: 2021-10-20 | Stop reason: HOSPADM

## 2021-10-16 RX ORDER — ACETAMINOPHEN 500 MG
1000 TABLET ORAL ONCE
Status: COMPLETED | OUTPATIENT
Start: 2021-10-16 | End: 2021-10-16

## 2021-10-16 RX ORDER — AMLODIPINE BESYLATE 10 MG/1
10 TABLET ORAL DAILY
Status: DISCONTINUED | OUTPATIENT
Start: 2021-10-16 | End: 2021-10-20 | Stop reason: HOSPADM

## 2021-10-16 RX ORDER — TRAZODONE HYDROCHLORIDE 50 MG/1
50 TABLET ORAL NIGHTLY
Status: DISCONTINUED | OUTPATIENT
Start: 2021-10-16 | End: 2021-10-20 | Stop reason: HOSPADM

## 2021-10-16 RX ORDER — IPRATROPIUM BROMIDE AND ALBUTEROL SULFATE 2.5; .5 MG/3ML; MG/3ML
SOLUTION RESPIRATORY (INHALATION)
Status: COMPLETED
Start: 2021-10-16 | End: 2021-10-16

## 2021-10-16 RX ORDER — METHYLPREDNISOLONE SODIUM SUCCINATE 40 MG/ML
40 INJECTION, POWDER, LYOPHILIZED, FOR SOLUTION INTRAMUSCULAR; INTRAVENOUS EVERY 12 HOURS
Status: DISCONTINUED | OUTPATIENT
Start: 2021-10-16 | End: 2021-10-20 | Stop reason: HOSPADM

## 2021-10-16 RX ORDER — SODIUM CHLORIDE 0.9 % (FLUSH) 0.9 %
3 SYRINGE (ML) INJECTION EVERY 8 HOURS
Status: DISCONTINUED | OUTPATIENT
Start: 2021-10-16 | End: 2021-10-20 | Stop reason: HOSPADM

## 2021-10-16 RX ORDER — ESCITALOPRAM OXALATE 10 MG/1
10 TABLET ORAL DAILY
Status: DISCONTINUED | OUTPATIENT
Start: 2021-10-16 | End: 2021-10-20 | Stop reason: HOSPADM

## 2021-10-16 RX ORDER — 0.9 % SODIUM CHLORIDE 0.9 %
500 INTRAVENOUS SOLUTION INTRAVENOUS ONCE
Status: COMPLETED | OUTPATIENT
Start: 2021-10-16 | End: 2021-10-16

## 2021-10-16 RX ORDER — SODIUM CHLORIDE 0.9 % (FLUSH) 0.9 %
10 SYRINGE (ML) INJECTION PRN
Status: DISCONTINUED | OUTPATIENT
Start: 2021-10-16 | End: 2021-10-20 | Stop reason: HOSPADM

## 2021-10-16 RX ORDER — IPRATROPIUM BROMIDE AND ALBUTEROL SULFATE 2.5; .5 MG/3ML; MG/3ML
1 SOLUTION RESPIRATORY (INHALATION) ONCE
Status: COMPLETED | OUTPATIENT
Start: 2021-10-16 | End: 2021-10-16

## 2021-10-16 RX ORDER — ATORVASTATIN CALCIUM 10 MG/1
10 TABLET, FILM COATED ORAL DAILY
Status: DISCONTINUED | OUTPATIENT
Start: 2021-10-16 | End: 2021-10-20 | Stop reason: HOSPADM

## 2021-10-16 RX ORDER — SODIUM CHLORIDE 9 MG/ML
25 INJECTION, SOLUTION INTRAVENOUS PRN
Status: DISCONTINUED | OUTPATIENT
Start: 2021-10-16 | End: 2021-10-20 | Stop reason: HOSPADM

## 2021-10-16 RX ORDER — MELOXICAM 7.5 MG/1
15 TABLET ORAL DAILY
Status: DISCONTINUED | OUTPATIENT
Start: 2021-10-16 | End: 2021-10-20 | Stop reason: HOSPADM

## 2021-10-16 RX ORDER — ALBUTEROL SULFATE 90 UG/1
2 AEROSOL, METERED RESPIRATORY (INHALATION) ONCE
Status: COMPLETED | OUTPATIENT
Start: 2021-10-16 | End: 2021-10-16

## 2021-10-16 RX ORDER — ACETAMINOPHEN 650 MG/1
650 SUPPOSITORY RECTAL EVERY 6 HOURS PRN
Status: DISCONTINUED | OUTPATIENT
Start: 2021-10-16 | End: 2021-10-20 | Stop reason: HOSPADM

## 2021-10-16 RX ORDER — ALBUTEROL SULFATE 2.5 MG/3ML
2.5 SOLUTION RESPIRATORY (INHALATION) EVERY 4 HOURS PRN
Status: DISCONTINUED | OUTPATIENT
Start: 2021-10-16 | End: 2021-10-20 | Stop reason: HOSPADM

## 2021-10-16 RX ORDER — POLYETHYLENE GLYCOL 3350 17 G/17G
17 POWDER, FOR SOLUTION ORAL DAILY
Status: DISCONTINUED | OUTPATIENT
Start: 2021-10-16 | End: 2021-10-20 | Stop reason: HOSPADM

## 2021-10-16 RX ORDER — ACETAMINOPHEN 500 MG
500 TABLET ORAL EVERY 8 HOURS PRN
Status: DISCONTINUED | OUTPATIENT
Start: 2021-10-16 | End: 2021-10-16 | Stop reason: SDUPTHER

## 2021-10-16 RX ORDER — FLUTICASONE PROPIONATE 50 MCG
2 SPRAY, SUSPENSION (ML) NASAL DAILY
Status: DISCONTINUED | OUTPATIENT
Start: 2021-10-16 | End: 2021-10-20 | Stop reason: HOSPADM

## 2021-10-16 RX ORDER — ACETAMINOPHEN 325 MG/1
650 TABLET ORAL EVERY 6 HOURS PRN
Status: DISCONTINUED | OUTPATIENT
Start: 2021-10-16 | End: 2021-10-20 | Stop reason: HOSPADM

## 2021-10-16 RX ORDER — IPRATROPIUM BROMIDE AND ALBUTEROL SULFATE 2.5; .5 MG/3ML; MG/3ML
1 SOLUTION RESPIRATORY (INHALATION) 3 TIMES DAILY
Status: DISCONTINUED | OUTPATIENT
Start: 2021-10-16 | End: 2021-10-20 | Stop reason: HOSPADM

## 2021-10-16 RX ORDER — IPRATROPIUM BROMIDE AND ALBUTEROL SULFATE 2.5; .5 MG/3ML; MG/3ML
1 SOLUTION RESPIRATORY (INHALATION) 3 TIMES DAILY
Status: DISCONTINUED | OUTPATIENT
Start: 2021-10-16 | End: 2021-10-16

## 2021-10-16 RX ORDER — SODIUM CHLORIDE 0.9 % (FLUSH) 0.9 %
10 SYRINGE (ML) INJECTION EVERY 12 HOURS SCHEDULED
Status: DISCONTINUED | OUTPATIENT
Start: 2021-10-16 | End: 2021-10-20 | Stop reason: HOSPADM

## 2021-10-16 RX ADMIN — ACETAMINOPHEN 1000 MG: 500 TABLET ORAL at 02:05

## 2021-10-16 RX ADMIN — Medication 10 ML: at 19:44

## 2021-10-16 RX ADMIN — ALBUTEROL SULFATE 2.5 MG: 2.5 SOLUTION RESPIRATORY (INHALATION) at 22:36

## 2021-10-16 RX ADMIN — ATORVASTATIN CALCIUM 10 MG: 10 TABLET, FILM COATED ORAL at 19:47

## 2021-10-16 RX ADMIN — IPRATROPIUM BROMIDE AND ALBUTEROL SULFATE 1 AMPULE: .5; 2.5 SOLUTION RESPIRATORY (INHALATION) at 18:00

## 2021-10-16 RX ADMIN — PIPERACILLIN AND TAZOBACTAM 3375 MG: 3; .375 INJECTION, POWDER, LYOPHILIZED, FOR SOLUTION INTRAVENOUS at 03:02

## 2021-10-16 RX ADMIN — FLUTICASONE PROPIONATE 2 SPRAY: 50 SPRAY, METERED NASAL at 08:56

## 2021-10-16 RX ADMIN — GUAIFENESIN SYRUP AND DEXTROMETHORPHAN 5 ML: 100; 10 SYRUP ORAL at 19:44

## 2021-10-16 RX ADMIN — GUAIFENESIN 600 MG: 600 TABLET, EXTENDED RELEASE ORAL at 10:01

## 2021-10-16 RX ADMIN — CEFEPIME HYDROCHLORIDE 2000 MG: 2 INJECTION, POWDER, FOR SOLUTION INTRAVENOUS at 06:30

## 2021-10-16 RX ADMIN — METHYLPREDNISOLONE SODIUM SUCCINATE 125 MG: 125 INJECTION, POWDER, FOR SOLUTION INTRAMUSCULAR; INTRAVENOUS at 02:05

## 2021-10-16 RX ADMIN — CETIRIZINE HYDROCHLORIDE 10 MG: 10 TABLET, FILM COATED ORAL at 08:58

## 2021-10-16 RX ADMIN — GUAIFENESIN 600 MG: 600 TABLET, EXTENDED RELEASE ORAL at 19:45

## 2021-10-16 RX ADMIN — ESCITALOPRAM OXALATE 10 MG: 10 TABLET ORAL at 22:30

## 2021-10-16 RX ADMIN — POLYETHYLENE GLYCOL 3350 17 G: 17 POWDER, FOR SOLUTION ORAL at 08:58

## 2021-10-16 RX ADMIN — Medication 3 ML: at 02:05

## 2021-10-16 RX ADMIN — IPRATROPIUM BROMIDE AND ALBUTEROL SULFATE 1 AMPULE: .5; 2.5 SOLUTION RESPIRATORY (INHALATION) at 11:27

## 2021-10-16 RX ADMIN — Medication 10 ML: at 08:57

## 2021-10-16 RX ADMIN — SODIUM CHLORIDE 500 ML: 9 INJECTION, SOLUTION INTRAVENOUS at 03:02

## 2021-10-16 RX ADMIN — METHYLPREDNISOLONE SODIUM SUCCINATE 40 MG: 40 INJECTION, POWDER, FOR SOLUTION INTRAMUSCULAR; INTRAVENOUS at 19:44

## 2021-10-16 RX ADMIN — MELOXICAM 15 MG: 7.5 TABLET ORAL at 08:59

## 2021-10-16 RX ADMIN — TRAMADOL HYDROCHLORIDE 50 MG: 50 TABLET ORAL at 17:18

## 2021-10-16 RX ADMIN — IPRATROPIUM BROMIDE AND ALBUTEROL SULFATE 1 AMPULE: 2.5; .5 SOLUTION RESPIRATORY (INHALATION) at 06:21

## 2021-10-16 RX ADMIN — DOXYCYCLINE 100 MG: 100 INJECTION, POWDER, LYOPHILIZED, FOR SOLUTION INTRAVENOUS at 05:05

## 2021-10-16 RX ADMIN — AMLODIPINE BESYLATE 10 MG: 10 TABLET ORAL at 09:00

## 2021-10-16 RX ADMIN — MONTELUKAST SODIUM 10 MG: 10 TABLET ORAL at 19:44

## 2021-10-16 RX ADMIN — ENOXAPARIN SODIUM 40 MG: 40 INJECTION SUBCUTANEOUS at 09:00

## 2021-10-16 RX ADMIN — METHYLPREDNISOLONE SODIUM SUCCINATE 40 MG: 40 INJECTION, POWDER, FOR SOLUTION INTRAMUSCULAR; INTRAVENOUS at 08:56

## 2021-10-16 RX ADMIN — CEFEPIME HYDROCHLORIDE 2000 MG: 2 INJECTION, POWDER, FOR SOLUTION INTRAVENOUS at 17:15

## 2021-10-16 RX ADMIN — TRAZODONE HYDROCHLORIDE 50 MG: 50 TABLET ORAL at 22:30

## 2021-10-16 RX ADMIN — IPRATROPIUM BROMIDE AND ALBUTEROL SULFATE 1 AMPULE: .5; 2.5 SOLUTION RESPIRATORY (INHALATION) at 02:46

## 2021-10-16 RX ADMIN — ALBUTEROL SULFATE 2 PUFF: 108 AEROSOL, METERED RESPIRATORY (INHALATION) at 02:00

## 2021-10-16 RX ADMIN — DOXYCYCLINE 100 MG: 100 INJECTION, POWDER, LYOPHILIZED, FOR SOLUTION INTRAVENOUS at 16:11

## 2021-10-16 RX ADMIN — PANTOPRAZOLE SODIUM 40 MG: 40 TABLET, DELAYED RELEASE ORAL at 08:58

## 2021-10-16 ASSESSMENT — PAIN SCALES - GENERAL
PAINLEVEL_OUTOF10: 7
PAINLEVEL_OUTOF10: 7
PAINLEVEL_OUTOF10: 4
PAINLEVEL_OUTOF10: 4
PAINLEVEL_OUTOF10: 0
PAINLEVEL_OUTOF10: 5
PAINLEVEL_OUTOF10: 5
PAINLEVEL_OUTOF10: 7
PAINLEVEL_OUTOF10: 7

## 2021-10-16 ASSESSMENT — PAIN DESCRIPTION - LOCATION
LOCATION: CHEST
LOCATION: BACK
LOCATION: BACK
LOCATION: CHEST
LOCATION: BACK
LOCATION: BACK

## 2021-10-16 ASSESSMENT — ENCOUNTER SYMPTOMS
CHOKING: 0
ABDOMINAL PAIN: 0
FACIAL SWELLING: 0
BLOOD IN STOOL: 0
CHEST TIGHTNESS: 0
EYE DISCHARGE: 0
DIARRHEA: 0
EYE PAIN: 0
VOICE CHANGE: 0
VOMITING: 0
SINUS PRESSURE: 0
BACK PAIN: 0
EYE REDNESS: 0
WHEEZING: 1
TROUBLE SWALLOWING: 0
SORE THROAT: 0
STRIDOR: 0
CONSTIPATION: 0
COUGH: 1
SHORTNESS OF BREATH: 1

## 2021-10-16 ASSESSMENT — PAIN DESCRIPTION - DESCRIPTORS
DESCRIPTORS: DISCOMFORT
DESCRIPTORS: ACHING;SORE
DESCRIPTORS: ACHING;SORE
DESCRIPTORS: ACHING

## 2021-10-16 ASSESSMENT — PAIN DESCRIPTION - ORIENTATION
ORIENTATION: LOWER
ORIENTATION: LOWER

## 2021-10-16 ASSESSMENT — PAIN DESCRIPTION - PROGRESSION
CLINICAL_PROGRESSION: GRADUALLY WORSENING
CLINICAL_PROGRESSION: GRADUALLY WORSENING

## 2021-10-16 ASSESSMENT — PAIN - FUNCTIONAL ASSESSMENT
PAIN_FUNCTIONAL_ASSESSMENT: ACTIVITIES ARE NOT PREVENTED
PAIN_FUNCTIONAL_ASSESSMENT: PREVENTS OR INTERFERES SOME ACTIVE ACTIVITIES AND ADLS

## 2021-10-16 ASSESSMENT — PAIN DESCRIPTION - PAIN TYPE
TYPE: ACUTE PAIN
TYPE: ACUTE PAIN
TYPE: CHRONIC PAIN
TYPE: CHRONIC PAIN

## 2021-10-16 ASSESSMENT — PAIN DESCRIPTION - FREQUENCY
FREQUENCY: CONTINUOUS
FREQUENCY: CONTINUOUS

## 2021-10-16 ASSESSMENT — PAIN DESCRIPTION - ONSET
ONSET: ON-GOING
ONSET: ON-GOING

## 2021-10-16 NOTE — ED PROVIDER NOTES
[id number] [facility]  eMERGENCY dEPARTMENT eNCOUnter      Pt Name: Annita Anne  MRN: [id number]  [place] 1955  Date of evaluation: 10/16/2021  Provider: Neal Jamison MD    [address] [place] [address]       Chief Complaint   Patient presents with    Shortness of Breath     SOB x2 weeks. diagnosed with pneumonia few weeks ago    Chest Pain       HISTORY OF PRESENT ILLNESS   (Location/Symptom, Timing/Onset,Context/Setting, Quality, Duration, Modifying Factors, Severity)  Note limiting factors. Annita Anne is a 63 y.o. male who presents to the emergency department patient is well-known to me from previous encounters emergency for similar situation in the past in fact patient was diagnosed with a recurrent worsening of pneumonia and he was admitted last month and got better but as per patient he never got better he does take oxygen at home as needed increasing cough congestion and increasing short of breath for the past several days paramedics at the scene because of short of breath noted to be working hard to breathe 9697% saturation on 4 L oxygen patient did receive Covid vaccination patient live by himself noted to be febrile by the paramedics brought it here in stable condition patient does have history of chronic anemia hyperlipidemia exalcohol and cocaine abuse history of gout hypertension SVT obesity chronic back pain    HPI    NursingNotes were reviewed. REVIEW OF SYSTEMS    (2-9 systems for level 4, 10 or more for level 5)     Review of Systems   Constitutional: Positive for activity change, appetite change, chills and diaphoresis. Negative for fever. HENT: Positive for congestion. Negative for drooling, facial swelling, mouth sores, nosebleeds, sinus pressure, sore throat, trouble swallowing and voice change. Eyes: Negative for pain, discharge, redness and visual disturbance. Respiratory: Positive for cough, shortness of breath and wheezing. Negative for choking, chest tightness and stridor. Cardiovascular: Negative for chest pain, palpitations and leg swelling. Gastrointestinal: Negative for abdominal pain, blood in stool, constipation, diarrhea and vomiting. Endocrine: Negative for cold intolerance, polyphagia and polyuria. Genitourinary: Negative for dysuria, flank pain, frequency, genital sores and urgency. Musculoskeletal: Negative for back pain, joint swelling, neck pain and neck stiffness. Skin: Negative for pallor and rash. Neurological: Negative for tremors, seizures, syncope, weakness, numbness and headaches. Hematological: Negative for adenopathy. Does not bruise/bleed easily. Psychiatric/Behavioral: Negative for agitation, behavioral problems, hallucinations and sleep disturbance. The patient is not hyperactive. All other systems reviewed and are negative. Except as noted above the remainder of the review of systems was reviewed and negative.        PAST MEDICAL HISTORY     Past Medical History:   Diagnosis Date    Alcohol abuse 10/27/2016    Anemia     Asthma     Cocaine abuse ([address] 10/27/2016    COPD (chronic obstructive [facility] disease) [facility]     Depression 04/27/2020    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    Drug-seeking behavior 04/14/2015    Edema 12/10/2015    Erectile dysfunction     Gastroesophageal reflux disease 12/17/2018    Gout 10/27/2016    History of arthroscopy of both knees 10/27/2016    House dust mite allergy 04/21/2014    Hyperlipidemia     Hypertension 10/27/2016    Injury to heart 10/27/2016    Insomnia 12/17/2018    Medical non-compliance 02/22/2014    Morbid obesity due to excess calories ([address] 12/08/2016    Osteoarthritis of both knees 12/08/2016    Personal history of tobacco use     Pneumonia 12/04/2016    caused hospital admission    Pre-diabetes 10/27/2016    Seasonal allergies 04/21/2014    Severe persistent asthma 10/27/2016    Severe sleep apnea 04/14/2015    Supraventricular tachycardia ([address] 10/27/2016 SURGICALHISTORY       Past Surgical History:   Procedure Laterality Date    ANTERIOR CRUCIATE LIGAMENT REPAIR      CARDIAC CATHETERIZATION      COLONOSCOPY N/A 7/15/2020    [facility]    LEFT TOTAL KNEE ARTHROPLASTY performed by Nadeem Dumont MD at [facility] N/A 72/[id number]/1393    UMBILICAL HERNIA REPAIR WITH MESH performed by Ana Richardson MD at [facility]       Current Discharge Medication List      CONTINUE these medications which have NOT CHANGED    Details   albuterol sulfate  (90 Base) MCG/ACT inhaler Inhale 2 puffs into the lungs every 6 hours as needed for Wheezing or Shortness of Breath  Qty: 18 g, Refills: 1    Associated Diagnoses: Chronic obstructive pulmonary disease, unspecified (HCC)      traMADol (ULTRAM) 50 MG tablet Take by mouth.      meloxicam (MOBIC) 15 MG tablet Take 1 tablet by mouth daily  Qty: 30 tablet, Refills: 0      ipratropium-albuterol (DUONEB) 0.5-2.5 (3) MG/3ML SOLN nebulizer solution Inhale 3 mLs into the lungs every 6 hours as needed for Shortness of Breath  Qty: 360 mL, Refills: 3    Associated Diagnoses: Chronic obstructive pulmonary disease, unspecified COPD type ([facility])      cetirizine (ZYRTEC ALLERGY) 10 MG tablet Take 10 mg by mouth daily      TRELEGY ELLIPTA 200-62.5-25 MCG/INH AEPB Take 1 Inhaler by mouth daily      acetaminophen (ACETAMINOPHEN EXTRA STRENGTH) 500 MG tablet Take 1 tablet by mouth every 8 hours as needed for Pain  Qty: 120 tablet, Refills: 1    Comments: DX Code Needed  .   Associated Diagnoses: Status post total knee replacement, left      amLODIPine (NORVASC) 10 MG tablet Take 1 tablet by mouth daily  Qty: 90 tablet, Refills: 1    Associated Diagnoses: Essential hypertension      escitalopram (LEXAPRO) 10 MG tablet Take 1 tablet by mouth daily  Qty: 90 tablet, Refills: 1    Associated Diagnoses: Anxiety with depression      fluticasone (FLONASE) 50 MCG/ACT nasal spray 2 sprays by Nasal route daily  Qty: 3 Bottle, Refills: 1    Associated Diagnoses: Seasonal allergies      lovastatin (MEVACOR) 10 MG tablet Take 1 tablet by mouth nightly  Qty: 90 tablet, Refills: 1    Associated Diagnoses: Hyperlipidemia, unspecified hyperlipidemia type      montelukast (SINGULAIR) 10 MG tablet Take 1 tablet by mouth nightly  Qty: 90 tablet, Refills: 1    Comments: PT REQUEST REFILLS  Associated Diagnoses: Seasonal allergies      pantoprazole (PROTONIX) 40 MG tablet Take 1 tablet by mouth daily  Qty: 90 tablet, Refills: 1    Associated Diagnoses: Gastroesophageal reflux disease, unspecified whether esophagitis present      polyethylene glycol (GLYCOLAX) 17 GM/SCOOP powder Take 17 g by mouth daily  Qty: 510 g, Refills: 1    Associated Diagnoses: Constipation, unspecified constipation type      traZODone (DESYREL) 50 MG tablet Take 1 tablet by mouth nightly  Qty: 90 tablet, Refills: 1    Associated Diagnoses: Insomnia, unspecified type; Anxiety with depression      sildenafil (VIAGRA) 100 MG tablet Take 1 tablet by mouth as needed for Erectile Dysfunction  Qty: 10 tablet, Refills: 0    Associated Diagnoses: Erectile dysfunction, unspecified erectile dysfunction type      sodium chloride (OCEAN, BABY AYR) 0.65 % nasal spray 2 sprays by Nasal route three times daily  Qty: 1 each, Refills: 0      Hydrocortisone, Perianal, 1 % cream daily as needed       Handicap Placard MISC by Does not apply route DX: OSTEOARTHRITIS OF BOTH KNEES (M17.0), COPD (J44.9)     EXPIRES: 02/2024  Qty: 1 each, Refills: 0    Associated Diagnoses: Primary osteoarthritis of both knees;  Chronic obstructive pulmonary disease, unspecified COPD type ([facility] [address]             ALLERGIES     Fish-derived products, Iodine, Seasonal, and Pcn [penicillins]    FAMILY HISTORY       Family History   Problem Relation Age of Onset    Arthritis Mother  Asthma Mother     High Cholesterol Mother     Other Mother         aneurysm    Diabetes Father     Stroke Maternal Grandmother     Cancer Maternal Grandfather     Hypertension Other     COPD Neg Hx           SOCIAL HISTORY       Social History     Socioeconomic History    Marital status: Single     Spouse name: n/a    Number of children: 1    Years of education: 15    Highest education level: None   Occupational History    Occupation: Disability     Employer: NONE   Tobacco Use    Smoking status: Current Some Day Smoker     Packs/day: 0.25     Years: 30.00     Pack years: 7.50     Types: Cigarettes, Cigars     Start date: 1988     Last attempt to quit: 10/1/2013     Years since quittin.0    Smokeless tobacco: Never Used   Vaping Use    Vaping Use: Never used   Substance and Sexual Activity    Alcohol use: Yes     Alcohol/week: 4.0 standard drinks     Types: 2 Cans of beer, 2 Shots of liquor per week     Comment: social 1 -2 x week    Drug use: Yes     Types: Cocaine, Marijuana     Comment: no cocaine x 1 year, marijuana weekly    Sexual activity: Not Currently     Partners: Female   Other Topics Concern    None   Social History Narrative    Lives in an apartment    15 steps to get up to the apartment    Asking for hospital bed and shower chair      Social Determinants of Health     Financial Resource Strain: Medium Risk    Difficulty of Paying Living Expenses: Somewhat hard   Food Insecurity: No Food Insecurity    Worried About Running Out of Food in the Last Year: Never true    Jonnie of Food in the Last Year: Never true   Transportation Needs: No Transportation Needs    Lack of Transportation (Medical): No    Lack of Transportation (Non-Medical): No   Physical Activity: Inactive    Days of Exercise per Week: 0 days    Minutes of Exercise per Session: 0 min   Stress: Stress Concern Present    Feeling of Stress :  To some extent   Social Connections: Socially Isolated    Frequency of Communication with Friends and Family: Once a week    Frequency of Social Gatherings with Friends and Family: Once a week    Attends Zoroastrian Services: 1 to 4 times per year    Active Member of [address] [place] [address] [employer] or Organizations: Not asked    Attends Club or Organization Meetings: Never    Marital Status:    Intimate Partner Violence:     Fear of Current or Ex-Partner:     Emotionally Abused:     Physically Abused:     Sexually Abused:        SCREENINGS    Amari Coma Scale  Eye Opening: Spontaneous  Best Verbal Response: Oriented  Best Motor Response: Obeys commands  Levant Coma Scale Score: 15 @FLOW([id number])@      PHYSICAL EXAM    (up to 7 for level 4, 8 or more for level 5)     ED Triage Vitals   BP Temp Temp Source Pulse Resp SpO2 Height Weight   10/16/21 0200 10/16/21 0231 10/16/21 0231 10/16/21 0200 10/16/21 0200 10/16/21 0200 10/16/21 0231 10/16/21 0231   (!) 164/102 101.7 °F (38.7 °C) Oral 107 25 98 % 6' (1.829 m) 285 lb (129.3 kg)       Physical Exam  Vitals and nursing note reviewed. Constitutional:       Appearance: He is obese. Comments: Active alert cooperative patient slightly uncomfortable and has has frequent bouts of coughing noted   HENT:      Head: Normocephalic and atraumatic. Mouth/Throat:      Mouth: Mucous membranes are moist.   Eyes:      Pupils: Pupils are equal, round, and reactive to light. Cardiovascular:      Rate and Rhythm: Regular rhythm. Tachycardia present. No extrasystoles are present. Pulses: No decreased pulses. Heart sounds: No murmur heard. No friction rub. Pulmonary:      Effort: Tachypnea, accessory muscle usage and respiratory distress present. Breath sounds: Examination of the right-lower field reveals wheezing. Examination of the left-lower field reveals wheezing. Decreased breath sounds and wheezing present. Chest:      Chest wall: No mass, deformity, tenderness, crepitus or edema.  There is no dullness to percussion. Musculoskeletal:      Cervical back: Normal range of motion. Right lower leg: No tenderness. No edema. Left lower leg: No tenderness. No edema. Skin:     Capillary Refill: Capillary refill takes less than 2 seconds. Coloration: Skin is not cyanotic. Findings: No ecchymosis or rash. Neurological:      General: No focal deficit present. Mental Status: He is alert and oriented to person, place, and time. Cranial Nerves: No cranial nerve deficit. Motor: No weakness. Psychiatric:         Mood and Affect: Mood is not anxious. Behavior: Behavior is not agitated. DIAGNOSTIC RESULTS     EKG: All EKG's are interpreted by the Emergency Department Physician who either signs or Co-signsthis chart in the absence of a cardiologist.        RADIOLOGY:   Rosebud Brooke such as CT, Ultrasound and MRI are read by the radiologist. Plain radiographic images are visualized and preliminarily interpreted by the emergency physician with the below findings:        Interpretation per the Radiologist below, if available at the time ofthis note:    XR CHEST PORTABLE   Final Result   LEFT MID AND LEFT LOWER LUNG PNEUMONIA VERSUS ATELECTASIS.             ED BEDSIDE ULTRASOUND:   Performed by ED Physician - none    LABS:  Labs Reviewed   COMPREHENSIVE METABOLIC PANEL - Abnormal; Notable for the following components:       Result Value    BUN 7 (*)     Albumin 2.7 (*)     Alkaline Phosphatase 107 (*)     Globulin 4.1 (*)     All other components within normal limits   CBC WITH AUTO DIFFERENTIAL - Abnormal; Notable for the following components:    WBC 21.7 (*)     RBC 3.40 (*)     Hemoglobin 10.5 (*)     Hematocrit 32.7 (*)     MCHC 32.1 (*)     RDW 15.0 (*)     Platelets 518 (*)     Neutrophils Absolute 16.8 (*)     Monocytes Absolute 2.1 (*)     All other components within normal limits   LACTIC ACID, PLASMA - Abnormal; Notable for the following components:    Lactic Acid CARE TIME   Total Critical Care time was  minutes, excluding separately reportableprocedures. There was a high probability of clinicallysignificant/life threatening deterioration in the patient's condition which required my urgent intervention. CONSULTS:  IP CONSULT TO HOSPITALIST    PROCEDURES:  Unless otherwise noted below, none     Procedures    FINAL IMPRESSION      1. COPD exacerbation ([facility] [address]    2. Pneumonia of left lower lobe due to infectious organism    3. Febrile illness, acute          DISPOSITION/PLAN   DISPOSITION        PATIENT REFERRED TO:  No follow-up provider specified.     DISCHARGE MEDICATIONS:  Current Discharge Medication List             (Please note that portions of this note were completed with a voice recognition program.  Efforts were made to edit the dictations but occasionally words are mis-transcribed.)    Roberth Nair MD (electronically signed)  Attending Emergency Physician       Roberth Nair MD  10/16/21 9711

## 2021-10-16 NOTE — PROGRESS NOTES
Pt admitted to the unit via w/c. Pt is A&O x 4. Pleasant and cooperative with staff and care. Pt oriented on call light and unit and able to use call light to make needs known. Skin is intact and shows no s/s of pressure trauma. No further complaints voiced. Call light within reach. Safety maintained.

## 2021-10-16 NOTE — FLOWSHEET NOTE
Patient urine sample collected and taken to lab.  Electronically signed by Meredith Mcfarlane RN on 10/16/2021 at 11:02 AM

## 2021-10-16 NOTE — ED TRIAGE NOTES
Pt. Presents to ED with complaints of Shortness of breath x2 weeks, chest pain, and back pain. Pt. Treated for pneumonia about 2-3 weeks ago. Finished antibiotics last week and prednisone pack yesterday. When EMS arrived patient giving himself a duoneb. Per EMS patient febrile and has cough.

## 2021-10-16 NOTE — H&P
Hospital Medicine History & Physical      PCP: Dharmesh Santillan MD    Date of Admission: 10/16/2021    Date of Service: 10/16/21      Chief Complaint:  Dyspnea, SOB, productive cough      History Of Present Illness:  63 y.o. male who presented to [facility] with  above complains. He was treated for CAP/COPD exacerbation 2weeks ago, DC home with 1 week PO levaquin/steroids and completed this Tx week ago. He had dyspnea for the past 2-3 days, noted increased in dyspnea/SOB especially with physical activity, denied  fever. Despite respiratory Tx at home, he had no improvement. Still smoking. Since he didn't feel relieve, came to ER for further evaluation. After initial stabilization he was admitted for further management to the floor.      Past Medical History:          Diagnosis Date    Alcohol abuse 10/27/2016    Anemia     Asthma     Cocaine abuse ([address] 10/27/2016    COPD (chronic obstructive pulmonary disease) ([address]     Depression 04/27/2020    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    Drug-seeking behavior 04/14/2015    Edema 12/10/2015    Erectile dysfunction     Gastroesophageal reflux disease 12/17/2018    Gout 10/27/2016    History of arthroscopy of both knees 10/27/2016    House dust mite allergy 04/21/2014    Hyperlipidemia     Hypertension 10/27/2016    Injury to heart 10/27/2016    Insomnia 12/17/2018    Medical non-compliance 02/22/2014    Morbid obesity due to excess calories ([address] 12/08/2016    Osteoarthritis of both knees 12/08/2016    Personal history of tobacco use     Pneumonia 12/04/2016    caused hospital admission    Pre-diabetes 10/27/2016    Seasonal allergies 04/21/2014    Severe persistent asthma 10/27/2016    Severe sleep apnea 04/14/2015    Supraventricular tachycardia ([address] 10/27/2016       Past Surgical History:          Procedure Laterality Date    ANTERIOR CRUCIATE LIGAMENT REPAIR      CARDIAC CATHETERIZATION      COLONOSCOPY N/A 7/15/2020    COLONOSCOPY WITH POLYPECTOMY performed by Gwen Ahuja MD at [facility] Left 8/9/2019    LEFT TOTAL KNEE ARTHROPLASTY performed by Shavonne Jennings MD at [facility] N/A 79/95/4034    UMBILICAL HERNIA REPAIR WITH MESH performed by Syed Reyes MD at [facility]       Medications Prior to Admission:      Prior to Admission medications    Medication Sig Start Date End Date Taking?  Authorizing Provider   albuterol sulfate  (90 Base) MCG/ACT inhaler Inhale 2 puffs into the lungs every 6 hours as needed for Wheezing or Shortness of Breath 9/29/21  Yes Bronwyn Mejia MD   traMADol Orie Debbie) 50 MG tablet Take by mouth. 5/25/21  Yes Historical Provider, MD   meloxicam (MOBIC) 15 MG tablet Take 1 tablet by mouth daily 9/19/21  Yes Sally Scott MD   ipratropium-albuterol (DUONEB) 0.5-2.5 (3) MG/3ML SOLN nebulizer solution Inhale 3 mLs into the lungs every 6 hours as needed for Shortness of Breath 9/19/21 10/19/21 Yes Sally Scott MD   cetirizine (ZYRTEC ALLERGY) 10 MG tablet Take 10 mg by mouth daily   Yes Historical Provider, MD Acuna Mayo 200-62.5-25 MCG/INH AEPB Take 1 Inhaler by mouth daily 8/17/21  Yes Historical Provider, MD   acetaminophen (ACETAMINOPHEN EXTRA STRENGTH) 500 MG tablet Take 1 tablet by mouth every 8 hours as needed for Pain 8/16/21  Yes Bronwyn Mejia MD   amLODIPine (NORVASC) 10 MG tablet Take 1 tablet by mouth daily 4/29/21  Yes Bronwyn Mejia MD   escitalopram (LEXAPRO) 10 MG tablet Take 1 tablet by mouth daily 4/29/21  Yes Bronwyn Mejia MD   fluticasone (FLONASE) 50 MCG/ACT nasal spray 2 sprays by Nasal route daily 4/29/21  Yes Bronwyn Mejia MD   lovastatin (MEVACOR) 10 MG tablet Take 1 tablet by mouth nightly 4/29/21  Yes Bronwyn Mejia MD   montelukast (SINGULAIR) 10 MG tablet Take 1 tablet by mouth nightly 4/29/21  Yes Bronwyn Mejia MD   pantoprazole (PROTONIX) 40 MG tablet Take 1 tablet by mouth daily 4/29/21  Yes Oksana Valdes MD   polyethylene glycol [facility]) 17 GM/SCOOP powder Take 17 g by mouth daily  Patient taking differently: Take 17 g by mouth daily as needed  4/29/21  Yes Oksana Valdes MD   traZODone (DESYREL) 50 MG tablet Take 1 tablet by mouth nightly 4/29/21  Yes Oksana Valdes MD   sildenafil (VIAGRA) 100 MG tablet Take 1 tablet by mouth as needed for Erectile Dysfunction 10/8/21   Oksana Valdes MD   sodium chloride (OCEAN, BABY AYR) 0.65 % nasal spray 2 sprays by Nasal route three times daily 9/28/21   Rula Rodriguez MD   Hydrocortisone, Perianal, 1 % cream daily as needed  5/28/21   Historical Provider, MD   Handicap Placard MISC by Does not apply route DX: OSTEOARTHRITIS OF BOTH KNEES (M17.0), COPD (J44.9)     EXPIRES: 02/2024 2/1/19   Oksana Valdes MD       Allergies:  Fish-derived products, Iodine, Seasonal, and Pcn [penicillins]    Social History:      The patient currently lives home    TOBACCO:   reports that he has been smoking cigarettes and cigars. He started smoking about 33 years ago. He has a 7.50 pack-year smoking history. He has never used smokeless tobacco.  ETOH:   reports current alcohol use of about 4.0 standard drinks of alcohol per week. Family History:       Reviewed in detail and negative for DM, CAD, Cancer, CVA. Positive as follows:        Problem Relation Age of Onset    Arthritis Mother     Asthma Mother     High Cholesterol Mother     Other Mother         aneurysm    Diabetes Father     Stroke Maternal Grandmother     Cancer Maternal Grandfather     Hypertension Other     COPD Neg Hx        REVIEW OF SYSTEMS:   Pertinent positives as noted in the HPI. All other systems reviewed and negative.     PHYSICAL EXAM:    BP (!) 151/90   Pulse 86   Temp 98.4 °F (36.9 °C) (Oral)   Resp 20   Ht 6' 0.5\" (1.842 m)   Wt 299 lb 4.8 oz (135.8 kg)   SpO2 99%   BMI 40.03 kg/m²     General appearance:  No apparent distress, appears stated age and cooperative. HEENT:  Normal cephalic, atraumatic without obvious deformity. Pupils equal, round, and reactive to light. Extra ocular muscles intact. Conjunctivae/corneas clear. Neck: Supple, with full range of motion. No jugular venous distention. Trachea midline. Respiratory:  Normal respiratory effort. bilaterally with Rales/Rhonchi. Cardiovascular:  Regular rate and rhythm with normal S1/S2 without murmurs, rubs or gallops. Abdomen: Soft, non-tender, non-distended with normal bowel sounds. Musculoskeletal:  No clubbing, cyanosis or edema bilaterally. Full range of motion without deformity. Skin: Skin color, texture, turgor normal.  No rashes or lesions. Neurologic:  Neurovascularly intact without any focal sensory/motor deficits. Cranial nerves: II-XII intact, grossly non-focal.  Psychiatric:  Alert and oriented, thought content appropriate, normal insight  Capillary Refill: Brisk,< 3 seconds   Peripheral Pulses: +2 palpable, equal bilaterally       Labs:     Recent Labs     10/16/21  0218   WBC 21.7*   HGB 10.5*   HCT 32.7*   *     Recent Labs     10/16/21  0218      K 3.9      CO2 27   BUN 7*   CREATININE 0.78   CALCIUM 9.2     Recent Labs     10/16/21  0218   AST 12   ALT 19   BILITOT 0.3   ALKPHOS 107*     Recent Labs     10/16/21  0218   INR 1.1     Recent Labs     10/16/21  0218   TROPONINI <0.010       Urinalysis:      Lab Results   Component Value Date    NITRU Negative 09/16/2021    WBCUA 0-2 09/16/2021    BACTERIA None 10/05/2017    RBCUA 0-2 09/16/2021    BLOODU Trace-intact 09/16/2021    SPECGRAV 1.020 09/16/2021    GLUCOSEU 500 09/16/2021       Radiology:       XR CHEST PORTABLE   Final Result   LEFT MID AND LEFT LOWER LUNG PNEUMONIA VERSUS ATELECTASIS. ASSESSMENT:    Active Hospital Problems    Diagnosis Date Noted    Pneumonia [J18.9] 09/24/2021       PLAN:        DVT Prophylaxis: lovenox  Diet: ADULT DIET;  Regular  Code Status: Full Code    PT/OT Eval Status:     Dispo -  Dyspnea/SOB due to COPD exacerbation, changes on chest X ray consistent with lower lung CAP- IV solumedrol/atbs and duoneb initiated  Respiratory failure due to above, desaturation- O2  CAP- atbs as ordered   Smoking- advise to stop   Obesity with BMI 40%- supportive care  Patient remained full code during this admission  Medically stable for acute admission at Osito Rubio MD    Thank you Alba Mercedes MD for the opportunity to be involved in this patient's care. If you have any questions or concerns please feel free to contact me.

## 2021-10-17 LAB
ANION GAP SERPL CALCULATED.3IONS-SCNC: 10 MEQ/L (ref 9–15)
BUN BLDV-MCNC: 11 MG/DL (ref 8–23)
CALCIUM SERPL-MCNC: 9.6 MG/DL (ref 8.5–9.9)
CHLORIDE BLD-SCNC: 100 MEQ/L (ref 95–107)
CO2: 28 MEQ/L (ref 20–31)
CREAT SERPL-MCNC: 0.64 MG/DL (ref 0.7–1.2)
GFR AFRICAN AMERICAN: >60
GFR NON-AFRICAN AMERICAN: >60
GLUCOSE BLD-MCNC: 174 MG/DL (ref 60–115)
GLUCOSE BLD-MCNC: 195 MG/DL (ref 60–115)
GLUCOSE BLD-MCNC: 216 MG/DL (ref 70–99)
GLUCOSE BLD-MCNC: 307 MG/DL (ref 60–115)
HCT VFR BLD CALC: 31.5 % (ref 42–52)
HEMOGLOBIN: 10.3 G/DL (ref 13.7–17.5)
MCH RBC QN AUTO: 31 PG (ref 25.7–32.2)
MCHC RBC AUTO-ENTMCNC: 32.7 % (ref 32.3–36.5)
MCV RBC AUTO: 94.9 FL (ref 79–92.2)
PDW BLD-RTO: 14.6 % (ref 11.6–14.4)
PERFORMED ON: ABNORMAL
PLATELET # BLD: 408 K/UL (ref 163–337)
POTASSIUM REFLEX MAGNESIUM: 4.2 MEQ/L (ref 3.4–4.9)
PROCALCITONIN: 0.1 NG/ML (ref 0–0.15)
RBC # BLD: 3.32 M/UL (ref 4.63–6.08)
SODIUM BLD-SCNC: 138 MEQ/L (ref 135–144)
WBC # BLD: 23.8 K/UL (ref 4.2–9)

## 2021-10-17 PROCEDURE — 6360000002 HC RX W HCPCS: Performed by: INTERNAL MEDICINE

## 2021-10-17 PROCEDURE — 1210000000 HC MED SURG R&B

## 2021-10-17 PROCEDURE — 97530 THERAPEUTIC ACTIVITIES: CPT

## 2021-10-17 PROCEDURE — 97166 OT EVAL MOD COMPLEX 45 MIN: CPT

## 2021-10-17 PROCEDURE — 2580000003 HC RX 258: Performed by: INTERNAL MEDICINE

## 2021-10-17 PROCEDURE — 6370000000 HC RX 637 (ALT 250 FOR IP): Performed by: INTERNAL MEDICINE

## 2021-10-17 PROCEDURE — 36415 COLL VENOUS BLD VENIPUNCTURE: CPT

## 2021-10-17 PROCEDURE — 84145 PROCALCITONIN (PCT): CPT

## 2021-10-17 PROCEDURE — 2700000000 HC OXYGEN THERAPY PER DAY

## 2021-10-17 PROCEDURE — 2500000003 HC RX 250 WO HCPCS: Performed by: INTERNAL MEDICINE

## 2021-10-17 PROCEDURE — 94640 AIRWAY INHALATION TREATMENT: CPT

## 2021-10-17 PROCEDURE — 2580000003 HC RX 258: Performed by: EMERGENCY MEDICINE

## 2021-10-17 PROCEDURE — 85027 COMPLETE CBC AUTOMATED: CPT

## 2021-10-17 PROCEDURE — 80048 BASIC METABOLIC PNL TOTAL CA: CPT

## 2021-10-17 RX ADMIN — AMLODIPINE BESYLATE 10 MG: 10 TABLET ORAL at 08:39

## 2021-10-17 RX ADMIN — ENOXAPARIN SODIUM 40 MG: 40 INJECTION SUBCUTANEOUS at 08:38

## 2021-10-17 RX ADMIN — POLYETHYLENE GLYCOL 3350 17 G: 17 POWDER, FOR SOLUTION ORAL at 08:38

## 2021-10-17 RX ADMIN — DOXYCYCLINE 100 MG: 100 INJECTION, POWDER, LYOPHILIZED, FOR SOLUTION INTRAVENOUS at 04:03

## 2021-10-17 RX ADMIN — TRAZODONE HYDROCHLORIDE 50 MG: 50 TABLET ORAL at 22:43

## 2021-10-17 RX ADMIN — IPRATROPIUM BROMIDE AND ALBUTEROL SULFATE 1 AMPULE: .5; 2.5 SOLUTION RESPIRATORY (INHALATION) at 11:29

## 2021-10-17 RX ADMIN — CETIRIZINE HYDROCHLORIDE 10 MG: 10 TABLET, FILM COATED ORAL at 08:39

## 2021-10-17 RX ADMIN — GUAIFENESIN 600 MG: 600 TABLET, EXTENDED RELEASE ORAL at 20:06

## 2021-10-17 RX ADMIN — ATORVASTATIN CALCIUM 10 MG: 10 TABLET, FILM COATED ORAL at 20:08

## 2021-10-17 RX ADMIN — TRAMADOL HYDROCHLORIDE 50 MG: 50 TABLET ORAL at 02:30

## 2021-10-17 RX ADMIN — Medication 3 ML: at 04:03

## 2021-10-17 RX ADMIN — FLUTICASONE PROPIONATE 2 SPRAY: 50 SPRAY, METERED NASAL at 08:40

## 2021-10-17 RX ADMIN — GUAIFENESIN 600 MG: 600 TABLET, EXTENDED RELEASE ORAL at 08:40

## 2021-10-17 RX ADMIN — CEFEPIME HYDROCHLORIDE 2000 MG: 2 INJECTION, POWDER, FOR SOLUTION INTRAVENOUS at 18:54

## 2021-10-17 RX ADMIN — Medication 10 ML: at 20:06

## 2021-10-17 RX ADMIN — METHYLPREDNISOLONE SODIUM SUCCINATE 40 MG: 40 INJECTION, POWDER, FOR SOLUTION INTRAMUSCULAR; INTRAVENOUS at 08:38

## 2021-10-17 RX ADMIN — TRAMADOL HYDROCHLORIDE 50 MG: 50 TABLET ORAL at 22:43

## 2021-10-17 RX ADMIN — IPRATROPIUM BROMIDE AND ALBUTEROL SULFATE 1 AMPULE: .5; 2.5 SOLUTION RESPIRATORY (INHALATION) at 17:59

## 2021-10-17 RX ADMIN — CEFEPIME HYDROCHLORIDE 2000 MG: 2 INJECTION, POWDER, FOR SOLUTION INTRAVENOUS at 05:11

## 2021-10-17 RX ADMIN — ALBUTEROL SULFATE 2.5 MG: 2.5 SOLUTION RESPIRATORY (INHALATION) at 22:47

## 2021-10-17 RX ADMIN — ESCITALOPRAM OXALATE 10 MG: 10 TABLET ORAL at 22:42

## 2021-10-17 RX ADMIN — METHYLPREDNISOLONE SODIUM SUCCINATE 40 MG: 40 INJECTION, POWDER, FOR SOLUTION INTRAMUSCULAR; INTRAVENOUS at 20:06

## 2021-10-17 RX ADMIN — PANTOPRAZOLE SODIUM 40 MG: 40 TABLET, DELAYED RELEASE ORAL at 08:40

## 2021-10-17 RX ADMIN — IPRATROPIUM BROMIDE AND ALBUTEROL SULFATE 1 AMPULE: .5; 2.5 SOLUTION RESPIRATORY (INHALATION) at 06:17

## 2021-10-17 RX ADMIN — MONTELUKAST SODIUM 10 MG: 10 TABLET ORAL at 20:06

## 2021-10-17 RX ADMIN — Medication 10 ML: at 08:40

## 2021-10-17 RX ADMIN — DOXYCYCLINE 100 MG: 100 INJECTION, POWDER, LYOPHILIZED, FOR SOLUTION INTRAVENOUS at 16:45

## 2021-10-17 RX ADMIN — MELOXICAM 15 MG: 7.5 TABLET ORAL at 08:39

## 2021-10-17 ASSESSMENT — PAIN SCALES - GENERAL
PAINLEVEL_OUTOF10: 0
PAINLEVEL_OUTOF10: 7
PAINLEVEL_OUTOF10: 7
PAINLEVEL_OUTOF10: 8
PAINLEVEL_OUTOF10: 7

## 2021-10-17 ASSESSMENT — PAIN DESCRIPTION - ORIENTATION
ORIENTATION: LOWER
ORIENTATION: LOWER

## 2021-10-17 ASSESSMENT — PAIN DESCRIPTION - LOCATION
LOCATION: BACK

## 2021-10-17 ASSESSMENT — PAIN DESCRIPTION - PAIN TYPE
TYPE: CHRONIC PAIN

## 2021-10-17 ASSESSMENT — PAIN DESCRIPTION - FREQUENCY: FREQUENCY: CONTINUOUS

## 2021-10-17 ASSESSMENT — PAIN DESCRIPTION - DESCRIPTORS: DESCRIPTORS: ACHING;SORE

## 2021-10-17 NOTE — PROGRESS NOTES
Hospitalist Progress Note      PCP: Marek Duarte MD    Date of Admission: 10/16/2021    Chief Complaint: dyspnea    Subjective: pt eating breakfast     Medications:  Reviewed    Infusion Medications    sodium chloride       Scheduled Medications    sodium chloride flush  3 mL IntraVENous Q8H    amLODIPine  10 mg Oral Daily    cetirizine  10 mg Oral Daily    escitalopram  10 mg Oral Daily    fluticasone  2 spray Nasal Daily    atorvastatin  10 mg Oral Daily    meloxicam  15 mg Oral Daily    montelukast  10 mg Oral Nightly    pantoprazole  40 mg Oral Daily    traZODone  50 mg Oral Nightly    polyethylene glycol  17 g Oral Daily    sodium chloride flush  10 mL IntraVENous 2 times per day    enoxaparin  40 mg SubCUTAneous Daily    methylPREDNISolone  40 mg IntraVENous Q12H    cefepime  2,000 mg IntraVENous Q12H    doxycycline (VIBRAMYCIN) IV  100 mg IntraVENous Q12H    influenza virus vaccine  0.5 mL IntraMUSCular Prior to discharge    ipratropium-albuterol  1 ampule Inhalation TID    guaiFENesin  600 mg Oral BID     PRN Meds: traMADol, sodium chloride flush, sodium chloride, promethazine **OR** ondansetron, polyethylene glycol, acetaminophen **OR** acetaminophen, albuterol, guaiFENesin-dextromethorphan    No intake or output data in the 24 hours ending 10/17/21 0811    Exam:    BP (!) 161/84   Pulse 65   Temp 98 °F (36.7 °C) (Oral)   Resp 18   Ht 6' 0.5\" (1.842 m)   Wt 299 lb 4.8 oz (135.8 kg)   SpO2 97%   BMI 40.03 kg/m²     General appearance: No apparent distress, appears stated age and cooperative. HEENT: Pupils equal, round, and reactive to light. Conjunctivae/corneas clear. Neck: Supple, with full range of motion. No jugular venous distention. Trachea midline. Respiratory:  Normal respiratory effort. bilaterally with minimal expiratory  Wheezes/Rhonchi. Cardiovascular: Regular rate and rhythm with normal S1/S2 without murmurs, rubs or gallops.   Abdomen: Soft, non-tender,

## 2021-10-17 NOTE — PROGRESS NOTES
Occupational Therapy   Occupational Therapy Initial Assessment  Date: 10/17/2021   Patient Name: Layla Buck  MRN: [id number]     : 1955    Date of Service: 10/17/2021    Discharge Recommendations:  Continue to assess pending progress       Assessment   Performance deficits / Impairments: Decreased ADL status; Decreased strength;Decreased endurance;Decreased balance;Decreased functional mobility   Assessment: Pt is a 64 y/o male. PLOF was indep with ADLs/IADLs but dep for driving . Pt would benefit from skilled OT to increase activity tolerance for improved safety and indep with ADLs/IADLs upon d/c  Prognosis: Good  Decision Making: Medium Complexity  REQUIRES OT FOLLOW UP: Yes  Activity Tolerance  Activity Tolerance: Patient limited by fatigue           Patient Diagnosis(es): The primary encounter diagnosis was COPD exacerbation ([facility] [address] Diagnoses of Pneumonia of left lower lobe due to infectious organism and Febrile illness, acute were also pertinent to this visit. has a past medical history of Alcohol abuse, Anemia, Asthma, Cocaine abuse ([address] COPD (chronic obstructive pulmonary disease) ([address] Depression, Disorder of pharynx, Drug-seeking behavior, Edema, Erectile dysfunction, Gastroesophageal reflux disease, Gout, History of arthroscopy of both knees, House dust mite allergy, Hyperlipidemia, Hypertension, Injury to heart, Insomnia, Medical non-compliance, Morbid obesity due to excess calories ([address] Osteoarthritis of both knees, Personal history of tobacco use, Pneumonia, Pre-diabetes, Seasonal allergies, Severe persistent asthma, Severe sleep apnea, and Supraventricular tachycardia ([address] has a past surgical history that includes Anterior cruciate ligament repair; Cardiac catheterization; Total knee arthroplasty (Left, 2019); Colonoscopy (N/A, 7/15/2020); Umbilical hernia repair (N/A, 2020); and hernia repair.            Restrictions  Restrictions/Precautions  Required Braces or Orthoses?: No    Subjective   General  Chart Reviewed: Yes  Patient assessed for rehabilitation services?: Yes  Additional Pertinent Hx: pt uses cpap at night  Family / Caregiver Present: No  Referring Practitioner: Dr. Katie Castanon: Keep having trouble breathing. Want to get back to where I'm able to do things   Patient Currently in Pain: No  Pain Assessment  Pain Assessment: 0-10  Pain Level: 0  Vital Signs  Patient Currently in Pain: No  Oxygen Therapy  SpO2: 98 %  O2 Flow Rate (L/min): 3 L/min  Patient Observation  Observations: after sit to stand x3 O2 was 96  Social/Functional History  Social/Functional History  Lives With: Alone  Type of Home: Apartment  Home Layout: One level  Home Access: Stairs to enter with rails (no elevator, 15 steps per pt )  Entrance Stairs - Number of Steps: 15  Bathroom Shower/Tub: Tub/Shower unit  Bathroom Toilet: Standard  Bathroom Equipment:  (pt would like shower seat for tub)  Bathroom Accessibility: Accessible  Home Equipment: Cane (uses when L knee hurts, previous TKA )  Receives Help From: Friend(s)  ADL Assistance: Needs assistance  Homemaking Assistance: Needs assistance  Driving: Total (has someone take him to store and appts )  Shopping:  Moderate (uses moderize cart )  Ambulation Assistance: Independent  Active : No  Mode of Transportation: Car  Occupation: Retired  Type of occupation:  at [address]: plays "[employer]       Objective   Vision: Impaired  Vision Exceptions: Wears glasses for reading  Hearing: Within functional limits    Orientation  Overall Orientation Status: Within Functional Limits  Observation/Palpation  Posture: Good  Balance  Sitting Balance: Independent  Standing Balance: Supervision  Functional Mobility  Activity: Other (sit to stand )  Assist Level: Supervision  Functional Mobility Comments: pt has good safety awareness   ADL  Feeding: Independent  Grooming: Independent  UE Bathing: Independent  LE Bathing: Supervision (due to dizziness and SOB with increased activity )  UE Dressing: Stand by assistance (for O2)  LE Dressing: Supervision  Toileting: Supervision  Tone RUE  RUE Tone: Normotonic  Tone LUE  LUE Tone: Normotonic  Coordination  Movements Are Fluid And Coordinated: Yes  Functional Activity Tolerance  Functional Activity Tolerance:  Tolerates < 10 min exercise w/ changes in vital signs  Additional Comments: SOB, O2 levels decrease               Perception  Overall Perceptual Status: WFL     Sensation  Overall Sensation Status: WFL        LUE PROM (degrees)  LUE PROM: WFL  Left Hand PROM (degrees)  Left Hand PROM: WFL  RUE PROM (degrees)  RUE PROM: WFL  Right Hand PROM (degrees)  Right Hand PROM: WFL  LUE Strength  Gross LUE Strength: WFL  L Hand General: 3+/5  LUE Strength Comment: 4/5  RUE Strength  Gross RUE Strength: WFL  R Hand General: 3+/5  RUE Strength Comment: 4/5     Hand Dominance  Hand Dominance: Right             Plan   Plan  Times per week: 3-5   Times per day: Daily  Current Treatment Recommendations: Strengthening, Balance Training, Functional Mobility Training, Endurance Training, Safety Education & Training, Self-Care / ADL, Home Management Training    G-Code     OutComes Score                                                  AM-PAC Score             Goals  Short term goals  Time Frame for Short term goals: 3-5 days   Short term goal 1: Be indep with LB dressing   Short term goal 2: Complete ADL for 5+ min without SOB  Short term goal 3: Be indep with LB bathing   Long term goals  Time Frame for Long term goals : see short term goals   Patient Goals   Patient goals : Get back to his \"normal\" and be able to breath better        Therapy Time   Individual Concurrent Group Co-treatment   Time In  3:00         Time Out  3:45         Minutes  [address] [facility], OT

## 2021-10-18 LAB
ANION GAP SERPL CALCULATED.3IONS-SCNC: 9 MEQ/L (ref 9–15)
BASOPHILS ABSOLUTE: 0 K/UL (ref 0–0.1)
BASOPHILS RELATIVE PERCENT: 0.2 % (ref 0.2–1.2)
BUN BLDV-MCNC: 15 MG/DL (ref 8–23)
CALCIUM SERPL-MCNC: 9.5 MG/DL (ref 8.5–9.9)
CHLORIDE BLD-SCNC: 100 MEQ/L (ref 95–107)
CO2: 29 MEQ/L (ref 20–31)
CREAT SERPL-MCNC: 0.68 MG/DL (ref 0.7–1.2)
EOSINOPHILS ABSOLUTE: 0 K/UL (ref 0–0.5)
EOSINOPHILS RELATIVE PERCENT: 0 % (ref 0.8–7)
GFR AFRICAN AMERICAN: >60
GFR NON-AFRICAN AMERICAN: >60
GLUCOSE BLD-MCNC: 165 MG/DL (ref 60–115)
GLUCOSE BLD-MCNC: 171 MG/DL (ref 60–115)
GLUCOSE BLD-MCNC: 183 MG/DL (ref 70–99)
GLUCOSE BLD-MCNC: 220 MG/DL (ref 60–115)
GLUCOSE BLD-MCNC: 245 MG/DL (ref 60–115)
HBA1C MFR BLD: 7.3 % (ref 4.8–5.9)
HCT VFR BLD CALC: 31 % (ref 42–52)
HEMOGLOBIN: 10 G/DL (ref 13.7–17.5)
IMMATURE GRANULOCYTES #: 0.3 K/UL
IMMATURE GRANULOCYTES %: 1.4 %
LYMPHOCYTES ABSOLUTE: 1.4 K/UL (ref 1.3–3.6)
LYMPHOCYTES RELATIVE PERCENT: 6.8 %
MCH RBC QN AUTO: 30.9 PG (ref 25.7–32.2)
MCHC RBC AUTO-ENTMCNC: 32.3 % (ref 32.3–36.5)
MCV RBC AUTO: 95.7 FL (ref 79–92.2)
MONOCYTES ABSOLUTE: 1.2 K/UL (ref 0.3–0.8)
MONOCYTES RELATIVE PERCENT: 6 % (ref 5.3–12.2)
NEUTROPHILS ABSOLUTE: 17 K/UL (ref 1.8–5.4)
NEUTROPHILS RELATIVE PERCENT: 85.6 % (ref 34–67.9)
PDW BLD-RTO: 14.5 % (ref 11.6–14.4)
PERFORMED ON: ABNORMAL
PLATELET # BLD: 435 K/UL (ref 163–337)
POTASSIUM SERPL-SCNC: 4.5 MEQ/L (ref 3.4–4.9)
RBC # BLD: 3.24 M/UL (ref 4.63–6.08)
SODIUM BLD-SCNC: 138 MEQ/L (ref 135–144)
WBC # BLD: 19.9 K/UL (ref 4.2–9)

## 2021-10-18 PROCEDURE — 6370000000 HC RX 637 (ALT 250 FOR IP): Performed by: INTERNAL MEDICINE

## 2021-10-18 PROCEDURE — 2580000003 HC RX 258: Performed by: EMERGENCY MEDICINE

## 2021-10-18 PROCEDURE — 36415 COLL VENOUS BLD VENIPUNCTURE: CPT

## 2021-10-18 PROCEDURE — 1210000000 HC MED SURG R&B

## 2021-10-18 PROCEDURE — 85025 COMPLETE CBC W/AUTO DIFF WBC: CPT

## 2021-10-18 PROCEDURE — 83036 HEMOGLOBIN GLYCOSYLATED A1C: CPT

## 2021-10-18 PROCEDURE — 94640 AIRWAY INHALATION TREATMENT: CPT

## 2021-10-18 PROCEDURE — 6360000002 HC RX W HCPCS: Performed by: INTERNAL MEDICINE

## 2021-10-18 PROCEDURE — 2500000003 HC RX 250 WO HCPCS: Performed by: INTERNAL MEDICINE

## 2021-10-18 PROCEDURE — 80048 BASIC METABOLIC PNL TOTAL CA: CPT

## 2021-10-18 PROCEDURE — 2700000000 HC OXYGEN THERAPY PER DAY

## 2021-10-18 PROCEDURE — 2580000003 HC RX 258: Performed by: INTERNAL MEDICINE

## 2021-10-18 RX ADMIN — Medication 3 ML: at 08:43

## 2021-10-18 RX ADMIN — ATORVASTATIN CALCIUM 10 MG: 10 TABLET, FILM COATED ORAL at 20:26

## 2021-10-18 RX ADMIN — IPRATROPIUM BROMIDE AND ALBUTEROL SULFATE 1 AMPULE: .5; 2.5 SOLUTION RESPIRATORY (INHALATION) at 17:55

## 2021-10-18 RX ADMIN — METHYLPREDNISOLONE SODIUM SUCCINATE 40 MG: 40 INJECTION, POWDER, FOR SOLUTION INTRAMUSCULAR; INTRAVENOUS at 08:41

## 2021-10-18 RX ADMIN — Medication 10 ML: at 22:50

## 2021-10-18 RX ADMIN — PANTOPRAZOLE SODIUM 40 MG: 40 TABLET, DELAYED RELEASE ORAL at 08:41

## 2021-10-18 RX ADMIN — FLUTICASONE PROPIONATE 2 SPRAY: 50 SPRAY, METERED NASAL at 08:41

## 2021-10-18 RX ADMIN — CETIRIZINE HYDROCHLORIDE 10 MG: 10 TABLET, FILM COATED ORAL at 08:41

## 2021-10-18 RX ADMIN — ALBUTEROL SULFATE 2.5 MG: 2.5 SOLUTION RESPIRATORY (INHALATION) at 22:51

## 2021-10-18 RX ADMIN — CEFEPIME HYDROCHLORIDE 2000 MG: 2 INJECTION, POWDER, FOR SOLUTION INTRAVENOUS at 15:53

## 2021-10-18 RX ADMIN — DOXYCYCLINE 100 MG: 100 INJECTION, POWDER, LYOPHILIZED, FOR SOLUTION INTRAVENOUS at 15:52

## 2021-10-18 RX ADMIN — DOXYCYCLINE 100 MG: 100 INJECTION, POWDER, LYOPHILIZED, FOR SOLUTION INTRAVENOUS at 04:00

## 2021-10-18 RX ADMIN — TRAMADOL HYDROCHLORIDE 50 MG: 50 TABLET ORAL at 15:57

## 2021-10-18 RX ADMIN — METHYLPREDNISOLONE SODIUM SUCCINATE 40 MG: 40 INJECTION, POWDER, FOR SOLUTION INTRAMUSCULAR; INTRAVENOUS at 20:26

## 2021-10-18 RX ADMIN — AMLODIPINE BESYLATE 10 MG: 10 TABLET ORAL at 08:41

## 2021-10-18 RX ADMIN — IPRATROPIUM BROMIDE AND ALBUTEROL SULFATE 1 AMPULE: .5; 2.5 SOLUTION RESPIRATORY (INHALATION) at 11:36

## 2021-10-18 RX ADMIN — GUAIFENESIN 600 MG: 600 TABLET, EXTENDED RELEASE ORAL at 08:42

## 2021-10-18 RX ADMIN — TRAZODONE HYDROCHLORIDE 50 MG: 50 TABLET ORAL at 22:50

## 2021-10-18 RX ADMIN — GUAIFENESIN 600 MG: 600 TABLET, EXTENDED RELEASE ORAL at 20:26

## 2021-10-18 RX ADMIN — IPRATROPIUM BROMIDE AND ALBUTEROL SULFATE 1 AMPULE: .5; 2.5 SOLUTION RESPIRATORY (INHALATION) at 06:18

## 2021-10-18 RX ADMIN — TRAMADOL HYDROCHLORIDE 50 MG: 50 TABLET ORAL at 06:15

## 2021-10-18 RX ADMIN — ESCITALOPRAM OXALATE 10 MG: 10 TABLET ORAL at 22:50

## 2021-10-18 RX ADMIN — MONTELUKAST SODIUM 10 MG: 10 TABLET ORAL at 20:26

## 2021-10-18 RX ADMIN — CEFEPIME HYDROCHLORIDE 2000 MG: 2 INJECTION, POWDER, FOR SOLUTION INTRAVENOUS at 05:00

## 2021-10-18 RX ADMIN — POLYETHYLENE GLYCOL 3350 17 G: 17 POWDER, FOR SOLUTION ORAL at 08:41

## 2021-10-18 RX ADMIN — Medication 3 ML: at 03:57

## 2021-10-18 RX ADMIN — MELOXICAM 15 MG: 7.5 TABLET ORAL at 08:41

## 2021-10-18 ASSESSMENT — PAIN DESCRIPTION - PAIN TYPE
TYPE: CHRONIC PAIN

## 2021-10-18 ASSESSMENT — PAIN DESCRIPTION - LOCATION
LOCATION: BACK

## 2021-10-18 ASSESSMENT — PAIN SCALES - GENERAL
PAINLEVEL_OUTOF10: 8
PAINLEVEL_OUTOF10: 7
PAINLEVEL_OUTOF10: 0
PAINLEVEL_OUTOF10: 7

## 2021-10-18 NOTE — PROGRESS NOTES
Pt A&Ox4 sitting in chair. Assessment and vitals are as charted. Medications given as ordered. Pt requests to have trazadone and lexapro closer to 2200 tonight. Pt states no further needs at this time. Call light and table are within reach.

## 2021-10-18 NOTE — PROGRESS NOTES
Occupational Therapy  Facility/Department: [facility] MED SURG UNIT  Daily Treatment Note  NAME: Deborah Viveros  : 1955  MRN: [id number]    Date of Service: 10/18/2021    This therapist went to see pt for OT today. Pt stated he does not need any OT and can do his ADL himself. Pt's nurse was present and stated pt has been I in his room and was getting pt towels so pt could wash up. Pt stated he did not need this therapist's assist and does not need to be on OT.  Pt d/c from OT today per pt request.       Therapy Time   Individual Concurrent Group Co-treatment   Time In  0         Time Out  0         Minutes  [place], [place]

## 2021-10-18 NOTE — PROGRESS NOTES
PT evaluation attempted x2 this AM. First attempt pt with MD. Second attempt pt deferred PT evaluation. Will check with pt again this afternoon to see if agreeable to PT evaluation per MD order.     [employer], [address] [employer] [facility],[address]

## 2021-10-18 NOTE — PROGRESS NOTES
Patient feels better. He said that he feels 70% better compared to when I came in. No chest pain. Persistent cough. No significant shortness of breath. No fever or chills. No headaches, loss of consciousness or seizure. ROS: 12 system review otherwise is negative for acute signs or symptoms over the last 24 hrs. Exam: Awake, alert oriented, in no apparent distress, morbidly obese. HEENT: Pink conjunctiva and buccal mucosa. Normal and throat and nose  Neck: Supple, no nuchal rigidity. Endo: No thyromegaly. Vascular: No JVD or carotid bruit. Chest: Left basilar diminished breath sound. Heart: Regular rate and rhythm, no extra sounds. No murmur, no rub. Abdomen: Soft, no tenderness, no rebound, no rigidity. Clinically I could not exclude the possibility of intra-abdominal mass or organomegaly. LE: No cyanosis or clubbing, no varices or edema. Neuro: Awake, alert, oriented, normal speech, normal comprehension, normal extension, normal cranial nerves, normal and symmetrical motor and tone examination throughout. MS: No joint effusion or tenderness. Skin: No skin rash, itching, bruising or significant findings. Assessment and plan:     *Acute COPD exacerbation. *Systemic inflammatory reactive syndrome present on admission. *Recent left sided pneumonia with atelectasis. I admitted him for this almost a month ago. After that the patient saw his pulmonologist Dr. Juani Ryan. Dr. Juani Ryan did not recommend any repeat imaging or bronchoscopy at this time. No clinical evidence at this time to suggest empyema, septic shock. *Nocturnal respiratory failure and sleep apnea. Patient is on nocturnal CPAP and oxygen at night. *Tobacco addiction. The patient is cutting down. *Morbid obesity. *Hypertension. *Steroid-induced hyperglycemia. I suspect that the patient may have underlying type 2 diabetes. *Multiple other medical problems. Plan:  Continue intravenous antibiotic.   Continue Solu-Medrol. Continue additional bronchodilators. I discussed with the patient the possible need of repeat imaging and/or transfer for inpatient pulmonary evaluation. I discussed with patient the possible need of a bronchoscopy, other pulmonary care. He is not interested to proceed. He wants to go home today or tomorrow. He said he will discuss this with his pulmonologist Dr Alvin Lee and make a decision when and how to proceed. He understood the potential risk and implication of delaying needed that work-up. I may or may not have addressed all of this pt symptoms, medical issues, abnormal labs and findings. Pt will need additional work up, investigation, testing, surveillance, and treatment to be done at a later time and date during this hospitalization or post discharge by PCP and other out pt providers.

## 2021-10-18 NOTE — PROGRESS NOTES
Physical Therapy      PT attempted eval this afternoon but pt declined so NO PT evaluation completed. Pt will defers all PT at this time.     Laura Alamo, PT #[id number]

## 2021-10-19 LAB
GLUCOSE BLD-MCNC: 172 MG/DL (ref 60–115)
GLUCOSE BLD-MCNC: 177 MG/DL (ref 60–115)
GLUCOSE BLD-MCNC: 236 MG/DL (ref 60–115)
GLUCOSE BLD-MCNC: 275 MG/DL (ref 60–115)
PERFORMED ON: ABNORMAL

## 2021-10-19 PROCEDURE — 2580000003 HC RX 258: Performed by: INTERNAL MEDICINE

## 2021-10-19 PROCEDURE — 2700000000 HC OXYGEN THERAPY PER DAY

## 2021-10-19 PROCEDURE — 6370000000 HC RX 637 (ALT 250 FOR IP): Performed by: INTERNAL MEDICINE

## 2021-10-19 PROCEDURE — 6360000002 HC RX W HCPCS: Performed by: INTERNAL MEDICINE

## 2021-10-19 PROCEDURE — 1210000000 HC MED SURG R&B

## 2021-10-19 PROCEDURE — 2500000003 HC RX 250 WO HCPCS: Performed by: INTERNAL MEDICINE

## 2021-10-19 PROCEDURE — 94640 AIRWAY INHALATION TREATMENT: CPT

## 2021-10-19 PROCEDURE — 2580000003 HC RX 258: Performed by: EMERGENCY MEDICINE

## 2021-10-19 RX ADMIN — CEFEPIME HYDROCHLORIDE 2000 MG: 2 INJECTION, POWDER, FOR SOLUTION INTRAVENOUS at 17:16

## 2021-10-19 RX ADMIN — Medication 3 ML: at 17:18

## 2021-10-19 RX ADMIN — MONTELUKAST SODIUM 10 MG: 10 TABLET ORAL at 20:44

## 2021-10-19 RX ADMIN — ENOXAPARIN SODIUM 40 MG: 40 INJECTION SUBCUTANEOUS at 08:28

## 2021-10-19 RX ADMIN — METFORMIN HYDROCHLORIDE 500 MG: 500 TABLET ORAL at 12:00

## 2021-10-19 RX ADMIN — METFORMIN HYDROCHLORIDE 500 MG: 500 TABLET ORAL at 17:16

## 2021-10-19 RX ADMIN — TRAMADOL HYDROCHLORIDE 50 MG: 50 TABLET ORAL at 22:32

## 2021-10-19 RX ADMIN — Medication 10 ML: at 08:32

## 2021-10-19 RX ADMIN — DOXYCYCLINE 100 MG: 100 INJECTION, POWDER, LYOPHILIZED, FOR SOLUTION INTRAVENOUS at 16:19

## 2021-10-19 RX ADMIN — TRAMADOL HYDROCHLORIDE 50 MG: 50 TABLET ORAL at 01:21

## 2021-10-19 RX ADMIN — TRAZODONE HYDROCHLORIDE 50 MG: 50 TABLET ORAL at 22:32

## 2021-10-19 RX ADMIN — GUAIFENESIN SYRUP AND DEXTROMETHORPHAN 5 ML: 100; 10 SYRUP ORAL at 09:22

## 2021-10-19 RX ADMIN — GUAIFENESIN 600 MG: 600 TABLET, EXTENDED RELEASE ORAL at 08:29

## 2021-10-19 RX ADMIN — GUAIFENESIN SYRUP AND DEXTROMETHORPHAN 5 ML: 100; 10 SYRUP ORAL at 02:09

## 2021-10-19 RX ADMIN — CEFEPIME HYDROCHLORIDE 2000 MG: 2 INJECTION, POWDER, FOR SOLUTION INTRAVENOUS at 05:31

## 2021-10-19 RX ADMIN — Medication 10 ML: at 20:44

## 2021-10-19 RX ADMIN — IPRATROPIUM BROMIDE AND ALBUTEROL SULFATE 1 AMPULE: .5; 2.5 SOLUTION RESPIRATORY (INHALATION) at 18:25

## 2021-10-19 RX ADMIN — METHYLPREDNISOLONE SODIUM SUCCINATE 40 MG: 40 INJECTION, POWDER, FOR SOLUTION INTRAMUSCULAR; INTRAVENOUS at 20:43

## 2021-10-19 RX ADMIN — MELOXICAM 15 MG: 7.5 TABLET ORAL at 08:29

## 2021-10-19 RX ADMIN — METHYLPREDNISOLONE SODIUM SUCCINATE 40 MG: 40 INJECTION, POWDER, FOR SOLUTION INTRAMUSCULAR; INTRAVENOUS at 08:30

## 2021-10-19 RX ADMIN — PANTOPRAZOLE SODIUM 40 MG: 40 TABLET, DELAYED RELEASE ORAL at 08:30

## 2021-10-19 RX ADMIN — ALBUTEROL SULFATE 2.5 MG: 2.5 SOLUTION RESPIRATORY (INHALATION) at 22:01

## 2021-10-19 RX ADMIN — ESCITALOPRAM OXALATE 10 MG: 10 TABLET ORAL at 22:32

## 2021-10-19 RX ADMIN — FLUTICASONE PROPIONATE 2 SPRAY: 50 SPRAY, METERED NASAL at 08:29

## 2021-10-19 RX ADMIN — GUAIFENESIN 600 MG: 600 TABLET, EXTENDED RELEASE ORAL at 20:44

## 2021-10-19 RX ADMIN — GUAIFENESIN SYRUP AND DEXTROMETHORPHAN 5 ML: 100; 10 SYRUP ORAL at 22:34

## 2021-10-19 RX ADMIN — AMLODIPINE BESYLATE 10 MG: 10 TABLET ORAL at 08:29

## 2021-10-19 RX ADMIN — IPRATROPIUM BROMIDE AND ALBUTEROL SULFATE 1 AMPULE: .5; 2.5 SOLUTION RESPIRATORY (INHALATION) at 06:11

## 2021-10-19 RX ADMIN — ATORVASTATIN CALCIUM 10 MG: 10 TABLET, FILM COATED ORAL at 20:44

## 2021-10-19 RX ADMIN — TRAMADOL HYDROCHLORIDE 50 MG: 50 TABLET ORAL at 14:30

## 2021-10-19 RX ADMIN — ALBUTEROL SULFATE 2.5 MG: 2.5 SOLUTION RESPIRATORY (INHALATION) at 04:05

## 2021-10-19 RX ADMIN — IPRATROPIUM BROMIDE AND ALBUTEROL SULFATE 1 AMPULE: .5; 2.5 SOLUTION RESPIRATORY (INHALATION) at 11:16

## 2021-10-19 RX ADMIN — DOXYCYCLINE 100 MG: 100 INJECTION, POWDER, LYOPHILIZED, FOR SOLUTION INTRAVENOUS at 04:26

## 2021-10-19 RX ADMIN — POLYETHYLENE GLYCOL 3350 17 G: 17 POWDER, FOR SOLUTION ORAL at 08:28

## 2021-10-19 RX ADMIN — CETIRIZINE HYDROCHLORIDE 10 MG: 10 TABLET, FILM COATED ORAL at 08:28

## 2021-10-19 ASSESSMENT — PAIN SCALES - GENERAL
PAINLEVEL_OUTOF10: 8
PAINLEVEL_OUTOF10: 7
PAINLEVEL_OUTOF10: 0

## 2021-10-19 NOTE — FLOWSHEET NOTE
1241: Patient awake in chair, eating lunch.  Electronically signed by Shanna Ba RN on 10/19/2021 at 12:41 PM

## 2021-10-19 NOTE — PROGRESS NOTES
Continues to feel well. He reports having a persistent moist cough. No shortness of breath at rest.  No abdominal pain, nausea or vomiting. No hemoptysis. ROS: 12 system review otherwise is negative for acute signs or symptoms over the last 24 hrs.      Exam: Awake, alert oriented, in no apparent distress, morbidly obese. HEENT: Pink conjunctiva and buccal mucosa. Normal and throat and nose  Neck: Supple, no nuchal rigidity. Endo: No thyromegaly. Vascular: No JVD or carotid bruit. Chest: Left basilar diminished breath sound. Heart: Regular rate and rhythm, no extra sounds. No murmur, no rub. Abdomen: Soft, no tenderness, no rebound, no rigidity. Clinically I could not exclude the possibility of intra-abdominal mass or organomegaly. LE: No cyanosis or clubbing, no varices or edema. Neuro: Awake, alert, oriented, normal speech, normal comprehension, normal extension, normal cranial nerves, normal and symmetrical motor and tone examination throughout. MS: No joint effusion or tenderness. Skin: No skin rash, itching, bruising or significant findings.     Assessment and plan:      *Acute COPD exacerbation.     *Systemic inflammatory reactive syndrome present on admission.     *Recent left sided pneumonia with atelectasis. I admitted him for this almost a month ago. After that the patient saw his pulmonologist Dr. Alvin Lee. Dr. Alvin Lee did not recommend any repeat imaging or bronchoscopy at this time. No clinical evidence at this time to suggest empyema, septic shock r others.     *Nocturnal respiratory failure and sleep apnea. Patient is on nocturnal CPAP and oxygen at night.     *Tobacco addiction. The patient is cutting down. Continue encouragement to quit smoking.     *Morbid obesity.     *Hypertension. Fair control.     *Steroid-induced hyperglycemia. I suspect that the patient may have underlying type 2 diabetes. A1c is elevated.   We will start the patient on Metformin.     *Multiple other medical problems.     Plan:  Continue intravenous antibiotic. Continue Solu-Medrol. Continue additional bronchodilators.     I discussed with the patient the possible need of repeat imaging ( Ct ) and/or transfer for inpatient pulmonary evaluation. I discussed with patient the possible need of a bronchoscopy, other pulmonary care. He is not interested to proceed. He wants to go home today or tomorrow. He said he will discuss this with his pulmonologist Dr Tiffanie Aguirre and make a decision when and how to proceed. He understood the potential risk and implication of delaying needed that work-up.

## 2021-10-19 NOTE — FLOWSHEET NOTE
Patient stated that he is not feeling as good as he thought he should be. Pt expresses that he sometimes feels better through out different times of the day but then will end up in a coughing episode. Patient had very productive cough during am med pass. Patient was able to bring up thick grey colored mucous. Patient SOB with coughing episodes and exertion. Patient does not appear to be in any acute distress at this time. Patient is medicated with scheduled mobic for 8/10 chronic back pain. Electronically signed by Shilpi aKn RN on 10/19/2021 at 8:46 AM    4822: Patient requesting Robitussin for persistent cough. Patient medicated with PRN Robitussin DM as MAR order. OK per pharmacy to give with Mucinex.  Electronically signed by Shilpi Kan RN on 10/19/2021 at 9:23 AM

## 2021-10-20 VITALS
BODY MASS INDEX: 39.67 KG/M2 | HEIGHT: 73 IN | DIASTOLIC BLOOD PRESSURE: 76 MMHG | TEMPERATURE: 97.9 F | SYSTOLIC BLOOD PRESSURE: 149 MMHG | RESPIRATION RATE: 18 BRPM | WEIGHT: 299.3 LBS | OXYGEN SATURATION: 95 % | HEART RATE: 64 BPM

## 2021-10-20 LAB
GLUCOSE BLD-MCNC: 242 MG/DL (ref 60–115)
GLUCOSE BLD-MCNC: 243 MG/DL (ref 60–115)
PERFORMED ON: ABNORMAL
PERFORMED ON: ABNORMAL

## 2021-10-20 PROCEDURE — 6370000000 HC RX 637 (ALT 250 FOR IP): Performed by: INTERNAL MEDICINE

## 2021-10-20 PROCEDURE — 90686 IIV4 VACC NO PRSV 0.5 ML IM: CPT | Performed by: INTERNAL MEDICINE

## 2021-10-20 PROCEDURE — 2500000003 HC RX 250 WO HCPCS: Performed by: INTERNAL MEDICINE

## 2021-10-20 PROCEDURE — 6360000002 HC RX W HCPCS: Performed by: INTERNAL MEDICINE

## 2021-10-20 PROCEDURE — 2580000003 HC RX 258: Performed by: INTERNAL MEDICINE

## 2021-10-20 PROCEDURE — 94640 AIRWAY INHALATION TREATMENT: CPT

## 2021-10-20 PROCEDURE — 2580000003 HC RX 258: Performed by: EMERGENCY MEDICINE

## 2021-10-20 PROCEDURE — G0008 ADMIN INFLUENZA VIRUS VAC: HCPCS | Performed by: INTERNAL MEDICINE

## 2021-10-20 RX ORDER — GUAIFENESIN 600 MG/1
600 TABLET, EXTENDED RELEASE ORAL 2 TIMES DAILY PRN
Qty: 40 TABLET | Refills: 1 | Status: SHIPPED | OUTPATIENT
Start: 2021-10-20 | End: 2022-02-24

## 2021-10-20 RX ORDER — ALBUTEROL SULFATE 90 UG/1
2 AEROSOL, METERED RESPIRATORY (INHALATION) EVERY 6 HOURS PRN
Qty: 18 G | Refills: 3 | Status: SHIPPED | OUTPATIENT
Start: 2021-10-20 | End: 2021-11-29

## 2021-10-20 RX ORDER — ALBUTEROL SULFATE 90 UG/1
2 AEROSOL, METERED RESPIRATORY (INHALATION) EVERY 6 HOURS PRN
Qty: 18 G | Refills: 3 | Status: SHIPPED | OUTPATIENT
Start: 2021-10-20 | End: 2021-10-20 | Stop reason: SDUPTHER

## 2021-10-20 RX ORDER — GUAIFENESIN 600 MG/1
600 TABLET, EXTENDED RELEASE ORAL 2 TIMES DAILY
Qty: 40 TABLET | Refills: 1 | Status: SHIPPED | OUTPATIENT
Start: 2021-10-20 | End: 2021-10-20

## 2021-10-20 RX ORDER — PREDNISONE 10 MG/1
10 TABLET ORAL SEE ADMIN INSTRUCTIONS
Qty: 20 TABLET | Refills: 0 | Status: SHIPPED | OUTPATIENT
Start: 2021-10-20 | End: 2021-10-30

## 2021-10-20 RX ORDER — GLUCOSAMINE HCL/CHONDROITIN SU 500-400 MG
CAPSULE ORAL
Qty: 100 STRIP | Refills: 1 | Status: SHIPPED | OUTPATIENT
Start: 2021-10-20 | End: 2022-04-05 | Stop reason: SDUPTHER

## 2021-10-20 RX ORDER — LANCETS 30 GAUGE
1 EACH MISCELLANEOUS 3 TIMES DAILY
Qty: 200 EACH | Refills: 5 | Status: SHIPPED | OUTPATIENT
Start: 2021-10-20

## 2021-10-20 RX ORDER — LEVOFLOXACIN 500 MG/1
500 TABLET, FILM COATED ORAL DAILY
Qty: 5 TABLET | Refills: 0 | Status: SHIPPED | OUTPATIENT
Start: 2021-10-20 | End: 2021-11-05 | Stop reason: ALTCHOICE

## 2021-10-20 RX ORDER — HYDROCODONE BITARTRATE AND ACETAMINOPHEN 5; 325 MG/1; MG/1
1 TABLET ORAL EVERY 4 HOURS PRN
Qty: 21 TABLET | Refills: 0 | Status: SHIPPED | OUTPATIENT
Start: 2021-10-20 | End: 2021-10-25

## 2021-10-20 RX ORDER — ALBUTEROL SULFATE 90 UG/1
2 AEROSOL, METERED RESPIRATORY (INHALATION) EVERY 6 HOURS PRN
Qty: 18 G | Refills: 4 | Status: SHIPPED | OUTPATIENT
Start: 2021-10-20 | End: 2021-10-20 | Stop reason: SDUPTHER

## 2021-10-20 RX ADMIN — IPRATROPIUM BROMIDE AND ALBUTEROL SULFATE 1 AMPULE: .5; 2.5 SOLUTION RESPIRATORY (INHALATION) at 11:35

## 2021-10-20 RX ADMIN — Medication 3 ML: at 04:23

## 2021-10-20 RX ADMIN — INFLUENZA A VIRUS A/VICTORIA/2570/2019 IVR-215 (H1N1) ANTIGEN (PROPIOLACTONE INACTIVATED), INFLUENZA A VIRUS A/CAMBODIA/E0826360/2020 IVR-224 (H3N2) ANTIGEN (PROPIOLACTONE INACTIVATED), INFLUENZA B VIRUS B/VICTORIA/705/2018 BVR-11 ANTIGEN (PROPIOLACTONE INACTIVATED), INFLUENZA B VIRUS B/PHUKET/3073/2013 BVR-1B ANTIGEN (PROPIOLACTONE INACTIVATED) 0.5 ML: 15; 15; 15; 15 INJECTION, SUSPENSION INTRAMUSCULAR at 12:32

## 2021-10-20 RX ADMIN — PANTOPRAZOLE SODIUM 40 MG: 40 TABLET, DELAYED RELEASE ORAL at 08:43

## 2021-10-20 RX ADMIN — FLUTICASONE PROPIONATE 2 SPRAY: 50 SPRAY, METERED NASAL at 08:50

## 2021-10-20 RX ADMIN — GUAIFENESIN SYRUP AND DEXTROMETHORPHAN 5 ML: 100; 10 SYRUP ORAL at 08:52

## 2021-10-20 RX ADMIN — MELOXICAM 15 MG: 7.5 TABLET ORAL at 08:43

## 2021-10-20 RX ADMIN — Medication 10 ML: at 09:00

## 2021-10-20 RX ADMIN — METHYLPREDNISOLONE SODIUM SUCCINATE 40 MG: 40 INJECTION, POWDER, FOR SOLUTION INTRAMUSCULAR; INTRAVENOUS at 08:42

## 2021-10-20 RX ADMIN — CEFEPIME HYDROCHLORIDE 2000 MG: 2 INJECTION, POWDER, FOR SOLUTION INTRAVENOUS at 05:53

## 2021-10-20 RX ADMIN — AMLODIPINE BESYLATE 10 MG: 10 TABLET ORAL at 08:43

## 2021-10-20 RX ADMIN — CETIRIZINE HYDROCHLORIDE 10 MG: 10 TABLET, FILM COATED ORAL at 08:43

## 2021-10-20 RX ADMIN — IPRATROPIUM BROMIDE AND ALBUTEROL SULFATE 1 AMPULE: .5; 2.5 SOLUTION RESPIRATORY (INHALATION) at 06:00

## 2021-10-20 RX ADMIN — ENOXAPARIN SODIUM 40 MG: 40 INJECTION SUBCUTANEOUS at 08:42

## 2021-10-20 RX ADMIN — METFORMIN HYDROCHLORIDE 500 MG: 500 TABLET ORAL at 08:43

## 2021-10-20 RX ADMIN — DOXYCYCLINE 100 MG: 100 INJECTION, POWDER, LYOPHILIZED, FOR SOLUTION INTRAVENOUS at 04:23

## 2021-10-20 RX ADMIN — GUAIFENESIN 600 MG: 600 TABLET, EXTENDED RELEASE ORAL at 08:43

## 2021-10-20 ASSESSMENT — PAIN SCALES - GENERAL
PAINLEVEL_OUTOF10: 6
PAINLEVEL_OUTOF10: 7
PAINLEVEL_OUTOF10: 7

## 2021-10-20 ASSESSMENT — PAIN DESCRIPTION - LOCATION: LOCATION: BACK

## 2021-10-20 ASSESSMENT — PAIN DESCRIPTION - PAIN TYPE: TYPE: CHRONIC PAIN

## 2021-10-20 ASSESSMENT — PAIN DESCRIPTION - DESCRIPTORS: DESCRIPTORS: ACHING;DISCOMFORT

## 2021-10-20 NOTE — PROGRESS NOTES
Nutrition Education    · Verbally reviewed information with Patient  · Educated on Carbohydrate counting for people with diabetes. · Written educational materials provided. · Contact name and number provided. · Refer to Patient Education activity for more details.     Electronically signed by Jeovany French RD, EVELIA on 10/20/21 at 2:37 PM EDT

## 2021-10-20 NOTE — PROGRESS NOTES
Discharge instructions/medications/ glucometer use reviewed with pt, pt verbalized understanding. Pt transferred to car via w/c with staff assist with out incident.

## 2021-10-20 NOTE — DISCHARGE SUMMARY
oriented, normal speech, normal comprehension, normal extension, normal cranial nerves, normal and symmetrical motor and tone examination throughout. MS: No joint effusion or tenderness. Skin: No skin rash, itching, bruising or significant findings. Patient was seen by the following consultants while admitted to [facility]:   Consults:  IP CONSULT TO HOSPITALIST  IP CONSULT TO DIETITIAN  IP CONSULT TO DIABETES EDUCATOR    Significant Diagnostic Studies:    XR CHEST PORTABLE    Result Date: 10/16/2021  EXAMINATION: XR CHEST PORTABLE CLINICAL HISTORY: SHORTNESS OF BREATH COMPARISONS: CT CHEST, SEPTEMBER 24, 2021, CHEST RADIOGRAPH SEPTEMBER 23, 2021 FINDINGS: Osseous structures intact. Cardiopericardial silhouette normal. Right lung clear. Area of increased opacity again identified, left lower lung and lingula. LEFT MID AND LEFT LOWER LUNG PNEUMONIA VERSUS ATELECTASIS. Discharge Medications:       Kiara Ba   Home Medication Instructions ENF:[id number]    Printed on:10/20/21 9483   Medication Information                      acetaminophen (ACETAMINOPHEN EXTRA STRENGTH) 500 MG tablet  Take 1 tablet by mouth every 8 hours as needed for Pain             albuterol sulfate  (90 Base) MCG/ACT inhaler  Inhale 2 puffs into the lungs every 6 hours as needed for Wheezing or Shortness of Breath             amLODIPine (NORVASC) 10 MG tablet  Take 1 tablet by mouth daily             blood glucose monitor strips  Test three times a day & as needed for symptoms of irregular blood glucose. Dispense sufficient amount for indicated testing frequency plus additional to accommodate PRN testing needs.              cetirizine (ZYRTEC ALLERGY) 10 MG tablet  Take 10 mg by mouth daily             escitalopram (LEXAPRO) 10 MG tablet  Take 1 tablet by mouth daily             fluticasone (FLONASE) 50 MCG/ACT nasal spray  2 sprays by Nasal route daily             guaiFENesin (MUCINEX) 600 MG extended release tablet  Take 1 tablet by mouth 2 times daily as needed for Congestion (Cough)             Handicap Placard MISC  by Does not apply route DX: OSTEOARTHRITIS OF BOTH KNEES (M17.0), COPD (J44.9)     EXPIRES: 02/2024             HYDROcodone-acetaminophen (NORCO) 5-325 MG per tablet  Take 1 tablet by mouth every 4 hours as needed for Pain for up to 5 days. Intended supply: 5 days. Take lowest dose possible to manage pain             Hydrocortisone, Perianal, 1 % cream  daily as needed              ipratropium-albuterol (DUONEB) 0.5-2.5 (3) MG/3ML SOLN nebulizer solution  Inhale 3 mLs into the lungs every 6 hours as needed for Shortness of Breath             Lancets MISC  1 each by Does not apply route 3 times daily             levoFLOXacin (LEVAQUIN) 500 MG tablet  Take 1 tablet by mouth daily             lovastatin (MEVACOR) 10 MG tablet  Take 1 tablet by mouth nightly             meloxicam (MOBIC) 15 MG tablet  Take 1 tablet by mouth daily             montelukast (SINGULAIR) 10 MG tablet  Take 1 tablet by mouth nightly             pantoprazole (PROTONIX) 40 MG tablet  Take 1 tablet by mouth daily             polyethylene glycol (GLYCOLAX) 17 GM/SCOOP powder  Take 17 g by mouth daily             predniSONE (DELTASONE) 10 MG tablet  Take 1 tablet by mouth See Admin Instructions for 10 days Take 1 twice a day for 5 days then 1 tablet daily for 5 days then half a tablet daily             sildenafil (VIAGRA) 100 MG tablet  Take 1 tablet by mouth as needed for Erectile Dysfunction             sodium chloride (OCEAN, BABY AYR) 0.65 % nasal spray  2 sprays by Nasal route three times daily             traZODone (DESYREL) 50 MG tablet  Take 1 tablet by mouth nightly             TRELEGY ELLIPTA 200-62.5-25 MCG/INH AEPB  Take 1 Inhaler by mouth daily                 Disposition:   Discharged to Home. Any [facility] needs that were indicated and/or required as been addressed and set up by Social Work.      Condition at discharge: Pt was medically stable at the time of discharge. Significant improvement in clinical condition compared to initial condition at presentation to hospital    Activity: activity as tolerated, fall precautions. Total time taken for discharging this patient: 40 minutes. Greater than 70% of time was spent focused exclusively on this patient. Time was taken to review chart, discuss plans with consultants, reconciling medications, discussing plan answering questions with patient. SignedSherryle Pringle, MD  10/20/2021, 12:48 PM  ----------------------------------------------------------------------------------------------------------------------    Umberto Ybarra,     Please return to ER or call 911 if you develop any significant signs or symptoms. I may not have addressed all of your medical illnesses or the abnormal blood work or imaging therefore please ask your PCP Hector Connolly MD and other out patient specialists and providers  to obtain [facility] record entirely to follow up on all of the abnormal labs, imaging and findings that I have and have not addressed during your hospitalization. Discharging you from the hospital does not mean that your medical care ends here and now. You may still need additional work up, investigation, monitoring, and treatment to be handled from this point on by outside providers including your PCP, Hector Connolly MD , Specialists and other healthcare providers. Please review your list of discharge medications prior to resuming medications you might still have at home, as the medications you need to be taking, dosages or how often you must take them may have changed. For medication questions, contact your retail pharmacy and your PCP, Hector Connolly MD .     ** I STRONGLY RECOMMEND that you follow up with Hector Connolly MD within 3 to 5 days for a post hospitalization evaluation.  This specific office visit is covered by your insurance, and is not the same as your annual doctor visit/ check up. This office visit is important, as it may prevent need for repeat and/or future hospitalizations. **    Your medical team at [facility] appreciates the opportunity to work with you to get well! Sincerely,  Kaya Gan MD Follow up with Dr. Alejandro Baird MD in the next 7 days or sooner if needed. Please return to ER or call 911 if you develop any significant signs or symptoms. I may or may not have addressed all of your symptoms, medical issues,  Illnesses, or all of the abnormal blood work or imaging therefore please ask your PCP and other specialists to obtain [facility] record entirely to follow up on all of your symptoms, illnesses, abnormal labs, imaging and findings that I have and have not addressed during your hospitalization. Discharging you from the hospital does not mean that your medical care ends here and now. You may still need to have additional work up, investigation, monitoring, surveillance, and treatment to be handled from this point on by in the out patient setting by  out patient providers including your PCP, Specialists and other healthcare providers. For medication questions, contact your retail pharmacy and your PCP. Your medical team at [facility] ([facility]) appreciates the opportunity to work with you to get well!     Kaya Gan MD

## 2021-10-21 LAB
BLOOD CULTURE, ROUTINE: NORMAL
CULTURE, BLOOD 2: NORMAL

## 2021-10-22 ENCOUNTER — TELEPHONE (OUTPATIENT)
Dept: FAMILY MEDICINE CLINIC | Age: 66
End: 2021-10-22

## 2021-10-22 NOTE — TELEPHONE ENCOUNTER
Yudith [facility] Transitions Initial Follow Up Call    Outreach made within 2 business days of discharge: Yes    Patient: Mona Tejada Patient : 1955   MRN: [id number]  Reason for Admission: There are no discharge diagnoses documented for the most recent discharge. Discharge Date: 10/20/21       Spoke with: Left Message/pt has f/u appt on 10/26/21    Discharge department/facility: [facility] Interactive Patient Contact:  Was patient able to fill all prescriptions:  Was patient instructed to bring all medications to the follow-up visit: Is patient taking all medications as directed in the discharge summary?  Does patient understand their discharge instructions:   Does patient have questions or concerns that need addressed prior to 7-14 day follow up office visit:     Scheduled appointment with PCP within 7-14 days      Follow Up  Future Appointments   Date Time Provider Constance Espitia   10/26/2021  3:40 PM MD [address]   2022  1:40 PM Terrie Moreno MD [address]

## 2021-10-26 ENCOUNTER — VIRTUAL VISIT (OUTPATIENT)
Dept: FAMILY MEDICINE CLINIC | Age: 66
End: 2021-10-26
Payer: MEDICARE

## 2021-10-26 DIAGNOSIS — E66.01 MORBID OBESITY DUE TO EXCESS CALORIES (HCC): Chronic | ICD-10-CM

## 2021-10-26 DIAGNOSIS — E11.9 TYPE 2 DIABETES MELLITUS WITHOUT COMPLICATION, WITHOUT LONG-TERM CURRENT USE OF INSULIN (HCC): ICD-10-CM

## 2021-10-26 DIAGNOSIS — Z72.0 TOBACCO USE: ICD-10-CM

## 2021-10-26 DIAGNOSIS — J44.0 CHRONIC OBSTRUCTIVE PULMONARY DISEASE WITH ACUTE LOWER RESPIRATORY INFECTION (HCC): Chronic | ICD-10-CM

## 2021-10-26 DIAGNOSIS — F14.10 COCAINE ABUSE (HCC): Chronic | ICD-10-CM

## 2021-10-26 DIAGNOSIS — F10.10 ALCOHOL ABUSE: Chronic | ICD-10-CM

## 2021-10-26 DIAGNOSIS — M48.061 SPINAL STENOSIS OF LUMBAR REGION, UNSPECIFIED WHETHER NEUROGENIC CLAUDICATION PRESENT: ICD-10-CM

## 2021-10-26 DIAGNOSIS — J18.9 COMMUNITY ACQUIRED PNEUMONIA OF LEFT LOWER LOBE OF LUNG: Primary | ICD-10-CM

## 2021-10-26 PROBLEM — K42.9 UMBILICAL HERNIA WITHOUT OBSTRUCTION AND WITHOUT GANGRENE: Chronic | Status: ACTIVE | Noted: 2020-09-06

## 2021-10-26 PROBLEM — Z86.0100 HISTORY OF COLON POLYPS: Chronic | Status: ACTIVE | Noted: 2021-10-26

## 2021-10-26 PROBLEM — Z86.010 HISTORY OF COLON POLYPS: Chronic | Status: ACTIVE | Noted: 2021-10-26

## 2021-10-26 PROCEDURE — 99443 PR PHYS/QHP TELEPHONE EVALUATION 21-30 MIN: CPT | Performed by: FAMILY MEDICINE

## 2021-10-26 RX ORDER — MELOXICAM 15 MG/1
15 TABLET ORAL DAILY
Qty: 30 TABLET | Refills: 0 | Status: SHIPPED | OUTPATIENT
Start: 2021-10-26 | End: 2021-11-24

## 2021-10-26 ASSESSMENT — ENCOUNTER SYMPTOMS
DIARRHEA: 0
SHORTNESS OF BREATH: 1
CHEST TIGHTNESS: 0
WHEEZING: 0
ABDOMINAL PAIN: 0
VOMITING: 0
NAUSEA: 0
COUGH: 0

## 2021-10-26 NOTE — ASSESSMENT & PLAN NOTE
Patient counseled on healthy dietary choices and the benefits of a lower salt and/or lower carbohydrate diet as appropriate. Advice was given to make small changes over time, setting smaller achievable goals until recommended lifestyle changes are reached.

## 2021-10-26 NOTE — PROGRESS NOTES
David Alvarenga (:  1955) is a 63 y.o. male,Established patient, here for evaluation of the following chief complaint(s): Follow-Up from Hospital (pt is doing a VV today to follow up on his hospital visit for pneumonia . )         ASSESSMENT/PLAN:  1. Community acquired pneumonia of left lower lobe of lung  Comments:  Clinically resolving over time. Will repeat CXR to reassess. Pulmonology evaluation needed due to recurrent LLL pneumonia. Will help pt schedule visit ASAP  Orders:  -     XR CHEST STANDARD (2 VW); Future  2. Chronic obstructive pulmonary disease with acute lower respiratory infection ([address]  Assessment & Plan:  Patient instructed to finish full course of prednisone taper as prescribed and to continue with home oxygen. I stressed with him the importance of taking baseline controller medication daily. He was instructed to obtain a pulse oximeter at home and check pulse oximeter values should he feel worsening dyspnea. If oximeter values are less than 90% despite use of home oxygen he was instructed to return to the hospital for further evaluation and management. Orders:  -     XR CHEST STANDARD (2 VW); Future  3. Type 2 diabetes mellitus without complication, without long-term current use of insulin ([address]  Assessment & Plan:  Newly diagnosed during recent hospital admission. I reviewed with patient the importance of tight blood glucose control long-term. He was instructed to continue with Metformin as prescribed during his admission. We will discuss further long-term management recommendations at a subsequent follow-up visit in the next month. Pt was referred to diabetic education to help with healthy diet and further education RE: disease management. Orders:  -     Yakelin Solorzano Diabetic Lorraine Chung  4.  Morbid obesity due to excess calories [facility])  Assessment & Plan:  Patient counseled on healthy dietary choices and the benefits of a lower salt and/or lower carbohydrate diet as long-term diabetes management. Patient was found stable for discharge home on 10/20/2021 to complete a course of levofloxacin and prednisone. He was instructed to follow-up with primary care and with pulmonology as an outpatient. Patient reports feeling improved overall since hospital discharge. They report completing full course of antibiotic and continuing with prednisone taper as prescribed. Patient reports using his baseline inhaler medication at home as prescribed. Patient reports dyspnea on exertion which is improved with use of supplemental oxygen at home. Patient denies any fever/chills/sweats, dyspnea at rest, persistent wheezing, chest tightness, chest pain, palpitations, lightheadedness, worsening lower extremity edema, or syncope. Patient admits to continued cigarette smoking since hospital discharge, but reports only smoking a handful of cigarettes daily. Patient denies having any outpatient follow-up visit scheduled with pulmonology. Patient reports receiving glucometer and blood glucose testing supplies from the hospital on discharge. They deny checking blood glucose values at home. Patient states they have been taking Metformin as prescribed since hospital discharge. They deny any polyuria or polydipsia, new onset vision changes, or new onset paresthesias. Review of Systems   Constitutional: Negative for appetite change, chills, diaphoresis and fever. Eyes: Negative for visual disturbance. Respiratory: Positive for shortness of breath (with activity). Negative for cough, chest tightness and wheezing (with activity). Cardiovascular: Negative for chest pain, palpitations and leg swelling. Gastrointestinal: Negative for abdominal pain, diarrhea, nausea and vomiting. Endocrine: Negative for cold intolerance, heat intolerance, polydipsia, polyphagia and polyuria. Genitourinary: Negative for dysuria and hematuria. No flowsheet data found.      Physical Exam        On this date 10/26/2021 I have spent 24 minutes reviewing previous notes, test results and face to face (virtual) with the patient discussing the diagnosis and importance of compliance with the treatment plan as well as documenting on the day of the visit. David Alvarenga, was evaluated through a synchronous (real-time) audio-video encounter. The patient (or guardian if applicable) is aware that this is a billable service. Verbal consent to proceed has been obtained within the past 12 months. The visit was conducted pursuant to the emergency declaration under the [address] [place] [address], [address] [place] [address] authority and the [employer] General Act. Patient identification was verified, and a caregiver was present when appropriate. The patient was located in a state where the provider was credentialed to provide care. An electronic signature was used to authenticate this note.     --Elder Nunez MD

## 2021-10-26 NOTE — ASSESSMENT & PLAN NOTE
Patient instructed to finish full course of prednisone taper as prescribed and to continue with home oxygen. I stressed with him the importance of taking baseline controller medication daily. He was instructed to obtain a pulse oximeter at home and check pulse oximeter values should he feel worsening dyspnea. If oximeter values are less than 90% despite use of home oxygen he was instructed to return to the hospital for further evaluation and management.

## 2021-10-26 NOTE — ASSESSMENT & PLAN NOTE
Newly diagnosed during recent hospital admission. I reviewed with patient the importance of tight blood glucose control long-term. He was instructed to continue with Metformin as prescribed during his admission. We will discuss further long-term management recommendations at a subsequent follow-up visit in the next month. Pt was referred to diabetic education to help with healthy diet and further education RE: disease management.

## 2021-10-29 ENCOUNTER — CARE COORDINATION (OUTPATIENT)
Dept: CARE COORDINATION | Age: 66
End: 2021-10-29

## 2021-10-29 NOTE — CARE COORDINATION
ACM spoke to patient. Introduced care coordination program role of ACM goals and benefits. Patient was previously in [facility] program and was provided COPD education. Patient was recently in the hospital.  Has had 4 ED visits and 4 admissions this year. Patient is on supplemental oxygen at home continues on steroids. Patient states that he sleeps in a recliner. He is unable to lay on the couch or in his bed flat. Patient states he has some financial concerns. ACM referred to  to assist with community resources. Patient reports need for a shower chair and a hospital bed. Patient did agree to participate in [facility] program.  Patient was not able to do initial assessment today as he has an appointment. Patient agreed for ACM to call next week to complete enrollment into the [facility] program.  Patient did state he is still not checking his blood sugars. ACM reviewed importance of monitoring blood sugars. Patient stated he will begin monitoring once a day blood sugar sticks to report to ACM next week. PLAN   DM EDU COPD EDU, SMOKING EDU  CONSIDER [facility] FOR MED MANAGEMENT AND BS MONITORING   DISCUSS PALL CARE  ACP PLANNING  DM EDU CLASS. MEDICATION AND MONITORING COMPLIANCE  SS NEEDS PER PATIENT NEEDS [facility] AND HOSPITAL BED.

## 2021-10-29 NOTE — LETTER
10/29/2021    Na Verduzco  [address]  [address]    Dear Na Brought,    I enjoyed speaking with you and wanted to send some additional information. Wiliam Valladares MD and I will work together to ensure your needs are met and help you achieve your health goals. We are committed to walk with you on this journey and look forward to working with you. Please feel free to contact me with any questions or concerns. I am available by phone or for appointments at the office. You can reach me at [phone].       In good health,     Valentina Baca RN   ENCLOSED  COPD AND DIABETES [facility]

## 2021-10-31 ENCOUNTER — APPOINTMENT (OUTPATIENT)
Dept: GENERAL RADIOLOGY | Age: 66
DRG: 191 | End: 2021-10-31
Payer: MEDICARE

## 2021-10-31 ENCOUNTER — HOSPITAL ENCOUNTER (INPATIENT)
Age: 66
LOS: 1 days | Discharge: HOME | DRG: 191 | End: 2021-11-01
Attending: EMERGENCY MEDICINE
Payer: MEDICARE

## 2021-10-31 DIAGNOSIS — J44.1 COPD EXACERBATION (HCC): Primary | ICD-10-CM

## 2021-10-31 DIAGNOSIS — J18.9 PNEUMONIA OF LEFT LOWER LOBE DUE TO INFECTIOUS ORGANISM: ICD-10-CM

## 2021-10-31 LAB
ALBUMIN SERPL-MCNC: 3.3 G/DL (ref 3.5–4.6)
ALP BLD-CCNC: 85 U/L (ref 35–104)
ALT SERPL-CCNC: 12 U/L (ref 0–41)
ANION GAP SERPL CALCULATED.3IONS-SCNC: 11 MEQ/L (ref 9–15)
AST SERPL-CCNC: 11 U/L (ref 0–40)
BASOPHILS ABSOLUTE: 0 K/UL (ref 0–0.1)
BASOPHILS RELATIVE PERCENT: 0.2 % (ref 0.2–1.2)
BILIRUB SERPL-MCNC: 1 MG/DL (ref 0.2–0.7)
BUN BLDV-MCNC: 9 MG/DL (ref 8–23)
CALCIUM SERPL-MCNC: 9 MG/DL (ref 8.5–9.9)
CHLORIDE BLD-SCNC: 102 MEQ/L (ref 95–107)
CO2: 25 MEQ/L (ref 20–31)
CREAT SERPL-MCNC: 0.87 MG/DL (ref 0.7–1.2)
EOSINOPHILS ABSOLUTE: 0.1 K/UL (ref 0–0.5)
EOSINOPHILS RELATIVE PERCENT: 0.3 % (ref 0.8–7)
GFR AFRICAN AMERICAN: >60
GFR NON-AFRICAN AMERICAN: >60
GLOBULIN: 3.5 G/DL (ref 2.3–3.5)
GLUCOSE BLD-MCNC: 139 MG/DL (ref 70–99)
HCT VFR BLD CALC: 33.5 % (ref 42–52)
HEMOGLOBIN: 10.6 G/DL (ref 13.7–17.5)
IMMATURE GRANULOCYTES #: 0.1 K/UL
IMMATURE GRANULOCYTES %: 0.6 %
LYMPHOCYTES ABSOLUTE: 2.2 K/UL (ref 1.3–3.6)
LYMPHOCYTES RELATIVE PERCENT: 14.8 %
MAGNESIUM: 1.5 MG/DL (ref 1.7–2.4)
MCH RBC QN AUTO: 30.2 PG (ref 25.7–32.2)
MCHC RBC AUTO-ENTMCNC: 31.6 % (ref 32.3–36.5)
MCV RBC AUTO: 95.4 FL (ref 79–92.2)
MONOCYTES ABSOLUTE: 2 K/UL (ref 0.3–0.8)
MONOCYTES RELATIVE PERCENT: 14 % (ref 5.3–12.2)
NEUTROPHILS ABSOLUTE: 10.2 K/UL (ref 1.8–5.4)
NEUTROPHILS RELATIVE PERCENT: 70.1 % (ref 34–67.9)
PDW BLD-RTO: 16.9 % (ref 11.6–14.4)
PLATELET # BLD: 403 K/UL (ref 163–337)
POTASSIUM SERPL-SCNC: 3.7 MEQ/L (ref 3.4–4.9)
PRO-BNP: 74 PG/ML
RBC # BLD: 3.51 M/UL (ref 4.63–6.08)
SARS-COV-2, NAAT: NOT DETECTED
SODIUM BLD-SCNC: 138 MEQ/L (ref 135–144)
TOTAL PROTEIN: 6.8 G/DL (ref 6.3–8)
TROPONIN: <0.01 NG/ML (ref 0–0.01)
WBC # BLD: 14.5 K/UL (ref 4.2–9)

## 2021-10-31 PROCEDURE — 36415 COLL VENOUS BLD VENIPUNCTURE: CPT

## 2021-10-31 PROCEDURE — 93005 ELECTROCARDIOGRAM TRACING: CPT

## 2021-10-31 PROCEDURE — 87635 SARS-COV-2 COVID-19 AMP PRB: CPT

## 2021-10-31 PROCEDURE — 6360000002 HC RX W HCPCS: Performed by: EMERGENCY MEDICINE

## 2021-10-31 PROCEDURE — 85025 COMPLETE CBC W/AUTO DIFF WBC: CPT

## 2021-10-31 PROCEDURE — 96365 THER/PROPH/DIAG IV INF INIT: CPT

## 2021-10-31 PROCEDURE — 2580000003 HC RX 258: Performed by: EMERGENCY MEDICINE

## 2021-10-31 PROCEDURE — 83880 ASSAY OF NATRIURETIC PEPTIDE: CPT

## 2021-10-31 PROCEDURE — 84484 ASSAY OF TROPONIN QUANT: CPT

## 2021-10-31 PROCEDURE — 83735 ASSAY OF MAGNESIUM: CPT

## 2021-10-31 PROCEDURE — 80053 COMPREHEN METABOLIC PANEL: CPT

## 2021-10-31 PROCEDURE — 71045 X-RAY EXAM CHEST 1 VIEW: CPT

## 2021-10-31 RX ORDER — MAGNESIUM SULFATE 1 G/100ML
1000 INJECTION INTRAVENOUS ONCE
Status: COMPLETED | OUTPATIENT
Start: 2021-10-31 | End: 2021-11-01

## 2021-10-31 RX ORDER — 0.9 % SODIUM CHLORIDE 0.9 %
1000 INTRAVENOUS SOLUTION INTRAVENOUS ONCE
Status: COMPLETED | OUTPATIENT
Start: 2021-10-31 | End: 2021-11-01

## 2021-10-31 RX ADMIN — MAGNESIUM SULFATE HEPTAHYDRATE 1000 MG: 1 INJECTION, SOLUTION INTRAVENOUS at 23:48

## 2021-10-31 RX ADMIN — SODIUM CHLORIDE 1000 ML: 9 INJECTION, SOLUTION INTRAVENOUS at 23:47

## 2021-10-31 ASSESSMENT — ENCOUNTER SYMPTOMS
BACK PAIN: 0
CHEST TIGHTNESS: 1
PHOTOPHOBIA: 0
ABDOMINAL PAIN: 0
VOMITING: 0
WHEEZING: 0
SHORTNESS OF BREATH: 1
SINUS PRESSURE: 0
CONSTIPATION: 0
RHINORRHEA: 0
APNEA: 0
COLOR CHANGE: 0
SORE THROAT: 0
NAUSEA: 0
DIARRHEA: 0
COUGH: 1
ABDOMINAL DISTENTION: 0
EYE PAIN: 0

## 2021-11-01 VITALS
SYSTOLIC BLOOD PRESSURE: 125 MMHG | DIASTOLIC BLOOD PRESSURE: 66 MMHG | WEIGHT: 285 LBS | RESPIRATION RATE: 20 BRPM | BODY MASS INDEX: 45.8 KG/M2 | TEMPERATURE: 97.3 F | HEIGHT: 66 IN | HEART RATE: 76 BPM | OXYGEN SATURATION: 95 %

## 2021-11-01 PROBLEM — J44.1 COPD WITH ACUTE EXACERBATION (HCC): Status: ACTIVE | Noted: 2021-11-01

## 2021-11-01 LAB
EKG ATRIAL RATE: 107 BPM
EKG P AXIS: 65 DEGREES
EKG P-R INTERVAL: 134 MS
EKG Q-T INTERVAL: 328 MS
EKG QRS DURATION: 94 MS
EKG QTC CALCULATION (BAZETT): 437 MS
EKG R AXIS: 53 DEGREES
EKG T AXIS: 60 DEGREES
EKG VENTRICULAR RATE: 107 BPM
GLUCOSE BLD-MCNC: 134 MG/DL (ref 60–115)
GLUCOSE BLD-MCNC: 150 MG/DL (ref 60–115)
GLUCOSE BLD-MCNC: 181 MG/DL (ref 60–115)
PERFORMED ON: ABNORMAL

## 2021-11-01 PROCEDURE — 2580000003 HC RX 258: Performed by: INTERNAL MEDICINE

## 2021-11-01 PROCEDURE — 99283 EMERGENCY DEPT VISIT LOW MDM: CPT

## 2021-11-01 PROCEDURE — 1210000000 HC MED SURG R&B

## 2021-11-01 PROCEDURE — 6360000002 HC RX W HCPCS: Performed by: INTERNAL MEDICINE

## 2021-11-01 PROCEDURE — 94640 AIRWAY INHALATION TREATMENT: CPT

## 2021-11-01 PROCEDURE — 6370000000 HC RX 637 (ALT 250 FOR IP): Performed by: INTERNAL MEDICINE

## 2021-11-01 PROCEDURE — 93010 ELECTROCARDIOGRAM REPORT: CPT | Performed by: INTERNAL MEDICINE

## 2021-11-01 PROCEDURE — 93005 ELECTROCARDIOGRAM TRACING: CPT

## 2021-11-01 RX ORDER — IPRATROPIUM BROMIDE AND ALBUTEROL SULFATE 2.5; .5 MG/3ML; MG/3ML
1 SOLUTION RESPIRATORY (INHALATION) 3 TIMES DAILY
Status: DISCONTINUED | OUTPATIENT
Start: 2021-11-01 | End: 2021-11-01 | Stop reason: HOSPADM

## 2021-11-01 RX ORDER — DOXYCYCLINE HYCLATE 100 MG
100 TABLET ORAL 2 TIMES DAILY
Qty: 10 TABLET | Refills: 0 | Status: SHIPPED | OUTPATIENT
Start: 2021-11-01 | End: 2021-11-06

## 2021-11-01 RX ORDER — SODIUM CHLORIDE 0.9 % (FLUSH) 0.9 %
10 SYRINGE (ML) INJECTION EVERY 12 HOURS SCHEDULED
Status: DISCONTINUED | OUTPATIENT
Start: 2021-11-01 | End: 2021-11-01 | Stop reason: HOSPADM

## 2021-11-01 RX ORDER — BENZONATATE 100 MG/1
100 CAPSULE ORAL 2 TIMES DAILY PRN
Qty: 20 CAPSULE | Refills: 0 | Status: SHIPPED | OUTPATIENT
Start: 2021-11-01 | End: 2021-11-08

## 2021-11-01 RX ORDER — GUAIFENESIN 600 MG/1
1200 TABLET, EXTENDED RELEASE ORAL 2 TIMES DAILY
Qty: 40 TABLET | Refills: 0 | Status: SHIPPED | OUTPATIENT
Start: 2021-11-01 | End: 2021-11-05 | Stop reason: SDUPTHER

## 2021-11-01 RX ORDER — POLYETHYLENE GLYCOL 3350 17 G/17G
17 POWDER, FOR SOLUTION ORAL DAILY PRN
Status: DISCONTINUED | OUTPATIENT
Start: 2021-11-01 | End: 2021-11-01 | Stop reason: HOSPADM

## 2021-11-01 RX ORDER — SODIUM CHLORIDE 9 MG/ML
25 INJECTION, SOLUTION INTRAVENOUS PRN
Status: DISCONTINUED | OUTPATIENT
Start: 2021-11-01 | End: 2021-11-01 | Stop reason: HOSPADM

## 2021-11-01 RX ORDER — ESCITALOPRAM OXALATE 10 MG/1
10 TABLET ORAL DAILY
Status: DISCONTINUED | OUTPATIENT
Start: 2021-11-01 | End: 2021-11-01 | Stop reason: HOSPADM

## 2021-11-01 RX ORDER — PANTOPRAZOLE SODIUM 40 MG/1
40 TABLET, DELAYED RELEASE ORAL DAILY
Status: DISCONTINUED | OUTPATIENT
Start: 2021-11-01 | End: 2021-11-01 | Stop reason: HOSPADM

## 2021-11-01 RX ORDER — LEVOFLOXACIN 750 MG/1
750 TABLET ORAL DAILY
Qty: 7 TABLET | Refills: 0 | Status: SHIPPED | OUTPATIENT
Start: 2021-11-01 | End: 2021-11-08

## 2021-11-01 RX ORDER — METHYLPREDNISOLONE SODIUM SUCCINATE 40 MG/ML
40 INJECTION, POWDER, LYOPHILIZED, FOR SOLUTION INTRAMUSCULAR; INTRAVENOUS EVERY 12 HOURS
Status: DISCONTINUED | OUTPATIENT
Start: 2021-11-01 | End: 2021-11-01 | Stop reason: HOSPADM

## 2021-11-01 RX ORDER — PREDNISONE 10 MG/1
60 TABLET ORAL DAILY
Qty: 30 TABLET | Refills: 0 | Status: SHIPPED | OUTPATIENT
Start: 2021-11-01 | End: 2021-11-06

## 2021-11-01 RX ORDER — ACETAMINOPHEN 500 MG
500 TABLET ORAL EVERY 8 HOURS PRN
Status: DISCONTINUED | OUTPATIENT
Start: 2021-11-01 | End: 2021-11-01 | Stop reason: HOSPADM

## 2021-11-01 RX ORDER — SODIUM CHLORIDE 0.9 % (FLUSH) 0.9 %
10 SYRINGE (ML) INJECTION PRN
Status: DISCONTINUED | OUTPATIENT
Start: 2021-11-01 | End: 2021-11-01 | Stop reason: HOSPADM

## 2021-11-01 RX ORDER — ATORVASTATIN CALCIUM 10 MG/1
10 TABLET, FILM COATED ORAL DAILY
Status: DISCONTINUED | OUTPATIENT
Start: 2021-11-01 | End: 2021-11-01 | Stop reason: HOSPADM

## 2021-11-01 RX ORDER — TRAZODONE HYDROCHLORIDE 50 MG/1
50 TABLET ORAL NIGHTLY
Status: DISCONTINUED | OUTPATIENT
Start: 2021-11-01 | End: 2021-11-01 | Stop reason: HOSPADM

## 2021-11-01 RX ORDER — MONTELUKAST SODIUM 10 MG/1
10 TABLET ORAL NIGHTLY
Status: DISCONTINUED | OUTPATIENT
Start: 2021-11-01 | End: 2021-11-01 | Stop reason: HOSPADM

## 2021-11-01 RX ORDER — GUAIFENESIN 600 MG/1
600 TABLET, EXTENDED RELEASE ORAL 2 TIMES DAILY PRN
Status: DISCONTINUED | OUTPATIENT
Start: 2021-11-01 | End: 2021-11-01 | Stop reason: HOSPADM

## 2021-11-01 RX ORDER — MELOXICAM 7.5 MG/1
15 TABLET ORAL DAILY
Status: DISCONTINUED | OUTPATIENT
Start: 2021-11-01 | End: 2021-11-01 | Stop reason: HOSPADM

## 2021-11-01 RX ORDER — CETIRIZINE HYDROCHLORIDE 10 MG/1
10 TABLET ORAL DAILY
Status: DISCONTINUED | OUTPATIENT
Start: 2021-11-01 | End: 2021-11-01 | Stop reason: HOSPADM

## 2021-11-01 RX ORDER — AMLODIPINE BESYLATE 5 MG/1
10 TABLET ORAL DAILY
Status: DISCONTINUED | OUTPATIENT
Start: 2021-11-01 | End: 2021-11-01 | Stop reason: HOSPADM

## 2021-11-01 RX ADMIN — PANTOPRAZOLE SODIUM 40 MG: 40 TABLET, DELAYED RELEASE ORAL at 08:42

## 2021-11-01 RX ADMIN — METHYLPREDNISOLONE SODIUM SUCCINATE 40 MG: 40 INJECTION, POWDER, FOR SOLUTION INTRAMUSCULAR; INTRAVENOUS at 08:41

## 2021-11-01 RX ADMIN — CETIRIZINE HYDROCHLORIDE 10 MG: 10 TABLET, FILM COATED ORAL at 08:42

## 2021-11-01 RX ADMIN — ACETAMINOPHEN 500 MG: 500 TABLET ORAL at 01:49

## 2021-11-01 RX ADMIN — IPRATROPIUM BROMIDE AND ALBUTEROL SULFATE 3 ML: .5; 2.5 SOLUTION RESPIRATORY (INHALATION) at 06:29

## 2021-11-01 RX ADMIN — AMLODIPINE BESYLATE 10 MG: 5 TABLET ORAL at 08:42

## 2021-11-01 RX ADMIN — TRAZODONE HYDROCHLORIDE 50 MG: 50 TABLET ORAL at 01:49

## 2021-11-01 RX ADMIN — IPRATROPIUM BROMIDE AND ALBUTEROL SULFATE 3 ML: .5; 2.5 SOLUTION RESPIRATORY (INHALATION) at 11:28

## 2021-11-01 RX ADMIN — METFORMIN HYDROCHLORIDE 500 MG: 500 TABLET ORAL at 08:42

## 2021-11-01 RX ADMIN — MELOXICAM 15 MG: 7.5 TABLET ORAL at 08:41

## 2021-11-01 RX ADMIN — ENOXAPARIN SODIUM 40 MG: 40 INJECTION SUBCUTANEOUS at 08:41

## 2021-11-01 RX ADMIN — ACETAMINOPHEN 500 MG: 500 TABLET ORAL at 08:46

## 2021-11-01 RX ADMIN — ESCITALOPRAM OXALATE 10 MG: 10 TABLET ORAL at 08:43

## 2021-11-01 RX ADMIN — SODIUM CHLORIDE, PRESERVATIVE FREE 10 ML: 5 INJECTION INTRAVENOUS at 08:42

## 2021-11-01 ASSESSMENT — PAIN DESCRIPTION - PAIN TYPE: TYPE: CHRONIC PAIN

## 2021-11-01 ASSESSMENT — PAIN DESCRIPTION - DESCRIPTORS: DESCRIPTORS: ACHING

## 2021-11-01 ASSESSMENT — PAIN DESCRIPTION - LOCATION: LOCATION: BACK

## 2021-11-01 ASSESSMENT — PAIN - FUNCTIONAL ASSESSMENT: PAIN_FUNCTIONAL_ASSESSMENT: PREVENTS OR INTERFERES SOME ACTIVE ACTIVITIES AND ADLS

## 2021-11-01 ASSESSMENT — PAIN DESCRIPTION - ONSET: ONSET: ON-GOING

## 2021-11-01 ASSESSMENT — PAIN SCALES - GENERAL
PAINLEVEL_OUTOF10: 9
PAINLEVEL_OUTOF10: 0
PAINLEVEL_OUTOF10: 9
PAINLEVEL_OUTOF10: 5
PAINLEVEL_OUTOF10: 8

## 2021-11-01 ASSESSMENT — PAIN DESCRIPTION - FREQUENCY: FREQUENCY: CONTINUOUS

## 2021-11-01 ASSESSMENT — PAIN DESCRIPTION - ORIENTATION: ORIENTATION: LOWER

## 2021-11-01 NOTE — PROGRESS NOTES
Virtual follow up appt with Dr. Ilda Mtz on 11/3 @ 11am   Dr office will send text message with a link to click on for call

## 2021-11-01 NOTE — PROGRESS NOTES
Spiritual Care Services     Summary of Visit:      Spiritual Assessment/Intervention/Outcomes:    Encounter Summary  Services provided to[de-identified] Patient  Referral/Consult From[de-identified] Rounding  Support System: Family members  Continue Visiting: No  Complexity of Encounter: Moderate  Length of Encounter: 15 minutes  Spiritual Assessment Completed: Yes  Routine  Type: Initial     Spiritual/Quaker  Type: Spiritual support  Assessment: Calm, Approachable  Intervention: Prayer  Outcome: Connection/belonging, Expressed gratitude        Primary Decision Maker (Healthcare Proxy)  Patient's Healthcare Decision Maker is[de-identified] Legal Next of Kin           Values / Beliefs  Do you have any ethnic, cultural, sacramental, or spiritual Holiness needs you would like us to be aware of while you are in the hospital?: No    Care Plan:        [id number]   Electronically signed by Cintia Fernandez on 11/1/21 at 9:27 AM EDT     To reach a  for emotional and spiritual support, place an [facility] consult request.   If a  is needed immediately, dial 0 and ask to page the on-call .

## 2021-11-01 NOTE — PROGRESS NOTES
Received report from [facility] in the Emergency Room. Pt brought via wheelchair to room 210. Pt assessment and vitals are as charted. Medications given per orders. Pt on 2L of oxygen via nasal cannula. Pt given new hospital gown, pants, extra pillow, and gingerale per request. Pt states no further needs at this time. Call light and table are within reach.

## 2021-11-01 NOTE — PLAN OF CARE
Problem: Pain:  Goal: Pain level will decrease  Description: Pain level will decrease  11/1/2021 1108 by Tapan Hennessy RN  Outcome: Completed  11/1/2021 0158 by Symone Cartagena RN  Outcome: Ongoing  Goal: Control of acute pain  Description: Control of acute pain  11/1/2021 1108 by Tapan Hennessy RN  Outcome: Completed  11/1/2021 0158 by Symone Cartagena RN  Outcome: Ongoing  Goal: Control of chronic pain  Description: Control of chronic pain  11/1/2021 1108 by Tapan Hennessy RN  Outcome: Completed  11/1/2021 0158 by Symone Cartagena RN  Outcome: Ongoing     Problem: Falls - Risk of:  Goal: Will remain free from falls  Description: Will remain free from falls  11/1/2021 1108 by Tapan Hennessy RN  Outcome: Completed  11/1/2021 0158 by Symone Cartagena RN  Outcome: Met This Shift  Goal: Absence of physical injury  Description: Absence of physical injury  11/1/2021 1108 by Tapan Hennessy RN  Outcome: Completed  11/1/2021 0158 by Symone Cartagena RN  Outcome: Met This Shift

## 2021-11-01 NOTE — PROGRESS NOTES
Weaned from 2L NC to RA and is satting WNL. Patient said he only wears O2 prn at home. Stable and ready for discharge per Dr. Toan Altamirano. No acute issues this shift.

## 2021-11-01 NOTE — H&P
Hospital Medicine History & Physical      PCP: Char Rosado MD    Date of Admission: 10/31/2021    Date of Service: 11/1/21      Chief Complaint:  Dyspnea, SOB      History Of Present Illness:  63 y.o. male who presented to [facility] with above complains. He was DC home 10 days ago after similar admission. He had dyspnea for the past 2-3 days, noted increased in dyspnea/SOB especially with physical activity, denied  fever. Despite respiratory Tx at home, he had no improvement. Still smoking. Since he didn't feel relieve, came to ER for further evaluation.  After initial stabilization he was admitted for further management to the floor. per ambulance report he was found to have saturation 96% on RA at home     Past Medical History:          Diagnosis Date    Alcohol abuse 10/27/2016    Anemia     Asthma     Cocaine abuse ([address] 10/27/2016    COPD (chronic obstructive pulmonary disease) ([address]     Depression 04/27/2020    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    Drug-seeking behavior 04/14/2015    Edema 12/10/2015    Erectile dysfunction     Gastroesophageal reflux disease 12/17/2018    Gout 10/27/2016    History of arthroscopy of both knees 10/27/2016    History of colon polyps 10/26/2021    House dust mite allergy 04/21/2014    Hyperlipidemia     Hypertension 10/27/2016    Injury to heart 10/27/2016    Insomnia 12/17/2018    Loculated pleural effusion     Medical non-compliance 02/22/2014    Morbid obesity due to excess calories ([address] 12/08/2016    Osteoarthritis of both knees 12/08/2016    Personal history of tobacco use     Pleurisy 12/12/2016    Pneumonia 12/04/2016    caused hospital admission    Pre-diabetes 10/27/2016    Seasonal allergies 04/21/2014    Severe persistent asthma 10/27/2016    Severe sleep apnea 04/14/2015    Supraventricular tachycardia ([address] 10/27/2016    Type 2 diabetes mellitus without complication, without long-term current use of insulin ([address] 10/26/2021       Past Surgical History:          Procedure Laterality Date    ANTERIOR CRUCIATE LIGAMENT REPAIR      CARDIAC CATHETERIZATION      COLONOSCOPY N/A 7/15/2020    COLONOSCOPY WITH POLYPECTOMY performed by Rupesh Ba MD at [facility] Left 8/9/2019    LEFT TOTAL KNEE ARTHROPLASTY performed by Fox Lane MD at [facility] N/A 27/91/8885    UMBILICAL HERNIA REPAIR WITH MESH performed by Salina Ocampo MD at [facility]       Medications Prior to Admission:      Prior to Admission medications    Medication Sig Start Date End Date Taking?  Authorizing Provider   predniSONE (DELTASONE) 10 MG tablet Take 6 tablets by mouth daily for 5 doses 11/1/21 11/6/21 Yes Tc Harding MD   levoFLOXacin (LEVAQUIN) 750 MG tablet Take 1 tablet by mouth daily for 7 days 11/1/21 11/8/21 Yes Tc Harding MD   guaiFENesin [facility]) 600 MG extended release tablet Take 2 tablets by mouth 2 times daily for 10 days 11/1/21 11/11/21 Yes Tc Harding MD   benzonatate (TESSALON) 100 MG capsule Take 1 capsule by mouth 2 times daily as needed for Cough 11/1/21 11/8/21 Yes Tc Harding MD   doxycycline hyclate (VIBRA-TABS) 100 MG tablet Take 1 tablet by mouth 2 times daily for 5 days 11/1/21 11/6/21 Yes Marc Patel MD   meloxicam TUAN MAYEN [facility]) 15 MG tablet Take 1 tablet by mouth daily 10/26/21  Yes [place], MD   levoFLOXacin (LEVAQUIN) 500 MG tablet Take 1 tablet by mouth daily 10/20/21  Yes Alexis Pierce MD   guaiFENesin (MUCINEX) 600 MG extended release tablet Take 1 tablet by mouth 2 times daily as needed for Congestion (Cough) 10/20/21  Yes Alexis Pierce MD   albuterol sulfate  (90 Base) MCG/ACT inhaler Inhale 2 puffs into the lungs every 6 hours as needed for Wheezing or Shortness of Breath 10/20/21  Yes Alexis Pierce MD   metFORMIN (GLUCOPHAGE) 500 MG tablet Take 1 tablet by mouth 2 times daily (with meals) 10/20/21  Yes Rebecca Hernandez MD   sildenafil (VIAGRA) 100 MG tablet Take 1 tablet by mouth as needed for Erectile Dysfunction 10/8/21  Yes Mauro Victor MD   sodium chloride (OCEAN, BABY AYR) 0.65 % nasal spray 2 sprays by Nasal route three times daily 9/28/21  Yes Deandre Quinteros MD   cetirizine (ZYRTEC ALLERGY) 10 MG tablet Take 10 mg by mouth daily   Yes Historical Provider, MD [place] 200-62.5-25 MCG/INH AEPB Take 1 Inhaler by mouth daily 8/17/21  Yes Historical Provider, MD   acetaminophen (ACETAMINOPHEN EXTRA STRENGTH) 500 MG tablet Take 1 tablet by mouth every 8 hours as needed for Pain 8/16/21  Yes Mauro Victor MD   amLODIPine (NORVASC) 10 MG tablet Take 1 tablet by mouth daily 4/29/21  Yes Mauro Victor MD   escitalopram (LEXAPRO) 10 MG tablet Take 1 tablet by mouth daily 4/29/21  Yes Mauro Victor MD   fluticasone [facility]) 50 MCG/ACT nasal spray 2 sprays by Nasal route daily 4/29/21  Yes Mauro Victor MD   lovastatin (MEVACOR) 10 MG tablet Take 1 tablet by mouth nightly 4/29/21  Yes Mauro Victor MD   montelukast (SINGULAIR) 10 MG tablet Take 1 tablet by mouth nightly 4/29/21  Yes Mauro Victor MD   pantoprazole (PROTONIX) 40 MG tablet Take 1 tablet by mouth daily 4/29/21  Yes Mauro Victor MD   polyethylene glycol (GLYCOLAX) 17 GM/SCOOP powder Take 17 g by mouth daily  Patient taking differently: Take 17 g by mouth daily as needed  4/29/21  Yes Mauro Victor MD   traZODone (DESYREL) 50 MG tablet Take 1 tablet by mouth nightly 4/29/21  Yes Mauro Victor MD   Lancets MISC 1 each by Does not apply route 3 times daily 10/20/21   Rebecca Hernandez MD   blood glucose monitor strips Test three times a day & as needed for symptoms of irregular blood glucose. Dispense sufficient amount for indicated testing frequency plus additional to accommodate PRN testing needs.  10/20/21   Rebecca Hernandez MD   ipratropium-albuterol (DUONEB) 0.5-2.5 (3) MG/3ML SOLN nebulizer solution Inhale 3 mLs into the lungs every 6 hours as needed for Shortness of Breath 9/19/21 10/19/21  Ros Carroll MD   Hydrocortisone, Perianal, 1 % cream daily as needed  5/28/21   Historical Provider, MD   Handicap [employer] [address] [facility] by Does not apply route DX: OSTEOARTHRITIS OF BOTH KNEES (M17.0), COPD (J44.9)     EXPIRES: 02/2024 2/1/19   Addison Johnson MD       Allergies:  Fish-derived products, Iodine, Seasonal, and Pcn [penicillins]    Social History:      The patient currently lives home    TOBACCO:   reports that he has been smoking cigarettes and cigars. He started smoking about 33 years ago. He has a 7.50 pack-year smoking history. He has never used smokeless tobacco.  ETOH:   reports current alcohol use of about 4.0 standard drinks of alcohol per week. Family History:       Reviewed in detail and negative for DM, CAD, Cancer, CVA. Positive as follows:        Problem Relation Age of Onset    Arthritis Mother     Asthma Mother     High Cholesterol Mother     Other Mother         aneurysm    Diabetes Father     Stroke Maternal Grandmother     Cancer Maternal Grandfather     Hypertension Other     COPD Neg Hx        REVIEW OF SYSTEMS:   Pertinent positives as noted in the HPI. All other systems reviewed and negative. PHYSICAL EXAM:    BP (!) 152/86 Comment: nurse aware  Pulse 65   Temp 97.3 °F (36.3 °C) (Oral)   Resp 20   Ht 5' 6\" (1.676 m)   Wt 285 lb (129.3 kg)   SpO2 95%   BMI 46.00 kg/m²     General appearance:  No apparent distress, appears stated age and cooperative. HEENT:  Normal cephalic, atraumatic without obvious deformity. Pupils equal, round, and reactive to light. Extra ocular muscles intact. Conjunctivae/corneas clear. Neck: Supple, with full range of motion. No jugular venous distention. Trachea midline. Respiratory:  Normal respiratory effort. bilaterally with mild expiratory  Wheezes.   Cardiovascular:  Regular rate and rhythm with normal S1/S2 without murmurs, rubs or gallops. Abdomen: Soft, non-tender, non-distended with normal bowel sounds. Musculoskeletal:  No clubbing, cyanosis or edema bilaterally. Full range of motion without deformity. Skin: Skin color, texture, turgor normal.  No rashes or lesions. Neurologic:  Neurovascularly intact without any focal sensory/motor deficits. Cranial nerves: II-XII intact, grossly non-focal.  Psychiatric:  Alert and oriented, thought content appropriate, normal insight  Capillary Refill: Brisk,< 3 seconds   Peripheral Pulses: +2 palpable, equal bilaterally       Labs:     Recent Labs     10/31/21  2251   WBC 14.5*   HGB 10.6*   HCT 33.5*   *     Recent Labs     10/31/21  2251      K 3.7      CO2 25   BUN 9   CREATININE 0.87   CALCIUM 9.0     Recent Labs     10/31/21  2251   AST 11   ALT 12   BILITOT 1.0*   ALKPHOS 85     No results for input(s): INR in the last 72 hours. Recent Labs     10/31/21  2251   TROPONINI <0.010       Urinalysis:      Lab Results   Component Value Date    NITRU Negative 10/16/2021    WBCUA 0-2 09/16/2021    BACTERIA None 10/05/2017    RBCUA 0-2 09/16/2021    BLOODU Negative 10/16/2021    SPECGRAV 1.010 10/16/2021    GLUCOSEU 500 10/16/2021       Radiology:         XR CHEST PORTABLE    (Results Pending)       ASSESSMENT:    Active Hospital Problems    Diagnosis Date Noted    COPD with acute exacerbation ([facility] [J44.1] 11/01/2021       PLAN:        DVT Prophylaxis:   Diet: ADULT DIET; Regular  Code Status: Full Code    PT/OT Eval Status:     Dispo - Dyspnea, COPD- baseline, no desaturation over night, pt doing well on2 LO2 while using 4L at home.  He has follow up appointment with dr Christine Sanchez x 10 days   HTN- controlled  Obesity with BMI 46%  Smoking  Suspect non compliance with medical Tx at home  Medically stable for Dc with PO atbs/steroids and follow up with pulmonary service as outpatient        Christian Gamino MD    Thank you Jeff Ocasio MD for the opportunity to be involved in this patient's care. If you have any questions or concerns please feel free to contact me.

## 2021-11-03 ENCOUNTER — TELEPHONE (OUTPATIENT)
Dept: FAMILY MEDICINE CLINIC | Age: 66
End: 2021-11-03

## 2021-11-03 ENCOUNTER — VIRTUAL VISIT (OUTPATIENT)
Dept: FAMILY MEDICINE CLINIC | Age: 66
End: 2021-11-03
Payer: MEDICARE

## 2021-11-03 DIAGNOSIS — J18.9 COMMUNITY ACQUIRED PNEUMONIA OF LEFT LOWER LOBE OF LUNG: ICD-10-CM

## 2021-11-03 DIAGNOSIS — J44.0 CHRONIC OBSTRUCTIVE PULMONARY DISEASE WITH ACUTE LOWER RESPIRATORY INFECTION (HCC): Primary | ICD-10-CM

## 2021-11-03 DIAGNOSIS — Z72.0 TOBACCO USE: ICD-10-CM

## 2021-11-03 DIAGNOSIS — E11.9 TYPE 2 DIABETES MELLITUS WITHOUT COMPLICATION, WITHOUT LONG-TERM CURRENT USE OF INSULIN (HCC): ICD-10-CM

## 2021-11-03 PROCEDURE — 99442 PR PHYS/QHP TELEPHONE EVALUATION 11-20 MIN: CPT | Performed by: FAMILY MEDICINE

## 2021-11-03 RX ORDER — FLUTICASONE FUROATE, UMECLIDINIUM BROMIDE AND VILANTEROL TRIFENATATE 200; 62.5; 25 UG/1; UG/1; UG/1
1 POWDER RESPIRATORY (INHALATION) DAILY
Qty: 60 EACH | Refills: 2 | Status: SHIPPED | OUTPATIENT
Start: 2021-11-03 | End: 2022-01-25

## 2021-11-03 ASSESSMENT — ENCOUNTER SYMPTOMS
DIARRHEA: 0
ABDOMINAL PAIN: 0
SINUS PAIN: 0
CONSTIPATION: 0
NAUSEA: 0
SHORTNESS OF BREATH: 1
WHEEZING: 1
COUGH: 1
TROUBLE SWALLOWING: 0
VOMITING: 0
SORE THROAT: 0
CHEST TIGHTNESS: 0

## 2021-11-03 NOTE — ASSESSMENT & PLAN NOTE
Patient with recurrent exacerbations related to recurrent left sided pneumonia/effusion. Patient is established with pulmonology and may require further procedural intervention, but does not have a visit scheduled for another 2 weeks. I will have office staff help to move up visit ASA for further specialist management. Patient was instructed to return to the emergency room for any worsening dyspnea, wheezing, or chest tightness not relieved with use of home inhalers/nebulizer.

## 2021-11-03 NOTE — PROGRESS NOTES
Cornell Grayson (:  1955) is a 63 y.o. male,Established patient, here for evaluation of the following chief complaint(s): Follow-Up from Hospital (pt is just following up from the hospital , he is un aware of why he had an appointment today.)         ASSESSMENT/PLAN:  1. Chronic obstructive pulmonary disease with acute lower respiratory infection ([facility] [address]  Assessment & Plan:  Patient with recurrent exacerbations related to recurrent left sided pneumonia/effusion. Patient is established with pulmonology and may require further procedural intervention, but does not have a visit scheduled for another 2 weeks. I will have office staff help to move up visit ASAP for further specialist management. Patient was instructed to return to the emergency room for any worsening dyspnea, wheezing, or chest tightness not relieved with use of home inhalers/nebulizer. Orders:  -     Alberta Mayor [id number] MCG/INH AEPB; Take 1 Inhaler by mouth daily, Disp-60 each, R-2, DAWNormal  2. Community acquired pneumonia of left lower lobe of lung  Assessment & Plan:  Recurrent in nature. I will have office staff help attempt to move up hospital follow-up visit with pulmonology due to repeated admissions. Patient was instructed to finish full course of antibiotic as prescribed and return to the ER for any fever/chills/sweats, or acutely worsening dyspnea or chest pain. 3. Tobacco use  Assessment & Plan:  Patient denies any cigarette smoking over the past 2 weeks. I congratulated him on his efforts to remain tobacco free and encouraged him to continue abstaining from all tobacco use. 4. Type 2 diabetes mellitus without complication, without long-term current use of insulin ([facility] [address]  Assessment & Plan:  Newly diagnosis since 10/16/2021 hospital admission and likely associated with recurrent/chronic steroid use from known underlying respiratory disease.   I stressed with patient the importance of continuing with Metformin as prescribed. We will follow-up in the office to discuss further long-term diabetes management. Return for Chronic Disease Check in 3-4 Weeks. SUBJECTIVE/OBJECTIVE:  HPI    Patient presents for virtual telephone visit. He was admitted at [facility] on 10/31/2021 due to COPD exacerbation with worsening dyspnea and wheezing not improved with home nebulizers. There was no noted hypoxia. Chest x-ray was documented to show left mid and lower lung pneumonia/atelectasis with left-sided pleural effusion that was improved since prior imaging. Treatment was initiated with oral antibiotics and steroids and patient was found stable for discharge home the following day on 11/01/2021 with a 7-day course of levofloxacin and a 5-day burst of prednisone 60 mg daily to follow-up with primary care and pulmonology as an outpatient. Patient reports feeling improved overall since hospital discharge. He denies any fevers, chills, sweats, dyspnea at rest, worsening cough, worsening wheezing, chest discomfort, palpitations, lightheadedness, dizziness, worsening lower extremity edema, or syncope. Patient reports taking Metformin as previously prescribed and denies any abdominal pain, nausea/vomiting, or diarrhea. He reports having a visit scheduled with pulmonology in 2 weeks. Review of Systems   Constitutional: Positive for fatigue. Negative for appetite change, chills, diaphoresis and fever. HENT: Negative for congestion, ear pain, sinus pain, sore throat and trouble swallowing. Respiratory: Positive for cough, shortness of breath (with activity) and wheezing (intermittent). Negative for chest tightness. Cardiovascular: Negative for chest pain, palpitations and leg swelling. Gastrointestinal: Negative for abdominal pain, constipation, diarrhea, nausea and vomiting. Endocrine: Negative for cold intolerance, heat intolerance, polydipsia, polyphagia and polyuria.    Genitourinary: Negative for dysuria and hematuria. Musculoskeletal: Negative for myalgias. Skin: Negative for rash. Neurological: Negative for dizziness, syncope, weakness, light-headedness and numbness. No flowsheet data found. Physical Exam        On this date 11/3/2021 I have spent 14 minutes reviewing previous notes, test results and face to face (virtual) with the patient discussing the diagnosis and importance of compliance with the treatment plan as well as documenting on the day of the visit. Wil Santiago, was evaluated through a synchronous (real-time) audio-video encounter. The patient (or guardian if applicable) is aware that this is a billable service. Verbal consent to proceed has been obtained within the past 12 months. The visit was conducted pursuant to the emergency declaration under the [address] [place] [address] authority and the [employer] General Act. Patient identification was verified, and a caregiver was present when appropriate. The patient was located in a state where the provider was credentialed to provide care. An electronic signature was used to authenticate this note.     --Mario Ford MD

## 2021-11-03 NOTE — ASSESSMENT & PLAN NOTE
Newly diagnosis since 10/16/2021 hospital admission and likely associated with recurrent/chronic steroid use from known underlying respiratory disease. I stressed with patient the importance of continuing with Metformin as prescribed. We will follow-up in the office to discuss further long-term diabetes management.

## 2021-11-03 NOTE — ASSESSMENT & PLAN NOTE
Patient denies any cigarette smoking over the past 2 weeks. I congratulated him on his efforts to remain tobacco free and encouraged him to continue abstaining from all tobacco use.

## 2021-11-03 NOTE — ASSESSMENT & PLAN NOTE
Recurrent in nature. I will have office staff help attempt to move up hospital follow-up visit with pulmonology due to repeated admissions. Patient was instructed to finish full course of antibiotic as prescribed and return to the ER for any fever/chills/sweats, or acutely worsening dyspnea or chest pain.

## 2021-11-03 NOTE — TELEPHONE ENCOUNTER
[facility] Transitions Initial Follow Up Call    Outreach made within 2 business days of discharge: Yes    Patient: Carol Goodson Patient : 1955   MRN: [id number]  Reason for Admission: There are no discharge diagnoses documented for the most recent discharge. Discharge Date: 21       Spoke with: PRASHANTH    Discharge department/facility: [facility] AT [facility] Interactive Patient Contact:  Was patient able to fill all prescriptions: No: VM  Was patient instructed to bring all medications to the follow-up visit: No: VM  Is patient taking all medications as directed in the discharge summary?  No  Does patient understand their discharge instructions: No: VM  Does patient have questions or concerns that need addressed prior to 7-14 day follow up office visit: no    Scheduled appointment with PCP within 7-14 days    Follow Up  Future Appointments   Date Time Provider Constance Espitia   2021  3:40 PM MD Rom[address]   2022  1:40 PM Nestor Peña MD [address] [place] [address]

## 2021-11-04 ENCOUNTER — CARE COORDINATION (OUTPATIENT)
Dept: CARE COORDINATION | Age: 66
End: 2021-11-04

## 2021-11-04 NOTE — CARE COORDINATION
M called patient. Left message requesting a return call to complete [facility] program enrollment. Patient was recently in the hospital for COPD. [facility] is attempting to reach patient to complete enrollment and discuss healthcare concerns and plan of care. [facility] supplied contact information.

## 2021-11-05 ENCOUNTER — TELEPHONE (OUTPATIENT)
Dept: FAMILY MEDICINE CLINIC | Age: 66
End: 2021-11-05

## 2021-11-05 ENCOUNTER — CARE COORDINATION (OUTPATIENT)
Dept: CARE COORDINATION | Age: 66
End: 2021-11-05

## 2021-11-05 SDOH — ECONOMIC STABILITY: TRANSPORTATION INSECURITY
IN THE PAST 12 MONTHS, HAS LACK OF TRANSPORTATION KEPT YOU FROM MEETINGS, WORK, OR FROM GETTING THINGS NEEDED FOR DAILY LIVING?: YES

## 2021-11-05 SDOH — ECONOMIC STABILITY: TRANSPORTATION INSECURITY
IN THE PAST 12 MONTHS, HAS THE LACK OF TRANSPORTATION KEPT YOU FROM MEDICAL APPOINTMENTS OR FROM GETTING MEDICATIONS?: YES

## 2021-11-05 ASSESSMENT — ENCOUNTER SYMPTOMS: DYSPNEA ASSOCIATED WITH: MINIMAL EXERTION

## 2021-11-05 NOTE — TELEPHONE ENCOUNTER
ACM spoke to patient today. Completed initial assessment for [facility] program.  Patient is in need of a hospital bed. Patient sleeps in a recliner. considering his COPD and other comorbidities patient would benefit from a hospital bed. Patient's height is 6 foot 1 inches and per patient his weight is 290 pounds. Patient has [facility] Medicare plan staff will have to find out where his DME needs should be sent. Patient is also requesting a shower chair.

## 2021-11-05 NOTE — CARE COORDINATION
Ambulatory Care Coordination Note  11/5/2021  CM Risk Score: 6  Charlson 10 Year Mortality Risk Score: 47%     ACC: Olimpia Ramon RN    Summary Note: ACM spoke to patient today. ACM had first contact a few weeks ago. We were to follow-up for enrollment. Patient ended up in an observation status overnight at [facility]. Diagnosis COPD exacerbation. Patient has since returned home had a virtual visit with PCP. Today patient reports feeling a little better. ACM reviewed recent admission and follow-up. Patient is aware he has an appointment with Dr. Bess Done on 11/18/2021. Patient has another follow-up with ECP 11/23/2021. ACM was able to review patient's medication list.  Patient states he is in need of a hospital bed as he currently sleeps in a chair. He reports he is not able to lay in his bed or on couch. He reports it is too difficult to breathe. Patient also states he is in need of a shower chair. ACM sent PCP office request to assist patient in obtaining a hospital bed and shower chair. Patient states he does struggle with transportation. Patient stated that he has a personal friend that has been his payee for several years. This payee is also EC in patient's chart. Patient states that his friend Pipo Key does assist him with his needs. Patient states he has a daughter that lives in [facility] that he does not see very often. Patient reports he does not monitor his blood glucose daily. Patient also states he has not wore his CPAP in several months. Patient does state he wears his oxygen daily and especially at at bedtime. Patient says he stays home at all times. He reports no transportation. Patient reports he gets very short of breath when walking. Patient stated may be down the road he could qualify for a scooter. Patient states in the apartment he lives and he has to climb approximately 15 steps. He is working on getting set up in a lower level.   Patient states his landlord will not put in safety rails in his bathroom. ACM completed initial assessment medication review [facility]. Plan of care  COPD and diabetic education  Advance care planning  Transportation needs  Discuss nutritional review and farm review  Discussed palliative care  Patient declined home health care      Ambulatory Care Coordination Assessment    Care Coordination Protocol  Program Enrollment: Complex Care  Referral from Primary Care Provider: No  Week 1 - Initial Assessment     Do you have all of your prescriptions and are they filled?: Yes  Barriers to medication adherence: None  Are you able to afford your medications?: No  How often do you have trouble taking your medications the way you have been told to take them?: I always take them as prescribed. Do you have Home O2 Therapy?: Yes   Oxygen Regimen: At night/Sleep Flow - Enter rate/FIO2: 3   Method of Delivery: Nasal Cannula, Concentrater   CPAP Use: CPAP      Ability to seek help/take action for Emergent Urgent situations i.e. fire, crime, inclement weather or health crisis. : Independent  Ability to ambulate to restroom: Independent  Ability handle personal hygeine needs (bathing/dressing/grooming): Independent  Ability to manage Medications: Independent  Ability to prepare Food Preparation: Independent  Ability to maintain home (clean home, laundry): Needs Assistance  Ability to drive and/or has transportation: Dependent  Ability to do shopping: Needs Assistance  Ability to manage finances: Needs Assistance  Is patient able to live independently?: Yes     Current Housing: Apartment        Per the Fall Risk Screening, did the patient have 2 or more falls or 1 fall with injury in the past year?: Yes  How often do you think you are about to fall and you do NOT fall?  For example, you grab something to stabilize yourself or hold onto a wall/furniture?: Occasionally  Use of a Mobility Aid: No  Difficulty walking/impaired gait: No  Issues with feet or shoes like numbness, edema, shoes not fitting: No  Changes in vision, poor vision or poor lighting in environment: No  Dizziness: No  Other Fall Risk: Yes  What other fall risks does the patient have?: high risk meds      Frequent urination at night?: Yes  Do you use rails/bars?: No  Do you have a non-slip tub mat?: No     Are you experiencing loss of meaning?: No  Are you experiencing loss of hope and peace?: No     Thinking about your patient's physical health needs, are there any symptoms or problems (risk indicators) you are unsure about that require further investigation?: No identified areas of uncertainly or problems already being investigated   Are the patients physical health problems impacting on their mental well-being?: Mild impact on mental well-being e.g. \"\"feeling fed-up\"\", \"\"reduced enjoyment\"\"   Are there any problems with your patients lifestyle behaviors (alcohol, drugs, diet, exercise) that are impacting on physical or mental well-being?: Some mild concern of potential negative impact on well-being   Do you have any other concerns about your patients mental well-being? How would you rate their severity and impact on the patient?: Mild problems - don't interfere with function   How would you rate their home environment in terms of safety and stability (including domestic violence, insecure housing, neighbor harassment)?: Consistently safe, supportive, stable, no identified problems   How do daily activities impact on the patient's well-being? (include current or anticipated unemployment, work, caregiving, access to transportation or other):  Contributes to low mood or stress at times   How would you rate their social network (family, work, friends)?: Little participation, lonely and socially isolated   How would you rate their financial resources (including ability to afford all required medical care)?: [address] insecure, some resource challenges   How wells does the patient now understand their health and well-being (symptoms, signs or risk factors) and what they need to do to manage their health?: Reasonable to good understanding and already engages in managing health or is willing to undertake better management   How well do you think your patient can engage in healthcare discussions? (Barriers include language, deafness, aphasia, alcohol or drug problems, learning difficulties, concentration): Adequate communication, with or without minor barriers   Do other services need to be involved to help this patient?: Other care/services in place and adequate   Are current services involved with this patient well-coordinated? (Include coordination with other services you are now recommendation): All required care/services in place and well-coordinated   Suggested Interventions and [place]: Declined   Pharmacist: Not Started   Registered Dietician: Not Started   Specialty Service Referral: Not Started   Other Services: Completed   Transportation Services: Completed   Zone Management Tools: Completed                  Prior to Admission medications    Medication Sig Start Date End Date Taking?  Authorizing Provider   sildenafil (VIAGRA) 100 MG tablet Take 1 tablet by mouth as needed for Erectile Dysfunction 11/8/21  Yes Kuldeep Romero MD   Peggye Ashleigh 532-30.8-73 MCG/INH AEPB Take 1 Inhaler by mouth daily 11/3/21  Yes Kuldeep Romero MD   predniSONE (DELTASONE) 10 MG tablet Take 6 tablets by mouth daily for 5 doses 11/1/21 11/6/21 Yes Osmany De Paz MD   levoFLOXacin (LEVAQUIN) 750 MG tablet Take 1 tablet by mouth daily for 7 days 11/1/21 11/8/21 Yes Osmany De Paz MD   benzonatate (TESSALON) 100 MG capsule Take 1 capsule by mouth 2 times daily as needed for Cough 11/1/21 11/8/21 Yes Osmany De Paz MD   doxycycline hyclate (VIBRA-TABS) 100 MG tablet Take 1 tablet by mouth 2 times daily for 5 days 11/1/21 11/6/21 Yes Columba Cooper MD   meloxicam (MOBIC) 15 MG tablet Take 1 tablet by mouth daily 10/26/21  Yes Kuldeep Romero MD   albuterol sulfate  (90 Base) MCG/ACT inhaler Inhale 2 puffs into the lungs every 6 hours as needed for Wheezing or Shortness of Breath 10/20/21  Yes Zahra Paris MD   metFORMIN (GLUCOPHAGE) 500 MG tablet Take 1 tablet by mouth 2 times daily (with meals) 10/20/21  Yes Zahra Paris MD   sodium chloride (OCEAN, BABY AYR) 0.65 % nasal spray 2 sprays by Nasal route three times daily 9/28/21  Yes Blayne Guzman MD   ipratropium-albuterol (DUONEB) 0.5-2.5 (3) MG/3ML SOLN nebulizer solution Inhale 3 mLs into the lungs every 6 hours as needed for Shortness of Breath 9/19/21 11/5/21 Yes Columba Cooper MD   cetirizine (ZYRTEC ALLERGY) 10 MG tablet Take 10 mg by mouth daily   Yes Acosta Payne MD   acetaminophen (ACETAMINOPHEN EXTRA STRENGTH) 500 MG tablet Take 1 tablet by mouth every 8 hours as needed for Pain 8/16/21  Yes Kuldeep Romero MD   amLODIPine (NORVASC) 10 MG tablet Take 1 tablet by mouth daily 4/29/21  Yes Kuldeep Romero MD   escitalopram (LEXAPRO) 10 MG tablet Take 1 tablet by mouth daily 4/29/21  Yes Kuldeep Romero MD   fluticasone [facility]) 50 MCG/ACT nasal spray 2 sprays by Nasal route daily 4/29/21  Yes Kuldeep Romero MD   lovastatin (MEVACOR) 10 MG tablet Take 1 tablet by mouth nightly 4/29/21  Yes Kuldeep Romero MD   montelukast (SINGULAIR) 10 MG tablet Take 1 tablet by mouth nightly 4/29/21  Yes Kuldeep Romero MD   pantoprazole (PROTONIX) 40 MG tablet Take 1 tablet by mouth daily 4/29/21  Yes Kuldeep Romero MD   polyethylene glycol (GLYCOLAX) 17 GM/SCOOP powder Take 17 g by mouth daily  Patient taking differently: Take 17 g by mouth daily as needed  4/29/21  Yes Kuldeep Romero MD   traZODone (DESYREL) 50 MG tablet Take 1 tablet by mouth nightly 4/29/21  Yes Kuldeep Romero MD   guaiFENesin (MUCINEX) 600 MG extended release tablet Take 1 tablet by mouth 2 times daily as needed for Congestion (Cough) 10/20/21   Zahra Paris MD Lancets MISC 1 each by Does not apply route 3 times daily 10/20/21   Mansoor Alegria MD   blood glucose monitor strips Test three times a day & as needed for symptoms of irregular blood glucose. Dispense sufficient amount for indicated testing frequency plus additional to accommodate PRN testing needs.  10/20/21   Mansoor Alegria MD       Future Appointments   Date Time Provider Constance Espitia   11/23/2021  3:40 PM MD Romero [address] 94   1/6/2022  1:40 PM MD Romero [address] 94

## 2021-11-17 ENCOUNTER — HOSPITAL ENCOUNTER (INPATIENT)
Age: 66
LOS: 3 days | Discharge: HOME | DRG: 190 | End: 2021-11-20
Attending: EMERGENCY MEDICINE | Admitting: INTERNAL MEDICINE
Payer: MEDICARE

## 2021-11-17 ENCOUNTER — APPOINTMENT (OUTPATIENT)
Dept: GENERAL RADIOLOGY | Age: 66
DRG: 190 | End: 2021-11-17
Payer: MEDICARE

## 2021-11-17 ENCOUNTER — TELEPHONE (OUTPATIENT)
Dept: OTHER | Facility: CLINIC | Age: 66
End: 2021-11-17

## 2021-11-17 DIAGNOSIS — J44.1 COPD EXACERBATION (HCC): Primary | ICD-10-CM

## 2021-11-17 DIAGNOSIS — J18.9 PNEUMONIA OF RIGHT LOWER LOBE DUE TO INFECTIOUS ORGANISM: ICD-10-CM

## 2021-11-17 LAB
ALBUMIN SERPL-MCNC: 3.1 G/DL (ref 3.5–4.6)
ALP BLD-CCNC: 78 U/L (ref 35–104)
ALT SERPL-CCNC: 9 U/L (ref 0–41)
AMPHETAMINE SCREEN, URINE: ABNORMAL
ANION GAP SERPL CALCULATED.3IONS-SCNC: 12 MEQ/L (ref 9–15)
AST SERPL-CCNC: 10 U/L (ref 0–40)
BANDED NEUTROPHILS RELATIVE PERCENT: 21 %
BARBITURATE SCREEN URINE: ABNORMAL
BASOPHILS ABSOLUTE: 0 K/UL (ref 0–0.1)
BASOPHILS RELATIVE PERCENT: 0.2 % (ref 0.2–1.2)
BENZODIAZEPINE SCREEN, URINE: ABNORMAL
BILIRUB SERPL-MCNC: 0.9 MG/DL (ref 0.2–0.7)
BILIRUBIN URINE: NEGATIVE
BLOOD, URINE: NEGATIVE
BUN BLDV-MCNC: 7 MG/DL (ref 8–23)
CALCIUM SERPL-MCNC: 9.1 MG/DL (ref 8.5–9.9)
CANNABINOID SCREEN URINE: POSITIVE
CHLORIDE BLD-SCNC: 100 MEQ/L (ref 95–107)
CLARITY: CLEAR
CO2: 24 MEQ/L (ref 20–31)
COCAINE METABOLITE SCREEN URINE: POSITIVE
COLOR: YELLOW
CREAT SERPL-MCNC: 0.96 MG/DL (ref 0.7–1.2)
EKG ATRIAL RATE: 101 BPM
EKG P AXIS: 73 DEGREES
EKG P-R INTERVAL: 142 MS
EKG Q-T INTERVAL: 318 MS
EKG QRS DURATION: 92 MS
EKG QTC CALCULATION (BAZETT): 412 MS
EKG R AXIS: 68 DEGREES
EKG T AXIS: 61 DEGREES
EKG VENTRICULAR RATE: 101 BPM
EOSINOPHILS ABSOLUTE: 0 K/UL (ref 0–0.5)
EOSINOPHILS RELATIVE PERCENT: 0.5 % (ref 0.8–7)
GFR AFRICAN AMERICAN: >60
GFR NON-AFRICAN AMERICAN: >60
GLOBULIN: 4.2 G/DL (ref 2.3–3.5)
GLUCOSE BLD-MCNC: 196 MG/DL (ref 60–115)
GLUCOSE BLD-MCNC: 271 MG/DL (ref 60–115)
GLUCOSE BLD-MCNC: 96 MG/DL (ref 70–99)
GLUCOSE URINE: NEGATIVE MG/DL
HCT VFR BLD CALC: 31.3 % (ref 42–52)
HEMOGLOBIN: 10 G/DL (ref 13.7–17.5)
IMMATURE GRANULOCYTES #: 0.1 K/UL
IMMATURE GRANULOCYTES %: 0.8 %
KETONES, URINE: NEGATIVE MG/DL
LACTIC ACID, SEPSIS: 1.3 MMOL/L (ref 0.5–1.9)
LEUKOCYTE ESTERASE, URINE: NEGATIVE
LYMPHOCYTES ABSOLUTE: 2.3 K/UL (ref 1.3–3.6)
LYMPHOCYTES RELATIVE PERCENT: 15 %
Lab: ABNORMAL
MAGNESIUM: 1.8 MG/DL (ref 1.7–2.4)
MCH RBC QN AUTO: 30.2 PG (ref 25.7–32.2)
MCHC RBC AUTO-ENTMCNC: 31.9 % (ref 32.3–36.5)
MCV RBC AUTO: 94.6 FL (ref 79–92.2)
METAMYELOCYTES RELATIVE PERCENT: 1 %
MONOCYTES ABSOLUTE: 2.1 K/UL (ref 0.3–0.8)
MONOCYTES RELATIVE PERCENT: 14 % (ref 5.3–12.2)
MYELOCYTE PERCENT: 2 %
NEUTROPHILS ABSOLUTE: 10.7 K/UL (ref 1.8–5.4)
NEUTROPHILS RELATIVE PERCENT: 47 % (ref 34–67.9)
NITRITE, URINE: NEGATIVE
OPIATE SCREEN URINE: POSITIVE
PDW BLD-RTO: 18.1 % (ref 11.6–14.4)
PERFORMED ON: ABNORMAL
PERFORMED ON: ABNORMAL
PH UA: 5 (ref 5–9)
PHENCYCLIDINE SCREEN URINE: ABNORMAL
PLATELET # BLD: 340 K/UL (ref 163–337)
PLATELET SLIDE REVIEW: ABNORMAL
POTASSIUM SERPL-SCNC: 4 MEQ/L (ref 3.4–4.9)
PRO-BNP: 85 PG/ML
PROTEIN UA: NEGATIVE MG/DL
RBC # BLD: 3.31 M/UL (ref 4.63–6.08)
SARS-COV-2, NAAT: NOT DETECTED
SLIDE REVIEW: ABNORMAL
SODIUM BLD-SCNC: 136 MEQ/L (ref 135–144)
SPECIFIC GRAVITY UA: 1.01 (ref 1–1.03)
STOMATOCYTES: ABNORMAL
TARGET CELLS: ABNORMAL
TOTAL PROTEIN: 7.3 G/DL (ref 6.3–8)
TROPONIN: <0.01 NG/ML (ref 0–0.01)
URINE REFLEX TO CULTURE: NORMAL
UROBILINOGEN, URINE: 0.2 E.U./DL
WBC # BLD: 15.1 K/UL (ref 4.2–9)

## 2021-11-17 PROCEDURE — 99285 EMERGENCY DEPT VISIT HI MDM: CPT

## 2021-11-17 PROCEDURE — 36415 COLL VENOUS BLD VENIPUNCTURE: CPT

## 2021-11-17 PROCEDURE — 84484 ASSAY OF TROPONIN QUANT: CPT

## 2021-11-17 PROCEDURE — 6360000002 HC RX W HCPCS: Performed by: INTERNAL MEDICINE

## 2021-11-17 PROCEDURE — 87635 SARS-COV-2 COVID-19 AMP PRB: CPT

## 2021-11-17 PROCEDURE — 1210000000 HC MED SURG R&B

## 2021-11-17 PROCEDURE — 83880 ASSAY OF NATRIURETIC PEPTIDE: CPT

## 2021-11-17 PROCEDURE — 80053 COMPREHEN METABOLIC PANEL: CPT

## 2021-11-17 PROCEDURE — 6370000000 HC RX 637 (ALT 250 FOR IP): Performed by: EMERGENCY MEDICINE

## 2021-11-17 PROCEDURE — 97530 THERAPEUTIC ACTIVITIES: CPT

## 2021-11-17 PROCEDURE — 83605 ASSAY OF LACTIC ACID: CPT

## 2021-11-17 PROCEDURE — 96375 TX/PRO/DX INJ NEW DRUG ADDON: CPT

## 2021-11-17 PROCEDURE — 85025 COMPLETE CBC W/AUTO DIFF WBC: CPT

## 2021-11-17 PROCEDURE — 6370000000 HC RX 637 (ALT 250 FOR IP): Performed by: INTERNAL MEDICINE

## 2021-11-17 PROCEDURE — 81003 URINALYSIS AUTO W/O SCOPE: CPT

## 2021-11-17 PROCEDURE — 80306 DRUG TEST PRSMV INSTRMNT: CPT

## 2021-11-17 PROCEDURE — 97166 OT EVAL MOD COMPLEX 45 MIN: CPT

## 2021-11-17 PROCEDURE — 94640 AIRWAY INHALATION TREATMENT: CPT

## 2021-11-17 PROCEDURE — 6360000002 HC RX W HCPCS: Performed by: EMERGENCY MEDICINE

## 2021-11-17 PROCEDURE — 83735 ASSAY OF MAGNESIUM: CPT

## 2021-11-17 PROCEDURE — 2580000003 HC RX 258: Performed by: EMERGENCY MEDICINE

## 2021-11-17 PROCEDURE — 93010 ELECTROCARDIOGRAM REPORT: CPT | Performed by: INTERNAL MEDICINE

## 2021-11-17 PROCEDURE — 2580000003 HC RX 258: Performed by: INTERNAL MEDICINE

## 2021-11-17 PROCEDURE — 93005 ELECTROCARDIOGRAM TRACING: CPT

## 2021-11-17 PROCEDURE — 96365 THER/PROPH/DIAG IV INF INIT: CPT

## 2021-11-17 PROCEDURE — 71045 X-RAY EXAM CHEST 1 VIEW: CPT

## 2021-11-17 PROCEDURE — 87040 BLOOD CULTURE FOR BACTERIA: CPT

## 2021-11-17 RX ORDER — TRAZODONE HYDROCHLORIDE 50 MG/1
50 TABLET ORAL NIGHTLY
Status: DISCONTINUED | OUTPATIENT
Start: 2021-11-17 | End: 2021-11-20 | Stop reason: HOSPADM

## 2021-11-17 RX ORDER — IPRATROPIUM BROMIDE AND ALBUTEROL SULFATE 2.5; .5 MG/3ML; MG/3ML
1 SOLUTION RESPIRATORY (INHALATION) ONCE
Status: COMPLETED | OUTPATIENT
Start: 2021-11-17 | End: 2021-11-17

## 2021-11-17 RX ORDER — MORPHINE SULFATE 4 MG/ML
4 INJECTION, SOLUTION INTRAMUSCULAR; INTRAVENOUS ONCE
Status: COMPLETED | OUTPATIENT
Start: 2021-11-17 | End: 2021-11-17

## 2021-11-17 RX ORDER — MELOXICAM 7.5 MG/1
15 TABLET ORAL DAILY
Status: DISCONTINUED | OUTPATIENT
Start: 2021-11-17 | End: 2021-11-20 | Stop reason: HOSPADM

## 2021-11-17 RX ORDER — PANTOPRAZOLE SODIUM 40 MG/1
40 TABLET, DELAYED RELEASE ORAL DAILY
Status: DISCONTINUED | OUTPATIENT
Start: 2021-11-17 | End: 2021-11-20 | Stop reason: HOSPADM

## 2021-11-17 RX ORDER — BUDESONIDE AND FORMOTEROL FUMARATE DIHYDRATE 160; 4.5 UG/1; UG/1
1 AEROSOL RESPIRATORY (INHALATION) DAILY
Status: DISCONTINUED | OUTPATIENT
Start: 2021-11-17 | End: 2021-11-20 | Stop reason: HOSPADM

## 2021-11-17 RX ORDER — ONDANSETRON 2 MG/ML
4 INJECTION INTRAMUSCULAR; INTRAVENOUS ONCE
Status: COMPLETED | OUTPATIENT
Start: 2021-11-17 | End: 2021-11-17

## 2021-11-17 RX ORDER — SODIUM CHLORIDE 0.9 % (FLUSH) 0.9 %
5-40 SYRINGE (ML) INJECTION EVERY 12 HOURS SCHEDULED
Status: DISCONTINUED | OUTPATIENT
Start: 2021-11-17 | End: 2021-11-20 | Stop reason: HOSPADM

## 2021-11-17 RX ORDER — GUAIFENESIN 600 MG/1
600 TABLET, EXTENDED RELEASE ORAL 2 TIMES DAILY
Status: DISCONTINUED | OUTPATIENT
Start: 2021-11-17 | End: 2021-11-20 | Stop reason: HOSPADM

## 2021-11-17 RX ORDER — ESCITALOPRAM OXALATE 10 MG/1
10 TABLET ORAL DAILY
Status: DISCONTINUED | OUTPATIENT
Start: 2021-11-17 | End: 2021-11-20 | Stop reason: HOSPADM

## 2021-11-17 RX ORDER — ACETAMINOPHEN 500 MG
500 TABLET ORAL EVERY 8 HOURS PRN
Status: DISCONTINUED | OUTPATIENT
Start: 2021-11-17 | End: 2021-11-20 | Stop reason: HOSPADM

## 2021-11-17 RX ORDER — METHYLPREDNISOLONE SODIUM SUCCINATE 125 MG/2ML
125 INJECTION, POWDER, LYOPHILIZED, FOR SOLUTION INTRAMUSCULAR; INTRAVENOUS ONCE
Status: COMPLETED | OUTPATIENT
Start: 2021-11-17 | End: 2021-11-17

## 2021-11-17 RX ORDER — SODIUM CHLORIDE 9 MG/ML
25 INJECTION, SOLUTION INTRAVENOUS PRN
Status: DISCONTINUED | OUTPATIENT
Start: 2021-11-17 | End: 2021-11-20 | Stop reason: HOSPADM

## 2021-11-17 RX ORDER — SODIUM CHLORIDE 0.9 % (FLUSH) 0.9 %
5-40 SYRINGE (ML) INJECTION PRN
Status: DISCONTINUED | OUTPATIENT
Start: 2021-11-17 | End: 2021-11-20 | Stop reason: HOSPADM

## 2021-11-17 RX ORDER — FUROSEMIDE 10 MG/ML
20 INJECTION INTRAMUSCULAR; INTRAVENOUS ONCE
Status: COMPLETED | OUTPATIENT
Start: 2021-11-17 | End: 2021-11-17

## 2021-11-17 RX ORDER — METHYLPREDNISOLONE SODIUM SUCCINATE 40 MG/ML
40 INJECTION, POWDER, LYOPHILIZED, FOR SOLUTION INTRAMUSCULAR; INTRAVENOUS EVERY 8 HOURS
Status: DISCONTINUED | OUTPATIENT
Start: 2021-11-17 | End: 2021-11-20 | Stop reason: HOSPADM

## 2021-11-17 RX ORDER — AMLODIPINE BESYLATE 10 MG/1
10 TABLET ORAL DAILY
Status: DISCONTINUED | OUTPATIENT
Start: 2021-11-17 | End: 2021-11-20 | Stop reason: HOSPADM

## 2021-11-17 RX ORDER — MONTELUKAST SODIUM 10 MG/1
10 TABLET ORAL NIGHTLY
Status: DISCONTINUED | OUTPATIENT
Start: 2021-11-17 | End: 2021-11-20 | Stop reason: HOSPADM

## 2021-11-17 RX ORDER — ATORVASTATIN CALCIUM 10 MG/1
10 TABLET, FILM COATED ORAL NIGHTLY
Status: DISCONTINUED | OUTPATIENT
Start: 2021-11-17 | End: 2021-11-20 | Stop reason: HOSPADM

## 2021-11-17 RX ORDER — ONDANSETRON 2 MG/ML
4 INJECTION INTRAMUSCULAR; INTRAVENOUS EVERY 6 HOURS PRN
Status: DISCONTINUED | OUTPATIENT
Start: 2021-11-17 | End: 2021-11-20 | Stop reason: HOSPADM

## 2021-11-17 RX ORDER — ACETAMINOPHEN 500 MG
1000 TABLET ORAL ONCE
Status: COMPLETED | OUTPATIENT
Start: 2021-11-17 | End: 2021-11-17

## 2021-11-17 RX ORDER — ACETAMINOPHEN 650 MG/1
650 SUPPOSITORY RECTAL EVERY 6 HOURS PRN
Status: DISCONTINUED | OUTPATIENT
Start: 2021-11-17 | End: 2021-11-20 | Stop reason: HOSPADM

## 2021-11-17 RX ORDER — IPRATROPIUM BROMIDE AND ALBUTEROL SULFATE 2.5; .5 MG/3ML; MG/3ML
1 SOLUTION RESPIRATORY (INHALATION) 4 TIMES DAILY
Status: DISCONTINUED | OUTPATIENT
Start: 2021-11-17 | End: 2021-11-20 | Stop reason: HOSPADM

## 2021-11-17 RX ORDER — ACETAMINOPHEN 325 MG/1
650 TABLET ORAL EVERY 6 HOURS PRN
Status: DISCONTINUED | OUTPATIENT
Start: 2021-11-17 | End: 2021-11-20 | Stop reason: HOSPADM

## 2021-11-17 RX ORDER — POLYETHYLENE GLYCOL 3350 17 G/17G
17 POWDER, FOR SOLUTION ORAL DAILY PRN
Status: DISCONTINUED | OUTPATIENT
Start: 2021-11-17 | End: 2021-11-20 | Stop reason: HOSPADM

## 2021-11-17 RX ORDER — ONDANSETRON 4 MG/1
4 TABLET, ORALLY DISINTEGRATING ORAL EVERY 8 HOURS PRN
Status: DISCONTINUED | OUTPATIENT
Start: 2021-11-17 | End: 2021-11-20 | Stop reason: HOSPADM

## 2021-11-17 RX ORDER — LEVOFLOXACIN 5 MG/ML
500 INJECTION, SOLUTION INTRAVENOUS EVERY 24 HOURS
Status: DISCONTINUED | OUTPATIENT
Start: 2021-11-18 | End: 2021-11-20 | Stop reason: HOSPADM

## 2021-11-17 RX ORDER — LEVOFLOXACIN 5 MG/ML
750 INJECTION, SOLUTION INTRAVENOUS ONCE
Status: COMPLETED | OUTPATIENT
Start: 2021-11-17 | End: 2021-11-17

## 2021-11-17 RX ORDER — SODIUM CHLORIDE 0.9 % (FLUSH) 0.9 %
3 SYRINGE (ML) INJECTION EVERY 8 HOURS
Status: DISCONTINUED | OUTPATIENT
Start: 2021-11-17 | End: 2021-11-17

## 2021-11-17 RX ORDER — ALBUTEROL SULFATE 90 UG/1
2 AEROSOL, METERED RESPIRATORY (INHALATION) ONCE
Status: DISCONTINUED | OUTPATIENT
Start: 2021-11-17 | End: 2021-11-17

## 2021-11-17 RX ADMIN — TRAZODONE HYDROCHLORIDE 50 MG: 50 TABLET ORAL at 20:54

## 2021-11-17 RX ADMIN — IPRATROPIUM BROMIDE AND ALBUTEROL SULFATE 1 AMPULE: .5; 2.5 SOLUTION RESPIRATORY (INHALATION) at 21:14

## 2021-11-17 RX ADMIN — METHYLPREDNISOLONE SODIUM SUCCINATE 40 MG: 40 INJECTION, POWDER, FOR SOLUTION INTRAMUSCULAR; INTRAVENOUS at 20:54

## 2021-11-17 RX ADMIN — MORPHINE SULFATE 4 MG: 4 INJECTION, SOLUTION INTRAMUSCULAR; INTRAVENOUS at 11:51

## 2021-11-17 RX ADMIN — IPRATROPIUM BROMIDE AND ALBUTEROL SULFATE 1 AMPULE: .5; 2.5 SOLUTION RESPIRATORY (INHALATION) at 17:37

## 2021-11-17 RX ADMIN — SODIUM CHLORIDE, PRESERVATIVE FREE 10 ML: 5 INJECTION INTRAVENOUS at 21:01

## 2021-11-17 RX ADMIN — METFORMIN HYDROCHLORIDE 500 MG: 500 TABLET ORAL at 17:14

## 2021-11-17 RX ADMIN — ESCITALOPRAM OXALATE 10 MG: 10 TABLET ORAL at 23:00

## 2021-11-17 RX ADMIN — FUROSEMIDE 20 MG: 10 INJECTION, SOLUTION INTRAMUSCULAR; INTRAVENOUS at 11:51

## 2021-11-17 RX ADMIN — METHYLPREDNISOLONE SODIUM SUCCINATE 125 MG: 125 INJECTION, POWDER, FOR SOLUTION INTRAMUSCULAR; INTRAVENOUS at 11:51

## 2021-11-17 RX ADMIN — ENOXAPARIN SODIUM 40 MG: 40 INJECTION SUBCUTANEOUS at 17:14

## 2021-11-17 RX ADMIN — MONTELUKAST SODIUM 10 MG: 10 TABLET ORAL at 20:54

## 2021-11-17 RX ADMIN — Medication 3 ML: at 11:52

## 2021-11-17 RX ADMIN — LEVOFLOXACIN 750 MG: 5 INJECTION, SOLUTION INTRAVENOUS at 13:17

## 2021-11-17 RX ADMIN — ACETAMINOPHEN 1000 MG: 500 TABLET ORAL at 12:14

## 2021-11-17 RX ADMIN — ONDANSETRON 4 MG: 2 INJECTION INTRAMUSCULAR; INTRAVENOUS at 11:51

## 2021-11-17 RX ADMIN — ATORVASTATIN CALCIUM 10 MG: 10 TABLET, FILM COATED ORAL at 20:54

## 2021-11-17 RX ADMIN — GUAIFENESIN 600 MG: 600 TABLET, EXTENDED RELEASE ORAL at 20:54

## 2021-11-17 RX ADMIN — IPRATROPIUM BROMIDE AND ALBUTEROL SULFATE 1 AMPULE: .5; 2.5 SOLUTION RESPIRATORY (INHALATION) at 12:52

## 2021-11-17 ASSESSMENT — ENCOUNTER SYMPTOMS
SHORTNESS OF BREATH: 1
CHEST TIGHTNESS: 0
STRIDOR: 0
BLOOD IN STOOL: 0
BACK PAIN: 0
CHOKING: 0
TROUBLE SWALLOWING: 0
SORE THROAT: 0
VOICE CHANGE: 0
DIARRHEA: 0
EYE REDNESS: 0
EYE DISCHARGE: 0
EYE PAIN: 0
SINUS PRESSURE: 0
CONSTIPATION: 0
FACIAL SWELLING: 0
WHEEZING: 1
VOMITING: 0
ABDOMINAL PAIN: 0
COUGH: 0

## 2021-11-17 ASSESSMENT — PAIN DESCRIPTION - PAIN TYPE: TYPE: CHRONIC PAIN

## 2021-11-17 ASSESSMENT — PAIN DESCRIPTION - PROGRESSION: CLINICAL_PROGRESSION: NOT CHANGED

## 2021-11-17 ASSESSMENT — PAIN DESCRIPTION - LOCATION: LOCATION: BACK

## 2021-11-17 ASSESSMENT — PAIN SCALES - GENERAL
PAINLEVEL_OUTOF10: 4
PAINLEVEL_OUTOF10: 0
PAINLEVEL_OUTOF10: 4
PAINLEVEL_OUTOF10: 0

## 2021-11-17 ASSESSMENT — PAIN DESCRIPTION - ONSET: ONSET: ON-GOING

## 2021-11-17 ASSESSMENT — PAIN DESCRIPTION - ORIENTATION: ORIENTATION: LOWER

## 2021-11-17 NOTE — ED PROVIDER NOTES
[facility] ED  eMERGENCY dEPARTMENT eNCOUnter      Pt Name: Annita Anne  MRN: [id number]  [place] 1955  Date of evaluation: 11/17/2021  Provider: Neal Jamison MD    [address] [place] [address]       Chief Complaint   Patient presents with    Shortness of Breath    Cough     productive x1 week green sputum       HISTORY OF PRESENT ILLNESS   (Location/Symptom, Timing/Onset,Context/Setting, Quality, Duration, Modifying Factors, Severity)  Note limiting factors. Annita Anne is a 63 y.o. male who presents to the emergency department patient well-known to us from previous multiple encounters emergency for similar situation this time patient is sick for the last 1 week time with fever chills short of breath coughing productive cough which is yellowish in color thick in nature history of obstructive sleep apnea obesity anemia hyperlipidemia ex alcohol abuse patient used to do cocaine denies any cocaine recently hypertension cardiac arrhythmia COPD home oxygen 3 L at home despite that he short of breath the paramedics were called as is a chronic back pain    HPI    NursingNotes were reviewed. REVIEW OF SYSTEMS    (2-9 systems for level 4, 10 or more for level 5)     Review of Systems   Constitutional: Positive for activity change, appetite change, chills, diaphoresis, fatigue and fever. HENT: Positive for congestion and postnasal drip. Negative for drooling, facial swelling, mouth sores, nosebleeds, sinus pressure, sore throat, trouble swallowing and voice change. Eyes: Negative for pain, discharge, redness and visual disturbance. Respiratory: Positive for shortness of breath and wheezing. Negative for cough, choking, chest tightness and stridor. Cardiovascular: Positive for chest pain. Negative for palpitations and leg swelling. Gastrointestinal: Negative for abdominal pain, blood in stool, constipation, diarrhea and vomiting.    Endocrine: Negative for cold intolerance, polyphagia and polyuria. Genitourinary: Negative for dysuria, flank pain, frequency, genital sores and urgency. Musculoskeletal: Negative for back pain, joint swelling, neck pain and neck stiffness. Skin: Negative for pallor and rash. Neurological: Negative for tremors, seizures, syncope, weakness, numbness and headaches. Hematological: Negative for adenopathy. Does not bruise/bleed easily. Psychiatric/Behavioral: Negative for agitation, behavioral problems, hallucinations and sleep disturbance. The patient is not hyperactive. All other systems reviewed and are negative. Except as noted above the remainder of the review of systems was reviewed and negative.        PAST MEDICAL HISTORY     Past Medical History:   Diagnosis Date    Alcohol abuse 10/27/2016    Anemia     Asthma     Cocaine abuse ([address] 10/27/2016    COPD (chronic obstructive pulmonary disease) ([address]     Depression 04/27/2020    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    Drug-seeking behavior 04/14/2015    Edema 12/10/2015    Erectile dysfunction     Gastroesophageal reflux disease 12/17/2018    Gout 10/27/2016    History of arthroscopy of both knees 10/27/2016    History of colon polyps 10/26/2021    House dust mite allergy 04/21/2014    Hyperlipidemia     Hypertension 10/27/2016    Injury to heart 10/27/2016    Insomnia 12/17/2018    Loculated pleural effusion     Medical non-compliance 02/22/2014    Morbid obesity due to excess calories ([address] 12/08/2016    Osteoarthritis of both knees 12/08/2016    Personal history of tobacco use     Pleurisy 12/12/2016    Pneumonia 12/04/2016    caused hospital admission    Pre-diabetes 10/27/2016    Seasonal allergies 04/21/2014    Severe persistent asthma 10/27/2016    Severe sleep apnea 04/14/2015    Supraventricular tachycardia ([address] 10/27/2016    Type 2 diabetes mellitus without complication, without long-term current use of insulin ([address] 10/26/2021         SURGICALHISTORY       Past Surgical History:   Procedure Laterality Date    ANTERIOR CRUCIATE LIGAMENT REPAIR      CARDIAC CATHETERIZATION      COLONOSCOPY N/A 7/15/2020    COLONOSCOPY WITH POLYPECTOMY performed by Flip Bangura MD at [facility] Left 8/9/2019    LEFT TOTAL KNEE ARTHROPLASTY performed by Milton Cristina MD at [facility] N/A 62/27/5134    UMBILICAL HERNIA REPAIR WITH MESH performed by Cally Nair MD at [address] [place] [address]       Previous Medications    ACETAMINOPHEN (ACETAMINOPHEN EXTRA STRENGTH) 500 MG TABLET    Take 1 tablet by mouth every 8 hours as needed for Pain    ALBUTEROL SULFATE  (90 BASE) MCG/ACT INHALER    Inhale 2 puffs into the lungs every 6 hours as needed for Wheezing or Shortness of Breath    AMLODIPINE (NORVASC) 10 MG TABLET    Take 1 tablet by mouth daily    BLOOD GLUCOSE MONITOR STRIPS    Test three times a day & as needed for symptoms of irregular blood glucose. Dispense sufficient amount for indicated testing frequency plus additional to accommodate PRN testing needs.     CETIRIZINE (ZYRTEC ALLERGY) 10 MG TABLET    Take 10 mg by mouth daily    ESCITALOPRAM (LEXAPRO) 10 MG TABLET    Take 1 tablet by mouth daily    FLUTICASONE (FLONASE) 50 MCG/ACT NASAL SPRAY    2 sprays by Nasal route daily    GUAIFENESIN (MUCINEX) 600 MG EXTENDED RELEASE TABLET    Take 1 tablet by mouth 2 times daily as needed for Congestion (Cough)    IPRATROPIUM-ALBUTEROL (DUONEB) 0.5-2.5 (3) MG/3ML SOLN NEBULIZER SOLUTION    Inhale 3 mLs into the lungs every 6 hours as needed for Shortness of Breath    LANCETS MISC    1 each by Does not apply route 3 times daily    LOVASTATIN (MEVACOR) 10 MG TABLET    Take 1 tablet by mouth nightly    MELOXICAM (MOBIC) 15 MG TABLET    Take 1 tablet by mouth daily    METFORMIN (GLUCOPHAGE) 500 MG TABLET    Take 1 tablet by mouth 2 times daily (with meals)    MONTELUKAST (SINGULAIR) 10 MG TABLET    Take 1 tablet by mouth nightly    PANTOPRAZOLE (PROTONIX) 40 MG TABLET    Take 1 tablet by mouth daily    POLYETHYLENE GLYCOL (GLYCOLAX) 17 GM/SCOOP POWDER    Take 17 g by mouth daily    SILDENAFIL (VIAGRA) 100 MG TABLET    Take 1 tablet by mouth as needed for Erectile Dysfunction    SODIUM CHLORIDE (OCEAN, BABY AYR) 0.65 % NASAL SPRAY    2 sprays by Nasal route three times daily    TRAZODONE (DESYREL) 50 MG TABLET    Take 1 tablet by mouth nightly    TRELEGY ELLIPTA 200-62.5-25 MCG/INH AEPB    Take 1 Inhaler by mouth daily       ALLERGIES     Fish-derived products, Iodine, Seasonal, and Pcn [penicillins]    FAMILY HISTORY       Family History   Problem Relation Age of Onset    Arthritis Mother     Asthma Mother     High Cholesterol Mother     Other Mother         aneurysm    Diabetes Father     Stroke Maternal Grandmother     Cancer Maternal Grandfather     Hypertension Other     COPD Neg Hx           SOCIAL HISTORY       Social History     Socioeconomic History    Marital status: Single     Spouse name: n/a    Number of children: 1    Years of education: 15    Highest education level: None   Occupational History    Occupation: Disability     Employer: NONE   Tobacco Use    Smoking status: Light Tobacco Smoker     Years: 30.00     Types: Cigarettes, Cigars     Start date: 1988     Last attempt to quit: 10/1/2013     Years since quittin.1    Smokeless tobacco: Never Used    Tobacco comment: doesnt buy only smokes if someone gives him one    Vaping Use    Vaping Use: Never used   Substance and Sexual Activity    Alcohol use: Not Currently     Alcohol/week: 4.0 standard drinks     Types: 2 Cans of beer, 2 Shots of liquor per week     Comment: social 1 -2 x week    Drug use: Not Currently     Types: Cocaine, Marijuana (Weed)     Comment: no cocaine x 1 year, marijuana weekly    Sexual activity: Not Currently     Partners: Female   Other Topics Concern    None   Social History Narrative    Lives in an apartment    15 steps to get up to the apartment    Asking for hospital bed and shower chair 11/5/2021 sent request to pcp office     Has 1 daughter who lives in [facility] per patient 2 grandchildren. Per patient does not see his daughter very often. Social Determinants of Health     Financial Resource Strain:     Difficulty of Paying Living Expenses: Not on file   Food Insecurity:     Worried About Running Out of Food in the Last Year: Not on file    Jonnie of Food in the Last Year: Not on file   Transportation Needs: Unmet Transportation Needs    Lack of Transportation (Medical):  Yes    Lack of Transportation (Non-Medical): Yes   Physical Activity:     Days of Exercise per Week: Not on file    Minutes of Exercise per Session: Not on file   Stress:     Feeling of Stress : Not on file   Social Connections:     Frequency of Communication with Friends and Family: Not on file    Frequency of Social Gatherings with Friends and Family: Not on file    Attends Sabianism Services: Not on file    Active Member of [address] [place] [address] or Organizations: Not on file    Attends Club or Organization Meetings: Not on file    Marital Status: Not on file   Intimate Partner Violence:     Fear of Current or Ex-Partner: Not on file    Emotionally Abused: Not on file    Physically Abused: Not on file    Sexually Abused: Not on file   Housing Stability:     Unable to Pay for Housing in the Last Year: Not on file    Number of [place] in the Last Year: Not on file    Unstable Housing in the Last Year: Not on file       SCREENINGS      @FLOW([id number])@      PHYSICAL EXAM    (up to 7 for level 4, 8 or more for level 5)     ED Triage Vitals   BP Temp Temp Source Pulse Resp SpO2 Height Weight   11/17/21 1128 11/17/21 1131 11/17/21 1131 11/17/21 1128 11/17/21 1128 11/17/21 1128 11/17/21 1128 11/17/21 1128   119/62 103.1 °F (39.5 °C) Oral 118 22 96 % 6' (1.829 m) 280 lb (127 kg)       Physical Exam  Vitals and nursing note reviewed. Constitutional:       Appearance: He is obese. Comments: Slightly uncomfortable because of frequent bouts of coughing as well as working hard to breathe looks and sound very congested nasally febrile at this time   HENT:      Head: Normocephalic and atraumatic. Mouth/Throat:      Pharynx: No pharyngeal swelling or oropharyngeal exudate. Eyes:      Extraocular Movements: Extraocular movements intact. Neck:      Thyroid: No thyromegaly. Vascular: No hepatojugular reflux. Trachea: No tracheal deviation. Cardiovascular:      Rate and Rhythm: Normal rate and regular rhythm. No extrasystoles are present. Pulses: No decreased pulses. Heart sounds: No murmur heard. No friction rub. No gallop. Pulmonary:      Effort: Tachypnea, accessory muscle usage and respiratory distress present. Breath sounds: Examination of the right-lower field reveals wheezing and rhonchi. Examination of the left-lower field reveals wheezing and rhonchi. Decreased breath sounds, wheezing and rhonchi present. Chest:      Chest wall: No mass, deformity, tenderness, crepitus or edema. There is no dullness to percussion. Musculoskeletal:      Cervical back: Normal range of motion. Right lower leg: No tenderness. No edema. Left lower leg: No tenderness. No edema. Lymphadenopathy:      Cervical: No cervical adenopathy. Skin:     Capillary Refill: Capillary refill takes less than 2 seconds. Coloration: Skin is not cyanotic or pale. Findings: No ecchymosis, erythema or rash. Nails: There is clubbing. Neurological:      General: No focal deficit present. Mental Status: He is alert.          DIAGNOSTIC RESULTS     EKG: All EKG's are interpreted by the Emergency Department Physician who either signs or Co-signsthis chart in the absence of a cardiologist.        RADIOLOGY:   Non-plain filmimages such as CT, Ultrasound and MRI are read by the radiologist. Dunia Barrow radiographic images are visualized and preliminarily interpreted by the emergency physician with the below findings:        Interpretation per the Radiologist below, if available at the time ofthis note:    XR CHEST PORTABLE    (Results Pending)         ED BEDSIDE ULTRASOUND:   Performed by ED Physician - none    LABS:  Labs Reviewed   COMPREHENSIVE METABOLIC PANEL - Abnormal; Notable for the following components:       Result Value    BUN 7 (*)     Albumin 3.1 (*)     Total Bilirubin 0.9 (*)     Globulin 4.2 (*)     All other components within normal limits   CBC WITH AUTO DIFFERENTIAL - Abnormal; Notable for the following components:    WBC 15.1 (*)     RBC 3.31 (*)     Hemoglobin 10.0 (*)     Hematocrit 31.3 (*)     MCV 94.6 (*)     MCHC 31.9 (*)     RDW 18.1 (*)     Platelets 005 (*)     Monocytes % 14.0 (*)     Eosinophils % 0.5 (*)     Neutrophils Absolute 10.7 (*)     Monocytes Absolute 2.1 (*)     All other components within normal limits   CULTURE, BLOOD 1   CULTURE, BLOOD 2   COVID-19, RAPID   MAGNESIUM   TROPONIN   BRAIN NATRIURETIC PEPTIDE   URINE RT REFLEX TO CULTURE   URINE DRUG SCREEN, COMPREHENSIVE       All other labs were within normal range or not returned as of this dictation.     EMERGENCY DEPARTMENT COURSE and DIFFERENTIAL DIAGNOSIS/MDM:   Vitals:    Vitals:    11/17/21 1128 11/17/21 1131 11/17/21 1200 11/17/21 1230   BP: 119/62      Pulse: 118      Resp: 22      Temp:  103.1 °F (39.5 °C)     TempSrc:  Oral     SpO2: 96%  100% 96%   Weight: 280 lb (127 kg)      Height: 6' (1.829 m)          MDM  Number of Diagnoses or Management Options  COPD exacerbation (HCC)  Pneumonia of right lower lobe due to infectious organism: established and improving  Diagnosis management comments: Of asthma/COPD short of breath the last 1 week time productive cough febrile illness here for further evaluation multiple visits to this emergency for similar situation patient EKG performed sinus tachycardia rate of 101/min IL interval 142 ms Q RS duration is 92 ms patient QT interval is 31 8 ms had no ischemia no ST elevation a PVC on EKG Case discussed with hospitalist patient to be admitted to the hospital, as per patient he has appointment with a pulmonary doctor today but he could not make it       Amount and/or Complexity of Data Reviewed  Clinical lab tests: ordered and reviewed  Tests in the radiology section of CPT®: ordered and reviewed        CRITICAL CARE TIME   Total Critical Care time was  minutes, excluding separately reportableprocedures. There was a high probability of clinicallysignificant/life threatening deterioration in the patient's condition which required my urgent intervention. CONSULTS:  None    PROCEDURES:  Unless otherwise noted below, none     Procedures    FINAL IMPRESSION      1. COPD exacerbation ([address]    2. Pneumonia of right lower lobe due to infectious organism          DISPOSITION/PLAN   DISPOSITION        PATIENT REFERRED TO:  No follow-up provider specified.     DISCHARGE MEDICATIONS:  New Prescriptions    No medications on file          (Please note that portions of this note were completed with a voice recognition program.  Efforts were made to edit the dictations but occasionally words are mis-transcribed.)    Livia Whiteside MD (electronically signed)  Attending Emergency Physician       Livia Whiteside MD  11/17/21 1468

## 2021-11-17 NOTE — PROGRESS NOTES
PHARMACY NOTE:    Trelegy Ellipta inhaler (non-formulary) changed to Symbicort 160/4.5 inhaler + Spiriva Respimat inhaler per therapeutic interchange.     Parish Zepeda, PharmD   11/17/2021 3:26 PM

## 2021-11-17 NOTE — TELEPHONE ENCOUNTER
Writer contacted ED provider Dr Sherryle Solomons to inform of 30 day readmission risk via text.      Attending:   Susan Corbin    Call Back: If you need to call back to inform of disposition you can contact me at [phone]

## 2021-11-17 NOTE — PROGRESS NOTES
Occupational Therapy   Occupational Therapy Initial Assessment- Med  Date: 2021   Patient Name: Miriam Bernal  MRN: [id number]     : 1955    Date of Service: 2021    Discharge Recommendations:   (pt would benefit from OP PT skilled services)  OT Equipment Recommendations  Equipment Needed: Yes  Other: shower chair    Assessment   Performance deficits / Impairments: Decreased ADL status; Decreased functional mobility ; Decreased strength; Decreased endurance; Decreased high-level IADLs  Assessment: Pt is a 64y/o male PLOF lives home alone, was I/MI with ADL, whom [facility] 2* COPD exacerbation, pneumonia of R lower lobe d/t infectious organism. Pt with multiple recent hospitalizations. Pt demo's the above resulting perf def/impair and would benefit from OT skilled services to maximize strength/end and safety/I with ADL for safe d/c transition. Treatment Diagnosis: COPD exacerbation, pneumonia of R lower lobe d/t infectious organism  Prognosis: Good  History: multi comorb  Exam: 5 perf def/impair  OT Education: OT Role; Plan of Care  REQUIRES OT FOLLOW UP: Yes  Safety Devices  Safety Devices in place: Yes  Type of devices: All fall risk precautions in place           Patient Diagnosis(es): The primary encounter diagnosis was COPD exacerbation ([facility] A diagnosis of Pneumonia of right lower lobe due to infectious organism was also pertinent to this visit.      has a past medical history of Alcohol abuse, Anemia, Asthma, Cocaine abuse ([address] COPD (chronic obstructive pulmonary disease) ([address] Depression, Disorder of pharynx, Drug-seeking behavior, Edema, Erectile dysfunction, Gastroesophageal reflux disease, Gout, History of arthroscopy of both knees, History of colon polyps, House dust mite allergy, Hyperlipidemia, Hypertension, Injury to heart, Insomnia, Loculated pleural effusion, Medical non-compliance, Morbid obesity due to excess calories ([address] Osteoarthritis of both knees, Personal 24/7 at home. Working on trying to get a hospital bed- per pt has a  assisting)  Armando Purdy Help From:  ( through the hospital)  ADL Assistance: Independent  Homemaking Assistance: Independent  Homemaking Responsibilities: Yes (does all homemaking tasks)  Ambulation Assistance: Independent (w/o AD)  Transfer Assistance: Independent  Active : No  Occupation: Retired  Type of occupation:  maintenance  Leisure & Hobbies: Guitar, TV  Additional Comments: Reports inc'd difficulty performing home taks over the last few days       Objective   Vision: Impaired  Vision Exceptions: Wears glasses for reading  Hearing: Within functional limits       Observation/Palpation  Observation: 2L O2 via NC, IV RUE  Functional Mobility  Functional - Mobility Device: No device  Activity: To/from bathroom  Assist Level: Supervision  Functional Mobility Comments: Good balance  ADL  Feeding: Independent  Grooming: Independent  UE Bathing: Modified independent   LE Bathing: Supervision  UE Dressing: Modified independent   LE Dressing: Supervision  Toileting: Supervision  Tone RUE  RUE Tone: Normotonic  Tone LUE  LUE Tone: Normotonic  Coordination  Movements Are Fluid And Coordinated: Yes        Transfers  Sit to stand: Independent  Stand to sit:  Independent              Sensation  Overall Sensation Status: WFL        LUE AROM (degrees)  LUE AROM : WFL  Left Hand AROM (degrees)  Left Hand AROM: WFL  RUE AROM (degrees)  RUE AROM : WFL  Right Hand AROM (degrees)  Right Hand AROM: WFL  LUE Strength  Gross LUE Strength: Exceptions to [facility]  L Shoulder Flex: 3+/5  RUE Strength  Gross RUE Strength: Exceptions to [facility]  R Shoulder Flex: 4-/5                   Plan   Plan  Times per week: 3-6x/wk  Times per day: Daily  Plan weeks: <1- med pt  Current Treatment Recommendations: Strengthening, ROM, Balance Training, Functional Mobility Training, Endurance Training, Stair training, Pain Management, Safety Education & Training, Patient/Caregiver Education & Training, Self-Care / ADL, Home Management Training          Goals  Short term goals  Time Frame for Short term goals: 3-5 days- med pt  Short term goal 1: Tolerate 25-30min BUE ther ex/act, with O2 sats 90% or above, to inc strength/end for ADL  Short term goal 2: MI LB dressing and bathing  Short term goal 3: Inc to 5-7min stand dajuan/end, with O2 sats 90% and above, for inc'd safety and I with ADL  Short term goal 4: MI light homemaking tasks  Long term goals  Time Frame for Long term goals : Same as STGs  Long term goal 1: Same as STGs  Long term goal 2: Same as STGs  Patient Goals   Patient goals :  To be able to do more       Therapy Time   Individual Concurrent Group Co-treatment   Time In  3:05         Time Out  4:00         Minutes  [address] [place] [address]

## 2021-11-17 NOTE — ED TRIAGE NOTES
Patient brought to ER by [facility] for complaints of increasing SOB and productive cough with green sputum x1 week. He is febrile 103.1 upon arrival to ER.

## 2021-11-18 ENCOUNTER — CARE COORDINATION (OUTPATIENT)
Dept: CARE COORDINATION | Age: 66
End: 2021-11-18

## 2021-11-18 LAB
ANION GAP SERPL CALCULATED.3IONS-SCNC: 13 MEQ/L (ref 9–15)
BUN BLDV-MCNC: 13 MG/DL (ref 8–23)
CALCIUM SERPL-MCNC: 9.6 MG/DL (ref 8.5–9.9)
CHLORIDE BLD-SCNC: 102 MEQ/L (ref 95–107)
CO2: 23 MEQ/L (ref 20–31)
CREAT SERPL-MCNC: 0.79 MG/DL (ref 0.7–1.2)
GFR AFRICAN AMERICAN: >60
GFR NON-AFRICAN AMERICAN: >60
GLUCOSE BLD-MCNC: 145 MG/DL (ref 60–115)
GLUCOSE BLD-MCNC: 171 MG/DL (ref 70–99)
GLUCOSE BLD-MCNC: 182 MG/DL (ref 60–115)
GLUCOSE BLD-MCNC: 186 MG/DL (ref 60–115)
GLUCOSE BLD-MCNC: 208 MG/DL (ref 60–115)
HCT VFR BLD CALC: 31.2 % (ref 42–52)
HEMOGLOBIN: 10.1 G/DL (ref 13.7–17.5)
MCH RBC QN AUTO: 30.8 PG (ref 25.7–32.2)
MCHC RBC AUTO-ENTMCNC: 32.4 % (ref 32.3–36.5)
MCV RBC AUTO: 95.1 FL (ref 79–92.2)
PDW BLD-RTO: 17.4 % (ref 11.6–14.4)
PERFORMED ON: ABNORMAL
PLATELET # BLD: 335 K/UL (ref 163–337)
POTASSIUM REFLEX MAGNESIUM: 4.1 MEQ/L (ref 3.4–4.9)
RBC # BLD: 3.28 M/UL (ref 4.63–6.08)
SODIUM BLD-SCNC: 138 MEQ/L (ref 135–144)
WBC # BLD: 10.4 K/UL (ref 4.2–9)

## 2021-11-18 PROCEDURE — 6370000000 HC RX 637 (ALT 250 FOR IP): Performed by: INTERNAL MEDICINE

## 2021-11-18 PROCEDURE — 36415 COLL VENOUS BLD VENIPUNCTURE: CPT

## 2021-11-18 PROCEDURE — 6360000002 HC RX W HCPCS: Performed by: INTERNAL MEDICINE

## 2021-11-18 PROCEDURE — 85027 COMPLETE CBC AUTOMATED: CPT

## 2021-11-18 PROCEDURE — 97162 PT EVAL MOD COMPLEX 30 MIN: CPT

## 2021-11-18 PROCEDURE — 94640 AIRWAY INHALATION TREATMENT: CPT

## 2021-11-18 PROCEDURE — 80048 BASIC METABOLIC PNL TOTAL CA: CPT

## 2021-11-18 PROCEDURE — 2580000003 HC RX 258: Performed by: INTERNAL MEDICINE

## 2021-11-18 PROCEDURE — 97535 SELF CARE MNGMENT TRAINING: CPT

## 2021-11-18 PROCEDURE — 97530 THERAPEUTIC ACTIVITIES: CPT

## 2021-11-18 PROCEDURE — 1210000000 HC MED SURG R&B

## 2021-11-18 RX ADMIN — GUAIFENESIN 600 MG: 600 TABLET, EXTENDED RELEASE ORAL at 08:39

## 2021-11-18 RX ADMIN — PANTOPRAZOLE SODIUM 40 MG: 40 TABLET, DELAYED RELEASE ORAL at 05:00

## 2021-11-18 RX ADMIN — METHYLPREDNISOLONE SODIUM SUCCINATE 40 MG: 40 INJECTION, POWDER, FOR SOLUTION INTRAMUSCULAR; INTRAVENOUS at 20:36

## 2021-11-18 RX ADMIN — TRAZODONE HYDROCHLORIDE 50 MG: 50 TABLET ORAL at 23:50

## 2021-11-18 RX ADMIN — METHYLPREDNISOLONE SODIUM SUCCINATE 40 MG: 40 INJECTION, POWDER, FOR SOLUTION INTRAMUSCULAR; INTRAVENOUS at 04:58

## 2021-11-18 RX ADMIN — IPRATROPIUM BROMIDE AND ALBUTEROL SULFATE 1 AMPULE: .5; 2.5 SOLUTION RESPIRATORY (INHALATION) at 09:58

## 2021-11-18 RX ADMIN — METFORMIN HYDROCHLORIDE 500 MG: 500 TABLET ORAL at 17:09

## 2021-11-18 RX ADMIN — BUDESONIDE AND FORMOTEROL FUMARATE DIHYDRATE 1 PUFF: 160; 4.5 AEROSOL RESPIRATORY (INHALATION) at 18:06

## 2021-11-18 RX ADMIN — LEVOFLOXACIN 500 MG: 5 INJECTION, SOLUTION INTRAVENOUS at 00:17

## 2021-11-18 RX ADMIN — LEVOFLOXACIN 500 MG: 5 INJECTION, SOLUTION INTRAVENOUS at 23:51

## 2021-11-18 RX ADMIN — IPRATROPIUM BROMIDE AND ALBUTEROL SULFATE 1 AMPULE: .5; 2.5 SOLUTION RESPIRATORY (INHALATION) at 06:14

## 2021-11-18 RX ADMIN — ESCITALOPRAM OXALATE 10 MG: 10 TABLET ORAL at 23:50

## 2021-11-18 RX ADMIN — ACETAMINOPHEN 500 MG: 500 TABLET ORAL at 20:35

## 2021-11-18 RX ADMIN — AMLODIPINE BESYLATE 10 MG: 10 TABLET ORAL at 08:39

## 2021-11-18 RX ADMIN — BUDESONIDE AND FORMOTEROL FUMARATE DIHYDRATE 1 PUFF: 160; 4.5 AEROSOL RESPIRATORY (INHALATION) at 06:14

## 2021-11-18 RX ADMIN — TIOTROPIUM BROMIDE INHALATION SPRAY 1 PUFF: 3.12 SPRAY, METERED RESPIRATORY (INHALATION) at 06:14

## 2021-11-18 RX ADMIN — METFORMIN HYDROCHLORIDE 500 MG: 500 TABLET ORAL at 08:39

## 2021-11-18 RX ADMIN — METHYLPREDNISOLONE SODIUM SUCCINATE 40 MG: 40 INJECTION, POWDER, FOR SOLUTION INTRAMUSCULAR; INTRAVENOUS at 12:22

## 2021-11-18 RX ADMIN — ENOXAPARIN SODIUM 40 MG: 40 INJECTION SUBCUTANEOUS at 08:39

## 2021-11-18 RX ADMIN — ATORVASTATIN CALCIUM 10 MG: 10 TABLET, FILM COATED ORAL at 20:30

## 2021-11-18 RX ADMIN — ACETAMINOPHEN 500 MG: 500 TABLET ORAL at 07:04

## 2021-11-18 RX ADMIN — SODIUM CHLORIDE, PRESERVATIVE FREE 10 ML: 5 INJECTION INTRAVENOUS at 08:39

## 2021-11-18 RX ADMIN — GUAIFENESIN 600 MG: 600 TABLET, EXTENDED RELEASE ORAL at 20:30

## 2021-11-18 RX ADMIN — IPRATROPIUM BROMIDE AND ALBUTEROL SULFATE 1 AMPULE: .5; 2.5 SOLUTION RESPIRATORY (INHALATION) at 13:49

## 2021-11-18 RX ADMIN — MELOXICAM 15 MG: 7.5 TABLET ORAL at 08:39

## 2021-11-18 RX ADMIN — IPRATROPIUM BROMIDE AND ALBUTEROL SULFATE 1 AMPULE: .5; 2.5 SOLUTION RESPIRATORY (INHALATION) at 18:06

## 2021-11-18 RX ADMIN — MONTELUKAST SODIUM 10 MG: 10 TABLET ORAL at 20:30

## 2021-11-18 RX ADMIN — SODIUM CHLORIDE, PRESERVATIVE FREE 10 ML: 5 INJECTION INTRAVENOUS at 20:39

## 2021-11-18 ASSESSMENT — PAIN SCALES - GENERAL
PAINLEVEL_OUTOF10: 0
PAINLEVEL_OUTOF10: 10
PAINLEVEL_OUTOF10: 8
PAINLEVEL_OUTOF10: 8
PAINLEVEL_OUTOF10: 10
PAINLEVEL_OUTOF10: 0
PAINLEVEL_OUTOF10: 8

## 2021-11-18 ASSESSMENT — PAIN DESCRIPTION - FREQUENCY
FREQUENCY: CONTINUOUS
FREQUENCY: CONTINUOUS

## 2021-11-18 ASSESSMENT — PAIN DESCRIPTION - ONSET: ONSET: AWAKENED FROM SLEEP

## 2021-11-18 ASSESSMENT — PAIN DESCRIPTION - PAIN TYPE
TYPE: CHRONIC PAIN

## 2021-11-18 ASSESSMENT — PAIN DESCRIPTION - DESCRIPTORS: DESCRIPTORS: ACHING;PRESSURE;SHARP

## 2021-11-18 ASSESSMENT — PAIN DESCRIPTION - LOCATION
LOCATION: BACK

## 2021-11-18 ASSESSMENT — PAIN DESCRIPTION - ORIENTATION
ORIENTATION: RIGHT;LEFT;MID
ORIENTATION: LOWER
ORIENTATION: RIGHT;LEFT;MID

## 2021-11-18 NOTE — PROGRESS NOTES
Spoke with Ravindra Oconnor, Care Coordinator, please read her note 11/5/2021. Request we sent up Home hospital bed and shower chair for pt. Danie Horner in PT notified. PT and OT evals recently taking place. Dr Dorie Gamez, PCP office was suppose to order this equipment but does not seem to have been ordered.

## 2021-11-18 NOTE — PROGRESS NOTES
Physical Therapy    Medical Initial Assessment    NAME: Dieudonne Deluna  : 1955  MRN: [id number]    Date of Service: 2021    Patient Diagnosis(es): The primary encounter diagnosis was COPD exacerbation ([facility] [address] A diagnosis of Pneumonia of right lower lobe due to infectious organism was also pertinent to this visit. has a past medical history of Alcohol abuse, Anemia, Asthma, Cocaine abuse ([address] COPD (chronic obstructive pulmonary disease) ([address] Depression, Disorder of pharynx, Drug-seeking behavior, Edema, Erectile dysfunction, Gastroesophageal reflux disease, Gout, History of arthroscopy of both knees, History of colon polyps, House dust mite allergy, Hyperlipidemia, Hypertension, Injury to heart, Insomnia, Loculated pleural effusion, Medical non-compliance, Morbid obesity due to excess calories ([address] Osteoarthritis of both knees, Personal history of tobacco use, Pleurisy, Pneumonia, Pre-diabetes, Seasonal allergies, Severe persistent asthma, Severe sleep apnea, Supraventricular tachycardia ([address] and Type 2 diabetes mellitus without complication, without long-term current use of insulin ([address] has a past surgical history that includes Anterior cruciate ligament repair; Cardiac catheterization; Total knee arthroplasty (Left, 2019); Colonoscopy (N/A, 7/15/2020); Umbilical hernia repair (N/A, 2020); and hernia repair. Restrictions  Position Activity Restriction  Other position/activity restrictions: home O2 at 4L per pt report, currently on 2L of O2 via NC. Due to breathing deficits, pt unable to lay flat in bed. Pt must be at 45 degree angle or more to breathe when sleeping. Pt currently sleeping in recliner chair as cannot lay flat in his bed.  Pt would benefit from semielectric hosoitpal bed for consitent safe breathing while sleeping at apporx 45 deg elevation dure to respiratory diagnosisi- COPD, hisotry of peumonia, etc.  Vision/Hearing        Subjective  General  Chart Reviewed: Yes  Patient assessed for rehabilitation services?: Yes  Additional Pertinent Hx: Pt currently needing to sleep in recliner due to breathing deficits unable to breathe if flat in bed. Family / Caregiver Present: No  Referring Practitioner: Dr Houston West  Referral Date : 11/17/21  Diagnosis: COPD exacerbation, weakness  Subjective  Subjective: I just really need the hospital bed to sleep- I cna;t sleep in my flat bed with my breathing adn sleeping in the recliner chair is really hard on my chroinic low back pain. \"  Pain Screening  Patient Currently in Pain: Yes  Pain Assessment  Pain Assessment: 0-10  Pain Level: 10  Patient's Stated Pain Goal: 3  Pain Type: Chronic pain  Pain Location: Back  Pain Orientation: Right; Left; Mid  Pain Descriptors: Aching; Pressure;  Sharp  Pain Frequency: Continuous  Pain Onset: Awakened from sleep  Vital Signs  Patient Currently in Pain: Yes  Patient Observation  Observations: 2L of O2 via NC  Pre Treatment Pain Screening  Pain at present: 8  Scale Used: Numeric Score  Intervention List: Patient able to continue with treatment    Orientation       Social/Functional History  Social/Functional History  Lives With: Alone  Type of Home: Apartment  Home Layout: One level  Home Access: Stairs to enter with rails  Entrance Stairs - Number of Steps: 15 (short of breath and occ rest- usually needs nebulizer up)  Entrance Stairs - Rails: Left (ascending )  Bathroom Shower/Tub: Tub/Shower unit  Bathroom Equipment: Hand-held shower  Bathroom Accessibility: Walker accessible  Home Equipment: Cane, Oxygen (4L of O2 at home)  Receives Help From:  ( through the hospital)  ADL Assistance: [address] [place] [address] Avenue: Independent  Homemaking Responsibilities: Yes (does all homemaking tasks)  Ambulation Assistance: Independent (cane PRN)  Transfer Assistance: Independent  Active : No  Occupation: Retired  Type of occupation:  maintenance  Leisure & Hobbies: Guitar, TV  Additional Comments: Reports inc'd difficulty performing home taks over the last few days  Objective          AROM RLE (degrees)  RLE AROM: WFL  AROM LLE (degrees)  LLE AROM : WFL  Strength RLE  Comment: seated HIP 4+/5, knee 4+/5, anle5/5  Strength LLE  Comment: seated hip 4+ top5/5, knee 4+ to 5/5, ankle 5/5  Coordination  Movements Are Fluid And Coordinated: Yes  Sensation  Overall Sensation Status: WFL     Transfers  Sit to Stand: Modified independent  Stand to sit: Modified independent  Comment: cleared modified indpendent in room , to call for assist PRN  Ambulation  Ambulation?: Yes  Ambulation 1  Surface: level tile  Device: No Device  Assistance: Supervision; Modified Independent  Quality of Gait: wide ABIGAIL, dec step length bilaterally, steady, no LOB, manages O2 safely  Distance: 40 ft x 2   Comments: 2L of O2 via NC with pusle ox post 98%  RPE rate of perceived exertion 5/10 post walks with >= 5 minutes to recover and increased coughing post walks; pt cleared modified indpendent in room with call for assist PRN, CS longer distances  Stairs/Curb  Stairs?: No (to test when feeling stronger)     Balance  Sitting - Static: Good  Sitting - Dynamic: Good  Standing - Static: Good  Standing - Dynamic: Good  Exercises  Comments: pursed lip breathing x10 , recommended hourly or with commercials to improve breathing and endurance     Assessment   Body structures, Functions, Activity limitations: Decreased functional mobility ; Decreased endurance; Increased pain; Decreased posture  Assessment: 65yom with chroininc respiratory issues, here with COPD exacerbationi. Pt appropriate for PT follow to increase funcitonal moblity endurance with gati and stairs.  (indep room and santiago with O2 and no AD , PRN A)  Treatment Diagnosis: decrased functional mobility endurance. REQUIRES PT FOLLOW UP: Yes  Activity Tolerance  Activity Tolerance: Patient limited by pain; Patient limited by endurance;  Patient limited by fatigue Discharge Recommendations:         Plan   Plan  Times per week: 5-7 x week  Times per day:  (1-2x per day)  Plan weeks:  (<1 medical pt)  Current Treatment Recommendations: Strengthening, Functional Mobility Training, Endurance Training, Gait Training, Stair training, Positioning    G-Code          Goals  Short term goals  Time Frame for Short term goals: 3-5 days medical patient  Short term goal 1: Pt ambulating >= 150ft with least AD modified indpendent and rate of perceived exertion(RPE) post at <= 3/5   Short term goal 2: 10 stairs one ralil with RPE post <= 4/10 per pt report  Short term goal 3: Pt able to demsntrate more upright posture and 10 good pursed lip breathing - to perfrom multiple times per day as HEP  Short term goal 4: Assist with DME and discharge planning as able- info fro PCP to order hosptial bed and shower chair in therapy notes. Long term goals  Time Frame for Long term goals : same as [facility] medical pt  Patient Goals   Patient goals : \"I need a hospital bed so I can sleep better, breathe better and not keep needing to come back to the hospital because of my breathing. \"       Therapy Time   Individual Concurrent Group Co-treatment   Time In           Time Out           Minutes                   Delgado Gtz PT       Physical Therapy    Medical Initial Assessment    NAME: Demarcus Diez  : 1955  MRN: [id number]    Date of Service: 2021    Patient Diagnosis(es): The primary encounter diagnosis was COPD exacerbation ([facility] [address] A diagnosis of Pneumonia of right lower lobe due to infectious organism was also pertinent to this visit.      has a past medical history of Alcohol abuse, Anemia, Asthma, Cocaine abuse ([address] COPD (chronic obstructive pulmonary disease) ([address] Depression, Disorder of pharynx, Drug-seeking behavior, Edema, Erectile dysfunction, Gastroesophageal reflux disease, Gout, History of arthroscopy of both knees, History of colon polyps, House dust mite allergy, pain  Pain Location: Back  Pain Orientation: Right; Left; Mid  Pain Descriptors: Aching; Pressure;  Sharp  Pain Frequency: Continuous  Pain Onset: Awakened from sleep  Vital Signs  Patient Currently in Pain: Yes  Patient Observation  Observations: 2L of O2 via NC  Pre Treatment Pain Screening  Pain at present: 8  Scale Used: Numeric Score  Intervention List: Patient able to continue with treatment    Orientation       Social/Functional History  Social/Functional History  Lives With: Alone  Type of Home: Apartment  Home Layout: One level  Home Access: Stairs to enter with rails  Entrance Stairs - Number of Steps: 15 (short of breath and occ rest- usually needs nebulizer up)  Entrance Stairs - Rails: Left (ascending )  Bathroom Shower/Tub: Tub/Shower unit  Bathroom Equipment: Hand-held shower  Bathroom Accessibility: Walker accessible  Home Equipment: Cane, Oxygen (4L of O2 at home)  Receives Help From:  ( through the hospital)  ADL Assistance: [address] [place] [address] Avenue: Independent  Homemaking Responsibilities: Yes (does all homemaking tasks)  Ambulation Assistance: Independent (cane PRN)  Transfer Assistance: Independent  Active : No  Occupation: Retired  Type of occupation:  maintenance  Leisure & Hobbies: [employer], [employer]  Additional Comments: Reports inc'd difficulty performing home taks over the last few days  Objective          AROM RLE (degrees)  RLE AROM: WFL  AROM LLE (degrees)  LLE AROM : WFL  Strength RLE  Comment: seated HIP 4+/5, knee 4+/5, anle5/5  Strength LLE  Comment: seated hip 4+ top5/5, knee 4+ to 5/5, ankle 5/5  Coordination  Movements Are Fluid And Coordinated: Yes  Sensation  Overall Sensation Status: WFL     Transfers  Sit to Stand: Modified independent  Stand to sit: Modified independent  Comment: cleared modified indpendent in room , to call for assist PRN  Ambulation  Ambulation?: Yes  Ambulation 1  Surface: level tile  Device: No Device  Assistance: Supervision; Modified Independent  Quality of Gait: wide ABIGAIL, dec step length bilaterally, steady, no LOB, manages O2 safely  Distance: 40 ft x 2   Comments: 2L of O2 via NC with pusle ox post 98%  RPE rate of perceived exertion 5/10 post walks with >= 5 minutes to recover and increased coughing post walks; pt cleared modified indpendent in room with call for assist PRN, CS longer distances  Stairs/Curb  Stairs?: No (to test when feeling stronger)     Balance  Sitting - Static: Good  Sitting - Dynamic: Good  Standing - Static: Good  Standing - Dynamic: Good  Exercises  Comments: pursed lip breathing x10 , recommended hourly or with commercials to improve breathing and endurance     Assessment   Body structures, Functions, Activity limitations: Decreased functional mobility ; Decreased endurance; Increased pain; Decreased posture  Assessment: 65yom with chroininc respiratory issues, here with COPD exacerbationi. Pt appropriate for PT follow to increase funcitonal moblity endurance with gati and stairs.  (indep room and santiago with O2 and no AD , PRN A)  Treatment Diagnosis: decrased functional mobility endurance. REQUIRES PT FOLLOW UP: Yes  Activity Tolerance  Activity Tolerance: Patient limited by pain; Patient limited by endurance;  Patient limited by fatigue     Discharge Recommendations:         Plan   Plan  Times per week: 5-7 x week  Times per day:  (1-2x per day)  Plan weeks:  (<1 medical pt)  Current Treatment Recommendations: Strengthening, Functional Mobility Training, Endurance Training, Gait Training, Stair training, Positioning    G-Code          Goals  Short term goals  Time Frame for Short term goals: 3-5 days medical patient  Short term goal 1: Pt ambulating >= 150ft with least AD modified indpendent and rate of perceived exertion(RPE) post at <= 3/5   Short term goal 2: 10 stairs one ralil with RPE post <= 4/10 per pt report  Short term goal 3: Pt able to demsntrate more upright posture and 10 good pursed lip breathing - to perfrom multiple times per day as HEP  Short term goal 4: Assist with DME and discharge planning as able- info fro PCP to order hosptial bed and shower chair in therapy notes. Long term goals  Time Frame for Long term goals : same as [facility] medical pt  Patient Goals   Patient goals : \"I need a hospital bed so I can sleep better, breathe better and not keep needing to come back to the hospital because of my breathing. \"       Therapy Time   Individual Concurrent Group Co-treatment   Time In  1731         Time Out  1100         Minutes  [id number], [id number]

## 2021-11-18 NOTE — H&P
Hospital Medicine History & Physical      PCP: Dharmesh Santillan MD    Date of Admission: 11/17/2021    Date of Service: 11/18/21      Chief Complaint:  Dyspnea, cough, fever, sputum production       History Of Present Illness:  63 y.o. male who presented to [facility] with above complains for the past 5-6 days. He was DC with PO atbs/steroids on November 1st and after completing his Tx above complains became more severe. He suppose to see his pulmonary specialist yesterday- dr Ashwin Raza, but since he spikes fever, came to ER for further evaluation. After initial stabilization  he was admitted for further treatment. Denied CP, dizziness.      Past Medical History:          Diagnosis Date    Alcohol abuse 10/27/2016    Anemia     Asthma     Cocaine abuse ([address] 10/27/2016    COPD (chronic obstructive pulmonary disease) ([address]     Depression 04/27/2020    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    Drug-seeking behavior 04/14/2015    Edema 12/10/2015    Erectile dysfunction     Gastroesophageal reflux disease 12/17/2018    Gout 10/27/2016    History of arthroscopy of both knees 10/27/2016    History of colon polyps 10/26/2021    House dust mite allergy 04/21/2014    Hyperlipidemia     Hypertension 10/27/2016    Injury to heart 10/27/2016    Insomnia 12/17/2018    Loculated pleural effusion     Medical non-compliance 02/22/2014    Morbid obesity due to excess calories ([address] 12/08/2016    Osteoarthritis of both knees 12/08/2016    Personal history of tobacco use     Pleurisy 12/12/2016    Pneumonia 12/04/2016    caused hospital admission    Pre-diabetes 10/27/2016    Seasonal allergies 04/21/2014    Severe persistent asthma 10/27/2016    Severe sleep apnea 04/14/2015    Supraventricular tachycardia ([address] 10/27/2016    Type 2 diabetes mellitus without complication, without long-term current use of insulin ([address] 10/26/2021       Past Surgical History:          Procedure Laterality Date    ANTERIOR CRUCIATE LIGAMENT REPAIR      CARDIAC CATHETERIZATION      COLONOSCOPY N/A 7/15/2020    COLONOSCOPY WITH POLYPECTOMY performed by Leandro Mathur MD at [facility] Left 8/9/2019    LEFT TOTAL KNEE ARTHROPLASTY performed by Honey Espinal MD at [facility] N/A 36/75/6206    UMBILICAL HERNIA REPAIR WITH MESH performed by Zeke Hendricks MD at [facility]       Medications Prior to Admission:      Prior to Admission medications    Medication Sig Start Date End Date Taking?  Authorizing Provider   montelukast (SINGULAIR) 10 MG tablet Take 1 tablet by mouth nightly 11/15/21  Yes [place], MD   traZODone (DESYREL) 50 MG tablet Take 1 tablet by mouth nightly 11/15/21  Yes [place], MD   sildenafil (VIAGRA) 100 MG tablet Take 1 tablet by mouth as needed for Erectile Dysfunction 11/8/21  Yes Christiano Alexander MD   Pebbles Bears 192-23.6-13 MCG/INH AEPB Take 1 Inhaler by mouth daily 11/3/21  Yes [place], MD   meloxicam (MOBIC) 15 MG tablet Take 1 tablet by mouth daily 10/26/21  Yes [place], MD   guaiFENesin (MUCINEX) 600 MG extended release tablet Take 1 tablet by mouth 2 times daily as needed for Congestion (Cough) 10/20/21  Yes Jonathan Siemens, MD   albuterol sulfate  (90 Base) MCG/ACT inhaler Inhale 2 puffs into the lungs every 6 hours as needed for Wheezing or Shortness of Breath 10/20/21  Yes Jonathan Siemens, MD   metFORMIN (GLUCOPHAGE) 500 MG tablet Take 1 tablet by mouth 2 times daily (with meals) 10/20/21  Yes Jonathan Siemens, MD   sodium chloride (OCEAN, BABY AYR) 0.65 % nasal spray 2 sprays by Nasal route three times daily 9/28/21  Yes Senthil Simons MD   cetirizine (ZYRTEC ALLERGY) 10 MG tablet Take 10 mg by mouth daily   Yes Historical Provider, MD   acetaminophen (ACETAMINOPHEN EXTRA STRENGTH) 500 MG tablet Take 1 tablet by mouth every 8 hours as needed for Pain 8/16/21  Yes [place], MD   amLODIPine (NORVASC) 10 MG tablet Take 1 tablet by mouth daily 4/29/21  Yes Anna Smith MD   escitalopram (LEXAPRO) 10 MG tablet Take 1 tablet by mouth daily 4/29/21  Yes Anna Smith MD   fluticasone [facility]) 50 MCG/ACT nasal spray 2 sprays by Nasal route daily 4/29/21  Yes Anna Smith MD   lovastatin (MEVACOR) 10 MG tablet Take 1 tablet by mouth nightly 4/29/21  Yes Anna Smith MD   pantoprazole (PROTONIX) 40 MG tablet Take 1 tablet by mouth daily 4/29/21  Yes Anna Smith MD   polyethylene glycol (GLYCOLAX) 17 GM/SCOOP powder Take 17 g by mouth daily  Patient taking differently: Take 17 g by mouth daily as needed  4/29/21  Yes Anna Smith MD   Lancets MISC 1 each by Does not apply route 3 times daily 10/20/21   Pinky Alvares MD   blood glucose monitor strips Test three times a day & as needed for symptoms of irregular blood glucose. Dispense sufficient amount for indicated testing frequency plus additional to accommodate PRN testing needs. 10/20/21   Pinky Alvares MD   ipratropium-albuterol (DUONEB) 0.5-2.5 (3) MG/3ML SOLN nebulizer solution Inhale 3 mLs into the lungs every 6 hours as needed for Shortness of Breath 9/19/21 11/5/21  Christian Gamino MD       Allergies:  Fish-derived products, Iodine, Seasonal, and Pcn [penicillins]    Social History:      The patient currently lives home    TOBACCO:   reports that he has been smoking cigarettes and cigars. He started smoking about 33 years ago. He has smoked for the past 30.00 years. He has never used smokeless tobacco.  ETOH:   reports previous alcohol use of about 4.0 standard drinks of alcohol per week. Family History:       Reviewed in detail and negative for DM, CAD, Cancer, CVA.  Positive as follows:        Problem Relation Age of Onset    Arthritis Mother     Asthma Mother     High Cholesterol Mother     Other Mother         aneurysm    Diabetes Father     Stroke Maternal Grandmother     Cancer Maternal Grandfather     Hypertension Other     COPD Neg Hx        REVIEW OF SYSTEMS:   Pertinent positives as noted in the HPI. All other systems reviewed and negative. PHYSICAL EXAM:    /70   Pulse 63   Temp 97.5 °F (36.4 °C) (Oral)   Resp 20   Ht 6' (1.829 m)   Wt 280 lb (127 kg)   SpO2 99%   BMI 37.97 kg/m²     General appearance:  No apparent distress, appears stated age and cooperative. HEENT:  Normal cephalic, atraumatic without obvious deformity. Pupils equal, round, and reactive to light. Extra ocular muscles intact. Conjunctivae/corneas clear. Neck: Supple, with full range of motion. No jugular venous distention. Trachea midline. Respiratory:  Normal respiratory effort. bilaterally with Wheezes/Rhonchi. Cardiovascular:  Regular rate and rhythm with normal S1/S2 without murmurs, rubs or gallops. Abdomen: Soft, non-tender, non-distended with normal bowel sounds. Musculoskeletal:  No clubbing, cyanosis or edema bilaterally. Full range of motion without deformity. Skin: Skin color, texture, turgor normal.  No rashes or lesions. Neurologic:  Neurovascularly intact without any focal sensory/motor deficits. Cranial nerves: II-XII intact, grossly non-focal.  Psychiatric:  Alert and oriented, thought content appropriate, normal insight  Capillary Refill: Brisk,< 3 seconds   Peripheral Pulses: +2 palpable, equal bilaterally       Labs:     Recent Labs     11/17/21  1203 11/18/21  0644   WBC 15.1* 10.4*   HGB 10.0* 10.1*   HCT 31.3* 31.2*   * 335     Recent Labs     11/17/21  1203 11/18/21  0644    138   K 4.0 4.1    102   CO2 24 23   BUN 7* 13   CREATININE 0.96 0.79   CALCIUM 9.1 9.6     Recent Labs     11/17/21  1203   AST 10   ALT 9   BILITOT 0.9*   ALKPHOS 78     No results for input(s): INR in the last 72 hours.   Recent Labs     11/17/21  1203   TROPONINI <0.010       Urinalysis:      Lab Results   Component Value Date    NITRU Negative 11/17/2021    WBCUA 0-2 09/16/2021    BACTERIA None 10/05/2017    RBCUA 0-2 09/16/2021    BLOODU Negative 11/17/2021    SPECGRAV 1.015 11/17/2021    GLUCOSEU Negative 11/17/2021           XR CHEST PORTABLE   Final Result      No significant interval change from prior. Left lung opacity and pleural effusion similar to 10/31/2021. ASSESSMENT:    Active Hospital Problems    Diagnosis Date Noted    COPD exacerbation ([facility] [address] [J44.1] 11/17/2021       PLAN:        DVT Prophylaxis: lovenox  Diet: ADULT DIET; Regular  Code Status: Full Code    PT/OT Eval Status: done    Dispo -  Dyspnea, COPD- treatement with iv atbs/solumedrol initiated  CAP/fever- as above    HTN- controlled  Obesity with BMI 46%  Smoking, polysubstance use- advice to stop   Suspect non compliance with medical Tx at home  Medically stable for acute admission at [facility] and follow up with pulmonary service as outpatient        Gisele Hager MD    Thank you Santi Breen MD for the opportunity to be involved in this patient's care. If you have any questions or concerns please feel free to contact me.

## 2021-11-18 NOTE — CARE COORDINATION
[facility] was set to call patient today for [facility] out reach. AC notes patient is currently inpatient at [facility]. ACM called [facility] and spoke with supervisor Yakelin Fernandez. AC notes patient may still have need for shower chair and hospital bed. [facility] requested possible assistance during this admission to obtain hospital bed and shower chair for patient when he goes home. AC will continue to monitor once dispose known.

## 2021-11-18 NOTE — PROGRESS NOTES
Occupational Therapy  Facility/Department: [facility] MED SURG UNIT  Daily Treatment Note  NAME: Zeeshan Strange  : 1955  MRN: [id number]    Date of Service: 2021    Discharge Recommendations:   (Pt would benefit from OP PT services)       Assessment   Performance deficits / Impairments: Decreased ADL status; Decreased functional mobility ; Decreased strength; Decreased endurance; Decreased high-level IADLs  Assessment: Pt had just finished a breathing tx with RT. Pt stated his back was at an 8/10 pain. Pt used shower chair, HH shower head, and grab bars to perform shower at MI level. Pt's O2 sats were 95% post, HR was elevated at 113 BPM and pt stated \"I feel exhausted\" afterward. Pt's back pain significantly inc's with standing longer periods and pt also quickly fatigues d/t respiratory status. Therefore, pt needs a shower chair in order to perform bathing safely and MI as noted during this tx. W/o a shower chair, pt is at inc'd fall risk and may not be able to tolerate showers safely anymore. Pt would also benefit from a hospital bed as well d/t severe chronic back pain and chronic respiratory issues. Pt has had multiple recent hospitalizations alone d/t respiratory complications. An adjustable hospital bed will allow pt a more upright position for breathing which, in turn, will also help in preventing pneumonia and future freq hospitalizations. Pt is aware of these medical equipment needs and is in agreement. Treatment Diagnosis: COPD exacerbation, pneumonia of R lower lobe d/t infectious organism  Prognosis: Good  REQUIRES OT FOLLOW UP: Yes         Patient Diagnosis(es): The primary encounter diagnosis was COPD exacerbation ([address] A diagnosis of Pneumonia of right lower lobe due to infectious organism was also pertinent to this visit.       has a past medical history of Alcohol abuse, Anemia, Asthma, Cocaine abuse ([address] COPD (chronic obstructive pulmonary disease) ([address] Depression, Disorder of pharynx, Drug-seeking behavior, Edema, Erectile dysfunction, Gastroesophageal reflux disease, Gout, History of arthroscopy of both knees, History of colon polyps, House dust mite allergy, Hyperlipidemia, Hypertension, Injury to heart, Insomnia, Loculated pleural effusion, Medical non-compliance, Morbid obesity due to excess calories ([address] Osteoarthritis of both knees, Personal history of tobacco use, Pleurisy, Pneumonia, Pre-diabetes, Seasonal allergies, Severe persistent asthma, Severe sleep apnea, Supraventricular tachycardia ([address] and Type 2 diabetes mellitus without complication, without long-term current use of insulin ([address] has a past surgical history that includes Anterior cruciate ligament repair; Cardiac catheterization; Total knee arthroplasty (Left, 8/9/2019); Colonoscopy (N/A, 7/15/2020); Umbilical hernia repair (N/A, 11/30/2020); and hernia repair. Restrictions  Position Activity Restriction  Other position/activity restrictions: home O2 at 4L per pt report, currently on 2L of O2 via NC. Due to breathing deficits, pt unable to lay flat in bed. Pt must be at 45 degree angle or more to breathe when sleeping. Pt currently sleeping in recliner chair as cannot lay flat in his bed.  Pt would benefit from semielectric hosoitpal bed for consitent safe breathing while sleeping at apporx 45 deg elevation due to respiratory diagnosis- COPD, hx of pneumonia, etc.     Subjective   General  Chart Reviewed: Yes  Patient assessed for rehabilitation services?: Yes  Family / Caregiver Present: No  Referring Practitioner: Dr. Lucius Boeck  Diagnosis: COPD exacerbation, pneumonia of R lower lobe d/t infectious organism  Subjective  Subjective: Pt just finished a breathing tx with RT, was agreeable to a shower  Pain Assessment  Pain Assessment: 0-10  Pain Level: 8  Pain Type: Chronic pain  Pain Location: Back  Pain Orientation: Right; Left; Mid  Pain Frequency: Continuous  Vital Signs  Level of Consciousness: Alert (0)  Patient Currently in Pain: Yes  Patient Observation  Observations: 2L of O2 via NC      Objective    ADL  Grooming: Independent  UE Bathing: Modified independent   LE Bathing: Modified independent   UE Dressing: Modified independent   LE Dressing: Modified independent   Toileting: Modified independent   Additional Comments: pt performed bathing seated on shower chair, use of HH shower head, GBs for safety and endurance purposes        Functional Mobility  Functional - Mobility Device: No device  Activity: To/from bathroom  Assist Level: Modified independent   Functional Mobility Comments: Good balance  Shower Transfers  Shower - Transfer Type: To and From  Shower - Transfer To: Shower seat with back  Shower - Technique: Ambulating (small step in/out of shower stall holding GBs)  Shower Transfers: Modified independence  Bed mobility  Supine to Sit: Modified independent (from raised hospital bed)  Transfers  Sit to stand: Independent  Stand to sit: Independent           Plan   Plan  Times per week: 3-6x/wk  Times per day: Daily  Plan weeks: <1- med pt  Current Treatment Recommendations: Strengthening, ROM, Balance Training, Functional Mobility Training, Endurance Training, Stair training, Pain Management, Safety Education & Training, Patient/Caregiver Education & Training, Self-Care / ADL, Home Management Training          Goals  Short term goals  Time Frame for Short term goals: 3-5 days- med pt  Short term goal 1: Tolerate 25-30min BUE ther ex/act, with O2 sats 90% or above, to inc strength/end for ADL  Short term goal 2: MI LB dressing and bathing  Short term goal 3: Inc to 5-7min stand dajuan/end, with O2 sats 90% and above, for inc'd safety and I with ADL  Short term goal 4: MI light homemaking tasks  Long term goals  Time Frame for Long term goals : Same as STGs  Long term goal 1: Same as STGs  Long term goal 2: Same as STGs  Patient Goals   Patient goals :  To be able to do more       Therapy Time   Individual Concurrent Group Co-treatment   Time In  1:50         Time Out  2:40         Minutes  Sania Ragland

## 2021-11-19 LAB
GLUCOSE BLD-MCNC: 127 MG/DL (ref 60–115)
GLUCOSE BLD-MCNC: 150 MG/DL (ref 60–115)
GLUCOSE BLD-MCNC: 162 MG/DL (ref 60–115)
GLUCOSE BLD-MCNC: 186 MG/DL (ref 60–115)
PERFORMED ON: ABNORMAL

## 2021-11-19 PROCEDURE — 94640 AIRWAY INHALATION TREATMENT: CPT

## 2021-11-19 PROCEDURE — 97530 THERAPEUTIC ACTIVITIES: CPT

## 2021-11-19 PROCEDURE — 2700000000 HC OXYGEN THERAPY PER DAY

## 2021-11-19 PROCEDURE — 97116 GAIT TRAINING THERAPY: CPT

## 2021-11-19 PROCEDURE — 6360000002 HC RX W HCPCS: Performed by: INTERNAL MEDICINE

## 2021-11-19 PROCEDURE — 1210000000 HC MED SURG R&B

## 2021-11-19 PROCEDURE — 2580000003 HC RX 258: Performed by: INTERNAL MEDICINE

## 2021-11-19 PROCEDURE — 6370000000 HC RX 637 (ALT 250 FOR IP): Performed by: INTERNAL MEDICINE

## 2021-11-19 PROCEDURE — 97110 THERAPEUTIC EXERCISES: CPT

## 2021-11-19 RX ADMIN — ENOXAPARIN SODIUM 40 MG: 40 INJECTION SUBCUTANEOUS at 08:35

## 2021-11-19 RX ADMIN — GUAIFENESIN 600 MG: 600 TABLET, EXTENDED RELEASE ORAL at 21:03

## 2021-11-19 RX ADMIN — TRAZODONE HYDROCHLORIDE 50 MG: 50 TABLET ORAL at 21:03

## 2021-11-19 RX ADMIN — BUDESONIDE AND FORMOTEROL FUMARATE DIHYDRATE 1 PUFF: 160; 4.5 AEROSOL RESPIRATORY (INHALATION) at 18:02

## 2021-11-19 RX ADMIN — SODIUM CHLORIDE, PRESERVATIVE FREE 10 ML: 5 INJECTION INTRAVENOUS at 08:36

## 2021-11-19 RX ADMIN — POLYETHYLENE GLYCOL 3350 17 G: 17 POWDER, FOR SOLUTION ORAL at 08:35

## 2021-11-19 RX ADMIN — BUDESONIDE AND FORMOTEROL FUMARATE DIHYDRATE 1 PUFF: 160; 4.5 AEROSOL RESPIRATORY (INHALATION) at 06:13

## 2021-11-19 RX ADMIN — ESCITALOPRAM OXALATE 10 MG: 10 TABLET ORAL at 21:03

## 2021-11-19 RX ADMIN — AMLODIPINE BESYLATE 10 MG: 10 TABLET ORAL at 08:35

## 2021-11-19 RX ADMIN — METHYLPREDNISOLONE SODIUM SUCCINATE 40 MG: 40 INJECTION, POWDER, FOR SOLUTION INTRAMUSCULAR; INTRAVENOUS at 20:59

## 2021-11-19 RX ADMIN — MELOXICAM 15 MG: 7.5 TABLET ORAL at 08:34

## 2021-11-19 RX ADMIN — METHYLPREDNISOLONE SODIUM SUCCINATE 40 MG: 40 INJECTION, POWDER, FOR SOLUTION INTRAMUSCULAR; INTRAVENOUS at 12:04

## 2021-11-19 RX ADMIN — LEVOFLOXACIN 500 MG: 5 INJECTION, SOLUTION INTRAVENOUS at 23:43

## 2021-11-19 RX ADMIN — METHYLPREDNISOLONE SODIUM SUCCINATE 40 MG: 40 INJECTION, POWDER, FOR SOLUTION INTRAMUSCULAR; INTRAVENOUS at 04:59

## 2021-11-19 RX ADMIN — METFORMIN HYDROCHLORIDE 500 MG: 500 TABLET ORAL at 08:34

## 2021-11-19 RX ADMIN — IPRATROPIUM BROMIDE AND ALBUTEROL SULFATE 1 AMPULE: .5; 2.5 SOLUTION RESPIRATORY (INHALATION) at 14:05

## 2021-11-19 RX ADMIN — IPRATROPIUM BROMIDE AND ALBUTEROL SULFATE 1 AMPULE: .5; 2.5 SOLUTION RESPIRATORY (INHALATION) at 18:01

## 2021-11-19 RX ADMIN — TIOTROPIUM BROMIDE INHALATION SPRAY 1 PUFF: 3.12 SPRAY, METERED RESPIRATORY (INHALATION) at 06:13

## 2021-11-19 RX ADMIN — IPRATROPIUM BROMIDE AND ALBUTEROL SULFATE 1 AMPULE: .5; 2.5 SOLUTION RESPIRATORY (INHALATION) at 10:02

## 2021-11-19 RX ADMIN — SODIUM CHLORIDE, PRESERVATIVE FREE 10 ML: 5 INJECTION INTRAVENOUS at 21:05

## 2021-11-19 RX ADMIN — METFORMIN HYDROCHLORIDE 500 MG: 500 TABLET ORAL at 17:19

## 2021-11-19 RX ADMIN — ATORVASTATIN CALCIUM 10 MG: 10 TABLET, FILM COATED ORAL at 21:03

## 2021-11-19 RX ADMIN — PANTOPRAZOLE SODIUM 40 MG: 40 TABLET, DELAYED RELEASE ORAL at 05:00

## 2021-11-19 RX ADMIN — MONTELUKAST SODIUM 10 MG: 10 TABLET ORAL at 21:03

## 2021-11-19 RX ADMIN — IPRATROPIUM BROMIDE AND ALBUTEROL SULFATE 1 AMPULE: .5; 2.5 SOLUTION RESPIRATORY (INHALATION) at 06:14

## 2021-11-19 RX ADMIN — GUAIFENESIN 600 MG: 600 TABLET, EXTENDED RELEASE ORAL at 08:35

## 2021-11-19 ASSESSMENT — PAIN SCALES - GENERAL
PAINLEVEL_OUTOF10: 8
PAINLEVEL_OUTOF10: 0
PAINLEVEL_OUTOF10: 8

## 2021-11-19 ASSESSMENT — PAIN DESCRIPTION - PAIN TYPE: TYPE: CHRONIC PAIN

## 2021-11-19 ASSESSMENT — PAIN DESCRIPTION - LOCATION: LOCATION: BACK

## 2021-11-19 NOTE — PROGRESS NOTES
Pt alert and oriented. Pt denies any needs at this time. Pt up and independent. Pt call light in reach. Pt medicated for pain per mar. Will continue to monitor pt.   Electronically signed by Chichi Damon RN on 11/18/2021 at 10:49 PM

## 2021-11-19 NOTE — PROGRESS NOTES
Occupational Therapy  Facility/Department: [facility] MED SURG UNIT  Daily Treatment Note  NAME: Eren Vega  : 1955  MRN: [id number]    Date of Service: 2021    Discharge Recommendations:   (Pt would benefit from OP PT services)       Assessment   Performance deficits / Impairments: Decreased ADL status; Decreased functional mobility ; Decreased strength; Decreased endurance; Decreased high-level IADLs  Assessment: Pt's O2 sats were 93% beginning of tx on RA. Pt used 4# dowel for 20 reps each UE ther ex with RBs- O2 sats 93% and above on RA, HR was low 60s BPM.   Treatment Diagnosis: COPD exacerbation, pneumonia of R lower lobe d/t infectious organism  Prognosis: Good  REQUIRES OT FOLLOW UP: Yes         Patient Diagnosis(es): The primary encounter diagnosis was COPD exacerbation ([address] A diagnosis of Pneumonia of right lower lobe due to infectious organism was also pertinent to this visit. has a past medical history of Alcohol abuse, Anemia, Asthma, Cocaine abuse ([address] COPD (chronic obstructive pulmonary disease) ([address] Depression, Disorder of pharynx, Drug-seeking behavior, Edema, Erectile dysfunction, Gastroesophageal reflux disease, Gout, History of arthroscopy of both knees, History of colon polyps, House dust mite allergy, Hyperlipidemia, Hypertension, Injury to heart, Insomnia, Loculated pleural effusion, Medical non-compliance, Morbid obesity due to excess calories ([address] Osteoarthritis of both knees, Personal history of tobacco use, Pleurisy, Pneumonia, Pre-diabetes, Seasonal allergies, Severe persistent asthma, Severe sleep apnea, Supraventricular tachycardia ([address] and Type 2 diabetes mellitus without complication, without long-term current use of insulin ([address] has a past surgical history that includes Anterior cruciate ligament repair; Cardiac catheterization; Total knee arthroplasty (Left, 2019); Colonoscopy (N/A, 7/15/2020);  Umbilical hernia repair (N/A, 2020); and hernia repair. Restrictions  Position Activity Restriction  Other position/activity restrictions: home O2 at 4L per pt report, currently on 2L of O2 via NC. Due to breathing deficits, pt unable to lay flat in bed. Pt must be at 45 degree angle or more to breathe when sleeping. Pt currently sleeping in recliner chair as cannot lay flat in his bed.  Pt would benefit from semielectric hosoitpal bed for consitent safe breathing while sleeping at apporx 45 deg elevation dure to respiratory diagnosisi- COPD, hisotry of peumonia, etc.  Subjective   General  Chart Reviewed: Yes  Patient assessed for rehabilitation services?: Yes  Family / Caregiver Present: No  Referring Practitioner: Dr. Destiny Coker  Diagnosis: COPD exacerbation, pneumonia of R lower lobe d/t infectious organism  Subjective  Subjective: Pt agreeable to OT  Pain Assessment  Pain Assessment: 0-10  Pain Level: 8  Pain Type: Chronic pain  Pain Location: Back  Vital Signs  Patient Currently in Pain: Yes      Objective    Functional Mobility  Functional - Mobility Device: No device  Activity: To/From therapy gym  Assist Level: Modified independent   Functional Mobility Comments: Good balance     Exercises  Shoulder Flexion: 1x20 reps BUE with 4# dowel for raises, forward rows, and backward rows  Horizontal ABduction: 1x20 reps BUE with 4# dowel  Horizontal ADduction: 1x20 reps BUE with 4# dowel  Elbow Flexion: 1x20 reps BUE with 4# dowel  Elbow Extension: 1x20 reps BUE with 4# dowel  Wrist Flexion: 1x20 reps BUE with 4# dowel  Wrist Extension: 1x20 reps BUE with 4# dowel  Other: to inc strength/end for ADL (RB between each set)                    Plan   Plan  Times per week: 3-6x/wk  Times per day: Daily  Plan weeks: <1- med pt  Current Treatment Recommendations: Strengthening, ROM, Balance Training, Functional Mobility Training, Endurance Training, Stair training, Pain Management, Safety Education & Training, Patient/Caregiver Education & Training, Self-Care / ADL, Home Management Training       Goals  Short term goals  Time Frame for Short term goals: 3-5 days- med pt  Short term goal 1: Tolerate 25-30min BUE ther ex/act, with O2 sats 90% or above, to inc strength/end for ADL  Short term goal 2: MI LB dressing and bathing  Short term goal 3: Inc to 5-7min stand dajuan/end, with O2 sats 90% and above, for inc'd safety and I with ADL  Short term goal 4: MI light homemaking tasks  Long term goals  Time Frame for Long term goals : Same as STGs  Long term goal 1: Same as STGs  Long term goal 2: Same as STGs  Patient Goals   Patient goals :  To be able to do more       Therapy Time   Individual Concurrent Group Co-treatment   Time In  8:30         Time Out  9:15         Minutes  [address], [place]

## 2021-11-19 NOTE — PROGRESS NOTES
Physical Therapy  Facility/Department: [facility] MED SURG UNIT  Daily Treatment Note  NAME: Yuliya Post  : 1955  MRN: [id number]    Date of Service: 2021    Discharge Recommendations:           Assessment   Body structures, Functions, Activity limitations: (P) Decreased functional mobility ; Decreased endurance; Increased pain; Decreased posture  Assessment: (P) Pt. tolerated three gait trials to assist pt with increasing functional endurance. Pt. SpO2 desats to 95 - 96% with room air. Mild SOB reported. SpO2 recovery to to 97-99% within ~ 1-2 minute seated rest break. Pt. demo'd mild cough with spitting out some flem. Pt. demo's independent tsf skills. O LOB with gait pattern and demo's 0 LOB with gait activity. Pt. demo's slow sumaya and limited by enedurance. Treatment Diagnosis: (P) decreased functional mobility endurance. REQUIRES PT FOLLOW UP: (P) Yes  Activity Tolerance  Activity Tolerance: (P) Patient limited by pain; Patient limited by endurance; Patient limited by fatigue     Patient Diagnosis(es): The primary encounter diagnosis was COPD exacerbation ([address] A diagnosis of Pneumonia of right lower lobe due to infectious organism was also pertinent to this visit.      has a past medical history of Alcohol abuse, Anemia, Asthma, Cocaine abuse ([address] COPD (chronic obstructive pulmonary disease) ([address] Depression, Disorder of pharynx, Drug-seeking behavior, Edema, Erectile dysfunction, Gastroesophageal reflux disease, Gout, History of arthroscopy of both knees, History of colon polyps, House dust mite allergy, Hyperlipidemia, Hypertension, Injury to heart, Insomnia, Loculated pleural effusion, Medical non-compliance, Morbid obesity due to excess calories ([address] Osteoarthritis of both knees, Personal history of tobacco use, Pleurisy, Pneumonia, Pre-diabetes, Seasonal allergies, Severe persistent asthma, Severe sleep apnea, Supraventricular tachycardia ([address] and Type 2 diabetes mellitus without complication, without long-term current use of insulin ([facility] has a past surgical history that includes Anterior cruciate ligament repair; Cardiac catheterization; Total knee arthroplasty (Left, 8/9/2019); Colonoscopy (N/A, 7/15/2020); Umbilical hernia repair (N/A, 11/30/2020); and hernia repair. Restrictions  Position Activity Restriction  Other position/activity restrictions: home O2 at 4L per pt report, currently on 2L of O2 via NC. Due to breathing deficits, pt unable to lay flat in bed. Pt must be at 45 degree angle or more to breathe when sleeping. Pt currently sleeping in recliner chair as cannot lay flat in his bed. Pt would benefit from semielectric hosoitpal bed for consitent safe breathing while sleeping at apporx 45 deg elevation dure to respiratory diagnosisi- COPD, hisotry of peumonia, etc.  Subjective    Pt. Reports currently sleeping in recliner at home and has trouble sleeping due to 8/10 back pain. Pt. Reports current not moving around to much and keep coming back to hospital due to having trouble around lung region and breathing. General  Chart Reviewed: (P) Yes  Additional Pertinent Hx: (P) Pt currently needing to sleep in recliner due to breathing deficits unable to breathe if flat in bed. Family / Caregiver Present: (P) No  Referring Practitioner: (P) Dr Flori Cotton to Stand: (P) Modified independent  Stand to sit: (P) Modified independent  Comment: (P) cleared modified indpendent in room , to call for assist PRN  Ambulation  Ambulation?: (P) Yes  Ambulation 1  Surface: (P) level tile  Device: (P) No Device  Assistance: (P) Supervision; Modified Independent  Quality of Gait: (P) wide ABIGAIL, dec step length bilaterally, one slight sway with self righting with slower sumaya, Pt. overall steady with no LOB this date. Distance: (P) ~150' x 3   Comments: (P) Seated rest breaks to reduce exertion. Pt. reports mild SOB.  SpO2 desats to 95-96% post each gait trial. SpO2 recovery 97-99% within ~ 1-2 minutes with room air. Pt. demo'd cough post last gait trial.      Balance  Sitting - Static: (P) Good  Sitting - Dynamic: (P) Good  Standing - Static: (P) Good  Standing - Dynamic: (P) Good       Pt. Education on breathing technique to assist with reducing exertion and exercise diaphragm to assist breathing with stomach versus chest towards improving endurance. G-Code     OutComes Score                                                     AM-PAC Score             Goals  Short term goals  Time Frame for Short term goals: (P) 3-5 days medical patient  Short term goal 1: (P) Pt ambulating >= 150ft with least AD modified indpendent and rate of perceived exertion(RPE) post at <= 3/5   Short term goal 2: (P) 10 stairs one ralil with RPE post <= 4/10 per pt report  Short term goal 3: (P) Pt able to demsntrate more upright posture and 10 good pursed lip breathing - to perfrom multiple times per day as HEP  Short term goal 4: (P) Assist with DME and discharge planning as able- info fro PCP to order hosptial bed and shower chair in therapy notes. Long term goals  Time Frame for Long term goals : (P) same as STG medical pt  Patient Goals   Patient goals : (P) \"I need a hospital bed so I can sleep better, breathe better and not keep needing to come back to the hospital because of my breathing. \"    Plan    Plan  Times per week: (P) 5-7 x week  Times per day: (P) Daily  Plan weeks:  (<1 medical pt)  Current Treatment Recommendations: (P) Strengthening, Functional Mobility Training, Endurance Training, Gait Training, Stair training, Positioning     Therapy Time   Individual Concurrent Group Co-treatment   Time In  1050         Time Out  1135         Minutes  Cristo Bridges 15, PTA  License and [address] Number: (P) .[id number]

## 2021-11-19 NOTE — PROGRESS NOTES
Hospitalist Progress Note      PCP: Elder Nunez MD    Date of Admission: 11/17/2021    Chief Complaint: dyspnea, SOB    Subjective: pt awake, alert, looks comforable     Medications:  Reviewed    Infusion Medications    sodium chloride       Scheduled Medications    ipratropium-albuterol  1 ampule Inhalation 4x Daily    amLODIPine  10 mg Oral Daily    escitalopram  10 mg Oral Daily    guaiFENesin  600 mg Oral BID    atorvastatin  10 mg Oral Nightly    meloxicam  15 mg Oral Daily    metFORMIN  500 mg Oral BID WC    montelukast  10 mg Oral Nightly    pantoprazole  40 mg Oral Daily    traZODone  50 mg Oral Nightly    levofloxacin  500 mg IntraVENous Q24H    methylPREDNISolone  40 mg IntraVENous Q8H    insulin lispro  0-12 Units SubCUTAneous TID WC    insulin lispro  0-6 Units SubCUTAneous Nightly    sodium chloride flush  5-40 mL IntraVENous 2 times per day    enoxaparin  40 mg SubCUTAneous Daily    budesonide-formoterol  1 puff Inhalation Daily    And    tiotropium  1 puff Inhalation Daily     PRN Meds: acetaminophen, sodium chloride flush, sodium chloride, ondansetron **OR** ondansetron, polyethylene glycol, acetaminophen **OR** acetaminophen      Intake/Output Summary (Last 24 hours) at 11/19/2021 0744  Last data filed at 11/19/2021 0650  Gross per 24 hour   Intake 630 ml   Output    Net 630 ml       Exam:    BP (!) 107/56   Pulse 58   Temp 97.3 °F (36.3 °C) (Oral)   Resp 18   Ht 6' (1.829 m)   Wt 280 lb (127 kg)   SpO2 97%   BMI 37.97 kg/m²     General appearance: No apparent distress, appears stated age and cooperative. HEENT: Pupils equal, round, and reactive to light. Conjunctivae/corneas clear. Neck: Supple, with full range of motion. No jugular venous distention. Trachea midline. Respiratory:  Normal respiratory effort. bilaterally with mild expiratory  Wheezes. Cardiovascular: Regular rate and rhythm with normal S1/S2 without murmurs, rubs or gallops.   Abdomen: Soft, non-tender, non-distended with normal bowel sounds. Musculoskeletal: No clubbing, cyanosis or edema bilaterally. Full range of motion without deformity. Skin: Skin color, texture, turgor normal.  No rashes or lesions. Neurologic:  Neurovascularly intact without any focal sensory/motor deficits. Cranial nerves: II-XII intact, grossly non-focal.  Psychiatric: Alert and oriented, thought content appropriate, normal insight  Capillary Refill: Brisk,< 3 seconds   Peripheral Pulses: +2 palpable, equal bilaterally       Labs:   Recent Labs     11/17/21  1203 11/18/21  0644   WBC 15.1* 10.4*   HGB 10.0* 10.1*   HCT 31.3* 31.2*   * 335     Recent Labs     11/17/21  1203 11/18/21  0644    138   K 4.0 4.1    102   CO2 24 23   BUN 7* 13   CREATININE 0.96 0.79   CALCIUM 9.1 9.6     Recent Labs     11/17/21  1203   AST 10   ALT 9   BILITOT 0.9*   ALKPHOS 78     No results for input(s): INR in the last 72 hours. Recent Labs     11/17/21  1203   TROPONINI <0.010       Urinalysis:      Lab Results   Component Value Date    NITRU Negative 11/17/2021    WBCUA 0-2 09/16/2021    BACTERIA None 10/05/2017    RBCUA 0-2 09/16/2021    BLOODU Negative 11/17/2021    SPECGRAV 1.015 11/17/2021    GLUCOSEU Negative 11/17/2021       Radiology:  XR CHEST PORTABLE   Final Result      No significant interval change from prior. Left lung opacity and pleural effusion similar to 10/31/2021. Assessment/Plan:    Active Hospital Problems    Diagnosis Date Noted    COPD exacerbation ([facility] [J44.1] 11/17/2021         DVT Prophylaxis: lovenox  Diet: ADULT DIET;  Regular  Code Status: Full Code    PT/OT Eval Status: done    Dispo - Dyspnea, COPD- treatement with iv atbs/solumedrol initiated  CAP/fever- as above    HTN- controlled  Obesity with BMI 46%  Smoking, polysubstance use- advice to stop   Suspect non compliance with medical Tx at home  Chronic low back pain- pain management on case   Medically stable for acute admission at [facility] and follow up with pulmonary service as outpatient       Electronically signed by Luis Eduardo Plummer MD on 11/19/2021 at 7:44 AM

## 2021-11-20 VITALS
WEIGHT: 280 LBS | SYSTOLIC BLOOD PRESSURE: 141 MMHG | HEIGHT: 72 IN | TEMPERATURE: 97.3 F | OXYGEN SATURATION: 98 % | DIASTOLIC BLOOD PRESSURE: 77 MMHG | HEART RATE: 63 BPM | RESPIRATION RATE: 18 BRPM | BODY MASS INDEX: 37.93 KG/M2

## 2021-11-20 LAB
GLUCOSE BLD-MCNC: 165 MG/DL (ref 60–115)
GLUCOSE BLD-MCNC: 180 MG/DL (ref 60–115)
PERFORMED ON: ABNORMAL
PERFORMED ON: ABNORMAL

## 2021-11-20 PROCEDURE — 6370000000 HC RX 637 (ALT 250 FOR IP): Performed by: INTERNAL MEDICINE

## 2021-11-20 PROCEDURE — 6360000002 HC RX W HCPCS: Performed by: INTERNAL MEDICINE

## 2021-11-20 PROCEDURE — 94640 AIRWAY INHALATION TREATMENT: CPT

## 2021-11-20 RX ORDER — LEVOFLOXACIN 500 MG/1
500 TABLET, FILM COATED ORAL DAILY
Qty: 5 TABLET | Refills: 0 | Status: SHIPPED | OUTPATIENT
Start: 2021-11-20 | End: 2021-11-25

## 2021-11-20 RX ORDER — PREDNISONE 20 MG/1
20 TABLET ORAL DAILY
Qty: 7 TABLET | Refills: 0 | Status: SHIPPED | OUTPATIENT
Start: 2021-11-20 | End: 2021-11-27

## 2021-11-20 RX ADMIN — TIOTROPIUM BROMIDE INHALATION SPRAY 1 PUFF: 3.12 SPRAY, METERED RESPIRATORY (INHALATION) at 06:10

## 2021-11-20 RX ADMIN — MELOXICAM 15 MG: 7.5 TABLET ORAL at 08:26

## 2021-11-20 RX ADMIN — PANTOPRAZOLE SODIUM 40 MG: 40 TABLET, DELAYED RELEASE ORAL at 05:45

## 2021-11-20 RX ADMIN — ACETAMINOPHEN 500 MG: 500 TABLET ORAL at 05:14

## 2021-11-20 RX ADMIN — ENOXAPARIN SODIUM 40 MG: 40 INJECTION SUBCUTANEOUS at 08:27

## 2021-11-20 RX ADMIN — METFORMIN HYDROCHLORIDE 500 MG: 500 TABLET ORAL at 08:27

## 2021-11-20 RX ADMIN — AMLODIPINE BESYLATE 10 MG: 10 TABLET ORAL at 08:27

## 2021-11-20 RX ADMIN — IPRATROPIUM BROMIDE AND ALBUTEROL SULFATE 1 AMPULE: .5; 2.5 SOLUTION RESPIRATORY (INHALATION) at 06:10

## 2021-11-20 RX ADMIN — GUAIFENESIN 600 MG: 600 TABLET, EXTENDED RELEASE ORAL at 08:27

## 2021-11-20 RX ADMIN — METHYLPREDNISOLONE SODIUM SUCCINATE 40 MG: 40 INJECTION, POWDER, FOR SOLUTION INTRAMUSCULAR; INTRAVENOUS at 11:17

## 2021-11-20 RX ADMIN — BUDESONIDE AND FORMOTEROL FUMARATE DIHYDRATE 1 PUFF: 160; 4.5 AEROSOL RESPIRATORY (INHALATION) at 06:10

## 2021-11-20 RX ADMIN — IPRATROPIUM BROMIDE AND ALBUTEROL SULFATE 1 AMPULE: .5; 2.5 SOLUTION RESPIRATORY (INHALATION) at 09:54

## 2021-11-20 RX ADMIN — METHYLPREDNISOLONE SODIUM SUCCINATE 40 MG: 40 INJECTION, POWDER, FOR SOLUTION INTRAMUSCULAR; INTRAVENOUS at 05:15

## 2021-11-20 ASSESSMENT — PAIN SCALES - GENERAL
PAINLEVEL_OUTOF10: 9
PAINLEVEL_OUTOF10: 8
PAINLEVEL_OUTOF10: 9

## 2021-11-20 ASSESSMENT — PAIN DESCRIPTION - LOCATION: LOCATION: BACK

## 2021-11-20 ASSESSMENT — PAIN DESCRIPTION - PROGRESSION: CLINICAL_PROGRESSION: NOT CHANGED

## 2021-11-20 ASSESSMENT — PAIN DESCRIPTION - DESCRIPTORS: DESCRIPTORS: ACHING;PRESSURE;SHARP

## 2021-11-20 ASSESSMENT — PAIN DESCRIPTION - ORIENTATION: ORIENTATION: RIGHT;LEFT;MID

## 2021-11-20 ASSESSMENT — PAIN DESCRIPTION - FREQUENCY: FREQUENCY: CONTINUOUS

## 2021-11-20 ASSESSMENT — PAIN DESCRIPTION - ONSET: ONSET: AWAKENED FROM SLEEP

## 2021-11-20 ASSESSMENT — PAIN DESCRIPTION - PAIN TYPE: TYPE: CHRONIC PAIN

## 2021-11-20 NOTE — PROGRESS NOTES
AVS printed and explained to pt. Pt educated on f/u appointments with dates and times. Pt also educated on new medications that were prescribed. Saline lock removed prior to discharge. Pt gathered own things in room and took with him. Pt left via wheelchair with staff.

## 2021-11-20 NOTE — DISCHARGE SUMMARY
Discharge Summary    Patient:  Mona Tejada  YOB: 1955    MRN: [id number]   Acct: [de-identified]    Primary Care Physician: Porter Curry MD    Admit date:  11/17/2021    Discharge date:   11/20/21      Discharge Diagnoses:   <principal problem not specified>  Active Problems:    COPD exacerbation ([address]  Resolved Problems:    * No resolved hospital problems. *      Admitted for: [facility]: patient was admitted and treated for CAP? COPD exacerbation. With IV atbs and steroids his fever resolved. He was saturating well on 2 LO2 via NC. Was evaluated by pain service for chronic lower back pain and was advised not to continue with opioids on DC due to positive urine tox screen. After completing hs acute treatment will be DC home with PO atbs/prednisone. Will follow with his pulmonary service- dr Micky Gupta as outpatient. Advised to stop smoking      Consultants:  Pain management     Discharge Medications:       Medication List      START taking these medications    levoFLOXacin 500 MG tablet  Commonly known as: LEVAQUIN  Take 1 tablet by mouth daily for 5 days     predniSONE 20 MG tablet  Commonly known as: DELTASONE  Take 1 tablet by mouth daily for 7 days        CHANGE how you take these medications    polyethylene glycol 17 GM/SCOOP powder  Commonly known as: GLYCOLAX  Take 17 g by mouth daily  What changed:   · when to take this  · reasons to take this        CONTINUE taking these medications    acetaminophen 500 MG tablet  Commonly known as: Acetaminophen Extra Strength  Take 1 tablet by mouth every 8 hours as needed for Pain     albuterol sulfate  (90 Base) MCG/ACT inhaler  Inhale 2 puffs into the lungs every 6 hours as needed for Wheezing or Shortness of Breath     amLODIPine 10 MG tablet  Commonly known as: NORVASC  Take 1 tablet by mouth daily     blood glucose test strips  Test three times a day & as needed for symptoms of irregular blood glucose.  Dispense sufficient amount for indicated testing frequency plus additional to accommodate PRN testing needs.      escitalopram 10 MG tablet  Commonly known as: LEXAPRO  Take 1 tablet by mouth daily     fluticasone 50 MCG/ACT nasal spray  Commonly known as: FLONASE  2 sprays by Nasal route daily     guaiFENesin 600 MG extended release tablet  Commonly known as: MUCINEX  Take 1 tablet by mouth 2 times daily as needed for Congestion (Cough)     ipratropium-albuterol 0.5-2.5 (3) MG/3ML Soln nebulizer solution  Commonly known as: DUONEB  Inhale 3 mLs into the lungs every 6 hours as needed for Shortness of Breath     Lancets Misc  1 each by Does not apply route 3 times daily     lovastatin 10 MG tablet  Commonly known as: MEVACOR  Take 1 tablet by mouth nightly     meloxicam 15 MG tablet  Commonly known as: MOBIC  Take 1 tablet by mouth daily     metFORMIN 500 MG tablet  Commonly known as: GLUCOPHAGE  Take 1 tablet by mouth 2 times daily (with meals)     montelukast 10 MG tablet  Commonly known as: SINGULAIR  Take 1 tablet by mouth nightly     pantoprazole 40 MG tablet  Commonly known as: PROTONIX  Take 1 tablet by mouth daily     sildenafil 100 MG tablet  Commonly known as: VIAGRA  Take 1 tablet by mouth as needed for Erectile Dysfunction     sodium chloride 0.65 % nasal spray  Commonly known as: OCEAN, BABY AYR  2 sprays by Nasal route three times daily     traZODone 50 MG tablet  Commonly known as: DESYREL  Take 1 tablet by mouth nightly     Trelegy Ellipta 200-62.5-25 MCG/INH Aepb  Generic drug: Fluticasone-Umeclidin-Vilant  Take 1 Inhaler by mouth daily     ZyrTEC Allergy 10 MG tablet  Generic drug: cetirizine           Where to Get Your Medications      These medications were sent to [facility]/pharmacy #[id number] [place], [place] - [address] [facility] [phone] [address] [facility] [address]    Phone: [phone]   · levoFLOXacin 500 MG tablet  · predniSONE 20 MG tablet         Physical Exam:    Vitals:  Vitals: 11/19/21 1406 11/19/21 1820 11/20/21 0012 11/20/21 0521   BP:  129/63  (!) 141/77   Pulse:  73  63   Resp: 20   18   Temp:  98.1 °F (36.7 °C)  97.3 °F (36.3 °C)   TempSrc:    Oral   SpO2: 97% 98% 97% 100%   Weight:       Height:         Weight: Weight: 280 lb (127 kg)     24 hour intake/output:    Intake/Output Summary (Last 24 hours) at 11/20/2021 0820  Last data filed at 11/19/2021 1230  Gross per 24 hour   Intake 720 ml   Output    Net 720 ml       General appearance - alert, well appearing, and in no distress  Chest - mild expiratory  wheezes,   Heart - normal rate, regular rhythm, normal S1, S2, no murmurs, rubs, clicks or gallops  Abdomen - soft, nontender, nondistended, no masses or organomegaly  Obese: Yes; Protuberant: Yes   Neurological - alert, oriented, normal speech, no focal findings or movement disorder noted  Extremities - peripheral pulses normal, no pedal edema, no clubbing or cyanosis  Skin - normal coloration and turgor, no rashes, no suspicious skin lesions noted        Radiology reports as per the Radiologist  Radiology: XR CHEST PORTABLE    Result Date: 11/17/2021  XR CHEST PORTABLE Clinical History:  Shortness of breath. Cough. Comparison:  10/31/2021. RESULT: Opacity involving the left mid to lower lung zones, grossly similar to 10/31/2021. Left pleural effusion, also unchanged. Hyperinflated lungs with emphysematous changes. No distinct right lung opacity. No pneumothorax. Stable cardiomediastinal silhouette, partially obscured by the left lung findings. Aortic vascular calcifications. Osteochondroma projecting from the left scapular region, unchanged. Partially imaged degenerative changes both shoulders. Degenerative changes with bridging endplate osteophytes in the imaged spine, unchanged. No acute osseous findings. No significant interval change from prior. Left lung opacity and pleural effusion similar to 10/31/2021.        Results for orders placed or performed during the hospital encounter of 11/17/21   Culture, Blood 1    Specimen: Blood   Result Value Ref Range    Blood Culture, Routine       No Growth to date. Any change in status will be called. Culture, Blood 2    Specimen: Blood   Result Value Ref Range    Culture, Blood 2       No Growth to date. Any change in status will be called.    COVID-19, Rapid    Specimen: Nasopharyngeal Swab   Result Value Ref Range    SARS-CoV-2, NAAT Not Detected Not Detected   Comprehensive Metabolic Panel   Result Value Ref Range    Sodium 136 135 - 144 mEq/L    Potassium 4.0 3.4 - 4.9 mEq/L    Chloride 100 95 - 107 mEq/L    CO2 24 20 - 31 mEq/L    Anion Gap 12 9 - 15 mEq/L    Glucose 96 70 - 99 mg/dL    BUN 7 (L) 8 - 23 mg/dL    CREATININE 0.96 0.70 - 1.20 mg/dL    GFR Non-African American >60.0 >60    GFR  >60.0 >60    Calcium 9.1 8.5 - 9.9 mg/dL    Total Protein 7.3 6.3 - 8.0 g/dL    Albumin 3.1 (L) 3.5 - 4.6 g/dL    Total Bilirubin 0.9 (H) 0.2 - 0.7 mg/dL    Alkaline Phosphatase 78 35 - 104 U/L    ALT 9 0 - 41 U/L    AST 10 0 - 40 U/L    Globulin 4.2 (H) 2.3 - 3.5 g/dL   CBC Auto Differential   Result Value Ref Range    WBC 15.1 (H) 4.2 - 9.0 K/uL    RBC 3.31 (L) 4.63 - 6.08 M/uL    Hemoglobin 10.0 (L) 13.7 - 17.5 g/dL    Hematocrit 31.3 (L) 42.0 - 52.0 %    MCV 94.6 (H) 79.0 - 92.2 fL    MCH 30.2 25.7 - 32.2 pg    MCHC 31.9 (L) 32.3 - 36.5 %    RDW 18.1 (H) 11.6 - 14.4 %    Platelets 202 (H) 019 - 337 K/uL    PLATELET SLIDE REVIEW Increased     SLIDE REVIEW see below     Neutrophils % 47.0 34.0 - 67.9 %    Immature Granulocytes % 0.8 %    Lymphocytes % 15.0 %    Monocytes % 14.0 (H) 5.3 - 12.2 %    Eosinophils % 0.5 (L) 0.8 - 7.0 %    Basophils % 0.2 0.2 - 1.2 %    Neutrophils Absolute 10.7 (H) 1.8 - 5.4 K/uL    Immature Granulocytes # 0.1 K/uL    Lymphocytes Absolute 2.3 1.3 - 3.6 K/uL    Monocytes Absolute 2.1 (H) 0.3 - 0.8 K/uL    Eosinophils Absolute 0.0 0.0 - 0.5 K/uL    Basophils Absolute 0.0 0.0 - 0.1 K/uL    Bands Relative 21 %    Metamyelocytes Relative 1 %    Myelocyte Percent 2 %    Stomatocytes Occasional     Target Cells 3+    Magnesium   Result Value Ref Range    Magnesium 1.8 1.7 - 2.4 mg/dL   Troponin   Result Value Ref Range    Troponin <0.010 0.000 - 0.010 ng/mL   Brain Natriuretic Peptide   Result Value Ref Range    Pro-BNP 85 pg/mL   Urine Reflex to Culture    Specimen: Urine, clean catch   Result Value Ref Range    Color, UA Yellow Straw/Yellow    Clarity, UA Clear Clear    Glucose, Ur Negative Negative mg/dL    Bilirubin Urine Negative Negative    Ketones, Urine Negative Negative mg/dL    Specific Gravity, UA 1.015 1.005 - 1.030    Blood, Urine Negative Negative    pH, UA 5.0 5.0 - 9.0    Protein, UA Negative Negative mg/dL    Urobilinogen, Urine 0.2 <2.0 E.U./dL    Nitrite, Urine Negative Negative    Leukocyte Esterase, Urine Negative Negative    Urine Reflex to Culture Not Indicated    Urine Drug Screen, Comprehensive   Result Value Ref Range    PCP Screen, Urine Neg Negative <25 ng/mL    Benzodiazepine Screen, Urine Neg Negative <150 ng/mL    Cocaine Metabolite Screen, Urine POSITIVE (A) Negative <150 ng/mL    Amphetamine Screen, Urine Neg Negative <500 ng/mL    Cannabinoid Scrn, Ur POSITIVE (A) Negative <50 ng/mL    Opiate Scrn, Ur POSITIVE (A) Negative <100 ng/mL    Barbiturate Screen, Ur Neg Negative <200 ng/mL    Drug Screen Comment: see below    CBC   Result Value Ref Range    WBC 10.4 (H) 4.2 - 9.0 K/uL    RBC 3.28 (L) 4.63 - 6.08 M/uL    Hemoglobin 10.1 (L) 13.7 - 17.5 g/dL    Hematocrit 31.2 (L) 42.0 - 52.0 %    MCV 95.1 (H) 79.0 - 92.2 fL    MCH 30.8 25.7 - 32.2 pg    MCHC 32.4 32.3 - 36.5 %    RDW 17.4 (H) 11.6 - 14.4 %    Platelets 066 533 - 054 K/uL   Basic Metabolic Panel w/ Reflex to MG   Result Value Ref Range    Sodium 138 135 - 144 mEq/L    Potassium reflex Magnesium 4.1 3.4 - 4.9 mEq/L    Chloride 102 95 - 107 mEq/L    CO2 23 20 - 31 mEq/L    Anion Gap 13 9 - 15 mEq/L    Glucose 171 (H) 70 - 99 mg/dL BUN 13 8 - 23 mg/dL    CREATININE 0.79 0.70 - 1.20 mg/dL    GFR Non-African American >60.0 >60    GFR  >60.0 >60    Calcium 9.6 8.5 - 9.9 mg/dL   Lactate, Sepsis   Result Value Ref Range    Lactic Acid, Sepsis 1.3 0.5 - 1.9 mmol/L   POCT Glucose   Result Value Ref Range    POC Glucose 196 (H) 60 - 115 mg/dl    Performed on ACCU-CHEK    POCT Glucose   Result Value Ref Range    POC Glucose 271 (H) 60 - 115 mg/dl    Performed on ACCU-CHEK    POCT Glucose   Result Value Ref Range    POC Glucose 208 (H) 60 - 115 mg/dl    Performed on ACCU-CHEK    POCT Glucose   Result Value Ref Range    POC Glucose 186 (H) 60 - 115 mg/dl    Performed on ACCU-CHEK    POCT Glucose   Result Value Ref Range    POC Glucose 145 (H) 60 - 115 mg/dl    Performed on ACCU-CHEK    POCT Glucose   Result Value Ref Range    POC Glucose 182 (H) 60 - 115 mg/dl    Performed on ACCU-CHEK    POCT Glucose   Result Value Ref Range    POC Glucose 150 (H) 60 - 115 mg/dl    Performed on ACCU-CHEK    POCT Glucose   Result Value Ref Range    POC Glucose 162 (H) 60 - 115 mg/dl    Performed on ACCU-CHEK    POCT Glucose   Result Value Ref Range    POC Glucose 186 (H) 60 - 115 mg/dl    Performed on ACCU-CHEK    POCT Glucose   Result Value Ref Range    POC Glucose 127 (H) 60 - 115 mg/dl    Performed on ACCU-CHEK    POCT Glucose   Result Value Ref Range    POC Glucose 180 (H) 60 - 115 mg/dl    Performed on ACCU-CHEK    EKG 12 Lead - Chest Pain   Result Value Ref Range    Ventricular Rate 101 BPM    Atrial Rate 101 BPM    P-R Interval 142 ms    QRS Duration 92 ms    Q-T Interval 318 ms    QTc Calculation (Bazett) 412 ms    P Axis 73 degrees    R Axis 68 degrees    T Axis 61 degrees       Diet:  ADULT DIET;  Regular    Activity:  Activity as tolerated (Patient may move about with assist as indicated or with supervision.)    Follow-up:  in 1 weeks with Zeenat Armstrong MD,      Disposition: home    Condition: Stable    Time Spent: 45 minutes    Electronically signed by Mason Ruano MD on 11/20/2021 at 8:20 AM    Discharging Hospitalist

## 2021-11-22 ENCOUNTER — CARE COORDINATION (OUTPATIENT)
Dept: CASE MANAGEMENT | Age: 66
End: 2021-11-22

## 2021-11-22 DIAGNOSIS — J44.1 COPD EXACERBATION (HCC): Primary | ICD-10-CM

## 2021-11-22 NOTE — CARE COORDINATION
to perform post hospital discharge assessment. Verified name and  with patient as identifiers. Provided introduction to self, and explanation of the CTN role. CTN reviewed discharge instructions, medical action plan and red flags with patient who verbalized understanding. Patient given an opportunity to ask questions and does not have any further questions or concerns at this time. Were discharge instructions available to patient? Yes. Reviewed appropriate site of care based on symptoms and resources available to patient including: When to call 911. The patient agrees to contact the PCP office for questions related to their healthcare. Medication reconciliation was performed with patient, who verbalizes understanding of administration of home medications. Advised obtaining a 90-day supply of all daily and as-needed medications. CTN provided contact information. No further follow-up call indicated based on severity of symptoms and risk factors. ACM following. CTN s/o. Care Transitions 24 Hour Call    Do you have any ongoing symptoms?: Yes  Patient-reported symptoms: Shortness of Breath, Cough  Do you have a copy of your discharge instructions?: Yes  Do you have all of your prescriptions and are they filled?: Yes  Have you scheduled your follow up appointment?: Yes  Were you discharged with any Home Care or Post Acute Services: Yes  Post Acute Services: Other (Comment: Current w/ [facility]. Isabella Cosme RN.)  Patient DME: Straight cane  Patient Home Equipment: CPAP, Oxygen, Nebulizer (Comment: doesnt wear cpap )  Do you have support at home?: Alone  Do you feel like you have everything you need to keep you well at home?: Yes  Are you an active caregiver in your home?: No  Care Transitions Interventions    Pharmacist: Completed       Other Services: Completed (Comment: Current w/ [facility] AMOS Luciano RN.)          Follow Up  Future Appointments   Date Time Provider Constance Espitia   2021  3:40 PM Mirna DORADO MD [facility] [address] [address]   1/6/2022  1:40 PM Halbert Brittle, MD [address] [place] [address]

## 2021-11-23 ENCOUNTER — CARE COORDINATION (OUTPATIENT)
Dept: CARE COORDINATION | Age: 66
End: 2021-11-23

## 2021-11-23 ENCOUNTER — TELEPHONE (OUTPATIENT)
Dept: PHARMACY | Facility: CLINIC | Age: 66
End: 2021-11-23

## 2021-11-23 LAB
BLOOD CULTURE, ROUTINE: NORMAL
CULTURE, BLOOD 2: NORMAL

## 2021-11-23 NOTE — TELEPHONE ENCOUNTER
Received a referral: from Care Coordinator Provider to review patients medications. Called patient to schedule a time to speak with a pharmacist over the telephone. Spoke to patient and advised them of the above message.   Patient verified understanding and scheduled their appointment: 11/29/21 at 3pm COPD, and Smoking      [place] free: [phone]

## 2021-11-26 ENCOUNTER — TELEPHONE (OUTPATIENT)
Dept: PHARMACY | Facility: CLINIC | Age: 66
End: 2021-11-26

## 2021-11-26 NOTE — LETTER
[address] [facility] [address]  [address], [address]        [address]  [address] On license of [facility] [address] [address]           11/29/21     Dear Na Verduzco,            You are eligible for a complete medication therapy review performed by a [address] [place] [address],[address] licensed clinical pharmacist. This review helps ensure that you are getting the most benefit from the medications you receive and includes the following:  - Review of your medications, including over-the-counter and herbal medications. - Answering questions about your medications and how to get the most benefit from them. - Identifying and helping to prevent potential drug interactions or side effects.  - Possibly identifying less costly alternatives that are equally effective. Under this program, [facility] will work with you and your doctor to manage your drug therapy. Please contact the [address] [place] [address] office to set up a time for your medication review with one of our clinical pharmacists. To contact us call [phone] or [phone], and select Option 7. This will be a phone consult and therefore will not require a trip to the medical office. Please note: This is an optional program.  It is a free service provided to help ensure that your medicines are safe, necessary, and effective. Your participation is encouraged, but not required.     Sincerely,  Swathi Silva   Department, toll free: [phone]

## 2021-11-26 NOTE — TELEPHONE ENCOUNTER
Received a referral: Provider to review patients medications. Called patient to schedule a time to speak with a pharmacist over the telephone. No answer left VM: Please contact us [phone] Option #5 to schedule this appointment. Pharmacists are available Monday thru Friday 7:30 AM till 5:30 PM.      Logan Crowley CPhT.    [address] [facility], toll free: [phone] Option #5

## 2021-11-29 ENCOUNTER — SCHEDULED TELEPHONE ENCOUNTER (OUTPATIENT)
Dept: PHARMACY | Facility: CLINIC | Age: 66
End: 2021-11-29

## 2021-11-29 DIAGNOSIS — J44.9 CHRONIC OBSTRUCTIVE PULMONARY DISEASE, UNSPECIFIED (HCC): ICD-10-CM

## 2021-11-29 RX ORDER — ALBUTEROL SULFATE 90 UG/1
2 AEROSOL, METERED RESPIRATORY (INHALATION) EVERY 6 HOURS PRN
Qty: 18 EACH | Refills: 1 | Status: SHIPPED | OUTPATIENT
Start: 2021-11-29 | End: 2022-01-25

## 2021-11-29 NOTE — TELEPHONE ENCOUNTER
Pharmacy requesting medication refill.  Please approve or deny this request.    Rx requested:  Requested Prescriptions     Pending Prescriptions Disp Refills    albuterol sulfate  (90 Base) MCG/ACT inhaler [Pharmacy Med Name: ALBUTEROL HFA (VENTOLIN) INH] 18 each 1     Sig: INHALE 2 PUFFS INTO THE LUNGS EVERY 6 HOURS AS NEEDED FOR WHEEZING OR SHORTNESS OF BREATH         Last Office Visit:   11/3/2021      Next Visit Date:  Future Appointments   Date Time Provider Constance Espitia   11/29/2021  3:00 PM SCHEDULE, [facility] CLINICAL PHARMACY [facility] Clin Rx None   12/9/2021 11:50 AM Everardo Weiss, APRN - CNP Kev[facility] Peters   1/6/2022  1:40 PM MD [facility] [place] [facility]

## 2021-11-29 NOTE — TELEPHONE ENCOUNTER
CLINICAL PHARMACY NOTE - Medication Review    Nahomy Gordon is a 63 y.o. male referred to a clinical pharmacy specialist by care coordination. Attempt made to reach patient by telephone for scheduled medication review. Unable to LVM. Letter was already sent today.     Brenda Busby, PharmD, [address] [place] [address]  Department, toll free: [phone]    For Pharmacy Admin Tracking Only     Gap Closed?: No    Time Spent (min): 15

## 2021-11-30 ENCOUNTER — CARE COORDINATION (OUTPATIENT)
Dept: CARE COORDINATION | Age: 66
End: 2021-11-30

## 2021-12-01 ENCOUNTER — CARE COORDINATION (OUTPATIENT)
Dept: CARE COORDINATION | Age: 66
End: 2021-12-01

## 2021-12-01 NOTE — CARE COORDINATION
ACM called patient. Unable to contact or leave message as voicemail was full. Phone goes directly to voicemail. This is an attempt for [facility] out reach. ACM also notes other providers have attempted to reach patient regarding  and pharmacy med review.
Patient

## 2021-12-08 DIAGNOSIS — K21.9 GASTROESOPHAGEAL REFLUX DISEASE, UNSPECIFIED WHETHER ESOPHAGITIS PRESENT: ICD-10-CM

## 2021-12-08 RX ORDER — PANTOPRAZOLE SODIUM 40 MG/1
40 TABLET, DELAYED RELEASE ORAL DAILY
Qty: 90 TABLET | Refills: 1 | Status: ON HOLD | OUTPATIENT
Start: 2021-12-08 | End: 2022-05-16

## 2021-12-08 NOTE — TELEPHONE ENCOUNTER
Pharmacy requesting medication refill.  Please approve or deny this request.    Rx requested:  Requested Prescriptions     Pending Prescriptions Disp Refills    pantoprazole (PROTONIX) 40 MG tablet [Pharmacy Med Name: PANTOPRAZOLE SOD DR 40 MG TAB] 90 tablet 1     Sig: Take 1 tablet by mouth daily         Last Office Visit:   11/3/2021      Next Visit Date:  Future Appointments   Date Time Provider Constance Espitia   12/9/2021 11:50 AM BHARATI Kaur - SANDRA Bradley  Linh Peters   1/6/2022  1:40 PM Luis Ohara MD [facility]

## 2021-12-09 ENCOUNTER — CARE COORDINATION (OUTPATIENT)
Dept: CARE COORDINATION | Age: 66
End: 2021-12-09

## 2021-12-09 NOTE — CARE COORDINATION
ACM called patient. Go straight to voicemail voicemail was full unable to contact patient or leave message to return call. This is an attempt for an [facility] out reach.

## 2021-12-10 ENCOUNTER — TELEPHONE (OUTPATIENT)
Dept: FAMILY MEDICINE CLINIC | Age: 66
End: 2021-12-10

## 2021-12-14 ENCOUNTER — CARE COORDINATION (OUTPATIENT)
Dept: CARE COORDINATION | Age: 66
End: 2021-12-14

## 2021-12-15 DIAGNOSIS — M48.061 SPINAL STENOSIS OF LUMBAR REGION, UNSPECIFIED WHETHER NEUROGENIC CLAUDICATION PRESENT: Primary | ICD-10-CM

## 2021-12-15 RX ORDER — OXYCODONE HYDROCHLORIDE AND ACETAMINOPHEN 5; 325 MG/1; MG/1
1 TABLET ORAL EVERY 6 HOURS PRN
Qty: 56 TABLET | Refills: 0 | Status: SHIPPED | OUTPATIENT
Start: 2021-12-15 | End: 2021-12-26

## 2021-12-16 LAB
BILIRUBIN URINE: NEGATIVE
BLOOD, URINE: NEGATIVE
CLARITY: CLEAR
COLOR: YELLOW
GLUCOSE URINE: NEGATIVE MG/DL
KETONES, URINE: NEGATIVE MG/DL
LEUKOCYTE ESTERASE, URINE: NEGATIVE
NITRITE, URINE: NEGATIVE
PH UA: >=9 (ref 5–9)
PROTEIN UA: NEGATIVE MG/DL
SPECIFIC GRAVITY UA: 1.01 (ref 1–1.03)
UROBILINOGEN, URINE: 1 E.U./DL

## 2021-12-17 ENCOUNTER — TELEPHONE (OUTPATIENT)
Dept: FAMILY MEDICINE CLINIC | Age: 66
End: 2021-12-17

## 2021-12-17 LAB — URINE CULTURE, ROUTINE: NORMAL

## 2021-12-22 ENCOUNTER — HOSPITAL ENCOUNTER (EMERGENCY)
Age: 66
Discharge: HOME | End: 2021-12-22
Attending: EMERGENCY MEDICINE
Payer: MEDICARE

## 2021-12-22 VITALS
TEMPERATURE: 97.9 F | HEART RATE: 88 BPM | SYSTOLIC BLOOD PRESSURE: 136 MMHG | OXYGEN SATURATION: 96 % | DIASTOLIC BLOOD PRESSURE: 86 MMHG | WEIGHT: 279 LBS | BODY MASS INDEX: 32.28 KG/M2 | HEIGHT: 78 IN | RESPIRATION RATE: 22 BRPM

## 2021-12-22 DIAGNOSIS — G89.18 POSTOPERATIVE PAIN: ICD-10-CM

## 2021-12-22 DIAGNOSIS — K59.00 CONSTIPATION, UNSPECIFIED CONSTIPATION TYPE: Primary | ICD-10-CM

## 2021-12-22 DIAGNOSIS — G89.29 ACUTE EXACERBATION OF CHRONIC LOW BACK PAIN: ICD-10-CM

## 2021-12-22 DIAGNOSIS — M54.50 ACUTE EXACERBATION OF CHRONIC LOW BACK PAIN: ICD-10-CM

## 2021-12-22 PROCEDURE — 99283 EMERGENCY DEPT VISIT LOW MDM: CPT

## 2021-12-22 PROCEDURE — 94640 AIRWAY INHALATION TREATMENT: CPT

## 2021-12-22 PROCEDURE — 6370000000 HC RX 637 (ALT 250 FOR IP): Performed by: EMERGENCY MEDICINE

## 2021-12-22 PROCEDURE — 96372 THER/PROPH/DIAG INJ SC/IM: CPT

## 2021-12-22 PROCEDURE — 6360000002 HC RX W HCPCS: Performed by: EMERGENCY MEDICINE

## 2021-12-22 PROCEDURE — 96374 THER/PROPH/DIAG INJ IV PUSH: CPT

## 2021-12-22 RX ORDER — HYDROCODONE BITARTRATE AND ACETAMINOPHEN 5; 325 MG/1; MG/1
1 TABLET ORAL EVERY 6 HOURS PRN
Qty: 12 TABLET | Refills: 0 | Status: ON HOLD | OUTPATIENT
Start: 2021-12-22 | End: 2021-12-29

## 2021-12-22 RX ORDER — ONDANSETRON 2 MG/ML
4 INJECTION INTRAMUSCULAR; INTRAVENOUS ONCE
Status: COMPLETED | OUTPATIENT
Start: 2021-12-22 | End: 2021-12-22

## 2021-12-22 RX ORDER — MORPHINE SULFATE 4 MG/ML
4 INJECTION, SOLUTION INTRAMUSCULAR; INTRAVENOUS ONCE
Status: COMPLETED | OUTPATIENT
Start: 2021-12-22 | End: 2021-12-22

## 2021-12-22 RX ORDER — IPRATROPIUM BROMIDE AND ALBUTEROL SULFATE 2.5; .5 MG/3ML; MG/3ML
1 SOLUTION RESPIRATORY (INHALATION) ONCE
Status: COMPLETED | OUTPATIENT
Start: 2021-12-22 | End: 2021-12-22

## 2021-12-22 RX ADMIN — MAGNESIUM CITRATE 296 ML: 1.75 LIQUID ORAL at 13:42

## 2021-12-22 RX ADMIN — IPRATROPIUM BROMIDE AND ALBUTEROL SULFATE 1 AMPULE: .5; 2.5 SOLUTION RESPIRATORY (INHALATION) at 13:15

## 2021-12-22 RX ADMIN — ONDANSETRON 4 MG: 2 INJECTION INTRAMUSCULAR; INTRAVENOUS at 13:42

## 2021-12-22 RX ADMIN — MORPHINE SULFATE 4 MG: 4 INJECTION, SOLUTION INTRAMUSCULAR; INTRAVENOUS at 13:41

## 2021-12-22 ASSESSMENT — ENCOUNTER SYMPTOMS
FACIAL SWELLING: 0
STRIDOR: 0
CONSTIPATION: 1
CHEST TIGHTNESS: 0
SINUS PRESSURE: 0
SORE THROAT: 0
TROUBLE SWALLOWING: 0
WHEEZING: 1
CHOKING: 0
ABDOMINAL PAIN: 0
EYE DISCHARGE: 0
EYE REDNESS: 0
BLOOD IN STOOL: 0
VOMITING: 0
EYE PAIN: 0
SHORTNESS OF BREATH: 1
COUGH: 1
DIARRHEA: 0
BACK PAIN: 1
VOICE CHANGE: 0

## 2021-12-22 ASSESSMENT — PAIN SCALES - GENERAL
PAINLEVEL_OUTOF10: 9
PAINLEVEL_OUTOF10: 9

## 2021-12-22 ASSESSMENT — PAIN DESCRIPTION - LOCATION: LOCATION: BACK

## 2021-12-22 ASSESSMENT — PAIN DESCRIPTION - FREQUENCY: FREQUENCY: CONTINUOUS

## 2021-12-22 ASSESSMENT — PAIN DESCRIPTION - PAIN TYPE: TYPE: ACUTE PAIN

## 2021-12-22 ASSESSMENT — PAIN DESCRIPTION - DESCRIPTORS: DESCRIPTORS: ACHING

## 2021-12-22 ASSESSMENT — PAIN DESCRIPTION - ORIENTATION: ORIENTATION: MID

## 2021-12-22 NOTE — ED PROVIDER NOTES
[facility] ED  eMERGENCY dEPARTMENT eNCOUnter      Pt Name: Nahomy Gordon  MRN: [id number]  [place] 1955  Date of evaluation: 12/22/2021  Provider: Kentrell Galicia MD    [address] [place] [address]       Chief Complaint   Patient presents with    Respiratory Distress     x 12 hours, drained fluid off of left lung on Dec. 2nd    Constipation     x 1 day       HISTORY OF PRESENT ILLNESS   (Location/Symptom, Timing/Onset,Context/Setting, Quality, Duration, Modifying Factors, Severity)  Note limiting factors. Nahomy Gordon is a 61 y.o. male who presents to the emergency department patient well-known to us from previous multiple encounters emergency for similar situation this time patient underwent surgical intervention for his left lung procedure at [facility] and now recovering from nursing home he went home patient has no pain medication requesting pain medication for his incision and the staples which are still in place in the left side of the chest and the back patient has a chronic back pain constipation and has chronic wheezing because of COPD has no fever no chills no productive cough    HPI    NursingNotes were reviewed. REVIEW OF SYSTEMS    (2-9 systems for level 4, 10 or more for level 5)     Review of Systems   Constitutional: Positive for activity change and appetite change. Negative for fever. HENT: Negative for congestion, drooling, facial swelling, mouth sores, nosebleeds, sinus pressure, sore throat, trouble swallowing and voice change. Eyes: Negative for pain, discharge, redness and visual disturbance. Respiratory: Positive for cough, shortness of breath and wheezing. Negative for choking, chest tightness and stridor. Cardiovascular: Negative for chest pain, palpitations and leg swelling. Gastrointestinal: Positive for constipation. Negative for abdominal pain, blood in stool, diarrhea and vomiting. Endocrine: Negative for cold intolerance, polyphagia and polyuria. Genitourinary: Negative for dysuria, flank pain, frequency, genital sores and urgency. Musculoskeletal: Positive for back pain. Negative for joint swelling, neck pain and neck stiffness. Skin: Negative for pallor and rash. Neurological: Negative for tremors, seizures, syncope, weakness, numbness and headaches. Hematological: Negative for adenopathy. Does not bruise/bleed easily. Psychiatric/Behavioral: Negative for agitation, behavioral problems, hallucinations and sleep disturbance. The patient is not hyperactive. All other systems reviewed and are negative. Except as noted above the remainder of the review of systems was reviewed and negative.        PAST MEDICAL HISTORY     Past Medical History:   Diagnosis Date    Alcohol abuse 10/27/2016    Anemia     Asthma     Cocaine abuse ([address] 10/27/2016    COPD (chronic obstructive pulmonary disease) ([address]     Depression 04/27/2020    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    Drug-seeking behavior 04/14/2015    Edema 12/10/2015    Erectile dysfunction     Gastroesophageal reflux disease 12/17/2018    Gout 10/27/2016    History of arthroscopy of both knees 10/27/2016    History of colon polyps 10/26/2021    House dust mite allergy 04/21/2014    Hyperlipidemia     Hypertension 10/27/2016    Injury to heart 10/27/2016    Insomnia 12/17/2018    Loculated pleural effusion     Medical non-compliance 02/22/2014    Morbid obesity due to excess calories ([address] 12/08/2016    Osteoarthritis of both knees 12/08/2016    Personal history of tobacco use     Pleurisy 12/12/2016    Pneumonia 12/04/2016    caused hospital admission    Pre-diabetes 10/27/2016    Seasonal allergies 04/21/2014    Severe persistent asthma 10/27/2016    Severe sleep apnea 04/14/2015    Supraventricular tachycardia ([address] 10/27/2016    Type 2 diabetes mellitus without complication, without long-term current use of insulin ([address] 10/26/2021         SURGICALHISTORY Past Surgical History:   Procedure Laterality Date    ANTERIOR CRUCIATE LIGAMENT REPAIR      CARDIAC CATHETERIZATION      COLONOSCOPY N/A 07/15/2020    COLONOSCOPY WITH POLYPECTOMY performed by Jax Woodward MD at [address] [facility] [place], [place] Left 11/27/2021    VATS/thoracotomy    TOTAL KNEE ARTHROPLASTY Left 08/09/2019    LEFT TOTAL KNEE ARTHROPLASTY performed by Kush Elaine MD at [facility] N/A 36/95/1054    UMBILICAL HERNIA REPAIR WITH MESH performed by Pipe Pate MD at [address] [facility]       Discharge Medication List as of 12/22/2021  1:36 PM      CONTINUE these medications which have NOT CHANGED    Details   oxyCODONE-acetaminophen (PERCOCET) 5-325 MG per tablet Take 1 tablet by mouth every 6 hours as needed for Pain for up to 14 days. Intended supply: 7 days.  Take lowest dose possible to manage pain, Disp-56 tablet, R-0Normal      pantoprazole (PROTONIX) 40 MG tablet Take 1 tablet by mouth daily, Disp-90 tablet, R-1Normal      albuterol sulfate  (90 Base) MCG/ACT inhaler INHALE 2 PUFFS INTO THE LUNGS EVERY 6 HOURS AS NEEDED FOR WHEEZING OR SHORTNESS OF BREATH, Disp-18 each, R-1Normal      meloxicam (MOBIC) 15 MG tablet Take 1 tablet by mouth daily, Disp-30 tablet, R-2Normal      escitalopram (LEXAPRO) 10 MG tablet Take 1 tablet by mouth daily, Disp-90 tablet, R-1Normal      montelukast (SINGULAIR) 10 MG tablet Take 1 tablet by mouth nightly, Disp-90 tablet, R-1Normal      traZODone (DESYREL) 50 MG tablet Take 1 tablet by mouth nightly, Disp-90 tablet, R-1Normal      sildenafil (VIAGRA) 100 MG tablet Take 1 tablet by mouth as needed for Erectile Dysfunction, Disp-10 tablet, R-0Normal      TRELEGY ELLIPTA 200-62.5-25 MCG/INH AEPB Take 1 Inhaler by mouth daily, Disp-60 each, R-2, DAWNormal      guaiFENesin (MUCINEX) 600 MG extended release tablet Take 1 tablet by mouth 2 times daily as needed for Congestion (Cough), Disp-40 tablet, R-1Normal      metFORMIN (GLUCOPHAGE) 500 MG tablet Take 1 tablet by mouth 2 times daily (with meals), Disp-60 tablet, R-2Normal      sodium chloride (OCEAN, BABY AYR) 0.65 % nasal spray 2 sprays by Nasal route three times daily, Disp-1 each, R-0Normal      cetirizine (ZYRTEC ALLERGY) 10 MG tablet Take 10 mg by mouth dailyHistorical Med      acetaminophen (ACETAMINOPHEN EXTRA STRENGTH) 500 MG tablet Take 1 tablet by mouth every 8 hours as needed for Pain, Disp-120 tablet, R-1DX Code Needed  . Normal      polyethylene glycol (GLYCOLAX) 17 GM/SCOOP powder Take 17 g by mouth daily, Disp-510 g, R-1Normal      amLODIPine (NORVASC) 10 MG tablet Take 1 tablet by mouth daily, Disp-90 tablet, R-1Normal      fluticasone (FLONASE) 50 MCG/ACT nasal spray 2 sprays by Nasal route daily, Disp-3 Bottle, R-1Normal      lovastatin (MEVACOR) 10 MG tablet Take 1 tablet by mouth nightly, Disp-90 tablet, R-1Normal      Lancets MISC 3 TIMES DAILY Starting Wed 10/20/2021, Disp-200 each, R-5, Normal      blood glucose monitor strips Test three times a day & as needed for symptoms of irregular blood glucose. Dispense sufficient amount for indicated testing frequency plus additional to accommodate PRN testing needs. , Disp-100 strip, R-1, Normal      ipratropium-albuterol (DUONEB) 0.5-2.5 (3) MG/3ML SOLN nebulizer solution Inhale 3 mLs into the lungs every 6 hours as needed for Shortness of Breath, Disp-360 mL, R-3Normal             ALLERGIES     Fish-derived products, Iodine, Seasonal, and Pcn [penicillins]    FAMILY HISTORY       Family History   Problem Relation Age of Onset    Arthritis Mother     Asthma Mother     High Cholesterol Mother     Other Mother         aneurysm    Diabetes Father     Stroke Maternal Grandmother     Cancer Maternal Grandfather     Hypertension Other     COPD Neg Hx           SOCIAL HISTORY       Social History     Socioeconomic History    Marital status: Single     Spouse name: n/a    Number of children: 1    Years of education: 15    Highest education level: None   Occupational History    Occupation: Disability     Employer: NONE   Tobacco Use    Smoking status: Light Tobacco Smoker     Years: 30.00     Types: Cigarettes, Cigars     Start date: 1988     Last attempt to quit: 10/1/2013     Years since quittin.2    Smokeless tobacco: Never Used    Tobacco comment: doesnt buy only smokes if someone gives him one    Vaping Use    Vaping Use: Never used   Substance and Sexual Activity    Alcohol use: Not Currently     Alcohol/week: 4.0 standard drinks     Types: 2 Cans of beer, 2 Shots of liquor per week     Comment: social 1 -2 x week    Drug use: Not Currently     Types: Cocaine, Marijuana (Weed)     Comment: no cocaine x 1 year, marijuana weekly    Sexual activity: Not Currently     Partners: Female   Other Topics Concern    None   Social History Narrative    Lives in an apartment    15 steps to get up to the apartment    Asking for hospital bed and shower chair 2021 sent request to pcp office     Has 1 daughter who lives in [place] per patient 2 grandchildren. Per patient does not see his daughter very often. Social Determinants of Health     Financial Resource Strain:     Difficulty of Paying Living Expenses: Not on file   Food Insecurity:     Worried About Running Out of Food in the Last Year: Not on file    Jonnie of Food in the Last Year: Not on file   Transportation Needs: Unmet Transportation Needs    Lack of Transportation (Medical):  Yes    Lack of Transportation (Non-Medical): Yes   Physical Activity:     Days of Exercise per Week: Not on file    Minutes of Exercise per Session: Not on file   Stress:     Feeling of Stress : Not on file   Social Connections:     Frequency of Communication with Friends and Family: Not on file    Frequency of Social Gatherings with Friends and Family: Not on file    Attends Hinduism Services: Not on file   [employer] of Clubs or Organizations: Not on file    Attends Club or Organization Meetings: Not on file    Marital Status: Not on file   Intimate Partner Violence:     Fear of Current or Ex-Partner: Not on file    Emotionally Abused: Not on file    Physically Abused: Not on file    Sexually Abused: Not on file   Housing Stability:     Unable to Pay for Housing in the Last Year: Not on file    Number of [place] in the Last Year: Not on file    Unstable Housing in the Last Year: Not on file       SCREENINGS      @FLOW([id number])@      PHYSICAL EXAM    (up to 7 for level 4, 8 or more for level 5)     ED Triage Vitals [12/22/21 1235]   BP Temp Temp Source Pulse Resp SpO2 Height Weight   (!) 147/79 97.9 °F (36.6 °C) Oral 117 22 94 % 6' 6\" (1.981 m) 279 lb (126.6 kg)       Physical Exam  Vitals and nursing note reviewed. Constitutional:       General: He is not in acute distress. Appearance: He is well-developed. He is obese. He is not ill-appearing. Comments: Active alert cooperative patient nontoxic appearance talks in full sentences afebrile   HENT:      Head: Normocephalic and atraumatic. Right Ear: Tympanic membrane, ear canal and external ear normal.      Left Ear: Tympanic membrane, ear canal and external ear normal.   Eyes:      General:         Right eye: No discharge. Left eye: No discharge. Pupils: Pupils are equal, round, and reactive to light. Cardiovascular:      Rate and Rhythm: Normal rate and regular rhythm. Heart sounds: Normal heart sounds. No murmur heard. No gallop. Pulmonary:      Effort: No respiratory distress. Breath sounds: No stridor. Wheezing present. No rhonchi. Comments: Minimal faint expiratory wheezing noted on listen to his lungs no retraction no use of accessory muscles work of breathing not labored  Abdominal:      General: Bowel sounds are normal.      Palpations: Abdomen is soft. There is no mass. Tenderness:  There is no abdominal tenderness. There is no right CVA tenderness, guarding or rebound. Hernia: No hernia is present. Musculoskeletal:         General: Tenderness present. Normal range of motion. Cervical back: Neck supple. Comments: Staples on left side of the upper back but no cellulitis healing very satisfactory no crepitus   Skin:     General: Skin is warm. Capillary Refill: Capillary refill takes less than 2 seconds. Coloration: Skin is not jaundiced. Findings: No bruising, erythema, lesion or rash. Neurological:      Mental Status: He is alert and oriented to person, place, and time. Cranial Nerves: No cranial nerve deficit. Motor: No abnormal muscle tone. Psychiatric:         Behavior: Behavior normal.         Thought Content: Thought content normal.         DIAGNOSTIC RESULTS     EKG: All EKG's are interpreted by the Emergency Department Physician who either signs or Co-signsthis chart in the absence of a cardiologist.        RADIOLOGY:   Deborah Lust such as CT, Ultrasound and MRI are read by the radiologist. Plain radiographic images are visualized and preliminarily interpreted by the emergency physician with the below findings:        Interpretation per the Radiologist below, if available at the time ofthis note:    No orders to display         ED BEDSIDE ULTRASOUND:   Performed by ED Physician - none    LABS:  Labs Reviewed - No data to display    All other labs were within normal range or not returned as of this dictation. EMERGENCY DEPARTMENT COURSE and DIFFERENTIAL DIAGNOSIS/MDM:   Vitals:    Vitals:    12/22/21 1235 12/22/21 1346   BP: (!) 147/79 136/86   Pulse: 117 88   Resp: 22 22   Temp: 97.9 °F (36.6 °C)    TempSrc: Oral    SpO2: 94% 96%   Weight: 279 lb (126.6 kg)    Height: 6' 6\" (1.981 m)            MDM    CRITICAL CARE TIME   Total Critical Care time was minutes, excluding separately reportableprocedures.   There was a high probability of clinicallysignificant/life threatening deterioration in the patient's condition which required my urgent intervention. CONSULTS:  None    PROCEDURES:  Unless otherwise noted below, none     Procedures    FINAL IMPRESSION      1. Constipation, unspecified constipation type    2. Acute exacerbation of chronic low back pain    3.  Postoperative pain          DISPOSITION/PLAN   DISPOSITION        [facility]  [facility]  [phone]    In 1 week        DISCHARGE MEDICATIONS:  Discharge Medication List as of 12/22/2021  1:36 PM             (Please note that portions of this note were completed with a voice recognition program.  Efforts were made to edit the dictations but occasionally words are mis-transcribed.)    Sung Chandler MD (electronically signed)  Attending Emergency Physician       Sung Chandler MD  12/22/21 4310

## 2021-12-22 NOTE — ED TRIAGE NOTES
Pt presents to ED today with SOB and constipation. Pt had a VATS procedure done to L lung 11/27/2021. Pt states SOB with ambulation. States he was DC from Skilled facility yesterday and they did not send him home with pain medication.

## 2021-12-22 NOTE — ED NOTES
Pt provided with DC and medication education; verbalized understanding. Pt ambulated from Ed with steady gait and all belongings.         Margi Austin RN  12/22/21 4900

## 2021-12-26 ENCOUNTER — APPOINTMENT (OUTPATIENT)
Dept: GENERAL RADIOLOGY | Age: 66
DRG: 192 | End: 2021-12-26
Payer: COMMERCIAL

## 2021-12-26 ENCOUNTER — HOSPITAL ENCOUNTER (INPATIENT)
Age: 66
LOS: 3 days | Discharge: HOME | DRG: 192 | End: 2021-12-29
Attending: EMERGENCY MEDICINE | Admitting: INTERNAL MEDICINE
Payer: COMMERCIAL

## 2021-12-26 DIAGNOSIS — G89.29 ACUTE EXACERBATION OF CHRONIC LOW BACK PAIN: ICD-10-CM

## 2021-12-26 DIAGNOSIS — M54.50 ACUTE EXACERBATION OF CHRONIC LOW BACK PAIN: ICD-10-CM

## 2021-12-26 DIAGNOSIS — I21.4 NSTEMI (NON-ST ELEVATED MYOCARDIAL INFARCTION) (HCC): Primary | ICD-10-CM

## 2021-12-26 DIAGNOSIS — J44.1 COPD EXACERBATION (HCC): ICD-10-CM

## 2021-12-26 LAB
AMPHETAMINE SCREEN, URINE: ABNORMAL
ANION GAP SERPL CALCULATED.3IONS-SCNC: 14 MEQ/L (ref 9–15)
BARBITURATE SCREEN URINE: ABNORMAL
BASOPHILS ABSOLUTE: 0.1 K/UL (ref 0–0.1)
BASOPHILS RELATIVE PERCENT: 0.9 % (ref 0.2–1.2)
BENZODIAZEPINE SCREEN, URINE: ABNORMAL
BILIRUBIN URINE: NEGATIVE
BLOOD, URINE: NEGATIVE
BUN BLDV-MCNC: 8 MG/DL (ref 8–23)
CALCIUM SERPL-MCNC: 9.7 MG/DL (ref 8.5–9.9)
CANNABINOID SCREEN URINE: ABNORMAL
CHLORIDE BLD-SCNC: 103 MEQ/L (ref 95–107)
CLARITY: CLEAR
CO2: 23 MEQ/L (ref 20–31)
COCAINE METABOLITE SCREEN URINE: POSITIVE
COLOR: NORMAL
CREAT SERPL-MCNC: 0.82 MG/DL (ref 0.7–1.2)
EOSINOPHILS ABSOLUTE: 0.5 K/UL (ref 0–0.5)
EOSINOPHILS RELATIVE PERCENT: 4.3 % (ref 0.8–7)
GFR AFRICAN AMERICAN: >60
GFR NON-AFRICAN AMERICAN: >60
GLUCOSE BLD-MCNC: 133 MG/DL (ref 70–99)
GLUCOSE BLD-MCNC: 189 MG/DL (ref 60–115)
GLUCOSE URINE: NEGATIVE MG/DL
HBA1C MFR BLD: 6 % (ref 4.8–5.9)
HCT VFR BLD CALC: 35.9 % (ref 42–52)
HEMOGLOBIN: 10.9 G/DL (ref 13.7–17.5)
IMMATURE GRANULOCYTES #: 0 K/UL
IMMATURE GRANULOCYTES %: 0.3 %
KETONES, URINE: NEGATIVE MG/DL
LEUKOCYTE ESTERASE, URINE: NEGATIVE
LYMPHOCYTES ABSOLUTE: 2.6 K/UL (ref 1.3–3.6)
LYMPHOCYTES RELATIVE PERCENT: 23.9 %
Lab: ABNORMAL
MCH RBC QN AUTO: 28 PG (ref 25.7–32.2)
MCHC RBC AUTO-ENTMCNC: 30.4 % (ref 32.3–36.5)
MCV RBC AUTO: 92.3 FL (ref 79–92.2)
MONOCYTES ABSOLUTE: 0.7 K/UL (ref 0.3–0.8)
MONOCYTES RELATIVE PERCENT: 6.9 % (ref 5.3–12.2)
NEUTROPHILS ABSOLUTE: 6.9 K/UL (ref 1.8–5.4)
NEUTROPHILS RELATIVE PERCENT: 63.7 % (ref 34–67.9)
NITRITE, URINE: NEGATIVE
OPIATE SCREEN URINE: POSITIVE
PDW BLD-RTO: 17.9 % (ref 11.6–14.4)
PERFORMED ON: ABNORMAL
PH UA: 7.5 (ref 5–9)
PHENCYCLIDINE SCREEN URINE: ABNORMAL
PLATELET # BLD: 609 K/UL (ref 163–337)
POTASSIUM SERPL-SCNC: 4.4 MEQ/L (ref 3.4–4.9)
PRO-BNP: 94 PG/ML
PROTEIN UA: NEGATIVE MG/DL
RBC # BLD: 3.89 M/UL (ref 4.63–6.08)
SARS-COV-2, NAAT: NOT DETECTED
SODIUM BLD-SCNC: 140 MEQ/L (ref 135–144)
SPECIFIC GRAVITY UA: 1.02 (ref 1–1.03)
TROPONIN: 0.03 NG/ML (ref 0–0.01)
UROBILINOGEN, URINE: 0.2 E.U./DL
WBC # BLD: 10.8 K/UL (ref 4.2–9)

## 2021-12-26 PROCEDURE — 81003 URINALYSIS AUTO W/O SCOPE: CPT

## 2021-12-26 PROCEDURE — 84484 ASSAY OF TROPONIN QUANT: CPT

## 2021-12-26 PROCEDURE — 93005 ELECTROCARDIOGRAM TRACING: CPT

## 2021-12-26 PROCEDURE — 85025 COMPLETE CBC W/AUTO DIFF WBC: CPT

## 2021-12-26 PROCEDURE — 6370000000 HC RX 637 (ALT 250 FOR IP): Performed by: INTERNAL MEDICINE

## 2021-12-26 PROCEDURE — 99285 EMERGENCY DEPT VISIT HI MDM: CPT

## 2021-12-26 PROCEDURE — 6360000002 HC RX W HCPCS: Performed by: EMERGENCY MEDICINE

## 2021-12-26 PROCEDURE — 83036 HEMOGLOBIN GLYCOSYLATED A1C: CPT

## 2021-12-26 PROCEDURE — 96374 THER/PROPH/DIAG INJ IV PUSH: CPT

## 2021-12-26 PROCEDURE — 71045 X-RAY EXAM CHEST 1 VIEW: CPT

## 2021-12-26 PROCEDURE — 1210000000 HC MED SURG R&B

## 2021-12-26 PROCEDURE — 87635 SARS-COV-2 COVID-19 AMP PRB: CPT

## 2021-12-26 PROCEDURE — 36415 COLL VENOUS BLD VENIPUNCTURE: CPT

## 2021-12-26 PROCEDURE — 80306 DRUG TEST PRSMV INSTRMNT: CPT

## 2021-12-26 PROCEDURE — 94640 AIRWAY INHALATION TREATMENT: CPT

## 2021-12-26 PROCEDURE — 6360000002 HC RX W HCPCS: Performed by: INTERNAL MEDICINE

## 2021-12-26 PROCEDURE — 80048 BASIC METABOLIC PNL TOTAL CA: CPT

## 2021-12-26 PROCEDURE — 2580000003 HC RX 258: Performed by: INTERNAL MEDICINE

## 2021-12-26 PROCEDURE — 83880 ASSAY OF NATRIURETIC PEPTIDE: CPT

## 2021-12-26 PROCEDURE — 6370000000 HC RX 637 (ALT 250 FOR IP): Performed by: EMERGENCY MEDICINE

## 2021-12-26 RX ORDER — MORPHINE SULFATE 2 MG/ML
1 INJECTION, SOLUTION INTRAMUSCULAR; INTRAVENOUS EVERY 4 HOURS PRN
Status: DISCONTINUED | OUTPATIENT
Start: 2021-12-26 | End: 2021-12-29 | Stop reason: HOSPADM

## 2021-12-26 RX ORDER — MELOXICAM 7.5 MG/1
15 TABLET ORAL DAILY
Status: DISCONTINUED | OUTPATIENT
Start: 2021-12-26 | End: 2021-12-29 | Stop reason: HOSPADM

## 2021-12-26 RX ORDER — SODIUM CHLORIDE 9 MG/ML
25 INJECTION, SOLUTION INTRAVENOUS PRN
Status: DISCONTINUED | OUTPATIENT
Start: 2021-12-26 | End: 2021-12-29 | Stop reason: HOSPADM

## 2021-12-26 RX ORDER — ONDANSETRON 2 MG/ML
4 INJECTION INTRAMUSCULAR; INTRAVENOUS EVERY 6 HOURS PRN
Status: DISCONTINUED | OUTPATIENT
Start: 2021-12-26 | End: 2021-12-29 | Stop reason: HOSPADM

## 2021-12-26 RX ORDER — ESCITALOPRAM OXALATE 10 MG/1
10 TABLET ORAL DAILY
Status: DISCONTINUED | OUTPATIENT
Start: 2021-12-26 | End: 2021-12-29 | Stop reason: HOSPADM

## 2021-12-26 RX ORDER — ASPIRIN 325 MG
325 TABLET ORAL ONCE
Status: COMPLETED | OUTPATIENT
Start: 2021-12-26 | End: 2021-12-26

## 2021-12-26 RX ORDER — TRAZODONE HYDROCHLORIDE 50 MG/1
50 TABLET ORAL NIGHTLY
Status: DISCONTINUED | OUTPATIENT
Start: 2021-12-26 | End: 2021-12-29 | Stop reason: HOSPADM

## 2021-12-26 RX ORDER — PROMETHAZINE HYDROCHLORIDE 12.5 MG/1
12.5 TABLET ORAL EVERY 6 HOURS PRN
Status: DISCONTINUED | OUTPATIENT
Start: 2021-12-26 | End: 2021-12-29 | Stop reason: HOSPADM

## 2021-12-26 RX ORDER — IPRATROPIUM BROMIDE AND ALBUTEROL SULFATE 2.5; .5 MG/3ML; MG/3ML
1 SOLUTION RESPIRATORY (INHALATION) 3 TIMES DAILY
Status: DISCONTINUED | OUTPATIENT
Start: 2021-12-26 | End: 2021-12-26

## 2021-12-26 RX ORDER — NICOTINE POLACRILEX 4 MG
15 LOZENGE BUCCAL PRN
Status: DISCONTINUED | OUTPATIENT
Start: 2021-12-26 | End: 2021-12-29 | Stop reason: HOSPADM

## 2021-12-26 RX ORDER — MONTELUKAST SODIUM 10 MG/1
10 TABLET ORAL NIGHTLY
Status: DISCONTINUED | OUTPATIENT
Start: 2021-12-26 | End: 2021-12-29 | Stop reason: HOSPADM

## 2021-12-26 RX ORDER — ACETAMINOPHEN 650 MG/1
650 SUPPOSITORY RECTAL EVERY 6 HOURS PRN
Status: DISCONTINUED | OUTPATIENT
Start: 2021-12-26 | End: 2021-12-29 | Stop reason: HOSPADM

## 2021-12-26 RX ORDER — CETIRIZINE HYDROCHLORIDE 10 MG/1
10 TABLET ORAL DAILY
Status: DISCONTINUED | OUTPATIENT
Start: 2021-12-26 | End: 2021-12-29 | Stop reason: HOSPADM

## 2021-12-26 RX ORDER — DEXTROSE MONOHYDRATE 25 G/50ML
12.5 INJECTION, SOLUTION INTRAVENOUS PRN
Status: DISCONTINUED | OUTPATIENT
Start: 2021-12-26 | End: 2021-12-29 | Stop reason: HOSPADM

## 2021-12-26 RX ORDER — GUAIFENESIN 600 MG/1
600 TABLET, EXTENDED RELEASE ORAL 3 TIMES DAILY PRN
Status: DISCONTINUED | OUTPATIENT
Start: 2021-12-26 | End: 2021-12-29 | Stop reason: HOSPADM

## 2021-12-26 RX ORDER — ACETAMINOPHEN 325 MG/1
650 TABLET ORAL EVERY 6 HOURS PRN
Status: DISCONTINUED | OUTPATIENT
Start: 2021-12-26 | End: 2021-12-29 | Stop reason: HOSPADM

## 2021-12-26 RX ORDER — HYDROCODONE BITARTRATE AND ACETAMINOPHEN 5; 325 MG/1; MG/1
1 TABLET ORAL EVERY 6 HOURS PRN
Status: DISCONTINUED | OUTPATIENT
Start: 2021-12-26 | End: 2021-12-29 | Stop reason: HOSPADM

## 2021-12-26 RX ORDER — ATORVASTATIN CALCIUM 10 MG/1
20 TABLET, FILM COATED ORAL NIGHTLY
Status: DISCONTINUED | OUTPATIENT
Start: 2021-12-26 | End: 2021-12-29 | Stop reason: HOSPADM

## 2021-12-26 RX ORDER — ALBUTEROL SULFATE 2.5 MG/3ML
2.5 SOLUTION RESPIRATORY (INHALATION) EVERY 4 HOURS PRN
Status: DISCONTINUED | OUTPATIENT
Start: 2021-12-26 | End: 2021-12-29 | Stop reason: HOSPADM

## 2021-12-26 RX ORDER — METHYLPREDNISOLONE SODIUM SUCCINATE 40 MG/ML
40 INJECTION, POWDER, LYOPHILIZED, FOR SOLUTION INTRAMUSCULAR; INTRAVENOUS EVERY 12 HOURS
Status: DISCONTINUED | OUTPATIENT
Start: 2021-12-27 | End: 2021-12-29 | Stop reason: HOSPADM

## 2021-12-26 RX ORDER — IPRATROPIUM BROMIDE AND ALBUTEROL SULFATE 2.5; .5 MG/3ML; MG/3ML
1 SOLUTION RESPIRATORY (INHALATION) 3 TIMES DAILY
Status: DISCONTINUED | OUTPATIENT
Start: 2021-12-26 | End: 2021-12-29 | Stop reason: HOSPADM

## 2021-12-26 RX ORDER — METHYLPREDNISOLONE SODIUM SUCCINATE 125 MG/2ML
125 INJECTION, POWDER, LYOPHILIZED, FOR SOLUTION INTRAMUSCULAR; INTRAVENOUS ONCE
Status: COMPLETED | OUTPATIENT
Start: 2021-12-26 | End: 2021-12-26

## 2021-12-26 RX ORDER — PANTOPRAZOLE SODIUM 40 MG/1
40 TABLET, DELAYED RELEASE ORAL DAILY
Status: DISCONTINUED | OUTPATIENT
Start: 2021-12-26 | End: 2021-12-29 | Stop reason: HOSPADM

## 2021-12-26 RX ORDER — SODIUM CHLORIDE 0.9 % (FLUSH) 0.9 %
10 SYRINGE (ML) INJECTION PRN
Status: DISCONTINUED | OUTPATIENT
Start: 2021-12-26 | End: 2021-12-29 | Stop reason: HOSPADM

## 2021-12-26 RX ORDER — DEXTROSE MONOHYDRATE 50 MG/ML
100 INJECTION, SOLUTION INTRAVENOUS PRN
Status: DISCONTINUED | OUTPATIENT
Start: 2021-12-26 | End: 2021-12-29 | Stop reason: HOSPADM

## 2021-12-26 RX ORDER — AMLODIPINE BESYLATE 10 MG/1
10 TABLET ORAL DAILY
Status: DISCONTINUED | OUTPATIENT
Start: 2021-12-26 | End: 2021-12-29 | Stop reason: HOSPADM

## 2021-12-26 RX ORDER — POLYETHYLENE GLYCOL 3350 17 G/17G
17 POWDER, FOR SOLUTION ORAL DAILY PRN
Status: DISCONTINUED | OUTPATIENT
Start: 2021-12-26 | End: 2021-12-29 | Stop reason: HOSPADM

## 2021-12-26 RX ORDER — SODIUM CHLORIDE 0.9 % (FLUSH) 0.9 %
10 SYRINGE (ML) INJECTION EVERY 12 HOURS SCHEDULED
Status: DISCONTINUED | OUTPATIENT
Start: 2021-12-26 | End: 2021-12-29 | Stop reason: HOSPADM

## 2021-12-26 RX ORDER — FLUTICASONE PROPIONATE 50 MCG
2 SPRAY, SUSPENSION (ML) NASAL DAILY
Status: DISCONTINUED | OUTPATIENT
Start: 2021-12-26 | End: 2021-12-29 | Stop reason: HOSPADM

## 2021-12-26 RX ADMIN — TRAZODONE HYDROCHLORIDE 50 MG: 50 TABLET ORAL at 22:23

## 2021-12-26 RX ADMIN — MONTELUKAST SODIUM 10 MG: 10 TABLET ORAL at 20:30

## 2021-12-26 RX ADMIN — ESCITALOPRAM OXALATE 10 MG: 10 TABLET ORAL at 22:23

## 2021-12-26 RX ADMIN — ENOXAPARIN SODIUM 40 MG: 40 INJECTION SUBCUTANEOUS at 18:30

## 2021-12-26 RX ADMIN — MORPHINE SULFATE 1 MG: 2 INJECTION, SOLUTION INTRAMUSCULAR; INTRAVENOUS at 23:14

## 2021-12-26 RX ADMIN — ASPIRIN 325 MG: 325 TABLET ORAL at 15:59

## 2021-12-26 RX ADMIN — HYDROCODONE BITARTRATE AND ACETAMINOPHEN 1 TABLET: 5; 325 TABLET ORAL at 18:30

## 2021-12-26 RX ADMIN — GUAIFENESIN 600 MG: 600 TABLET, EXTENDED RELEASE ORAL at 18:31

## 2021-12-26 RX ADMIN — METHYLPREDNISOLONE SODIUM SUCCINATE 125 MG: 125 INJECTION, POWDER, FOR SOLUTION INTRAMUSCULAR; INTRAVENOUS at 15:21

## 2021-12-26 RX ADMIN — CETIRIZINE HYDROCHLORIDE 10 MG: 10 TABLET, FILM COATED ORAL at 18:31

## 2021-12-26 RX ADMIN — ATORVASTATIN CALCIUM 20 MG: 10 TABLET, FILM COATED ORAL at 20:30

## 2021-12-26 RX ADMIN — IPRATROPIUM BROMIDE AND ALBUTEROL SULFATE 1 AMPULE: .5; 2.5 SOLUTION RESPIRATORY (INHALATION) at 20:05

## 2021-12-26 RX ADMIN — PIPERACILLIN AND TAZOBACTAM 3375 MG: 3; .375 INJECTION, POWDER, LYOPHILIZED, FOR SOLUTION INTRAVENOUS at 18:30

## 2021-12-26 ASSESSMENT — PAIN SCALES - GENERAL
PAINLEVEL_OUTOF10: 9
PAINLEVEL_OUTOF10: 0

## 2021-12-26 ASSESSMENT — PAIN DESCRIPTION - LOCATION
LOCATION: BACK

## 2021-12-26 ASSESSMENT — ENCOUNTER SYMPTOMS
EYE REDNESS: 0
VOMITING: 0
NAUSEA: 0
SHORTNESS OF BREATH: 1
ABDOMINAL PAIN: 0
EYE DISCHARGE: 0
BACK PAIN: 0
SPUTUM PRODUCTION: 1
COUGH: 1
SORE THROAT: 0

## 2021-12-26 ASSESSMENT — PAIN DESCRIPTION - PAIN TYPE
TYPE: SURGICAL PAIN
TYPE: ACUTE PAIN
TYPE: CHRONIC PAIN;SURGICAL PAIN

## 2021-12-26 ASSESSMENT — PAIN DESCRIPTION - DESCRIPTORS: DESCRIPTORS: SHOOTING

## 2021-12-26 ASSESSMENT — PAIN DESCRIPTION - ORIENTATION: ORIENTATION: LOWER;UPPER

## 2021-12-26 ASSESSMENT — PAIN DESCRIPTION - FREQUENCY: FREQUENCY: CONTINUOUS

## 2021-12-26 NOTE — ED TRIAGE NOTES
Pt from home via [facility] for c/o increased SOB. Pt had recent left lung procedure for fluid removal.  Pt had a PICC line removed on Monday. Pt on cart in the hallway and then able to be moved to Room 7 by EMS for continued care. IV established and labs drawn by Shoshana Cronin via [employer] guidance.

## 2021-12-26 NOTE — FLOWSHEET NOTE
Patient is admitted to room 210. Patient is oriented to room and call light. Patient is AxOx4. Patient has friend at bedside for admission questions. Patient states he has staples still intact from a procedure that he does not recall when it was done and states he does not have an appointment to get them removed \"No body told me about getting them removed\". Patient stated he just left nursing home after finishing IV antibiotics and has been in and out of 3 different hospitals in the last month. VSS are stable. 2L NC at bedside to be used PRN. Clean urinal placed at bedside for urine specimen to be collected. Patient eating late dinner tray. Call light within reach. Electronically signed by Sivan Gamboa RN on 12/26/2021 at 6:47 PM

## 2021-12-26 NOTE — ED NOTES
Called supervisor, patient assigned to room 210. Waiting on admission order.   Eric Viera  12/26/21 9483

## 2021-12-26 NOTE — ED NOTES
Dr. Bobo Luis called back to Dr. Doretha Quinones.   Hakeem Matamoros [facility] Shauna  12/26/21 1183

## 2021-12-26 NOTE — LETTER
[facility] ED  [address] [place] [address]  Phone: [phone]               December 26, 2021    Patient: Kendell Carmichael   YOB: 1955   Date of Visit: 12/26/2021       To Whom It May Concern: Lolita Garcia was seen and treated in our emergency department on 12/26/2021. He {Return to school/sport/work:[id number]}.       Sincerely,       Mango Vernon RN         Signature:__________________________________

## 2021-12-26 NOTE — ED PROVIDER NOTES
[address] [facility]  EMERGENCY DEPARTMENT ENCOUNTER      Pt Name: Melvi Vargas  MRN: [id number]  [place] 1955  Date of evaluation: 12/26/2021  Provider: Tasia Stark DO        HISTORY OF PRESENT ILLNESS    Melvi Vargas is a 61 y.o. male per chart review has ah/o alcohol abuse, anemia, asthma, cocaine abuse, copd, depression, edema, erecticle dysfunction, GERD. He comes in by EMS for SOB. The history is provided by the patient. Shortness of Breath  Severity:  Mild  Onset quality:  Sudden  Timing:  Constant  Progression:  Unchanged  Chronicity:  Recurrent  Context: activity and URI    Relieved by:  Nothing  Worsened by:  Nothing  Ineffective treatments:  None tried  Associated symptoms: cough and sputum production    Associated symptoms: no abdominal pain, no chest pain, no ear pain, no fever, no neck pain, no rash, no sore throat and no vomiting    Risk factors: obesity             REVIEW OF SYSTEMS       Review of Systems   Constitutional: Negative for chills and fever. HENT: Negative for ear pain and sore throat. Eyes: Negative for discharge and redness. Respiratory: Positive for cough, sputum production and shortness of breath. Cardiovascular: Negative for chest pain and palpitations. Gastrointestinal: Negative for abdominal pain, nausea and vomiting. Genitourinary: Negative for difficulty urinating and dysuria. Musculoskeletal: Negative for back pain and neck pain. Skin: Negative for rash and wound. Neurological: Negative for dizziness and syncope. Psychiatric/Behavioral: Negative for confusion. The patient is not nervous/anxious. All other systems reviewed and are negative. Except as noted above the remainder of the review of systems was reviewed and negative.        PAST MEDICAL HISTORY     Past Medical History:   Diagnosis Date    Alcohol abuse 10/27/2016    Anemia     Asthma     Cocaine abuse ([facility] 10/27/2016    [facility]     Depression 04/27/2020    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    Drug-seeking behavior 04/14/2015    Edema 12/10/2015    Erectile dysfunction     Gastroesophageal reflux disease 12/17/2018    Gout 10/27/2016    History of arthroscopy of both knees 10/27/2016    History of colon polyps 10/26/2021    House dust mite allergy 04/21/2014    Hyperlipidemia     Hypertension 10/27/2016    Injury to heart 10/27/2016    Insomnia 12/17/2018    Loculated pleural effusion     Medical non-compliance 02/22/2014    Morbid obesity due to excess calories ([facility] 12/08/2016    Osteoarthritis of both knees 12/08/2016    Personal history of tobacco use     Pleurisy 12/12/2016    Pneumonia 12/04/2016    caused hospital admission    Pre-diabetes 10/27/2016    Seasonal allergies 04/21/2014    Severe persistent asthma 10/27/2016    Severe sleep apnea 04/14/2015    Supraventricular tachycardia ([facility] 10/27/2016    Type 2 diabetes mellitus without complication, without long-term current use of insulin ([facility] 10/26/2021         SURGICAL HISTORY       Past Surgical History:   Procedure Laterality Date    ANTERIOR CRUCIATE LIGAMENT REPAIR      CARDIAC CATHETERIZATION      COLONOSCOPY N/A 07/15/2020    COLONOSCOPY WITH POLYPECTOMY performed by Kati Coto MD at [address] [place] [address] Left 11/27/2021    VATS/thoracotomy    TOTAL KNEE ARTHROPLASTY Left 08/09/2019    LEFT TOTAL KNEE ARTHROPLASTY performed by Arjun Arizmendi MD at [facility] N/A 78/91/2868    [address] [place] [address] performed by Joanne Bowen MD at [address] [facility]       Discharge Medication List as of 12/29/2021  9:26 AM      CONTINUE these medications which have NOT CHANGED    Details   fluticasone (FLONASE) 50 MCG/ACT nasal spray 2 sprays by Nasal route daily, Disp-48 g, R-1Normal      amLODIPine (NORVASC) 10 MG tablet Take 1 tablet by mouth daily, Disp-90 tablet, R-1Normal      lovastatin (MEVACOR) 10 MG tablet Take 1 tablet by mouth nightly, Disp-90 tablet, R-1Normal      pantoprazole (PROTONIX) 40 MG tablet Take 1 tablet by mouth daily, Disp-90 tablet, R-1Normal      albuterol sulfate  (90 Base) MCG/ACT inhaler INHALE 2 PUFFS INTO THE LUNGS EVERY 6 HOURS AS NEEDED FOR WHEEZING OR SHORTNESS OF BREATH, Disp-18 each, R-1Normal      meloxicam (MOBIC) 15 MG tablet Take 1 tablet by mouth daily, Disp-30 tablet, R-2Normal      escitalopram (LEXAPRO) 10 MG tablet Take 1 tablet by mouth daily, Disp-90 tablet, R-1Normal      montelukast (SINGULAIR) 10 MG tablet Take 1 tablet by mouth nightly, Disp-90 tablet, R-1Normal      traZODone (DESYREL) 50 MG tablet Take 1 tablet by mouth nightly, Disp-90 tablet, R-1Normal      sildenafil (VIAGRA) 100 MG tablet Take 1 tablet by mouth as needed for Erectile Dysfunction, Disp-10 tablet, R-0Normal      TRELEGY ELLIPTA 200-62.5-25 MCG/INH AEPB Take 1 Inhaler by mouth daily, Disp-60 each, R-2, DAWNormal      guaiFENesin (MUCINEX) 600 MG extended release tablet Take 1 tablet by mouth 2 times daily as needed for Congestion (Cough), Disp-40 tablet, R-1Normal      Lancets MISC 3 TIMES DAILY Starting Wed 10/20/2021, Disp-200 each, R-5, Normal      blood glucose monitor strips Test three times a day & as needed for symptoms of irregular blood glucose. Dispense sufficient amount for indicated testing frequency plus additional to accommodate PRN testing needs. , Disp-100 strip, R-1, Normal      metFORMIN (GLUCOPHAGE) 500 MG tablet Take 1 tablet by mouth 2 times daily (with meals), Disp-60 tablet, R-2Normal      sodium chloride (OCEAN, BABY AYR) 0.65 % nasal spray 2 sprays by Nasal route three times daily, Disp-1 each, R-0Normal      ipratropium-albuterol (DUONEB) 0.5-2.5 (3) MG/3ML SOLN nebulizer solution Inhale 3 mLs into the lungs every 6 hours as needed for Shortness of Breath, Disp-360 mL, R-3Normal      cetirizine (ZYRTEC ALLERGY) 10 MG tablet Take 10 mg by mouth dailyHistorical Med      acetaminophen (ACETAMINOPHEN EXTRA STRENGTH) 500 MG tablet Take 1 tablet by mouth every 8 hours as needed for Pain, Disp-120 tablet, R-1DX Code Needed  . Normal      polyethylene glycol (GLYCOLAX) 17 GM/SCOOP powder Take 17 g by mouth daily, Disp-510 g, R-1Normal             ALLERGIES     Fish-derived products, Iodine, Seasonal, and Pcn [penicillins]    FAMILY HISTORY       Family History   Problem Relation Age of Onset    Arthritis Mother     Asthma Mother     High Cholesterol Mother     Other Mother         aneurysm    Diabetes Father     Stroke Maternal Grandmother     Cancer Maternal Grandfather     Hypertension Other     COPD Neg Hx           SOCIAL HISTORY       Social History     Socioeconomic History    Marital status: Single     Spouse name: n/a    Number of children: 1    Years of education: 15    Highest education level: None   Occupational History    Occupation: Disability     Employer: NONE   Tobacco Use    Smoking status: Light Tobacco Smoker     Years: 30.00     Types: Cigarettes, Cigars     Start date: 1988     Last attempt to quit: 10/1/2013     Years since quittin.2    Smokeless tobacco: Never Used    Tobacco comment: doesnt buy only smokes if someone gives him one    Vaping Use    Vaping Use: Never used   Substance and Sexual Activity    Alcohol use: Not Currently     Alcohol/week: 4.0 standard drinks     Types: 2 Cans of beer, 2 Shots of liquor per week     Comment: social 1 -2 x week    Drug use: Not Currently     Types: Cocaine, Marijuana (Weed)     Comment: no cocaine x 1 year, marijuana weekly    Sexual activity: Not Currently     Partners: Female   Other Topics Concern    None   Social History Narrative    Lives in an apartment    15 steps to get up to the apartment    Asking for hospital bed and shower chair 2021 sent request to pcp office     Has 1 daughter who lives in [facility] per patient 2 grandchildren.   Per patient does not see his daughter very often. Social Determinants of Health     Financial Resource Strain:     Difficulty of Paying Living Expenses: Not on file   Food Insecurity:     Worried About Running Out of Food in the Last Year: Not on file    Jonnie of Food in the Last Year: Not on file   Transportation Needs: Unmet Transportation Needs    Lack of Transportation (Medical): Yes    Lack of Transportation (Non-Medical): Yes   Physical Activity:     Days of Exercise per Week: Not on file    Minutes of Exercise per Session: Not on file   Stress:     Feeling of Stress : Not on file   Social Connections:     Frequency of Communication with Friends and Family: Not on file    Frequency of Social Gatherings with Friends and Family: Not on file    Attends Baptism Services: Not on file    Active Member of [address] [place] [address] [employer] or Organizations: Not on file    Attends Club or Organization Meetings: Not on file    Marital Status: Not on file   Intimate Partner Violence:     Fear of Current or Ex-Partner: Not on file    Emotionally Abused: Not on file    Physically Abused: Not on file    Sexually Abused: Not on file   Housing Stability:     Unable to Pay for Housing in the Last Year: Not on file    Number of [place] in the Last Year: Not on file    Unstable Housing in the Last Year: Not on file         PHYSICAL EXAM       ED Triage Vitals [12/26/21 1435]   BP Temp Temp Source Pulse Resp SpO2 Height Weight   106/80 98.5 °F (36.9 °C) Oral 99 20 100 % 6' 0.5\" (1.842 m) 270 lb (122.5 kg)       Physical Exam  Vitals and nursing note reviewed. Constitutional:       Appearance: Normal appearance. He is obese. HENT:      Head: Normocephalic and atraumatic. Right Ear: Tympanic membrane normal.      Left Ear: Tympanic membrane normal.      Nose: Nose normal.      Mouth/Throat:      Mouth: Mucous membranes are moist.      Pharynx: Oropharynx is clear.    Eyes:      General: Lids are normal.      Extraocular Movements: Extraocular movements intact. Conjunctiva/sclera: Conjunctivae normal.      Pupils: Pupils are equal, round, and reactive to light. Cardiovascular:      Rate and Rhythm: Normal rate and regular rhythm. Pulses: Normal pulses. Heart sounds: Normal heart sounds. Pulmonary:      Effort: Pulmonary effort is normal.      Breath sounds: Normal breath sounds. Abdominal:      General: Abdomen is flat. Bowel sounds are normal.      Palpations: Abdomen is soft. Musculoskeletal:         General: Normal range of motion. Cervical back: Full passive range of motion without pain, normal range of motion and neck supple. Skin:     General: Skin is warm. Capillary Refill: Capillary refill takes less than 2 seconds. Neurological:      General: No focal deficit present. Mental Status: He is alert and oriented to person, place, and time. Deep Tendon Reflexes: Reflexes are normal and symmetric. Psychiatric:         Attention and Perception: Attention and perception normal.         Mood and Affect: Mood normal.         Behavior: Behavior normal. Behavior is cooperative.            LABS:  Labs Reviewed   CULTURE, RESPIRATORY - Abnormal; Notable for the following components:       Result Value    CULTURE, RESPIRATORY Usual respiratory kamar in 48 hrs (*)     Organism Yeast (*)     All other components within normal limits    Narrative:     ORDER#: [id number]                          ORDERED BY: DL FAIR  SOURCE: Sputum Expectorated Sputum         COLLECTED:  12/27/21 13:13  ANTIBIOTICS AT [place].:                      RECEIVED :  12/27/21 34:02   BASIC METABOLIC PANEL - Abnormal; Notable for the following components:    Glucose 133 (*)     All other components within normal limits   CBC WITH AUTO DIFFERENTIAL - Abnormal; Notable for the following components:    WBC 10.8 (*)     RBC 3.89 (*)     Hemoglobin 10.9 (*)     Hematocrit 35.9 (*)     MCV 92.3 (*)     MCHC 30.4 (*)     RDW 17.9 (*)     Platelets 523 (*)     Neutrophils Absolute 6.9 (*)     All other components within normal limits   TROPONIN - Abnormal; Notable for the following components:    Troponin 0.028 (*)     All other components within normal limits    Narrative:     CALL  Christopher BERRIOS tel. [phone],  Chemistry results called to and read back by aisha, 12/26/2021 15:53, by  Shikha Ratliff   HEMOGLOBIN A1C - Abnormal; Notable for the following components:    Hemoglobin A1C 6.0 (*)     All other components within normal limits   CBC WITH AUTO DIFFERENTIAL - Abnormal; Notable for the following components:    RBC 3.82 (*)     Hemoglobin 10.8 (*)     Hematocrit 34.3 (*)     MCHC 31.5 (*)     RDW 17.5 (*)     Platelets 661 (*)     Neutrophils % 85.5 (*)     Monocytes % 1.2 (*)     Eosinophils % 0.0 (*)     Basophils % 0.1 (*)     Neutrophils Absolute 6.9 (*)     Lymphocytes Absolute 1.0 (*)     Monocytes Absolute 0.1 (*)     All other components within normal limits   BASIC METABOLIC PANEL - Abnormal; Notable for the following components:    Glucose 148 (*)     All other components within normal limits   HEMOGLOBIN A1C - Abnormal; Notable for the following components:    Hemoglobin A1C 6.0 (*)     All other components within normal limits   URINE DRUG SCREEN, COMPREHENSIVE - Abnormal; Notable for the following components:    Cocaine Metabolite Screen, Urine POSITIVE (*)     Opiate Scrn, Ur POSITIVE (*)     All other components within normal limits   POCT GLUCOSE - Abnormal; Notable for the following components:    POC Glucose 189 (*)     All other components within normal limits   POCT GLUCOSE - Abnormal; Notable for the following components:    POC Glucose 144 (*)     All other components within normal limits   POCT GLUCOSE - Abnormal; Notable for the following components:    POC Glucose 161 (*)     All other components within normal limits   POCT GLUCOSE - Abnormal; Notable for the following components:    POC Glucose 152 (*)     All other components within normal limits   POCT GLUCOSE - Abnormal; Notable for the following components:    POC Glucose 167 (*)     All other components within normal limits   POCT GLUCOSE - Abnormal; Notable for the following components:    POC Glucose 125 (*)     All other components within normal limits   POCT GLUCOSE - Abnormal; Notable for the following components:    POC Glucose 134 (*)     All other components within normal limits   POCT GLUCOSE - Abnormal; Notable for the following components:    POC Glucose 123 (*)     All other components within normal limits   POCT GLUCOSE - Abnormal; Notable for the following components:    POC Glucose 122 (*)     All other components within normal limits   POCT GLUCOSE - Abnormal; Notable for the following components:    POC Glucose 127 (*)     All other components within normal limits   COVID-19, RAPID   BRAIN NATRIURETIC PEPTIDE    Narrative:     CALL  White  LOER tel. [phone],  Chemistry results called to and read back by aisha 12/26/2021 15:53, by  Alejandrina Cain   URINALYSIS   TROPONIN   POCT GLUCOSE   POCT GLUCOSE   POCT GLUCOSE   POCT GLUCOSE   POCT GLUCOSE   POCT GLUCOSE   POCT GLUCOSE   POCT GLUCOSE   POCT GLUCOSE   POCT GLUCOSE   POCT GLUCOSE   POCT GLUCOSE   POCT GLUCOSE   POCT GLUCOSE         MDM:   Vitals:    Vitals:    12/28/21 0508 12/28/21 0615 12/29/21 0611 12/29/21 1628   BP:  (!) 115/98 133/64    Pulse:  70 60    Resp:   18 18   Temp:  97.9 °F (36.6 °C) 97.8 °F (36.6 °C)    TempSrc:   Oral    SpO2: 98% 99% 97% 92%   Weight:       Height:           MDM  Number of Diagnoses or Management Options  Diagnosis management comments: Patient presents with SOB. Labs, EKG, CXR and medication ordered.    Elevated troponin 0.028  Asa 325mg PO ordered       EKG Interpretation    Interpreted by emergency department physician    Rhythm: normal sinus   Rate: normal  Axis: normal  Ectopy: none  Conduction: normal  ST Segments: nonspecific changes  T Waves: no acute change  Q Waves: none    Clinical Impression: non-specific EKG    DANE JAEGER DO     The lab results, radiology and test results were reviewed with the patient and family. The patient will follow up in 2 days with their primary care doctor. If their symptoms change or get worse they will return to the ER. CRITICAL CARE TIME   Total CriticalCare time was 0 minutes, excluding separately reportable procedures. There was a high probability of clinically significant/life threatening deterioration in the patient's condition which required my urgent intervention. PROCEDURES:  Unlessotherwise noted below, none     Procedures      FINAL IMPRESSION      1. NSTEMI (non-ST elevated myocardial infarction) ([facility]    2. COPD exacerbation ([facility]    3.  Acute exacerbation of chronic low back pain          DISPOSITION/PLAN   DISPOSITION            Kurtis Steven DO (electronically signed)  Attending Emergency Physician          Larissa aWgner DO  12/29/21 5977

## 2021-12-27 LAB
ANION GAP SERPL CALCULATED.3IONS-SCNC: 13 MEQ/L (ref 9–15)
BASOPHILS ABSOLUTE: 0 K/UL (ref 0–0.1)
BASOPHILS RELATIVE PERCENT: 0.1 % (ref 0.2–1.2)
BUN BLDV-MCNC: 14 MG/DL (ref 8–23)
CALCIUM SERPL-MCNC: 9.5 MG/DL (ref 8.5–9.9)
CHLORIDE BLD-SCNC: 102 MEQ/L (ref 95–107)
CO2: 22 MEQ/L (ref 20–31)
CREAT SERPL-MCNC: 0.79 MG/DL (ref 0.7–1.2)
EKG ATRIAL RATE: 90 BPM
EKG P AXIS: 65 DEGREES
EKG P-R INTERVAL: 150 MS
EKG Q-T INTERVAL: 362 MS
EKG QRS DURATION: 96 MS
EKG QTC CALCULATION (BAZETT): 442 MS
EKG R AXIS: 51 DEGREES
EKG T AXIS: 46 DEGREES
EKG VENTRICULAR RATE: 90 BPM
EOSINOPHILS ABSOLUTE: 0 K/UL (ref 0–0.5)
EOSINOPHILS RELATIVE PERCENT: 0 % (ref 0.8–7)
GFR AFRICAN AMERICAN: >60
GFR NON-AFRICAN AMERICAN: >60
GLUCOSE BLD-MCNC: 115 MG/DL (ref 60–115)
GLUCOSE BLD-MCNC: 144 MG/DL (ref 60–115)
GLUCOSE BLD-MCNC: 148 MG/DL (ref 70–99)
GLUCOSE BLD-MCNC: 152 MG/DL (ref 60–115)
GLUCOSE BLD-MCNC: 161 MG/DL (ref 60–115)
HBA1C MFR BLD: 6 % (ref 4.8–5.9)
HCT VFR BLD CALC: 34.3 % (ref 42–52)
HEMOGLOBIN: 10.8 G/DL (ref 13.7–17.5)
IMMATURE GRANULOCYTES #: 0 K/UL
IMMATURE GRANULOCYTES %: 0.5 %
LYMPHOCYTES ABSOLUTE: 1 K/UL (ref 1.3–3.6)
LYMPHOCYTES RELATIVE PERCENT: 12.7 %
MCH RBC QN AUTO: 28.3 PG (ref 25.7–32.2)
MCHC RBC AUTO-ENTMCNC: 31.5 % (ref 32.3–36.5)
MCV RBC AUTO: 89.8 FL (ref 79–92.2)
MONOCYTES ABSOLUTE: 0.1 K/UL (ref 0.3–0.8)
MONOCYTES RELATIVE PERCENT: 1.2 % (ref 5.3–12.2)
NEUTROPHILS ABSOLUTE: 6.9 K/UL (ref 1.8–5.4)
NEUTROPHILS RELATIVE PERCENT: 85.5 % (ref 34–67.9)
PDW BLD-RTO: 17.5 % (ref 11.6–14.4)
PERFORMED ON: ABNORMAL
PERFORMED ON: NORMAL
PLATELET # BLD: 528 K/UL (ref 163–337)
POTASSIUM SERPL-SCNC: 4.5 MEQ/L (ref 3.4–4.9)
RBC # BLD: 3.82 M/UL (ref 4.63–6.08)
SODIUM BLD-SCNC: 137 MEQ/L (ref 135–144)
TROPONIN: <0.01 NG/ML (ref 0–0.01)
WBC # BLD: 8 K/UL (ref 4.2–9)

## 2021-12-27 PROCEDURE — 6370000000 HC RX 637 (ALT 250 FOR IP): Performed by: INTERNAL MEDICINE

## 2021-12-27 PROCEDURE — 83036 HEMOGLOBIN GLYCOSYLATED A1C: CPT

## 2021-12-27 PROCEDURE — 6360000002 HC RX W HCPCS: Performed by: INTERNAL MEDICINE

## 2021-12-27 PROCEDURE — 87070 CULTURE OTHR SPECIMN AEROBIC: CPT

## 2021-12-27 PROCEDURE — 36415 COLL VENOUS BLD VENIPUNCTURE: CPT

## 2021-12-27 PROCEDURE — 93010 ELECTROCARDIOGRAM REPORT: CPT | Performed by: INTERNAL MEDICINE

## 2021-12-27 PROCEDURE — 84484 ASSAY OF TROPONIN QUANT: CPT

## 2021-12-27 PROCEDURE — 2580000003 HC RX 258: Performed by: INTERNAL MEDICINE

## 2021-12-27 PROCEDURE — 1210000000 HC MED SURG R&B

## 2021-12-27 PROCEDURE — 87205 SMEAR GRAM STAIN: CPT

## 2021-12-27 PROCEDURE — 80048 BASIC METABOLIC PNL TOTAL CA: CPT

## 2021-12-27 PROCEDURE — 94640 AIRWAY INHALATION TREATMENT: CPT

## 2021-12-27 PROCEDURE — 85025 COMPLETE CBC W/AUTO DIFF WBC: CPT

## 2021-12-27 RX ADMIN — ATORVASTATIN CALCIUM 20 MG: 10 TABLET, FILM COATED ORAL at 20:44

## 2021-12-27 RX ADMIN — PIPERACILLIN AND TAZOBACTAM 3375 MG: 3; .375 INJECTION, POWDER, LYOPHILIZED, FOR SOLUTION INTRAVENOUS at 02:39

## 2021-12-27 RX ADMIN — Medication 10 ML: at 08:42

## 2021-12-27 RX ADMIN — MORPHINE SULFATE 1 MG: 2 INJECTION, SOLUTION INTRAMUSCULAR; INTRAVENOUS at 20:44

## 2021-12-27 RX ADMIN — ESCITALOPRAM OXALATE 10 MG: 10 TABLET ORAL at 20:44

## 2021-12-27 RX ADMIN — PIPERACILLIN AND TAZOBACTAM 3375 MG: 3; .375 INJECTION, POWDER, LYOPHILIZED, FOR SOLUTION INTRAVENOUS at 18:15

## 2021-12-27 RX ADMIN — HYDROCODONE BITARTRATE AND ACETAMINOPHEN 1 TABLET: 5; 325 TABLET ORAL at 22:34

## 2021-12-27 RX ADMIN — METHYLPREDNISOLONE SODIUM SUCCINATE 40 MG: 40 INJECTION, POWDER, FOR SOLUTION INTRAMUSCULAR; INTRAVENOUS at 14:22

## 2021-12-27 RX ADMIN — HYDROCODONE BITARTRATE AND ACETAMINOPHEN 1 TABLET: 5; 325 TABLET ORAL at 16:25

## 2021-12-27 RX ADMIN — MELOXICAM 15 MG: 7.5 TABLET ORAL at 08:37

## 2021-12-27 RX ADMIN — METFORMIN HYDROCHLORIDE 500 MG: 500 TABLET ORAL at 16:23

## 2021-12-27 RX ADMIN — PIPERACILLIN AND TAZOBACTAM 3375 MG: 3; .375 INJECTION, POWDER, LYOPHILIZED, FOR SOLUTION INTRAVENOUS at 11:07

## 2021-12-27 RX ADMIN — IPRATROPIUM BROMIDE AND ALBUTEROL SULFATE 1 AMPULE: .5; 2.5 SOLUTION RESPIRATORY (INHALATION) at 06:04

## 2021-12-27 RX ADMIN — METHYLPREDNISOLONE SODIUM SUCCINATE 40 MG: 40 INJECTION, POWDER, FOR SOLUTION INTRAMUSCULAR; INTRAVENOUS at 02:40

## 2021-12-27 RX ADMIN — AMLODIPINE BESYLATE 10 MG: 10 TABLET ORAL at 08:37

## 2021-12-27 RX ADMIN — ENOXAPARIN SODIUM 40 MG: 40 INJECTION SUBCUTANEOUS at 08:37

## 2021-12-27 RX ADMIN — HYDROCODONE BITARTRATE AND ACETAMINOPHEN 1 TABLET: 5; 325 TABLET ORAL at 02:40

## 2021-12-27 RX ADMIN — METFORMIN HYDROCHLORIDE 500 MG: 500 TABLET ORAL at 08:37

## 2021-12-27 RX ADMIN — HYDROCODONE BITARTRATE AND ACETAMINOPHEN 1 TABLET: 5; 325 TABLET ORAL at 08:37

## 2021-12-27 RX ADMIN — IPRATROPIUM BROMIDE AND ALBUTEROL SULFATE 1 AMPULE: .5; 2.5 SOLUTION RESPIRATORY (INHALATION) at 11:28

## 2021-12-27 RX ADMIN — IPRATROPIUM BROMIDE AND ALBUTEROL SULFATE 1 AMPULE: .5; 2.5 SOLUTION RESPIRATORY (INHALATION) at 17:41

## 2021-12-27 RX ADMIN — MONTELUKAST SODIUM 10 MG: 10 TABLET ORAL at 20:44

## 2021-12-27 RX ADMIN — ONDANSETRON 4 MG: 2 INJECTION INTRAMUSCULAR; INTRAVENOUS at 06:28

## 2021-12-27 RX ADMIN — CETIRIZINE HYDROCHLORIDE 10 MG: 10 TABLET, FILM COATED ORAL at 08:37

## 2021-12-27 RX ADMIN — TRAZODONE HYDROCHLORIDE 50 MG: 50 TABLET ORAL at 20:44

## 2021-12-27 RX ADMIN — PANTOPRAZOLE SODIUM 40 MG: 40 TABLET, DELAYED RELEASE ORAL at 08:37

## 2021-12-27 RX ADMIN — MORPHINE SULFATE 1 MG: 2 INJECTION, SOLUTION INTRAMUSCULAR; INTRAVENOUS at 12:45

## 2021-12-27 ASSESSMENT — PAIN SCALES - GENERAL
PAINLEVEL_OUTOF10: 6
PAINLEVEL_OUTOF10: 9
PAINLEVEL_OUTOF10: 7
PAINLEVEL_OUTOF10: 9
PAINLEVEL_OUTOF10: 9
PAINLEVEL_OUTOF10: 0
PAINLEVEL_OUTOF10: 7

## 2021-12-27 ASSESSMENT — PAIN DESCRIPTION - PAIN TYPE: TYPE: SURGICAL PAIN

## 2021-12-27 ASSESSMENT — PAIN DESCRIPTION - LOCATION: LOCATION: BACK

## 2021-12-27 NOTE — H&P
Supraventricular tachycardia ([facility] [address] 10/27/2016    Type 2 diabetes mellitus without complication, without long-term current use of insulin ([facility] [address] 10/26/2021       Past Surgical History:      Procedure Laterality Date    ANTERIOR CRUCIATE LIGAMENT REPAIR      CARDIAC CATHETERIZATION      COLONOSCOPY N/A 07/15/2020    COLONOSCOPY WITH POLYPECTOMY performed by Christina Cottrell MD at [address] [facility] Left 11/27/2021    VATS/thoracotomy    TOTAL KNEE ARTHROPLASTY Left 08/09/2019    LEFT TOTAL KNEE ARTHROPLASTY performed by Binta Hayes MD at [facility] N/A 96/76/0017    UMBILICAL HERNIA REPAIR WITH MESH performed by Concepcion Landis MD at [facility]       Medications Prior to Admission:    Prior to Admission medications    Medication Sig Start Date End Date Taking?  Authorizing Provider   fluticasone (FLONASE) 50 MCG/ACT nasal spray 2 sprays by Nasal route daily 12/22/21  Yes Maty Bansal MD   amLODIPine (NORVASC) 10 MG tablet Take 1 tablet by mouth daily 12/22/21  Yes Maty Bansal MD   lovastatin (MEVACOR) 10 MG tablet Take 1 tablet by mouth nightly 12/22/21  Yes Maty Bansal MD   pantoprazole (PROTONIX) 40 MG tablet Take 1 tablet by mouth daily 12/8/21  Yes Maty Bansal MD   albuterol sulfate  (90 Base) MCG/ACT inhaler INHALE 2 PUFFS INTO THE LUNGS EVERY 6 HOURS AS NEEDED FOR WHEEZING OR SHORTNESS OF BREATH 11/29/21  Yes Maty Bansal MD   meloxicam (MOBIC) 15 MG tablet Take 1 tablet by mouth daily 11/24/21  Yes Maty Bansal MD   escitalopram (LEXAPRO) 10 MG tablet Take 1 tablet by mouth daily 11/24/21  Yes Maty Bansal MD   montelukast (SINGULAIR) 10 MG tablet Take 1 tablet by mouth nightly 11/15/21  Yes Maty Bansal MD   traZODone (DESYREL) 50 MG tablet Take 1 tablet by mouth nightly 11/15/21  Yes Maty Bansal MD   sildenafil (VIAGRA) 100 MG tablet Take 1 tablet by mouth as needed for Erectile Dysfunction 11/8/21 Yes Miriam Vazquez MD   [address] 367-70.8-67 MCG/INH AEPB Take 1 Inhaler by mouth daily 11/3/21  Yes Miriam Vazquez MD   guaiFENesin (MUCINEX) 600 MG extended release tablet Take 1 tablet by mouth 2 times daily as needed for Congestion (Cough) 10/20/21  Yes Pamela Ho MD   sodium chloride (OCEAN, BABY AYR) 0.65 % nasal spray 2 sprays by Nasal route three times daily 9/28/21  Yes Ana Burkett MD   ipratropium-albuterol (DUONEB) 0.5-2.5 (3) MG/3ML SOLN nebulizer solution Inhale 3 mLs into the lungs every 6 hours as needed for Shortness of Breath 9/19/21 12/26/21 Yes Shannan Zhou MD   cetirizine (ZYRTEC ALLERGY) 10 MG tablet Take 10 mg by mouth daily   Yes Acosta Payne MD   acetaminophen (ACETAMINOPHEN EXTRA STRENGTH) 500 MG tablet Take 1 tablet by mouth every 8 hours as needed for Pain 8/16/21  Yes Miriam Vazquez MD   polyethylene glycol (GLYCOLAX) 17 GM/SCOOP powder Take 17 g by mouth daily  Patient taking differently: Take 17 g by mouth daily as needed  4/29/21  Yes Miriam Vazquez MD   Lancets MISC 1 each by Does not apply route 3 times daily 10/20/21   Pamela Ho MD   blood glucose monitor strips Test three times a day & as needed for symptoms of irregular blood glucose. Dispense sufficient amount for indicated testing frequency plus additional to accommodate PRN testing needs. 10/20/21   Pamela Ho MD   metFORMIN (GLUCOPHAGE) 500 MG tablet Take 1 tablet by mouth 2 times daily (with meals) 10/20/21   Pamela Ho MD       Allergies:  Fish-derived products, Iodine, Seasonal, and Pcn [penicillins]    Social History:   TOBACCO:   reports that he has been smoking cigarettes and cigars. He started smoking about 33 years ago. He has smoked for the past 30.00 years. He has never used smokeless tobacco.  ETOH:   reports previous alcohol use of about 4.0 standard drinks of alcohol per week.       Family History:       Problem Relation Age of Onset    Arthritis Mother     Asthma Mother     High Cholesterol Mother     Other Mother         aneurysm    Diabetes Father     Stroke Maternal Grandmother     Cancer Maternal Grandfather     Hypertension Other     COPD Neg Hx        REVIEW OF SYSTEMS:  Ten systems reviewed and negative except for stated in HPI    Physical Exam:    Vitals: BP (!) 140/83   Pulse 97   Temp 97.9 °F (36.6 °C)   Resp 18   Ht 6' 0.5\" (1.842 m)   Wt 270 lb (122.5 kg)   SpO2 97%   BMI 36.12 kg/m²   General appearance: alert, appears stated age and cooperative, no apparent distress  Skin: Skin color, texture, turgor normal. No rashes or lesions  HEENT: Head: Normocephalic, no lesions, without obvious abnormality. Neck: no adenopathy, no carotid bruit, no JVD, supple, symmetrical, trachea midline and thyroid not enlarged, symmetric, no tenderness/mass/nodules  Lungs: Minimal crackles of the left mid and lower base. Patient has linear surgical scar with staples are still on involving the left chest wall from the left axillary area all the way to the left mid back toward the spine. This is consistent with his recent VATS done at [facility].   Heart: regular rate and rhythm, S1, S2 normal, no murmur, click, rub or gallop  Abdomen: soft, non-tender; bowel sounds normal; no masses,  no organomegaly  Extremities: extremities normal, atraumatic, no cyanosis or edema  Neurologic: Mental status: Alert, oriented, thought content appropriate     Recent Labs     12/26/21  1527 12/27/21  0626   WBC 10.8* 8.0   HGB 10.9* 10.8*   * 528*     Recent Labs     12/26/21  1527 12/27/21  0626    137   K 4.4 4.5    102   CO2 23 22   BUN 8 14   CREATININE 0.82 0.79   GLUCOSE 133* 148*     Troponin T:   Recent Labs     12/26/21  1527 12/27/21  0626   TROPONINI 0.028* <0.010       ABGs:   Lab Results   Component Value Date    PHART 7.44 12/03/2021    PO2ART 64 12/03/2021    SQI5SWG 39 12/03/2021     INR: No results for input(s): INR in the last 72 hours.  URINALYSIS:  Recent Labs     12/26/21 1919   COLORU Dark yellow   PHUR 7.5   CLARITYU Clear   SPECGRAV 1.020   LEUKOCYTESUR Negative   UROBILINOGEN 0.2   BILIRUBINUR Negative   BLOODU Negative   GLUCOSEU Negative     -----------------------------------------------------------------   XR CHEST PORTABLE    Result Date: 12/27/2021  EXAMINATION: Portable AP ERECT view of the chest. CLINICAL HISTORY:  sob , recent thoracentesis DATE: 12/26/2021 3:43 PM COMPARISONS: 11/17/2021 FINDINGS: Normal cardiac silhouette. The left costophrenic angle is blunted secondary to pleural effusion, but decreased from prior exam. There is infiltrate/atelectasis in left midlung and left lung base. There is also infiltrate/atelectasis left upper lobe and right lung base. No pneumothorax. There are moderate degenerative changes in spine. Metallic staples overlie the left lower thorax. This was not seen on prior exams. DECREASED LEFT-SIDED PLEURAL EFFUSION. INFILTRATE/ATELECTASIS LEFT LUNG, MOST PRONOUNCED AT THE LEFT LUNG BASE. SMALL INFILTRATE/ATELECTASIS RIGHT LUNG BASE. Assessment and Plan     *Acute COPD exacerbation is suspected that based on worsening cough, wheezing and congestion that had significantly improved over the last 24 hours with the aerosol bronchodilators and the Solu-Medrol. Saturation 95% on room air. No tachypnea. Unlikely pulmonary embolism. No clinical evidence of DVT such as leg swelling or calf tenderness. Patient is allergic to contrast dye. VQ scan will certainly be inconclusive due to left-sided lung fibrosis and recent VATS. Risk of contrast-induced allergic reaction outweigh benefit given the low clinical suspicion. *Chronic left-sided pulmonary scarring and effusion for which patient had VATS at [facility] recently. The patient continues to have staples at the surgical site. Unknown results of tissue biopsy. Patient is to follow-up with his pulmonary team at [facility].   Unlikely that the patient has a recurrent pneumonia at this site. *Cocaine abuse. Patient tested positive for cocaine on a previous encounter and that was educated about the need to stop otherwise is a potentially endangering his life having major heart attack or stroke. *Elevated troponin, transient. Atypical cardiac pain noted. His chest wall pain is related to his recent surgical intervention with diet. Patient had a stress test in 2020 which came back negative for ischemia but positive for suspected attenuation changes. His elevated troponin could be caused by his recent cocaine inhalation. No clinical evidence to suggest ACS at this time. *Hypertension. *Diabetes. *Multiple other medical problems not listed above. Plan:  I had accepted to admit patient to the medical floor. I started the patient on aerosol and the Solu-Medrol as well as Zosyn. Consider switching him to oral antibiotic. Continue telemetry monitoring. His troponin is down to normal.  Counseling about the need to stop cocaine otherwise patient is endangering his life. Consider follow-up with the cardiology team however patient needs to be abstinent from cocaine. Patient needs to follow-up with his pulmonary team at [facility] and to follow-up with thoracic surgeon who performed his VATS to review pending pathology report. Given the fact that that most of his pulmonary care is done at [facility], I will instruct patient to seek medical care related to his pulmonary issues at [facility] and that is to ensure appropriate continuity of care and prevent things potentially getting missed.     Patient Active Problem List   Diagnosis Code    Anemia D64.9    Medical non-compliance Z91.19    House dust mite allergy Z91.09    Seasonal allergies J30.2    GASPER (obstructive sleep apnea) G47.33    Drug-seeking behavior Z76.5    Hyperlipidemia E78.5    Alcohol abuse F10.10    Cocaine abuse ([facility] [address] F14.10    Gout M10.9    History of arthroscopy of both knees Z98.890    Hypertension I10    Supraventricular tachycardia ([facility] [address] I47.1    Erectile dysfunction N52.9    Community acquired pneumonia of left lower lobe of lung J18.9    Morbid obesity due to excess calories ([facility]) E66.01    COPD (chronic obstructive pulmonary disease) (HCC) J44.9    Chest wall pain R07.89    Insomnia G47.00    Gastroesophageal reflux disease K21.9    Tobacco use Z72.0    Status post total knee replacement, left L39.494    Allergy to seafood Z91.013    Anxiety with depression D80.3    Umbilical hernia without obstruction and without gangrene K42.9    Spinal stenosis of lumbar region M48.061    Type 2 diabetes mellitus without complication, without long-term current use of insulin ([facility]) E11.9    History of colon polyps Z86.010    COPD with acute exacerbation (HCC) J44.1    COPD exacerbation (HCC) J44.1       Charl Merlin, MD, MD  Admitting Hospitalist    TTS: 85mins where I focused more than 75% of my attention on rendering care, and planning treatment course for this patient, in addition to talking to RN team, mid levels, consulting with other physicians and following up on labs and imaging. High Risk Readmission Screening Tool Score Noted.      Emergency Contact:

## 2021-12-27 NOTE — PROGRESS NOTES
Pt A&Ox4 and resting in bed. Patient states he had \"lung procedure\" at [facility] in [place] then was at the nursing home [facility] in [facility] for recovery. Pt is unsure of doctor that did surgery or when staples are to be removed. Assessment and vitals are as charted. Medication given per orders. Patient given turkey sandwich and soda per request. Pt states no further needs at this time. Call light and table are within reach of patient.

## 2021-12-28 LAB
GLUCOSE BLD-MCNC: 123 MG/DL (ref 60–115)
GLUCOSE BLD-MCNC: 125 MG/DL (ref 60–115)
GLUCOSE BLD-MCNC: 134 MG/DL (ref 60–115)
GLUCOSE BLD-MCNC: 167 MG/DL (ref 60–115)
PERFORMED ON: ABNORMAL

## 2021-12-28 PROCEDURE — 6360000002 HC RX W HCPCS: Performed by: INTERNAL MEDICINE

## 2021-12-28 PROCEDURE — 6370000000 HC RX 637 (ALT 250 FOR IP): Performed by: INTERNAL MEDICINE

## 2021-12-28 PROCEDURE — 2580000003 HC RX 258: Performed by: INTERNAL MEDICINE

## 2021-12-28 PROCEDURE — 2700000000 HC OXYGEN THERAPY PER DAY

## 2021-12-28 PROCEDURE — 1210000000 HC MED SURG R&B

## 2021-12-28 PROCEDURE — 94640 AIRWAY INHALATION TREATMENT: CPT

## 2021-12-28 RX ADMIN — ESCITALOPRAM OXALATE 10 MG: 10 TABLET ORAL at 20:04

## 2021-12-28 RX ADMIN — AMLODIPINE BESYLATE 10 MG: 10 TABLET ORAL at 08:27

## 2021-12-28 RX ADMIN — PANTOPRAZOLE SODIUM 40 MG: 40 TABLET, DELAYED RELEASE ORAL at 08:27

## 2021-12-28 RX ADMIN — Medication 10 ML: at 08:31

## 2021-12-28 RX ADMIN — IPRATROPIUM BROMIDE AND ALBUTEROL SULFATE 1 AMPULE: .5; 2.5 SOLUTION RESPIRATORY (INHALATION) at 11:32

## 2021-12-28 RX ADMIN — IPRATROPIUM BROMIDE AND ALBUTEROL SULFATE 1 AMPULE: .5; 2.5 SOLUTION RESPIRATORY (INHALATION) at 05:08

## 2021-12-28 RX ADMIN — PIPERACILLIN AND TAZOBACTAM 3375 MG: 3; .375 INJECTION, POWDER, LYOPHILIZED, FOR SOLUTION INTRAVENOUS at 02:28

## 2021-12-28 RX ADMIN — PIPERACILLIN AND TAZOBACTAM 3375 MG: 3; .375 INJECTION, POWDER, LYOPHILIZED, FOR SOLUTION INTRAVENOUS at 11:03

## 2021-12-28 RX ADMIN — MONTELUKAST SODIUM 10 MG: 10 TABLET ORAL at 20:05

## 2021-12-28 RX ADMIN — PIPERACILLIN AND TAZOBACTAM 3375 MG: 3; .375 INJECTION, POWDER, LYOPHILIZED, FOR SOLUTION INTRAVENOUS at 17:38

## 2021-12-28 RX ADMIN — Medication 10 ML: at 21:41

## 2021-12-28 RX ADMIN — MELOXICAM 15 MG: 7.5 TABLET ORAL at 08:27

## 2021-12-28 RX ADMIN — METHYLPREDNISOLONE SODIUM SUCCINATE 40 MG: 40 INJECTION, POWDER, FOR SOLUTION INTRAMUSCULAR; INTRAVENOUS at 02:28

## 2021-12-28 RX ADMIN — MORPHINE SULFATE 1 MG: 2 INJECTION, SOLUTION INTRAMUSCULAR; INTRAVENOUS at 11:05

## 2021-12-28 RX ADMIN — HYDROCODONE BITARTRATE AND ACETAMINOPHEN 1 TABLET: 5; 325 TABLET ORAL at 22:39

## 2021-12-28 RX ADMIN — CETIRIZINE HYDROCHLORIDE 10 MG: 10 TABLET, FILM COATED ORAL at 08:27

## 2021-12-28 RX ADMIN — METFORMIN HYDROCHLORIDE 500 MG: 500 TABLET ORAL at 16:52

## 2021-12-28 RX ADMIN — ENOXAPARIN SODIUM 40 MG: 40 INJECTION SUBCUTANEOUS at 08:26

## 2021-12-28 RX ADMIN — MORPHINE SULFATE 1 MG: 2 INJECTION, SOLUTION INTRAMUSCULAR; INTRAVENOUS at 02:27

## 2021-12-28 RX ADMIN — TRAZODONE HYDROCHLORIDE 50 MG: 50 TABLET ORAL at 20:05

## 2021-12-28 RX ADMIN — METFORMIN HYDROCHLORIDE 500 MG: 500 TABLET ORAL at 08:27

## 2021-12-28 RX ADMIN — HYDROCODONE BITARTRATE AND ACETAMINOPHEN 1 TABLET: 5; 325 TABLET ORAL at 16:55

## 2021-12-28 RX ADMIN — HYDROCODONE BITARTRATE AND ACETAMINOPHEN 1 TABLET: 5; 325 TABLET ORAL at 08:27

## 2021-12-28 RX ADMIN — MORPHINE SULFATE 1 MG: 2 INJECTION, SOLUTION INTRAMUSCULAR; INTRAVENOUS at 20:02

## 2021-12-28 RX ADMIN — METHYLPREDNISOLONE SODIUM SUCCINATE 40 MG: 40 INJECTION, POWDER, FOR SOLUTION INTRAMUSCULAR; INTRAVENOUS at 14:13

## 2021-12-28 RX ADMIN — ATORVASTATIN CALCIUM 20 MG: 10 TABLET, FILM COATED ORAL at 20:04

## 2021-12-28 RX ADMIN — IPRATROPIUM BROMIDE AND ALBUTEROL SULFATE 1 AMPULE: .5; 2.5 SOLUTION RESPIRATORY (INHALATION) at 17:58

## 2021-12-28 ASSESSMENT — PAIN SCALES - GENERAL
PAINLEVEL_OUTOF10: 10
PAINLEVEL_OUTOF10: 7
PAINLEVEL_OUTOF10: 7
PAINLEVEL_OUTOF10: 9
PAINLEVEL_OUTOF10: 6
PAINLEVEL_OUTOF10: 9

## 2021-12-28 NOTE — PLAN OF CARE
Problem: Falls - Risk of:  Goal: Will remain free from falls  Description: Will remain free from falls  Outcome: Met This Shift  Goal: Absence of physical injury  Description: Absence of physical injury  Outcome: Met This Shift     Problem: Pain:  Description: Pain management should include both nonpharmacologic and pharmacologic interventions.   Goal: Pain level will decrease  Description: Pain level will decrease  Outcome: Ongoing  Goal: Control of acute pain  Description: Control of acute pain  Outcome: Ongoing  Goal: Control of chronic pain  Description: Control of chronic pain  Outcome: Ongoing

## 2021-12-28 NOTE — PROGRESS NOTES
Patient is feeling better. Less cough and congestion. No chest pain or palpitation. No fever or chills    ROS: 12 system review otherwise is negative for acute signs or symptoms over the last 24 hrs. Exam: General appearance: alert, appears stated age and cooperative, no apparent distress  Skin: Skin color, texture, turgor normal. No rashes or lesions  HEENT: Head: Normocephalic, no lesions, without obvious abnormality. Neck: no adenopathy, no carotid bruit, no JVD, supple, symmetrical, trachea midline and thyroid not enlarged, symmetric, no tenderness/mass/nodules  Lungs: Minimal crackles of the left mid and lower base. Patient has linear surgical scar with staples are still on involving the left chest wall from the left axillary area all the way to the left mid back toward the spine. This is consistent with his recent VATS done at [facility]. Heart: regular rate and rhythm, S1, S2 normal, no murmur, click, rub or gallop  Abdomen: soft, non-tender; bowel sounds normal; no masses,  no organomegaly  Extremities: extremities normal, atraumatic, no cyanosis or edema  Neurologic: Mental status: Alert, oriented, thought content appropriate      Assessment and plan:     *Acute COPD exacerbation. Significant improvement of bilateral wheezing. Saturation 95% on 2 L. Patient is on home oxygen.     *Chronic left-sided pulmonary scarring and effusion for which patient had VATS at [facility] recently. The patient continues to have staples at the surgical site. Unknown results of tissue biopsy. Patient is to follow-up with his pulmonary team at [address] [place] [address]. Unlikely that the patient has a recurrent pneumonia at this site.     *Cocaine abuse. Patient tested positive for cocaine on a previous encounter and that was educated about the need to stop otherwise is a potentially endangering his life having major heart attack or stroke.     *Elevated troponin, transient. Atypical cardiac pain noted.   His chest wall pain is related to his recent surgical intervention with diet. Patient had a stress test in 2020 which came back negative for ischemia but positive for suspected attenuation changes. His elevated troponin could be caused by his recent cocaine inhalation. No clinical evidence to suggest ACS at this time.     *Hypertension.     *Diabetes.     *Multiple other medical problems not listed above. Patient would likely need to have additional work-up, desiccation and therapeutic intervention that that potentially could take place post discharge by PCP in collaboration with other needed outpatient and providers.

## 2021-12-29 VITALS
HEART RATE: 60 BPM | TEMPERATURE: 97.8 F | HEIGHT: 73 IN | SYSTOLIC BLOOD PRESSURE: 133 MMHG | OXYGEN SATURATION: 92 % | RESPIRATION RATE: 18 BRPM | BODY MASS INDEX: 35.78 KG/M2 | DIASTOLIC BLOOD PRESSURE: 64 MMHG | WEIGHT: 270 LBS

## 2021-12-29 LAB
CULTURE, RESPIRATORY: ABNORMAL
CULTURE, RESPIRATORY: ABNORMAL
GLUCOSE BLD-MCNC: 110 MG/DL (ref 60–115)
GLUCOSE BLD-MCNC: 122 MG/DL (ref 60–115)
GLUCOSE BLD-MCNC: 127 MG/DL (ref 60–115)
GRAM STAIN RESULT: ABNORMAL
ORGANISM: ABNORMAL
PERFORMED ON: ABNORMAL
PERFORMED ON: ABNORMAL
PERFORMED ON: NORMAL

## 2021-12-29 PROCEDURE — 6370000000 HC RX 637 (ALT 250 FOR IP): Performed by: INTERNAL MEDICINE

## 2021-12-29 PROCEDURE — 6360000002 HC RX W HCPCS: Performed by: INTERNAL MEDICINE

## 2021-12-29 PROCEDURE — 94640 AIRWAY INHALATION TREATMENT: CPT

## 2021-12-29 PROCEDURE — 2580000003 HC RX 258: Performed by: INTERNAL MEDICINE

## 2021-12-29 RX ORDER — HYDROCODONE BITARTRATE AND ACETAMINOPHEN 5; 325 MG/1; MG/1
1 TABLET ORAL EVERY 6 HOURS PRN
Qty: 12 TABLET | Refills: 0 | Status: SHIPPED | OUTPATIENT
Start: 2021-12-29 | End: 2022-01-02

## 2021-12-29 RX ORDER — PREDNISONE 20 MG/1
TABLET ORAL
Qty: 20 TABLET | Refills: 0 | Status: SHIPPED | OUTPATIENT
Start: 2021-12-29 | End: 2022-01-17

## 2021-12-29 RX ADMIN — MELOXICAM 15 MG: 7.5 TABLET ORAL at 08:52

## 2021-12-29 RX ADMIN — MORPHINE SULFATE 1 MG: 2 INJECTION, SOLUTION INTRAMUSCULAR; INTRAVENOUS at 12:32

## 2021-12-29 RX ADMIN — IPRATROPIUM BROMIDE AND ALBUTEROL SULFATE 1 AMPULE: .5; 2.5 SOLUTION RESPIRATORY (INHALATION) at 05:28

## 2021-12-29 RX ADMIN — CETIRIZINE HYDROCHLORIDE 10 MG: 10 TABLET, FILM COATED ORAL at 08:51

## 2021-12-29 RX ADMIN — IPRATROPIUM BROMIDE AND ALBUTEROL SULFATE 1 AMPULE: .5; 2.5 SOLUTION RESPIRATORY (INHALATION) at 12:50

## 2021-12-29 RX ADMIN — METFORMIN HYDROCHLORIDE 500 MG: 500 TABLET ORAL at 16:08

## 2021-12-29 RX ADMIN — ENOXAPARIN SODIUM 40 MG: 40 INJECTION SUBCUTANEOUS at 08:52

## 2021-12-29 RX ADMIN — METHYLPREDNISOLONE SODIUM SUCCINATE 40 MG: 40 INJECTION, POWDER, FOR SOLUTION INTRAMUSCULAR; INTRAVENOUS at 14:56

## 2021-12-29 RX ADMIN — AMLODIPINE BESYLATE 10 MG: 10 TABLET ORAL at 08:52

## 2021-12-29 RX ADMIN — PANTOPRAZOLE SODIUM 40 MG: 40 TABLET, DELAYED RELEASE ORAL at 08:52

## 2021-12-29 RX ADMIN — HYDROCODONE BITARTRATE AND ACETAMINOPHEN 1 TABLET: 5; 325 TABLET ORAL at 08:52

## 2021-12-29 RX ADMIN — PIPERACILLIN AND TAZOBACTAM 3375 MG: 3; .375 INJECTION, POWDER, LYOPHILIZED, FOR SOLUTION INTRAVENOUS at 03:36

## 2021-12-29 RX ADMIN — PIPERACILLIN AND TAZOBACTAM 3375 MG: 3; .375 INJECTION, POWDER, LYOPHILIZED, FOR SOLUTION INTRAVENOUS at 11:54

## 2021-12-29 RX ADMIN — METHYLPREDNISOLONE SODIUM SUCCINATE 40 MG: 40 INJECTION, POWDER, FOR SOLUTION INTRAMUSCULAR; INTRAVENOUS at 03:37

## 2021-12-29 RX ADMIN — METFORMIN HYDROCHLORIDE 500 MG: 500 TABLET ORAL at 08:52

## 2021-12-29 RX ADMIN — HYDROCODONE BITARTRATE AND ACETAMINOPHEN 1 TABLET: 5; 325 TABLET ORAL at 16:08

## 2021-12-29 RX ADMIN — MORPHINE SULFATE 1 MG: 2 INJECTION, SOLUTION INTRAMUSCULAR; INTRAVENOUS at 03:36

## 2021-12-29 ASSESSMENT — PAIN SCALES - GENERAL
PAINLEVEL_OUTOF10: 9
PAINLEVEL_OUTOF10: 6
PAINLEVEL_OUTOF10: 9
PAINLEVEL_OUTOF10: 10
PAINLEVEL_OUTOF10: 0
PAINLEVEL_OUTOF10: 6
PAINLEVEL_OUTOF10: 6
PAINLEVEL_OUTOF10: 9

## 2021-12-29 NOTE — PROGRESS NOTES
Discharge instructions and medications reviewed with pt at bedside. Understanding verbalized and all questions answered. Pt transported to ride via wheelchair. He packed his own belongings prior to discharge.

## 2021-12-29 NOTE — DISCHARGE SUMMARY
Hospital Medicine Discharge Summary    Pedro Goss  :  1955  MRN:  [id number]    Admit date:  2021  Discharge date:  2021    Admitting Physician:  Elsie Shanks MD  Primary Care Physician:  Dia De Santiago MD      Discharge Diagnoses:      *Acute COPD exacerbation. Significant improvement of bilateral wheezing. Saturation 95% on 2 L. Patient is on home oxygen.     *Chronic left-sided pulmonary scarring and effusion for which patient had VATS at [facility] recently. Nilo Tee patient continues to have staples at the surgical site.  Unknown results of tissue biopsy.  Patient is to follow-up with his pulmonary team at [facility].  Unlikely that the patient has a recurrent pneumonia at this site. Normal white count, no temperature elevation, no hypertension or tachycardia.     *Cocaine abuse.  Patient tested positive for cocaine on a previous encounter and that was educated about the need to stop otherwise is a potentially endangering his life having major heart attack or stroke.     *Elevated troponin, transient.  Atypical cardiac pain noted.  His chest wall pain is related to his recent surgical intervention with diet.  Patient had a stress test in  which came back negative for ischemia but positive for suspected attenuation changes. His elevated troponin could be caused by his recent cocaine inhalation. No clinical evidence to suggest ACS at this time.     *Hypertension.     *Diabetes.     *Multiple other medical problems not listed above. Patient has multiple medical issues as listed above and others other than not listed. Patient is doing great. No shortness of breath or wheezing at this time. Patient is back to baseline state. No fever or chills. I do not have clear or strong clinical justification to extend inpatient hospitalization.   Patient however would definitely need and require close and frequent monitoring as well as additional work-up, investigation and therapeutic intervention that potentially could take place in the outpatient setting by primary care doctor in collaboration with other specialists including his pulmonologist Dr. Delfina Thomson and his [address] [place] [address] chest surgeons who performed VATS. That is to prevent relapse, decompensation, rehospitalization and other medical implications. Hospital Course: Barrington Phelan is a 61 y.o. male that was admitted and treated at [facility] for the aforementioned medical conditions. Patient was started on aerosol bronchodilators as well as Solu-Medrol. Patient had made significant improvement in his respiratory status and back to baseline state. Initially patient was a started on Zosyn however patient does not exhibit any signs of acute respiratory infection or systemic inflammatory reactive syndrome. Patient recently had VATS on the left side for empyema. The patient was sent out at that to the nursing home to complete his intravenous antibiotic as recommended by his providers. At this time there is no clinical indication to extend his antibiotic care unless patient starts having worsening fever or leukocytosis. Exam: General appearance: alert, appears stated age and cooperative, no apparent distress  Skin: Skin color, texture, turgor normal. No rashes or lesions  HEENT: Head: Normocephalic, no lesions, without obvious abnormality. Neck: no adenopathy, no carotid bruit, no JVD, supple, symmetrical, trachea midline and thyroid not enlarged, symmetric, no tenderness/mass/nodules  Lungs: Minimal crackles of the left mid and lower base.  Patient has linear surgical scar with staples are still on involving the left chest wall from the left axillary area all the way to the left mid back toward the spine.  This is consistent with his recent VATS done at [facility].   Heart: regular rate and rhythm, S1, S2 normal, no murmur, click, rub or gallop  Abdomen: soft, non-tender; bowel sounds normal; no masses,  no organomegaly  Extremities: extremities normal, atraumatic, no cyanosis or edema  Neurologic: Mental status: Alert, oriented, thought content appropriate     Patient was seen by the following consultants while admitted to [facility]:   Consults:  None    Significant Diagnostic Studies:    XR CHEST PORTABLE    Result Date: 12/27/2021  EXAMINATION: Portable AP ERECT view of the chest. CLINICAL HISTORY:  sob , recent thoracentesis DATE: 12/26/2021 3:43 PM COMPARISONS: 11/17/2021 FINDINGS: Normal cardiac silhouette. The left costophrenic angle is blunted secondary to pleural effusion, but decreased from prior exam. There is infiltrate/atelectasis in left midlung and left lung base. There is also infiltrate/atelectasis left upper lobe and right lung base. No pneumothorax. There are moderate degenerative changes in spine. Metallic staples overlie the left lower thorax. This was not seen on prior exams. DECREASED LEFT-SIDED PLEURAL EFFUSION. INFILTRATE/ATELECTASIS LEFT LUNG, MOST PRONOUNCED AT THE LEFT LUNG BASE. SMALL INFILTRATE/ATELECTASIS RIGHT LUNG BASE. Discharge Medications:         Medication List      START taking these medications    predniSONE 20 MG tablet  Commonly known as: DELTASONE  Take one tablet TWICE per day for THREE days, then ONE tablet ONCE per day for a week, then HALF tablet ONCE per day for a WEEK then STOP.         CHANGE how you take these medications    polyethylene glycol 17 GM/SCOOP powder  Commonly known as: GLYCOLAX  Take 17 g by mouth daily  What changed:   · when to take this  · reasons to take this        CONTINUE taking these medications    acetaminophen 500 MG tablet  Commonly known as: Acetaminophen Extra Strength  Take 1 tablet by mouth every 8 hours as needed for Pain     albuterol sulfate  (90 Base) MCG/ACT inhaler  INHALE 2 PUFFS INTO THE LUNGS EVERY 6 HOURS AS NEEDED FOR WHEEZING OR SHORTNESS OF BREATH     amLODIPine 10 MG tablet  Commonly known as: NORVASC  Take 1 tablet by mouth daily blood glucose test strips  Test three times a day & as needed for symptoms of irregular blood glucose. Dispense sufficient amount for indicated testing frequency plus additional to accommodate PRN testing needs. escitalopram 10 MG tablet  Commonly known as: LEXAPRO  Take 1 tablet by mouth daily     fluticasone 50 MCG/ACT nasal spray  Commonly known as: FLONASE  2 sprays by Nasal route daily     guaiFENesin 600 MG extended release tablet  Commonly known as: MUCINEX  Take 1 tablet by mouth 2 times daily as needed for Congestion (Cough)     HYDROcodone-acetaminophen 5-325 MG per tablet  Commonly known as: Norco  Take 1 tablet by mouth every 6 hours as needed for Pain for up to 4 days.      ipratropium-albuterol 0.5-2.5 (3) MG/3ML Soln nebulizer solution  Commonly known as: DUONEB  Inhale 3 mLs into the lungs every 6 hours as needed for Shortness of Breath     Lancets Misc  1 each by Does not apply route 3 times daily     lovastatin 10 MG tablet  Commonly known as: MEVACOR  Take 1 tablet by mouth nightly     meloxicam 15 MG tablet  Commonly known as: MOBIC  Take 1 tablet by mouth daily     metFORMIN 500 MG tablet  Commonly known as: GLUCOPHAGE  Take 1 tablet by mouth 2 times daily (with meals)     montelukast 10 MG tablet  Commonly known as: SINGULAIR  Take 1 tablet by mouth nightly     pantoprazole 40 MG tablet  Commonly known as: PROTONIX  Take 1 tablet by mouth daily     sildenafil 100 MG tablet  Commonly known as: VIAGRA  Take 1 tablet by mouth as needed for Erectile Dysfunction     sodium chloride 0.65 % nasal spray  Commonly known as: OCEAN, BABY AYR  2 sprays by Nasal route three times daily     traZODone 50 MG tablet  Commonly known as: DESYREL  Take 1 tablet by mouth nightly     Trelegy Ellipta 200-62.5-25 MCG/INH Aepb  Generic drug: Fluticasone-Umeclidin-Vilant  Take 1 Inhaler by mouth daily     ZyrTEC Allergy 10 MG tablet  Generic drug: cetirizine           Where to Get Your Medications      These medications were sent to [facility] [phone]  [address] [facility]    Phone: [phone]   · predniSONE 20 MG tablet     You can get these medications from any pharmacy    Bring a paper prescription for each of these medications  · HYDROcodone-acetaminophen 5-325 MG per tablet         Disposition:   Discharged to Home. Any [facility] needs that were indicated and/or required as been addressed and set up by Social Work. Condition at discharge: Pt was medically stable at the time of discharge. Significant improvement in clinical condition compared to initial condition at presentation to hospital    Activity: activity as tolerated, fall precautions. Total time taken for discharging this patient: 40 minutes. Greater than 70% of time was spent focused exclusively on this patient. Time was taken to review chart, discuss plans with consultants, reconciling medications, discussing plan answering questions with patient. Arsenio Souza MD  12/29/2021, 9:09 AM  ----------------------------------------------------------------------------------------------------------------------    Taniya Valerio,     Please return to ER or call 911 if you develop any significant signs or symptoms. I may not have addressed all of your medical illnesses or the abnormal blood work or imaging therefore please ask your PCP Quincy Guidry MD and other out patient specialists and providers  to obtain [facility] record entirely to follow up on all of the abnormal labs, imaging and findings that I have and have not addressed during your hospitalization. Discharging you from the hospital does not mean that your medical care ends here and now.  You may still need additional work up, investigation, monitoring, and treatment to be handled from this point on by outside providers including your PCP, Quincy Guidry MD , Specialists and other healthcare providers. Please review your list of discharge medications prior to resuming medications you might still have at home, as the medications you need to be taking, dosages or how often you must take them may have changed. For medication questions, contact your retail pharmacy and your PCP, Mohsen Wick MD .     ** I STRONGLY RECOMMEND that you follow up with Mohsen Wick MD within 3 to 5 days for a post hospitalization evaluation. This specific office visit is covered by your insurance, and is not the same as your annual doctor visit/ check up. This office visit is important, as it may prevent need for repeat and/or future hospitalizations. **    Your medical team at [facility]) appreciates the opportunity to work with you to get well! Sincerely,  Juno Baird MD Follow up with Dr. Mohsen Wick MD in the next 7 days or sooner if needed. Please return to ER or call 911 if you develop any significant signs or symptoms. I may or may not have addressed all of your symptoms, medical issues,  Illnesses, or all of the abnormal blood work or imaging therefore please ask your PCP and other specialists to obtain [id number] [facility] record entirely to follow up on all of your symptoms, illnesses, abnormal labs, imaging and findings that I have and have not addressed during your hospitalization. Discharging you from the hospital does not mean that your medical care ends here and now. You may still need to have additional work up, investigation, monitoring, surveillance, and treatment to be handled from this point on by in the out patient setting by  out patient providers including your PCP, Specialists and other healthcare providers. For medication questions, contact your retail pharmacy and your PCP. Your medical team at [facility]) appreciates the opportunity to work with you to get well!     Juno Baird MD

## 2021-12-29 NOTE — PROGRESS NOTES
Removed 32 staples from pt's left chest wall, pt tolerated well. Incision well approximated with no drainage noted. Will continue to monitor.

## 2021-12-29 NOTE — PROGRESS NOTES
Per Dr. Cole Galeano is pt's home medication and he is not being provided a prescription for it on discharge. Will continue to monitor.

## 2021-12-30 ENCOUNTER — CARE COORDINATION (OUTPATIENT)
Dept: CASE MANAGEMENT | Age: 66
End: 2021-12-30

## 2021-12-30 DIAGNOSIS — N52.9 ERECTILE DYSFUNCTION, UNSPECIFIED ERECTILE DYSFUNCTION TYPE: Chronic | ICD-10-CM

## 2021-12-30 DIAGNOSIS — J44.1 COPD EXACERBATION (HCC): Primary | ICD-10-CM

## 2021-12-30 DIAGNOSIS — Z96.652 STATUS POST TOTAL KNEE REPLACEMENT, LEFT: ICD-10-CM

## 2021-12-30 PROCEDURE — 1111F DSCHRG MED/CURRENT MED MERGE: CPT | Performed by: FAMILY MEDICINE

## 2021-12-30 RX ORDER — SILDENAFIL 100 MG/1
100 TABLET, FILM COATED ORAL PRN
Qty: 10 TABLET | Refills: 0 | Status: SHIPPED | OUTPATIENT
Start: 2021-12-30 | End: 2022-01-10

## 2021-12-30 RX ORDER — ACETAMINOPHEN 500 MG
500 TABLET ORAL EVERY 8 HOURS PRN
Qty: 120 TABLET | Refills: 1 | OUTPATIENT
Start: 2021-12-30

## 2021-12-30 NOTE — CARE COORDINATION
difficulty and reports no ambulatory concerns. Uses 2L NC HS/PRN and states he slept without O2 last night and did not feel SOB this AM. No home pulse oximeter or home thermometer. Does not use a CPAP. States he had 2 covid vaccinations but no booster. States he has no known h/o covid. Does not have/use nitro. States he has been abstaining from cocaine use since his VATs and v/u of cardiac/pulmonary risks w/ continued use. Declines any rehab needs at this time. Declines need for [facility] services and has/taking his meds. Picking up Mobic today. Has ACM and notified to follow case. Patients top risk factors for readmission: Recent VATs, PN, COPD, Known cocaine history. Care Transition Nurse (CTN) contacted the patient by telephone to perform post hospital discharge assessment. Verified name and  with patient as identifiers. Provided introduction to self, and explanation of the CTN role. CTN reviewed discharge instructions, medical action plan and red flags with patient who verbalized understanding. Patient given an opportunity to ask questions and does not have any further questions or concerns at this time. Were discharge instructions available to patient? Yes. Reviewed appropriate site of care based on symptoms and resources available to patient including: When to call 911. The patient agrees to contact the PCP office for questions related to their healthcare. Medication reconciliation was performed with patient, who verbalizes understanding of administration of home medications. Advised obtaining a 90-day supply of all daily and as-needed medications. CTN provided contact information. No further follow-up call indicated based on severity of symptoms and risk factors. ACM follows. CTN s/o.     Care Transitions 24 Hour Call    Do you have any ongoing symptoms?: Yes  Patient-reported symptoms: Pain, Shortness of Breath, Cough  Do you have a copy of your discharge instructions?: Yes  Do you have all of your prescriptions and are they filled?: Yes  Have you scheduled your follow up appointment?: Yes  Were you discharged with any Home Care or Post Acute Services: No  Post Acute Services: Other (Comment: Current w/ ACM. Nicholas Cueva, SAMANTHA.)  Patient DME: Straight cane  Patient Home Equipment: CPAP, Oxygen, Nebulizer (Comment: doesnt wear cpap )  Do you have support at home?: Alone  Are you an active caregiver in your home?: No  Care Transitions Interventions     Other Services: Completed (Comment: 12/30/21 Current w/ ACJÚNIOR Farah.  SAMANTHA.)          Follow Up  Future Appointments   Date Time Provider Constance Espitia   1/6/2022  1:40 PM Halbert Brittle, MD [facility] [address]   1/25/2022  1:45 PM Hes[facility] Brar MD [address] [facility]   1/25/2022  3:00 PM Karen Cristina MD [address] [place] [address]

## 2022-01-04 ENCOUNTER — CARE COORDINATION (OUTPATIENT)
Dept: CARE COORDINATION | Age: 67
End: 2022-01-04

## 2022-01-04 ASSESSMENT — ENCOUNTER SYMPTOMS: DYSPNEA ASSOCIATED WITH: EXERTION

## 2022-01-04 NOTE — CARE COORDINATION
Telephone call with patient. He indicated that he called [facility] but had a bad connection and could understand the person. He is wanting to know if he had transportation benefit with insurance and how to access this benefit.

## 2022-01-04 NOTE — CARE COORDINATION
Telephone call to [facility]. They indicated that patient does not have non emergency transportation benefit. ID number [id number].

## 2022-01-04 NOTE — CARE COORDINATION
Ambulatory Care Coordination Note  1/4/2022  CM Risk Score: 6  Charlson 10 Year Mortality Risk Score: 47%     ACC: Светлана Mcfarlane, RN    Summary Note: ACM spoke to patient. Patient was in the hospital in November and then went to nursing home for short time. That back in the hospital after [facility] for a few days. Patient also was at [facility] for surgery [address], chalo VATS. She was not able to give me providers name but states he has a follow-up next week. ACM reviewed upcoming appointments. Patient states he is not checking his blood sugars. He states he has equipment but does not know how to do it. ACM advised patient to take his equipment to his appointment on 6 January and have office staff assist him with One Touch monitoring. Patient denied any open wounds. ACM reviewed the importance of blood sugar monitoring medication management diet and activity as it relates to his blood sugars. ACM sent additional education with AVS from today. Patient reports he wears oxygen as needed 2 L. He reports he has had to wear it once or twice this week. Patient states he does get shortness of breath with exertion and some ADLs specifically bathing. Patient states he has been trying for over a month to get a hospital bed, shower chair, and a toilet seat riser. ACM will speak with PCP office to see if staff can assist getting orders faxed to appropriate DME supply to assist patient with these items. Patient states that he was advised that his insurance [employer] does not pay for any transportation. Patient states he will use [employer] [address] transit and also try other ways to get a ride with friends. POC  MED COMPLIANCE  APPT COMPLIANCE  DME NEEDS,   BSM COMPLIANCE  TRANSPORTATION GOALS FOR Clovis Osler received counseling on the following healthy behaviors: SELF MANAGEMENT of chronic health conditions,with goal to improve quality of life and overall wellbeing.   The patient is asked to make an attempt to improve diet and exercise patterns to aid in medical management. I have instructed Magdaline Loser to complete a self tracking handout on Blood Sugars , Blood Pressures  and Weights and instructed them to bring it with them to his next appointment. Discussed use, benefit, and side effects of prescribed medications. Barriers to medication compliance addressed. All patient questions answered. Pt voiced understanding. Diabetes Assessment    Medic Alert ID: No  Meal Planning: None   How often do you test your blood sugar?: No Testing   Do you have barriers with adherence to non-pharmacologic self-management interventions?  (Nutrition/Exercise/Self-Monitoring): No   Have you ever had to go to the ED for symptoms of low blood sugar?: No       No patient-reported symptoms   Do you have hyperglycemia symptoms?: No   Do you have hypoglycemia symptoms?: No   Blood Sugar Monitoring Regimen: Not Testing       and   COPD Assessment    Does the patient understand envrionmental exposure?: No  Is the patient able to verbalize Rescue vs. Long Acting medications?: Yes  Does the patient have a nebulizer?: Yes  Does the patient use a space with inhaled medications?: No     No patient-reported symptoms         Symptoms:     Symptom course: stable  Breathlessness: exertion  Increase use of rapid acting/rescue inhaled medications?: No  Change in chronic cough?: No/At Baseline  Change in sputum?: No/At Baseline  Self Monitoring - SaO2: No  Have you had a recent diagnosis of pneumonia either by PCP or at a hospital?: No             Care Coordination Interventions    Program Enrollment: Complex Care  Referral from Primary Care Provider: No  Suggested Interventions and [address] [place] [address] Hwy: Declined  Pharmacist: Declined  Registered Dietician: Completed  Specialty Services Referral: Completed (Comment: ACP planning )  Other Services: Completed (Comment: walk in t/l )  Transportation Support: Completed  Zone Management Tools: Completed (Comment: copd dm)         Goals Addressed                    This Visit's Progress      Patient Stated (pt-stated)   No change      Patient stated he will work on being compliant on getting to his doctor appointments. He will look into additional transportation resources. ACM sent referral to  to assist with transportation resources. Barriers: lack of motivation and lack of education  Plan for overcoming my barriers: work with ACM and LSW  Confidence: 5/10  Anticipated Goal Completion Date: 2/1/2021            Prior to Admission medications    Medication Sig Start Date End Date Taking?  Authorizing Provider   acetaminophen (ACETAMINOPHEN EXTRA STRENGTH) 500 MG tablet Take 1 tablet by mouth every 8 hours as needed for Pain 12/30/21   Naya Willis MD   sildenafil (VIAGRA) 100 MG tablet Take 1 tablet by mouth as needed for Erectile Dysfunction 12/30/21   Naya Willis MD   predniSONE (DELTASONE) 20 MG tablet Take one tablet TWICE per day for THREE days, then ONE tablet ONCE per day for a week, then HALF tablet ONCE per day for a WEEK then STOP. 12/29/21   Mak Medina MD   fluticasone (FLONASE) 50 MCG/ACT nasal spray 2 sprays by Nasal route daily 12/22/21   Naya Willis MD   amLODIPine (NORVASC) 10 MG tablet Take 1 tablet by mouth daily 12/22/21   Naya Willis MD   lovastatin (MEVACOR) 10 MG tablet Take 1 tablet by mouth nightly 12/22/21   Naya Willis MD   pantoprazole (PROTONIX) 40 MG tablet Take 1 tablet by mouth daily 12/8/21   Naya Willis MD   albuterol sulfate  (90 Base) MCG/ACT inhaler INHALE 2 PUFFS INTO THE LUNGS EVERY 6 HOURS AS NEEDED FOR WHEEZING OR SHORTNESS OF BREATH  Patient not taking: Reported on 12/30/2021 11/29/21   Naya Willis MD   meloxicam (MOBIC) 15 MG tablet Take 1 tablet by mouth daily 11/24/21   Naya Willis MD   escitalopram (LEXAPRO) 10 MG tablet Take 1 tablet by mouth daily 11/24/21   Naya Willis MD

## 2022-01-04 NOTE — CARE COORDINATION
Telephone call to patient. Relayed results of phone call with [facility]. Patient indicated that he is going to utilize [employer] for transportation to upcoming medical appointment.

## 2022-01-04 NOTE — PATIENT INSTRUCTIONS
Patient Education        Learning About Meal Planning for Diabetes  Why plan your meals? Meal planning can be a key part of managing diabetes. Planning meals and snacks with the right balance of carbohydrate, protein, and fat can help you keep your blood sugar at the target level you set with your doctor. You don't have to eat special foods. You can eat what your family eats, including sweets once in a while. But you do have to pay attention to how often you eat and how much you eat of certain foods. You may want to work with a dietitian or a certified diabetes educator. He or she can give you tips and meal ideas and can answer your questions about meal planning. This health professional can also help you reach a healthy weight if that is one of your goals. What plan is right for you? Your dietitian or diabetes educator may suggest that you start with the plate format or carbohydrate counting. The plate format  The plate format is a simple way to help you manage how you eat. You plan meals by learning how much space each food should take on a plate. Using the plate format helps you spread carbohydrate throughout the day. It can make it easier to keep your blood sugar level within your target range. It also helps you see if you're eating healthy portion sizes. To use the plate format, you put non-starchy vegetables on half your plate. Add meat or meat substitutes on one-quarter of the plate. Put a grain or starchy vegetable (such as brown rice or a potato) on the final quarter of the plate. You can add a small piece of fruit and some low-fat or fat-free milk or yogurt, depending on your carbohydrate goal for each meal.  Here are some tips for using the plate format:  · Make sure that you are not using an oversized plate. A 9-inch plate is best. Many restaurants use larger plates. · Get used to using the plate format at home. Then you can use it when you eat out.   · Write down your questions about using the plate format. Talk to your doctor, a dietitian, or a diabetes educator about your concerns. Carbohydrate counting  With carbohydrate counting, you plan meals based on the amount of carbohydrate in each food. Carbohydrate raises blood sugar higher and more quickly than any other nutrient. It is found in desserts, breads and cereals, and fruit. It's also found in starchy vegetables such as potatoes and corn, grains such as rice and pasta, and milk and yogurt. Spreading carbohydrate throughout the day helps keep your blood sugar levels within your target range. Your daily amount depends on several things, including your weight, how active you are, which diabetes medicines you take, and what your goals are for your blood sugar levels. A registered dietitian or diabetes educator can help you plan how much carbohydrate to include in each meal and snack. A guideline for your daily amount of carbohydrate is:  · 45 to 60 grams at each meal. That's about the same as 3 to 4 carbohydrate servings. · 15 to 20 grams at each snack. That's about the same as 1 carbohydrate serving. The Nutrition Facts label on packaged foods tells you how much carbohydrate is in a serving of the food. First, look at the serving size on the food label. Is that the amount you eat in a serving? All of the nutrition information on a food label is based on that serving size. So if you eat more or less than that, you'll need to adjust the other numbers. Total carbohydrate is the next thing you need to look for on the label. If you count carbohydrate servings, one serving of carbohydrate is 15 grams. For foods that don't come with labels, such as fresh fruits and vegetables, you'll need a guide that lists carbohydrate in these foods. Ask your doctor, dietitian, or diabetes educator about books or other nutrition guides you can use.   If you take insulin, you need to know how many grams of carbohydrate are in a meal. This lets you know how much rapid-acting insulin to take before you eat. If you use an insulin pump, you get a constant rate of insulin during the day. So the pump must be programmed at meals to give you extra insulin to cover the rise in blood sugar after meals. When you know how much carbohydrate you will eat, you can take the right amount of insulin. Or, if you always use the same amount of insulin, you need to make sure that you eat the same amount of carbohydrate at meals. If you need more help to understand carbohydrate counting and food labels, ask your doctor, dietitian, or diabetes educator. How can you plan healthy meals? Here are some tips to get started:  · Plan your meals a week at a time. Don't forget to include snacks too. · Use cookbooks or online recipes to plan several main meals. Plan some quick meals for busy nights. You also can double some recipes that freeze well. Then you can save half for other busy nights when you don't have time to cook. · Make sure you have the ingredients you need for your recipes. If you're running low on basic items, put these items on your shopping list too. · List foods that you use to make breakfasts, lunches, and snacks. List plenty of fruits and vegetables. · Post this list on the refrigerator. Add to it as you think of more things you need. · Take the list to the store to do your weekly shopping. Follow-up care is a key part of your treatment and safety. Be sure to make and go to all appointments, and call your doctor if you are having problems. It's also a good idea to know your test results and keep a list of the medicines you take. Where can you learn more? Go to https://betty.Lema21. org and sign in to your ContextPlane account. Enter I975 in the [facility] box to learn more about \"Learning About Meal Planning for Diabetes. \"     If you do not have an account, please click on the \"Sign Up Now\" link.   Current as of: September 8, 2021               Content Version: 13.1  © 2006-2021 [employer]. Care instructions adapted under license by [address] If you have questions about a medical condition or this instruction, always ask your healthcare professional. [address] any warranty or liability for your use of this information. Patient Education        Diabetes Blood Sugar Emergencies: Your Action Plan  How can you prevent a blood sugar emergency? An important part of living with diabetes is keeping your blood sugar in your target range. You'll need to know what to do if it's too high or too low. Managing your blood sugar levels helps you avoid emergencies. This care sheet will teach you about the signs of high and low blood sugar. It will help you make an action plan with your doctor for when these signs occur. Low blood sugar is more likely to happen if you take certain medicines for diabetes. It can also happen if you skip a meal, drink alcohol, or exercise more than usual.  You may get high blood sugar if you eat differently than you normally do. One example is eating more carbohydrate than usual. Having a cold, the flu, or other sudden illness can also cause high blood sugar levels. Levels can also rise if you miss a dose of medicine. Any change in how you take your medicine may affect your blood sugar level. So it's important to work with your doctor before you make any changes. Track your blood sugar  Work with your doctor to fill in the blank spaces below that apply to you. Track your levels, know your target range, and write down ways you can get your blood sugar back in your target range. A log book can help you track your levels. Take the book to all of your medical appointments. · Check your blood sugar _____ times a day, at these times:________________________________________________.   (For example: Before meals, at bedtime, before exercise, during exercise, other.)  · Your blood sugar target range before a meal is ___________________. Your blood sugar target range after a meal is _______________________. · Do this___________________________________________________to get your blood sugar back within your safe range if your blood sugar results are _________________________________________. (For example: Less than 70 or above 250 mg/dL.)  Call your doctor when your blood sugar results are ___________________________________. (For example: Less than 70 or above 250 mg/dL.)  What are the symptoms of low and high blood sugar? Common symptoms of low blood sugar are sweating and feeling shaky, weak, hungry, or confused. Symptoms can start quickly. Common symptoms of high blood sugar are feeling very thirsty or very hungry. You may also pass urine more often than usual. You may have blurry vision and may lose weight without trying. But some people may have high or low blood sugar without having any symptoms. That's a good reason to check your blood sugar on a regular schedule. What should you do if you have symptoms? Work with your doctor to fill in the blank spaces below that apply to you. Low blood sugar and \"the rule of 15\"  If you have symptoms of low blood sugar, check your blood sugar. If it's below _____ ( for example, below 70), eat or drink a quick-sugar food that has about 15 grams of carbohydrate. Your goal is to get your level back to your safe range. Check your blood sugar again 15 minutes later. If it's still not in your target range, take another 15 grams of carbohydrate and check your blood sugar again in 15 minutes. Repeat this until you reach your target. Then go back to your regular testing schedule. Children usually need less than 15 grams of carbohydrate. Check with your doctor or diabetes educator for the amount that is right for your child.   When you have low blood sugar, it's best to stop or reduce any physical activity until your blood sugar is back in your target range and is stable. If you must stay active, eat or drink 30 grams of carbohydrate. Then check your blood sugar again in 15 minutes. If it's not in your target range, take another 30 grams of carbohydrates. Check your blood sugar again in 15 minutes. Keep doing this until you reach your target. You can then go back to your regular testing schedule. If your symptoms or blood sugar levels are getting worse or have not improved after 15 minutes, seek medical care right away. If you take insulin, always carry a glucagon emergency kit. Be sure your family, friends, and coworkers know how to give glucagon. Here are some examples of quick-sugar foods with 15 grams of carbohydrate:  · 3 or 4 glucose tablets  · 1 tablespoon (3 teaspoons) table sugar  · ½ cup to ¾ cup (4 to 6 ounces) of fruit juice or regular (not diet) soda  · Hard candy (such as 6 Life Savers)  High blood sugar  If you have symptoms of high blood sugar, check your blood sugar. Your goal is to get your level back to your target range. If it's above ______ ( for example, above 250), follow these steps:  · If you missed a dose of your diabetes medicine, take it now. Take only the amount of medicine that you have been prescribed. Do not take more or less medicine. · Give yourself insulin if your doctor has prescribed it for high blood sugar. · Test for ketones, if the doctor told you to do so. If the results of the ketone test show a moderate-to-large amount of ketones, call the doctor for advice. · Wait 30 minutes after you take the extra insulin or the missed medicine. Check your blood sugar again. If your symptoms or blood sugar levels are getting worse or have not improved after taking these steps, seek medical care right away. Follow-up care is a key part of your treatment and safety. Be sure to make and go to all appointments, and call your doctor if you are having problems.  It's also a good idea to know your test results and keep a list of the medicines you take. Where can you learn more? Go to [email or web address] and sign in to your SnowGate account. Enter M563 in the [facility] box to learn more about \"Diabetes Blood Sugar Emergencies: Your Action Plan. \"     If you do not have an account, please click on the \"Sign Up Now\" link. Current as of: July 28, 2021               Content Version: 13.1  © 2006-2021 Healthwise, [employer]. Care instructions adapted under license by [facility] ([facility]). If you have questions about a medical condition or this instruction, always ask your healthcare professional. [place] [address] any warranty or liability for your use of this information. Patient Education        Learning About Carbohydrate (Carb) Counting and Eating Out When You Have Diabetes  Why plan your meals? Meal planning can be a key part of managing diabetes. Planning meals and snacks with the right balance of carbohydrate, protein, and fat can help you keep your blood sugar at the target level you set with your doctor. You don't have to eat special foods. You can eat what your family eats, including sweets once in a while. But you do have to pay attention to how often you eat and how much you eat of certain foods. You may want to work with a dietitian or a certified diabetes educator. He or she can give you tips and meal ideas and can answer your questions about meal planning. This health professional can also help you reach a healthy weight if that is one of your goals. What should you know about eating carbs? Managing the amount of carbohydrate (carbs) you eat is an important part of healthy meals when you have diabetes. Carbohydrate is found in many foods. · Learn which foods have carbs. And learn the amounts of carbs in different foods. ? Bread, cereal, pasta, and rice have about 15 grams of carbs in a serving.  A serving is 1 slice of bread (1 ounce), ½ cup of cooked cereal, or 1/3 cup of cooked pasta or rice. ? Fruits have 15 grams of carbs in a serving. A serving is 1 small fresh fruit, such as an apple or orange; ½ of a banana; ½ cup of cooked or canned fruit; ½ cup of fruit juice; 1 cup of melon or raspberries; or 2 tablespoons of dried fruit. ? Milk and no-sugar-added yogurt have 15 grams of carbs in a serving. A serving is 1 cup of milk or 2/3 cup of no-sugar-added yogurt. ? Starchy vegetables have 15 grams of carbs in a serving. A serving is ½ cup of mashed potatoes or sweet potato; 1 cup winter squash; ½ of a small baked potato; ½ cup of cooked beans; or ½ cup cooked corn or green peas. · Learn how much carbs to eat each day and at each meal. A dietitian or CDE can teach you how to keep track of the amount of carbs you eat. This is called carbohydrate counting. · If you are not sure how to count carbohydrate grams, use the Plate Method to plan meals. It is a good, quick way to make sure that you have a balanced meal. It also helps you spread carbs throughout the day. ? Divide your plate by types of foods. Put non-starchy vegetables on half the plate, meat or other protein food on one-quarter of the plate, and a grain or starchy vegetable in the final quarter of the plate. To this you can add a small piece of fruit and 1 cup of milk or yogurt, depending on how many carbs you are supposed to eat at a meal.  · Try to eat about the same amount of carbs at each meal. Do not \"save up\" your daily allowance of carbs to eat at one meal.  · Proteins have very little or no carbs per serving. Examples of proteins are beef, chicken, turkey, fish, eggs, tofu, cheese, cottage cheese, and peanut butter. A serving size of meat is 3 ounces, which is about the size of a deck of cards. Examples of meat substitute serving sizes (equal to 1 ounce of meat) are 1/4 cup of cottage cheese, 1 egg, 1 tablespoon of peanut butter, and ½ cup of tofu. How can you eat out and still eat healthy?   · Learn to estimate the serving sizes of foods that have carbohydrate. If you measure food at home, it will be easier to estimate the amount in a serving of restaurant food. · If the meal you order has too much carbohydrate (such as potatoes, corn, or baked beans), ask to have a low-carbohydrate food instead. Ask for a salad or green vegetables. · If you use insulin, check your blood sugar before and after eating out to help you plan how much to eat in the future. · If you eat more carbohydrate at a meal than you had planned, take a walk or do other exercise. This will help lower your blood sugar. What are some tips for eating healthy? · Limit saturated fat, such as the fat from meat and dairy products. This is a healthy choice because people who have diabetes are at higher risk of heart disease. So choose lean cuts of meat and nonfat or low-fat dairy products. Use olive or canola oil instead of butter or shortening when cooking. · Don't skip meals. Your blood sugar may drop too low if you skip meals and take insulin or certain medicines for diabetes. · Check with your doctor before you drink alcohol. Alcohol can cause your blood sugar to drop too low. Alcohol can also cause a bad reaction if you take certain diabetes medicines. Follow-up care is a key part of your treatment and safety. Be sure to make and go to all appointments, and call your doctor if you are having problems. It's also a good idea to know your test results and keep a list of the medicines you take. Where can you learn more? Go to https://[email or web address].[email or web address] org and sign in to your [employer] account. Enter M315 in the [facility] box to learn more about \"Learning About Carbohydrate (Carb) Counting and Eating Out When You Have Diabetes. \"     If you do not have an account, please click on the \"Sign Up Now\" link. Current as of: September 8, 2021               Content Version: 13.1  © 5295-3142 Healthwise, [facility].    Care instructions adapted under license by [facility] ([facility]). If you have questions about a medical condition or this instruction, always ask your healthcare professional. [address] any warranty or liability for your use of this information. Patient Education        Learning About Tests When You Have Diabetes  Why do you need regular tests? Diabetes can lead to other health problems if it's not well controlled. You'll need tests to monitor how well your diabetes is controlled and to check for other things like high cholesterol or kidney problems. Having tests on a regular schedule can help your doctor find problems early, when it's easier to manage them. What tests do you need? These are the tests you may need and how often you should have them. A1c blood test.  This test shows the average level of blood sugar over the past 2 to 3 months. It helps your doctor see whether blood sugar levels have been staying within your target range. · How often: Every 3 to 6 months  · Goal: A blood sugar level in your target range  Blood pressure test.  This test measures the pressure of blood flow in the arteries. Controlling blood pressure can help prevent damage to nerves and blood vessels. · How often: Every 3 to 6 months  · Goal: A blood pressure level in your target range  Cholesterol test.  This test measures the amount of a type of fat in the blood. It is common for people with diabetes to also have high cholesterol. Too much cholesterol in the blood can build up inside the blood vessels and raise the risk for heart attack and stroke. · How often: At the time of your diabetes diagnosis, and as often as your doctor recommends after that  · Goal: A cholesterol level in your target range  Albumin-creatinine ratio test.  This test checks for kidney damage by looking for the protein albumin (say \"al-BYOO-paris\") in the urine. Albumin is normally found in the blood.  Kidney damage can let small amounts of it (microalbumin) leak into the urine. · How often: Once a year  · Goal: No protein in the urine  Blood creatinine test/estimated glomerular filtration (eGFR). The blood creatinine (say \"oagt-VR-cj-neen\") level shows how well your kidneys are working. Creatinine is a waste product that muscles release into the blood. Blood creatinine is used to estimate the glomerular filtration rate. A high level of creatinine and/or a low eGFR may mean your kidneys are not working as well as they should. · How often: Once a year  · Goal: Normal level of creatinine in the blood. The eGFR goal is greater than 60 mL/min/1.73 m². Complete foot exam.  The doctor checks for foot sores and whether any sensation has been lost.  · How often: Once a year  · Goal: Healthy feet with no foot ulcers or loss of feeling  Dental exam and cleaning. The dentist checks for gum disease and tooth decay. People with high blood sugar are more likely to have these problems. · How often: Every 6 months  · Goal: Healthy teeth and gums  Complete eye exam.  High blood sugar levels can damage the eyes. This exam is done by an ophthalmologist or optometrist. It includes a dilated eye exam. The exam shows whether there's damage to the back of the eye (diabetic retinopathy). · How often: Once a year. If you don't have any signs of diabetic retinopathy, your doctor may recommend an exam every 2 years. · Goal: No damage to the back of the eye  Thyroid-stimulating hormone (TSH) blood test.  This test checks for thyroid disease. Too little thyroid hormone can cause some medicines (like insulin) to stay in the body longer. This can cause low blood sugar. You may be tested if you have high cholesterol or are a woman over 48years old. · How often: As part of your diabetes diagnosis, and as often as your doctor recommends after that  · Goal: Normal level of TSH in the blood  Follow-up care is a key part of your treatment and safety.  Be sure to make and go to all appointments, and call your doctor if you are having problems. It's also a good idea to know your test results and keep a list of the medicines you take. Where can you learn more? Go to https://betty.SkyBitz. org and sign in to your [employer] Source account. Enter [id number] in the [facility] box to learn more about \"Learning About Tests When You Have Diabetes. \"     If you do not have an account, please click on the \"Sign Up Now\" link. Current as of: July 28, 2021               Content Version: 13.1  © 2006-2021 [employer]. Care instructions adapted under license by [facility] ([facility]). If you have questions about a medical condition or this instruction, always ask your healthcare professional. [address] any warranty or liability for your use of this information. Patient Education        Type 2 Diabetes: Care Instructions  Your Care Instructions     Type 2 diabetes is a disease that develops when the body's tissues cannot use insulin properly. Over time, the pancreas cannot make enough insulin. Insulin is a hormone that helps the body's cells use sugar (glucose) for energy. It also helps the body store extra sugar in muscle, fat, and liver cells. Without insulin, the sugar cannot get into the cells to do its work. It stays in the blood instead. This can cause high blood sugar levels. A person has diabetes when the blood sugar stays too high too much of the time. Over time, diabetes can lead to diseases of the heart, blood vessels, nerves, kidneys, and eyes. You may be able to control your blood sugar by losing weight, eating a healthy diet, and getting daily exercise. You may also have to take insulin or other diabetes medicine. Follow-up care is a key part of your treatment and safety. Be sure to make and go to all appointments. Call your doctor if you are having problems.  It's also a good idea to know your test results and keep a list of (lost consciousness), or you suddenly become very sleepy or confused. (You may have very low blood sugar.)   Call your doctor now or seek immediate medical care if:    · Your blood sugar is 300 mg/dL or is higher than the level your doctor has set for you.     · You have symptoms of low blood sugar, such as:  ? Sweating. ? Feeling nervous, shaky, and weak. ? Extreme hunger and slight nausea. ? Dizziness and headache.  ? Blurred vision. ? Confusion. Watch closely for changes in your health, and be sure to contact your doctor if:    · You often have problems controlling your blood sugar.     · You have symptoms of long-term diabetes problems, such as:  ? New vision changes. ? New pain, numbness, or tingling in your hands or feet. ? Skin problems. Where can you learn more? Go to https://Hyper Urban Level User Swedenkrystaleb.Social GameWorks. org and sign in to your c3 creations account. Enter C553 in the SanFranSEO box to learn more about \"Type 2 Diabetes: Care Instructions. \"     If you do not have an account, please click on the \"Sign Up Now\" link. Current as of: July 28, 2021               Content Version: 13.1  © 7891-3705 sendwithus. Care instructions adapted under license by [facility] ([facility]). If you have questions about a medical condition or this instruction, always ask your healthcare professional. [address] any warranty or liability for your use of this information. Patient Education        Learning About High Blood Sugar  What is high blood sugar? Your body turns the food you eat into glucose (sugar), which it uses for energy. But if your body isn't able to use the sugar right away, it can build up in your blood and lead to high blood sugar. When the amount of sugar in your blood stays too high for too much of the time, you may have diabetes. Diabetes is a disease that can cause serious health problems.   The good news is that lifestyle changes may help you get your blood sugar back to normal and avoid or delay diabetes. What causes high blood sugar? Sugar (glucose) can build up in your blood if you:  · Have insulin resistance. · Don't take enough insulin or miss a dose of your diabetes medicine. · Take certain medicines, such as steroids. What are the symptoms? Having high blood sugar may not cause any symptoms at all. Or it may make you feel very thirsty or very hungry. You may also urinate more often than usual, have blurry vision, or lose weight without trying. How is high blood sugar treated? You can take steps to lower your blood sugar level if you understand what makes it get higher. Your doctor may want you to learn how to test your blood sugar level at home. Then you can see how illness, stress, or different kinds of food or medicine raise or lower your blood sugar level. Other tests may be needed to see if you have diabetes. How can you prevent high blood sugar? · Watch your weight. If you're overweight, losing just a small amount of weight may help. Reducing fat around your waist is most important. · Limit the amount of calories, sweets, and unhealthy fat you eat. Ask your doctor if a dietitian can help you. A registered dietitian can help you create meal plans that fit your lifestyle. · Get at least 30 minutes of exercise on most days of the week. Exercise helps control your blood sugar. It also helps you maintain a healthy weight. Walking is a good choice. You also may want to do other activities, such as running, swimming, cycling, or playing tennis or team sports. · If your doctor prescribed medicines, take them exactly as prescribed. Call your doctor if you think you are having a problem with your medicine. You will get more details on the specific medicines your doctor prescribes. Follow-up care is a key part of your treatment and safety. Be sure to make and go to all appointments, and call your doctor if you are having problems.  It's also a good idea to know your test results and keep a list of the medicines you take. Where can you learn more? Go to [email or web address] and sign in to your DadShed account. Enter O108 in the [facility] box to learn more about \"Learning About High Blood Sugar. \"     If you do not have an account, please click on the \"Sign Up Now\" link. Current as of: July 28, 2021               Content Version: 13.1  © 2006-2021 Healthwise, Broadlink. Care instructions adapted under license by [facility] ([facility]). If you have questions about a medical condition or this instruction, always ask your healthcare professional. [place] [address] any warranty or liability for your use of this information. Patient Education        Hypoglycemia: Care Instructions  Overview     Hypoglycemia means that your blood sugar is low and your body is not getting enough fuel. Some people get low blood sugar from not eating often enough. Some medicines to treat diabetes can cause low blood sugar. People who have had surgery on their stomachs or intestines may get hypoglycemia. Problems with the pancreas, kidneys, or liver also can cause low blood sugar. A snack or drink with sugar in it will raise your blood sugar and should ease your symptoms right away. Your doctor may recommend that you change or stop your medicines until you can get your blood sugar levels under control. In the long run, you may need to change your diet and eating habits so that you get enough fuel for your body throughout the day. Follow-up care is a key part of your treatment and safety. Be sure to make and go to all appointments, and call your doctor if you are having problems. It's also a good idea to know your test results and keep a list of the medicines you take. How can you care for yourself at home? · Learn your signs of low blood sugar. They are different for everyone.  Some common early signs include:  ? Nausea. ? Hunger. ? Feeling nervous, irritable, or shaky. ? Cold, clammy skin. ? Sweating (when you're not exercising). · Use the \"rule of 15\" to treat low blood sugar. This includes eating 15 grams of carbohydrate from a quick-sugar food, such as 3 or 4 glucose tablets or ½ cup of juice. Wait 15 minutes and check your blood sugar. If it is still below 70 mg/dL, eat another 15 grams of carbohydrate. Repeat this every 15 minutes until your blood sugar is in a safe target range. · Once your blood sugar is in a safe range, eat a snack or meal to prevent recurrent low blood sugar. · Make sure family, friends, and coworkers know the symptoms of low blood sugar and know how to get your sugar level up. · If you were prescribed a glucagon kit, always have it with you. Make sure friends and family know how to use it. When should you call for help? Call 911 anytime you think you may need emergency care. For example, call if:    · You passed out (lost consciousness).     · You are confused or cannot think clearly.     · Your blood sugar is very high or very low. Watch closely for changes in your health, and be sure to contact your doctor if:    · Your blood sugar stays outside the level your doctor set for you.     · You have any problems. Where can you learn more? Go to [email or web address].[email or web address] org and sign in to your [email or web address] account. Enter W601 in the Textbroker box to learn more about \"Hypoglycemia: Care Instructions. \"     If you do not have an account, please click on the \"Sign Up Now\" link. Current as of: July 28, 2021               Content Version: 13.1  © 0050-0451 [employer]. Care instructions adapted under license by [facility] ([facility]). If you have questions about a medical condition or this instruction, always ask your healthcare professional. [address] any warranty or liability for your use of this information.

## 2022-01-06 ENCOUNTER — OFFICE VISIT (OUTPATIENT)
Dept: FAMILY MEDICINE CLINIC | Age: 67
End: 2022-01-06
Payer: COMMERCIAL

## 2022-01-06 VITALS
DIASTOLIC BLOOD PRESSURE: 68 MMHG | OXYGEN SATURATION: 97 % | SYSTOLIC BLOOD PRESSURE: 134 MMHG | TEMPERATURE: 97 F | HEART RATE: 87 BPM | HEIGHT: 73 IN | BODY MASS INDEX: 38.12 KG/M2 | RESPIRATION RATE: 20 BRPM | WEIGHT: 287.6 LBS

## 2022-01-06 DIAGNOSIS — I10 PRIMARY HYPERTENSION: Chronic | ICD-10-CM

## 2022-01-06 DIAGNOSIS — R07.89 CHEST WALL PAIN: ICD-10-CM

## 2022-01-06 DIAGNOSIS — J44.1 CHRONIC OBSTRUCTIVE PULMONARY DISEASE WITH ACUTE EXACERBATION (HCC): Primary | Chronic | ICD-10-CM

## 2022-01-06 DIAGNOSIS — M48.061 SPINAL STENOSIS OF LUMBAR REGION, UNSPECIFIED WHETHER NEUROGENIC CLAUDICATION PRESENT: ICD-10-CM

## 2022-01-06 DIAGNOSIS — Z98.890 STATUS POST LUNG SURGERY: ICD-10-CM

## 2022-01-06 DIAGNOSIS — F14.10 COCAINE ABUSE (HCC): Chronic | ICD-10-CM

## 2022-01-06 DIAGNOSIS — E11.9 TYPE 2 DIABETES MELLITUS WITHOUT COMPLICATION, WITHOUT LONG-TERM CURRENT USE OF INSULIN (HCC): ICD-10-CM

## 2022-01-06 DIAGNOSIS — Z72.0 TOBACCO USE: ICD-10-CM

## 2022-01-06 PROCEDURE — 99214 OFFICE O/P EST MOD 30 MIN: CPT | Performed by: FAMILY MEDICINE

## 2022-01-06 RX ORDER — OXYCODONE HYDROCHLORIDE 5 MG/1
TABLET ORAL
Status: ON HOLD | COMMUNITY
Start: 2021-12-08 | End: 2022-05-18 | Stop reason: HOSPADM

## 2022-01-06 RX ORDER — OMEPRAZOLE 20 MG/1
CAPSULE, DELAYED RELEASE ORAL
COMMUNITY
Start: 2021-12-21

## 2022-01-06 RX ORDER — BLOOD-GLUCOSE METER
EACH MISCELLANEOUS
COMMUNITY
Start: 2021-10-20

## 2022-01-06 SDOH — ECONOMIC STABILITY: FOOD INSECURITY: WITHIN THE PAST 12 MONTHS, THE FOOD YOU BOUGHT JUST DIDN'T LAST AND YOU DIDN'T HAVE MONEY TO GET MORE.: NEVER TRUE

## 2022-01-06 SDOH — ECONOMIC STABILITY: FOOD INSECURITY: WITHIN THE PAST 12 MONTHS, YOU WORRIED THAT YOUR FOOD WOULD RUN OUT BEFORE YOU GOT MONEY TO BUY MORE.: NEVER TRUE

## 2022-01-06 ASSESSMENT — ENCOUNTER SYMPTOMS
SHORTNESS OF BREATH: 1
COUGH: 0
ABDOMINAL PAIN: 0
DIARRHEA: 0
VOMITING: 0
WHEEZING: 0
NAUSEA: 0
CHEST TIGHTNESS: 0

## 2022-01-06 ASSESSMENT — SOCIAL DETERMINANTS OF HEALTH (SDOH): HOW HARD IS IT FOR YOU TO PAY FOR THE VERY BASICS LIKE FOOD, HOUSING, MEDICAL CARE, AND HEATING?: NOT HARD AT ALL

## 2022-01-06 NOTE — PROGRESS NOTES
Rupinder Alvarez (: 1955) is a 61 y.o. male, Established patient, who presents today for:    Chief Complaint   Patient presents with    Diabetes     Patient presents today for a routine chronic follow up.  Hyperlipidemia    Hypertension         ASSESSMENT/PLAN    1. Chronic obstructive pulmonary disease with acute exacerbation (HCC)  Assessment & Plan:  Clinically improving over time following recent admission with use of prednisone and home inhalers. Patient instructed to use supplemental oxygen during the day and night, and was instructed to return to the emergency room for any acutely worsening episodes of dyspnea or episodes of hypoxia (SPO2 less than 90%) despite use of supplemental oxygen. I will have office staff help him schedule hospital follow-up visit with pulmonology at [facility] AT [facility] to discuss long-term treatment plan. 2. Chest wall pain  Assessment & Plan:  Likely associated with recent left lung surgery. Patient has been referred to pain management to help with long-term pain control in the setting of his known spinal stenosis and ongoing cocaine abuse. Orders:  -     Deborah Mancia MD, Pain Management, [place]  3. Status post lung surgery  Comments:  Patient has follow-up in place with cardiothoracic surgery at [facility]  4. Type 2 diabetes mellitus without complication, without long-term current use of insulin (HCC)  Assessment & Plan:  Most recent A1c at goal control. Patient instructed to continue with current dose of Metformin   Orders:  -     Microalbumin / Creatinine Urine Ratio; Future  -     metFORMIN (GLUCOPHAGE) 500 MG tablet; Take 1 tablet by mouth 2 times daily (with meals), Disp-180 tablet, R-1Normal  5. Primary hypertension  Assessment & Plan:  Blood pressure within normal limits today in the office. Patient instructed to continue with current medication   6. Cocaine abuse ([facility] [address]  Assessment & Plan:   Positive cocaine testing in the past 2 weeks.   I stressed with patient the importance of abstaining from cocaine use with his known medical history and underlying serious comorbidities. He is aware of the risk for sudden cardiac death or serious morbidity related to cocaine induced heart damage. Orders:  -     Tyler Luna MD, Pain Management, [place]  7. Spinal stenosis of lumbar region, unspecified whether neurogenic claudication present  -     Tyler Luna MD, Pain Management, [place]  8. Tobacco use  Assessment & Plan:  Patient admits to infrequent smoking of a handful of cigarettes since hospital discharge. I stressed with him the importance of avoiding all tobacco use with his recent pulmonary exacerbations and recent cardiothoracic surgery. Return in about 3 months (around 4/6/2022) for Chronic Disease Check. SUBJECTIVE/OBJECTIVE:    HPI    Patient presents for hospital follow-up and chronic disease check visit. Patient was most recently admitted at [facility] on 12/26/2021 with acute COPD exacerbation and transiently elevated troponin levels with chest discomfort and positive cocaine testing. Patient had no other clinical evidence to suggest acute coronary syndrome and no further cardiac intervention was recommended. His chest wall discomfort was thought to be related to recent VATS procedure/open thoracotomy performed at [facility] on 11/27/2021 for postobstructive pneumonia and left-sided empyema. Respiratory status improved with aerosolized bronchodilators and use of Solu-Medrol. Patient was found stable for discharge home on 12/29/2021 and instructed to follow-up with cardiology through [address], pulmonology at [facility], and primary care as an outpatient. Patient reports feeling improved overall since hospital discharge They deny any worsening chest pain, dyspnea at rest, wheezing, cough, fevers/chills/sweats, or nausea/vomiting.  There are no reported issues with palpitations, lightheadedness, lower extremity edema, or syncope. Patient is not currently using supplemental oxygen in the office, but reports using supplemental oxygen at home as prescribed at 4 L flow rate when more active and overnight. Patient denies having pulmonology hospital follow-up visit scheduled, but reports having follow-up scheduled with cardiothoracic surgeon at [facility] next week. After review of recent positive testing result in the hospital, patient admits to continued intermittent cocaine use, most recently in the past 2 weeks. They deny going to [place] [id number] or  [employer] and deny having a sponsor. Current Outpatient Medications on File Prior to Visit   Medication Sig Dispense Refill    oxyCODONE (ROXICODONE) 5 MG immediate release tablet       omeprazole (PRILOSEC) 20 MG delayed release capsule       Blood Glucose Monitoring Suppl (ONE TOUCH ULTRA 2) w/Device KIT       acetaminophen (ACETAMINOPHEN EXTRA STRENGTH) 500 MG tablet Take 1 tablet by mouth every 8 hours as needed for Pain 120 tablet 1    sildenafil (VIAGRA) 100 MG tablet Take 1 tablet by mouth as needed for Erectile Dysfunction 10 tablet 0    predniSONE (DELTASONE) 20 MG tablet Take one tablet TWICE per day for THREE days, then ONE tablet ONCE per day for a week, then HALF tablet ONCE per day for a WEEK then STOP.  20 tablet 0    fluticasone (FLONASE) 50 MCG/ACT nasal spray 2 sprays by Nasal route daily 48 g 1    amLODIPine (NORVASC) 10 MG tablet Take 1 tablet by mouth daily 90 tablet 1    lovastatin (MEVACOR) 10 MG tablet Take 1 tablet by mouth nightly 90 tablet 1    pantoprazole (PROTONIX) 40 MG tablet Take 1 tablet by mouth daily 90 tablet 1    albuterol sulfate  (90 Base) MCG/ACT inhaler INHALE 2 PUFFS INTO THE LUNGS EVERY 6 HOURS AS NEEDED FOR WHEEZING OR SHORTNESS OF BREATH 18 each 1    meloxicam (MOBIC) 15 MG tablet Take 1 tablet by mouth daily 30 tablet 2    escitalopram (LEXAPRO) 10 MG tablet Take 1 tablet by mouth daily 90 tablet 1    montelukast (SINGULAIR) 10 MG tablet Take 1 tablet by mouth nightly 90 tablet 1    traZODone (DESYREL) 50 MG tablet Take 1 tablet by mouth nightly 90 tablet 1    TRELEGY ELLIPTA 200-62.5-25 MCG/INH AEPB Take 1 Inhaler by mouth daily 60 each 2    guaiFENesin (MUCINEX) 600 MG extended release tablet Take 1 tablet by mouth 2 times daily as needed for Congestion (Cough) 40 tablet 1    Lancets MISC 1 each by Does not apply route 3 times daily 200 each 5    blood glucose monitor strips Test three times a day & as needed for symptoms of irregular blood glucose. Dispense sufficient amount for indicated testing frequency plus additional to accommodate PRN testing needs. 100 strip 1    sodium chloride (OCEAN, BABY AYR) 0.65 % nasal spray 2 sprays by Nasal route three times daily 1 each 0    ipratropium-albuterol (DUONEB) 0.5-2.5 (3) MG/3ML SOLN nebulizer solution Inhale 3 mLs into the lungs every 6 hours as needed for Shortness of Breath 360 mL 3    cetirizine (ZYRTEC ALLERGY) 10 MG tablet Take 10 mg by mouth daily      polyethylene glycol (GLYCOLAX) 17 GM/SCOOP powder Take 17 g by mouth daily (Patient taking differently: Take 17 g by mouth daily as needed ) 510 g 1     No current facility-administered medications on file prior to visit. Allergies   Allergen Reactions    Fish-Derived Products Anaphylaxis    Iodine Anaphylaxis     Iodine-containing products, dyes, contrast dye    Seasonal Shortness Of Breath    Shellfish Allergy      Other reaction(s): Swelling/Edema    Pcn [Penicillins] Nausea And Vomiting        Review of Systems   Constitutional: Positive for fatigue. Negative for appetite change, chills, diaphoresis and fever. Eyes: Negative for visual disturbance. Respiratory: Positive for shortness of breath (with activity). Negative for cough, chest tightness and wheezing. Cardiovascular: Positive for chest pain (left chest wall).  Negative for palpitations and leg swelling. Gastrointestinal: Negative for abdominal pain, diarrhea, nausea and vomiting. Endocrine: Negative for cold intolerance, heat intolerance, polydipsia, polyphagia and polyuria. Genitourinary: Negative for dysuria and hematuria. Skin: Negative for rash. Neurological: Negative for dizziness and light-headedness. Vitals:  /68 (Site: Right Upper Arm, Position: Sitting, Cuff Size: Medium Adult)   Pulse 87   Temp 97 °F (36.1 °C) (Temporal)   Resp 20   Ht 6' 0.5\" (1.842 m)   Wt 287 lb 9.6 oz (130.5 kg)   SpO2 97%   BMI 38.47 kg/m²     Physical Exam  Vitals reviewed. Constitutional:       General: He is not in acute distress. Appearance: Normal appearance. He is obese. He is not ill-appearing, toxic-appearing or diaphoretic. HENT:      Head: Normocephalic and atraumatic. Cardiovascular:      Rate and Rhythm: Normal rate and regular rhythm. Heart sounds: No murmur heard. Pulmonary:      Effort: Pulmonary effort is normal. No tachypnea, accessory muscle usage or respiratory distress. Breath sounds: Normal breath sounds. No decreased air movement. No decreased breath sounds, wheezing, rhonchi or rales. Abdominal:      General: Bowel sounds are normal.      Palpations: Abdomen is soft. Tenderness: There is no abdominal tenderness. There is no guarding or rebound. Musculoskeletal:      Cervical back: Neck supple. Right lower leg: No edema. Left lower leg: No edema. Lymphadenopathy:      Cervical: No cervical adenopathy. Skin:     Findings: No rash. Neurological:      Mental Status: He is alert and oriented to person, place, and time. Psychiatric:         Mood and Affect: Mood normal.         Behavior: Behavior normal.         Thought Content: Thought content normal.         Ortho Exam (If Applicable)              An electronic signature was used to authenticate this note.      Marie Lucero MD

## 2022-01-07 NOTE — ASSESSMENT & PLAN NOTE
Clinically improving over time following recent admission with use of prednisone and home inhalers. Patient instructed to use supplemental oxygen during the day and night, and was instructed to return to the emergency room for any acutely worsening episodes of dyspnea or episodes of hypoxia (SPO2 less than 90%) despite use of supplemental oxygen. I will have office staff help him schedule hospital follow-up visit with pulmonology at [facility] to discuss long-term treatment plan.

## 2022-01-07 NOTE — ASSESSMENT & PLAN NOTE
Likely associated with recent left lung surgery. Patient has been referred to pain management to help with long-term pain control in the setting of his known spinal stenosis and ongoing cocaine abuse.

## 2022-01-07 NOTE — ASSESSMENT & PLAN NOTE
Positive cocaine testing in the past 2 weeks. I stressed with patient the importance of abstaining from cocaine use with his known medical history and underlying serious comorbidities. He is aware of the risk for sudden cardiac death or serious morbidity related to cocaine induced heart damage.

## 2022-01-10 DIAGNOSIS — N52.9 ERECTILE DYSFUNCTION, UNSPECIFIED ERECTILE DYSFUNCTION TYPE: Chronic | ICD-10-CM

## 2022-01-10 RX ORDER — SILDENAFIL 100 MG/1
100 TABLET, FILM COATED ORAL PRN
Qty: 10 TABLET | Refills: 0 | Status: SHIPPED | OUTPATIENT
Start: 2022-01-10 | End: 2022-01-31 | Stop reason: SDUPTHER

## 2022-01-10 NOTE — TELEPHONE ENCOUNTER
Requesting medication refill. Please approve or deny this request.    Rx requested:  Requested Prescriptions     Pending Prescriptions Disp Refills    sildenafil (VIAGRA) 100 MG tablet [Pharmacy Med Name: Sildenafil Citrate Oral Tablet 100 MG] 10 tablet 0     Sig: TAKE 1 TABLET BY MOUTH AS NEEDED FOR ERECTILE DYSFUNCTION.        Last Office Visit:   1/6/2022    Last Filled:      Last Labs:      Next Visit Date:  Future Appointments   Date Time Provider Constance Nupur   1/25/2022  1:45 PM Farrukh Davenport MD [address]   1/25/2022  3:00 PM MD [address]   1/26/2022  9:00 AM [facility] [place]   4/7/2022  2:00 PM MD Romero [address]

## 2022-01-17 ENCOUNTER — APPOINTMENT (OUTPATIENT)
Dept: GENERAL RADIOLOGY | Age: 67
End: 2022-01-17
Payer: COMMERCIAL

## 2022-01-17 ENCOUNTER — HOSPITAL ENCOUNTER (EMERGENCY)
Age: 67
Discharge: HOME | End: 2022-01-17
Attending: EMERGENCY MEDICINE
Payer: COMMERCIAL

## 2022-01-17 ENCOUNTER — TELEPHONE (OUTPATIENT)
Dept: OTHER | Facility: CLINIC | Age: 67
End: 2022-01-17

## 2022-01-17 VITALS
OXYGEN SATURATION: 97 % | WEIGHT: 287 LBS | BODY MASS INDEX: 38.39 KG/M2 | RESPIRATION RATE: 22 BRPM | SYSTOLIC BLOOD PRESSURE: 144 MMHG | HEART RATE: 87 BPM | TEMPERATURE: 98.7 F | DIASTOLIC BLOOD PRESSURE: 80 MMHG

## 2022-01-17 DIAGNOSIS — J44.1 COPD EXACERBATION (HCC): Primary | ICD-10-CM

## 2022-01-17 DIAGNOSIS — J90 PLEURAL EFFUSION ON LEFT: ICD-10-CM

## 2022-01-17 DIAGNOSIS — U07.1 COVID-19 VIRUS INFECTION: ICD-10-CM

## 2022-01-17 LAB
ALBUMIN SERPL-MCNC: 3.6 G/DL (ref 3.5–4.6)
ALP BLD-CCNC: 114 U/L (ref 35–104)
ALT SERPL-CCNC: 14 U/L (ref 0–41)
ANION GAP SERPL CALCULATED.3IONS-SCNC: 13 MEQ/L (ref 9–15)
AST SERPL-CCNC: 21 U/L (ref 0–40)
BASOPHILS ABSOLUTE: 0 K/UL (ref 0–0.1)
BASOPHILS RELATIVE PERCENT: 0.2 % (ref 0.2–1.2)
BILIRUB SERPL-MCNC: <0.2 MG/DL (ref 0.2–0.7)
BUN BLDV-MCNC: 10 MG/DL (ref 8–23)
CALCIUM SERPL-MCNC: 9.2 MG/DL (ref 8.5–9.9)
CHLORIDE BLD-SCNC: 104 MEQ/L (ref 95–107)
CO2: 22 MEQ/L (ref 20–31)
CREAT SERPL-MCNC: 0.81 MG/DL (ref 0.7–1.2)
EKG ATRIAL RATE: 76 BPM
EKG P AXIS: 72 DEGREES
EKG P-R INTERVAL: 134 MS
EKG Q-T INTERVAL: 370 MS
EKG QRS DURATION: 94 MS
EKG QTC CALCULATION (BAZETT): 416 MS
EKG R AXIS: 54 DEGREES
EKG T AXIS: 52 DEGREES
EKG VENTRICULAR RATE: 76 BPM
EOSINOPHILS ABSOLUTE: 0.2 K/UL (ref 0–0.5)
EOSINOPHILS RELATIVE PERCENT: 4.3 % (ref 0.8–7)
GFR AFRICAN AMERICAN: >60
GFR NON-AFRICAN AMERICAN: >60
GLOBULIN: 3.5 G/DL (ref 2.3–3.5)
GLUCOSE BLD-MCNC: 124 MG/DL (ref 70–99)
HCT VFR BLD CALC: 40.7 % (ref 42–52)
HEMOGLOBIN: 12.4 G/DL (ref 13.7–17.5)
IMMATURE GRANULOCYTES #: 0 K/UL
IMMATURE GRANULOCYTES %: 0.2 %
INR BLD: 1
LYMPHOCYTES ABSOLUTE: 1.3 K/UL (ref 1.3–3.6)
LYMPHOCYTES RELATIVE PERCENT: 22.7 %
MAGNESIUM: 1.8 MG/DL (ref 1.7–2.4)
MCH RBC QN AUTO: 28.9 PG (ref 25.7–32.2)
MCHC RBC AUTO-ENTMCNC: 30.5 % (ref 32.3–36.5)
MCV RBC AUTO: 94.9 FL (ref 79–92.2)
MONOCYTES ABSOLUTE: 0.6 K/UL (ref 0.3–0.8)
MONOCYTES RELATIVE PERCENT: 9.8 % (ref 5.3–12.2)
NEUTROPHILS ABSOLUTE: 3.5 K/UL (ref 1.8–5.4)
NEUTROPHILS RELATIVE PERCENT: 62.8 % (ref 34–67.9)
PDW BLD-RTO: 20.7 % (ref 11.6–14.4)
PLATELET # BLD: 245 K/UL (ref 163–337)
POTASSIUM SERPL-SCNC: 4.3 MEQ/L (ref 3.4–4.9)
PRO-BNP: 44 PG/ML
PROTHROMBIN TIME: 13 SEC (ref 12.3–14.9)
RBC # BLD: 4.29 M/UL (ref 4.63–6.08)
SARS-COV-2, NAAT: DETECTED
SODIUM BLD-SCNC: 139 MEQ/L (ref 135–144)
TOTAL PROTEIN: 7.1 G/DL (ref 6.3–8)
TROPONIN: <0.01 NG/ML (ref 0–0.01)
WBC # BLD: 5.6 K/UL (ref 4.2–9)

## 2022-01-17 PROCEDURE — 6360000002 HC RX W HCPCS: Performed by: EMERGENCY MEDICINE

## 2022-01-17 PROCEDURE — 85610 PROTHROMBIN TIME: CPT

## 2022-01-17 PROCEDURE — 85025 COMPLETE CBC W/AUTO DIFF WBC: CPT

## 2022-01-17 PROCEDURE — 84484 ASSAY OF TROPONIN QUANT: CPT

## 2022-01-17 PROCEDURE — 93005 ELECTROCARDIOGRAM TRACING: CPT

## 2022-01-17 PROCEDURE — 83735 ASSAY OF MAGNESIUM: CPT

## 2022-01-17 PROCEDURE — 71045 X-RAY EXAM CHEST 1 VIEW: CPT

## 2022-01-17 PROCEDURE — 99281 EMR DPT VST MAYX REQ PHY/QHP: CPT

## 2022-01-17 PROCEDURE — 83880 ASSAY OF NATRIURETIC PEPTIDE: CPT

## 2022-01-17 PROCEDURE — 87635 SARS-COV-2 COVID-19 AMP PRB: CPT

## 2022-01-17 PROCEDURE — 80053 COMPREHEN METABOLIC PANEL: CPT

## 2022-01-17 PROCEDURE — 96365 THER/PROPH/DIAG IV INF INIT: CPT

## 2022-01-17 PROCEDURE — 36415 COLL VENOUS BLD VENIPUNCTURE: CPT

## 2022-01-17 PROCEDURE — 99282 EMERGENCY DEPT VISIT SF MDM: CPT

## 2022-01-17 PROCEDURE — 87040 BLOOD CULTURE FOR BACTERIA: CPT

## 2022-01-17 PROCEDURE — 93010 ELECTROCARDIOGRAM REPORT: CPT | Performed by: INTERNAL MEDICINE

## 2022-01-17 RX ORDER — PREDNISONE 20 MG/1
40 TABLET ORAL DAILY
Qty: 5 TABLET | Refills: 0 | Status: SHIPPED | OUTPATIENT
Start: 2022-01-17 | End: 2022-01-22

## 2022-01-17 RX ORDER — SODIUM CHLORIDE 0.9 % (FLUSH) 0.9 %
3 SYRINGE (ML) INJECTION EVERY 8 HOURS
Status: DISCONTINUED | OUTPATIENT
Start: 2022-01-17 | End: 2022-01-17 | Stop reason: HOSPADM

## 2022-01-17 RX ORDER — MAGNESIUM SULFATE 1 G/100ML
1000 INJECTION INTRAVENOUS ONCE
Status: COMPLETED | OUTPATIENT
Start: 2022-01-17 | End: 2022-01-17

## 2022-01-17 RX ADMIN — MAGNESIUM SULFATE HEPTAHYDRATE 1000 MG: 1 INJECTION, SOLUTION INTRAVENOUS at 10:39

## 2022-01-17 ASSESSMENT — ENCOUNTER SYMPTOMS
BACK PAIN: 0
WHEEZING: 1
EYE DISCHARGE: 0
SHORTNESS OF BREATH: 1
SORE THROAT: 0
TROUBLE SWALLOWING: 0
VOMITING: 0
DIARRHEA: 0
BLOOD IN STOOL: 0
EYE REDNESS: 0
CHOKING: 0
CHEST TIGHTNESS: 0
STRIDOR: 0
VOICE CHANGE: 0
COUGH: 1
ABDOMINAL PAIN: 0
CONSTIPATION: 0
SINUS PRESSURE: 0
EYE PAIN: 0
FACIAL SWELLING: 0

## 2022-01-17 ASSESSMENT — PAIN DESCRIPTION - DESCRIPTORS: DESCRIPTORS: SHARP

## 2022-01-17 ASSESSMENT — PAIN SCALES - GENERAL: PAINLEVEL_OUTOF10: 10

## 2022-01-17 ASSESSMENT — PAIN DESCRIPTION - PAIN TYPE: TYPE: ACUTE PAIN

## 2022-01-17 ASSESSMENT — PAIN DESCRIPTION - LOCATION: LOCATION: BACK

## 2022-01-17 ASSESSMENT — PAIN DESCRIPTION - ORIENTATION: ORIENTATION: LOWER

## 2022-01-17 NOTE — ED PROVIDER NOTES
[facility] ED  eMERGENCY dEPARTMENT eNCOUnter      Pt Name: Vickie Henao  MRN: [id number]  [place] 1955  Date of evaluation: 1/17/2022  Provider: Marce Casper MD    [address] [place] [address]       Chief Complaint   Patient presents with    Generalized Body Aches    Shortness of Breath         HISTORY OF PRESENT ILLNESS   (Location/Symptom, Timing/Onset,Context/Setting, Quality, Duration, Modifying Factors, Severity)  Note limiting factors. Vickie Henao is a 61 y.o. male who presents to the emergency department multiple previous multiple encounters emergency for similar situation patient history of recurrent pneumonia and pleural effusion finally patient did underwent a surgical intervention at [facility] few months ago and recovered from the surgery patient history of COPD chronic back pain obstructive sleep apnea ex alcohol abuse cocaine abuse supraventricular cardia obesity has increasing cough congestion getting short of breath despite home oxygen at home nonproductive cough which is whitish in color and fever denies any contact with any COVID infection    HPI    NursingNotes were reviewed. REVIEW OF SYSTEMS    (2-9 systems for level 4, 10 or more for level 5)     Review of Systems   Constitutional: Positive for activity change, appetite change, chills, diaphoresis and fatigue. Negative for fever. HENT: Positive for congestion. Negative for drooling, facial swelling, mouth sores, nosebleeds, sinus pressure, sore throat, trouble swallowing and voice change. Eyes: Negative for pain, discharge, redness and visual disturbance. Respiratory: Positive for cough, shortness of breath and wheezing. Negative for choking, chest tightness and stridor. Cardiovascular: Negative for chest pain, palpitations and leg swelling. Gastrointestinal: Negative for abdominal pain, blood in stool, constipation, diarrhea and vomiting.    Endocrine: Negative for cold intolerance, polyphagia and polyuria. Genitourinary: Negative for dysuria, flank pain, frequency, genital sores and urgency. Musculoskeletal: Negative for back pain, joint swelling, neck pain and neck stiffness. Skin: Negative for pallor and rash. Neurological: Negative for tremors, seizures, syncope, weakness, numbness and headaches. Hematological: Negative for adenopathy. Does not bruise/bleed easily. Psychiatric/Behavioral: Negative for agitation, behavioral problems, hallucinations and sleep disturbance. The patient is not hyperactive. All other systems reviewed and are negative. Except as noted above the remainder of the review of systems was reviewed and negative.        PAST MEDICAL HISTORY     Past Medical History:   Diagnosis Date    Alcohol abuse 10/27/2016    Anemia     Asthma     Cocaine abuse ([address] 10/27/2016    COPD (chronic obstructive pulmonary disease) ([address]     Depression 04/27/2020    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    Drug-seeking behavior 04/14/2015    Edema 12/10/2015    Erectile dysfunction     Gastroesophageal reflux disease 12/17/2018    Gout 10/27/2016    History of arthroscopy of both knees 10/27/2016    History of colon polyps 10/26/2021    House dust mite allergy 04/21/2014    Hyperlipidemia     Hypertension 10/27/2016    Injury to heart 10/27/2016    Insomnia 12/17/2018    Loculated pleural effusion     Medical non-compliance 02/22/2014    Morbid obesity due to excess calories ([address] 12/08/2016    Osteoarthritis of both knees 12/08/2016    Personal history of tobacco use     Pleurisy 12/12/2016    Pneumonia 12/04/2016    caused hospital admission    Pre-diabetes 10/27/2016    Seasonal allergies 04/21/2014    Severe persistent asthma 10/27/2016    Severe sleep apnea 04/14/2015    Supraventricular tachycardia ([address] 10/27/2016    Type 2 diabetes mellitus without complication, without long-term current use of insulin ([address] 10/26/2021         SURGICALHISTORY       Past Surgical History:   Procedure Laterality Date    ANTERIOR CRUCIATE LIGAMENT REPAIR      CARDIAC CATHETERIZATION      COLONOSCOPY N/A 07/15/2020    COLONOSCOPY WITH POLYPECTOMY performed by Kay Santana MD at [facility] Left 11/27/2021    VATS/thoracotomy    TOTAL KNEE ARTHROPLASTY Left 08/09/2019    LEFT TOTAL KNEE ARTHROPLASTY performed by Jillian Daniels MD at [facility] N/A 86/46/4824    UMBILICAL HERNIA REPAIR WITH MESH performed by Socrates Minaya MD at [address] [place] [address]       Previous Medications    ACETAMINOPHEN (ACETAMINOPHEN EXTRA STRENGTH) 500 MG TABLET    Take 1 tablet by mouth every 8 hours as needed for Pain    ALBUTEROL SULFATE  (90 BASE) MCG/ACT INHALER    INHALE 2 PUFFS INTO THE LUNGS EVERY 6 HOURS AS NEEDED FOR WHEEZING OR SHORTNESS OF BREATH    AMLODIPINE (NORVASC) 10 MG TABLET    Take 1 tablet by mouth daily    BLOOD GLUCOSE MONITOR STRIPS    Test three times a day & as needed for symptoms of irregular blood glucose. Dispense sufficient amount for indicated testing frequency plus additional to accommodate PRN testing needs.     BLOOD GLUCOSE MONITORING SUPPL (ONE TOUCH ULTRA 2) W/DEVICE KIT        CETIRIZINE (ZYRTEC ALLERGY) 10 MG TABLET    Take 10 mg by mouth daily    ESCITALOPRAM (LEXAPRO) 10 MG TABLET    Take 1 tablet by mouth daily    FLUTICASONE (FLONASE) 50 MCG/ACT NASAL SPRAY    2 sprays by Nasal route daily    GUAIFENESIN (MUCINEX) 600 MG EXTENDED RELEASE TABLET    Take 1 tablet by mouth 2 times daily as needed for Congestion (Cough)    IPRATROPIUM-ALBUTEROL (DUONEB) 0.5-2.5 (3) MG/3ML SOLN NEBULIZER SOLUTION    Inhale 3 mLs into the lungs every 6 hours as needed for Shortness of Breath    LANCETS MISC    1 each by Does not apply route 3 times daily    LOVASTATIN (MEVACOR) 10 MG TABLET    Take 1 tablet by mouth nightly    MELOXICAM (MOBIC) 15 MG TABLET    Take 1 tablet by mouth daily    METFORMIN (GLUCOPHAGE) 500 MG TABLET    Take 1 tablet by mouth 2 times daily (with meals)    MONTELUKAST (SINGULAIR) 10 MG TABLET    Take 1 tablet by mouth nightly    OMEPRAZOLE (PRILOSEC) 20 MG DELAYED RELEASE CAPSULE        OXYCODONE (ROXICODONE) 5 MG IMMEDIATE RELEASE TABLET        PANTOPRAZOLE (PROTONIX) 40 MG TABLET    Take 1 tablet by mouth daily    POLYETHYLENE GLYCOL (GLYCOLAX) 17 GM/SCOOP POWDER    Take 17 g by mouth daily    SILDENAFIL (VIAGRA) 100 MG TABLET    TAKE 1 TABLET BY MOUTH AS NEEDED FOR ERECTILE DYSFUNCTION.     SODIUM CHLORIDE (OCEAN, BABY AYR) 0.65 % NASAL SPRAY    2 sprays by Nasal route three times daily    TRAZODONE (DESYREL) 50 MG TABLET    Take 1 tablet by mouth nightly    TRELEGY ELLIPTA 200-62.5-25 MCG/INH AEPB    Take 1 Inhaler by mouth daily       ALLERGIES     Fish-derived products, Iodine, Seasonal, Shellfish allergy, and Pcn [penicillins]    FAMILY HISTORY       Family History   Problem Relation Age of Onset    Arthritis Mother     Asthma Mother     High Cholesterol Mother     Other Mother         aneurysm    Diabetes Father     Stroke Maternal Grandmother     Cancer Maternal Grandfather     Hypertension Other     COPD Neg Hx           SOCIAL HISTORY       Social History     Socioeconomic History    Marital status: Single     Spouse name: n/a    Number of children: 1    Years of education: 15    Highest education level: None   Occupational History    Occupation: Disability     Employer: NONE   Tobacco Use    Smoking status: Light Tobacco Smoker     Packs/day: 0.00     Years: 30.00     Pack years: 0.00     Types: Cigarettes, Cigars     Start date: 1988     Last attempt to quit: 10/1/2013     Years since quittin.3    Smokeless tobacco: Never Used    Tobacco comment: doesnt buy only smokes if someone gives him one    Vaping Use    Vaping Use: Never used   Substance and Sexual Activity    Alcohol use: Not Currently     Alcohol/week: 4.0 standard drinks     Types: 2 Cans of beer, 2 Shots of liquor per week     Comment: social 1 -2 x week    Drug use: Not Currently     Types: Cocaine, Marijuana (Weed)     Comment: no cocaine x 1 year, marijuana weekly    Sexual activity: Not Currently     Partners: Female   Other Topics Concern    None   Social History Narrative    Lives in an apartment    15 steps to get up to the apartment    Asking for hospital bed and shower chair 11/5/2021 sent request to pcp office     Has 1 daughter who lives in [facility] per patient 2 grandchildren. Per patient does not see his daughter very often. Social Determinants of Health     Financial Resource Strain: Low Risk     Difficulty of Paying Living Expenses: Not hard at all   Food Insecurity: No Food Insecurity    Worried About Running Out of Food in the Last Year: Never true    Jonnie of Food in the Last Year: Never true   Transportation Needs: Unmet Transportation Needs    Lack of Transportation (Medical):  Yes    Lack of Transportation (Non-Medical): Yes   Physical Activity:     Days of Exercise per Week: Not on file    Minutes of Exercise per Session: Not on file   Stress:     Feeling of Stress : Not on file   Social Connections:     Frequency of Communication with Friends and Family: Not on file    Frequency of Social Gatherings with Friends and Family: Not on file    Attends Confucianism Services: Not on file    Active Member of [address] [place] [address] [employer] or Organizations: Not on file    Attends Club or Organization Meetings: Not on file    Marital Status: Not on file   Intimate Partner Violence:     Fear of Current or Ex-Partner: Not on file    Emotionally Abused: Not on file    Physically Abused: Not on file    Sexually Abused: Not on file   Housing Stability:     Unable to Pay for Housing in the Last Year: Not on file    Number of [place] in the Last Year: Not on file    Unstable Housing in the Last Year: Not on file       SCREENINGS      @FLOW([id number])@      PHYSICAL EXAM    (up to 7 for level 4, 8 or more for level 5)     ED Triage Vitals [01/17/22 0959]   BP Temp Temp Source Pulse Resp SpO2 Height Weight   (!) 144/80 98.7 °F (37.1 °C) Oral 87 22 97 % -- 287 lb (130.2 kg)       Physical Exam  Vitals and nursing note reviewed. Constitutional:       Appearance: He is well-developed. Interventions: He is not intubated. Comments: Active alert cooperative patient nontoxic appearance of frequent bouts of coughing noted on arrival did talk with full   HENT:      Head: Normocephalic. Mouth/Throat:      Pharynx: No pharyngeal swelling or oropharyngeal exudate. Cardiovascular:      Rate and Rhythm: Normal rate and regular rhythm. Heart sounds: Normal heart sounds. No murmur heard. No gallop. Pulmonary:      Effort: Pulmonary effort is normal. No accessory muscle usage or respiratory distress. He is not intubated. Breath sounds: Examination of the right-lower field reveals wheezing. Examination of the left-lower field reveals wheezing and rales. Decreased breath sounds, wheezing and rales present. Chest:      Chest wall: No mass, deformity, tenderness or crepitus. There is no dullness to percussion. Abdominal:      General: Bowel sounds are normal.      Palpations: Abdomen is soft. There is no mass. Tenderness: There is no rebound. Musculoskeletal:         General: No tenderness. Normal range of motion. Cervical back: Normal range of motion and neck supple. Right lower leg: No edema. Skin:     General: Skin is warm. Capillary Refill: Capillary refill takes less than 2 seconds. Findings: No ecchymosis, erythema or rash. Neurological:      General: No focal deficit present. Mental Status: He is alert and oriented to person, place, and time. Cranial Nerves: No cranial nerve deficit. Motor: No abnormal muscle tone. Psychiatric:         Behavior: Behavior normal.         Thought Content:  Thought content normal.         DIAGNOSTIC RESULTS     EKG: All EKG's are interpreted by the Emergency Department Physician who either signs or Co-signsthis chart in the absence of a cardiologist.        RADIOLOGY:   Larene Sick such as CT, Ultrasound and MRI are read by the radiologist. Zoila Sukhjinder radiographic images are visualized and preliminarily interpreted by the emergency physician with the below findings:        Interpretation per the Radiologist below, if available at the time ofthis note:    XR CHEST PORTABLE   Final Result   A pleural effusion is again seen on the left that may be slightly diminished with atelectasis or infiltrates in the upper lung field. ED BEDSIDE ULTRASOUND:   Performed by ED Physician - none    LABS:  Labs Reviewed   COVID-19, RAPID - Abnormal; Notable for the following components:       Result Value    SARS-CoV-2, NAAT DETECTED (*)     All other components within normal limits    Narrative:     CALL  White  AGUSTINA tel. [phone],   COMPREHENSIVE METABOLIC PANEL - Abnormal; Notable for the following components:    Glucose 124 (*)     Alkaline Phosphatase 114 (*)     All other components within normal limits   CBC WITH AUTO DIFFERENTIAL - Abnormal; Notable for the following components:    RBC 4.29 (*)     Hemoglobin 12.4 (*)     Hematocrit 40.7 (*)     MCV 94.9 (*)     MCHC 30.5 (*)     RDW 20.7 (*)     All other components within normal limits   CULTURE, BLOOD 1   CULTURE, BLOOD 2   MAGNESIUM   TROPONIN   BRAIN NATRIURETIC PEPTIDE   PROTIME-INR       All other labs were within normal range or not returned as of this dictation.     EMERGENCY DEPARTMENT COURSE and DIFFERENTIAL DIAGNOSIS/MDM:   Vitals:    Vitals:    01/17/22 0959   BP: (!) 144/80   Pulse: 87   Resp: 22   Temp: 98.7 °F (37.1 °C)   TempSrc: Oral   SpO2: 97%   Weight: 287 lb (130.2 kg)           MDM  Number of Diagnoses or Management Options  COPD exacerbation ([facility] [address]): established and improving  COVID-19 virus infection  Pleural effusion on left: established and improving  Diagnosis management comments: Patient undergone thoracentesis at [facility] to take the fluid out for left lung patient has small pleural effusion again COPD exacerbation Home oxygen needs aerosol machine at home patient positive for COVID at this time patient told about it patient will follow-up with primary care physician and aerosol treatment as needed patient given prednisone for the next 5 days time EKG performed on arrival sinus rhythm with sinus arrhythmia early repolarization previous EKG compare same as before rate of 74/min patient has no chest had no chest pain RI interval 134 ms QRS duration 94 ms QT interval 370 ms patient is told about positive COVID patient resting very well feeling much better at this time after magnesium and Solu-Medrol given in route by the paramedics       Amount and/or Complexity of Data Reviewed  Clinical lab tests: ordered and reviewed  Tests in the radiology section of CPT®: ordered and reviewed        CRITICAL CARE TIME   Total Critical Care time was  minutes, excluding separately reportableprocedures. There was a high probability of clinicallysignificant/life threatening deterioration in the patient's condition which required my urgent intervention. ONSULTS:  None    PROCEDURES:  Unless otherwise noted below, none     Procedures    FINAL IMPRESSION      1. COPD exacerbation ([address]    2.  Pleural effusion on left    3. COVID-19 virus infection          DISPOSITION/PLAN   DISPOSITION        PATIENT REFERRED TO:  Ludwin Castillo MD  [facility]  [phone]    In 1 week        DISCHARGE MEDICATIONS:  New Prescriptions    PREDNISONE (DELTASONE) 20 MG TABLET    Take 2 tablets by mouth daily for 5 doses          (Please note that portions of this note were completed with a voice recognition program.  Efforts were made to edit the dictations but occasionally words are mis-transcribed.)    Dat Brown MD (electronically signed)  Attending Emergency Physician       Nicholas Enriquez MD  01/17/22 020 [id number]

## 2022-01-17 NOTE — TELEPHONE ENCOUNTER
Writer contacted ED provider Dr Brennan Kawasaki to inform of 30 day readmission risk via text.      Attending:   Jewel Jackson    Call Back: If you need to call back to inform of disposition you can contact me at [phone]

## 2022-01-17 NOTE — ED NOTES
Pt is given d/c instructions and one script. Pt voiced understanding of d/c instructions without further questions.       Ankit Glez RN  01/17/22 0124

## 2022-01-18 ENCOUNTER — CARE COORDINATION (OUTPATIENT)
Dept: CARE COORDINATION | Age: 67
End: 2022-01-18

## 2022-01-18 NOTE — CARE COORDINATION
Ambulatory Care Coordination ED COVID Follow up Call    Challenges to be reviewed by the provider   Additional needs identified to be addressed with provider:  none                 Encounter was not routed to provider for escalation. Method of communication with provider: none    Discussed COVID-19 related testing which was: available at this time. Test results were: positive. Patient informed of results, if available? Yes and Completed 1/17/2022. Current Symptoms: pain or aching joints, shortness of breath, no new symptoms and no worsening symptoms    Reviewed New or Changed Meds: yes    Do you have what you need at home?  Durable Medical Equipment ordered at discharge: None   Home Health/Outpatient orders at discharge: none   Was patient discharged with a pulse oximeter? No Discussed and confirmed pulse oximeter discharge instructions and when to notify provider or seek emergency care. Patient education provided: Reviewed appropriate site of care based on symptoms and resources available to patient including: PCP. Follow up appointment recommended: yes. If no appointment scheduled, scheduling offered: yes  Future Appointments   Date Time Provider Constance Espitia   1/25/2022  1:45 PM Tomás Kaminski MD [facility] [address]   1/25/2022  3:00 PM Ron Huerta MD [facility]   1/26/2022  9:00 AM Kimberley Seymour MD [facility] [place]   4/7/2022  2:00 PM Asya Yoo  [facility]       Interventions: Scheduled appointment with PCP-Previously scheduled   Obtained and reviewed discharge summary and/or continuity of care documents  Reviewed discharge instructions, medical action plan and red flags with patient who verbalized understanding. Plan for follow-up call in 3-5 days based on severity of symptoms and risk factors. Plan for next call: symptom management-SOB and body aches  Provided contact information for future needs.       Brittani Ma RN

## 2022-01-22 LAB
BLOOD CULTURE, ROUTINE: NORMAL
CULTURE, BLOOD 2: NORMAL

## 2022-01-24 ENCOUNTER — CARE COORDINATION (OUTPATIENT)
Dept: CARE COORDINATION | Age: 67
End: 2022-01-24

## 2022-01-24 NOTE — CARE COORDINATION
Attempted to contact patient for 7 day follow up post ED COVID monitoring. Unable to reach patient by phone. Message left regarding purpose of call. Number provided and call back requested.

## 2022-01-25 ENCOUNTER — OFFICE VISIT (OUTPATIENT)
Dept: FAMILY MEDICINE CLINIC | Age: 67
End: 2022-01-25
Payer: COMMERCIAL

## 2022-01-25 VITALS
HEART RATE: 94 BPM | TEMPERATURE: 96.9 F | SYSTOLIC BLOOD PRESSURE: 128 MMHG | DIASTOLIC BLOOD PRESSURE: 74 MMHG | OXYGEN SATURATION: 96 % | HEIGHT: 73 IN | RESPIRATION RATE: 20 BRPM | WEIGHT: 280 LBS | BODY MASS INDEX: 37.11 KG/M2

## 2022-01-25 DIAGNOSIS — J44.9 CHRONIC OBSTRUCTIVE PULMONARY DISEASE, UNSPECIFIED (HCC): ICD-10-CM

## 2022-01-25 DIAGNOSIS — N52.9 ERECTILE DYSFUNCTION, UNSPECIFIED ERECTILE DYSFUNCTION TYPE: Chronic | ICD-10-CM

## 2022-01-25 DIAGNOSIS — J44.1 COPD WITH ACUTE EXACERBATION (HCC): Primary | ICD-10-CM

## 2022-01-25 DIAGNOSIS — U07.1 COVID-19: ICD-10-CM

## 2022-01-25 PROCEDURE — 1111F DSCHRG MED/CURRENT MED MERGE: CPT | Performed by: FAMILY MEDICINE

## 2022-01-25 PROCEDURE — 99214 OFFICE O/P EST MOD 30 MIN: CPT | Performed by: FAMILY MEDICINE

## 2022-01-25 RX ORDER — BUDESONIDE AND FORMOTEROL FUMARATE DIHYDRATE 160; 4.5 UG/1; UG/1
AEROSOL RESPIRATORY (INHALATION)
COMMUNITY
Start: 2022-01-20 | End: 2022-02-24 | Stop reason: SDUPTHER

## 2022-01-25 RX ORDER — SILDENAFIL 100 MG/1
100 TABLET, FILM COATED ORAL PRN
Qty: 10 TABLET | Refills: 0 | Status: CANCELLED | OUTPATIENT
Start: 2022-01-25

## 2022-01-25 RX ORDER — ALBUTEROL SULFATE 90 UG/1
2 AEROSOL, METERED RESPIRATORY (INHALATION) EVERY 6 HOURS PRN
Qty: 18 EACH | Refills: 1 | Status: SHIPPED | OUTPATIENT
Start: 2022-01-25 | End: 2022-04-02

## 2022-01-25 RX ORDER — PREDNISONE 10 MG/1
TABLET ORAL
Qty: 9 TABLET | Refills: 0 | Status: SHIPPED | OUTPATIENT
Start: 2022-01-25 | End: 2022-02-24

## 2022-01-25 ASSESSMENT — ENCOUNTER SYMPTOMS
DIARRHEA: 0
COUGH: 0
VOMITING: 0
SHORTNESS OF BREATH: 1
APNEA: 0
BLOOD IN STOOL: 0
NAUSEA: 0
CHEST TIGHTNESS: 0
CONSTIPATION: 0
ABDOMINAL PAIN: 0

## 2022-01-25 NOTE — PROGRESS NOTES
Post-Discharge Transitional Care Management Services or Hospital Follow Up      Emily Marley   YOB: 1955    Date of Office Visit:  1/25/2022  Date of Hospital Admission: 1/17/22  Date of Hospital Discharge: 1/17/22  Readmission Risk Score(high >=14%.  Medium >=10%):Readmission Risk Score: 31.2 ( )      Care management risk score Rising risk (score 2-5) and Complex Care (Scores >=6): 6     Non face to face  following discharge, date last encounter closed (first attempt may have been earlier): *No documented post hospital discharge outreach found in the last 14 days *No documented post hospital discharge outreach found in the last 14 days    Call initiated 2 business days of discharge: *No response recorded in the last 14 days     Patient Active Problem List   Diagnosis    Anemia    Medical non-compliance    House dust mite allergy    Seasonal allergies    GASPER (obstructive sleep apnea)    Drug-seeking behavior    Hyperlipidemia    Alcohol abuse    Cocaine abuse ([address]    Gout    History of arthroscopy of both knees    Hypertension    Supraventricular tachycardia ([address]    Erectile dysfunction    Community acquired pneumonia of left lower lobe of lung    Morbid obesity due to excess calories ([address]    COPD (chronic obstructive pulmonary disease) ([address]    Chest wall pain    Insomnia    Gastroesophageal reflux disease    Tobacco use    Status post total knee replacement, left    Allergy to seafood    Anxiety with depression    Umbilical hernia without obstruction and without gangrene    Spinal stenosis of lumbar region    Type 2 diabetes mellitus without complication, without long-term current use of insulin (HCC)    History of colon polyps       Allergies   Allergen Reactions    Fish-Derived Products Anaphylaxis    Iodine Anaphylaxis     Iodine-containing products, dyes, contrast dye    Seasonal Shortness Of Breath    Shellfish Allergy      Other reaction(s): Swelling/Edema    Pcn [Penicillins] Nausea And Vomiting       Medications listed as ordered at the time of discharge from hospital     Medication List          Accurate as of January 25, 2022  3:33 PM. If you have any questions, ask your nurse or doctor. START taking these medications    predniSONE 10 MG tablet  Commonly known as: DELTASONE  2 tabs daily x 3 days, 1 tab daily x 3 days  Started by: Paddy Ryan MD        CHANGE how you take these medications    polyethylene glycol 17 GM/SCOOP powder  Commonly known as: GLYCOLAX  Take 17 g by mouth daily  What changed:   · when to take this  · reasons to take this        CONTINUE taking these medications    acetaminophen 500 MG tablet  Commonly known as: Acetaminophen Extra Strength  Take 1 tablet by mouth every 8 hours as needed for Pain     albuterol sulfate  (90 Base) MCG/ACT inhaler  INHALE 2 PUFFS INTO THE LUNGS EVERY 6 HOURS AS NEEDED FOR WHEEZING OR SHORTNESS OF BREATH     amLODIPine 10 MG tablet  Commonly known as: NORVASC  Take 1 tablet by mouth daily     blood glucose test strips  Test three times a day & as needed for symptoms of irregular blood glucose. Dispense sufficient amount for indicated testing frequency plus additional to accommodate PRN testing needs.      escitalopram 10 MG tablet  Commonly known as: LEXAPRO  Take 1 tablet by mouth daily     fluticasone 50 MCG/ACT nasal spray  Commonly known as: FLONASE  2 sprays by Nasal route daily     guaiFENesin 600 MG extended release tablet  Commonly known as: MUCINEX  Take 1 tablet by mouth 2 times daily as needed for Congestion (Cough)     ipratropium-albuterol 0.5-2.5 (3) MG/3ML Soln nebulizer solution  Commonly known as: DUONEB  Inhale 3 mLs into the lungs every 6 hours as needed for Shortness of Breath     Lancets Misc  1 each by Does not apply route 3 times daily     lovastatin 10 MG tablet  Commonly known as: MEVACOR  Take 1 tablet by mouth nightly     meloxicam 15 MG tablet  Commonly known as: MOBIC  Take 1 tablet by mouth daily     metFORMIN 500 MG tablet  Commonly known as: GLUCOPHAGE  Take 1 tablet by mouth 2 times daily (with meals)     montelukast 10 MG tablet  Commonly known as: SINGULAIR  Take 1 tablet by mouth nightly     omeprazole 20 MG delayed release capsule  Commonly known as: PRILOSEC     ONE TOUCH ULTRA 2 w/Device Kit     oxyCODONE 5 MG immediate release tablet  Commonly known as: ROXICODONE     pantoprazole 40 MG tablet  Commonly known as: PROTONIX  Take 1 tablet by mouth daily     sildenafil 100 MG tablet  Commonly known as: VIAGRA  TAKE 1 TABLET BY MOUTH AS NEEDED FOR ERECTILE DYSFUNCTION. sodium chloride 0.65 % nasal spray  Commonly known as: OCEAN, BABY AYR  2 sprays by Nasal route three times daily     Symbicort 160-4.5 MCG/ACT Aero  Generic drug: budesonide-formoterol     traZODone 50 MG tablet  Commonly known as: DESYREL  Take 1 tablet by mouth nightly     ZyrTEC Allergy 10 MG tablet  Generic drug: cetirizine           Where to Get Your Medications      These medications were sent to [facility] [address]    Phone: [phone]   · predniSONE 10 MG tablet           Medications marked \"taking\" at this time  Outpatient Medications Marked as Taking for the 22 encounter (Office Visit) with Marylee Apgar, MD   Medication Sig Dispense Refill    SYMBICORT 160-4.5 MCG/ACT AERO       predniSONE (DELTASONE) 10 MG tablet 2 tabs daily x 3 days, 1 tab daily x 3 days 9 tablet 0    sildenafil (VIAGRA) 100 MG tablet TAKE 1 TABLET BY MOUTH AS NEEDED FOR ERECTILE DYSFUNCTION.  10 tablet 0    oxyCODONE (ROXICODONE) 5 MG immediate release tablet       omeprazole (PRILOSEC) 20 MG delayed release capsule       Blood Glucose Monitoring Suppl (ONE TOUCH ULTRA 2) w/Device KIT       metFORMIN (GLUCOPHAGE) 500 MG tablet Take 1 tablet by mouth 2 times daily (with meals) 180 tablet 1    acetaminophen (ACETAMINOPHEN EXTRA STRENGTH) 500 MG tablet Take 1 tablet by mouth every 8 hours as needed for Pain 120 tablet 1    fluticasone (FLONASE) 50 MCG/ACT nasal spray 2 sprays by Nasal route daily 48 g 1    amLODIPine (NORVASC) 10 MG tablet Take 1 tablet by mouth daily 90 tablet 1    lovastatin (MEVACOR) 10 MG tablet Take 1 tablet by mouth nightly 90 tablet 1    pantoprazole (PROTONIX) 40 MG tablet Take 1 tablet by mouth daily 90 tablet 1    albuterol sulfate  (90 Base) MCG/ACT inhaler INHALE 2 PUFFS INTO THE LUNGS EVERY 6 HOURS AS NEEDED FOR WHEEZING OR SHORTNESS OF BREATH 18 each 1    meloxicam (MOBIC) 15 MG tablet Take 1 tablet by mouth daily 30 tablet 2    escitalopram (LEXAPRO) 10 MG tablet Take 1 tablet by mouth daily 90 tablet 1    montelukast (SINGULAIR) 10 MG tablet Take 1 tablet by mouth nightly 90 tablet 1    traZODone (DESYREL) 50 MG tablet Take 1 tablet by mouth nightly 90 tablet 1    guaiFENesin (MUCINEX) 600 MG extended release tablet Take 1 tablet by mouth 2 times daily as needed for Congestion (Cough) 40 tablet 1    Lancets MISC 1 each by Does not apply route 3 times daily 200 each 5    blood glucose monitor strips Test three times a day & as needed for symptoms of irregular blood glucose. Dispense sufficient amount for indicated testing frequency plus additional to accommodate PRN testing needs.  100 strip 1    sodium chloride (OCEAN, BABY AYR) 0.65 % nasal spray 2 sprays by Nasal route three times daily 1 each 0    ipratropium-albuterol (DUONEB) 0.5-2.5 (3) MG/3ML SOLN nebulizer solution Inhale 3 mLs into the lungs every 6 hours as needed for Shortness of Breath 360 mL 3    cetirizine (ZYRTEC ALLERGY) 10 MG tablet Take 10 mg by mouth daily      polyethylene glycol (GLYCOLAX) 17 GM/SCOOP powder Take 17 g by mouth daily (Patient taking differently: Take 17 g by mouth daily as needed ) 510 g 1        Medications patient taking as of now reconciled against medications ordered at time of hospital discharge: Yes    Chief Complaint   Patient presents with    Follow-Up from Hospital     pt presents today for f/u. HPI  Patient is a very pleasant 63-year-old male presents today to follow-up after recent visit to the emergency room on 1/17/2022. At that visit, he was diagnosed with COVID-19 as well as a COPD exacerbation. Patient was treated with steroids and advised to follow-up. Since then,  he states that his breathing has improved and is approximately 60 to 70% better. However he states that wheezing did return shortly after finishing his steroids. he denies any shortness of breath above his baseline. He typically uses oxygen continuously at home but did not bring oxygen with him today. He last used albuterol inhaler approximately an hour before he came into the office. Chest x-ray in the emergency room showed a left pleural effusion as well as atelectasis versus infiltrates. Patient states that that effusion has been drained approximately 1-1/2 months earlier and he still has discomfort from the thoracentesis site. Patient denies any chest pain, or lower extremity edema. He denies any fever or chills. He has a follow-up appointment with pulmonology tomorrow. He was supposed to see cardiology today but that appointment was rescheduled    Inpatient course: Discharge summary reviewed- see chart. Interval history/Current status: Improved    Review of Systems   Constitutional: Negative for activity change, appetite change and fatigue. Respiratory: Positive for shortness of breath (Baseline). Negative for apnea, cough and chest tightness. Cardiovascular: Negative for chest pain, palpitations and leg swelling. Gastrointestinal: Negative for abdominal pain, blood in stool, constipation, diarrhea, nausea and vomiting. Musculoskeletal: Negative for arthralgias. Neurological: Negative for seizures and headaches.    Psychiatric/Behavioral: Negative for hallucinations and suicidal ideas. Vitals:    01/25/22 1452   BP: 128/74   Site: Right Upper Arm   Position: Sitting   Cuff Size: Large Adult   Pulse: 94   Resp: 20   Temp: 96.9 °F (36.1 °C)   TempSrc: Temporal   SpO2: 96%   Weight: 280 lb (127 kg)   Height: 6' 0.5\" (1.842 m)     Body mass index is 37.45 kg/m². Wt Readings from Last 3 Encounters:   01/25/22 280 lb (127 kg)   01/17/22 287 lb (130.2 kg)   01/06/22 287 lb 9.6 oz (130.5 kg)     BP Readings from Last 3 Encounters:   01/25/22 128/74   01/17/22 (!) 144/80   01/06/22 134/68       Physical Exam  Vitals and nursing note reviewed. Constitutional:       General: He is not in acute distress. Appearance: Normal appearance. He is well-developed. He is not diaphoretic. HENT:      Head: Normocephalic and atraumatic. Nose: Nose normal.      Mouth/Throat:      Mouth: Mucous membranes are moist.      Pharynx: Oropharynx is clear. Eyes:      Conjunctiva/sclera: Conjunctivae normal.      Pupils: Pupils are equal, round, and reactive to light. Cardiovascular:      Rate and Rhythm: Normal rate and regular rhythm. Heart sounds: Normal heart sounds. No murmur heard. No friction rub. No gallop. Pulmonary:      Effort: Pulmonary effort is normal. No respiratory distress. Breath sounds: Examination of the right-upper field reveals wheezing. Examination of the left-upper field reveals wheezing. Examination of the right-middle field reveals wheezing. Examination of the left-middle field reveals wheezing. Examination of the right-lower field reveals wheezing. Examination of the left-lower field reveals wheezing. Wheezing (scattered) present. No rales. Chest:      Chest wall: No tenderness. Abdominal:      General: Abdomen is flat. Bowel sounds are normal.      Palpations: Abdomen is soft. Tenderness: There is no abdominal tenderness. Musculoskeletal:      Cervical back: Normal range of motion.    Skin:     General: Skin is warm and dry. Neurological:      Mental Status: He is alert and oriented to person, place, and time. Psychiatric:         Behavior: Behavior normal.         Thought Content: Thought content normal.         Judgment: Judgment normal.             Assessment/Plan:  1. COPD with acute exacerbation ([address]  Exacerbation likely secondary to COVID-19. Patient responded well to initial steroid medication but believe patient may benefit from a tapered dose. We will also repeat chest x-ray and have patient share past findings with pulmonology to determine next steps  - CA DISCHARGE MEDS RECONCILED W/ CURRENT OUTPATIENT MED LIST  - predniSONE (DELTASONE) 10 MG tablet; 2 tabs daily x 3 days, 1 tab daily x 3 days  Dispense: 9 tablet; Refill: 0  - XR CHEST STANDARD (2 VW); Future    2. COVID-19  Improving  - CA DISCHARGE MEDS RECONCILED W/ CURRENT OUTPATIENT MED LIST    3.  Erectile dysfunction, unspecified erectile dysfunction type  Will Not refill prescription until patient is able to follow-up pulmonology and cardiology        Medical Decision Making: high complexity

## 2022-01-25 NOTE — TELEPHONE ENCOUNTER
pharmacy requesting medication refill.  Please approve or deny this request.    Rx requested:  Requested Prescriptions     Pending Prescriptions Disp Refills    albuterol sulfate  (90 Base) MCG/ACT inhaler [Pharmacy Med Name: ALBUTEROL HFA (VENTOLIN) INH] 18 each 1     Sig: INHALE 2 PUFFS INTO THE LUNGS EVERY 6 HOURS AS NEEDED FOR WHEEZING OR SHORTNESS OF BREATH         Last Office Visit:   1/6/2022      Next Visit Date:  Future Appointments   Date Time Provider Constance Espitia   1/25/2022  3:00 PM MD [address]   1/26/2022  9:00 AM MD ADONIS Montoya PM [facility] [place]   4/7/2022  2:00 PM MD [address]

## 2022-01-27 ENCOUNTER — HOSPITAL ENCOUNTER (EMERGENCY)
Age: 67
Discharge: HOME | End: 2022-01-27
Attending: EMERGENCY MEDICINE
Payer: MEDICARE

## 2022-01-27 ENCOUNTER — APPOINTMENT (OUTPATIENT)
Dept: CT IMAGING | Age: 67
End: 2022-01-27
Payer: MEDICARE

## 2022-01-27 ENCOUNTER — CARE COORDINATION (OUTPATIENT)
Dept: CARE COORDINATION | Age: 67
End: 2022-01-27

## 2022-01-27 ENCOUNTER — TELEPHONE (OUTPATIENT)
Dept: OTHER | Facility: CLINIC | Age: 67
End: 2022-01-27

## 2022-01-27 VITALS
HEIGHT: 73 IN | RESPIRATION RATE: 20 BRPM | BODY MASS INDEX: 37.11 KG/M2 | OXYGEN SATURATION: 94 % | WEIGHT: 280 LBS | DIASTOLIC BLOOD PRESSURE: 84 MMHG | HEART RATE: 92 BPM | SYSTOLIC BLOOD PRESSURE: 152 MMHG | TEMPERATURE: 99 F

## 2022-01-27 DIAGNOSIS — U07.1 COVID-19 VIRUS INFECTION: ICD-10-CM

## 2022-01-27 DIAGNOSIS — J44.1 COPD EXACERBATION (HCC): Primary | ICD-10-CM

## 2022-01-27 LAB
ALBUMIN SERPL-MCNC: 3.6 G/DL (ref 3.5–4.6)
ALP BLD-CCNC: 100 U/L (ref 35–104)
ALT SERPL-CCNC: 15 U/L (ref 0–41)
AMPHETAMINE SCREEN, URINE: ABNORMAL
ANION GAP SERPL CALCULATED.3IONS-SCNC: 12 MEQ/L (ref 9–15)
AST SERPL-CCNC: 15 U/L (ref 0–40)
BARBITURATE SCREEN URINE: ABNORMAL
BASOPHILS ABSOLUTE: 0 K/UL (ref 0–0.1)
BASOPHILS RELATIVE PERCENT: 0.2 % (ref 0.2–1.2)
BENZODIAZEPINE SCREEN, URINE: ABNORMAL
BILIRUB SERPL-MCNC: <0.2 MG/DL (ref 0.2–0.7)
BUN BLDV-MCNC: 10 MG/DL (ref 8–23)
CALCIUM SERPL-MCNC: 9.2 MG/DL (ref 8.5–9.9)
CANNABINOID SCREEN URINE: ABNORMAL
CHLORIDE BLD-SCNC: 108 MEQ/L (ref 95–107)
CO2: 23 MEQ/L (ref 20–31)
COCAINE METABOLITE SCREEN URINE: POSITIVE
CREAT SERPL-MCNC: 0.88 MG/DL (ref 0.7–1.2)
EOSINOPHILS ABSOLUTE: 0.1 K/UL (ref 0–0.5)
EOSINOPHILS RELATIVE PERCENT: 1.1 % (ref 0.8–7)
GFR AFRICAN AMERICAN: >60
GFR NON-AFRICAN AMERICAN: >60
GLOBULIN: 2.9 G/DL (ref 2.3–3.5)
GLUCOSE BLD-MCNC: 97 MG/DL (ref 70–99)
HCT VFR BLD CALC: 34.1 % (ref 42–52)
HEMOGLOBIN: 10.5 G/DL (ref 13.7–17.5)
IMMATURE GRANULOCYTES #: 0.1 K/UL
IMMATURE GRANULOCYTES %: 0.6 %
INR BLD: 1
LACTIC ACID: 1.4 MMOL/L (ref 0.5–2.2)
LYMPHOCYTES ABSOLUTE: 2 K/UL (ref 1.3–3.6)
LYMPHOCYTES RELATIVE PERCENT: 21.2 %
Lab: ABNORMAL
MCH RBC QN AUTO: 29.1 PG (ref 25.7–32.2)
MCHC RBC AUTO-ENTMCNC: 30.8 % (ref 32.3–36.5)
MCV RBC AUTO: 94.5 FL (ref 79–92.2)
MONOCYTES ABSOLUTE: 1.2 K/UL (ref 0.3–0.8)
MONOCYTES RELATIVE PERCENT: 12.2 % (ref 5.3–12.2)
NEUTROPHILS ABSOLUTE: 6.2 K/UL (ref 1.8–5.4)
NEUTROPHILS RELATIVE PERCENT: 64.7 % (ref 34–67.9)
OPIATE SCREEN URINE: ABNORMAL
PDW BLD-RTO: 19.7 % (ref 11.6–14.4)
PHENCYCLIDINE SCREEN URINE: ABNORMAL
PLATELET # BLD: 341 K/UL (ref 163–337)
POTASSIUM SERPL-SCNC: 4.1 MEQ/L (ref 3.4–4.9)
PROTHROMBIN TIME: 13.1 SEC (ref 12.3–14.9)
RBC # BLD: 3.61 M/UL (ref 4.63–6.08)
SODIUM BLD-SCNC: 143 MEQ/L (ref 135–144)
TOTAL PROTEIN: 6.5 G/DL (ref 6.3–8)
TROPONIN: <0.01 NG/ML (ref 0–0.01)
WBC # BLD: 9.6 K/UL (ref 4.2–9)

## 2022-01-27 PROCEDURE — 80053 COMPREHEN METABOLIC PANEL: CPT

## 2022-01-27 PROCEDURE — 6360000004 HC RX CONTRAST MEDICATION: Performed by: EMERGENCY MEDICINE

## 2022-01-27 PROCEDURE — 96372 THER/PROPH/DIAG INJ SC/IM: CPT

## 2022-01-27 PROCEDURE — 83605 ASSAY OF LACTIC ACID: CPT

## 2022-01-27 PROCEDURE — 96374 THER/PROPH/DIAG INJ IV PUSH: CPT

## 2022-01-27 PROCEDURE — 93005 ELECTROCARDIOGRAM TRACING: CPT

## 2022-01-27 PROCEDURE — 84484 ASSAY OF TROPONIN QUANT: CPT

## 2022-01-27 PROCEDURE — 85025 COMPLETE CBC W/AUTO DIFF WBC: CPT

## 2022-01-27 PROCEDURE — 36415 COLL VENOUS BLD VENIPUNCTURE: CPT

## 2022-01-27 PROCEDURE — 71275 CT ANGIOGRAPHY CHEST: CPT

## 2022-01-27 PROCEDURE — 80306 DRUG TEST PRSMV INSTRMNT: CPT

## 2022-01-27 PROCEDURE — 6370000000 HC RX 637 (ALT 250 FOR IP): Performed by: EMERGENCY MEDICINE

## 2022-01-27 PROCEDURE — 99283 EMERGENCY DEPT VISIT LOW MDM: CPT

## 2022-01-27 PROCEDURE — 2580000003 HC RX 258: Performed by: EMERGENCY MEDICINE

## 2022-01-27 PROCEDURE — 6360000002 HC RX W HCPCS: Performed by: EMERGENCY MEDICINE

## 2022-01-27 PROCEDURE — 85610 PROTHROMBIN TIME: CPT

## 2022-01-27 PROCEDURE — 87040 BLOOD CULTURE FOR BACTERIA: CPT

## 2022-01-27 RX ORDER — DOXYCYCLINE HYCLATE 100 MG
100 TABLET ORAL 2 TIMES DAILY
Qty: 20 TABLET | Refills: 0 | Status: SHIPPED | OUTPATIENT
Start: 2022-01-27 | End: 2022-02-06

## 2022-01-27 RX ORDER — SODIUM CHLORIDE 0.9 % (FLUSH) 0.9 %
3 SYRINGE (ML) INJECTION EVERY 8 HOURS
Status: DISCONTINUED | OUTPATIENT
Start: 2022-01-27 | End: 2022-01-28 | Stop reason: HOSPADM

## 2022-01-27 RX ORDER — METHYLPREDNISOLONE 4 MG/1
TABLET ORAL
Qty: 1 KIT | Refills: 0 | Status: SHIPPED | OUTPATIENT
Start: 2022-01-27 | End: 2022-02-24

## 2022-01-27 RX ORDER — METHYLPREDNISOLONE SODIUM SUCCINATE 125 MG/2ML
125 INJECTION, POWDER, LYOPHILIZED, FOR SOLUTION INTRAMUSCULAR; INTRAVENOUS ONCE
Status: COMPLETED | OUTPATIENT
Start: 2022-01-27 | End: 2022-01-27

## 2022-01-27 RX ORDER — DIPHENHYDRAMINE HYDROCHLORIDE 50 MG/ML
25 INJECTION INTRAMUSCULAR; INTRAVENOUS ONCE
Status: COMPLETED | OUTPATIENT
Start: 2022-01-27 | End: 2022-01-27

## 2022-01-27 RX ORDER — DOXYCYCLINE HYCLATE 100 MG/1
100 CAPSULE ORAL ONCE
Status: COMPLETED | OUTPATIENT
Start: 2022-01-27 | End: 2022-01-27

## 2022-01-27 RX ORDER — CETIRIZINE HYDROCHLORIDE 10 MG/1
TABLET ORAL
Qty: 60 TABLET | Refills: 2 | Status: SHIPPED | OUTPATIENT
Start: 2022-01-27 | End: 2022-05-26 | Stop reason: SDUPTHER

## 2022-01-27 RX ADMIN — IOPAMIDOL 100 ML: 755 INJECTION, SOLUTION INTRAVENOUS at 20:58

## 2022-01-27 RX ADMIN — DOXYCYCLINE HYCLATE 100 MG: 100 CAPSULE ORAL at 22:15

## 2022-01-27 RX ADMIN — METHYLPREDNISOLONE SODIUM SUCCINATE 125 MG: 125 INJECTION, POWDER, FOR SOLUTION INTRAMUSCULAR; INTRAVENOUS at 20:24

## 2022-01-27 RX ADMIN — DIPHENHYDRAMINE HYDROCHLORIDE 25 MG: 50 INJECTION INTRAMUSCULAR; INTRAVENOUS at 20:24

## 2022-01-27 RX ADMIN — SODIUM CHLORIDE, PRESERVATIVE FREE 3 ML: 5 INJECTION INTRAVENOUS at 20:25

## 2022-01-27 ASSESSMENT — PAIN DESCRIPTION - PAIN TYPE: TYPE: CHRONIC PAIN

## 2022-01-27 ASSESSMENT — PAIN DESCRIPTION - DESCRIPTORS: DESCRIPTORS: ACHING

## 2022-01-27 ASSESSMENT — PAIN DESCRIPTION - FREQUENCY: FREQUENCY: CONTINUOUS

## 2022-01-27 ASSESSMENT — PAIN DESCRIPTION - ONSET: ONSET: ON-GOING

## 2022-01-27 ASSESSMENT — PAIN DESCRIPTION - LOCATION: LOCATION: BACK

## 2022-01-27 ASSESSMENT — PAIN SCALES - GENERAL: PAINLEVEL_OUTOF10: 9

## 2022-01-27 NOTE — TELEPHONE ENCOUNTER
Encounter Information    Provider Department Appt Notes   4/7/2022 Kim Devries MD [facility] 4 month follow up        Recent Visits    01/25/2022 COPD with acute exacerbation [facility])   [address], MD   01/06/2022 Chronic obstructive pulmonary disease with acute exacerbation [facility])   [address], MD   11/03/2021 Chronic obstructive pulmonary disease with acute lower respiratory infection [facility])   [facility] Kim Devries MD   10/26/2021

## 2022-01-28 ENCOUNTER — TELEPHONE (OUTPATIENT)
Dept: OTHER | Facility: CLINIC | Age: 67
End: 2022-01-28

## 2022-01-28 ENCOUNTER — CARE COORDINATION (OUTPATIENT)
Dept: CARE COORDINATION | Age: 67
End: 2022-01-28

## 2022-01-28 DIAGNOSIS — N52.9 ERECTILE DYSFUNCTION, UNSPECIFIED ERECTILE DYSFUNCTION TYPE: Chronic | ICD-10-CM

## 2022-01-28 LAB
EKG ATRIAL RATE: 89 BPM
EKG P AXIS: 69 DEGREES
EKG P-R INTERVAL: 140 MS
EKG Q-T INTERVAL: 356 MS
EKG QRS DURATION: 96 MS
EKG QTC CALCULATION (BAZETT): 433 MS
EKG R AXIS: 50 DEGREES
EKG T AXIS: 50 DEGREES
EKG VENTRICULAR RATE: 89 BPM

## 2022-01-28 PROCEDURE — 93010 ELECTROCARDIOGRAM REPORT: CPT | Performed by: INTERNAL MEDICINE

## 2022-01-28 NOTE — TELEPHONE ENCOUNTER
Writer contacted Dr. Ayanna Escalante via text regarding patient being at high risk for readmission.

## 2022-01-28 NOTE — ED PROVIDER NOTES
CC/HPI: 71-year-old male to the emergency department chief complaint of cough, wheezing and shortness of breath. Patient with history of COPD, chronic pain and substance abuse. Patient recently had surgical procedure [facility] to drain an effusion to his chest.  Patient states he was seen 2 days ago by his primary care physician for his coughing and wheezing. Patient states he was supposed to be prescribed steroids however prescription was phoned informed. Patient states he saw his pulmonologist yesterday who did not feel steroids were necessary. Patient states he has taken his breathing treatments however he still feels like he is short of breath and still is wheezing. Patient states his cough is occasionally productive of white sputum. No fever. Denies chest pain other than patient states he has some residual soreness in the left side of his chest secondary to the recent procedure. No recent travel no calf pain or swelling. Patient states he was on steroids last week and believes he needs her course. Patient recently diagnosed with Covid. VITALS/PMH/PSH: Reviewed per nurses notes    REVIEW OF SYSTEMS: As in chief complaint history of present illness, otherwise all other systems are reviewed and negative the total 10 systems reviewed    PHYSICAL EXAM:  GEN: Pt alert and oriented, appears mildly dyspneic. HEENT:         Normocephalic/Atramatic        PERRL, EOMI       Throat non-edematous. No erythema noted. No exudates noted. Moist membranes  NECK: Nontender, no signs of trauma, no lymphadenopathy  HEART: Reg S1/S2, without murmer, rub or gallop  LUNGS: Diminished bilaterally with scattered bibasilar rales and rhonchi left greater than right., respirations even and unlabored  ABDOMEN: Bowel sounds positive, soft, nondistended. Non-tender to palpation. No guarding rebound or rigidity  MUSCULOSKELETAL/EXTREMITITES:  No signs of trauma, cyanosis or edema.   No calf swelling or tenderness  LYMPH: no peripheral lympadenopathy noted  SKIN:  Warm & dry, no rash  NEUROLOGIC:  Alert and oriented. Speech clear    Medical decision making/ED course;  Patient taken to room 7 and EKG obtained. EKG interpreted by me as showing normal sinus rhythm at 89 bpm with no signs of acute ST-T changes. Normal intervals. IV was established and lab work was obtained and reviewed. Patient with a CMP within normal limits other than chloride being elevated at 108. Troponin was negative. Liver enzymes were within normal limits. Tox screen was positive for cocaine. White blood cell count was 9.6 with an H&H of 10.5 and 34.1 and platelets of 247. Coagulation studies were within normal limits. CT scan of the chest was obtained, which was interpreted by radiologist as showing enlarged main pulmonary artery consistent with pulmonary hypertension. No evidence of acute pulmonary embolism. Patchy bilateral infiltrates left greater than right which may reflect viral pneumonia per the radiologist.  Multifocal linear opacities bilateral lung fields left greater than right likely combination of scarring and subsegmental atelectasis. Small left pleural effusion and left basilar atelectasis. No signs of pneumothorax. Focal bronchiectasis in the left distal lower lobe as seen on prior exam.    Patient was given IV dose of Solu-Medrol in the emergency department. Patient was not hypoxemic. Other vital signs are stable. Patient also given a dose of p.o. doxycycline while in the emergency department. I discussed with the patient that symptoms appear secondary to COPD exacerbation likely brought on by recent Covid infection. Discussed will treat with antibiotics and steroids. Patient does not need a refill on his breathing medications at home.   Patient has both a family practice physician and a pulmonologist.  Does not appear to need hospitalization at this time and should be stable to be treated as an outpatient. Discussed need to return for any worsening and or changes to symptoms. Patient voiced concerns about going home stating that he felt like symptoms could worsen to the point where he could need to return and be admitted. I discussed that at this time we will discharge him home and he can return if he feels that is necessary. Clinical impression;  1) acute exacerbation of COPD  2) status post recent COVID-19 infection    Disposition/plan; patient discharged home in stable condition given discharge instructions on Covid and COPD. Patient to continue all breathing medications as prescribed.   Patient given prescription for doxycycline and Medrol Farrukh Jane,   01/27/22 [id number]

## 2022-01-28 NOTE — CARE COORDINATION
ACM called patient for ED follow-up Covid monitoring. Unable to leave message as contact phone disconnected.

## 2022-01-28 NOTE — ED TRIAGE NOTES
Pt. Presents to ED with complaints of shortness of breath x4 days. Tested positive for covid 1/17. Has COPD as well  PCP was suppose to prescribe steriods and didn't then his pulmonologist said not to take any .   Woke up with worse shortness of breath

## 2022-01-31 ENCOUNTER — CARE COORDINATION (OUTPATIENT)
Dept: CARE COORDINATION | Age: 67
End: 2022-01-31

## 2022-01-31 RX ORDER — SILDENAFIL 100 MG/1
100 TABLET, FILM COATED ORAL PRN
Qty: 10 TABLET | Refills: 0 | Status: SHIPPED | OUTPATIENT
Start: 2022-01-31 | End: 2022-03-02 | Stop reason: SDUPTHER

## 2022-01-31 NOTE — LETTER
1/31/2022    [address]     I have enjoyed working with you to improve your health. I would like to continue to provide you support. However, I have been unable to reach you at, [phone] (home)  Please let me know if I can help schedule an office visit for you or answer any questions. Joan Grey MD is interested in your health and hopes to hear from you soon. If you would like continued access to your Nurse Care Coordinator, Clinton Green RN, you can reach me at [phone]. I will wait to hear from you and will no longer reach out to you. Joan Grey MD and his/her team will continue to provide care and be available for questions.       In good health,     Clinton Green RN

## 2022-01-31 NOTE — CARE COORDINATION
[facility] has made several attempts to contact patient for [facility] out reach and Covid monitoring. [facility] has been unable to contact or leave messages for patient. [facility] will sign off at this time.

## 2022-02-02 LAB
BLOOD CULTURE, ROUTINE: NORMAL
CULTURE, BLOOD 2: NORMAL

## 2022-02-09 DIAGNOSIS — J44.9 CHRONIC OBSTRUCTIVE PULMONARY DISEASE, UNSPECIFIED COPD TYPE (HCC): Chronic | ICD-10-CM

## 2022-02-09 DIAGNOSIS — J44.1 COPD WITH ACUTE EXACERBATION (HCC): ICD-10-CM

## 2022-02-09 RX ORDER — IPRATROPIUM BROMIDE AND ALBUTEROL SULFATE 2.5; .5 MG/3ML; MG/3ML
1 SOLUTION RESPIRATORY (INHALATION) EVERY 6 HOURS PRN
Qty: 360 ML | Refills: 0 | Status: SHIPPED | OUTPATIENT
Start: 2022-02-09 | End: 2022-03-10 | Stop reason: SDUPTHER

## 2022-02-09 RX ORDER — PREDNISONE 10 MG/1
TABLET ORAL
Qty: 9 TABLET | Refills: 0 | OUTPATIENT
Start: 2022-02-09

## 2022-02-09 NOTE — TELEPHONE ENCOUNTER
It appeared that these were ordered for patient in by an ER doctor, but patient states he has been on both of these medications for years. Please advise.     Rx requested:  Requested Prescriptions     Pending Prescriptions Disp Refills    ipratropium-albuterol (DUONEB) 0.5-2.5 (3) MG/3ML SOLN nebulizer solution 360 mL 3     Sig: Inhale 3 mLs into the lungs every 6 hours as needed for Shortness of Breath    predniSONE (DELTASONE) 10 MG tablet 9 tablet 0     Si tabs daily x 3 days, 1 tab daily x 3 days         Last Office Visit:   2022      Next Visit Date:  Future Appointments   Date Time Provider Constance Espitia   2022  2:00 PM MD [address]

## 2022-02-24 ENCOUNTER — APPOINTMENT (OUTPATIENT)
Dept: GENERAL RADIOLOGY | Age: 67
End: 2022-02-24
Payer: MEDICARE

## 2022-02-24 ENCOUNTER — HOSPITAL ENCOUNTER (EMERGENCY)
Age: 67
Discharge: HOME | End: 2022-02-24
Attending: EMERGENCY MEDICINE
Payer: MEDICARE

## 2022-02-24 VITALS
OXYGEN SATURATION: 97 % | WEIGHT: 280 LBS | HEART RATE: 84 BPM | TEMPERATURE: 97.5 F | RESPIRATION RATE: 16 BRPM | BODY MASS INDEX: 37.93 KG/M2 | SYSTOLIC BLOOD PRESSURE: 138 MMHG | DIASTOLIC BLOOD PRESSURE: 80 MMHG | HEIGHT: 72 IN

## 2022-02-24 DIAGNOSIS — J30.2 SEASONAL ALLERGIES: Chronic | ICD-10-CM

## 2022-02-24 DIAGNOSIS — J44.1 COPD EXACERBATION (HCC): Primary | ICD-10-CM

## 2022-02-24 LAB
ALBUMIN SERPL-MCNC: 3.8 G/DL (ref 3.5–4.6)
ALP BLD-CCNC: 128 U/L (ref 35–104)
ALT SERPL-CCNC: 12 U/L (ref 0–41)
ANION GAP SERPL CALCULATED.3IONS-SCNC: 12 MEQ/L (ref 9–15)
AST SERPL-CCNC: 15 U/L (ref 0–40)
BASOPHILS ABSOLUTE: 0.1 K/UL (ref 0–0.1)
BASOPHILS RELATIVE PERCENT: 0.6 % (ref 0.2–1.2)
BILIRUB SERPL-MCNC: 0.3 MG/DL (ref 0.2–0.7)
BUN BLDV-MCNC: 15 MG/DL (ref 8–23)
CALCIUM SERPL-MCNC: 9.8 MG/DL (ref 8.5–9.9)
CHLORIDE BLD-SCNC: 105 MEQ/L (ref 95–107)
CO2: 25 MEQ/L (ref 20–31)
CREAT SERPL-MCNC: 0.86 MG/DL (ref 0.7–1.2)
EKG ATRIAL RATE: 85 BPM
EKG P AXIS: 79 DEGREES
EKG P-R INTERVAL: 152 MS
EKG Q-T INTERVAL: 374 MS
EKG QRS DURATION: 98 MS
EKG QTC CALCULATION (BAZETT): 445 MS
EKG R AXIS: 68 DEGREES
EKG T AXIS: 50 DEGREES
EKG VENTRICULAR RATE: 85 BPM
EOSINOPHILS ABSOLUTE: 0.8 K/UL (ref 0–0.5)
EOSINOPHILS RELATIVE PERCENT: 8.2 % (ref 0.8–7)
GFR AFRICAN AMERICAN: >60
GFR NON-AFRICAN AMERICAN: >60
GLOBULIN: 3.3 G/DL (ref 2.3–3.5)
GLUCOSE BLD-MCNC: 91 MG/DL (ref 70–99)
HCT VFR BLD CALC: 37.7 % (ref 42–52)
HEMOGLOBIN: 11.8 G/DL (ref 13.7–17.5)
IMMATURE GRANULOCYTES #: 0 K/UL
IMMATURE GRANULOCYTES %: 0.3 %
LYMPHOCYTES ABSOLUTE: 2.1 K/UL (ref 1.3–3.6)
LYMPHOCYTES RELATIVE PERCENT: 22.2 %
MCH RBC QN AUTO: 28.9 PG (ref 25.7–32.2)
MCHC RBC AUTO-ENTMCNC: 31.3 % (ref 32.3–36.5)
MCV RBC AUTO: 92.2 FL (ref 79–92.2)
MONOCYTES ABSOLUTE: 0.9 K/UL (ref 0.3–0.8)
MONOCYTES RELATIVE PERCENT: 9.8 % (ref 5.3–12.2)
NEUTROPHILS ABSOLUTE: 5.6 K/UL (ref 1.8–5.4)
NEUTROPHILS RELATIVE PERCENT: 58.9 % (ref 34–67.9)
PDW BLD-RTO: 18.6 % (ref 11.6–14.4)
PLATELET # BLD: 363 K/UL (ref 163–337)
POTASSIUM SERPL-SCNC: 4.1 MEQ/L (ref 3.4–4.9)
PRO-BNP: 38 PG/ML
RBC # BLD: 4.09 M/UL (ref 4.63–6.08)
SODIUM BLD-SCNC: 142 MEQ/L (ref 135–144)
TOTAL PROTEIN: 7.1 G/DL (ref 6.3–8)
TROPONIN: <0.01 NG/ML (ref 0–0.01)
WBC # BLD: 9.5 K/UL (ref 4.2–9)

## 2022-02-24 PROCEDURE — 2500000003 HC RX 250 WO HCPCS: Performed by: EMERGENCY MEDICINE

## 2022-02-24 PROCEDURE — 71046 X-RAY EXAM CHEST 2 VIEWS: CPT

## 2022-02-24 PROCEDURE — 93005 ELECTROCARDIOGRAM TRACING: CPT

## 2022-02-24 PROCEDURE — 36415 COLL VENOUS BLD VENIPUNCTURE: CPT

## 2022-02-24 PROCEDURE — 99284 EMERGENCY DEPT VISIT MOD MDM: CPT

## 2022-02-24 PROCEDURE — 85025 COMPLETE CBC W/AUTO DIFF WBC: CPT

## 2022-02-24 PROCEDURE — 96365 THER/PROPH/DIAG IV INF INIT: CPT

## 2022-02-24 PROCEDURE — 80053 COMPREHEN METABOLIC PANEL: CPT

## 2022-02-24 PROCEDURE — 6360000002 HC RX W HCPCS: Performed by: EMERGENCY MEDICINE

## 2022-02-24 PROCEDURE — 83880 ASSAY OF NATRIURETIC PEPTIDE: CPT

## 2022-02-24 PROCEDURE — 96372 THER/PROPH/DIAG INJ SC/IM: CPT

## 2022-02-24 PROCEDURE — 84484 ASSAY OF TROPONIN QUANT: CPT

## 2022-02-24 PROCEDURE — 2580000003 HC RX 258: Performed by: EMERGENCY MEDICINE

## 2022-02-24 PROCEDURE — 93010 ELECTROCARDIOGRAM REPORT: CPT | Performed by: INTERNAL MEDICINE

## 2022-02-24 RX ORDER — BUDESONIDE AND FORMOTEROL FUMARATE DIHYDRATE 160; 4.5 UG/1; UG/1
2 AEROSOL RESPIRATORY (INHALATION) 2 TIMES DAILY
Qty: 10.2 G | Refills: 0 | Status: SHIPPED | OUTPATIENT
Start: 2022-02-24 | End: 2022-05-14 | Stop reason: SDUPTHER

## 2022-02-24 RX ORDER — METHYLPREDNISOLONE 4 MG/1
TABLET ORAL
Qty: 1 KIT | Refills: 0 | Status: SHIPPED | OUTPATIENT
Start: 2022-02-24 | End: 2022-05-14

## 2022-02-24 RX ORDER — ALBUTEROL SULFATE 90 UG/1
2 AEROSOL, METERED RESPIRATORY (INHALATION) EVERY 4 HOURS PRN
Qty: 18 G | Refills: 1 | Status: SHIPPED | OUTPATIENT
Start: 2022-02-24 | End: 2022-05-14 | Stop reason: SDUPTHER

## 2022-02-24 RX ORDER — SODIUM CHLORIDE 0.9 % (FLUSH) 0.9 %
3 SYRINGE (ML) INJECTION EVERY 8 HOURS
Status: DISCONTINUED | OUTPATIENT
Start: 2022-02-24 | End: 2022-02-24 | Stop reason: HOSPADM

## 2022-02-24 RX ORDER — METHYLPREDNISOLONE SODIUM SUCCINATE 125 MG/2ML
125 INJECTION, POWDER, LYOPHILIZED, FOR SOLUTION INTRAMUSCULAR; INTRAVENOUS ONCE
Status: COMPLETED | OUTPATIENT
Start: 2022-02-24 | End: 2022-02-24

## 2022-02-24 RX ORDER — DOXYCYCLINE HYCLATE 100 MG
100 TABLET ORAL 2 TIMES DAILY
Qty: 20 TABLET | Refills: 0 | Status: SHIPPED | OUTPATIENT
Start: 2022-02-24 | End: 2022-03-06

## 2022-02-24 RX ADMIN — METHYLPREDNISOLONE SODIUM SUCCINATE 125 MG: 125 INJECTION, POWDER, FOR SOLUTION INTRAMUSCULAR; INTRAVENOUS at 11:41

## 2022-02-24 RX ADMIN — DOXYCYCLINE 100 MG: 100 INJECTION, POWDER, LYOPHILIZED, FOR SOLUTION INTRAVENOUS at 12:44

## 2022-02-24 RX ADMIN — SODIUM CHLORIDE, PRESERVATIVE FREE 3 ML: 5 INJECTION INTRAVENOUS at 11:42

## 2022-02-24 ASSESSMENT — PAIN DESCRIPTION - LOCATION
LOCATION: BACK
LOCATION: BACK

## 2022-02-24 ASSESSMENT — PAIN DESCRIPTION - PAIN TYPE
TYPE: ACUTE PAIN
TYPE: CHRONIC PAIN

## 2022-02-24 ASSESSMENT — PAIN DESCRIPTION - DESCRIPTORS
DESCRIPTORS: PRESSURE
DESCRIPTORS: ACHING

## 2022-02-24 ASSESSMENT — PAIN SCALES - GENERAL
PAINLEVEL_OUTOF10: 9
PAINLEVEL_OUTOF10: 9

## 2022-02-24 ASSESSMENT — PAIN DESCRIPTION - ORIENTATION: ORIENTATION: LOWER

## 2022-02-24 ASSESSMENT — PAIN DESCRIPTION - FREQUENCY: FREQUENCY: CONTINUOUS

## 2022-02-24 ASSESSMENT — PAIN DESCRIPTION - ONSET: ONSET: ON-GOING

## 2022-02-24 NOTE — ED PROVIDER NOTES
CC/HPI: 68-year-old male to the emergency department with chief complaint of cough wheezing and shortness of breath. Patient has a history of COPD, chronic pain and substance abuse. Patient had recent surgical procedure at [facility] to drain an effusion in his chest cavity. Patient was seen by me for COPD exacerbation 1 month ago. At that time CT scan of the chest did not show any signs of pulmonary embolism. Patient was sent home on steroids and antibiotics and states that he did well. Patient has since followed up with his pulmonologist.  Patient states approximately 2 weeks ago he began noticing mild wheezing and coughing. Patient states that over the last week the symptoms have worsened. Patient is taking his breathing treatments with minimal improvement. No known fever. Cough occasionally productive. Patient states occasionally his ribs hurt with coughing and is chronic back pain is worse than normal.  No paresthesias no changes to bowel movements or bladder function. No vomiting      VITALS/PMH/PSH: Reviewed per nurses notes    REVIEW OF SYSTEMS: As in chief complaint history of present illness, otherwise all other systems are reviewed and negative the total 10 systems reviewed    PHYSICAL EXAM:  GEN: Pt alert and oriented, no acute distress. Does not appear dyspneic. Does not appear uncomfortable. HEENT:         Normocephalic/Atramatic        PERRL, EOMI       Throat non-edematous. No erythema noted. No exudates noted. Moist membranes  NECK: Nontender, no signs of trauma, no lymphadenopathy  HEART: Reg S1/S2, without murmer, rub or gallop  LUNGS: Scattered bibasilar rhonchi. Faint end expiratory wheeze but good aeration. Equal breath sounds. ABDOMEN: Bowel sounds positive, soft, nondistended. Non-tender to palpation. No guarding rebound or rigidity  MUSCULOSKELETAL/EXTREMITITES:  No signs of trauma, cyanosis or edema. No CVA tenderness.   No calf swelling or tenderness. LYMPH: no peripheral lympadenopathy noted  SKIN:  Warm & dry, no rash  NEUROLOGIC:  Alert and oriented. Speech clear    Medical decision making/ED course;  Patient main stable throughout the course of his emergency department stay. IV was established and lab work was obtained and reviewed. Patient with a 9.5 white blood cell count with an H&H of 11.8 and 37.7 with platelets of 826. CMP showed all parameters within normal limits. Beta nitric peptide was low at 38. Troponin was less than 0.010. ER EKG interpretation -normal sinus rhythm at 85 bpm with no signs of acute ST-T changes. Normal intervals. Chest x-ray was interpreted by radiologist as;    Narrative   EXAMINATION: XR CHEST (2 VW)       CLINICAL HISTORY:  shortness of breath        COMPARISONS: January 17, 2022 1031 hours.       FINDINGS:       3 views of the chest are submitted.  The cardiac silhouette is enlarged   Pulmonary vascular unremarkable. Right sided trachea. Right apical scarring/fibrosis and/or pleural effusion noted   Interval improvement as compared to prior study but persistent area of atelectasis, patchy infiltrate left lower lobe with left pleural effusion. .  .  No Pneumothoraces.                                                                                        Impression   INTERVAL IMPROVEMENT AS COMPARED TO PRIOR STUDY BUT PERSISTENT AREA OF ATELECTASIS, PATCHY INFILTRATE LEFT LOWER LOBE WITH LEFT PLEURAL EFFUSION PERSISTS. .. Patient was given IV Solu-Medrol and IV doxycycline while in the emergency department. Repeat examination pulse ox 95% on room air. Did not appear dyspneic. Discussed all findings with the patient and need for follow-up. Patient has an appointment next week with his pulmonologist.  Encouraged to return for worsening or changes to symptoms.     Clinical impression;  1) acute exacerbation COPD    Disposition/plan; patient discharged home in stable condition given discharge instructions on COPD. Patient given prescription for Medrol Dosepak and doxycycline. Also requested refills on his breathing medication. Encouraged to keep upcoming appointments with pulmonologist and family practice doctor. Encouraged to return for worsening or new symptoms. Patient understanding agreement with treatment plan.      Hyacinth Halsted, DO  02/24/22 1090

## 2022-02-24 NOTE — ED TRIAGE NOTES
Pt c/o SOB x 1 week, Hx of COPD and asthma. Pt last used inhaler PTA and lst resp tx at 10:30 am.  Pt c/o SOB, and chest tightness, back pain, dry eyes, onset over 1 week. Pt to ED 9, gown, monitor, EKG and labs drawn per order. Pt resp even, unlabored, skin w/d, pain in lower back rated 9/10, nothing taken for pain today.

## 2022-03-02 DIAGNOSIS — N52.9 ERECTILE DYSFUNCTION, UNSPECIFIED ERECTILE DYSFUNCTION TYPE: Chronic | ICD-10-CM

## 2022-03-02 RX ORDER — CETIRIZINE HYDROCHLORIDE 10 MG/1
TABLET ORAL
Qty: 120 TABLET | OUTPATIENT
Start: 2022-03-02

## 2022-03-02 NOTE — TELEPHONE ENCOUNTER
Patient is requesting medication refill.  Please approve or deny this request.    Rx requested:  Requested Prescriptions     Pending Prescriptions Disp Refills    sildenafil (VIAGRA) 100 MG tablet 10 tablet 0     Sig: Take 1 tablet by mouth as needed for Erectile Dysfunction         Last Office Visit:   1/6/2022      Next Visit Date:  Future Appointments   Date Time Provider Constance Espitia   4/7/2022  2:00 PM MD [facility]

## 2022-03-03 RX ORDER — SILDENAFIL 100 MG/1
100 TABLET, FILM COATED ORAL PRN
Qty: 10 TABLET | Refills: 0 | Status: SHIPPED | OUTPATIENT
Start: 2022-03-03 | End: 2022-04-14 | Stop reason: SDUPTHER

## 2022-03-10 DIAGNOSIS — J44.9 CHRONIC OBSTRUCTIVE PULMONARY DISEASE, UNSPECIFIED COPD TYPE (HCC): Chronic | ICD-10-CM

## 2022-03-10 RX ORDER — IPRATROPIUM BROMIDE AND ALBUTEROL SULFATE 2.5; .5 MG/3ML; MG/3ML
1 SOLUTION RESPIRATORY (INHALATION) EVERY 6 HOURS PRN
Qty: 360 ML | Refills: 1 | Status: SHIPPED | OUTPATIENT
Start: 2022-03-10 | End: 2022-05-05

## 2022-03-10 NOTE — TELEPHONE ENCOUNTER
----- Message from Filomena Agrawal sent at 3/2/2022  9:22 AM EST -----  Subject: Medication Problem    QUESTIONS  Name of Medication? ipratropium-albuterol (DUONEB) 0.5-2.5 (3) MG/3ML SOLN   nebulizer solution  Patient-reported dosage and instructions? 0.5-2.5 mg/3 ml  What question or problem do you have with the medication? Patient needs a   new prescription due to him using it more frequently. He is currently out   of it and the pharmacy said he cannot get it until 3/10 (when his ins will   pay for it)  Preferred Pharmacy? [facility]Y #[id number]- [address], [address] [facility]  Pharmacy phone number (if available)? [phone]  Additional Information for Provider?   ---------------------------------------------------------------------------  --------------  CALL BACK INFO  What is the best way for the office to contact you? Do not leave any   message, patient will call back for answer  Preferred Call Back Phone Number? [phone]  ---------------------------------------------------------------------------  --------------  SCRIPT ANSWERS  Relationship to Patient?  Self

## 2022-03-23 DIAGNOSIS — M48.061 SPINAL STENOSIS OF LUMBAR REGION, UNSPECIFIED WHETHER NEUROGENIC CLAUDICATION PRESENT: ICD-10-CM

## 2022-03-23 DIAGNOSIS — Z96.652 STATUS POST TOTAL KNEE REPLACEMENT, LEFT: ICD-10-CM

## 2022-03-24 DIAGNOSIS — M48.061 SPINAL STENOSIS OF LUMBAR REGION, UNSPECIFIED WHETHER NEUROGENIC CLAUDICATION PRESENT: ICD-10-CM

## 2022-03-24 DIAGNOSIS — Z96.652 STATUS POST TOTAL KNEE REPLACEMENT, LEFT: ICD-10-CM

## 2022-03-24 RX ORDER — ACETAMINOPHEN 500 MG
500 TABLET ORAL EVERY 8 HOURS PRN
Qty: 90 TABLET | Refills: 2 | Status: SHIPPED | OUTPATIENT
Start: 2022-03-24 | End: 2022-07-07

## 2022-03-24 RX ORDER — MELOXICAM 15 MG/1
15 TABLET ORAL DAILY
Qty: 30 TABLET | Refills: 2 | OUTPATIENT
Start: 2022-03-24

## 2022-03-24 RX ORDER — ACETAMINOPHEN 500 MG
500 TABLET ORAL EVERY 8 HOURS PRN
Qty: 120 TABLET | Refills: 1 | OUTPATIENT
Start: 2022-03-24

## 2022-03-24 RX ORDER — MELOXICAM 15 MG/1
15 TABLET ORAL DAILY
Qty: 30 TABLET | Refills: 2 | Status: SHIPPED | OUTPATIENT
Start: 2022-03-24 | End: 2022-05-26 | Stop reason: SDUPTHER

## 2022-03-24 NOTE — TELEPHONE ENCOUNTER
patient requesting medication refill.  Please approve or deny this request.    Rx requested:  Requested Prescriptions     Pending Prescriptions Disp Refills    acetaminophen (ACETAMINOPHEN EXTRA STRENGTH) 500 MG tablet 120 tablet 1     Sig: Take 1 tablet by mouth every 8 hours as needed for Pain    meloxicam (MOBIC) 15 MG tablet 30 tablet 2     Sig: Take 1 tablet by mouth daily         Last Office Visit:   1/6/2022      Next Visit Date:  Future Appointments   Date Time Provider Constance Espitia   4/7/2022  2:00 PM MD [facility]

## 2022-03-24 NOTE — TELEPHONE ENCOUNTER
pharmacy requesting medication refill.  Please approve or deny this request.    Rx requested:  Requested Prescriptions     Pending Prescriptions Disp Refills    ACETAMINOPHEN EXTRA STRENGTH 500 MG tablet [Pharmacy Med Name: ACETAMINOPHEN 500 MG TABLET] 120 tablet 1     Sig: TAKE 1 TABLET BY MOUTH EVERY 8 HOURS AS NEEDED FOR PAIN    meloxicam (MOBIC) 15 MG tablet [Pharmacy Med Name: MELOXICAM 15 MG TABLET] 30 tablet 2     Sig: TAKE 1 TABLET BY MOUTH EVERY DAY         Last Office Visit:   1/6/2022      Next Visit Date:  Future Appointments   Date Time Provider Constance Espitia   4/7/2022  2:00 PM [facility]

## 2022-03-31 DIAGNOSIS — J44.9 CHRONIC OBSTRUCTIVE PULMONARY DISEASE, UNSPECIFIED (HCC): ICD-10-CM

## 2022-04-02 RX ORDER — ALBUTEROL SULFATE 90 UG/1
2 AEROSOL, METERED RESPIRATORY (INHALATION) EVERY 6 HOURS PRN
Qty: 18 EACH | Refills: 1 | Status: SHIPPED | OUTPATIENT
Start: 2022-04-02 | End: 2022-05-26 | Stop reason: SDUPTHER

## 2022-04-05 DIAGNOSIS — E11.9 TYPE 2 DIABETES MELLITUS WITHOUT COMPLICATION, WITHOUT LONG-TERM CURRENT USE OF INSULIN (HCC): ICD-10-CM

## 2022-04-05 DIAGNOSIS — E78.5 HYPERLIPIDEMIA, UNSPECIFIED HYPERLIPIDEMIA TYPE: Chronic | ICD-10-CM

## 2022-04-05 RX ORDER — LOVASTATIN 10 MG/1
10 TABLET ORAL NIGHTLY
Qty: 90 TABLET | Refills: 1 | Status: SHIPPED | OUTPATIENT
Start: 2022-04-05 | End: 2022-04-12

## 2022-04-05 RX ORDER — GLUCOSAMINE HCL/CHONDROITIN SU 500-400 MG
CAPSULE ORAL
Qty: 100 STRIP | Refills: 1 | Status: SHIPPED | OUTPATIENT
Start: 2022-04-05

## 2022-04-05 RX ORDER — ALBUTEROL SULFATE 90 UG/1
2 AEROSOL, METERED RESPIRATORY (INHALATION) EVERY 4 HOURS PRN
Qty: 18 G | Refills: 1 | OUTPATIENT
Start: 2022-04-05

## 2022-04-07 ENCOUNTER — OFFICE VISIT (OUTPATIENT)
Dept: FAMILY MEDICINE CLINIC | Age: 67
End: 2022-04-07
Payer: MEDICARE

## 2022-04-07 VITALS
DIASTOLIC BLOOD PRESSURE: 62 MMHG | TEMPERATURE: 97.8 F | HEIGHT: 72 IN | BODY MASS INDEX: 37.49 KG/M2 | SYSTOLIC BLOOD PRESSURE: 130 MMHG | WEIGHT: 276.8 LBS | OXYGEN SATURATION: 92 % | HEART RATE: 86 BPM

## 2022-04-07 DIAGNOSIS — Z72.0 TOBACCO USE: ICD-10-CM

## 2022-04-07 DIAGNOSIS — I47.1 SUPRAVENTRICULAR TACHYCARDIA (HCC): Chronic | ICD-10-CM

## 2022-04-07 DIAGNOSIS — I10 PRIMARY HYPERTENSION: Chronic | ICD-10-CM

## 2022-04-07 DIAGNOSIS — E11.69 ONYCHOMYCOSIS OF MULTIPLE TOENAILS WITH TYPE 2 DIABETES MELLITUS (HCC): ICD-10-CM

## 2022-04-07 DIAGNOSIS — D64.9 ANEMIA, UNSPECIFIED TYPE: Chronic | ICD-10-CM

## 2022-04-07 DIAGNOSIS — Z12.5 SCREENING FOR PROSTATE CANCER: ICD-10-CM

## 2022-04-07 DIAGNOSIS — J44.9 CHRONIC OBSTRUCTIVE PULMONARY DISEASE, UNSPECIFIED COPD TYPE (HCC): Chronic | ICD-10-CM

## 2022-04-07 DIAGNOSIS — E66.01 CLASS 2 SEVERE OBESITY WITH SERIOUS COMORBIDITY AND BODY MASS INDEX (BMI) OF 37.0 TO 37.9 IN ADULT, UNSPECIFIED OBESITY TYPE (HCC): ICD-10-CM

## 2022-04-07 DIAGNOSIS — B35.1 ONYCHOMYCOSIS OF MULTIPLE TOENAILS WITH TYPE 2 DIABETES MELLITUS (HCC): ICD-10-CM

## 2022-04-07 DIAGNOSIS — M48.061 SPINAL STENOSIS OF LUMBAR REGION, UNSPECIFIED WHETHER NEUROGENIC CLAUDICATION PRESENT: ICD-10-CM

## 2022-04-07 DIAGNOSIS — M10.9 GOUT, UNSPECIFIED CAUSE, UNSPECIFIED CHRONICITY, UNSPECIFIED SITE: Chronic | ICD-10-CM

## 2022-04-07 DIAGNOSIS — F14.10 COCAINE ABUSE (HCC): Chronic | ICD-10-CM

## 2022-04-07 DIAGNOSIS — K21.9 GASTROESOPHAGEAL REFLUX DISEASE, UNSPECIFIED WHETHER ESOPHAGITIS PRESENT: ICD-10-CM

## 2022-04-07 DIAGNOSIS — E11.9 TYPE 2 DIABETES MELLITUS WITHOUT COMPLICATION, WITHOUT LONG-TERM CURRENT USE OF INSULIN (HCC): Primary | ICD-10-CM

## 2022-04-07 LAB — HBA1C MFR BLD: 5.7 %

## 2022-04-07 PROCEDURE — 3044F HG A1C LEVEL LT 7.0%: CPT | Performed by: FAMILY MEDICINE

## 2022-04-07 PROCEDURE — 83036 HEMOGLOBIN GLYCOSYLATED A1C: CPT | Performed by: FAMILY MEDICINE

## 2022-04-07 PROCEDURE — 99214 OFFICE O/P EST MOD 30 MIN: CPT | Performed by: FAMILY MEDICINE

## 2022-04-07 RX ORDER — BLOOD-GLUCOSE METER
EACH MISCELLANEOUS
Qty: 1 KIT | Status: CANCELLED | OUTPATIENT
Start: 2022-04-07

## 2022-04-07 ASSESSMENT — ENCOUNTER SYMPTOMS
ANAL BLEEDING: 0
COUGH: 0
CHEST TIGHTNESS: 0
NAUSEA: 0
SHORTNESS OF BREATH: 0
ABDOMINAL PAIN: 0
CONSTIPATION: 0
WHEEZING: 0
VOMITING: 0
BLOOD IN STOOL: 0
DIARRHEA: 0

## 2022-04-07 NOTE — ASSESSMENT & PLAN NOTE
Patient counseled on healthy dietary choices and the benefits of a lower salt and/or lower carbohydrate diet as appropriate. Patient also counseled on benefits of activity for 20-30 minutes at least 3-5 days a week as he is able. Advice was given to make small changes over time, setting smaller achievable goals until recommended lifestyle changes are reached.

## 2022-04-07 NOTE — PROGRESS NOTES
Mookie Teague (: 1955) is a 61 y.o. male, Established patient, who presents today for:    Chief Complaint   Patient presents with    Check-Up     Patient is present for [facility]. patient states that he has been having some incressed back and neck pain          ASSESSMENT/PLAN    1. Type 2 diabetes mellitus without complication, without long-term current use of insulin (HCC)  -     POCT glycosylated hemoglobin (Hb A1C)  -     AFL - Chava Hernandez MD, Ophthalmology, [facility]  -     Comprehensive Metabolic Panel; Future  -     Lipid Panel; Future  -     Microalbumin / Creatinine Urine Ratio; Future  -      DIABETES FOOT EXAM  2. Onychomycosis of multiple toenails with type 2 diabetes mellitus [facility]  -     [facility], [place]  3. Primary hypertension  -     CBC with Auto Differential; Future  -     Comprehensive Metabolic Panel; Future  4. Supraventricular tachycardia (HCC)  -     CBC with Auto Differential; Future  5. Chronic obstructive pulmonary disease, unspecified COPD type ([facility]  Assessment & Plan:  Currently stable on medication. I stressed with patient the importance of complete tobacco cessation and keeping regular follow-up visits with pulmonology office to lessen the risk of recurrent hospital admissions. Orders:  -     CBC with Auto Differential; Future  6. Gastroesophageal reflux disease, unspecified whether esophagitis present  Assessment & Plan:   Asymptomatic, continue current medications  7. Anemia, unspecified type  -     CBC with Auto Differential; Future  8. Gout, unspecified cause, unspecified chronicity, unspecified site  -     Uric Acid; Future  9. Spinal stenosis of lumbar region, unspecified whether neurogenic claudication present  -     [facility] - Tiburcio Epps MD, Pain Management, [place]  10. Cocaine abuse [facility])  Assessment & Plan:  Patient reports abstaining from cocaine use since 2021.   He was encouraged to continue avoiding all illicit drug use   11. Tobacco use  Assessment & Plan:  Patient pre-contemplative and is aware that smoking cessation would be the best thing for overall health. We reviewed cessation aids including medication and nicotine replacement therapy. Will continue to encourage complete smoking cessation at subsequent visits. 12. Class 2 severe obesity with serious comorbidity and body mass index (BMI) of 37.0 to 37.9 in adult, unspecified obesity type [facility])  Assessment & Plan:  Patient counseled on healthy dietary choices and the benefits of a lower salt and/or lower carbohydrate diet as appropriate. Patient also counseled on benefits of activity for 20-30 minutes at least 3-5 days a week as he is able. Advice was given to make small changes over time, setting smaller achievable goals until recommended lifestyle changes are reached. 15. Screening for prostate cancer  -     PSA Screening; Future      Return in about 6 months (around 10/7/2022) for Annual Wellness Visit. SUBJECTIVE/OBJECTIVE:    HPI    Patient presents for chronic diabetes, hypertension, COPD, GERD, anemia, gout, and obesity follow-up. Care has been established with [facility]ology at [facility]. Patient reports taking medications as prescribed since the most recent visit. They report using an herbal supplement (unable to recall name) from a family member reported to help with chronic lung issues and state they are feeling significantly improved overall in terms of breathing. They report cutting back on fried foods, but deny adhering to any specific lower carbohydrate or lower salt diet. They deny any routine exercise outside of normal day-to-day activity. They deny any polyuria or polydipsia, new onset vision changes, or new onset paresthesias. Patient denies any chest pain,  palpitations, lightheadedness, dizziness, worsening lower extremity edema, or syncope.   Patient denies any dyspnea at rest, persistent wheezing, worsening cough, chest tightness, or limitation in normal day-to-day activity due to breathing. Patient denies any change in appetite, weight changes, dysphagia, early satiety, increased belching, sour brash/heartburn, abdominal pain, nausea/vomiting, diarrhea, constipation, melena, rectal bleeding, or hematochezia. Patient admits to continued alcohol consumption of 5 to 10 beers per week. He denies any illicit drug use and states he has been sober from all cocaine use since December 2021. He admits to continued smoking of 2 to 3 cigarettes/day. Current Outpatient Medications on File Prior to Visit   Medication Sig Dispense Refill    metFORMIN (GLUCOPHAGE) 500 MG tablet Take 1 tablet by mouth 2 times daily (with meals) 180 tablet 1    blood glucose monitor strips Test three times a day & as needed for symptoms of irregular blood glucose. Dispense sufficient amount for indicated testing frequency plus additional to accommodate PRN testing needs. 100 strip 1    lovastatin (MEVACOR) 10 MG tablet Take 1 tablet by mouth nightly 90 tablet 1    albuterol sulfate  (90 Base) MCG/ACT inhaler Inhale 2 puffs into the lungs every 6 hours as needed for Wheezing or Shortness of Breath 18 each 1    acetaminophen (ACETAMINOPHEN EXTRA STRENGTH) 500 MG tablet Take 1 tablet by mouth every 8 hours as needed for Pain 90 tablet 2    meloxicam (MOBIC) 15 MG tablet Take 1 tablet by mouth daily 30 tablet 2    ipratropium-albuterol (DUONEB) 0.5-2.5 (3) MG/3ML SOLN nebulizer solution Inhale 3 mLs into the lungs every 6 hours as needed for Shortness of Breath 360 mL 1    sildenafil (VIAGRA) 100 MG tablet Take 1 tablet by mouth as needed for Erectile Dysfunction 10 tablet 0    methylPREDNISolone (MEDROL, PORTER,) 4 MG tablet Take by mouth.  1 kit 0    albuterol sulfate HFA (VENTOLIN HFA) 108 (90 Base) MCG/ACT inhaler Inhale 2 puffs into the lungs every 4 hours as needed for Wheezing 18 g 1    albuterol (PROVENTIL) (5 MG/ML) 0.5% nebulizer solution Take 0.5 mLs by nebulization every 6 hours as needed for Wheezing 120 each 0    SYMBICORT 160-4.5 MCG/ACT AERO Inhale 2 puffs into the lungs 2 times daily 10.2 g 0    cetirizine (ZYRTEC) 10 MG tablet TAKE 1 TABLET BY MOUTH TWICE A DAY 60 tablet 2    oxyCODONE (ROXICODONE) 5 MG immediate release tablet       omeprazole (PRILOSEC) 20 MG delayed release capsule       Blood Glucose Monitoring Suppl (ONE TOUCH ULTRA 2) w/Device KIT       fluticasone (FLONASE) 50 MCG/ACT nasal spray 2 sprays by Nasal route daily 48 g 1    amLODIPine (NORVASC) 10 MG tablet Take 1 tablet by mouth daily 90 tablet 1    pantoprazole (PROTONIX) 40 MG tablet Take 1 tablet by mouth daily 90 tablet 1    escitalopram (LEXAPRO) 10 MG tablet Take 1 tablet by mouth daily 90 tablet 1    montelukast (SINGULAIR) 10 MG tablet Take 1 tablet by mouth nightly 90 tablet 1    traZODone (DESYREL) 50 MG tablet Take 1 tablet by mouth nightly 90 tablet 1    Lancets MISC 1 each by Does not apply route 3 times daily 200 each 5    sodium chloride (OCEAN, BABY AYR) 0.65 % nasal spray 2 sprays by Nasal route three times daily 1 each 0    polyethylene glycol (GLYCOLAX) 17 GM/SCOOP powder Take 17 g by mouth daily (Patient taking differently: Take 17 g by mouth daily as needed ) 510 g 1     No current facility-administered medications on file prior to visit. Allergies   Allergen Reactions    Fish-Derived Products Anaphylaxis    Iodine Anaphylaxis     Iodine-containing products, dyes, contrast dye    Seasonal Shortness Of Breath    Shellfish Allergy      Other reaction(s): Swelling/Edema    Pcn [Penicillins] Nausea And Vomiting        Review of Systems   Constitutional: Negative for appetite change, chills, diaphoresis, fatigue, fever and unexpected weight change. Eyes: Negative for visual disturbance. Respiratory: Negative for cough, chest tightness, shortness of breath and wheezing.     Cardiovascular: Negative for chest pain, palpitations and leg swelling. No orthopnea, No PND   Gastrointestinal: Negative for abdominal pain, anal bleeding, blood in stool, constipation, diarrhea, nausea and vomiting. No heartburn, No melena   Endocrine: Negative for cold intolerance, heat intolerance, polydipsia, polyphagia and polyuria. Genitourinary: Negative for dysuria and hematuria. Musculoskeletal: Negative for myalgias. Skin: Negative for rash. Neurological: Negative for dizziness, syncope, weakness, light-headedness, numbness and headaches. Psychiatric/Behavioral: Negative for dysphoric mood. The patient is not nervous/anxious. Vitals:  /62 (Site: Right Upper Arm, Position: Sitting, Cuff Size: Large Adult)   Pulse 86   Temp 97.8 °F (36.6 °C) (Temporal)   Ht 6' (1.829 m)   Wt 276 lb 12.8 oz (125.6 kg)   SpO2 92%   BMI 37.54 kg/m²     Results for POC orders placed in visit on 04/07/22   POCT glycosylated hemoglobin (Hb A1C)   Result Value Ref Range    Hemoglobin A1C 5.7 %         Physical Exam  Vitals reviewed. Constitutional:       General: He is not in acute distress. Appearance: He is not ill-appearing. Eyes:      General: No scleral icterus. Neck:      Thyroid: No thyroid mass, thyromegaly or thyroid tenderness. Vascular: No carotid bruit. Cardiovascular:      Rate and Rhythm: Normal rate and regular rhythm. Pulses:           Dorsalis pedis pulses are 2+ on the right side and 2+ on the left side. Posterior tibial pulses are 2+ on the right side and 2+ on the left side. Heart sounds: Normal heart sounds. No murmur heard. Pulmonary:      Effort: Pulmonary effort is normal. No tachypnea, accessory muscle usage or respiratory distress. Breath sounds: No decreased air movement. Wheezing (expiratory wheezing throughout) present. No decreased breath sounds, rhonchi or rales. Abdominal:      General: Bowel sounds are normal. There is no distension. Palpations: Abdomen is soft. Tenderness: There is no abdominal tenderness. There is no right CVA tenderness, left CVA tenderness, guarding or rebound. Musculoskeletal:      Cervical back: Neck supple. Right lower leg: No edema. Left lower leg: No edema. Feet:      Right foot:      Protective Sensation: 10 sites tested. 10 sites sensed. Skin integrity: Callus and dry skin present. No ulcer, blister, skin breakdown, erythema, warmth or fissure. Toenail Condition: Right toenails are abnormally thick. Fungal disease present. Left foot:      Protective Sensation: 10 sites tested. 10 sites sensed. Skin integrity: Callus and dry skin present. No ulcer, blister, skin breakdown, erythema, warmth or fissure. Lymphadenopathy:      Cervical: No cervical adenopathy. Skin:     Findings: No rash. Neurological:      Mental Status: He is alert and oriented to person, place, and time. Sensory: Sensation is intact. No sensory deficit. Comments: Foot monofilament testing negative for neuropathy bilaterally   Psychiatric:         Mood and Affect: Mood normal.         Behavior: Behavior normal.         Thought Content: Thought content normal.         Ortho Exam (If Applicable)              An electronic signature was used to authenticate this note.      Janina Wu MD

## 2022-04-07 NOTE — ASSESSMENT & PLAN NOTE
Patient reports abstaining from cocaine use since December 2021.   He was encouraged to continue avoiding all illicit drug use

## 2022-04-07 NOTE — ASSESSMENT & PLAN NOTE
Currently stable on medication. I stressed with patient the importance of complete tobacco cessation and keeping regular follow-up visits with pulmonology office to lessen the risk of recurrent hospital admissions.

## 2022-04-12 ENCOUNTER — HOSPITAL ENCOUNTER (OUTPATIENT)
Dept: LAB | Age: 67
Discharge: HOME | End: 2022-04-12
Payer: COMMERCIAL

## 2022-04-12 DIAGNOSIS — E11.29 TYPE 2 DIABETES MELLITUS WITH ALBUMINURIA (HCC): Primary | ICD-10-CM

## 2022-04-12 DIAGNOSIS — J44.9 CHRONIC OBSTRUCTIVE PULMONARY DISEASE, UNSPECIFIED COPD TYPE (HCC): Chronic | ICD-10-CM

## 2022-04-12 DIAGNOSIS — D72.829 LEUKOCYTOSIS, UNSPECIFIED TYPE: ICD-10-CM

## 2022-04-12 DIAGNOSIS — Z12.5 SCREENING FOR PROSTATE CANCER: ICD-10-CM

## 2022-04-12 DIAGNOSIS — R80.9 TYPE 2 DIABETES MELLITUS WITH ALBUMINURIA (HCC): Primary | ICD-10-CM

## 2022-04-12 DIAGNOSIS — M10.9 GOUT, UNSPECIFIED CAUSE, UNSPECIFIED CHRONICITY, UNSPECIFIED SITE: Chronic | ICD-10-CM

## 2022-04-12 DIAGNOSIS — I10 PRIMARY HYPERTENSION: Chronic | ICD-10-CM

## 2022-04-12 DIAGNOSIS — E11.9 TYPE 2 DIABETES MELLITUS WITHOUT COMPLICATION, WITHOUT LONG-TERM CURRENT USE OF INSULIN (HCC): ICD-10-CM

## 2022-04-12 DIAGNOSIS — D64.9 ANEMIA, UNSPECIFIED TYPE: Chronic | ICD-10-CM

## 2022-04-12 DIAGNOSIS — I47.1 SUPRAVENTRICULAR TACHYCARDIA (HCC): Chronic | ICD-10-CM

## 2022-04-12 DIAGNOSIS — E78.5 HYPERLIPIDEMIA, UNSPECIFIED HYPERLIPIDEMIA TYPE: Chronic | ICD-10-CM

## 2022-04-12 LAB
ALBUMIN SERPL-MCNC: 4.3 G/DL (ref 3.5–4.6)
ALP BLD-CCNC: 124 U/L (ref 35–104)
ALT SERPL-CCNC: 14 U/L (ref 0–41)
ANION GAP SERPL CALCULATED.3IONS-SCNC: 16 MEQ/L (ref 9–15)
AST SERPL-CCNC: 17 U/L (ref 0–40)
BASOPHILS ABSOLUTE: 0.1 K/UL (ref 0–0.2)
BASOPHILS RELATIVE PERCENT: 0.6 %
BILIRUB SERPL-MCNC: 0.5 MG/DL (ref 0.2–0.7)
BUN BLDV-MCNC: 10 MG/DL (ref 8–23)
CALCIUM SERPL-MCNC: 9.8 MG/DL (ref 8.5–9.9)
CHLORIDE BLD-SCNC: 104 MEQ/L (ref 95–107)
CHOLESTEROL, TOTAL: 176 MG/DL (ref 0–199)
CO2: 20 MEQ/L (ref 20–31)
CREAT SERPL-MCNC: 0.98 MG/DL (ref 0.7–1.2)
CREATININE URINE: 271.9 MG/DL
EOSINOPHILS ABSOLUTE: 0.3 K/UL (ref 0–0.7)
EOSINOPHILS RELATIVE PERCENT: 2.4 %
GFR AFRICAN AMERICAN: >60
GFR NON-AFRICAN AMERICAN: >60
GLOBULIN: 3.1 G/DL (ref 2.3–3.5)
GLUCOSE BLD-MCNC: 93 MG/DL (ref 70–99)
HCT VFR BLD CALC: 43.5 % (ref 42–52)
HDLC SERPL-MCNC: 48 MG/DL (ref 40–59)
HEMOGLOBIN: 14 G/DL (ref 14–18)
LDL CHOLESTEROL CALCULATED: 108 MG/DL (ref 0–129)
LYMPHOCYTES ABSOLUTE: 1.9 K/UL (ref 1–4.8)
LYMPHOCYTES RELATIVE PERCENT: 17 %
MCH RBC QN AUTO: 30.1 PG (ref 27–31.3)
MCHC RBC AUTO-ENTMCNC: 32.2 % (ref 33–37)
MCV RBC AUTO: 93.4 FL (ref 80–100)
MICROALBUMIN UR-MCNC: 96.1 MG/DL
MICROALBUMIN/CREAT UR-RTO: 353.4 MG/G (ref 0–30)
MONOCYTES ABSOLUTE: 1 K/UL (ref 0.2–0.8)
MONOCYTES RELATIVE PERCENT: 9.3 %
NEUTROPHILS ABSOLUTE: 7.8 K/UL (ref 1.4–6.5)
NEUTROPHILS RELATIVE PERCENT: 70.7 %
PDW BLD-RTO: 18.1 % (ref 11.5–14.5)
PLATELET # BLD: 354 K/UL (ref 130–400)
POTASSIUM SERPL-SCNC: 3.6 MEQ/L (ref 3.4–4.9)
PROSTATE SPECIFIC ANTIGEN: 2.28 NG/ML (ref 0–4)
RBC # BLD: 4.65 M/UL (ref 4.7–6.1)
SODIUM BLD-SCNC: 140 MEQ/L (ref 135–144)
TOTAL PROTEIN: 7.4 G/DL (ref 6.3–8)
TRIGL SERPL-MCNC: 101 MG/DL (ref 0–150)
URIC ACID, SERUM: 4.9 MG/DL (ref 3.4–7)
WBC # BLD: 11 K/UL (ref 4.8–10.8)

## 2022-04-12 PROCEDURE — 80053 COMPREHEN METABOLIC PANEL: CPT

## 2022-04-12 PROCEDURE — 82570 ASSAY OF URINE CREATININE: CPT

## 2022-04-12 PROCEDURE — 84153 ASSAY OF PSA TOTAL: CPT

## 2022-04-12 PROCEDURE — 82043 UR ALBUMIN QUANTITATIVE: CPT

## 2022-04-12 PROCEDURE — 84550 ASSAY OF BLOOD/URIC ACID: CPT

## 2022-04-12 PROCEDURE — 80061 LIPID PANEL: CPT

## 2022-04-12 PROCEDURE — 85025 COMPLETE CBC W/AUTO DIFF WBC: CPT

## 2022-04-12 PROCEDURE — 36415 COLL VENOUS BLD VENIPUNCTURE: CPT

## 2022-04-12 RX ORDER — LOVASTATIN 20 MG/1
20 TABLET ORAL NIGHTLY
Qty: 90 TABLET | Refills: 1 | Status: SHIPPED | OUTPATIENT
Start: 2022-04-12 | End: 2022-10-06 | Stop reason: SDUPTHER

## 2022-04-12 NOTE — RESULT ENCOUNTER NOTE
High urine microalbumin, CMP with stable high alk phos 124, lipid panel with , normal uric acid, normal PSA, CBC with high WBC 11    Please call patient to inform him that recent lab work shows the followin. His uric acid level is normal and his prostate screening is normal2. His lipid panel shows a bad cholesterol level above goal control for known diabetes. I would like to increase his dose of lovastatin to 20 mg daily. He should take TWO of his current 10 mg tablets at the same time until he runs out and then  the new prescription for the higher dose at his pharmacy. 3. His liver function testing is stable and his blood counts show no signs of anemia with a continued elevated white blood cell count likely associated with frequent oral steroid medication. We will continue to monitor over time with repeat testing in 1 month. I have left a future order in his chart. 4. His urine microalbumin is significantly elevated indicating that his diabetes is impairing his kidney function. I have placed a referral to a nephrologist (kidney specialist) to help with preserving his kidney function long-term.   Please help him arrange a visit with the specialist and send recent bloodwork and urine testing results to nephrology office with the referrral.

## 2022-04-13 DIAGNOSIS — N52.9 ERECTILE DYSFUNCTION, UNSPECIFIED ERECTILE DYSFUNCTION TYPE: Chronic | ICD-10-CM

## 2022-04-13 NOTE — TELEPHONE ENCOUNTER
Patient is requesting medication refill.  Please approve or deny this request.    Rx requested:  Requested Prescriptions     Pending Prescriptions Disp Refills    sildenafil (VIAGRA) 100 MG tablet 10 tablet 0     Sig: Take 1 tablet by mouth as needed for Erectile Dysfunction         Last Office Visit:   4/7/2022      Next Visit Date:  Future Appointments   Date Time Provider Constance Espitia   10/6/2022  3:00 PM [facility]

## 2022-04-14 ENCOUNTER — APPOINTMENT (OUTPATIENT)
Dept: GENERAL RADIOLOGY | Age: 67
End: 2022-04-14
Payer: MEDICARE

## 2022-04-14 ENCOUNTER — HOSPITAL ENCOUNTER (EMERGENCY)
Age: 67
Discharge: LEFT BEFORE DISCHARGE | End: 2022-04-14
Attending: EMERGENCY MEDICINE
Payer: MEDICARE

## 2022-04-14 VITALS
HEART RATE: 91 BPM | OXYGEN SATURATION: 96 % | WEIGHT: 280 LBS | SYSTOLIC BLOOD PRESSURE: 121 MMHG | RESPIRATION RATE: 19 BRPM | DIASTOLIC BLOOD PRESSURE: 92 MMHG | BODY MASS INDEX: 37.97 KG/M2 | TEMPERATURE: 99.3 F

## 2022-04-14 DIAGNOSIS — R21 RASH AND OTHER NONSPECIFIC SKIN ERUPTION: ICD-10-CM

## 2022-04-14 DIAGNOSIS — J18.9 PNEUMONIA DUE TO INFECTIOUS ORGANISM, UNSPECIFIED LATERALITY, UNSPECIFIED PART OF LUNG: ICD-10-CM

## 2022-04-14 DIAGNOSIS — J44.1 COPD EXACERBATION (HCC): Primary | ICD-10-CM

## 2022-04-14 DIAGNOSIS — Z53.29 LEFT AGAINST MEDICAL ADVICE: ICD-10-CM

## 2022-04-14 LAB
ALBUMIN SERPL-MCNC: 3.8 G/DL (ref 3.5–4.6)
ALP BLD-CCNC: 116 U/L (ref 35–104)
ALT SERPL-CCNC: 11 U/L (ref 0–41)
ANION GAP SERPL CALCULATED.3IONS-SCNC: 12 MEQ/L (ref 9–15)
AST SERPL-CCNC: 13 U/L (ref 0–40)
BASOPHILS ABSOLUTE: 0 K/UL (ref 0–0.1)
BASOPHILS RELATIVE PERCENT: 0.1 % (ref 0.2–1.2)
BILIRUB SERPL-MCNC: 0.6 MG/DL (ref 0.2–0.7)
BUN BLDV-MCNC: 7 MG/DL (ref 8–23)
CALCIUM SERPL-MCNC: 9.2 MG/DL (ref 8.5–9.9)
CHLORIDE BLD-SCNC: 103 MEQ/L (ref 95–107)
CO2: 23 MEQ/L (ref 20–31)
CREAT SERPL-MCNC: 0.79 MG/DL (ref 0.7–1.2)
D DIMER: 0.76 MG/L FEU (ref 0–0.5)
EOSINOPHILS ABSOLUTE: 0.2 K/UL (ref 0–0.5)
EOSINOPHILS RELATIVE PERCENT: 1.6 % (ref 0.8–7)
GFR AFRICAN AMERICAN: >60
GFR NON-AFRICAN AMERICAN: >60
GLOBULIN: 2.8 G/DL (ref 2.3–3.5)
GLUCOSE BLD-MCNC: 129 MG/DL (ref 70–99)
HCT VFR BLD CALC: 40.5 % (ref 42–52)
HEMOGLOBIN: 13.1 G/DL (ref 13.7–17.5)
IMMATURE GRANULOCYTES #: 0 K/UL
IMMATURE GRANULOCYTES %: 0.2 %
INFLUENZA A BY PCR: NEGATIVE
INFLUENZA B BY PCR: NEGATIVE
INR BLD: 1.1
LYMPHOCYTES ABSOLUTE: 0.7 K/UL (ref 1.3–3.6)
LYMPHOCYTES RELATIVE PERCENT: 4.5 %
MCH RBC QN AUTO: 29.8 PG (ref 25.7–32.2)
MCHC RBC AUTO-ENTMCNC: 32.3 % (ref 32.3–36.5)
MCV RBC AUTO: 92 FL (ref 79–92.2)
MONOCYTES ABSOLUTE: 1.1 K/UL (ref 0.3–0.8)
MONOCYTES RELATIVE PERCENT: 7.2 % (ref 5.3–12.2)
NEUTROPHILS ABSOLUTE: 12.8 K/UL (ref 1.8–5.4)
NEUTROPHILS RELATIVE PERCENT: 86.4 % (ref 34–67.9)
PDW BLD-RTO: 16.7 % (ref 11.6–14.4)
PLATELET # BLD: 288 K/UL (ref 163–337)
POTASSIUM SERPL-SCNC: 3.4 MEQ/L (ref 3.4–4.9)
PRO-BNP: 118 PG/ML
PROTHROMBIN TIME: 14.3 SEC (ref 12.3–14.9)
RBC # BLD: 4.4 M/UL (ref 4.63–6.08)
SARS-COV-2, NAAT: NOT DETECTED
SODIUM BLD-SCNC: 138 MEQ/L (ref 135–144)
TOTAL PROTEIN: 6.6 G/DL (ref 6.3–8)
TROPONIN: <0.01 NG/ML (ref 0–0.01)
WBC # BLD: 14.8 K/UL (ref 4.2–9)

## 2022-04-14 PROCEDURE — 6370000000 HC RX 637 (ALT 250 FOR IP): Performed by: EMERGENCY MEDICINE

## 2022-04-14 PROCEDURE — 2500000003 HC RX 250 WO HCPCS: Performed by: EMERGENCY MEDICINE

## 2022-04-14 PROCEDURE — 80053 COMPREHEN METABOLIC PANEL: CPT

## 2022-04-14 PROCEDURE — 96375 TX/PRO/DX INJ NEW DRUG ADDON: CPT

## 2022-04-14 PROCEDURE — 85025 COMPLETE CBC W/AUTO DIFF WBC: CPT

## 2022-04-14 PROCEDURE — 71045 X-RAY EXAM CHEST 1 VIEW: CPT

## 2022-04-14 PROCEDURE — 85610 PROTHROMBIN TIME: CPT

## 2022-04-14 PROCEDURE — 85379 FIBRIN DEGRADATION QUANT: CPT

## 2022-04-14 PROCEDURE — 99284 EMERGENCY DEPT VISIT MOD MDM: CPT

## 2022-04-14 PROCEDURE — 83880 ASSAY OF NATRIURETIC PEPTIDE: CPT

## 2022-04-14 PROCEDURE — 96365 THER/PROPH/DIAG IV INF INIT: CPT

## 2022-04-14 PROCEDURE — 6360000002 HC RX W HCPCS: Performed by: EMERGENCY MEDICINE

## 2022-04-14 PROCEDURE — 87070 CULTURE OTHR SPECIMN AEROBIC: CPT

## 2022-04-14 PROCEDURE — 2580000003 HC RX 258: Performed by: EMERGENCY MEDICINE

## 2022-04-14 PROCEDURE — 36415 COLL VENOUS BLD VENIPUNCTURE: CPT

## 2022-04-14 PROCEDURE — 93005 ELECTROCARDIOGRAM TRACING: CPT

## 2022-04-14 PROCEDURE — 87635 SARS-COV-2 COVID-19 AMP PRB: CPT

## 2022-04-14 PROCEDURE — 87502 INFLUENZA DNA AMP PROBE: CPT

## 2022-04-14 PROCEDURE — 87040 BLOOD CULTURE FOR BACTERIA: CPT

## 2022-04-14 PROCEDURE — 84484 ASSAY OF TROPONIN QUANT: CPT

## 2022-04-14 RX ORDER — METHYLPREDNISOLONE 4 MG/1
TABLET ORAL
Qty: 1 KIT | Refills: 0 | Status: SHIPPED | OUTPATIENT
Start: 2022-04-14 | End: 2022-05-14

## 2022-04-14 RX ORDER — ACETAMINOPHEN 500 MG
1000 TABLET ORAL ONCE
Status: COMPLETED | OUTPATIENT
Start: 2022-04-14 | End: 2022-04-14

## 2022-04-14 RX ORDER — SILDENAFIL 100 MG/1
100 TABLET, FILM COATED ORAL PRN
Qty: 10 TABLET | Refills: 0 | Status: SHIPPED | OUTPATIENT
Start: 2022-04-14 | End: 2022-07-07

## 2022-04-14 RX ORDER — DOXYCYCLINE HYCLATE 100 MG
100 TABLET ORAL 2 TIMES DAILY
Qty: 20 TABLET | Refills: 0 | Status: SHIPPED | OUTPATIENT
Start: 2022-04-14 | End: 2022-04-24

## 2022-04-14 RX ORDER — METHYLPREDNISOLONE SODIUM SUCCINATE 125 MG/2ML
125 INJECTION, POWDER, LYOPHILIZED, FOR SOLUTION INTRAMUSCULAR; INTRAVENOUS ONCE
Status: COMPLETED | OUTPATIENT
Start: 2022-04-14 | End: 2022-04-14

## 2022-04-14 RX ORDER — DIPHENHYDRAMINE HYDROCHLORIDE 50 MG/ML
25 INJECTION INTRAMUSCULAR; INTRAVENOUS ONCE
Status: DISCONTINUED | OUTPATIENT
Start: 2022-04-14 | End: 2022-04-14

## 2022-04-14 RX ORDER — METHYLPREDNISOLONE SODIUM SUCCINATE 125 MG/2ML
125 INJECTION, POWDER, LYOPHILIZED, FOR SOLUTION INTRAMUSCULAR; INTRAVENOUS ONCE
Status: DISCONTINUED | OUTPATIENT
Start: 2022-04-14 | End: 2022-04-14

## 2022-04-14 RX ADMIN — DEXTROSE MONOHYDRATE 100 MG: 50 INJECTION, SOLUTION INTRAVENOUS at 13:20

## 2022-04-14 RX ADMIN — METHYLPREDNISOLONE SODIUM SUCCINATE 125 MG: 125 INJECTION, POWDER, FOR SOLUTION INTRAMUSCULAR; INTRAVENOUS at 13:20

## 2022-04-14 RX ADMIN — ACETAMINOPHEN 1000 MG: 500 TABLET ORAL at 13:21

## 2022-04-14 ASSESSMENT — PAIN - FUNCTIONAL ASSESSMENT
PAIN_FUNCTIONAL_ASSESSMENT: 0-10
PAIN_FUNCTIONAL_ASSESSMENT: 0-10

## 2022-04-14 ASSESSMENT — PAIN DESCRIPTION - FREQUENCY: FREQUENCY: CONTINUOUS

## 2022-04-14 ASSESSMENT — PAIN DESCRIPTION - LOCATION
LOCATION: BACK
LOCATION: CHEST

## 2022-04-14 ASSESSMENT — PAIN DESCRIPTION - PAIN TYPE
TYPE: ACUTE PAIN
TYPE: ACUTE PAIN

## 2022-04-14 ASSESSMENT — PAIN DESCRIPTION - ORIENTATION: ORIENTATION: LOWER

## 2022-04-14 ASSESSMENT — PAIN SCALES - GENERAL
PAINLEVEL_OUTOF10: 4
PAINLEVEL_OUTOF10: 9
PAINLEVEL_OUTOF10: 9

## 2022-04-14 ASSESSMENT — PAIN DESCRIPTION - PROGRESSION: CLINICAL_PROGRESSION: GRADUALLY IMPROVING

## 2022-04-14 ASSESSMENT — PAIN DESCRIPTION - DESCRIPTORS
DESCRIPTORS: ACHING
DESCRIPTORS: DISCOMFORT

## 2022-04-14 ASSESSMENT — PAIN DESCRIPTION - ONSET: ONSET: ON-GOING

## 2022-04-14 NOTE — ED TRIAGE NOTES
Complains of shortness of breath starting yesterday morning. Worse today. HX of COPD. States it feels like COPD.

## 2022-04-14 NOTE — ED PROVIDER NOTES
CC/HPI: 79-year-old male to the emergency department with cough shortness of breath feeling fevered began yesterday morning. Patient states he has a history of COPD and feels similar. Patient states this afternoon he noticed tan to yellowish sputum. Denies chest pain denies calf pain or swelling. Patient states he is on home oxygen. Denies vomiting or changes to bowel movements      VITALS/PMH/PSH: Reviewed per nurses notes    REVIEW OF SYSTEMS: As in chief complaint history of present illness, otherwise all other systems are reviewed and negative the total 10 systems reviewed    PHYSICAL EXAM:  GEN: Pt alert and oriented, patient appears moderately dyspneic upon arrival however improved after being placed on nasal cannula. Initial pulse ox reported by nursing staff to be in the mid to upper 70s. Also temperature slightly elevated at 99.3. HEENT:         Normocephalic/Atramatic        PERRL, EOMI       Throat non-edematous. No erythema noted. No exudates noted. Moist membranes  NECK: Nontender, no signs of trauma, no lymphadenopathy  HEART: Reg S1/S2, heart rate in the low 100s. Without murmer, rub or gallop  LUNGS: Moderately dyspneic upon arrival.  Patient with scattered rales and rhonchi bibasilar. Equal breath sounds. ABDOMEN: Bowel sounds positive, soft, nondistended. Non-tender to palpation. No guarding rebound or rigidity  MUSCULOSKELETAL/EXTREMITITES:  No signs of trauma, cyanosis or edema. No CVA tenderness. No calf swelling or tenderness. LYMPH: no peripheral lympadenopathy noted  SKIN:  Warm & dry, no rash  NEUROLOGIC:  Alert and oriented. Patient with conversational dyspnea that resolved with nasal cannula    Medical decision making/ED course;  Patient taken to room 9 and placed on monitor. IV was established and lab work was obtained. Patient with elevated white blood cell count of 14.8 with an H&H of 13 and 40.5 and platelets of 285.   CMP was within normal limits other than glucose being elevated at 129. Beta natruretic peptide was elevated at 118. Troponin was negative. D-dimer was elevated at 0.76. Covid testing and influenza testing were negative. Patient was given IV Solu-Medrol and doxycycline while in the emergency department. ER EKG interpretation -sinus tachycardia 106 bpm with premature atrial complexes. Otherwise no signs of acute ST-T changes.      Impression   AREA OF PLEURAL THICKENING VERSUS SOFT TISSUE DENSITY ALONG THE LATERAL ASPECT LEFT CHEST WALL. BIBASILAR AREAS ATELECTASIS, PATCHY INFILTRATE IN THE BASES LEFT GREATER THAN RIGHT SUPERIMPOSED RADIOGRAPHIC FINDINGS OF COPD. Dat Hilt SCARRING OR TRACE LEFT    PLEURAL EFFUSION. OVERALL GIVEN THE CONSTELLATION OF FINDINGS CONSIDER CT SCAN THE CHEST TO FURTHER EVALUATE     Discussed at length with patient elevated D-dimer and also x-ray findings and need for CAT scan. Patient voiced understanding however refused to CAT scan stating that he was feeling better and wanted to go home. I discussed with him that I recommended that he be admitted to the hospital and that with his elevated white blood cell count elevated temperature and his history that with his hypoxemia I felt that he would not do well at home and also by the time that he return to the emergency department he could be severe to the point that it could be life-threatening or he could need to be intubated for respiratory failure. Patient appeared competent to make his own decisions did not appear under the influence of drugs or alcohol at the time. Voiced understanding of risks versus benefits including but not limited to death or disability and still refused any further testing and stated that he would go home and follow-up with his regular doctor and return if symptoms worsened or persisted.     Clinical impression;  1) acute exacerbation of COPD  2) pneumonia  3) elevated D-dimer  4) patient refused CAT scan and signed out   [facility] ADVICE    Disposition/plan;   Patient signed out AMA. Strongly encouraged to return for worsening or changes to symptoms. Patient given prescription for doxycycline and Medrol Dosepak. Encouraged to use his breathing treatments as prescribed. Patient states that he has home oxygen and also has all his medications and does not need a refill.      Meng Morillo, DO  04/14/22 1538

## 2022-04-15 LAB
EKG ATRIAL RATE: 106 BPM
EKG P AXIS: 81 DEGREES
EKG P-R INTERVAL: 148 MS
EKG Q-T INTERVAL: 358 MS
EKG QRS DURATION: 106 MS
EKG QTC CALCULATION (BAZETT): 475 MS
EKG R AXIS: 81 DEGREES
EKG T AXIS: 67 DEGREES
EKG VENTRICULAR RATE: 106 BPM

## 2022-04-15 NOTE — TELEPHONE ENCOUNTER
Future Appointments    Encounter Information    Provider Department Appt Notes   10/6/2022 Melissa Bridges MD [facility] Primary [facility]       Past Visits    Date Provider Specialty Visit Type Primary Dx   04/07/2022 Melissa Bridges MD Family Medicine Office Visit Type 2 diabetes mellitus without complication, without long-term current use of insulin ([facility]   01/25/2022 [id number] Sofi Iniguez MD Family Medicine Office Visit COPD with acute exacerbation ([facility]

## 2022-04-16 LAB — CULTURE, RESPIRATORY: NORMAL

## 2022-04-17 RX ORDER — DIPHENHYDRAMINE HYDROCHLORIDE 25 MG/1
CAPSULE ORAL
Qty: 30 CAPSULE | Refills: 2 | OUTPATIENT
Start: 2022-04-17

## 2022-04-19 LAB
BLOOD CULTURE, ROUTINE: NORMAL
CULTURE, BLOOD 2: NORMAL

## 2022-04-20 DIAGNOSIS — J30.2 SEASONAL ALLERGIES: Chronic | ICD-10-CM

## 2022-04-20 DIAGNOSIS — G47.00 INSOMNIA, UNSPECIFIED TYPE: ICD-10-CM

## 2022-04-20 DIAGNOSIS — F41.8 ANXIETY WITH DEPRESSION: ICD-10-CM

## 2022-04-20 RX ORDER — MONTELUKAST SODIUM 10 MG/1
10 TABLET ORAL NIGHTLY
Qty: 90 TABLET | Refills: 1 | Status: SHIPPED | OUTPATIENT
Start: 2022-04-20 | End: 2022-10-06 | Stop reason: SDUPTHER

## 2022-04-20 RX ORDER — TRAZODONE HYDROCHLORIDE 50 MG/1
50 TABLET ORAL NIGHTLY
Qty: 90 TABLET | Refills: 1 | Status: SHIPPED | OUTPATIENT
Start: 2022-04-20 | End: 2022-10-06 | Stop reason: SDUPTHER

## 2022-05-04 DIAGNOSIS — J44.9 CHRONIC OBSTRUCTIVE PULMONARY DISEASE, UNSPECIFIED COPD TYPE (HCC): Chronic | ICD-10-CM

## 2022-05-05 RX ORDER — IPRATROPIUM BROMIDE AND ALBUTEROL SULFATE 2.5; .5 MG/3ML; MG/3ML
1 SOLUTION RESPIRATORY (INHALATION) EVERY 6 HOURS PRN
Qty: 360 ML | Refills: 1 | Status: SHIPPED | OUTPATIENT
Start: 2022-05-05 | End: 2022-08-03 | Stop reason: SDUPTHER

## 2022-05-06 ENCOUNTER — TELEPHONE (OUTPATIENT)
Dept: FAMILY MEDICINE CLINIC | Age: 67
End: 2022-05-06

## 2022-05-06 NOTE — TELEPHONE ENCOUNTER
----- Message from Chad Kaur sent at 5/5/2022  9:28 AM EDT -----  Subject: Message to Provider    QUESTIONS  Information for Provider? Macarena SINGH with [facility] would like   to know if the provider can put back an order for an shower set with a   back on it, and a hospital bed due to respiratory problems. provider that   are in network are [facility] [address] [phone], [address] [place] [address]   medical equipment [phone], and Bessyisrael Madera  [phone]   ---------------------------------------------------------------------------  --------------  [address]  What is the best way for the office to contact you? OK to leave message on   voicemail  Preferred Call Back Phone Number? [phone]  ---------------------------------------------------------------------------  --------------  SCRIPT ANSWERS  Relationship to Patient? Third Party  Third Party Type? Insurance? Representative Name?  Yris Bush

## 2022-05-07 NOTE — TELEPHONE ENCOUNTER
Please help with this. I don't know what a shower set is, please clarify. Also, there are usually qualifications to get a hospital bed that need to be met before it will be covered, having COPD alone is not enough. We need to know what documentation is needed to get a hospital bed covered by patient's insurance.

## 2022-05-09 NOTE — TELEPHONE ENCOUNTER
Attempted to call [facility] from Lavonda Boxer. Phone number is no longer in service. I'm pretty sure the shower set is supposed to be shower seat.

## 2022-05-13 DIAGNOSIS — K21.9 GASTROESOPHAGEAL REFLUX DISEASE, UNSPECIFIED WHETHER ESOPHAGITIS PRESENT: ICD-10-CM

## 2022-05-14 ENCOUNTER — APPOINTMENT (OUTPATIENT)
Dept: GENERAL RADIOLOGY | Age: 67
End: 2022-05-14
Payer: MEDICARE

## 2022-05-14 ENCOUNTER — HOSPITAL ENCOUNTER (EMERGENCY)
Age: 67
Discharge: HOME | End: 2022-05-14
Attending: EMERGENCY MEDICINE
Payer: MEDICARE

## 2022-05-14 VITALS
RESPIRATION RATE: 19 BRPM | TEMPERATURE: 98.6 F | OXYGEN SATURATION: 97 % | WEIGHT: 274 LBS | HEIGHT: 77 IN | SYSTOLIC BLOOD PRESSURE: 156 MMHG | DIASTOLIC BLOOD PRESSURE: 101 MMHG | BODY MASS INDEX: 32.35 KG/M2 | HEART RATE: 84 BPM

## 2022-05-14 DIAGNOSIS — J44.1 COPD EXACERBATION (HCC): Primary | ICD-10-CM

## 2022-05-14 LAB
ALBUMIN SERPL-MCNC: 3.6 G/DL (ref 3.5–4.6)
ALP BLD-CCNC: 97 U/L (ref 35–104)
ALT SERPL-CCNC: 14 U/L (ref 0–41)
ANION GAP SERPL CALCULATED.3IONS-SCNC: 12 MEQ/L (ref 9–15)
AST SERPL-CCNC: 20 U/L (ref 0–40)
BASOPHILS ABSOLUTE: 0 K/UL (ref 0–0.1)
BASOPHILS RELATIVE PERCENT: 0.6 % (ref 0.2–1.2)
BILIRUB SERPL-MCNC: 0.3 MG/DL (ref 0.2–0.7)
BILIRUBIN URINE: NEGATIVE
BLOOD, URINE: NEGATIVE
BUN BLDV-MCNC: 9 MG/DL (ref 8–23)
CALCIUM SERPL-MCNC: 9.1 MG/DL (ref 8.5–9.9)
CHLORIDE BLD-SCNC: 107 MEQ/L (ref 95–107)
CLARITY: CLEAR
CO2: 21 MEQ/L (ref 20–31)
COLOR: NORMAL
CREAT SERPL-MCNC: 1 MG/DL (ref 0.7–1.2)
EOSINOPHILS ABSOLUTE: 0.4 K/UL (ref 0–0.5)
EOSINOPHILS RELATIVE PERCENT: 5.2 % (ref 0.8–7)
GFR AFRICAN AMERICAN: >60
GFR NON-AFRICAN AMERICAN: >60
GLOBULIN: 2.7 G/DL (ref 2.3–3.5)
GLUCOSE BLD-MCNC: 94 MG/DL (ref 70–99)
GLUCOSE URINE: NEGATIVE MG/DL
HCT VFR BLD CALC: 38.7 % (ref 42–52)
HEMOGLOBIN: 12.2 G/DL (ref 13.7–17.5)
IMMATURE GRANULOCYTES #: 0 K/UL
IMMATURE GRANULOCYTES %: 0.3 %
INFLUENZA A BY PCR: NEGATIVE
INFLUENZA B BY PCR: NEGATIVE
KETONES, URINE: NORMAL MG/DL
LEUKOCYTE ESTERASE, URINE: NEGATIVE
LYMPHOCYTES ABSOLUTE: 1.8 K/UL (ref 1.3–3.6)
LYMPHOCYTES RELATIVE PERCENT: 26.5 %
MAGNESIUM: 2.1 MG/DL (ref 1.7–2.4)
MCH RBC QN AUTO: 30.1 PG (ref 25.7–32.2)
MCHC RBC AUTO-ENTMCNC: 31.5 % (ref 32.3–36.5)
MCV RBC AUTO: 95.6 FL (ref 79–92.2)
MONOCYTES ABSOLUTE: 0.8 K/UL (ref 0.3–0.8)
MONOCYTES RELATIVE PERCENT: 11.9 % (ref 5.3–12.2)
NEUTROPHILS ABSOLUTE: 3.9 K/UL (ref 1.8–5.4)
NEUTROPHILS RELATIVE PERCENT: 55.5 % (ref 34–67.9)
NITRITE, URINE: NEGATIVE
PDW BLD-RTO: 16.2 % (ref 11.6–14.4)
PH UA: 5.5 (ref 5–9)
PLATELET # BLD: 285 K/UL (ref 163–337)
POTASSIUM SERPL-SCNC: 4.3 MEQ/L (ref 3.4–4.9)
PROTEIN UA: NEGATIVE MG/DL
RBC # BLD: 4.05 M/UL (ref 4.63–6.08)
SARS-COV-2, NAAT: NOT DETECTED
SODIUM BLD-SCNC: 140 MEQ/L (ref 135–144)
SPECIFIC GRAVITY UA: 1.02 (ref 1–1.03)
TOTAL PROTEIN: 6.3 G/DL (ref 6.3–8)
TROPONIN: <0.01 NG/ML (ref 0–0.01)
URINE REFLEX TO CULTURE: NORMAL
UROBILINOGEN, URINE: 0.2 E.U./DL
WBC # BLD: 7 K/UL (ref 4.2–9)

## 2022-05-14 PROCEDURE — 99285 EMERGENCY DEPT VISIT HI MDM: CPT

## 2022-05-14 PROCEDURE — 6370000000 HC RX 637 (ALT 250 FOR IP): Performed by: EMERGENCY MEDICINE

## 2022-05-14 PROCEDURE — 6360000002 HC RX W HCPCS: Performed by: EMERGENCY MEDICINE

## 2022-05-14 PROCEDURE — 2580000003 HC RX 258: Performed by: EMERGENCY MEDICINE

## 2022-05-14 PROCEDURE — 80053 COMPREHEN METABOLIC PANEL: CPT

## 2022-05-14 PROCEDURE — 84484 ASSAY OF TROPONIN QUANT: CPT

## 2022-05-14 PROCEDURE — 87635 SARS-COV-2 COVID-19 AMP PRB: CPT

## 2022-05-14 PROCEDURE — 81003 URINALYSIS AUTO W/O SCOPE: CPT

## 2022-05-14 PROCEDURE — 94640 AIRWAY INHALATION TREATMENT: CPT

## 2022-05-14 PROCEDURE — 96374 THER/PROPH/DIAG INJ IV PUSH: CPT

## 2022-05-14 PROCEDURE — 83735 ASSAY OF MAGNESIUM: CPT

## 2022-05-14 PROCEDURE — 85025 COMPLETE CBC W/AUTO DIFF WBC: CPT

## 2022-05-14 PROCEDURE — 36415 COLL VENOUS BLD VENIPUNCTURE: CPT

## 2022-05-14 PROCEDURE — 87502 INFLUENZA DNA AMP PROBE: CPT

## 2022-05-14 PROCEDURE — 93005 ELECTROCARDIOGRAM TRACING: CPT

## 2022-05-14 PROCEDURE — 71045 X-RAY EXAM CHEST 1 VIEW: CPT

## 2022-05-14 RX ORDER — ALBUTEROL SULFATE 90 UG/1
2 AEROSOL, METERED RESPIRATORY (INHALATION) EVERY 4 HOURS PRN
Qty: 18 G | Refills: 1 | Status: SHIPPED | OUTPATIENT
Start: 2022-05-14 | End: 2022-07-08 | Stop reason: SDUPTHER

## 2022-05-14 RX ORDER — METHYLPREDNISOLONE SODIUM SUCCINATE 125 MG/2ML
125 INJECTION, POWDER, LYOPHILIZED, FOR SOLUTION INTRAMUSCULAR; INTRAVENOUS ONCE
Status: COMPLETED | OUTPATIENT
Start: 2022-05-14 | End: 2022-05-14

## 2022-05-14 RX ORDER — PREDNISONE 50 MG/1
50 TABLET ORAL DAILY
Qty: 5 TABLET | Refills: 0 | Status: ON HOLD | OUTPATIENT
Start: 2022-05-14 | End: 2022-05-18 | Stop reason: HOSPADM

## 2022-05-14 RX ORDER — AZITHROMYCIN 250 MG/1
TABLET, FILM COATED ORAL
Qty: 1 PACKET | Refills: 0 | Status: SHIPPED | OUTPATIENT
Start: 2022-05-14 | End: 2022-05-18

## 2022-05-14 RX ORDER — 0.9 % SODIUM CHLORIDE 0.9 %
1000 INTRAVENOUS SOLUTION INTRAVENOUS ONCE
Status: COMPLETED | OUTPATIENT
Start: 2022-05-14 | End: 2022-05-14

## 2022-05-14 RX ORDER — IPRATROPIUM BROMIDE AND ALBUTEROL SULFATE 2.5; .5 MG/3ML; MG/3ML
1 SOLUTION RESPIRATORY (INHALATION) ONCE
Status: COMPLETED | OUTPATIENT
Start: 2022-05-14 | End: 2022-05-14

## 2022-05-14 RX ORDER — AZITHROMYCIN 250 MG/1
500 TABLET, FILM COATED ORAL ONCE
Status: COMPLETED | OUTPATIENT
Start: 2022-05-14 | End: 2022-05-14

## 2022-05-14 RX ORDER — BUDESONIDE AND FORMOTEROL FUMARATE DIHYDRATE 160; 4.5 UG/1; UG/1
2 AEROSOL RESPIRATORY (INHALATION) 2 TIMES DAILY
Qty: 10.2 G | Refills: 2 | Status: SHIPPED | OUTPATIENT
Start: 2022-05-14 | End: 2022-07-08 | Stop reason: SDUPTHER

## 2022-05-14 RX ADMIN — AZITHROMYCIN DIHYDRATE 500 MG: 250 TABLET, FILM COATED ORAL at 20:09

## 2022-05-14 RX ADMIN — METHYLPREDNISOLONE SODIUM SUCCINATE 125 MG: 125 INJECTION, POWDER, FOR SOLUTION INTRAMUSCULAR; INTRAVENOUS at 18:10

## 2022-05-14 RX ADMIN — IPRATROPIUM BROMIDE AND ALBUTEROL SULFATE 1 AMPULE: 2.5; .5 SOLUTION RESPIRATORY (INHALATION) at 17:49

## 2022-05-14 RX ADMIN — SODIUM CHLORIDE 1000 ML: 9 INJECTION, SOLUTION INTRAVENOUS at 18:10

## 2022-05-14 ASSESSMENT — ENCOUNTER SYMPTOMS
BACK PAIN: 0
EYE PAIN: 0
SINUS PRESSURE: 0
WHEEZING: 0
DIARRHEA: 0
SHORTNESS OF BREATH: 1
VOMITING: 0
RHINORRHEA: 0
COUGH: 0
PHOTOPHOBIA: 0
NAUSEA: 0
SORE THROAT: 0
APNEA: 0
ABDOMINAL DISTENTION: 0
CONSTIPATION: 0
COLOR CHANGE: 0
ABDOMINAL PAIN: 0

## 2022-05-14 ASSESSMENT — PAIN SCALES - GENERAL: PAINLEVEL_OUTOF10: 10

## 2022-05-14 ASSESSMENT — PAIN DESCRIPTION - LOCATION: LOCATION: BACK;CHEST

## 2022-05-14 ASSESSMENT — PAIN DESCRIPTION - DESCRIPTORS: DESCRIPTORS: ACHING

## 2022-05-14 ASSESSMENT — PAIN DESCRIPTION - ORIENTATION: ORIENTATION: LEFT

## 2022-05-14 ASSESSMENT — PAIN DESCRIPTION - PAIN TYPE: TYPE: ACUTE PAIN

## 2022-05-14 ASSESSMENT — PAIN DESCRIPTION - ONSET: ONSET: GRADUAL

## 2022-05-14 ASSESSMENT — PAIN DESCRIPTION - FREQUENCY: FREQUENCY: CONTINUOUS

## 2022-05-14 NOTE — ED PROVIDER NOTES
[address] [place] [address] ED  eMERGENCY dEPARTMENT eNCOUnter      Pt Name: Devon Garcia  MRN: [id number]  [place] 1955  Date of evaluation: 5/14/2022  Provider: Baylee Lancaster MD    CHIEF COMPLAINT       Chief Complaint   Patient presents with    Shortness of Breath     SOB x 2 days, slight cough and congestion, used inhaler at home today        HISTORY OF PRESENT ILLNESS   (Location/Symptom, Timing/Onset,Context/Setting, Quality, Duration, Modifying Factors, Severity)  Note limiting factors. Devon Garcia is a 61 y.o. male who presents to the emergency department with complaint of shortness of breath, cough and congestion worse since yesterday. No fever or chills. No chest pain, palpitations, orthopnea or PND. History of COPD. Smokes half a pack of cigarettes a day. Vaccinated for [place]. Denies fever or chills. Denies nausea or vomiting. Denies diarrhea or constipation. HPI    Nursing Notes were reviewed. REVIEW OF SYSTEMS    (2-9 systems for level 4, 10 or more for level 5)     Review of Systems   Constitutional: Negative. Negative for activity change, appetite change, chills, fatigue and fever. HENT: Positive for congestion. Negative for ear discharge, ear pain, hearing loss, rhinorrhea, sinus pressure and sore throat. Eyes: Negative for photophobia, pain and visual disturbance. Respiratory: Positive for shortness of breath. Negative for apnea, cough and wheezing. Cardiovascular: Negative for chest pain, palpitations and leg swelling. Gastrointestinal: Negative for abdominal distention, abdominal pain, constipation, diarrhea, nausea and vomiting. Endocrine: Negative for cold intolerance, heat intolerance and polyuria. Genitourinary: Negative for dysuria, flank pain, frequency and urgency. Musculoskeletal: Negative for arthralgias, back pain, gait problem, myalgias and neck stiffness. Skin: Negative for color change, pallor and rash.    Allergic/Immunologic: Negative for food allergies and immunocompromised state. Neurological: Negative for dizziness, tremors, syncope, weakness, light-headedness and headaches. Psychiatric/Behavioral: Negative for agitation, confusion and hallucinations. All other systems reviewed and are negative. Except as noted above the remainder of the review of systems was reviewed and negative.        PAST MEDICAL HISTORY     Past Medical History:   Diagnosis Date    Alcohol abuse 10/27/2016    Anemia     Asthma     Cocaine abuse ([address] 10/27/2016    Community acquired pneumonia of left lower lobe of lung 12/5/2016    COPD (chronic obstructive pulmonary disease) ([address]     Depression 04/27/2020    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    Drug-seeking behavior 04/14/2015    Edema 12/10/2015    Erectile dysfunction     Gastroesophageal reflux disease 12/17/2018    Gout 10/27/2016    History of arthroscopy of both knees 10/27/2016    History of colon polyps 10/26/2021    House dust mite allergy 04/21/2014    Hyperlipidemia     Hypertension 10/27/2016    Injury to heart 10/27/2016    Insomnia 12/17/2018    Loculated pleural effusion     Medical non-compliance 02/22/2014    Morbid obesity due to excess calories ([address] 12/08/2016    Osteoarthritis of both knees 12/08/2016    Personal history of tobacco use     Pleurisy 12/12/2016    Pneumonia 12/04/2016    caused hospital admission    Pre-diabetes 10/27/2016    Seasonal allergies 04/21/2014    Severe persistent asthma 10/27/2016    Severe sleep apnea 04/14/2015    Supraventricular tachycardia ([address] 10/27/2016    Type 2 diabetes mellitus with albuminuria ([address] 10/26/2021    Type 2 diabetes mellitus without complication, without long-term current use of insulin ([address] 10/26/2021         SURGICAL HISTORY       Past Surgical History:   Procedure Laterality Date    ANTERIOR CRUCIATE LIGAMENT REPAIR      CARDIAC CATHETERIZATION      COLONOSCOPY N/A 07/15/2020    COLONOSCOPY WITH POLYPECTOMY performed by Yusuf Santana MD at [address] [facility] Left 11/27/2021    VATS/thoracotomy    TOTAL KNEE ARTHROPLASTY Left 08/09/2019    LEFT TOTAL KNEE ARTHROPLASTY performed by Francesca Castro MD at [facility] N/A 91/18/5306    UMBILICAL HERNIA REPAIR WITH MESH performed by Cosme Spring MD at [address] [place] [address]       Discharge Medication List as of 5/14/2022  7:50 PM      CONTINUE these medications which have NOT CHANGED    Details   ipratropium-albuterol (DUONEB) 0.5-2.5 (3) MG/3ML SOLN nebulizer solution Take 3 mLs by nebulization every 6 hours as needed for Shortness of Breath, Disp-360 mL, R-1Normal      traZODone (DESYREL) 50 MG tablet Take 1 tablet by mouth nightly, Disp-90 tablet, R-1Normal      montelukast (SINGULAIR) 10 MG tablet Take 1 tablet by mouth nightly, Disp-90 tablet, R-1Normal      sildenafil (VIAGRA) 100 MG tablet Take 1 tablet by mouth as needed for Erectile Dysfunction, Disp-10 tablet, R-0Normal      lovastatin (MEVACOR) 20 MG tablet Take 1 tablet by mouth nightly, Disp-90 tablet, R-1Normal      metFORMIN (GLUCOPHAGE) 500 MG tablet Take 1 tablet by mouth 2 times daily (with meals), Disp-180 tablet, R-1Normal      blood glucose monitor strips Test three times a day & as needed for symptoms of irregular blood glucose. Dispense sufficient amount for indicated testing frequency plus additional to accommodate PRN testing needs. , Disp-100 strip, R-1, Normal      !! albuterol sulfate  (90 Base) MCG/ACT inhaler Inhale 2 puffs into the lungs every 6 hours as needed for Wheezing or Shortness of Breath, Disp-18 each, R-1Normal      acetaminophen (ACETAMINOPHEN EXTRA STRENGTH) 500 MG tablet Take 1 tablet by mouth every 8 hours as needed for Pain, Disp-90 tablet, R-2Normal      meloxicam (MOBIC) 15 MG tablet Take 1 tablet by mouth daily, Disp-30 tablet, R-2DX Code Needed  . Normal      albuterol (PROVENTIL) (5 MG/ML) 0.5% nebulizer solution Take 0.5 mLs by nebulization every 6 hours as needed for Wheezing, Disp-120 each, R-0Normal      cetirizine (ZYRTEC) 10 MG tablet TAKE 1 TABLET BY MOUTH TWICE A DAY, Disp-60 tablet, R-2Normal      oxyCODONE (ROXICODONE) 5 MG immediate release tablet Historical Med      omeprazole (PRILOSEC) 20 MG delayed release capsule Historical Med      Blood Glucose Monitoring Suppl (ONE TOUCH ULTRA 2) w/Device KIT Starting Wed 10/20/2021, Historical Med      fluticasone (FLONASE) 50 MCG/ACT nasal spray 2 sprays by Nasal route daily, Disp-48 g, R-1Normal      amLODIPine (NORVASC) 10 MG tablet Take 1 tablet by mouth daily, Disp-90 tablet, R-1Normal      pantoprazole (PROTONIX) 40 MG tablet Take 1 tablet by mouth daily, Disp-90 tablet, R-1Normal      escitalopram (LEXAPRO) 10 MG tablet Take 1 tablet by mouth daily, Disp-90 tablet, R-1Normal      Lancets MISC 3 TIMES DAILY Starting Wed 10/20/2021, Disp-200 each, R-5, Normal      sodium chloride (OCEAN, BABY AYR) 0.65 % nasal spray 2 sprays by Nasal route three times daily, Disp-1 each, R-0Normal      polyethylene glycol (GLYCOLAX) 17 GM/SCOOP powder Take 17 g by mouth daily, Disp-510 g, R-1Normal       !! - Potential duplicate medications found. Please discuss with provider.           ALLERGIES     Fish-derived products, Iodine, Seasonal, Shellfish allergy, and Pcn [penicillins]    FAMILY HISTORY       Family History   Problem Relation Age of Onset    Arthritis Mother     Asthma Mother     High Cholesterol Mother     Other Mother         aneurysm    Diabetes Father     Stroke Maternal Grandmother     Cancer Maternal Grandfather     Hypertension Other     COPD Neg Hx           SOCIAL HISTORY       Social History     Socioeconomic History    Marital status: Single     Spouse name: n/a    Number of children: 1    Years of education: 15    Highest education level: None   Occupational History    Occupation: Disability     Employer: NONE   Tobacco Use  Smoking status: Light Tobacco Smoker     Packs/day: 0.00     Years: 30.00     Pack years: 0.00     Types: Cigarettes, Cigars     Start date: 1988     Last attempt to quit: 10/1/2013     Years since quittin.6    Smokeless tobacco: Never Used    Tobacco comment: doesnt buy only smokes if someone gives him one    Vaping Use    Vaping Use: Never used   Substance and Sexual Activity    Alcohol use: Not Currently     Alcohol/week: 4.0 standard drinks     Types: 2 Cans of beer, 2 Shots of liquor per week     Comment: social 1 -2 x week    Drug use: Not Currently     Types: Cocaine, Marijuana (Weed)     Comment: no cocaine x 1 year, marijuana weekly    Sexual activity: Not Currently     Partners: Female   Other Topics Concern    None   Social History Narrative    Lives in an apartment    15 steps to get up to the apartment    Asking for hospital bed and shower chair 2021 sent request to pcp office     Has 1 daughter who lives in [facility] per patient 2 grandchildren. Per patient does not see his daughter very often. Social Determinants of Health     Financial Resource Strain: Low Risk     Difficulty of Paying Living Expenses: Not hard at all   Food Insecurity: No Food Insecurity    Worried About Running Out of Food in the Last Year: Never true    Jonnie of Food in the Last Year: Never true   Transportation Needs: Unmet Transportation Needs    Lack of Transportation (Medical):  Yes    Lack of Transportation (Non-Medical): Yes   Physical Activity:     Days of Exercise per Week: Not on file    Minutes of Exercise per Session: Not on file   Stress:     Feeling of Stress : Not on file   Social Connections:     Frequency of Communication with Friends and Family: Not on file    Frequency of Social Gatherings with Friends and Family: Not on file    Attends Cheondoism Services: Not on file    Active Member of Clubs or Organizations: Not on file    Attends Club or Organization Meetings: Not on file    Marital Status: Not on file   Intimate Partner Violence:     Fear of Current or Ex-Partner: Not on file    Emotionally Abused: Not on file    Physically Abused: Not on file    Sexually Abused: Not on file   Housing Stability:     Unable to Pay for Housing in the Last Year: Not on file    Number of [place] in the Last Year: Not on file    Unstable Housing in the Last Year: Not on file       SCREENINGS             PHYSICAL EXAM    (up to 7 for level 4, 8 or more for level 5)     ED Triage Vitals [05/14/22 1736]   BP Temp Temp Source Pulse Resp SpO2 Height Weight   (!) 170/105 98.6 °F (37 °C) Temporal 94 16 94 % 6' 5\" (1.956 m) 274 lb (124.3 kg)       Physical Exam  Vitals and nursing note reviewed. Constitutional:       General: He is not in acute distress. Appearance: He is well-developed. He is obese. He is not ill-appearing, toxic-appearing or diaphoretic. Interventions: He is not intubated. HENT:      Head: Normocephalic and atraumatic. Nose: Nose normal.      Mouth/Throat:      Mouth: Mucous membranes are moist.      Pharynx: Oropharynx is clear. No pharyngeal swelling or oropharyngeal exudate. Eyes:      General: No scleral icterus. Right eye: No discharge. Left eye: No discharge. Conjunctiva/sclera: Conjunctivae normal.      Pupils: Pupils are equal, round, and reactive to light. Neck:      Thyroid: No thyromegaly. Vascular: No hepatojugular reflux or JVD. Trachea: No tracheal deviation. Cardiovascular:      Rate and Rhythm: Normal rate and regular rhythm. No extrasystoles are present. Pulses: No decreased pulses. Heart sounds: Normal heart sounds. No murmur heard. No friction rub. No gallop. Pulmonary:      Effort: Pulmonary effort is normal. No tachypnea, bradypnea, accessory muscle usage or respiratory distress. He is not intubated. Breath sounds: No stridor. Examination of the right-upper field reveals wheezing. Examination of the left-upper field reveals wheezing. Examination of the right-middle field reveals wheezing. Examination of the left-middle field reveals wheezing. Examination of the right-lower field reveals wheezing. Examination of the left-lower field reveals wheezing. Wheezing present. No decreased breath sounds, rhonchi or rales. Chest:      Chest wall: No mass, deformity, tenderness, crepitus or edema. There is no dullness to percussion. Abdominal:      General: Bowel sounds are normal. There is no distension. Palpations: Abdomen is soft. There is no hepatomegaly, splenomegaly or mass. Tenderness: There is no abdominal tenderness. There is no guarding or rebound. Musculoskeletal:         General: No tenderness or deformity. Normal range of motion. Cervical back: Normal range of motion and neck supple. Right lower leg: No tenderness. No edema. Left lower leg: No tenderness. No edema. Lymphadenopathy:      Cervical: No cervical adenopathy. Skin:     General: Skin is warm and dry. Capillary Refill: Capillary refill takes less than 2 seconds. Coloration: Skin is not cyanotic or pale. Findings: No ecchymosis, erythema or rash. Nails: There is no clubbing. Neurological:      General: No focal deficit present. Mental Status: He is alert. He is disoriented. Cranial Nerves: No cranial nerve deficit. Motor: No weakness or abnormal muscle tone. Coordination: Coordination normal.      Deep Tendon Reflexes: Reflexes are normal and symmetric. Reflexes normal.   Psychiatric:         Mood and Affect: Mood normal. Mood is not anxious. Behavior: Behavior normal. Behavior is not agitated. Thought Content:  Thought content normal.         Judgment: Judgment normal.         DIAGNOSTIC RESULTS     EKG: All EKG's are interpreted by the Emergency Department Physician who either signs or Co-signs this chart in the absence of a cardiologist.    Twelve-lead EKG shows normal sinus rhythm, rate 82 bpm, normal intervals, normal electrical axis, no acute ST-T wave changes. RADIOLOGY:   Non-plain film images such as CT, Ultrasound and MRI are read by the radiologist. [place] Sm[place] radiographicimages are visualized and preliminarily interpreted by the emergency physician with the below findings:    Chest x-ray shows no acute cardiopulmonary process. Interpretation per the Radiologist below, if available at the time of this note:    XR CHEST PORTABLE    (Results Pending)         ED BEDSIDE ULTRASOUND:   Performed by ED Physician - none    LABS:  Labs Reviewed   CBC WITH AUTO DIFFERENTIAL - Abnormal; Notable for the following components:       Result Value    RBC 4.05 (*)     Hemoglobin 12.2 (*)     Hematocrit 38.7 (*)     MCV 95.6 (*)     MCHC 31.5 (*)     RDW 16.2 (*)     All other components within normal limits   RAPID INFLUENZA A/B ANTIGENS   COVID-19, RAPID   COMPREHENSIVE METABOLIC PANEL   MAGNESIUM   TROPONIN   URINALYSIS WITH REFLEX TO CULTURE       All other labs were within normal range or not returned as of this dictation.     EMERGENCY DEPARTMENT COURSE and DIFFERENTIALDIAGNOSIS/MDM:   Vitals:    Vitals:    05/14/22 1736 05/14/22 2006   BP: (!) 170/105 (!) 156/101   Pulse: 94 84   Resp: 16 19   Temp: 98.6 °F (37 °C)    TempSrc: Temporal    SpO2: 94% 97%   Weight: 274 lb (124.3 kg)    Height: 6' 5\" (1.956 m)            MDM  Number of Diagnoses or Management Options     Amount and/or Complexity of Data Reviewed  Clinical lab tests: reviewed and ordered  Tests in the radiology section of CPT®: ordered and reviewed  Tests in the medicine section of CPT®: ordered and reviewed    Risk of Complications, Morbidity, and/or Mortality  Presenting problems: moderate  Diagnostic procedures: moderate  Management options: moderate    Patient Progress  Patient progress: improved      CRITICAL CARE TIME   Total Critical Care time was  minutes, excluding separately reportable procedures. There was a high probability of clinically significant/life threatening deterioration in the patient's condition which required my urgentintervention.      CONSULTS:  None    PROCEDURES:  Unless otherwise noted below, none     Procedures    FINAL IMPRESSION      1. COPD exacerbation ([address]          DISPOSITION/PLAN   DISPOSITION        PATIENT REFERRED TO:  Giulia Mcclendon MD  [address]  [address] [place] [address]  [phone]    In 3 days        DISCHARGE MEDICATIONS:  Discharge Medication List as of 5/14/2022  7:50 PM      START taking these medications    Details   azithromycin (ZITHROMAX Z-PORTER) 250 MG tablet Take 2 tablets (500 mg) on Day 1, and then take 1 tablet (250 mg) on days 2 through 5., Disp-1 packet, R-0Normal      predniSONE (DELTASONE) 50 MG tablet Take 1 tablet by mouth daily for 5 days, Disp-5 tablet, R-0Normal                (Please note that portions of this note were completed with a voice recognitionprogram.  Efforts were made to edit the dictations but occasionally words are mis-transcribed.)    Andrey Peña MD (electronically signed)  Attending Emergency Physician          Andrey Peña MD  05/15/22 5725

## 2022-05-15 LAB
EKG ATRIAL RATE: 82 BPM
EKG P AXIS: 76 DEGREES
EKG P-R INTERVAL: 152 MS
EKG Q-T INTERVAL: 380 MS
EKG QRS DURATION: 100 MS
EKG QTC CALCULATION (BAZETT): 443 MS
EKG R AXIS: 63 DEGREES
EKG T AXIS: 59 DEGREES
EKG VENTRICULAR RATE: 82 BPM

## 2022-05-15 PROCEDURE — 93010 ELECTROCARDIOGRAM REPORT: CPT | Performed by: INTERNAL MEDICINE

## 2022-05-15 NOTE — ED NOTES
Pt instructed to follow up with PCP regarding elevated BP.  Dr. Eduardo Carreon aware and states pt is suitable for D/C     Heather Gongora RN  05/14/22 2015

## 2022-05-16 ENCOUNTER — HOSPITAL ENCOUNTER (INPATIENT)
Age: 67
LOS: 2 days | Discharge: HOME | DRG: 192 | End: 2022-05-18
Attending: EMERGENCY MEDICINE | Admitting: INTERNAL MEDICINE
Payer: MEDICARE

## 2022-05-16 ENCOUNTER — APPOINTMENT (OUTPATIENT)
Dept: GENERAL RADIOLOGY | Age: 67
DRG: 192 | End: 2022-05-16
Payer: MEDICARE

## 2022-05-16 DIAGNOSIS — J44.1 COPD EXACERBATION (HCC): Primary | ICD-10-CM

## 2022-05-16 DIAGNOSIS — Z76.5 DRUG-SEEKING BEHAVIOR: ICD-10-CM

## 2022-05-16 LAB
ALBUMIN SERPL-MCNC: 4.4 G/DL (ref 3.5–4.6)
ALP BLD-CCNC: 115 U/L (ref 35–104)
ALT SERPL-CCNC: 14 U/L (ref 0–41)
AMPHETAMINE SCREEN, URINE: ABNORMAL
ANION GAP SERPL CALCULATED.3IONS-SCNC: 14 MEQ/L (ref 9–15)
AST SERPL-CCNC: 15 U/L (ref 0–40)
BARBITURATE SCREEN URINE: ABNORMAL
BASOPHILS ABSOLUTE: 0 K/UL (ref 0–0.1)
BASOPHILS RELATIVE PERCENT: 0.2 % (ref 0.2–1.2)
BENZODIAZEPINE SCREEN, URINE: ABNORMAL
BILIRUB SERPL-MCNC: 0.4 MG/DL (ref 0.2–0.7)
BUN BLDV-MCNC: 14 MG/DL (ref 8–23)
CALCIUM SERPL-MCNC: 10.1 MG/DL (ref 8.5–9.9)
CANNABINOID SCREEN URINE: POSITIVE
CHLORIDE BLD-SCNC: 105 MEQ/L (ref 95–107)
CO2: 22 MEQ/L (ref 20–31)
COCAINE METABOLITE SCREEN URINE: POSITIVE
CREAT SERPL-MCNC: 0.81 MG/DL (ref 0.7–1.2)
EKG ATRIAL RATE: 91 BPM
EKG P AXIS: 80 DEGREES
EKG P-R INTERVAL: 140 MS
EKG Q-T INTERVAL: 360 MS
EKG QRS DURATION: 100 MS
EKG QTC CALCULATION (BAZETT): 442 MS
EKG R AXIS: 65 DEGREES
EKG T AXIS: 57 DEGREES
EKG VENTRICULAR RATE: 91 BPM
EOSINOPHILS ABSOLUTE: 0 K/UL (ref 0–0.5)
EOSINOPHILS RELATIVE PERCENT: 0.2 % (ref 0.8–7)
GFR AFRICAN AMERICAN: >60
GFR NON-AFRICAN AMERICAN: >60
GLOBULIN: 3.5 G/DL (ref 2.3–3.5)
GLUCOSE BLD-MCNC: 114 MG/DL (ref 70–99)
HBA1C MFR BLD: 5.6 % (ref 4.8–5.9)
HCT VFR BLD CALC: 43.9 % (ref 42–52)
HEMOGLOBIN: 14.2 G/DL (ref 13.7–17.5)
IMMATURE GRANULOCYTES #: 0 K/UL
IMMATURE GRANULOCYTES %: 0.4 %
INR BLD: 1
LACTIC ACID: 1.8 MMOL/L (ref 0.5–2.2)
LYMPHOCYTES ABSOLUTE: 0.7 K/UL (ref 1.3–3.6)
LYMPHOCYTES RELATIVE PERCENT: 6.5 %
Lab: ABNORMAL
MCH RBC QN AUTO: 30.5 PG (ref 25.7–32.2)
MCHC RBC AUTO-ENTMCNC: 32.3 % (ref 32.3–36.5)
MCV RBC AUTO: 94.2 FL (ref 79–92.2)
MONOCYTES ABSOLUTE: 0.4 K/UL (ref 0.3–0.8)
MONOCYTES RELATIVE PERCENT: 3.3 % (ref 5.3–12.2)
NEUTROPHILS ABSOLUTE: 9.7 K/UL (ref 1.8–5.4)
NEUTROPHILS RELATIVE PERCENT: 89.4 % (ref 34–67.9)
OPIATE SCREEN URINE: POSITIVE
PDW BLD-RTO: 16 % (ref 11.6–14.4)
PHENCYCLIDINE SCREEN URINE: ABNORMAL
PLATELET # BLD: 356 K/UL (ref 163–337)
POTASSIUM SERPL-SCNC: 4.5 MEQ/L (ref 3.4–4.9)
PRO-BNP: 270 PG/ML
PROTHROMBIN TIME: 13.2 SEC (ref 12.3–14.9)
RBC # BLD: 4.66 M/UL (ref 4.63–6.08)
SARS-COV-2, NAAT: NOT DETECTED
SODIUM BLD-SCNC: 141 MEQ/L (ref 135–144)
TOTAL PROTEIN: 7.9 G/DL (ref 6.3–8)
TROPONIN: <0.01 NG/ML (ref 0–0.01)
WBC # BLD: 10.9 K/UL (ref 4.2–9)

## 2022-05-16 PROCEDURE — 96375 TX/PRO/DX INJ NEW DRUG ADDON: CPT

## 2022-05-16 PROCEDURE — 87635 SARS-COV-2 COVID-19 AMP PRB: CPT

## 2022-05-16 PROCEDURE — 99285 EMERGENCY DEPT VISIT HI MDM: CPT

## 2022-05-16 PROCEDURE — 36415 COLL VENOUS BLD VENIPUNCTURE: CPT

## 2022-05-16 PROCEDURE — 83605 ASSAY OF LACTIC ACID: CPT

## 2022-05-16 PROCEDURE — 6360000002 HC RX W HCPCS: Performed by: INTERNAL MEDICINE

## 2022-05-16 PROCEDURE — 6370000000 HC RX 637 (ALT 250 FOR IP)

## 2022-05-16 PROCEDURE — 2580000003 HC RX 258: Performed by: EMERGENCY MEDICINE

## 2022-05-16 PROCEDURE — 80306 DRUG TEST PRSMV INSTRMNT: CPT

## 2022-05-16 PROCEDURE — 80053 COMPREHEN METABOLIC PANEL: CPT

## 2022-05-16 PROCEDURE — 6360000002 HC RX W HCPCS: Performed by: EMERGENCY MEDICINE

## 2022-05-16 PROCEDURE — 6370000000 HC RX 637 (ALT 250 FOR IP): Performed by: INTERNAL MEDICINE

## 2022-05-16 PROCEDURE — 87040 BLOOD CULTURE FOR BACTERIA: CPT

## 2022-05-16 PROCEDURE — 93010 ELECTROCARDIOGRAM REPORT: CPT | Performed by: INTERNAL MEDICINE

## 2022-05-16 PROCEDURE — 85025 COMPLETE CBC W/AUTO DIFF WBC: CPT

## 2022-05-16 PROCEDURE — 93005 ELECTROCARDIOGRAM TRACING: CPT

## 2022-05-16 PROCEDURE — G0378 HOSPITAL OBSERVATION PER HR: HCPCS

## 2022-05-16 PROCEDURE — 96376 TX/PRO/DX INJ SAME DRUG ADON: CPT

## 2022-05-16 PROCEDURE — 83036 HEMOGLOBIN GLYCOSYLATED A1C: CPT

## 2022-05-16 PROCEDURE — 83880 ASSAY OF NATRIURETIC PEPTIDE: CPT

## 2022-05-16 PROCEDURE — 96374 THER/PROPH/DIAG INJ IV PUSH: CPT

## 2022-05-16 PROCEDURE — 2580000003 HC RX 258: Performed by: INTERNAL MEDICINE

## 2022-05-16 PROCEDURE — 94640 AIRWAY INHALATION TREATMENT: CPT

## 2022-05-16 PROCEDURE — 71045 X-RAY EXAM CHEST 1 VIEW: CPT

## 2022-05-16 PROCEDURE — 84484 ASSAY OF TROPONIN QUANT: CPT

## 2022-05-16 PROCEDURE — 1210000000 HC MED SURG R&B

## 2022-05-16 PROCEDURE — 85610 PROTHROMBIN TIME: CPT

## 2022-05-16 PROCEDURE — 96372 THER/PROPH/DIAG INJ SC/IM: CPT

## 2022-05-16 PROCEDURE — 96365 THER/PROPH/DIAG IV INF INIT: CPT

## 2022-05-16 RX ORDER — TRAZODONE HYDROCHLORIDE 50 MG/1
50 TABLET ORAL NIGHTLY
Status: DISCONTINUED | OUTPATIENT
Start: 2022-05-16 | End: 2022-05-18 | Stop reason: HOSPADM

## 2022-05-16 RX ORDER — ONDANSETRON 2 MG/ML
4 INJECTION INTRAMUSCULAR; INTRAVENOUS ONCE
Status: COMPLETED | OUTPATIENT
Start: 2022-05-16 | End: 2022-05-16

## 2022-05-16 RX ORDER — ONDANSETRON 4 MG/1
4 TABLET, ORALLY DISINTEGRATING ORAL EVERY 8 HOURS PRN
Status: DISCONTINUED | OUTPATIENT
Start: 2022-05-16 | End: 2022-05-18 | Stop reason: HOSPADM

## 2022-05-16 RX ORDER — IPRATROPIUM BROMIDE AND ALBUTEROL SULFATE 2.5; .5 MG/3ML; MG/3ML
1 SOLUTION RESPIRATORY (INHALATION) 4 TIMES DAILY
Status: DISCONTINUED | OUTPATIENT
Start: 2022-05-16 | End: 2022-05-16 | Stop reason: SDUPTHER

## 2022-05-16 RX ORDER — SODIUM CHLORIDE 9 MG/ML
INJECTION, SOLUTION INTRAVENOUS PRN
Status: DISCONTINUED | OUTPATIENT
Start: 2022-05-16 | End: 2022-05-18 | Stop reason: HOSPADM

## 2022-05-16 RX ORDER — ONDANSETRON 2 MG/ML
4 INJECTION INTRAMUSCULAR; INTRAVENOUS EVERY 6 HOURS PRN
Status: DISCONTINUED | OUTPATIENT
Start: 2022-05-16 | End: 2022-05-18 | Stop reason: HOSPADM

## 2022-05-16 RX ORDER — METHYLPREDNISOLONE SODIUM SUCCINATE 125 MG/2ML
125 INJECTION, POWDER, LYOPHILIZED, FOR SOLUTION INTRAMUSCULAR; INTRAVENOUS ONCE
Status: DISCONTINUED | OUTPATIENT
Start: 2022-05-16 | End: 2022-05-16

## 2022-05-16 RX ORDER — KETOROLAC TROMETHAMINE 30 MG/ML
30 INJECTION, SOLUTION INTRAMUSCULAR; INTRAVENOUS EVERY 6 HOURS PRN
Status: DISCONTINUED | OUTPATIENT
Start: 2022-05-16 | End: 2022-05-18 | Stop reason: HOSPADM

## 2022-05-16 RX ORDER — AMLODIPINE BESYLATE 10 MG/1
10 TABLET ORAL DAILY
Status: DISCONTINUED | OUTPATIENT
Start: 2022-05-16 | End: 2022-05-18 | Stop reason: HOSPADM

## 2022-05-16 RX ORDER — ACETAMINOPHEN 325 MG/1
650 TABLET ORAL EVERY 6 HOURS PRN
Status: DISCONTINUED | OUTPATIENT
Start: 2022-05-16 | End: 2022-05-18 | Stop reason: HOSPADM

## 2022-05-16 RX ORDER — XYLITOL/YERBA SANTA
AEROSOL, SPRAY WITH PUMP (ML) MUCOUS MEMBRANE PRN
Status: DISCONTINUED | OUTPATIENT
Start: 2022-05-16 | End: 2022-05-18 | Stop reason: HOSPADM

## 2022-05-16 RX ORDER — ESCITALOPRAM OXALATE 10 MG/1
10 TABLET ORAL DAILY
Status: DISCONTINUED | OUTPATIENT
Start: 2022-05-16 | End: 2022-05-16

## 2022-05-16 RX ORDER — KETOROLAC TROMETHAMINE 30 MG/ML
30 INJECTION, SOLUTION INTRAMUSCULAR; INTRAVENOUS ONCE
Status: COMPLETED | OUTPATIENT
Start: 2022-05-16 | End: 2022-05-16

## 2022-05-16 RX ORDER — LEVOFLOXACIN 5 MG/ML
500 INJECTION, SOLUTION INTRAVENOUS EVERY 24 HOURS
Status: DISCONTINUED | OUTPATIENT
Start: 2022-05-16 | End: 2022-05-18 | Stop reason: HOSPADM

## 2022-05-16 RX ORDER — ACETAMINOPHEN 650 MG/1
650 SUPPOSITORY RECTAL EVERY 6 HOURS PRN
Status: DISCONTINUED | OUTPATIENT
Start: 2022-05-16 | End: 2022-05-18 | Stop reason: HOSPADM

## 2022-05-16 RX ORDER — SODIUM CHLORIDE 0.9 % (FLUSH) 0.9 %
3 SYRINGE (ML) INJECTION EVERY 8 HOURS
Status: DISCONTINUED | OUTPATIENT
Start: 2022-05-16 | End: 2022-05-18 | Stop reason: HOSPADM

## 2022-05-16 RX ORDER — IPRATROPIUM BROMIDE AND ALBUTEROL SULFATE 2.5; .5 MG/3ML; MG/3ML
SOLUTION RESPIRATORY (INHALATION)
Status: COMPLETED
Start: 2022-05-16 | End: 2022-05-16

## 2022-05-16 RX ORDER — ENOXAPARIN SODIUM 100 MG/ML
40 INJECTION SUBCUTANEOUS DAILY
Status: DISCONTINUED | OUTPATIENT
Start: 2022-05-16 | End: 2022-05-16 | Stop reason: DRUGHIGH

## 2022-05-16 RX ORDER — MONTELUKAST SODIUM 10 MG/1
10 TABLET ORAL NIGHTLY
Status: DISCONTINUED | OUTPATIENT
Start: 2022-05-16 | End: 2022-05-18 | Stop reason: HOSPADM

## 2022-05-16 RX ORDER — ATORVASTATIN CALCIUM 10 MG/1
10 TABLET, FILM COATED ORAL NIGHTLY
Status: DISCONTINUED | OUTPATIENT
Start: 2022-05-16 | End: 2022-05-18 | Stop reason: HOSPADM

## 2022-05-16 RX ORDER — SODIUM CHLORIDE 0.9 % (FLUSH) 0.9 %
5-40 SYRINGE (ML) INJECTION PRN
Status: DISCONTINUED | OUTPATIENT
Start: 2022-05-16 | End: 2022-05-18 | Stop reason: HOSPADM

## 2022-05-16 RX ORDER — OXYCODONE HYDROCHLORIDE 5 MG/1
5 TABLET ORAL EVERY 6 HOURS PRN
Status: DISCONTINUED | OUTPATIENT
Start: 2022-05-16 | End: 2022-05-18 | Stop reason: HOSPADM

## 2022-05-16 RX ORDER — MORPHINE SULFATE 4 MG/ML
4 INJECTION, SOLUTION INTRAMUSCULAR; INTRAVENOUS ONCE
Status: COMPLETED | OUTPATIENT
Start: 2022-05-16 | End: 2022-05-16

## 2022-05-16 RX ORDER — PANTOPRAZOLE SODIUM 40 MG/1
40 TABLET, DELAYED RELEASE ORAL DAILY
Qty: 90 TABLET | Refills: 1 | Status: SHIPPED | OUTPATIENT
Start: 2022-05-16 | End: 2022-10-06 | Stop reason: SDUPTHER

## 2022-05-16 RX ORDER — MELOXICAM 7.5 MG/1
15 TABLET ORAL DAILY
Status: DISCONTINUED | OUTPATIENT
Start: 2022-05-16 | End: 2022-05-18 | Stop reason: HOSPADM

## 2022-05-16 RX ORDER — IPRATROPIUM BROMIDE AND ALBUTEROL SULFATE 2.5; .5 MG/3ML; MG/3ML
1 SOLUTION RESPIRATORY (INHALATION)
Status: DISCONTINUED | OUTPATIENT
Start: 2022-05-16 | End: 2022-05-18 | Stop reason: HOSPADM

## 2022-05-16 RX ORDER — POLYETHYLENE GLYCOL 3350 17 G/17G
17 POWDER, FOR SOLUTION ORAL DAILY PRN
Status: DISCONTINUED | OUTPATIENT
Start: 2022-05-16 | End: 2022-05-18 | Stop reason: HOSPADM

## 2022-05-16 RX ORDER — SODIUM CHLORIDE 0.9 % (FLUSH) 0.9 %
5-40 SYRINGE (ML) INJECTION EVERY 12 HOURS SCHEDULED
Status: DISCONTINUED | OUTPATIENT
Start: 2022-05-16 | End: 2022-05-18 | Stop reason: HOSPADM

## 2022-05-16 RX ORDER — ENOXAPARIN SODIUM 100 MG/ML
30 INJECTION SUBCUTANEOUS 2 TIMES DAILY
Status: DISCONTINUED | OUTPATIENT
Start: 2022-05-16 | End: 2022-05-18 | Stop reason: HOSPADM

## 2022-05-16 RX ORDER — PANTOPRAZOLE SODIUM 40 MG/1
40 TABLET, DELAYED RELEASE ORAL
Status: DISCONTINUED | OUTPATIENT
Start: 2022-05-17 | End: 2022-05-18 | Stop reason: HOSPADM

## 2022-05-16 RX ORDER — ESCITALOPRAM OXALATE 10 MG/1
10 TABLET ORAL NIGHTLY
Status: DISCONTINUED | OUTPATIENT
Start: 2022-05-16 | End: 2022-05-18 | Stop reason: HOSPADM

## 2022-05-16 RX ORDER — METHYLPREDNISOLONE SODIUM SUCCINATE 40 MG/ML
40 INJECTION, POWDER, LYOPHILIZED, FOR SOLUTION INTRAMUSCULAR; INTRAVENOUS EVERY 12 HOURS
Status: DISCONTINUED | OUTPATIENT
Start: 2022-05-16 | End: 2022-05-18 | Stop reason: HOSPADM

## 2022-05-16 RX ADMIN — AMLODIPINE BESYLATE 10 MG: 10 TABLET ORAL at 17:30

## 2022-05-16 RX ADMIN — KETOROLAC TROMETHAMINE 30 MG: 30 INJECTION, SOLUTION INTRAMUSCULAR at 14:33

## 2022-05-16 RX ADMIN — KETOROLAC TROMETHAMINE 30 MG: 30 INJECTION, SOLUTION INTRAMUSCULAR; INTRAVENOUS at 21:28

## 2022-05-16 RX ADMIN — METHYLPREDNISOLONE SODIUM SUCCINATE 125 MG: 125 INJECTION, POWDER, FOR SOLUTION INTRAMUSCULAR; INTRAVENOUS at 12:40

## 2022-05-16 RX ADMIN — LEVOFLOXACIN 500 MG: 5 INJECTION, SOLUTION INTRAVENOUS at 17:31

## 2022-05-16 RX ADMIN — ATORVASTATIN CALCIUM 10 MG: 10 TABLET, FILM COATED ORAL at 21:28

## 2022-05-16 RX ADMIN — MORPHINE SULFATE 4 MG: 4 INJECTION, SOLUTION INTRAMUSCULAR; INTRAVENOUS at 13:05

## 2022-05-16 RX ADMIN — ESCITALOPRAM OXALATE 10 MG: 10 TABLET ORAL at 23:24

## 2022-05-16 RX ADMIN — IPRATROPIUM BROMIDE AND ALBUTEROL SULFATE 1 AMPULE: .5; 2.5 SOLUTION RESPIRATORY (INHALATION) at 16:31

## 2022-05-16 RX ADMIN — MELOXICAM 15 MG: 7.5 TABLET ORAL at 17:35

## 2022-05-16 RX ADMIN — ENOXAPARIN SODIUM 30 MG: 100 INJECTION SUBCUTANEOUS at 21:29

## 2022-05-16 RX ADMIN — IPRATROPIUM BROMIDE AND ALBUTEROL SULFATE 1 AMPULE: .5; 2.5 SOLUTION RESPIRATORY (INHALATION) at 20:25

## 2022-05-16 RX ADMIN — METHYLPREDNISOLONE SODIUM SUCCINATE 40 MG: 40 INJECTION, POWDER, FOR SOLUTION INTRAMUSCULAR; INTRAVENOUS at 17:30

## 2022-05-16 RX ADMIN — TRAZODONE HYDROCHLORIDE 50 MG: 50 TABLET ORAL at 23:23

## 2022-05-16 RX ADMIN — ONDANSETRON HYDROCHLORIDE 4 MG: 2 SOLUTION INTRAMUSCULAR; INTRAVENOUS at 13:06

## 2022-05-16 RX ADMIN — MONTELUKAST SODIUM 10 MG: 10 TABLET, FILM COATED ORAL at 23:24

## 2022-05-16 RX ADMIN — Medication 3 ML: at 17:31

## 2022-05-16 RX ADMIN — OXYCODONE 5 MG: 5 TABLET ORAL at 23:24

## 2022-05-16 RX ADMIN — METFORMIN HYDROCHLORIDE 500 MG: 500 TABLET ORAL at 17:29

## 2022-05-16 RX ADMIN — Medication 10 ML: at 21:28

## 2022-05-16 ASSESSMENT — PAIN SCALES - GENERAL
PAINLEVEL_OUTOF10: 9
PAINLEVEL_OUTOF10: 5
PAINLEVEL_OUTOF10: 10
PAINLEVEL_OUTOF10: 10
PAINLEVEL_OUTOF10: 9

## 2022-05-16 ASSESSMENT — ENCOUNTER SYMPTOMS
FACIAL SWELLING: 0
EYE DISCHARGE: 0
CHOKING: 0
COUGH: 1
WHEEZING: 1
EYE PAIN: 0
STRIDOR: 0
DIARRHEA: 0
BLOOD IN STOOL: 0
SORE THROAT: 0
SHORTNESS OF BREATH: 1
EYE REDNESS: 0
VOICE CHANGE: 0
SINUS PRESSURE: 0
VOMITING: 0
CONSTIPATION: 0
BACK PAIN: 1
TROUBLE SWALLOWING: 0
CHEST TIGHTNESS: 0
ABDOMINAL PAIN: 0

## 2022-05-16 ASSESSMENT — PAIN DESCRIPTION - DESCRIPTORS: DESCRIPTORS: DISCOMFORT

## 2022-05-16 ASSESSMENT — PAIN DESCRIPTION - FREQUENCY: FREQUENCY: CONTINUOUS

## 2022-05-16 ASSESSMENT — PAIN DESCRIPTION - LOCATION
LOCATION: ABDOMEN;ARM
LOCATION: BACK;RIB CAGE
LOCATION: BACK
LOCATION: CHEST

## 2022-05-16 ASSESSMENT — PAIN DESCRIPTION - ORIENTATION
ORIENTATION: RIGHT
ORIENTATION: LEFT

## 2022-05-16 ASSESSMENT — PAIN - FUNCTIONAL ASSESSMENT: PAIN_FUNCTIONAL_ASSESSMENT: 0-10

## 2022-05-16 NOTE — ED NOTES
Dr. Joel Lubin called and spoke with Dr Luis Felipe Schultz and accepted patient.       Dwaine Felty  05/16/22 2135

## 2022-05-16 NOTE — PROGRESS NOTES
Pharmacist Review and Automatic Dose Adjustment of Prophylactic Enoxaparin    *Review reason for admission/hospital problem list*    The reviewing pharmacist has made an adjustment to the ordered enoxaparin dose or converted to UFH per the approved [facility] protocol and table as identified below. Gely Clay is a 61 y.o. male. Recent Labs     05/14/22  1806 05/16/22  1258   CREATININE 1.00 0.81       Estimated Creatinine Clearance: 134 mL/min (based on SCr of 0.81 mg/dL). Recent Labs     05/14/22  1806 05/16/22  1259   HGB 12.2* 14.2   HCT 38.7* 43.9    356*     Recent Labs     05/16/22  1315   INR 1.0       Height:   Ht Readings from Last 1 Encounters:   05/14/22 6' 5\" (1.956 m)     Weight:  Wt Readings from Last 1 Encounters:   05/14/22 274 lb (124.3 kg)               Plan: Based upon the patient's weight and renal function, the ordered enoxaparin dose of 40 mg daily has been changed/converted to 30 mg BID.       Thank you,  Isabel Arteaga [id number], [facility]  5/16/2022, 4:19 PM

## 2022-05-16 NOTE — ED TRIAGE NOTES
Pt to ER from home via [address] with c/o ongoing shortness of breath and chest pain for the last few days, Pt states chest pain becomes worse with deep breathing and talking, recently seen for similar complaint, pt states dc home on ATBX and steroids which he states don't seem to be helping. Pt wears home O2 at [facility], pt given duoneb and 125mg solumedrol enroute to ER. Pt speaking in complete sentences. Moist/congested cough noted.

## 2022-05-16 NOTE — ED PROVIDER NOTES
[facility] ED  eMERGENCY dEPARTMENT eNCOUnter      Pt Name: Jennifer Lockett  MRN: [id number]  [place] 1955  Date of evaluation: 5/16/2022  Provider: Alfonzo Aguilera MD    [address] [place] [address]       Chief Complaint   Patient presents with    Shortness of Breath       HISTORY OF PRESENT ILLNESS   (Location/Symptom, Timing/Onset,Context/Setting, Quality, Duration, Modifying Factors, Severity)  Note limiting factors. Jennifer Lockett is a 61 y.o. male who presents to the emergency department patient is well-known to us from previous multiple encounters emergency for similar situation the past patient is a ex-smoker he is a COPD with intermittent as needed home oxygen not very compliant obesity history of obstructive sleep apnea alcohol abuse substance abuse in the past hypertension live by himself with a history of anxiety depression chronic back pain patient in fact was seen here 2 days ago for similar situation with cough congestion and chest pain worse with movement and cough patient signed out AMA does not want to be in the hospital called again for the paramedic because short of breath patient has a history of recurrent persistent pneumonia underwent thoracostomy and drainage of the fluid from the left lung at [facility] by lung doctors    HPI    NursingNotes were reviewed. REVIEW OF SYSTEMS    (2-9 systems for level 4, 10 or more for level 5)     Review of Systems   Constitutional: Positive for activity change, appetite change, chills, diaphoresis and fatigue. Negative for fever. HENT: Negative for congestion, drooling, facial swelling, mouth sores, nosebleeds, sinus pressure, sore throat, trouble swallowing and voice change. Eyes: Negative for pain, discharge, redness and visual disturbance. Respiratory: Positive for cough, shortness of breath and wheezing. Negative for choking, chest tightness and stridor. Cardiovascular: Positive for chest pain.  Negative for palpitations and leg swelling. Gastrointestinal: Negative for abdominal pain, blood in stool, constipation, diarrhea and vomiting. Endocrine: Negative for cold intolerance, polyphagia and polyuria. Genitourinary: Negative for dysuria, flank pain, frequency, genital sores and urgency. Musculoskeletal: Positive for arthralgias, back pain and myalgias. Negative for joint swelling, neck pain and neck stiffness. Skin: Negative for pallor and rash. Neurological: Negative for tremors, seizures, syncope, weakness, numbness and headaches. Hematological: Negative for adenopathy. Does not bruise/bleed easily. Psychiatric/Behavioral: Negative for agitation, behavioral problems, hallucinations and sleep disturbance. The patient is nervous/anxious. The patient is not hyperactive. All other systems reviewed and are negative. Except as noted above the remainder of the review of systems was reviewed and negative.        PAST MEDICAL HISTORY     Past Medical History:   Diagnosis Date    Alcohol abuse 10/27/2016    Anemia     Asthma     Cocaine abuse ([facility] [address] 10/27/2016    Community acquired pneumonia of left lower lobe of lung 12/5/2016    COPD (chronic obstructive pulmonary disease) ([facility] [address]     Depression 04/27/2020    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    Drug-seeking behavior 04/14/2015    Edema 12/10/2015    Erectile dysfunction     Gastroesophageal reflux disease 12/17/2018    Gout 10/27/2016    History of arthroscopy of both knees 10/27/2016    History of colon polyps 10/26/2021    House dust mite allergy 04/21/2014    Hyperlipidemia     Hypertension 10/27/2016    Injury to heart 10/27/2016    Insomnia 12/17/2018    Loculated pleural effusion     Medical non-compliance 02/22/2014    Morbid obesity due to excess calories ([facility] [address] 12/08/2016    Osteoarthritis of both knees 12/08/2016    Personal history of tobacco use     Pleurisy 12/12/2016    Pneumonia 12/04/2016    caused hospital admission    Pre-diabetes MONITORING SUPPL (ONE TOUCH ULTRA 2) W/DEVICE KIT        CETIRIZINE (ZYRTEC) 10 MG TABLET    TAKE 1 TABLET BY MOUTH TWICE A DAY    ESCITALOPRAM (LEXAPRO) 10 MG TABLET    Take 1 tablet by mouth daily    FLUTICASONE (FLONASE) 50 MCG/ACT NASAL SPRAY    2 sprays by Nasal route daily    IPRATROPIUM-ALBUTEROL (DUONEB) 0.5-2.5 (3) MG/3ML SOLN NEBULIZER SOLUTION    Take 3 mLs by nebulization every 6 hours as needed for Shortness of Breath    LANCETS MISC    1 each by Does not apply route 3 times daily    LOVASTATIN (MEVACOR) 20 MG TABLET    Take 1 tablet by mouth nightly    MELOXICAM (MOBIC) 15 MG TABLET    Take 1 tablet by mouth daily    METFORMIN (GLUCOPHAGE) 500 MG TABLET    Take 1 tablet by mouth 2 times daily (with meals)    MONTELUKAST (SINGULAIR) 10 MG TABLET    Take 1 tablet by mouth nightly    OMEPRAZOLE (PRILOSEC) 20 MG DELAYED RELEASE CAPSULE        OXYCODONE (ROXICODONE) 5 MG IMMEDIATE RELEASE TABLET        PANTOPRAZOLE (PROTONIX) 40 MG TABLET    Take 1 tablet by mouth daily    POLYETHYLENE GLYCOL (GLYCOLAX) 17 GM/SCOOP POWDER    Take 17 g by mouth daily    PREDNISONE (DELTASONE) 50 MG TABLET    Take 1 tablet by mouth daily for 5 days    SILDENAFIL (VIAGRA) 100 MG TABLET    Take 1 tablet by mouth as needed for Erectile Dysfunction    SODIUM CHLORIDE (OCEAN, BABY AYR) 0.65 % NASAL SPRAY    2 sprays by Nasal route three times daily    SYMBICORT 160-4.5 MCG/ACT AERO    Inhale 2 puffs into the lungs 2 times daily    TRAZODONE (DESYREL) 50 MG TABLET    Take 1 tablet by mouth nightly       ALLERGIES     Fish-derived products, Iodine, Seasonal, Shellfish allergy, and Pcn [penicillins]    FAMILY HISTORY       Family History   Problem Relation Age of Onset    Arthritis Mother     Asthma Mother     High Cholesterol Mother     Other Mother         aneurysm    Diabetes Father     Stroke Maternal Grandmother     Cancer Maternal Grandfather     Hypertension Other     COPD Neg Hx           SOCIAL HISTORY       Social History     Socioeconomic History    Marital status: Single     Spouse name: n/a    Number of children: 1    Years of education: 15    Highest education level: None   Occupational History    Occupation: Disability     Employer: NONE   Tobacco Use    Smoking status: Light Tobacco Smoker     Packs/day: 0.00     Years: 30.00     Pack years: 0.00     Types: Cigarettes, Cigars     Start date: 1988     Last attempt to quit: 10/1/2013     Years since quittin.6    Smokeless tobacco: Never Used    Tobacco comment: doesnt buy only smokes if someone gives him one    Vaping Use    Vaping Use: Never used   Substance and Sexual Activity    Alcohol use: Not Currently     Alcohol/week: 4.0 standard drinks     Types: 2 Cans of beer, 2 Shots of liquor per week     Comment: social 1 -2 x week    Drug use: Not Currently     Types: Cocaine, Marijuana (Weed)     Comment: no cocaine x 1 year, marijuana weekly    Sexual activity: Not Currently     Partners: Female   Other Topics Concern    None   Social History Narrative    Lives in an apartment    15 steps to get up to the apartment    Asking for hospital bed and shower chair 2021 sent request to pcp office     Has 1 daughter who lives in [facility] per patient 2 grandchildren. Per patient does not see his daughter very often. Social Determinants of Health     Financial Resource Strain: Low Risk     Difficulty of Paying Living Expenses: Not hard at all   Food Insecurity: No Food Insecurity    Worried About Running Out of Food in the Last Year: Never true    Jonnie of Food in the Last Year: Never true   Transportation Needs: Unmet Transportation Needs    Lack of Transportation (Medical):  Yes    Lack of Transportation (Non-Medical): Yes   Physical Activity:     Days of Exercise per Week: Not on file    Minutes of Exercise per Session: Not on file   Stress:     Feeling of Stress : Not on file   Social Connections:     Frequency of Communication with Friends and Family: Not on file    Frequency of Social Gatherings with Friends and Family: Not on file    Attends Caodaism Services: Not on file    Active Member of Clubs or Organizations: Not on file    Attends Club or Organization Meetings: Not on file    Marital Status: Not on file   Intimate Partner Violence:     Fear of Current or Ex-Partner: Not on file    Emotionally Abused: Not on file    Physically Abused: Not on file    Sexually Abused: Not on file   Housing Stability:     Unable to Pay for Housing in the Last Year: Not on file    Number of [place] in the Last Year: Not on file    Unstable Housing in the Last Year: Not on file       SCREENINGS    Amari Coma Scale  Eye Opening: Spontaneous  Best Verbal Response: Oriented  Best Motor Response: Obeys commands  [place] Coma Scale Score: 15 @FLOW([id number])@      PHYSICAL EXAM    (up to 7 for level 4, 8 or more for level 5)     ED Triage Vitals [05/16/22 1247]   BP Temp Temp Source Pulse Resp SpO2 Height Weight   (!) 184/104 98.2 °F (36.8 °C) Oral 91 24 93 % -- --       Physical Exam  Vitals and nursing note reviewed. Constitutional:       General: He is not in acute distress. Appearance: He is not ill-appearing, toxic-appearing or diaphoretic. HENT:      Head: Atraumatic. Mouth/Throat:      Mouth: Mucous membranes are moist.      Pharynx: No pharyngeal swelling or oropharyngeal exudate. Neck:      Thyroid: No thyromegaly. Vascular: No hepatojugular reflux or JVD. Trachea: No tracheal deviation. Cardiovascular:      Rate and Rhythm: Normal rate and regular rhythm. No extrasystoles are present. Heart sounds: No murmur heard. No friction rub. Pulmonary:      Effort: Pulmonary effort is normal. Tachypnea present. Breath sounds: Examination of the right-lower field reveals wheezing. Examination of the left-lower field reveals wheezing. Wheezing present. No rhonchi or rales.       Comments: Alert cooperative patient talks in full sentences moving all extremities very well follows verbal commands slightly uncomfortable because of the short of breath  Chest:      Chest wall: No mass, deformity, tenderness or crepitus. There is no dullness to percussion. Musculoskeletal:      Cervical back: Normal range of motion. Right lower leg: No tenderness. No edema. Left lower leg: No tenderness. No edema. Lymphadenopathy:      Cervical: No cervical adenopathy. Skin:     Capillary Refill: Capillary refill takes less than 2 seconds. Coloration: Skin is not cyanotic or pale. Findings: No ecchymosis or erythema. Nails: There is no clubbing. Neurological:      General: No focal deficit present. Mental Status: He is alert and oriented to person, place, and time. Cranial Nerves: No cranial nerve deficit. Psychiatric:         Mood and Affect: Mood is anxious.          DIAGNOSTIC RESULTS     EKG: All EKG's are interpreted by the Emergency Department Physician who either signs or Co-signsthis chart in the absence of a cardiologist.        RADIOLOGY:   [place] such as CT, Ultrasound and MRI are read by the radiologist. Ashli Layer radiographic images are visualized and preliminarily interpreted by the emergency physician with the below findings:        Interpretation per the Radiologist below, if available at the time ofthis note:    XR CHEST PORTABLE    (Results Pending)         ED BEDSIDE ULTRASOUND:   Performed by ED Physician - none    LABS:  Labs Reviewed   COMPREHENSIVE METABOLIC PANEL - Abnormal; Notable for the following components:       Result Value    Glucose 114 (*)     Calcium 10.1 (*)     Alkaline Phosphatase 115 (*)     All other components within normal limits   CBC WITH AUTO DIFFERENTIAL - Abnormal; Notable for the following components:    WBC 10.9 (*)     MCV 94.2 (*)     RDW 16.0 (*)     Platelets 408 (*)     Neutrophils % 89.4 (*)     Monocytes % 3.3 (*)     Eosinophils % 0.2 (*)     Neutrophils Absolute 9.7 (*)     Lymphocytes Absolute 0.7 (*)     All other components within normal limits   CULTURE, BLOOD 1   CULTURE, BLOOD 2   COVID-19, RAPID   LACTIC ACID   TROPONIN   BRAIN NATRIURETIC PEPTIDE   PROTIME-INR   URINE DRUG SCREEN       All other labs were within normal range or not returned as of this dictation. EMERGENCY DEPARTMENT COURSE and DIFFERENTIAL DIAGNOSIS/MDM:   Vitals:    Vitals:    05/16/22 1247 05/16/22 1331 05/16/22 1340   BP: (!) 184/104 (!) 178/115 (!) 168/109   Pulse: 91 88 88   Resp: 24 21 20   Temp: 98.2 °F (36.8 °C)     TempSrc: Oral     SpO2: 93% 91% 95%           MDM  Number of Diagnoses or Management Options  COPD exacerbation ([facility] [address]): established and improving  Drug-seeking behavior  Diagnosis management comments: Eval noted for encounters emergency for similar situation patient does have chronic pain X alcohol and cocaine abuse in the past patient denies lately any cocaine use no chest tightness but back pain short of breath brought in here and noted to be short of breath patient noncompliant with oxygen minimal wheezing bilaterally paramedics gave aerosol treatment improve oxygenation EKG performed arrival normal sinus rhythm rate of 91/min IN interval 140 ms QRS duration 100 ms QT interval 360 ms has no ischemia no ST depression no PVCs on EKG patient is signed out AMA only 2 days ago he refused admission and this time patient requesting admission to the hospital Case discussed with the hospitalist Dr. Angie Hayes who agreed to admit him to the hospital       Amount and/or Complexity of Data Reviewed  Clinical lab tests: ordered and reviewed  Tests in the radiology section of CPT®: ordered and reviewed        CRITICAL CARE TIME   Total Critical Care time was  minutes, excluding separately reportableprocedures. There was a high probability of clinicallysignificant/life threatening deterioration in the patient's condition which required my urgent intervention. ONSULTS:  None    PROCEDURES:  Unless otherwise noted below, none     Procedures    FINAL IMPRESSION      1. COPD exacerbation ([facility] [address]    2. Drug-seeking behavior          DISPOSITION/PLAN   DISPOSITION Admitted 05/16/2022 02:00:18 PM      PATIENT REFERRED TO:  No follow-up provider specified.     DISCHARGE MEDICATIONS:  New Prescriptions    No medications on file          (Please note that portions of this note were completed with a voice recognition program.  Efforts were made to edit the dictations but occasionally words are mis-transcribed.)    Leanne Habermann, MD (electronically signed)  Attending Emergency Physician       Leanne Habermann, MD  05/16/22 [address], MD  05/16/22 9380

## 2022-05-16 NOTE — ED NOTES
Pt medicated per EMAR, pt asking how often he can receive pain medication     Linette Santiago RN  05/16/22 [id number]

## 2022-05-16 NOTE — ED NOTES
Report given to RN, pt transferred upstairs to room     [facility], RN  05/16/22 5453

## 2022-05-16 NOTE — ED NOTES
Called and spoke with [facility] FEL[facility] in lab. Covid test is negative. Informed nursing supervisor patient can be taken to the floor.       Mirta Daily  05/16/22 2551

## 2022-05-17 LAB
ANION GAP SERPL CALCULATED.3IONS-SCNC: 12 MEQ/L (ref 9–15)
BASOPHILS ABSOLUTE: 0 K/UL (ref 0–0.1)
BASOPHILS RELATIVE PERCENT: 0.1 % (ref 0.2–1.2)
BUN BLDV-MCNC: 18 MG/DL (ref 8–23)
CALCIUM SERPL-MCNC: 9.7 MG/DL (ref 8.5–9.9)
CHLORIDE BLD-SCNC: 103 MEQ/L (ref 95–107)
CO2: 24 MEQ/L (ref 20–31)
CREAT SERPL-MCNC: 0.86 MG/DL (ref 0.7–1.2)
EOSINOPHILS ABSOLUTE: 0 K/UL (ref 0–0.5)
EOSINOPHILS RELATIVE PERCENT: 0 % (ref 0.8–7)
GFR AFRICAN AMERICAN: >60
GFR NON-AFRICAN AMERICAN: >60
GLUCOSE BLD-MCNC: 143 MG/DL (ref 70–99)
HCT VFR BLD CALC: 41.4 % (ref 42–52)
HEMOGLOBIN: 12.9 G/DL (ref 13.7–17.5)
IMMATURE GRANULOCYTES #: 0 K/UL
IMMATURE GRANULOCYTES %: 0.4 %
LYMPHOCYTES ABSOLUTE: 1 K/UL (ref 1.3–3.6)
LYMPHOCYTES RELATIVE PERCENT: 10.1 %
MCH RBC QN AUTO: 30.6 PG (ref 25.7–32.2)
MCHC RBC AUTO-ENTMCNC: 31.2 % (ref 32.3–36.5)
MCV RBC AUTO: 98.3 FL (ref 79–92.2)
MONOCYTES ABSOLUTE: 0.9 K/UL (ref 0.3–0.8)
MONOCYTES RELATIVE PERCENT: 9.5 % (ref 5.3–12.2)
NEUTROPHILS ABSOLUTE: 7.6 K/UL (ref 1.8–5.4)
NEUTROPHILS RELATIVE PERCENT: 79.9 % (ref 34–67.9)
PDW BLD-RTO: 16 % (ref 11.6–14.4)
PLATELET # BLD: 312 K/UL (ref 163–337)
POTASSIUM REFLEX MAGNESIUM: 4.4 MEQ/L (ref 3.4–4.9)
RBC # BLD: 4.21 M/UL (ref 4.63–6.08)
SODIUM BLD-SCNC: 139 MEQ/L (ref 135–144)
WBC # BLD: 9.5 K/UL (ref 4.2–9)

## 2022-05-17 PROCEDURE — 2580000003 HC RX 258: Performed by: EMERGENCY MEDICINE

## 2022-05-17 PROCEDURE — 2580000003 HC RX 258: Performed by: INTERNAL MEDICINE

## 2022-05-17 PROCEDURE — 36415 COLL VENOUS BLD VENIPUNCTURE: CPT

## 2022-05-17 PROCEDURE — G0378 HOSPITAL OBSERVATION PER HR: HCPCS

## 2022-05-17 PROCEDURE — 96376 TX/PRO/DX INJ SAME DRUG ADON: CPT

## 2022-05-17 PROCEDURE — 6370000000 HC RX 637 (ALT 250 FOR IP): Performed by: INTERNAL MEDICINE

## 2022-05-17 PROCEDURE — 6360000002 HC RX W HCPCS: Performed by: INTERNAL MEDICINE

## 2022-05-17 PROCEDURE — 96366 THER/PROPH/DIAG IV INF ADDON: CPT

## 2022-05-17 PROCEDURE — 85025 COMPLETE CBC W/AUTO DIFF WBC: CPT

## 2022-05-17 PROCEDURE — 1210000000 HC MED SURG R&B

## 2022-05-17 PROCEDURE — 96372 THER/PROPH/DIAG INJ SC/IM: CPT

## 2022-05-17 PROCEDURE — 80048 BASIC METABOLIC PNL TOTAL CA: CPT

## 2022-05-17 PROCEDURE — 94640 AIRWAY INHALATION TREATMENT: CPT

## 2022-05-17 RX ORDER — LISINOPRIL 20 MG/1
20 TABLET ORAL DAILY
Status: DISCONTINUED | OUTPATIENT
Start: 2022-05-17 | End: 2022-05-18 | Stop reason: HOSPADM

## 2022-05-17 RX ORDER — GUAIFENESIN/DEXTROMETHORPHAN 100-10MG/5
5 SYRUP ORAL EVERY 4 HOURS PRN
Status: DISCONTINUED | OUTPATIENT
Start: 2022-05-17 | End: 2022-05-18 | Stop reason: HOSPADM

## 2022-05-17 RX ORDER — GUAIFENESIN 600 MG/1
600 TABLET, EXTENDED RELEASE ORAL 2 TIMES DAILY
Status: DISCONTINUED | OUTPATIENT
Start: 2022-05-17 | End: 2022-05-18 | Stop reason: HOSPADM

## 2022-05-17 RX ORDER — BUDESONIDE AND FORMOTEROL FUMARATE DIHYDRATE 160; 4.5 UG/1; UG/1
2 AEROSOL RESPIRATORY (INHALATION) 2 TIMES DAILY
COMMUNITY
Start: 2022-04-20 | End: 2022-07-08 | Stop reason: SDUPTHER

## 2022-05-17 RX ORDER — LISINOPRIL 10 MG/1
10 TABLET ORAL DAILY
Status: DISCONTINUED | OUTPATIENT
Start: 2022-05-17 | End: 2022-05-17

## 2022-05-17 RX ADMIN — ESCITALOPRAM OXALATE 10 MG: 10 TABLET ORAL at 23:30

## 2022-05-17 RX ADMIN — KETOROLAC TROMETHAMINE 30 MG: 30 INJECTION, SOLUTION INTRAMUSCULAR; INTRAVENOUS at 10:32

## 2022-05-17 RX ADMIN — IPRATROPIUM BROMIDE AND ALBUTEROL SULFATE 1 AMPULE: .5; 2.5 SOLUTION RESPIRATORY (INHALATION) at 10:16

## 2022-05-17 RX ADMIN — Medication 3 ML: at 05:49

## 2022-05-17 RX ADMIN — MOMETASONE FUROATE AND FORMOTEROL FUMARATE DIHYDRATE 2 PUFF: 100; 5 AEROSOL RESPIRATORY (INHALATION) at 17:57

## 2022-05-17 RX ADMIN — LISINOPRIL 20 MG: 20 TABLET ORAL at 08:35

## 2022-05-17 RX ADMIN — OXYCODONE 5 MG: 5 TABLET ORAL at 05:35

## 2022-05-17 RX ADMIN — MONTELUKAST SODIUM 10 MG: 10 TABLET, FILM COATED ORAL at 21:35

## 2022-05-17 RX ADMIN — IPRATROPIUM BROMIDE AND ALBUTEROL SULFATE 1 AMPULE: .5; 2.5 SOLUTION RESPIRATORY (INHALATION) at 17:57

## 2022-05-17 RX ADMIN — AMLODIPINE BESYLATE 10 MG: 10 TABLET ORAL at 08:36

## 2022-05-17 RX ADMIN — Medication 10 ML: at 10:32

## 2022-05-17 RX ADMIN — KETOROLAC TROMETHAMINE 30 MG: 30 INJECTION, SOLUTION INTRAMUSCULAR; INTRAVENOUS at 22:34

## 2022-05-17 RX ADMIN — OXYCODONE 5 MG: 5 TABLET ORAL at 17:34

## 2022-05-17 RX ADMIN — TRAZODONE HYDROCHLORIDE 50 MG: 50 TABLET ORAL at 23:30

## 2022-05-17 RX ADMIN — IPRATROPIUM BROMIDE AND ALBUTEROL SULFATE 1 AMPULE: .5; 2.5 SOLUTION RESPIRATORY (INHALATION) at 06:08

## 2022-05-17 RX ADMIN — IPRATROPIUM BROMIDE AND ALBUTEROL SULFATE 1 AMPULE: .5; 2.5 SOLUTION RESPIRATORY (INHALATION) at 22:10

## 2022-05-17 RX ADMIN — METFORMIN HYDROCHLORIDE 500 MG: 500 TABLET ORAL at 16:21

## 2022-05-17 RX ADMIN — LEVOFLOXACIN 500 MG: 5 INJECTION, SOLUTION INTRAVENOUS at 16:28

## 2022-05-17 RX ADMIN — OXYCODONE 5 MG: 5 TABLET ORAL at 11:33

## 2022-05-17 RX ADMIN — ENOXAPARIN SODIUM 30 MG: 100 INJECTION SUBCUTANEOUS at 08:36

## 2022-05-17 RX ADMIN — MELOXICAM 15 MG: 7.5 TABLET ORAL at 08:35

## 2022-05-17 RX ADMIN — KETOROLAC TROMETHAMINE 30 MG: 30 INJECTION, SOLUTION INTRAMUSCULAR; INTRAVENOUS at 16:24

## 2022-05-17 RX ADMIN — IPRATROPIUM BROMIDE AND ALBUTEROL SULFATE 1 AMPULE: .5; 2.5 SOLUTION RESPIRATORY (INHALATION) at 13:59

## 2022-05-17 RX ADMIN — METHYLPREDNISOLONE SODIUM SUCCINATE 40 MG: 40 INJECTION, POWDER, FOR SOLUTION INTRAMUSCULAR; INTRAVENOUS at 03:41

## 2022-05-17 RX ADMIN — PANTOPRAZOLE SODIUM 40 MG: 40 TABLET, DELAYED RELEASE ORAL at 05:35

## 2022-05-17 RX ADMIN — ENOXAPARIN SODIUM 30 MG: 100 INJECTION SUBCUTANEOUS at 21:34

## 2022-05-17 RX ADMIN — GUAIFENESIN 600 MG: 600 TABLET, EXTENDED RELEASE ORAL at 08:44

## 2022-05-17 RX ADMIN — ATORVASTATIN CALCIUM 10 MG: 10 TABLET, FILM COATED ORAL at 21:35

## 2022-05-17 RX ADMIN — Medication 10 ML: at 21:36

## 2022-05-17 RX ADMIN — GUAIFENESIN SYRUP AND DEXTROMETHORPHAN 5 ML: 100; 10 SYRUP ORAL at 08:44

## 2022-05-17 RX ADMIN — KETOROLAC TROMETHAMINE 30 MG: 30 INJECTION, SOLUTION INTRAMUSCULAR; INTRAVENOUS at 03:41

## 2022-05-17 RX ADMIN — GUAIFENESIN 600 MG: 600 TABLET, EXTENDED RELEASE ORAL at 21:35

## 2022-05-17 RX ADMIN — METHYLPREDNISOLONE SODIUM SUCCINATE 40 MG: 40 INJECTION, POWDER, FOR SOLUTION INTRAMUSCULAR; INTRAVENOUS at 16:21

## 2022-05-17 RX ADMIN — IPRATROPIUM BROMIDE AND ALBUTEROL SULFATE 1 AMPULE: .5; 2.5 SOLUTION RESPIRATORY (INHALATION) at 00:29

## 2022-05-17 RX ADMIN — OXYCODONE 5 MG: 5 TABLET ORAL at 23:30

## 2022-05-17 RX ADMIN — METFORMIN HYDROCHLORIDE 500 MG: 500 TABLET ORAL at 08:36

## 2022-05-17 ASSESSMENT — PAIN SCALES - GENERAL
PAINLEVEL_OUTOF10: 9
PAINLEVEL_OUTOF10: 8
PAINLEVEL_OUTOF10: 9
PAINLEVEL_OUTOF10: 8
PAINLEVEL_OUTOF10: 9
PAINLEVEL_OUTOF10: 10

## 2022-05-17 ASSESSMENT — PAIN DESCRIPTION - LOCATION
LOCATION: BACK
LOCATION: BACK
LOCATION: CHEST
LOCATION: CHEST
LOCATION: BACK;RIB CAGE

## 2022-05-17 ASSESSMENT — PAIN DESCRIPTION - DESCRIPTORS
DESCRIPTORS: DISCOMFORT;ACHING;SHARP
DESCRIPTORS: ACHING;DISCOMFORT;SHARP
DESCRIPTORS: DISCOMFORT;ACHING;SHARP

## 2022-05-17 ASSESSMENT — PAIN DESCRIPTION - ORIENTATION
ORIENTATION: RIGHT
ORIENTATION: RIGHT

## 2022-05-17 NOTE — PROGRESS NOTES
Patient awake in bed this morning. Patient is upset that he is not ordered to have morphine. Attempted to provide education regarding morphine and breathing issues. Patient was unreceptive to learning and was upset that physician would not order anything more for pain. Patient was verbal with this nurse stating that the doctor is not a real doctor, and that we, the nurses, believe anything that he says to be true. Patient stated that this is the reason why people do not like coming here for medical care. Morning medications provided and assessment completed.  Call light and belongings within reach

## 2022-05-17 NOTE — H&P
Hospital Medicine History & Physical      PCP: Veto Fothergill, MD    Date of Admission: 5/16/2022    Date of Service: 5/17/22      Chief Complaint:  Dyspnea, cough. CP      History Of Present Illness:  61 y.o. male who presented to [facility] with above complains. For the past 3-4 days he noted increased in dyspnea, SOB during physical activity and at rest, had dry cough with scant discharge, developed CP with cough. Had repeated visits to ER. He was following with pulmonary service-  Dr. Mora Conley as outpatient.      Past Medical History:          Diagnosis Date    Alcohol abuse 10/27/2016    Anemia     Asthma     Cocaine abuse ([address] 10/27/2016    Community acquired pneumonia of left lower lobe of lung 12/5/2016    COPD (chronic obstructive pulmonary disease) ([address]     Depression 04/27/2020    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    Drug-seeking behavior 04/14/2015    Edema 12/10/2015    Erectile dysfunction     Gastroesophageal reflux disease 12/17/2018    Gout 10/27/2016    History of arthroscopy of both knees 10/27/2016    History of colon polyps 10/26/2021    House dust mite allergy 04/21/2014    Hyperlipidemia     Hypertension 10/27/2016    Injury to heart 10/27/2016    Insomnia 12/17/2018    Loculated pleural effusion     Medical non-compliance 02/22/2014    Morbid obesity due to excess calories ([address] 12/08/2016    Osteoarthritis of both knees 12/08/2016    Personal history of tobacco use     Pleurisy 12/12/2016    Pneumonia 12/04/2016    caused hospital admission    Pre-diabetes 10/27/2016    Seasonal allergies 04/21/2014    Severe persistent asthma 10/27/2016    Severe sleep apnea 04/14/2015    Supraventricular tachycardia ([address] 10/27/2016    Type 2 diabetes mellitus with albuminuria ([address] 10/26/2021    Type 2 diabetes mellitus without complication, without long-term current use of insulin ([address] 10/26/2021       Past Surgical History:          Procedure Laterality Date    ANTERIOR CRUCIATE LIGAMENT REPAIR      CARDIAC CATHETERIZATION      COLONOSCOPY N/A 07/15/2020    COLONOSCOPY WITH POLYPECTOMY performed by Corine De Oliveira MD at [address] [facility] Left 11/27/2021    VATS/thoracotomy    TOTAL KNEE ARTHROPLASTY Left 08/09/2019    LEFT TOTAL KNEE ARTHROPLASTY performed by Ubaldo Gil MD at [facility] N/A 37/35/7362    UMBILICAL HERNIA REPAIR WITH MESH performed by Judie Espinoza MD at [facility]       Medications Prior to Admission:      Prior to Admission medications    Medication Sig Start Date End Date Taking?  Authorizing Provider   pantoprazole (PROTONIX) 40 MG tablet Take 1 tablet by mouth daily 5/16/22   [place], MD   SYMBICORT 160-4.5 MCG/ACT AERO Inhale 2 puffs into the lungs 2 times daily 5/14/22   Austin Burrell MD   albuterol sulfate HFA (VENTOLIN HFA) 108 (90 Base) MCG/ACT inhaler Inhale 2 puffs into the lungs every 4 hours as needed for Wheezing 5/14/22   Austin Burrell MD   azithromycin (ZITHROMAX Z-PORTER) 250 MG tablet Take 2 tablets (500 mg) on Day 1, and then take 1 tablet (250 mg) on days 2 through 5. 5/14/22 5/18/22  Austin Burrell MD   predniSONE (DELTASONE) 50 MG tablet Take 1 tablet by mouth daily for 5 days 5/14/22 5/19/22  Austin Burrell MD   ipratropium-albuterol (DUONEB) 0.5-2.5 (3) MG/3ML SOLN nebulizer solution Take 3 mLs by nebulization every 6 hours as needed for Shortness of Breath 5/5/22   [place], MD   traZODone (DESYREL) 50 MG tablet Take 1 tablet by mouth nightly 4/20/22   [facility], MD   montelukast (SINGULAIR) 10 MG tablet Take 1 tablet by mouth nightly 4/20/22   [place], MD   sildenafil (VIAGRA) 100 MG tablet Take 1 tablet by mouth as needed for Erectile Dysfunction 4/14/22   [place], MD   lovastatin (MEVACOR) 20 MG tablet Take 1 tablet by mouth nightly 4/12/22   [facility]   metFORMIN (GLUCOPHAGE) 500 MG tablet Take 1 tablet by mouth 2 times daily (with meals) 4/5/22   Darshana Kennedy MD   blood glucose monitor strips Test three times a day & as needed for symptoms of irregular blood glucose. Dispense sufficient amount for indicated testing frequency plus additional to accommodate PRN testing needs.  4/5/22   Darshana Kennedy MD   albuterol sulfate  (90 Base) MCG/ACT inhaler Inhale 2 puffs into the lungs every 6 hours as needed for Wheezing or Shortness of Breath 4/2/22   Darshana Kennedy MD   acetaminophen (ACETAMINOPHEN EXTRA STRENGTH) 500 MG tablet Take 1 tablet by mouth every 8 hours as needed for Pain 3/24/22   Darshana Kennedy MD   meloxicam (MOBIC) 15 MG tablet Take 1 tablet by mouth daily 3/24/22   Darshana Kennedy MD   albuterol (PROVENTIL) (5 MG/ML) 0.5% nebulizer solution Take 0.5 mLs by nebulization every 6 hours as needed for Wheezing 2/24/22   Alfredo Puff, DO   cetirizine (ZYRTEC) 10 MG tablet TAKE 1 TABLET BY MOUTH TWICE A DAY  Patient not taking: Reported on 5/14/2022 1/27/22   BHARATI Comer - SANDRA   oxyCODONE (ROXICODONE) 5 MG immediate release tablet  12/8/21   Historical Provider, MD   omeprazole (PRILOSEC) 20 MG delayed release capsule  12/21/21   Historical Provider, MD   Blood Glucose Monitoring Suppl (ONE TOUCH ULTRA 2) w/Device KIT  10/20/21   Historical Provider, MD   fluticasone (FLONASE) 50 MCG/ACT nasal spray 2 sprays by Nasal route daily 12/22/21   Darshana Kennedy MD   amLODIPine (NORVASC) 10 MG tablet Take 1 tablet by mouth daily 12/22/21   Darshana Kennedy MD   escitalopram (LEXAPRO) 10 MG tablet Take 1 tablet by mouth daily 11/24/21   Darshana Kennedy MD   Lancets MISC 1 each by Does not apply route 3 times daily 10/20/21   Soni Cash MD   sodium chloride (OCEAN, BABY AYR) 0.65 % nasal spray 2 sprays by Nasal route three times daily 9/28/21   Britt Giang MD   polyethylene glycol (GLYCOLAX) 17 GM/SCOOP powder Take 17 g by mouth daily  Patient taking normal.  No rashes or lesions. Neurologic:  Neurovascularly intact without any focal sensory/motor deficits. Cranial nerves: II-XII intact, grossly non-focal.  Psychiatric:  Alert and oriented, thought content appropriate, normal insight  Capillary Refill: Brisk,< 3 seconds   Peripheral Pulses: +2 palpable, equal bilaterally       Labs:     Recent Labs     05/14/22  1806 05/16/22  1259 05/17/22  0547   WBC 7.0 10.9* 9.5*   HGB 12.2* 14.2 12.9*   HCT 38.7* 43.9 41.4*    356* 312     Recent Labs     05/14/22  1806 05/16/22  1258 05/17/22  0549    141 139   K 4.3 4.5 4.4    105 103   CO2 21 22 24   BUN 9 14 18   CREATININE 1.00 0.81 0.86   CALCIUM 9.1 10.1* 9.7     Recent Labs     05/14/22  1806 05/16/22  1258   AST 20 15   ALT 14 14   BILITOT 0.3 0.4   ALKPHOS 97 115*     Recent Labs     05/16/22  1315   INR 1.0     Recent Labs     05/14/22  1806 05/16/22  1258   TROPONINI <0.010 <0.010       Urinalysis:      Lab Results   Component Value Date    NITRU Negative 05/14/2022    WBCUA 0-2 09/16/2021    BACTERIA None 10/05/2017    RBCUA 0-2 09/16/2021    BLOODU Negative 05/14/2022    SPECGRAV 1.025 05/14/2022    GLUCOSEU Negative 05/14/2022           XR CHEST PORTABLE   Final Result   CHANGES IN THE LEFT LOWER THORAX WOULD APPEAR TO BE CHRONIC IN NATURE. THE RIGHT LUNG IS CLEAR. ASSESSMENT:    Active Hospital Problems    Diagnosis Date Noted    COPD exacerbation ([facility] [J44.1] 05/16/2022     Priority: Medium       PLAN:        DVT Prophylaxis:   Diet: ADULT DIET;  Regular  Code Status: Full Code    PT/OT Eval Status:     Dispo - Dyspnea, COPD- treatement with iv atbs/solumedrol initiated  HTN- elevated, add low dose of lisinopril  Acute/chronic respiratory failure- continue with O2, pt has home O2 at home- using it over night only per his report   Obesity with BMI 34%  Smoking, polysubstance use- advice to stop   Suspect non compliance with medical Tx at home  Chronic low back pain- continue

## 2022-05-17 NOTE — PROGRESS NOTES
Pt A&Ox3 resting in bed. Assessment and vitals are as charted. Medications given per orders. Pt states he would like to take his trazadone and lexapro at 2300. Pt given ginerale per request. Pt states no further needs at this time. Call light and table are within reach.

## 2022-05-18 VITALS
BODY MASS INDEX: 34.47 KG/M2 | HEIGHT: 73 IN | DIASTOLIC BLOOD PRESSURE: 89 MMHG | OXYGEN SATURATION: 100 % | HEART RATE: 58 BPM | RESPIRATION RATE: 18 BRPM | WEIGHT: 260.1 LBS | TEMPERATURE: 97.5 F | SYSTOLIC BLOOD PRESSURE: 154 MMHG

## 2022-05-18 LAB
GLUCOSE BLD-MCNC: 121 MG/DL (ref 70–99)
GLUCOSE BLD-MCNC: 137 MG/DL (ref 70–99)
PERFORMED ON: ABNORMAL
PERFORMED ON: ABNORMAL

## 2022-05-18 PROCEDURE — 96376 TX/PRO/DX INJ SAME DRUG ADON: CPT

## 2022-05-18 PROCEDURE — 6370000000 HC RX 637 (ALT 250 FOR IP): Performed by: INTERNAL MEDICINE

## 2022-05-18 PROCEDURE — 2580000003 HC RX 258: Performed by: INTERNAL MEDICINE

## 2022-05-18 PROCEDURE — 96372 THER/PROPH/DIAG INJ SC/IM: CPT

## 2022-05-18 PROCEDURE — 2580000003 HC RX 258: Performed by: EMERGENCY MEDICINE

## 2022-05-18 PROCEDURE — G0378 HOSPITAL OBSERVATION PER HR: HCPCS

## 2022-05-18 PROCEDURE — 94640 AIRWAY INHALATION TREATMENT: CPT

## 2022-05-18 PROCEDURE — 6360000002 HC RX W HCPCS: Performed by: INTERNAL MEDICINE

## 2022-05-18 RX ORDER — PREDNISONE 20 MG/1
20 TABLET ORAL 2 TIMES DAILY
Qty: 10 TABLET | Refills: 0 | Status: SHIPPED | OUTPATIENT
Start: 2022-05-18 | End: 2022-05-23

## 2022-05-18 RX ORDER — LISINOPRIL 20 MG/1
20 TABLET ORAL DAILY
Qty: 30 TABLET | Refills: 3 | Status: SHIPPED | OUTPATIENT
Start: 2022-05-18 | End: 2022-05-18 | Stop reason: HOSPADM

## 2022-05-18 RX ORDER — PREDNISONE 20 MG/1
20 TABLET ORAL 2 TIMES DAILY
Qty: 10 TABLET | Refills: 0 | Status: SHIPPED | OUTPATIENT
Start: 2022-05-18 | End: 2022-05-18 | Stop reason: HOSPADM

## 2022-05-18 RX ORDER — LOSARTAN POTASSIUM 25 MG/1
25 TABLET ORAL DAILY
Qty: 90 TABLET | Refills: 1 | Status: SHIPPED | OUTPATIENT
Start: 2022-05-18 | End: 2022-10-06 | Stop reason: SDUPTHER

## 2022-05-18 RX ADMIN — GUAIFENESIN 600 MG: 600 TABLET, EXTENDED RELEASE ORAL at 09:42

## 2022-05-18 RX ADMIN — IPRATROPIUM BROMIDE AND ALBUTEROL SULFATE 1 AMPULE: .5; 2.5 SOLUTION RESPIRATORY (INHALATION) at 10:02

## 2022-05-18 RX ADMIN — Medication 3 ML: at 13:07

## 2022-05-18 RX ADMIN — MELOXICAM 15 MG: 7.5 TABLET ORAL at 09:42

## 2022-05-18 RX ADMIN — MOMETASONE FUROATE AND FORMOTEROL FUMARATE DIHYDRATE 2 PUFF: 100; 5 AEROSOL RESPIRATORY (INHALATION) at 06:08

## 2022-05-18 RX ADMIN — AMLODIPINE BESYLATE 10 MG: 10 TABLET ORAL at 09:43

## 2022-05-18 RX ADMIN — LISINOPRIL 20 MG: 20 TABLET ORAL at 09:43

## 2022-05-18 RX ADMIN — METHYLPREDNISOLONE SODIUM SUCCINATE 40 MG: 40 INJECTION, POWDER, FOR SOLUTION INTRAMUSCULAR; INTRAVENOUS at 13:06

## 2022-05-18 RX ADMIN — IPRATROPIUM BROMIDE AND ALBUTEROL SULFATE 1 AMPULE: .5; 2.5 SOLUTION RESPIRATORY (INHALATION) at 06:08

## 2022-05-18 RX ADMIN — KETOROLAC TROMETHAMINE 30 MG: 30 INJECTION, SOLUTION INTRAMUSCULAR; INTRAVENOUS at 10:17

## 2022-05-18 RX ADMIN — ENOXAPARIN SODIUM 30 MG: 100 INJECTION SUBCUTANEOUS at 09:42

## 2022-05-18 RX ADMIN — IPRATROPIUM BROMIDE AND ALBUTEROL SULFATE 1 AMPULE: .5; 2.5 SOLUTION RESPIRATORY (INHALATION) at 13:48

## 2022-05-18 RX ADMIN — METFORMIN HYDROCHLORIDE 500 MG: 500 TABLET ORAL at 09:42

## 2022-05-18 RX ADMIN — PANTOPRAZOLE SODIUM 40 MG: 40 TABLET, DELAYED RELEASE ORAL at 05:00

## 2022-05-18 RX ADMIN — METHYLPREDNISOLONE SODIUM SUCCINATE 40 MG: 40 INJECTION, POWDER, FOR SOLUTION INTRAMUSCULAR; INTRAVENOUS at 04:41

## 2022-05-18 RX ADMIN — Medication 10 ML: at 13:07

## 2022-05-18 RX ADMIN — KETOROLAC TROMETHAMINE 30 MG: 30 INJECTION, SOLUTION INTRAMUSCULAR; INTRAVENOUS at 04:41

## 2022-05-18 RX ADMIN — OXYCODONE 5 MG: 5 TABLET ORAL at 05:43

## 2022-05-18 RX ADMIN — Medication 3 ML: at 04:41

## 2022-05-18 RX ADMIN — OXYCODONE 5 MG: 5 TABLET ORAL at 10:17

## 2022-05-18 ASSESSMENT — PAIN SCALES - GENERAL
PAINLEVEL_OUTOF10: 8

## 2022-05-18 ASSESSMENT — PAIN DESCRIPTION - ORIENTATION: ORIENTATION: RIGHT;LEFT

## 2022-05-18 NOTE — DISCHARGE SUMMARY
Discharge Summary    Patient:  Cee Rossi  YOB: 1955    MRN: [id number]   Acct: [de-identified]    Primary Care Physician: Jose Antonio Rouse MD    Admit date:  5/16/2022    Discharge date:   05/18/22      Discharge Diagnoses:   COPD exacerbation ([facility]  Principal Problem:    COPD exacerbation ([facility]  Resolved Problems:    * No resolved hospital problems. *      Admitted for: [facility] Course: patient was admitted and treated for COPD exacerbation. Treated with IV atbs/steroids. After completing his acute stay and treatment will be DC home with PO levaquin/prednisone.  Also he was asking for prescription of opioids on DC, after refusal he became belligerent, yelled at me profanity,  to \"get out of my room\"    Consultants:      Discharge Medications:       Medication List      START taking these medications    lisinopril 20 MG tablet  Commonly known as: PRINIVIL;ZESTRIL  Take 1 tablet by mouth daily        CHANGE how you take these medications    polyethylene glycol 17 GM/SCOOP powder  Commonly known as: GLYCOLAX  Take 17 g by mouth daily  What changed:   · when to take this  · reasons to take this     predniSONE 20 MG tablet  Commonly known as: DELTASONE  Take 1 tablet by mouth 2 times daily for 5 days  What changed:   · medication strength  · how much to take  · when to take this        CONTINUE taking these medications    acetaminophen 500 MG tablet  Commonly known as: Acetaminophen Extra Strength  Take 1 tablet by mouth every 8 hours as needed for Pain     * albuterol (5 MG/ML) 0.5% nebulizer solution  Commonly known as: PROVENTIL  Take 0.5 mLs by nebulization every 6 hours as needed for Wheezing     * albuterol sulfate  (90 Base) MCG/ACT inhaler  Inhale 2 puffs into the lungs every 6 hours as needed for Wheezing or Shortness of Breath     * albuterol sulfate  (90 Base) MCG/ACT inhaler  Commonly known as: Ventolin HFA  Inhale 2 puffs into the lungs every 4 hours as needed for Wheezing     amLODIPine 10 MG tablet  Commonly known as: NORVASC  Take 1 tablet by mouth daily     azithromycin 250 MG tablet  Commonly known as: Zithromax Z-Donnie  Take 2 tablets (500 mg) on Day 1, and then take 1 tablet (250 mg) on days 2 through 5.     blood glucose test strips  Test three times a day & as needed for symptoms of irregular blood glucose. Dispense sufficient amount for indicated testing frequency plus additional to accommodate PRN testing needs.      cetirizine 10 MG tablet  Commonly known as: ZYRTEC  TAKE 1 TABLET BY MOUTH TWICE A DAY     escitalopram 10 MG tablet  Commonly known as: LEXAPRO  Take 1 tablet by mouth daily     fluticasone 50 MCG/ACT nasal spray  Commonly known as: FLONASE  2 sprays by Nasal route daily     ipratropium-albuterol 0.5-2.5 (3) MG/3ML Soln nebulizer solution  Commonly known as: DUONEB  Take 3 mLs by nebulization every 6 hours as needed for Shortness of Breath     Lancets Misc  1 each by Does not apply route 3 times daily     lovastatin 20 MG tablet  Commonly known as: MEVACOR  Take 1 tablet by mouth nightly     meloxicam 15 MG tablet  Commonly known as: MOBIC  Take 1 tablet by mouth daily     metFORMIN 500 MG tablet  Commonly known as: GLUCOPHAGE  Take 1 tablet by mouth 2 times daily (with meals)     montelukast 10 MG tablet  Commonly known as: SINGULAIR  Take 1 tablet by mouth nightly     omeprazole 20 MG delayed release capsule  Commonly known as: PRILOSEC     ONE TOUCH ULTRA 2 w/Device Kit     pantoprazole 40 MG tablet  Commonly known as: PROTONIX  Take 1 tablet by mouth daily     sildenafil 100 MG tablet  Commonly known as: VIAGRA  Take 1 tablet by mouth as needed for Erectile Dysfunction     sodium chloride 0.65 % nasal spray  Commonly known as: OCEAN, BABY AYR  2 sprays by Nasal route three times daily     * Symbicort 160-4.5 MCG/ACT Aero  Generic drug: budesonide-formoterol     * Symbicort 160-4.5 MCG/ACT Aero  Generic drug: budesonide-formoterol  Inhale 2 puffs into the lungs 2 times daily     traZODone 50 MG tablet  Commonly known as: DESYREL  Take 1 tablet by mouth nightly         * This list has 5 medication(s) that are the same as other medications prescribed for you. Read the directions carefully, and ask your doctor or other care provider to review them with you. STOP taking these medications    oxyCODONE 5 MG immediate release tablet  Commonly known as: ROXICODONE           Where to Get Your Medications      These medications were sent to [facility]/pharmacy #[id number]- [address] [phone]  [address] [address]    Phone: [phone]   · lisinopril 20 MG tablet  · pantoprazole 40 MG tablet  · predniSONE 20 MG tablet         Physical Exam:    Vitals:  Vitals:    05/17/22 2134 05/18/22 0032 05/18/22 0440 05/18/22 0543   BP: (!) 152/88   (!) 154/89   Pulse: 65   58   Resp: 20 18 18   Temp: 97.9 °F (36.6 °C)   97.5 °F (36.4 °C)   TempSrc: Oral  Oral Oral   SpO2: 97% 99%  100%   Weight:       Height:         Weight: Weight: 260 lb 1.6 oz (118 kg)     24 hour intake/output:No intake or output data in the 24 hours ending 05/18/22 0729    General appearance - alert, well appearing, and in no distress  Chest - clear to auscultation, no wheezes, rales or rhonchi, symmetric air entry  Heart - normal rate, regular rhythm, normal S1, S2, no murmurs, rubs, clicks or gallops  Abdomen - soft, nontender, nondistended, no masses or organomegaly  Obese: Yes; Protuberant: No   Neurological - alert, oriented, normal speech, no focal findings or movement disorder noted  Extremities - peripheral pulses normal, no pedal edema, no clubbing or cyanosis  Skin - normal coloration and turgor, no rashes, no suspicious skin lesions noted        Radiology reports as per the Radiologist  Radiology: XR CHEST PORTABLE    Result Date: 5/16/2022  EXAMINATION:  CHEST X-RAY. CLINICAL HISTORY:  SHORTNESS OF BREATH. COMPARISONS:  5/14/2022.  TECHNIQUE: AP portable view. FINDINGS:  There is fibrotic change and some pleural thickening in the left lower thorax. The right lung is clear. Pulmonary vascularity appears normal. Heart size is within normal limits. We note severe degenerative arthritis in the right shoulder. CHANGES IN THE LEFT LOWER THORAX WOULD APPEAR TO BE CHRONIC IN NATURE. THE RIGHT LUNG IS CLEAR. Results for orders placed or performed during the hospital encounter of 05/16/22   Culture, Blood 1    Specimen: Blood   Result Value Ref Range    Blood Culture, Routine       No Growth to date. Any change in status will be called. Culture, Blood 2    Specimen: Blood   Result Value Ref Range    Culture, Blood 2       No Growth to date. Any change in status will be called.    COVID-19, Rapid    Specimen: Nasopharyngeal Swab   Result Value Ref Range    SARS-CoV-2, NAAT Not Detected Not Detected   Comprehensive Metabolic Panel   Result Value Ref Range    Sodium 141 135 - 144 mEq/L    Potassium 4.5 3.4 - 4.9 mEq/L    Chloride 105 95 - 107 mEq/L    CO2 22 20 - 31 mEq/L    Anion Gap 14 9 - 15 mEq/L    Glucose 114 (H) 70 - 99 mg/dL    BUN 14 8 - 23 mg/dL    CREATININE 0.81 0.70 - 1.20 mg/dL    GFR Non-African American >60.0 >60    GFR  >60.0 >60    Calcium 10.1 (H) 8.5 - 9.9 mg/dL    Total Protein 7.9 6.3 - 8.0 g/dL    Albumin 4.4 3.5 - 4.6 g/dL    Total Bilirubin 0.4 0.2 - 0.7 mg/dL    Alkaline Phosphatase 115 (H) 35 - 104 U/L    ALT 14 0 - 41 U/L    AST 15 0 - 40 U/L    Globulin 3.5 2.3 - 3.5 g/dL   Lactic Acid   Result Value Ref Range    Lactic Acid 1.8 0.5 - 2.2 mmol/L   Troponin   Result Value Ref Range    Troponin <0.010 0.000 - 0.010 ng/mL   CBC with Auto Differential   Result Value Ref Range    WBC 10.9 (H) 4.2 - 9.0 K/uL    RBC 4.66 4.63 - 6.08 M/uL    Hemoglobin 14.2 13.7 - 17.5 g/dL    Hematocrit 43.9 42.0 - 52.0 %    MCV 94.2 (H) 79.0 - 92.2 fL    MCH 30.5 25.7 - 32.2 pg    MCHC 32.3 32.3 - 36.5 %    RDW 16.0 (H) 11.6 - 14.4 %    Platelets 977 (H) 936 - 337 K/uL    Neutrophils % 89.4 (H) 34.0 - 67.9 %    Immature Granulocytes % 0.4 %    Lymphocytes % 6.5 %    Monocytes % 3.3 (L) 5.3 - 12.2 %    Eosinophils % 0.2 (L) 0.8 - 7.0 %    Basophils % 0.2 0.2 - 1.2 %    Neutrophils Absolute 9.7 (H) 1.8 - 5.4 K/uL    Immature Granulocytes # 0.0 K/uL    Lymphocytes Absolute 0.7 (L) 1.3 - 3.6 K/uL    Monocytes Absolute 0.4 0.3 - 0.8 K/uL    Eosinophils Absolute 0.0 0.0 - 0.5 K/uL    Basophils Absolute 0.0 0.0 - 0.1 K/uL   Brain Natriuretic Peptide   Result Value Ref Range    Pro- pg/mL   Protime-INR   Result Value Ref Range    Protime 13.2 12.3 - 14.9 sec    INR 1.0    Hemoglobin A1C   Result Value Ref Range    Hemoglobin A1C 5.6 4.8 - 5.9 %   Basic Metabolic Panel w/ Reflex to MG   Result Value Ref Range    Sodium 139 135 - 144 mEq/L    Potassium reflex Magnesium 4.4 3.4 - 4.9 mEq/L    Chloride 103 95 - 107 mEq/L    CO2 24 20 - 31 mEq/L    Anion Gap 12 9 - 15 mEq/L    Glucose 143 (H) 70 - 99 mg/dL    BUN 18 8 - 23 mg/dL    CREATININE 0.86 0.70 - 1.20 mg/dL    GFR Non-African American >60.0 >60    GFR  >60.0 >60    Calcium 9.7 8.5 - 9.9 mg/dL   CBC with Auto Differential   Result Value Ref Range    WBC 9.5 (H) 4.2 - 9.0 K/uL    RBC 4.21 (L) 4.63 - 6.08 M/uL    Hemoglobin 12.9 (L) 13.7 - 17.5 g/dL    Hematocrit 41.4 (L) 42.0 - 52.0 %    MCV 98.3 (H) 79.0 - 92.2 fL    MCH 30.6 25.7 - 32.2 pg    MCHC 31.2 (L) 32.3 - 36.5 %    RDW 16.0 (H) 11.6 - 14.4 %    Platelets 983 316 - 234 K/uL    Neutrophils % 79.9 (H) 34.0 - 67.9 %    Immature Granulocytes % 0.4 %    Lymphocytes % 10.1 %    Monocytes % 9.5 5.3 - 12.2 %    Eosinophils % 0.0 (L) 0.8 - 7.0 %    Basophils % 0.1 (L) 0.2 - 1.2 %    Neutrophils Absolute 7.6 (H) 1.8 - 5.4 K/uL    Immature Granulocytes # 0.0 K/uL    Lymphocytes Absolute 1.0 (L) 1.3 - 3.6 K/uL    Monocytes Absolute 0.9 (H) 0.3 - 0.8 K/uL    Eosinophils Absolute 0.0 0.0 - 0.5 K/uL    Basophils Absolute 0.0 0.0 -

## 2022-05-18 NOTE — PROGRESS NOTES
Patient awake, A&Ox4, and in bed. Pt ambulates independently in the room. Assessment and vitals are as charted. Medications given as ordered. Pt given gingerale, ice water, and turkey sandwich per request. Pt states no further needs until his pain medications are due. Call light and table are within reach.

## 2022-05-19 ENCOUNTER — CARE COORDINATION (OUTPATIENT)
Dept: CASE MANAGEMENT | Age: 67
End: 2022-05-19

## 2022-05-19 DIAGNOSIS — J44.1 COPD EXACERBATION (HCC): Primary | ICD-10-CM

## 2022-05-19 PROCEDURE — 1111F DSCHRG MED/CURRENT MED MERGE: CPT | Performed by: FAMILY MEDICINE

## 2022-05-19 NOTE — CARE COORDINATION
Yudith 45 Transitions Initial Follow Up Call    Call within 2 business days of discharge: Yes    Patient: Brian Hutchinson Patient : 1955   MRN: <[id number]>  Reason for Admission: COPD exacerbation  Discharge Date: 22 RARS: Readmission Risk Score: 22.9 ( )      Last Discharge [facility]       Complaint Diagnosis Description Type Department Provider    22 Shortness of Breath COPD exacerbation ([address] . .. ED to Hosp-Admission (Discharged) (ADMITTED) Houston Meek MD; Gely Novak MD           Spoke with pt for an initial care transition call post hospital discharge. Pt admitted on 22 with dx exacerbation of COPD. Pt presented with c/o sob, cough, and CP with coughing. Pt states that he is doing \"good\" but still complains of soreness to chest wall. Pt upset that the doctor from the hospital would not send him home on any Percocet for his pain. Pt states that he was told to discuss this with his pcp. CTN was able to de-escalate pt and suggested to do as the hospitalist discussed regarding pain management. Pt denies any increased sob, cough, chest tightness, or chest congestion. Pt reports to mild sob with increased activity. Pt has O2 at home that he wears at night and prn during the day. Pt states that it is set at 3L. Pt also has a nebulizer at home that he uses prn. Has Duoneb tx. Has not needed to use since he has been home. Pt also has an Albuterol inhaler that he has if needed. Pt denies any increased use of his rescue inhaler. Pt states that he has Symbicort BID that he takes as his maintenance inhaler. Pt states in the past that he has been given Trelegy Ellipta and Advair as samples from his pulmonologist that he states is too expensive to fill so he stays on the Symbicort. CTN reviewed the COPD zones with pt. He does not recall getting any information on this in the past. CTN will mail pt a copy of it so that he can have to review and keep as a reference.  Pt was asking about obtaining free meals from [facility]. CTN suggested he call the customer service number on the back of his [employer] insurance card to inquire if this is an included benefit. CTN offered to provide pt with member service number so that he can call to inquire about post discharge meals (If in plan, can receive 2 meals for 7 days delivered to the pt via [employer]). Pt declined offer stated he could look at his card and call himself. Full medication review completed. Pt has new rxs filled and reports that he is taking as prescribed. Pt thought his Norvasc might have been stopped since the Losartan was added. However, CTN did not note that Norvasc was discontinued. Only med stopped at discharge was noted to be Roxicodone. Pt was advised to self-monitor his BP at home. Pt does have a home BP kit and glucometer. Pt states that he is unsure of how to use these. CTN suggested her bring equipment with him to his f/u appt with his pcp next week and have office staff educate pt on use. He voiced understanding. Pt states he does not know how to use his glucometer either and CTN again suggested her bring equipment with him to his appt. Reviewed f/u information. Pt is scheduled on 5/26/22 with his pcp. Pt states that he uses transportation via a public transit. Pt pays $2 for rides. Pt voiced no other needs/concerns at the time of this call. Pt was agreeable to continued outreaches from this CTN.     Facility:     Non-face-to-face services provided:  Scheduled appointment with PCP-5/26/22  Scheduled appointment with Specialist-Dr. Isabella Pulido (pulm)  Obtained and reviewed discharge summary and/or continuity of care documents  Assessment and support for treatment adherence and medication management-[id number] entered, as MH pcp     Transitions of Care Initial Call    Challenges to be reviewed by the provider   Additional needs identified to be addressed with provider: No  none             Method of communication with provider : none    Care Transition Nurse contacted the patient by telephone to perform post hospital discharge assessment. Provided introduction to self, and explanation of the CTN role. CTN reviewed discharge instructions, medical action plan and red flags with patient who verbalized understanding. Patient given an opportunity to ask questions and does not have any further questions or concerns at this time. Were discharge instructions available to patient? Yes. Reviewed appropriate site of care based on symptoms and resources available to patient including: PCP  Specialist  When to call 911. The patient agrees to contact the PCP office for questions related to their healthcare. Medication reconciliation was performed with patient, who verbalizes understanding of administration of home medications. A    CTN provided contact information. Plan for follow-up call in 5-7 days based on severity of symptoms and risk factors. Plan for next call: symptom management-Any worsening in COPD? self management-Remind pt to bring BP and glucometer kits to his f/u with his pcp for education  follow up appointment-Remind pt of OV with pcp on 5/26/22. Ask pt if he has scheduled appt with pulmonary?  community resources-Has pt reached out to [facility] for post discharge meals? Has pt received COPD zones? Care Transitions 24 Hour Call    Schedule Follow Up Appointment with PCP: Completed  Do you have a copy of your discharge instructions?: Yes  Do you have all of your prescriptions and are they filled?: Yes  Have you been contacted by a [id number] [facility] Pharmacist?: No  Have you scheduled your follow up appointment?: Yes  How are you going to get to your appointment?: Public transportation (Comment: public transit)  Post Acute Services: Other (Comment: Current w/ [facility].  Tyrese Toribio RN.)  Patient DME: Straight cane  Patient Home Equipment: CPAP, Oxygen, Nebulizer (Comment: doesnt wear cpap )  Do you have support at home?: Alone  Do you feel like you have everything you need to keep you well at home?: Yes  Are you an active caregiver in your home?: No  Care Transitions Interventions    Pharmacist: Completed       Other Services: Completed (Comment: 12/30/21 Current w/ JOVANNI Farah.  SAMANTHA.)     Social Work: Completed           Follow Up  Future Appointments   Date Time Provider Constance Espitia   5/26/2022  4:00 PM Jonathan Funes MD [facility]   10/6/2022  3:00 PM Jonathan Funes MD [id number] RN

## 2022-05-19 NOTE — CARE COORDINATION
Yudith 45 Transitions Initial Follow Up Call    Call within 2 business days of discharge: Yes    Patient: Giovana Guzman Patient : 1955   MRN: <[id number]>  Reason for Admission: COPD exacerbation  Discharge Date: 22 RARS: Readmission Risk Score: 22.9 ( )      Last Discharge [address] [address]       Complaint Diagnosis Description Type Department Provider    22 Shortness of Breath COPD exacerbation ([address] . .. ED to Hosp-Admission (Discharged) (ADMITTED) Krystal Andrews MD; Abel Whitney MD        Attempted to reach the patient for initial Care Transition call post hospital discharge. Phone rang with no answer. Unable to leave a message d/t no VM box being set up. No alternative phone numbers listed for pt. Will attempt outreach again. Noted pt has a HFU appt scheduled with his pcp on 22.       Follow Up  Future Appointments   Date Time Provider Constance Espitia   2022  4:00 PM Jermaine Heath MD [place] [id number]   10/6/2022  3:00 PM Jermaine Heath MD [id number] RN

## 2022-05-20 ENCOUNTER — CARE COORDINATION (OUTPATIENT)
Dept: CASE MANAGEMENT | Age: 67
End: 2022-05-20

## 2022-05-21 LAB
BLOOD CULTURE, ROUTINE: NORMAL
CULTURE, BLOOD 2: NORMAL

## 2022-05-26 ENCOUNTER — CARE COORDINATION (OUTPATIENT)
Dept: CASE MANAGEMENT | Age: 67
End: 2022-05-26

## 2022-05-26 ENCOUNTER — OFFICE VISIT (OUTPATIENT)
Dept: FAMILY MEDICINE CLINIC | Age: 67
End: 2022-05-26
Payer: MEDICARE

## 2022-05-26 VITALS
HEART RATE: 84 BPM | SYSTOLIC BLOOD PRESSURE: 124 MMHG | BODY MASS INDEX: 35.21 KG/M2 | TEMPERATURE: 96.8 F | OXYGEN SATURATION: 96 % | HEIGHT: 72 IN | WEIGHT: 260 LBS | DIASTOLIC BLOOD PRESSURE: 58 MMHG

## 2022-05-26 DIAGNOSIS — R80.9 TYPE 2 DIABETES MELLITUS WITH ALBUMINURIA (HCC): ICD-10-CM

## 2022-05-26 DIAGNOSIS — Z09 HOSPITAL DISCHARGE FOLLOW-UP: Primary | ICD-10-CM

## 2022-05-26 DIAGNOSIS — F41.8 ANXIETY WITH DEPRESSION: ICD-10-CM

## 2022-05-26 DIAGNOSIS — I10 PRIMARY HYPERTENSION: Chronic | ICD-10-CM

## 2022-05-26 DIAGNOSIS — M48.061 SPINAL STENOSIS OF LUMBAR REGION, UNSPECIFIED WHETHER NEUROGENIC CLAUDICATION PRESENT: ICD-10-CM

## 2022-05-26 DIAGNOSIS — J30.2 SEASONAL ALLERGIES: Chronic | ICD-10-CM

## 2022-05-26 DIAGNOSIS — E11.29 TYPE 2 DIABETES MELLITUS WITH ALBUMINURIA (HCC): ICD-10-CM

## 2022-05-26 DIAGNOSIS — J44.1 CHRONIC OBSTRUCTIVE PULMONARY DISEASE WITH ACUTE EXACERBATION (HCC): ICD-10-CM

## 2022-05-26 PROCEDURE — 99495 TRANSJ CARE MGMT MOD F2F 14D: CPT | Performed by: FAMILY MEDICINE

## 2022-05-26 PROCEDURE — 1111F DSCHRG MED/CURRENT MED MERGE: CPT | Performed by: FAMILY MEDICINE

## 2022-05-26 RX ORDER — PANTOPRAZOLE SODIUM 40 MG/1
40 TABLET, DELAYED RELEASE ORAL DAILY
Qty: 90 TABLET | Refills: 1 | Status: CANCELLED | OUTPATIENT
Start: 2022-05-26

## 2022-05-26 RX ORDER — TRAZODONE HYDROCHLORIDE 50 MG/1
50 TABLET ORAL NIGHTLY
Qty: 90 TABLET | Refills: 1 | Status: CANCELLED | OUTPATIENT
Start: 2022-05-26

## 2022-05-26 RX ORDER — MONTELUKAST SODIUM 10 MG/1
10 TABLET ORAL NIGHTLY
Qty: 90 TABLET | Refills: 1 | Status: CANCELLED | OUTPATIENT
Start: 2022-05-26

## 2022-05-26 RX ORDER — NICOTINE 21 MG/24HR
PATCH, TRANSDERMAL 24 HOURS TRANSDERMAL
Qty: 30 PATCH | Status: CANCELLED | OUTPATIENT
Start: 2022-05-26

## 2022-05-26 RX ORDER — BUDESONIDE AND FORMOTEROL FUMARATE DIHYDRATE 160; 4.5 UG/1; UG/1
2 AEROSOL RESPIRATORY (INHALATION) 2 TIMES DAILY
Qty: 10.2 G | Refills: 2 | Status: CANCELLED | OUTPATIENT
Start: 2022-05-26

## 2022-05-26 RX ORDER — CETIRIZINE HYDROCHLORIDE 10 MG/1
10 TABLET ORAL DAILY
Qty: 90 TABLET | Refills: 1 | Status: SHIPPED | OUTPATIENT
Start: 2022-05-26 | End: 2022-10-06 | Stop reason: SDUPTHER

## 2022-05-26 RX ORDER — PREDNISONE 10 MG/1
TABLET ORAL
Qty: 45 TABLET | Refills: 0 | Status: SHIPPED | OUTPATIENT
Start: 2022-05-26 | End: 2022-11-02

## 2022-05-26 RX ORDER — IPRATROPIUM BROMIDE AND ALBUTEROL SULFATE 2.5; .5 MG/3ML; MG/3ML
1 SOLUTION RESPIRATORY (INHALATION) EVERY 6 HOURS PRN
Qty: 360 ML | Refills: 1 | Status: CANCELLED | OUTPATIENT
Start: 2022-05-26

## 2022-05-26 RX ORDER — DOXYCYCLINE HYCLATE 100 MG/1
TABLET, DELAYED RELEASE ORAL
COMMUNITY

## 2022-05-26 RX ORDER — FLUTICASONE PROPIONATE 50 MCG
2 SPRAY, SUSPENSION (ML) NASAL DAILY
Qty: 48 G | Refills: 1 | Status: SHIPPED | OUTPATIENT
Start: 2022-05-26 | End: 2022-10-06 | Stop reason: SDUPTHER

## 2022-05-26 RX ORDER — MELOXICAM 15 MG/1
15 TABLET ORAL DAILY
Qty: 90 TABLET | Refills: 1 | Status: SHIPPED | OUTPATIENT
Start: 2022-05-26 | End: 2022-10-06 | Stop reason: SDUPTHER

## 2022-05-26 RX ORDER — ACETAMINOPHEN 500 MG
500 TABLET ORAL EVERY 8 HOURS PRN
Qty: 90 TABLET | Refills: 2 | Status: CANCELLED | OUTPATIENT
Start: 2022-05-26

## 2022-05-26 RX ORDER — ESCITALOPRAM OXALATE 10 MG/1
10 TABLET ORAL DAILY
Qty: 90 TABLET | Refills: 1 | Status: SHIPPED | OUTPATIENT
Start: 2022-05-26 | End: 2022-10-06 | Stop reason: SDUPTHER

## 2022-05-26 RX ORDER — LEVOFLOXACIN 750 MG/1
TABLET ORAL
Status: CANCELLED | OUTPATIENT
Start: 2022-05-26

## 2022-05-26 RX ORDER — NICOTINE 21 MG/24HR
PATCH, TRANSDERMAL 24 HOURS TRANSDERMAL
COMMUNITY
Start: 2022-04-20

## 2022-05-26 RX ORDER — LOVASTATIN 20 MG/1
20 TABLET ORAL NIGHTLY
Qty: 90 TABLET | Refills: 1 | Status: CANCELLED | OUTPATIENT
Start: 2022-05-26

## 2022-05-26 RX ORDER — LOSARTAN POTASSIUM 25 MG/1
25 TABLET ORAL DAILY
Qty: 90 TABLET | Refills: 1 | Status: CANCELLED | OUTPATIENT
Start: 2022-05-26

## 2022-05-26 RX ORDER — LEVOFLOXACIN 750 MG/1
TABLET ORAL
COMMUNITY
Start: 2022-04-20

## 2022-05-26 ASSESSMENT — PATIENT HEALTH QUESTIONNAIRE - PHQ9
SUM OF ALL RESPONSES TO PHQ QUESTIONS 1-9: 0
SUM OF ALL RESPONSES TO PHQ QUESTIONS 1-9: 0
6. FEELING BAD ABOUT YOURSELF - OR THAT YOU ARE A FAILURE OR HAVE LET YOURSELF OR YOUR FAMILY DOWN: 0
2. FEELING DOWN, DEPRESSED OR HOPELESS: 0
7. TROUBLE CONCENTRATING ON THINGS, SUCH AS READING THE NEWSPAPER OR WATCHING TELEVISION: 0
3. TROUBLE FALLING OR STAYING ASLEEP: 0
10. IF YOU CHECKED OFF ANY PROBLEMS, HOW DIFFICULT HAVE THESE PROBLEMS MADE IT FOR YOU TO DO YOUR WORK, TAKE CARE OF THINGS AT HOME, OR GET ALONG WITH OTHER PEOPLE: 0
8. MOVING OR SPEAKING SO SLOWLY THAT OTHER PEOPLE COULD HAVE NOTICED. OR THE OPPOSITE, BEING SO FIGETY OR RESTLESS THAT YOU HAVE BEEN MOVING AROUND A LOT MORE THAN USUAL: 0
SUM OF ALL RESPONSES TO PHQ QUESTIONS 1-9: 0
5. POOR APPETITE OR OVEREATING: 0
4. FEELING TIRED OR HAVING LITTLE ENERGY: 0
1. LITTLE INTEREST OR PLEASURE IN DOING THINGS: 0
SUM OF ALL RESPONSES TO PHQ9 QUESTIONS 1 & 2: 0
SUM OF ALL RESPONSES TO PHQ QUESTIONS 1-9: 0
9. THOUGHTS THAT YOU WOULD BE BETTER OFF DEAD, OR OF HURTING YOURSELF: 0

## 2022-05-26 ASSESSMENT — ENCOUNTER SYMPTOMS
SINUS PAIN: 0
NAUSEA: 0
VOMITING: 0
DIARRHEA: 0
SHORTNESS OF BREATH: 1
SORE THROAT: 0
ABDOMINAL PAIN: 0
CHEST TIGHTNESS: 0
WHEEZING: 1

## 2022-05-26 NOTE — PROGRESS NOTES
Post-Discharge Transitional Care Follow Up      Brian Hutchinson   YOB: 1955    Date of Office Visit:  5/26/2022  Date of Hospital Admission: 5/16/22  Date of Hospital Discharge: 5/18/22  Readmission Risk Score (high >=14%. Medium >=10%):Readmission Risk Score: 22.9 ( )      Care management risk score Rising risk (score 2-5) and Complex Care (Scores >=6): 14     Non face to face  following discharge, date last encounter closed (first attempt may have been earlier): 5/19/2022 12:45 PM     Call initiated 2 business days of discharge: Yes     ASSESSMENT AND PLAN:  Hospital discharge follow-up  -     [place] DISCHARGE MEDS RECONCILED W/ CURRENT OUTPATIENT MED LIST  Chronic obstructive pulmonary disease with acute exacerbation ([facility]  Assessment & Plan:  Patient will complete a new tapered course of prednisone due to noted persistent airway exacerbation. I stressed with him the importance of keeping follow-up visits with pulmonology and taking his medications as prescribed. I urged him to continue making best efforts to remain tobacco free and avoid smoking. He was instructed to return to the emergency room for any acutely worsening dyspnea not improved with management plan or for any new onset fevers (temp >100.4 F) with worsening cough or increased mucus production. Orders:  -     predniSONE (DELTASONE) 10 MG tablet; Take 5 tabs daily x 3 days, 4 tabs daily x 3 days, 3 tabs daily x 3 days, 2 tabs daily x 3 days, 1 tab daily x 3 days, Disp-45 tablet, R-0Normal  Primary hypertension  Assessment & Plan:   Well-controlled, continue current medications, continue current treatment plan, medication adherence emphasized and lifestyle modifications recommended  Seasonal allergies  -     fluticasone (FLONASE) 50 MCG/ACT nasal spray; 2 sprays by Nasal route daily, Disp-48 g, R-1Normal  -     cetirizine (ZYRTEC) 10 MG tablet;  Take 1 tablet by mouth daily, Disp-90 tablet, R-1Normal  Type 2 diabetes mellitus with albuminuria ([address]  Assessment & Plan:  Most recent A1c at goal control. Patient instructed to continue with current medication. Patient advised to watch blood glucose values over time and call the office for persistently elevated values greater than 200 at home, particularly with his intermittent use of courses of oral steroid for COPD exacerbation management. Anxiety with depression  -     escitalopram (LEXAPRO) 10 MG tablet; Take 1 tablet by mouth daily, Disp-90 tablet, R-1Normal  Spinal stenosis of lumbar region, unspecified whether neurogenic claudication present  -     meloxicam (MOBIC) 15 MG tablet; Take 1 tablet by mouth daily, Disp-90 tablet, R-1DX Code Needed  . Normal      Medical Decision Making: moderate complexity  Return for KEEP NEXT SCHEDULED VISIT OCT 2022. Subjective:   HPI    Inpatient course: Discharge summary reviewed- see chart. Interval history/Current status:     Patient presents for hospital follow-up visit. Patient was admitted at [facility] on 05/16/2022 for COPD exacerbation. Patient was treated with IV antibiotics and steroids with improvement. Due to noted elevated blood pressure medications were adjusted for better hypertension management. Patient was found stable for discharge home on 05/18/2022 to follow-up with primary care and pulmonology as an outpatient and finish a course of prednisone and antibiotic. Patient reports completing full course of prednisone and an antibiotic as prescribed since hospital discharge. They report feeling improved overall, but state there is continued dyspnea on exertion and wheezing with activity that is worse than usual baseline. There is no reported fever/chills/sweats, dyspnea at rest, chest pain, palpitations, lightheadedness, dizziness, worsening lower extremity edema, or syncope. Patient reports having a visit scheduled with pulmonology in the next 1 to 2 months.     Patient Active Problem List   Diagnosis    Anemia    Medical non-compliance    House dust mite allergy    Seasonal allergies    GASPER (obstructive sleep apnea)    Drug-seeking behavior    Hyperlipidemia    Alcohol abuse    Cocaine abuse [facility]    Gout    History of arthroscopy of both knees    Hypertension    Supraventricular tachycardia (HCC)    Erectile dysfunction    Class 2 severe obesity with serious comorbidity and body mass index (BMI) of 37.0 to 37.9 in adult [facility])    COPD (chronic obstructive pulmonary disease) (HCC)    Chest wall pain    Insomnia    Gastroesophageal reflux disease    Tobacco use    Status post total knee replacement, left    Allergy to seafood    Anxiety with depression    Umbilical hernia without obstruction and without gangrene    Spinal stenosis of lumbar region    Type 2 diabetes mellitus with albuminuria ([facility]    History of colon polyps       Medications listed as ordered at the time of discharge from hospital     Medication List          Accurate as of May 26, 2022  5:32 PM. If you have any questions, ask your nurse or doctor. START taking these medications    predniSONE 10 MG tablet  Commonly known as: DELTASONE  Take 5 tabs daily x 3 days, 4 tabs daily x 3 days, 3 tabs daily x 3 days, 2 tabs daily x 3 days, 1 tab daily x 3 days  Started by: Luz Iyer MD        CHANGE how you take these medications    cetirizine 10 MG tablet  Commonly known as: ZYRTEC  Take 1 tablet by mouth daily  What changed: See the new instructions.   Changed by: Luz Iyer MD     polyethylene glycol 17 GM/SCOOP powder  Commonly known as: GLYCOLAX  Take 17 g by mouth daily  What changed:   · when to take this  · reasons to take this        CONTINUE taking these medications    acetaminophen 500 MG tablet  Commonly known as: Acetaminophen Extra Strength  Take 1 tablet by mouth every 8 hours as needed for Pain     * albuterol (5 MG/ML) 0.5% nebulizer solution  Commonly known as: PROVENTIL  Take 0.5 mLs by nebulization every 6 hours as needed for Wheezing     * albuterol sulfate  (90 Base) MCG/ACT inhaler  Inhale 2 puffs into the lungs every 6 hours as needed for Wheezing or Shortness of Breath     * albuterol sulfate  (90 Base) MCG/ACT inhaler  Commonly known as: Ventolin HFA  Inhale 2 puffs into the lungs every 4 hours as needed for Wheezing     amLODIPine 10 MG tablet  Commonly known as: NORVASC  Take 1 tablet by mouth daily     blood glucose test strips  Test three times a day & as needed for symptoms of irregular blood glucose. Dispense sufficient amount for indicated testing frequency plus additional to accommodate PRN testing needs.      Doxycycline Hyclate 100 MG Tbec     escitalopram 10 MG tablet  Commonly known as: LEXAPRO  Take 1 tablet by mouth daily     fluticasone 50 MCG/ACT nasal spray  Commonly known as: FLONASE  2 sprays by Nasal route daily     ipratropium-albuterol 0.5-2.5 (3) MG/3ML Soln nebulizer solution  Commonly known as: DUONEB  Take 3 mLs by nebulization every 6 hours as needed for Shortness of Breath     Lancets Misc  1 each by Does not apply route 3 times daily     levoFLOXacin 750 MG tablet  Commonly known as: LEVAQUIN     losartan 25 MG tablet  Commonly known as: COZAAR  Take 1 tablet by mouth daily     lovastatin 20 MG tablet  Commonly known as: MEVACOR  Take 1 tablet by mouth nightly     meloxicam 15 MG tablet  Commonly known as: MOBIC  Take 1 tablet by mouth daily     metFORMIN 500 MG tablet  Commonly known as: GLUCOPHAGE  Take 1 tablet by mouth 2 times daily (with meals)     montelukast 10 MG tablet  Commonly known as: SINGULAIR  Take 1 tablet by mouth nightly     nicotine 14 MG/24HR  Commonly known as: NICODERM CQ     omeprazole 20 MG delayed release capsule  Commonly known as: PRILOSEC     ONE TOUCH ULTRA 2 w/Device Kit     pantoprazole 40 MG tablet  Commonly known as: PROTONIX  Take 1 tablet by mouth daily     sildenafil 100 MG tablet  Commonly known as: VIAGRA  Take 1 tablet by mouth as needed for Erectile Dysfunction     sodium chloride 0.65 % nasal spray  Commonly known as: KHADRA, BABY AYR  2 sprays by Nasal route three times daily     * Symbicort 160-4.5 MCG/ACT Aero  Generic drug: budesonide-formoterol     * Symbicort 160-4.5 MCG/ACT Aero  Generic drug: budesonide-formoterol  Inhale 2 puffs into the lungs 2 times daily     traZODone 50 MG tablet  Commonly known as: DESYREL  Take 1 tablet by mouth nightly         * This list has 5 medication(s) that are the same as other medications prescribed for you. Read the directions carefully, and ask your doctor or other care provider to review them with you.                Where to Get Your Medications      These medications were sent to [facility]/pharmacy #[id number]- [address] [facility] [phone]  [address] [address]    Phone: [phone]   · cetirizine 10 MG tablet  · escitalopram 10 MG tablet  · fluticasone 50 MCG/ACT nasal spray  · meloxicam 15 MG tablet  · predniSONE 10 MG tablet          Medications marked \"taking\" at this time  Outpatient Medications Marked as Taking for the 5/26/22 encounter (Office Visit) with Darshana Kennedy MD   Medication Sig Dispense Refill    Doxycycline Hyclate 100 MG TBEC Take by mouth      levoFLOXacin (LEVAQUIN) 750 MG tablet TAKE 1 TABLET BY MOUTH EVERY DAY      nicotine (NICODERM CQ) 14 MG/24HR 1 PATCH TRANSDERMAL EVERY 24 HOURS      escitalopram (LEXAPRO) 10 MG tablet Take 1 tablet by mouth daily 90 tablet 1    fluticasone (FLONASE) 50 MCG/ACT nasal spray 2 sprays by Nasal route daily 48 g 1    meloxicam (MOBIC) 15 MG tablet Take 1 tablet by mouth daily 90 tablet 1    cetirizine (ZYRTEC) 10 MG tablet Take 1 tablet by mouth daily 90 tablet 1    predniSONE (DELTASONE) 10 MG tablet Take 5 tabs daily x 3 days, 4 tabs daily x 3 days, 3 tabs daily x 3 days, 2 tabs daily x 3 days, 1 tab daily x 3 days 45 tablet 0    losartan (COZAAR) 25 MG tablet Take 1 tablet by mouth daily 90 tablet 1    budesonide-formoterol (SYMBICORT) 160-4.5 MCG/ACT AERO Inhale 2 puffs into the lungs 2 times daily       pantoprazole (PROTONIX) 40 MG tablet Take 1 tablet by mouth daily 90 tablet 1    SYMBICORT 160-4.5 MCG/ACT AERO Inhale 2 puffs into the lungs 2 times daily 10.2 g 2    albuterol sulfate HFA (VENTOLIN HFA) 108 (90 Base) MCG/ACT inhaler Inhale 2 puffs into the lungs every 4 hours as needed for Wheezing 18 g 1    ipratropium-albuterol (DUONEB) 0.5-2.5 (3) MG/3ML SOLN nebulizer solution Take 3 mLs by nebulization every 6 hours as needed for Shortness of Breath 360 mL 1    traZODone (DESYREL) 50 MG tablet Take 1 tablet by mouth nightly 90 tablet 1    montelukast (SINGULAIR) 10 MG tablet Take 1 tablet by mouth nightly 90 tablet 1    sildenafil (VIAGRA) 100 MG tablet Take 1 tablet by mouth as needed for Erectile Dysfunction 10 tablet 0    lovastatin (MEVACOR) 20 MG tablet Take 1 tablet by mouth nightly 90 tablet 1    metFORMIN (GLUCOPHAGE) 500 MG tablet Take 1 tablet by mouth 2 times daily (with meals) 180 tablet 1    blood glucose monitor strips Test three times a day & as needed for symptoms of irregular blood glucose. Dispense sufficient amount for indicated testing frequency plus additional to accommodate PRN testing needs.  100 strip 1    acetaminophen (ACETAMINOPHEN EXTRA STRENGTH) 500 MG tablet Take 1 tablet by mouth every 8 hours as needed for Pain 90 tablet 2    Blood Glucose Monitoring Suppl (ONE TOUCH ULTRA 2) w/Device KIT       amLODIPine (NORVASC) 10 MG tablet Take 1 tablet by mouth daily 90 tablet 1    Lancets MISC 1 each by Does not apply route 3 times daily 200 each 5    sodium chloride (OCEAN, BABY AYR) 0.65 % nasal spray 2 sprays by Nasal route three times daily 1 each 0    polyethylene glycol (GLYCOLAX) 17 GM/SCOOP powder Take 17 g by mouth daily (Patient taking differently: Take 17 g by mouth daily as needed ) 510 g 1        Medications patient taking as of now reconciled against medications ordered at time of hospital discharge: Yes    Review of Systems   Constitutional: Negative for appetite change, chills, diaphoresis, fatigue and fever. HENT: Negative for congestion, ear pain, sinus pain and sore throat. Respiratory: Positive for shortness of breath and wheezing. Negative for chest tightness. Cardiovascular: Negative for chest pain, palpitations and leg swelling. Gastrointestinal: Negative for abdominal pain, diarrhea, nausea and vomiting. Endocrine: Negative for cold intolerance, heat intolerance, polydipsia, polyphagia and polyuria. Neurological: Negative for dizziness, syncope and light-headedness. Objective:    BP (!) 124/58 (Site: Left Upper Arm, Position: Sitting, Cuff Size: Large Adult)   Pulse 84   Temp 96.8 °F (36 °C) (Temporal)   Ht 6' (1.829 m)   Wt 260 lb (117.9 kg)   SpO2 96%   BMI 35.26 kg/m²   Physical Exam  Vitals reviewed. Constitutional:       General: He is not in acute distress. Appearance: Normal appearance. Cardiovascular:      Rate and Rhythm: Normal rate and regular rhythm. Heart sounds: No murmur heard. Pulmonary:      Effort: Pulmonary effort is normal. No tachypnea, accessory muscle usage or respiratory distress. Breath sounds: Decreased air movement present. Decreased breath sounds and wheezing (throughout all lung fields) present. No rhonchi or rales. Abdominal:      General: Bowel sounds are normal.      Palpations: Abdomen is soft. Tenderness: There is no abdominal tenderness. There is no guarding or rebound. Musculoskeletal:      Right lower leg: No edema. Left lower leg: No edema. Skin:     Findings: No rash. Neurological:      Mental Status: He is alert and oriented to person, place, and time. Psychiatric:         Mood and Affect: Mood normal.         Behavior: Behavior normal.         Thought Content:  Thought content normal.         An electronic signature was used to authenticate this note.   --Etienne Khan MD

## 2022-05-26 NOTE — ASSESSMENT & PLAN NOTE
Patient will complete a new tapered course of prednisone due to noted persistent airway exacerbation. I stressed with him the importance of keeping follow-up visits with pulmonology and taking his medications as prescribed. I urged him to continue making best efforts to remain tobacco free and avoid smoking. He was instructed to return to the emergency room for any acutely worsening dyspnea not improved with management plan or for any new onset fevers (temp >100.4 F) with worsening cough or increased mucus production.

## 2022-05-26 NOTE — CARE COORDINATION
[facility] Follow [facility] Call    2022    Patient: Mookie Teague  Patient : 1955   MRN: <[id number]>  Reason for Admission: COPD exacerbation  Discharge Date: 22 RARS: Readmission Risk Score: 22.9 ( )         Spoke with pt for a sub care transition call. Pt admitted to [facility] on 22 with dx COPD exacerbation. Pt states that he feels that he has been doing \"good. \" Pt does c/o ongoing anterior/lateral chest and back \"soreness. \" Pt states that sometimes it is difficult to get comfortable in bed with the discomfort. He notices that movement worsens the discomfort. Pt denies any worrisome s/sx of worsening in his condition of COPD. Pt denies any wheezing, cough, sob, chest tightness, or chest congestion. Pt has nebulizer treatments at home that he states that he uses at least 4-5 times a day. Pt also has a rescue inhaler if needed. Pt denies any increase in use in his nebulizer or his rescue inhaler. Pt has Symbicort maintenance inhaler that he reports he is taking as prescribed. Pt does have O2 at home. Wear 3L at night and prn during the day. Has not had to increase liter flow at night and has not needed to wear O2 during the day. CTN reminded pt of his appt today with his pcp at 4pm. Pt is aware and intends on going. Pt states that he intends on taking the bus to get to his appointment. CTN inquired if pt has received the COPD zones yet and he states that he has not. CTN reviewed the COPD zones with the pt on the phone today. Processed worrisome s/sx of COPD that would prompt him to notify his pcp about. He voiced understanding. CTN reminded pt to take his glucometer and BP cuff to his OV today for education on use. Pt states that he was not going to bring these to the OV, as he was not going to carry them on the bus. He states, \"I have the instructions here at home. All I have to do is take the time to read them. \"     CTN noted that an [address] reached out to the pt's pcp's office regarding DME for a shower chair with back and hospital bed. CTN inquired about this. Pt states that he is still interested in obtaining equipment. CTN encouraged pt to discuss with his pcp today regarding this equipment. He states that he intends on doing so. Noted that pcp mentioned that COPD would not be an appropriate dx for need for a hospital bed and that he may need to fit certain criteria for obtaining a hospital bed. Noted that office staff attempted to reach the nurse from [facility] but the number she had provided was not inservice now. CTN inquired if he knew the number for the nurse through [facility]. He states that right off hand he does not have her number, but when he gets to it, he states that he can update either this CTN or the office staff on that. Pt voiced no other needs/concerns at this time. Pt was agreeable to ongoing outreaches from this CTN. Care Transitions Follow Up Call    Needs to be reviewed by the provider   Additional needs identified to be addressed with provider: No  none             Method of communication with provider : none      Care Transition Nurse contacted the patient by telephone to follow up after admission on 5/16/22. Addressed changes since last contact: none    CTN reviewed medical action plan and red flags with patient and discussed any barriers to care and/or understanding of plan of care after discharge. Discussed appropriate site of care based on symptoms and resources available to patient including: PCP  When to call 911. The patient agrees to contact the PCP office for questions related to their healthcare.      Patients top risk factors for readmission: lack of knowledge about disease  level of motivation  medical condition-DM, COPD, hld, htn, CKD, obesity  medication management  transportation  utilization of services  Interventions to address risk factors: Scheduled appointment with PCP-keep scheduled f/u with pcp today and Pt to work with RN from insurance and pcp office to obtain needed DME    CTN provided contact information for future needs. Plan for follow-up call in 7-10 days based on severity of symptoms and risk factors. Plan for next call: symptom management-any worrisome s/sx of worsening in COPD? Has pt received COPD zones? Did pt discuss with pcp regarding needed DME? (hospital bed and shower chair)      Care Transitions Subsequent and Final Call    Subsequent and Final Calls  Care Transitions Interventions  Other Interventions:            Follow Up  Future Appointments   Date Time Provider Constance Espitia   5/26/2022  4:00 PM MD Rom[address]   10/6/2022  3:00 PM Ning Santillan MD [id number]

## 2022-05-26 NOTE — ASSESSMENT & PLAN NOTE
Most recent A1c at goal control. Patient instructed to continue with current medication. Patient advised to watch blood glucose values over time and call the office for persistently elevated values greater than 200 at home, particularly with his intermittent use of courses of oral steroid for COPD exacerbation management.

## 2022-06-01 ENCOUNTER — CARE COORDINATION (OUTPATIENT)
Dept: CASE MANAGEMENT | Age: 67
End: 2022-06-01

## 2022-06-01 NOTE — CARE COORDINATION
[facility] 45 Transitions Follow Up Call    2022    Patient: Celeste Garcia  Patient : 1955   MRN: <[id number]>  Reason for Admission: COPD exacerbation  Discharge Date: 22 RARS: Readmission Risk Score: 22.9 ( )         Spoke with pt for a sub care transition call. Pt states that he is doing well today. Pt completed a f/u with his pcp last week and was started on a Prednisone taper d/t persistent airway exacerbation. Confirmed that the pt started this and is tolerating well. Pt states that he does notice improvement with the initiation of the taper. Pt states that he is less sob and does not have any wheezing. Pt continues to have back and anterior chest wall \"soreness\" stating that it is from all the coughing he was doing. Pt states that he has been using a heating pad at night which has helped considerably. Pt continues to use his nebulizer at baseline about every 5-6 hrs. Pt has not needed to use his O2 during the day. Pt uses at night at 3L. Pt states that he is compliant with his Symbicort maintenance inhaler. CTN recommended f/u with his pulmonologist sooner then already scheduled appt in 1-2 months especially if condition doesn't improve once steroid taper is complete. Pt voiced understanding, but states that he does not like going to that office because he knows he will be there for at least 2 hrs or more before he can get out of there. Pt states that he just wants to keep the current appt that he has scheduled. Pt was advised by his pcp to monitor his BS trends closely as he was on the Prednisone. Processed this with the pt and he voiced understanding and states that he knows how to use the glucometer he has at home, but stated, \"I just haven't gotten around to it. \" CTN strongly encouraged pt to monitor his BS trends as advised by his pcp d/t the Prednisone causing elevated BS trends. Spoke to pt about the nurse julieta George with DME (hospital bed and shower chair).  Pt adherence and medication management-Finish Prednisone taper as ordered      CTN provided contact information for future needs. Plan for follow-up call in 7-10 days based on severity of symptoms and risk factors. Plan for next call: symptom management-Any worsening in COPD  Has pt received COPD zones? Care Transitions Subsequent and Final Call    Subsequent and Final Calls  Do you have any ongoing symptoms?: Yes  Onset of Patient-reported symptoms: In the past 7 days  Patient-reported symptoms: Shortness of Breath  Interventions for patient-reported symptoms: Notified PCP/Physician  Have your medications changed?: Yes  Patient Reports: Prednisone taper per pcp on 5/26/22  Do you have any questions related to your medications?: No  Do you currently have any active services?: No  Are you currently active with any services?: Other  Do you have any needs or concerns that I can assist you with?: No  Identified Barriers: Lack of Education, Lack of Motivation  Care Transitions Interventions    Pharmacist: Completed       Other Services: Completed (Comment: 12/30/21 Current w/ JOVANNI Luciano RN.)     Social Work: Completed    Other Interventions:            Follow Up  Future Appointments   Date Time Provider Constance Espitia   10/6/2022  3:00 PM Lieutenant Miguel Angel MD [address] [facility]

## 2022-06-09 ENCOUNTER — CARE COORDINATION (OUTPATIENT)
Dept: CASE MANAGEMENT | Age: 67
End: 2022-06-09

## 2022-06-09 NOTE — CARE COORDINATION
[facility] 45 Transitions Follow Up Call    2022    Patient: Carole Mead  Patient : 1955   MRN: <[id number]>  Reason for Admission: COPD exacerbation  Discharge Date: 22 RARS: Readmission Risk Score: 22.9 ( )       Attempted to reach the patient for subsequent Care Transition call. Message left with CTN's contact information requesting return phone call. Will attempt outreach again.       Follow Up  Future Appointments   Date Time Provider Constance Espitia   10/6/2022  3:00 PM Fabian Thomason MD [address] [facility]

## 2022-06-17 ENCOUNTER — CARE COORDINATION (OUTPATIENT)
Dept: CASE MANAGEMENT | Age: 67
End: 2022-06-17

## 2022-06-17 NOTE — CARE COORDINATION
[facility] Transitions Follow Up Call    2022    Patient: Laure Baldwin  Patient : 1955   MRN: <[id number]>  Reason for Admission: COPD exacerbation  Discharge Date: 22 RARS: Readmission Risk Score: 22.9 ( )         2nd attempt to reach the patient for subsequent Care Transition call. Message left with CTN's contact information requesting return phone call. No further outreaches will be attempted. If no return call by the end of the day, CTN will sign off and resolve CT episode.       Follow Up  Future Appointments   Date Time Provider Constance Espitia   10/6/2022  3:00 PM Palma Coker MD [address] [facility], [address] [place] [address]

## 2022-06-17 NOTE — CARE COORDINATION
[facility] 45 Transitions Follow Up Call    2022    Patient: Dawit Friedman  Patient : 1955   MRN: <[id number]>  Reason for Admission: COPD exacerbation  Discharge Date: 22 RARS: Readmission Risk Score: 22.9 ( )         Spoke with the pt for a sub care transition call. Pt states that he is doing much better this week. Pt denies any sob, wheezing, chest congestion, chest tightness, fever, or chills. Pt states that he finished his Prednisone taper about 2 days ago and feels \"good. \" Pt denies needing to use his O2 during the day. Still uses O2 @3L at HS. Pt uses his nebulizer about every 5hrs and denies that he has had to increase usage of this. Pt does have a rescue inhaler at home if needed. Pt states he only uses when he is out and about. Pt states that with the increased heat and humidity this week, he has had to use the rescue inhaler a few times. CTN inquired if the pt had air conditioning in his apartment and he stated that he doesn't. Pt asking for any resources available to help with obtaining an air conditioner. CTN provided pt with the [facility] phone number for [facility] @ [phone]. Advised pt to call soon, as they are limited on the amount they have. Program opened today as first day for applications. Pt voiced appreciation and states he will f/u. CTN discussed the care coordination program and offered enrollment d/t high utilization and COPD dx. Explained program in detail and pt declined enrollment at this time. Pt was encouraged to contact his pcp if future needs arise for the care coordination program. Pt voiced understanding. Pt agreeable with today being the final outreach, as pt has no other identified or voiced needs/concerns. CTN to sign off today.     Care Transitions Follow Up Call    Needs to be reviewed by the provider   Additional needs identified to be addressed with provider: No  none             Method of communication with provider : none      Care Transition Nurse contacted the patient by telephone to follow up after admission on 5/16/22. Addressed changes since last contact: none    CTN reviewed discharge instructions, medical action plan and red flags with patient and discussed any barriers to care and/or understanding of plan of care after discharge. Discussed appropriate site of care based on symptoms and resources available to patient including: PCP  Specialist  When to call 911. The patient agrees to contact the PCP office for questions related to their healthcare. Patients top risk factors for readmission: lack of knowledge about disease  level of motivation  medical condition-COPD, DM, htn, GASPER, CKD, hld  utilization of services  Interventions to address risk factors: Scheduled appointment with PCP-Schedule f/u appts as advised, Scheduled appointment with Specialist-Keep schedule f/u with pulmonary and Assistance in accessing community resources-Pt provide with contact information for the [facility] for assistance with an air conditioner    CTN provided contact information for future needs. No further follow-up call indicated based on severity of symptoms and risk factors. Care Transitions Subsequent and Final Call    Subsequent and Final Calls  Do you have any ongoing symptoms?: No  Have your medications changed?: No  Do you have any questions related to your medications?: No  Do you currently have any active services?: No  Are you currently active with any services?: Other  Do you have any needs or concerns that I can assist you with?: No  Identified Barriers: Lack of Motivation, Lack of Education, Financial, Lack of Support  Care Transitions Interventions    Pharmacist: Completed       Other Services: Completed (Comment: 12/30/21 Current w/ JOVANNI Luciano RN.)     Social Work: Completed    Other Interventions:            Follow Up  Future Appointments   Date Time Provider Constance Espitia 10/6/2022  3:00 PM Concha Roque MD [address]

## 2022-06-29 DIAGNOSIS — I10 ESSENTIAL HYPERTENSION: Chronic | ICD-10-CM

## 2022-06-29 RX ORDER — AMLODIPINE BESYLATE 10 MG/1
10 TABLET ORAL DAILY
Qty: 90 TABLET | Refills: 1 | Status: SHIPPED | OUTPATIENT
Start: 2022-06-29 | End: 2022-10-06 | Stop reason: SDUPTHER

## 2022-06-29 NOTE — TELEPHONE ENCOUNTER
Pharmacy is requesting medication refill.  Please approve or deny this request.    Rx requested:  Requested Prescriptions     Pending Prescriptions Disp Refills    amLODIPine (NORVASC) 10 MG tablet [Pharmacy Med Name: AMLODIPINE BESYLATE 10 MG TAB] 90 tablet 1     Sig: Take 1 tablet by mouth daily         Last Office Visit:   5/26/2022      Next Visit Date:  Future Appointments   Date Time Provider Constance Espitia   10/6/2022  3:00 PM [facility]

## 2022-07-06 DIAGNOSIS — N52.9 ERECTILE DYSFUNCTION, UNSPECIFIED ERECTILE DYSFUNCTION TYPE: Chronic | ICD-10-CM

## 2022-07-06 DIAGNOSIS — Z96.652 STATUS POST TOTAL KNEE REPLACEMENT, LEFT: ICD-10-CM

## 2022-07-06 NOTE — TELEPHONE ENCOUNTER
Comments:     Last Office Visit (last PCP visit):   5/26/2022    Next Visit Date:  Future Appointments   Date Time Provider Constance Espitia   10/6/2022  3:00 PM MD Romero [address] [place] [address]       **If hasn't been seen in over a year OR hasn't followed up according to last diabetes/ADHD visit, make appointment for patient before sending refill to provider.     Rx requested:  Requested Prescriptions     Pending Prescriptions Disp Refills    ACETAMINOPHEN EXTRA STRENGTH 500 MG tablet [Pharmacy Med Name: ACETAMINOPHEN 500 MG TABLET] 90 tablet 2     Sig: TAKE 1 TABLET BY MOUTH EVERY 8 HOURS AS NEEDED FOR PAIN

## 2022-07-07 RX ORDER — PSEUDOEPHED/ACETAMINOPH/DIPHEN 30MG-500MG
TABLET ORAL
Qty: 90 TABLET | Refills: 2 | Status: SHIPPED | OUTPATIENT
Start: 2022-07-07 | End: 2022-10-06 | Stop reason: SDUPTHER

## 2022-07-07 RX ORDER — SILDENAFIL 100 MG/1
100 TABLET, FILM COATED ORAL PRN
Qty: 6 TABLET | Refills: 1 | Status: SHIPPED | OUTPATIENT
Start: 2022-07-07 | End: 2022-08-03 | Stop reason: SDUPTHER

## 2022-07-08 RX ORDER — ALBUTEROL SULFATE 90 UG/1
2 AEROSOL, METERED RESPIRATORY (INHALATION) EVERY 4 HOURS PRN
Qty: 18 G | Refills: 1 | Status: SHIPPED | OUTPATIENT
Start: 2022-07-08 | End: 2022-08-29

## 2022-07-08 RX ORDER — BUDESONIDE AND FORMOTEROL FUMARATE DIHYDRATE 160; 4.5 UG/1; UG/1
2 AEROSOL RESPIRATORY (INHALATION) 2 TIMES DAILY
Qty: 30.6 G | Refills: 1 | Status: SHIPPED | OUTPATIENT
Start: 2022-07-08 | End: 2022-10-06 | Stop reason: SDUPTHER

## 2022-08-03 DIAGNOSIS — J44.9 CHRONIC OBSTRUCTIVE PULMONARY DISEASE, UNSPECIFIED COPD TYPE (HCC): Chronic | ICD-10-CM

## 2022-08-03 DIAGNOSIS — N52.9 ERECTILE DYSFUNCTION, UNSPECIFIED ERECTILE DYSFUNCTION TYPE: Chronic | ICD-10-CM

## 2022-08-03 RX ORDER — SILDENAFIL 100 MG/1
100 TABLET, FILM COATED ORAL PRN
Qty: 6 TABLET | Refills: 1 | Status: SHIPPED | OUTPATIENT
Start: 2022-08-03

## 2022-08-03 RX ORDER — IPRATROPIUM BROMIDE AND ALBUTEROL SULFATE 2.5; .5 MG/3ML; MG/3ML
1 SOLUTION RESPIRATORY (INHALATION) EVERY 4 HOURS PRN
Qty: 360 ML | Refills: 1 | Status: SHIPPED | OUTPATIENT
Start: 2022-08-03 | End: 2022-10-06 | Stop reason: SDUPTHER

## 2022-08-03 RX ORDER — SILDENAFIL 100 MG/1
100 TABLET, FILM COATED ORAL PRN
Qty: 6 TABLET | Refills: 1 | Status: CANCELLED | OUTPATIENT
Start: 2022-08-03

## 2022-08-03 NOTE — TELEPHONE ENCOUNTER
----- Message from Hyun Galindo sent at 8/2/2022 11:42 AM EDT -----  Subject: Message to Provider    QUESTIONS  Information for Provider? Patient needs Albuterol solution. Pharmacy said   that patient needs to use it 6 times a day and it needs to be written on   the script, because patient is using it more because of the weather. Please call back and advise. Please send to the [facility] pharmacy on [address] [facility] in [place].   ---------------------------------------------------------------------------  --------------  [phone] [employer]  [phone]; Do not leave any message, patient will call back for answer  ---------------------------------------------------------------------------  --------------  SCRIPT ANSWERS  Relationship to Patient?  Self

## 2022-08-29 RX ORDER — ALBUTEROL SULFATE 90 UG/1
2 AEROSOL, METERED RESPIRATORY (INHALATION) EVERY 4 HOURS PRN
Qty: 18 EACH | Refills: 1 | Status: SHIPPED | OUTPATIENT
Start: 2022-08-29 | End: 2022-10-06 | Stop reason: SDUPTHER

## 2022-10-03 ENCOUNTER — CARE COORDINATION (OUTPATIENT)
Dept: CARE COORDINATION | Age: 67
End: 2022-10-03

## 2022-10-03 NOTE — CARE COORDINATION
Attempted to contact patient for care coordination discussion. Unable to reach patient by phone. Unable to leave a message as his voice mailbox is not set up  I will send a message to Dr Bridget Chacon to discuss care coordination with Randy Gresham at his next office visit this Thursday 10/6/2022  If I do not hear back from Randy Gresham  I will follow up with him in one week.

## 2022-10-06 ENCOUNTER — OFFICE VISIT (OUTPATIENT)
Dept: FAMILY MEDICINE CLINIC | Age: 67
End: 2022-10-06
Payer: COMMERCIAL

## 2022-10-06 VITALS
DIASTOLIC BLOOD PRESSURE: 70 MMHG | HEART RATE: 74 BPM | HEIGHT: 72 IN | OXYGEN SATURATION: 95 % | BODY MASS INDEX: 34.62 KG/M2 | TEMPERATURE: 97.3 F | WEIGHT: 255.6 LBS | SYSTOLIC BLOOD PRESSURE: 120 MMHG

## 2022-10-06 DIAGNOSIS — E11.21 TYPE 2 DIABETES MELLITUS WITH DIABETIC NEPHROPATHY, WITHOUT LONG-TERM CURRENT USE OF INSULIN (HCC): Primary | ICD-10-CM

## 2022-10-06 DIAGNOSIS — Z96.652 STATUS POST TOTAL KNEE REPLACEMENT, LEFT: ICD-10-CM

## 2022-10-06 DIAGNOSIS — F41.8 ANXIETY WITH DEPRESSION: ICD-10-CM

## 2022-10-06 DIAGNOSIS — M48.061 SPINAL STENOSIS OF LUMBAR REGION, UNSPECIFIED WHETHER NEUROGENIC CLAUDICATION PRESENT: ICD-10-CM

## 2022-10-06 DIAGNOSIS — Z23 NEED FOR VACCINATION: ICD-10-CM

## 2022-10-06 DIAGNOSIS — E78.5 HYPERLIPIDEMIA, UNSPECIFIED HYPERLIPIDEMIA TYPE: Chronic | ICD-10-CM

## 2022-10-06 DIAGNOSIS — I47.1 SUPRAVENTRICULAR TACHYCARDIA (HCC): Chronic | ICD-10-CM

## 2022-10-06 DIAGNOSIS — K21.9 GASTROESOPHAGEAL REFLUX DISEASE, UNSPECIFIED WHETHER ESOPHAGITIS PRESENT: ICD-10-CM

## 2022-10-06 DIAGNOSIS — I10 PRIMARY HYPERTENSION: Chronic | ICD-10-CM

## 2022-10-06 DIAGNOSIS — G47.00 INSOMNIA, UNSPECIFIED TYPE: ICD-10-CM

## 2022-10-06 DIAGNOSIS — J44.9 CHRONIC OBSTRUCTIVE PULMONARY DISEASE, UNSPECIFIED COPD TYPE (HCC): Chronic | ICD-10-CM

## 2022-10-06 DIAGNOSIS — J30.2 SEASONAL ALLERGIES: Chronic | ICD-10-CM

## 2022-10-06 LAB — HBA1C MFR BLD: 5.1 %

## 2022-10-06 PROCEDURE — 3044F HG A1C LEVEL LT 7.0%: CPT | Performed by: FAMILY MEDICINE

## 2022-10-06 PROCEDURE — 3023F SPIROM DOC REV: CPT | Performed by: FAMILY MEDICINE

## 2022-10-06 PROCEDURE — 90471 IMMUNIZATION ADMIN: CPT | Performed by: FAMILY MEDICINE

## 2022-10-06 PROCEDURE — 83036 HEMOGLOBIN GLYCOSYLATED A1C: CPT | Performed by: FAMILY MEDICINE

## 2022-10-06 PROCEDURE — G8427 DOCREV CUR MEDS BY ELIG CLIN: HCPCS | Performed by: FAMILY MEDICINE

## 2022-10-06 PROCEDURE — 4004F PT TOBACCO SCREEN RCVD TLK: CPT | Performed by: FAMILY MEDICINE

## 2022-10-06 PROCEDURE — 90674 CCIIV4 VAC NO PRSV 0.5 ML IM: CPT | Performed by: FAMILY MEDICINE

## 2022-10-06 PROCEDURE — 2022F DILAT RTA XM EVC RTNOPTHY: CPT | Performed by: FAMILY MEDICINE

## 2022-10-06 PROCEDURE — G8482 FLU IMMUNIZE ORDER/ADMIN: HCPCS | Performed by: FAMILY MEDICINE

## 2022-10-06 PROCEDURE — G8417 CALC BMI ABV UP PARAM F/U: HCPCS | Performed by: FAMILY MEDICINE

## 2022-10-06 PROCEDURE — 3017F COLORECTAL CA SCREEN DOC REV: CPT | Performed by: FAMILY MEDICINE

## 2022-10-06 PROCEDURE — 99214 OFFICE O/P EST MOD 30 MIN: CPT | Performed by: FAMILY MEDICINE

## 2022-10-06 RX ORDER — ALBUTEROL SULFATE 90 UG/1
2 AEROSOL, METERED RESPIRATORY (INHALATION) EVERY 4 HOURS PRN
Qty: 18 EACH | Refills: 1 | Status: SHIPPED | OUTPATIENT
Start: 2022-10-06

## 2022-10-06 RX ORDER — BUDESONIDE AND FORMOTEROL FUMARATE DIHYDRATE 160; 4.5 UG/1; UG/1
2 AEROSOL RESPIRATORY (INHALATION) 2 TIMES DAILY
Qty: 30.6 G | Refills: 1 | Status: SHIPPED | OUTPATIENT
Start: 2022-10-06

## 2022-10-06 RX ORDER — LOVASTATIN 20 MG/1
20 TABLET ORAL NIGHTLY
Qty: 90 TABLET | Refills: 1 | Status: SHIPPED | OUTPATIENT
Start: 2022-10-06

## 2022-10-06 RX ORDER — PREDNISONE 10 MG/1
TABLET ORAL
Qty: 45 TABLET | Refills: 0 | Status: CANCELLED | OUTPATIENT
Start: 2022-10-06

## 2022-10-06 RX ORDER — IPRATROPIUM BROMIDE AND ALBUTEROL SULFATE 2.5; .5 MG/3ML; MG/3ML
1 SOLUTION RESPIRATORY (INHALATION) EVERY 4 HOURS PRN
Qty: 360 ML | Refills: 1 | Status: SHIPPED | OUTPATIENT
Start: 2022-10-06

## 2022-10-06 RX ORDER — CETIRIZINE HYDROCHLORIDE 10 MG/1
10 TABLET ORAL DAILY
Qty: 90 TABLET | Refills: 1 | Status: SHIPPED | OUTPATIENT
Start: 2022-10-06

## 2022-10-06 RX ORDER — ACETAMINOPHEN 500 MG
1000 TABLET ORAL EVERY 8 HOURS PRN
Qty: 90 TABLET | Refills: 1 | Status: SHIPPED | OUTPATIENT
Start: 2022-10-06

## 2022-10-06 RX ORDER — FLUTICASONE PROPIONATE 50 MCG
2 SPRAY, SUSPENSION (ML) NASAL DAILY
Qty: 48 G | Refills: 1 | Status: SHIPPED | OUTPATIENT
Start: 2022-10-06

## 2022-10-06 RX ORDER — MELOXICAM 15 MG/1
15 TABLET ORAL DAILY
Qty: 90 TABLET | Refills: 1 | Status: SHIPPED | OUTPATIENT
Start: 2022-10-06

## 2022-10-06 RX ORDER — AMLODIPINE BESYLATE 10 MG/1
10 TABLET ORAL DAILY
Qty: 90 TABLET | Refills: 1 | Status: SHIPPED | OUTPATIENT
Start: 2022-10-06

## 2022-10-06 RX ORDER — TRAZODONE HYDROCHLORIDE 50 MG/1
50 TABLET ORAL NIGHTLY
Qty: 90 TABLET | Refills: 1 | Status: SHIPPED | OUTPATIENT
Start: 2022-10-06

## 2022-10-06 RX ORDER — ESCITALOPRAM OXALATE 10 MG/1
10 TABLET ORAL DAILY
Qty: 90 TABLET | Refills: 1 | Status: SHIPPED | OUTPATIENT
Start: 2022-10-06

## 2022-10-06 RX ORDER — LOSARTAN POTASSIUM 25 MG/1
25 TABLET ORAL DAILY
Qty: 90 TABLET | Refills: 1 | Status: SHIPPED | OUTPATIENT
Start: 2022-10-06

## 2022-10-06 RX ORDER — SILDENAFIL 100 MG/1
100 TABLET, FILM COATED ORAL PRN
Qty: 6 TABLET | Refills: 1 | Status: CANCELLED | OUTPATIENT
Start: 2022-10-06

## 2022-10-06 RX ORDER — PANTOPRAZOLE SODIUM 40 MG/1
40 TABLET, DELAYED RELEASE ORAL DAILY
Qty: 90 TABLET | Refills: 1 | Status: SHIPPED | OUTPATIENT
Start: 2022-10-06

## 2022-10-06 RX ORDER — MONTELUKAST SODIUM 10 MG/1
10 TABLET ORAL NIGHTLY
Qty: 90 TABLET | Refills: 1 | Status: SHIPPED | OUTPATIENT
Start: 2022-10-06

## 2022-10-06 ASSESSMENT — PATIENT HEALTH QUESTIONNAIRE - PHQ9
8. MOVING OR SPEAKING SO SLOWLY THAT OTHER PEOPLE COULD HAVE NOTICED. OR THE OPPOSITE, BEING SO FIGETY OR RESTLESS THAT YOU HAVE BEEN MOVING AROUND A LOT MORE THAN USUAL: 0
9. THOUGHTS THAT YOU WOULD BE BETTER OFF DEAD, OR OF HURTING YOURSELF: 0
SUM OF ALL RESPONSES TO PHQ QUESTIONS 1-9: 0
10. IF YOU CHECKED OFF ANY PROBLEMS, HOW DIFFICULT HAVE THESE PROBLEMS MADE IT FOR YOU TO DO YOUR WORK, TAKE CARE OF THINGS AT HOME, OR GET ALONG WITH OTHER PEOPLE: 0
7. TROUBLE CONCENTRATING ON THINGS, SUCH AS READING THE NEWSPAPER OR WATCHING TELEVISION: 0
1. LITTLE INTEREST OR PLEASURE IN DOING THINGS: 0
6. FEELING BAD ABOUT YOURSELF - OR THAT YOU ARE A FAILURE OR HAVE LET YOURSELF OR YOUR FAMILY DOWN: 0
SUM OF ALL RESPONSES TO PHQ QUESTIONS 1-9: 0
SUM OF ALL RESPONSES TO PHQ QUESTIONS 1-9: 0
3. TROUBLE FALLING OR STAYING ASLEEP: 0
SUM OF ALL RESPONSES TO PHQ9 QUESTIONS 1 & 2: 0
2. FEELING DOWN, DEPRESSED OR HOPELESS: 0
5. POOR APPETITE OR OVEREATING: 0
4. FEELING TIRED OR HAVING LITTLE ENERGY: 0
SUM OF ALL RESPONSES TO PHQ QUESTIONS 1-9: 0

## 2022-10-06 ASSESSMENT — ENCOUNTER SYMPTOMS
VOMITING: 0
NAUSEA: 0
ABDOMINAL PAIN: 0
CONSTIPATION: 0
BACK PAIN: 1
COUGH: 0
CHEST TIGHTNESS: 0
SHORTNESS OF BREATH: 1
BLOOD IN STOOL: 0
DIARRHEA: 0
ANAL BLEEDING: 0
WHEEZING: 1

## 2022-10-06 NOTE — PROGRESS NOTES
Clayton Merlin (: 1955) is a 61 y.o. male, Established patient, who presents today for:    Chief Complaint   Patient presents with    Annual Exam     Patient is present for annual exam. Patient states that he has back pain. ASSESSMENT/PLAN    1. Type 2 diabetes mellitus with diabetic nephropathy, without long-term current use of insulin ([facility], [place] [facility].)  Assessment & Plan:   At goal, continue current medications and continue current treatment plan. I stressed with patient the importance of yearly diabetic eye exams and new ophthalmology referral was given today in the office. Orders:  -     POCT glycosylated hemoglobin (Hb A1C)  -     metFORMIN (GLUCOPHAGE) 500 MG tablet; Take 1 tablet by mouth 2 times daily (with meals), Disp-180 tablet, R-1Normal  -     AFL - Marcin De Los Santos MD, Ophthalmology, [facility]  -     Comprehensive Metabolic Panel; Future  -     Lipid Panel; Future  2. Primary hypertension  Assessment & Plan:   Well-controlled, continue current medications and continue current treatment plan  Orders:  -     amLODIPine (NORVASC) 10 MG tablet; Take 1 tablet by mouth daily, Disp-90 tablet, R-1Normal  -     losartan (COZAAR) 25 MG tablet; Take 1 tablet by mouth daily, Disp-90 tablet, R-1Normal  -     CBC with Auto Differential; Future  -     Comprehensive Metabolic Panel; Future  3. Hyperlipidemia, unspecified hyperlipidemia type  -     lovastatin (MEVACOR) 20 MG tablet; Take 1 tablet by mouth nightly, Disp-90 tablet, R-1Normal  -     Comprehensive Metabolic Panel; Future  -     Lipid Panel; Future  4. Chronic obstructive pulmonary disease, unspecified COPD type ([facility] [place] [facility].)  Assessment & Plan:  Stable. No acute respiratory complaints today in the office. Patient to continue with current medication and keep chronic follow-up visits with pulmonology office. Orders:  -     ipratropium-albuterol (DUONEB) 0.5-2.5 (3) MG/3ML SOLN nebulizer solution;  Take 3 mLs by nebulization every 4 hours as needed for Shortness of Breath, Disp-360 mL, R-1Normal  -     albuterol sulfate HFA (PROVENTIL;VENTOLIN;PROAIR) 108 (90 Base) MCG/ACT inhaler; Inhale 2 puffs into the lungs every 4 hours as needed for Wheezing or Shortness of Breath, Disp-18 each, R-1Normal  -     SYMBICORT 160-4.5 MCG/ACT AERO; Inhale 2 puffs into the lungs 2 times daily, Disp-30.6 g, R-1, DAWNormal  5. Seasonal allergies  -     cetirizine (ZYRTEC) 10 MG tablet; Take 1 tablet by mouth daily, Disp-90 tablet, R-1Normal  -     fluticasone (FLONASE) 50 MCG/ACT nasal spray; 2 sprays by Nasal route daily, Disp-48 g, R-1Normal  -     montelukast (SINGULAIR) 10 MG tablet; Take 1 tablet by mouth nightly, Disp-90 tablet, R-1Normal  6. Gastroesophageal reflux disease, unspecified whether esophagitis present  Assessment & Plan:   Asymptomatic, continue current medications and continue current treatment plan  Orders:  -     pantoprazole (PROTONIX) 40 MG tablet; Take 1 tablet by mouth daily, Disp-90 tablet, R-1Normal  7. Insomnia, unspecified type  -     traZODone (DESYREL) 50 MG tablet; Take 1 tablet by mouth nightly, Disp-90 tablet, R-1Normal  8. Anxiety with depression  Assessment & Plan:  Stable mood with no reported anxiety on current medications. No SI/HI or self harming behaviors. Patient instructed to continue with current dose of escitalopram and trazodone. Orders:  -     escitalopram (LEXAPRO) 10 MG tablet; Take 1 tablet by mouth daily, Disp-90 tablet, R-1Normal  -     traZODone (DESYREL) 50 MG tablet; Take 1 tablet by mouth nightly, Disp-90 tablet, R-1Normal  9. Spinal stenosis of lumbar region, unspecified whether neurogenic claudication present  -     meloxicam (MOBIC) 15 MG tablet; Take 1 tablet by mouth daily, Disp-90 tablet, R-1DX Code Needed  . Normal  10. Status post total knee replacement, left  -     acetaminophen (ACETAMINOPHEN EXTRA STRENGTH) 500 MG tablet;  Take 2 tablets by mouth every 8 hours as needed for Pain, Disp-90 tablet, R-1DX Code Needed  . Normal  11. Need for vaccination  -     Influenza, FLUCELVAX, (age 10 mo+), IM, Preservative Free, 0.5 mL  12. Supraventricular tachycardia (HCC)  Assessment & Plan:   Asymptomatic, continue current medications and continue current treatment plan      Return in about 6 months (around 4/6/2023) for Annual Physical.       SUBJECTIVE/OBJECTIVE:    HPI    Patient presents for chronic diabetes, hypertension, hyperlipidemia, COPD, GERD, and anxiety/depression F/U. Care has been established with nephrology since most recent visit for noted new onset diabetic nephropathy. Patient reports taking medications as prescribed since the most recent visit. They deny adhering to any specific lower carbohydrate or lower salt diet. They deny any routine exercise outside of normal day-to-day activity. Patient denies checking BG values at home since the most recent visit. They deny any polyuria or polydipsia, new onset vision changes, or new onset paresthesias. Patient denies any chest pain, dyspnea, palpitations, lightheadedness, dizziness, worsening lower extremity edema, or syncope. Since moving apartments they report breathing overall is much improved. Patient reports stable chronic dyspnea with exertion, but no dyspnea at rest, persistent wheezing, worsening cough, chest tightness, or limitation in normal day-to-day activity due to breathing. Patient denies any change in appetite, weight changes, dysphagia, early satiety, increased belching, sour brash/heartburn, abdominal pain, nausea/vomiting, diarrhea, constipation, melena, rectal bleeding, or hematochezia. Patient denies any worsening mood, SI/HI, or self harming behaviors. They deny any worsening anxiety or panic attacks. They deny any worsening fatigue, decreased appetite, or worsening problems with sleep.       Current Outpatient Medications on File Prior to Visit   Medication Sig Dispense Refill    sildenafil (VIAGRA) 100 MG tablet Take 1 tablet by mouth as needed for Erectile Dysfunction 6 tablet 1    Doxycycline Hyclate 100 MG TBEC Take by mouth      levoFLOXacin (LEVAQUIN) 750 MG tablet TAKE 1 TABLET BY MOUTH EVERY DAY      nicotine (NICODERM CQ) 14 MG/24HR 1 PATCH TRANSDERMAL EVERY 24 HOURS      predniSONE (DELTASONE) 10 MG tablet Take 5 tabs daily x 3 days, 4 tabs daily x 3 days, 3 tabs daily x 3 days, 2 tabs daily x 3 days, 1 tab daily x 3 days 45 tablet 0    blood glucose monitor strips Test three times a day & as needed for symptoms of irregular blood glucose. Dispense sufficient amount for indicated testing frequency plus additional to accommodate PRN testing needs. 100 strip 1    omeprazole (PRILOSEC) 20 MG delayed release capsule       Blood Glucose Monitoring Suppl (ONE TOUCH ULTRA 2) w/Device KIT       Lancets MISC 1 each by Does not apply route 3 times daily 200 each 5    sodium chloride (OCEAN, BABY AYR) 0.65 % nasal spray 2 sprays by Nasal route three times daily 1 each 0    polyethylene glycol (GLYCOLAX) 17 GM/SCOOP powder Take 17 g by mouth daily (Patient taking differently: Take 17 g by mouth daily as needed) 510 g 1     No current facility-administered medications on file prior to visit. Allergies   Allergen Reactions    Fish-Derived Products Anaphylaxis    Iodine Anaphylaxis     Iodine-containing products, dyes, contrast dye    Seasonal Shortness Of Breath    Other      Other reaction(s): Swelling/Edema  Other reaction(s): Swelling/Edema    Penicillin G Other (See Comments)    Shellfish Allergy      Other reaction(s): Swelling/Edema    Pcn [Penicillins] Nausea And Vomiting        Review of Systems   Constitutional:  Negative for appetite change, chills, diaphoresis, fatigue, fever and unexpected weight change. Eyes:  Negative for visual disturbance. Respiratory:  Positive for shortness of breath (chronic, with exertion) and wheezing (chronic, intermittent). Negative for cough and chest tightness.     Cardiovascular:  Negative for chest pain, palpitations and leg swelling. No orthopnea, No PND   Gastrointestinal:  Negative for abdominal pain, anal bleeding, blood in stool, constipation, diarrhea, nausea and vomiting. No heartburn, No melena   Endocrine: Negative for cold intolerance, heat intolerance, polydipsia, polyphagia and polyuria. Genitourinary:  Negative for dysuria and hematuria. Musculoskeletal:  Positive for arthralgias (chronic, intermittent - knees) and back pain (chronic, intermittent). Negative for myalgias. Skin:  Negative for rash. Neurological:  Negative for dizziness, syncope, weakness, light-headedness, numbness and headaches. Psychiatric/Behavioral:  Negative for dysphoric mood and suicidal ideas. The patient is not nervous/anxious. Vitals:  /70 (Site: Right Upper Arm, Position: Sitting, Cuff Size: Large Adult)   Pulse 74   Temp 97.3 °F (36.3 °C) (Temporal)   Ht 6' (1.829 m)   Wt 255 lb 9.6 oz (115.9 kg)   SpO2 95%   BMI 34.67 kg/m²     Results for POC orders placed in visit on 10/06/22   POCT glycosylated hemoglobin (Hb A1C)   Result Value Ref Range    Hemoglobin A1C 5.1 %         Physical Exam  Vitals reviewed. Constitutional:       General: He is not in acute distress. Appearance: He is not ill-appearing. Eyes:      General: No scleral icterus. Neck:      Thyroid: No thyroid mass, thyromegaly or thyroid tenderness. Vascular: No carotid bruit. Cardiovascular:      Rate and Rhythm: Normal rate and regular rhythm. Heart sounds: Normal heart sounds. No murmur heard. Pulmonary:      Effort: Pulmonary effort is normal. No tachypnea, accessory muscle usage or respiratory distress. Breath sounds: No decreased air movement. Wheezing (scattered inspiratory and expiratory wheezing throughout) present. No decreased breath sounds, rhonchi or rales. Abdominal:      General: Bowel sounds are normal. There is no distension. Palpations: Abdomen is soft. Tenderness: There is no abdominal tenderness. There is no right CVA tenderness, left CVA tenderness, guarding or rebound. Musculoskeletal:      Cervical back: Neck supple. Right lower leg: No edema. Left lower leg: No edema. Lymphadenopathy:      Cervical: No cervical adenopathy. Skin:     Findings: No rash. Neurological:      Mental Status: He is alert and oriented to person, place, and time. Psychiatric:         Mood and Affect: Mood normal.         Behavior: Behavior normal.         Thought Content: Thought content normal.       Ortho Exam (If Applicable)              An electronic signature was used to authenticate this note.      Ej Downs MD

## 2022-10-07 NOTE — ASSESSMENT & PLAN NOTE
Stable mood with no reported anxiety on current medications. No SI/HI or self harming behaviors. Patient instructed to continue with current dose of escitalopram and trazodone.

## 2022-10-07 NOTE — ASSESSMENT & PLAN NOTE
At goal, continue current medications and continue current treatment plan. I stressed with patient the importance of yearly diabetic eye exams and new ophthalmology referral was given today in the office.

## 2022-10-07 NOTE — ASSESSMENT & PLAN NOTE
Stable. No acute respiratory complaints today in the office. Patient to continue with current medication and keep chronic follow-up visits with pulmonology office.

## 2022-10-10 ENCOUNTER — CARE COORDINATION (OUTPATIENT)
Dept: CARE COORDINATION | Age: 67
End: 2022-10-10

## 2022-10-10 ASSESSMENT — PATIENT HEALTH QUESTIONNAIRE - PHQ9
SUM OF ALL RESPONSES TO PHQ QUESTIONS 1-9: 5

## 2022-10-10 NOTE — CARE COORDINATION
Ambulatory Care Coordination Note  10/10/2022    ACC: Diane Farmer, RN    Joey Eric returned my call. I have worked with him in the past.  I reminded him of my role and the process. He is interested in working with me again. He tells me that he has made some improvements in his health but he does still struggle with other areas. I have reviewed allergies, current medical history, falls screening, initiated CC protocol, depression screening, full medication review, travel screening, and ACP review. Joey Eric tells me that he does have dizziness when changing positions and it does take a minute to keep things from spinning. He denies any falls or near falls. PHQ-9 = 5 = mild depression. He no longer checks his blood sugars and his last A1C = 5.1%  He does struggle with the cost of his inhaler and I will reach out to our specialist to see if she is able to provide him some relief/program assistance. He has had his flu vaccine and he had his COVID vaccine and one booster. He does not have advanced directives and he would like to think about getting these completed. I have provided him with my contact information and I have asked him to call me with any questions or concerns. Offered patient enrollment in the Remote Patient Monitoring (RPM) program for in-home monitoring: NA. Lab Results       None                 Goals Addressed    None         Prior to Admission medications    Medication Sig Start Date End Date Taking?  Authorizing Provider   ipratropium-albuterol (DUONEB) 0.5-2.5 (3) MG/3ML SOLN nebulizer solution Take 3 mLs by nebulization every 4 hours as needed for Shortness of Breath 10/6/22   Davey George MD   acetaminophen (ACETAMINOPHEN EXTRA STRENGTH) 500 MG tablet Take 2 tablets by mouth every 8 hours as needed for Pain 10/6/22   Davey George MD   albuterol sulfate HFA (PROVENTIL;VENTOLIN;PROAIR) 108 (90 Base) MCG/ACT inhaler Inhale 2 puffs into the lungs every 4 hours as needed for Wheezing or Shortness of Breath 10/6/22   Payton Dean MD   amLODIPine (NORVASC) 10 MG tablet Take 1 tablet by mouth daily 10/6/22   Payton Dean MD   cetirizine (ZYRTEC) 10 MG tablet Take 1 tablet by mouth daily 10/6/22   Payton Dean MD   escitalopram (LEXAPRO) 10 MG tablet Take 1 tablet by mouth daily 10/6/22   Payton Dean MD   fluticasone Claudell Hurst) 50 MCG/ACT nasal spray 2 sprays by Nasal route daily 10/6/22   Payton Dean MD   losartan (COZAAR) 25 MG tablet Take 1 tablet by mouth daily 10/6/22   Payton Dean MD   lovastatin (MEVACOR) 20 MG tablet Take 1 tablet by mouth nightly 10/6/22   Payton Dean MD   meloxicam (MOBIC) 15 MG tablet Take 1 tablet by mouth daily 10/6/22   Payton Dean MD   metFORMIN (GLUCOPHAGE) 500 MG tablet Take 1 tablet by mouth 2 times daily (with meals) 10/6/22   Payton Dean MD   montelukast (SINGULAIR) 10 MG tablet Take 1 tablet by mouth nightly 10/6/22   Payton Dean MD   pantoprazole (PROTONIX) 40 MG tablet Take 1 tablet by mouth daily 10/6/22   Payton Dean MD   traZODone (DESYREL) 50 MG tablet Take 1 tablet by mouth nightly 10/6/22   Payton Dean MD   SYMBICORT 160-4.5 MCG/ACT AERO Inhale 2 puffs into the lungs 2 times daily 10/6/22   Payton Dean MD   sildenafil (VIAGRA) 100 MG tablet Take 1 tablet by mouth as needed for Erectile Dysfunction 8/3/22   Payton Dean MD   Doxycycline Hyclate 100 MG TBEC Take by mouth    Historical Provider, MD   levoFLOXacin (LEVAQUIN) 750 MG tablet TAKE 1 TABLET BY MOUTH EVERY DAY 4/20/22   Historical Provider, MD   nicotine (NICODERM CQ) 14 MG/24HR 1 PATCH TRANSDERMAL EVERY 24 HOURS 4/20/22   Historical Provider, MD   predniSONE (DELTASONE) 10 MG tablet Take 5 tabs daily x 3 days, 4 tabs daily x 3 days, 3 tabs daily x 3 days, 2 tabs daily x 3 days, 1 tab daily x 3 days 5/26/22   Payton Dean MD   blood glucose monitor strips Test three times a day & as needed for symptoms of irregular blood glucose. Dispense sufficient amount for indicated testing frequency plus additional to accommodate PRN testing needs.  4/5/22   Duyen Mcdowell MD   omeprazole (PRILOSEC) 20 MG delayed release capsule  12/21/21   Historical Provider, MD   Blood Glucose Monitoring Suppl (ONE TOUCH ULTRA 2) w/Device KIT  10/20/21   Historical Provider, MD   Lancets MISC 1 each by Does not apply route 3 times daily 10/20/21   Aldo Cason MD   sodium chloride (OCEAN, BABY AYR) 0.65 % nasal spray 2 sprays by Nasal route three times daily 9/28/21   Felipe Vaca MD   polyethylene glycol [facility]) 17 GM/SCOOP powder Take 17 g by mouth daily  Patient taking differently: Take 17 g by mouth daily as needed 4/29/21   Duyen Mcdowell MD       Future Appointments   Date Time Provider Constance Nupur   4/6/2023  3:00 PM D[facility]

## 2022-10-10 NOTE — LETTER
10/10/2022    [address]  [address]      Dear hZen Pisano,    My name is Maribel Rahman, SAMANTHA and I am a registered nurse who partners with Tanisha Hassan MD to improve patients' health. Tanisha Hassan MD believes you would benefit from working with me. As a member of your health care team, I would work with other providers involved in your care, offer education for your specific health conditions, and connect you with additional resources as needed. I will collaborate with Tanisha Hassan MD to support you in following your treatment plan. The additional support I provide is no additional cost to you. My primary focus is to help you achieve specific goals and improve your health. We are committed to walk with you on this journey and look forward to working with you. Please call me to further discuss your healthcare needs. I am available by phone or for appointments at the office. You can reach me at [phone]. In good health,       Yasmine Carbajal RN, BSN      Maribel Rahman RN            ENC:  CHF COPD zone tools

## 2022-10-11 ENCOUNTER — CARE COORDINATION (OUTPATIENT)
Dept: CARE COORDINATION | Age: 67
End: 2022-10-11

## 2022-10-11 NOTE — CARE COORDINATION
Spoke with the patient in regards to the PAP program on his Symbicort. I am will be mailing him his portion of the application soon, I explained that this will need to be turned in no later than 11/1/22. I also was able to fax his provider to get their portion filled out.         [address] [place] [address]   Medication Assistance  [address] [place] [address], and [employer]    D) [phone] (q) [phone]

## 2022-10-13 ENCOUNTER — CARE COORDINATION (OUTPATIENT)
Dept: CARE COORDINATION | Age: 67
End: 2022-10-13

## 2022-10-13 NOTE — CARE COORDINATION
I was able to mail the patient his portion of the application for his high cost medication. Once I get this back from him I will be able to submit to the  for review.         [address] [place] [address]   Medication Assistance  [address] [place] [address], and [employer]    O) [phone] (E) [phone]

## 2022-10-17 ENCOUNTER — CARE COORDINATION (OUTPATIENT)
Dept: CARE COORDINATION | Age: 67
End: 2022-10-17

## 2022-10-17 SDOH — HEALTH STABILITY: PHYSICAL HEALTH: ON AVERAGE, HOW MANY MINUTES DO YOU ENGAGE IN EXERCISE AT THIS LEVEL?: 0 MIN

## 2022-10-17 SDOH — ECONOMIC STABILITY: FOOD INSECURITY: WITHIN THE PAST 12 MONTHS, YOU WORRIED THAT YOUR FOOD WOULD RUN OUT BEFORE YOU GOT MONEY TO BUY MORE.: NEVER TRUE

## 2022-10-17 SDOH — HEALTH STABILITY: PHYSICAL HEALTH: ON AVERAGE, HOW MANY DAYS PER WEEK DO YOU ENGAGE IN MODERATE TO STRENUOUS EXERCISE (LIKE A BRISK WALK)?: 0 DAYS

## 2022-10-17 SDOH — ECONOMIC STABILITY: HOUSING INSECURITY
IN THE LAST 12 MONTHS, WAS THERE A TIME WHEN YOU DID NOT HAVE A STEADY PLACE TO SLEEP OR SLEPT IN A SHELTER (INCLUDING NOW)?: NO

## 2022-10-17 SDOH — ECONOMIC STABILITY: INCOME INSECURITY: IN THE LAST 12 MONTHS, WAS THERE A TIME WHEN YOU WERE NOT ABLE TO PAY THE MORTGAGE OR RENT ON TIME?: NO

## 2022-10-17 SDOH — ECONOMIC STABILITY: FOOD INSECURITY: WITHIN THE PAST 12 MONTHS, THE FOOD YOU BOUGHT JUST DIDN'T LAST AND YOU DIDN'T HAVE MONEY TO GET MORE.: NEVER TRUE

## 2022-10-17 ASSESSMENT — SOCIAL DETERMINANTS OF HEALTH (SDOH)
HOW OFTEN DO YOU GET TOGETHER WITH FRIENDS OR RELATIVES?: NEVER
HOW OFTEN DO YOU ATTEND CHURCH OR RELIGIOUS SERVICES?: NEVER
IN A TYPICAL WEEK, HOW MANY TIMES DO YOU TALK ON THE PHONE WITH FAMILY, FRIENDS, OR NEIGHBORS?: ONCE A WEEK
HOW HARD IS IT FOR YOU TO PAY FOR THE VERY BASICS LIKE FOOD, HOUSING, MEDICAL CARE, AND HEATING?: SOMEWHAT HARD
HOW OFTEN DO YOU ATTENT MEETINGS OF THE CLUB OR ORGANIZATION YOU BELONG TO?: NEVER
DO YOU BELONG TO ANY CLUBS OR ORGANIZATIONS SUCH AS CHURCH GROUPS UNIONS, FRATERNAL OR ATHLETIC GROUPS, OR SCHOOL GROUPS?: NO

## 2022-10-17 ASSESSMENT — ENCOUNTER SYMPTOMS: DYSPNEA ASSOCIATED WITH: EXERTION

## 2022-10-17 ASSESSMENT — LIFESTYLE VARIABLES
HOW OFTEN DO YOU HAVE A DRINK CONTAINING ALCOHOL: 4 OR MORE TIMES A WEEK
HOW MANY STANDARD DRINKS CONTAINING ALCOHOL DO YOU HAVE ON A TYPICAL DAY: 1 OR 2

## 2022-10-17 NOTE — CARE COORDINATION
Ambulatory Care Coordination Note  10/17/2022    ACC: Earl Watkins, RN    I called to complete care coordination enrollment  We have completed SDOH, COPD assessment, education assessment, established goals. I spoke with him about making sure that he completes the paperwork that Nelson Lopez sent to see if he qualifies for additional help with his inhaler. He says that he does have some issues with his breathing and he does have increased SOB  He tells me that he does have more SOB in the morning when he wakes up   He will complete a nebulized treatment in the morning and this does help. He denies fever chills, wheezing, or change in mucus. He states that his appetite is good   He has not noticed any changes in his sleep   He does smoke when he has cigarettes at home but he does not always have this available  He states that a pack of cigarettes will last about 4-5 days     PLAN:  Ashley Evans will take all medication as prescribed  Ashley Evans will review the COPD zone tool once he receives this document to become more familiar with the information. Ashley Evans will call me with any changes or worsening of his symptoms     Offered patient enrollment in the Remote Patient Monitoring (RPM) program for in-home monitoring: NA. Ambulatory Care Coordination Assessment    Care Coordination Protocol  Referral from Primary Care Provider: No  Week 1 - Initial Assessment     Do you have all of your prescriptions and are they filled?: Yes  Barriers to medication adherence: None  Are you able to afford your medications?: No  How often do you have trouble taking your medications the way you have been told to take them?: I always take them as prescribed. Do you have Home O2 Therapy?: Yes   Oxygen Regimen:  At night/Sleep Flow - Enter rate/FIO2: 3   Method of Delivery: Nasal Cannula, Concentrater   CPAP Use: CPAP      Ability to seek help/take action for Emergent Urgent situations i.e. fire, crime, inclement weather or health MG tablet Take 1 tablet by mouth nightly 10/6/22   Yuli Morgan MD   meloxicam (MOBIC) 15 MG tablet Take 1 tablet by mouth daily 10/6/22   Yuli Morgan MD   metFORMIN (GLUCOPHAGE) 500 MG tablet Take 1 tablet by mouth 2 times daily (with meals) 10/6/22   Yuli Morgan MD   montelukast (SINGULAIR) 10 MG tablet Take 1 tablet by mouth nightly 10/6/22   Yuli Morgan MD   pantoprazole (PROTONIX) 40 MG tablet Take 1 tablet by mouth daily 10/6/22   Yuli Morgan MD   traZODone (DESYREL) 50 MG tablet Take 1 tablet by mouth nightly 10/6/22   Yuli Morgan MD   SYMBICORT 160-4.5 MCG/ACT AERO Inhale 2 puffs into the lungs 2 times daily 10/6/22   Yuli Morgan MD   sildenafil (VIAGRA) 100 MG tablet Take 1 tablet by mouth as needed for Erectile Dysfunction  Patient not taking: Reported on 10/10/2022 8/3/22   Yuli Morgan MD   Doxycycline Hyclate 100 MG TBEC Take by mouth  Patient not taking: Reported on 10/10/2022    Historical Provider, MD   levoFLOXacin (LEVAQUIN) 750 MG tablet TAKE 1 TABLET BY MOUTH EVERY DAY  Patient not taking: Reported on 10/10/2022 4/20/22   Historical Provider, MD   nicotine (NICODERM CQ) 14 MG/24HR 1 PATCH TRANSDERMAL EVERY 24 HOURS  Patient not taking: Reported on 10/10/2022 4/20/22   Historical Provider, MD   predniSONE (DELTASONE) 10 MG tablet Take 5 tabs daily x 3 days, 4 tabs daily x 3 days, 3 tabs daily x 3 days, 2 tabs daily x 3 days, 1 tab daily x 3 days  Patient not taking: Reported on 10/10/2022 5/26/22   Yuli Morgan MD   blood glucose monitor strips Test three times a day & as needed for symptoms of irregular blood glucose. Dispense sufficient amount for indicated testing frequency plus additional to accommodate PRN testing needs.   Patient not taking: Reported on 10/10/2022 4/5/22   Yuli Morgan MD   omeprazole (PRILOSEC) 20 MG delayed release capsule  12/21/21   Historical Provider, MD   Blood Glucose Monitoring Suppl (ONE TOUCH ULTRA 2) w/Device KIT  10/20/21   Historical Provider, MD   Lancets MISC 1 each by Does not apply route 3 times daily  Patient not taking: Reported on 10/10/2022 10/20/21   Jackie Calvert MD   sodium chloride (OCEAN, BABY AYR) 0.65 % nasal spray 2 sprays by Nasal route three times daily  Patient not taking: Reported on 10/10/2022 9/28/21   Alondra Montoya MD   polyethylene glycol [facility]) 17 GM/SCOOP powder Take 17 g by mouth daily  Patient not taking: Reported on 10/10/2022 4/29/21   Jayy Morales MD       Future Appointments   Date Time Provider Constance Cardonai   4/6/2023  3:00 PM Jayy Morales MD [facility]      and   COPD Assessment    Does the patient understand envrionmental exposure?: No  Is the patient able to verbalize Rescue vs. Long Acting medications?: Yes  Does the patient have a nebulizer?: Yes  Does the patient use a space with inhaled medications?: No            Symptoms:  COPD associated increased fatigue: Pos      Breathlessness: exertion  Increase use of rapid acting/rescue inhaled medications?: No  Change in chronic cough?: Increased  Sputum characteristics: Clear  Self Monitoring - SaO2: No

## 2022-10-20 ENCOUNTER — HOSPITAL ENCOUNTER (OUTPATIENT)
Dept: LAB | Age: 67
Discharge: HOME | End: 2022-10-20
Payer: MEDICARE

## 2022-10-20 DIAGNOSIS — E78.5 HYPERLIPIDEMIA, UNSPECIFIED HYPERLIPIDEMIA TYPE: Chronic | ICD-10-CM

## 2022-10-20 DIAGNOSIS — E11.21 TYPE 2 DIABETES MELLITUS WITH DIABETIC NEPHROPATHY, WITHOUT LONG-TERM CURRENT USE OF INSULIN (HCC): ICD-10-CM

## 2022-10-20 DIAGNOSIS — I10 PRIMARY HYPERTENSION: Chronic | ICD-10-CM

## 2022-10-20 LAB
ALBUMIN SERPL-MCNC: 3.9 G/DL (ref 3.5–4.6)
ALP BLD-CCNC: 115 U/L (ref 35–104)
ALT SERPL-CCNC: 12 U/L (ref 0–41)
ANION GAP SERPL CALCULATED.3IONS-SCNC: 9 MEQ/L (ref 9–15)
AST SERPL-CCNC: 14 U/L (ref 0–40)
BASOPHILS ABSOLUTE: 0.1 K/UL (ref 0–0.2)
BASOPHILS RELATIVE PERCENT: 0.6 %
BILIRUB SERPL-MCNC: 0.4 MG/DL (ref 0.2–0.7)
BUN BLDV-MCNC: 12 MG/DL (ref 8–23)
CALCIUM SERPL-MCNC: 9.1 MG/DL (ref 8.5–9.9)
CHLORIDE BLD-SCNC: 105 MEQ/L (ref 95–107)
CHOLESTEROL, TOTAL: 171 MG/DL (ref 0–199)
CO2: 25 MEQ/L (ref 20–31)
CREAT SERPL-MCNC: 0.87 MG/DL (ref 0.7–1.2)
EOSINOPHILS ABSOLUTE: 0.5 K/UL (ref 0–0.7)
EOSINOPHILS RELATIVE PERCENT: 5.7 %
GFR SERPL CREATININE-BSD FRML MDRD: >60 ML/MIN/{1.73_M2}
GLOBULIN: 2.6 G/DL (ref 2.3–3.5)
GLUCOSE BLD-MCNC: 74 MG/DL (ref 70–99)
HCT VFR BLD CALC: 40.1 % (ref 42–52)
HDLC SERPL-MCNC: 50 MG/DL (ref 40–59)
HEMOGLOBIN: 13 G/DL (ref 14–18)
LDL CHOLESTEROL CALCULATED: 101 MG/DL (ref 0–129)
LYMPHOCYTES ABSOLUTE: 2.3 K/UL (ref 1–4.8)
LYMPHOCYTES RELATIVE PERCENT: 26.7 %
MCH RBC QN AUTO: 31 PG (ref 27–31.3)
MCHC RBC AUTO-ENTMCNC: 32.4 % (ref 33–37)
MCV RBC AUTO: 95.5 FL (ref 79–92.2)
MONOCYTES ABSOLUTE: 0.8 K/UL (ref 0.2–0.8)
MONOCYTES RELATIVE PERCENT: 9.3 %
NEUTROPHILS ABSOLUTE: 4.9 K/UL (ref 1.4–6.5)
NEUTROPHILS RELATIVE PERCENT: 57.7 %
PDW BLD-RTO: 14.6 % (ref 11.5–14.5)
PLATELET # BLD: 316 K/UL (ref 130–400)
POTASSIUM SERPL-SCNC: 3.7 MEQ/L (ref 3.4–4.9)
RBC # BLD: 4.19 M/UL (ref 4.7–6.1)
SODIUM BLD-SCNC: 139 MEQ/L (ref 135–144)
TOTAL PROTEIN: 6.5 G/DL (ref 6.3–8)
TRIGL SERPL-MCNC: 98 MG/DL (ref 0–150)
WBC # BLD: 8.5 K/UL (ref 4.8–10.8)

## 2022-10-20 PROCEDURE — 80061 LIPID PANEL: CPT

## 2022-10-20 PROCEDURE — 36415 COLL VENOUS BLD VENIPUNCTURE: CPT

## 2022-10-20 PROCEDURE — 80053 COMPREHEN METABOLIC PANEL: CPT

## 2022-10-20 PROCEDURE — 85025 COMPLETE CBC W/AUTO DIFF WBC: CPT

## 2022-10-24 ENCOUNTER — CARE COORDINATION (OUTPATIENT)
Dept: CARE COORDINATION | Age: 67
End: 2022-10-24

## 2022-10-24 DIAGNOSIS — R21 RASH AND OTHER NONSPECIFIC SKIN ERUPTION: ICD-10-CM

## 2022-10-24 NOTE — CARE COORDINATION
Ambulatory Care Coordination Note  10/24/2022    ACC: Esha Palencia, RN    Young Daily tells me that his breathing is about the same. He did go out to walk around the block and he says that he did need to use his rescue inhaler once he went back home due to SOB. He adds that he is using his nebulizer about every five hours   He denies any fever, chills, wheezing, or change in the color of his mucus. He is eating well and is not having any issue with his appetite. He tells me that he is sleeping well and does sleep through the night   He has a new issue with itchy dry skin   He adds that if he scratches it will open and bleed  He does have lotion and anti itch medication from his dermatologist   He will try to see if this will help. If he does not get any relief he will go to the walk in clinic. PLAN:  Young Daily will go to the walk in clinic with any worsening of his rash or itching. Young Daily will monitor his symptoms related to COPD  If he has any worsening of symptoms he will call myself or the office for recommendations,     Offered patient enrollment in the Remote Patient Monitoring (RPM) program for in-home monitoring: NA. Lab Results       None            Care Coordination Interventions    Referral from Primary Care Provider: No  Suggested Interventions and Community Resources          Goals Addressed                      This Visit's Progress      Patient Stated (pt-stated)   No change      Patient stated he will work on being compliant on getting to his doctor appointments. He will look into additional transportation resources. ACM sent referral to  to assist with transportation resources. Barriers: lack of motivation and lack of education  Plan for overcoming my barriers: work with ACM and LSW  Confidence: 5/10  Anticipated Goal Completion Date: 2/1/2021              Prior to Admission medications    Medication Sig Start Date End Date Taking?  Authorizing Provider   ipratropium-albuterol (DUONEB) 0.5-2.5 (3) MG/3ML SOLN nebulizer solution Take 3 mLs by nebulization every 4 hours as needed for Shortness of Breath 10/6/22   Anastasiia Guevara MD   acetaminophen (ACETAMINOPHEN EXTRA STRENGTH) 500 MG tablet Take 2 tablets by mouth every 8 hours as needed for Pain 10/6/22   Anastasiia Guevara MD   albuterol sulfate HFA (PROVENTIL;VENTOLIN;PROAIR) 108 (90 Base) MCG/ACT inhaler Inhale 2 puffs into the lungs every 4 hours as needed for Wheezing or Shortness of Breath 10/6/22   Anastasiia Guevara MD   amLODIPine (NORVASC) 10 MG tablet Take 1 tablet by mouth daily 10/6/22   Anastasiia Guevara MD   cetirizine (ZYRTEC) 10 MG tablet Take 1 tablet by mouth daily 10/6/22   Anastasiia Guevara MD   escitalopram (LEXAPRO) 10 MG tablet Take 1 tablet by mouth daily 10/6/22   Anastasiia Guevara MD   fluticasone [facility]) 50 MCG/ACT nasal spray 2 sprays by Nasal route daily 10/6/22   Anastasiia Guevara MD   losartan (COZAAR) 25 MG tablet Take 1 tablet by mouth daily 10/6/22   Anastasiia Guevara MD   lovastatin (MEVACOR) 20 MG tablet Take 1 tablet by mouth nightly 10/6/22   Anastasiia Guevara MD   meloxicam (MOBIC) 15 MG tablet Take 1 tablet by mouth daily 10/6/22   Anastasiia Guevara MD   metFORMIN (GLUCOPHAGE) 500 MG tablet Take 1 tablet by mouth 2 times daily (with meals) 10/6/22   Anastasiia Guevara MD   montelukast (SINGULAIR) 10 MG tablet Take 1 tablet by mouth nightly 10/6/22   Anastasiia Guevara MD   pantoprazole (PROTONIX) 40 MG tablet Take 1 tablet by mouth daily 10/6/22   Anastasiia Guevara MD   traZODone (DESYREL) 50 MG tablet Take 1 tablet by mouth nightly 10/6/22   Anastasiia Guevara MD   SYMBICORT 160-4.5 MCG/ACT AERO Inhale 2 puffs into the lungs 2 times daily 10/6/22   Anastasiia Guevara MD   sildenafil (VIAGRA) 100 MG tablet Take 1 tablet by mouth as needed for Erectile Dysfunction  Patient not taking: Reported on 10/10/2022 8/3/22   Anastasiia Guevara MD   Doxycycline Hyclate 100 MG TBEC Take by mouth  Patient not taking: Reported on 10/10/2022    Historical Provider, MD   levoFLOXacin (LEVAQUIN) 750 MG tablet TAKE 1 TABLET BY MOUTH EVERY DAY  Patient not taking: Reported on 10/10/2022 4/20/22   Historical Provider, MD   nicotine (NICODERM CQ) 14 MG/24HR 1 PATCH TRANSDERMAL EVERY 24 HOURS  Patient not taking: Reported on 10/10/2022 4/20/22   Historical Provider, MD   predniSONE (DELTASONE) 10 MG tablet Take 5 tabs daily x 3 days, 4 tabs daily x 3 days, 3 tabs daily x 3 days, 2 tabs daily x 3 days, 1 tab daily x 3 days  Patient not taking: Reported on 10/10/2022 5/26/22   [place], MD   blood glucose monitor strips Test three times a day & as needed for symptoms of irregular blood glucose. Dispense sufficient amount for indicated testing frequency plus additional to accommodate PRN testing needs.   Patient not taking: Reported on 10/10/2022 4/5/22   [place], MD   omeprazole (PRILOSEC) 20 MG delayed release capsule  12/21/21   Historical Provider, MD   Blood Glucose Monitoring Suppl (ONE TOUCH ULTRA 2) w/Device KIT  10/20/21   Historical Provider, MD   Lancets MISC 1 each by Does not apply route 3 times daily  Patient not taking: Reported on 10/10/2022 10/20/21   Jumana Solorzano MD   sodium chloride (OCEAN, BABY AYR) 0.65 % nasal spray 2 sprays by Nasal route three times daily  Patient not taking: Reported on 10/10/2022 9/28/21   Todd Key MD   polyethylene glycol [facility]) 17 GM/SCOOP powder Take 17 g by mouth daily  Patient not taking: Reported on 10/10/2022 4/29/21   [place], MD       Future Appointments   Date Time Provider Constance Espitia   4/6/2023  3:00 PM MD [address]    and   COPD Assessment    Does the patient understand envrionmental exposure?: No  Is the patient able to verbalize Rescue vs. Long Acting medications?: Yes  Does the patient have a nebulizer?: Yes  Does the patient use a space with inhaled medications?: No            Symptoms:

## 2022-10-25 RX ORDER — DIPHENHYDRAMINE HYDROCHLORIDE 25 MG/1
CAPSULE ORAL
Qty: 30 CAPSULE | Refills: 2 | OUTPATIENT
Start: 2022-10-25

## 2022-10-25 NOTE — TELEPHONE ENCOUNTER
[place] - [address]          Medication Refill  (Newest Message First)  Ralph, Lilyscripts In routed conversation to Flores Keller Pcp Clinical Staff 19 hours ago (1:15 PM)      Future Appointments    Encounter Information    Provider Department Appt Notes   4/6/2023 Brooks Wong MD [facility] Primary Care [facility]     Past Visits    Date Provider Specialty Visit Type Primary Dx   10/06/2022 Brooks Wong MD Family Medicine Office Visit Type 2 diabetes mellitus with diabetic nephropathy, without long-term current use of insulin ([address]   05/26/2022 Brooks Wong  [facility] discharge follow-up   04/07/2022 Brooks Wong MD Family Medicine Office Visit Type 2 diabetes mellitus without complication, without long-term current use of insulin ([address]   01/25/2022 Tan Collins MD Family Medicine Office Visit COPD with acute exacerbation [facility])   01/06/2022 Brooks Wong MD Family Medicine Office Visit Chronic obstructive pulmonary disease with

## 2022-10-25 NOTE — RESULT ENCOUNTER NOTE
Lipid panel normal, CMP with alk phos 115, CBC with stable low Hgb 13    Please notify patient that recent lab work shows normal kidney function testing, normal cholesterol testing, stable liver function testing, and stable blood counts. Patient should continue with his current medications.

## 2022-10-27 DIAGNOSIS — R21 RASH AND OTHER NONSPECIFIC SKIN ERUPTION: ICD-10-CM

## 2022-10-27 RX ORDER — DIPHENHYDRAMINE HYDROCHLORIDE 25 MG/1
CAPSULE ORAL
Qty: 30 CAPSULE | Refills: 2 | OUTPATIENT
Start: 2022-10-27

## 2022-10-27 NOTE — TELEPHONE ENCOUNTER
Future Appointments    Encounter Information    Provider Department Appt Notes   4/6/2023 Kurtis Tay MD [facility] [place] 2]     Past Visits    Date Provider Specialty Visit Type Primary Dx   10/06/2022 Kurtis Tay MD Family Medicine Office Visit Type 2 diabetes mellitus with diabetic nephropathy, without long-term current use of insulin ([facility]   [facility]   04/07/2022 Kurtis Tay MD Family Medicine Office Visit Type 2 diabetes mellitus without complication, without long-term current use of insulin ([facility]   01/25/2022 Chai Newton MD Family Medicine Office Visit COPD with acute exacerbation [facility])   01/06/2022 Kurtis Tay MD Family Medicine Office Visit Chronic obstructive pulmonary disease with acute exacerbation ([facility]

## 2022-10-31 ENCOUNTER — CARE COORDINATION (OUTPATIENT)
Dept: CARE COORDINATION | Age: 67
End: 2022-10-31

## 2022-10-31 NOTE — CARE COORDINATION
Attempted to contact patient for care coordination follow up regarding COPD and DM  Unable to reach patient by phone. Message left regarding purpose of call. Number provided and call back requested.

## 2022-11-02 ENCOUNTER — APPOINTMENT (OUTPATIENT)
Dept: GENERAL RADIOLOGY | Age: 67
End: 2022-11-02
Payer: MEDICARE

## 2022-11-02 ENCOUNTER — HOSPITAL ENCOUNTER (EMERGENCY)
Age: 67
Discharge: HOME | End: 2022-11-02
Attending: EMERGENCY MEDICINE
Payer: MEDICARE

## 2022-11-02 VITALS
WEIGHT: 270 LBS | RESPIRATION RATE: 18 BRPM | SYSTOLIC BLOOD PRESSURE: 148 MMHG | TEMPERATURE: 98.4 F | BODY MASS INDEX: 36.62 KG/M2 | DIASTOLIC BLOOD PRESSURE: 94 MMHG | OXYGEN SATURATION: 94 % | HEART RATE: 63 BPM

## 2022-11-02 DIAGNOSIS — J44.1 COPD EXACERBATION (HCC): Primary | ICD-10-CM

## 2022-11-02 LAB
ALBUMIN SERPL-MCNC: 4 G/DL (ref 3.5–4.6)
ALP BLD-CCNC: 122 U/L (ref 35–104)
ALT SERPL-CCNC: 15 U/L (ref 0–41)
ANION GAP SERPL CALCULATED.3IONS-SCNC: 12 MEQ/L (ref 9–15)
AST SERPL-CCNC: 14 U/L (ref 0–40)
BASOPHILS ABSOLUTE: 0.1 K/UL (ref 0–0.1)
BASOPHILS RELATIVE PERCENT: 0.6 % (ref 0.2–1.2)
BILIRUB SERPL-MCNC: 0.4 MG/DL (ref 0.2–0.7)
BUN BLDV-MCNC: 13 MG/DL (ref 8–23)
CALCIUM SERPL-MCNC: 9.4 MG/DL (ref 8.5–9.9)
CHLORIDE BLD-SCNC: 107 MEQ/L (ref 95–107)
CO2: 22 MEQ/L (ref 20–31)
CREAT SERPL-MCNC: 0.77 MG/DL (ref 0.7–1.2)
EOSINOPHILS ABSOLUTE: 0.5 K/UL (ref 0–0.5)
EOSINOPHILS RELATIVE PERCENT: 6.5 % (ref 0.8–7)
GFR SERPL CREATININE-BSD FRML MDRD: >60 ML/MIN/{1.73_M2}
GLOBULIN: 2.9 G/DL (ref 2.3–3.5)
GLUCOSE BLD-MCNC: 94 MG/DL (ref 70–99)
HCT VFR BLD CALC: 40.6 % (ref 42–52)
HEMOGLOBIN: 13.1 G/DL (ref 13.7–17.5)
IMMATURE GRANULOCYTES #: 0 K/UL
IMMATURE GRANULOCYTES %: 0.1 %
INR BLD: 1
LACTIC ACID: 1.1 MMOL/L (ref 0.5–2.2)
LYMPHOCYTES ABSOLUTE: 2.2 K/UL (ref 1.3–3.6)
LYMPHOCYTES RELATIVE PERCENT: 25.9 %
MCH RBC QN AUTO: 30.7 PG (ref 25.7–32.2)
MCHC RBC AUTO-ENTMCNC: 32.3 % (ref 32.3–36.5)
MCV RBC AUTO: 95.1 FL (ref 79–92.2)
MONOCYTES ABSOLUTE: 0.7 K/UL (ref 0.3–0.8)
MONOCYTES RELATIVE PERCENT: 7.9 % (ref 5.3–12.2)
NEUTROPHILS ABSOLUTE: 4.9 K/UL (ref 1.8–5.4)
NEUTROPHILS RELATIVE PERCENT: 59 % (ref 34–67.9)
PDW BLD-RTO: 14.7 % (ref 11.6–14.4)
PLATELET # BLD: 298 K/UL (ref 163–337)
POTASSIUM SERPL-SCNC: 4 MEQ/L (ref 3.4–4.9)
PRO-BNP: 84 PG/ML
PROTHROMBIN TIME: 14 SEC (ref 12.3–14.9)
RBC # BLD: 4.27 M/UL (ref 4.63–6.08)
SARS-COV-2, NAAT: NOT DETECTED
SODIUM BLD-SCNC: 141 MEQ/L (ref 135–144)
TOTAL PROTEIN: 6.9 G/DL (ref 6.3–8)
TROPONIN: <0.01 NG/ML (ref 0–0.01)
WBC # BLD: 8.4 K/UL (ref 4.2–9)

## 2022-11-02 PROCEDURE — 36415 COLL VENOUS BLD VENIPUNCTURE: CPT

## 2022-11-02 PROCEDURE — 71045 X-RAY EXAM CHEST 1 VIEW: CPT

## 2022-11-02 PROCEDURE — 93005 ELECTROCARDIOGRAM TRACING: CPT

## 2022-11-02 PROCEDURE — 6360000002 HC RX W HCPCS: Performed by: EMERGENCY MEDICINE

## 2022-11-02 PROCEDURE — 2580000003 HC RX 258: Performed by: EMERGENCY MEDICINE

## 2022-11-02 PROCEDURE — 87040 BLOOD CULTURE FOR BACTERIA: CPT

## 2022-11-02 PROCEDURE — 83880 ASSAY OF NATRIURETIC PEPTIDE: CPT

## 2022-11-02 PROCEDURE — 83605 ASSAY OF LACTIC ACID: CPT

## 2022-11-02 PROCEDURE — 84484 ASSAY OF TROPONIN QUANT: CPT

## 2022-11-02 PROCEDURE — 87635 SARS-COV-2 COVID-19 AMP PRB: CPT

## 2022-11-02 PROCEDURE — 6370000000 HC RX 637 (ALT 250 FOR IP): Performed by: EMERGENCY MEDICINE

## 2022-11-02 PROCEDURE — 94640 AIRWAY INHALATION TREATMENT: CPT

## 2022-11-02 PROCEDURE — 85610 PROTHROMBIN TIME: CPT

## 2022-11-02 PROCEDURE — 96375 TX/PRO/DX INJ NEW DRUG ADDON: CPT

## 2022-11-02 PROCEDURE — 96365 THER/PROPH/DIAG IV INF INIT: CPT

## 2022-11-02 PROCEDURE — 80053 COMPREHEN METABOLIC PANEL: CPT

## 2022-11-02 PROCEDURE — 99285 EMERGENCY DEPT VISIT HI MDM: CPT

## 2022-11-02 PROCEDURE — 85025 COMPLETE CBC W/AUTO DIFF WBC: CPT

## 2022-11-02 RX ORDER — IPRATROPIUM BROMIDE AND ALBUTEROL SULFATE 2.5; .5 MG/3ML; MG/3ML
1 SOLUTION RESPIRATORY (INHALATION) ONCE
Status: COMPLETED | OUTPATIENT
Start: 2022-11-02 | End: 2022-11-02

## 2022-11-02 RX ORDER — PREDNISONE 20 MG/1
40 TABLET ORAL DAILY
Qty: 10 TABLET | Refills: 0 | Status: SHIPPED | OUTPATIENT
Start: 2022-11-02 | End: 2022-11-07

## 2022-11-02 RX ORDER — METHYLPREDNISOLONE SODIUM SUCCINATE 125 MG/2ML
125 INJECTION, POWDER, LYOPHILIZED, FOR SOLUTION INTRAMUSCULAR; INTRAVENOUS ONCE
Status: COMPLETED | OUTPATIENT
Start: 2022-11-02 | End: 2022-11-02

## 2022-11-02 RX ORDER — SODIUM CHLORIDE 9 MG/ML
INJECTION, SOLUTION INTRAVENOUS CONTINUOUS
Status: DISCONTINUED | OUTPATIENT
Start: 2022-11-02 | End: 2022-11-02 | Stop reason: HOSPADM

## 2022-11-02 RX ORDER — MAGNESIUM SULFATE 1 G/100ML
1000 INJECTION INTRAVENOUS ONCE
Status: COMPLETED | OUTPATIENT
Start: 2022-11-02 | End: 2022-11-02

## 2022-11-02 RX ORDER — SODIUM CHLORIDE 0.9 % (FLUSH) 0.9 %
3 SYRINGE (ML) INJECTION EVERY 8 HOURS
Status: DISCONTINUED | OUTPATIENT
Start: 2022-11-02 | End: 2022-11-02 | Stop reason: HOSPADM

## 2022-11-02 RX ADMIN — IPRATROPIUM BROMIDE AND ALBUTEROL SULFATE 1 AMPULE: 2.5; .5 SOLUTION RESPIRATORY (INHALATION) at 13:19

## 2022-11-02 RX ADMIN — MAGNESIUM SULFATE HEPTAHYDRATE 1000 MG: 1 INJECTION, SOLUTION INTRAVENOUS at 12:47

## 2022-11-02 RX ADMIN — SODIUM CHLORIDE: 9 INJECTION, SOLUTION INTRAVENOUS at 12:47

## 2022-11-02 RX ADMIN — METHYLPREDNISOLONE SODIUM SUCCINATE 125 MG: 125 INJECTION, POWDER, FOR SOLUTION INTRAMUSCULAR; INTRAVENOUS at 12:46

## 2022-11-02 ASSESSMENT — ENCOUNTER SYMPTOMS
TROUBLE SWALLOWING: 0
BACK PAIN: 0
STRIDOR: 0
CHEST TIGHTNESS: 0
SINUS PRESSURE: 0
ABDOMINAL PAIN: 0
SORE THROAT: 0
CHOKING: 0
EYE DISCHARGE: 0
VOMITING: 0
COUGH: 1
BLOOD IN STOOL: 0
EYE PAIN: 0
SHORTNESS OF BREATH: 1
VOICE CHANGE: 0
CONSTIPATION: 0
FACIAL SWELLING: 0
EYE REDNESS: 0
DIARRHEA: 0
WHEEZING: 1

## 2022-11-02 NOTE — ED NOTES
EKG at bedside.  Electronically signed by Rosmery Estrada RN on 11/2/2022 at 12:35 PM       Rosmery Estrada RN  11/02/22 [id number]

## 2022-11-02 NOTE — ED NOTES
Patient given DC instructions and one written prescription. Patient is Axox4. He calls his friend Marisol Schneider for ride home. Patient is requesting to be sent home with sandwich and pop, patient given food. Patient ambulates to main entrance with no assistive devices with a steady gait.  Electronically signed by Latrell Gutierrez RN on 11/2/2022 at 2:44 PM       Latrell Gutierrez RN  11/02/22 7411

## 2022-11-02 NOTE — ED TRIAGE NOTES
Patient to ER by EMS for SOB that has increased. Patient 89% on RA. On 4L NC patient is SATing 94%.  Electronically signed by Breanna Kirkland RN on 11/2/2022 at 12:32 PM

## 2022-11-02 NOTE — ED NOTES
Patient placed on heart monitor. Patient is resting in bed watching tv.  Electronically signed by Rosangela Reeves RN on 11/2/2022 at 12:58 PM       Rosangela Reeves RN  11/02/22 [id number]

## 2022-11-02 NOTE — ED PROVIDER NOTES
[facility] ED  eMERGENCY dEPARTMENT eNCOUnter      Pt Name: Dorian Farias  MRN: [id number]  [place] 1955  Date of evaluation: 11/2/2022  Provider: Sandhya Cutler MD    [address] [place] [address]       Chief Complaint   Patient presents with    Shortness of Breath     TORY OF PRESENT ILLNESS   (Location/Symptom, Timing/Onset,Context/Setting, Quality, Duration, Modifying Factors, Severity)  Note limiting factors. Dorian Farias is a 61 y.o. male who presents to the emergency department patient well-known to me from previous encounters this emergency for similar history in the past patient history of noncompliance history of asthma/COPD smoker chronic cocaine use alcohol abuse hyperlipidemia obesity drug-seeking behavior as per medical record along with obesity GE reflux chronic back pain due to chronic spinal stenosis pneumonia as undergone thoracentesis at [facility] [place] few months ago and ever since he has been doing fine patient also has history of gout and hypertension undergoing cardiac catheterization as well as surgery for his knees especially the left knee hernia repair as well as undergone colonoscopy in the past patient had increasing short of breath paramedics were seen he was standing outside without oxygen and he was low on oxygen as per paramedics they put him on oxygen and brought him here in stable condition given DuoNeb aerosol treatment in route due to the bilateral wheezing no fever no chills no vomiting or diarrhea denies any chest discomfort patient does take home oxygen at nighttime    HPI    NursingNotes were reviewed. REVIEW OF SYSTEMS    (2-9 systems for level 4, 10 or more for level 5)     Review of Systems   Constitutional: Negative. Negative for activity change and fever. HENT:  Negative for congestion, drooling, facial swelling, mouth sores, nosebleeds, sinus pressure, sore throat, trouble swallowing and voice change.     Eyes:  Negative for pain, discharge, redness and visual disturbance. Respiratory:  Positive for cough, shortness of breath and wheezing. Negative for choking, chest tightness and stridor. Cardiovascular:  Negative for chest pain, palpitations and leg swelling. Gastrointestinal:  Negative for abdominal pain, blood in stool, constipation, diarrhea and vomiting. Endocrine: Negative for cold intolerance, polyphagia and polyuria. Genitourinary:  Negative for dysuria, flank pain, frequency, genital sores and urgency. Musculoskeletal:  Negative for back pain, joint swelling, neck pain and neck stiffness. Skin:  Negative for pallor and rash. Neurological:  Negative for tremors, seizures, syncope, weakness, numbness and headaches. Hematological:  Negative for adenopathy. Does not bruise/bleed easily. Psychiatric/Behavioral:  Negative for agitation, behavioral problems, hallucinations and sleep disturbance. The patient is not hyperactive. All other systems reviewed and are negative. Except as noted above the remainder of the review of systems was reviewed and negative.        PAST MEDICAL HISTORY     Past Medical History:   Diagnosis Date    Alcohol abuse 10/27/2016    Anemia     Asthma     Cocaine abuse ([facility] 10/27/2016    Community acquired pneumonia of left lower lobe of lung 12/5/2016    COPD (chronic obstructive pulmonary disease) ([facility]     Depression 04/27/2020    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    Drug-seeking behavior 04/14/2015    Edema 12/10/2015    Erectile dysfunction     Gastroesophageal reflux disease 12/17/2018    Gout 10/27/2016    History of arthroscopy of both knees 10/27/2016    History of colon polyps 10/26/2021    House dust mite allergy 04/21/2014    Hyperlipidemia     Hypertension 10/27/2016    Injury to heart 10/27/2016    Insomnia 12/17/2018    Loculated pleural effusion     Medical non-compliance 02/22/2014    Morbid obesity due to excess calories ([facility] 12/08/2016    Osteoarthritis of both knees 12/08/2016    Personal history of tobacco use     Pleurisy 12/12/2016    Pneumonia 12/04/2016    caused hospital admission    Pre-diabetes 10/27/2016    Seasonal allergies 04/21/2014    Severe persistent asthma 10/27/2016    Severe sleep apnea 04/14/2015    Supraventricular tachycardia ([facility] 10/27/2016    Type 2 diabetes mellitus with albuminuria ([facility] 10/26/2021    Type 2 diabetes mellitus without complication, without long-term current use of insulin ([address] 10/26/2021         SURGICALHISTORY       Past Surgical History:   Procedure Laterality Date    ANTERIOR CRUCIATE LIGAMENT REPAIR      CARDIAC CATHETERIZATION      COLONOSCOPY N/A 07/15/2020    COLONOSCOPY WITH POLYPECTOMY performed by Robbin Samaniego MD at [place] [address] Left 11/27/2021    VATS/thoracotomy    TOTAL KNEE ARTHROPLASTY Left 08/09/2019    LEFT TOTAL KNEE ARTHROPLASTY performed by Allison Santana MD at [address] 11-7 [id number]/99/0646    UMBILICAL HERNIA REPAIR WITH MESH performed by Umu Arellano MD at [facility]       Previous Medications    ACETAMINOPHEN (ACETAMINOPHEN EXTRA STRENGTH) 500 MG TABLET    Take 2 tablets by mouth every 8 hours as needed for Pain    ALBUTEROL SULFATE HFA (PROVENTIL;VENTOLIN;PROAIR) 108 (90 BASE) MCG/ACT INHALER    Inhale 2 puffs into the lungs every 4 hours as needed for Wheezing or Shortness of Breath    AMLODIPINE (NORVASC) 10 MG TABLET    Take 1 tablet by mouth daily    BLOOD GLUCOSE MONITOR STRIPS    Test three times a day & as needed for symptoms of irregular blood glucose. Dispense sufficient amount for indicated testing frequency plus additional to accommodate PRN testing needs.     BLOOD GLUCOSE MONITORING SUPPL (ONE TOUCH ULTRA 2) W/DEVICE KIT        CETIRIZINE (ZYRTEC) 10 MG TABLET    Take 1 tablet by mouth daily    DOXYCYCLINE HYCLATE 100 MG TBEC    Take by mouth    ESCITALOPRAM (LEXAPRO) 10 MG TABLET    Take 1 tablet by mouth daily    FLUTICASONE (FLONASE) 50 MCG/ACT NASAL SPRAY    2 sprays by Nasal route daily    IPRATROPIUM-ALBUTEROL (DUONEB) 0.5-2.5 (3) MG/3ML SOLN NEBULIZER SOLUTION    Take 3 mLs by nebulization every 4 hours as needed for Shortness of Breath    LANCETS MISC    1 each by Does not apply route 3 times daily    LEVOFLOXACIN (LEVAQUIN) 750 MG TABLET    TAKE 1 TABLET BY MOUTH EVERY DAY    LOSARTAN (COZAAR) 25 MG TABLET    Take 1 tablet by mouth daily    LOVASTATIN (MEVACOR) 20 MG TABLET    Take 1 tablet by mouth nightly    MELOXICAM (MOBIC) 15 MG TABLET    Take 1 tablet by mouth daily    METFORMIN (GLUCOPHAGE) 500 MG TABLET    Take 1 tablet by mouth 2 times daily (with meals)    MONTELUKAST (SINGULAIR) 10 MG TABLET    Take 1 tablet by mouth nightly    NICOTINE (NICODERM CQ) 14 MG/24HR    1 PATCH TRANSDERMAL EVERY 24 HOURS    OMEPRAZOLE (PRILOSEC) 20 MG DELAYED RELEASE CAPSULE        PANTOPRAZOLE (PROTONIX) 40 MG TABLET    Take 1 tablet by mouth daily    POLYETHYLENE GLYCOL (GLYCOLAX) 17 GM/SCOOP POWDER    Take 17 g by mouth daily    SILDENAFIL (VIAGRA) 100 MG TABLET    Take 1 tablet by mouth as needed for Erectile Dysfunction    SODIUM CHLORIDE (OCEAN, BABY AYR) 0.65 % NASAL SPRAY    2 sprays by Nasal route three times daily    SYMBICORT 160-4.5 MCG/ACT AERO    Inhale 2 puffs into the lungs 2 times daily    TRAZODONE (DESYREL) 50 MG TABLET    Take 1 tablet by mouth nightly       ALLERGIES     Fish-derived products, Iodine, Seasonal, Other, Penicillin g, Shellfish allergy, and Pcn [penicillins]    FAMILY HISTORY       Family History   Problem Relation Age of Onset    Arthritis Mother     Asthma Mother     High Cholesterol Mother     Other Mother         aneurysm    Diabetes Father     Stroke Maternal Grandmother     Cancer Maternal Grandfather     Hypertension Other     COPD Neg Hx           SOCIAL HISTORY       Social History     Socioeconomic History    Marital status: Single     Spouse name: n/a    Number of children: 1    Years of education: 15 Organization Meetings: Never    Marital Status:    Housing Stability: Unknown    Unable to Pay for Housing in the Last Year: No    Unstable Housing in the Last Year: No       SCREENINGS    Amari Coma Scale  Eye Opening: Spontaneous  Best Verbal Response: Oriented  Best Motor Response: Obeys commands  Amari Coma Scale Score: 15 @FLOW([id number])@      PHYSICAL EXAM    (up to 7 for level 4, 8 or more for level 5)     ED Triage Vitals [11/02/22 1228]   BP Temp Temp Source Heart Rate Resp SpO2 Height Weight   (!) 148/86 98.4 °F (36.9 °C) Oral 62 20 94 % -- 270 lb (122.5 kg)       Physical Exam  Vitals and nursing note reviewed. Constitutional:       General: He is not in acute distress. Appearance: He is well-developed. He is not ill-appearing, toxic-appearing or diaphoretic. Comments: Alert cooperative patient nontoxic appearance afebrile talks in full sentences   HENT:      Head: Normocephalic. Mouth/Throat:      Mouth: Mucous membranes are moist.   Neck:      Thyroid: No thyromegaly. Vascular: No hepatojugular reflux or JVD. Trachea: No tracheal deviation. Cardiovascular:      Rate and Rhythm: Normal rate and regular rhythm. Heart sounds: Normal heart sounds. No murmur heard. No gallop. Pulmonary:      Effort: No respiratory distress. Breath sounds: Examination of the right-lower field reveals wheezing. Examination of the left-lower field reveals wheezing. Decreased breath sounds and wheezing present. No rales. Chest:      Chest wall: No mass, deformity, tenderness, crepitus or edema. There is no dullness to percussion. Abdominal:      General: Bowel sounds are normal.      Palpations: Abdomen is soft. There is no mass. Tenderness: There is no rebound. Musculoskeletal:         General: No tenderness. Normal range of motion. Cervical back: Normal range of motion and neck supple. Right lower leg: No tenderness. No edema.       Left lower leg: No tenderness. No edema. Lymphadenopathy:      Cervical: No cervical adenopathy. Skin:     General: Skin is warm. Capillary Refill: Capillary refill takes less than 2 seconds. Coloration: Skin is not cyanotic or pale. Findings: No ecchymosis, erythema or rash. Neurological:      Mental Status: He is alert and oriented to person, place, and time. Cranial Nerves: No cranial nerve deficit. Motor: No abnormal muscle tone. Psychiatric:         Mood and Affect: Mood normal. Mood is not anxious. Behavior: Behavior normal. Behavior is not agitated. Thought Content: Thought content normal.       DIAGNOSTIC RESULTS     EKG: All EKG's are interpreted by the Emergency Department Physician who either signs or Co-signsthis chart in the absence of a cardiologist.        RADIOLOGY:   Dev [place] such as CT, Ultrasound and MRI are read by the radiologist. Plain radiographic images are visualized and preliminarily interpreted by the emergency physician with the below findings:        Interpretation per the Radiologist below, if available at the time ofthis note:    XR CHEST PORTABLE   Final Result   Chronic changes of the left hemithorax as noted above. No acute airspace consolidation.                ED BEDSIDE ULTRASOUND:   Performed by ED Physician - none    LABS:  Labs Reviewed   COMPREHENSIVE METABOLIC PANEL - Abnormal; Notable for the following components:       Result Value    Alkaline Phosphatase 122 (*)     All other components within normal limits   CBC WITH AUTO DIFFERENTIAL - Abnormal; Notable for the following components:    RBC 4.27 (*)     Hemoglobin 13.1 (*)     Hematocrit 40.6 (*)     MCV 95.1 (*)     RDW 14.7 (*)     All other components within normal limits   COVID-19, RAPID   CULTURE, BLOOD 1   CULTURE, BLOOD 2   LACTIC ACID   TROPONIN   BRAIN NATRIURETIC PEPTIDE   PROTIME-INR       All other labs were within normal range or not returned as of this dictation. EMERGENCY DEPARTMENT COURSE and DIFFERENTIAL DIAGNOSIS/MDM:   Vitals:    Vitals:    11/02/22 1228 11/02/22 1245 11/02/22 1300   BP: (!) 148/86 (!) 147/86 (!) 168/95   Pulse: 62  62   Resp: 20  19   Temp: 98.4 °F (36.9 °C)     TempSrc: Oral     SpO2: 94%  95%   Weight: 270 lb (122.5 kg)             MDM      CRITICAL CARE TIME   Total Critical Care time was  minutes, excluding separately reportableprocedures. There was a high probability of clinicallysignificant/life threatening deterioration in the patient's condition which required my urgent intervention.   CONSULTS:  None    PROCEDURES:  Unless otherwise noted below, none     Procedures    FINAL IMPRESSION      1. COPD exacerbation (HCC)          DISPOSITION/PLAN   DISPOSITION        PATIENT REFERRED TO:  [place], MD  [place] [address]  [phone]    In 2 days      DISCHARGE MEDICATIONS:  New Prescriptions    PREDNISONE (DELTASONE) 20 MG TABLET    Take 2 tablets by mouth daily for 5 doses          (Please note that portions of this note were completed with a voice recognition program.  Efforts were made to edit the dictations but occasionally words are mis-transcribed.)    Lala Garcia MD (electronically signed)  Attending Emergency Physician        Lala Garcia MD  11/02/22 [id number]

## 2022-11-03 LAB
EKG ATRIAL RATE: 58 BPM
EKG P AXIS: 75 DEGREES
EKG P-R INTERVAL: 166 MS
EKG Q-T INTERVAL: 430 MS
EKG QRS DURATION: 106 MS
EKG QTC CALCULATION (BAZETT): 422 MS
EKG R AXIS: 56 DEGREES
EKG T AXIS: 60 DEGREES
EKG VENTRICULAR RATE: 58 BPM

## 2022-11-03 PROCEDURE — 93010 ELECTROCARDIOGRAM REPORT: CPT | Performed by: INTERNAL MEDICINE

## 2022-11-07 ENCOUNTER — CARE COORDINATION (OUTPATIENT)
Dept: CARE COORDINATION | Age: 67
End: 2022-11-07

## 2022-11-07 DIAGNOSIS — J44.9 CHRONIC OBSTRUCTIVE PULMONARY DISEASE, UNSPECIFIED COPD TYPE (HCC): Chronic | ICD-10-CM

## 2022-11-07 LAB
BLOOD CULTURE, ROUTINE: NORMAL
CULTURE, BLOOD 2: NORMAL

## 2022-11-07 ASSESSMENT — ENCOUNTER SYMPTOMS: DYSPNEA ASSOCIATED WITH: MINIMAL EXERTION

## 2022-11-07 NOTE — CARE COORDINATION
Ambulatory Care Coordination Note  11/7/2022    ACC: Dominga Weaver, RN      COPD    Shahid West says that he is feeling a little better  He has completed the steroid pack  He denies any issues with fever, chills, mucus or cough. He tells me that he is only has a few puffs on his rescue inhaler   He did call over to the pharmacy for a refill but they told him that he needs a reorder. It does look like Dr Chinyere Brooks already had written for one refill in October. I will follow up with his pharmacy. He adds that he does use his nebulizer 2-3 times per day. He does continue to have exertional SOB and I have explained the importance of conserving energy and  breaking up his activities. He is not having any changes in his eating or sleep. I have reached out to [facility], I spoke with the pharmacist and he will the rescue inhaler  He did have the original order filled 10/6 and the second refill was picked up 10/24/22  I will update Shahid Wset on the rescue inhaler    PLAN:  Shahid West will take his rescue inhaler only as prescribed  Shahid West will monitor his symptoms using the zone tools  He will call myself or the office for any worsening of symptoms or if he drops into the yellow zone. Offered patient enrollment in the Remote Patient Monitoring (RPM) program for in-home monitoring: NA. Lab Results       None            Care Coordination Interventions    Referral from Primary Care Provider: No  Suggested Interventions and Community Resources          Goals Addressed    None         Prior to Admission medications    Medication Sig Start Date End Date Taking?  Authorizing Provider   predniSONE (DELTASONE) 20 MG tablet Take 2 tablets by mouth daily for 5 doses 11/2/22 11/7/22  Fahad Swartz MD   ipratropium-albuterol (DUONEB) 0.5-2.5 (3) MG/3ML SOLN nebulizer solution Take 3 mLs by nebulization every 4 hours as needed for Shortness of Breath 10/6/22   Anastasiia Guevara MD   acetaminophen (ACETAMINOPHEN EXTRA STRENGTH) 500 MG tablet Take 2 tablets by mouth every 8 hours as needed for Pain 10/6/22   Emelyn Porter MD   albuterol sulfate HFA (PROVENTIL;VENTOLIN;PROAIR) 108 (90 Base) MCG/ACT inhaler Inhale 2 puffs into the lungs every 4 hours as needed for Wheezing or Shortness of Breath 10/6/22   Emelyn Porter MD   amLODIPine (NORVASC) 10 MG tablet Take 1 tablet by mouth daily 10/6/22   Emelyn Porter MD   cetirizine (ZYRTEC) 10 MG tablet Take 1 tablet by mouth daily 10/6/22   Emelyn Porter MD   escitalopram (LEXAPRO) 10 MG tablet Take 1 tablet by mouth daily 10/6/22   Emelyn Porter MD   fluticasone [facility]) 50 MCG/ACT nasal spray 2 sprays by Nasal route daily  Patient not taking: Reported on 11/2/2022 10/6/22   Emelyn Porter MD   losartan (COZAAR) 25 MG tablet Take 1 tablet by mouth daily 10/6/22   Emelyn Porter MD   lovastatin (MEVACOR) 20 MG tablet Take 1 tablet by mouth nightly 10/6/22   Emelyn Porter MD   meloxicam (MOBIC) 15 MG tablet Take 1 tablet by mouth daily 10/6/22   Emelyn Porter MD   metFORMIN (GLUCOPHAGE) 500 MG tablet Take 1 tablet by mouth 2 times daily (with meals) 10/6/22   Emelyn Porter MD   montelukast (SINGULAIR) 10 MG tablet Take 1 tablet by mouth nightly 10/6/22   Emelyn Porter MD   pantoprazole (PROTONIX) 40 MG tablet Take 1 tablet by mouth daily 10/6/22   Emelyn Porter MD   traZODone (DESYREL) 50 MG tablet Take 1 tablet by mouth nightly 10/6/22   Emelyn Porter MD   SYMBICORT 160-4.5 MCG/ACT AERO Inhale 2 puffs into the lungs 2 times daily 10/6/22   Emelyn Porter MD   sildenafil (VIAGRA) 100 MG tablet Take 1 tablet by mouth as needed for Erectile Dysfunction  Patient not taking: Reported on 10/10/2022 8/3/22   Emelyn Porter MD   Doxycycline Hyclate 100 MG TBEC Take by mouth  Patient not taking: Reported on 10/10/2022    Historical Provider, MD   levoFLOXacin (LEVAQUIN) 750 MG tablet TAKE 1 TABLET BY MOUTH EVERY DAY  Patient not taking: Reported on 10/10/2022 4/20/22   Historical Provider, MD   nicotine (NICODERM CQ) 14 MG/24HR 1 PATCH TRANSDERMAL EVERY 24 HOURS  Patient not taking: Reported on 10/10/2022 4/20/22   Historical Provider, MD   blood glucose monitor strips Test three times a day & as needed for symptoms of irregular blood glucose. Dispense sufficient amount for indicated testing frequency plus additional to accommodate PRN testing needs.   Patient not taking: Reported on 10/10/2022 4/5/22   Payton Dean MD   omeprazole (PRILOSEC) 20 MG delayed release capsule  12/21/21   Historical Provider, MD   Blood Glucose Monitoring Suppl (ONE TOUCH ULTRA 2) w/Device KIT  10/20/21   Historical Provider, MD   Lancets MISC 1 each by Does not apply route 3 times daily  Patient not taking: Reported on 10/10/2022 10/20/21   Emilie Brandt MD   sodium chloride (OCEAN, BABY AYR) 0.65 % nasal spray 2 sprays by Nasal route three times daily  Patient not taking: Reported on 10/10/2022 9/28/21   Gideon Trinh MD   polyethylene glycol [facility]) 17 GM/SCOOP powder Take 17 g by mouth daily  Patient not taking: Reported on 10/10/2022 4/29/21   Payton Dean MD       Future Appointments   Date Time Provider Constance Espitia   4/6/2023  3:00 PM MD Romero Roman [address]    and   COPD Assessment    Does the patient understand envrionmental exposure?: No  Is the patient able to verbalize Rescue vs. Long Acting medications?: Yes  Does the patient have a nebulizer?: Yes  Does the patient use a space with inhaled medications?: No            Symptoms:

## 2022-11-08 RX ORDER — ALBUTEROL SULFATE 90 UG/1
2 AEROSOL, METERED RESPIRATORY (INHALATION) EVERY 4 HOURS PRN
Qty: 18 EACH | Refills: 1 | Status: SHIPPED | OUTPATIENT
Start: 2022-11-08

## 2022-11-12 ENCOUNTER — HOSPITAL ENCOUNTER (EMERGENCY)
Age: 67
Discharge: HOME | End: 2022-11-12
Attending: EMERGENCY MEDICINE
Payer: MEDICARE

## 2022-11-12 ENCOUNTER — APPOINTMENT (OUTPATIENT)
Dept: GENERAL RADIOLOGY | Age: 67
End: 2022-11-12
Payer: MEDICARE

## 2022-11-12 VITALS
TEMPERATURE: 98.2 F | HEART RATE: 64 BPM | WEIGHT: 260 LBS | HEIGHT: 72 IN | BODY MASS INDEX: 35.21 KG/M2 | SYSTOLIC BLOOD PRESSURE: 135 MMHG | DIASTOLIC BLOOD PRESSURE: 88 MMHG | RESPIRATION RATE: 22 BRPM | OXYGEN SATURATION: 93 %

## 2022-11-12 DIAGNOSIS — M54.30 SCIATICA, UNSPECIFIED LATERALITY: Primary | ICD-10-CM

## 2022-11-12 PROCEDURE — 99284 EMERGENCY DEPT VISIT MOD MDM: CPT

## 2022-11-12 PROCEDURE — 6370000000 HC RX 637 (ALT 250 FOR IP): Performed by: EMERGENCY MEDICINE

## 2022-11-12 PROCEDURE — 72110 X-RAY EXAM L-2 SPINE 4/>VWS: CPT

## 2022-11-12 PROCEDURE — 6360000002 HC RX W HCPCS: Performed by: EMERGENCY MEDICINE

## 2022-11-12 PROCEDURE — 96372 THER/PROPH/DIAG INJ SC/IM: CPT

## 2022-11-12 RX ORDER — OXYCODONE HYDROCHLORIDE AND ACETAMINOPHEN 5; 325 MG/1; MG/1
1 TABLET ORAL
Status: COMPLETED | OUTPATIENT
Start: 2022-11-12 | End: 2022-11-12

## 2022-11-12 RX ORDER — CYCLOBENZAPRINE HCL 10 MG
10 TABLET ORAL NIGHTLY PRN
Qty: 10 TABLET | Refills: 0 | Status: SHIPPED | OUTPATIENT
Start: 2022-11-12 | End: 2022-11-22

## 2022-11-12 RX ORDER — DIAZEPAM 5 MG/1
5 TABLET ORAL ONCE
Status: COMPLETED | OUTPATIENT
Start: 2022-11-12 | End: 2022-11-12

## 2022-11-12 RX ORDER — TRAMADOL HYDROCHLORIDE 50 MG/1
50 TABLET ORAL EVERY 4 HOURS PRN
Qty: 18 TABLET | Refills: 0 | Status: SHIPPED | OUTPATIENT
Start: 2022-11-12 | End: 2022-11-15 | Stop reason: ALTCHOICE

## 2022-11-12 RX ORDER — KETOROLAC TROMETHAMINE 30 MG/ML
30 INJECTION, SOLUTION INTRAMUSCULAR; INTRAVENOUS ONCE
Status: COMPLETED | OUTPATIENT
Start: 2022-11-12 | End: 2022-11-12

## 2022-11-12 RX ADMIN — OXYCODONE AND ACETAMINOPHEN 1 TABLET: 5; 325 TABLET ORAL at 14:20

## 2022-11-12 RX ADMIN — KETOROLAC TROMETHAMINE 30 MG: 30 INJECTION, SOLUTION INTRAMUSCULAR at 14:21

## 2022-11-12 RX ADMIN — DIAZEPAM 5 MG: 5 TABLET ORAL at 14:20

## 2022-11-12 ASSESSMENT — ENCOUNTER SYMPTOMS
BACK PAIN: 1
COUGH: 0
SORE THROAT: 0
SHORTNESS OF BREATH: 0
ABDOMINAL PAIN: 0
NAUSEA: 0
VOMITING: 0
DIARRHEA: 0

## 2022-11-12 ASSESSMENT — PAIN SCALES - GENERAL: PAINLEVEL_OUTOF10: 10

## 2022-11-12 NOTE — ED PROVIDER NOTES
[address] ED  eMERGENCYdEPARTMENT eNCOUnter      Pt Name: Yuliya Escudero  MRN: [id number]  [place] 1955  Date of evaluation: 11/12/2022  Johnathan Goel MD    CHIEF COMPLAINT           HPI  Yuliya Escudero is a 61 y.o. male per chart review has a h/o COPD, asthma, cocaine abuse, HTN, Hpl presents to the ED with back pain. Pt notes he was taking the trash out and noted moderate, constant, aching, low back pain radiating down R leg. Pt denies fever, IV drug abuse, incontinence, cp, sob, ab pain, dysuria, diarrhea. Feels like his sciatica. ROS  Review of Systems   Constitutional:  Negative for activity change, chills and fever. HENT:  Negative for ear pain and sore throat. Eyes:  Negative for visual disturbance. Respiratory:  Negative for cough and shortness of breath. Cardiovascular:  Negative for chest pain, palpitations and leg swelling. Gastrointestinal:  Negative for abdominal pain, diarrhea, nausea and vomiting. Genitourinary:  Negative for dysuria. Musculoskeletal:  Positive for back pain. Skin:  Negative for rash. Neurological:  Negative for dizziness and weakness. Except as noted above the remainder of the review of systems was reviewed and negative.        PAST MEDICAL HISTORY     Past Medical History:   Diagnosis Date    Alcohol abuse 10/27/2016    Anemia     Asthma     Cocaine abuse ([facility] [address] 10/27/2016    Community acquired pneumonia of left lower lobe of lung 12/5/2016    COPD (chronic obstructive pulmonary disease) ([facility] [address]     Depression 04/27/2020    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    Drug-seeking behavior 04/14/2015    Edema 12/10/2015    Erectile dysfunction     Gastroesophageal reflux disease 12/17/2018    Gout 10/27/2016    History of arthroscopy of both knees 10/27/2016    History of colon polyps 10/26/2021    House dust mite allergy 04/21/2014    Hyperlipidemia     Hypertension 10/27/2016    Injury to heart 10/27/2016    Insomnia 12/17/2018 Loculated pleural effusion     Medical non-compliance 02/22/2014    Morbid obesity due to excess calories ([facility] [address] 12/08/2016    Osteoarthritis of both knees 12/08/2016    Personal history of tobacco use     Pleurisy 12/12/2016    Pneumonia 12/04/2016    caused hospital admission    Pre-diabetes 10/27/2016    Seasonal allergies 04/21/2014    Severe persistent asthma 10/27/2016    Severe sleep apnea 04/14/2015    Supraventricular tachycardia ([facility] [address] 10/27/2016    Type 2 diabetes mellitus with albuminuria ([facility] [address] 10/26/2021    Type 2 diabetes mellitus without complication, without long-term current use of insulin ([facility] [address] 10/26/2021         SURGICAL HISTORY       Past Surgical History:   Procedure Laterality Date    ANTERIOR CRUCIATE LIGAMENT REPAIR      CARDIAC CATHETERIZATION      COLONOSCOPY N/A 07/15/2020    COLONOSCOPY WITH POLYPECTOMY performed by Aníbal Palacio MD at [place] [address] Left 11/27/2021    VATS/thoracotomy    TOTAL KNEE ARTHROPLASTY Left 08/09/2019    LEFT TOTAL KNEE ARTHROPLASTY performed by Cynthia Goff MD at [place] [address] 78/56/0943    UMBILICAL HERNIA REPAIR WITH MESH performed by Jann Abdul MD at [address] [place] [address]       Previous Medications    ACETAMINOPHEN (ACETAMINOPHEN EXTRA STRENGTH) 500 MG TABLET    Take 2 tablets by mouth every 8 hours as needed for Pain    ALBUTEROL SULFATE HFA (PROVENTIL;VENTOLIN;PROAIR) 108 (90 BASE) MCG/ACT INHALER    Inhale 2 puffs into the lungs every 4 hours as needed for Wheezing or Shortness of Breath    AMLODIPINE (NORVASC) 10 MG TABLET    Take 1 tablet by mouth daily    BLOOD GLUCOSE MONITOR STRIPS    Test three times a day & as needed for symptoms of irregular blood glucose. Dispense sufficient amount for indicated testing frequency plus additional to accommodate PRN testing needs.     BLOOD GLUCOSE MONITORING SUPPL (ONE TOUCH ULTRA 2) W/DEVICE KIT        CETIRIZINE (ZYRTEC) 10 MG TABLET Take 1 tablet by mouth daily    DOXYCYCLINE HYCLATE 100 MG TBEC    Take by mouth    ESCITALOPRAM (LEXAPRO) 10 MG TABLET    Take 1 tablet by mouth daily    FLUTICASONE (FLONASE) 50 MCG/ACT NASAL SPRAY    2 sprays by Nasal route daily    IPRATROPIUM-ALBUTEROL (DUONEB) 0.5-2.5 (3) MG/3ML SOLN NEBULIZER SOLUTION    Take 3 mLs by nebulization every 4 hours as needed for Shortness of Breath    LANCETS MISC    1 each by Does not apply route 3 times daily    LEVOFLOXACIN (LEVAQUIN) 750 MG TABLET    TAKE 1 TABLET BY MOUTH EVERY DAY    LOSARTAN (COZAAR) 25 MG TABLET    Take 1 tablet by mouth daily    LOVASTATIN (MEVACOR) 20 MG TABLET    Take 1 tablet by mouth nightly    MELOXICAM (MOBIC) 15 MG TABLET    Take 1 tablet by mouth daily    METFORMIN (GLUCOPHAGE) 500 MG TABLET    Take 1 tablet by mouth 2 times daily (with meals)    MONTELUKAST (SINGULAIR) 10 MG TABLET    Take 1 tablet by mouth nightly    NICOTINE (NICODERM CQ) 14 MG/24HR    1 PATCH TRANSDERMAL EVERY 24 HOURS    OMEPRAZOLE (PRILOSEC) 20 MG DELAYED RELEASE CAPSULE        PANTOPRAZOLE (PROTONIX) 40 MG TABLET    Take 1 tablet by mouth daily    POLYETHYLENE GLYCOL (GLYCOLAX) 17 GM/SCOOP POWDER    Take 17 g by mouth daily    SILDENAFIL (VIAGRA) 100 MG TABLET    Take 1 tablet by mouth as needed for Erectile Dysfunction    SODIUM CHLORIDE (OCEAN, BABY AYR) 0.65 % NASAL SPRAY    2 sprays by Nasal route three times daily    SYMBICORT 160-4.5 MCG/ACT AERO    Inhale 2 puffs into the lungs 2 times daily    TRAZODONE (DESYREL) 50 MG TABLET    Take 1 tablet by mouth nightly       ALLERGIES     Fish-derived products, Iodine, Seasonal, Other, Penicillin g, Shellfish allergy, and Pcn [penicillins]    FAMILY HISTORY       Family History   Problem Relation Age of Onset    Arthritis Mother     Asthma Mother     High Cholesterol Mother     Other Mother         aneurysm    Diabetes Father     Stroke Maternal Grandmother     Cancer Maternal Grandfather     Hypertension Other     COPD Neg Hx           SOCIAL HISTORY       Social History     Socioeconomic History    Marital status: Single     Spouse name: n/a    Number of children: 1    Years of education: 12    Highest education level: None   Occupational History    Occupation: Disability     Employer: NONE   Tobacco Use    Smoking status: Light Smoker     Packs/day: 0.25     Years: 30.00     Pack years: 7.50     Types: Cigarettes, Cigars     Start date: 1988     Last attempt to quit: 10/1/2013     Years since quittin.1    Smokeless tobacco: Never    Tobacco comments:     doesnt buy only smokes if someone gives him one    Vaping Use    Vaping Use: Never used   Substance and Sexual Activity    Alcohol use: Not Currently     Alcohol/week: 4.0 standard drinks     Types: 2 Cans of beer, 2 Shots of liquor per week    Drug use: Yes     Types: Cocaine, Marijuana (Weed)     Comment: no cocaine x 1 year, marijuana weekly    Sexual activity: Not Currently     Partners: Female   Social History Narrative    Lives in an apartment    15 steps to get up to the apartment    Asking for hospital bed and shower chair 2021 sent request to pcp office     Has 1 daughter who lives in [facility] per patient 2 grandchildren. Per patient does not see his daughter very often.      Social Determinants of Health     Financial Resource Strain: Medium Risk    Difficulty of Paying Living Expenses: Somewhat hard   Food Insecurity: No Food Insecurity    Worried About Running Out of Food in the Last Year: Never true    Ran Out of Food in the Last Year: Never true   Physical Activity: Inactive    Days of Exercise per Week: 0 days    Minutes of Exercise per Session: 0 min   Stress: No Stress Concern Present    Feeling of Stress : Not at all   Social Connections: Socially Isolated    Frequency of Communication with Friends and Family: Once a week    Frequency of Social Gatherings with Friends and Family: Never    Attends Anabaptist Services: Never    Active Member of Clubs or Organizations: No    Attends Club or Organization Meetings: Never    Marital Status:    Housing Stability: Unknown    Unable to Pay for Housing in the Last Year: No    Unstable Housing in the Last Year: No         PHYSICAL EXAM       ED Triage Vitals   BP Temp Temp src Pulse Resp SpO2 Height Weight   -- -- -- -- -- -- -- --       Physical Exam  Vitals and nursing note reviewed. Constitutional:       Appearance: He is well-developed. HENT:      Head: Normocephalic. Right Ear: External ear normal.      Left Ear: External ear normal.   Eyes:      Conjunctiva/sclera: Conjunctivae normal.      Pupils: Pupils are equal, round, and reactive to light. Cardiovascular:      Rate and Rhythm: Normal rate and regular rhythm. Heart sounds: Normal heart sounds. Pulmonary:      Effort: Pulmonary effort is normal.      Breath sounds: Normal breath sounds. Abdominal:      General: Bowel sounds are normal. There is no distension. Palpations: Abdomen is soft. Tenderness: There is no abdominal tenderness. Musculoskeletal:         General: Normal range of motion. Cervical back: Normal range of motion and neck supple. Comments: +R straight leg raise   Skin:     General: Skin is warm and dry. Neurological:      Mental Status: He is alert and oriented to person, place, and time. Psychiatric:         Mood and Affect: Mood normal.         MDM  62 yo male presents to the ED with back pain. Pt is afebrile, hemodynamically stable. Very low suspicion for cord compression due to lack of fever, incontinence, IV drug abuse, saddle anesthesia. Pt given PO percocet, PO valium, IM toradol in the ED. Lumbar XR negative for any acute process. Pt reassessed and doing well. Pt able to tolerate PO and able to walk without difficulty. Suspect sciatica. Pt educated about sciatica and given referral to nsgy. Pt given prescription for tramadol, flexeril.   Pt given back pain warning signs and will f/u with nsgy. Pt understands plan. FINAL IMPRESSION      1.  Sciatica, unspecified laterality          DISPOSITION/PLAN   DISPOSITION Decision To Discharge 11/12/2022 03:29:29 PM        DISCHARGE MEDICATIONS:  [unfilled]         Aleisha Kennedy MD(electronically signed)  Attending Emergency Physician           Aleisha Kennedy MD  11/12/22 [phone]

## 2022-11-12 NOTE — ED TRIAGE NOTES
Patient to ER with chronic back pain and most recently pain started to radiated down right leg.  Electronically signed by Yenifer Hutton RN on 11/12/2022 at 2:15 PM

## 2022-11-15 ENCOUNTER — OFFICE VISIT (OUTPATIENT)
Dept: FAMILY MEDICINE CLINIC | Age: 67
End: 2022-11-15
Payer: COMMERCIAL

## 2022-11-15 VITALS
DIASTOLIC BLOOD PRESSURE: 62 MMHG | WEIGHT: 260 LBS | TEMPERATURE: 97.3 F | OXYGEN SATURATION: 95 % | HEART RATE: 75 BPM | HEIGHT: 72 IN | BODY MASS INDEX: 35.21 KG/M2 | SYSTOLIC BLOOD PRESSURE: 104 MMHG

## 2022-11-15 DIAGNOSIS — M54.41 CHRONIC RIGHT-SIDED LOW BACK PAIN WITH RIGHT-SIDED SCIATICA: Primary | ICD-10-CM

## 2022-11-15 DIAGNOSIS — G89.29 CHRONIC RIGHT-SIDED LOW BACK PAIN WITH RIGHT-SIDED SCIATICA: Primary | ICD-10-CM

## 2022-11-15 DIAGNOSIS — J44.1 CHRONIC OBSTRUCTIVE PULMONARY DISEASE WITH ACUTE EXACERBATION (HCC): Chronic | ICD-10-CM

## 2022-11-15 DIAGNOSIS — M51.36 DDD (DEGENERATIVE DISC DISEASE), LUMBAR: ICD-10-CM

## 2022-11-15 PROBLEM — N18.30 CKD (CHRONIC KIDNEY DISEASE) STAGE 3, GFR 30-59 ML/MIN (HCC): Status: ACTIVE | Noted: 2021-12-14

## 2022-11-15 PROBLEM — F12.90 MARIJUANA USE: Status: ACTIVE | Noted: 2021-11-27

## 2022-11-15 PROBLEM — M51.369 DDD (DEGENERATIVE DISC DISEASE), LUMBAR: Status: ACTIVE | Noted: 2022-11-15

## 2022-11-15 PROCEDURE — G8417 CALC BMI ABV UP PARAM F/U: HCPCS | Performed by: FAMILY MEDICINE

## 2022-11-15 PROCEDURE — 3078F DIAST BP <80 MM HG: CPT | Performed by: FAMILY MEDICINE

## 2022-11-15 PROCEDURE — 3074F SYST BP LT 130 MM HG: CPT | Performed by: FAMILY MEDICINE

## 2022-11-15 PROCEDURE — 4004F PT TOBACCO SCREEN RCVD TLK: CPT | Performed by: FAMILY MEDICINE

## 2022-11-15 PROCEDURE — G8427 DOCREV CUR MEDS BY ELIG CLIN: HCPCS | Performed by: FAMILY MEDICINE

## 2022-11-15 PROCEDURE — 3023F SPIROM DOC REV: CPT | Performed by: FAMILY MEDICINE

## 2022-11-15 PROCEDURE — 99214 OFFICE O/P EST MOD 30 MIN: CPT | Performed by: FAMILY MEDICINE

## 2022-11-15 PROCEDURE — G8482 FLU IMMUNIZE ORDER/ADMIN: HCPCS | Performed by: FAMILY MEDICINE

## 2022-11-15 PROCEDURE — 3017F COLORECTAL CA SCREEN DOC REV: CPT | Performed by: FAMILY MEDICINE

## 2022-11-15 RX ORDER — PREDNISONE 10 MG/1
TABLET ORAL
Qty: 45 TABLET | Refills: 0 | Status: SHIPPED | OUTPATIENT
Start: 2022-11-15

## 2022-11-15 ASSESSMENT — ENCOUNTER SYMPTOMS
NAUSEA: 0
VOMITING: 0
CHEST TIGHTNESS: 0
COUGH: 0
BACK PAIN: 1
DIARRHEA: 0
SHORTNESS OF BREATH: 1
WHEEZING: 0
ABDOMINAL PAIN: 0

## 2022-11-15 ASSESSMENT — PATIENT HEALTH QUESTIONNAIRE - PHQ9
SUM OF ALL RESPONSES TO PHQ QUESTIONS 1-9: 4
4. FEELING TIRED OR HAVING LITTLE ENERGY: 1
9. THOUGHTS THAT YOU WOULD BE BETTER OFF DEAD, OR OF HURTING YOURSELF: 0
SUM OF ALL RESPONSES TO PHQ QUESTIONS 1-9: 4
SUM OF ALL RESPONSES TO PHQ QUESTIONS 1-9: 4
5. POOR APPETITE OR OVEREATING: 0
10. IF YOU CHECKED OFF ANY PROBLEMS, HOW DIFFICULT HAVE THESE PROBLEMS MADE IT FOR YOU TO DO YOUR WORK, TAKE CARE OF THINGS AT HOME, OR GET ALONG WITH OTHER PEOPLE: 0
2. FEELING DOWN, DEPRESSED OR HOPELESS: 1
6. FEELING BAD ABOUT YOURSELF - OR THAT YOU ARE A FAILURE OR HAVE LET YOURSELF OR YOUR FAMILY DOWN: 0
SUM OF ALL RESPONSES TO PHQ QUESTIONS 1-9: 4
SUM OF ALL RESPONSES TO PHQ9 QUESTIONS 1 & 2: 2
1. LITTLE INTEREST OR PLEASURE IN DOING THINGS: 1
3. TROUBLE FALLING OR STAYING ASLEEP: 1
8. MOVING OR SPEAKING SO SLOWLY THAT OTHER PEOPLE COULD HAVE NOTICED. OR THE OPPOSITE, BEING SO FIGETY OR RESTLESS THAT YOU HAVE BEEN MOVING AROUND A LOT MORE THAN USUAL: 0
7. TROUBLE CONCENTRATING ON THINGS, SUCH AS READING THE NEWSPAPER OR WATCHING TELEVISION: 0

## 2022-11-15 NOTE — ASSESSMENT & PLAN NOTE
Improved respiratory status overall. Tapered course of prednisone used for low back pain/sciatica should help provide further improvement. Patient was instructed to F/U with pulmonology office.

## 2022-11-15 NOTE — ASSESSMENT & PLAN NOTE
Pt with low back pain with sciatica and no red flag sx of fevers, chills, weight loss, weakness of legs, or bladder/bowel incontinence. Will treat conservatively with heat/ice, tapered course of prednisone, and formal physical therapy due to severity and duration/hx. I will have office staff help him establish care with neurosurgery to determine if he is a candidate for any procedural intervention. He was instructed to complete full course of physical therapy if not thought to be a candidate for any surgery. Would refer to pain management if back pain was still significant despite formal therapy.

## 2022-11-15 NOTE — PROGRESS NOTES
supplemental oxygen. Physical exam was documented to show wheezing with decreased breath sounds, otherwise unremarkable. No acute findings were noted on chest x-ray per radiology interpretation. COVID-19 testing was negative, troponin level was negative, BNP was normal, PT/INR was normal, lactic acid was normal, CMP was unremarkable, and CBC was unremarkable. Patient was given methylprednisolone and aerosols with improvement and was found stable for discharge home with burst course of prednisone 40 mg daily x5 days to follow-up with pulmonology and primary care as an outpatient for suspected COPD exacerbation. Patient reported that his respiratory status improved following burst course of prednisone, however, he reports taking out the trash 10 days later and noted acute right low back pain radiating to the right buttock and right leg rated 10 out of 10 in severity. He presented to [facility] on 11/12/2022 for this complaint. Vital signs were documented as unremarkable. Physical exam was documented to show positive straight leg testing on the right. X-ray of the lumbar spine was obtained and reported to show severe multilevel degenerative changes and facet arthropathy with diffuse disc height loss. Patient was given Percocet x1, Valium x1, and Toradol injection x1. He was diagnosed with right low back pain with sciatica and was found stable for discharge home with a 3-day course of tramadol and Flexeril. Recommendations were given to follow-up with neurosurgery as an outpatient. Patient states that his low back pain is still present, described as aching and and rated 8 out of 10 in severity with radiation to the right buttock and right lateral thigh. Patient denies any fever/chills/sweats, dysuria, hematuria, loss of bowel or bladder control, lower extremity paresthesias, or lower extremity weakness. Patient reports not having a visit established with neurosurgery.     Current Outpatient Medications on File Prior to Visit   Medication Sig Dispense Refill    traMADol (ULTRAM) 50 MG tablet Take 1 tablet by mouth every 4 hours as needed for Pain for up to 3 days. Intended supply: 3 days.  Take lowest dose possible to manage pain 18 tablet 0    cyclobenzaprine (FLEXERIL) 10 MG tablet Take 1 tablet by mouth nightly as needed for Muscle spasms 10 tablet 0    albuterol sulfate HFA (PROVENTIL;VENTOLIN;PROAIR) 108 (90 Base) MCG/ACT inhaler Inhale 2 puffs into the lungs every 4 hours as needed for Wheezing or Shortness of Breath 18 each 1    ipratropium-albuterol (DUONEB) 0.5-2.5 (3) MG/3ML SOLN nebulizer solution Take 3 mLs by nebulization every 4 hours as needed for Shortness of Breath 360 mL 1    acetaminophen (ACETAMINOPHEN EXTRA STRENGTH) 500 MG tablet Take 2 tablets by mouth every 8 hours as needed for Pain 90 tablet 1    amLODIPine (NORVASC) 10 MG tablet Take 1 tablet by mouth daily 90 tablet 1    cetirizine (ZYRTEC) 10 MG tablet Take 1 tablet by mouth daily 90 tablet 1    escitalopram (LEXAPRO) 10 MG tablet Take 1 tablet by mouth daily 90 tablet 1    losartan (COZAAR) 25 MG tablet Take 1 tablet by mouth daily 90 tablet 1    lovastatin (MEVACOR) 20 MG tablet Take 1 tablet by mouth nightly 90 tablet 1    meloxicam (MOBIC) 15 MG tablet Take 1 tablet by mouth daily 90 tablet 1    metFORMIN (GLUCOPHAGE) 500 MG tablet Take 1 tablet by mouth 2 times daily (with meals) 180 tablet 1    montelukast (SINGULAIR) 10 MG tablet Take 1 tablet by mouth nightly 90 tablet 1    pantoprazole (PROTONIX) 40 MG tablet Take 1 tablet by mouth daily 90 tablet 1    traZODone (DESYREL) 50 MG tablet Take 1 tablet by mouth nightly 90 tablet 1    SYMBICORT 160-4.5 MCG/ACT AERO Inhale 2 puffs into the lungs 2 times daily 30.6 g 1    fluticasone (FLONASE) 50 MCG/ACT nasal spray 2 sprays by Nasal route daily (Patient not taking: No sig reported) 48 g 1    sildenafil (VIAGRA) 100 MG tablet Take 1 tablet by mouth as needed for Erectile Dysfunction (Patient not taking: No sig reported) 6 tablet 1    Doxycycline Hyclate 100 MG TBEC Take by mouth (Patient not taking: No sig reported)      levoFLOXacin (LEVAQUIN) 750 MG tablet TAKE 1 TABLET BY MOUTH EVERY DAY (Patient not taking: No sig reported)      nicotine (NICODERM CQ) 14 MG/24HR 1 PATCH TRANSDERMAL EVERY 24 HOURS (Patient not taking: No sig reported)      blood glucose monitor strips Test three times a day & as needed for symptoms of irregular blood glucose. Dispense sufficient amount for indicated testing frequency plus additional to accommodate PRN testing needs. (Patient not taking: No sig reported) 100 strip 1    omeprazole (PRILOSEC) 20 MG delayed release capsule  (Patient not taking: No sig reported)      Blood Glucose Monitoring Suppl (ONE TOUCH ULTRA 2) w/Device KIT  (Patient not taking: No sig reported)      Lancets MISC 1 each by Does not apply route 3 times daily (Patient not taking: No sig reported) 200 each 5    sodium chloride (OCEAN, BABY AYR) 0.65 % nasal spray 2 sprays by Nasal route three times daily (Patient not taking: No sig reported) 1 each 0    polyethylene glycol (GLYCOLAX) 17 GM/SCOOP powder Take 17 g by mouth daily (Patient not taking: No sig reported) 510 g 1     No current facility-administered medications on file prior to visit. Allergies   Allergen Reactions    Fish-Derived Products Anaphylaxis    Iodine Anaphylaxis     Iodine-containing products, dyes, contrast dye    Seasonal Shortness Of Breath    Other      Other reaction(s): Swelling/Edema  Other reaction(s): Swelling/Edema    Penicillin G Other (See Comments)    Shellfish Allergy      Other reaction(s): Swelling/Edema    Pcn [Penicillins] Nausea And Vomiting        Review of Systems   Constitutional:  Negative for appetite change, chills, diaphoresis, fatigue and fever. Respiratory:  Positive for shortness of breath (chronic, with activity). Negative for cough, chest tightness and wheezing. Cardiovascular:  Negative for chest pain, palpitations and leg swelling. Gastrointestinal:  Negative for abdominal pain, diarrhea, nausea and vomiting. Genitourinary:  Negative for dysuria, frequency and urgency. Musculoskeletal:  Positive for back pain and gait problem. Skin:  Negative for rash. Neurological:  Negative for dizziness, syncope, weakness, light-headedness and numbness. Vitals:  /62 (Site: Left Upper Arm, Position: Sitting, Cuff Size: Large Adult)   Pulse 75   Temp 97.3 °F (36.3 °C) (Temporal)   Ht 6' (1.829 m)   Wt 260 lb (117.9 kg)   SpO2 95%   BMI 35.26 kg/m²     Physical Exam  Vitals reviewed. Constitutional:       General: He is not in acute distress. Appearance: Normal appearance. Cardiovascular:      Rate and Rhythm: Normal rate and regular rhythm. Heart sounds: No murmur heard. Pulmonary:      Effort: Pulmonary effort is normal. No tachypnea, accessory muscle usage or respiratory distress. Breath sounds: Decreased air movement present. Decreased breath sounds and wheezing (end expiratory) present. No rhonchi or rales. Abdominal:      General: Bowel sounds are normal.      Palpations: Abdomen is soft. Tenderness: There is no abdominal tenderness. There is no guarding or rebound. Musculoskeletal:      Lumbar back: Tenderness (right lumbar paraspinal tenderness to palpation, tenderness over right sciatic notch) present. No swelling, edema, deformity, signs of trauma, lacerations, spasms or bony tenderness. Decreased range of motion (limited flexion, right lateral bending, and extension). Negative right straight leg raise test and negative left straight leg raise test.        Back:       Right lower leg: No edema. Left lower leg: No edema. Skin:     Findings: No rash. Neurological:      Mental Status: He is alert and oriented to person, place, and time. Sensory: Sensation is intact. No sensory deficit. Motor: No weakness. Gait: Gait abnormal (antalgic gait, favoring right low back). Comments: +5/5 strength BLE   Psychiatric:         Mood and Affect: Mood normal.         Behavior: Behavior normal.         Thought Content: Thought content normal.       Back Exam     Tests   Straight leg raise right: negative  Straight leg raise left: negative       (If Applicable)              An electronic signature was used to authenticate this note.      Jovanna Terry MD

## 2022-11-17 ENCOUNTER — INITIAL CONSULT (OUTPATIENT)
Dept: SPORTS MEDICINE | Age: 67
End: 2022-11-17
Payer: MEDICARE

## 2022-11-17 ENCOUNTER — TELEPHONE (OUTPATIENT)
Dept: SPORTS MEDICINE | Age: 67
End: 2022-11-17

## 2022-11-17 VITALS
DIASTOLIC BLOOD PRESSURE: 76 MMHG | HEIGHT: 72 IN | TEMPERATURE: 96.5 F | WEIGHT: 257 LBS | BODY MASS INDEX: 34.81 KG/M2 | SYSTOLIC BLOOD PRESSURE: 118 MMHG

## 2022-11-17 DIAGNOSIS — M51.36 DDD (DEGENERATIVE DISC DISEASE), LUMBAR: Primary | ICD-10-CM

## 2022-11-17 DIAGNOSIS — M17.11 PRIMARY OSTEOARTHRITIS OF RIGHT KNEE: ICD-10-CM

## 2022-11-17 PROCEDURE — G8427 DOCREV CUR MEDS BY ELIG CLIN: HCPCS | Performed by: FAMILY MEDICINE

## 2022-11-17 PROCEDURE — 99204 OFFICE O/P NEW MOD 45 MIN: CPT | Performed by: FAMILY MEDICINE

## 2022-11-17 PROCEDURE — 3017F COLORECTAL CA SCREEN DOC REV: CPT | Performed by: FAMILY MEDICINE

## 2022-11-17 PROCEDURE — G8417 CALC BMI ABV UP PARAM F/U: HCPCS | Performed by: FAMILY MEDICINE

## 2022-11-17 PROCEDURE — 3078F DIAST BP <80 MM HG: CPT | Performed by: FAMILY MEDICINE

## 2022-11-17 PROCEDURE — G8482 FLU IMMUNIZE ORDER/ADMIN: HCPCS | Performed by: FAMILY MEDICINE

## 2022-11-17 PROCEDURE — 4004F PT TOBACCO SCREEN RCVD TLK: CPT | Performed by: FAMILY MEDICINE

## 2022-11-17 PROCEDURE — 3074F SYST BP LT 130 MM HG: CPT | Performed by: FAMILY MEDICINE

## 2022-11-17 PROCEDURE — 20611 DRAIN/INJ JOINT/BURSA W/US: CPT | Performed by: FAMILY MEDICINE

## 2022-11-17 RX ORDER — BUPIVACAINE HYDROCHLORIDE 5 MG/ML
3 INJECTION, SOLUTION EPIDURAL; INTRACAUDAL ONCE
Status: COMPLETED | OUTPATIENT
Start: 2022-11-17 | End: 2022-11-17

## 2022-11-17 RX ORDER — LIDOCAINE HYDROCHLORIDE 10 MG/ML
3 INJECTION, SOLUTION INFILTRATION; PERINEURAL ONCE
Status: COMPLETED | OUTPATIENT
Start: 2022-11-17 | End: 2022-11-17

## 2022-11-17 RX ORDER — LIDOCAINE 4 G/G
1 PATCH TOPICAL DAILY
Qty: 30 PATCH | Refills: 0 | Status: SHIPPED | OUTPATIENT
Start: 2022-11-17 | End: 2022-11-17

## 2022-11-17 RX ORDER — LIDOCAINE 50 MG/G
1 PATCH TOPICAL DAILY
Qty: 30 PATCH | Refills: 0 | Status: SHIPPED | OUTPATIENT
Start: 2022-11-17 | End: 2022-12-17

## 2022-11-17 RX ORDER — BETAMETHASONE SODIUM PHOSPHATE AND BETAMETHASONE ACETATE 3; 3 MG/ML; MG/ML
12 INJECTION, SUSPENSION INTRA-ARTICULAR; INTRALESIONAL; INTRAMUSCULAR; SOFT TISSUE ONCE
Status: COMPLETED | OUTPATIENT
Start: 2022-11-17 | End: 2022-11-17

## 2022-11-17 RX ADMIN — BETAMETHASONE SODIUM PHOSPHATE AND BETAMETHASONE ACETATE 12 MG: 3; 3 INJECTION, SUSPENSION INTRA-ARTICULAR; INTRALESIONAL; INTRAMUSCULAR; SOFT TISSUE at 09:19

## 2022-11-17 RX ADMIN — LIDOCAINE HYDROCHLORIDE 3 ML: 10 INJECTION, SOLUTION INFILTRATION; PERINEURAL at 09:20

## 2022-11-17 RX ADMIN — BUPIVACAINE HYDROCHLORIDE 15 MG: 5 INJECTION, SOLUTION EPIDURAL; INTRACAUDAL at 09:21

## 2022-11-17 ASSESSMENT — ENCOUNTER SYMPTOMS
SINUS PAIN: 0
APNEA: 0
FACIAL SWELLING: 0
EYE DISCHARGE: 0
ABDOMINAL DISTENTION: 0
CHEST TIGHTNESS: 0

## 2022-11-17 NOTE — PROGRESS NOTES
[facility]) Physicians  Neurosurgery and Pain [facility]  [address], [address] [facility] , [address] [facility], [address]: [phone]  F: [phone]      Jamin Roth  (1955)    11/17/2022    Subjective:     Jamin Roth is 61 y.o. male who complains today of:    Chief Complaint   Patient presents with    Lower Back Pain    Knee Pain     Right side       HPI     Patient comes in complaining of low back pain and right knee pain says it started a few weeks ago he some x-rays of his back he has not had x-rays of the knee he says the knee seems to be bothering worse at this point thinks because his lipid is irritating his back also he has had no recent treatment for these issues  Allergies:  Fish-derived products, Iodine, Seasonal, Other, Penicillin g, Shellfish allergy, and Pcn [penicillins]    Past Medical History:   Diagnosis Date    Alcohol abuse 10/27/2016    Anemia     Asthma     Cocaine abuse ([address] 10/27/2016    Community acquired pneumonia of left lower lobe of lung 12/5/2016    COPD (chronic obstructive pulmonary disease) ([address]     Depression 04/27/2020    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    Drug-seeking behavior 04/14/2015    Edema 12/10/2015    Erectile dysfunction     Gastroesophageal reflux disease 12/17/2018    Gout 10/27/2016    History of arthroscopy of both knees 10/27/2016    History of colon polyps 10/26/2021    House dust mite allergy 04/21/2014    Hyperlipidemia     Hypertension 10/27/2016    Injury to heart 10/27/2016    Insomnia 12/17/2018    Loculated pleural effusion     Medical non-compliance 02/22/2014    Morbid obesity due to excess calories ([address] 12/08/2016    Osteoarthritis of both knees 12/08/2016    Personal history of tobacco use     Pleurisy 12/12/2016    Pneumonia 12/04/2016    caused hospital admission    Pre-diabetes 10/27/2016    Seasonal allergies 04/21/2014    Severe persistent asthma 10/27/2016    Severe sleep apnea 04/14/2015 Supraventricular tachycardia ([facility] 10/27/2016    Type 2 diabetes mellitus with albuminuria ([facility] 10/26/2021    Type 2 diabetes mellitus without complication, without long-term current use of insulin ([facility] 10/26/2021     Past Surgical History:   Procedure Laterality Date    ANTERIOR CRUCIATE LIGAMENT REPAIR      CARDIAC CATHETERIZATION      COLONOSCOPY N/A 07/15/2020    COLONOSCOPY WITH POLYPECTOMY performed by Rita Dia MD at [place] [address] Left 2021    VATS/thoracotomy    TOTAL KNEE ARTHROPLASTY Left 2019    LEFT TOTAL KNEE ARTHROPLASTY performed by Arvind Schaefer MD at [address] [place] [address] N/A     [facility] performed by John Escalante MD at [address] History   Problem Relation Age of Onset    Arthritis Mother     Asthma Mother     High Cholesterol Mother     Other Mother         aneurysm    Diabetes Father     Stroke Maternal Grandmother     Cancer Maternal Grandfather     Hypertension Other     COPD Neg Hx      Social History     Socioeconomic History    Marital status: Single     Spouse name: n/a    Number of children: 1    Years of education: 12    Highest education level: Not on file   Occupational History    Occupation: Disability     Employer: NONE   Tobacco Use    Smoking status: Light Smoker     Packs/day: 0.25     Years: 30.00     Pack years: 7.50     Types: Cigarettes, Cigars     Start date: 1988     Last attempt to quit: 10/1/2013     Years since quittin.1    Smokeless tobacco: Never    Tobacco comments:     doesnt buy only smokes if someone gives him one    Vaping Use    Vaping Use: Never used   Substance and Sexual Activity    Alcohol use: Not Currently     Alcohol/week: 4.0 standard drinks     Types: 2 Cans of beer, 2 Shots of liquor per week    Drug use: Yes     Types: Cocaine, Marijuana (Weed)     Comment: no cocaine x 1 year, marijuana weekly    Sexual activity: Not Currently each 1    ipratropium-albuterol (DUONEB) 0.5-2.5 (3) MG/3ML SOLN nebulizer solution Take 3 mLs by nebulization every 4 hours as needed for Shortness of Breath 360 mL 1    acetaminophen (ACETAMINOPHEN EXTRA STRENGTH) 500 MG tablet Take 2 tablets by mouth every 8 hours as needed for Pain 90 tablet 1    amLODIPine (NORVASC) 10 MG tablet Take 1 tablet by mouth daily 90 tablet 1    cetirizine (ZYRTEC) 10 MG tablet Take 1 tablet by mouth daily 90 tablet 1    escitalopram (LEXAPRO) 10 MG tablet Take 1 tablet by mouth daily 90 tablet 1    fluticasone (FLONASE) 50 MCG/ACT nasal spray 2 sprays by Nasal route daily (Patient not taking: No sig reported) 48 g 1    losartan (COZAAR) 25 MG tablet Take 1 tablet by mouth daily 90 tablet 1    lovastatin (MEVACOR) 20 MG tablet Take 1 tablet by mouth nightly 90 tablet 1    meloxicam (MOBIC) 15 MG tablet Take 1 tablet by mouth daily 90 tablet 1    metFORMIN (GLUCOPHAGE) 500 MG tablet Take 1 tablet by mouth 2 times daily (with meals) 180 tablet 1    montelukast (SINGULAIR) 10 MG tablet Take 1 tablet by mouth nightly 90 tablet 1    pantoprazole (PROTONIX) 40 MG tablet Take 1 tablet by mouth daily 90 tablet 1    traZODone (DESYREL) 50 MG tablet Take 1 tablet by mouth nightly 90 tablet 1    SYMBICORT 160-4.5 MCG/ACT AERO Inhale 2 puffs into the lungs 2 times daily 30.6 g 1    blood glucose monitor strips Test three times a day & as needed for symptoms of irregular blood glucose. Dispense sufficient amount for indicated testing frequency plus additional to accommodate PRN testing needs. (Patient not taking: No sig reported) 100 strip 1    Lancets MISC 1 each by Does not apply route 3 times daily (Patient not taking: No sig reported) 200 each 5     No current facility-administered medications on file prior to visit. Review of Systems   Constitutional:  Negative for activity change and fever. HENT:  Negative for congestion, facial swelling and sinus pain.     Eyes:  Negative for discharge. Respiratory:  Negative for apnea and chest tightness. Gastrointestinal:  Negative for abdominal distention. Endocrine: Negative for cold intolerance. Genitourinary:  Negative for difficulty urinating and dysuria. Allergic/Immunologic: Negative for immunocompromised state. Neurological:  Negative for dizziness. Hematological:  Negative for adenopathy. Psychiatric/Behavioral:  Negative for agitation, behavioral problems and confusion. Objective:     Vitals:  /76 (Site: Left Upper Arm, Position: Sitting, Cuff Size: Large Adult)   Temp (!) 96.5 °F (35.8 °C) (Tympanic)   Ht 6' (1.829 m)   Wt 257 lb (116.6 kg)   BMI 34.86 kg/m² Pain Score:  10 - Worst pain ever      Physical Exam  Constitutional:       Appearance: Normal appearance. HENT:      Head: Normocephalic. Eyes:      Pupils: Pupils are equal, round, and reactive to light. Cardiovascular:      Rate and Rhythm: Normal rate. Pulses: Normal pulses. Pulmonary:      Breath sounds: No wheezing. Abdominal:      Palpations: Abdomen is soft. Musculoskeletal:      Cervical back: Neck supple. Skin:     General: Skin is warm and dry. Neurological:      Mental Status: He is alert and oriented to person, place, and time. Psychiatric:         Mood and Affect: Mood normal.         Behavior: Behavior normal.       Ortho Exam   Examination of the lumbar spine revealed the neurovascular muscle status to be intact patient has near full range of motion tenderness mostly in the L5-S1 region no tension signs equivocal SI signs  Examination of the right knee reveals neurovascular muscle status to be intact mild edema no effusion range of motion 0-90 no collateral instabilities tenderness most over the medial joint space    I reviewed x-rays of the right knee done today  I reviewed x-rays of the lumbar spine done on 11/12/2022  Assessment:      Diagnosis Orders   1.  DDD (degenerative disc disease), lumbar  Ambulatory referral to Physical Therapy    lidocaine 4 % external patch      2. Primary osteoarthritis of right knee  XR KNEE RIGHT (3 VIEWS)    Ambulatory referral to Physical Therapy    MI ARTHROCENTESIS ASPIR&/INJ MAJOR JT/BURSA W/O US    betamethasone acetate-betamethasone sodium phosphate (CELESTONE) injection 12 mg    bupivacaine (PF) (MARCAINE) 0.5 % injection 15 mg    lidocaine 1 % injection 3 mL          Plan:   Patient data sent for evaluation and treatment regarding his back he is already on many medications therefore Keisha Nichole give him a lidocaine patch for that some to therapy  Regarding his knee we injected him today with cortisone with relief of symptoms I told him to be careful if his sugars and send him for therapy for that also and see him back in 3 weeks    @[facility]@   Spine Surgery  Advanced Pain [facility]           Provider: Twanna Phalen, DO          Patient Name: Filippo Mejía : 1955         Date: 22          PROCEDURE:  US Knee Injection  Dx DJD right knee    After informed consent the patient was put in a supine position the right knee was exposed and draped. Using msk US the knee joint was identified and prepped with Betadine. A 22g US guided needle was used to inject 2cc celestone, 3cc lidocaine, and 3cc marcaine with relief of symptoms.   Pt tolerated procedure well, left stable, told to ice the knee today and resume normal activity in 2 days  US images of procedure were saved        Orders Placed This Encounter   Medications    lidocaine 4 % external patch     Sig: Place 1 patch onto the skin daily     Dispense:  30 patch     Refill:  0    betamethasone acetate-betamethasone sodium phosphate (CELESTONE) injection 12 mg    bupivacaine (PF) (MARCAINE) 0.5 % injection 15 mg    lidocaine 1 % injection 3 mL       Orders Placed This Encounter   Procedures    XR KNEE RIGHT (3 VIEWS)     Standing Status:   Future     Standing Expiration Date:   2023    Ambulatory referral to Physical Therapy     Referral Priority:   Routine     Referral Type:   Eval and Treat     Referral Reason:   Specialty Services Required     Requested Specialty:   Physical Therapist     Number of Visits Requested:   1    SD ARTHROCENTESIS ASPIR&/INJ MAJOR JT/BURSA W/O US         Follow up:  Return in about 3 weeks (around 12/8/2022).     RUTHIE SAMUEL DO

## 2022-11-17 NOTE — TELEPHONE ENCOUNTER
Patient's insurance has denied coverage of Lidocaine (Lidoderm) 5% patch for the following reason(s):    -Plan does not allow coverage of this medication based on prescriber answering No to the following question(s):  Is the requested drug being prescribed for any of the following:   A) Pain associated with post-herpetic neuralgia,   B) Pain associated with diabetic neuropathy,   C) Pain associated with cancer-related neuropathy(including treatment-related neuropathy [e.g., neuropathy associated with radiation treatment or chemotherapy])    Dr. Barrett Tolliver may appeal this denial within 60 days with a letter clearly describing the reason(s) for appealing. How would Dr. Barrett Tolliver like to proceed?   Please respond to Pain Clinical Pool

## 2022-11-17 NOTE — TELEPHONE ENCOUNTER
Lidocaine (Lidoderm) 5% patch prior authorization request submitted online ([employer] to [employer] Part D), auth pending.    Camilo Anaya: [id number] - PA Case ID: [id number] - Rx #: [id number]

## 2022-11-17 NOTE — TELEPHONE ENCOUNTER
Lidocaine (Lidoderm) 5% patch prior authorization request submitted online ([employer] to [employer] Part D), auth pending.    Kina Lynch: [id number] - PA Case ID: [id number] - Rx #: [id number]

## 2022-11-17 NOTE — TELEPHONE ENCOUNTER
Patient's insurance has denied coverage of Lidocaine (Lidoderm) 5% patch for the following reason(s):     -Plan does not allow coverage of this medication based on prescriber answering No to the following question(s):  Is the requested drug being prescribed for any of the following:   A) Pain associated with post-herpetic neuralgia,   B) Pain associated with diabetic neuropathy,   C) Pain associated with cancer-related neuropathy(including treatment-related neuropathy [e.g., neuropathy associated with radiation treatment or chemotherapy])     Dr. Karyle Gola may appeal this denial within 60 days with a letter clearly describing the reason(s) for appealing. How would Dr. Karyle Gola like to proceed?   Please respond to Pain Clinical Pool      ---this is a duplicate message, original message sent to Dr. Karyle Gola today

## 2022-11-17 NOTE — TELEPHONE ENCOUNTER
Pt asks if Dr. Nahid Vuong can prescribe lidocaine 5% patches so a PA can be completed. Pt states his insurance will not cover 4% since its OTC.

## 2022-11-21 NOTE — TELEPHONE ENCOUNTER
TRIED CALLING PT, BUT UNABLE TO LM - VOICEMAIL BOX NOT SET UP. PLEASE TRY AGAIN AND INFORM PT OF LIDOCAINE PATCH DENIAL AND DR. SAMUEL'S RESPONSE, \"Patient to use over-the-counter lidocaine\"

## 2022-11-28 ENCOUNTER — CARE COORDINATION (OUTPATIENT)
Dept: CARE COORDINATION | Age: 67
End: 2022-11-28

## 2022-11-28 NOTE — CARE COORDINATION
Attempted to contact patient for care coordination follow up regarding COPD  Unable to reach patient by phone. Voice mailbox is not set up  I will follow up in one week as per protocol.

## 2022-12-05 ENCOUNTER — CARE COORDINATION (OUTPATIENT)
Dept: CARE COORDINATION | Age: 67
End: 2022-12-05

## 2022-12-05 DIAGNOSIS — N52.9 ERECTILE DYSFUNCTION, UNSPECIFIED ERECTILE DYSFUNCTION TYPE: Chronic | ICD-10-CM

## 2022-12-05 NOTE — CARE COORDINATION
Attempted to contact patient for care coordination follow up regarding COPD  Unable to reach patient by phone.   Unable to leave a message as the voice mailbox is not set up  I will follow up with Julia Etienne in one week as per protocol

## 2022-12-06 NOTE — TELEPHONE ENCOUNTER
Pharmacy is requesting medication refill.  Please approve or deny this request.    Rx requested:  Requested Prescriptions     Pending Prescriptions Disp Refills    sildenafil (VIAGRA) 100 MG tablet [Pharmacy Med Name: SILDENAFIL 100 MG TABLET] 6 tablet 1     Sig: TAKE 1 TABLET BY MOUTH AS NEEDED FOR ERECTILE DYSFUNCTION         Last Office Visit:   11/15/2022      Next Visit Date:  Future Appointments   Date Time Provider Constance Espitia   12/8/2022  9:00 AM [address] [facility],  [address] [facility]   4/6/2023  3:00 PM Karel Awad MD [place] [id number]

## 2022-12-07 RX ORDER — SILDENAFIL 100 MG/1
100 TABLET, FILM COATED ORAL PRN
Qty: 6 TABLET | Refills: 1 | Status: SHIPPED | OUTPATIENT
Start: 2022-12-07

## 2022-12-12 ENCOUNTER — CARE COORDINATION (OUTPATIENT)
Dept: CARE COORDINATION | Age: 67
End: 2022-12-12

## 2022-12-12 NOTE — CARE COORDINATION
Third and final attempt to contact patient for care coordination follow up regarding COPD and DM  Unable to reach patient by phone. Message left regarding purpose of call. Number provided and call back requested. Episode completed/resolved.

## 2022-12-19 DIAGNOSIS — J44.9 CHRONIC OBSTRUCTIVE PULMONARY DISEASE, UNSPECIFIED COPD TYPE (HCC): Chronic | ICD-10-CM

## 2022-12-19 DIAGNOSIS — Z96.652 STATUS POST TOTAL KNEE REPLACEMENT, LEFT: ICD-10-CM

## 2022-12-19 NOTE — TELEPHONE ENCOUNTER
Pharmacy is requesting medication refill.  Please approve or deny this request.    Rx requested:  Requested Prescriptions     Pending Prescriptions Disp Refills    acetaminophen (ACETAMINOPHEN EXTRA STRENGTH) 500 MG tablet [Pharmacy Med Name: ACETAMINOPHEN 500 MG TABLET] 90 tablet 1     Sig: Take 1 tablet by mouth every 8 hours as needed for Pain    albuterol sulfate HFA (PROVENTIL;VENTOLIN;PROAIR) 108 (90 Base) MCG/ACT inhaler [Pharmacy Med Name: ALBUTEROL HFA (VENTOLIN) INH] 18 each 1     Sig: Inhale 2 puffs into the lungs every 4 hours as needed for Wheezing or Shortness of Breath    ipratropium-albuterol (DUONEB) 0.5-2.5 (3) MG/3ML SOLN nebulizer solution [Pharmacy Med Name: IPRAT-ALBUT 0.5-3(2.5) MG/3 ML] 360 mL 1     Sig: Take 3 mLs by nebulization every 4 hours as needed for Shortness of Breath         Last Office Visit:   11/15/2022      Next Visit Date:  Future Appointments   Date Time Provider Constance Espitia   4/6/2023  3:00 PM [facility]

## 2022-12-21 ENCOUNTER — APPOINTMENT (OUTPATIENT)
Dept: GENERAL RADIOLOGY | Age: 67
End: 2022-12-21
Payer: MEDICARE

## 2022-12-21 ENCOUNTER — HOSPITAL ENCOUNTER (EMERGENCY)
Age: 67
Discharge: HOME | End: 2022-12-21
Payer: MEDICARE

## 2022-12-21 VITALS
SYSTOLIC BLOOD PRESSURE: 132 MMHG | TEMPERATURE: 98.1 F | RESPIRATION RATE: 18 BRPM | OXYGEN SATURATION: 95 % | HEART RATE: 82 BPM | DIASTOLIC BLOOD PRESSURE: 86 MMHG

## 2022-12-21 DIAGNOSIS — G89.29 CHRONIC PAIN OF RIGHT KNEE: ICD-10-CM

## 2022-12-21 DIAGNOSIS — M54.50 ACUTE EXACERBATION OF CHRONIC LOW BACK PAIN: ICD-10-CM

## 2022-12-21 DIAGNOSIS — M25.561 CHRONIC PAIN OF RIGHT KNEE: ICD-10-CM

## 2022-12-21 DIAGNOSIS — J44.1 COPD EXACERBATION (HCC): Primary | ICD-10-CM

## 2022-12-21 DIAGNOSIS — G89.29 ACUTE EXACERBATION OF CHRONIC LOW BACK PAIN: ICD-10-CM

## 2022-12-21 LAB
ALBUMIN SERPL-MCNC: 3.9 G/DL (ref 3.5–4.6)
ALP BLD-CCNC: 99 U/L (ref 35–104)
ALT SERPL-CCNC: 12 U/L (ref 0–41)
ANION GAP SERPL CALCULATED.3IONS-SCNC: 11 MEQ/L (ref 9–15)
AST SERPL-CCNC: 15 U/L (ref 0–40)
BASOPHILS ABSOLUTE: 0.1 K/UL (ref 0–0.1)
BASOPHILS RELATIVE PERCENT: 0.6 % (ref 0.2–1.2)
BILIRUB SERPL-MCNC: 0.6 MG/DL (ref 0.2–0.7)
BUN BLDV-MCNC: 10 MG/DL (ref 8–23)
CALCIUM SERPL-MCNC: 9.5 MG/DL (ref 8.5–9.9)
CHLORIDE BLD-SCNC: 105 MEQ/L (ref 95–107)
CO2: 24 MEQ/L (ref 20–31)
CREAT SERPL-MCNC: 0.89 MG/DL (ref 0.7–1.2)
EKG ATRIAL RATE: 81 BPM
EKG P AXIS: 81 DEGREES
EKG P-R INTERVAL: 156 MS
EKG Q-T INTERVAL: 382 MS
EKG QRS DURATION: 102 MS
EKG QTC CALCULATION (BAZETT): 443 MS
EKG R AXIS: 67 DEGREES
EKG T AXIS: 62 DEGREES
EKG VENTRICULAR RATE: 81 BPM
EOSINOPHILS ABSOLUTE: 0.2 K/UL (ref 0–0.5)
EOSINOPHILS RELATIVE PERCENT: 1.7 % (ref 0.8–7)
GFR SERPL CREATININE-BSD FRML MDRD: >60 ML/MIN/{1.73_M2}
GLOBULIN: 3.2 G/DL (ref 2.3–3.5)
GLUCOSE BLD-MCNC: 99 MG/DL (ref 70–99)
HCT VFR BLD CALC: 41.1 % (ref 42–52)
HEMOGLOBIN: 13.1 G/DL (ref 13.7–17.5)
IMMATURE GRANULOCYTES #: 0 K/UL
IMMATURE GRANULOCYTES %: 0.3 %
INFLUENZA A BY PCR: NEGATIVE
INFLUENZA B BY PCR: NEGATIVE
LYMPHOCYTES ABSOLUTE: 1.7 K/UL (ref 1.3–3.6)
LYMPHOCYTES RELATIVE PERCENT: 15.4 %
MAGNESIUM: 1.9 MG/DL (ref 1.7–2.4)
MCH RBC QN AUTO: 31.3 PG (ref 25.7–32.2)
MCHC RBC AUTO-ENTMCNC: 31.9 % (ref 32.3–36.5)
MCV RBC AUTO: 98.1 FL (ref 79–92.2)
MONOCYTES ABSOLUTE: 0.9 K/UL (ref 0.3–0.8)
MONOCYTES RELATIVE PERCENT: 8 % (ref 5.3–12.2)
NEUTROPHILS ABSOLUTE: 8 K/UL (ref 1.8–5.4)
NEUTROPHILS RELATIVE PERCENT: 74 % (ref 34–67.9)
PDW BLD-RTO: 15.6 % (ref 11.6–14.4)
PLATELET # BLD: 356 K/UL (ref 163–337)
POTASSIUM SERPL-SCNC: 3.7 MEQ/L (ref 3.4–4.9)
RBC # BLD: 4.19 M/UL (ref 4.63–6.08)
SARS-COV-2, NAAT: NOT DETECTED
SODIUM BLD-SCNC: 140 MEQ/L (ref 135–144)
TOTAL PROTEIN: 7.1 G/DL (ref 6.3–8)
TROPONIN: <0.01 NG/ML (ref 0–0.01)
WBC # BLD: 10.8 K/UL (ref 4.2–9)

## 2022-12-21 PROCEDURE — 96365 THER/PROPH/DIAG IV INF INIT: CPT

## 2022-12-21 PROCEDURE — 93005 ELECTROCARDIOGRAM TRACING: CPT

## 2022-12-21 PROCEDURE — 83735 ASSAY OF MAGNESIUM: CPT

## 2022-12-21 PROCEDURE — 2580000003 HC RX 258

## 2022-12-21 PROCEDURE — 96375 TX/PRO/DX INJ NEW DRUG ADDON: CPT

## 2022-12-21 PROCEDURE — 6360000002 HC RX W HCPCS

## 2022-12-21 PROCEDURE — 71045 X-RAY EXAM CHEST 1 VIEW: CPT

## 2022-12-21 PROCEDURE — 87635 SARS-COV-2 COVID-19 AMP PRB: CPT

## 2022-12-21 PROCEDURE — 36415 COLL VENOUS BLD VENIPUNCTURE: CPT

## 2022-12-21 PROCEDURE — 85025 COMPLETE CBC W/AUTO DIFF WBC: CPT

## 2022-12-21 PROCEDURE — 87502 INFLUENZA DNA AMP PROBE: CPT

## 2022-12-21 PROCEDURE — 80053 COMPREHEN METABOLIC PANEL: CPT

## 2022-12-21 PROCEDURE — 84484 ASSAY OF TROPONIN QUANT: CPT

## 2022-12-21 PROCEDURE — 99285 EMERGENCY DEPT VISIT HI MDM: CPT

## 2022-12-21 RX ORDER — ACETAMINOPHEN 500 MG
1000 TABLET ORAL 3 TIMES DAILY PRN
Qty: 540 TABLET | Refills: 0 | Status: SHIPPED | OUTPATIENT
Start: 2022-12-21 | End: 2022-12-21 | Stop reason: SDUPTHER

## 2022-12-21 RX ORDER — ALBUTEROL SULFATE 90 UG/1
2 AEROSOL, METERED RESPIRATORY (INHALATION) EVERY 4 HOURS PRN
Qty: 18 EACH | Refills: 1 | Status: SHIPPED | OUTPATIENT
Start: 2022-12-21

## 2022-12-21 RX ORDER — PREDNISONE 20 MG/1
40 TABLET ORAL DAILY
Qty: 20 TABLET | Refills: 0 | Status: SHIPPED | OUTPATIENT
Start: 2022-12-21 | End: 2022-12-31

## 2022-12-21 RX ORDER — AZITHROMYCIN 250 MG/1
TABLET, FILM COATED ORAL
Qty: 1 PACKET | Refills: 0 | Status: SHIPPED | OUTPATIENT
Start: 2022-12-21 | End: 2022-12-25

## 2022-12-21 RX ORDER — ACETAMINOPHEN 500 MG
1000 TABLET ORAL 3 TIMES DAILY PRN
Qty: 42 TABLET | Refills: 0 | Status: SHIPPED | OUTPATIENT
Start: 2022-12-21 | End: 2022-12-28

## 2022-12-21 RX ORDER — KETOROLAC TROMETHAMINE 15 MG/ML
15 INJECTION, SOLUTION INTRAMUSCULAR; INTRAVENOUS ONCE
Status: COMPLETED | OUTPATIENT
Start: 2022-12-21 | End: 2022-12-21

## 2022-12-21 RX ORDER — METHYLPREDNISOLONE SODIUM SUCCINATE 125 MG/2ML
125 INJECTION, POWDER, LYOPHILIZED, FOR SOLUTION INTRAMUSCULAR; INTRAVENOUS ONCE
Status: COMPLETED | OUTPATIENT
Start: 2022-12-21 | End: 2022-12-21

## 2022-12-21 RX ORDER — IPRATROPIUM BROMIDE AND ALBUTEROL SULFATE 2.5; .5 MG/3ML; MG/3ML
1 SOLUTION RESPIRATORY (INHALATION) EVERY 4 HOURS PRN
Qty: 360 ML | Refills: 1 | Status: SHIPPED | OUTPATIENT
Start: 2022-12-21

## 2022-12-21 RX ORDER — ACETAMINOPHEN 500 MG
500 TABLET ORAL EVERY 8 HOURS PRN
Qty: 90 TABLET | Refills: 1 | Status: SHIPPED | OUTPATIENT
Start: 2022-12-21 | End: 2022-12-21 | Stop reason: ALTCHOICE

## 2022-12-21 RX ORDER — MAGNESIUM SULFATE IN WATER 40 MG/ML
2000 INJECTION, SOLUTION INTRAVENOUS ONCE
Status: COMPLETED | OUTPATIENT
Start: 2022-12-21 | End: 2022-12-21

## 2022-12-21 RX ORDER — 0.9 % SODIUM CHLORIDE 0.9 %
500 INTRAVENOUS SOLUTION INTRAVENOUS ONCE
Status: COMPLETED | OUTPATIENT
Start: 2022-12-21 | End: 2022-12-21

## 2022-12-21 RX ADMIN — MAGNESIUM SULFATE HEPTAHYDRATE 2000 MG: 40 INJECTION, SOLUTION INTRAVENOUS at 12:45

## 2022-12-21 RX ADMIN — SODIUM CHLORIDE 500 ML: 9 INJECTION, SOLUTION INTRAVENOUS at 12:45

## 2022-12-21 RX ADMIN — KETOROLAC TROMETHAMINE 15 MG: 15 INJECTION, SOLUTION INTRAMUSCULAR; INTRAVENOUS at 12:45

## 2022-12-21 RX ADMIN — METHYLPREDNISOLONE SODIUM SUCCINATE 125 MG: 125 INJECTION, POWDER, FOR SOLUTION INTRAMUSCULAR; INTRAVENOUS at 12:45

## 2022-12-21 ASSESSMENT — ENCOUNTER SYMPTOMS
ALLERGIC/IMMUNOLOGIC NEGATIVE: 1
EYE PAIN: 0
BACK PAIN: 1
COUGH: 1
EYE DISCHARGE: 0
DIARRHEA: 0
VOMITING: 0
EYE ITCHING: 0
ABDOMINAL PAIN: 0
CONSTIPATION: 0
NAUSEA: 0
RHINORRHEA: 0
SHORTNESS OF BREATH: 1
SORE THROAT: 0

## 2022-12-21 ASSESSMENT — PAIN DESCRIPTION - PAIN TYPE: TYPE: CHRONIC PAIN

## 2022-12-21 ASSESSMENT — PAIN SCALES - GENERAL: PAINLEVEL_OUTOF10: 5

## 2022-12-21 ASSESSMENT — PAIN DESCRIPTION - ORIENTATION: ORIENTATION: LOWER

## 2022-12-21 ASSESSMENT — PAIN DESCRIPTION - LOCATION: LOCATION: BACK

## 2022-12-21 NOTE — ED PROVIDER NOTES
[facility] ED  eMERGENCY dEPARTMENT eNCOUnter      Pt Name: Gopal Hale  MRN: [id number]  [place] 1955  Date of evaluation: 12/21/2022  Provider: RUBEN Fermin        HISTORY OF PRESENT ILLNESS    Gopal Hale is a 74 y.o. male per chart review has a PMHx of asthma, COPD, type II DM, hyperlipidemia, cocaine abuse, hypertension, drug-seeking behavior, osteoarthritis, depression presents to ED for evaluation of shortness of breath, low back pain, R knee pain. Patient reports that he has felt SOB since awakening this AM.  Reports history of COPD with nightly O2 use at 2 to 3 L. Reports taking 1 nebulizer treatment this a.m. prior to arrival in the emergency department. Additionally, reports that he has been smoking \"a lot\" of crack cocaine, states that he smokes crack cocaine every day. Denies new injuries, trauma to lower back, right knee. Reports that these are chronic issues that he has been dealing with and is unable to manage pain at home. Patient denies taking Motrin, Tylenol prior to arrival in the emergency department. Patient denies chest pain, N/V/D, fever, chills. Reports that he has a chronic productive cough. REVIEW OF SYSTEMS       Review of Systems   Constitutional:  Negative for activity change, appetite change, chills, fatigue and fever. HENT:  Negative for congestion, rhinorrhea and sore throat. Eyes:  Negative for pain, discharge and itching. Respiratory:  Positive for cough and shortness of breath. Cardiovascular:  Negative for chest pain, palpitations and leg swelling. Gastrointestinal:  Negative for abdominal pain, constipation, diarrhea, nausea and vomiting. Endocrine: Negative for polydipsia, polyphagia and polyuria. Genitourinary:  Negative for difficulty urinating and dysuria. Musculoskeletal:  Positive for arthralgias and back pain. Negative for joint swelling and myalgias. Skin:  Negative for rash and wound.    Allergic/Immunologic: Negative. Neurological:  Negative for light-headedness and headaches. Hematological:  Negative for adenopathy. Does not bruise/bleed easily. Psychiatric/Behavioral: Negative. Except as noted above the remainder of the review of systems was reviewed and negative.        PAST MEDICAL HISTORY     Past Medical History:   Diagnosis Date    Alcohol abuse 10/27/2016    Anemia     Asthma     Cocaine abuse ([address] 10/27/2016    Community acquired pneumonia of left lower lobe of lung 12/5/2016    COPD (chronic obstructive pulmonary disease) ([address]     Depression 04/27/2020    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    Drug-seeking behavior 04/14/2015    Edema 12/10/2015    Erectile dysfunction     Gastroesophageal reflux disease 12/17/2018    Gout 10/27/2016    History of arthroscopy of both knees 10/27/2016    History of colon polyps 10/26/2021    House dust mite allergy 04/21/2014    Hyperlipidemia     Hypertension 10/27/2016    Injury to heart 10/27/2016    Insomnia 12/17/2018    Loculated pleural effusion     Medical non-compliance 02/22/2014    Morbid obesity due to excess calories ([address] 12/08/2016    Osteoarthritis of both knees 12/08/2016    Personal history of tobacco use     Pleurisy 12/12/2016    Pneumonia 12/04/2016    caused hospital admission    Pre-diabetes 10/27/2016    Seasonal allergies 04/21/2014    Severe persistent asthma 10/27/2016    Severe sleep apnea 04/14/2015    Supraventricular tachycardia ([address] 10/27/2016    Type 2 diabetes mellitus with albuminuria ([address] 10/26/2021    Type 2 diabetes mellitus without complication, without long-term current use of insulin ([address] 10/26/2021         SURGICAL HISTORY       Past Surgical History:   Procedure Laterality Date    ANTERIOR CRUCIATE LIGAMENT REPAIR      CARDIAC CATHETERIZATION      COLONOSCOPY N/A 07/15/2020    COLONOSCOPY WITH POLYPECTOMY performed by Ashleigh Conte MD at [facility] 20 Left 11/27/2021    VATS/thoracotomy TOTAL KNEE ARTHROPLASTY Left 08/09/2019    LEFT TOTAL KNEE ARTHROPLASTY performed by Dimitri Sloan MD at [facility] 55 N/A 16/10/2379    UMBILICAL HERNIA REPAIR WITH MESH performed by Val Hough MD at [address] [place] [address]       Discharge Medication List as of 12/21/2022  2:01 PM        CONTINUE these medications which have NOT CHANGED    Details   albuterol sulfate HFA (PROVENTIL;VENTOLIN;PROAIR) 108 (90 Base) MCG/ACT inhaler Inhale 2 puffs into the lungs every 4 hours as needed for Wheezing or Shortness of Breath, Disp-18 each, R-1DX Code Needed  . Normal      ipratropium-albuterol (DUONEB) 0.5-2.5 (3) MG/3ML SOLN nebulizer solution Take 3 mLs by nebulization every 4 hours as needed for Shortness of Breath, Disp-360 mL, R-1Normal      sildenafil (VIAGRA) 100 MG tablet TAKE 1 TABLET BY MOUTH AS NEEDED FOR ERECTILE DYSFUNCTION, Disp-6 tablet, R-1DX Code Needed  . Normal      amLODIPine (NORVASC) 10 MG tablet Take 1 tablet by mouth daily, Disp-90 tablet, R-1Normal      cetirizine (ZYRTEC) 10 MG tablet Take 1 tablet by mouth daily, Disp-90 tablet, R-1Normal      escitalopram (LEXAPRO) 10 MG tablet Take 1 tablet by mouth daily, Disp-90 tablet, R-1Normal      fluticasone (FLONASE) 50 MCG/ACT nasal spray 2 sprays by Nasal route daily, Disp-48 g, R-1Normal      losartan (COZAAR) 25 MG tablet Take 1 tablet by mouth daily, Disp-90 tablet, R-1Normal      lovastatin (MEVACOR) 20 MG tablet Take 1 tablet by mouth nightly, Disp-90 tablet, R-1Normal      meloxicam (MOBIC) 15 MG tablet Take 1 tablet by mouth daily, Disp-90 tablet, R-1DX Code Needed  . Normal      metFORMIN (GLUCOPHAGE) 500 MG tablet Take 1 tablet by mouth 2 times daily (with meals), Disp-180 tablet, R-1Normal      montelukast (SINGULAIR) 10 MG tablet Take 1 tablet by mouth nightly, Disp-90 tablet, R-1Normal      pantoprazole (PROTONIX) 40 MG tablet Take 1 tablet by mouth daily, Disp-90 tablet, R-1Normal      traZODone exudate or posterior oropharyngeal erythema. Eyes:      Extraocular Movements: Extraocular movements intact. Conjunctiva/sclera: Conjunctivae normal.      Pupils: Pupils are equal, round, and reactive to light. Cardiovascular:      Rate and Rhythm: Normal rate and regular rhythm. Pulses: Normal pulses. Heart sounds: Normal heart sounds. Pulmonary:      Effort: Pulmonary effort is normal. No respiratory distress. Breath sounds: No wheezing. Abdominal:      General: Abdomen is flat. Palpations: Abdomen is soft. Musculoskeletal:         General: Normal range of motion. Cervical back: Normal range of motion and neck supple. Skin:     General: Skin is warm and dry. Capillary Refill: Capillary refill takes less than 2 seconds. Neurological:      General: No focal deficit present. Mental Status: He is alert and oriented to person, place, and time. Mental status is at baseline. Psychiatric:         Mood and Affect: Mood normal.         LABS:  Labs Reviewed   CBC WITH AUTO DIFFERENTIAL - Abnormal; Notable for the following components:       Result Value    WBC 10.8 (*)     RBC 4.19 (*)     Hemoglobin 13.1 (*)     Hematocrit 41.1 (*)     MCV 98.1 (*)     MCHC 31.9 (*)     RDW 15.6 (*)     Platelets 049 (*)     Neutrophils % 74.0 (*)     Neutrophils Absolute 8.0 (*)     Monocytes Absolute 0.9 (*)     All other components within normal limits   COVID-19, RAPID   RAPID INFLUENZA A/B ANTIGENS   COMPREHENSIVE METABOLIC PANEL   MAGNESIUM   TROPONIN   URINE DRUG SCREEN         MDM:   Vitals:    Vitals:    12/21/22 1211 12/21/22 1320 12/21/22 1452   BP: 124/87 128/82 132/86   Pulse: 96 86 82   Resp: 20 16 18   Temp: 98.1 °F (36.7 °C)     TempSrc: Oral     SpO2: 96% 97% 80       62-year-old male presents to ED via EMS with complaint of shortness of breath, back pain, R knee pain. Patient is afebrile, hemodynamically stable, nontoxic.   Patient given 1 L IV NS, IV magnesium, IV Solu-Medrol, IV Toradol while in ED. EKG shows NSR with HR 81, normal axis, normal intervals, no ST changes. There is no significant change noted in EKG when compared to EKG on 11/02/2022. COVID-19 negative. Influenza A/B negative. Labs remarkable for WBC 10.8. Troponin negative. Chest x-ray negative for ADP. Patient reassessed and reports that SOB has resolved following IV medications. Ambulatory pulse ox performed on room air and O2 sat remained at 94%, heart rate 97. No exertional dyspnea noted. Patient with COPD exacerbation. Patient given prescription for Tylenol, prednisone, Z-Donnie. When discussing results and findings with patient he reports that he needs something stronger for pain management for the upcoming days. Patient advised I have prescribed Tylenol for pain management. Patient states that Tylenol and Motrin do not help, states \"I am just going to go smoke a bunch of crack and that will help my pain\". Patient advised against smoking crack cocaine however states that he is going to do so anyway. Patient educated on supportive care. Patient given standard anticipatory guidance, return to ED warning signs, strict follow-up guidelines with PCP. Patient verbalized understanding of education, instruction. Patient is agreeable to plan. Patient discharged home in stable condition. CRITICAL CARE TIME   Total CriticalCare time was 0 minutes, excluding separately reportable procedures. There was a high probability of clinically significant/life threatening deterioration in the patient's condition which required my urgent intervention. PROCEDURES:  Unlessotherwise noted below, none      Procedures      FINAL IMPRESSION      1. COPD exacerbation ([facility]    2. Acute exacerbation of chronic low back pain    3.  Chronic pain of right knee          DISPOSITION/PLAN   DISPOSITION Decision To Discharge 12/21/2022 02:01:25 PM          RUBEN Washington (electronically signed)  Attending Emergency Physician         Ana M Salvador, NP-C  12/21/22 [id number]

## 2023-01-03 ENCOUNTER — HOSPITAL ENCOUNTER (OUTPATIENT)
Age: 68
Setting detail: OBSERVATION
Discharge: HOME | End: 2023-01-04
Attending: EMERGENCY MEDICINE | Admitting: INTERNAL MEDICINE
Payer: MEDICARE

## 2023-01-03 ENCOUNTER — APPOINTMENT (OUTPATIENT)
Dept: GENERAL RADIOLOGY | Age: 68
End: 2023-01-03
Payer: MEDICARE

## 2023-01-03 DIAGNOSIS — R07.9 CHEST PAIN, UNSPECIFIED TYPE: Primary | ICD-10-CM

## 2023-01-03 LAB
ALBUMIN SERPL-MCNC: 3.7 G/DL (ref 3.5–4.6)
ALP BLD-CCNC: 79 U/L (ref 35–104)
ALT SERPL-CCNC: 18 U/L (ref 0–41)
AMPHETAMINE SCREEN, URINE: ABNORMAL
ANION GAP SERPL CALCULATED.3IONS-SCNC: 9 MEQ/L (ref 9–15)
AST SERPL-CCNC: 14 U/L (ref 0–40)
BARBITURATE SCREEN URINE: ABNORMAL
BASOPHILS ABSOLUTE: 0.1 K/UL (ref 0–0.1)
BASOPHILS RELATIVE PERCENT: 0.4 % (ref 0.2–1.2)
BENZODIAZEPINE SCREEN, URINE: ABNORMAL
BILIRUB SERPL-MCNC: 0.3 MG/DL (ref 0.2–0.7)
BILIRUBIN URINE: NEGATIVE
BLOOD, URINE: NEGATIVE
BUN BLDV-MCNC: 13 MG/DL (ref 8–23)
CALCIUM SERPL-MCNC: 9.2 MG/DL (ref 8.5–9.9)
CANNABINOID SCREEN URINE: POSITIVE
CHLORIDE BLD-SCNC: 105 MEQ/L (ref 95–107)
CLARITY: CLEAR
CO2: 28 MEQ/L (ref 20–31)
COCAINE METABOLITE SCREEN URINE: POSITIVE
COLOR: YELLOW
CREAT SERPL-MCNC: 0.77 MG/DL (ref 0.7–1.2)
D DIMER: 0.31 MG/L FEU (ref 0–0.5)
EKG ATRIAL RATE: 93 BPM
EKG P AXIS: 78 DEGREES
EKG P-R INTERVAL: 146 MS
EKG Q-T INTERVAL: 366 MS
EKG QRS DURATION: 100 MS
EKG QTC CALCULATION (BAZETT): 455 MS
EKG R AXIS: 44 DEGREES
EKG T AXIS: 54 DEGREES
EKG VENTRICULAR RATE: 93 BPM
EOSINOPHILS ABSOLUTE: 0.4 K/UL (ref 0–0.5)
EOSINOPHILS RELATIVE PERCENT: 2.5 % (ref 0.8–7)
GFR SERPL CREATININE-BSD FRML MDRD: >60 ML/MIN/{1.73_M2}
GLOBULIN: 2.4 G/DL (ref 2.3–3.5)
GLUCOSE BLD-MCNC: 96 MG/DL (ref 70–99)
GLUCOSE URINE: NEGATIVE MG/DL
HCT VFR BLD CALC: 38.9 % (ref 42–52)
HEMOGLOBIN: 12.7 G/DL (ref 13.7–17.5)
IMMATURE GRANULOCYTES #: 0.1 K/UL
IMMATURE GRANULOCYTES %: 0.4 %
KETONES, URINE: NEGATIVE MG/DL
LEUKOCYTE ESTERASE, URINE: NEGATIVE
LIPASE: 17 U/L (ref 12–95)
LYMPHOCYTES ABSOLUTE: 3.1 K/UL (ref 1.3–3.6)
LYMPHOCYTES RELATIVE PERCENT: 20.5 %
Lab: ABNORMAL
MCH RBC QN AUTO: 32.2 PG (ref 25.7–32.2)
MCHC RBC AUTO-ENTMCNC: 32.6 % (ref 32.3–36.5)
MCV RBC AUTO: 98.5 FL (ref 79–92.2)
MONOCYTES ABSOLUTE: 1.4 K/UL (ref 0.3–0.8)
MONOCYTES RELATIVE PERCENT: 9.1 % (ref 5.3–12.2)
NEUTROPHILS ABSOLUTE: 10.1 K/UL (ref 1.8–5.4)
NEUTROPHILS RELATIVE PERCENT: 67.1 % (ref 34–67.9)
NITRITE, URINE: NEGATIVE
OPIATE SCREEN URINE: POSITIVE
PDW BLD-RTO: 16.5 % (ref 11.6–14.4)
PH UA: 6.5 (ref 5–9)
PHENCYCLIDINE SCREEN URINE: ABNORMAL
PLATELET # BLD: 307 K/UL (ref 163–337)
POTASSIUM REFLEX MAGNESIUM: 3.7 MEQ/L (ref 3.4–4.9)
PRO-BNP: 298 PG/ML
PROTEIN UA: NEGATIVE MG/DL
RBC # BLD: 3.95 M/UL (ref 4.63–6.08)
SODIUM BLD-SCNC: 142 MEQ/L (ref 135–144)
SPECIFIC GRAVITY UA: 1.02 (ref 1–1.03)
TOTAL PROTEIN: 6.1 G/DL (ref 6.3–8)
TRICYCLIC, URINE: ABNORMAL
TROPONIN: <0.01 NG/ML (ref 0–0.01)
URINE REFLEX TO CULTURE: NORMAL
UROBILINOGEN, URINE: 0.2 E.U./DL
WBC # BLD: 15 K/UL (ref 4.2–9)

## 2023-01-03 PROCEDURE — 6360000002 HC RX W HCPCS: Performed by: EMERGENCY MEDICINE

## 2023-01-03 PROCEDURE — 2580000003 HC RX 258: Performed by: INTERNAL MEDICINE

## 2023-01-03 PROCEDURE — 97166 OT EVAL MOD COMPLEX 45 MIN: CPT

## 2023-01-03 PROCEDURE — 96374 THER/PROPH/DIAG INJ IV PUSH: CPT

## 2023-01-03 PROCEDURE — 80306 DRUG TEST PRSMV INSTRMNT: CPT

## 2023-01-03 PROCEDURE — 6370000000 HC RX 637 (ALT 250 FOR IP): Performed by: INTERNAL MEDICINE

## 2023-01-03 PROCEDURE — 84484 ASSAY OF TROPONIN QUANT: CPT

## 2023-01-03 PROCEDURE — 93005 ELECTROCARDIOGRAM TRACING: CPT

## 2023-01-03 PROCEDURE — 99285 EMERGENCY DEPT VISIT HI MDM: CPT

## 2023-01-03 PROCEDURE — 96376 TX/PRO/DX INJ SAME DRUG ADON: CPT

## 2023-01-03 PROCEDURE — 85025 COMPLETE CBC W/AUTO DIFF WBC: CPT

## 2023-01-03 PROCEDURE — 81003 URINALYSIS AUTO W/O SCOPE: CPT

## 2023-01-03 PROCEDURE — 85379 FIBRIN DEGRADATION QUANT: CPT

## 2023-01-03 PROCEDURE — G0378 HOSPITAL OBSERVATION PER HR: HCPCS

## 2023-01-03 PROCEDURE — 94640 AIRWAY INHALATION TREATMENT: CPT

## 2023-01-03 PROCEDURE — 83880 ASSAY OF NATRIURETIC PEPTIDE: CPT

## 2023-01-03 PROCEDURE — 96375 TX/PRO/DX INJ NEW DRUG ADDON: CPT

## 2023-01-03 PROCEDURE — 71045 X-RAY EXAM CHEST 1 VIEW: CPT

## 2023-01-03 PROCEDURE — 80053 COMPREHEN METABOLIC PANEL: CPT

## 2023-01-03 PROCEDURE — 36415 COLL VENOUS BLD VENIPUNCTURE: CPT

## 2023-01-03 PROCEDURE — 96372 THER/PROPH/DIAG INJ SC/IM: CPT

## 2023-01-03 PROCEDURE — 6360000002 HC RX W HCPCS: Performed by: INTERNAL MEDICINE

## 2023-01-03 PROCEDURE — 6370000000 HC RX 637 (ALT 250 FOR IP): Performed by: EMERGENCY MEDICINE

## 2023-01-03 PROCEDURE — 83690 ASSAY OF LIPASE: CPT

## 2023-01-03 RX ORDER — GUAIFENESIN DEXTROMETHORPHAN HYDROBROMIDE ORAL SOLUTION 10; 100 MG/5ML; MG/5ML
5 SOLUTION ORAL EVERY 4 HOURS PRN
Status: DISCONTINUED | OUTPATIENT
Start: 2023-01-03 | End: 2023-01-04 | Stop reason: HOSPADM

## 2023-01-03 RX ORDER — ONDANSETRON 2 MG/ML
4 INJECTION INTRAMUSCULAR; INTRAVENOUS EVERY 6 HOURS PRN
Status: DISCONTINUED | OUTPATIENT
Start: 2023-01-03 | End: 2023-01-04 | Stop reason: HOSPADM

## 2023-01-03 RX ORDER — METHYLPREDNISOLONE SODIUM SUCCINATE 40 MG/ML
40 INJECTION, POWDER, LYOPHILIZED, FOR SOLUTION INTRAMUSCULAR; INTRAVENOUS EVERY 12 HOURS
Status: DISCONTINUED | OUTPATIENT
Start: 2023-01-03 | End: 2023-01-04 | Stop reason: HOSPADM

## 2023-01-03 RX ORDER — LORAZEPAM 2 MG/ML
0.5 INJECTION INTRAMUSCULAR ONCE
Status: COMPLETED | OUTPATIENT
Start: 2023-01-03 | End: 2023-01-03

## 2023-01-03 RX ORDER — DOXYCYCLINE HYCLATE 100 MG/1
100 CAPSULE ORAL EVERY 12 HOURS SCHEDULED
Status: DISCONTINUED | OUTPATIENT
Start: 2023-01-03 | End: 2023-01-04 | Stop reason: HOSPADM

## 2023-01-03 RX ORDER — MONTELUKAST SODIUM 10 MG/1
10 TABLET ORAL NIGHTLY
Status: DISCONTINUED | OUTPATIENT
Start: 2023-01-03 | End: 2023-01-04 | Stop reason: HOSPADM

## 2023-01-03 RX ORDER — ONDANSETRON 4 MG/1
4 TABLET, ORALLY DISINTEGRATING ORAL EVERY 8 HOURS PRN
Status: DISCONTINUED | OUTPATIENT
Start: 2023-01-03 | End: 2023-01-04 | Stop reason: HOSPADM

## 2023-01-03 RX ORDER — LOSARTAN POTASSIUM 25 MG/1
25 TABLET ORAL DAILY
Status: DISCONTINUED | OUTPATIENT
Start: 2023-01-03 | End: 2023-01-04 | Stop reason: HOSPADM

## 2023-01-03 RX ORDER — INSULIN LISPRO 100 [IU]/ML
0-8 INJECTION, SOLUTION INTRAVENOUS; SUBCUTANEOUS
Status: DISCONTINUED | OUTPATIENT
Start: 2023-01-03 | End: 2023-01-04 | Stop reason: HOSPADM

## 2023-01-03 RX ORDER — ONDANSETRON 2 MG/ML
4 INJECTION INTRAMUSCULAR; INTRAVENOUS ONCE
Status: COMPLETED | OUTPATIENT
Start: 2023-01-03 | End: 2023-01-03

## 2023-01-03 RX ORDER — ESCITALOPRAM OXALATE 10 MG/1
10 TABLET ORAL DAILY
Status: DISCONTINUED | OUTPATIENT
Start: 2023-01-03 | End: 2023-01-04 | Stop reason: HOSPADM

## 2023-01-03 RX ORDER — IPRATROPIUM BROMIDE AND ALBUTEROL SULFATE 2.5; .5 MG/3ML; MG/3ML
1 SOLUTION RESPIRATORY (INHALATION) 3 TIMES DAILY
Status: DISCONTINUED | OUTPATIENT
Start: 2023-01-03 | End: 2023-01-04 | Stop reason: HOSPADM

## 2023-01-03 RX ORDER — SODIUM CHLORIDE 0.9 % (FLUSH) 0.9 %
5-40 SYRINGE (ML) INJECTION PRN
Status: DISCONTINUED | OUTPATIENT
Start: 2023-01-03 | End: 2023-01-04 | Stop reason: HOSPADM

## 2023-01-03 RX ORDER — INSULIN LISPRO 100 [IU]/ML
0-4 INJECTION, SOLUTION INTRAVENOUS; SUBCUTANEOUS NIGHTLY
Status: DISCONTINUED | OUTPATIENT
Start: 2023-01-03 | End: 2023-01-04 | Stop reason: HOSPADM

## 2023-01-03 RX ORDER — SODIUM CHLORIDE 9 MG/ML
INJECTION, SOLUTION INTRAVENOUS PRN
Status: DISCONTINUED | OUTPATIENT
Start: 2023-01-03 | End: 2023-01-04 | Stop reason: HOSPADM

## 2023-01-03 RX ORDER — PANTOPRAZOLE SODIUM 40 MG/1
40 TABLET, DELAYED RELEASE ORAL DAILY
Status: DISCONTINUED | OUTPATIENT
Start: 2023-01-03 | End: 2023-01-04 | Stop reason: HOSPADM

## 2023-01-03 RX ORDER — MELOXICAM 7.5 MG/1
15 TABLET ORAL DAILY
Status: DISCONTINUED | OUTPATIENT
Start: 2023-01-03 | End: 2023-01-04 | Stop reason: HOSPADM

## 2023-01-03 RX ORDER — ENOXAPARIN SODIUM 100 MG/ML
30 INJECTION SUBCUTANEOUS 2 TIMES DAILY
Status: DISCONTINUED | OUTPATIENT
Start: 2023-01-03 | End: 2023-01-04 | Stop reason: HOSPADM

## 2023-01-03 RX ORDER — ACETAMINOPHEN 650 MG/1
650 SUPPOSITORY RECTAL EVERY 6 HOURS PRN
Status: DISCONTINUED | OUTPATIENT
Start: 2023-01-03 | End: 2023-01-04 | Stop reason: HOSPADM

## 2023-01-03 RX ORDER — AMLODIPINE BESYLATE 10 MG/1
10 TABLET ORAL DAILY
Status: DISCONTINUED | OUTPATIENT
Start: 2023-01-03 | End: 2023-01-04 | Stop reason: HOSPADM

## 2023-01-03 RX ORDER — KETOROLAC TROMETHAMINE 30 MG/ML
30 INJECTION, SOLUTION INTRAMUSCULAR; INTRAVENOUS EVERY 6 HOURS PRN
Status: DISCONTINUED | OUTPATIENT
Start: 2023-01-03 | End: 2023-01-04 | Stop reason: HOSPADM

## 2023-01-03 RX ORDER — POLYETHYLENE GLYCOL 3350 17 G/17G
17 POWDER, FOR SOLUTION ORAL DAILY PRN
Status: DISCONTINUED | OUTPATIENT
Start: 2023-01-03 | End: 2023-01-04 | Stop reason: HOSPADM

## 2023-01-03 RX ORDER — SODIUM CHLORIDE 0.9 % (FLUSH) 0.9 %
5-40 SYRINGE (ML) INJECTION EVERY 12 HOURS SCHEDULED
Status: DISCONTINUED | OUTPATIENT
Start: 2023-01-03 | End: 2023-01-04 | Stop reason: HOSPADM

## 2023-01-03 RX ORDER — ALBUTEROL SULFATE 90 UG/1
2 AEROSOL, METERED RESPIRATORY (INHALATION) EVERY 4 HOURS PRN
Status: DISCONTINUED | OUTPATIENT
Start: 2023-01-03 | End: 2023-01-04 | Stop reason: HOSPADM

## 2023-01-03 RX ORDER — ATORVASTATIN CALCIUM 10 MG/1
10 TABLET, FILM COATED ORAL NIGHTLY
Status: DISCONTINUED | OUTPATIENT
Start: 2023-01-03 | End: 2023-01-04 | Stop reason: HOSPADM

## 2023-01-03 RX ORDER — ASPIRIN 81 MG/1
324 TABLET, CHEWABLE ORAL ONCE
Status: COMPLETED | OUTPATIENT
Start: 2023-01-03 | End: 2023-01-03

## 2023-01-03 RX ORDER — TRAMADOL HYDROCHLORIDE 50 MG/1
50 TABLET ORAL EVERY 6 HOURS PRN
Status: DISCONTINUED | OUTPATIENT
Start: 2023-01-03 | End: 2023-01-04 | Stop reason: HOSPADM

## 2023-01-03 RX ORDER — GUAIFENESIN 600 MG/1
600 TABLET, EXTENDED RELEASE ORAL 2 TIMES DAILY
Status: DISCONTINUED | OUTPATIENT
Start: 2023-01-03 | End: 2023-01-04 | Stop reason: HOSPADM

## 2023-01-03 RX ORDER — IPRATROPIUM BROMIDE AND ALBUTEROL SULFATE 2.5; .5 MG/3ML; MG/3ML
1 SOLUTION RESPIRATORY (INHALATION) 3 TIMES DAILY
Status: DISCONTINUED | OUTPATIENT
Start: 2023-01-03 | End: 2023-01-03

## 2023-01-03 RX ORDER — ACETAMINOPHEN 325 MG/1
650 TABLET ORAL EVERY 6 HOURS PRN
Status: DISCONTINUED | OUTPATIENT
Start: 2023-01-03 | End: 2023-01-04 | Stop reason: HOSPADM

## 2023-01-03 RX ORDER — TRAZODONE HYDROCHLORIDE 50 MG/1
50 TABLET ORAL NIGHTLY
Status: DISCONTINUED | OUTPATIENT
Start: 2023-01-03 | End: 2023-01-04 | Stop reason: HOSPADM

## 2023-01-03 RX ORDER — ALBUTEROL SULFATE 2.5 MG/3ML
2.5 SOLUTION RESPIRATORY (INHALATION) ONCE
Status: COMPLETED | OUTPATIENT
Start: 2023-01-03 | End: 2023-01-03

## 2023-01-03 RX ORDER — MORPHINE SULFATE 4 MG/ML
4 INJECTION, SOLUTION INTRAMUSCULAR; INTRAVENOUS ONCE
Status: COMPLETED | OUTPATIENT
Start: 2023-01-03 | End: 2023-01-03

## 2023-01-03 RX ADMIN — ATORVASTATIN CALCIUM 10 MG: 10 TABLET, FILM COATED ORAL at 20:20

## 2023-01-03 RX ADMIN — GUAIFENESIN 600 MG: 600 TABLET, EXTENDED RELEASE ORAL at 11:00

## 2023-01-03 RX ADMIN — IPRATROPIUM BROMIDE AND ALBUTEROL SULFATE 1 AMPULE: 2.5; .5 SOLUTION RESPIRATORY (INHALATION) at 11:36

## 2023-01-03 RX ADMIN — METHYLPREDNISOLONE SODIUM SUCCINATE 40 MG: 40 INJECTION, POWDER, FOR SOLUTION INTRAMUSCULAR; INTRAVENOUS at 11:04

## 2023-01-03 RX ADMIN — ENOXAPARIN SODIUM 30 MG: 100 INJECTION SUBCUTANEOUS at 11:09

## 2023-01-03 RX ADMIN — LORAZEPAM 0.5 MG: 2 INJECTION INTRAMUSCULAR; INTRAVENOUS at 07:21

## 2023-01-03 RX ADMIN — AMLODIPINE BESYLATE 10 MG: 10 TABLET ORAL at 11:01

## 2023-01-03 RX ADMIN — MELOXICAM 15 MG: 7.5 TABLET ORAL at 11:01

## 2023-01-03 RX ADMIN — GUAIFENESIN 600 MG: 600 TABLET, EXTENDED RELEASE ORAL at 20:20

## 2023-01-03 RX ADMIN — ONDANSETRON 4 MG: 2 INJECTION INTRAMUSCULAR; INTRAVENOUS at 05:48

## 2023-01-03 RX ADMIN — ASPIRIN 81 MG CHEWABLE TABLET 324 MG: 81 TABLET CHEWABLE at 05:47

## 2023-01-03 RX ADMIN — ALBUTEROL SULFATE 2.5 MG: 2.5 SOLUTION RESPIRATORY (INHALATION) at 07:35

## 2023-01-03 RX ADMIN — IPRATROPIUM BROMIDE AND ALBUTEROL SULFATE 1 AMPULE: 2.5; .5 SOLUTION RESPIRATORY (INHALATION) at 18:01

## 2023-01-03 RX ADMIN — PANTOPRAZOLE SODIUM 40 MG: 40 TABLET, DELAYED RELEASE ORAL at 11:00

## 2023-01-03 RX ADMIN — NITROGLYCERIN 0.5 INCH: 20 OINTMENT TOPICAL at 07:22

## 2023-01-03 RX ADMIN — ACETAMINOPHEN 650 MG: 325 TABLET ORAL at 17:55

## 2023-01-03 RX ADMIN — ENOXAPARIN SODIUM 30 MG: 100 INJECTION SUBCUTANEOUS at 20:20

## 2023-01-03 RX ADMIN — DOXYCYCLINE HYCLATE 100 MG: 100 CAPSULE ORAL at 20:23

## 2023-01-03 RX ADMIN — MONTELUKAST SODIUM 10 MG: 10 TABLET, FILM COATED ORAL at 20:20

## 2023-01-03 RX ADMIN — TRAZODONE HYDROCHLORIDE 50 MG: 50 TABLET ORAL at 20:20

## 2023-01-03 RX ADMIN — Medication 10 ML: at 20:23

## 2023-01-03 RX ADMIN — LOSARTAN POTASSIUM 25 MG: 25 TABLET, FILM COATED ORAL at 11:00

## 2023-01-03 RX ADMIN — METFORMIN HYDROCHLORIDE 500 MG: 500 TABLET ORAL at 11:00

## 2023-01-03 RX ADMIN — METFORMIN HYDROCHLORIDE 500 MG: 500 TABLET ORAL at 17:40

## 2023-01-03 RX ADMIN — MORPHINE SULFATE 4 MG: 4 INJECTION, SOLUTION INTRAMUSCULAR; INTRAVENOUS at 05:48

## 2023-01-03 RX ADMIN — METHYLPREDNISOLONE SODIUM SUCCINATE 40 MG: 40 INJECTION, POWDER, FOR SOLUTION INTRAMUSCULAR; INTRAVENOUS at 22:51

## 2023-01-03 RX ADMIN — ESCITALOPRAM OXALATE 10 MG: 10 TABLET ORAL at 10:59

## 2023-01-03 RX ADMIN — DOXYCYCLINE HYCLATE 100 MG: 100 CAPSULE ORAL at 11:03

## 2023-01-03 RX ADMIN — MOMETASONE FUROATE AND FORMOTEROL FUMARATE DIHYDRATE 2 PUFF: 100; 5 AEROSOL RESPIRATORY (INHALATION) at 18:01

## 2023-01-03 RX ADMIN — Medication 5 ML: at 11:18

## 2023-01-03 ASSESSMENT — PAIN DESCRIPTION - LOCATION
LOCATION: BACK;CHEST;KNEE
LOCATION: CHEST
LOCATION: CHEST
LOCATION: BACK

## 2023-01-03 ASSESSMENT — PAIN SCALES - GENERAL
PAINLEVEL_OUTOF10: 0
PAINLEVEL_OUTOF10: 3
PAINLEVEL_OUTOF10: 6
PAINLEVEL_OUTOF10: 9

## 2023-01-03 ASSESSMENT — PAIN - FUNCTIONAL ASSESSMENT: PAIN_FUNCTIONAL_ASSESSMENT: 0-10

## 2023-01-03 ASSESSMENT — PAIN DESCRIPTION - ORIENTATION: ORIENTATION: LOWER

## 2023-01-03 NOTE — ED NOTES
Supervisor notified patient accepted by Dr Benjamin Meyer for admit     Amol Davies, ASMANTHA  01/03/23 5977

## 2023-01-03 NOTE — ED NOTES
Dr. Raúl Rai paged to discuss patient for admission here at [facility]  01/03/23 5935

## 2023-01-03 NOTE — PROGRESS NOTES
Occupational Therapy  Facility/Department: [address] [place] [address]  Occupational Therapy Initial Assessment    Name: Nahomy Gordon  : 1955  MRN: [id number]  Date of Service: 1/3/2023    Discharge Recommendations:  Continue to assess pending progress, Home independently        Patient Diagnosis(es): The encounter diagnosis was Chest pain, unspecified type. Past Medical History:  has a past medical history of Alcohol abuse, Anemia, Asthma, Cocaine abuse ([address] Community acquired pneumonia of left lower lobe of lung, COPD (chronic obstructive pulmonary disease) ([address] Depression, Disorder of pharynx, Drug-seeking behavior, Edema, Erectile dysfunction, Gastroesophageal reflux disease, Gout, History of arthroscopy of both knees, History of colon polyps, House dust mite allergy, Hyperlipidemia, Hypertension, Injury to heart, Insomnia, Loculated pleural effusion, Medical non-compliance, Morbid obesity due to excess calories ([address] Osteoarthritis of both knees, Personal history of tobacco use, Pleurisy, Pneumonia, Pre-diabetes, Seasonal allergies, Severe persistent asthma, Severe sleep apnea, Supraventricular tachycardia ([address] Type 2 diabetes mellitus with albuminuria ([address] and Type 2 diabetes mellitus without complication, without long-term current use of insulin ([address] Past Surgical History:  has a past surgical history that includes Anterior cruciate ligament repair; Cardiac catheterization; Total knee arthroplasty (Left, 2019); Colonoscopy (N/A, 07/15/2020); Umbilical hernia repair (N/A, 2020); hernia repair; and thoracotomy (Left, 2021). Treatment Diagnosis: weakness      Assessment   Performance deficits / Impairments: Decreased safe awareness;Decreased balance;Decreased endurance  Assessment: 74year old male admitted to [facility] due to dyspnea, cough, chest pain and knee pain.   OT to address maximizing indep with EC/WS strategies to promote safe transition home for ADL and IADL.  Treatment Diagnosis: weakness  Prognosis: Good  REQUIRES OT FOLLOW-UP: Yes        Plan   Occupational Therapy Plan  Times Per Week: 3-6x/wk  Current Treatment Recommendations: Balance training, Endurance training, Safety education & training, Patient/Caregiver education & training     Restrictions  Restrictions/Precautions  Restrictions/Precautions: Fall Risk    Subjective    8/10 pain in back and RLE     Social/Functional History  Social/Functional History  Lives With: Alone  Type of Home: Apartment  Home Layout: One level  Home Access: Level entry  Bathroom Shower/Tub: Tub/Shower unit  Bathroom Toilet: Standard  Bathroom Accessibility: Accessible  Home Equipment: priyanka Webb  Has the patient had two or more falls in the past year or any fall with injury in the past year?: No  ADL Assistance: [facility] [facility] [address]: Independent  Homemaking Responsibilities: Yes  Ambulation Assistance: Independent (no device)  Transfer Assistance: Independent  Active : No  Patient's  Info: family transports as needed  Occupation: Retired  Type of Occupation: [employer]  Leisure & Hobbies: [employer]  IADL Comments: indep IADL       Objective        ADL  Feeding: Setup  Grooming: Setup  UE Bathing: Setup  LE Bathing: Contact guard assistance  UE Dressing: Setup  LE Dressing: Minimal assistance  Toileting: Contact guard assistance  Additional Comments: CGA transfers and functional mobiity        Vision  Vision: Impaired  Vision Exceptions: Wears glasses for reading  Hearing  Hearing: Within functional limits  Orientation  Overall Orientation Status: Within Functional Limits           LUE AROM (degrees)  LUE AROM : WFL  RUE AROM (degrees)  RUE AROM : WFL  LUE Strength  LUE Strength Comment: 4+/5  RUE Strength  RUE Strength Comment: 4+/5          Goals  Short Term Goals  Short Term Goal 1: Mod I ADL routine wtih good safety and balance  Short Term Goal 2: mod I stand x 7 minutes to complete ADL  Short Term Goal 3: Mod I BUE HEP with handout  Short Term Goal 4: 100% understanding of EC/WS strategies for daily life       Therapy Time   Individual Concurrent Group Co-treatment   Time In 1815         Time Out 1845         Minutes Jacqueline Singh 88, OT

## 2023-01-03 NOTE — ED TRIAGE NOTES
Pt. Presents by EMS with initial c/o chest pain per dispatch and then on arrival he also reports back and left knee pain secondary to chronic arthritis. He ambulates from EMS cot to bed without assistance. Pt. Reports mid sternal chest pain at home states it was 9/10 but has resolved now. He reports chronic SOB secondary to COPD and continues to smoke. He denies dizziness, syncope, vision changes, abd pain, N/V. He is alert and oriented, skin is warm and dry.

## 2023-01-03 NOTE — PROGRESS NOTES
Patient brought to room 222, via wheelchair and transferred independently to bed. Monitor room notified for tele.

## 2023-01-03 NOTE — H&P
Hospital Medicine History & Physical      PCP: Susana Carreon MD    Date of Admission: 1/3/2023    Date of Service: 1/3/23      Chief Complaint:  dyspnea/cough/CP      History Of Present Illness:  74 y.o. male who presented to [facility] with above complains. He had dyspnea/SOB with midsternal CP yesterday which lasted for 1/2 hrs. Today AM he decided to be checked in ER and after stabilization was admitted for further management.  Denied fever, dizziness, nausea      Past Medical History:          Diagnosis Date    Alcohol abuse 10/27/2016    Anemia     Asthma     Cocaine abuse ([address] 10/27/2016    Community acquired pneumonia of left lower lobe of lung 12/5/2016    COPD (chronic obstructive pulmonary disease) ([address]     Depression 04/27/2020    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    Drug-seeking behavior 04/14/2015    Edema 12/10/2015    Erectile dysfunction     Gastroesophageal reflux disease 12/17/2018    Gout 10/27/2016    History of arthroscopy of both knees 10/27/2016    History of colon polyps 10/26/2021    House dust mite allergy 04/21/2014    Hyperlipidemia     Hypertension 10/27/2016    Injury to heart 10/27/2016    Insomnia 12/17/2018    Loculated pleural effusion     Medical non-compliance 02/22/2014    Morbid obesity due to excess calories ([address] 12/08/2016    Osteoarthritis of both knees 12/08/2016    Personal history of tobacco use     Pleurisy 12/12/2016    Pneumonia 12/04/2016    caused hospital admission    Pre-diabetes 10/27/2016    Seasonal allergies 04/21/2014    Severe persistent asthma 10/27/2016    Severe sleep apnea 04/14/2015    Supraventricular tachycardia ([address] 10/27/2016    Type 2 diabetes mellitus with albuminuria ([address] 10/26/2021    Type 2 diabetes mellitus without complication, without long-term current use of insulin ([address] 10/26/2021       Past Surgical History:          Procedure Laterality Date    ANTERIOR CRUCIATE LIGAMENT REPAIR      CARDIAC CATHETERIZATION      COLONOSCOPY N/A 07/15/2020    COLONOSCOPY WITH POLYPECTOMY performed by Gwen Ahuja MD at [facility] 20 Left 11/27/2021    VATS/thoracotomy    TOTAL KNEE ARTHROPLASTY Left 08/09/2019    LEFT TOTAL KNEE ARTHROPLASTY performed by Shavonne Jennings MD at [facility] 55 N/A 15/60/0551    UMBILICAL HERNIA REPAIR WITH MESH performed by Syed Reyes MD at [facility]       Medications Prior to Admission:      Prior to Admission medications    Medication Sig Start Date End Date Taking?  Authorizing Provider   albuterol sulfate HFA (PROVENTIL;VENTOLIN;PROAIR) 108 (90 Base) MCG/ACT inhaler Inhale 2 puffs into the lungs every 4 hours as needed for Wheezing or Shortness of Breath 12/21/22   Bronwyn Mejia MD   ipratropium-albuterol (DUONEB) 0.5-2.5 (3) MG/3ML SOLN nebulizer solution Take 3 mLs by nebulization every 4 hours as needed for Shortness of Breath 12/21/22   Bronwyn Mejia MD   acetaminophen (TYLENOL) 500 MG tablet Take 2 tablets by mouth 3 times daily as needed for Pain 12/21/22 12/28/22  RUBEN Cee   sildenafil (VIAGRA) 100 MG tablet TAKE 1 TABLET BY MOUTH AS NEEDED FOR ERECTILE DYSFUNCTION 12/7/22   Bronwyn Mejia MD   amLODIPine (NORVASC) 10 MG tablet Take 1 tablet by mouth daily 10/6/22   Bronwyn Mejia MD   cetirizine (ZYRTEC) 10 MG tablet Take 1 tablet by mouth daily 10/6/22   Bronwyn Mejia MD   escitalopram (LEXAPRO) 10 MG tablet Take 1 tablet by mouth daily 10/6/22   Bronwyn Mejia MD   fluticasone (FLONASE) 50 MCG/ACT nasal spray 2 sprays by Nasal route daily  Patient not taking: No sig reported 10/6/22   Bronwyn Mejia MD   losartan (COZAAR) 25 MG tablet Take 1 tablet by mouth daily 10/6/22   Bronwyn Mejia MD   lovastatin (MEVACOR) 20 MG tablet Take 1 tablet by mouth nightly 10/6/22   Bronwyn Mejia MD   meloxicam (MOBIC) 15 MG tablet Take 1 tablet by mouth daily 10/6/22   Bronwyn Mejia MD   metFORMIN (GLUCOPHAGE) 500 MG tablet Take 1 tablet by mouth 2 times daily (with meals) 10/6/22   Danii Barnhart MD   montelukast (SINGULAIR) 10 MG tablet Take 1 tablet by mouth nightly 10/6/22   Danii Barnhart MD   pantoprazole (PROTONIX) 40 MG tablet Take 1 tablet by mouth daily 10/6/22   Danii Barnhart MD   traZODone (DESYREL) 50 MG tablet Take 1 tablet by mouth nightly 10/6/22   Danii Barnhart MD   SYMBICORT 160-4.5 MCG/ACT AERO Inhale 2 puffs into the lungs 2 times daily 10/6/22   Danii Barnhart MD   blood glucose monitor strips Test three times a day & as needed for symptoms of irregular blood glucose. Dispense sufficient amount for indicated testing frequency plus additional to accommodate PRN testing needs. Patient not taking: No sig reported 4/5/22   Danii Barnhart MD   Lancets MISC 1 each by Does not apply route 3 times daily  Patient not taking: No sig reported 10/20/21   Ryan Bravo MD       Allergies:  Fish-derived products, Iodine, Seasonal, Other, Penicillin g, Shellfish allergy, and Pcn [penicillins]    Social History:      The patient currently lives home    TOBACCO:   reports that he has been smoking cigarettes and cigars. He started smoking about 34 years ago. He has a 7.50 pack-year smoking history. He has never used smokeless tobacco.  ETOH:   reports that he does not currently use alcohol after a past usage of about 4.0 standard drinks per week. Family History:       Reviewed in detail and negative for DM, CAD, Cancer, CVA. Positive as follows:        Problem Relation Age of Onset    Arthritis Mother     Asthma Mother     High Cholesterol Mother     Other Mother         aneurysm    Diabetes Father     Stroke Maternal Grandmother     Cancer Maternal Grandfather     Hypertension Other     COPD Neg Hx        REVIEW OF SYSTEMS:   Pertinent positives as noted in the HPI. All other systems reviewed and negative.     PHYSICAL EXAM:    /74   Pulse 61   Temp 97.8 °F (36.6 °C) (Oral)   Resp 18   Ht 6' (1.829 m)   Wt 264 lb 8 oz (120 kg)   SpO2 95%   BMI 35.87 kg/m²     General appearance:  No apparent distress, appears stated age and cooperative. HEENT:  Normal cephalic, atraumatic without obvious deformity. Pupils equal, round, and reactive to light. Extra ocular muscles intact. Conjunctivae/corneas clear. Neck: Supple, with full range of motion. No jugular venous distention. Trachea midline. Respiratory:  Normal respiratory effort. Clear to auscultation, bilaterally without Rales/Wheezes/Rhonchi. Cardiovascular:  Regular rate and rhythm with normal S1/S2 without murmurs, rubs or gallops. Abdomen: Soft, non-tender, non-distended with normal bowel sounds. Musculoskeletal:  No clubbing, cyanosis or edema bilaterally. Full range of motion without deformity. Skin: Skin color, texture, turgor normal.  No rashes or lesions. Neurologic:  Neurovascularly intact without any focal sensory/motor deficits. Cranial nerves: II-XII intact, grossly non-focal.  Psychiatric:  Alert and oriented, thought content appropriate, normal insight  Capillary Refill: Brisk,< 3 seconds   Peripheral Pulses: +2 palpable, equal bilaterally       Labs:     Recent Labs     01/03/23 0522   WBC 15.0*   HGB 12.7*   HCT 38.9*        Recent Labs     01/03/23 0522      K 3.7      CO2 28   BUN 13   CREATININE 0.77   CALCIUM 9.2     Recent Labs     01/03/23 0522   AST 14   ALT 18   BILITOT 0.3   ALKPHOS 79     No results for input(s): INR in the last 72 hours.   Recent Labs     01/03/23 0522   TROPONINI <0.010       Urinalysis:      Lab Results   Component Value Date/Time    NITRU Negative 01/03/2023 07:34 AM    WBCUA 0-2 09/16/2021 11:40 AM    BACTERIA None 10/05/2017 11:32 AM    RBCUA 0-2 09/16/2021 11:40 AM    BLOODU Negative 01/03/2023 07:34 AM    SPECGRAV 1.020 01/03/2023 07:34 AM    GLUCOSEU Negative 01/03/2023 07:34 AM       Radiology:     CXR: I have reviewed the CXR with the following interpretation:   EKG:  I have reviewed the EKG with the following interpretation:     XR CHEST PORTABLE   Final Result   Stable likely pleuroparenchymal scarring on the left. ASSESSMENT:    Active Hospital Problems    Diagnosis Date Noted    Chest pain [R07.9] 01/03/2023     Priority: Medium       PLAN:        DVT Prophylaxis:   Diet: ADULT DIET; Regular  Code Status: Full Code    PT/OT Eval Status:     Dispo -   CP- resolved on admission- work up initiated,   Dyspnea, COPD-at baseline, has leucocytosis- treatement with iv atbs/solumedrol initiated  HTN- controlled, home meds restarted   Obesity with BMI 35%  Smoking, polysubstance use- advice to stop   Suspect non compliance with medical Tx at home  Chronic low back pain- continue current pain meds. Medically stable for acute admission at [facility]op[facility] Mathur MD    Thank you Anita Gottron, MD for the opportunity to be involved in this patient's care. If you have any questions or concerns please feel free to contact me.

## 2023-01-03 NOTE — ED NOTES
Face to Face report given to [facility].  Electronically signed by Karen Barnett RN on 1/3/2023 at [address], RN  01/03/23 1454

## 2023-01-03 NOTE — PROGRESS NOTES
1410 Walked into pt's room to colect troponin. Pt was diaphoretic on forehead. Checked pt's glucose result on POCT 120mg/dL  VS taken T 98.3 /74 HR 66 Spo2 97% on RA R 20  Pt denies any SOB, CP. Lightheadedness. Pt is lying comfortably in bed with call light within place.    Troponin Lab work was collected

## 2023-01-03 NOTE — PROGRESS NOTES
0900 Assumed care for pt from ER. Report given by Rosendo Crenshaw. Pt is here for c/o of CP. VS taken assessment completed and documented, 20G in L hand flushes well. + Lung sounds coarse crackles Noted. C/O SOB with activity. Currently has nitro patch on R shoulder. Pt is independent. Pt voices no concerns at this time. Call light is within reach.

## 2023-01-03 NOTE — ED PROVIDER NOTES
eMERGENCY dEPARTMENT eNCOUnter      [address] [facility]    Chief Complaint   Patient presents with    Chest Pain    Back Pain    Knee Pain       HPI    Berenice Davenport is a 74 y.o. male with history of alcohol abuse, anemia, cocaine abuse, committee acquired pneumonia, COPD, erectile dysfunction, GERD, hyperlipidemia, hypertension, smoking who presentsto ED from home  By EMS  With complaint of chest pain, left knee pain  Onset last night  Intensity of symptoms moderate patient complains of left-sided chest pain which was dull in nature with no radiation. Patient has got chronic shortness of breath and COPD. He has cough with slight expectoration. Patient continues to smoke. He denies any fever or chills. Patient also has got chronic right knee pain . He denies any injury.     PAST MEDICAL HISTORY    Past Medical History:   Diagnosis Date    Alcohol abuse 10/27/2016    Anemia     Asthma     Cocaine abuse ([address] 10/27/2016    Community acquired pneumonia of left lower lobe of lung 12/5/2016    COPD (chronic obstructive pulmonary disease) ([address]     Depression 04/27/2020    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    Drug-seeking behavior 04/14/2015    Edema 12/10/2015    Erectile dysfunction     Gastroesophageal reflux disease 12/17/2018    Gout 10/27/2016    History of arthroscopy of both knees 10/27/2016    History of colon polyps 10/26/2021    House dust mite allergy 04/21/2014    Hyperlipidemia     Hypertension 10/27/2016    Injury to heart 10/27/2016    Insomnia 12/17/2018    Loculated pleural effusion     Medical non-compliance 02/22/2014    Morbid obesity due to excess calories ([address] 12/08/2016    Osteoarthritis of both knees 12/08/2016    Personal history of tobacco use     Pleurisy 12/12/2016    Pneumonia 12/04/2016    caused hospital admission    Pre-diabetes 10/27/2016    Seasonal allergies 04/21/2014    Severe persistent asthma 10/27/2016    Severe sleep apnea 04/14/2015    Supraventricular tachycardia ([address] 10/27/2016    Type 2 diabetes mellitus with albuminuria ([facility] 10/26/2021    Type 2 diabetes mellitus without complication, without long-term current use of insulin ([facility] 10/26/2021       SURGICAL HISTORY    Past Surgical History:   Procedure Laterality Date    ANTERIOR CRUCIATE LIGAMENT REPAIR      CARDIAC CATHETERIZATION      COLONOSCOPY N/A 07/15/2020    COLONOSCOPY WITH POLYPECTOMY performed by Babs Syed MD at [place] [id number] Left 11/27/2021    VATS/thoracotomy    TOTAL KNEE ARTHROPLASTY Left 08/09/2019    LEFT TOTAL KNEE ARTHROPLASTY performed by Ana M Schultz MD at [id number]/[id number]    [facility] performed by Lynn Carrera MD at [address]    Current Outpatient Rx   Medication Sig Dispense Refill    albuterol sulfate HFA (PROVENTIL;VENTOLIN;PROAIR) 108 (90 Base) MCG/ACT inhaler Inhale 2 puffs into the lungs every 4 hours as needed for Wheezing or Shortness of Breath 18 each 1    ipratropium-albuterol (DUONEB) 0.5-2.5 (3) MG/3ML SOLN nebulizer solution Take 3 mLs by nebulization every 4 hours as needed for Shortness of Breath 360 mL 1    acetaminophen (TYLENOL) 500 MG tablet Take 2 tablets by mouth 3 times daily as needed for Pain 42 tablet 0    sildenafil (VIAGRA) 100 MG tablet TAKE 1 TABLET BY MOUTH AS NEEDED FOR ERECTILE DYSFUNCTION 6 tablet 1    amLODIPine (NORVASC) 10 MG tablet Take 1 tablet by mouth daily 90 tablet 1    cetirizine (ZYRTEC) 10 MG tablet Take 1 tablet by mouth daily 90 tablet 1    escitalopram (LEXAPRO) 10 MG tablet Take 1 tablet by mouth daily 90 tablet 1    fluticasone (FLONASE) 50 MCG/ACT nasal spray 2 sprays by Nasal route daily (Patient not taking: No sig reported) 48 g 1    losartan (COZAAR) 25 MG tablet Take 1 tablet by mouth daily 90 tablet 1    lovastatin (MEVACOR) 20 MG tablet Take 1 tablet by mouth nightly 90 tablet 1    meloxicam (MOBIC) 15 MG tablet Take 1 tablet by mouth daily 90 tablet 1    metFORMIN (GLUCOPHAGE) 500 MG tablet Take 1 tablet by mouth 2 times daily (with meals) 180 tablet 1    montelukast (SINGULAIR) 10 MG tablet Take 1 tablet by mouth nightly 90 tablet 1    pantoprazole (PROTONIX) 40 MG tablet Take 1 tablet by mouth daily 90 tablet 1    traZODone (DESYREL) 50 MG tablet Take 1 tablet by mouth nightly 90 tablet 1    SYMBICORT 160-4.5 MCG/ACT AERO Inhale 2 puffs into the lungs 2 times daily 30.6 g 1    blood glucose monitor strips Test three times a day & as needed for symptoms of irregular blood glucose. Dispense sufficient amount for indicated testing frequency plus additional to accommodate PRN testing needs.  (Patient not taking: No sig reported) 100 strip 1    Lancets MISC 1 each by Does not apply route 3 times daily (Patient not taking: No sig reported) 200 each 5       ALLERGIES    Allergies   Allergen Reactions    Fish-Derived Products Anaphylaxis    Iodine Anaphylaxis     Iodine-containing products, dyes, contrast dye    Seasonal Shortness Of Breath    Other      Other reaction(s): Swelling/Edema  Other reaction(s): Swelling/Edema    Penicillin G Other (See Comments)    Shellfish Allergy      Other reaction(s): Swelling/Edema    Pcn [Penicillins] Nausea And Vomiting       FAMILY HISTORY    Family History   Problem Relation Age of Onset    Arthritis Mother     Asthma Mother     High Cholesterol Mother     Other Mother         aneurysm    Diabetes Father     Stroke Maternal Grandmother     Cancer Maternal Grandfather     Hypertension Other     COPD Neg Hx        SOCIAL HISTORY    Social History     Socioeconomic History    Marital status: Single     Spouse name: n/a    Number of children: 1    Years of education: 12    Highest education level: None   Occupational History    Occupation: Disability     Employer: NONE   Tobacco Use    Smoking status: Light Smoker     Packs/day: 0.25     Years: 30.00     Pack years: 7.50     Types: Cigarettes, Cigars Start date: 1988     Last attempt to quit: 10/1/2013     Years since quittin.2    Smokeless tobacco: Never    Tobacco comments:     doesnt buy only smokes if someone gives him one    Vaping Use    Vaping Use: Never used   Substance and Sexual Activity    Alcohol use: Not Currently     Alcohol/week: 4.0 standard drinks     Types: 2 Cans of beer, 2 Shots of liquor per week    Drug use: Yes     Types: Cocaine, Marijuana (Weed)     Comment: no cocaine x 1 year, marijuana weekly    Sexual activity: Not Currently     Partners: Female   Social History Narrative    Lives in an apartment    15 steps to get up to the apartment    Asking for hospital bed and shower chair 2021 sent request to pcp office     Has 1 daughter who lives in [facility] per patient 2 grandchildren. Per patient does not see his daughter very often.      Social Determinants of Health     Financial Resource Strain: Medium Risk    Difficulty of Paying Living Expenses: Somewhat hard   Food Insecurity: No Food Insecurity    Worried About Running Out of Food in the Last Year: Never true    Ran Out of Food in the Last Year: Never true   Physical Activity: Inactive    Days of Exercise per Week: 0 days    Minutes of Exercise per Session: 0 min   Stress: No Stress Concern Present    Feeling of Stress : Not at all   Social Connections: Socially Isolated    Frequency of Communication with Friends and Family: Once a week    Frequency of Social Gatherings with Friends and Family: Never    Attends Hinduism Services: Never    Active Member of Clubs or Organizations: No    Attends Club or Organization Meetings: Never    Marital Status:    Housing Stability: Unknown    Unable to Pay for Housing in the Last Year: No    Unstable Housing in the Last Year: No       REVIEW OF SYSTEMS    Constitutional:  Denies fever, chills, weight loss or weakness   Eyes:  Denies photophobia or discharge   HENT:  Denies sore throat or ear pain   Respiratory: Complains of cough and shortness of breath   Cardiovascular: Complains of chest pain, but denies palpitations or swelling   GI:  Denies abdominal pain, nausea, vomiting, or diarrhea   Musculoskeletal:  Denies back pain   Skin:  Denies rash   Neurologic:  Denies headache, focal weakness or sensory changes   Endocrine:  Denies polyuria or polydypsia   Lymphatic:  Denies swollen glands   Psychiatric:  Denies depression, suicidal ideation or homicidal ideation   All systems negative except as marked. PHYSICAL EXAM    VITAL SIGNS: /88   Pulse (!) 102   Temp 98.3 °F (36.8 °C) (Oral)   Resp 16   Wt 235 lb (106.6 kg)   SpO2 96%   BMI 31.87 kg/m²    Constitutional:  Well developed, Well nourished, mild acute distress, Non-toxic appearance. HENT:  Normocephalic, Atraumatic, Bilateral external ears normal, Oropharynx moist, No oral exudates, Nose normal. Neck- Normal range of motion, No tenderness, Supple, No stridor. Eyes:  PERRL, EOMI, Conjunctiva normal, No discharge. Respiratory:  Normal breath sounds, No respiratory distress, No wheezing, No chest tenderness. Cardiovascular: Tachycardic, Normal rhythm, No murmurs, No rubs, No gallops. GI:  Bowel sounds normal, Soft, No tenderness, No masses, No pulsatile masses. : No CVA tenderness. Musculoskeletal:  Intact distal pulses, No edema, No tenderness, No cyanosis, No clubbing. Good range of motion in all major joints. No tenderness to palpation or major deformities noted. Back- No tenderness. Integument:  Warm, Dry, No erythema, No rash. Lymphatic:  No lymphadenopathy noted. Neurologic:  Alert & oriented x 3, Normal motor function, Normal sensory function, No focal deficits noted.    Psychiatric:  Affect normal, Judgment normal, Mood normal.     EKG    NSR, HR 93 , Normal Axis, No ST- T wave changes, QTc 455     RADIOLOGY    XR CHEST PORTABLE    (Results Pending)       REEVALUATION   At the end of the shift patient at 7 AM was Sign out to on coming physician , Dr Dee Dee Scott to follow up on Labs ,  revaluation  and appropriate disposition. Labs  Labs Reviewed   CBC WITH AUTO DIFFERENTIAL - Abnormal; Notable for the following components:       Result Value    WBC 15.0 (*)     RBC 3.95 (*)     Hemoglobin 12.7 (*)     Hematocrit 38.9 (*)     MCV 98.5 (*)     RDW 16.5 (*)     Neutrophils Absolute 10.1 (*)     Monocytes Absolute 1.4 (*)     All other components within normal limits   D-DIMER, QUANTITATIVE   COMPREHENSIVE METABOLIC PANEL W/ REFLEX TO MG FOR LOW K   LIPASE   TROPONIN   BRAIN NATRIURETIC PEPTIDE   URINE DRUG SCREEN             Summation      Patient Course:     ED Medications administered this visit:    Medications   morphine sulfate (PF) injection 4 mg (has no administration in time range)   ondansetron (ZOFRAN) injection 4 mg (has no administration in time range)   aspirin chewable tablet 324 mg (has no administration in time range)       New Prescriptions from this visit:    New Prescriptions    No medications on file       Follow-up:  No follow-up provider specified. Final Impression:   1. Chest pain, unspecified type               (Please note that portions of this note were completed with a voice recognition program.  Efforts were made to edit the dictations but occasionally words are mis-transcribed.)  Dr. Glenis Cool MD    Addendum (Dr. Dee Dee Scott)  Care was turned over to myself on this 71-year-old gentleman who presented to the emergency department complaining of chest pain just prior to arrival.  Patient describes a midsternal sharp and then diffusely dull anterior chest pain without radiation to the back neck arm or jaws lasted a few moments and resolved prior to arrival.  Patient reports associated shortness of breath with underlying COPD, no associated nausea or diaphoresis. Patient denies any recent cough cold congestion fevers or chills no vomiting or diarrhea.   She reports chronic right-sided low back pain with radicular pain into the right thigh. As well as diffuse chronic knee pain no calf pain or swelling no history of DVT or PE. Patient denies any history of coronary artery disease or congestive heart failure states he has had previous cardiac evaluation maybe a year or 2 ago. Patient admits to crack cocaine use patient states he would like to detox from it states he is using frequently but not daily last use was 2 days ago  On my examination: Patient is awake alert oriented appropriate answering questions following commands nontoxic no distress pain-free. Eyes pupils are equal and reactive sclera white conjunctive are pink. Oral pharyngeal cavity is pink with good moisture the airway is patent. Neck is supple no adenopathy or JVD. Heart is regular rate and rhythm no gross murmurs rubs or clicks. Lungs lungs are coarse with scattered coarse expiratory wheeze no respiratory distress no use of accessory muscles or conversational dyspnea pulse ox 96% on room air bedside. Chest wall is nontender chest rise is full and symmetric. Abdomen is soft nontender with good bowel sounds no rebound rigidity or guarding. Back is nontender no costovertebral angle tenderness. Skin is warm and dry without rashes. Lower extremities reveal no edema or calf tenderness no asymmetry. Work-up reviewed: EKG stable normal sinus rhythm at 93 with no significant ST-T change no acute infarction pattern appears similar when compared to previous of 12/26. CBC reveals leukocytosis at 15.0 BMP liver function test lipase D-dimer screening cardiac BNP are all stable. Treatment and course: Patient was treated prior to my arrival with baby aspirin 4 p.o. morphine 4 mg IV Zofran 4 mg IV. Nitroglycerin paste half inch was ordered along with the albuterol nebulizer treatment for wheezing. Ativan 0.5 mg IV was given with a history of cocaine abuse. Patient well-appearing stable vital signs pain-free.   Heart score is a 5, last stress test visible in the computer system is from 2020-results not reported  coordination of care: Call was placed out to the hospitalist Case was discussed with Dr. Moo George who is familiar with the patient, will admit the patient for further evaluation and care, discussed desire for detox  I did discuss with patient plan for admission to the hospital, patient aware will not receive any narcotic pain medication. Diagnosis:  1. Chest pain  2.   Cocaine abuse  Disposition: Patient awaiting bed placement in stable condition         Brandon Zurita DO  01/03/23 [id number]

## 2023-01-03 NOTE — ED NOTES
Patient assigned to room 222.  Electronically signed by Breonna Jane RN on 1/3/2023 at 7:33 AM       Breonna Jane RN  01/03/23 [id number]

## 2023-01-04 VITALS
HEART RATE: 60 BPM | DIASTOLIC BLOOD PRESSURE: 69 MMHG | TEMPERATURE: 97.7 F | WEIGHT: 264.5 LBS | BODY MASS INDEX: 35.83 KG/M2 | RESPIRATION RATE: 18 BRPM | SYSTOLIC BLOOD PRESSURE: 125 MMHG | OXYGEN SATURATION: 97 % | HEIGHT: 72 IN

## 2023-01-04 LAB
ANION GAP SERPL CALCULATED.3IONS-SCNC: 9 MEQ/L (ref 9–15)
BASOPHILS ABSOLUTE: 0 K/UL (ref 0–0.1)
BASOPHILS RELATIVE PERCENT: 0.2 % (ref 0.2–1.2)
BUN BLDV-MCNC: 13 MG/DL (ref 8–23)
CALCIUM SERPL-MCNC: 9.2 MG/DL (ref 8.5–9.9)
CHLORIDE BLD-SCNC: 106 MEQ/L (ref 95–107)
CO2: 25 MEQ/L (ref 20–31)
CREAT SERPL-MCNC: 0.68 MG/DL (ref 0.7–1.2)
EOSINOPHILS ABSOLUTE: 0 K/UL (ref 0–0.5)
EOSINOPHILS RELATIVE PERCENT: 0 % (ref 0.8–7)
GFR SERPL CREATININE-BSD FRML MDRD: >60 ML/MIN/{1.73_M2}
GLUCOSE BLD-MCNC: 120 MG/DL (ref 70–99)
GLUCOSE BLD-MCNC: 127 MG/DL (ref 70–99)
GLUCOSE BLD-MCNC: 149 MG/DL (ref 70–99)
GLUCOSE BLD-MCNC: 151 MG/DL (ref 70–99)
GLUCOSE BLD-MCNC: 162 MG/DL (ref 70–99)
HCT VFR BLD CALC: 37.9 % (ref 42–52)
HEMOGLOBIN: 12.4 G/DL (ref 13.7–17.5)
IMMATURE GRANULOCYTES #: 0.1 K/UL
IMMATURE GRANULOCYTES %: 0.4 %
LYMPHOCYTES ABSOLUTE: 0.5 K/UL (ref 1.3–3.6)
LYMPHOCYTES RELATIVE PERCENT: 4.1 %
MCH RBC QN AUTO: 31.6 PG (ref 25.7–32.2)
MCHC RBC AUTO-ENTMCNC: 32.7 % (ref 32.3–36.5)
MCV RBC AUTO: 96.7 FL (ref 79–92.2)
MONOCYTES ABSOLUTE: 0.3 K/UL (ref 0.3–0.8)
MONOCYTES RELATIVE PERCENT: 2 % (ref 5.3–12.2)
NEUTROPHILS ABSOLUTE: 12.3 K/UL (ref 1.8–5.4)
NEUTROPHILS RELATIVE PERCENT: 93.3 % (ref 34–67.9)
PDW BLD-RTO: 15.9 % (ref 11.6–14.4)
PERFORMED ON: ABNORMAL
PLATELET # BLD: 281 K/UL (ref 163–337)
POTASSIUM REFLEX MAGNESIUM: 4.6 MEQ/L (ref 3.4–4.9)
RBC # BLD: 3.92 M/UL (ref 4.63–6.08)
SODIUM BLD-SCNC: 140 MEQ/L (ref 135–144)
TROPONIN: <0.01 NG/ML (ref 0–0.01)
WBC # BLD: 13.2 K/UL (ref 4.2–9)

## 2023-01-04 PROCEDURE — 84484 ASSAY OF TROPONIN QUANT: CPT

## 2023-01-04 PROCEDURE — 96372 THER/PROPH/DIAG INJ SC/IM: CPT

## 2023-01-04 PROCEDURE — G0378 HOSPITAL OBSERVATION PER HR: HCPCS

## 2023-01-04 PROCEDURE — 80048 BASIC METABOLIC PNL TOTAL CA: CPT

## 2023-01-04 PROCEDURE — 94640 AIRWAY INHALATION TREATMENT: CPT

## 2023-01-04 PROCEDURE — 6370000000 HC RX 637 (ALT 250 FOR IP): Performed by: INTERNAL MEDICINE

## 2023-01-04 PROCEDURE — 36415 COLL VENOUS BLD VENIPUNCTURE: CPT

## 2023-01-04 PROCEDURE — 85025 COMPLETE CBC W/AUTO DIFF WBC: CPT

## 2023-01-04 PROCEDURE — 6360000002 HC RX W HCPCS: Performed by: INTERNAL MEDICINE

## 2023-01-04 RX ADMIN — MOMETASONE FUROATE AND FORMOTEROL FUMARATE DIHYDRATE 2 PUFF: 100; 5 AEROSOL RESPIRATORY (INHALATION) at 05:49

## 2023-01-04 RX ADMIN — ESCITALOPRAM OXALATE 10 MG: 10 TABLET ORAL at 08:00

## 2023-01-04 RX ADMIN — ACETAMINOPHEN 650 MG: 325 TABLET ORAL at 08:06

## 2023-01-04 RX ADMIN — METFORMIN HYDROCHLORIDE 500 MG: 500 TABLET ORAL at 08:01

## 2023-01-04 RX ADMIN — GUAIFENESIN 600 MG: 600 TABLET, EXTENDED RELEASE ORAL at 08:00

## 2023-01-04 RX ADMIN — MELOXICAM 15 MG: 7.5 TABLET ORAL at 08:00

## 2023-01-04 RX ADMIN — PANTOPRAZOLE SODIUM 40 MG: 40 TABLET, DELAYED RELEASE ORAL at 08:00

## 2023-01-04 RX ADMIN — DOXYCYCLINE HYCLATE 100 MG: 100 CAPSULE ORAL at 08:00

## 2023-01-04 RX ADMIN — IPRATROPIUM BROMIDE AND ALBUTEROL SULFATE 1 AMPULE: 2.5; .5 SOLUTION RESPIRATORY (INHALATION) at 05:49

## 2023-01-04 RX ADMIN — ENOXAPARIN SODIUM 30 MG: 100 INJECTION SUBCUTANEOUS at 08:01

## 2023-01-04 RX ADMIN — AMLODIPINE BESYLATE 10 MG: 10 TABLET ORAL at 08:00

## 2023-01-04 RX ADMIN — LOSARTAN POTASSIUM 25 MG: 25 TABLET, FILM COATED ORAL at 08:01

## 2023-01-04 RX ADMIN — IPRATROPIUM BROMIDE AND ALBUTEROL SULFATE 1 AMPULE: 2.5; .5 SOLUTION RESPIRATORY (INHALATION) at 11:50

## 2023-01-04 ASSESSMENT — PAIN SCALES - GENERAL: PAINLEVEL_OUTOF10: 0

## 2023-01-04 NOTE — DISCHARGE SUMMARY
Discharge Summary    Patient:  Deborah Viveros  YOB: 1955    MRN: [id number]   Acct: [de-identified]    Primary Care Physician: Nivia Michelle MD    Admit date:  1/3/2023    Discharge date:   01/04/23      Discharge Diagnoses:   Chest pain  Principal Problem:    Chest pain  Resolved Problems:    * No resolved hospital problems. *      Admitted for: [facility] Course: patient was admitted with atypical CP yesterday which resolved with admission. Had negative work up including troponins/tele monitoring. After completing acute stay will be DC home in stable condition.  Advice stop using cocaine     Consultants:      Discharge Medications:       Medication List        CONTINUE taking these medications      acetaminophen 500 MG tablet  Commonly known as: TYLENOL  Take 2 tablets by mouth 3 times daily as needed for Pain     albuterol sulfate  (90 Base) MCG/ACT inhaler  Commonly known as: PROVENTIL;VENTOLIN;PROAIR  Inhale 2 puffs into the lungs every 4 hours as needed for Wheezing or Shortness of Breath     amLODIPine 10 MG tablet  Commonly known as: NORVASC  Take 1 tablet by mouth daily     cetirizine 10 MG tablet  Commonly known as: ZYRTEC  Take 1 tablet by mouth daily     escitalopram 10 MG tablet  Commonly known as: LEXAPRO  Take 1 tablet by mouth daily     ipratropium-albuterol 0.5-2.5 (3) MG/3ML Soln nebulizer solution  Commonly known as: DUONEB  Take 3 mLs by nebulization every 4 hours as needed for Shortness of Breath     Lancets Misc  1 each by Does not apply route 3 times daily     losartan 25 MG tablet  Commonly known as: COZAAR  Take 1 tablet by mouth daily     lovastatin 20 MG tablet  Commonly known as: MEVACOR  Take 1 tablet by mouth nightly     meloxicam 15 MG tablet  Commonly known as: MOBIC  Take 1 tablet by mouth daily     metFORMIN 500 MG tablet  Commonly known as: GLUCOPHAGE  Take 1 tablet by mouth 2 times daily (with meals)     montelukast 10 MG tablet  Commonly known as: SINGULAIR  Take 1 tablet by mouth nightly     pantoprazole 40 MG tablet  Commonly known as: PROTONIX  Take 1 tablet by mouth daily     sildenafil 100 MG tablet  Commonly known as: VIAGRA  TAKE 1 TABLET BY MOUTH AS NEEDED FOR ERECTILE DYSFUNCTION     Symbicort 160-4.5 MCG/ACT Aero  Generic drug: budesonide-formoterol  Inhale 2 puffs into the lungs 2 times daily     traZODone 50 MG tablet  Commonly known as: DESYREL  Take 1 tablet by mouth nightly            STOP taking these medications      blood glucose test strips     fluticasone 50 MCG/ACT nasal spray  Commonly known as: FLONASE              Physical Exam:    Vitals:  Vitals:    01/03/23 2009 01/04/23 0028 01/04/23 0530 01/04/23 0538   BP:   125/69 125/69   Pulse:   61 60   Resp: 18  18 18   Temp:   97.7 °F (36.5 °C) 97.7 °F (36.5 °C)   TempSrc: Oral  Oral Oral   SpO2:  98% 97% 97%   Weight:       Height:         Weight: Weight: 264 lb 8 oz (120 kg)     24 hour intake/output:  Intake/Output Summary (Last 24 hours) at 1/4/2023 4246  Last data filed at 1/3/2023 2023  Gross per 24 hour   Intake 10 ml   Output --   Net 10 ml       General appearance - alert, well appearing, and in no distress  Chest - clear to auscultation, no wheezes, rales or rhonchi, symmetric air entry  Heart - normal rate, regular rhythm, normal S1, S2, no murmurs, rubs, clicks or gallops  Abdomen - soft, nontender, nondistended, no masses or organomegaly  Obese: Yes; Protuberant: Yes   Neurological - alert, oriented, normal speech, no focal findings or movement disorder noted  Extremities - peripheral pulses normal, no pedal edema, no clubbing or cyanosis  Skin - normal coloration and turgor, no rashes, no suspicious skin lesions noted    Procedures:      Diagnostic Test:      Radiology reports as per the Radiologist  Radiology: XR CHEST PORTABLE    Result Date: 1/3/2023  EXAMINATION: ONE XRAY VIEW OF THE CHEST 1/3/2023 4:39 am COMPARISON: 21 December 2022.  HISTORY: ORDERING SYSTEM PROVIDED HISTORY: cp TECHNOLOGIST PROVIDED HISTORY: Reason for exam:->cp What reading provider will be dictating this exam?->CRC FINDINGS: Pleuroparenchymal scarring on the left is not appreciably changed. Normal heart and pulmonary vascularity. Stable likely pleuroparenchymal scarring on the left.         Results for orders placed or performed during the hospital encounter of 01/03/23   CBC with Auto Differential   Result Value Ref Range    WBC 15.0 (H) 4.2 - 9.0 K/uL    RBC 3.95 (L) 4.63 - 6.08 M/uL    Hemoglobin 12.7 (L) 13.7 - 17.5 g/dL    Hematocrit 38.9 (L) 42.0 - 52.0 %    MCV 98.5 (H) 79.0 - 92.2 fL    MCH 32.2 25.7 - 32.2 pg    MCHC 32.6 32.3 - 36.5 %    RDW 16.5 (H) 11.6 - 14.4 %    Platelets 166 431 - 584 K/uL    Neutrophils % 67.1 34.0 - 67.9 %    Immature Granulocytes % 0.4 %    Lymphocytes % 20.5 %    Monocytes % 9.1 5.3 - 12.2 %    Eosinophils % 2.5 0.8 - 7.0 %    Basophils % 0.4 0.2 - 1.2 %    Neutrophils Absolute 10.1 (H) 1.8 - 5.4 K/uL    Immature Granulocytes # 0.1 K/uL    Lymphocytes Absolute 3.1 1.3 - 3.6 K/uL    Monocytes Absolute 1.4 (H) 0.3 - 0.8 K/uL    Eosinophils Absolute 0.4 0.0 - 0.5 K/uL    Basophils Absolute 0.1 0.0 - 0.1 K/uL   Comprehensive Metabolic Panel w/ Reflex to MG   Result Value Ref Range    Sodium 142 135 - 144 mEq/L    Potassium reflex Magnesium 3.7 3.4 - 4.9 mEq/L    Chloride 105 95 - 107 mEq/L    CO2 28 20 - 31 mEq/L    Anion Gap 9 9 - 15 mEq/L    Glucose 96 70 - 99 mg/dL    BUN 13 8 - 23 mg/dL    Creatinine 0.77 0.70 - 1.20 mg/dL    Est, Glom Filt Rate >60.0 >60    Calcium 9.2 8.5 - 9.9 mg/dL    Total Protein 6.1 (L) 6.3 - 8.0 g/dL    Albumin 3.7 3.5 - 4.6 g/dL    Total Bilirubin 0.3 0.2 - 0.7 mg/dL    Alkaline Phosphatase 79 35 - 104 U/L    ALT 18 0 - 41 U/L    AST 14 0 - 40 U/L    Globulin 2.4 2.3 - 3.5 g/dL   Lipase   Result Value Ref Range    Lipase 17 12 - 95 U/L   Troponin   Result Value Ref Range    Troponin <0.010 0.000 - 0.010 ng/mL   D-Dimer, Quantitative   Result Value Ref Range    D-Dimer, Quant 0.31 0.00 - 0.50 mg/L FEU   Brain Natriuretic Peptide   Result Value Ref Range    Pro- pg/mL   Urinalysis with Reflex to Culture    Specimen: Urine   Result Value Ref Range    Color, UA Yellow Straw/Yellow    Clarity, UA Clear Clear    Glucose, Ur Negative Negative mg/dL    Bilirubin Urine Negative Negative    Ketones, Urine Negative Negative mg/dL    Specific Gravity, UA 1.020 1.005 - 1.030    Blood, Urine Negative Negative    pH, UA 6.5 5.0 - 9.0    Protein, UA Negative Negative mg/dL    Urobilinogen, Urine 0.2 <2.0 E.U./dL    Nitrite, Urine Negative Negative    Leukocyte Esterase, Urine Negative Negative    Urine Reflex to Culture Not Indicated    Urine Drug Screen, Comprehensive   Result Value Ref Range    PCP Screen, Urine Neg Negative <25 ng/mL    Benzodiazepine Screen, Urine Neg Negative <150 ng/mL    Cocaine Metabolite Screen, Urine POSITIVE (A) Negative <150 ng/mL    Amphetamine Screen, Urine Neg Negative <500 ng/mL    Cannabinoid Scrn, Ur POSITIVE (A) Negative <50 ng/mL    Opiate Scrn, Ur POSITIVE (A) Negative <100 ng/mL    Barbiturate Screen, Ur Neg Negative <179 ng/mL    Tricyclic Neg Negative <611 ng/mL    Drug Screen Comment: see below    Troponin   Result Value Ref Range    Troponin <0.010 0.000 - 0.010 ng/mL   Troponin   Result Value Ref Range    Troponin <0.010 0.000 - 0.010 ng/mL   Troponin   Result Value Ref Range    Troponin <0.010 0.000 - 0.010 ng/mL   Basic Metabolic Panel w/ Reflex to MG   Result Value Ref Range    Sodium 140 135 - 144 mEq/L    Potassium reflex Magnesium 4.6 3.4 - 4.9 mEq/L    Chloride 106 95 - 107 mEq/L    CO2 25 20 - 31 mEq/L    Anion Gap 9 9 - 15 mEq/L    Glucose 127 (H) 70 - 99 mg/dL    BUN 13 8 - 23 mg/dL    Creatinine 0.68 (L) 0.70 - 1.20 mg/dL    Est, Glom Filt Rate >60.0 >60    Calcium 9.2 8.5 - 9.9 mg/dL   CBC with Auto Differential   Result Value Ref Range    WBC 13.2 (H) 4.2 - 9.0 K/uL    RBC 3.92 (L) 4.63 - 6.08 M/uL Hemoglobin 12.4 (L) 13.7 - 17.5 g/dL    Hematocrit 37.9 (L) 42.0 - 52.0 %    MCV 96.7 (H) 79.0 - 92.2 fL    MCH 31.6 25.7 - 32.2 pg    MCHC 32.7 32.3 - 36.5 %    RDW 15.9 (H) 11.6 - 14.4 %    Platelets 419 904 - 778 K/uL    Neutrophils % 93.3 (H) 34.0 - 67.9 %    Immature Granulocytes % 0.4 %    Lymphocytes % 4.1 %    Monocytes % 2.0 (L) 5.3 - 12.2 %    Eosinophils % 0.0 (L) 0.8 - 7.0 %    Basophils % 0.2 0.2 - 1.2 %    Neutrophils Absolute 12.3 (H) 1.8 - 5.4 K/uL    Immature Granulocytes # 0.1 K/uL    Lymphocytes Absolute 0.5 (L) 1.3 - 3.6 K/uL    Monocytes Absolute 0.3 0.3 - 0.8 K/uL    Eosinophils Absolute 0.0 0.0 - 0.5 K/uL    Basophils Absolute 0.0 0.0 - 0.1 K/uL   EKG 12 Lead   Result Value Ref Range    Ventricular Rate 93 BPM    Atrial Rate 93 BPM    P-R Interval 146 ms    QRS Duration 100 ms    Q-T Interval 366 ms    QTc Calculation (Bazett) 455 ms    P Axis 78 degrees    R Axis 44 degrees    T Axis 54 degrees       Diet:  ADULT DIET;  Regular    Activity:  Activity as tolerated (Patient may move about with assist as indicated or with supervision.)    Follow-up:  in 1 weeks with Lore Bazan MD,      Disposition: home    Condition: Stable    Time Spent: 50 minutes    Electronically signed by Sally Scott MD on 1/4/2023 at 7:38 AM    Discharging Hospitalist

## 2023-01-04 NOTE — PROGRESS NOTES
Therapy manager talked with Dr Zaragoza Ask this date re patient.  Dr Zaragoza Ask reports pt at baseline and will discharge this date- no need to complete eval.     Orquidea Mcmillan, [address] [facility]

## 2023-01-04 NOTE — PLAN OF CARE
Pt. Is in bed watching Tv. He is A/O/ x 4, Independent. He takes his po medications whole. No c/o pain, SOB, or difficult breathing. Last BM on 12/31/2022, no c/o constipation, abd pain, or diarrhea. He has a 20 ga IV in his ledt hand that is clean, dry, and intact. He is on TELE running NSR. He is on ATB doxycycline therapy. Call light is w/in reach,. He is in bed resting comfortably. Will continue to monitor.

## 2023-01-04 NOTE — PROGRESS NOTES
0745 - AVS printed and explained to pt. Saline lock removed prior to discharge. Pt gathered own belongings.

## 2023-01-05 ENCOUNTER — CARE COORDINATION (OUTPATIENT)
Dept: CARE COORDINATION | Age: 68
End: 2023-01-05

## 2023-01-05 NOTE — CARE COORDINATION
Care Transitions Outreach Attempt    Call within 2 business days of discharge: Yes   Attempted to reach patient for transitions of care follow up. Unable to reach patient. CTN left HIPAA compliant message requesting return call. CTN will attempt to reach again. CTN routed message High Priority to PCP and  Staff to schedule 7 day Hospital F/U. STOP taking:  blood glucose test strips  fluticasone 50 MCG/ACT nasal spray [facility])    Patient: Elysia Sam Patient : 1955 MRN: [id number]    Last Discharge  Street       Date Complaint Diagnosis Description Type Department Provider    1/3/23 Chest Pain; Back Pain; Knee Pain Chest pain, unspecified type ED to Hosp-Admission (Discharged) (ADMITTED) [facility] Ros Carroll MD; Kwame Swann. .. Was this an external facility discharge? No Discharge Facility: [facility]    Noted following upcoming appointments from discharge chart review:   [facility] follow up appointment(s):   Future Appointments   Date Time Provider Constance Espitia   2023  3:00 PM MD [address]     [id number] [address] follow up appointment(s):     [address] / [facility] Transition Team  [phone]

## 2023-01-06 ENCOUNTER — CARE COORDINATION (OUTPATIENT)
Dept: CARE COORDINATION | Age: 68
End: 2023-01-06

## 2023-01-06 NOTE — CARE COORDINATION
Care Transitions Outreach Attempt    Call within 2 business days of discharge: Yes   Second attempt made to reach patient for transitions of care follow up. Unable to reach patient. CTN left HIPAA compliant message requesting return call. CTN will sign off. Patient: Shon Del Rosario Patient : 1955 MRN: [id number]    Last Discharge [address]       Date Complaint Diagnosis Description Type Department Provider    1/3/23 Chest Pain; Back Pain; Knee Pain Chest pain, unspecified type ED to Hosp-Admission (Discharged) (ADMITTED) [facility] Ben Davila MD; Gisella Chen. .. Was this an external facility discharge?  No Discharge Facility: [facility]    Noted following upcoming appointments from discharge chart review:   [facility] follow up appointment(s):   Future Appointments   Date Time Provider Constance Espitia   2023  3:45 PM MD [address]   2023  3:00 PM MD [address]     Non-[facility] follow up appointment(s):

## 2023-01-09 DIAGNOSIS — N52.9 ERECTILE DYSFUNCTION, UNSPECIFIED ERECTILE DYSFUNCTION TYPE: Chronic | ICD-10-CM

## 2023-01-09 RX ORDER — SILDENAFIL 100 MG/1
100 TABLET, FILM COATED ORAL PRN
Qty: 6 TABLET | Refills: 1 | Status: SHIPPED | OUTPATIENT
Start: 2023-01-09

## 2023-01-09 NOTE — TELEPHONE ENCOUNTER
Patient is requesting medication refill.  Please approve or deny this request.    Rx requested:  Requested Prescriptions     Pending Prescriptions Disp Refills    sildenafil (VIAGRA) 100 MG tablet 6 tablet 1     Sig: Take 1 tablet by mouth as needed for Erectile Dysfunction         Last Office Visit:   11/15/2022      Next Visit Date:  Future Appointments   Date Time Provider Constance Nupur   1/12/2023  3:45 PM MD [address]   4/6/2023  3:00 PM MD [address]

## 2023-01-13 ENCOUNTER — TELEPHONE (OUTPATIENT)
Dept: FAMILY MEDICINE CLINIC | Age: 68
End: 2023-01-13

## 2023-01-13 NOTE — TELEPHONE ENCOUNTER
Pt requesting Dr. Claudio Anderson to fill out an order form to get the biggest hospital bed there is for him to have at home. Pt had orthopedic appt and they were discussing possible TKR on the right. He has already had the left knee replaced. Please advise.     [address]: [phone]

## 2023-01-20 ENCOUNTER — TELEPHONE (OUTPATIENT)
Dept: GASTROENTEROLOGY | Age: 68
End: 2023-01-20

## 2023-01-25 ENCOUNTER — TELEMEDICINE (OUTPATIENT)
Dept: FAMILY MEDICINE CLINIC | Age: 68
End: 2023-01-25

## 2023-01-25 DIAGNOSIS — J30.2 SEASONAL ALLERGIES: Chronic | ICD-10-CM

## 2023-01-25 DIAGNOSIS — E78.49 OTHER HYPERLIPIDEMIA: Chronic | ICD-10-CM

## 2023-01-25 DIAGNOSIS — F14.10 COCAINE ABUSE (HCC): ICD-10-CM

## 2023-01-25 DIAGNOSIS — R07.89 CHEST DISCOMFORT: Primary | ICD-10-CM

## 2023-01-25 DIAGNOSIS — N18.31 STAGE 3A CHRONIC KIDNEY DISEASE (HCC): ICD-10-CM

## 2023-01-25 DIAGNOSIS — D53.9 MACROCYTIC ANEMIA: ICD-10-CM

## 2023-01-25 DIAGNOSIS — M10.9 GOUT, UNSPECIFIED CAUSE, UNSPECIFIED CHRONICITY, UNSPECIFIED SITE: Chronic | ICD-10-CM

## 2023-01-25 DIAGNOSIS — I10 PRIMARY HYPERTENSION: Chronic | ICD-10-CM

## 2023-01-25 DIAGNOSIS — I47.1 SUPRAVENTRICULAR TACHYCARDIA (HCC): ICD-10-CM

## 2023-01-25 DIAGNOSIS — F12.90 MARIJUANA USE: ICD-10-CM

## 2023-01-25 DIAGNOSIS — E11.21 TYPE 2 DIABETES MELLITUS WITH DIABETIC NEPHROPATHY, WITHOUT LONG-TERM CURRENT USE OF INSULIN (HCC): ICD-10-CM

## 2023-01-25 DIAGNOSIS — J44.1 CHRONIC OBSTRUCTIVE PULMONARY DISEASE WITH ACUTE EXACERBATION (HCC): ICD-10-CM

## 2023-01-25 DIAGNOSIS — Z72.0 TOBACCO USE: ICD-10-CM

## 2023-01-25 DIAGNOSIS — E66.01 CLASS 2 SEVERE OBESITY WITH SERIOUS COMORBIDITY AND BODY MASS INDEX (BMI) OF 35.0 TO 35.9 IN ADULT, UNSPECIFIED OBESITY TYPE (HCC): ICD-10-CM

## 2023-01-25 RX ORDER — MONTELUKAST SODIUM 10 MG/1
10 TABLET ORAL NIGHTLY
Qty: 90 TABLET | Refills: 1 | Status: SHIPPED | OUTPATIENT
Start: 2023-01-25

## 2023-01-25 RX ORDER — IPRATROPIUM BROMIDE AND ALBUTEROL SULFATE 2.5; .5 MG/3ML; MG/3ML
1 SOLUTION RESPIRATORY (INHALATION) EVERY 4 HOURS PRN
Qty: 360 ML | Refills: 1 | Status: SHIPPED | OUTPATIENT
Start: 2023-01-25

## 2023-01-25 RX ORDER — SILDENAFIL 100 MG/1
100 TABLET, FILM COATED ORAL PRN
Qty: 6 TABLET | Refills: 1 | Status: CANCELLED | OUTPATIENT
Start: 2023-01-25

## 2023-01-25 ASSESSMENT — PATIENT HEALTH QUESTIONNAIRE - PHQ9
2. FEELING DOWN, DEPRESSED OR HOPELESS: 1
SUM OF ALL RESPONSES TO PHQ9 QUESTIONS 1 & 2: 2
6. FEELING BAD ABOUT YOURSELF - OR THAT YOU ARE A FAILURE OR HAVE LET YOURSELF OR YOUR FAMILY DOWN: 0
SUM OF ALL RESPONSES TO PHQ QUESTIONS 1-9: 2
SUM OF ALL RESPONSES TO PHQ QUESTIONS 1-9: 2
4. FEELING TIRED OR HAVING LITTLE ENERGY: 0
1. LITTLE INTEREST OR PLEASURE IN DOING THINGS: 1
10. IF YOU CHECKED OFF ANY PROBLEMS, HOW DIFFICULT HAVE THESE PROBLEMS MADE IT FOR YOU TO DO YOUR WORK, TAKE CARE OF THINGS AT HOME, OR GET ALONG WITH OTHER PEOPLE: 0
9. THOUGHTS THAT YOU WOULD BE BETTER OFF DEAD, OR OF HURTING YOURSELF: 0
5. POOR APPETITE OR OVEREATING: 0
7. TROUBLE CONCENTRATING ON THINGS, SUCH AS READING THE NEWSPAPER OR WATCHING TELEVISION: 0
SUM OF ALL RESPONSES TO PHQ QUESTIONS 1-9: 2
SUM OF ALL RESPONSES TO PHQ QUESTIONS 1-9: 2
8. MOVING OR SPEAKING SO SLOWLY THAT OTHER PEOPLE COULD HAVE NOTICED. OR THE OPPOSITE, BEING SO FIGETY OR RESTLESS THAT YOU HAVE BEEN MOVING AROUND A LOT MORE THAN USUAL: 0
3. TROUBLE FALLING OR STAYING ASLEEP: 0

## 2023-01-25 ASSESSMENT — ENCOUNTER SYMPTOMS
ANAL BLEEDING: 0
SHORTNESS OF BREATH: 1
CHEST TIGHTNESS: 0
CONSTIPATION: 0
COUGH: 0
VOMITING: 0
WHEEZING: 0
NAUSEA: 0
ABDOMINAL PAIN: 0
BLOOD IN STOOL: 0
DIARRHEA: 0

## 2023-01-25 NOTE — ASSESSMENT & PLAN NOTE
Recent exacerbation clinically resolved. Patient back to normal baseline in terms of breathing. He was instructed to continue with Symbicort, Singulair, and as needed nebulizers/albuterol at home. Patient was urged to remain tobacco free over time.

## 2023-01-25 NOTE — ASSESSMENT & PLAN NOTE
Most recent renal function testing stable. We will continue to follow blood work over time and aggressively manage risk factors including blood pressure, blood sugar, and cholesterol.

## 2023-01-25 NOTE — ASSESSMENT & PLAN NOTE
Blood pressure historically well controlled. Patient instructed to continue with current doses of losartan and amlodipine.

## 2023-01-25 NOTE — ASSESSMENT & PLAN NOTE
Patient reports abstaining from cocaine use since his most recent hospital admission. He declines any referral to a substance abuse specialist or detox/rehab. I encouraged him to continue avoiding all illicit substance use.

## 2023-01-25 NOTE — ASSESSMENT & PLAN NOTE
Patient reports abstaining from marijuana use since his most recent hospital admission. I encouraged him to continue avoiding all illicit substance use.

## 2023-01-25 NOTE — PROGRESS NOTES
2023    TELEHEALTH EVALUATION -- Audio/Visual (During EZTPR-75 public health emergency)    HPI:    Wil Santiago (:  1955) has requested an audio/video evaluation for the following concern(s):    Patient presents for virtual video visit for follow-up regarding multiple ER visits. Patient was initially seen at [facility] ER on 2022 with complaints of dyspnea. Patient admitted to smoking crack cocaine daily. Vital signs were documented as unremarkable, physical exam was documented as unremarkable. Patient reported taking nebulizer treatment just prior to presentation to ER. COVID-19 and influenza testing returned negative, a CMP, magnesium, and troponin were unremarkable. Chest x-ray showed no acute findings. Patient was diagnosed with COPD exacerbation and found stable for discharge home with a 5-day course of azithromycin and 10-day burst course of prednisone 40 mg daily. Patient subsequently presented to [facility] again on 2023 with complaints of chest discomfort. Patient was admitted to telemetry and troponins x3 were negative with no significant arrhythmias noted on telemetry. Urine drug testing returned positive for cocaine, cannabinoids, and opiates. Patient was advised to discontinue illicit substance use and was found stable for discharge home on 2023 with instructions to follow-up with primary care as an outpatient. Patient reports feeling improved overall since ER and hospital discharge. Patient denies any chest pain, palpitations, lightheadedness, dizziness, worsening lower extremity edema, or syncope. Patient denies any dyspnea at rest, persistent wheezing, worsening cough, chest tightness, or limitation in normal day-to-day activity due to breathing. Patient states he has been tobacco free since his most recent hospital discharge and states he has also abstained from marijuana and cocaine/crack use.   Patient states with smoking cessation and avoidance of illicit substances he has noted that he has felt improved overall and has been more active at home with less respiratory complaints and less frequent use of his nebulizer. Patient reports taking his chronic medications as prescribed. He denies checking blood glucose values at home. Review of Systems   Constitutional:  Negative for appetite change, chills, diaphoresis, fatigue, fever and unexpected weight change. Eyes:  Negative for visual disturbance. Respiratory:  Positive for shortness of breath (with exertion, chronic). Negative for cough, chest tightness and wheezing. Cardiovascular:  Negative for chest pain, palpitations and leg swelling. No orthopnea, No PND   Gastrointestinal:  Negative for abdominal pain, anal bleeding, blood in stool, constipation, diarrhea, nausea and vomiting. No heartburn, No melena   Endocrine: Negative for cold intolerance, heat intolerance, polydipsia, polyphagia and polyuria. Genitourinary:  Negative for dysuria and hematuria. Musculoskeletal:  Negative for myalgias. Skin:  Negative for rash. Neurological:  Negative for dizziness, syncope, weakness, light-headedness, numbness and headaches. Psychiatric/Behavioral:  Negative for dysphoric mood. The patient is not nervous/anxious. Prior to Visit Medications    Medication Sig Taking?  Authorizing Provider   ipratropium-albuterol (DUONEB) 0.5-2.5 (3) MG/3ML SOLN nebulizer solution Take 3 mLs by nebulization every 4 hours as needed for Shortness of Breath Yes [place], MD   montelukast (SINGULAIR) 10 MG tablet Take 1 tablet by mouth nightly Yes [place], MD   sildenafil (VIAGRA) 100 MG tablet Take 1 tablet by mouth as needed for Erectile Dysfunction Yes [place], MD   albuterol sulfate HFA (PROVENTIL;VENTOLIN;PROAIR) 108 (90 Base) MCG/ACT inhaler Inhale 2 puffs into the lungs every 4 hours as needed for Wheezing or Shortness of Breath Yes Mirna DORADO MD Ricardo   amLODIPine (NORVASC) 10 MG tablet Take 1 tablet by mouth daily Yes Ben Baumann MD   cetirizine (ZYRTEC) 10 MG tablet Take 1 tablet by mouth daily Yes Ben Baumann MD   escitalopram (LEXAPRO) 10 MG tablet Take 1 tablet by mouth daily Yes Ben Baumann MD   losartan (COZAAR) 25 MG tablet Take 1 tablet by mouth daily Yes Ben Baumann MD   lovastatin (MEVACOR) 20 MG tablet Take 1 tablet by mouth nightly Yes Ben Baumann MD   meloxicam (MOBIC) 15 MG tablet Take 1 tablet by mouth daily Yes Ben Baumann MD   metFORMIN (GLUCOPHAGE) 500 MG tablet Take 1 tablet by mouth 2 times daily (with meals) Yes eBn Baumann MD   pantoprazole (PROTONIX) 40 MG tablet Take 1 tablet by mouth daily Yes Ben Baumann MD   traZODone (DESYREL) 50 MG tablet Take 1 tablet by mouth nightly Yes Ben Baumann MD   SYMBICORT 160-4.5 MCG/ACT AERO Inhale 2 puffs into the lungs 2 times daily Yes Ben Baumann MD   Lancets MISC 1 each by Does not apply route 3 times daily Yes Adonay Liriano MD   acetaminophen (TYLENOL) 500 MG tablet Take 2 tablets by mouth 3 times daily as needed for Pain  Khadra Stahl NP-C       Social History     Tobacco Use    Smoking status: Light Smoker     Packs/day: 0.25     Years: 30.00     Pack years: 7.50     Types: Cigarettes, Cigars     Start date: 1988     Last attempt to quit: 10/1/2013     Years since quittin.3    Smokeless tobacco: Never    Tobacco comments:     doesnt buy only smokes if someone gives him one    Vaping Use    Vaping Use: Never used   Substance Use Topics    Alcohol use: Not Currently     Alcohol/week: 4.0 standard drinks     Types: 2 Cans of beer, 2 Shots of liquor per week    Drug use: Yes     Types: Cocaine, Marijuana (Weed)     Comment: no cocaine x 1 year, marijuana weekly            PHYSICAL EXAMINATION:  [ INSTRUCTIONS:  \"[x]\" Indicates a positive item  \"[]\" Indicates a negative item  -- DELETE ALL ITEMS NOT EXAMINED]  Vital Signs: (As obtained by patient/caregiver or practitioner observation)    Blood pressure-  Heart rate-    Respiratory rate-    Temperature-  Pulse oximetry-     Constitutional: [x] Appears well-developed and well-nourished [x] No apparent distress      [] Abnormal-   Mental status  [x] Alert and awake  [x] Oriented to person/place/time [x]Able to follow commands      Eyes:  EOM    [x]  Normal  [] Abnormal-  Sclera  [x]  Normal  [] Abnormal -         Discharge [x]  None visible  [] Abnormal -    HENT:   [x] Normocephalic, atraumatic. [] Abnormal   [x] Mouth/Throat: Mucous membranes are moist.     External Ears [x] Normal  [] Abnormal-     Neck: [x] No visualized mass     Pulmonary/Chest: [x] Respiratory effort normal.  [x] No visualized signs of difficulty breathing or respiratory distress        [] Abnormal-      Neurological:        [x] No Facial Asymmetry (Cranial nerve 7 motor function) (limited exam to video visit)          [x] No gaze palsy        [] Abnormal-         Skin:        [x] No significant exanthematous lesions or discoloration noted on facial skin         [] Abnormal-            Psychiatric:       [x] Normal Affect       [] Abnormal-     Other pertinent observable physical exam findings-     ASSESSMENT/PLAN:  1. Chest discomfort  Comments:  Resolved since hospital discharge and cessation of cocaine/crack use. Patient urged to continue avoiding all illicit substances. See below  2. Chronic obstructive pulmonary disease with acute exacerbation (HCC)  Assessment & Plan:  Recent exacerbation clinically resolved. Patient back to normal baseline in terms of breathing. He was instructed to continue with Symbicort, Singulair, and as needed nebulizers/albuterol at home. Patient was urged to remain tobacco free over time. 3. Tobacco use  Assessment & Plan:  Patient reports avoiding all tobacco use since recent hospital admission. He was encouraged to remain tobacco free moving forward.   4. Cocaine abuse [facility])  Assessment & Plan:   Patient reports abstaining from cocaine use since his most recent hospital admission. He declines any referral to a substance abuse specialist or detox/rehab. I encouraged him to continue avoiding all illicit substance use. 5. Marijuana use  Assessment & Plan:  Patient reports abstaining from marijuana use since his most recent hospital admission. I encouraged him to continue avoiding all illicit substance use. 6. Type 2 diabetes mellitus with diabetic nephropathy, without long-term current use of insulin ([facility].)  Assessment & Plan:  Most recent A1c at goal control. Patient instructed to continue with current dose of metformin. We will continue to monitor blood glucose values over time. I stressed with patient the importance of keeping regular yearly diabetic eye exams. Patient was given new referral to optometry/ophthalmology for retinopathy screening. Orders:  -     [facility] - Alver Good, OD, OptometryWendy Matamoros  -     Comprehensive Metabolic Panel; Future  -     Lipid Panel; Future  -     Hemoglobin A1C; Future  7. Primary hypertension  Assessment & Plan:  Blood pressure historically well controlled. Patient instructed to continue with current doses of losartan and amlodipine. Orders:  -     CBC with Auto Differential; Future  -     Comprehensive Metabolic Panel; Future  8. Supraventricular tachycardia ([facility]  Assessment & Plan:  Currently asymptomatic. Patient instructed to continue with current dose of amlodipine. We will continue to monitor over time. Orders:  -     CBC with Auto Differential; Future  -     TSH; Future  9. Other hyperlipidemia  -     Comprehensive Metabolic Panel; Future  -     Lipid Panel; Future  10. Stage 3a chronic kidney disease ([facility].)  Assessment & Plan:  Most recent renal function testing stable. We will continue to follow blood work over time and aggressively manage risk factors including blood pressure, blood sugar, and cholesterol.    Orders:  - CBC with Auto Differential; Future  -     Comprehensive Metabolic Panel; Future  11. Macrocytic anemia  -     CBC with Auto Differential; Future  -     Vitamin B12 & Folate; Future  12. Gout, unspecified cause, unspecified chronicity, unspecified site  -     Comprehensive Metabolic Panel; Future  -     Uric Acid; Future  13. Seasonal allergies  -     montelukast (SINGULAIR) 10 MG tablet; Take 1 tablet by mouth nightly, Disp-90 tablet, R-1Normal  14. Class 2 severe obesity with serious comorbidity and body mass index (BMI) of 35.0 to 35.9 in adult, unspecified obesity type [facility])  Assessment & Plan:  Patient counseled on healthy dietary choices and the benefits of a lower salt and/or lower carbohydrate diet as appropriate. Patient also counseled on benefits of moderate intensity cardiovascular exercise for 150 minutes per week as they are able. Advice was given to make small changes over time, setting smaller achievable goals until recommended lifestyle changes are reached. Orders:  -     Comprehensive Metabolic Panel; Future  -     Lipid Panel; Future  -     Hemoglobin A1C; Future  -     TSH; Future        Return in about 4 months (around 5/25/2023) for Annual Wellness Visit. Demarcus Diez, was evaluated through a synchronous (real-time) audio-video encounter. The patient (or guardian if applicable) is aware that this is a billable service, which includes applicable co-pays. This Virtual Visit was conducted with patient's (and/or legal guardian's) consent. The visit was conducted pursuant to the emergency declaration under the [address] [place] [address], [address] [place] [address] authority and the [employer] and [place] General Act. Patient identification was verified, and a caregiver was present when appropriate. The patient was located at Home: [facility]  [facility] [address]. Provider was located at Home ([place] [address]): [place].        Total time spent on this encounter: Not billed by time    --Char Rosado MD on 1/25/2023 at 11:15 AM    An electronic signature was used to authenticate this note.

## 2023-01-25 NOTE — ASSESSMENT & PLAN NOTE
Patient reports avoiding all tobacco use since recent hospital admission. He was encouraged to remain tobacco free moving forward.

## 2023-01-25 NOTE — ASSESSMENT & PLAN NOTE
Most recent A1c at goal control. Patient instructed to continue with current dose of metformin. We will continue to monitor blood glucose values over time. I stressed with patient the importance of keeping regular yearly diabetic eye exams. Patient was given new referral to optometry/ophthalmology for retinopathy screening.

## 2023-01-25 NOTE — ASSESSMENT & PLAN NOTE
Currently asymptomatic. Patient instructed to continue with current dose of amlodipine. We will continue to monitor over time.

## 2023-02-01 ENCOUNTER — HOSPITAL ENCOUNTER (OUTPATIENT)
Dept: LAB | Age: 68
Discharge: HOME | End: 2023-02-01
Payer: MEDICARE

## 2023-02-01 DIAGNOSIS — N18.31 STAGE 3A CHRONIC KIDNEY DISEASE (HCC): ICD-10-CM

## 2023-02-01 DIAGNOSIS — I47.1 SUPRAVENTRICULAR TACHYCARDIA (HCC): ICD-10-CM

## 2023-02-01 DIAGNOSIS — D53.9 MACROCYTIC ANEMIA: ICD-10-CM

## 2023-02-01 DIAGNOSIS — E66.01 CLASS 2 SEVERE OBESITY WITH SERIOUS COMORBIDITY AND BODY MASS INDEX (BMI) OF 35.0 TO 35.9 IN ADULT, UNSPECIFIED OBESITY TYPE (HCC): ICD-10-CM

## 2023-02-01 DIAGNOSIS — M10.9 GOUT, UNSPECIFIED CAUSE, UNSPECIFIED CHRONICITY, UNSPECIFIED SITE: Chronic | ICD-10-CM

## 2023-02-01 DIAGNOSIS — E11.21 TYPE 2 DIABETES MELLITUS WITH DIABETIC NEPHROPATHY, WITHOUT LONG-TERM CURRENT USE OF INSULIN (HCC): ICD-10-CM

## 2023-02-01 DIAGNOSIS — E78.49 OTHER HYPERLIPIDEMIA: Chronic | ICD-10-CM

## 2023-02-01 DIAGNOSIS — I10 PRIMARY HYPERTENSION: Chronic | ICD-10-CM

## 2023-02-01 LAB
ALBUMIN SERPL-MCNC: 3.8 G/DL (ref 3.5–4.6)
ALP BLD-CCNC: 97 U/L (ref 35–104)
ALT SERPL-CCNC: 13 U/L (ref 0–41)
ANION GAP SERPL CALCULATED.3IONS-SCNC: 12 MEQ/L (ref 9–15)
AST SERPL-CCNC: 21 U/L (ref 0–40)
BASOPHILS ABSOLUTE: 0.1 K/UL (ref 0–0.2)
BASOPHILS RELATIVE PERCENT: 0.7 %
BILIRUB SERPL-MCNC: 0.4 MG/DL (ref 0.2–0.7)
BUN BLDV-MCNC: 12 MG/DL (ref 8–23)
CALCIUM SERPL-MCNC: 9.2 MG/DL (ref 8.5–9.9)
CHLORIDE BLD-SCNC: 108 MEQ/L (ref 95–107)
CHOLESTEROL, TOTAL: 178 MG/DL (ref 0–199)
CO2: 25 MEQ/L (ref 20–31)
CREAT SERPL-MCNC: 0.99 MG/DL (ref 0.7–1.2)
EOSINOPHILS ABSOLUTE: 0.4 K/UL (ref 0–0.7)
EOSINOPHILS RELATIVE PERCENT: 3.8 %
GFR SERPL CREATININE-BSD FRML MDRD: >60 ML/MIN/{1.73_M2}
GLOBULIN: 2.8 G/DL (ref 2.3–3.5)
GLUCOSE BLD-MCNC: 83 MG/DL (ref 70–99)
HBA1C MFR BLD: 5.6 % (ref 4.8–5.9)
HCT VFR BLD CALC: 40.5 % (ref 42–52)
HDLC SERPL-MCNC: 62 MG/DL (ref 40–59)
HEMOGLOBIN: 13.1 G/DL (ref 14–18)
LDL CHOLESTEROL CALCULATED: 103 MG/DL (ref 0–129)
LYMPHOCYTES ABSOLUTE: 1.9 K/UL (ref 1–4.8)
LYMPHOCYTES RELATIVE PERCENT: 19.7 %
MCH RBC QN AUTO: 31.7 PG (ref 27–31.3)
MCHC RBC AUTO-ENTMCNC: 32.4 % (ref 33–37)
MCV RBC AUTO: 97.8 FL (ref 79–92.2)
MONOCYTES ABSOLUTE: 0.8 K/UL (ref 0.2–0.8)
MONOCYTES RELATIVE PERCENT: 8.6 %
NEUTROPHILS ABSOLUTE: 6.4 K/UL (ref 1.4–6.5)
NEUTROPHILS RELATIVE PERCENT: 67.2 %
PDW BLD-RTO: 15.2 % (ref 11.5–14.5)
PLATELET # BLD: 359 K/UL (ref 130–400)
POTASSIUM SERPL-SCNC: 4.3 MEQ/L (ref 3.4–4.9)
RBC # BLD: 4.14 M/UL (ref 4.7–6.1)
SODIUM BLD-SCNC: 145 MEQ/L (ref 135–144)
TOTAL PROTEIN: 6.6 G/DL (ref 6.3–8)
TRIGL SERPL-MCNC: 66 MG/DL (ref 0–150)
TSH SERPL DL<=0.05 MIU/L-ACNC: 1.94 UIU/ML (ref 0.44–3.86)
URIC ACID, SERUM: 4.3 MG/DL (ref 3.4–7)
WBC # BLD: 9.5 K/UL (ref 4.8–10.8)

## 2023-02-01 PROCEDURE — 84443 ASSAY THYROID STIM HORMONE: CPT

## 2023-02-01 PROCEDURE — 82607 VITAMIN B-12: CPT

## 2023-02-01 PROCEDURE — 84550 ASSAY OF BLOOD/URIC ACID: CPT

## 2023-02-01 PROCEDURE — 36415 COLL VENOUS BLD VENIPUNCTURE: CPT

## 2023-02-01 PROCEDURE — 82746 ASSAY OF FOLIC ACID SERUM: CPT

## 2023-02-01 PROCEDURE — 80053 COMPREHEN METABOLIC PANEL: CPT

## 2023-02-01 PROCEDURE — 85025 COMPLETE CBC W/AUTO DIFF WBC: CPT

## 2023-02-01 PROCEDURE — 83036 HEMOGLOBIN GLYCOSYLATED A1C: CPT

## 2023-02-01 PROCEDURE — 80061 LIPID PANEL: CPT

## 2023-02-02 LAB
FOLATE: 6.1 NG/ML
VITAMIN B-12: 623 PG/ML (ref 232–1245)

## 2023-02-11 DIAGNOSIS — N52.9 ERECTILE DYSFUNCTION, UNSPECIFIED ERECTILE DYSFUNCTION TYPE: Chronic | ICD-10-CM

## 2023-02-13 ENCOUNTER — TELEPHONE (OUTPATIENT)
Dept: FAMILY MEDICINE CLINIC | Age: 68
End: 2023-02-13

## 2023-02-13 RX ORDER — SILDENAFIL 100 MG/1
100 TABLET, FILM COATED ORAL PRN
Qty: 6 TABLET | Refills: 1 | Status: SHIPPED | OUTPATIENT
Start: 2023-02-13

## 2023-02-13 NOTE — TELEPHONE ENCOUNTER
Comments:     Last Office Visit (last PCP visit):   1/25/2023    Next Visit Date:  Future Appointments   Date Time Provider Constance Espitia   4/6/2023  3:00 PM MD Romero Gandhi [facility]   5/31/2023 11:30 AM MD Romero Gandhi [address]       **If hasn't been seen in over a year OR hasn't followed up according to last diabetes/ADHD visit, make appointment for patient before sending refill to provider.     Rx requested:  Requested Prescriptions     Pending Prescriptions Disp Refills    sildenafil (VIAGRA) 100 MG tablet [Pharmacy Med Name: SILDENAFIL 100 MG TABLET] 6 tablet 1     Sig: TAKE 1 TABLET BY MOUTH AS NEEDED FOR ERECTILE DYSFUNCTION

## 2023-02-13 NOTE — TELEPHONE ENCOUNTER
Please call patient to ask him if he is having difficulty with sleeping in his normal bed laying flat, or if he requires a hospital bed to elevate the head of the bed more than 30 degrees to help with breathing.

## 2023-02-14 LAB — DIABETIC RETINOPATHY: NEGATIVE

## 2023-02-14 NOTE — TELEPHONE ENCOUNTER
Yes he requires a hospital bed to elevate the head of the bed more than 30 degrees to help with breathing. Patient states that when he is laying flat he feels as if he cant breath and feels like he is choking.

## 2023-03-09 DIAGNOSIS — Z96.652 PRESENCE OF LEFT ARTIFICIAL KNEE JOINT: ICD-10-CM

## 2023-03-09 NOTE — TELEPHONE ENCOUNTER
Pharmacy is requesting medication refill.  Please approve or deny this request.    Rx requested:  Requested Prescriptions     Pending Prescriptions Disp Refills    acetaminophen (ACETAMINOPHEN EXTRA STRENGTH) 500 MG tablet [Pharmacy Med Name: ACETAMINOPHEN 500 MG TABLET] 90 tablet 1     Sig: Take 1 tablet by mouth every 8 hours as needed for Pain         Last Office Visit:   1/25/2023      Next Visit Date:  Future Appointments   Date Time Provider Constance Espitia   4/6/2023  3:00 PM MD Romero [address]   5/31/2023 11:30 AM MD Romero [address]

## 2023-03-10 RX ORDER — ACETAMINOPHEN 500 MG
500 TABLET ORAL EVERY 8 HOURS PRN
Qty: 90 TABLET | Refills: 1 | Status: SHIPPED | OUTPATIENT
Start: 2023-03-10

## 2023-03-11 DIAGNOSIS — J44.9 CHRONIC OBSTRUCTIVE PULMONARY DISEASE, UNSPECIFIED COPD TYPE (HCC): Chronic | ICD-10-CM

## 2023-03-13 RX ORDER — ALBUTEROL SULFATE 90 UG/1
2 AEROSOL, METERED RESPIRATORY (INHALATION) EVERY 6 HOURS PRN
Qty: 18 G | Refills: 1 | Status: SHIPPED | OUTPATIENT
Start: 2023-03-13 | End: 2023-04-04 | Stop reason: SDUPTHER

## 2023-03-16 RX ORDER — IPRATROPIUM BROMIDE AND ALBUTEROL SULFATE 2.5; .5 MG/3ML; MG/3ML
1 SOLUTION RESPIRATORY (INHALATION) EVERY 4 HOURS PRN
Qty: 360 ML | Refills: 1 | Status: SHIPPED | OUTPATIENT
Start: 2023-03-16

## 2023-03-16 NOTE — TELEPHONE ENCOUNTER
Pt is requesting medication refill.  Please approve or deny this request.    Rx requested:  Requested Prescriptions     Pending Prescriptions Disp Refills    ipratropium-albuterol (DUONEB) 0.5-2.5 (3) MG/3ML SOLN nebulizer solution 360 mL 1     Sig: Take 3 mLs by nebulization every 4 hours as needed for Shortness of Breath         Last Office Visit:   1/25/2023      Next Visit Date:  Future Appointments   Date Time Provider Constance Cardonai   4/6/2023  3:00 PM MD Romero [facility]   5/31/2023 11:30 AM MD Romero [facility]

## 2023-03-30 DIAGNOSIS — E78.5 HYPERLIPIDEMIA, UNSPECIFIED HYPERLIPIDEMIA TYPE: Chronic | ICD-10-CM

## 2023-03-30 RX ORDER — LOVASTATIN 20 MG/1
20 TABLET ORAL NIGHTLY
Qty: 90 TABLET | Refills: 1 | Status: SHIPPED | OUTPATIENT
Start: 2023-03-30

## 2023-03-30 NOTE — TELEPHONE ENCOUNTER
Comments:     Last Office Visit (last PCP visit):   1/25/2023    Next Visit Date:  Future Appointments   Date Time Provider Constance Nupur   4/6/2023  3:00 PM MD Romero [facility]   5/31/2023 11:30 AM MD Romero [facility]       **If hasn't been seen in over a year OR hasn't followed up according to last diabetes/ADHD visit, make appointment for patient before sending refill to provider.     Rx requested:  Requested Prescriptions     Pending Prescriptions Disp Refills    lovastatin (MEVACOR) 20 MG tablet [Pharmacy Med Name: LOVASTATIN 20 MG TABLET] 90 tablet 1     Sig: TAKE 1 TABLET BY MOUTH NIGHTLY

## 2023-04-04 ENCOUNTER — OFFICE VISIT (OUTPATIENT)
Dept: FAMILY MEDICINE CLINIC | Age: 68
End: 2023-04-04
Payer: MEDICARE

## 2023-04-04 ENCOUNTER — NURSE TRIAGE (OUTPATIENT)
Dept: OTHER | Facility: CLINIC | Age: 68
End: 2023-04-04

## 2023-04-04 VITALS
BODY MASS INDEX: 34.7 KG/M2 | DIASTOLIC BLOOD PRESSURE: 70 MMHG | WEIGHT: 261.8 LBS | SYSTOLIC BLOOD PRESSURE: 136 MMHG | HEART RATE: 91 BPM | HEIGHT: 73 IN | TEMPERATURE: 98.1 F | OXYGEN SATURATION: 94 %

## 2023-04-04 DIAGNOSIS — J44.1 CHRONIC OBSTRUCTIVE PULMONARY DISEASE WITH ACUTE EXACERBATION (HCC): ICD-10-CM

## 2023-04-04 PROCEDURE — 99213 OFFICE O/P EST LOW 20 MIN: CPT

## 2023-04-04 PROCEDURE — 3078F DIAST BP <80 MM HG: CPT

## 2023-04-04 PROCEDURE — 1123F ACP DISCUSS/DSCN MKR DOCD: CPT

## 2023-04-04 PROCEDURE — 3074F SYST BP LT 130 MM HG: CPT

## 2023-04-04 RX ORDER — IPRATROPIUM BROMIDE AND ALBUTEROL SULFATE 2.5; .5 MG/3ML; MG/3ML
1 SOLUTION RESPIRATORY (INHALATION) EVERY 4 HOURS PRN
Qty: 360 ML | Refills: 1 | Status: SHIPPED | OUTPATIENT
Start: 2023-04-04

## 2023-04-04 RX ORDER — METHYLPREDNISOLONE 4 MG/1
TABLET ORAL
Qty: 1 KIT | Refills: 0 | Status: SHIPPED | OUTPATIENT
Start: 2023-04-04 | End: 2023-04-06 | Stop reason: ALTCHOICE

## 2023-04-04 RX ORDER — ACETAMINOPHEN 500 MG
500 TABLET ORAL EVERY 8 HOURS PRN
Qty: 90 TABLET | Refills: 1 | Status: SHIPPED | OUTPATIENT
Start: 2023-04-04 | End: 2023-04-06 | Stop reason: SDUPTHER

## 2023-04-04 RX ORDER — FLUTICASONE PROPIONATE 50 MCG
1 SPRAY, SUSPENSION (ML) NASAL DAILY
Qty: 16 G | Refills: 3 | Status: SHIPPED | OUTPATIENT
Start: 2023-04-04

## 2023-04-04 RX ORDER — ALBUTEROL SULFATE 90 UG/1
2 AEROSOL, METERED RESPIRATORY (INHALATION) EVERY 6 HOURS PRN
Qty: 18 G | Refills: 1 | Status: SHIPPED | OUTPATIENT
Start: 2023-04-04

## 2023-04-04 RX ORDER — AZITHROMYCIN 250 MG/1
250 TABLET, FILM COATED ORAL SEE ADMIN INSTRUCTIONS
Qty: 6 TABLET | Refills: 0 | Status: SHIPPED | OUTPATIENT
Start: 2023-04-04 | End: 2023-04-09

## 2023-04-04 SDOH — ECONOMIC STABILITY: FOOD INSECURITY: WITHIN THE PAST 12 MONTHS, YOU WORRIED THAT YOUR FOOD WOULD RUN OUT BEFORE YOU GOT MONEY TO BUY MORE.: NEVER TRUE

## 2023-04-04 SDOH — ECONOMIC STABILITY: INCOME INSECURITY: HOW HARD IS IT FOR YOU TO PAY FOR THE VERY BASICS LIKE FOOD, HOUSING, MEDICAL CARE, AND HEATING?: NOT HARD AT ALL

## 2023-04-04 SDOH — ECONOMIC STABILITY: FOOD INSECURITY: WITHIN THE PAST 12 MONTHS, THE FOOD YOU BOUGHT JUST DIDN'T LAST AND YOU DIDN'T HAVE MONEY TO GET MORE.: NEVER TRUE

## 2023-04-04 ASSESSMENT — ENCOUNTER SYMPTOMS
SORE THROAT: 0
COLOR CHANGE: 0
DIARRHEA: 0
COUGH: 1
SHORTNESS OF BREATH: 1
NAUSEA: 0
CHEST TIGHTNESS: 0

## 2023-04-04 NOTE — TELEPHONE ENCOUNTER
Received call from [facility] at [facility] with Red Flag Complaint. Subjective: Caller states \"Difficulty breathing\"     Current Symptoms: SOB - not at rest, only when active  Denies chest pain    Onset:  A couple weeks and this week getting worse    Pain Severity: 0/10    Temperature: Patient is reported to not have a fever. Also denies chills, sweats at times     What has been tried: Sometimes gets the sweats, neb treatment x2    History related to the current reason for call: COPD    Recommended disposition: Go to Office Now    Care advice provided, patient verbalizes understanding; denies any other questions or concerns; instructed to call back for any new or worsening symptoms. Patient/Caller agrees with recommended disposition; writer provided warm transfer to [employer] at [facility] for appointment scheduling     Attention Provider: Thank you for allowing me to participate in the care of your patient. The patient was connected to triage in response to information provided to the ECC/PSC. Please do not respond through this encounter as the response is not directed to a shared pool.     Reason for Disposition   MILD difficulty breathing (e.g., minimal/no SOB at rest, SOB with walking, pulse < 100) of new-onset or worse than normal    Protocols used: Breathing Difficulty-ADULT-[place]

## 2023-04-04 NOTE — PROGRESS NOTES
not an admission. Britta Renee, APRN - CNP    CHIEF COMPLAINT       Chief Complaint   Patient presents with    Shortness of Breath     Worsening SOB, COPD flare up x1 week. SUBJECTIVE/REVIEW OF SYSTEMS     Review of Systems   Constitutional:  Negative for fatigue and unexpected weight change. HENT: Negative. Negative for congestion, ear pain and sore throat. Eyes:  Negative for visual disturbance. Respiratory:  Positive for cough and shortness of breath. Negative for chest tightness. Cardiovascular:  Negative for chest pain, palpitations and leg swelling. Gastrointestinal:  Negative for diarrhea and nausea. Endocrine: Negative for polydipsia and polyuria. Genitourinary: Negative. Musculoskeletal: Negative. Skin:  Negative for color change. Neurological:  Negative for weakness, light-headedness, numbness and headaches. Hematological:  Does not bruise/bleed easily. Psychiatric/Behavioral:  Negative for confusion. The patient is not nervous/anxious. OBJECTIVE/PHYSICAL EXAM     Physical Exam  Vitals reviewed. Constitutional:       Appearance: Normal appearance. He is not ill-appearing or toxic-appearing. HENT:      Head: Normocephalic. Right Ear: Tympanic membrane, ear canal and external ear normal. No middle ear effusion. There is no impacted cerumen. No mastoid tenderness. Tympanic membrane is not perforated, erythematous or bulging. Left Ear: Tympanic membrane, ear canal and external ear normal.  No middle ear effusion. There is no impacted cerumen. No mastoid tenderness. Tympanic membrane is not perforated, erythematous or bulging. Nose: No congestion or rhinorrhea. Mouth/Throat:      Mouth: Mucous membranes are moist.      Pharynx: Oropharynx is clear. No pharyngeal swelling, oropharyngeal exudate or posterior oropharyngeal erythema. Tonsils: No tonsillar exudate or tonsillar abscesses. 0 on the right. 0 on the left.    Eyes:

## 2023-04-06 ENCOUNTER — HOSPITAL ENCOUNTER (OUTPATIENT)
Age: 68
Discharge: HOME | End: 2023-04-08
Payer: MEDICARE

## 2023-04-06 ENCOUNTER — OFFICE VISIT (OUTPATIENT)
Dept: FAMILY MEDICINE CLINIC | Age: 68
End: 2023-04-06
Payer: MEDICARE

## 2023-04-06 ENCOUNTER — HOSPITAL ENCOUNTER (OUTPATIENT)
Dept: GENERAL RADIOLOGY | Age: 68
Discharge: HOME | End: 2023-04-08
Payer: MEDICARE

## 2023-04-06 VITALS
SYSTOLIC BLOOD PRESSURE: 108 MMHG | BODY MASS INDEX: 35.23 KG/M2 | DIASTOLIC BLOOD PRESSURE: 56 MMHG | TEMPERATURE: 97.6 F | HEART RATE: 63 BPM | WEIGHT: 265.8 LBS | HEIGHT: 73 IN | OXYGEN SATURATION: 90 %

## 2023-04-06 DIAGNOSIS — M48.061 SPINAL STENOSIS OF LUMBAR REGION, UNSPECIFIED WHETHER NEUROGENIC CLAUDICATION PRESENT: ICD-10-CM

## 2023-04-06 DIAGNOSIS — Z12.11 SCREENING FOR COLON CANCER: ICD-10-CM

## 2023-04-06 DIAGNOSIS — I47.1 SUPRAVENTRICULAR TACHYCARDIA (HCC): Chronic | ICD-10-CM

## 2023-04-06 DIAGNOSIS — E66.01 CLASS 2 SEVERE OBESITY WITH SERIOUS COMORBIDITY AND BODY MASS INDEX (BMI) OF 35.0 TO 35.9 IN ADULT, UNSPECIFIED OBESITY TYPE (HCC): ICD-10-CM

## 2023-04-06 DIAGNOSIS — I10 PRIMARY HYPERTENSION: Chronic | ICD-10-CM

## 2023-04-06 DIAGNOSIS — E11.21 TYPE 2 DIABETES MELLITUS WITH DIABETIC NEPHROPATHY, WITHOUT LONG-TERM CURRENT USE OF INSULIN (HCC): ICD-10-CM

## 2023-04-06 DIAGNOSIS — E78.5 HYPERLIPIDEMIA, UNSPECIFIED HYPERLIPIDEMIA TYPE: Chronic | ICD-10-CM

## 2023-04-06 DIAGNOSIS — Z86.010 HISTORY OF COLON POLYPS: Chronic | ICD-10-CM

## 2023-04-06 DIAGNOSIS — J44.1 CHRONIC OBSTRUCTIVE PULMONARY DISEASE WITH ACUTE EXACERBATION (HCC): Primary | ICD-10-CM

## 2023-04-06 DIAGNOSIS — D53.9 MACROCYTIC ANEMIA: ICD-10-CM

## 2023-04-06 DIAGNOSIS — J30.2 SEASONAL ALLERGIES: Chronic | ICD-10-CM

## 2023-04-06 DIAGNOSIS — Z13.6 SCREENING FOR AAA (ABDOMINAL AORTIC ANEURYSM): ICD-10-CM

## 2023-04-06 DIAGNOSIS — J44.1 CHRONIC OBSTRUCTIVE PULMONARY DISEASE WITH ACUTE EXACERBATION (HCC): ICD-10-CM

## 2023-04-06 DIAGNOSIS — M10.9 GOUT, UNSPECIFIED CAUSE, UNSPECIFIED CHRONICITY, UNSPECIFIED SITE: Chronic | ICD-10-CM

## 2023-04-06 DIAGNOSIS — Z72.0 TOBACCO USE: ICD-10-CM

## 2023-04-06 PROBLEM — N18.30 CKD (CHRONIC KIDNEY DISEASE) STAGE 3, GFR 30-59 ML/MIN (HCC): Status: RESOLVED | Noted: 2021-12-14 | Resolved: 2023-04-06

## 2023-04-06 PROCEDURE — 3044F HG A1C LEVEL LT 7.0%: CPT | Performed by: FAMILY MEDICINE

## 2023-04-06 PROCEDURE — 99214 OFFICE O/P EST MOD 30 MIN: CPT | Performed by: FAMILY MEDICINE

## 2023-04-06 PROCEDURE — 3078F DIAST BP <80 MM HG: CPT | Performed by: FAMILY MEDICINE

## 2023-04-06 PROCEDURE — 71046 X-RAY EXAM CHEST 2 VIEWS: CPT

## 2023-04-06 PROCEDURE — 3074F SYST BP LT 130 MM HG: CPT | Performed by: FAMILY MEDICINE

## 2023-04-06 PROCEDURE — 1123F ACP DISCUSS/DSCN MKR DOCD: CPT | Performed by: FAMILY MEDICINE

## 2023-04-06 RX ORDER — PREDNISONE 10 MG/1
TABLET ORAL
Qty: 45 TABLET | Refills: 0 | Status: SHIPPED | OUTPATIENT
Start: 2023-04-06

## 2023-04-06 RX ORDER — ACETAMINOPHEN 500 MG
500 TABLET ORAL EVERY 8 HOURS PRN
Qty: 90 TABLET | Refills: 1 | Status: SHIPPED | OUTPATIENT
Start: 2023-04-06

## 2023-04-06 RX ORDER — BUDESONIDE AND FORMOTEROL FUMARATE DIHYDRATE 160; 4.5 UG/1; UG/1
2 AEROSOL RESPIRATORY (INHALATION) 2 TIMES DAILY
Qty: 30.6 G | Refills: 1 | Status: SHIPPED | OUTPATIENT
Start: 2023-04-06

## 2023-04-06 RX ORDER — MELOXICAM 15 MG/1
15 TABLET ORAL DAILY
Qty: 90 TABLET | Refills: 1 | Status: SHIPPED | OUTPATIENT
Start: 2023-04-06

## 2023-04-06 RX ORDER — LOVASTATIN 20 MG/1
20 TABLET ORAL NIGHTLY
Qty: 90 TABLET | Refills: 1 | Status: SHIPPED | OUTPATIENT
Start: 2023-04-06

## 2023-04-06 RX ORDER — MONTELUKAST SODIUM 10 MG/1
10 TABLET ORAL NIGHTLY
Qty: 90 TABLET | Refills: 1 | Status: SHIPPED | OUTPATIENT
Start: 2023-04-06

## 2023-04-06 ASSESSMENT — ENCOUNTER SYMPTOMS
NAUSEA: 0
BLOOD IN STOOL: 0
SINUS PAIN: 0
RHINORRHEA: 0
SINUS PRESSURE: 0
ABDOMINAL PAIN: 0
ANAL BLEEDING: 0
DIARRHEA: 0
SORE THROAT: 0
CHEST TIGHTNESS: 1
VOMITING: 0
SHORTNESS OF BREATH: 1
CONSTIPATION: 0
COUGH: 1
TROUBLE SWALLOWING: 0
WHEEZING: 1

## 2023-04-06 NOTE — PROGRESS NOTES
Mariama Moran (: 1955) is a 74 y.o. male, Established patient, who presents today for:    Chief Complaint   Patient presents with    Diabetes     Patient is present for [facility]. Patient states that he is still feeling SOB and having trouble staying awake . Hypertension    Sleep Apnea    COPD         ASSESSMENT/PLAN    1. Chronic obstructive pulmonary disease with acute exacerbation (HCC)  Assessment & Plan:  Acute exacerbation likely secondary to bronchitis/LRTI based on history and exam.  Secondary to exam I will change oral steroid from Medrol Dosepak to extended prednisone taper and obtain a chest x-ray to evaluate for any effusions or consolidations. Patient with borderline SPO2 today in the office on room air. He was instructed to go to the emergency room for any acutely worsening dyspnea at rest or decreased activity tolerance associated with any fever/chills/sweats, nausea/vomiting, chest pain, lightheadedness, dizziness, worsening lower extremity edema, or syncope. We will keep close follow-up patient was instructed to finish full course of azithromycin and keep close follow-up in the next 5 to 10 days for reassessment. Orders:  -     SYMBICORT 160-4.5 MCG/ACT AERO; Inhale 2 puffs into the lungs 2 times daily, Disp-30.6 g, R-1, DAWNormal  -     predniSONE (DELTASONE) 10 MG tablet; Take 5 tabs daily x 3 days, 4 tabs daily x 3 days, 3 tabs daily x 3 days, 2 tabs daily x 3 days, 1 tab daily x 3 days, Disp-45 tablet, R-0Normal  -     XR CHEST STANDARD (2 VW); Future  2. Type 2 diabetes mellitus with diabetic nephropathy, without long-term current use of insulin ([facility]  Assessment & Plan:  Most recent A1c at goal control. Patient instructed to continue with current dose of metformin   Orders:  -     Comprehensive Metabolic Panel; Future  -     Hemoglobin A1C; Future  -     Microalbumin / Creatinine Urine Ratio; Future  -     Lipid Panel; Future  3.  Primary hypertension  Assessment &

## 2023-04-06 NOTE — ASSESSMENT & PLAN NOTE
Currently asymptomatic. Patient to continue with current doses of losartan and amlodipine.   We will continue to monitor over time

## 2023-04-06 NOTE — ASSESSMENT & PLAN NOTE
Borderline low BP today in the office. Patient will keep close follow-up in the next 7 to 10 days for reassessment. He was instructed to go to the emergency room or call 911 for any persistent lightheadedness/dizziness, particularly if associated with any diaphoresis, nausea/vomiting, chest pain, palpitations, or syncope.

## 2023-04-06 NOTE — ASSESSMENT & PLAN NOTE
Patient pre-contemplative and is aware that smoking cessation would be the best thing for overall health, particularly in the setting of acute COPD exacerbation. We will continue to encourage complete smoking cessation at subsequent visits.

## 2023-04-07 NOTE — RESULT ENCOUNTER NOTE
Please notify patient that recent chest x-ray showed no active findings and only chronic changes with a small left pleural effusion. If there is worsening shortness of breath despite treatment plan initiated in the office patient should go to the emergency room for acute management.

## 2023-04-20 ENCOUNTER — HOSPITAL ENCOUNTER (OUTPATIENT)
Dept: ULTRASOUND IMAGING | Age: 68
Discharge: HOME | End: 2023-04-22
Payer: MEDICARE

## 2023-04-20 ENCOUNTER — OFFICE VISIT (OUTPATIENT)
Dept: FAMILY MEDICINE CLINIC | Age: 68
End: 2023-04-20
Payer: MEDICARE

## 2023-04-20 VITALS
DIASTOLIC BLOOD PRESSURE: 62 MMHG | OXYGEN SATURATION: 96 % | HEIGHT: 73 IN | TEMPERATURE: 97.6 F | SYSTOLIC BLOOD PRESSURE: 112 MMHG | WEIGHT: 265 LBS | BODY MASS INDEX: 35.12 KG/M2 | HEART RATE: 80 BPM

## 2023-04-20 DIAGNOSIS — Z72.0 TOBACCO USE: ICD-10-CM

## 2023-04-20 DIAGNOSIS — J44.1 CHRONIC OBSTRUCTIVE PULMONARY DISEASE WITH ACUTE EXACERBATION (HCC): ICD-10-CM

## 2023-04-20 DIAGNOSIS — Z13.6 SCREENING FOR AAA (ABDOMINAL AORTIC ANEURYSM): ICD-10-CM

## 2023-04-20 PROCEDURE — 76706 US ABDL AORTA SCREEN AAA: CPT

## 2023-04-20 PROCEDURE — 1123F ACP DISCUSS/DSCN MKR DOCD: CPT | Performed by: FAMILY MEDICINE

## 2023-04-20 PROCEDURE — 99214 OFFICE O/P EST MOD 30 MIN: CPT | Performed by: FAMILY MEDICINE

## 2023-04-20 PROCEDURE — 3074F SYST BP LT 130 MM HG: CPT | Performed by: FAMILY MEDICINE

## 2023-04-20 PROCEDURE — 3078F DIAST BP <80 MM HG: CPT | Performed by: FAMILY MEDICINE

## 2023-04-20 RX ORDER — TIOTROPIUM BROMIDE 18 UG/1
18 CAPSULE ORAL; RESPIRATORY (INHALATION) DAILY
Qty: 30 CAPSULE | Refills: 2 | Status: SHIPPED | OUTPATIENT
Start: 2023-04-20

## 2023-04-20 ASSESSMENT — ENCOUNTER SYMPTOMS
COUGH: 0
DIARRHEA: 0
SINUS PRESSURE: 0
TROUBLE SWALLOWING: 0
ABDOMINAL PAIN: 0
CHEST TIGHTNESS: 0
SORE THROAT: 0
VOMITING: 0
SINUS PAIN: 0
SHORTNESS OF BREATH: 1
NAUSEA: 0

## 2023-04-20 NOTE — PROGRESS NOTES
Normal appearance. He is not ill-appearing, toxic-appearing or diaphoretic. Cardiovascular:      Rate and Rhythm: Normal rate and regular rhythm. Pulmonary:      Effort: Pulmonary effort is normal. No tachypnea, accessory muscle usage or respiratory distress (speaking in complete sentences). Breath sounds: Decreased air movement present. Decreased breath sounds and wheezing (scattered) present. No rhonchi or rales. Musculoskeletal:      Right lower leg: No edema. Left lower leg: No edema. Skin:     Findings: No rash. Neurological:      Mental Status: He is alert and oriented to person, place, and time. Psychiatric:         Mood and Affect: Mood normal.         Behavior: Behavior normal.         Thought Content: Thought content normal.       Ortho Exam (If Applicable)              An electronic signature was used to authenticate this note.      Dai De Santiago MD

## 2023-04-20 NOTE — ASSESSMENT & PLAN NOTE
Since most recent visit but reports continued difficulty with activity on current medication regimen. He reports previously having better benefit with Trelegy use states he was given samples microbiology in the past.  I have given him samples of Trelegy today in the office (14 day samples x 4) and instructed him to F/U with pulmonology in the next 2-4 weeks. I will have office staff help him schedule visit. He was instructed to HOLD symbicort while taking the Trelegy. Patient was instructed to go to the emergency room for any acutely worsening dyspnea at rest, or dyspnea associated with worsening productive cough, fever/chills/sweats, hypoxia, chest discomfort, or lightheadedness. If patient is unable to get a visit with pulmonology in the next 4 weeks and runs out of Trelegy samples he was instructed to restart his Symbicort and  RX for Spiriva once daily the pharmacy and take this as his new baseline regimen. Patient voiced understanding and agreed with plan.

## 2023-04-21 ENCOUNTER — TELEPHONE (OUTPATIENT)
Dept: FAMILY MEDICINE CLINIC | Age: 68
End: 2023-04-21

## 2023-04-21 DIAGNOSIS — J44.9 CHRONIC OBSTRUCTIVE PULMONARY DISEASE, UNSPECIFIED COPD TYPE (HCC): Primary | Chronic | ICD-10-CM

## 2023-04-22 NOTE — RESULT ENCOUNTER NOTE
Please notify patient that recent screening ultrasound of the abdominal aorta showed no signs of aneurysm. No further testing is needed.

## 2023-04-25 ENCOUNTER — TELEPHONE (OUTPATIENT)
Dept: FAMILY MEDICINE CLINIC | Age: 68
End: 2023-04-25

## 2023-04-25 NOTE — TELEPHONE ENCOUNTER
Patient states that he is unable to make it to the appointment on May 1st patient states that if he could get a pulmonologist through the [facility] here in [place] that would work better for him. And that wednesdays or thursdays work better for him.

## 2023-05-05 DIAGNOSIS — I10 PRIMARY HYPERTENSION: Chronic | ICD-10-CM

## 2023-05-08 RX ORDER — AMLODIPINE BESYLATE 10 MG/1
10 TABLET ORAL DAILY
Qty: 90 TABLET | Refills: 1 | Status: SHIPPED | OUTPATIENT
Start: 2023-05-08

## 2023-05-08 NOTE — TELEPHONE ENCOUNTER
Pharmacy is requesting medication refill.  Please approve or deny this request.    Rx requested:  Requested Prescriptions     Pending Prescriptions Disp Refills    amLODIPine (NORVASC) 10 MG tablet [Pharmacy Med Name: AMLODIPINE BESYLATE 10 MG TAB] 90 tablet 1     Sig: Take 1 tablet by mouth daily         Last Office Visit:   4/20/2023      Next Visit Date:  Future Appointments   Date Time Provider Constance Espitia   5/11/2023  1:45 PM Milli Butcher, [address] [facility] [facility]   5/31/2023 11:30 AM Abebe De Santiago MD [facility]

## 2023-05-16 DIAGNOSIS — J30.2 SEASONAL ALLERGIES: Chronic | ICD-10-CM

## 2023-05-17 RX ORDER — CETIRIZINE HYDROCHLORIDE 10 MG/1
10 TABLET ORAL DAILY
Qty: 90 TABLET | Refills: 1 | Status: SHIPPED | OUTPATIENT
Start: 2023-05-17

## 2023-05-17 NOTE — TELEPHONE ENCOUNTER
Pharmacy is requesting medication refill.  Please approve or deny this request.    Rx requested:  Requested Prescriptions     Pending Prescriptions Disp Refills    cetirizine (ZYRTEC) 10 MG tablet [Pharmacy Med Name: CETIRIZINE HCL 10 MG TABLET] 90 tablet 1     Sig: Take 1 tablet by mouth daily         Last Office Visit:   4/20/2023      Next Visit Date:  Future Appointments   Date Time Provider Constance Cardonai   5/31/2023 11:30 AM Snow Rivera MD [place] [address]   6/1/2023  2:45 PM Bob Sandoval MD [facility]

## 2023-05-26 RX ORDER — FLUTICASONE PROPIONATE 50 MCG
2 SPRAY, SUSPENSION (ML) NASAL DAILY
Qty: 16 G | Refills: 3 | Status: SHIPPED | OUTPATIENT
Start: 2023-05-26

## 2023-05-28 ENCOUNTER — HOSPITAL ENCOUNTER (EMERGENCY)
Age: 68
Discharge: HOME | End: 2023-05-28
Payer: MEDICARE

## 2023-05-28 ENCOUNTER — APPOINTMENT (OUTPATIENT)
Dept: GENERAL RADIOLOGY | Age: 68
End: 2023-05-28
Payer: MEDICARE

## 2023-05-28 VITALS
TEMPERATURE: 98.6 F | OXYGEN SATURATION: 93 % | RESPIRATION RATE: 16 BRPM | WEIGHT: 260 LBS | BODY MASS INDEX: 35.21 KG/M2 | DIASTOLIC BLOOD PRESSURE: 81 MMHG | SYSTOLIC BLOOD PRESSURE: 129 MMHG | HEART RATE: 75 BPM | HEIGHT: 72 IN

## 2023-05-28 DIAGNOSIS — J44.1 COPD EXACERBATION (HCC): Primary | ICD-10-CM

## 2023-05-28 LAB
ALBUMIN SERPL-MCNC: 3.8 G/DL (ref 3.5–4.6)
ALP SERPL-CCNC: 107 U/L (ref 35–104)
ALT SERPL-CCNC: 11 U/L (ref 0–41)
ANION GAP SERPL CALCULATED.3IONS-SCNC: 10 MEQ/L (ref 9–15)
AST SERPL-CCNC: 13 U/L (ref 0–40)
BASOPHILS # BLD: 0.1 K/UL (ref 0–0.1)
BASOPHILS NFR BLD: 0.5 % (ref 0.2–1.2)
BILIRUB SERPL-MCNC: 0.8 MG/DL (ref 0.2–0.7)
BILIRUB UR QL STRIP: NEGATIVE
BNP BLD-MCNC: 185 PG/ML
BUN SERPL-MCNC: 8 MG/DL (ref 8–23)
CALCIUM SERPL-MCNC: 9.2 MG/DL (ref 8.5–9.9)
CHLORIDE SERPL-SCNC: 107 MEQ/L (ref 95–107)
CLARITY UR: CLEAR
CO2 SERPL-SCNC: 23 MEQ/L (ref 20–31)
COLOR UR: YELLOW
CREAT SERPL-MCNC: 0.78 MG/DL (ref 0.7–1.2)
EOSINOPHIL # BLD: 0.6 K/UL (ref 0–0.5)
EOSINOPHIL NFR BLD: 4.7 % (ref 0.8–7)
ERYTHROCYTE [DISTWIDTH] IN BLOOD BY AUTOMATED COUNT: 15.4 % (ref 11.6–14.4)
GLOBULIN SER CALC-MCNC: 3.3 G/DL (ref 2.3–3.5)
GLUCOSE SERPL-MCNC: 107 MG/DL (ref 70–99)
GLUCOSE UR STRIP-MCNC: NEGATIVE MG/DL
HCT VFR BLD AUTO: 41.5 % (ref 42–52)
HGB BLD-MCNC: 13.3 G/DL (ref 13.7–17.5)
HGB UR QL STRIP: NEGATIVE
IMM GRANULOCYTES # BLD: 0 K/UL
IMM GRANULOCYTES NFR BLD: 0.2 %
KETONES UR STRIP-MCNC: NEGATIVE MG/DL
LEUKOCYTE ESTERASE UR QL STRIP: NEGATIVE
LYMPHOCYTES # BLD: 1.6 K/UL (ref 1.3–3.6)
LYMPHOCYTES NFR BLD: 13.1 %
MAGNESIUM SERPL-MCNC: 2 MG/DL (ref 1.7–2.4)
MCH RBC QN AUTO: 31.1 PG (ref 25.7–32.2)
MCHC RBC AUTO-ENTMCNC: 32 % (ref 32.3–36.5)
MCV RBC AUTO: 97.2 FL (ref 79–92.2)
MONOCYTES # BLD: 1.4 K/UL (ref 0.3–0.8)
MONOCYTES NFR BLD: 11 % (ref 5.3–12.2)
NEUTROPHILS # BLD: 8.7 K/UL (ref 1.8–5.4)
NEUTS SEG NFR BLD: 70.5 % (ref 34–67.9)
NITRITE UR QL STRIP: NEGATIVE
PH UR STRIP: 5.5 [PH] (ref 5–9)
PLATELET # BLD AUTO: 307 K/UL (ref 163–337)
POTASSIUM SERPL-SCNC: 3.8 MEQ/L (ref 3.4–4.9)
PROT SERPL-MCNC: 7.1 G/DL (ref 6.3–8)
PROT UR STRIP-MCNC: NEGATIVE MG/DL
RBC # BLD AUTO: 4.27 M/UL (ref 4.63–6.08)
SODIUM SERPL-SCNC: 140 MEQ/L (ref 135–144)
SP GR UR STRIP: 1.01 (ref 1–1.03)
TROPONIN T SERPL-MCNC: <0.01 NG/ML (ref 0–0.01)
URINE REFLEX TO CULTURE: NORMAL
UROBILINOGEN UR STRIP-ACNC: 0.2 E.U./DL
WBC # BLD AUTO: 12.4 K/UL (ref 4.2–9)

## 2023-05-28 PROCEDURE — 71046 X-RAY EXAM CHEST 2 VIEWS: CPT

## 2023-05-28 PROCEDURE — 96374 THER/PROPH/DIAG INJ IV PUSH: CPT

## 2023-05-28 PROCEDURE — 94640 AIRWAY INHALATION TREATMENT: CPT

## 2023-05-28 PROCEDURE — 84484 ASSAY OF TROPONIN QUANT: CPT

## 2023-05-28 PROCEDURE — 81003 URINALYSIS AUTO W/O SCOPE: CPT

## 2023-05-28 PROCEDURE — 99285 EMERGENCY DEPT VISIT HI MDM: CPT

## 2023-05-28 PROCEDURE — 85025 COMPLETE CBC W/AUTO DIFF WBC: CPT

## 2023-05-28 PROCEDURE — 80053 COMPREHEN METABOLIC PANEL: CPT

## 2023-05-28 PROCEDURE — 83735 ASSAY OF MAGNESIUM: CPT

## 2023-05-28 PROCEDURE — 93005 ELECTROCARDIOGRAM TRACING: CPT

## 2023-05-28 PROCEDURE — 96375 TX/PRO/DX INJ NEW DRUG ADDON: CPT

## 2023-05-28 PROCEDURE — 6370000000 HC RX 637 (ALT 250 FOR IP)

## 2023-05-28 PROCEDURE — 6360000002 HC RX W HCPCS: Performed by: NURSE PRACTITIONER

## 2023-05-28 PROCEDURE — 36415 COLL VENOUS BLD VENIPUNCTURE: CPT

## 2023-05-28 PROCEDURE — 83880 ASSAY OF NATRIURETIC PEPTIDE: CPT

## 2023-05-28 RX ORDER — PREDNISONE 20 MG/1
40 TABLET ORAL DAILY
Qty: 10 TABLET | Refills: 0 | Status: SHIPPED | OUTPATIENT
Start: 2023-05-28 | End: 2023-06-02

## 2023-05-28 RX ORDER — IPRATROPIUM BROMIDE AND ALBUTEROL SULFATE 2.5; .5 MG/3ML; MG/3ML
SOLUTION RESPIRATORY (INHALATION)
Status: COMPLETED
Start: 2023-05-28 | End: 2023-05-28

## 2023-05-28 RX ORDER — METHYLPREDNISOLONE SODIUM SUCCINATE 125 MG/2ML
125 INJECTION, POWDER, LYOPHILIZED, FOR SOLUTION INTRAMUSCULAR; INTRAVENOUS ONCE
Status: COMPLETED | OUTPATIENT
Start: 2023-05-28 | End: 2023-05-28

## 2023-05-28 RX ORDER — FUROSEMIDE 10 MG/ML
20 INJECTION INTRAMUSCULAR; INTRAVENOUS ONCE
Status: COMPLETED | OUTPATIENT
Start: 2023-05-28 | End: 2023-05-28

## 2023-05-28 RX ORDER — IPRATROPIUM BROMIDE AND ALBUTEROL SULFATE 2.5; .5 MG/3ML; MG/3ML
1 SOLUTION RESPIRATORY (INHALATION)
Status: DISCONTINUED | OUTPATIENT
Start: 2023-05-28 | End: 2023-05-28 | Stop reason: HOSPADM

## 2023-05-28 RX ADMIN — IPRATROPIUM BROMIDE AND ALBUTEROL SULFATE 1 DOSE: 2.5; .5 SOLUTION RESPIRATORY (INHALATION) at 16:53

## 2023-05-28 RX ADMIN — METHYLPREDNISOLONE SODIUM SUCCINATE 125 MG: 125 INJECTION, POWDER, FOR SOLUTION INTRAMUSCULAR; INTRAVENOUS at 15:16

## 2023-05-28 RX ADMIN — IPRATROPIUM BROMIDE AND ALBUTEROL SULFATE 1 DOSE: .5; 3 SOLUTION RESPIRATORY (INHALATION) at 16:53

## 2023-05-28 RX ADMIN — FUROSEMIDE 20 MG: 10 INJECTION, SOLUTION INTRAMUSCULAR; INTRAVENOUS at 15:17

## 2023-05-28 ASSESSMENT — ENCOUNTER SYMPTOMS
EYE ITCHING: 0
NAUSEA: 0
RHINORRHEA: 0
CONSTIPATION: 0
STRIDOR: 0
SINUS PAIN: 0
PHOTOPHOBIA: 0
BLOOD IN STOOL: 0
CHEST TIGHTNESS: 0
EYE PAIN: 0
SINUS PRESSURE: 0
TROUBLE SWALLOWING: 0
SHORTNESS OF BREATH: 1
CHOKING: 0
VOMITING: 0
DIARRHEA: 0
EYE REDNESS: 0
VOICE CHANGE: 0
BACK PAIN: 0
COLOR CHANGE: 0
ABDOMINAL DISTENTION: 0
WHEEZING: 0
SORE THROAT: 0
APNEA: 0
ABDOMINAL PAIN: 0
COUGH: 0
ALLERGIC/IMMUNOLOGIC NEGATIVE: 1
EYE DISCHARGE: 0
FACIAL SWELLING: 0

## 2023-05-28 ASSESSMENT — PAIN - FUNCTIONAL ASSESSMENT
PAIN_FUNCTIONAL_ASSESSMENT: NONE - DENIES PAIN
PAIN_FUNCTIONAL_ASSESSMENT: 0-10
PAIN_FUNCTIONAL_ASSESSMENT: NONE - DENIES PAIN
PAIN_FUNCTIONAL_ASSESSMENT: NONE - DENIES PAIN

## 2023-05-28 ASSESSMENT — PAIN DESCRIPTION - ORIENTATION: ORIENTATION: LEFT

## 2023-05-28 ASSESSMENT — PAIN DESCRIPTION - PAIN TYPE: TYPE: ACUTE PAIN

## 2023-05-28 ASSESSMENT — PAIN DESCRIPTION - FREQUENCY: FREQUENCY: CONTINUOUS

## 2023-05-28 ASSESSMENT — PAIN DESCRIPTION - LOCATION: LOCATION: BACK

## 2023-05-28 ASSESSMENT — PAIN DESCRIPTION - DESCRIPTORS: DESCRIPTORS: DULL;PRESSURE;SHARP

## 2023-05-30 LAB
EKG ATRIAL RATE: 86 BPM
EKG P AXIS: 85 DEGREES
EKG P-R INTERVAL: 152 MS
EKG Q-T INTERVAL: 378 MS
EKG QRS DURATION: 102 MS
EKG QTC CALCULATION (BAZETT): 452 MS
EKG R AXIS: 64 DEGREES
EKG T AXIS: 65 DEGREES
EKG VENTRICULAR RATE: 86 BPM

## 2023-05-30 PROCEDURE — 93010 ELECTROCARDIOGRAM REPORT: CPT | Performed by: INTERNAL MEDICINE

## 2023-05-31 ENCOUNTER — OFFICE VISIT (OUTPATIENT)
Dept: FAMILY MEDICINE CLINIC | Age: 68
End: 2023-05-31
Payer: MEDICARE

## 2023-05-31 VITALS
DIASTOLIC BLOOD PRESSURE: 70 MMHG | OXYGEN SATURATION: 96 % | BODY MASS INDEX: 34.21 KG/M2 | WEIGHT: 252.6 LBS | HEIGHT: 72 IN | SYSTOLIC BLOOD PRESSURE: 116 MMHG | TEMPERATURE: 98 F | HEART RATE: 90 BPM

## 2023-05-31 DIAGNOSIS — G47.33 OSA (OBSTRUCTIVE SLEEP APNEA): Chronic | ICD-10-CM

## 2023-05-31 DIAGNOSIS — Z72.0 TOBACCO USE: ICD-10-CM

## 2023-05-31 DIAGNOSIS — I10 PRIMARY HYPERTENSION: Chronic | ICD-10-CM

## 2023-05-31 DIAGNOSIS — F14.10 COCAINE ABUSE (HCC): Chronic | ICD-10-CM

## 2023-05-31 DIAGNOSIS — J44.1 CHRONIC OBSTRUCTIVE PULMONARY DISEASE WITH ACUTE EXACERBATION (HCC): Primary | Chronic | ICD-10-CM

## 2023-05-31 PROCEDURE — 3078F DIAST BP <80 MM HG: CPT | Performed by: FAMILY MEDICINE

## 2023-05-31 PROCEDURE — 99214 OFFICE O/P EST MOD 30 MIN: CPT | Performed by: FAMILY MEDICINE

## 2023-05-31 PROCEDURE — 3074F SYST BP LT 130 MM HG: CPT | Performed by: FAMILY MEDICINE

## 2023-05-31 PROCEDURE — 1123F ACP DISCUSS/DSCN MKR DOCD: CPT | Performed by: FAMILY MEDICINE

## 2023-05-31 ASSESSMENT — ENCOUNTER SYMPTOMS
CHEST TIGHTNESS: 0
WHEEZING: 1
VOMITING: 0
NAUSEA: 0
ABDOMINAL PAIN: 0
SINUS PRESSURE: 0
SHORTNESS OF BREATH: 1
DIARRHEA: 0
COUGH: 1
SINUS PAIN: 0

## 2023-05-31 NOTE — ASSESSMENT & PLAN NOTE
Improved since ER visit. Patient instructed to finish full course of prednisone as prescribed. He was instructed to continue with home inhalers and use nebulizer every 4-6 hours scheduled for the next 4 to 5 days. Patient has visit scheduled tomorrow with pulmonology to establish care and have ER follow-up.   We will discuss with the specialist any further treatment/medication recommendations moving forward

## 2023-05-31 NOTE — ASSESSMENT & PLAN NOTE
I stressed with patient the importance of restarting nightly CPAP machine use. Patient was advised to contact the office should he not tolerate use of the machine, the next step would be referral to sleep medicine.

## 2023-05-31 NOTE — PROGRESS NOTES
Valla Heimlich (: 1955) is a 74 y.o. male, Established patient, who presents today for:    Chief Complaint   Patient presents with    Follow-up     Patient is present for ED f/u Patient states that he was having SOB          ASSESSMENT/PLAN    1. Chronic obstructive pulmonary disease with acute exacerbation [facility])  Assessment & Plan:  Improved since ER visit. Patient instructed to finish full course of prednisone as prescribed. He was instructed to continue with home inhalers and use nebulizer every 4-6 hours scheduled for the next 4 to 5 days. Patient has visit scheduled tomorrow with pulmonology to establish care and have ER follow-up. We will discuss with the specialist any further treatment/medication recommendations moving forward  2. GASPER (obstructive sleep apnea)  Assessment & Plan:  I stressed with patient the importance of restarting nightly CPAP machine use. Patient was advised to contact the office should he not tolerate use of the machine, the next step would be referral to sleep medicine. 3. Primary hypertension  Assessment & Plan:   Blood pressure within normal limits today in the office. Patient instructed to continue with current doses of losartan and amlodipine. 4. Cocaine abuse [facility])  Assessment & Plan:  Patient denies any further crack cocaine use since a day prior to his ER presentation. I again stressed with patient the importance of avoiding all illicit substance use. I reviewed with him the association of cocaine use with significant cardiovascular and cerebrovascular related morbidity and even mortality. Patient again declines any referral to a substance abuse specialist or detox/rehab. He was advised to contact the office should he change his mind in the future. 5. Tobacco use  Assessment & Plan:   Patient contemplative but not yet ready to set a quit date. He has weaned down cigarette use to only 2 to 3 cigarettes/day.   I stressed with him again the importance of

## 2023-05-31 NOTE — ASSESSMENT & PLAN NOTE
Patient contemplative but not yet ready to set a quit date. He has weaned down cigarette use to only 2 to 3 cigarettes/day. I stressed with him again the importance of complete tobacco cessation moving forward.

## 2023-05-31 NOTE — ASSESSMENT & PLAN NOTE
Patient denies any further crack cocaine use since a day prior to his ER presentation. I again stressed with patient the importance of avoiding all illicit substance use. I reviewed with him the association of cocaine use with significant cardiovascular and cerebrovascular related morbidity and even mortality. Patient again declines any referral to a substance abuse specialist or detox/rehab. He was advised to contact the office should he change his mind in the future.

## 2023-06-03 RX ORDER — POLYETHYLENE GLYCOL 3350, SODIUM CHLORIDE, SODIUM BICARBONATE, POTASSIUM CHLORIDE 420; 11.2; 5.72; 1.48 G/4L; G/4L; G/4L; G/4L
4000 POWDER, FOR SOLUTION ORAL ONCE
Qty: 4000 ML | Refills: 0 | Status: SHIPPED | OUTPATIENT
Start: 2023-06-03 | End: 2023-06-03

## 2023-06-18 DIAGNOSIS — K21.9 GASTROESOPHAGEAL REFLUX DISEASE, UNSPECIFIED WHETHER ESOPHAGITIS PRESENT: ICD-10-CM

## 2023-06-18 DIAGNOSIS — M48.061 SPINAL STENOSIS OF LUMBAR REGION, UNSPECIFIED WHETHER NEUROGENIC CLAUDICATION PRESENT: ICD-10-CM

## 2023-06-19 DIAGNOSIS — J44.1 CHRONIC OBSTRUCTIVE PULMONARY DISEASE WITH ACUTE EXACERBATION (HCC): ICD-10-CM

## 2023-06-19 RX ORDER — PANTOPRAZOLE SODIUM 40 MG/1
40 TABLET, DELAYED RELEASE ORAL DAILY
Qty: 90 TABLET | Refills: 1 | Status: SHIPPED | OUTPATIENT
Start: 2023-06-19

## 2023-06-19 RX ORDER — MELOXICAM 15 MG/1
15 TABLET ORAL DAILY
Qty: 90 TABLET | Refills: 1 | Status: SHIPPED | OUTPATIENT
Start: 2023-06-19

## 2023-06-19 RX ORDER — IPRATROPIUM BROMIDE AND ALBUTEROL SULFATE 2.5; .5 MG/3ML; MG/3ML
1 SOLUTION RESPIRATORY (INHALATION) EVERY 4 HOURS PRN
Qty: 360 ML | Refills: 1 | Status: SHIPPED | OUTPATIENT
Start: 2023-06-19 | End: 2023-07-31

## 2023-06-19 NOTE — TELEPHONE ENCOUNTER
Comments:     Last Office Visit (last PCP visit):   5/31/2023    Next Visit Date:  Future Appointments   Date Time Provider [address]   7/5/2023 10:00 AM Jane Nogueira   8/2/2023 11:00 AM Eugene Pfeiffer MD [address] [place]       **If hasn't been seen in over a year OR hasn't followed up according to last diabetes/ADHD visit, make appointment for patient before sending refill to provider.     Rx requested:  Requested Prescriptions     Pending Prescriptions Disp Refills    pantoprazole (PROTONIX) 40 MG tablet [Pharmacy Med Name: PANTOPRAZOLE SOD DR 40 MG TAB] 90 tablet 1     Sig: TAKE 1 TABLET BY MOUTH EVERY DAY    meloxicam (MOBIC) 15 MG tablet [Pharmacy Med Name: MELOXICAM 15 MG TABLET] 90 tablet 1     Sig: TAKE 1 TABLET BY MOUTH EVERY DAY

## 2023-06-19 NOTE — TELEPHONE ENCOUNTER
Pharmacy is requesting medication refill.  Please approve or deny this request.    Rx requested:  Requested Prescriptions     Pending Prescriptions Disp Refills    ipratropium 0.5 mg-albuterol 2.5 mg (DUONEB) 0.5-2.5 (3) MG/3ML SOLN nebulizer solution [Pharmacy Med Name: IPRAT-ALBUT 0.5-3(2.5) MG/3 ML] 360 mL 1     Sig: Take 3 mLs by nebulization every 4 hours         Last Office Visit:   5/31/2023      Next Visit Date:  Future Appointments   Date Time Provider [address] [place] [address]   7/5/2023 10:00 AM Roger Saini MD [address] [place] [address]   8/2/2023 11:00 AM Valerie West MD [address] [place] [address]

## 2023-06-29 DIAGNOSIS — M48.061 SPINAL STENOSIS OF LUMBAR REGION, UNSPECIFIED WHETHER NEUROGENIC CLAUDICATION PRESENT: ICD-10-CM

## 2023-06-29 RX ORDER — ACETAMINOPHEN 500 MG
500 TABLET ORAL EVERY 8 HOURS PRN
Qty: 90 TABLET | Refills: 1 | Status: SHIPPED | OUTPATIENT
Start: 2023-06-29

## 2023-07-02 ENCOUNTER — HOSPITAL ENCOUNTER (EMERGENCY)
Age: 68
Discharge: HOME | End: 2023-07-02
Attending: EMERGENCY MEDICINE
Payer: MEDICARE

## 2023-07-02 ENCOUNTER — APPOINTMENT (OUTPATIENT)
Dept: GENERAL RADIOLOGY | Age: 68
End: 2023-07-02
Payer: MEDICARE

## 2023-07-02 VITALS
TEMPERATURE: 97.6 F | SYSTOLIC BLOOD PRESSURE: 140 MMHG | DIASTOLIC BLOOD PRESSURE: 75 MMHG | BODY MASS INDEX: 35.62 KG/M2 | HEART RATE: 85 BPM | OXYGEN SATURATION: 95 % | WEIGHT: 263 LBS | HEIGHT: 72 IN | RESPIRATION RATE: 18 BRPM
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
## 2023-07-02 DIAGNOSIS — J44.1 COPD EXACERBATION (HCC): Primary | ICD-10-CM

## 2023-07-02 DIAGNOSIS — S61.451A DOG BITE, HAND, RIGHT, INITIAL ENCOUNTER: ICD-10-CM

## 2023-07-02 LAB
ANION GAP SERPL CALCULATED.3IONS-SCNC: 11 MEQ/L (ref 9–15)
BASOPHILS # BLD: 0.1 K/UL (ref 0–0.1)
BASOPHILS NFR BLD: 0.7 % (ref 0.2–1.2)
BUN SERPL-MCNC: 14 MG/DL (ref 8–23)
CALCIUM SERPL-MCNC: 9.3 MG/DL (ref 8.5–9.9)
CHLORIDE SERPL-SCNC: 108 MEQ/L (ref 95–107)
CO2 SERPL-SCNC: 22 MEQ/L (ref 20–31)
CREAT SERPL-MCNC: 0.83 MG/DL (ref 0.7–1.2)
EKG ATRIAL RATE: 74 BPM
EKG P AXIS: 81 DEGREES
EKG P-R INTERVAL: 160 MS
EKG Q-T INTERVAL: 384 MS
EKG QRS DURATION: 102 MS
EKG QTC CALCULATION (BAZETT): 426 MS
EKG R AXIS: 73 DEGREES
EKG T AXIS: 72 DEGREES
EKG VENTRICULAR RATE: 74 BPM
EOSINOPHIL # BLD: 0.3 K/UL (ref 0–0.5)
EOSINOPHIL NFR BLD: 3.6 % (ref 0.8–7)
ERYTHROCYTE [DISTWIDTH] IN BLOOD BY AUTOMATED COUNT: 14.6 % (ref 11.6–14.4)
GLUCOSE SERPL-MCNC: 99 MG/DL (ref 70–99)
HCT VFR BLD AUTO: 41.5 % (ref 42–52)
HGB BLD-MCNC: 13.3 G/DL (ref 13.7–17.5)
IMM GRANULOCYTES # BLD: 0 K/UL
IMM GRANULOCYTES NFR BLD: 0.2 %
LYMPHOCYTES # BLD: 1.5 K/UL (ref 1.3–3.6)
LYMPHOCYTES NFR BLD: 17.9 %
MCH RBC QN AUTO: 30.9 PG (ref 25.7–32.2)
MCHC RBC AUTO-ENTMCNC: 32 % (ref 32.3–36.5)
MCV RBC AUTO: 96.3 FL (ref 79–92.2)
MONOCYTES # BLD: 0.8 K/UL (ref 0.3–0.8)
MONOCYTES NFR BLD: 9 % (ref 5.3–12.2)
NEUTROPHILS # BLD: 5.7 K/UL (ref 1.8–5.4)
NEUTS SEG NFR BLD: 68.6 % (ref 34–67.9)
PLATELET # BLD AUTO: 297 K/UL (ref 163–337)
POTASSIUM SERPL-SCNC: 4.2 MEQ/L (ref 3.4–4.9)
RBC # BLD AUTO: 4.31 M/UL (ref 4.63–6.08)
SODIUM SERPL-SCNC: 141 MEQ/L (ref 135–144)
TROPONIN T SERPL-MCNC: <0.01 NG/ML (ref 0–0.01)
WBC # BLD AUTO: 8.4 K/UL (ref 4.2–9)

## 2023-07-02 PROCEDURE — 85025 COMPLETE CBC W/AUTO DIFF WBC: CPT

## 2023-07-02 PROCEDURE — 36415 COLL VENOUS BLD VENIPUNCTURE: CPT

## 2023-07-02 PROCEDURE — 84484 ASSAY OF TROPONIN QUANT: CPT

## 2023-07-02 PROCEDURE — 93005 ELECTROCARDIOGRAM TRACING: CPT

## 2023-07-02 PROCEDURE — 90471 IMMUNIZATION ADMIN: CPT | Performed by: EMERGENCY MEDICINE

## 2023-07-02 PROCEDURE — 90715 TDAP VACCINE 7 YRS/> IM: CPT | Performed by: EMERGENCY MEDICINE

## 2023-07-02 PROCEDURE — 99285 EMERGENCY DEPT VISIT HI MDM: CPT

## 2023-07-02 PROCEDURE — 6360000002 HC RX W HCPCS: Performed by: EMERGENCY MEDICINE

## 2023-07-02 PROCEDURE — 73130 X-RAY EXAM OF HAND: CPT

## 2023-07-02 PROCEDURE — 71045 X-RAY EXAM CHEST 1 VIEW: CPT

## 2023-07-02 PROCEDURE — 6370000000 HC RX 637 (ALT 250 FOR IP): Performed by: EMERGENCY MEDICINE

## 2023-07-02 PROCEDURE — 80048 BASIC METABOLIC PNL TOTAL CA: CPT

## 2023-07-02 RX ORDER — PREDNISONE 20 MG/1
40 TABLET ORAL ONCE
Status: COMPLETED | OUTPATIENT
Start: 2023-07-02 | End: 2023-07-02

## 2023-07-02 RX ORDER — CLINDAMYCIN HYDROCHLORIDE 300 MG/1
300 CAPSULE ORAL 3 TIMES DAILY
Qty: 15 CAPSULE | Refills: 0 | Status: SHIPPED | OUTPATIENT
Start: 2023-07-02 | End: 2023-07-07

## 2023-07-02 RX ORDER — PREDNISONE 20 MG/1
40 TABLET ORAL DAILY
Qty: 8 TABLET | Refills: 0 | Status: SHIPPED | OUTPATIENT
Start: 2023-07-02 | End: 2023-07-06

## 2023-07-02 RX ORDER — CLINDAMYCIN HYDROCHLORIDE 150 MG/1
300 CAPSULE ORAL ONCE
Status: COMPLETED | OUTPATIENT
Start: 2023-07-02 | End: 2023-07-02

## 2023-07-02 RX ORDER — IPRATROPIUM BROMIDE AND ALBUTEROL SULFATE 2.5; .5 MG/3ML; MG/3ML
1 SOLUTION RESPIRATORY (INHALATION) ONCE
Status: DISCONTINUED | OUTPATIENT
Start: 2023-07-02 | End: 2023-07-02 | Stop reason: HOSPADM

## 2023-07-02 RX ADMIN — TETANUS TOXOID, REDUCED DIPHTHERIA TOXOID AND ACELLULAR PERTUSSIS VACCINE, ADSORBED 0.5 ML: 5; 2.5; 8; 8; 2.5 SUSPENSION INTRAMUSCULAR at 12:54

## 2023-07-02 RX ADMIN — CLINDAMYCIN HYDROCHLORIDE 300 MG: 150 CAPSULE ORAL at 12:54

## 2023-07-02 RX ADMIN — PREDNISONE 40 MG: 20 TABLET ORAL at 12:54

## 2023-07-02 ASSESSMENT — ENCOUNTER SYMPTOMS
NAUSEA: 0
BACK PAIN: 0
SINUS PAIN: 0
SHORTNESS OF BREATH: 1
COLOR CHANGE: 0
SORE THROAT: 0
EYE DISCHARGE: 0
DIARRHEA: 0
VOMITING: 0
EYE REDNESS: 0
ABDOMINAL PAIN: 0
COUGH: 0
WHEEZING: 1

## 2023-07-02 ASSESSMENT — PAIN DESCRIPTION - PAIN TYPE: TYPE: ACUTE PAIN

## 2023-07-02 ASSESSMENT — PAIN - FUNCTIONAL ASSESSMENT: PAIN_FUNCTIONAL_ASSESSMENT: 0-10

## 2023-07-02 ASSESSMENT — PAIN DESCRIPTION - ONSET: ONSET: SUDDEN

## 2023-07-02 ASSESSMENT — PAIN DESCRIPTION - LOCATION: LOCATION: FINGER (COMMENT WHICH ONE)

## 2023-07-02 ASSESSMENT — PAIN DESCRIPTION - DESCRIPTORS: DESCRIPTORS: SHARP

## 2023-07-02 ASSESSMENT — PAIN DESCRIPTION - ORIENTATION: ORIENTATION: RIGHT

## 2023-07-02 ASSESSMENT — PAIN DESCRIPTION - FREQUENCY: FREQUENCY: CONTINUOUS

## 2023-07-05 ENCOUNTER — OFFICE VISIT (OUTPATIENT)
Dept: PULMONOLOGY | Age: 68
End: 2023-07-05
Payer: MEDICARE

## 2023-07-05 VITALS
WEIGHT: 255 LBS | DIASTOLIC BLOOD PRESSURE: 78 MMHG | HEART RATE: 84 BPM | OXYGEN SATURATION: 94 % | SYSTOLIC BLOOD PRESSURE: 140 MMHG | BODY MASS INDEX: 34.58 KG/M2

## 2023-07-05 DIAGNOSIS — G47.33 OSA (OBSTRUCTIVE SLEEP APNEA): Chronic | ICD-10-CM

## 2023-07-05 DIAGNOSIS — J44.1 CHRONIC OBSTRUCTIVE PULMONARY DISEASE WITH ACUTE EXACERBATION (HCC): Primary | ICD-10-CM

## 2023-07-05 DIAGNOSIS — R09.02 HYPOXIA: ICD-10-CM

## 2023-07-05 DIAGNOSIS — Z87.891 PERSONAL HISTORY OF TOBACCO USE: ICD-10-CM

## 2023-07-05 PROCEDURE — 99204 OFFICE O/P NEW MOD 45 MIN: CPT | Performed by: INTERNAL MEDICINE

## 2023-07-05 PROCEDURE — 3078F DIAST BP <80 MM HG: CPT | Performed by: INTERNAL MEDICINE

## 2023-07-05 PROCEDURE — 3074F SYST BP LT 130 MM HG: CPT | Performed by: INTERNAL MEDICINE

## 2023-07-05 PROCEDURE — 3023F SPIROM DOC REV: CPT | Performed by: INTERNAL MEDICINE

## 2023-07-05 PROCEDURE — G8427 DOCREV CUR MEDS BY ELIG CLIN: HCPCS | Performed by: INTERNAL MEDICINE

## 2023-07-05 PROCEDURE — G8417 CALC BMI ABV UP PARAM F/U: HCPCS | Performed by: INTERNAL MEDICINE

## 2023-07-05 RX ORDER — ALBUTEROL SULFATE 90 UG/1
2 AEROSOL, METERED RESPIRATORY (INHALATION) EVERY 4 HOURS PRN
Qty: 18 G | Refills: 3 | Status: SHIPPED | OUTPATIENT
Start: 2023-07-05

## 2023-07-05 RX ORDER — PREDNISONE 10 MG/1
TABLET ORAL
Qty: 40 TABLET | Refills: 0 | Status: SHIPPED | OUTPATIENT
Start: 2023-07-05 | End: 2023-07-06

## 2023-07-05 RX ORDER — ALBUTEROL SULFATE 90 UG/1
2 AEROSOL, METERED RESPIRATORY (INHALATION) EVERY 6 HOURS PRN
Qty: 18 G | Refills: 2 | Status: SHIPPED | OUTPATIENT
Start: 2023-07-05 | End: 2023-07-05

## 2023-07-05 ASSESSMENT — ENCOUNTER SYMPTOMS
WHEEZING: 1
NAUSEA: 0
SHORTNESS OF BREATH: 1
ABDOMINAL PAIN: 0
EYE ITCHING: 0
SORE THROAT: 0
VOICE CHANGE: 0
RHINORRHEA: 0
DIARRHEA: 0
COUGH: 1
VOMITING: 0
CHEST TIGHTNESS: 0

## 2023-07-06 ENCOUNTER — HOSPITAL ENCOUNTER (EMERGENCY)
Age: 68
Discharge: HOME | End: 2023-07-06
Attending: EMERGENCY MEDICINE
Payer: MEDICARE

## 2023-07-06 ENCOUNTER — APPOINTMENT (OUTPATIENT)
Dept: GENERAL RADIOLOGY | Age: 68
End: 2023-07-06
Payer: MEDICARE

## 2023-07-06 VITALS
SYSTOLIC BLOOD PRESSURE: 163 MMHG | WEIGHT: 254 LBS | DIASTOLIC BLOOD PRESSURE: 92 MMHG | RESPIRATION RATE: 18 BRPM | TEMPERATURE: 98.7 F | OXYGEN SATURATION: 93 % | HEIGHT: 73 IN | BODY MASS INDEX: 33.66 KG/M2 | HEART RATE: 80 BPM

## 2023-07-06 DIAGNOSIS — J44.1 COPD EXACERBATION (HCC): Primary | ICD-10-CM

## 2023-07-06 DIAGNOSIS — E11.21 TYPE 2 DIABETES MELLITUS WITH DIABETIC NEPHROPATHY, WITHOUT LONG-TERM CURRENT USE OF INSULIN (HCC): ICD-10-CM

## 2023-07-06 DIAGNOSIS — I10 ESSENTIAL HYPERTENSION: ICD-10-CM

## 2023-07-06 LAB
ALBUMIN SERPL-MCNC: 4 G/DL (ref 3.5–4.6)
ALP SERPL-CCNC: 93 U/L (ref 35–104)
ALT SERPL-CCNC: 13 U/L (ref 0–41)
ANION GAP SERPL CALCULATED.3IONS-SCNC: 11 MEQ/L (ref 9–15)
AST SERPL-CCNC: 17 U/L (ref 0–40)
BASOPHILS # BLD: 0 K/UL (ref 0–0.1)
BASOPHILS NFR BLD: 0.3 % (ref 0.2–1.2)
BILIRUB SERPL-MCNC: 0.4 MG/DL (ref 0.2–0.7)
BNP BLD-MCNC: 133 PG/ML
BUN SERPL-MCNC: 14 MG/DL (ref 8–23)
CALCIUM SERPL-MCNC: 9.5 MG/DL (ref 8.5–9.9)
CHLORIDE SERPL-SCNC: 108 MEQ/L (ref 95–107)
CO2 SERPL-SCNC: 21 MEQ/L (ref 20–31)
CREAT SERPL-MCNC: 0.7 MG/DL (ref 0.7–1.2)
EKG ATRIAL RATE: 66 BPM
EKG ATRIAL RATE: 681 BPM
EKG P AXIS: 76 DEGREES
EKG P-R INTERVAL: 158 MS
EKG Q-T INTERVAL: 388 MS
EKG Q-T INTERVAL: 408 MS
EKG QRS DURATION: 110 MS
EKG QRS DURATION: 110 MS
EKG QTC CALCULATION (BAZETT): 427 MS
EKG QTC CALCULATION (BAZETT): 447 MS
EKG R AXIS: 65 DEGREES
EKG R AXIS: 70 DEGREES
EKG T AXIS: 65 DEGREES
EKG T AXIS: 66 DEGREES
EKG VENTRICULAR RATE: 66 BPM
EKG VENTRICULAR RATE: 80 BPM
EOSINOPHIL # BLD: 0.1 K/UL (ref 0–0.5)
EOSINOPHIL NFR BLD: 0.4 % (ref 0.8–7)
ERYTHROCYTE [DISTWIDTH] IN BLOOD BY AUTOMATED COUNT: 15 % (ref 11.6–14.4)
GLOBULIN SER CALC-MCNC: 3.2 G/DL (ref 2.3–3.5)
GLUCOSE SERPL-MCNC: 113 MG/DL (ref 70–99)
HCT VFR BLD AUTO: 42 % (ref 42–52)
HGB BLD-MCNC: 13.4 G/DL (ref 13.7–17.5)
IMM GRANULOCYTES # BLD: 0.1 K/UL
IMM GRANULOCYTES NFR BLD: 0.5 %
INR PPP: 1
LYMPHOCYTES # BLD: 1.8 K/UL (ref 1.3–3.6)
LYMPHOCYTES NFR BLD: 13.4 %
MAGNESIUM SERPL-MCNC: 2.1 MG/DL (ref 1.7–2.4)
MCH RBC QN AUTO: 31.2 PG (ref 25.7–32.2)
MCHC RBC AUTO-ENTMCNC: 31.9 % (ref 32.3–36.5)
MCV RBC AUTO: 97.7 FL (ref 79–92.2)
MONOCYTES # BLD: 1.2 K/UL (ref 0.3–0.8)
MONOCYTES NFR BLD: 9.3 % (ref 5.3–12.2)
NEUTROPHILS # BLD: 10.1 K/UL (ref 1.8–5.4)
NEUTS SEG NFR BLD: 76.1 % (ref 34–67.9)
PLATELET # BLD AUTO: 302 K/UL (ref 163–337)
POTASSIUM SERPL-SCNC: 4.2 MEQ/L (ref 3.4–4.9)
PROT SERPL-MCNC: 7.2 G/DL (ref 6.3–8)
PROTHROMBIN TIME: 12.9 SEC (ref 12.3–14.9)
RBC # BLD AUTO: 4.3 M/UL (ref 4.63–6.08)
SODIUM SERPL-SCNC: 140 MEQ/L (ref 135–144)
TROPONIN T SERPL-MCNC: <0.01 NG/ML (ref 0–0.01)
TSH SERPL-MCNC: 0.44 UIU/ML (ref 0.44–3.86)
WBC # BLD AUTO: 13.3 K/UL (ref 4.2–9)

## 2023-07-06 PROCEDURE — 84484 ASSAY OF TROPONIN QUANT: CPT

## 2023-07-06 PROCEDURE — 2580000003 HC RX 258: Performed by: EMERGENCY MEDICINE

## 2023-07-06 PROCEDURE — 6360000002 HC RX W HCPCS: Performed by: EMERGENCY MEDICINE

## 2023-07-06 PROCEDURE — 93005 ELECTROCARDIOGRAM TRACING: CPT

## 2023-07-06 PROCEDURE — 36415 COLL VENOUS BLD VENIPUNCTURE: CPT

## 2023-07-06 PROCEDURE — 83880 ASSAY OF NATRIURETIC PEPTIDE: CPT

## 2023-07-06 PROCEDURE — 80053 COMPREHEN METABOLIC PANEL: CPT

## 2023-07-06 PROCEDURE — 83735 ASSAY OF MAGNESIUM: CPT

## 2023-07-06 PROCEDURE — 84443 ASSAY THYROID STIM HORMONE: CPT

## 2023-07-06 PROCEDURE — 96365 THER/PROPH/DIAG IV INF INIT: CPT

## 2023-07-06 PROCEDURE — 99285 EMERGENCY DEPT VISIT HI MDM: CPT

## 2023-07-06 PROCEDURE — 93010 ELECTROCARDIOGRAM REPORT: CPT | Performed by: INTERNAL MEDICINE

## 2023-07-06 PROCEDURE — 71045 X-RAY EXAM CHEST 1 VIEW: CPT

## 2023-07-06 PROCEDURE — 96375 TX/PRO/DX INJ NEW DRUG ADDON: CPT

## 2023-07-06 PROCEDURE — 85025 COMPLETE CBC W/AUTO DIFF WBC: CPT

## 2023-07-06 PROCEDURE — 85610 PROTHROMBIN TIME: CPT

## 2023-07-06 PROCEDURE — 6370000000 HC RX 637 (ALT 250 FOR IP): Performed by: EMERGENCY MEDICINE

## 2023-07-06 RX ORDER — HYDRALAZINE HYDROCHLORIDE 20 MG/ML
10 INJECTION INTRAMUSCULAR; INTRAVENOUS ONCE
Status: COMPLETED | OUTPATIENT
Start: 2023-07-06 | End: 2023-07-06

## 2023-07-06 RX ORDER — IPRATROPIUM BROMIDE AND ALBUTEROL SULFATE 2.5; .5 MG/3ML; MG/3ML
1 SOLUTION RESPIRATORY (INHALATION) ONCE
Status: COMPLETED | OUTPATIENT
Start: 2023-07-06 | End: 2023-07-06

## 2023-07-06 RX ORDER — PREDNISONE 20 MG/1
40 TABLET ORAL DAILY
Qty: 10 TABLET | Refills: 0 | Status: SHIPPED | OUTPATIENT
Start: 2023-07-06 | End: 2023-07-11

## 2023-07-06 RX ADMIN — IPRATROPIUM BROMIDE AND ALBUTEROL SULFATE 1 DOSE: .5; 3 SOLUTION RESPIRATORY (INHALATION) at 09:20

## 2023-07-06 RX ADMIN — HYDRALAZINE HYDROCHLORIDE 10 MG: 20 INJECTION INTRAMUSCULAR; INTRAVENOUS at 10:29

## 2023-07-06 RX ADMIN — SODIUM CHLORIDE 100 MG: 9 INJECTION, SOLUTION INTRAVENOUS at 09:28

## 2023-07-06 ASSESSMENT — ENCOUNTER SYMPTOMS
ABDOMINAL PAIN: 0
TROUBLE SWALLOWING: 0
EYE DISCHARGE: 0
SINUS PRESSURE: 0
BLOOD IN STOOL: 0
CHEST TIGHTNESS: 0
FACIAL SWELLING: 0
CONSTIPATION: 0
BACK PAIN: 0
STRIDOR: 0
WHEEZING: 1
EYE PAIN: 0
SHORTNESS OF BREATH: 1
VOMITING: 0
CHOKING: 0
COUGH: 1
VOICE CHANGE: 0
DIARRHEA: 0
EYE REDNESS: 0
SORE THROAT: 0

## 2023-07-06 ASSESSMENT — PAIN - FUNCTIONAL ASSESSMENT: PAIN_FUNCTIONAL_ASSESSMENT: NONE - DENIES PAIN

## 2023-07-06 NOTE — TELEPHONE ENCOUNTER
Pharmacy is requesting medication refill.  Please approve or deny this request.    Rx requested:  Requested Prescriptions     Pending Prescriptions Disp Refills    metFORMIN (GLUCOPHAGE) 500 MG tablet [Pharmacy Med Name: METFORMIN  MG TABLET] 180 tablet 1     Sig: Take 1 tablet by mouth 2 times daily (with meals)         Last Office Visit:   5/31/2023      Next Visit Date:  Future Appointments   Date Time Provider [address]   8/2/2023 11:00 AM Manan Estrada MD [address] [place]   8/16/2023 11:15 AM Jonathan Scott MD [facility]

## 2023-07-07 ENCOUNTER — TELEPHONE (OUTPATIENT)
Dept: FAMILY MEDICINE CLINIC | Age: 68
End: 2023-07-07

## 2023-07-07 DIAGNOSIS — J30.2 SEASONAL ALLERGIES: Chronic | ICD-10-CM

## 2023-07-07 DIAGNOSIS — J44.1 CHRONIC OBSTRUCTIVE PULMONARY DISEASE WITH ACUTE EXACERBATION (HCC): ICD-10-CM

## 2023-07-07 DIAGNOSIS — Z72.0 TOBACCO USE: Primary | ICD-10-CM

## 2023-07-07 RX ORDER — NICOTINE 21 MG/24HR
1 PATCH, TRANSDERMAL 24 HOURS TRANSDERMAL DAILY
Qty: 42 PATCH | Refills: 0 | Status: SHIPPED | OUTPATIENT
Start: 2023-07-07 | End: 2023-08-18

## 2023-07-10 ENCOUNTER — PREP FOR PROCEDURE (OUTPATIENT)
Dept: GASTROENTEROLOGY | Age: 68
End: 2023-07-10

## 2023-07-10 RX ORDER — SODIUM CHLORIDE 0.9 % (FLUSH) 0.9 %
5-40 SYRINGE (ML) INJECTION EVERY 12 HOURS SCHEDULED
Status: CANCELLED | OUTPATIENT
Start: 2023-07-10

## 2023-07-10 RX ORDER — SODIUM CHLORIDE 9 MG/ML
INJECTION, SOLUTION INTRAVENOUS CONTINUOUS
Status: CANCELLED | OUTPATIENT
Start: 2023-07-10

## 2023-07-10 RX ORDER — MONTELUKAST SODIUM 10 MG/1
10 TABLET ORAL NIGHTLY
Qty: 90 TABLET | Refills: 1 | Status: SHIPPED | OUTPATIENT
Start: 2023-07-10

## 2023-07-10 RX ORDER — SODIUM CHLORIDE, SODIUM LACTATE, POTASSIUM CHLORIDE, CALCIUM CHLORIDE 600; 310; 30; 20 MG/100ML; MG/100ML; MG/100ML; MG/100ML
INJECTION, SOLUTION INTRAVENOUS CONTINUOUS
Status: CANCELLED | OUTPATIENT
Start: 2023-07-10

## 2023-07-10 RX ORDER — BUDESONIDE AND FORMOTEROL FUMARATE DIHYDRATE 160; 4.5 UG/1; UG/1
2 AEROSOL RESPIRATORY (INHALATION) 2 TIMES DAILY
Qty: 30.6 G | Refills: 1 | Status: SHIPPED | OUTPATIENT
Start: 2023-07-10

## 2023-07-10 RX ORDER — TIOTROPIUM BROMIDE 18 UG/1
18 CAPSULE ORAL; RESPIRATORY (INHALATION) DAILY
Qty: 90 CAPSULE | Refills: 1 | Status: SHIPPED | OUTPATIENT
Start: 2023-07-10

## 2023-07-10 RX ORDER — SODIUM CHLORIDE 9 MG/ML
INJECTION, SOLUTION INTRAVENOUS PRN
Status: CANCELLED | OUTPATIENT
Start: 2023-07-10

## 2023-07-10 NOTE — TELEPHONE ENCOUNTER
Albuterol inhaler refill was sent 07/05/23 with 3 refills and nebulizer solution refill was sent 06/19/23 with refills as well. He should have supplies. I have sent his spiriva, symbicort, and singulair refills this morning as 90 day prescriptions with refills as well, so he should have everything he needs from a respiratory standpoint.

## 2023-07-13 ENCOUNTER — TELEPHONE (OUTPATIENT)
Dept: PULMONOLOGY | Age: 68
End: 2023-07-13

## 2023-07-13 RX ORDER — ALBUTEROL SULFATE 90 UG/1
2 AEROSOL, METERED RESPIRATORY (INHALATION) EVERY 4 HOURS PRN
Qty: 18 G | Refills: 3 | Status: SHIPPED | OUTPATIENT
Start: 2023-07-13

## 2023-07-13 NOTE — TELEPHONE ENCOUNTER
PT CALLED IN TO THE OFFICE BECAUSE HE WAS SICK AND WAS USING HIS RESCUE INHALER MORE FREQUENTLY. HE IS GOING TO BE OUT OF MEDICATION AND HE CANT REFILL IT UNTIL NEXT MONTH. HE IS ASKING IF YOU CAN CHANGE THE STRENGTH OR GIVE HIM A DIFFERENT RESCUE.        PLEASE SEND TO [facility].

## 2023-07-18 ENCOUNTER — HOSPITAL ENCOUNTER (OUTPATIENT)
Dept: CT IMAGING | Age: 68
Discharge: HOME | End: 2023-07-20
Attending: INTERNAL MEDICINE
Payer: MEDICARE

## 2023-07-18 ENCOUNTER — HOSPITAL ENCOUNTER (OUTPATIENT)
Dept: PULMONOLOGY | Age: 68
Discharge: HOME | End: 2023-07-18
Attending: INTERNAL MEDICINE
Payer: MEDICARE

## 2023-07-18 DIAGNOSIS — J44.1 CHRONIC OBSTRUCTIVE PULMONARY DISEASE WITH ACUTE EXACERBATION (HCC): ICD-10-CM

## 2023-07-18 DIAGNOSIS — Z87.891 PERSONAL HISTORY OF TOBACCO USE: ICD-10-CM

## 2023-07-18 PROCEDURE — 6360000002 HC RX W HCPCS

## 2023-07-18 PROCEDURE — 94726 PLETHYSMOGRAPHY LUNG VOLUMES: CPT

## 2023-07-18 PROCEDURE — 94060 EVALUATION OF WHEEZING: CPT

## 2023-07-18 PROCEDURE — 94729 DIFFUSING CAPACITY: CPT

## 2023-07-18 PROCEDURE — 71271 CT THORAX LUNG CANCER SCR C-: CPT

## 2023-07-18 RX ORDER — ALBUTEROL SULFATE 2.5 MG/3ML
SOLUTION RESPIRATORY (INHALATION)
Status: COMPLETED
Start: 2023-07-18 | End: 2023-07-18

## 2023-07-18 RX ADMIN — ALBUTEROL SULFATE 2.5 MG: 2.5 SOLUTION RESPIRATORY (INHALATION) at 14:06

## 2023-07-24 DIAGNOSIS — N52.9 ERECTILE DYSFUNCTION, UNSPECIFIED ERECTILE DYSFUNCTION TYPE: Chronic | ICD-10-CM

## 2023-07-24 NOTE — TELEPHONE ENCOUNTER
Pharmacy is requesting medication refill.  Please approve or deny this request.    Rx requested:  Requested Prescriptions     Pending Prescriptions Disp Refills    sildenafil (VIAGRA) 100 MG tablet [Pharmacy Med Name: SILDENAFIL 100 MG TABLET] 6 tablet 1     Sig: TAKE 1 TABLET BY MOUTH AS NEEDED FOR ERECTILE DYSFUNCTION         Last Office Visit:   5/31/2023      Next Visit Date:  Future Appointments   Date Time Provider [address]   8/2/2023 11:00 AM Jelani Cisneros MD [address] [place]   8/16/2023 11:15 AM Candelaria Knapp MD [facility]

## 2023-07-25 RX ORDER — SILDENAFIL 100 MG/1
100 TABLET, FILM COATED ORAL PRN
Qty: 6 TABLET | Refills: 1 | Status: SHIPPED | OUTPATIENT
Start: 2023-07-25

## 2023-07-31 DIAGNOSIS — J44.1 CHRONIC OBSTRUCTIVE PULMONARY DISEASE WITH ACUTE EXACERBATION (HCC): ICD-10-CM

## 2023-07-31 RX ORDER — IPRATROPIUM BROMIDE AND ALBUTEROL SULFATE 2.5; .5 MG/3ML; MG/3ML
1 SOLUTION RESPIRATORY (INHALATION) EVERY 4 HOURS PRN
Qty: 360 ML | Refills: 1 | Status: SHIPPED | OUTPATIENT
Start: 2023-07-31 | End: 2023-09-21

## 2023-07-31 NOTE — TELEPHONE ENCOUNTER
Pt is requesting medication refill.  Please approve or deny this request.    Rx requested:  Requested Prescriptions     Pending Prescriptions Disp Refills    ipratropium 0.5 mg-albuterol 2.5 mg (DUONEB) 0.5-2.5 (3) MG/3ML SOLN nebulizer solution [Pharmacy Med Name: IPRAT-ALBUT 0.5-3(2.5) MG/3 ML] 360 mL 1     Sig: TAKE 3 MLS BY NEBULIZATION EVERY 4 HOURS AS NEEDED FOR SHORTNESS OF BREATH OR WHEEZING         Last Office Visit:   5/31/2023      Next Visit Date:  Future Appointments   Date Time Provider [address]   8/2/2023 11:00 AM Sam Moreland MD [address]   8/16/2023 11:15 AM Cheikh Christianson MD [facility]

## 2023-07-31 NOTE — TELEPHONE ENCOUNTER
Pt requesting refill for ipratropium 0.5 mg-albuterol 2.5 mg (DUONEB) 0.5-2.5 (3) MG/3ML SOLN nebulizer solution. Pt has 1 day left. Please advise.     LOV: 5/31/2023  Future Appts: 8/2/2023    Ph: [phone]

## 2023-08-03 ENCOUNTER — TELEMEDICINE (OUTPATIENT)
Dept: FAMILY MEDICINE CLINIC | Age: 68
End: 2023-08-03
Payer: MEDICARE

## 2023-08-03 DIAGNOSIS — R79.89 LOW THYROID STIMULATING HORMONE (TSH) LEVEL: ICD-10-CM

## 2023-08-03 DIAGNOSIS — Z12.5 SCREENING FOR PROSTATE CANCER: ICD-10-CM

## 2023-08-03 DIAGNOSIS — Z12.11 SCREENING FOR COLON CANCER: ICD-10-CM

## 2023-08-03 DIAGNOSIS — M51.36 DDD (DEGENERATIVE DISC DISEASE), LUMBAR: ICD-10-CM

## 2023-08-03 DIAGNOSIS — E78.49 OTHER HYPERLIPIDEMIA: Chronic | ICD-10-CM

## 2023-08-03 DIAGNOSIS — Z86.010 HISTORY OF COLON POLYPS: Chronic | ICD-10-CM

## 2023-08-03 DIAGNOSIS — I10 PRIMARY HYPERTENSION: Chronic | ICD-10-CM

## 2023-08-03 DIAGNOSIS — Z72.0 TOBACCO USE: ICD-10-CM

## 2023-08-03 DIAGNOSIS — M54.41 CHRONIC RIGHT-SIDED LOW BACK PAIN WITH RIGHT-SIDED SCIATICA: ICD-10-CM

## 2023-08-03 DIAGNOSIS — F14.10 COCAINE ABUSE (HCC): Chronic | ICD-10-CM

## 2023-08-03 DIAGNOSIS — F10.10 ALCOHOL ABUSE: Chronic | ICD-10-CM

## 2023-08-03 DIAGNOSIS — F12.90 MARIJUANA USE: ICD-10-CM

## 2023-08-03 DIAGNOSIS — G89.29 CHRONIC RIGHT-SIDED LOW BACK PAIN WITH RIGHT-SIDED SCIATICA: ICD-10-CM

## 2023-08-03 DIAGNOSIS — R26.81 UNSTEADY GAIT: ICD-10-CM

## 2023-08-03 DIAGNOSIS — Z00.00 MEDICARE ANNUAL WELLNESS VISIT, SUBSEQUENT: Primary | ICD-10-CM

## 2023-08-03 DIAGNOSIS — D53.9 MACROCYTIC ANEMIA: ICD-10-CM

## 2023-08-03 DIAGNOSIS — E11.21 TYPE 2 DIABETES MELLITUS WITH DIABETIC NEPHROPATHY, WITHOUT LONG-TERM CURRENT USE OF INSULIN (HCC): ICD-10-CM

## 2023-08-03 DIAGNOSIS — I47.1 SUPRAVENTRICULAR TACHYCARDIA (HCC): Chronic | ICD-10-CM

## 2023-08-03 DIAGNOSIS — E66.9 CLASS 1 OBESITY WITH SERIOUS COMORBIDITY AND BODY MASS INDEX (BMI) OF 33.0 TO 33.9 IN ADULT, UNSPECIFIED OBESITY TYPE: ICD-10-CM

## 2023-08-03 DIAGNOSIS — M10.9 GOUT, UNSPECIFIED CAUSE, UNSPECIFIED CHRONICITY, UNSPECIFIED SITE: Chronic | ICD-10-CM

## 2023-08-03 PROCEDURE — 1123F ACP DISCUSS/DSCN MKR DOCD: CPT | Performed by: FAMILY MEDICINE

## 2023-08-03 PROCEDURE — 3044F HG A1C LEVEL LT 7.0%: CPT | Performed by: FAMILY MEDICINE

## 2023-08-03 PROCEDURE — 3017F COLORECTAL CA SCREEN DOC REV: CPT | Performed by: FAMILY MEDICINE

## 2023-08-03 PROCEDURE — G0439 PPPS, SUBSEQ VISIT: HCPCS | Performed by: FAMILY MEDICINE

## 2023-08-03 ASSESSMENT — COLUMBIA-SUICIDE SEVERITY RATING SCALE - C-SSRS
6. HAVE YOU EVER DONE ANYTHING, STARTED TO DO ANYTHING, OR PREPARED TO DO ANYTHING TO END YOUR LIFE?: NO
2. HAVE YOU ACTUALLY HAD ANY THOUGHTS OF KILLING YOURSELF?: NO
1. WITHIN THE PAST MONTH, HAVE YOU WISHED YOU WERE DEAD OR WISHED YOU COULD GO TO SLEEP AND NOT WAKE UP?: NO

## 2023-08-03 ASSESSMENT — PATIENT HEALTH QUESTIONNAIRE - PHQ9
4. FEELING TIRED OR HAVING LITTLE ENERGY: 0
5. POOR APPETITE OR OVEREATING: 0
SUM OF ALL RESPONSES TO PHQ QUESTIONS 1-9: 0
3. TROUBLE FALLING OR STAYING ASLEEP: 0
1. LITTLE INTEREST OR PLEASURE IN DOING THINGS: 0
SUM OF ALL RESPONSES TO PHQ QUESTIONS 1-9: 0
10. IF YOU CHECKED OFF ANY PROBLEMS, HOW DIFFICULT HAVE THESE PROBLEMS MADE IT FOR YOU TO DO YOUR WORK, TAKE CARE OF THINGS AT HOME, OR GET ALONG WITH OTHER PEOPLE: 0
9. THOUGHTS THAT YOU WOULD BE BETTER OFF DEAD, OR OF HURTING YOURSELF: 0
8. MOVING OR SPEAKING SO SLOWLY THAT OTHER PEOPLE COULD HAVE NOTICED. OR THE OPPOSITE, BEING SO FIGETY OR RESTLESS THAT YOU HAVE BEEN MOVING AROUND A LOT MORE THAN USUAL: 0
7. TROUBLE CONCENTRATING ON THINGS, SUCH AS READING THE NEWSPAPER OR WATCHING TELEVISION: 0
SUM OF ALL RESPONSES TO PHQ QUESTIONS 1-9: 0
SUM OF ALL RESPONSES TO PHQ9 QUESTIONS 1 & 2: 0
2. FEELING DOWN, DEPRESSED OR HOPELESS: 0
SUM OF ALL RESPONSES TO PHQ QUESTIONS 1-9: 0
6. FEELING BAD ABOUT YOURSELF - OR THAT YOU ARE A FAILURE OR HAVE LET YOURSELF OR YOUR FAMILY DOWN: 0

## 2023-08-03 ASSESSMENT — LIFESTYLE VARIABLES
HOW OFTEN DO YOU HAVE A DRINK CONTAINING ALCOHOL: 2-4 TIMES A MONTH
HOW MANY STANDARD DRINKS CONTAINING ALCOHOL DO YOU HAVE ON A TYPICAL DAY: 3 OR 4

## 2023-08-03 NOTE — ASSESSMENT & PLAN NOTE
Patient reporting only very infrequent use of marijuana. He is not yet ready to set a complete quit date. We will continue to monitor over time. Patient was encouraged to continue abstaining from recreational substance use.

## 2023-08-03 NOTE — ASSESSMENT & PLAN NOTE
Patient denies cocaine use \"for the past few months. \"  I again stressed with patient importance of avoiding all illicit substance use. I reviewed with him again the association of cocaine use with significant cardiovascular and cerebrovascular related morbidity and even mortality. Patient continues to decline any referral to substance abuse specialist or detox/rehab. He was again advised to contact the office should he change his mind in the future.

## 2023-08-03 NOTE — PROGRESS NOTES
Medicare Annual Wellness Visit    Jaycee Tierney is here for Medicare AWV    Assessment & Plan   1. Medicare annual wellness visit, subsequent  2. Screening for prostate cancer  -     PSA Screening; Future  3. Screening for colon cancer  -     [id number] Alonso Velazquez MD, [facility], [place]  4. History of colon polyps  -     [address] Alonso Velazquez MD, [facility], [place]  5. Type 2 diabetes mellitus with diabetic nephropathy, without long-term current use of insulin (HCC)  -     Comprehensive Metabolic Panel; Future  -     Lipid Panel; Future  -     Hemoglobin A1C; Future  -     TSH; Future  -     T4, Free; Future  6. Primary hypertension  -     CBC with Auto Differential; Future  -     Comprehensive Metabolic Panel; Future  -     TSH; Future  -     T4, Free; Future  7. Supraventricular tachycardia (HCC)  -     CBC with Auto Differential; Future  -     TSH; Future  -     T4, Free; Future  8. Other hyperlipidemia  -     Comprehensive Metabolic Panel; Future  -     Lipid Panel; Future  9. Gout, unspecified cause, unspecified chronicity, unspecified site  -     Uric Acid; Future  10. Macrocytic anemia  -     CBC with Auto Differential; Future  -     Vitamin B12 & Folate; Future  11. Low thyroid stimulating hormone (TSH) level  -     TSH; Future  -     T4, Free; Future  12. Class 1 obesity with serious comorbidity and body mass index (BMI) of 33.0 to 33.9 in adult, unspecified obesity type  Assessment & Plan:  Patient counseled on healthy dietary choices and the benefits of a lower salt and/or lower carbohydrate diet as appropriate. Patient also counseled on benefits of moderate intensity cardiovascular exercise for 150 minutes per week as they are able. Advice was given to make small changes over time, setting smaller achievable goals until recommended lifestyle changes are reached. Orders:  -     Comprehensive Metabolic Panel; Future  -     Lipid Panel;  Future  -     Hemoglobin A1C; Future  -     TSH;

## 2023-08-07 NOTE — TELEPHONE ENCOUNTER
H Plasty Text: Given the location of the defect, shape of the defect and the proximity to free margins a H-plasty was deemed most appropriate for repair.  Using a sterile surgical marker, the appropriate advancement arms of the H-plasty were drawn incorporating the defect and placing the expected incisions within the relaxed skin tension lines where possible. The area thus outlined was incised deep to adipose tissue with a #15 scalpel blade. The skin margins were undermined to an appropriate distance in all directions utilizing iris scissors.  The opposing advancement arms were then advanced into place in opposite direction and anchored with interrupted buried subcutaneous sutures. Thank you Trilobed Flap Text: The defect edges were debeveled with a #15 scalpel blade.  Given the location of the defect and the proximity to free margins a trilobed flap was deemed most appropriate.  Using a sterile surgical marker, an appropriate trilobed flap drawn around the defect.    The area thus outlined was incised deep to adipose tissue with a #15 scalpel blade.  The skin margins were undermined to an appropriate distance in all directions utilizing iris scissors. Show Curettage Variables: Yes Billing Type: Third-Party Bill Complex Repair And V-Y Plasty Text: The defect edges were debeveled with a #15 scalpel blade.  The primary defect was closed partially with a complex linear closure.  Given the location of the remaining defect, shape of the defect and the proximity to free margins a V-Y plasty was deemed most appropriate for complete closure of the defect.  Using a sterile surgical marker, an appropriate advancement flap was drawn incorporating the defect and placing the expected incisions within the relaxed skin tension lines where possible.    The area thus outlined was incised deep to adipose tissue with a #15 scalpel blade.  The skin margins were undermined to an appropriate distance in all directions utilizing iris scissors. No Excisional Biopsy Additional Text (Leave Blank If You Do Not Want): The margin was drawn around the clinically apparent lesion.  An elliptical shape was then drawn on the skin incorporating the lesion and margins.  Incisions were then made along these lines to the appropriate tissue plane and the lesion was extirpated. Lip Wedge Excision Repair Text: Given the location of the defect and the proximity to free margins a full thickness wedge repair was deemed most appropriate.  Using a sterile surgical marker, the appropriate repair was drawn incorporating the defect and placing the expected incisions perpendicular to the vermillion border.  The vermillion border was also meticulously outlined to ensure appropriate reapproximation during the repair.  The area thus outlined was incised through and through with a #15 scalpel blade.  The muscularis and dermis were reaproximated with deep sutures following hemostasis. Care was taken to realign the vermillion border before proceeding with the superficial closure.  Once the vermillion was realigned the superfical and mucosal closure was finished. W Plasty Text: The lesion was extirpated to the level of the fat with a #15 scalpel blade.  Given the location of the defect, shape of the defect and the proximity to free margins a W-plasty was deemed most appropriate for repair.  Using a sterile surgical marker, the appropriate transposition arms of the W-plasty were drawn incorporating the defect and placing the expected incisions within the relaxed skin tension lines where possible.    The area thus outlined was incised deep to adipose tissue with a #15 scalpel blade.  The skin margins were undermined to an appropriate distance in all directions utilizing iris scissors.  The opposing transposition arms were then transposed into place in opposite direction and anchored with interrupted buried subcutaneous sutures. Partial Purse String (Intermediate) Text: Given the location of the defect and the characteristics of the surrounding skin an intermediate purse string closure was deemed most appropriate.  Undermining was performed circumferentially around the surgical defect.  A purse string suture was then placed and tightened. Wound tension of the circular defect prevented complete closure of the wound. Mucosal Advancement Flap Text: Given the location of the defect, shape of the defect and the proximity to free margins a mucosal advancement flap was deemed most appropriate. Incisions were made with a 15 blade scalpel in the appropriate fashion along the cutaneous vermillion border and the mucosal lip. The remaining actinically damaged mucosal tissue was excised.  The mucosal advancement flap was then elevated to the gingival sulcus with care taken to preserve the neurovascular structures and advanced into the primary defect. Care was taken to ensure that precise realignment of the vermillion border was achieved. Detail Level: Detailed Slit Excision Additional Text (Leave Blank If You Do Not Want): A linear line was drawn on the skin overlying the lesion. An incision was made slowly until the lesion was visualized.  Once visualized, the lesion was removed with blunt dissection. Bilateral Helical Rim Advancement Flap Text: The defect edges were debeveled with a #15 blade scalpel.  Given the location of the defect and the proximity to free margins (helical rim) a bilateral helical rim advancement flap was deemed most appropriate.  Using a sterile surgical marker, the appropriate advancement flaps were drawn incorporating the defect and placing the expected incisions between the helical rim and antihelix where possible.  The area thus outlined was incised through and through with a #15 scalpel blade.  With a skin hook and iris scissors, the flaps were gently and sharply undermined and freed up. Mastoid Interpolation Flap Text: A decision was made to reconstruct the defect utilizing an interpolation axial flap and a staged reconstruction.  A telfa template was made of the defect.  This telfa template was then used to outline the mastoid interpolation flap.  The donor area for the pedicle flap was then injected with anesthesia.  The flap was excised through the skin and subcutaneous tissue down to the layer of the underlying musculature.  The pedicle flap was carefully excised within this deep plane to maintain its blood supply.  The edges of the donor site were undermined.   The donor site was closed in a primary fashion.  The pedicle was then rotated into position and sutured.  Once the tube was sutured into place, adequate blood supply was confirmed with blanching and refill.  The pedicle was then wrapped with xeroform gauze and dressed appropriately with a telfa and gauze bandage to ensure continued blood supply and protect the attached pedicle. Curettage Prior To Excision?: No Bi-Rhombic Flap Text: The defect edges were debeveled with a #15 scalpel blade.  Given the location of the defect and the proximity to free margins a bi-rhombic flap was deemed most appropriate.  Using a sterile surgical marker, an appropriate rhombic flap was drawn incorporating the defect. The area thus outlined was incised deep to adipose tissue with a #15 scalpel blade.  The skin margins were undermined to an appropriate distance in all directions utilizing iris scissors. Path Notes (To The Dermatopathologist): Please check margins.\\n Partial Purse String (Simple) Text: Given the location of the defect and the characteristics of the surrounding skin a simple purse string closure was deemed most appropriate.  Undermining was performed circumferentially around the surgical defect.  A purse string suture was then placed and tightened. Wound tension of the circular defect prevented complete closure of the wound. Alar Island Pedicle Flap Text: The defect edges were debeveled with a #15 scalpel blade.  Given the location of the defect, shape of the defect and the proximity to the alar rim an island pedicle advancement flap was deemed most appropriate.  Using a sterile surgical marker, an appropriate advancement flap was drawn incorporating the defect, outlining the appropriate donor tissue and placing the expected incisions within the nasal ala running parallel to the alar rim. The area thus outlined was incised with a #15 scalpel blade.  The skin margins were undermined minimally to an appropriate distance in all directions around the primary defect and laterally outward around the island pedicle utilizing iris scissors.  There was minimal undermining beneath the pedicle flap. Composite Graft Text: The defect edges were debeveled with a #15 scalpel blade.  Given the location of the defect, shape of the defect, the proximity to free margins and the fact the defect was full thickness a composite graft was deemed most appropriate.  The defect was outline and then transferred to the donor site.  A full thickness graft was then excised from the donor site. The graft was then placed in the primary defect, oriented appropriately and then sutured into place.  The secondary defect was then repaired using a primary closure. Home Suture Removal Text: Patient was provided a home suture removal kit and will remove their sutures at home.  If they have any questions or difficulties they will call the office. Split-Thickness Skin Graft Text: The defect edges were debeveled with a #15 scalpel blade.  Given the location of the defect, shape of the defect and the proximity to free margins a split thickness skin graft was deemed most appropriate.  Using a sterile surgical marker, the primary defect shape was transferred to the donor site. The split thickness graft was then harvested.  The skin graft was then placed in the primary defect and oriented appropriately. Complex Repair And M Plasty Text: The defect edges were debeveled with a #15 scalpel blade.  The primary defect was closed partially with a complex linear closure.  Given the location of the remaining defect, shape of the defect and the proximity to free margins an M plasty was deemed most appropriate for complete closure of the defect.  Using a sterile surgical marker, an appropriate advancement flap was drawn incorporating the defect and placing the expected incisions within the relaxed skin tension lines where possible.    The area thus outlined was incised deep to adipose tissue with a #15 scalpel blade.  The skin margins were undermined to an appropriate distance in all directions utilizing iris scissors. O-T Plasty Text: The defect edges were debeveled with a #15 scalpel blade.  Given the location of the defect, shape of the defect and the proximity to free margins an O-T plasty was deemed most appropriate.  Using a sterile surgical marker, an appropriate O-T plasty was drawn incorporating the defect and placing the expected incisions within the relaxed skin tension lines where possible.    The area thus outlined was incised deep to adipose tissue with a #15 scalpel blade.  The skin margins were undermined to an appropriate distance in all directions utilizing iris scissors. Tissue Cultured Epidermal Autograft Text: The defect edges were debeveled with a #15 scalpel blade.  Given the location of the defect, shape of the defect and the proximity to free margins a tissue cultured epidermal autograft was deemed most appropriate.  The graft was then trimmed to fit the size of the defect.  The graft was then placed in the primary defect and oriented appropriately. Bilobed Transposition Flap Text: The defect edges were debeveled with a #15 scalpel blade.  Given the location of the defect and the proximity to free margins a bilobed transposition flap was deemed most appropriate.  Using a sterile surgical marker, an appropriate bilobe flap drawn around the defect.    The area thus outlined was incised deep to adipose tissue with a #15 scalpel blade.  The skin margins were undermined to an appropriate distance in all directions utilizing iris scissors. Pre-Excision Curettage Text (Leave Blank If You Do Not Want): Prior to drawing the surgical margin the visible lesion was removed with electrodesiccation and curettage to clearly define the lesion size. Graft Donor Site Bandage (Optional-Leave Blank If You Don't Want In Note): Steri-strips and a pressure bandage were applied to the donor site. Keystone Flap Text: The defect edges were debeveled with a #15 scalpel blade.  Given the location of the defect, shape of the defect a keystone flap was deemed most appropriate.  Using a sterile surgical marker, an appropriate keystone flap was drawn incorporating the defect, outlining the appropriate donor tissue and placing the expected incisions within the relaxed skin tension lines where possible. The area thus outlined was incised deep to adipose tissue with a #15 scalpel blade.  The skin margins were undermined to an appropriate distance in all directions around the primary defect and laterally outward around the flap utilizing iris scissors. Secondary Defect Width (In Cm): 0 Estimated Blood Loss (Cc): minimal O-L Flap Text: The defect edges were debeveled with a #15 scalpel blade.  Given the location of the defect, shape of the defect and the proximity to free margins an O-L flap was deemed most appropriate.  Using a sterile surgical marker, an appropriate advancement flap was drawn incorporating the defect and placing the expected incisions within the relaxed skin tension lines where possible.    The area thus outlined was incised deep to adipose tissue with a #15 scalpel blade.  The skin margins were undermined to an appropriate distance in all directions utilizing iris scissors. Advancement Flap (Double) Text: The defect edges were debeveled with a #15 scalpel blade.  Given the location of the defect and the proximity to free margins a double advancement flap was deemed most appropriate.  Using a sterile surgical marker, the appropriate advancement flaps were drawn incorporating the defect and placing the expected incisions within the relaxed skin tension lines where possible.    The area thus outlined was incised deep to adipose tissue with a #15 scalpel blade.  The skin margins were undermined to an appropriate distance in all directions utilizing iris scissors. Complex Repair And Xenograft Text: The defect edges were debeveled with a #15 scalpel blade.  The primary defect was closed partially with a complex linear closure.  Given the location of the defect, shape of the defect and the proximity to free margins an tissue cultured epidermal autograft was deemed most appropriate to repair the remaining defect.  The graft was trimmed to fit the size of the remaining defect.  The graft was then placed in the primary defect, oriented appropriately, and sutured into place. Scalpel Size: 15 blade Burow's Advancement Flap Text: The defect edges were debeveled with a #15 scalpel blade.  Given the location of the defect and the proximity to free margins a Burow's advancement flap was deemed most appropriate.  Using a sterile surgical marker, the appropriate advancement flap was drawn incorporating the defect and placing the expected incisions within the relaxed skin tension lines where possible.    The area thus outlined was incised deep to adipose tissue with a #15 scalpel blade.  The skin margins were undermined to an appropriate distance in all directions utilizing iris scissors. Complex Repair And Bilobe Flap Text: The defect edges were debeveled with a #15 scalpel blade.  The primary defect was closed partially with a complex linear closure.  Given the location of the remaining defect, shape of the defect and the proximity to free margins a bilobe flap was deemed most appropriate for complete closure of the defect.  Using a sterile surgical marker, an appropriate advancement flap was drawn incorporating the defect and placing the expected incisions within the relaxed skin tension lines where possible.    The area thus outlined was incised deep to adipose tissue with a #15 scalpel blade.  The skin margins were undermined to an appropriate distance in all directions utilizing iris scissors. Anesthesia Type: 1% lidocaine with epinephrine Island Pedicle Flap With Canthal Suspension Text: The defect edges were debeveled with a #15 scalpel blade.  Given the location of the defect, shape of the defect and the proximity to free margins an island pedicle advancement flap was deemed most appropriate.  Using a sterile surgical marker, an appropriate advancement flap was drawn incorporating the defect, outlining the appropriate donor tissue and placing the expected incisions within the relaxed skin tension lines where possible. The area thus outlined was incised deep to adipose tissue with a #15 scalpel blade.  The skin margins were undermined to an appropriate distance in all directions around the primary defect and laterally outward around the island pedicle utilizing iris scissors.  There was minimal undermining beneath the pedicle flap. A suspension suture was placed in the canthal tendon to prevent tension and prevent ectropion. Elliptical Excision Additional Text (Leave Blank If You Do Not Want): The margin was drawn around the clinically apparent lesion.  An elliptical shape was then drawn on the skin incorporating the lesion and margins.  Incisions were then made along these lines to the appropriate tissue plane and the lesion was extirpated. Complex Repair And Rhombic Flap Text: The defect edges were debeveled with a #15 scalpel blade.  The primary defect was closed partially with a complex linear closure.  Given the location of the remaining defect, shape of the defect and the proximity to free margins a rhombic flap was deemed most appropriate for complete closure of the defect.  Using a sterile surgical marker, an appropriate advancement flap was drawn incorporating the defect and placing the expected incisions within the relaxed skin tension lines where possible.    The area thus outlined was incised deep to adipose tissue with a #15 scalpel blade.  The skin margins were undermined to an appropriate distance in all directions utilizing iris scissors. Z Plasty Text: The lesion was extirpated to the level of the fat with a #15 scalpel blade.  Given the location of the defect, shape of the defect and the proximity to free margins a Z-plasty was deemed most appropriate for repair.  Using a sterile surgical marker, the appropriate transposition arms of the Z-plasty were drawn incorporating the defect and placing the expected incisions within the relaxed skin tension lines where possible.    The area thus outlined was incised deep to adipose tissue with a #15 scalpel blade.  The skin margins were undermined to an appropriate distance in all directions utilizing iris scissors.  The opposing transposition arms were then transposed into place in opposite direction and anchored with interrupted buried subcutaneous sutures. Saucerization Excision Additional Text (Leave Blank If You Do Not Want): The margin was drawn around the clinically apparent lesion.  Incisions were then made along these lines, in a tangential fashion, to the appropriate tissue plane and the lesion was extirpated. Posterior Auricular Interpolation Flap Text: A decision was made to reconstruct the defect utilizing an interpolation axial flap and a staged reconstruction.  A telfa template was made of the defect.  This telfa template was then used to outline the posterior auricular interpolation flap.  The donor area for the pedicle flap was then injected with anesthesia.  The flap was excised through the skin and subcutaneous tissue down to the layer of the underlying musculature.  The pedicle flap was carefully excised within this deep plane to maintain its blood supply.  The edges of the donor site were undermined.   The donor site was closed in a primary fashion.  The pedicle was then rotated into position and sutured.  Once the tube was sutured into place, adequate blood supply was confirmed with blanching and refill.  The pedicle was then wrapped with xeroform gauze and dressed appropriately with a telfa and gauze bandage to ensure continued blood supply and protect the attached pedicle. Complex Repair And Single Advancement Flap Text: The defect edges were debeveled with a #15 scalpel blade.  The primary defect was closed partially with a complex linear closure.  Given the location of the remaining defect, shape of the defect and the proximity to free margins a single advancement flap was deemed most appropriate for complete closure of the defect.  Using a sterile surgical marker, an appropriate advancement flap was drawn incorporating the defect and placing the expected incisions within the relaxed skin tension lines where possible.    The area thus outlined was incised deep to adipose tissue with a #15 scalpel blade.  The skin margins were undermined to an appropriate distance in all directions utilizing iris scissors. Complex Repair And Double Advancement Flap Text: The defect edges were debeveled with a #15 scalpel blade.  The primary defect was closed partially with a complex linear closure.  Given the location of the remaining defect, shape of the defect and the proximity to free margins a double advancement flap was deemed most appropriate for complete closure of the defect.  Using a sterile surgical marker, an appropriate advancement flap was drawn incorporating the defect and placing the expected incisions within the relaxed skin tension lines where possible.    The area thus outlined was incised deep to adipose tissue with a #15 scalpel blade.  The skin margins were undermined to an appropriate distance in all directions utilizing iris scissors. Island Pedicle Flap Text: The defect edges were debeveled with a #15 scalpel blade.  Given the location of the defect, shape of the defect and the proximity to free margins an island pedicle advancement flap was deemed most appropriate.  Using a sterile surgical marker, an appropriate advancement flap was drawn incorporating the defect, outlining the appropriate donor tissue and placing the expected incisions within the relaxed skin tension lines where possible.    The area thus outlined was incised deep to adipose tissue with a #15 scalpel blade.  The skin margins were undermined to an appropriate distance in all directions around the primary defect and laterally outward around the island pedicle utilizing iris scissors.  There was minimal undermining beneath the pedicle flap. Epidermal Sutures: steri-strips Hemostasis: Electrocautery Helical Rim Advancement Flap Text: The defect edges were debeveled with a #15 blade scalpel.  Given the location of the defect and the proximity to free margins (helical rim) a double helical rim advancement flap was deemed most appropriate.  Using a sterile surgical marker, the appropriate advancement flaps were drawn incorporating the defect and placing the expected incisions between the helical rim and antihelix where possible.  The area thus outlined was incised through and through with a #15 scalpel blade.  With a skin hook and iris scissors, the flaps were gently and sharply undermined and freed up. Positioning (Leave Blank If You Do Not Want): The patient was placed in a comfortable position exposing the surgical site. Deep Sutures: 4-0 Vicryl Paramedian Forehead Flap Text: A decision was made to reconstruct the defect utilizing an interpolation axial flap and a staged reconstruction.  A telfa template was made of the defect.  This telfa template was then used to outline the paramedian forehead pedicle flap.  The donor area for the pedicle flap was then injected with anesthesia.  The flap was excised through the skin and subcutaneous tissue down to the layer of the underlying musculature.  The pedicle flap was carefully excised within this deep plane to maintain its blood supply.  The edges of the donor site were undermined.   The donor site was closed in a primary fashion.  The pedicle was then rotated into position and sutured.  Once the tube was sutured into place, adequate blood supply was confirmed with blanching and refill.  The pedicle was then wrapped with xeroform gauze and dressed appropriately with a telfa and gauze bandage to ensure continued blood supply and protect the attached pedicle. Complex Repair And Rotation Flap Text: The defect edges were debeveled with a #15 scalpel blade.  The primary defect was closed partially with a complex linear closure.  Given the location of the remaining defect, shape of the defect and the proximity to free margins a rotation flap was deemed most appropriate for complete closure of the defect.  Using a sterile surgical marker, an appropriate advancement flap was drawn incorporating the defect and placing the expected incisions within the relaxed skin tension lines where possible.    The area thus outlined was incised deep to adipose tissue with a #15 scalpel blade.  The skin margins were undermined to an appropriate distance in all directions utilizing iris scissors. Repair Type: Complex Fusiform Excision Additional Text (Leave Blank If You Do Not Want): The margin was drawn around the clinically apparent lesion.  A fusiform shape was then drawn on the skin incorporating the lesion and margins.  Incisions were then made along these lines to the appropriate tissue plane and the lesion was extirpated. Complex Repair And O-L Flap Text: The defect edges were debeveled with a #15 scalpel blade.  The primary defect was closed partially with a complex linear closure.  Given the location of the remaining defect, shape of the defect and the proximity to free margins an O-L flap was deemed most appropriate for complete closure of the defect.  Using a sterile surgical marker, an appropriate flap was drawn incorporating the defect and placing the expected incisions within the relaxed skin tension lines where possible.    The area thus outlined was incised deep to adipose tissue with a #15 scalpel blade.  The skin margins were undermined to an appropriate distance in all directions utilizing iris scissors. S Plasty Text: Given the location and shape of the defect, and the orientation of relaxed skin tension lines, an S-plasty was deemed most appropriate for repair.  Using a sterile surgical marker, the appropriate outline of the S-plasty was drawn, incorporating the defect and placing the expected incisions within the relaxed skin tension lines where possible.  The area thus outlined was incised deep to adipose tissue with a #15 scalpel blade.  The skin margins were undermined to an appropriate distance in all directions utilizing iris scissors. The skin flaps were advanced over the defect.  The opposing margins were then approximated with interrupted buried subcutaneous sutures. Complex Repair And O-T Advancement Flap Text: The defect edges were debeveled with a #15 scalpel blade.  The primary defect was closed partially with a complex linear closure.  Given the location of the remaining defect, shape of the defect and the proximity to free margins an O-T advancement flap was deemed most appropriate for complete closure of the defect.  Using a sterile surgical marker, an appropriate advancement flap was drawn incorporating the defect and placing the expected incisions within the relaxed skin tension lines where possible.    The area thus outlined was incised deep to adipose tissue with a #15 scalpel blade.  The skin margins were undermined to an appropriate distance in all directions utilizing iris scissors. Purse String (Intermediate) Text: Given the location of the defect and the characteristics of the surrounding skin a pursestring intermediate closure was deemed most appropriate.  Undermining was performed circumfirentially around the surgical defect.  A purstring suture was then placed and tightened. Xenograft Text: The defect edges were debeveled with a #15 scalpel blade.  Given the location of the defect, shape of the defect and the proximity to free margins a xenograft was deemed most appropriate.  The graft was then trimmed to fit the size of the defect.  The graft was then placed in the primary defect and oriented appropriately. O-Z Plasty Text: The defect edges were debeveled with a #15 scalpel blade.  Given the location of the defect, shape of the defect and the proximity to free margins an O-Z plasty (double transposition flap) was deemed most appropriate.  Using a sterile surgical marker, the appropriate transposition flaps were drawn incorporating the defect and placing the expected incisions within the relaxed skin tension lines where possible.    The area thus outlined was incised deep to adipose tissue with a #15 scalpel blade.  The skin margins were undermined to an appropriate distance in all directions utilizing iris scissors.  Hemostasis was achieved with electrocautery.  The flaps were then transposed into place, one clockwise and the other counterclockwise, and anchored with interrupted buried subcutaneous sutures. Complex Repair Preamble Text (Leave Blank If You Do Not Want): Extensive wide undermining was performed. Curvilinear Excision Additional Text (Leave Blank If You Do Not Want): The margin was drawn around the clinically apparent lesion.  A curvilinear shape was then drawn on the skin incorporating the lesion and margins.  Incisions were then made along these lines to the appropriate tissue plane and the lesion was extirpated. Melolabial Interpolation Flap Text: A decision was made to reconstruct the defect utilizing an interpolation axial flap and a staged reconstruction.  A telfa template was made of the defect.  This telfa template was then used to outline the melolabial interpolation flap.  The donor area for the pedicle flap was then injected with anesthesia.  The flap was excised through the skin and subcutaneous tissue down to the layer of the underlying musculature.  The pedicle flap was carefully excised within this deep plane to maintain its blood supply.  The edges of the donor site were undermined.   The donor site was closed in a primary fashion.  The pedicle was then rotated into position and sutured.  Once the tube was sutured into place, adequate blood supply was confirmed with blanching and refill.  The pedicle was then wrapped with xeroform gauze and dressed appropriately with a telfa and gauze bandage to ensure continued blood supply and protect the attached pedicle. Cartilage Graft Text: The defect edges were debeveled with a #15 scalpel blade.  Given the location of the defect, shape of the defect, the fact the defect involved a full thickness cartilage defect a cartilage graft was deemed most appropriate.  An appropriate donor site was identified, cleansed, and anesthetized. The cartilage graft was then harvested and transferred to the recipient site, oriented appropriately and then sutured into place.  The secondary defect was then repaired using a primary closure. Wound Care: Vaseline Complex Repair And Double M Plasty Text: The defect edges were debeveled with a #15 scalpel blade.  The primary defect was closed partially with a complex linear closure.  Given the location of the remaining defect, shape of the defect and the proximity to free margins a double M plasty was deemed most appropriate for complete closure of the defect.  Using a sterile surgical marker, an appropriate advancement flap was drawn incorporating the defect and placing the expected incisions within the relaxed skin tension lines where possible.    The area thus outlined was incised deep to adipose tissue with a #15 scalpel blade.  The skin margins were undermined to an appropriate distance in all directions utilizing iris scissors. Complex Repair And Tissue Cultured Epidermal Autograft Text: The defect edges were debeveled with a #15 scalpel blade.  The primary defect was closed partially with a complex linear closure.  Given the location of the defect, shape of the defect and the proximity to free margins an tissue cultured epidermal autograft was deemed most appropriate to repair the remaining defect.  The graft was trimmed to fit the size of the remaining defect.  The graft was then placed in the primary defect, oriented appropriately, and sutured into place. Modified Advancement Flap Text: The defect edges were debeveled with a #15 scalpel blade.  Given the location of the defect, shape of the defect and the proximity to free margins a modified advancement flap was deemed most appropriate.  Using a sterile surgical marker, an appropriate advancement flap was drawn incorporating the defect and placing the expected incisions within the relaxed skin tension lines where possible.    The area thus outlined was incised deep to adipose tissue with a #15 scalpel blade.  The skin margins were undermined to an appropriate distance in all directions utilizing iris scissors. Bilobed Flap Text: The defect edges were debeveled with a #15 scalpel blade.  Given the location of the defect and the proximity to free margins a bilobe flap was deemed most appropriate.  Using a sterile surgical marker, an appropriate bilobe flap drawn around the defect.    The area thus outlined was incised deep to adipose tissue with a #15 scalpel blade.  The skin margins were undermined to an appropriate distance in all directions utilizing iris scissors. Muscle Hinge Flap Text: The defect edges were debeveled with a #15 scalpel blade.  Given the size, depth and location of the defect and the proximity to free margins a muscle hinge flap was deemed most appropriate.  Using a sterile surgical marker, an appropriate hinge flap was drawn incorporating the defect. The area thus outlined was incised with a #15 scalpel blade.  The skin margins were undermined to an appropriate distance in all directions utilizing iris scissors. V-Y Flap Text: The defect edges were debeveled with a #15 scalpel blade.  Given the location of the defect, shape of the defect and the proximity to free margins a V-Y flap was deemed most appropriate.  Using a sterile surgical marker, an appropriate advancement flap was drawn incorporating the defect and placing the expected incisions within the relaxed skin tension lines where possible.    The area thus outlined was incised deep to adipose tissue with a #15 scalpel blade.  The skin margins were undermined to an appropriate distance in all directions utilizing iris scissors. Crescentic Advancement Flap Text: The defect edges were debeveled with a #15 scalpel blade.  Given the location of the defect and the proximity to free margins a crescentic advancement flap was deemed most appropriate.  Using a sterile surgical marker, the appropriate advancement flap was drawn incorporating the defect and placing the expected incisions within the relaxed skin tension lines where possible.    The area thus outlined was incised deep to adipose tissue with a #15 scalpel blade.  The skin margins were undermined to an appropriate distance in all directions utilizing iris scissors. Size Of Margin In Cm: 0.4 Intermediate Repair Preamble Text (Leave Blank If You Do Not Want): Undermining was performed with blunt dissection. Interpolation Flap Text: A decision was made to reconstruct the defect utilizing an interpolation axial flap and a staged reconstruction.  A telfa template was made of the defect.  This telfa template was then used to outline the interpolation flap.  The donor area for the pedicle flap was then injected with anesthesia.  The flap was excised through the skin and subcutaneous tissue down to the layer of the underlying musculature.  The interpolation flap was carefully excised within this deep plane to maintain its blood supply.  The edges of the donor site were undermined.   The donor site was closed in a primary fashion.  The pedicle was then rotated into position and sutured.  Once the tube was sutured into place, adequate blood supply was confirmed with blanching and refill.  The pedicle was then wrapped with xeroform gauze and dressed appropriately with a telfa and gauze bandage to ensure continued blood supply and protect the attached pedicle. Complex Repair And W Plasty Text: The defect edges were debeveled with a #15 scalpel blade.  The primary defect was closed partially with a complex linear closure.  Given the location of the remaining defect, shape of the defect and the proximity to free margins a W plasty was deemed most appropriate for complete closure of the defect.  Using a sterile surgical marker, an appropriate advancement flap was drawn incorporating the defect and placing the expected incisions within the relaxed skin tension lines where possible.    The area thus outlined was incised deep to adipose tissue with a #15 scalpel blade.  The skin margins were undermined to an appropriate distance in all directions utilizing iris scissors. Rotation Flap Text: The defect edges were debeveled with a #15 scalpel blade.  Given the location of the defect, shape of the defect and the proximity to free margins a rotation flap was deemed most appropriate.  Using a sterile surgical marker, an appropriate rotation flap was drawn incorporating the defect and placing the expected incisions within the relaxed skin tension lines where possible.    The area thus outlined was incised deep to adipose tissue with a #15 scalpel blade.  The skin margins were undermined to an appropriate distance in all directions utilizing iris scissors. Complex Repair And A-T Advancement Flap Text: The defect edges were debeveled with a #15 scalpel blade.  The primary defect was closed partially with a complex linear closure.  Given the location of the remaining defect, shape of the defect and the proximity to free margins an A-T advancement flap was deemed most appropriate for complete closure of the defect.  Using a sterile surgical marker, an appropriate advancement flap was drawn incorporating the defect and placing the expected incisions within the relaxed skin tension lines where possible.    The area thus outlined was incised deep to adipose tissue with a #15 scalpel blade.  The skin margins were undermined to an appropriate distance in all directions utilizing iris scissors. Complex Repair And Modified Advancement Flap Text: The defect edges were debeveled with a #15 scalpel blade.  The primary defect was closed partially with a complex linear closure.  Given the location of the remaining defect, shape of the defect and the proximity to free margins a modified advancement flap was deemed most appropriate for complete closure of the defect.  Using a sterile surgical marker, an appropriate advancement flap was drawn incorporating the defect and placing the expected incisions within the relaxed skin tension lines where possible.    The area thus outlined was incised deep to adipose tissue with a #15 scalpel blade.  The skin margins were undermined to an appropriate distance in all directions utilizing iris scissors. Melolabial Transposition Flap Text: The defect edges were debeveled with a #15 scalpel blade.  Given the location of the defect and the proximity to free margins a melolabial flap was deemed most appropriate.  Using a sterile surgical marker, an appropriate melolabial transposition flap was drawn incorporating the defect.    The area thus outlined was incised deep to adipose tissue with a #15 scalpel blade.  The skin margins were undermined to an appropriate distance in all directions utilizing iris scissors. Complex Repair And Split-Thickness Skin Graft Text: The defect edges were debeveled with a #15 scalpel blade.  The primary defect was closed partially with a complex linear closure.  Given the location of the defect, shape of the defect and the proximity to free margins a split thickness skin graft was deemed most appropriate to repair the remaining defect.  The graft was trimmed to fit the size of the remaining defect.  The graft was then placed in the primary defect, oriented appropriately, and sutured into place. Dorsal Nasal Flap Text: The defect edges were debeveled with a #15 scalpel blade.  Given the location of the defect and the proximity to free margins a dorsal nasal flap was deemed most appropriate.  Using a sterile surgical marker, an appropriate dorsal nasal flap was drawn around the defect.    The area thus outlined was incised deep to adipose tissue with a #15 scalpel blade.  The skin margins were undermined to an appropriate distance in all directions utilizing iris scissors. Complex Repair And Ftsg Text: The defect edges were debeveled with a #15 scalpel blade.  The primary defect was closed partially with a complex linear closure.  Given the location of the defect, shape of the defect and the proximity to free margins a full thickness skin graft was deemed most appropriate to repair the remaining defect.  The graft was trimmed to fit the size of the remaining defect.  The graft was then placed in the primary defect, oriented appropriately, and sutured into place. Advancement-Rotation Flap Text: The defect edges were debeveled with a #15 scalpel blade.  Given the location of the defect, shape of the defect and the proximity to free margins an advancement-rotation flap was deemed most appropriate.  Using a sterile surgical marker, an appropriate flap was drawn incorporating the defect and placing the expected incisions within the relaxed skin tension lines where possible. The area thus outlined was incised deep to adipose tissue with a #15 scalpel blade.  The skin margins were undermined to an appropriate distance in all directions utilizing iris scissors. Cheek-To-Nose Interpolation Flap Text: A decision was made to reconstruct the defect utilizing an interpolation axial flap and a staged reconstruction.  A telfa template was made of the defect.  This telfa template was then used to outline the Cheek-To-Nose Interpolation flap.  The donor area for the pedicle flap was then injected with anesthesia.  The flap was excised through the skin and subcutaneous tissue down to the layer of the underlying musculature.  The interpolation flap was carefully excised within this deep plane to maintain its blood supply.  The edges of the donor site were undermined.   The donor site was closed in a primary fashion.  The pedicle was then rotated into position and sutured.  Once the tube was sutured into place, adequate blood supply was confirmed with blanching and refill.  The pedicle was then wrapped with xeroform gauze and dressed appropriately with a telfa and gauze bandage to ensure continued blood supply and protect the attached pedicle. Repair Performed By Another Provider Text (Leave Blank If You Do Not Want): After the tissue was excised the defect was repaired by another provider. Complex Repair And Dermal Autograft Text: The defect edges were debeveled with a #15 scalpel blade.  The primary defect was closed partially with a complex linear closure.  Given the location of the defect, shape of the defect and the proximity to free margins an dermal autograft was deemed most appropriate to repair the remaining defect.  The graft was trimmed to fit the size of the remaining defect.  The graft was then placed in the primary defect, oriented appropriately, and sutured into place. Epidermal Closure Graft Donor Site (Optional): simple interrupted Epidermal Closure: interrupted subcuticular Consent was obtained from the patient. The risks and benefits to therapy were discussed in detail. Specifically, the risks of infection, scarring, bleeding, prolonged wound healing, incomplete removal, allergy to anesthesia, nerve injury and recurrence were addressed. Prior to the procedure, the treatment site was clearly identified and confirmed by the patient. All components of Universal Protocol/PAUSE Rule completed. Lab: [id number] Purse String (Simple) Text: Given the location of the defect and the characteristics of the surrounding skin a purse string simple closure was deemed most appropriate.  Undermining was performed circumferentially around the surgical defect.  A purse string suture was then placed and tightened. Transposition Flap Text: The defect edges were debeveled with a #15 scalpel blade.  Given the location of the defect and the proximity to free margins a transposition flap was deemed most appropriate.  Using a sterile surgical marker, an appropriate transposition flap was drawn incorporating the defect.    The area thus outlined was incised deep to adipose tissue with a #15 scalpel blade.  The skin margins were undermined to an appropriate distance in all directions utilizing iris scissors. A-T Advancement Flap Text: The defect edges were debeveled with a #15 scalpel blade.  Given the location of the defect, shape of the defect and the proximity to free margins an A-T advancement flap was deemed most appropriate.  Using a sterile surgical marker, an appropriate advancement flap was drawn incorporating the defect and placing the expected incisions within the relaxed skin tension lines where possible.    The area thus outlined was incised deep to adipose tissue with a #15 scalpel blade.  The skin margins were undermined to an appropriate distance in all directions utilizing iris scissors. Excision Method: Elliptical Anesthesia Volume In Cc: 10 Complex Repair And Transposition Flap Text: The defect edges were debeveled with a #15 scalpel blade.  The primary defect was closed partially with a complex linear closure.  Given the location of the remaining defect, shape of the defect and the proximity to free margins a transposition flap was deemed most appropriate for complete closure of the defect.  Using a sterile surgical marker, an appropriate advancement flap was drawn incorporating the defect and placing the expected incisions within the relaxed skin tension lines where possible.    The area thus outlined was incised deep to adipose tissue with a #15 scalpel blade.  The skin margins were undermined to an appropriate distance in all directions utilizing iris scissors. Complex Repair And Melolabial Flap Text: The defect edges were debeveled with a #15 scalpel blade.  The primary defect was closed partially with a complex linear closure.  Given the location of the remaining defect, shape of the defect and the proximity to free margins a melolabial flap was deemed most appropriate for complete closure of the defect.  Using a sterile surgical marker, an appropriate advancement flap was drawn incorporating the defect and placing the expected incisions within the relaxed skin tension lines where possible.    The area thus outlined was incised deep to adipose tissue with a #15 scalpel blade.  The skin margins were undermined to an appropriate distance in all directions utilizing iris scissors. Complex Repair And Z Plasty Text: The defect edges were debeveled with a #15 scalpel blade.  The primary defect was closed partially with a complex linear closure.  Given the location of the remaining defect, shape of the defect and the proximity to free margins a Z plasty was deemed most appropriate for complete closure of the defect.  Using a sterile surgical marker, an appropriate advancement flap was drawn incorporating the defect and placing the expected incisions within the relaxed skin tension lines where possible.    The area thus outlined was incised deep to adipose tissue with a #15 scalpel blade.  The skin margins were undermined to an appropriate distance in all directions utilizing iris scissors. Dressing: pressure dressing with telfa Complex Repair And Skin Substitute Graft Text: The defect edges were debeveled with a #15 scalpel blade.  The primary defect was closed partially with a complex linear closure.  Given the location of the remaining defect, shape of the defect and the proximity to free margins a skin substitute graft was deemed most appropriate to repair the remaining defect.  The graft was trimmed to fit the size of the remaining defect.  The graft was then placed in the primary defect, oriented appropriately, and sutured into place. Intermediate / Complex Repair - Final Wound Length In Cm: 5 Complex Repair And Dorsal Nasal Flap Text: The defect edges were debeveled with a #15 scalpel blade.  The primary defect was closed partially with a complex linear closure.  Given the location of the remaining defect, shape of the defect and the proximity to free margins a dorsal nasal flap was deemed most appropriate for complete closure of the defect.  Using a sterile surgical marker, an appropriate flap was drawn incorporating the defect and placing the expected incisions within the relaxed skin tension lines where possible.    The area thus outlined was incised deep to adipose tissue with a #15 scalpel blade.  The skin margins were undermined to an appropriate distance in all directions utilizing iris scissors. Dermal Autograft Text: The defect edges were debeveled with a #15 scalpel blade.  Given the location of the defect, shape of the defect and the proximity to free margins a dermal autograft was deemed most appropriate.  Using a sterile surgical marker, the primary defect shape was transferred to the donor site. The area thus outlined was incised deep to adipose tissue with a #15 scalpel blade.  The harvested graft was then trimmed of adipose and epidermal tissue until only dermis was left.  The skin graft was then placed in the primary defect and oriented appropriately. Ear Star Wedge Flap Text: The defect edges were debeveled with a #15 blade scalpel.  Given the location of the defect and the proximity to free margins (helical rim) an ear star wedge flap was deemed most appropriate.  Using a sterile surgical marker, the appropriate flap was drawn incorporating the defect and placing the expected incisions between the helical rim and antihelix where possible.  The area thus outlined was incised through and through with a #15 scalpel blade. Hatchet Flap Text: The defect edges were debeveled with a #15 scalpel blade.  Given the location of the defect, shape of the defect and the proximity to free margins a hatchet flap was deemed most appropriate.  Using a sterile surgical marker, an appropriate hatchet flap was drawn incorporating the defect and placing the expected incisions within the relaxed skin tension lines where possible.    The area thus outlined was incised deep to adipose tissue with a #15 scalpel blade.  The skin margins were undermined to an appropriate distance in all directions utilizing iris scissors. Spiral Flap Text: The defect edges were debeveled with a #15 scalpel blade.  Given the location of the defect, shape of the defect and the proximity to free margins a spiral flap was deemed most appropriate.  Using a sterile surgical marker, an appropriate rotation flap was drawn incorporating the defect and placing the expected incisions within the relaxed skin tension lines where possible. The area thus outlined was incised deep to adipose tissue with a #15 scalpel blade.  The skin margins were undermined to an appropriate distance in all directions utilizing iris scissors. O-T Advancement Flap Text: The defect edges were debeveled with a #15 scalpel blade.  Given the location of the defect, shape of the defect and the proximity to free margins an O-T advancement flap was deemed most appropriate.  Using a sterile surgical marker, an appropriate advancement flap was drawn incorporating the defect and placing the expected incisions within the relaxed skin tension lines where possible.    The area thus outlined was incised deep to adipose tissue with a #15 scalpel blade.  The skin margins were undermined to an appropriate distance in all directions utilizing iris scissors. Perilesional Excision Additional Text (Leave Blank If You Do Not Want): The margin was drawn around the clinically apparent lesion. Incisions were then made along these lines to the appropriate tissue plane and the lesion was extirpated. V-Y Plasty Text: The defect edges were debeveled with a #15 scalpel blade.  Given the location of the defect, shape of the defect and the proximity to free margins an V-Y advancement flap was deemed most appropriate.  Using a sterile surgical marker, an appropriate advancement flap was drawn incorporating the defect and placing the expected incisions within the relaxed skin tension lines where possible.    The area thus outlined was incised deep to adipose tissue with a #15 scalpel blade.  The skin margins were undermined to an appropriate distance in all directions utilizing iris scissors. Post-Care Instructions: I reviewed with the patient in detail post-care instructions. Patient is not to engage in any heavy lifting, exercise, or swimming for the next 14 days. Should the patient develop any fevers, chills, bleeding, severe pain patient will contact the office immediately. Skin Substitute Text: The defect edges were debeveled with a #15 scalpel blade.  Given the location of the defect, shape of the defect and the proximity to free margins a skin substitute graft was deemed most appropriate.  The graft material was trimmed to fit the size of the defect. The graft was then placed in the primary defect and oriented appropriately. No Repair - Repaired With Adjacent Surgical Defect Text (Leave Blank If You Do Not Want): After the excision the defect was repaired concurrently with another surgical defect which was in close approximation. Advancement Flap (Single) Text: The defect edges were debeveled with a #15 scalpel blade.  Given the location of the defect and the proximity to free margins a single advancement flap was deemed most appropriate.  Using a sterile surgical marker, an appropriate advancement flap was drawn incorporating the defect and placing the expected incisions within the relaxed skin tension lines where possible.    The area thus outlined was incised deep to adipose tissue with a #15 scalpel blade.  The skin margins were undermined to an appropriate distance in all directions utilizing iris scissors. Epidermal Autograft Text: The defect edges were debeveled with a #15 scalpel blade.  Given the location of the defect, shape of the defect and the proximity to free margins an epidermal autograft was deemed most appropriate.  Using a sterile surgical marker, the primary defect shape was transferred to the donor site. The epidermal graft was then harvested.  The skin graft was then placed in the primary defect and oriented appropriately. Complex Repair And Epidermal Autograft Text: The defect edges were debeveled with a #15 scalpel blade.  The primary defect was closed partially with a complex linear closure.  Given the location of the defect, shape of the defect and the proximity to free margins an epidermal autograft was deemed most appropriate to repair the remaining defect.  The graft was trimmed to fit the size of the remaining defect.  The graft was then placed in the primary defect, oriented appropriately, and sutured into place. Rhombic Flap Text: The defect edges were debeveled with a #15 scalpel blade.  Given the location of the defect and the proximity to free margins a rhombic flap was deemed most appropriate.  Using a sterile surgical marker, an appropriate rhombic flap was drawn incorporating the defect.    The area thus outlined was incised deep to adipose tissue with a #15 scalpel blade.  The skin margins were undermined to an appropriate distance in all directions utilizing iris scissors. Ftsg Text: The defect edges were debeveled with a #15 scalpel blade.  Given the location of the defect, shape of the defect and the proximity to free margins a full thickness skin graft was deemed most appropriate.  Using a sterile surgical marker, the primary defect shape was transferred to the donor site. The area thus outlined was incised deep to adipose tissue with a #15 scalpel blade.  The harvested graft was then trimmed of adipose tissue until only dermis and epidermis was left.  The skin margins of the secondary defect were undermined to an appropriate distance in all directions utilizing iris scissors.  The secondary defect was closed with interrupted buried subcutaneous sutures.  The skin edges were then re-apposed with running  sutures.  The skin graft was then placed in the primary defect and oriented appropriately. Size Of Lesion In Cm: 1.3 Star Wedge Flap Text: The defect edges were debeveled with a #15 scalpel blade.  Given the location of the defect, shape of the defect and the proximity to free margins a star wedge flap was deemed most appropriate.  Using a sterile surgical marker, an appropriate rotation flap was drawn incorporating the defect and placing the expected incisions within the relaxed skin tension lines where possible. The area thus outlined was incised deep to adipose tissue with a #15 scalpel blade.  The skin margins were undermined to an appropriate distance in all directions utilizing iris scissors. Island Pedicle Flap-Requiring Vessel Identification Text: The defect edges were debeveled with a #15 scalpel blade.  Given the location of the defect, shape of the defect and the proximity to free margins an island pedicle advancement flap was deemed most appropriate.  Using a sterile surgical marker, an appropriate advancement flap was drawn, based on the axial vessel mentioned above, incorporating the defect, outlining the appropriate donor tissue and placing the expected incisions within the relaxed skin tension lines where possible.    The area thus outlined was incised deep to adipose tissue with a #15 scalpel blade.  The skin margins were undermined to an appropriate distance in all directions around the primary defect and laterally outward around the island pedicle utilizing iris scissors.  There was minimal undermining beneath the pedicle flap. Double Island Pedicle Flap Text: The defect edges were debeveled with a #15 scalpel blade.  Given the location of the defect, shape of the defect and the proximity to free margins a double island pedicle advancement flap was deemed most appropriate.  Using a sterile surgical marker, an appropriate advancement flap was drawn incorporating the defect, outlining the appropriate donor tissue and placing the expected incisions within the relaxed skin tension lines where possible.    The area thus outlined was incised deep to adipose tissue with a #15 scalpel blade.  The skin margins were undermined to an appropriate distance in all directions around the primary defect and laterally outward around the island pedicle utilizing iris scissors.  There was minimal undermining beneath the pedicle flap. Cheek Interpolation Flap Text: A decision was made to reconstruct the defect utilizing an interpolation axial flap and a staged reconstruction.  A telfa template was made of the defect.  This telfa template was then used to outline the Cheek Interpolation flap.  The donor area for the pedicle flap was then injected with anesthesia.  The flap was excised through the skin and subcutaneous tissue down to the layer of the underlying musculature.  The interpolation flap was carefully excised within this deep plane to maintain its blood supply.  The edges of the donor site were undermined.   The donor site was closed in a primary fashion.  The pedicle was then rotated into position and sutured.  Once the tube was sutured into place, adequate blood supply was confirmed with blanching and refill.  The pedicle was then wrapped with xeroform gauze and dressed appropriately with a telfa and gauze bandage to ensure continued blood supply and protect the attached pedicle. Lab Facility: [id number] Excision Depth: adipose tissue

## 2023-08-08 ENCOUNTER — PREP FOR PROCEDURE (OUTPATIENT)
Dept: GASTROENTEROLOGY | Age: 68
End: 2023-08-08

## 2023-08-08 DIAGNOSIS — Z12.11 COLON CANCER SCREENING: ICD-10-CM

## 2023-08-11 NOTE — TELEPHONE ENCOUNTER
Patient is requesting medication refill.  Please approve or deny this request.    Rx requested:  Requested Prescriptions     Pending Prescriptions Disp Refills    SYMBICORT 160-4.5 MCG/ACT AERO 10.2 g 2     Sig: Inhale 2 puffs into the lungs 2 times daily    albuterol sulfate HFA (VENTOLIN HFA) 108 (90 Base) MCG/ACT inhaler 18 g 1     Sig: Inhale 2 puffs into the lungs every 4 hours as needed for Wheezing         Last Office Visit:   5/26/2022      Next Visit Date:  Future Appointments   Date Time Provider Constance Espitia   10/6/2022  3:00 PM MD Romero Fountain [address] Consent 1/Introductory Paragraph: The rationale for Mohs was explained to the patient and consent was obtained. The risks, benefits and alternatives to therapy were discussed in detail. Specifically, the risks of infection, scarring, pain, bleeding, prolonged wound healing, incomplete removal, allergy to anesthesia, nerve injury, recurrence and the possible need of delayed or additional reconstruction were addressed. Prior to the procedure, the treatment site was clearly identified and confirmed by the patient. All components of Universal Protocol/PAUSE Rule completed.

## 2023-08-28 ENCOUNTER — TELEPHONE (OUTPATIENT)
Dept: FAMILY MEDICINE CLINIC | Age: 68
End: 2023-08-28

## 2023-08-30 ENCOUNTER — OFFICE VISIT (OUTPATIENT)
Dept: FAMILY MEDICINE CLINIC | Age: 68
End: 2023-08-30
Payer: MEDICARE

## 2023-08-30 ENCOUNTER — HOSPITAL ENCOUNTER (OUTPATIENT)
Dept: LAB | Age: 68
Discharge: HOME | End: 2023-08-30
Payer: MEDICARE

## 2023-08-30 VITALS
WEIGHT: 254 LBS | DIASTOLIC BLOOD PRESSURE: 82 MMHG | HEART RATE: 68 BPM | SYSTOLIC BLOOD PRESSURE: 122 MMHG | OXYGEN SATURATION: 97 % | BODY MASS INDEX: 33.51 KG/M2 | TEMPERATURE: 98.4 F

## 2023-08-30 DIAGNOSIS — F10.10 ALCOHOL ABUSE: Chronic | ICD-10-CM

## 2023-08-30 DIAGNOSIS — E78.49 OTHER HYPERLIPIDEMIA: Chronic | ICD-10-CM

## 2023-08-30 DIAGNOSIS — E11.21 TYPE 2 DIABETES MELLITUS WITH DIABETIC NEPHROPATHY, WITHOUT LONG-TERM CURRENT USE OF INSULIN (HCC): ICD-10-CM

## 2023-08-30 DIAGNOSIS — I10 PRIMARY HYPERTENSION: Chronic | ICD-10-CM

## 2023-08-30 DIAGNOSIS — L02.224 BOIL OF GROIN: ICD-10-CM

## 2023-08-30 DIAGNOSIS — E66.9 CLASS 1 OBESITY WITH SERIOUS COMORBIDITY AND BODY MASS INDEX (BMI) OF 33.0 TO 33.9 IN ADULT, UNSPECIFIED OBESITY TYPE: ICD-10-CM

## 2023-08-30 DIAGNOSIS — D53.9 MACROCYTIC ANEMIA: ICD-10-CM

## 2023-08-30 DIAGNOSIS — M10.9 GOUT, UNSPECIFIED CAUSE, UNSPECIFIED CHRONICITY, UNSPECIFIED SITE: Chronic | ICD-10-CM

## 2023-08-30 DIAGNOSIS — Z12.5 SCREENING FOR PROSTATE CANCER: ICD-10-CM

## 2023-08-30 DIAGNOSIS — Z72.0 TOBACCO USE: ICD-10-CM

## 2023-08-30 DIAGNOSIS — I47.1 SUPRAVENTRICULAR TACHYCARDIA (HCC): Chronic | ICD-10-CM

## 2023-08-30 DIAGNOSIS — J44.1 CHRONIC OBSTRUCTIVE PULMONARY DISEASE WITH ACUTE EXACERBATION (HCC): Primary | Chronic | ICD-10-CM

## 2023-08-30 DIAGNOSIS — R79.89 LOW THYROID STIMULATING HORMONE (TSH) LEVEL: ICD-10-CM

## 2023-08-30 LAB
ALBUMIN SERPL-MCNC: 3.9 G/DL (ref 3.5–4.6)
ALP SERPL-CCNC: 97 U/L (ref 35–104)
ALT SERPL-CCNC: 10 U/L (ref 0–41)
ANION GAP SERPL CALCULATED.3IONS-SCNC: 10 MEQ/L (ref 9–15)
AST SERPL-CCNC: 22 U/L (ref 0–40)
BASOPHILS # BLD: 0.1 K/UL (ref 0–0.2)
BASOPHILS NFR BLD: 0.8 %
BILIRUB SERPL-MCNC: 0.6 MG/DL (ref 0.2–0.7)
BUN SERPL-MCNC: 13 MG/DL (ref 8–23)
CALCIUM SERPL-MCNC: 9.4 MG/DL (ref 8.5–9.9)
CHLORIDE SERPL-SCNC: 104 MEQ/L (ref 95–107)
CHOLEST SERPL-MCNC: 211 MG/DL (ref 0–199)
CO2 SERPL-SCNC: 23 MEQ/L (ref 20–31)
CREAT SERPL-MCNC: 0.95 MG/DL (ref 0.7–1.2)
EOSINOPHIL # BLD: 0.4 K/UL (ref 0–0.7)
EOSINOPHIL NFR BLD: 5.2 %
ERYTHROCYTE [DISTWIDTH] IN BLOOD BY AUTOMATED COUNT: 15 % (ref 11.5–14.5)
GLOBULIN SER CALC-MCNC: 3.3 G/DL (ref 2.3–3.5)
GLUCOSE SERPL-MCNC: 85 MG/DL (ref 70–99)
HBA1C MFR BLD: 5.7 % (ref 4.8–5.9)
HCT VFR BLD AUTO: 41 % (ref 42–52)
HDLC SERPL-MCNC: 56 MG/DL (ref 40–59)
HGB BLD-MCNC: 13.5 G/DL (ref 14–18)
LDLC SERPL CALC-MCNC: 132 MG/DL (ref 0–129)
LYMPHOCYTES # BLD: 1.6 K/UL (ref 1–4.8)
LYMPHOCYTES NFR BLD: 18.7 %
MCH RBC QN AUTO: 32.1 PG (ref 27–31.3)
MCHC RBC AUTO-ENTMCNC: 32.9 % (ref 33–37)
MCV RBC AUTO: 97.7 FL (ref 79–92.2)
MONOCYTES # BLD: 0.8 K/UL (ref 0.2–0.8)
MONOCYTES NFR BLD: 8.8 %
NEUTROPHILS # BLD: 5.8 K/UL (ref 1.4–6.5)
NEUTS SEG NFR BLD: 66.5 %
PLATELET # BLD AUTO: 304 K/UL (ref 130–400)
POTASSIUM SERPL-SCNC: 4.7 MEQ/L (ref 3.4–4.9)
PROT SERPL-MCNC: 7.2 G/DL (ref 6.3–8)
PSA SERPL-MCNC: 1.41 NG/ML (ref 0–4)
RBC # BLD AUTO: 4.2 M/UL (ref 4.7–6.1)
SODIUM SERPL-SCNC: 137 MEQ/L (ref 135–144)
T4 FREE SERPL-MCNC: 1.03 NG/DL (ref 0.84–1.68)
TRIGL SERPL-MCNC: 113 MG/DL (ref 0–150)
TSH SERPL-MCNC: 2.38 UIU/ML (ref 0.44–3.86)
URATE SERPL-MCNC: 4.9 MG/DL (ref 3.4–7)
WBC # BLD AUTO: 8.6 K/UL (ref 4.8–10.8)

## 2023-08-30 PROCEDURE — 80061 LIPID PANEL: CPT

## 2023-08-30 PROCEDURE — 83036 HEMOGLOBIN GLYCOSYLATED A1C: CPT

## 2023-08-30 PROCEDURE — 82607 VITAMIN B-12: CPT

## 2023-08-30 PROCEDURE — 84439 ASSAY OF FREE THYROXINE: CPT

## 2023-08-30 PROCEDURE — 80053 COMPREHEN METABOLIC PANEL: CPT

## 2023-08-30 PROCEDURE — 84550 ASSAY OF BLOOD/URIC ACID: CPT

## 2023-08-30 PROCEDURE — 3017F COLORECTAL CA SCREEN DOC REV: CPT | Performed by: FAMILY MEDICINE

## 2023-08-30 PROCEDURE — 3074F SYST BP LT 130 MM HG: CPT | Performed by: FAMILY MEDICINE

## 2023-08-30 PROCEDURE — 84443 ASSAY THYROID STIM HORMONE: CPT

## 2023-08-30 PROCEDURE — 85025 COMPLETE CBC W/AUTO DIFF WBC: CPT

## 2023-08-30 PROCEDURE — 3023F SPIROM DOC REV: CPT | Performed by: FAMILY MEDICINE

## 2023-08-30 PROCEDURE — 3044F HG A1C LEVEL LT 7.0%: CPT | Performed by: FAMILY MEDICINE

## 2023-08-30 PROCEDURE — 84153 ASSAY OF PSA TOTAL: CPT

## 2023-08-30 PROCEDURE — G8427 DOCREV CUR MEDS BY ELIG CLIN: HCPCS | Performed by: FAMILY MEDICINE

## 2023-08-30 PROCEDURE — 99214 OFFICE O/P EST MOD 30 MIN: CPT | Performed by: FAMILY MEDICINE

## 2023-08-30 PROCEDURE — G8417 CALC BMI ABV UP PARAM F/U: HCPCS | Performed by: FAMILY MEDICINE

## 2023-08-30 PROCEDURE — 82746 ASSAY OF FOLIC ACID SERUM: CPT

## 2023-08-30 PROCEDURE — 3079F DIAST BP 80-89 MM HG: CPT | Performed by: FAMILY MEDICINE

## 2023-08-30 PROCEDURE — 36415 COLL VENOUS BLD VENIPUNCTURE: CPT

## 2023-08-30 PROCEDURE — 2022F DILAT RTA XM EVC RTNOPTHY: CPT | Performed by: FAMILY MEDICINE

## 2023-08-30 PROCEDURE — 1123F ACP DISCUSS/DSCN MKR DOCD: CPT | Performed by: FAMILY MEDICINE

## 2023-08-30 PROCEDURE — 4004F PT TOBACCO SCREEN RCVD TLK: CPT | Performed by: FAMILY MEDICINE

## 2023-08-30 RX ORDER — SULFAMETHOXAZOLE AND TRIMETHOPRIM 800; 160 MG/1; MG/1
1 TABLET ORAL 2 TIMES DAILY
Qty: 20 TABLET | Refills: 0 | Status: SHIPPED | OUTPATIENT
Start: 2023-08-30 | End: 2023-09-09

## 2023-08-30 RX ORDER — PREDNISONE 50 MG/1
50 TABLET ORAL DAILY
Qty: 5 TABLET | Refills: 0 | Status: SHIPPED | OUTPATIENT
Start: 2023-08-30 | End: 2023-09-04

## 2023-08-30 ASSESSMENT — ENCOUNTER SYMPTOMS
SINUS PAIN: 0
SPUTUM PRODUCTION: 1
COUGH: 1
ABDOMINAL PAIN: 0
BLURRED VISION: 0
WHEEZING: 1
NAUSEA: 0
SHORTNESS OF BREATH: 1
HEMOPTYSIS: 0
CHEST TIGHTNESS: 0
SINUS PRESSURE: 0
DIARRHEA: 0
VOMITING: 0
SORE THROAT: 0
VISUAL CHANGE: 0

## 2023-08-30 ASSESSMENT — COPD QUESTIONNAIRES: COPD: 1

## 2023-08-30 NOTE — PROGRESS NOTES
Olga Snyder (: 1955) is a 74 y.o. male, Established patient, who presents today for:    Chief Complaint   Patient presents with    COPD     Copd flaring up states he has been feeling bad lately. ASSESSMENT/PLAN    1. Chronic obstructive pulmonary disease with acute exacerbation [facility])  Assessment & Plan: We will treat with course of Bactrim to provide for coverage of bronchitis along with MRSA secondary to left groin boil. See below. Patient to use burst course prednisone and continue with home inhalers. He was instructed to go to the emergency room for any worsening dyspnea or chest tightness despite management. Orders:  -     sulfamethoxazole-trimethoprim (BACTRIM DS) 800-160 MG per tablet; Take 1 tablet by mouth 2 times daily for 10 days, Disp-20 tablet, R-0Normal  -     predniSONE (DELTASONE) 50 MG tablet; Take 1 tablet by mouth daily for 5 days, Disp-5 tablet, R-0Normal  2. Boil of groin  Comments: We will cover for MRSA with course of Bactrim. Patient instructed to go to ER for any worsening pain or increased size with any F/C/S despite management. Orders:  -     sulfamethoxazole-trimethoprim (BACTRIM DS) 800-160 MG per tablet; Take 1 tablet by mouth 2 times daily for 10 days, Disp-20 tablet, R-0Normal  3. Type 2 diabetes mellitus with diabetic nephropathy, without long-term current use of insulin ([address])  Assessment & Plan:   Most recent A1c at goal control. Patient instructed to continue with current dose of metformin. Orders:  -      DIABETES FOOT EXAM  4. Primary hypertension  Assessment & Plan:   Well-controlled, continue current medications  5. Other hyperlipidemia  6. Tobacco use  Assessment & Plan:  Patient pre-contemplative and is aware that smoking cessation would be the best thing for overall health. We reviewed cessation aids including medication and nicotine replacement therapy. Will continue to encourage complete smoking cessation at subsequent visits.    7. Class

## 2023-08-30 NOTE — ASSESSMENT & PLAN NOTE
We will treat with course of Bactrim to provide for coverage of bronchitis along with MRSA secondary to left groin boil. See below. Patient to use burst course prednisone and continue with home inhalers. He was instructed to go to the emergency room for any worsening dyspnea or chest tightness despite management.

## 2023-08-31 ENCOUNTER — HOSPITAL ENCOUNTER (EMERGENCY)
Age: 68
Discharge: HOME | End: 2023-08-31
Attending: EMERGENCY MEDICINE | Admitting: EMERGENCY MEDICINE
Payer: MEDICARE

## 2023-08-31 ENCOUNTER — APPOINTMENT (OUTPATIENT)
Dept: GENERAL RADIOLOGY | Age: 68
End: 2023-08-31
Payer: MEDICARE

## 2023-08-31 VITALS
DIASTOLIC BLOOD PRESSURE: 75 MMHG | SYSTOLIC BLOOD PRESSURE: 138 MMHG | WEIGHT: 250 LBS | HEART RATE: 84 BPM | BODY MASS INDEX: 33.86 KG/M2 | TEMPERATURE: 98.5 F | RESPIRATION RATE: 20 BRPM | OXYGEN SATURATION: 95 % | HEIGHT: 72 IN

## 2023-08-31 DIAGNOSIS — J44.1 COPD EXACERBATION (HCC): Primary | ICD-10-CM

## 2023-08-31 LAB
ALBUMIN SERPL-MCNC: 4.3 G/DL (ref 3.5–4.6)
ALP SERPL-CCNC: 101 U/L (ref 35–104)
ALT SERPL-CCNC: 20 U/L (ref 0–41)
ANION GAP SERPL CALCULATED.3IONS-SCNC: 13 MEQ/L (ref 9–15)
AST SERPL-CCNC: 19 U/L (ref 0–40)
BASOPHILS # BLD: 0 K/UL (ref 0–0.1)
BASOPHILS NFR BLD: 0.3 % (ref 0.2–1.2)
BILIRUB SERPL-MCNC: 0.3 MG/DL (ref 0.2–0.7)
BNP BLD-MCNC: 134 PG/ML
BUN SERPL-MCNC: 18 MG/DL (ref 8–23)
CALCIUM SERPL-MCNC: 9.9 MG/DL (ref 8.5–9.9)
CHLORIDE SERPL-SCNC: 106 MEQ/L (ref 95–107)
CO2 SERPL-SCNC: 22 MEQ/L (ref 20–31)
CREAT SERPL-MCNC: 1.29 MG/DL (ref 0.7–1.2)
EOSINOPHIL # BLD: 0 K/UL (ref 0–0.5)
EOSINOPHIL NFR BLD: 0 % (ref 0.8–7)
ERYTHROCYTE [DISTWIDTH] IN BLOOD BY AUTOMATED COUNT: 15 % (ref 11.6–14.4)
FOLATE: 8.1 NG/ML
GLOBULIN SER CALC-MCNC: 3.4 G/DL (ref 2.3–3.5)
GLUCOSE SERPL-MCNC: 168 MG/DL (ref 70–99)
HCT VFR BLD AUTO: 41.3 % (ref 42–52)
HGB BLD-MCNC: 13.3 G/DL (ref 13.7–17.5)
IMM GRANULOCYTES # BLD: 0 K/UL
IMM GRANULOCYTES NFR BLD: 0.3 %
LYMPHOCYTES # BLD: 0.8 K/UL (ref 1.3–3.6)
LYMPHOCYTES NFR BLD: 10.7 %
MCH RBC QN AUTO: 31.3 PG (ref 25.7–32.2)
MCHC RBC AUTO-ENTMCNC: 32.2 % (ref 32.3–36.5)
MCV RBC AUTO: 97.2 FL (ref 79–92.2)
MONOCYTES # BLD: 0.4 K/UL (ref 0.3–0.8)
MONOCYTES NFR BLD: 5.2 % (ref 5.3–12.2)
NEUTROPHILS # BLD: 6.6 K/UL (ref 1.8–5.4)
NEUTS SEG NFR BLD: 83.5 % (ref 34–67.9)
PLATELET # BLD AUTO: 308 K/UL (ref 163–337)
POTASSIUM SERPL-SCNC: 4.5 MEQ/L (ref 3.4–4.9)
PROT SERPL-MCNC: 7.7 G/DL (ref 6.3–8)
RBC # BLD AUTO: 4.25 M/UL (ref 4.63–6.08)
SODIUM SERPL-SCNC: 141 MEQ/L (ref 135–144)
TROPONIN T SERPL-MCNC: <0.01 NG/ML (ref 0–0.01)
VITAMIN B-12: 577 PG/ML (ref 232–1245)
WBC # BLD AUTO: 7.9 K/UL (ref 4.2–9)

## 2023-08-31 PROCEDURE — 99285 EMERGENCY DEPT VISIT HI MDM: CPT

## 2023-08-31 PROCEDURE — 6360000002 HC RX W HCPCS: Performed by: EMERGENCY MEDICINE

## 2023-08-31 PROCEDURE — 93005 ELECTROCARDIOGRAM TRACING: CPT

## 2023-08-31 PROCEDURE — 80053 COMPREHEN METABOLIC PANEL: CPT

## 2023-08-31 PROCEDURE — 83880 ASSAY OF NATRIURETIC PEPTIDE: CPT

## 2023-08-31 PROCEDURE — 84484 ASSAY OF TROPONIN QUANT: CPT

## 2023-08-31 PROCEDURE — 96374 THER/PROPH/DIAG INJ IV PUSH: CPT

## 2023-08-31 PROCEDURE — 71045 X-RAY EXAM CHEST 1 VIEW: CPT

## 2023-08-31 PROCEDURE — 36415 COLL VENOUS BLD VENIPUNCTURE: CPT

## 2023-08-31 PROCEDURE — 85025 COMPLETE CBC W/AUTO DIFF WBC: CPT

## 2023-08-31 RX ORDER — METHYLPREDNISOLONE 4 MG/1
TABLET ORAL
Qty: 1 KIT | Refills: 0 | Status: SHIPPED | OUTPATIENT
Start: 2023-08-31

## 2023-08-31 RX ADMIN — METHYLPREDNISOLONE SODIUM SUCCINATE 125 MG: 125 INJECTION, POWDER, FOR SOLUTION INTRAMUSCULAR; INTRAVENOUS at 18:54

## 2023-08-31 ASSESSMENT — PAIN - FUNCTIONAL ASSESSMENT
PAIN_FUNCTIONAL_ASSESSMENT: NONE - DENIES PAIN
PAIN_FUNCTIONAL_ASSESSMENT: NONE - DENIES PAIN

## 2023-08-31 NOTE — ED PROVIDER NOTES
new or unusual symptoms. Patient was taken to room 7. IV was established and lab work was obtained. Lab work showed a negative troponin. Otherwise unremarkable. ER EKG interpretation normal sinus rhythm at 77 bpm with right bundle branch block otherwise no signs of acute ST-T changes. Chest x-ray interpreted by me as showing no obvious signs of acute cardiopulmonary disease. Scarring noted which appears similar to previous exam from 2 months ago. Final Clinical impression;  1) COPD exacerbation    Disposition/plan; patient discharged in stable condition given discharge instructions on COPD exacerbation. Patient encouraged to continue antibiotic as prescribed. Patient asked for a longer course of steroids and was given a Medrol Dosepak. Encouraged to return for worsening and or changes to symptoms. Patient voiced understanding and agreement with treatment plan.      Jenifer Vásquez, DO  08/31/23 [facility], DO  08/31/23 1928

## 2023-09-01 LAB
EKG ATRIAL RATE: 77 BPM
EKG P AXIS: 81 DEGREES
EKG P-R INTERVAL: 156 MS
EKG Q-T INTERVAL: 412 MS
EKG QRS DURATION: 124 MS
EKG QTC CALCULATION (BAZETT): 466 MS
EKG R AXIS: 62 DEGREES
EKG T AXIS: 56 DEGREES
EKG VENTRICULAR RATE: 77 BPM

## 2023-09-01 PROCEDURE — 93010 ELECTROCARDIOGRAM REPORT: CPT | Performed by: INTERNAL MEDICINE

## 2023-09-06 DIAGNOSIS — E78.5 HYPERLIPIDEMIA, UNSPECIFIED HYPERLIPIDEMIA TYPE: Chronic | ICD-10-CM

## 2023-09-06 RX ORDER — LOVASTATIN 40 MG/1
40 TABLET ORAL NIGHTLY
Qty: 90 TABLET | Refills: 1 | Status: SHIPPED | OUTPATIENT
Start: 2023-09-06

## 2023-09-07 PROBLEM — Z12.11 COLON CANCER SCREENING: Status: RESOLVED | Noted: 2023-08-08 | Resolved: 2023-09-07

## 2023-09-16 DIAGNOSIS — F41.8 ANXIETY WITH DEPRESSION: ICD-10-CM

## 2023-09-16 DIAGNOSIS — G47.00 INSOMNIA, UNSPECIFIED TYPE: ICD-10-CM

## 2023-09-17 NOTE — TELEPHONE ENCOUNTER
Appointments    Encounter Information    Provider Department Appt Notes   2/8/2024 Radha Gongora, [address] Primary Care Chronic Disease Check   8/5/2024 Radha Gongora MD [facility] TOG Primary Care AWV     Past Visits    Date Provider Specialty Visit Type Primary Dx   08/30/2023 Radha Gongora MD Family Medicine Office Visit Chronic obstructive pulmonary disease with acute exacerbation [facility])   08/03/2023 Radha Gongora MD Family Medicine Telemedicine Medicare annual wellness visit, subsequent   07/12/2023 Luciano Suresh MD  Unit Surgery    07/05/2023 Alexia Bernal MD Pulmonology Office Visit Chronic obstructive pulmonary disease with acute exacerbation [facility])   05/31/2023 Radha Gongora MD Family Medicine Office Visit Chronic obstructive pulmonary disease with acute exacerbation ([address])

## 2023-09-18 DIAGNOSIS — J44.1 CHRONIC OBSTRUCTIVE PULMONARY DISEASE WITH ACUTE EXACERBATION (HCC): ICD-10-CM

## 2023-09-18 RX ORDER — TRAZODONE HYDROCHLORIDE 50 MG/1
50 TABLET ORAL NIGHTLY
Qty: 90 TABLET | Refills: 1 | Status: SHIPPED | OUTPATIENT
Start: 2023-09-18

## 2023-09-19 RX ORDER — IPRATROPIUM BROMIDE AND ALBUTEROL SULFATE 2.5; .5 MG/3ML; MG/3ML
1 SOLUTION RESPIRATORY (INHALATION) EVERY 4 HOURS PRN
Qty: 360 ML | Refills: 1 | OUTPATIENT
Start: 2023-09-19

## 2023-09-19 NOTE — TELEPHONE ENCOUNTER
Pt requesting refill for ipratropium 0.5 mg-albuterol 2.5 mg (DUONEB) 0.5-2.5 (3) MG/3ML SOLN nebulizer solution. Pt has 4 days left. Please advise.     LOV: 8/30/2024  Future appts: 2/8/2024    Ph: [phone]

## 2023-09-21 DIAGNOSIS — J44.1 CHRONIC OBSTRUCTIVE PULMONARY DISEASE WITH ACUTE EXACERBATION (HCC): ICD-10-CM

## 2023-09-21 RX ORDER — IPRATROPIUM BROMIDE AND ALBUTEROL SULFATE 2.5; .5 MG/3ML; MG/3ML
1 SOLUTION RESPIRATORY (INHALATION) EVERY 4 HOURS PRN
Qty: 360 ML | Refills: 1 | Status: SHIPPED | OUTPATIENT
Start: 2023-09-21 | End: 2023-11-06

## 2023-09-21 NOTE — TELEPHONE ENCOUNTER
Pharmacy is requesting medication refill.  Please approve or deny this request.    Rx requested:  Requested Prescriptions     Pending Prescriptions Disp Refills    ipratropium 0.5 mg-albuterol 2.5 mg (DUONEB) 0.5-2.5 (3) MG/3ML SOLN nebulizer solution [Pharmacy Med Name: IPRAT-ALBUT 0.5-3(2.5) MG/3 ML] 360 mL 1     Sig: TAKE 3 MLS BY NEBULIZATION EVERY 4 HOURS AS NEEDED FOR SHORTNESS OF BREATH OR WHEEZING         Last Office Visit:   8/30/2023      Next Visit Date:  Future Appointments   Date Time Provider [address]   2/8/2024  2:00 PM MD Kevon Alatorre   8/5/2024  2:00 PM MD Kevon Alatorre

## 2023-10-04 PROBLEM — Z12.11 COLON CANCER SCREENING: Status: ACTIVE | Noted: 2023-08-08

## 2023-10-04 RX ORDER — SODIUM CHLORIDE 9 MG/ML
INJECTION, SOLUTION INTRAVENOUS PRN
OUTPATIENT
Start: 2023-10-04

## 2023-10-04 RX ORDER — SODIUM CHLORIDE, SODIUM LACTATE, POTASSIUM CHLORIDE, CALCIUM CHLORIDE 600; 310; 30; 20 MG/100ML; MG/100ML; MG/100ML; MG/100ML
INJECTION, SOLUTION INTRAVENOUS CONTINUOUS
OUTPATIENT
Start: 2023-10-04

## 2023-10-04 RX ORDER — SODIUM CHLORIDE 9 MG/ML
INJECTION, SOLUTION INTRAVENOUS CONTINUOUS
OUTPATIENT
Start: 2023-10-04

## 2023-10-04 RX ORDER — SODIUM CHLORIDE 0.9 % (FLUSH) 0.9 %
5-40 SYRINGE (ML) INJECTION EVERY 12 HOURS SCHEDULED
OUTPATIENT
Start: 2023-10-04

## 2023-10-23 DIAGNOSIS — N52.9 ERECTILE DYSFUNCTION, UNSPECIFIED ERECTILE DYSFUNCTION TYPE: Chronic | ICD-10-CM

## 2023-10-23 RX ORDER — SILDENAFIL 100 MG/1
100 TABLET, FILM COATED ORAL PRN
Qty: 6 TABLET | Refills: 1 | Status: SHIPPED | OUTPATIENT
Start: 2023-10-23

## 2023-10-23 NOTE — TELEPHONE ENCOUNTER
Comments:     Last Office Visit (last PCP visit):   8/30/2023    Next Visit Date:  Future Appointments   Date Time Provider [address]   2/8/2024  2:00 PM Erick Silva MD [address]   8/5/2024  2:00 PM Erick Silva MD [address]       **If hasn't been seen in over a year OR hasn't followed up according to last diabetes/ADHD visit, make appointment for patient before sending refill to provider.     Rx requested:  Requested Prescriptions     Pending Prescriptions Disp Refills    sildenafil (VIAGRA) 100 MG tablet [Pharmacy Med Name: SILDENAFIL 100 MG TABLET] 6 tablet 1     Sig: TAKE 1 TABLET BY MOUTH AS NEEDED FOR ERECTILE DYSFUNCTION

## 2023-10-25 DIAGNOSIS — M48.061 SPINAL STENOSIS OF LUMBAR REGION, UNSPECIFIED WHETHER NEUROGENIC CLAUDICATION PRESENT: ICD-10-CM

## 2023-10-26 RX ORDER — PSEUDOEPHED/ACETAMINOPH/DIPHEN 30MG-500MG
1 TABLET ORAL EVERY 8 HOURS PRN
Qty: 90 TABLET | Refills: 1 | Status: SHIPPED | OUTPATIENT
Start: 2023-10-26 | End: 2023-12-27 | Stop reason: SDUPTHER

## 2023-10-26 NOTE — TELEPHONE ENCOUNTER
Comments:     Last Office Visit (last PCP visit):   4/4/2023    Next Visit Date:  Future Appointments   Date Time Provider [address]   2/8/2024  2:00 PM Bud Silva MD [address]   8/5/2024  2:00 PM Bud Silva MD [address]       **If hasn't been seen in over a year OR hasn't followed up according to last diabetes/ADHD visit, make appointment for patient before sending refill to provider.     Rx requested:  Requested Prescriptions     Pending Prescriptions Disp Refills    Acetaminophen Extra Strength 500 MG TABS [Pharmacy Med Name: ACETAMINOPHEN 500 MG TABLET] 90 tablet 1     Sig: TAKE 1 TABLET BY MOUTH EVERY 8 HOURS AS NEEDED FOR PAIN

## 2023-11-03 PROBLEM — Z12.11 COLON CANCER SCREENING: Status: RESOLVED | Noted: 2023-08-08 | Resolved: 2023-11-03

## 2023-11-06 DIAGNOSIS — J44.1 CHRONIC OBSTRUCTIVE PULMONARY DISEASE WITH ACUTE EXACERBATION (HCC): ICD-10-CM

## 2023-11-06 RX ORDER — IPRATROPIUM BROMIDE AND ALBUTEROL SULFATE 2.5; .5 MG/3ML; MG/3ML
1 SOLUTION RESPIRATORY (INHALATION) EVERY 4 HOURS PRN
Qty: 360 ML | Refills: 1 | Status: SHIPPED | OUTPATIENT
Start: 2023-11-06

## 2023-11-06 NOTE — TELEPHONE ENCOUNTER
Comments:     Last Office Visit (last PCP visit):   8/30/2023    Next Visit Date:  Future Appointments   Date Time Provider [address]   2/8/2024  2:00 PM Briana Silva MD [address]   8/5/2024  2:00 PM Briana Silva MD [address]       **If hasn't been seen in over a year OR hasn't followed up according to last diabetes/ADHD visit, make appointment for patient before sending refill to provider.     Rx requested:  Requested Prescriptions     Pending Prescriptions Disp Refills    ipratropium 0.5 mg-albuterol 2.5 mg (DUONEB) 0.5-2.5 (3) MG/3ML SOLN nebulizer solution [Pharmacy Med Name: IPRAT-ALBUT 0.5-3(2.5) MG/3 ML] 360 mL 1     Sig: TAKE 3 MLS BY NEBULIZATION EVERY 4 HOURS AS NEEDED FOR SHORTNESS OF BREATH OR WHEEZING

## 2023-11-06 NOTE — TELEPHONE ENCOUNTER
----- Message from Evangelistamiricardo Rubi sent at 11/6/2023 12:35 PM EST -----  Subject: Refill Request    QUESTIONS  Name of Medication? ipratropium 0.5 mg-albuterol 2.5 mg (DUONEB) 0.5-2.5   (3) MG/3ML SOLN nebulizer solution  Patient-reported dosage and instructions? Every 4 hours or as needed  How many days do you have left? 0  Preferred Pharmacy? [id number]  Pharmacy phone number (if available)? [phone]  ---------------------------------------------------------------------------  --------------  Kalpana Pickett INFO  What is the best way for the office to contact you? OK to leave message on   voicemail  Preferred Call Back Phone Number? [phone]  ---------------------------------------------------------------------------  --------------  SCRIPT ANSWERS  Relationship to Patient?  Self

## 2023-11-09 DIAGNOSIS — I10 PRIMARY HYPERTENSION: Chronic | ICD-10-CM

## 2023-11-09 RX ORDER — LOSARTAN POTASSIUM 25 MG/1
25 TABLET ORAL DAILY
Qty: 90 TABLET | Refills: 1 | Status: SHIPPED | OUTPATIENT
Start: 2023-11-09

## 2023-11-09 NOTE — TELEPHONE ENCOUNTER
Patient is requesting medication refill.  Please approve or deny this request.    Rx requested:  Requested Prescriptions     Pending Prescriptions Disp Refills    losartan (COZAAR) 25 MG tablet [Pharmacy Med Name: Delisa Lowe 25 MG TAB] 90 tablet 1     Sig: Take 1 tablet by mouth daily         Last Office Visit:   8/30/2023      Next Visit Date:  Future Appointments   Date Time Provider [address] [place] [address]   2/8/2024  2:00 PM Lisseth Silva MD [address]   8/5/2024  2:00 PM [address] [place], [place] [address]Lisseth MD [address]

## 2023-11-15 ENCOUNTER — PREP FOR PROCEDURE (OUTPATIENT)
Dept: GASTROENTEROLOGY | Age: 68
End: 2023-11-15

## 2023-11-15 DIAGNOSIS — N52.9 ERECTILE DYSFUNCTION, UNSPECIFIED ERECTILE DYSFUNCTION TYPE: Chronic | ICD-10-CM

## 2023-11-15 DIAGNOSIS — E11.21 TYPE 2 DIABETES MELLITUS WITH DIABETIC NEPHROPATHY, WITHOUT LONG-TERM CURRENT USE OF INSULIN (HCC): ICD-10-CM

## 2023-11-15 DIAGNOSIS — I10 PRIMARY HYPERTENSION: Chronic | ICD-10-CM

## 2023-11-15 RX ORDER — AMLODIPINE BESYLATE 10 MG/1
10 TABLET ORAL DAILY
Qty: 90 TABLET | Refills: 1 | Status: SHIPPED | OUTPATIENT
Start: 2023-11-15

## 2023-11-15 RX ORDER — FLUTICASONE PROPIONATE 50 MCG
2 SPRAY, SUSPENSION (ML) NASAL DAILY
Qty: 16 G | Refills: 3 | Status: SHIPPED | OUTPATIENT
Start: 2023-11-15

## 2023-11-15 RX ORDER — SILDENAFIL 100 MG/1
100 TABLET, FILM COATED ORAL PRN
Qty: 6 TABLET | Refills: 1 | Status: SHIPPED | OUTPATIENT
Start: 2023-11-15

## 2023-11-15 NOTE — TELEPHONE ENCOUNTER
Requested Prescriptions     Pending Prescriptions Disp Refills    amLODIPine (NORVASC) 10 MG tablet 90 tablet 1     Sig: Take 1 tablet by mouth daily     Lov 8/30/2023  Fov 2/08/2024

## 2023-11-15 NOTE — TELEPHONE ENCOUNTER
----- Message from Justa Parr sent at 11/15/2023 11:40 AM EST -----  Subject: Message to Provider    QUESTIONS  Information for Provider? Patient stated he need a new breathing machine,   not getting a good mist he try putting on different mouth pieces and it   still enough coming out  ---------------------------------------------------------------------------  --------------  CALL BACK INFO  [phone]; OK to leave message on voicemail  ---------------------------------------------------------------------------  --------------  SCRIPT ANSWERS  Relationship to Patient?  Self

## 2023-11-15 NOTE — TELEPHONE ENCOUNTER
----- Message from Emily Barry sent at 11/15/2023 11:09 AM EST -----  Subject: Refill Request    QUESTIONS  Name of Medication? amLODIPine (NORVASC) 10 MG tablet  Patient-reported dosage and instructions? 10 mg 1 tablet daily  How many days do you have left? 0  Preferred Pharmacy? [facility]/PHARMACY #[id number]  Pharmacy phone number (if available)? [phone]  ---------------------------------------------------------------------------  --------------,  Name of Medication? fluticasone (FLONASE) 50 MCG/ACT nasal spray  Patient-reported dosage and instructions? 50 MCG   How many days do you have left? 0  Preferred Pharmacy? [facility]/PHARMACY #[id number]  Pharmacy phone number (if available)? [phone]  ---------------------------------------------------------------------------  --------------,  Name of Medication? losartan (COZAAR) 25 MG tablet  Patient-reported dosage and instructions? 25 MG 1 Tablet   How many days do you have left? 2  Preferred Pharmacy? [facility]/PHARMACY #[id number]  Pharmacy phone number (if available)? [phone]  ---------------------------------------------------------------------------  --------------,  Name of Medication? metFORMIN (GLUCOPHAGE) 500 MG tablet  Patient-reported dosage and instructions? 500 mg 2 tablets daily   How many days do you have left? 10  Preferred [facility]acy? [id number]  Pharmacy phone number (if available)? [phone]  ---------------------------------------------------------------------------  --------------,  Name of Medication? sildenafil (VIAGRA) 100 MG tablet  Patient-reported dosage and instructions? 100 mg as needed   How many days do you have left? 0  [facility]erred [facility]? [id number]  Pharmacy phone number (if available)? [phone]  ---------------------------------------------------------------------------  --------------  Deep Mackenzie INFO  What is the best way for the office to contact you? OK to leave message on   voicemail  Preferred Call Back Phone Number?

## 2023-11-24 DIAGNOSIS — J44.1 CHRONIC OBSTRUCTIVE PULMONARY DISEASE WITH ACUTE EXACERBATION (HCC): ICD-10-CM

## 2023-11-24 DIAGNOSIS — J30.2 SEASONAL ALLERGIES: Chronic | ICD-10-CM

## 2023-11-24 NOTE — TELEPHONE ENCOUNTER
Comments:     Last Office Visit (last PCP visit):   8/30/2023    Next Visit Date:  Future Appointments   Date Time Provider [address]   2/8/2024  2:00 PM Enoc Silva MD [address]   8/5/2024  2:00 PM Enoc Silva MD [address]       **If hasn't been seen in over a year OR hasn't followed up according to last diabetes/ADHD visit, make appointment for patient before sending refill to provider.     Rx requested:  Requested Prescriptions     Pending Prescriptions Disp Refills    cetirizine (ZYRTEC) 10 MG tablet [Pharmacy Med Name: CETIRIZINE HCL 10 MG TABLET] 90 tablet 1     Sig: TAKE 1 TABLET BY MOUTH EVERY DAY

## 2023-11-27 DIAGNOSIS — M48.061 SPINAL STENOSIS OF LUMBAR REGION, UNSPECIFIED WHETHER NEUROGENIC CLAUDICATION PRESENT: ICD-10-CM

## 2023-11-27 NOTE — TELEPHONE ENCOUNTER
Comments:     Last Office Visit (last PCP visit):   8/30/2023    Next Visit Date:  Future Appointments   Date Time Provider [address]   2/8/2024  2:00 PM Lisseth Silva MD [address]   8/5/2024  2:00 PM Lisseth Silva MD [address]       **If hasn't been seen in over a year OR hasn't followed up according to last diabetes/ADHD visit, make appointment for patient before sending refill to provider.     Rx requested:  Requested Prescriptions     Pending Prescriptions Disp Refills    meloxicam (MOBIC) 15 MG tablet 90 tablet 1     Sig: Take 1 tablet by mouth daily

## 2023-11-27 NOTE — TELEPHONE ENCOUNTER
----- Message from Dawson Guerrero sent at 11/27/2023 12:54 PM EST -----  Subject: Message to Provider    QUESTIONS  Information for Provider? patient states he sent in a refill request last   week and nothing is still in he would like a call back [phone]  ---------------------------------------------------------------------------  --------------  [address]  [phone]; OK to leave message on voicemail  ---------------------------------------------------------------------------  --------------  SCRIPT ANSWERS  Relationship to Patient?  Self

## 2023-11-28 RX ORDER — MELOXICAM 15 MG/1
15 TABLET ORAL DAILY
Qty: 30 TABLET | Refills: 0 | Status: SHIPPED | OUTPATIENT
Start: 2023-11-28

## 2023-11-28 RX ORDER — CETIRIZINE HYDROCHLORIDE 10 MG/1
10 TABLET ORAL DAILY
Qty: 90 TABLET | Refills: 1 | Status: SHIPPED | OUTPATIENT
Start: 2023-11-28

## 2023-11-29 ENCOUNTER — APPOINTMENT (OUTPATIENT)
Dept: GENERAL RADIOLOGY | Age: 68
DRG: 179 | End: 2023-11-29
Payer: MEDICARE

## 2023-11-29 ENCOUNTER — HOSPITAL ENCOUNTER (INPATIENT)
Age: 68
LOS: 1 days | Discharge: HOME | DRG: 179 | End: 2023-11-30
Attending: EMERGENCY MEDICINE | Admitting: INTERNAL MEDICINE
Payer: MEDICARE

## 2023-11-29 DIAGNOSIS — J44.1 CHRONIC OBSTRUCTIVE PULMONARY DISEASE WITH ACUTE EXACERBATION (HCC): ICD-10-CM

## 2023-11-29 DIAGNOSIS — J44.1 COPD EXACERBATION (HCC): Primary | ICD-10-CM

## 2023-11-29 DIAGNOSIS — J18.9 PNEUMONIA OF LEFT LOWER LOBE DUE TO INFECTIOUS ORGANISM: ICD-10-CM

## 2023-11-29 LAB
ALBUMIN SERPL-MCNC: 4.2 G/DL (ref 3.5–4.6)
ALP SERPL-CCNC: 112 U/L (ref 35–104)
ALT SERPL-CCNC: 17 U/L (ref 0–41)
ANION GAP SERPL CALCULATED.3IONS-SCNC: 10 MEQ/L (ref 9–15)
AST SERPL-CCNC: 21 U/L (ref 0–40)
BASOPHILS # BLD: 0 K/UL (ref 0–0.1)
BASOPHILS NFR BLD: 0.5 % (ref 0.2–1.2)
BILIRUB SERPL-MCNC: 0.6 MG/DL (ref 0.2–0.7)
BNP BLD-MCNC: 81 PG/ML
BUN SERPL-MCNC: 13 MG/DL (ref 8–23)
CALCIUM SERPL-MCNC: 9.6 MG/DL (ref 8.5–9.9)
CHLORIDE SERPL-SCNC: 105 MEQ/L (ref 95–107)
CO2 SERPL-SCNC: 24 MEQ/L (ref 20–31)
CREAT SERPL-MCNC: 0.82 MG/DL (ref 0.7–1.2)
EKG ATRIAL RATE: 82 BPM
EKG P AXIS: 81 DEGREES
EKG P-R INTERVAL: 162 MS
EKG Q-T INTERVAL: 428 MS
EKG QRS DURATION: 134 MS
EKG QTC CALCULATION (BAZETT): 500 MS
EKG R AXIS: 72 DEGREES
EKG T AXIS: 49 DEGREES
EKG VENTRICULAR RATE: 82 BPM
EOSINOPHIL # BLD: 0.3 K/UL (ref 0–0.5)
EOSINOPHIL NFR BLD: 4.4 % (ref 0.8–7)
ERYTHROCYTE [DISTWIDTH] IN BLOOD BY AUTOMATED COUNT: 14.8 % (ref 11.6–14.4)
ETHANOL PERCENT: NORMAL G/DL
ETHANOLAMINE SERPL-MCNC: <10 MG/DL (ref 0–0.08)
GLOBULIN SER CALC-MCNC: 3.4 G/DL (ref 2.3–3.5)
GLUCOSE BLD-MCNC: 136 MG/DL (ref 70–99)
GLUCOSE SERPL-MCNC: 103 MG/DL (ref 70–99)
HCT VFR BLD AUTO: 43.3 % (ref 42–52)
HGB BLD-MCNC: 13.7 G/DL (ref 13.7–17.5)
IMM GRANULOCYTES # BLD: 0 K/UL
IMM GRANULOCYTES NFR BLD: 0.3 %
INR PPP: 1
LACTATE BLDV-SCNC: 1.1 MMOL/L (ref 0.5–2.2)
LYMPHOCYTES # BLD: 1.3 K/UL (ref 1.3–3.6)
LYMPHOCYTES NFR BLD: 19 %
MAGNESIUM SERPL-MCNC: 2 MG/DL (ref 1.7–2.4)
MCH RBC QN AUTO: 31 PG (ref 25.7–32.2)
MCHC RBC AUTO-ENTMCNC: 31.6 % (ref 32.3–36.5)
MCV RBC AUTO: 98 FL (ref 79–92.2)
MONOCYTES # BLD: 0.8 K/UL (ref 0.3–0.8)
MONOCYTES NFR BLD: 12.2 % (ref 5.3–12.2)
NEUTROPHILS # BLD: 4.2 K/UL (ref 1.8–5.4)
NEUTS SEG NFR BLD: 63.6 % (ref 34–67.9)
PERFORMED ON: ABNORMAL
PLATELET # BLD AUTO: 303 K/UL (ref 163–337)
POTASSIUM SERPL-SCNC: 3.9 MEQ/L (ref 3.4–4.9)
PROT SERPL-MCNC: 7.6 G/DL (ref 6.3–8)
PROTHROMBIN TIME: 13.5 SEC (ref 12.3–14.9)
RBC # BLD AUTO: 4.42 M/UL (ref 4.63–6.08)
SODIUM SERPL-SCNC: 139 MEQ/L (ref 135–144)
TROPONIN, HIGH SENSITIVITY: 15 NG/L (ref 0–19)
TROPONIN, HIGH SENSITIVITY: 20 NG/L (ref 0–19)
WBC # BLD AUTO: 6.6 K/UL (ref 4.2–9)

## 2023-11-29 PROCEDURE — 1210000000 HC MED SURG R&B

## 2023-11-29 PROCEDURE — 6370000000 HC RX 637 (ALT 250 FOR IP): Performed by: EMERGENCY MEDICINE

## 2023-11-29 PROCEDURE — 82077 ASSAY SPEC XCP UR&BREATH IA: CPT

## 2023-11-29 PROCEDURE — 2580000003 HC RX 258: Performed by: EMERGENCY MEDICINE

## 2023-11-29 PROCEDURE — 84484 ASSAY OF TROPONIN QUANT: CPT

## 2023-11-29 PROCEDURE — 6360000002 HC RX W HCPCS: Performed by: INTERNAL MEDICINE

## 2023-11-29 PROCEDURE — 2500000003 HC RX 250 WO HCPCS: Performed by: EMERGENCY MEDICINE

## 2023-11-29 PROCEDURE — 85610 PROTHROMBIN TIME: CPT

## 2023-11-29 PROCEDURE — 93005 ELECTROCARDIOGRAM TRACING: CPT

## 2023-11-29 PROCEDURE — 6370000000 HC RX 637 (ALT 250 FOR IP): Performed by: INTERNAL MEDICINE

## 2023-11-29 PROCEDURE — 83735 ASSAY OF MAGNESIUM: CPT

## 2023-11-29 PROCEDURE — 85025 COMPLETE CBC W/AUTO DIFF WBC: CPT

## 2023-11-29 PROCEDURE — 93010 ELECTROCARDIOGRAM REPORT: CPT | Performed by: INTERNAL MEDICINE

## 2023-11-29 PROCEDURE — 96365 THER/PROPH/DIAG IV INF INIT: CPT

## 2023-11-29 PROCEDURE — 99285 EMERGENCY DEPT VISIT HI MDM: CPT

## 2023-11-29 PROCEDURE — A4216 STERILE WATER/SALINE, 10 ML: HCPCS | Performed by: EMERGENCY MEDICINE

## 2023-11-29 PROCEDURE — 2700000000 HC OXYGEN THERAPY PER DAY

## 2023-11-29 PROCEDURE — 96367 TX/PROPH/DG ADDL SEQ IV INF: CPT

## 2023-11-29 PROCEDURE — 83880 ASSAY OF NATRIURETIC PEPTIDE: CPT

## 2023-11-29 PROCEDURE — 83605 ASSAY OF LACTIC ACID: CPT

## 2023-11-29 PROCEDURE — 80053 COMPREHEN METABOLIC PANEL: CPT

## 2023-11-29 PROCEDURE — 96375 TX/PRO/DX INJ NEW DRUG ADDON: CPT

## 2023-11-29 PROCEDURE — 2580000003 HC RX 258: Performed by: INTERNAL MEDICINE

## 2023-11-29 PROCEDURE — 87040 BLOOD CULTURE FOR BACTERIA: CPT

## 2023-11-29 PROCEDURE — 6360000002 HC RX W HCPCS: Performed by: EMERGENCY MEDICINE

## 2023-11-29 PROCEDURE — 94640 AIRWAY INHALATION TREATMENT: CPT

## 2023-11-29 PROCEDURE — 36415 COLL VENOUS BLD VENIPUNCTURE: CPT

## 2023-11-29 PROCEDURE — 71045 X-RAY EXAM CHEST 1 VIEW: CPT

## 2023-11-29 RX ORDER — PANTOPRAZOLE SODIUM 40 MG/1
40 TABLET, DELAYED RELEASE ORAL DAILY
Status: DISCONTINUED | OUTPATIENT
Start: 2023-11-29 | End: 2023-11-30 | Stop reason: HOSPADM

## 2023-11-29 RX ORDER — KETOROLAC TROMETHAMINE 30 MG/ML
30 INJECTION, SOLUTION INTRAMUSCULAR; INTRAVENOUS EVERY 6 HOURS PRN
Status: DISCONTINUED | OUTPATIENT
Start: 2023-11-29 | End: 2023-11-30 | Stop reason: HOSPADM

## 2023-11-29 RX ORDER — MAGNESIUM SULFATE IN WATER 40 MG/ML
2000 INJECTION, SOLUTION INTRAVENOUS PRN
Status: DISCONTINUED | OUTPATIENT
Start: 2023-11-29 | End: 2023-11-29

## 2023-11-29 RX ORDER — OXYCODONE HYDROCHLORIDE AND ACETAMINOPHEN 5; 325 MG/1; MG/1
1 TABLET ORAL EVERY 4 HOURS PRN
Status: DISCONTINUED | OUTPATIENT
Start: 2023-11-29 | End: 2023-11-30 | Stop reason: HOSPADM

## 2023-11-29 RX ORDER — ONDANSETRON 4 MG/1
4 TABLET, ORALLY DISINTEGRATING ORAL EVERY 8 HOURS PRN
Status: DISCONTINUED | OUTPATIENT
Start: 2023-11-29 | End: 2023-11-30 | Stop reason: HOSPADM

## 2023-11-29 RX ORDER — SODIUM CHLORIDE 0.9 % (FLUSH) 0.9 %
5-40 SYRINGE (ML) INJECTION EVERY 12 HOURS SCHEDULED
Status: DISCONTINUED | OUTPATIENT
Start: 2023-11-29 | End: 2023-11-30 | Stop reason: HOSPADM

## 2023-11-29 RX ORDER — ACETAMINOPHEN 650 MG/1
650 SUPPOSITORY RECTAL EVERY 6 HOURS PRN
Status: DISCONTINUED | OUTPATIENT
Start: 2023-11-29 | End: 2023-11-30 | Stop reason: HOSPADM

## 2023-11-29 RX ORDER — SODIUM CHLORIDE 9 MG/ML
INJECTION, SOLUTION INTRAVENOUS CONTINUOUS
Status: DISCONTINUED | OUTPATIENT
Start: 2023-11-29 | End: 2023-11-29

## 2023-11-29 RX ORDER — OXYCODONE HYDROCHLORIDE AND ACETAMINOPHEN 5; 325 MG/1; MG/1
2 TABLET ORAL EVERY 4 HOURS PRN
Status: DISCONTINUED | OUTPATIENT
Start: 2023-11-29 | End: 2023-11-30 | Stop reason: HOSPADM

## 2023-11-29 RX ORDER — MONTELUKAST SODIUM 10 MG/1
10 TABLET ORAL NIGHTLY
Status: DISCONTINUED | OUTPATIENT
Start: 2023-11-29 | End: 2023-11-30 | Stop reason: HOSPADM

## 2023-11-29 RX ORDER — IPRATROPIUM BROMIDE AND ALBUTEROL SULFATE 2.5; .5 MG/3ML; MG/3ML
1 SOLUTION RESPIRATORY (INHALATION) 3 TIMES DAILY
Status: DISCONTINUED | OUTPATIENT
Start: 2023-11-29 | End: 2023-11-30 | Stop reason: HOSPADM

## 2023-11-29 RX ORDER — MAGNESIUM SULFATE 1 G/100ML
1000 INJECTION INTRAVENOUS ONCE
Status: COMPLETED | OUTPATIENT
Start: 2023-11-29 | End: 2023-11-29

## 2023-11-29 RX ORDER — TRAZODONE HYDROCHLORIDE 50 MG/1
50 TABLET ORAL NIGHTLY
Status: DISCONTINUED | OUTPATIENT
Start: 2023-11-29 | End: 2023-11-30 | Stop reason: HOSPADM

## 2023-11-29 RX ORDER — IPRATROPIUM BROMIDE AND ALBUTEROL SULFATE 2.5; .5 MG/3ML; MG/3ML
1 SOLUTION RESPIRATORY (INHALATION) ONCE
Status: COMPLETED | OUTPATIENT
Start: 2023-11-29 | End: 2023-11-29

## 2023-11-29 RX ORDER — LEVOFLOXACIN 5 MG/ML
750 INJECTION, SOLUTION INTRAVENOUS EVERY 24 HOURS
Status: DISCONTINUED | OUTPATIENT
Start: 2023-11-30 | End: 2023-11-30 | Stop reason: HOSPADM

## 2023-11-29 RX ORDER — ENOXAPARIN SODIUM 100 MG/ML
30 INJECTION SUBCUTANEOUS 2 TIMES DAILY
Status: DISCONTINUED | OUTPATIENT
Start: 2023-11-29 | End: 2023-11-30 | Stop reason: HOSPADM

## 2023-11-29 RX ORDER — LEVOFLOXACIN 5 MG/ML
750 INJECTION, SOLUTION INTRAVENOUS ONCE
Status: COMPLETED | OUTPATIENT
Start: 2023-11-29 | End: 2023-11-29

## 2023-11-29 RX ORDER — METOCLOPRAMIDE HYDROCHLORIDE 5 MG/ML
10 INJECTION INTRAMUSCULAR; INTRAVENOUS ONCE
Status: COMPLETED | OUTPATIENT
Start: 2023-11-29 | End: 2023-11-29

## 2023-11-29 RX ORDER — LOSARTAN POTASSIUM 25 MG/1
25 TABLET ORAL DAILY
Status: DISCONTINUED | OUTPATIENT
Start: 2023-11-29 | End: 2023-11-30 | Stop reason: HOSPADM

## 2023-11-29 RX ORDER — SODIUM CHLORIDE 9 MG/ML
INJECTION, SOLUTION INTRAVENOUS PRN
Status: DISCONTINUED | OUTPATIENT
Start: 2023-11-29 | End: 2023-11-30 | Stop reason: HOSPADM

## 2023-11-29 RX ORDER — ATORVASTATIN CALCIUM 10 MG/1
10 TABLET, FILM COATED ORAL NIGHTLY
Status: DISCONTINUED | OUTPATIENT
Start: 2023-11-29 | End: 2023-11-30 | Stop reason: HOSPADM

## 2023-11-29 RX ORDER — IPRATROPIUM BROMIDE AND ALBUTEROL SULFATE 2.5; .5 MG/3ML; MG/3ML
1 SOLUTION RESPIRATORY (INHALATION) EVERY 4 HOURS PRN
Status: DISCONTINUED | OUTPATIENT
Start: 2023-11-29 | End: 2023-11-30 | Stop reason: HOSPADM

## 2023-11-29 RX ORDER — ONDANSETRON 2 MG/ML
4 INJECTION INTRAMUSCULAR; INTRAVENOUS EVERY 6 HOURS PRN
Status: DISCONTINUED | OUTPATIENT
Start: 2023-11-29 | End: 2023-11-30 | Stop reason: HOSPADM

## 2023-11-29 RX ORDER — AMLODIPINE BESYLATE 10 MG/1
10 TABLET ORAL DAILY
Status: DISCONTINUED | OUTPATIENT
Start: 2023-11-29 | End: 2023-11-30 | Stop reason: HOSPADM

## 2023-11-29 RX ORDER — ACETAMINOPHEN 325 MG/1
650 TABLET ORAL EVERY 6 HOURS PRN
Status: DISCONTINUED | OUTPATIENT
Start: 2023-11-29 | End: 2023-11-30 | Stop reason: HOSPADM

## 2023-11-29 RX ORDER — ESCITALOPRAM OXALATE 10 MG/1
10 TABLET ORAL DAILY
Status: DISCONTINUED | OUTPATIENT
Start: 2023-11-29 | End: 2023-11-30 | Stop reason: HOSPADM

## 2023-11-29 RX ORDER — POLYETHYLENE GLYCOL 3350 17 G/17G
17 POWDER, FOR SOLUTION ORAL DAILY PRN
Status: DISCONTINUED | OUTPATIENT
Start: 2023-11-29 | End: 2023-11-30 | Stop reason: HOSPADM

## 2023-11-29 RX ADMIN — METFORMIN HYDROCHLORIDE 500 MG: 500 TABLET, FILM COATED ORAL at 16:44

## 2023-11-29 RX ADMIN — LEVOFLOXACIN 750 MG: 5 INJECTION, SOLUTION INTRAVENOUS at 08:52

## 2023-11-29 RX ADMIN — AMLODIPINE BESYLATE 10 MG: 10 TABLET ORAL at 10:47

## 2023-11-29 RX ADMIN — ENOXAPARIN SODIUM 30 MG: 100 INJECTION SUBCUTANEOUS at 20:23

## 2023-11-29 RX ADMIN — MONTELUKAST 10 MG: 10 TABLET, FILM COATED ORAL at 20:24

## 2023-11-29 RX ADMIN — MOMETASONE FUROATE AND FORMOTEROL FUMARATE DIHYDRATE 2 PUFF: 100; 5 AEROSOL RESPIRATORY (INHALATION) at 11:00

## 2023-11-29 RX ADMIN — SODIUM CHLORIDE 1000 ML: 9 INJECTION, SOLUTION INTRAVENOUS at 08:05

## 2023-11-29 RX ADMIN — TRAZODONE HYDROCHLORIDE 50 MG: 50 TABLET ORAL at 20:24

## 2023-11-29 RX ADMIN — METFORMIN HYDROCHLORIDE 500 MG: 500 TABLET, FILM COATED ORAL at 10:47

## 2023-11-29 RX ADMIN — Medication 10 ML: at 20:24

## 2023-11-29 RX ADMIN — ENOXAPARIN SODIUM 30 MG: 100 INJECTION SUBCUTANEOUS at 10:47

## 2023-11-29 RX ADMIN — SODIUM CHLORIDE: 9 INJECTION, SOLUTION INTRAVENOUS at 10:59

## 2023-11-29 RX ADMIN — METOCLOPRAMIDE HYDROCHLORIDE 10 MG: 5 INJECTION INTRAMUSCULAR; INTRAVENOUS at 07:52

## 2023-11-29 RX ADMIN — MAGNESIUM SULFATE HEPTAHYDRATE 1000 MG: 1 INJECTION, SOLUTION INTRAVENOUS at 07:53

## 2023-11-29 RX ADMIN — ESCITALOPRAM OXALATE 10 MG: 10 TABLET ORAL at 10:47

## 2023-11-29 RX ADMIN — MOMETASONE FUROATE AND FORMOTEROL FUMARATE DIHYDRATE 2 PUFF: 100; 5 AEROSOL RESPIRATORY (INHALATION) at 18:00

## 2023-11-29 RX ADMIN — IPRATROPIUM BROMIDE AND ALBUTEROL SULFATE 1 DOSE: 2.5; .5 SOLUTION RESPIRATORY (INHALATION) at 17:59

## 2023-11-29 RX ADMIN — FAMOTIDINE 20 MG: 10 INJECTION, SOLUTION INTRAVENOUS at 07:52

## 2023-11-29 RX ADMIN — PANTOPRAZOLE SODIUM 40 MG: 40 TABLET, DELAYED RELEASE ORAL at 10:47

## 2023-11-29 RX ADMIN — OXYCODONE AND ACETAMINOPHEN 1 TABLET: 5; 325 TABLET ORAL at 20:27

## 2023-11-29 RX ADMIN — Medication 10 ML: at 10:48

## 2023-11-29 RX ADMIN — ATORVASTATIN CALCIUM 10 MG: 10 TABLET, FILM COATED ORAL at 20:24

## 2023-11-29 RX ADMIN — IPRATROPIUM BROMIDE AND ALBUTEROL SULFATE 1 DOSE: 2.5; .5 SOLUTION RESPIRATORY (INHALATION) at 08:13

## 2023-11-29 RX ADMIN — LOSARTAN POTASSIUM 25 MG: 25 TABLET, FILM COATED ORAL at 10:47

## 2023-11-29 RX ADMIN — IPRATROPIUM BROMIDE AND ALBUTEROL SULFATE 1 DOSE: 2.5; .5 SOLUTION RESPIRATORY (INHALATION) at 12:49

## 2023-11-29 ASSESSMENT — ENCOUNTER SYMPTOMS
SHORTNESS OF BREATH: 1
EYE REDNESS: 0
SORE THROAT: 0
VOMITING: 0
EYE PAIN: 0
STRIDOR: 0
BLOOD IN STOOL: 0
NAUSEA: 1
VOICE CHANGE: 0
EYE DISCHARGE: 0
TROUBLE SWALLOWING: 0
WHEEZING: 0
CHEST TIGHTNESS: 0
DIARRHEA: 0
CONSTIPATION: 0
COUGH: 1
ABDOMINAL PAIN: 0
SINUS PRESSURE: 0
CHOKING: 0
BACK PAIN: 0
FACIAL SWELLING: 0

## 2023-11-29 ASSESSMENT — PAIN - FUNCTIONAL ASSESSMENT
PAIN_FUNCTIONAL_ASSESSMENT: PREVENTS OR INTERFERES SOME ACTIVE ACTIVITIES AND ADLS
PAIN_FUNCTIONAL_ASSESSMENT: NONE - DENIES PAIN

## 2023-11-29 ASSESSMENT — PAIN SCALES - GENERAL: PAINLEVEL_OUTOF10: 9

## 2023-11-29 ASSESSMENT — PAIN DESCRIPTION - ORIENTATION: ORIENTATION: OUTER

## 2023-11-29 ASSESSMENT — PAIN DESCRIPTION - LOCATION: LOCATION: GENERALIZED

## 2023-11-29 ASSESSMENT — PAIN DESCRIPTION - DESCRIPTORS: DESCRIPTORS: CRAMPING

## 2023-11-29 NOTE — ED PROVIDER NOTES
[address] [facility] ED  eMERGENCY dEPARTMENT eNCOUnter      Pt Name: Desi Espinoza  MRN: [id number]  [address] [facility] 1955  Date of evaluation: 11/29/2023  Provider: Isidro Mercer MD    [address]       Chief Complaint   Patient presents with    Shortness of Breath     PT CALLED AMBULANCE WITH C/O SOB   PT STATES HE  [address] A LOT YESTERDAY  AND VOMITED A FEW TIMES AND MAY HAVE ASPIRATED     TORY OF PRESENT ILLNESS   (Location/Symptom, Timing/Onset,Context/Setting, Quality, Duration, Modifying Factors, Severity)  Note limiting factors. Desi Espinoza is a 74 y.o. male who presents to the emergency department well-known to me from previous multiple encounters this emergency for multiple visits mostly for COPD respiratory illness in the past patient does have history of chronic back pain and drug-seeking behavior patient has a history of marijuana and cocaine use in the past patient admitted that he was drinking a lot yesterday mostly vodka and he started vomiting and after that he become short of breath thinking that he may have aspirated some vomitus patient does take home oxygen at home as needed history of obstructive sleep apnea obesity COPD gout hypertension GE reflux recurrent pneumonias in the past type 2 diabetes mellitus and renal insufficiency patient was seen patient was given aerosol treatment and Solu-Medrol in route and brought it here in stable condition    HPI    NursingNotes were reviewed. REVIEW OF SYSTEMS    (2-9 systems for level 4, 10 or more for level 5)     Review of Systems   Constitutional:  Positive for activity change, appetite change and fatigue. Negative for fever. HENT:  Negative for congestion, drooling, facial swelling, mouth sores, nosebleeds, sinus pressure, sore throat, trouble swallowing and voice change. Eyes:  Negative for pain, discharge, redness and visual disturbance. Respiratory:  Positive for cough and shortness of breath.  Negative for choking, chest

## 2023-11-29 NOTE — PROGRESS NOTES
Temi Mock Initial Discharge Assessment    Met with Patient to discuss discharge plan. PCP: Sapphire Mitchell MD                                  Date of Last Visit: \" about a month ago\"    If no PCP, list provided? N/A    Discharge Planning    Living Arrangements: independently at home    Who do you live with? Self     Who helps you with your care:  self    If lives at home:     Do you have any barriers navigating in your home? no    Patient can perform ADL? Yes    Current Services (outpatient and in home) :  None    Dialysis: No    Is transportation available to get to your appointments? Yes    DME Equipment:  yes - oxygen    Respiratory equipment: PRN/HS Oxygen 3 Liters    Respiratory provider:  yes - medical service company     Pharmacy:  yes - [facility] in [address] [facility] to afford cost of medication: Yes    Does patient feel safe at home? Yes    Does patient identify any home going needs? No    Patient agreeable to [address]? No    Patient agreeable to SNF/Rehab? N/A- patient is independent for ADLS    Other discharge needs identified? Other Patient is reported to have a history of substance abuse. This  met with patient to provide resources for help with substance abuse. Patient reports no interest in resources. Patient reports plan to get  breathing issues under control and DC home    Was [place] of Choice Provided? Yes    Initial Discharge Plan? (Note: please see concurrent daily documentation for any updates after initial note). Chart reviewed. Patient was admitted to [facility] after presenting to the ED with dyspnea/SOB. Patient has a history of COPD. Patient resides at home independently and has home O2. Patient reports being independent of all ADLS and is an active . Patient has a history of substance abuse. SS met with patient to discuss DC planning, help at home needs, and provide resources for help with substance abuse.   Patient reports no interest in resources.  Patient reports plan to get breathing issues under control and DC home.  Patient identifies no DC needs at this time.  No further follow up from SS requested.        Electronically signed by CIARA Galdamez on 11/29/2023 at 5:30 PM

## 2023-11-29 NOTE — PROGRESS NOTES
Pt admitted to room 210. Assessment competed. Pt denies any pain or discomfort at this time. Call light with in reach. Will continue to monitor.

## 2023-11-29 NOTE — PROGRESS NOTES
Pt observed ambulating in halss independently without AD prior to eval attempt. PT talked with pt in pts room about skilled medical PT needs. Pt reports needing respiratory treatments and being able to walk safe and independent to and from bathroom. Pt says he has no stairs. Pt relates needing OP to gain endurance. Educated pt to request OP eval order at discharge. Pt also declines any need for OT eval as bathing and dressing independently. No PT or OT eval warranted at this time. Please re refer therapy if pt medical conditions changes or worsens and therapy is needed.      Thank you for the referral.    Tita Ronquillo, [address]

## 2023-11-29 NOTE — H&P
Hospital Medicine History & Physical      PCP: Yusuf Jose MD    Date of Admission: 11/29/2023    Date of Service: 11/29/23      Chief Complaint:  dyspnea/SOB      History Of Present Illness:  74 y.o. male who presented to [facility] with above complains. Patient has long standing history of COPD, using home O2 PRN, had extra drink of alcohol on Monday, vomited yesterday and suspected he may aspirated.  Denied CP, fever, still smoking on a daily basis   After initial stabilization in ER was admitted for further management     Past Medical History:          Diagnosis Date    Alcohol abuse 10/27/2016    Anemia     Asthma     CKD (chronic kidney disease) stage 3, GFR 30-59 ml/min ([facility]) 12/14/2021    Cocaine abuse ([address]) 10/27/2016    Community acquired pneumonia of left lower lobe of lung 12/5/2016    COPD (chronic obstructive pulmonary disease) ([address])     Depression 04/27/2020    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    Drug-seeking behavior 04/14/2015    Edema 12/10/2015    Erectile dysfunction     Gastroesophageal reflux disease 12/17/2018    Gout 10/27/2016    History of arthroscopy of both knees 10/27/2016    History of colon polyps 10/26/2021    House dust mite allergy 04/21/2014    Hyperlipidemia     Hypertension 10/27/2016    Injury to heart 10/27/2016    Insomnia 12/17/2018    Loculated pleural effusion     Macrocytic anemia 9/7/2013    Medical non-compliance 02/22/2014    Morbid obesity due to excess calories ([address]) 12/08/2016    Osteoarthritis of both knees 12/08/2016    Personal history of tobacco use     Pleurisy 12/12/2016    Pneumonia 12/04/2016    caused hospital admission    Pre-diabetes 10/27/2016    Seasonal allergies 04/21/2014    Severe persistent asthma 10/27/2016    Severe sleep apnea 04/14/2015    Supraventricular tachycardia 10/27/2016    Type 2 diabetes mellitus with albuminuria ([address]) 10/26/2021    Type 2 diabetes mellitus without complication, without long-term current use of insulin

## 2023-11-29 NOTE — ED NOTES
Rt at bedside giving treatment   pt shows no signs of distress       Tony Orozco [address] [facility], [address]  11/29/23 1237

## 2023-11-30 VITALS
TEMPERATURE: 97.9 F | RESPIRATION RATE: 16 BRPM | BODY MASS INDEX: 34.38 KG/M2 | HEART RATE: 65 BPM | WEIGHT: 253.53 LBS | DIASTOLIC BLOOD PRESSURE: 84 MMHG | OXYGEN SATURATION: 85 % | SYSTOLIC BLOOD PRESSURE: 151 MMHG

## 2023-11-30 LAB
ANION GAP SERPL CALCULATED.3IONS-SCNC: 8 MEQ/L (ref 9–15)
BACTERIA BLD CULT ORG #2: NORMAL
BACTERIA BLD CULT: NORMAL
BASOPHILS # BLD: 0 K/UL (ref 0–0.1)
BASOPHILS NFR BLD: 0.1 % (ref 0.2–1.2)
BUN SERPL-MCNC: 13 MG/DL (ref 8–23)
CALCIUM SERPL-MCNC: 9.6 MG/DL (ref 8.5–9.9)
CHLORIDE SERPL-SCNC: 107 MEQ/L (ref 95–107)
CO2 SERPL-SCNC: 25 MEQ/L (ref 20–31)
CREAT SERPL-MCNC: 0.69 MG/DL (ref 0.7–1.2)
EOSINOPHIL # BLD: 0 K/UL (ref 0–0.5)
EOSINOPHIL NFR BLD: 0.3 % (ref 0.8–7)
ERYTHROCYTE [DISTWIDTH] IN BLOOD BY AUTOMATED COUNT: 14.5 % (ref 11.6–14.4)
GLUCOSE SERPL-MCNC: 109 MG/DL (ref 70–99)
HCT VFR BLD AUTO: 40.2 % (ref 42–52)
HGB BLD-MCNC: 13 G/DL (ref 13.7–17.5)
IMM GRANULOCYTES # BLD: 0 K/UL
IMM GRANULOCYTES NFR BLD: 0.1 %
LYMPHOCYTES # BLD: 1.2 K/UL (ref 1.3–3.6)
LYMPHOCYTES NFR BLD: 17 %
MCH RBC QN AUTO: 31.2 PG (ref 25.7–32.2)
MCHC RBC AUTO-ENTMCNC: 32.3 % (ref 32.3–36.5)
MCV RBC AUTO: 96.4 FL (ref 79–92.2)
MONOCYTES # BLD: 1.2 K/UL (ref 0.3–0.8)
MONOCYTES NFR BLD: 17.1 % (ref 5.3–12.2)
NEUTROPHILS # BLD: 4.4 K/UL (ref 1.8–5.4)
NEUTS SEG NFR BLD: 65.4 % (ref 34–67.9)
PLATELET # BLD AUTO: 298 K/UL (ref 163–337)
POTASSIUM SERPL-SCNC: 4.1 MEQ/L (ref 3.4–4.9)
RBC # BLD AUTO: 4.17 M/UL (ref 4.63–6.08)
SODIUM SERPL-SCNC: 140 MEQ/L (ref 135–144)
WBC # BLD AUTO: 6.8 K/UL (ref 4.2–9)

## 2023-11-30 PROCEDURE — 85025 COMPLETE CBC W/AUTO DIFF WBC: CPT

## 2023-11-30 PROCEDURE — 6360000002 HC RX W HCPCS: Performed by: INTERNAL MEDICINE

## 2023-11-30 PROCEDURE — 6370000000 HC RX 637 (ALT 250 FOR IP): Performed by: INTERNAL MEDICINE

## 2023-11-30 PROCEDURE — 2700000000 HC OXYGEN THERAPY PER DAY

## 2023-11-30 PROCEDURE — 94640 AIRWAY INHALATION TREATMENT: CPT

## 2023-11-30 PROCEDURE — 36415 COLL VENOUS BLD VENIPUNCTURE: CPT

## 2023-11-30 PROCEDURE — 80048 BASIC METABOLIC PNL TOTAL CA: CPT

## 2023-11-30 PROCEDURE — 2580000003 HC RX 258: Performed by: INTERNAL MEDICINE

## 2023-11-30 RX ORDER — ALBUTEROL SULFATE 90 UG/1
2 AEROSOL, METERED RESPIRATORY (INHALATION) EVERY 4 HOURS PRN
Qty: 18 G | Refills: 3 | Status: SHIPPED | OUTPATIENT
Start: 2023-11-30

## 2023-11-30 RX ORDER — BUDESONIDE AND FORMOTEROL FUMARATE DIHYDRATE 160; 4.5 UG/1; UG/1
2 AEROSOL RESPIRATORY (INHALATION) 2 TIMES DAILY
Qty: 30.6 G | Refills: 1 | Status: SHIPPED | OUTPATIENT
Start: 2023-11-30

## 2023-11-30 RX ORDER — LEVOFLOXACIN 500 MG/1
500 TABLET, FILM COATED ORAL DAILY
Qty: 3 TABLET | Refills: 0 | Status: SHIPPED | OUTPATIENT
Start: 2023-11-30 | End: 2023-12-03

## 2023-11-30 RX ORDER — PREDNISONE 10 MG/1
10 TABLET ORAL 2 TIMES DAILY
Qty: 10 TABLET | Refills: 0 | Status: SHIPPED | OUTPATIENT
Start: 2023-11-30 | End: 2023-12-05

## 2023-11-30 RX ORDER — METHYLPREDNISOLONE SODIUM SUCCINATE 40 MG/ML
40 INJECTION, POWDER, LYOPHILIZED, FOR SOLUTION INTRAMUSCULAR; INTRAVENOUS DAILY
Status: DISCONTINUED | OUTPATIENT
Start: 2023-11-30 | End: 2023-11-30 | Stop reason: HOSPADM

## 2023-11-30 RX ORDER — TIOTROPIUM BROMIDE 18 UG/1
18 CAPSULE ORAL; RESPIRATORY (INHALATION) DAILY
Qty: 90 CAPSULE | Refills: 1 | Status: SHIPPED | OUTPATIENT
Start: 2023-11-30

## 2023-11-30 RX ADMIN — IPRATROPIUM BROMIDE AND ALBUTEROL SULFATE 1 DOSE: 2.5; .5 SOLUTION RESPIRATORY (INHALATION) at 06:10

## 2023-11-30 RX ADMIN — IPRATROPIUM BROMIDE AND ALBUTEROL SULFATE 1 DOSE: 2.5; .5 SOLUTION RESPIRATORY (INHALATION) at 11:10

## 2023-11-30 RX ADMIN — PANTOPRAZOLE SODIUM 40 MG: 40 TABLET, DELAYED RELEASE ORAL at 08:29

## 2023-11-30 RX ADMIN — AMLODIPINE BESYLATE 10 MG: 10 TABLET ORAL at 08:29

## 2023-11-30 RX ADMIN — ENOXAPARIN SODIUM 30 MG: 100 INJECTION SUBCUTANEOUS at 08:29

## 2023-11-30 RX ADMIN — ESCITALOPRAM OXALATE 10 MG: 10 TABLET ORAL at 08:29

## 2023-11-30 RX ADMIN — LEVOFLOXACIN 750 MG: 5 INJECTION, SOLUTION INTRAVENOUS at 08:32

## 2023-11-30 RX ADMIN — LOSARTAN POTASSIUM 25 MG: 25 TABLET, FILM COATED ORAL at 08:28

## 2023-11-30 RX ADMIN — Medication 10 ML: at 08:30

## 2023-11-30 RX ADMIN — MOMETASONE FUROATE AND FORMOTEROL FUMARATE DIHYDRATE 2 PUFF: 100; 5 AEROSOL RESPIRATORY (INHALATION) at 06:10

## 2023-11-30 RX ADMIN — METFORMIN HYDROCHLORIDE 500 MG: 500 TABLET, FILM COATED ORAL at 08:28

## 2023-11-30 RX ADMIN — METHYLPREDNISOLONE SODIUM SUCCINATE 40 MG: 40 INJECTION INTRAMUSCULAR; INTRAVENOUS at 08:28

## 2023-11-30 RX ADMIN — IPRATROPIUM BROMIDE AND ALBUTEROL SULFATE 1 DOSE: 2.5; .5 SOLUTION RESPIRATORY (INHALATION) at 00:19

## 2023-11-30 NOTE — DISCHARGE SUMMARY
Discharge Summary    Patient:  Anabelle Morris  YOB: 1955    MRN: [id number]   Acct: [id number]    Primary Care Physician: Roby Silva MD    Admit date:  11/29/2023    Discharge date:   11/30/23      Discharge Diagnoses:   Pneumonia in infectious disease  Principal Problem:    Pneumonia in infectious disease  Resolved Problems:    * No resolved hospital problems. *      Admitted for: (HPI)above    Hospital Course: patient was admitted and treated for suspected CAP due to aspiration. No fever/leucocytosis were observed, treated with IV atbs, after completing his acute stay patient will be DC home with PO atbs/steroids and will follow up with his pulmonary service as outpatient     Consultants:      Discharge Medications:       Medication List        START taking these medications      levoFLOXacin 500 MG tablet  Commonly known as: LEVAQUIN  Take 1 tablet by mouth daily for 3 days     predniSONE 10 MG tablet  Commonly known as: DELTASONE  Take 1 tablet by mouth 2 times daily for 5 days            CONTINUE taking these medications      Acetaminophen Extra Strength 500 MG Tabs  Take 1 tablet by mouth every 8 hours as needed (pain)     * albuterol sulfate  (90 Base) MCG/ACT inhaler  Commonly known as: PROVENTIL;VENTOLIN;PROAIR  Inhale 2 puffs into the lungs every 4 hours as needed for Wheezing or Shortness of Breath     * albuterol sulfate  (90 Base) MCG/ACT inhaler  Commonly known as: PROVENTIL;VENTOLIN;PROAIR  Inhale 2 puffs into the lungs every 4 hours as needed for Wheezing     amLODIPine 10 MG tablet  Commonly known as: NORVASC  Take 1 tablet by mouth daily     cetirizine 10 MG tablet  Commonly known as: ZYRTEC  TAKE 1 TABLET BY MOUTH EVERY DAY     escitalopram 10 MG tablet  Commonly known as: LEXAPRO  Take 1 tablet by mouth daily     fluticasone 50 MCG/ACT nasal spray  Commonly known as: FLONASE  2 sprays by Nasal route daily     ipratropium 0.5 mg-albuterol 2.5 mg 0.5-2.5  Spent: 50 minutes    Electronically signed by Rhoda Chapman MD on 11/30/2023 at 8:01 AM    Discharging Hospitalist

## 2023-11-30 NOTE — PROGRESS NOTES
This nurse educated pt on the importance of needing a portable O2 tank to go home with multiple times and pt still refused.

## 2023-11-30 NOTE — PROGRESS NOTES
This nurse made numerus attempts to get patient to stay on unit to wait for his ride. Pt insisted on waiting down in lobby for ride to come. Pt is a&o x4, ambulates with no assistance. IV removed, cathter intact. Meds sent to pharmacy of pt choice. Discharge instructions given and reviewed with pt.

## 2023-11-30 NOTE — PLAN OF CARE
Problem: Discharge Planning  Goal: Discharge to home or other facility with appropriate resources  Outcome: Completed  Flowsheets (Taken 11/30/2023 1127)  Discharge to home or other facility with appropriate resources: Identify barriers to discharge with patient and caregiver     Problem: Safety - Adult  Goal: Free from fall injury  Outcome: Completed     Problem: ABCDS Injury Assessment  Goal: Absence of physical injury  Outcome: Completed

## 2023-11-30 NOTE — PROGRESS NOTES
Respiratory came to nurses station stating pt O2 was dropping to 85% when ambulating w/o O2 on and wheezing. This nurse went to assess pt and at rest he was sat at 100% and wheezing heard in front lobes. Dr. Niurka Gonzalez is aware and stated for pt to be d/c today with O2. Pt refused leaving with O2 stated, \"I don't need a O2 tank, its not that bad, I have a O2 concentrator at home\". Pt is currently on 2L of O2, and states he is on 3L of O2 as needed at home.

## 2023-12-01 ENCOUNTER — CARE COORDINATION (OUTPATIENT)
Dept: CARE COORDINATION | Age: 68
End: 2023-12-01

## 2023-12-01 NOTE — CARE COORDINATION
Care Transitions Initial Follow Up Call    Call within 2 business days of discharge: Yes    Patient Current Location:  Home: [address]  [address]    Care Transition Nurse contacted the patient by telephone to perform post hospital discharge assessment. Verified name and  with patient as identifiers. Provided introduction to self, and explanation of the Care Transition Nurse role. Patient: Stanford Esposito Patient : 1955   MRN: [id number]  Reason for Admission: COPD exacerbation  Discharge Date: 23 RARS: Readmission Risk Score: 10.8      Last Discharge Facility       Date Complaint Diagnosis Description Type Department Provider    23 Shortness of Breath COPD exacerbation ([facility]) . .. ED to Hosp-Admission (Discharged) (ADMITTED) Melissa BOWDEN COLE Tylor Fontaine MD; Claudette Vogt MD            Was this an external facility discharge? No Discharge Facility: [facility]    Challenges to be reviewed by the provider   Additional needs identified to be addressed with provider: No  none               Method of communication with provider: none.      -Spoke with patient for initial care transition call post hospital discharge.   -Patient admitted to [facility] on 23 with symptoms of SOB. Patient suspected of CAP d/t aspiration.  -Patient reports his night was fine after he got settled in back home. Admits to SOB with any activity, states has oxygen at home at 3L NC but has not been wearing. CTN encouraged patient to wear his oxygen when he feels SOB. Patient has no oximeter. Patient states he has been compliant with his respiratory medications and using nebulizer as needed. States eating/drinking well.  -States he usually has friends that will provide transportation to any appointments, if they are not available he will take the bus as it only costs a dollar.  -Patient denies any needs at this time, was appreciative of call,  and is agreeable to continued follow up from CTN.

## 2023-12-04 LAB
BACTERIA BLD CULT ORG #2: NORMAL
BACTERIA BLD CULT: NORMAL

## 2023-12-04 RX ORDER — ALBUTEROL SULFATE 90 UG/1
AEROSOL, METERED RESPIRATORY (INHALATION)
Qty: 18 EACH | Refills: 3 | OUTPATIENT
Start: 2023-12-04

## 2023-12-05 ENCOUNTER — OFFICE VISIT (OUTPATIENT)
Dept: FAMILY MEDICINE CLINIC | Age: 68
End: 2023-12-05

## 2023-12-05 ENCOUNTER — CARE COORDINATION (OUTPATIENT)
Dept: CARE COORDINATION | Age: 68
End: 2023-12-05

## 2023-12-05 VITALS
HEART RATE: 74 BPM | WEIGHT: 264.2 LBS | BODY MASS INDEX: 35.83 KG/M2 | SYSTOLIC BLOOD PRESSURE: 134 MMHG | TEMPERATURE: 98.1 F | OXYGEN SATURATION: 94 % | DIASTOLIC BLOOD PRESSURE: 60 MMHG

## 2023-12-05 DIAGNOSIS — J30.2 SEASONAL ALLERGIES: Chronic | ICD-10-CM

## 2023-12-05 DIAGNOSIS — Z23 NEED FOR VACCINATION: ICD-10-CM

## 2023-12-05 DIAGNOSIS — Z09 HOSPITAL DISCHARGE FOLLOW-UP: ICD-10-CM

## 2023-12-05 DIAGNOSIS — J18.9 PNEUMONIA OF LEFT LUNG DUE TO INFECTIOUS ORGANISM, UNSPECIFIED PART OF LUNG: Primary | ICD-10-CM

## 2023-12-05 DIAGNOSIS — J44.1 CHRONIC OBSTRUCTIVE PULMONARY DISEASE WITH ACUTE EXACERBATION (HCC): Chronic | ICD-10-CM

## 2023-12-05 RX ORDER — ALBUTEROL SULFATE 90 UG/1
2 AEROSOL, METERED RESPIRATORY (INHALATION) EVERY 4 HOURS PRN
Qty: 18 G | Refills: 3 | Status: SHIPPED | OUTPATIENT
Start: 2023-12-05

## 2023-12-05 RX ORDER — MONTELUKAST SODIUM 10 MG/1
10 TABLET ORAL NIGHTLY
Qty: 90 TABLET | Refills: 1 | Status: SHIPPED | OUTPATIENT
Start: 2023-12-05

## 2023-12-05 RX ORDER — PREDNISONE 10 MG/1
TABLET ORAL
Qty: 45 TABLET | Refills: 0 | Status: SHIPPED | OUTPATIENT
Start: 2023-12-05

## 2023-12-05 RX ORDER — BUDESONIDE AND FORMOTEROL FUMARATE DIHYDRATE 160; 4.5 UG/1; UG/1
2 AEROSOL RESPIRATORY (INHALATION) 2 TIMES DAILY
Qty: 30.6 G | Refills: 1 | Status: SHIPPED | OUTPATIENT
Start: 2023-12-05

## 2023-12-05 RX ORDER — IPRATROPIUM BROMIDE AND ALBUTEROL SULFATE 2.5; .5 MG/3ML; MG/3ML
1 SOLUTION RESPIRATORY (INHALATION) EVERY 4 HOURS PRN
Qty: 360 ML | Refills: 1 | Status: SHIPPED | OUTPATIENT
Start: 2023-12-05

## 2023-12-05 ASSESSMENT — ENCOUNTER SYMPTOMS
CHEST TIGHTNESS: 1
ABDOMINAL PAIN: 0
SHORTNESS OF BREATH: 1
VOMITING: 0
COUGH: 0
WHEEZING: 0
NAUSEA: 0
DIARRHEA: 0

## 2023-12-05 NOTE — ASSESSMENT & PLAN NOTE
Clinically resolving over time. Patient given order for chest x-ray and instructed to obtain imaging in 1 week to ensure resolution of left-sided pneumonia.   They were advised to go to the emergency room for any new onset fever/chills/sweats, worsening productive cough, new onset chest discomfort, or dyspnea at rest.

## 2023-12-05 NOTE — ASSESSMENT & PLAN NOTE
Steroid course has been extended secondary to examination today in the office. Patient instructed to go to the emergency room for any worsening dyspnea on exertion or any noted dyspnea at rest.  I will have office staff help patient schedule hospital follow-up visit with pulmonology office.

## 2023-12-05 NOTE — CARE COORDINATION
-CTN received a pcp RPM referral today. Referral forwarded to CTN from care management manager.  -Pt had initially declined RPM on initial CT call on 12/1.  -CTN did reach out to pt this evening and re-offered RPM enrollment. CTN explained in detail RPM and what was involved, including the possibility of a co-pay responsibility.  -Pt states he is unsure if he wants to do this right now and would like to think about.   -Pt states that he can call this CTN back this week with a decision.   -Pt declined taking CPT code information down at this time stating if interested, he will call back and get the CPT codes. -CTN will update care management manager and pcp regarding pt's indecisiveness regarding enrollment.

## 2023-12-05 NOTE — PROGRESS NOTES
Post-Discharge Transitional Care Follow Up      Jimena Colón   YOB: 1955    Date of Office Visit:  12/5/2023  Date of Hospital Admission: 11/29/23  Date of Hospital Discharge: 11/30/23  Readmission Risk Score (high >=14%. Medium >=10%):Readmission Risk Score: 10.8      Care management risk score Rising risk (score 2-5) and Complex Care (Scores >=6): No Risk Score On File     Non face to face  following discharge, date last encounter closed (first attempt may have been earlier): 12/01/2023     Call initiated 2 business days of discharge: Yes       ASSESSMENT AND PLAN  1. Pneumonia of left lung due to infectious organism, unspecified part of lung  Assessment & Plan:  Clinically resolving over time. Patient given order for chest x-ray and instructed to obtain imaging in 1 week to ensure resolution of left-sided pneumonia. They were advised to go to the emergency room for any new onset fever/chills/sweats, worsening productive cough, new onset chest discomfort, or dyspnea at rest.  Orders:  -     XR CHEST STANDARD (2 VW); Future  2. Chronic obstructive pulmonary disease with acute exacerbation (HCC)  Assessment & Plan:  Steroid course has been extended secondary to examination today in the office. Patient instructed to go to the emergency room for any worsening dyspnea on exertion or any noted dyspnea at rest.  I will have office staff help patient schedule hospital follow-up visit with pulmonology office. Orders:  -     Ambulatory Referral to Care Management  -     predniSONE (DELTASONE) 10 MG tablet; Take 5 tabs daily x 3 days, 4 tabs daily x 3 days, 3 tabs daily x 3 days, 2 tabs daily x 3 days, 1 tab daily x 3 days, Disp-45 tablet, R-0Normal  -     albuterol sulfate HFA (PROVENTIL;VENTOLIN;PROAIR) 108 (90 Base) MCG/ACT inhaler; Inhale 2 puffs into the lungs every 4 hours as needed for Wheezing or Shortness of Breath, Disp-18 g, R-3DX Code Needed  . Normal  -     ipratropium 0.5 mg-albuterol

## 2023-12-13 DIAGNOSIS — K21.9 GASTROESOPHAGEAL REFLUX DISEASE, UNSPECIFIED WHETHER ESOPHAGITIS PRESENT: ICD-10-CM

## 2023-12-13 RX ORDER — PANTOPRAZOLE SODIUM 40 MG/1
40 TABLET, DELAYED RELEASE ORAL DAILY
Qty: 90 TABLET | Refills: 1 | Status: SHIPPED | OUTPATIENT
Start: 2023-12-13

## 2023-12-15 ENCOUNTER — CARE COORDINATION (OUTPATIENT)
Dept: CARE COORDINATION | Age: 68
End: 2023-12-15

## 2023-12-15 NOTE — CARE COORDINATION
Care Transitions Follow Up Call    Patient Current Location:  Home: [address] [place] [address]    Care Transition Nurse contacted the patient by telephone to follow up after admission on 23 and to check if patient wishes to enroll in RPM(PCP referral.)    Patient: Luis Felipe Signs  Patient : 1955   MRN: [id number]  Reason for Admission:  COPD exacerbation  Discharge Date: 23 RARS: Readmission Risk Score: 10.8      Needs to be reviewed by the provider   Additional needs identified to be addressed with provider: No  none             Method of communication with provider: none.      -Spoke with patient for follow up care transition call.   -Patient reports he is \"a little better than last week\" admits to \"wobbliness\" of his legs as he remains a little week. Has cane/walker but does not use. -Denies any wheezing and is taking prednisone taper as ordered by PCP. States typically wears his 3L NC oxygen nightly but did not do so last night. Patient aware of CXR ordered by PCP and needing to be done and states he has plans to do so today as he will be out d/t need to make a trip to his pharmacy. Patient also aware of need for flu/pneumonia vaccines to be given at PCP office and states he will be taking care of that. CTN reviewed with patient PCP's advisement to obtain covid-19/RSV vaccines at his local pharmacy. Patient choosing not to receive the covid-19 vaccine again.  -Patient aware of below 1/3/24 pulmonary appointment.  -Patient denies any needs at this time. -CTN will plan for final call next outreach. Addressed changes since last contact:   completed PCP TCM/HFU-new prednisone taper ordered, future CXR ordered to be done. Discussed follow-up appointments. If no appointment was previously scheduled, appointment scheduling offered: n/a. Is follow up appointment scheduled within 7 days of discharge? Yes, discharged on 23 and saw PCP on 23.     Follow Up  Future

## 2023-12-26 DIAGNOSIS — M48.061 SPINAL STENOSIS OF LUMBAR REGION, UNSPECIFIED WHETHER NEUROGENIC CLAUDICATION PRESENT: ICD-10-CM

## 2023-12-26 NOTE — TELEPHONE ENCOUNTER
Comments:     Last Office Visit (last PCP visit):   12/5/2023    Next Visit Date:  Future Appointments   Date Time Provider [address]   1/3/2024 11:00 AM Etta Kirkland MD [facility]   2/8/2024  2:00 PM Barbi Silva MD [address]   8/5/2024  2:00 PM Barbi Silva MD [address]       **If hasn't been seen in over a year OR hasn't followed up according to last diabetes/ADHD visit, make appointment for patient before sending refill to provider. Rx requested:  Requested Prescriptions     Pending Prescriptions Disp Refills    Acetaminophen Extra Strength 500 MG TABS [Pharmacy Med Name: ACETAMINOPHEN 500 MG TABLET] 90 tablet 1     Sig: TAKE 1 TABLET BY MOUTH EVERY 8 HOURS AS NEEDED (PAIN).

## 2023-12-27 DIAGNOSIS — M48.061 SPINAL STENOSIS OF LUMBAR REGION, UNSPECIFIED WHETHER NEUROGENIC CLAUDICATION PRESENT: ICD-10-CM

## 2023-12-27 RX ORDER — PSEUDOEPHED/ACETAMINOPH/DIPHEN 30MG-500MG
1 TABLET ORAL EVERY 8 HOURS PRN
Qty: 90 TABLET | Refills: 1 | Status: SHIPPED | OUTPATIENT
Start: 2023-12-27

## 2023-12-27 RX ORDER — PSEUDOEPHED/ACETAMINOPH/DIPHEN 30MG-500MG
1 TABLET ORAL EVERY 8 HOURS PRN
Qty: 90 TABLET | Refills: 1 | OUTPATIENT
Start: 2023-12-27

## 2023-12-27 NOTE — TELEPHONE ENCOUNTER
----- Message from Severa Lo sent at 12/26/2023  2:41 PM EST -----  Subject: Refill Request    QUESTIONS  Name of Medication? Acetaminophen Extra Strength 500 MG TABS  Patient-reported dosage and instructions? 3 times daily  How many days do you have left? 0  [id number]  Pharmacy phone number (if available)? [phone]  ---------------------------------------------------------------------------  --------------  Janet Marker INFO  What is the best way for the office to contact you? OK to leave message on   voicemail  Preferred Call Back Phone Number? [phone]  ---------------------------------------------------------------------------  --------------  SCRIPT ANSWERS  Relationship to Patient?  Self

## 2023-12-28 ENCOUNTER — CARE COORDINATION (OUTPATIENT)
Dept: CARE COORDINATION | Age: 68
End: 2023-12-28

## 2023-12-28 NOTE — CARE COORDINATION
any active services?: No  Do you have any needs or concerns that I can assist you with?: No  Identified Barriers: Lack of Motivation  Care Transitions Interventions    Pharmacist: Declined       Other Services: Completed (Comment: 12/30/21 Current w/ JOVANNI Farah. SAMANTHA.)                      No further follow-up call indicated based on severity of symptoms and risk factors. Care transition program is ending, hand off to JOVANNI.       Cheryl Dela Cruz RN

## 2024-01-02 DIAGNOSIS — F41.8 ANXIETY WITH DEPRESSION: ICD-10-CM

## 2024-01-02 RX ORDER — ESCITALOPRAM OXALATE 10 MG/1
10 TABLET ORAL DAILY
Qty: 90 TABLET | Refills: 1 | Status: SHIPPED | OUTPATIENT
Start: 2024-01-02

## 2024-01-02 NOTE — TELEPHONE ENCOUNTER
Comments:     Last Office Visit (last PCP visit):   12/5/2023    Next Visit Date:  Future Appointments   Date Time Provider Department Center   1/3/2024 11:00 AM [facility]   2/8/2024  2:00 PM Roby Silva MD [facility]   8/5/2024  2:00 PM Roby Silva MD [facility]       **If hasn't been seen in over a year OR hasn't followed up according to last diabetes/ADHD visit, make appointment for patient before sending refill to provider.    Rx requested:  Requested Prescriptions     Pending Prescriptions Disp Refills    escitalopram (LEXAPRO) 10 MG tablet [Pharmacy Med Name: ESCITALOPRAM 10 MG TABLET] 90 tablet 1     Sig: TAKE 1 TABLET BY MOUTH EVERY DAY

## 2024-01-02 NOTE — TELEPHONE ENCOUNTER
----- Message from Chasity Curiel sent at 1/2/2024  3:19 PM EST -----  Subject: Refill Request    QUESTIONS  Name of Medication? escitalopram (LEXAPRO) 10 MG tablet  Patient-reported dosage and instructions? 1 at bedtime  How many days do you have left? 1  Preferred Pharmacy? [id number]  Pharmacy phone number (if available)? [phone]  ---------------------------------------------------------------------------  --------------  CALL BACK INFO  What is the best way for the office to contact you? Do not leave any   message, patient will call back for answer  Preferred Call Back Phone Number? [phone]  ---------------------------------------------------------------------------  --------------  SCRIPT ANSWERS  Relationship to Patient? Self

## 2024-01-03 ENCOUNTER — CARE COORDINATION (OUTPATIENT)
Dept: CARE COORDINATION | Age: 69
End: 2024-01-03

## 2024-01-03 ENCOUNTER — NURSE ONLY (OUTPATIENT)
Dept: FAMILY MEDICINE CLINIC | Age: 69
End: 2024-01-03
Payer: MEDICARE

## 2024-01-03 PROCEDURE — 90677 PCV20 VACCINE IM: CPT | Performed by: FAMILY MEDICINE

## 2024-01-03 PROCEDURE — G0008 ADMIN INFLUENZA VIRUS VAC: HCPCS | Performed by: FAMILY MEDICINE

## 2024-01-03 PROCEDURE — G0009 ADMIN PNEUMOCOCCAL VACCINE: HCPCS | Performed by: FAMILY MEDICINE

## 2024-01-03 PROCEDURE — 90694 VACC AIIV4 NO PRSRV 0.5ML IM: CPT | Performed by: FAMILY MEDICINE

## 2024-01-03 NOTE — CARE COORDINATION
Primary contact phone number to patient stated cannot be completed as dialed.  Second number which is supposed to be [facility] is [facility].  ACM was attempting to continue engagement in [facility] program following discharge from hospital and CTN engagement.

## 2024-01-04 ENCOUNTER — CARE COORDINATION (OUTPATIENT)
Dept: CARE COORDINATION | Age: 69
End: 2024-01-04

## 2024-01-06 ENCOUNTER — CARE COORDINATION (OUTPATIENT)
Dept: CARE COORDINATION | Age: 69
End: 2024-01-06

## 2024-01-06 NOTE — CARE COORDINATION
ACM called patient.  Unable to leave voicemail due to disconnected line.  ACM will attempt to engage in 1 week.

## 2024-01-12 ENCOUNTER — CARE COORDINATION (OUTPATIENT)
Dept: CARE COORDINATION | Age: 69
End: 2024-01-12

## 2024-01-12 NOTE — CARE COORDINATION
Ambulatory Care Management Note        Date/Time:  1/12/2024 12:09 PM    This patient was received as a referral from  Care Transition Nurse for case management of chronic conditions .  Multiple unsuccessful attempts have been made to contact patient.  Ambulatory Care Management get in touch letter mailed to the patient's address on file.   No further outreach attempts are scheduled by [facility] at this time.

## 2024-01-28 DIAGNOSIS — J44.1 CHRONIC OBSTRUCTIVE PULMONARY DISEASE WITH ACUTE EXACERBATION (HCC): Chronic | ICD-10-CM

## 2024-01-29 RX ORDER — IPRATROPIUM BROMIDE AND ALBUTEROL SULFATE 2.5; .5 MG/3ML; MG/3ML
1 SOLUTION RESPIRATORY (INHALATION) EVERY 4 HOURS PRN
Qty: 360 ML | Refills: 1 | Status: SHIPPED | OUTPATIENT
Start: 2024-01-29

## 2024-01-29 NOTE — TELEPHONE ENCOUNTER
Comments:     Last Office Visit (last PCP visit):   12/5/2023    Next Visit Date:  Future Appointments   Date Time Provider Department Center   2/8/2024  2:00 PM Roby Silva MD [facility]   2/14/2024 10:30 AM Makadia, French P, MD [facility]   8/5/2024  2:00 PM Roby Silva MD [facility]       **If hasn't been seen in over a year OR hasn't followed up according to last diabetes/ADHD visit, make appointment for patient before sending refill to provider.    Rx requested:  Requested Prescriptions     Pending Prescriptions Disp Refills    ipratropium 0.5 mg-albuterol 2.5 mg (DUONEB) 0.5-2.5 (3) MG/3ML SOLN nebulizer solution [Pharmacy Med Name: IPRAT-ALBUT 0.5-3(2.5) MG/3 ML] 360 mL 1     Sig: TAKE 3 MLS BY NEBULIZATION EVERY 4 HOURS AS NEEDED FOR SHORTNESS OF BREATH OR WHEEZING

## 2024-02-14 ENCOUNTER — OFFICE VISIT (OUTPATIENT)
Dept: PULMONOLOGY | Age: 69
End: 2024-02-14
Payer: MEDICARE

## 2024-02-14 VITALS
DIASTOLIC BLOOD PRESSURE: 84 MMHG | SYSTOLIC BLOOD PRESSURE: 124 MMHG | WEIGHT: 282 LBS | BODY MASS INDEX: 38.25 KG/M2 | OXYGEN SATURATION: 94 % | HEART RATE: 79 BPM

## 2024-02-14 DIAGNOSIS — J44.1 CHRONIC OBSTRUCTIVE PULMONARY DISEASE WITH ACUTE EXACERBATION (HCC): Chronic | ICD-10-CM

## 2024-02-14 PROCEDURE — 3017F COLORECTAL CA SCREEN DOC REV: CPT | Performed by: INTERNAL MEDICINE

## 2024-02-14 PROCEDURE — 3079F DIAST BP 80-89 MM HG: CPT | Performed by: INTERNAL MEDICINE

## 2024-02-14 PROCEDURE — 3023F SPIROM DOC REV: CPT | Performed by: INTERNAL MEDICINE

## 2024-02-14 PROCEDURE — G8484 FLU IMMUNIZE NO ADMIN: HCPCS | Performed by: INTERNAL MEDICINE

## 2024-02-14 PROCEDURE — G8417 CALC BMI ABV UP PARAM F/U: HCPCS | Performed by: INTERNAL MEDICINE

## 2024-02-14 PROCEDURE — 3074F SYST BP LT 130 MM HG: CPT | Performed by: INTERNAL MEDICINE

## 2024-02-14 PROCEDURE — 99215 OFFICE O/P EST HI 40 MIN: CPT | Performed by: INTERNAL MEDICINE

## 2024-02-14 PROCEDURE — 1124F ACP DISCUSS-NO DSCNMKR DOCD: CPT | Performed by: INTERNAL MEDICINE

## 2024-02-14 PROCEDURE — G8427 DOCREV CUR MEDS BY ELIG CLIN: HCPCS | Performed by: INTERNAL MEDICINE

## 2024-02-14 PROCEDURE — 4004F PT TOBACCO SCREEN RCVD TLK: CPT | Performed by: INTERNAL MEDICINE

## 2024-02-14 RX ORDER — IPRATROPIUM BROMIDE AND ALBUTEROL SULFATE 2.5; .5 MG/3ML; MG/3ML
1 SOLUTION RESPIRATORY (INHALATION) EVERY 4 HOURS PRN
Qty: 360 ML | Refills: 1 | Status: SHIPPED | OUTPATIENT
Start: 2024-02-14

## 2024-02-14 RX ORDER — ALBUTEROL SULFATE 90 UG/1
2 AEROSOL, METERED RESPIRATORY (INHALATION) EVERY 4 HOURS PRN
Qty: 18 G | Refills: 3 | Status: SHIPPED | OUTPATIENT
Start: 2024-02-14

## 2024-02-14 RX ORDER — BUDESONIDE AND FORMOTEROL FUMARATE DIHYDRATE 160; 4.5 UG/1; UG/1
2 AEROSOL RESPIRATORY (INHALATION) 2 TIMES DAILY
Qty: 30.6 G | Refills: 1 | Status: SHIPPED | OUTPATIENT
Start: 2024-02-14

## 2024-02-14 NOTE — PROGRESS NOTES
Subjective:             Anabelle Morris is a 68 y.o. male who complains today of:     Chief Complaint   Patient presents with    Follow-up     6m f/u on COPD, hypoxia        HPI  He is smoking 3-4 cigarette a day.   He complains of cough milky   C/o shortness of breath with exertion.   Occasional Wheezing.   No Hemoptysis.  No Chest tightness.   No Chest pain with radiation  or pleuritic pain.  No  leg edema.   No orthopnea.  No Fever or chills.   No Rhinorrhea and postnasal drip.    7/23  Spirometry is suggestive of severe obstructive ventilatory defect with no significant response to bronchodilators.  There is moderate air trapping.  Diffusion capacity is mildly reduced.    11/29/23 The interstitial markings appears slightly increased in the left lung and  could reflect early infiltrate.     CT chest IMPRESSION:  1. There is no pulmonary infiltrate, mass or suspicious pulmonary nodule  2. Emphysematous changes  3. Chronic pleuroparenchymal scarring seen within the left lower lobe and  mild interstitial fibrosis seen within the right and left upper lobes.  4. Old left rib fractures.       Allergies:  Fish-derived products, Iodine, Seasonal, Other, Penicillin g, Shellfish allergy, Shellfish-derived products, and Pcn [penicillins]  Past Medical History:   Diagnosis Date    Alcohol abuse 10/27/2016    Anemia     Asthma     CKD (chronic kidney disease) stage 3, GFR 30-59 ml/min ([facility]) 12/14/2021    Cocaine abuse ([facility]) 10/27/2016    Community acquired pneumonia of left lower lobe of lung 12/5/2016    COPD (chronic obstructive pulmonary disease) ([facility])     Depression 04/27/2020    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    Drug-seeking behavior 04/14/2015    Edema 12/10/2015    Erectile dysfunction     Gastroesophageal reflux disease 12/17/2018    Gout 10/27/2016    History of arthroscopy of both knees 10/27/2016    History of colon polyps 10/26/2021    House dust mite allergy 04/21/2014    Hyperlipidemia     Hypertension

## 2024-02-20 DIAGNOSIS — J30.2 SEASONAL ALLERGIES: Primary | Chronic | ICD-10-CM

## 2024-02-20 DIAGNOSIS — N52.9 ERECTILE DYSFUNCTION, UNSPECIFIED ERECTILE DYSFUNCTION TYPE: Chronic | ICD-10-CM

## 2024-02-20 RX ORDER — SILDENAFIL 100 MG/1
100 TABLET, FILM COATED ORAL PRN
Qty: 6 TABLET | Refills: 1 | Status: SHIPPED | OUTPATIENT
Start: 2024-02-20

## 2024-02-20 RX ORDER — FLUTICASONE PROPIONATE 50 MCG
2 SPRAY, SUSPENSION (ML) NASAL DAILY
Qty: 16 G | Refills: 2 | Status: SHIPPED | OUTPATIENT
Start: 2024-02-20

## 2024-02-20 NOTE — TELEPHONE ENCOUNTER
Comments:     Last Office Visit (last PCP visit):   12/5/2023    Next Visit Date:  Future Appointments   Date Time Provider Department Center   7/17/2024 11:15 AM Makadia, French P, MD [facility]   8/5/2024  2:00 PM Roby Silva MD [facility]       **If hasn't been seen in over a year OR hasn't followed up according to last diabetes/ADHD visit, make appointment for patient before sending refill to provider.    Rx requested:  Requested Prescriptions     Pending Prescriptions Disp Refills    fluticasone (FLONASE) 50 MCG/ACT nasal spray [Pharmacy Med Name: FLUTICASONE PROP 50 MCG SPRAY]  1     Sig: SPRAY 2 SPRAYS BY NASAL ROUTE DAILY    sildenafil (VIAGRA) 100 MG tablet [Pharmacy Med Name: SILDENAFIL 100 MG TABLET] 6 tablet 1     Sig: TAKE 1 TABLET BY MOUTH AS NEEDED FOR ERECTILE DYSFUNCTION

## 2024-02-21 DIAGNOSIS — M54.41 CHRONIC RIGHT-SIDED LOW BACK PAIN WITH RIGHT-SIDED SCIATICA: ICD-10-CM

## 2024-02-21 DIAGNOSIS — G89.29 CHRONIC RIGHT-SIDED LOW BACK PAIN WITH RIGHT-SIDED SCIATICA: ICD-10-CM

## 2024-02-21 DIAGNOSIS — M48.061 SPINAL STENOSIS OF LUMBAR REGION, UNSPECIFIED WHETHER NEUROGENIC CLAUDICATION PRESENT: ICD-10-CM

## 2024-02-21 DIAGNOSIS — M51.36 DDD (DEGENERATIVE DISC DISEASE), LUMBAR: Primary | ICD-10-CM

## 2024-02-23 RX ORDER — PSEUDOEPHED/ACETAMINOPH/DIPHEN 30MG-500MG
1 TABLET ORAL EVERY 8 HOURS PRN
Qty: 100 TABLET | Refills: 1 | Status: SHIPPED | OUTPATIENT
Start: 2024-02-23

## 2024-02-23 NOTE — TELEPHONE ENCOUNTER
Rx requested:  Requested Prescriptions     Pending Prescriptions Disp Refills    Acetaminophen Extra Strength 500 MG TABS [Pharmacy Med Name: ACETAMINOPHEN 500 MG TABLET] 90 tablet 1     Sig: TAKE 1 TABLET BY MOUTH EVERY 8 HOURS AS NEEDED (PAIN).       Last Office Visit:   1/25/2022      Next Visit Date:  Future Appointments   Date Time Provider Department Center   7/17/2024 11:15 AM Makadia, French P, MD [place] [facility]   8/5/2024  2:00 PM Roby Silva MD [facility]  Lin[facility]

## 2024-03-01 DIAGNOSIS — M48.061 SPINAL STENOSIS OF LUMBAR REGION, UNSPECIFIED WHETHER NEUROGENIC CLAUDICATION PRESENT: ICD-10-CM

## 2024-03-01 RX ORDER — MELOXICAM 15 MG/1
15 TABLET ORAL DAILY
Qty: 90 TABLET | Refills: 1 | Status: SHIPPED | OUTPATIENT
Start: 2024-03-01

## 2024-03-01 NOTE — TELEPHONE ENCOUNTER
Comments:     Last Office Visit (last PCP visit):   12/5/2023    Next Visit Date:  Future Appointments   Date Time Provider Department Center   7/17/2024 11:15 AM Makadia, French P, MD [facility]   8/5/2024  2:00 PM Roby Silva MD [facility]       **If hasn't been seen in over a year OR hasn't followed up according to last diabetes/ADHD visit, make appointment for patient before sending refill to provider.    Rx requested:  Requested Prescriptions     Pending Prescriptions Disp Refills    meloxicam (MOBIC) 15 MG tablet [Pharmacy Med Name: MELOXICAM 15 MG TABLET] 90 tablet 1     Sig: TAKE 1 TABLET BY MOUTH EVERY DAY

## 2024-03-12 DIAGNOSIS — G47.00 INSOMNIA, UNSPECIFIED TYPE: ICD-10-CM

## 2024-03-12 DIAGNOSIS — F41.8 ANXIETY WITH DEPRESSION: ICD-10-CM

## 2024-03-12 RX ORDER — TRAZODONE HYDROCHLORIDE 50 MG/1
50 TABLET ORAL NIGHTLY
Qty: 90 TABLET | Refills: 1 | Status: SHIPPED | OUTPATIENT
Start: 2024-03-12

## 2024-03-12 NOTE — TELEPHONE ENCOUNTER
Comments:     Last Office Visit (last PCP visit):   12/5/2023    Next Visit Date:  Future Appointments   Date Time Provider Department Center   7/17/2024 11:15 AM Makadia, French P, MD [facility]   8/5/2024  2:00 PM Roby Silva MD [facility]       **If hasn't been seen in over a year OR hasn't followed up according to last diabetes/ADHD visit, make appointment for patient before sending refill to provider.    Rx requested:  Requested Prescriptions     Pending Prescriptions Disp Refills    traZODone (DESYREL) 50 MG tablet [Pharmacy Med Name: TRAZODONE 50 MG TABLET] 90 tablet 1     Sig: TAKE 1 TABLET BY MOUTH EVERY DAY AT NIGHT                   
No

## 2024-03-20 ENCOUNTER — TELEMEDICINE (OUTPATIENT)
Dept: FAMILY MEDICINE CLINIC | Age: 69
End: 2024-03-20
Payer: MEDICARE

## 2024-03-20 DIAGNOSIS — F14.10 COCAINE ABUSE (HCC): ICD-10-CM

## 2024-03-20 DIAGNOSIS — F10.10 ALCOHOL ABUSE: Chronic | ICD-10-CM

## 2024-03-20 DIAGNOSIS — J30.2 SEASONAL ALLERGIES: Chronic | ICD-10-CM

## 2024-03-20 DIAGNOSIS — D53.9 MACROCYTIC ANEMIA: ICD-10-CM

## 2024-03-20 DIAGNOSIS — E11.21 TYPE 2 DIABETES MELLITUS WITH DIABETIC NEPHROPATHY, WITHOUT LONG-TERM CURRENT USE OF INSULIN (HCC): Primary | ICD-10-CM

## 2024-03-20 DIAGNOSIS — M10.9 GOUT, UNSPECIFIED CAUSE, UNSPECIFIED CHRONICITY, UNSPECIFIED SITE: Chronic | ICD-10-CM

## 2024-03-20 DIAGNOSIS — K21.9 GASTROESOPHAGEAL REFLUX DISEASE, UNSPECIFIED WHETHER ESOPHAGITIS PRESENT: ICD-10-CM

## 2024-03-20 DIAGNOSIS — F41.8 ANXIETY WITH DEPRESSION: ICD-10-CM

## 2024-03-20 DIAGNOSIS — E78.49 OTHER HYPERLIPIDEMIA: Chronic | ICD-10-CM

## 2024-03-20 DIAGNOSIS — F12.90 MARIJUANA USE: ICD-10-CM

## 2024-03-20 DIAGNOSIS — G47.00 INSOMNIA, UNSPECIFIED TYPE: ICD-10-CM

## 2024-03-20 DIAGNOSIS — I10 PRIMARY HYPERTENSION: Chronic | ICD-10-CM

## 2024-03-20 DIAGNOSIS — Z72.0 TOBACCO USE: ICD-10-CM

## 2024-03-20 DIAGNOSIS — J44.9 CHRONIC OBSTRUCTIVE PULMONARY DISEASE, UNSPECIFIED COPD TYPE (HCC): Chronic | ICD-10-CM

## 2024-03-20 DIAGNOSIS — E66.01 CLASS 2 SEVERE OBESITY DUE TO EXCESS CALORIES WITH SERIOUS COMORBIDITY AND BODY MASS INDEX (BMI) OF 38.0 TO 38.9 IN ADULT (HCC): ICD-10-CM

## 2024-03-20 DIAGNOSIS — E78.5 HYPERLIPIDEMIA, UNSPECIFIED HYPERLIPIDEMIA TYPE: Chronic | ICD-10-CM

## 2024-03-20 DIAGNOSIS — I47.10 SVT (SUPRAVENTRICULAR TACHYCARDIA): ICD-10-CM

## 2024-03-20 PROBLEM — J18.9 PNEUMONIA IN INFECTIOUS DISEASE: Status: RESOLVED | Noted: 2023-11-29 | Resolved: 2024-03-20

## 2024-03-20 PROCEDURE — 3046F HEMOGLOBIN A1C LEVEL >9.0%: CPT | Performed by: FAMILY MEDICINE

## 2024-03-20 PROCEDURE — 99214 OFFICE O/P EST MOD 30 MIN: CPT | Performed by: FAMILY MEDICINE

## 2024-03-20 PROCEDURE — 3023F SPIROM DOC REV: CPT | Performed by: FAMILY MEDICINE

## 2024-03-20 PROCEDURE — 4004F PT TOBACCO SCREEN RCVD TLK: CPT | Performed by: FAMILY MEDICINE

## 2024-03-20 PROCEDURE — 1124F ACP DISCUSS-NO DSCNMKR DOCD: CPT | Performed by: FAMILY MEDICINE

## 2024-03-20 PROCEDURE — G8484 FLU IMMUNIZE NO ADMIN: HCPCS | Performed by: FAMILY MEDICINE

## 2024-03-20 PROCEDURE — G8427 DOCREV CUR MEDS BY ELIG CLIN: HCPCS | Performed by: FAMILY MEDICINE

## 2024-03-20 PROCEDURE — 3017F COLORECTAL CA SCREEN DOC REV: CPT | Performed by: FAMILY MEDICINE

## 2024-03-20 PROCEDURE — G8417 CALC BMI ABV UP PARAM F/U: HCPCS | Performed by: FAMILY MEDICINE

## 2024-03-20 PROCEDURE — 2022F DILAT RTA XM EVC RTNOPTHY: CPT | Performed by: FAMILY MEDICINE

## 2024-03-20 RX ORDER — PANTOPRAZOLE SODIUM 40 MG/1
40 TABLET, DELAYED RELEASE ORAL DAILY
Qty: 90 TABLET | Refills: 1 | Status: SHIPPED | OUTPATIENT
Start: 2024-03-20

## 2024-03-20 RX ORDER — ESCITALOPRAM OXALATE 10 MG/1
10 TABLET ORAL DAILY
Qty: 90 TABLET | Refills: 1 | Status: SHIPPED | OUTPATIENT
Start: 2024-03-20

## 2024-03-20 RX ORDER — MONTELUKAST SODIUM 10 MG/1
10 TABLET ORAL NIGHTLY
Qty: 90 TABLET | Refills: 1 | Status: SHIPPED | OUTPATIENT
Start: 2024-03-20

## 2024-03-20 RX ORDER — AMLODIPINE BESYLATE 10 MG/1
10 TABLET ORAL DAILY
Qty: 90 TABLET | Refills: 1 | Status: SHIPPED | OUTPATIENT
Start: 2024-03-20

## 2024-03-20 RX ORDER — LOVASTATIN 40 MG/1
40 TABLET ORAL NIGHTLY
Qty: 90 TABLET | Refills: 1 | Status: SHIPPED | OUTPATIENT
Start: 2024-03-20

## 2024-03-20 RX ORDER — TRAZODONE HYDROCHLORIDE 50 MG/1
50 TABLET ORAL NIGHTLY
Qty: 90 TABLET | Refills: 1 | Status: SHIPPED | OUTPATIENT
Start: 2024-03-20

## 2024-03-20 RX ORDER — LOSARTAN POTASSIUM 25 MG/1
25 TABLET ORAL DAILY
Qty: 90 TABLET | Refills: 1 | Status: SHIPPED | OUTPATIENT
Start: 2024-03-20

## 2024-03-20 ASSESSMENT — ENCOUNTER SYMPTOMS
COUGH: 0
CONSTIPATION: 0
WHEEZING: 0
SHORTNESS OF BREATH: 0
ABDOMINAL DISTENTION: 0
BLOOD IN STOOL: 0
ANAL BLEEDING: 0
VOMITING: 0
RECTAL PAIN: 0
ABDOMINAL PAIN: 0
CHEST TIGHTNESS: 0
DIARRHEA: 0

## 2024-03-20 ASSESSMENT — PATIENT HEALTH QUESTIONNAIRE - PHQ9
5. POOR APPETITE OR OVEREATING: NOT AT ALL
7. TROUBLE CONCENTRATING ON THINGS, SUCH AS READING THE NEWSPAPER OR WATCHING TELEVISION: NOT AT ALL
10. IF YOU CHECKED OFF ANY PROBLEMS, HOW DIFFICULT HAVE THESE PROBLEMS MADE IT FOR YOU TO DO YOUR WORK, TAKE CARE OF THINGS AT HOME, OR GET ALONG WITH OTHER PEOPLE: NOT DIFFICULT AT ALL
1. LITTLE INTEREST OR PLEASURE IN DOING THINGS: NOT AT ALL
8. MOVING OR SPEAKING SO SLOWLY THAT OTHER PEOPLE COULD HAVE NOTICED. OR THE OPPOSITE, BEING SO FIGETY OR RESTLESS THAT YOU HAVE BEEN MOVING AROUND A LOT MORE THAN USUAL: NOT AT ALL
SUM OF ALL RESPONSES TO PHQ QUESTIONS 1-9: 2
2. FEELING DOWN, DEPRESSED OR HOPELESS: NOT AT ALL
SUM OF ALL RESPONSES TO PHQ9 QUESTIONS 1 & 2: 0
4. FEELING TIRED OR HAVING LITTLE ENERGY: SEVERAL DAYS
SUM OF ALL RESPONSES TO PHQ QUESTIONS 1-9: 2
6. FEELING BAD ABOUT YOURSELF - OR THAT YOU ARE A FAILURE OR HAVE LET YOURSELF OR YOUR FAMILY DOWN: NOT AT ALL
9. THOUGHTS THAT YOU WOULD BE BETTER OFF DEAD, OR OF HURTING YOURSELF: NOT AT ALL
3. TROUBLE FALLING OR STAYING ASLEEP: SEVERAL DAYS

## 2024-03-20 NOTE — ASSESSMENT & PLAN NOTE
Patient reporting intermittent breakthrough symptoms with alcohol consumption.  I stressed with him the importance of avoiding dietary triggers.  Instructions were given to continue with current dose of pantoprazole and call the office should there be persistent episodes of heartburn despite daily dosing of medication.

## 2024-03-20 NOTE — ASSESSMENT & PLAN NOTE
Stable mood with no reported anxiety on current medications.  No SI/HI or self harming behaviors.  Patient instructed to continue with current doses of escitalopram and trazodone.   Patient tolerated procedure well.

## 2024-03-20 NOTE — ASSESSMENT & PLAN NOTE
Patient denies cocaine use for the past 6 months.  I again stressed with patient importance of avoiding any cocaine/crack use.  I reviewed with him again the association of cocaine use with significant cardiovascular and cerebrovascular related morbidity and even mortality.  Patient continues to decline any referral to substance abuse specialist or detox/rehab.  He was again advised to contact the office should he change his mind in the future.

## 2024-03-20 NOTE — ASSESSMENT & PLAN NOTE
Most recent recorded blood pressure values within normal range.  Patient instructed to continue with current doses of losartan and amlodipine.

## 2024-03-20 NOTE — ASSESSMENT & PLAN NOTE
Patient reporting regular use of marijuana.  He is precontemplative and not willing to stop marijuana use.  I reviewed with him the associations of marijuana use with mood disorders and urged him to cut back on use, working toward a goal of complete cessation.  We will continue to monitor over time and continue encouraging complete abstinence from recreational substance use.

## 2024-03-20 NOTE — ASSESSMENT & PLAN NOTE
Stable respiratory status with no reported worsening cough, wheezing, or dyspnea at rest.  Instructions were given to continue with current dose of Symbicort and to follow-up with pulmonology office ASAP to receive samples of Spiriva for ongoing treatment.

## 2024-03-20 NOTE — ASSESSMENT & PLAN NOTE
Currently asymptomatic.  Patient to continue with current dose of amlodipine.  We will continue to monitor over time

## 2024-03-20 NOTE — PROGRESS NOTES
3/20/2024    TELEHEALTH EVALUATION -- Audio/Visual    HPI:    Anabelle Morris (:  1955) has requested an audio/video evaluation for the following concern(s):    Patient presents for virtual video visit for diabetes, hypertension, hyperlipidemia, COPD, GERD, depression/anxiety and obesity follow-up.  They report not being able to afford Spiriva since most recent office visit but states they have been taking all other medications as prescribed.  They report being told by pulmonology that samples could be provided for long-term management with this medication but they have not had the opportunity to go to the pulmonologist office to  the samples. Patient denies any dyspnea at rest, persistent wheezing, worsening cough, or chest tightness.    Patient admits to continued cigarette use. They also report drinking at least 3-4 tall beers twice a week. They report having heartburn on days when consuming alcohol. They admit to continued marijuana use daily, but deny use of cocaine for over 6 months.    Patient denies checking blood glucose values at home since his most recent visit. They deny any polyuria or polydipsia, new onset vision changes, or new onset paresthesias.    Review of Systems   Constitutional:  Negative for appetite change, chills, diaphoresis, fatigue, fever and unexpected weight change.   Eyes:  Negative for visual disturbance.   Respiratory:  Negative for cough, chest tightness, shortness of breath and wheezing.    Cardiovascular:  Negative for chest pain, palpitations and leg swelling.        No orthopnea, No PND   Gastrointestinal:  Negative for abdominal distention, abdominal pain, anal bleeding, blood in stool, constipation, diarrhea, nausea, rectal pain and vomiting.        +heartburn, No melena   Endocrine: Negative for cold intolerance, heat intolerance, polydipsia and polyuria.   Genitourinary:  Negative for dysuria and hematuria.   Musculoskeletal:  Negative for myalgias.   Skin:

## 2024-03-20 NOTE — ASSESSMENT & PLAN NOTE
Patient reports continued regular over-consumption of alcohol several days a week.  I reviewed with him again that with his known history of alcohol abuse he should remain sober and abstain from any alcohol consumption.  Patient is not ready to change his behavior, we will continue to encourage complete sobriety at routine office visits.

## 2024-03-20 NOTE — ASSESSMENT & PLAN NOTE
Most recent lipid panel with LDL above goal control.  Patient urged to double down on efforts to eat a lower carbohydrate and lower saturated fat diet.  He was instructed to continue with current dose of lovastatin.  We will make further medication recommendations based on updated lab results.

## 2024-03-27 ENCOUNTER — HOSPITAL ENCOUNTER (OUTPATIENT)
Dept: LAB | Age: 69
Discharge: HOME | End: 2024-03-27
Payer: MEDICARE

## 2024-03-27 DIAGNOSIS — J44.9 CHRONIC OBSTRUCTIVE PULMONARY DISEASE, UNSPECIFIED COPD TYPE (HCC): Chronic | ICD-10-CM

## 2024-03-27 DIAGNOSIS — F10.10 ALCOHOL ABUSE: Chronic | ICD-10-CM

## 2024-03-27 DIAGNOSIS — J44.1 CHRONIC OBSTRUCTIVE PULMONARY DISEASE WITH ACUTE EXACERBATION (HCC): Chronic | ICD-10-CM

## 2024-03-27 DIAGNOSIS — M48.061 SPINAL STENOSIS OF LUMBAR REGION, UNSPECIFIED WHETHER NEUROGENIC CLAUDICATION PRESENT: ICD-10-CM

## 2024-03-27 DIAGNOSIS — K21.9 GASTROESOPHAGEAL REFLUX DISEASE, UNSPECIFIED WHETHER ESOPHAGITIS PRESENT: ICD-10-CM

## 2024-03-27 DIAGNOSIS — E11.21 TYPE 2 DIABETES MELLITUS WITH DIABETIC NEPHROPATHY, WITHOUT LONG-TERM CURRENT USE OF INSULIN (HCC): ICD-10-CM

## 2024-03-27 DIAGNOSIS — F14.10 COCAINE ABUSE (HCC): ICD-10-CM

## 2024-03-27 DIAGNOSIS — I10 PRIMARY HYPERTENSION: Chronic | ICD-10-CM

## 2024-03-27 DIAGNOSIS — M10.9 GOUT, UNSPECIFIED CAUSE, UNSPECIFIED CHRONICITY, UNSPECIFIED SITE: Chronic | ICD-10-CM

## 2024-03-27 DIAGNOSIS — E66.01 CLASS 2 SEVERE OBESITY DUE TO EXCESS CALORIES WITH SERIOUS COMORBIDITY AND BODY MASS INDEX (BMI) OF 38.0 TO 38.9 IN ADULT (HCC): ICD-10-CM

## 2024-03-27 DIAGNOSIS — G89.29 CHRONIC RIGHT-SIDED LOW BACK PAIN WITH RIGHT-SIDED SCIATICA: ICD-10-CM

## 2024-03-27 DIAGNOSIS — M51.36 DDD (DEGENERATIVE DISC DISEASE), LUMBAR: ICD-10-CM

## 2024-03-27 DIAGNOSIS — M54.41 CHRONIC RIGHT-SIDED LOW BACK PAIN WITH RIGHT-SIDED SCIATICA: ICD-10-CM

## 2024-03-27 DIAGNOSIS — D53.9 MACROCYTIC ANEMIA: ICD-10-CM

## 2024-03-27 DIAGNOSIS — I47.10 SVT (SUPRAVENTRICULAR TACHYCARDIA) (HCC): ICD-10-CM

## 2024-03-27 DIAGNOSIS — N52.9 ERECTILE DYSFUNCTION, UNSPECIFIED ERECTILE DYSFUNCTION TYPE: Chronic | ICD-10-CM

## 2024-03-27 DIAGNOSIS — E78.49 OTHER HYPERLIPIDEMIA: Chronic | ICD-10-CM

## 2024-03-27 LAB
ALBUMIN SERPL-MCNC: 4 G/DL (ref 3.5–4.6)
ALP SERPL-CCNC: 94 U/L (ref 35–104)
ALT SERPL-CCNC: 21 U/L (ref 0–41)
ANION GAP SERPL CALCULATED.3IONS-SCNC: 11 MEQ/L (ref 9–15)
AST SERPL-CCNC: 19 U/L (ref 0–40)
BASOPHILS # BLD: 0 K/UL (ref 0–0.2)
BASOPHILS NFR BLD: 0.4 %
BILIRUB SERPL-MCNC: 0.5 MG/DL (ref 0.2–0.7)
BUN SERPL-MCNC: 14 MG/DL (ref 8–23)
CALCIUM SERPL-MCNC: 9.4 MG/DL (ref 8.5–9.9)
CHLORIDE SERPL-SCNC: 108 MEQ/L (ref 95–107)
CHOLEST SERPL-MCNC: 156 MG/DL (ref 0–199)
CO2 SERPL-SCNC: 24 MEQ/L (ref 20–31)
CREAT SERPL-MCNC: 0.92 MG/DL (ref 0.7–1.2)
EOSINOPHIL # BLD: 0.4 K/UL (ref 0–0.7)
EOSINOPHIL NFR BLD: 3.9 %
ERYTHROCYTE [DISTWIDTH] IN BLOOD BY AUTOMATED COUNT: 15.5 % (ref 11.5–14.5)
GLOBULIN SER CALC-MCNC: 3.2 G/DL (ref 2.3–3.5)
GLUCOSE SERPL-MCNC: 80 MG/DL (ref 70–99)
HBA1C MFR BLD: 5.5 % (ref 4.8–5.9)
HCT VFR BLD AUTO: 42.6 % (ref 42–52)
HDLC SERPL-MCNC: 52 MG/DL (ref 40–59)
HGB BLD-MCNC: 12.9 G/DL (ref 14–18)
LDLC SERPL CALC-MCNC: 90 MG/DL (ref 0–129)
LYMPHOCYTES # BLD: 1.7 K/UL (ref 1–4.8)
LYMPHOCYTES NFR BLD: 17.4 %
MCH RBC QN AUTO: 30.4 PG (ref 27–31.3)
MCHC RBC AUTO-ENTMCNC: 30.3 % (ref 33–37)
MCV RBC AUTO: 100.5 FL (ref 79–92.2)
MONOCYTES # BLD: 0.8 K/UL (ref 0.2–0.8)
MONOCYTES NFR BLD: 7.8 %
NEUTROPHILS # BLD: 6.8 K/UL (ref 1.4–6.5)
NEUTS SEG NFR BLD: 70.3 %
PLATELET # BLD AUTO: 330 K/UL (ref 130–400)
POTASSIUM SERPL-SCNC: 4.5 MEQ/L (ref 3.4–4.9)
PROT SERPL-MCNC: 7.2 G/DL (ref 6.3–8)
RBC # BLD AUTO: 4.24 M/UL (ref 4.7–6.1)
SODIUM SERPL-SCNC: 143 MEQ/L (ref 135–144)
T4 FREE SERPL-MCNC: 1.09 NG/DL (ref 0.84–1.68)
TRIGL SERPL-MCNC: 71 MG/DL (ref 0–150)
TSH SERPL-MCNC: 1.67 UIU/ML (ref 0.44–3.86)
WBC # BLD AUTO: 9.6 K/UL (ref 4.8–10.8)

## 2024-03-27 PROCEDURE — 83036 HEMOGLOBIN GLYCOSYLATED A1C: CPT

## 2024-03-27 PROCEDURE — 82607 VITAMIN B-12: CPT

## 2024-03-27 PROCEDURE — 84443 ASSAY THYROID STIM HORMONE: CPT

## 2024-03-27 PROCEDURE — 36415 COLL VENOUS BLD VENIPUNCTURE: CPT

## 2024-03-27 PROCEDURE — 84550 ASSAY OF BLOOD/URIC ACID: CPT

## 2024-03-27 PROCEDURE — 80053 COMPREHEN METABOLIC PANEL: CPT

## 2024-03-27 PROCEDURE — 80061 LIPID PANEL: CPT

## 2024-03-27 PROCEDURE — 84439 ASSAY OF FREE THYROXINE: CPT

## 2024-03-27 PROCEDURE — 82746 ASSAY OF FOLIC ACID SERUM: CPT

## 2024-03-27 PROCEDURE — 85025 COMPLETE CBC W/AUTO DIFF WBC: CPT

## 2024-03-27 RX ORDER — BUDESONIDE AND FORMOTEROL FUMARATE DIHYDRATE 160; 4.5 UG/1; UG/1
2 AEROSOL RESPIRATORY (INHALATION) 2 TIMES DAILY
Qty: 30.6 G | Refills: 1 | Status: SHIPPED | OUTPATIENT
Start: 2024-03-27

## 2024-03-27 RX ORDER — IPRATROPIUM BROMIDE AND ALBUTEROL SULFATE 2.5; .5 MG/3ML; MG/3ML
1 SOLUTION RESPIRATORY (INHALATION) EVERY 4 HOURS PRN
Qty: 360 ML | Refills: 1 | Status: SHIPPED | OUTPATIENT
Start: 2024-03-27

## 2024-03-27 RX ORDER — PSEUDOEPHED/ACETAMINOPH/DIPHEN 30MG-500MG
1 TABLET ORAL EVERY 8 HOURS PRN
Qty: 100 TABLET | Refills: 1 | Status: SHIPPED | OUTPATIENT
Start: 2024-03-27

## 2024-03-27 RX ORDER — SILDENAFIL 100 MG/1
100 TABLET, FILM COATED ORAL PRN
Qty: 6 TABLET | Refills: 1 | OUTPATIENT
Start: 2024-03-27

## 2024-03-27 RX ORDER — ALBUTEROL SULFATE 90 UG/1
2 AEROSOL, METERED RESPIRATORY (INHALATION) EVERY 4 HOURS PRN
Qty: 18 G | Refills: 3 | Status: SHIPPED | OUTPATIENT
Start: 2024-03-27

## 2024-03-27 NOTE — TELEPHONE ENCOUNTER
Comments:     Last Office Visit (last PCP visit):   3/20/2024    Next Visit Date:  Future Appointments   Date Time Provider Department Center   7/17/2024 11:15 AM Makadia, French P, MD [place] [facility]   8/5/2024  2:00 PM Roby Silva MD [facility]       **If hasn't been seen in over a year OR hasn't followed up according to last diabetes/ADHD visit, make appointment for patient before sending refill to provider.    Rx requested:  Requested Prescriptions     Pending Prescriptions Disp Refills    sildenafil (VIAGRA) 100 MG tablet [Pharmacy Med Name: SILDENAFIL 100 MG TABLET] 6 tablet 1     Sig: TAKE 1 TABLET BY MOUTH AS NEEDED FOR ERECTILE DYSFUNCTION

## 2024-03-27 NOTE — TELEPHONE ENCOUNTER
Requested Prescriptions     Pending Prescriptions Disp Refills    sildenafil (VIAGRA) 100 MG tablet [Pharmacy Med Name: SILDENAFIL 100 MG TABLET] 6 tablet 1     Sig: TAKE 1 TABLET BY MOUTH AS NEEDED FOR ERECTILE DYSFUNCTION    ipratropium 0.5 mg-albuterol 2.5 mg (DUONEB) 0.5-2.5 (3) MG/3ML SOLN nebulizer solution 360 mL 1     Sig: Take 3 mLs by nebulization every 4 hours as needed for Shortness of Breath or Wheezing    albuterol sulfate HFA (PROVENTIL;VENTOLIN;PROAIR) 108 (90 Base) MCG/ACT inhaler 18 g 3     Sig: Inhale 2 puffs into the lungs every 4 hours as needed for Wheezing or Shortness of Breath    SYMBICORT 160-4.5 MCG/ACT AERO 30.6 g 1     Sig: Inhale 2 puffs into the lungs 2 times daily    Acetaminophen Extra Strength 500 MG TABS 100 tablet 1     Sig: Take 1 tablet by mouth every 8 hours as needed (pain)

## 2024-03-28 LAB
FOLATE: 13 NG/ML (ref 4.8–24.2)
VITAMIN B-12: 533 PG/ML (ref 232–1245)

## 2024-03-29 NOTE — RESULT ENCOUNTER NOTE
Please notify patient that recent labwork looks either normal, unremarkable, or stable compared to previous results. Any current treatment plans should be continued and they should keep routine office follow-up as previously discussed.

## 2024-04-04 NOTE — TELEPHONE ENCOUNTER
LM for call back    Pharmacy is requesting medication refill.  Please approve or deny this request.    Rx requested:  Requested Prescriptions     Pending Prescriptions Disp Refills    albuterol sulfate HFA (PROVENTIL;VENTOLIN;PROAIR) 108 (90 Base) MCG/ACT inhaler [Pharmacy Med Name: ALBUTEROL HFA (VENTOLIN) INH] 18 each 1     Sig: Inhale 2 puffs into the lungs every 4 hours as needed for Wheezing         Last Office Visit:   5/26/2022      Next Visit Date:  Future Appointments   Date Time Provider Constance Espitia   10/6/2022  3:00 PM [facility]

## 2024-04-30 DIAGNOSIS — N52.9 ERECTILE DYSFUNCTION, UNSPECIFIED ERECTILE DYSFUNCTION TYPE: Chronic | ICD-10-CM

## 2024-04-30 DIAGNOSIS — J44.1 CHRONIC OBSTRUCTIVE PULMONARY DISEASE WITH ACUTE EXACERBATION (HCC): Chronic | ICD-10-CM

## 2024-04-30 RX ORDER — ALBUTEROL SULFATE 90 UG/1
2 AEROSOL, METERED RESPIRATORY (INHALATION) EVERY 4 HOURS PRN
Qty: 18 G | Refills: 2 | Status: SHIPPED | OUTPATIENT
Start: 2024-04-30

## 2024-04-30 RX ORDER — IPRATROPIUM BROMIDE AND ALBUTEROL SULFATE 2.5; .5 MG/3ML; MG/3ML
1 SOLUTION RESPIRATORY (INHALATION) EVERY 4 HOURS PRN
Qty: 360 ML | Refills: 1 | Status: SHIPPED | OUTPATIENT
Start: 2024-04-30

## 2024-04-30 RX ORDER — SILDENAFIL 100 MG/1
100 TABLET, FILM COATED ORAL PRN
Qty: 6 TABLET | Refills: 1 | Status: SHIPPED | OUTPATIENT
Start: 2024-04-30

## 2024-04-30 NOTE — TELEPHONE ENCOUNTER
Comments:     Last Office Visit (last PCP visit):   3/20/2024    Next Visit Date:  Future Appointments   Date Time Provider Department Center   6/26/2024 11:30 AM Roby Silva MD [facility]   7/17/2024 11:15 AM Makadia, French P, MD [facility]   8/5/2024  2:00 PM Roby Silva MD [facility]       **If hasn't been seen in over a year OR hasn't followed up according to last diabetes/ADHD visit, make appointment for patient before sending refill to provider.    Rx requested:  Requested Prescriptions     Pending Prescriptions Disp Refills    albuterol sulfate HFA (PROVENTIL;VENTOLIN;PROAIR) 108 (90 Base) MCG/ACT inhaler 18 g 3     Sig: Inhale 2 puffs into the lungs every 4 hours as needed for Wheezing or Shortness of Breath    ipratropium 0.5 mg-albuterol 2.5 mg (DUONEB) 0.5-2.5 (3) MG/3ML SOLN nebulizer solution 360 mL 1     Sig: Take 3 mLs by nebulization every 4 hours as needed for Shortness of Breath or Wheezing    sildenafil (VIAGRA) 100 MG tablet 6 tablet 1     Sig: Take 1 tablet by mouth as needed for Erectile Dysfunction

## 2024-05-06 DIAGNOSIS — J30.2 SEASONAL ALLERGIES: Chronic | ICD-10-CM

## 2024-05-06 NOTE — TELEPHONE ENCOUNTER
Comments:     Last Office Visit (last PCP visit):   3/20/2024    Next Visit Date:  Future Appointments   Date Time Provider Department Center   6/26/2024 11:30 AM Roby Silva MD [facility]   7/17/2024 11:15 AM Makadia, French P, MD [place] [facility]   8/5/2024  2:00 PM Roby Silva MD [facility]         Rx requested:  Requested Prescriptions     Pending Prescriptions Disp Refills    cetirizine (ZYRTEC) 10 MG tablet 90 tablet 1     Sig: Take 1 tablet by mouth daily

## 2024-05-07 RX ORDER — CETIRIZINE HYDROCHLORIDE 10 MG/1
10 TABLET ORAL DAILY
Qty: 90 TABLET | Refills: 1 | Status: SHIPPED | OUTPATIENT
Start: 2024-05-07

## 2024-05-14 DIAGNOSIS — J30.2 SEASONAL ALLERGIES: Chronic | ICD-10-CM

## 2024-05-14 RX ORDER — FLUTICASONE PROPIONATE 50 MCG
2 SPRAY, SUSPENSION (ML) NASAL DAILY
Qty: 48 G | Refills: 1 | Status: SHIPPED | OUTPATIENT
Start: 2024-05-14

## 2024-05-14 NOTE — TELEPHONE ENCOUNTER
Comments:     Last Office Visit (last PCP visit):   3/20/2024    Next Visit Date:  Future Appointments   Date Time Provider Department Center   6/26/2024 11:30 AM Roby Silva MD [facility]   7/17/2024 11:15 AM Makadia, French P, MD [place] [facility]   8/5/2024  2:00 PM Roby iSlva MD [facility]       **If hasn't been seen in over a year OR hasn't followed up according to last diabetes/ADHD visit, make appointment for patient before sending refill to provider.    Rx requested:  Requested Prescriptions     Pending Prescriptions Disp Refills    fluticasone (FLONASE) 50 MCG/ACT nasal spray [Pharmacy Med Name: FLUTICASONE PROP 50 MCG SPRAY]       Sig: SPRAY 2 SPRAYS BY NASAL ROUTE DAILY

## 2024-05-28 DIAGNOSIS — J44.1 CHRONIC OBSTRUCTIVE PULMONARY DISEASE WITH ACUTE EXACERBATION (HCC): Chronic | ICD-10-CM

## 2024-05-28 DIAGNOSIS — N52.9 ERECTILE DYSFUNCTION, UNSPECIFIED ERECTILE DYSFUNCTION TYPE: Chronic | ICD-10-CM

## 2024-05-28 RX ORDER — ALBUTEROL SULFATE 90 UG/1
2 AEROSOL, METERED RESPIRATORY (INHALATION) EVERY 4 HOURS PRN
Qty: 18 G | Refills: 1 | Status: SHIPPED | OUTPATIENT
Start: 2024-05-28

## 2024-05-28 RX ORDER — SILDENAFIL 100 MG/1
100 TABLET, FILM COATED ORAL PRN
Qty: 6 TABLET | Refills: 1 | OUTPATIENT
Start: 2024-05-28

## 2024-05-28 RX ORDER — IPRATROPIUM BROMIDE AND ALBUTEROL SULFATE 2.5; .5 MG/3ML; MG/3ML
1 SOLUTION RESPIRATORY (INHALATION) EVERY 4 HOURS PRN
Qty: 360 ML | Refills: 1 | Status: SHIPPED | OUTPATIENT
Start: 2024-05-28

## 2024-05-28 NOTE — TELEPHONE ENCOUNTER
Comments:     Last Office Visit (last PCP visit):   3/20/2024    Next Visit Date:  Future Appointments   Date Time Provider Department Center   6/26/2024 11:30 AM Roby Silva MD [facility]   7/17/2024 11:15 AM Makadia, French P, MD [place] [facility]   8/5/2024  2:00 PM Roby Silva MD [facility]       **If hasn't been seen in over a year OR hasn't followed up according to last diabetes/ADHD visit, make appointment for patient before sending refill to provider.    Rx requested:  Requested Prescriptions     Pending Prescriptions Disp Refills    albuterol sulfate HFA (PROVENTIL;VENTOLIN;PROAIR) 108 (90 Base) MCG/ACT inhaler 18 g 2     Sig: Inhale 2 puffs into the lungs every 4 hours as needed for Wheezing or Shortness of Breath    sildenafil (VIAGRA) 100 MG tablet 6 tablet 1     Sig: Take 1 tablet by mouth as needed for Erectile Dysfunction    ipratropium 0.5 mg-albuterol 2.5 mg (DUONEB) 0.5-2.5 (3) MG/3ML SOLN nebulizer solution 360 mL 1     Sig: Take 3 mLs by nebulization every 4 hours as needed for Shortness of Breath or Wheezing

## 2024-06-06 NOTE — CARE COORDINATION
Ambulatory Care Coordination Note  8/30/2020  CM Risk Score: 6  [place] 10 Year Mortality Risk Score: 23%     ACC: Molly Mcqueen RN    Summary Note: Morely called stating that he wanted to discuss his CPAP. He states that he is beginning to use the CPAP more and more and it does seem to be helping. He states that he is not napping as much during the day. He also tells me that he is not falling asleep while sitting watching TV  And, he adds that he feels that he does have more energy. He spoke with me about waking with SOB and he finds that using his nebulizer helps most with this issue. He adds that he is doing better about using his medications and taking them as prescribed. I expressed that I am very happy that he is improving on his health management as this will only give him improved health. PLAN:  Ho Weinstein will continue to increase the amount of time he is using his CPAP weekly until he is able to wear throughout his sleep. Ho Weinstein will use his nebulizer first thing in the morning for improved airway exchange and oxygenation with decreased SOB. Ho Weinstein will take all medication as prescribed. Goals Addressed    None         Prior to Admission medications    Medication Sig Start Date End Date Taking?  Authorizing Provider   albuterol (PROVENTIL) (5 MG/ML) 0.5% nebulizer solution Take 0.5 mLs by nebulization every 6 hours as needed for Wheezing or Shortness of Breath 8/26/20   Justin Licea MD   lovastatin (MEVACOR) 10 MG tablet Take 1 tablet by mouth nightly 8/21/20   Justin Licea MD   sildenafil (VIAGRA) 100 MG tablet Take 1 tablet by mouth as needed for Erectile Dysfunction 8/19/20   Justin Licea MD   ipratropium-albuterol (DUONEB) 0.5-2.5 (3) MG/3ML SOLN nebulizer solution Take 3 mLs by nebulization every 6 hours as needed for Shortness of Breath 8/7/20   Justin Licea MD   albuterol sulfate HFA (PROVENTIL HFA) 108 (90 Base) MCG/ACT inhaler Inhale 2 puffs into the lungs Katelynn Olea(Resident) every 6 hours as needed for Wheezing or Shortness of Breath 7/30/20   Kwame Cheatham MD   tiotropium (SPIRIVA HANDIHALER) 18 MCG inhalation capsule Inhale 1 capsule into the lungs daily 7/30/20   Kwame Cheatham MD   predniSONE (DELTASONE) 10 MG tablet Take 5 tabs daily x 3 days, 4 tabs daily x 3 days, 3 tabs daily x 3 days, 2 tabs daily x 3 days, 1 tab daily x 3 days 7/30/20   Kwame Cheatham MD   acetaminophen (TYLENOL) 500 MG tablet Take 1 tablet by mouth every 8 hours as needed for Pain 7/30/20   Kwame Cheatham MD   escitalopram (LEXAPRO) 10 MG tablet Take 1 tablet by mouth daily 7/30/20   Kwame Cheatham MD   amLODIPine (NORVASC) 10 MG tablet Take 1 tablet by mouth daily 7/14/20   Kwame Cheatham MD   fluticasone [employer]) 50 MCG/ACT nasal spray 2 sprays by Nasal route daily 6/9/20   Kwame Cheatham MD   traZODone (DESYREL) 50 MG tablet Take 1 tablet by mouth nightly 3/23/20   BHARATI Sheldon CNP   pantoprazole (PROTONIX) 40 MG tablet Take 1 tablet by mouth every morning (before breakfast) 3/23/20   BHARATI Sheldon CNP   montelukast (SINGULAIR) 10 MG tablet Take 1 tablet by mouth nightly 3/23/20   BHARATI Sheldon - SANDRA   budesonide-formoterol [facility]) 160-4.5 MCG/ACT AERO Inhale 2 puffs into the lungs 2 times daily 3/23/20   BHARATI Sheldon CNP   Handicap Placard MISC by Does not apply route DX: OSTEOARTHRITIS OF BOTH KNEES (M17.0), COPD (J44.9)     EXPIRES: 02/2024 2/1/19   Kwame Cheatham MD       Future Appointments   Date Time Provider Constance Nupur   9/2/2020 11:45 AM Tierney Bass  [facility]   9/4/2020  3:00 PM Winifred Mccormack  [address]   12/1/2020  4:20 PM MD Rom[facility] I[facility]

## 2024-06-10 DIAGNOSIS — J44.1 CHRONIC OBSTRUCTIVE PULMONARY DISEASE WITH ACUTE EXACERBATION (HCC): Chronic | ICD-10-CM

## 2024-06-10 DIAGNOSIS — M48.061 SPINAL STENOSIS OF LUMBAR REGION, UNSPECIFIED WHETHER NEUROGENIC CLAUDICATION PRESENT: ICD-10-CM

## 2024-06-10 DIAGNOSIS — G89.29 CHRONIC RIGHT-SIDED LOW BACK PAIN WITH RIGHT-SIDED SCIATICA: ICD-10-CM

## 2024-06-10 DIAGNOSIS — M54.41 CHRONIC RIGHT-SIDED LOW BACK PAIN WITH RIGHT-SIDED SCIATICA: ICD-10-CM

## 2024-06-10 DIAGNOSIS — M51.36 DDD (DEGENERATIVE DISC DISEASE), LUMBAR: ICD-10-CM

## 2024-06-10 RX ORDER — IPRATROPIUM BROMIDE AND ALBUTEROL SULFATE 2.5; .5 MG/3ML; MG/3ML
1 SOLUTION RESPIRATORY (INHALATION) EVERY 4 HOURS PRN
Qty: 360 ML | Refills: 1 | Status: SHIPPED | OUTPATIENT
Start: 2024-06-10

## 2024-06-10 RX ORDER — ALBUTEROL SULFATE 90 UG/1
2 AEROSOL, METERED RESPIRATORY (INHALATION) EVERY 4 HOURS PRN
Qty: 18 G | Refills: 1 | Status: SHIPPED | OUTPATIENT
Start: 2024-06-10

## 2024-06-10 RX ORDER — PSEUDOEPHED/ACETAMINOPH/DIPHEN 30MG-500MG
1 TABLET ORAL EVERY 8 HOURS PRN
Qty: 100 TABLET | Refills: 1 | Status: SHIPPED | OUTPATIENT
Start: 2024-06-10

## 2024-06-10 NOTE — TELEPHONE ENCOUNTER
----- Message from Roslynricardo Rome sent at 6/10/2024 11:25 AM EDT -----  Regarding: ECC Message to Provider  ECC Message to Provider    Relationship to Patient: Self     Additional Information : pt is requesting to fax his medication and refill the tylenol, puderal inhaler and puderal neb solutions  --------------------------------------------------------------------------------------------------------------------------    Call Back Information: OK to leave message on voicemail  Preferred Call Back Number: Phone [phone]

## 2024-06-10 NOTE — TELEPHONE ENCOUNTER
Comments:     Last Office Visit (last PCP visit):   3/20/2024    Next Visit Date:  Future Appointments   Date Time Provider Department Center   6/26/2024 11:30 AM Roby Silva MD [facility]   7/17/2024 11:15 AM Makadia, French P, MD [facility]   8/5/2024  2:00 PM Roby Silva MD [facility]         Rx requested:  Requested Prescriptions     Pending Prescriptions Disp Refills    Acetaminophen Extra Strength 500 MG TABS 100 tablet 1     Sig: Take 1 tablet by mouth every 8 hours as needed (pain)    albuterol sulfate HFA (PROVENTIL;VENTOLIN;PROAIR) 108 (90 Base) MCG/ACT inhaler 18 g 1     Sig: Inhale 2 puffs into the lungs every 4 hours as needed for Wheezing or Shortness of Breath    ipratropium 0.5 mg-albuterol 2.5 mg (DUONEB) 0.5-2.5 (3) MG/3ML SOLN nebulizer solution 360 mL 1     Sig: Take 3 mLs by nebulization every 4 hours as needed for Shortness of Breath or Wheezing

## 2024-06-11 DIAGNOSIS — N52.9 ERECTILE DYSFUNCTION, UNSPECIFIED ERECTILE DYSFUNCTION TYPE: Chronic | ICD-10-CM

## 2024-06-11 RX ORDER — SILDENAFIL 100 MG/1
100 TABLET, FILM COATED ORAL PRN
Qty: 6 TABLET | Refills: 1 | OUTPATIENT
Start: 2024-06-11

## 2024-06-28 ENCOUNTER — OFFICE VISIT (OUTPATIENT)
Dept: FAMILY MEDICINE CLINIC | Age: 69
End: 2024-06-28

## 2024-06-28 VITALS
DIASTOLIC BLOOD PRESSURE: 60 MMHG | OXYGEN SATURATION: 95 % | TEMPERATURE: 97.4 F | BODY MASS INDEX: 34.24 KG/M2 | HEIGHT: 72 IN | SYSTOLIC BLOOD PRESSURE: 136 MMHG | WEIGHT: 252.8 LBS | HEART RATE: 86 BPM

## 2024-06-28 DIAGNOSIS — E78.49 OTHER HYPERLIPIDEMIA: Chronic | ICD-10-CM

## 2024-06-28 DIAGNOSIS — Z12.5 SCREENING FOR PROSTATE CANCER: ICD-10-CM

## 2024-06-28 DIAGNOSIS — D53.9 MACROCYTIC ANEMIA: ICD-10-CM

## 2024-06-28 DIAGNOSIS — Z71.89 ACP (ADVANCE CARE PLANNING): ICD-10-CM

## 2024-06-28 DIAGNOSIS — Z86.010 HISTORY OF COLON POLYPS: Chronic | ICD-10-CM

## 2024-06-28 DIAGNOSIS — F10.10 ALCOHOL ABUSE: Chronic | ICD-10-CM

## 2024-06-28 DIAGNOSIS — G47.33 OSA (OBSTRUCTIVE SLEEP APNEA): Chronic | ICD-10-CM

## 2024-06-28 DIAGNOSIS — Z00.00 MEDICARE ANNUAL WELLNESS VISIT, SUBSEQUENT: Primary | ICD-10-CM

## 2024-06-28 DIAGNOSIS — M10.9 GOUT, UNSPECIFIED CAUSE, UNSPECIFIED CHRONICITY, UNSPECIFIED SITE: Chronic | ICD-10-CM

## 2024-06-28 DIAGNOSIS — J44.9 CHRONIC OBSTRUCTIVE PULMONARY DISEASE, UNSPECIFIED COPD TYPE (HCC): Chronic | ICD-10-CM

## 2024-06-28 DIAGNOSIS — J44.1 CHRONIC OBSTRUCTIVE PULMONARY DISEASE WITH ACUTE EXACERBATION (HCC): ICD-10-CM

## 2024-06-28 DIAGNOSIS — I10 PRIMARY HYPERTENSION: Chronic | ICD-10-CM

## 2024-06-28 DIAGNOSIS — I47.10 SVT (SUPRAVENTRICULAR TACHYCARDIA) (HCC): ICD-10-CM

## 2024-06-28 DIAGNOSIS — N52.9 ERECTILE DYSFUNCTION, UNSPECIFIED ERECTILE DYSFUNCTION TYPE: Chronic | ICD-10-CM

## 2024-06-28 DIAGNOSIS — B88.8 INFESTATION BY BED BUG: ICD-10-CM

## 2024-06-28 DIAGNOSIS — E11.21 TYPE 2 DIABETES MELLITUS WITH DIABETIC NEPHROPATHY, WITHOUT LONG-TERM CURRENT USE OF INSULIN (HCC): ICD-10-CM

## 2024-06-28 DIAGNOSIS — Z12.11 SCREENING FOR COLON CANCER: ICD-10-CM

## 2024-06-28 DIAGNOSIS — Z87.891 PERSONAL HISTORY OF TOBACCO USE, PRESENTING HAZARDS TO HEALTH: ICD-10-CM

## 2024-06-28 DIAGNOSIS — E66.09 CLASS 1 OBESITY DUE TO EXCESS CALORIES WITH SERIOUS COMORBIDITY AND BODY MASS INDEX (BMI) OF 34.0 TO 34.9 IN ADULT: ICD-10-CM

## 2024-06-28 DIAGNOSIS — Z23 NEED FOR VACCINATION: ICD-10-CM

## 2024-06-28 DIAGNOSIS — Z72.0 TOBACCO USE: ICD-10-CM

## 2024-06-28 DIAGNOSIS — F12.90 MARIJUANA USE: ICD-10-CM

## 2024-06-28 DIAGNOSIS — F14.10 COCAINE ABUSE (HCC): Chronic | ICD-10-CM

## 2024-06-28 LAB — HBA1C MFR BLD: 5.3 %

## 2024-06-28 RX ORDER — SILDENAFIL 100 MG/1
100 TABLET, FILM COATED ORAL PRN
Qty: 6 TABLET | Refills: 1 | Status: SHIPPED | OUTPATIENT
Start: 2024-06-28

## 2024-06-28 SDOH — ECONOMIC STABILITY: FOOD INSECURITY: WITHIN THE PAST 12 MONTHS, THE FOOD YOU BOUGHT JUST DIDN'T LAST AND YOU DIDN'T HAVE MONEY TO GET MORE.: NEVER TRUE

## 2024-06-28 SDOH — ECONOMIC STABILITY: FOOD INSECURITY: WITHIN THE PAST 12 MONTHS, YOU WORRIED THAT YOUR FOOD WOULD RUN OUT BEFORE YOU GOT MONEY TO BUY MORE.: NEVER TRUE

## 2024-06-28 SDOH — ECONOMIC STABILITY: INCOME INSECURITY: HOW HARD IS IT FOR YOU TO PAY FOR THE VERY BASICS LIKE FOOD, HOUSING, MEDICAL CARE, AND HEATING?: NOT HARD AT ALL

## 2024-06-28 ASSESSMENT — PATIENT HEALTH QUESTIONNAIRE - PHQ9
10. IF YOU CHECKED OFF ANY PROBLEMS, HOW DIFFICULT HAVE THESE PROBLEMS MADE IT FOR YOU TO DO YOUR WORK, TAKE CARE OF THINGS AT HOME, OR GET ALONG WITH OTHER PEOPLE: NOT DIFFICULT AT ALL
8. MOVING OR SPEAKING SO SLOWLY THAT OTHER PEOPLE COULD HAVE NOTICED. OR THE OPPOSITE, BEING SO FIGETY OR RESTLESS THAT YOU HAVE BEEN MOVING AROUND A LOT MORE THAN USUAL: NOT AT ALL
6. FEELING BAD ABOUT YOURSELF - OR THAT YOU ARE A FAILURE OR HAVE LET YOURSELF OR YOUR FAMILY DOWN: NOT AT ALL
SUM OF ALL RESPONSES TO PHQ9 QUESTIONS 1 & 2: 0
SUM OF ALL RESPONSES TO PHQ QUESTIONS 1-9: 0
SUM OF ALL RESPONSES TO PHQ QUESTIONS 1-9: 0
2. FEELING DOWN, DEPRESSED OR HOPELESS: NOT AT ALL
5. POOR APPETITE OR OVEREATING: NOT AT ALL
SUM OF ALL RESPONSES TO PHQ QUESTIONS 1-9: 0
3. TROUBLE FALLING OR STAYING ASLEEP: NOT AT ALL
SUM OF ALL RESPONSES TO PHQ QUESTIONS 1-9: 0
7. TROUBLE CONCENTRATING ON THINGS, SUCH AS READING THE NEWSPAPER OR WATCHING TELEVISION: NOT AT ALL
9. THOUGHTS THAT YOU WOULD BE BETTER OFF DEAD, OR OF HURTING YOURSELF: NOT AT ALL
1. LITTLE INTEREST OR PLEASURE IN DOING THINGS: NOT AT ALL
4. FEELING TIRED OR HAVING LITTLE ENERGY: NOT AT ALL

## 2024-06-28 ASSESSMENT — LIFESTYLE VARIABLES
HOW MANY STANDARD DRINKS CONTAINING ALCOHOL DO YOU HAVE ON A TYPICAL DAY: 1 OR 2
HOW OFTEN DO YOU HAVE A DRINK CONTAINING ALCOHOL: 2-3 TIMES A WEEK

## 2024-06-28 NOTE — PATIENT INSTRUCTIONS
excess skin from the body, particularly in the abdominal area.  Before you decide to have weight loss surgery, you must commit to staying healthy for life. This includes following up with your healthcare team, exercising most days of the week, and eating a sensible diet every day. It can be difficult to develop new eating and exercise habits after weight loss surgery, and you will have to work hard to stick to your goals.  Recovering from surgery and losing weight can be stressful and emotional, and it is important to have the support of family and friends. Working with a , therapist, or support group can help you through the ups and downs.  WHERE TO GET MORE INFORMATION -- Your healthcare provider is the best source of information for questions and concerns related to your medical problem.  This article will be updated as needed every four months on our Web site ([email or web address])

## 2024-06-28 NOTE — ASSESSMENT & PLAN NOTE
Patient denies cocaine use for the past 3 months.  I again stressed with patient importance of avoiding any cocaine/crack use.  I reviewed with him again the association of cocaine use with significant cardiovascular and cerebrovascular related morbidity and even mortality.  Patient continues to decline any referral to substance abuse specialist or detox/rehab.  He was again advised to contact the office should he change his mind in the future.

## 2024-06-28 NOTE — ASSESSMENT & PLAN NOTE
Most recent A1c at goal control.  Patient instructed to continue with current dose of metformin.  Urine microalbumin order given today in the office for further evaluation.

## 2024-06-28 NOTE — PROGRESS NOTES
Objective   Vitals:    06/28/24 1350   BP: 136/60   Pulse: 86   Temp: 97.4 °F (36.3 °C)   TempSrc: Temporal   SpO2: 95%   Weight: 114.7 kg (252 lb 12.8 oz)   Height: 1.829 m (6')      Body mass index is 34.29 kg/m².     Results for POC orders placed in visit on 06/28/24   POCT glycosylated hemoglobin (Hb A1C)   Result Value Ref Range    Hemoglobin A1C 5.3 %                   Allergies   Allergen Reactions    Fish-Derived Products Anaphylaxis    Iodine Anaphylaxis     Iodine-containing products, dyes, contrast dye    Seasonal Shortness Of Breath    Other      Other reaction(s): Swelling/Edema  Other reaction(s): Swelling/Edema    Penicillin G Other (See Comments)    Shellfish Allergy      Other reaction(s): Swelling/Edema    Shellfish-Derived Products      Other reaction(s): Swelling/Edema    Pcn [Penicillins] Nausea And Vomiting     Prior to Visit Medications    Medication Sig Taking? Authorizing Provider   sildenafil (VIAGRA) 100 MG tablet Take 1 tablet by mouth as needed for Erectile Dysfunction Yes Roby Silva MD   Acetaminophen Extra Strength 500 MG TABS Take 1 tablet by mouth every 8 hours as needed (pain) Yes Roby Silva MD   albuterol sulfate HFA (PROVENTIL;VENTOLIN;PROAIR) 108 (90 Base) MCG/ACT inhaler Inhale 2 puffs into the lungs every 4 hours as needed for Wheezing or Shortness of Breath Yes Roby Silva MD   ipratropium 0.5 mg-albuterol 2.5 mg (DUONEB) 0.5-2.5 (3) MG/3ML SOLN nebulizer solution Take 3 mLs by nebulization every 4 hours as needed for Shortness of Breath or Wheezing Yes Roby Silva MD   fluticasone (FLONASE) 50 MCG/ACT nasal spray 2 sprays by Nasal route daily Yes Roby Silva MD   cetirizine (ZYRTEC) 10 MG tablet Take 1 tablet by mouth daily Yes Roby Silva MD   SYMBICORT 160-4.5 MCG/ACT AERO Inhale 2 puffs into the lungs 2 times daily Yes Roby Silva MD   losartan (COZAAR) 25 MG tablet Take 1 tablet by mouth daily Yes

## 2024-07-10 DIAGNOSIS — J30.2 SEASONAL ALLERGIES: Chronic | ICD-10-CM

## 2024-07-10 RX ORDER — FLUTICASONE PROPIONATE 50 MCG
2 SPRAY, SUSPENSION (ML) NASAL DAILY
Qty: 48 G | Refills: 1 | Status: SHIPPED | OUTPATIENT
Start: 2024-07-10

## 2024-07-10 NOTE — TELEPHONE ENCOUNTER
Comments:     Last Office Visit (last PCP visit):   2024    Next Visit Date:  Future Appointments   Date Time Provider Department Center   2024 11:15 AM Makadia, French P, MD [facility]   2025  2:30 PM Roby Silva MD [facility]       **If hasn't been seen in over a year OR hasn't followed up according to last diabetes/ADHD visit, make appointment for patient before sending refill to provider.    Rx requested:  Requested Prescriptions     Pending Prescriptions Disp Refills    fluticasone (FLONASE) 50 MCG/ACT nasal spray 48 g 1     Si sprays by Nasal route daily

## 2024-07-10 NOTE — TELEPHONE ENCOUNTER
Patient  uses fluticasone 2 times daily . Pt stated  2 sprays in am and 1 spay in pm, per PCP.  Pt stated he will be out of the nasal spray before Aug 8. Patient asking if PCP can write a new script for refills because he is running out before he can get a refill.

## 2024-07-12 ENCOUNTER — HOSPITAL ENCOUNTER (EMERGENCY)
Age: 69
Discharge: HOME | End: 2024-07-12
Attending: EMERGENCY MEDICINE
Payer: MEDICARE

## 2024-07-12 VITALS
HEART RATE: 96 BPM | BODY MASS INDEX: 34.67 KG/M2 | HEIGHT: 72 IN | TEMPERATURE: 98.3 F | RESPIRATION RATE: 18 BRPM | OXYGEN SATURATION: 93 % | WEIGHT: 256 LBS | DIASTOLIC BLOOD PRESSURE: 70 MMHG | SYSTOLIC BLOOD PRESSURE: 126 MMHG

## 2024-07-12 DIAGNOSIS — J44.1 COPD EXACERBATION (HCC): Primary | ICD-10-CM

## 2024-07-12 PROCEDURE — 6370000000 HC RX 637 (ALT 250 FOR IP): Performed by: EMERGENCY MEDICINE

## 2024-07-12 PROCEDURE — 94640 AIRWAY INHALATION TREATMENT: CPT

## 2024-07-12 PROCEDURE — 99283 EMERGENCY DEPT VISIT LOW MDM: CPT

## 2024-07-12 PROCEDURE — 6360000002 HC RX W HCPCS: Performed by: EMERGENCY MEDICINE

## 2024-07-12 RX ORDER — AZITHROMYCIN 250 MG/1
500 TABLET, FILM COATED ORAL ONCE
Status: COMPLETED | OUTPATIENT
Start: 2024-07-12 | End: 2024-07-12

## 2024-07-12 RX ORDER — AZITHROMYCIN 250 MG/1
TABLET, FILM COATED ORAL
Qty: 1 PACKET | Refills: 0 | Status: SHIPPED | OUTPATIENT
Start: 2024-07-12 | End: 2024-07-16

## 2024-07-12 RX ORDER — IPRATROPIUM BROMIDE AND ALBUTEROL SULFATE 2.5; .5 MG/3ML; MG/3ML
1 SOLUTION RESPIRATORY (INHALATION) ONCE
Status: COMPLETED | OUTPATIENT
Start: 2024-07-12 | End: 2024-07-12

## 2024-07-12 RX ORDER — PREDNISONE 20 MG/1
40 TABLET ORAL DAILY
Qty: 10 TABLET | Refills: 0 | Status: SHIPPED | OUTPATIENT
Start: 2024-07-12 | End: 2024-07-17

## 2024-07-12 RX ORDER — DEXAMETHASONE 4 MG/1
8 TABLET ORAL ONCE
Status: COMPLETED | OUTPATIENT
Start: 2024-07-12 | End: 2024-07-12

## 2024-07-12 RX ADMIN — IPRATROPIUM BROMIDE AND ALBUTEROL SULFATE 1 DOSE: 2.5; .5 SOLUTION RESPIRATORY (INHALATION) at 10:02

## 2024-07-12 RX ADMIN — AZITHROMYCIN DIHYDRATE 500 MG: 250 TABLET ORAL at 09:44

## 2024-07-12 RX ADMIN — DEXAMETHASONE 8 MG: 4 TABLET ORAL at 09:44

## 2024-07-12 ASSESSMENT — ENCOUNTER SYMPTOMS
CHEST TIGHTNESS: 0
FACIAL SWELLING: 0
CONSTIPATION: 0
SHORTNESS OF BREATH: 0
BLOOD IN STOOL: 0
STRIDOR: 0
CHOKING: 0
VOMITING: 0
WHEEZING: 1
EYE DISCHARGE: 0
EYE REDNESS: 0
VOICE CHANGE: 0
COUGH: 1
TROUBLE SWALLOWING: 0
SINUS PRESSURE: 0
ABDOMINAL PAIN: 0
SORE THROAT: 0
DIARRHEA: 0
SINUS PAIN: 1
EYE PAIN: 0
BACK PAIN: 0

## 2024-07-12 ASSESSMENT — LIFESTYLE VARIABLES
HOW OFTEN DO YOU HAVE A DRINK CONTAINING ALCOHOL: NEVER
HOW MANY STANDARD DRINKS CONTAINING ALCOHOL DO YOU HAVE ON A TYPICAL DAY: PATIENT DOES NOT DRINK

## 2024-07-12 ASSESSMENT — PAIN - FUNCTIONAL ASSESSMENT: PAIN_FUNCTIONAL_ASSESSMENT: NONE - DENIES PAIN

## 2024-07-12 NOTE — ED TRIAGE NOTES
Pt a/o x 3 skin pink w/d resp non labored at rest but ETIENNE.lungs I/e wheezes throughout. Pt reports sob x 1 week. Denies any pain.neg edema in legs noted. Pt coughing up slt green phlegm.pt's house is currently being treated for bedbugs.

## 2024-07-12 NOTE — ED PROVIDER NOTES
[facility] ED  eMERGENCY dEPARTMENT eNCOUnter      Pt Name: Anabelle Morris  MRN: [id number]  Birthdate 1955  Date of evaluation: 7/12/2024  Provider: Bhavna Ramirez MD    CHIEF COMPLAINT       Chief Complaint   Patient presents with    Shortness of Breath         HISTORY OF PRESENT ILLNESS   (Location/Symptom, Timing/Onset,Context/Setting, Quality, Duration, Modifying Factors, Severity)  Note limiting factors.   Anabelle Morris is a 68 y.o. male who presents to the emergency department patient well-known to severe coronary emergency for similar situation the past multiple times patient be doing really fine for the last few months time did not seek any medical attention has lost his weight and uses oxygen as needed patient has a history of ex-smoker chronic back pain after sleep apnea COPD hyperlipidemia chronic narcotic abuse in remission at this time he is retired and is sick for the last 3 to 4 days time with cold cough congestion productive cough increasing short of breath brought him here has no documented fever denies any chest any chest pain    HPI    NursingNotes were reviewed.    REVIEW OF SYSTEMS    (2-9 systems for level 4, 10 or more for level 5)     Review of Systems   Constitutional: Negative.  Negative for activity change and fever.   HENT:  Positive for congestion, postnasal drip and sinus pain. Negative for drooling, facial swelling, mouth sores, nosebleeds, sinus pressure, sore throat, trouble swallowing and voice change.    Eyes:  Negative for pain, discharge, redness and visual disturbance.   Respiratory:  Positive for cough and wheezing. Negative for choking, chest tightness, shortness of breath and stridor.    Cardiovascular:  Negative for chest pain, palpitations and leg swelling.   Gastrointestinal:  Negative for abdominal pain, blood in stool, constipation, diarrhea and vomiting.   Endocrine: Negative for cold intolerance, polyphagia and polyuria.   Genitourinary:  Negative for  No ecchymosis, erythema or rash.   Neurological:      Mental Status: He is alert and oriented to person, place, and time.      Cranial Nerves: No cranial nerve deficit.      Motor: No weakness or abnormal muscle tone.   Psychiatric:         Mood and Affect: Mood is not anxious.         Behavior: Behavior normal.         Thought Content: Thought content normal.         DIAGNOSTIC RESULTS     EKG: All EKG's are interpreted by the Emergency Department Physician who either signs or Co-signsthis chart in the absence of a cardiologist.        RADIOLOGY:   Non-plain filmimages such as CT, Ultrasound and MRI are read by the radiologist. Plain radiographic images are visualized and preliminarily interpreted by the emergency physician with the below findings:        Interpretation per the Radiologist below, if available at the time ofthis note:    No orders to display         ED BEDSIDE ULTRASOUND:   Performed by ED Physician - none    LABS:  Labs Reviewed - No data to display    All other labs were within normal range or not returned as of this dictation.    EMERGENCY DEPARTMENT COURSE and DIFFERENTIAL DIAGNOSIS/MDM:   Vitals:    Vitals:    07/12/24 0928   BP: 126/70   Pulse: 96   Resp: 18   Temp: 98.3 °F (36.8 °C)   TempSrc: Oral   SpO2: 93%   Weight: 116.1 kg (256 lb)   Height: 1.829 m (6')           MDM      CRITICAL CARE TIME   Total Critical Care time was  minutes, excluding separately reportableprocedures.  There was a high probability of clinicallysignificant/life threatening deterioration in the patient's condition which required my urgent intervention.  ONSULTS:  None    PROCEDURES:  Unless otherwise noted below, none     Procedures    FINAL IMPRESSION      1. COPD exacerbation (HCC)          DISPOSITION/PLAN   DISPOSITION        PATIENT REFERRED TO:  Roby Silva MD  [address] [address] [phone]    In 1 week        DISCHARGE MEDICATIONS:  New Prescriptions    No medications on

## 2024-07-12 NOTE — ED NOTES
Pt reports he is breathing better and feeling better after meds. Lungs noted with more movement of air.

## 2024-07-15 ENCOUNTER — TELEPHONE (OUTPATIENT)
Dept: PULMONOLOGY | Age: 69
End: 2024-07-15

## 2024-07-15 NOTE — TELEPHONE ENCOUNTER
PT CALLED IN TO THE OFFICE BECAUSE HE HAS AN APPT WITH YOU IN [place] ON WEDNESDAY AND HE IS ASKING IF THE OFFICE CAN BRING SAMPLES TO [place]?      WHICH MEDICATION WOULD  YOU LIKE THEM TO TAKE?

## 2024-07-16 RX ORDER — BUDESONIDE, GLYCOPYRROLATE, AND FORMOTEROL FUMARATE 160; 9; 4.8 UG/1; UG/1; UG/1
AEROSOL, METERED RESPIRATORY (INHALATION)
Qty: 4 EACH | Refills: 0 | Status: CANCELLED | COMMUNITY
Start: 2024-07-16

## 2024-07-26 ENCOUNTER — OFFICE VISIT (OUTPATIENT)
Dept: FAMILY MEDICINE CLINIC | Age: 69
End: 2024-07-26
Payer: MEDICARE

## 2024-07-26 VITALS
OXYGEN SATURATION: 97 % | WEIGHT: 257.8 LBS | HEART RATE: 81 BPM | HEIGHT: 72 IN | BODY MASS INDEX: 34.92 KG/M2 | TEMPERATURE: 97.6 F | SYSTOLIC BLOOD PRESSURE: 118 MMHG | DIASTOLIC BLOOD PRESSURE: 70 MMHG

## 2024-07-26 DIAGNOSIS — M48.061 SPINAL STENOSIS OF LUMBAR REGION, UNSPECIFIED WHETHER NEUROGENIC CLAUDICATION PRESENT: ICD-10-CM

## 2024-07-26 DIAGNOSIS — I10 PRIMARY HYPERTENSION: Chronic | ICD-10-CM

## 2024-07-26 DIAGNOSIS — J44.1 CHRONIC OBSTRUCTIVE PULMONARY DISEASE WITH ACUTE EXACERBATION (HCC): Primary | Chronic | ICD-10-CM

## 2024-07-26 DIAGNOSIS — E78.5 HYPERLIPIDEMIA, UNSPECIFIED HYPERLIPIDEMIA TYPE: Chronic | ICD-10-CM

## 2024-07-26 DIAGNOSIS — E11.21 TYPE 2 DIABETES MELLITUS WITH DIABETIC NEPHROPATHY, WITHOUT LONG-TERM CURRENT USE OF INSULIN (HCC): ICD-10-CM

## 2024-07-26 DIAGNOSIS — J30.2 SEASONAL ALLERGIES: Chronic | ICD-10-CM

## 2024-07-26 DIAGNOSIS — F41.8 ANXIETY WITH DEPRESSION: ICD-10-CM

## 2024-07-26 PROCEDURE — G8427 DOCREV CUR MEDS BY ELIG CLIN: HCPCS | Performed by: FAMILY MEDICINE

## 2024-07-26 PROCEDURE — 4004F PT TOBACCO SCREEN RCVD TLK: CPT | Performed by: FAMILY MEDICINE

## 2024-07-26 PROCEDURE — 3044F HG A1C LEVEL LT 7.0%: CPT | Performed by: FAMILY MEDICINE

## 2024-07-26 PROCEDURE — 3017F COLORECTAL CA SCREEN DOC REV: CPT | Performed by: FAMILY MEDICINE

## 2024-07-26 PROCEDURE — 3074F SYST BP LT 130 MM HG: CPT | Performed by: FAMILY MEDICINE

## 2024-07-26 PROCEDURE — 99214 OFFICE O/P EST MOD 30 MIN: CPT | Performed by: FAMILY MEDICINE

## 2024-07-26 PROCEDURE — G8417 CALC BMI ABV UP PARAM F/U: HCPCS | Performed by: FAMILY MEDICINE

## 2024-07-26 PROCEDURE — 1124F ACP DISCUSS-NO DSCNMKR DOCD: CPT | Performed by: FAMILY MEDICINE

## 2024-07-26 PROCEDURE — 3078F DIAST BP <80 MM HG: CPT | Performed by: FAMILY MEDICINE

## 2024-07-26 PROCEDURE — 2022F DILAT RTA XM EVC RTNOPTHY: CPT | Performed by: FAMILY MEDICINE

## 2024-07-26 PROCEDURE — 3023F SPIROM DOC REV: CPT | Performed by: FAMILY MEDICINE

## 2024-07-26 RX ORDER — ESCITALOPRAM OXALATE 10 MG/1
10 TABLET ORAL DAILY
Qty: 90 TABLET | Refills: 1 | Status: SHIPPED | OUTPATIENT
Start: 2024-07-26

## 2024-07-26 RX ORDER — TIOTROPIUM BROMIDE 18 UG/1
18 CAPSULE ORAL; RESPIRATORY (INHALATION) DAILY
Qty: 90 CAPSULE | Refills: 1 | Status: SHIPPED | OUTPATIENT
Start: 2024-07-26

## 2024-07-26 RX ORDER — IPRATROPIUM BROMIDE AND ALBUTEROL SULFATE 2.5; .5 MG/3ML; MG/3ML
1 SOLUTION RESPIRATORY (INHALATION) EVERY 4 HOURS PRN
Qty: 360 ML | Refills: 1 | Status: SHIPPED | OUTPATIENT
Start: 2024-07-26

## 2024-07-26 RX ORDER — CETIRIZINE HYDROCHLORIDE 10 MG/1
10 TABLET ORAL DAILY
Qty: 90 TABLET | Refills: 1 | Status: SHIPPED | OUTPATIENT
Start: 2024-07-26

## 2024-07-26 RX ORDER — FLUTICASONE PROPIONATE 50 MCG
2 SPRAY, SUSPENSION (ML) NASAL DAILY
Qty: 48 G | Refills: 1 | Status: SHIPPED | OUTPATIENT
Start: 2024-07-26

## 2024-07-26 RX ORDER — MELOXICAM 15 MG/1
15 TABLET ORAL DAILY
Qty: 90 TABLET | Refills: 1 | Status: SHIPPED | OUTPATIENT
Start: 2024-07-26

## 2024-07-26 RX ORDER — AMLODIPINE BESYLATE 10 MG/1
10 TABLET ORAL DAILY
Qty: 90 TABLET | Refills: 1 | Status: SHIPPED | OUTPATIENT
Start: 2024-07-26

## 2024-07-26 RX ORDER — PREDNISONE 10 MG/1
TABLET ORAL
Qty: 45 TABLET | Refills: 0 | Status: SHIPPED | OUTPATIENT
Start: 2024-07-26

## 2024-07-26 RX ORDER — LOSARTAN POTASSIUM 25 MG/1
25 TABLET ORAL DAILY
Qty: 90 TABLET | Refills: 1 | Status: SHIPPED | OUTPATIENT
Start: 2024-07-26

## 2024-07-26 RX ORDER — ALBUTEROL SULFATE 90 UG/1
2 AEROSOL, METERED RESPIRATORY (INHALATION) EVERY 4 HOURS PRN
Qty: 18 G | Refills: 1 | Status: SHIPPED | OUTPATIENT
Start: 2024-07-26

## 2024-07-26 RX ORDER — LOVASTATIN 40 MG/1
40 TABLET ORAL NIGHTLY
Qty: 90 TABLET | Refills: 1 | Status: SHIPPED | OUTPATIENT
Start: 2024-07-26

## 2024-07-26 ASSESSMENT — ENCOUNTER SYMPTOMS
CHEST TIGHTNESS: 0
DIARRHEA: 0
ABDOMINAL PAIN: 0
VOMITING: 0
SHORTNESS OF BREATH: 1
NAUSEA: 0
WHEEZING: 0
COUGH: 0

## 2024-07-26 NOTE — PROGRESS NOTES
Anabelle Morris (: 1955) is a 68 y.o. male, Established patient, who presents today for:    Chief Complaint   Patient presents with    Follow-up     Pt is feeling better          Assessment & Plan     1. Chronic obstructive pulmonary disease with acute exacerbation (HCC)  Assessment & Plan:  Continued wheezing with decreased air movement today in the office.  Will extend course of steroids for further management.  Patient was urged to continue with current dose of Symbicort and to obtain Spiriva Spiriva from the specialist or discuss an alternative medication.  Instructions were given to return to the ER for any new onset fevers or acutely worsening dyspnea despite tapered course of prednisone, home baseline inhalers, and nebulizers.   Orders:  -     albuterol sulfate HFA (PROVENTIL;VENTOLIN;PROAIR) 108 (90 Base) MCG/ACT inhaler; Inhale 2 puffs into the lungs every 4 hours as needed for Wheezing or Shortness of Breath, Disp-18 g, R-1DX Code Needed  .Normal  -     ipratropium 0.5 mg-albuterol 2.5 mg (DUONEB) 0.5-2.5 (3) MG/3ML SOLN nebulizer solution; Take 3 mLs by nebulization every 4 hours as needed for Shortness of Breath or Wheezing, Disp-360 mL, R-1DX Code Needed  .Normal  -     tiotropium (SPIRIVA HANDIHALER) 18 MCG inhalation capsule; Inhale 1 capsule into the lungs daily, Disp-90 capsule, R-1Normal  -     predniSONE (DELTASONE) 10 MG tablet; Take 5 tabs daily x 3 days, 4 tabs daily x 3 days, 3 tabs daily x 3 days, 2 tabs daily x 3 days, 1 tab daily x 3 days, Disp-45 tablet, R-0Normal  2. Primary hypertension  -     amLODIPine (NORVASC) 10 MG tablet; Take 1 tablet by mouth daily, Disp-90 tablet, R-1DX Code Needed  .Normal  -     losartan (COZAAR) 25 MG tablet; Take 1 tablet by mouth daily, Disp-90 tablet, R-1Normal  3. Seasonal allergies  -     fluticasone (FLONASE) 50 MCG/ACT nasal spray; 2 sprays by Nasal route daily, Disp-48 g, R-1DX Code Needed  .Normal  -     cetirizine (ZYRTEC) 10 MG  Interval History: No complaints, but worsening hypoxia and hypotension.    Review of Systems   HENT: Negative for ear discharge and ear pain.    Eyes: Negative for pain and itching.   Gastrointestinal: Negative for abdominal distention and abdominal pain.   Skin: Negative for rash and wound.     Objective:     Vital Signs (Most Recent):  Temp: 97.1 °F (36.2 °C) (12/15/17 1700)  Pulse: 96 (12/15/17 1745)  Resp: (!) 34 (12/15/17 1745)  BP: (!) 150/76 (12/15/17 1600)  SpO2: 96 % (12/15/17 1745) Vital Signs (24h Range):  Temp:  [97.1 °F (36.2 °C)-97.8 °F (36.6 °C)] 97.1 °F (36.2 °C)  Pulse:  [] 96  Resp:  [18-44] 34  SpO2:  [82 %-100 %] 96 %  BP: ()/(37-87) 150/76  Arterial Line BP: ()/() 85/62     Weight: 115.7 kg (255 lb 1.2 oz)  Body mass index is 32.75 kg/m².    Intake/Output Summary (Last 24 hours) at 12/15/17 1805  Last data filed at 12/15/17 1400   Gross per 24 hour   Intake          1836.84 ml   Output              300 ml   Net          1536.84 ml      Physical Exam   Constitutional: He is oriented to person, place, and time. He appears well-developed and well-nourished.   Restless   HENT:   Head: Normocephalic and atraumatic.   Eyes: Conjunctivae are normal. Right eye exhibits no discharge. Left eye exhibits no discharge.   Neck: Neck supple.   Cardiovascular: Normal heart sounds.    Irregularly irregular   Pulmonary/Chest: No stridor.   Tachypneic  Coarse BS bilaterally   Abdominal: Soft. Bowel sounds are normal.   Musculoskeletal: He exhibits edema. He exhibits no deformity.   Neurological: He is alert and oriented to person, place, and time.   Skin: Skin is warm.       Significant Labs:   BMP:   Recent Labs  Lab 12/15/17  0146   *   *   K 5.0   CL 91*   CO2 18*   BUN 26*   CREATININE 2.8*   CALCIUM 8.2*     CBC:   Recent Labs  Lab 12/14/17  0740 12/15/17  0146   WBC 11.47 14.01*   HGB 15.8 14.7   HCT 46.8 44.9    204       Significant Imaging: I have reviewed all  pertinent imaging results/findings within the past 24 hours.

## 2024-07-26 NOTE — ASSESSMENT & PLAN NOTE
Continued wheezing with decreased air movement today in the office.  Will extend course of steroids for further management.  Patient was urged to continue with current dose of Symbicort and to obtain Spiriva Spiriva from the specialist or discuss an alternative medication.  Instructions were given to return to the ER for any new onset fevers or acutely worsening dyspnea despite tapered course of prednisone, home baseline inhalers, and nebulizers.

## 2024-07-29 ENCOUNTER — CARE COORDINATION (OUTPATIENT)
Dept: CARE COORDINATION | Age: 69
End: 2024-07-29

## 2024-07-29 NOTE — CARE COORDINATION
Ambulatory Care Coordination Note     7/29/2024 2:45 PM     Patient outreach attempt by this ACM today to offer care management services. ACM was unable to reach the patient by telephone today; left voice message requesting a return phone call to this ACM.  letter mailed requesting patient to contact this ACM.      ACM: Sharifa Farah RN

## 2024-08-01 ENCOUNTER — CARE COORDINATION (OUTPATIENT)
Dept: CARE COORDINATION | Age: 69
End: 2024-08-01

## 2024-08-08 ENCOUNTER — CARE COORDINATION (OUTPATIENT)
Dept: CARE COORDINATION | Age: 69
End: 2024-08-08

## 2024-08-09 NOTE — CARE COORDINATION
Ambulatory Care Coordination Note     2024 9:05 AM     Patient Current Location:  Home: [address] [place] [id number]     This patient was received as a referral from [facility] report .    Patient contacted the patient by telephone. Verified name and  with patient as identifiers. Provided introduction to self, and explanation of the ACM role.   Patient declined care management services at this time.          ACM: Sharifa Farah RN     Challenges to be reviewed by the provider   Additional needs identified to be addressed with provider No  none               Method of communication with provider: none.    Care Summary Note: ACM spoke with patient introduced [employer] program role of ACM goals and benefits.  Patient denied need for any care coordination's of his chronic conditions of COPD.  Patient did ask for assistance in obtaining his Flonase.  ACM three-way called [facility] his medication is ready for pickup.

## 2024-08-15 ENCOUNTER — TELEPHONE (OUTPATIENT)
Dept: FAMILY MEDICINE CLINIC | Age: 69
End: 2024-08-15

## 2024-08-15 NOTE — TELEPHONE ENCOUNTER
Pt called bc he started taking his prednisone and did the 5tabs x3 days, 4tabs x2 days. But inadvertently mistook his lexapro for the prednisone. And continued the taper down taking the wrong medication. Once he realized he was taking the incorrect medicine at the wrong dosage he started the prednisone but Pt is having SOB and is having similar feelings of his COPD. Pt was advise per  to go to the ED for evaluation.

## 2024-08-20 DIAGNOSIS — J44.1 CHRONIC OBSTRUCTIVE PULMONARY DISEASE WITH ACUTE EXACERBATION (HCC): Chronic | ICD-10-CM

## 2024-08-20 DIAGNOSIS — N52.9 ERECTILE DYSFUNCTION, UNSPECIFIED ERECTILE DYSFUNCTION TYPE: Chronic | ICD-10-CM

## 2024-08-20 RX ORDER — SILDENAFIL 100 MG/1
100 TABLET, FILM COATED ORAL PRN
Qty: 6 TABLET | Refills: 1 | Status: SHIPPED | OUTPATIENT
Start: 2024-08-20

## 2024-08-20 RX ORDER — IPRATROPIUM BROMIDE AND ALBUTEROL SULFATE 2.5; .5 MG/3ML; MG/3ML
1 SOLUTION RESPIRATORY (INHALATION) EVERY 4 HOURS PRN
Qty: 360 ML | Refills: 1 | Status: SHIPPED | OUTPATIENT
Start: 2024-08-20

## 2024-08-20 NOTE — TELEPHONE ENCOUNTER
Pt is requesting medication refill. Please approve or deny this request.    Rx requested:  Requested Prescriptions     Pending Prescriptions Disp Refills    ipratropium 0.5 mg-albuterol 2.5 mg (DUONEB) 0.5-2.5 (3) MG/3ML SOLN nebulizer solution 360 mL 1     Sig: Take 3 mLs by nebulization every 4 hours as needed for Shortness of Breath or Wheezing    sildenafil (VIAGRA) 100 MG tablet 6 tablet 1     Sig: Take 1 tablet by mouth as needed for Erectile Dysfunction         Last Office Visit:   7/26/2024      Next Visit Date:  Future Appointments   Date Time Provider Department Center   1/2/2025  2:30 PM Roby iSlva MD [facility] DEP

## 2024-08-29 ENCOUNTER — APPOINTMENT (OUTPATIENT)
Dept: GENERAL RADIOLOGY | Age: 69
End: 2024-08-29
Payer: MEDICARE

## 2024-08-29 ENCOUNTER — HOSPITAL ENCOUNTER (INPATIENT)
Age: 69
LOS: 2 days | Discharge: HOME | End: 2024-08-31
Attending: INTERNAL MEDICINE | Admitting: INTERNAL MEDICINE
Payer: MEDICARE

## 2024-08-29 DIAGNOSIS — J44.1 CHRONIC OBSTRUCTIVE PULMONARY DISEASE WITH ACUTE EXACERBATION (HCC): Chronic | ICD-10-CM

## 2024-08-29 DIAGNOSIS — R09.02 HYPOXIA: ICD-10-CM

## 2024-08-29 DIAGNOSIS — J44.1 COPD EXACERBATION (HCC): Primary | ICD-10-CM

## 2024-08-29 LAB
ANION GAP SERPL CALCULATED.3IONS-SCNC: 12 MEQ/L (ref 9–15)
BASOPHILS # BLD: 0 K/UL (ref 0–0.1)
BASOPHILS NFR BLD: 0.5 % (ref 0.2–1.2)
BUN SERPL-MCNC: 16 MG/DL (ref 8–23)
CALCIUM SERPL-MCNC: 8.9 MG/DL (ref 8.5–9.9)
CHLORIDE SERPL-SCNC: 104 MEQ/L (ref 95–107)
CO2 SERPL-SCNC: 24 MEQ/L (ref 20–31)
CREAT SERPL-MCNC: 0.93 MG/DL (ref 0.7–1.2)
EKG ATRIAL RATE: 88 BPM
EKG P AXIS: 84 DEGREES
EKG P-R INTERVAL: 158 MS
EKG Q-T INTERVAL: 406 MS
EKG QRS DURATION: 122 MS
EKG QTC CALCULATION (BAZETT): 491 MS
EKG R AXIS: 67 DEGREES
EKG T AXIS: 63 DEGREES
EKG VENTRICULAR RATE: 88 BPM
EOSINOPHIL # BLD: 0.1 K/UL (ref 0–0.5)
EOSINOPHIL NFR BLD: 1 % (ref 0.8–7)
ERYTHROCYTE [DISTWIDTH] IN BLOOD BY AUTOMATED COUNT: 16.7 % (ref 11.6–14.4)
ETHANOL PERCENT: NORMAL G/DL
ETHANOLAMINE SERPL-MCNC: <10 MG/DL (ref 0–0.08)
GLUCOSE BLD-MCNC: 119 MG/DL (ref 70–99)
GLUCOSE BLD-MCNC: 320 MG/DL (ref 70–99)
GLUCOSE SERPL-MCNC: 70 MG/DL (ref 70–99)
HCT VFR BLD AUTO: 38.4 % (ref 42–52)
HGB BLD-MCNC: 12.5 G/DL (ref 13.7–17.5)
IMM GRANULOCYTES # BLD: 0.1 K/UL
IMM GRANULOCYTES NFR BLD: 0.3 %
INFLUENZA A BY PCR: NEGATIVE
INFLUENZA B BY PCR: NEGATIVE
LYMPHOCYTES # BLD: 2.1 K/UL (ref 1.3–3.6)
LYMPHOCYTES NFR BLD: 15 %
MCH RBC QN AUTO: 31.9 PG (ref 25.7–32.2)
MCHC RBC AUTO-ENTMCNC: 32.6 % (ref 32.3–36.5)
MCV RBC AUTO: 98 FL (ref 79–92.2)
MONOCYTES # BLD: 1.1 K/UL (ref 0.3–0.8)
MONOCYTES NFR BLD: 8 % (ref 5.3–12.2)
NEUTROPHILS # BLD: 10.9 K/UL (ref 1.8–5.4)
NEUTS BAND NFR BLD MANUAL: 2 %
NEUTS SEG NFR BLD: 74 % (ref 34–67.9)
PERFORMED ON: ABNORMAL
PERFORMED ON: ABNORMAL
PLATELET # BLD AUTO: 279 K/UL (ref 163–337)
POTASSIUM SERPL-SCNC: 4.5 MEQ/L (ref 3.4–4.9)
RBC # BLD AUTO: 3.92 M/UL (ref 4.63–6.08)
SARS-COV-2 RDRP RESP QL NAA+PROBE: NOT DETECTED
SLIDE REVIEW: ABNORMAL
SODIUM SERPL-SCNC: 140 MEQ/L (ref 135–144)
TROPONIN, HIGH SENSITIVITY: 19 NG/L (ref 0–19)
TROPONIN, HIGH SENSITIVITY: 20 NG/L (ref 0–19)
WBC # BLD AUTO: 14.3 K/UL (ref 4.2–9)

## 2024-08-29 PROCEDURE — 6370000000 HC RX 637 (ALT 250 FOR IP): Performed by: INTERNAL MEDICINE

## 2024-08-29 PROCEDURE — 36415 COLL VENOUS BLD VENIPUNCTURE: CPT

## 2024-08-29 PROCEDURE — 94760 N-INVAS EAR/PLS OXIMETRY 1: CPT

## 2024-08-29 PROCEDURE — 87635 SARS-COV-2 COVID-19 AMP PRB: CPT

## 2024-08-29 PROCEDURE — 87502 INFLUENZA DNA AMP PROBE: CPT

## 2024-08-29 PROCEDURE — 82077 ASSAY SPEC XCP UR&BREATH IA: CPT

## 2024-08-29 PROCEDURE — 99285 EMERGENCY DEPT VISIT HI MDM: CPT

## 2024-08-29 PROCEDURE — 6370000000 HC RX 637 (ALT 250 FOR IP)

## 2024-08-29 PROCEDURE — 2700000000 HC OXYGEN THERAPY PER DAY

## 2024-08-29 PROCEDURE — 93005 ELECTROCARDIOGRAM TRACING: CPT

## 2024-08-29 PROCEDURE — 85025 COMPLETE CBC W/AUTO DIFF WBC: CPT

## 2024-08-29 PROCEDURE — 80048 BASIC METABOLIC PNL TOTAL CA: CPT

## 2024-08-29 PROCEDURE — 71046 X-RAY EXAM CHEST 2 VIEWS: CPT

## 2024-08-29 PROCEDURE — 6360000002 HC RX W HCPCS: Performed by: INTERNAL MEDICINE

## 2024-08-29 PROCEDURE — 1210000000 HC MED SURG R&B

## 2024-08-29 PROCEDURE — 84484 ASSAY OF TROPONIN QUANT: CPT

## 2024-08-29 PROCEDURE — 94640 AIRWAY INHALATION TREATMENT: CPT

## 2024-08-29 PROCEDURE — 2580000003 HC RX 258: Performed by: INTERNAL MEDICINE

## 2024-08-29 PROCEDURE — 93010 ELECTROCARDIOGRAM REPORT: CPT | Performed by: INTERNAL MEDICINE

## 2024-08-29 RX ORDER — AMLODIPINE BESYLATE 10 MG/1
10 TABLET ORAL DAILY
Status: DISCONTINUED | OUTPATIENT
Start: 2024-08-30 | End: 2024-08-31 | Stop reason: HOSPADM

## 2024-08-29 RX ORDER — DEXTROSE MONOHYDRATE 100 MG/ML
INJECTION, SOLUTION INTRAVENOUS CONTINUOUS PRN
Status: DISCONTINUED | OUTPATIENT
Start: 2024-08-29 | End: 2024-08-31 | Stop reason: HOSPADM

## 2024-08-29 RX ORDER — ACETAMINOPHEN 650 MG/1
650 SUPPOSITORY RECTAL EVERY 6 HOURS PRN
Status: DISCONTINUED | OUTPATIENT
Start: 2024-08-29 | End: 2024-08-31 | Stop reason: HOSPADM

## 2024-08-29 RX ORDER — LOSARTAN POTASSIUM 25 MG/1
25 TABLET ORAL DAILY
Status: DISCONTINUED | OUTPATIENT
Start: 2024-08-29 | End: 2024-08-29 | Stop reason: SDUPTHER

## 2024-08-29 RX ORDER — PANTOPRAZOLE SODIUM 40 MG/1
40 TABLET, DELAYED RELEASE ORAL DAILY
Status: DISCONTINUED | OUTPATIENT
Start: 2024-08-29 | End: 2024-08-31 | Stop reason: HOSPADM

## 2024-08-29 RX ORDER — TRAZODONE HYDROCHLORIDE 50 MG/1
50 TABLET, FILM COATED ORAL NIGHTLY
Status: DISCONTINUED | OUTPATIENT
Start: 2024-08-29 | End: 2024-08-31 | Stop reason: HOSPADM

## 2024-08-29 RX ORDER — ACETAMINOPHEN 325 MG/1
650 TABLET ORAL EVERY 6 HOURS PRN
Status: DISCONTINUED | OUTPATIENT
Start: 2024-08-29 | End: 2024-08-31 | Stop reason: HOSPADM

## 2024-08-29 RX ORDER — METHYLPREDNISOLONE SODIUM SUCCINATE 125 MG/2ML
125 INJECTION, POWDER, LYOPHILIZED, FOR SOLUTION INTRAMUSCULAR; INTRAVENOUS ONCE
Status: DISCONTINUED | OUTPATIENT
Start: 2024-08-29 | End: 2024-08-29

## 2024-08-29 RX ORDER — METHYLPREDNISOLONE SODIUM SUCCINATE 40 MG/ML
40 INJECTION, POWDER, LYOPHILIZED, FOR SOLUTION INTRAMUSCULAR; INTRAVENOUS EVERY 8 HOURS SCHEDULED
Status: DISCONTINUED | OUTPATIENT
Start: 2024-08-29 | End: 2024-08-31 | Stop reason: HOSPADM

## 2024-08-29 RX ORDER — INSULIN LISPRO 100 [IU]/ML
0-4 INJECTION, SOLUTION INTRAVENOUS; SUBCUTANEOUS NIGHTLY
Status: DISCONTINUED | OUTPATIENT
Start: 2024-08-29 | End: 2024-08-31 | Stop reason: HOSPADM

## 2024-08-29 RX ORDER — GLUCAGON 1 MG/ML
1 KIT INJECTION PRN
Status: DISCONTINUED | OUTPATIENT
Start: 2024-08-29 | End: 2024-08-31 | Stop reason: HOSPADM

## 2024-08-29 RX ORDER — IPRATROPIUM BROMIDE AND ALBUTEROL SULFATE 2.5; .5 MG/3ML; MG/3ML
1 SOLUTION RESPIRATORY (INHALATION)
Status: DISCONTINUED | OUTPATIENT
Start: 2024-08-29 | End: 2024-08-31 | Stop reason: HOSPADM

## 2024-08-29 RX ORDER — SODIUM CHLORIDE 9 MG/ML
INJECTION, SOLUTION INTRAVENOUS PRN
Status: DISCONTINUED | OUTPATIENT
Start: 2024-08-29 | End: 2024-08-31 | Stop reason: HOSPADM

## 2024-08-29 RX ORDER — MONTELUKAST SODIUM 10 MG/1
10 TABLET ORAL NIGHTLY
Status: DISCONTINUED | OUTPATIENT
Start: 2024-08-29 | End: 2024-08-31 | Stop reason: HOSPADM

## 2024-08-29 RX ORDER — ENOXAPARIN SODIUM 100 MG/ML
30 INJECTION SUBCUTANEOUS 2 TIMES DAILY
Status: DISCONTINUED | OUTPATIENT
Start: 2024-08-29 | End: 2024-08-31 | Stop reason: HOSPADM

## 2024-08-29 RX ORDER — SODIUM CHLORIDE 0.9 % (FLUSH) 0.9 %
5-40 SYRINGE (ML) INJECTION EVERY 12 HOURS SCHEDULED
Status: DISCONTINUED | OUTPATIENT
Start: 2024-08-29 | End: 2024-08-31 | Stop reason: HOSPADM

## 2024-08-29 RX ORDER — GUAIFENESIN 600 MG/1
600 TABLET, EXTENDED RELEASE ORAL 2 TIMES DAILY
Status: DISCONTINUED | OUTPATIENT
Start: 2024-08-29 | End: 2024-08-31 | Stop reason: HOSPADM

## 2024-08-29 RX ORDER — ONDANSETRON 2 MG/ML
4 INJECTION INTRAMUSCULAR; INTRAVENOUS EVERY 6 HOURS PRN
Status: DISCONTINUED | OUTPATIENT
Start: 2024-08-29 | End: 2024-08-31 | Stop reason: HOSPADM

## 2024-08-29 RX ORDER — POLYETHYLENE GLYCOL 3350 17 G/17G
17 POWDER, FOR SOLUTION ORAL DAILY PRN
Status: DISCONTINUED | OUTPATIENT
Start: 2024-08-29 | End: 2024-08-31 | Stop reason: HOSPADM

## 2024-08-29 RX ORDER — ATORVASTATIN CALCIUM 10 MG/1
10 TABLET, FILM COATED ORAL NIGHTLY
Status: DISCONTINUED | OUTPATIENT
Start: 2024-08-29 | End: 2024-08-31 | Stop reason: HOSPADM

## 2024-08-29 RX ORDER — ESCITALOPRAM OXALATE 10 MG/1
10 TABLET ORAL DAILY
Status: DISCONTINUED | OUTPATIENT
Start: 2024-08-29 | End: 2024-08-31 | Stop reason: HOSPADM

## 2024-08-29 RX ORDER — LOSARTAN POTASSIUM 25 MG/1
25 TABLET ORAL DAILY
Status: DISCONTINUED | OUTPATIENT
Start: 2024-08-30 | End: 2024-08-30

## 2024-08-29 RX ORDER — AMLODIPINE BESYLATE 10 MG/1
10 TABLET ORAL DAILY
Status: DISCONTINUED | OUTPATIENT
Start: 2024-08-29 | End: 2024-08-29 | Stop reason: SDUPTHER

## 2024-08-29 RX ORDER — BUDESONIDE AND FORMOTEROL FUMARATE DIHYDRATE 160; 4.5 UG/1; UG/1
2 AEROSOL RESPIRATORY (INHALATION)
Status: DISCONTINUED | OUTPATIENT
Start: 2024-08-29 | End: 2024-08-31 | Stop reason: HOSPADM

## 2024-08-29 RX ORDER — ONDANSETRON 4 MG/1
4 TABLET, ORALLY DISINTEGRATING ORAL EVERY 8 HOURS PRN
Status: DISCONTINUED | OUTPATIENT
Start: 2024-08-29 | End: 2024-08-31 | Stop reason: HOSPADM

## 2024-08-29 RX ORDER — INSULIN LISPRO 100 [IU]/ML
0-8 INJECTION, SOLUTION INTRAVENOUS; SUBCUTANEOUS
Status: DISCONTINUED | OUTPATIENT
Start: 2024-08-29 | End: 2024-08-31 | Stop reason: HOSPADM

## 2024-08-29 RX ORDER — MELOXICAM 7.5 MG/1
15 TABLET ORAL DAILY
Status: DISCONTINUED | OUTPATIENT
Start: 2024-08-29 | End: 2024-08-31 | Stop reason: HOSPADM

## 2024-08-29 RX ADMIN — ENOXAPARIN SODIUM 30 MG: 100 INJECTION SUBCUTANEOUS at 20:42

## 2024-08-29 RX ADMIN — GUAIFENESIN 600 MG: 600 TABLET, EXTENDED RELEASE ORAL at 20:42

## 2024-08-29 RX ADMIN — GUAIFENESIN 600 MG: 600 TABLET, EXTENDED RELEASE ORAL at 15:11

## 2024-08-29 RX ADMIN — LOSARTAN POTASSIUM 25 MG: 50 TABLET, FILM COATED ORAL at 13:23

## 2024-08-29 RX ADMIN — ATORVASTATIN CALCIUM 10 MG: 10 TABLET, FILM COATED ORAL at 20:43

## 2024-08-29 RX ADMIN — ESCITALOPRAM OXALATE 10 MG: 10 TABLET ORAL at 20:43

## 2024-08-29 RX ADMIN — MELOXICAM 15 MG: 7.5 TABLET ORAL at 15:16

## 2024-08-29 RX ADMIN — IPRATROPIUM BROMIDE AND ALBUTEROL SULFATE 1 DOSE: 2.5; .5 SOLUTION RESPIRATORY (INHALATION) at 20:00

## 2024-08-29 RX ADMIN — MONTELUKAST 10 MG: 10 TABLET, FILM COATED ORAL at 20:42

## 2024-08-29 RX ADMIN — IPRATROPIUM BROMIDE AND ALBUTEROL SULFATE 1 DOSE: 2.5; .5 SOLUTION RESPIRATORY (INHALATION) at 11:22

## 2024-08-29 RX ADMIN — IPRATROPIUM BROMIDE AND ALBUTEROL SULFATE 1 DOSE: 2.5; .5 SOLUTION RESPIRATORY (INHALATION) at 16:01

## 2024-08-29 RX ADMIN — AMLODIPINE BESYLATE 10 MG: 5 TABLET ORAL at 13:23

## 2024-08-29 RX ADMIN — TRAZODONE HYDROCHLORIDE 50 MG: 50 TABLET ORAL at 20:43

## 2024-08-29 RX ADMIN — BUDESONIDE AND FORMOTEROL FUMARATE DIHYDRATE 2 PUFF: 160; 4.5 AEROSOL RESPIRATORY (INHALATION) at 20:00

## 2024-08-29 RX ADMIN — INSULIN LISPRO 4 UNITS: 100 INJECTION, SOLUTION INTRAVENOUS; SUBCUTANEOUS at 20:47

## 2024-08-29 RX ADMIN — ENOXAPARIN SODIUM 30 MG: 100 INJECTION SUBCUTANEOUS at 15:11

## 2024-08-29 RX ADMIN — Medication 10 ML: at 20:47

## 2024-08-29 RX ADMIN — METHYLPREDNISOLONE SODIUM SUCCINATE 40 MG: 40 INJECTION INTRAMUSCULAR; INTRAVENOUS at 20:42

## 2024-08-29 RX ADMIN — AZITHROMYCIN MONOHYDRATE 500 MG: 500 INJECTION, POWDER, LYOPHILIZED, FOR SOLUTION INTRAVENOUS at 15:10

## 2024-08-29 RX ADMIN — METFORMIN HYDROCHLORIDE 500 MG: 500 TABLET ORAL at 16:59

## 2024-08-29 RX ADMIN — METHYLPREDNISOLONE SODIUM SUCCINATE 40 MG: 40 INJECTION INTRAMUSCULAR; INTRAVENOUS at 15:10

## 2024-08-29 ASSESSMENT — ENCOUNTER SYMPTOMS
RHINORRHEA: 0
SHORTNESS OF BREATH: 1
SORE THROAT: 0
COUGH: 1
NAUSEA: 1
WHEEZING: 1
ABDOMINAL PAIN: 0
SINUS PRESSURE: 0
EYES NEGATIVE: 1

## 2024-08-29 ASSESSMENT — LIFESTYLE VARIABLES
HOW OFTEN DO YOU HAVE A DRINK CONTAINING ALCOHOL: 4 OR MORE TIMES A WEEK
HOW MANY STANDARD DRINKS CONTAINING ALCOHOL DO YOU HAVE ON A TYPICAL DAY: 3 OR 4

## 2024-08-29 ASSESSMENT — PAIN DESCRIPTION - PAIN TYPE: TYPE: CHRONIC PAIN

## 2024-08-29 ASSESSMENT — PAIN SCALES - GENERAL
PAINLEVEL_OUTOF10: 0
PAINLEVEL_OUTOF10: 8
PAINLEVEL_OUTOF10: 3

## 2024-08-29 ASSESSMENT — PAIN DESCRIPTION - ORIENTATION: ORIENTATION: LOWER

## 2024-08-29 ASSESSMENT — PAIN DESCRIPTION - DESCRIPTORS: DESCRIPTORS: ACHING

## 2024-08-29 ASSESSMENT — PAIN DESCRIPTION - LOCATION
LOCATION: BACK
LOCATION: BACK

## 2024-08-29 ASSESSMENT — PAIN - FUNCTIONAL ASSESSMENT: PAIN_FUNCTIONAL_ASSESSMENT: 0-10

## 2024-08-29 NOTE — ED PROVIDER NOTES
[facility] ED  EMERGENCY DEPARTMENT ENCOUNTER      Pt Name: Anabelle Morris  MRN: [id number]  Birthdate 1955  Date of evaluation: 8/29/2024  Provider: BHARATI Mcmullen CNP      HISTORY OF PRESENT ILLNESS    Anabelle Morris is a 68 y.o. male who presents to the Emergency Department with reporting shortness of breath for 2 weeks.  Was placed on steroids by his PCP was accidentally taking extra doses of Lexapro instead of the steroids he was prescribed.  Denies chest pain or dizziness denies fevers.  But we got a get an EKG history of sleep apnea, COPD hypertension type 2 diabetes, half pack per day smoker of cigarettes.  Cocaine abuse.  Occasional marijuana user.  Reports last use of cocaine was last Friday.  Patient reports he has oxygen to use at home as needed but he was unable to because he did not have a nasal cannula.  EMS reports he was 90% room air at arrival brought to the ER by EMS.    REVIEW OF SYSTEMS       Review of Systems   Constitutional:  Negative for chills, fatigue and fever.   HENT:  Negative for congestion, ear pain, rhinorrhea, sinus pressure and sore throat.    Eyes: Negative.    Respiratory:  Positive for cough, shortness of breath and wheezing.    Cardiovascular: Negative.  Negative for chest pain and leg swelling.   Gastrointestinal:  Positive for nausea. Negative for abdominal pain.   Endocrine: Negative.    Musculoskeletal:  Negative for myalgias.   Skin:  Negative for rash.   Neurological:  Negative for dizziness, weakness and light-headedness.   Hematological:  Negative for adenopathy.   Psychiatric/Behavioral: Negative.           PAST MEDICAL HISTORY     Past Medical History:   Diagnosis Date    Alcohol abuse 10/27/2016    Anemia     Asthma     CKD (chronic kidney disease) stage 3, GFR 30-59 ml/min ([facility]) 12/14/2021    Cocaine abuse ([facility]) 10/27/2016    Community acquired pneumonia of left lower lobe of lung 12/05/2016    COPD (chronic obstructive pulmonary disease)  distress asking for something to drink.  No confusion.  Lung sounds have significant wheezing throughout.  Heart sounds are regular no murmurs or systoles.  Radial pulses 2+ bilaterally no lower extremity edema.  Orders for additional DuoNeb treatment x 1.  EKG sinus rhythm rate of 88 with right bundle branch block similar to previous.  Chest x-ray 2 view stable chronic changes no acute findings.  Negative COVID negative influenza BMP in normal range CBC shows leukocytosis white blood cells 14.3 neutrophilia, mild anemia hemoglobin 12.5 hematocrit 38.4.  Patient reports feeling significantly improved since medications and arrival to the ER.  He is ambulated in the hallway with 2 L nasal cannula.  His oxygen level dropped to between 89 and 90% while on the oxygen and ambulating and he had increased rate of respirations.  Additional orders placed for troponin and EtOH level.  Patient blood pressure on recheck has increased to 161/91.  He did not take his home medications today.  Orders placed for Cozaar 25 mg p.o. and amlodipine 10 mg p.o.  Troponin is resulted at 20 run from the blood that was drawn prior to arrival.  Second troponin is ordered.  Serum alcohol level is less than 10.  Repeat troponin is 19.  Discussed admission with patient due to hypoxia with ambulation and COPD exacerbation he is agreeable to admission.  Case is discussed with Dr. Love patient is excepted       EKG:  Sinus rhythm rate of 88 with right bundle branch block virtually no change from EKG performed November 2023.      2 view chest x-ray:  FINDINGS:  There is partial resection of the left 7th rib.  There is minor strandy  scarring in the left lower lobe.  There is no evidence of pneumonia or  pulmonary edema there is stable left basilar pleural scarring.  There are  changes of COPD.  Heart size is stable.  There is no pneumothorax.     IMPRESSION:  1. Stable chronic changes.  2. No evidence of an acute intrathoracic  process.    PROCEDURES:  Unless otherwise noted below, none     Procedures    My attending is: Dr Dhillon      FINAL IMPRESSION      1. COPD exacerbation (HCC)    2. Hypoxia          DISPOSITION/PLAN   DISPOSITION Decision To Admit 08/29/2024 12:18:38 PM  Condition at Disposition: BHARATI Lawson CNP (electronically signed)  Attending Emergency Physician      Silvio Lopez APRN - CNP  08/29/24 [id number]

## 2024-08-29 NOTE — ED NOTES
Ambulated patient with 3 liters oxygen nasal canula and patient's oxygen went to 89%-90% and patient states he feels short of breath with ambulation.  Anthony updated on assessment of patient.

## 2024-08-29 NOTE — ED TRIAGE NOTES
Pt brought by EMS for c/o shortness of breath.   Patient was 90% on room air upon squads arrival.  Patient given 1 duoneb treatment and 125 mg solumedrol by EMS prior to arrival.  Pt 94% on 3 liters.

## 2024-08-30 LAB
ANION GAP SERPL CALCULATED.3IONS-SCNC: 9 MEQ/L (ref 9–15)
BASOPHILS # BLD: 0 K/UL (ref 0–0.1)
BASOPHILS NFR BLD: 0 % (ref 0.2–1.2)
BUN SERPL-MCNC: 17 MG/DL (ref 8–23)
CALCIUM SERPL-MCNC: 9 MG/DL (ref 8.5–9.9)
CHLORIDE SERPL-SCNC: 107 MEQ/L (ref 95–107)
CO2 SERPL-SCNC: 25 MEQ/L (ref 20–31)
CREAT SERPL-MCNC: 0.76 MG/DL (ref 0.7–1.2)
EOSINOPHIL # BLD: 0 K/UL (ref 0–0.5)
EOSINOPHIL NFR BLD: 0 % (ref 0.8–7)
ERYTHROCYTE [DISTWIDTH] IN BLOOD BY AUTOMATED COUNT: 16 % (ref 11.6–14.4)
GLUCOSE BLD-MCNC: 115 MG/DL (ref 70–99)
GLUCOSE BLD-MCNC: 145 MG/DL (ref 70–99)
GLUCOSE BLD-MCNC: 156 MG/DL (ref 70–99)
GLUCOSE BLD-MCNC: 193 MG/DL (ref 70–99)
GLUCOSE SERPL-MCNC: 165 MG/DL (ref 70–99)
HCT VFR BLD AUTO: 38.6 % (ref 42–52)
HGB BLD-MCNC: 12.6 G/DL (ref 13.7–17.5)
IMM GRANULOCYTES # BLD: 0 K/UL
IMM GRANULOCYTES NFR BLD: 0.4 %
LYMPHOCYTES # BLD: 0.4 K/UL (ref 1.3–3.6)
LYMPHOCYTES NFR BLD: 4.7 %
MCH RBC QN AUTO: 31.7 PG (ref 25.7–32.2)
MCHC RBC AUTO-ENTMCNC: 32.6 % (ref 32.3–36.5)
MCV RBC AUTO: 97.2 FL (ref 79–92.2)
MONOCYTES # BLD: 0.3 K/UL (ref 0.3–0.8)
MONOCYTES NFR BLD: 3.8 % (ref 5.3–12.2)
NEUTROPHILS # BLD: 8.1 K/UL (ref 1.8–5.4)
NEUTS SEG NFR BLD: 91.1 % (ref 34–67.9)
PERFORMED ON: ABNORMAL
PLATELET # BLD AUTO: 247 K/UL (ref 163–337)
POTASSIUM SERPL-SCNC: 4.5 MEQ/L (ref 3.4–4.9)
RBC # BLD AUTO: 3.97 M/UL (ref 4.63–6.08)
SODIUM SERPL-SCNC: 141 MEQ/L (ref 135–144)
WBC # BLD AUTO: 8.9 K/UL (ref 4.2–9)

## 2024-08-30 PROCEDURE — 94760 N-INVAS EAR/PLS OXIMETRY 1: CPT

## 2024-08-30 PROCEDURE — 2700000000 HC OXYGEN THERAPY PER DAY

## 2024-08-30 PROCEDURE — 6370000000 HC RX 637 (ALT 250 FOR IP): Performed by: INTERNAL MEDICINE

## 2024-08-30 PROCEDURE — 6360000002 HC RX W HCPCS: Performed by: INTERNAL MEDICINE

## 2024-08-30 PROCEDURE — 2580000003 HC RX 258: Performed by: INTERNAL MEDICINE

## 2024-08-30 PROCEDURE — 85025 COMPLETE CBC W/AUTO DIFF WBC: CPT

## 2024-08-30 PROCEDURE — 36415 COLL VENOUS BLD VENIPUNCTURE: CPT

## 2024-08-30 PROCEDURE — 1210000000 HC MED SURG R&B

## 2024-08-30 PROCEDURE — 80048 BASIC METABOLIC PNL TOTAL CA: CPT

## 2024-08-30 PROCEDURE — 94640 AIRWAY INHALATION TREATMENT: CPT

## 2024-08-30 RX ORDER — LOSARTAN POTASSIUM 50 MG/1
50 TABLET ORAL DAILY
Status: DISCONTINUED | OUTPATIENT
Start: 2024-08-30 | End: 2024-08-31

## 2024-08-30 RX ADMIN — METFORMIN HYDROCHLORIDE 500 MG: 500 TABLET ORAL at 16:50

## 2024-08-30 RX ADMIN — Medication 10 ML: at 22:30

## 2024-08-30 RX ADMIN — MELOXICAM 15 MG: 7.5 TABLET ORAL at 08:31

## 2024-08-30 RX ADMIN — IPRATROPIUM BROMIDE AND ALBUTEROL SULFATE 1 DOSE: 2.5; .5 SOLUTION RESPIRATORY (INHALATION) at 10:10

## 2024-08-30 RX ADMIN — PANTOPRAZOLE SODIUM 40 MG: 40 TABLET, DELAYED RELEASE ORAL at 08:31

## 2024-08-30 RX ADMIN — ENOXAPARIN SODIUM 30 MG: 100 INJECTION SUBCUTANEOUS at 21:29

## 2024-08-30 RX ADMIN — METFORMIN HYDROCHLORIDE 500 MG: 500 TABLET ORAL at 08:31

## 2024-08-30 RX ADMIN — Medication 10 ML: at 10:23

## 2024-08-30 RX ADMIN — TIOTROPIUM BROMIDE INHALATION SPRAY 2 PUFF: 3.12 SPRAY, METERED RESPIRATORY (INHALATION) at 06:20

## 2024-08-30 RX ADMIN — MONTELUKAST 10 MG: 10 TABLET, FILM COATED ORAL at 21:27

## 2024-08-30 RX ADMIN — BUDESONIDE AND FORMOTEROL FUMARATE DIHYDRATE 2 PUFF: 160; 4.5 AEROSOL RESPIRATORY (INHALATION) at 06:21

## 2024-08-30 RX ADMIN — AMLODIPINE BESYLATE 10 MG: 10 TABLET ORAL at 08:31

## 2024-08-30 RX ADMIN — BUDESONIDE AND FORMOTEROL FUMARATE DIHYDRATE 2 PUFF: 160; 4.5 AEROSOL RESPIRATORY (INHALATION) at 18:00

## 2024-08-30 RX ADMIN — METHYLPREDNISOLONE SODIUM SUCCINATE 40 MG: 40 INJECTION INTRAMUSCULAR; INTRAVENOUS at 06:41

## 2024-08-30 RX ADMIN — GUAIFENESIN 600 MG: 600 TABLET, EXTENDED RELEASE ORAL at 08:31

## 2024-08-30 RX ADMIN — IPRATROPIUM BROMIDE AND ALBUTEROL SULFATE 1 DOSE: 2.5; .5 SOLUTION RESPIRATORY (INHALATION) at 18:00

## 2024-08-30 RX ADMIN — METHYLPREDNISOLONE SODIUM SUCCINATE 40 MG: 40 INJECTION INTRAMUSCULAR; INTRAVENOUS at 21:34

## 2024-08-30 RX ADMIN — GUAIFENESIN 600 MG: 600 TABLET, EXTENDED RELEASE ORAL at 21:26

## 2024-08-30 RX ADMIN — ESCITALOPRAM OXALATE 10 MG: 10 TABLET ORAL at 21:27

## 2024-08-30 RX ADMIN — TRAZODONE HYDROCHLORIDE 50 MG: 50 TABLET ORAL at 21:27

## 2024-08-30 RX ADMIN — ATORVASTATIN CALCIUM 10 MG: 10 TABLET, FILM COATED ORAL at 21:27

## 2024-08-30 RX ADMIN — LOSARTAN POTASSIUM 50 MG: 50 TABLET, FILM COATED ORAL at 08:31

## 2024-08-30 RX ADMIN — IPRATROPIUM BROMIDE AND ALBUTEROL SULFATE 1 DOSE: 2.5; .5 SOLUTION RESPIRATORY (INHALATION) at 06:21

## 2024-08-30 RX ADMIN — AZITHROMYCIN MONOHYDRATE 500 MG: 500 INJECTION, POWDER, LYOPHILIZED, FOR SOLUTION INTRAVENOUS at 14:44

## 2024-08-30 RX ADMIN — METHYLPREDNISOLONE SODIUM SUCCINATE 40 MG: 40 INJECTION INTRAMUSCULAR; INTRAVENOUS at 13:28

## 2024-08-30 RX ADMIN — IPRATROPIUM BROMIDE AND ALBUTEROL SULFATE 1 DOSE: 2.5; .5 SOLUTION RESPIRATORY (INHALATION) at 14:01

## 2024-08-30 ASSESSMENT — PAIN SCALES - GENERAL: PAINLEVEL_OUTOF10: 0

## 2024-08-30 NOTE — PROGRESS NOTES
Chart reviewed.  Patient presented to [facility] ED  complaining of shortness of breath.  Patient was admitted to medical floor with a diagnosis of COPD.  Patient's care discussed in daily quality rounds.  Patient is reported to be independent for all ADLS.  No DC or help at home needs identified at this time.  SS to follow as needed while patient is at [facility]

## 2024-08-30 NOTE — H&P
Hospital Medicine History & Physical      PCP: Roby Silva MD    Date of Admission: 8/29/2024    Date of Service: 8/30/24      Chief Complaint:  dyspnea/SOB/cough      History Of Present Illness:  68 y.o. male who presented to [facility] with above complains. Patient has long standing COPD, on home O2, following by pulmonary service as outpatient. For the past 2 weeks he developed dyspnea/cough/SOB, was treated for COPD exacerbation by PCP x2 with minimal improvement and eventually came to ER. Denied fever/CP, dizziness, was compliant with medical Tx at home, still smoking 1/2 PPD  After initial stabilization he was admitted to [facility]     Past Medical History:          Diagnosis Date    Alcohol abuse 10/27/2016    Anemia     Asthma     CKD (chronic kidney disease) stage 3, GFR 30-59 ml/min ([facility]) 12/14/2021    Cocaine abuse ([facility]) 10/27/2016    Community acquired pneumonia of left lower lobe of lung 12/05/2016    COPD (chronic obstructive pulmonary disease) ([facility])     Depression 04/27/2020    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    Drug-seeking behavior 04/14/2015    Edema 12/10/2015    Erectile dysfunction     Gastroesophageal reflux disease 12/17/2018    Gout 10/27/2016    History of arthroscopy of both knees 10/27/2016    History of colon polyps 10/26/2021    House dust mite allergy 04/21/2014    Hyperlipidemia     Hypertension 10/27/2016    Injury to heart 10/27/2016    Insomnia 12/17/2018    Loculated pleural effusion     Macrocytic anemia 09/07/2013    Medical non-compliance 02/22/2014    Morbid obesity due to excess calories ([facility]) 12/08/2016    Osteoarthritis of both knees 12/08/2016    Personal history of tobacco use     Pleurisy 12/12/2016    Pneumonia 12/04/2016    caused hospital admission    Pneumonia in infectious disease 11/29/2023    Pre-diabetes 10/27/2016    Seasonal allergies 04/21/2014    Severe persistent asthma 10/27/2016    Severe sleep apnea 04/14/2015    Supraventricular tachycardia  tablet by mouth daily 7/26/24  Yes Roby Silva MD   losartan (COZAAR) 25 MG tablet Take 1 tablet by mouth daily 7/26/24  Yes Roby Silva MD   lovastatin (MEVACOR) 40 MG tablet Take 1 tablet by mouth nightly 7/26/24  Yes Roby Silva MD   meloxicam (MOBIC) 15 MG tablet Take 1 tablet by mouth daily 7/26/24  Yes Roby Silva MD   metFORMIN (GLUCOPHAGE) 500 MG tablet Take 1 tablet by mouth 2 times daily (with meals) 7/26/24  Yes Roby Silva MD   tiotropium (SPIRIVA HANDIHALER) 18 MCG inhalation capsule Inhale 1 capsule into the lungs daily 7/26/24  Yes Roby Silva MD   predniSONE (DELTASONE) 10 MG tablet Take 5 tabs daily x 3 days, 4 tabs daily x 3 days, 3 tabs daily x 3 days, 2 tabs daily x 3 days, 1 tab daily x 3 days 7/26/24  Yes Roby Silva MD   Acetaminophen Extra Strength 500 MG TABS Take 1 tablet by mouth every 8 hours as needed (pain) 6/10/24  Yes Roby Silva MD   SYMBICORT 160-4.5 MCG/ACT AERO Inhale 2 puffs into the lungs 2 times daily 3/27/24  Yes Roby Silva MD   montelukast (SINGULAIR) 10 MG tablet Take 1 tablet by mouth nightly 3/20/24  Yes Roby Silva MD   pantoprazole (PROTONIX) 40 MG tablet Take 1 tablet by mouth daily 3/20/24  Yes Roby Silva MD   traZODone (DESYREL) 50 MG tablet Take 1 tablet by mouth nightly 3/20/24  Yes Roby Silva MD   Lancets MISC 1 each by Does not apply route 3 times daily 10/20/21   Martinez Devlin MD       Allergies:  Fish-derived products, Iodine, Seasonal, Other, Penicillin g, Shellfish allergy, Shellfish-derived products, and Pcn [penicillins]    Social History:      The patient currently lives home    TOBACCO:   reports that he has been smoking cigarettes and cigars. He started smoking about 36 years ago. He has a 7.5 pack-year smoking history. He has never used smokeless tobacco.  ETOH:   reports that he does not currently use alcohol after a past usage of about 4.0 standard drinks of  alcohol per week.      Family History:       Reviewed in detail and negative for DM, CAD, Cancer, CVA. Positive as follows:        Problem Relation Age of Onset    Arthritis Mother     Asthma Mother     High Cholesterol Mother     Other Mother         aneurysm    Diabetes Father     Stroke Maternal Grandmother     Cancer Maternal Grandfather     Hypertension Other     COPD Neg Hx        REVIEW OF SYSTEMS:   Pertinent positives as noted in the HPI. All other systems reviewed and negative.    PHYSICAL EXAM:    BP (!) 171/85   Pulse 68   Temp 97.5 °F (36.4 °C) (Oral)   Resp 18   Ht 1.854 m (6' 1\")   Wt 117.9 kg (260 lb)   SpO2 96%   BMI 34.30 kg/m²     General appearance:  No apparent distress, appears stated age and cooperative.  HEENT:  Normal cephalic, atraumatic without obvious deformity. Pupils equal, round, and reactive to light.  Extra ocular muscles intact. Conjunctivae/corneas clear.  Neck: Supple, with full range of motion. No jugular venous distention. Trachea midline.  Respiratory:    bilaterally with   Wheezes   Cardiovascular:  Regular rate and rhythm with normal S1/S2 without murmurs, rubs or gallops.  Abdomen: Soft, non-tender, non-distended with normal bowel sounds.  Musculoskeletal:  No clubbing, cyanosis or edema bilaterally.  Full range of motion without deformity.  Skin: Skin color, texture, turgor normal.  No rashes or lesions.  Neurologic:  Neurovascularly intact without any focal sensory/motor deficits. Cranial nerves: II-XII intact, grossly non-focal.  Psychiatric:  Alert and oriented, thought content appropriate, normal insight  Capillary Refill: Brisk,< 3 seconds   Peripheral Pulses: +2 palpable, equal bilaterally       Labs:     Recent Labs     08/29/24  1106 08/30/24  0522   WBC 14.3* 8.9   HGB 12.5* 12.6*   HCT 38.4* 38.6*    247     Recent Labs     08/29/24  1106 08/30/24  0527    141   K 4.5 4.5    107   CO2 24 25   BUN 16 17   CREATININE 0.93 0.76   CALCIUM 8.9  opportunity to be involved in this patient's care. If you have any questions or concerns please feel free to contact me.

## 2024-08-30 NOTE — PROGRESS NOTES
A&O x 4. Able to use call light to make needs known. Denies pain and shows no s/s of distress or discomfort. Continues on Azithromycin IV ATB with no adverse reactions noted. Accucheck noted to be elevated. Humalog given per sliding scale as ordered. No further complaints voiced. Call light within reach. Safety maintained.

## 2024-08-30 NOTE — PLAN OF CARE
Problem: Discharge Planning  Goal: Discharge to home or other facility with appropriate resources  8/29/2024 2221 by Dg Siu RN  Outcome: Progressing  8/29/2024 1544 by Sandra Valadez RN  Outcome: Progressing     Problem: Pain  Goal: Verbalizes/displays adequate comfort level or baseline comfort level  8/29/2024 2221 by Dg Siu RN  Outcome: Progressing  8/29/2024 1544 by Sandra Valadez RN  Outcome: Progressing     Problem: Safety - Adult  Goal: Free from fall injury  8/29/2024 2221 by Dg Siu RN  Outcome: Progressing  8/29/2024 1544 by Sandra Valadez RN  Outcome: Progressing     Problem: Respiratory - Adult  Goal: Achieves optimal ventilation and oxygenation  8/29/2024 2221 by Dg Siu RN  Outcome: Progressing  8/29/2024 1544 by Sandra Valadez, RN  Outcome: Progressing

## 2024-08-31 VITALS
HEIGHT: 73 IN | WEIGHT: 260 LBS | BODY MASS INDEX: 34.46 KG/M2 | SYSTOLIC BLOOD PRESSURE: 151 MMHG | RESPIRATION RATE: 18 BRPM | TEMPERATURE: 97.7 F | DIASTOLIC BLOOD PRESSURE: 90 MMHG | OXYGEN SATURATION: 98 % | HEART RATE: 70 BPM

## 2024-08-31 LAB
GLUCOSE BLD-MCNC: 121 MG/DL (ref 70–99)
GLUCOSE BLD-MCNC: 132 MG/DL (ref 70–99)
PERFORMED ON: ABNORMAL
PERFORMED ON: ABNORMAL

## 2024-08-31 PROCEDURE — 6360000002 HC RX W HCPCS: Performed by: INTERNAL MEDICINE

## 2024-08-31 PROCEDURE — 94760 N-INVAS EAR/PLS OXIMETRY 1: CPT

## 2024-08-31 PROCEDURE — 94640 AIRWAY INHALATION TREATMENT: CPT

## 2024-08-31 PROCEDURE — 6370000000 HC RX 637 (ALT 250 FOR IP): Performed by: INTERNAL MEDICINE

## 2024-08-31 PROCEDURE — 2700000000 HC OXYGEN THERAPY PER DAY

## 2024-08-31 PROCEDURE — 2580000003 HC RX 258: Performed by: INTERNAL MEDICINE

## 2024-08-31 RX ORDER — LOSARTAN POTASSIUM 50 MG/1
100 TABLET ORAL DAILY
Status: DISCONTINUED | OUTPATIENT
Start: 2024-08-31 | End: 2024-08-31 | Stop reason: HOSPADM

## 2024-08-31 RX ORDER — IPRATROPIUM BROMIDE AND ALBUTEROL SULFATE 2.5; .5 MG/3ML; MG/3ML
1 SOLUTION RESPIRATORY (INHALATION) EVERY 4 HOURS PRN
Qty: 360 ML | Refills: 1 | Status: SHIPPED | OUTPATIENT
Start: 2024-08-31

## 2024-08-31 RX ORDER — METHYLPREDNISOLONE 4 MG
TABLET, DOSE PACK ORAL
Qty: 1 KIT | Refills: 0 | Status: SHIPPED | OUTPATIENT
Start: 2024-08-31 | End: 2024-09-06

## 2024-08-31 RX ORDER — AZITHROMYCIN 250 MG/1
250 TABLET, FILM COATED ORAL DAILY
Qty: 3 TABLET | Refills: 0 | Status: SHIPPED | OUTPATIENT
Start: 2024-08-31 | End: 2024-09-03

## 2024-08-31 RX ORDER — NICOTINE 21 MG/24HR
1 PATCH, TRANSDERMAL 24 HOURS TRANSDERMAL DAILY
Qty: 42 PATCH | Refills: 0 | Status: SHIPPED | OUTPATIENT
Start: 2024-08-31 | End: 2024-10-12

## 2024-08-31 RX ORDER — LOSARTAN POTASSIUM 100 MG/1
100 TABLET ORAL DAILY
Qty: 30 TABLET | Refills: 3 | Status: SHIPPED | OUTPATIENT
Start: 2024-09-01

## 2024-08-31 RX ADMIN — AZITHROMYCIN MONOHYDRATE 500 MG: 500 INJECTION, POWDER, LYOPHILIZED, FOR SOLUTION INTRAVENOUS at 11:17

## 2024-08-31 RX ADMIN — BUDESONIDE AND FORMOTEROL FUMARATE DIHYDRATE 2 PUFF: 160; 4.5 AEROSOL RESPIRATORY (INHALATION) at 06:19

## 2024-08-31 RX ADMIN — IPRATROPIUM BROMIDE AND ALBUTEROL SULFATE 1 DOSE: 2.5; .5 SOLUTION RESPIRATORY (INHALATION) at 10:29

## 2024-08-31 RX ADMIN — AMLODIPINE BESYLATE 10 MG: 10 TABLET ORAL at 07:52

## 2024-08-31 RX ADMIN — GUAIFENESIN 600 MG: 600 TABLET, EXTENDED RELEASE ORAL at 07:52

## 2024-08-31 RX ADMIN — METHYLPREDNISOLONE SODIUM SUCCINATE 40 MG: 40 INJECTION INTRAMUSCULAR; INTRAVENOUS at 05:55

## 2024-08-31 RX ADMIN — Medication 10 ML: at 11:14

## 2024-08-31 RX ADMIN — MELOXICAM 15 MG: 7.5 TABLET ORAL at 07:51

## 2024-08-31 RX ADMIN — LOSARTAN POTASSIUM 100 MG: 50 TABLET, FILM COATED ORAL at 07:52

## 2024-08-31 RX ADMIN — PANTOPRAZOLE SODIUM 40 MG: 40 TABLET, DELAYED RELEASE ORAL at 07:52

## 2024-08-31 RX ADMIN — TIOTROPIUM BROMIDE INHALATION SPRAY 2 PUFF: 3.12 SPRAY, METERED RESPIRATORY (INHALATION) at 06:19

## 2024-08-31 RX ADMIN — METHYLPREDNISOLONE SODIUM SUCCINATE 40 MG: 40 INJECTION INTRAMUSCULAR; INTRAVENOUS at 13:03

## 2024-08-31 RX ADMIN — IPRATROPIUM BROMIDE AND ALBUTEROL SULFATE 1 DOSE: 2.5; .5 SOLUTION RESPIRATORY (INHALATION) at 06:19

## 2024-08-31 RX ADMIN — METFORMIN HYDROCHLORIDE 500 MG: 500 TABLET ORAL at 07:51

## 2024-08-31 NOTE — DISCHARGE SUMMARY
Discharge Summary    Patient:  Anabelle Morris  YOB: 1955    MRN: [id number]   Acct: [id number]    Primary Care Physician: Roby Silva MD    Admit date:  8/29/2024    Discharge date:   08/31/24      Discharge Diagnoses:   COPD exacerbation (HCC)  Principal Problem:    COPD exacerbation (HCC)  Resolved Problems:    * No resolved hospital problems. *      Admitted for: (HPI)above    Hospital Course: patient was admitted with dyspnea/COPD exacerbation, was treated with atbs/steroids and resp Tx. Had marked improvement in his condition and after completing acute stay will be DC home with PO atbs/steroids. Advised to stop smoking, agreed for nicotinic patch     Consultants:      Discharge Medications:       Medication List        START taking these medications      azithromycin 250 MG tablet  Commonly known as: ZITHROMAX  Take 1 tablet by mouth daily for 3 days     methylPREDNISolone 4 MG tablet  Commonly known as: MEDROL (PORTER)  Take by mouth.     nicotine 21 MG/24HR  Commonly known as: NICODERM CQ  Place 1 patch onto the skin daily            CHANGE how you take these medications      losartan 100 MG tablet  Commonly known as: COZAAR  Take 1 tablet by mouth daily  Start taking on: September 1, 2024  What changed:   medication strength  how much to take            CONTINUE taking these medications      Acetaminophen Extra Strength 500 MG Tabs  Take 1 tablet by mouth every 8 hours as needed (pain)     albuterol sulfate  (90 Base) MCG/ACT inhaler  Commonly known as: PROVENTIL;VENTOLIN;PROAIR  Inhale 2 puffs into the lungs every 4 hours as needed for Wheezing or Shortness of Breath     amLODIPine 10 MG tablet  Commonly known as: NORVASC  Take 1 tablet by mouth daily     cetirizine 10 MG tablet  Commonly known as: ZYRTEC  Take 1 tablet by mouth daily     escitalopram 10 MG tablet  Commonly known as: LEXAPRO  Take 1 tablet by mouth daily     fluticasone 50 MCG/ACT nasal spray  Commonly known  Hemoglobin 12.6 (L) 13.7 - 17.5 g/dL    Hematocrit 38.6 (L) 42.0 - 52.0 %    MCV 97.2 (H) 79.0 - 92.2 fL    MCH 31.7 25.7 - 32.2 pg    MCHC 32.6 32.3 - 36.5 %    RDW 16.0 (H) 11.6 - 14.4 %    Platelets 247 163 - 337 K/uL    Neutrophils % 91.1 (H) 34.0 - 67.9 %    Immature Granulocytes % 0.4 %    Lymphocytes % 4.7 %    Monocytes % 3.8 (L) 5.3 - 12.2 %    Eosinophils % 0.0 (L) 0.8 - 7.0 %    Basophils % 0.0 (L) 0.2 - 1.2 %    Neutrophils Absolute 8.1 (H) 1.8 - 5.4 K/uL    Immature Granulocytes # 0.0 K/uL    Lymphocytes Absolute 0.4 (L) 1.3 - 3.6 K/uL    Monocytes Absolute 0.3 0.3 - 0.8 K/uL    Eosinophils Absolute 0.0 0.0 - 0.5 K/uL    Basophils Absolute 0.0 0.0 - 0.1 K/uL   POCT Glucose   Result Value Ref Range    POC Glucose 119 (H) 70 - 99 mg/dl    Performed on ACCU-CHEK    POCT Glucose   Result Value Ref Range    POC Glucose 320 (H) 70 - 99 mg/dl    Performed on ACCU-Wireless SafetyK    POCT Glucose   Result Value Ref Range    POC Glucose 145 (H) 70 - 99 mg/dl    Performed on ACCU-Wireless SafetyK    POCT Glucose   Result Value Ref Range    POC Glucose 115 (H) 70 - 99 mg/dl    Performed on ACCU-CHEK    POCT Glucose   Result Value Ref Range    POC Glucose 156 (H) 70 - 99 mg/dl    Performed on ACCU-CHEK    POCT Glucose   Result Value Ref Range    POC Glucose 193 (H) 70 - 99 mg/dl    Performed on ACCU-CHEK    POCT Glucose   Result Value Ref Range    POC Glucose 132 (H) 70 - 99 mg/dl    Performed on ACCU-CHEK    EKG 12 Lead   Result Value Ref Range    Ventricular Rate 88 BPM    Atrial Rate 88 BPM    P-R Interval 158 ms    QRS Duration 122 ms    Q-T Interval 406 ms    QTc Calculation (Bazett) 491 ms    P Axis 84 degrees    R Axis 67 degrees    T Axis 63 degrees     *Note: Due to a large number of results and/or encounters for the requested time period, some results have not been displayed. A complete set of results can be found in Results Review.       Diet:  ADULT DIET; Regular    Activity:  Activity as tolerated (Patient may move about with

## 2024-08-31 NOTE — PROGRESS NOTES
Pt discharge home via friend, staff assisted pt to door. Discharge instructions discussed and given to patient. Meds sent to pharmacy of pt choice. IV removed, cathter intact.

## 2024-08-31 NOTE — PLAN OF CARE
Problem: Discharge Planning  Goal: Discharge to home or other facility with appropriate resources  Outcome: Progressing  Flowsheets (Taken 8/30/2024 0900 by Shasta Flores RN)  Discharge to home or other facility with appropriate resources: Identify barriers to discharge with patient and caregiver     Problem: Pain  Goal: Verbalizes/displays adequate comfort level or baseline comfort level  Outcome: Progressing     Problem: Safety - Adult  Goal: Free from fall injury  Outcome: Progressing     Problem: Respiratory - Adult  Goal: Achieves optimal ventilation and oxygenation  Outcome: Progressing     Problem: Chronic Conditions and Co-morbidities  Goal: Patient's chronic conditions and co-morbidity symptoms are monitored and maintained or improved  Outcome: Progressing

## 2024-09-03 ENCOUNTER — CARE COORDINATION (OUTPATIENT)
Dept: CARE COORDINATION | Age: 69
End: 2024-09-03

## 2024-09-03 NOTE — ADT AUTH CERT
Utilization Reviews       8/30    Last updated by Ruthie Soni RN on 8/31/2024 1220     Review Status Created By   In Primary Ruthie Soni RN       Review Type Associated Date   -- 8/31/2024      Criteria Review   DATE: 8/30  TYPE OF BED: MS     Patient has or is expected to exceed 2 midnights of medically necessary care.         RELEVANT BASELINES: (lab values, vitals, o2 amount/delivery, etc.)  A&O X3, uses oxygen at home as needed however does not have nasal cannula      PERTINENT UPDATES:  IV abx, IV solumedrol      VITALS:  3lnc 96% 171/85 68 18 97.5     ABNL/PERTINENT LABS/RADIOLOGY/DIAGNOSTIC STUDIES:  RBC: 3.97 (L)  Hemoglobin Quant: 12.6 (L)  Hematocrit: 38.6 (L)  MCV: 97.2 (H)  Glucose: 165 (H)  POC Glucose: 145 (H)  POC Glucose: 115 (H)  POC Glucose: 156 (H)  POC Glucose: 193 (H)        PHYSICAL EXAM:  Respiratory:    bilaterally with   Wheezes      MD CONSULTS/ASSESSMENT AND PLAN:  H&P IM*  COPD exacerbation/desaturation/acute/chronic respiratory failure, failure outpatient Tx- atbs/steroids/resp Tx initiated, follow up clinically  Smoking- advice to stop, patient refused nicotinic replacement  HTN- not well controlled, increase cozaar, follow up clinically  OSAP- patient not using CPAP- advised to follow up with pulmonary and may need to repeat sleep study after DC  Obesity with BMI 34%- supportive care  Hyperglycemia in the setting of steroids use- HBA1C was done 2 mts ago, metformin restarted, follow up with ACCU check, ISS ordered      MEDICATIONS:  azithromycin (ZITHROMAX) 500 mg in 250 mL addavial  Dose: 500 mg  Freq: EVERY 24 HOURS Route: IV        budesonide-formoterol (SYMBICORT) 160-4.5 MCG/ACT inhaler 2 puff  Dose: 2 puff  Freq: 2 TIMES DAILY RESP Route: IN     enoxaparin Sodium (LOVENOX) injection 30 mg  Dose: 30 mg  Freq: 2 TIMES DAILY Route: SC     ipratropium 0.5 mg-albuterol 2.5 mg (DUONEB) nebulizer solution 1 Dose  Dose: 1 Dose  Freq: EVERY 4 HOURS WHILE AWAKE RESP

## 2024-09-03 NOTE — CARE COORDINATION
Care Transitions Note    Initial Call - Call within 2 business days of discharge: Yes    Attempted to reach patient for transitions of care follow up.     Outreach Attempts:   Spoke very briefly with Mr. Morris, he is requesting a call back tomorrow.     Patient: Anabelle Morris    Patient : 1955   MRN: [id number]    Reason for Admission: COPD   Discharge Date: 24  RURS: Readmission Risk Score: 11.9    Last Discharge Facility       Date Complaint Diagnosis Description Type Department Provider    24 Shortness of Breath COPD exacerbation (HCC) ... ED to Hosp-Admission (Discharged) (ADMITTED) [facility] Francisco Burnett MD     Follow Up Appointment:   Patient has hospital follow up appointment scheduled within 7 days of discharge.    Future Appointments         Provider Specialty Dept Phone    2024 2:00 PM Roby Silva MD [facility] [phone]    2025 2:30 PM Roby Silva MD Family Medicine [phone]        Ruthann Armstrong RN

## 2024-09-04 ENCOUNTER — CARE COORDINATION (OUTPATIENT)
Dept: CARE COORDINATION | Age: 69
End: 2024-09-04

## 2024-09-04 NOTE — CARE COORDINATION
Care Transitions Note    Initial Call - Call within 2 business days of discharge: Yes    Attempted to reach patient for transitions of care follow up. Unable to reach patient.    Outreach Attempts:   Voicemail full, unable to leave message.   If no return call by the end of today, CTN will sign off and resolve CT program.      Patient: Anabelle Morris    Patient : 1955   MRN: [id number]    Reason for Admission: COPD exac  Discharge Date: 24  RURS: Readmission Risk Score: 11.9    Last Discharge Facility       Date Complaint Diagnosis Description Type Department Provider    24 Shortness of Breath COPD exacerbation (HCC) ... ED to Hosp-Admission (Discharged) (ADMITTED) [facility] Francisco Burnett MD   Follow Up Appointment:   Patient has hospital follow up appointment scheduled within 7 days of discharge.    Future Appointments         Provider Specialty Dept Phone    2024 2:00 PM Roby Silva MD [facility] [phone]    2025 2:30 PM Roby Silva MD Family Medicine [phone]        Ruthann Armstrong RN

## 2024-09-05 RX ORDER — METHYLPREDNISOLONE 4 MG
TABLET, DOSE PACK ORAL
Qty: 1 KIT | Refills: 0 | OUTPATIENT
Start: 2024-09-05 | End: 2024-09-11

## 2024-09-05 NOTE — TELEPHONE ENCOUNTER
Patient is requesting medication refill: patient will run out of medication before upcoming visit. Please advise.     Rx requested:  Requested Prescriptions     Pending Prescriptions Disp Refills    methylPREDNISolone (MEDROL, PORTER,) 4 MG tablet 1 kit 0     Sig: Take by mouth.         Last Office Visit:   7/26/2024      Next Visit Date:  Future Appointments   Date Time Provider Department Center   9/10/2024  1:00 PM Roby Silva MD [facility] DEP   1/2/2025  2:30 PM Roby Silva MD [facility] DEP

## 2024-09-10 ENCOUNTER — OFFICE VISIT (OUTPATIENT)
Dept: FAMILY MEDICINE CLINIC | Age: 69
End: 2024-09-10
Payer: MEDICARE

## 2024-09-10 VITALS
TEMPERATURE: 97.4 F | BODY MASS INDEX: 34.46 KG/M2 | HEIGHT: 73 IN | OXYGEN SATURATION: 94 % | HEART RATE: 84 BPM | SYSTOLIC BLOOD PRESSURE: 152 MMHG | DIASTOLIC BLOOD PRESSURE: 80 MMHG | WEIGHT: 260 LBS

## 2024-09-10 DIAGNOSIS — M54.41 CHRONIC RIGHT-SIDED LOW BACK PAIN WITH RIGHT-SIDED SCIATICA: ICD-10-CM

## 2024-09-10 DIAGNOSIS — Z72.0 TOBACCO USE: ICD-10-CM

## 2024-09-10 DIAGNOSIS — M51.36 DDD (DEGENERATIVE DISC DISEASE), LUMBAR: ICD-10-CM

## 2024-09-10 DIAGNOSIS — I10 PRIMARY HYPERTENSION: Chronic | ICD-10-CM

## 2024-09-10 DIAGNOSIS — I47.10 SVT (SUPRAVENTRICULAR TACHYCARDIA) (HCC): ICD-10-CM

## 2024-09-10 DIAGNOSIS — G89.29 CHRONIC RIGHT-SIDED LOW BACK PAIN WITH RIGHT-SIDED SCIATICA: ICD-10-CM

## 2024-09-10 DIAGNOSIS — E11.21 TYPE 2 DIABETES MELLITUS WITH DIABETIC NEPHROPATHY, WITHOUT LONG-TERM CURRENT USE OF INSULIN (HCC): ICD-10-CM

## 2024-09-10 DIAGNOSIS — J44.1 CHRONIC OBSTRUCTIVE PULMONARY DISEASE WITH ACUTE EXACERBATION (HCC): Primary | Chronic | ICD-10-CM

## 2024-09-10 DIAGNOSIS — M48.061 SPINAL STENOSIS OF LUMBAR REGION, UNSPECIFIED WHETHER NEUROGENIC CLAUDICATION PRESENT: ICD-10-CM

## 2024-09-10 PROCEDURE — G8417 CALC BMI ABV UP PARAM F/U: HCPCS | Performed by: FAMILY MEDICINE

## 2024-09-10 PROCEDURE — 3044F HG A1C LEVEL LT 7.0%: CPT | Performed by: FAMILY MEDICINE

## 2024-09-10 PROCEDURE — 3017F COLORECTAL CA SCREEN DOC REV: CPT | Performed by: FAMILY MEDICINE

## 2024-09-10 PROCEDURE — 4004F PT TOBACCO SCREEN RCVD TLK: CPT | Performed by: FAMILY MEDICINE

## 2024-09-10 PROCEDURE — 1111F DSCHRG MED/CURRENT MED MERGE: CPT | Performed by: FAMILY MEDICINE

## 2024-09-10 PROCEDURE — 3023F SPIROM DOC REV: CPT | Performed by: FAMILY MEDICINE

## 2024-09-10 PROCEDURE — 99214 OFFICE O/P EST MOD 30 MIN: CPT | Performed by: FAMILY MEDICINE

## 2024-09-10 PROCEDURE — 2022F DILAT RTA XM EVC RTNOPTHY: CPT | Performed by: FAMILY MEDICINE

## 2024-09-10 PROCEDURE — 1124F ACP DISCUSS-NO DSCNMKR DOCD: CPT | Performed by: FAMILY MEDICINE

## 2024-09-10 PROCEDURE — 3079F DIAST BP 80-89 MM HG: CPT | Performed by: FAMILY MEDICINE

## 2024-09-10 PROCEDURE — 3077F SYST BP >= 140 MM HG: CPT | Performed by: FAMILY MEDICINE

## 2024-09-10 PROCEDURE — G8427 DOCREV CUR MEDS BY ELIG CLIN: HCPCS | Performed by: FAMILY MEDICINE

## 2024-09-10 RX ORDER — LOSARTAN POTASSIUM 100 MG/1
100 TABLET ORAL DAILY
Qty: 90 TABLET | Refills: 1 | Status: SHIPPED | OUTPATIENT
Start: 2024-09-10

## 2024-09-10 RX ORDER — PREDNISONE 50 MG/1
50 TABLET ORAL DAILY
Qty: 5 TABLET | Refills: 0 | Status: SHIPPED | OUTPATIENT
Start: 2024-09-10 | End: 2024-09-15

## 2024-09-10 RX ORDER — PSEUDOEPHED/ACETAMINOPH/DIPHEN 30MG-500MG
1 TABLET ORAL EVERY 8 HOURS PRN
Qty: 100 TABLET | Refills: 1 | Status: SHIPPED | OUTPATIENT
Start: 2024-09-10

## 2024-09-10 RX ORDER — IPRATROPIUM BROMIDE AND ALBUTEROL SULFATE 2.5; .5 MG/3ML; MG/3ML
1 SOLUTION RESPIRATORY (INHALATION) EVERY 4 HOURS PRN
Qty: 360 ML | Refills: 1 | Status: SHIPPED | OUTPATIENT
Start: 2024-09-10

## 2024-09-10 ASSESSMENT — ENCOUNTER SYMPTOMS
BLOOD IN STOOL: 0
WHEEZING: 1
NAUSEA: 0
SHORTNESS OF BREATH: 1
DIARRHEA: 0
ANAL BLEEDING: 0
CHEST TIGHTNESS: 1
COUGH: 1
ABDOMINAL PAIN: 0
CONSTIPATION: 0
VOMITING: 0

## 2024-09-11 DIAGNOSIS — M48.061 SPINAL STENOSIS OF LUMBAR REGION, UNSPECIFIED WHETHER NEUROGENIC CLAUDICATION PRESENT: ICD-10-CM

## 2024-09-11 DIAGNOSIS — I10 PRIMARY HYPERTENSION: Chronic | ICD-10-CM

## 2024-09-11 RX ORDER — MELOXICAM 15 MG/1
15 TABLET ORAL DAILY
Qty: 90 TABLET | Refills: 1 | Status: SHIPPED | OUTPATIENT
Start: 2024-09-11

## 2024-09-11 RX ORDER — AMLODIPINE BESYLATE 10 MG/1
10 TABLET ORAL DAILY
Qty: 90 TABLET | Refills: 1 | Status: SHIPPED | OUTPATIENT
Start: 2024-09-11

## 2024-09-18 ENCOUNTER — TELEPHONE (OUTPATIENT)
Dept: PULMONOLOGY | Age: 69
End: 2024-09-18

## 2024-09-18 ENCOUNTER — TELEPHONE (OUTPATIENT)
Dept: FAMILY MEDICINE CLINIC | Age: 69
End: 2024-09-18

## 2024-09-26 ENCOUNTER — CARE COORDINATION (OUTPATIENT)
Dept: CARE COORDINATION | Age: 69
End: 2024-09-26

## 2024-09-27 ENCOUNTER — CARE COORDINATION (OUTPATIENT)
Dept: CARE COORDINATION | Age: 69
End: 2024-09-27

## 2024-09-30 ENCOUNTER — CARE COORDINATION (OUTPATIENT)
Dept: CARE COORDINATION | Age: 69
End: 2024-09-30

## 2024-09-30 DIAGNOSIS — N52.9 ERECTILE DYSFUNCTION, UNSPECIFIED ERECTILE DYSFUNCTION TYPE: Chronic | ICD-10-CM

## 2024-09-30 RX ORDER — SILDENAFIL 100 MG/1
100 TABLET, FILM COATED ORAL PRN
Qty: 6 TABLET | Refills: 1 | OUTPATIENT
Start: 2024-09-30

## 2024-09-30 NOTE — TELEPHONE ENCOUNTER
Patient is requesting medication refill. Please approve or deny this request.    Rx requested:  Requested Prescriptions     Pending Prescriptions Disp Refills    sildenafil (VIAGRA) 100 MG tablet 6 tablet 1     Sig: Take 1 tablet by mouth as needed for Erectile Dysfunction         Last Office Visit:   9/10/2024      Next Visit Date:  Future Appointments   Date Time Provider Department Center   10/10/2024  9:30 AM Roby Silva MD [facility] DEP   1/2/2025  2:30 PM Roby Silva MD [facility] DEP

## 2024-09-30 NOTE — CARE COORDINATION
Telephone call with patient.  He stated that talked to payee this AM and he pays his rent, utilities, cable and medications on line from his computer..  He doesn't want to change payee at this time.  Discussed mail in pharmacy and they would need a form of payment to set it up.  Next time patient talks to his payee he is going to discuss issue of having charge card information so he can set up mail in pharmacy.

## 2024-09-30 NOTE — CARE COORDINATION
Telephone call to Social Security per patient's request to start getting his check sent to him and not payee.  He stated that payee is in the hospital.  Telephone call to Social Security to set up a time to come into the office to become his own person.  Appointment can be done by phone and set up appointment for 11/2/2024 at 10AM.for patient to be his own payee. He need to find out which bank Payee is using and is going their today.  He does not want to work on setting up mail in pharmacy until he gets this situation corrected.

## 2024-09-30 NOTE — CARE COORDINATION
The following email was obtained from Bronwyn Quiñonez/Information and .  Bronwyn,    If your patient needs a payee, it can be done online or by calling [phone]. I have also included the website.    https://www.ssa.gov/payee/  [phone]    I hope that this helps.    Kindly,    Bronwyn Quiñonez  Information &

## 2024-10-03 ENCOUNTER — CARE COORDINATION (OUTPATIENT)
Dept: CARE COORDINATION | Age: 69
End: 2024-10-03

## 2024-10-03 NOTE — CARE COORDINATION
Telephone call to patient. Discussed called [facility] and they have six prescriptions ready for  which includes nebulizer solution. Patient has a friend who will take him to  his prescriptions.

## 2024-10-03 NOTE — CARE COORDINATION
Patient calling stating he is in need of his nebulizer solution and cannot get thru to [facility].  Discussed plan to call [facility] on his behalf.

## 2024-10-03 NOTE — CARE COORDINATION
Telephone call to [facility].  They confirmed that patient has six prescriptions ready for  which includes nebulizer solution.

## 2024-10-07 ENCOUNTER — CARE COORDINATION (OUTPATIENT)
Dept: CARE COORDINATION | Age: 69
End: 2024-10-07

## 2024-10-07 NOTE — CARE COORDINATION
Telephone call to patient.  He called Social Security and cancelled his appointment to become his own person.  He still has not talked to his payee about charge card so can set up mail away pharmacy/[facility]..He doesn't want to overwhelm his payee.

## 2024-10-10 ENCOUNTER — LAB (OUTPATIENT)
Dept: FAMILY MEDICINE CLINIC | Age: 69
End: 2024-10-10
Payer: MEDICARE

## 2024-10-10 DIAGNOSIS — Z23 NEED FOR INFLUENZA VACCINATION: Primary | ICD-10-CM

## 2024-10-10 DIAGNOSIS — N52.9 ERECTILE DYSFUNCTION, UNSPECIFIED ERECTILE DYSFUNCTION TYPE: Chronic | ICD-10-CM

## 2024-10-10 PROCEDURE — 90653 IIV ADJUVANT VACCINE IM: CPT | Performed by: FAMILY MEDICINE

## 2024-10-10 PROCEDURE — G0008 ADMIN INFLUENZA VIRUS VAC: HCPCS | Performed by: FAMILY MEDICINE

## 2024-10-10 RX ORDER — SILDENAFIL 100 MG/1
100 TABLET, FILM COATED ORAL PRN
Qty: 6 TABLET | Refills: 1 | OUTPATIENT
Start: 2024-10-10

## 2024-10-10 NOTE — TELEPHONE ENCOUNTER
Patient is requesting medication refill. Please approve or deny this request.    Rx requested:  Requested Prescriptions     Pending Prescriptions Disp Refills    sildenafil (VIAGRA) 100 MG tablet 6 tablet 1     Sig: Take 1 tablet by mouth as needed for Erectile Dysfunction         Last Office Visit:   9/10/2024      Next Visit Date:  Future Appointments   Date Time Provider Department Center   1/2/2025  2:30 PM Roby Silva MD [facility] DEP

## 2024-10-14 ENCOUNTER — CARE COORDINATION (OUTPATIENT)
Dept: CARE COORDINATION | Age: 69
End: 2024-10-14

## 2024-10-14 NOTE — CARE COORDINATION
Telephone call with patient.  He still wants to keep his payee but he is still in the hospital..  He has not wanted to do mail away [facility] pharmacy until he speaks to his   payee.

## 2024-10-19 ENCOUNTER — HOSPITAL ENCOUNTER (EMERGENCY)
Age: 69
Discharge: ANOTHER HOSPITAL | End: 2024-10-19
Attending: EMERGENCY MEDICINE
Payer: MEDICARE

## 2024-10-19 ENCOUNTER — HOSPITAL ENCOUNTER (INPATIENT)
Age: 69
LOS: 18 days | Discharge: NURSING, REHAB OR LONG-TERM CARE | DRG: 207 | End: 2024-11-06
Attending: STUDENT IN AN ORGANIZED HEALTH CARE EDUCATION/TRAINING PROGRAM | Admitting: STUDENT IN AN ORGANIZED HEALTH CARE EDUCATION/TRAINING PROGRAM
Payer: MEDICARE

## 2024-10-19 ENCOUNTER — APPOINTMENT (OUTPATIENT)
Dept: GENERAL RADIOLOGY | Age: 69
End: 2024-10-19
Payer: MEDICARE

## 2024-10-19 VITALS
RESPIRATION RATE: 20 BRPM | TEMPERATURE: 97.9 F | HEIGHT: 72 IN | OXYGEN SATURATION: 97 % | BODY MASS INDEX: 33.18 KG/M2 | WEIGHT: 245 LBS | HEART RATE: 95 BPM | DIASTOLIC BLOOD PRESSURE: 72 MMHG | SYSTOLIC BLOOD PRESSURE: 132 MMHG

## 2024-10-19 DIAGNOSIS — R09.02 HYPOXIA: ICD-10-CM

## 2024-10-19 DIAGNOSIS — J96.00 ACUTE RESPIRATORY FAILURE, UNSPECIFIED WHETHER WITH HYPOXIA OR HYPERCAPNIA: Primary | ICD-10-CM

## 2024-10-19 DIAGNOSIS — R94.31 ABNORMAL EKG: ICD-10-CM

## 2024-10-19 DIAGNOSIS — J44.1 COPD EXACERBATION (HCC): Primary | ICD-10-CM

## 2024-10-19 LAB
ALBUMIN SERPL-MCNC: 3.9 G/DL (ref 3.5–4.6)
ALP SERPL-CCNC: 102 U/L (ref 35–104)
ALT SERPL-CCNC: 14 U/L (ref 0–41)
AMPHETAMINES UR QL SCN>500 NG/ML: ABNORMAL
ANION GAP SERPL CALCULATED.3IONS-SCNC: 9 MEQ/L (ref 9–15)
AST SERPL-CCNC: 16 U/L (ref 0–40)
B PARAP IS1001 DNA NPH QL NAA+NON-PROBE: NOT DETECTED
B PERT.PT PRMT NPH QL NAA+NON-PROBE: NOT DETECTED
BARBITURATES UR QL SCN>200 NG/ML: ABNORMAL
BASOPHILS # BLD: 0.1 K/UL (ref 0–0.1)
BASOPHILS NFR BLD: 0.5 % (ref 0.2–1.2)
BENZODIAZ UR QL SCN: ABNORMAL
BILIRUB SERPL-MCNC: 0.6 MG/DL (ref 0.2–0.7)
BILIRUB UR QL STRIP: NEGATIVE
BNP BLD-MCNC: 40 PG/ML
BUN SERPL-MCNC: 13 MG/DL (ref 8–23)
C PNEUM DNA NPH QL NAA+NON-PROBE: NOT DETECTED
CALCIUM SERPL-MCNC: 9.3 MG/DL (ref 8.5–9.9)
CHLORIDE SERPL-SCNC: 105 MEQ/L (ref 95–107)
CLARITY UR: CLEAR
CO2 SERPL-SCNC: 26 MEQ/L (ref 20–31)
COCAINE UR QL SCN: POSITIVE
COLOR UR: YELLOW
CREAT SERPL-MCNC: 0.8 MG/DL (ref 0.7–1.2)
D DIMER PPP FEU-MCNC: 0.4 MG/L FEU (ref 0–0.5)
DRUG SCREEN COMMENT UR-IMP: ABNORMAL
EKG ATRIAL RATE: 91 BPM
EKG P AXIS: 87 DEGREES
EKG P-R INTERVAL: 160 MS
EKG Q-T INTERVAL: 410 MS
EKG QRS DURATION: 138 MS
EKG QTC CALCULATION (BAZETT): 501 MS
EKG R AXIS: 81 DEGREES
EKG T AXIS: 62 DEGREES
EKG VENTRICULAR RATE: 90 BPM
EOSINOPHIL # BLD: 0.3 K/UL (ref 0–0.5)
EOSINOPHIL NFR BLD: 3.3 % (ref 0.8–7)
ERYTHROCYTE [DISTWIDTH] IN BLOOD BY AUTOMATED COUNT: 15.6 % (ref 11.6–14.4)
ETHANOL PERCENT: NORMAL G/DL
ETHANOLAMINE SERPL-MCNC: <10 MG/DL (ref 0–0.08)
FLUAV RNA NPH QL NAA+NON-PROBE: NOT DETECTED
FLUBV RNA NPH QL NAA+NON-PROBE: NOT DETECTED
GLOBULIN SER CALC-MCNC: 3.6 G/DL (ref 2.3–3.5)
GLUCOSE SERPL-MCNC: 83 MG/DL (ref 70–99)
GLUCOSE UR STRIP-MCNC: NEGATIVE MG/DL
HADV DNA NPH QL NAA+NON-PROBE: NOT DETECTED
HCOV 229E RNA NPH QL NAA+NON-PROBE: NOT DETECTED
HCOV HKU1 RNA NPH QL NAA+NON-PROBE: NOT DETECTED
HCOV NL63 RNA NPH QL NAA+NON-PROBE: NOT DETECTED
HCOV OC43 RNA NPH QL NAA+NON-PROBE: NOT DETECTED
HCT VFR BLD AUTO: 39.5 % (ref 42–52)
HGB BLD-MCNC: 12.8 G/DL (ref 13.7–17.5)
HGB UR QL STRIP: NEGATIVE
HMPV RNA NPH QL NAA+NON-PROBE: NOT DETECTED
HPIV1 RNA NPH QL NAA+NON-PROBE: NOT DETECTED
HPIV2 RNA NPH QL NAA+NON-PROBE: NOT DETECTED
HPIV3 RNA NPH QL NAA+NON-PROBE: NOT DETECTED
HPIV4 RNA NPH QL NAA+NON-PROBE: NOT DETECTED
IMM GRANULOCYTES # BLD: 0 K/UL
IMM GRANULOCYTES NFR BLD: 0.4 %
KETONES UR STRIP-MCNC: 15 MG/DL
LACTATE BLDV-SCNC: 1 MMOL/L (ref 0.5–2.2)
LEUKOCYTE ESTERASE UR QL STRIP: NEGATIVE
LYMPHOCYTES # BLD: 1.7 K/UL (ref 1.3–3.6)
LYMPHOCYTES NFR BLD: 16 %
M PNEUMO DNA NPH QL NAA+NON-PROBE: NOT DETECTED
MCH RBC QN AUTO: 31.8 PG (ref 25.7–32.2)
MCHC RBC AUTO-ENTMCNC: 32.4 % (ref 32.3–36.5)
MCV RBC AUTO: 98 FL (ref 79–92.2)
MONOCYTES # BLD: 1.3 K/UL (ref 0.3–0.8)
MONOCYTES NFR BLD: 12.3 % (ref 5.3–12.2)
NEUTROPHILS # BLD: 7 K/UL (ref 1.8–5.4)
NEUTS SEG NFR BLD: 67.5 % (ref 34–67.9)
NITRITE UR QL STRIP: NEGATIVE
OPIATES UR QL SCN: ABNORMAL
PCP UR QL SCN>25 NG/ML: ABNORMAL
PH UR STRIP: 5.5 [PH] (ref 5–9)
PLATELET # BLD AUTO: 283 K/UL (ref 163–337)
POTASSIUM SERPL-SCNC: 3.9 MEQ/L (ref 3.4–4.9)
PROCALCITONIN SERPL IA-MCNC: 0.06 NG/ML (ref 0–0.15)
PROT SERPL-MCNC: 7.5 G/DL (ref 6.3–8)
PROT UR STRIP-MCNC: NEGATIVE MG/DL
RBC # BLD AUTO: 4.03 M/UL (ref 4.63–6.08)
RSV RNA NPH QL NAA+NON-PROBE: NOT DETECTED
RV+EV RNA NPH QL NAA+NON-PROBE: DETECTED
SARS-COV-2 RDRP RESP QL NAA+PROBE: NOT DETECTED
SARS-COV-2 RNA NPH QL NAA+NON-PROBE: NOT DETECTED
SODIUM SERPL-SCNC: 140 MEQ/L (ref 135–144)
SP GR UR STRIP: 1.02 (ref 1–1.03)
THC UR QL SCN>50 NG/ML: POSITIVE
TRICYCLICS UR QL SCN: ABNORMAL
TROPONIN, HIGH SENSITIVITY: 15 NG/L (ref 0–19)
TROPONIN, HIGH SENSITIVITY: 18 NG/L (ref 0–19)
UROBILINOGEN UR STRIP-ACNC: 0.2 E.U./DL
WBC # BLD AUTO: 10.4 K/UL (ref 4.2–9)

## 2024-10-19 PROCEDURE — 71045 X-RAY EXAM CHEST 1 VIEW: CPT

## 2024-10-19 PROCEDURE — 6360000002 HC RX W HCPCS: Performed by: INTERNAL MEDICINE

## 2024-10-19 PROCEDURE — 93005 ELECTROCARDIOGRAM TRACING: CPT

## 2024-10-19 PROCEDURE — 85379 FIBRIN DEGRADATION QUANT: CPT

## 2024-10-19 PROCEDURE — 36415 COLL VENOUS BLD VENIPUNCTURE: CPT

## 2024-10-19 PROCEDURE — 2500000003 HC RX 250 WO HCPCS: Performed by: INTERNAL MEDICINE

## 2024-10-19 PROCEDURE — 99285 EMERGENCY DEPT VISIT HI MDM: CPT

## 2024-10-19 PROCEDURE — 82077 ASSAY SPEC XCP UR&BREATH IA: CPT

## 2024-10-19 PROCEDURE — 2580000003 HC RX 258: Performed by: INTERNAL MEDICINE

## 2024-10-19 PROCEDURE — 1210000000 HC MED SURG R&B

## 2024-10-19 PROCEDURE — 6370000000 HC RX 637 (ALT 250 FOR IP): Performed by: INTERNAL MEDICINE

## 2024-10-19 PROCEDURE — 36600 WITHDRAWAL OF ARTERIAL BLOOD: CPT

## 2024-10-19 PROCEDURE — 0BH17EZ INSERTION OF ENDOTRACHEAL AIRWAY INTO TRACHEA, VIA NATURAL OR ARTIFICIAL OPENING: ICD-10-PCS | Performed by: STUDENT IN AN ORGANIZED HEALTH CARE EDUCATION/TRAINING PROGRAM

## 2024-10-19 PROCEDURE — 85025 COMPLETE CBC W/AUTO DIFF WBC: CPT

## 2024-10-19 PROCEDURE — 6370000000 HC RX 637 (ALT 250 FOR IP): Performed by: EMERGENCY MEDICINE

## 2024-10-19 PROCEDURE — 84484 ASSAY OF TROPONIN QUANT: CPT

## 2024-10-19 PROCEDURE — 84145 PROCALCITONIN (PCT): CPT

## 2024-10-19 PROCEDURE — 82803 BLOOD GASES ANY COMBINATION: CPT

## 2024-10-19 PROCEDURE — 5A1955Z RESPIRATORY VENTILATION, GREATER THAN 96 CONSECUTIVE HOURS: ICD-10-PCS | Performed by: STUDENT IN AN ORGANIZED HEALTH CARE EDUCATION/TRAINING PROGRAM

## 2024-10-19 PROCEDURE — 83605 ASSAY OF LACTIC ACID: CPT

## 2024-10-19 PROCEDURE — 87635 SARS-COV-2 COVID-19 AMP PRB: CPT

## 2024-10-19 PROCEDURE — 5A09357 ASSISTANCE WITH RESPIRATORY VENTILATION, LESS THAN 24 CONSECUTIVE HOURS, CONTINUOUS POSITIVE AIRWAY PRESSURE: ICD-10-PCS | Performed by: STUDENT IN AN ORGANIZED HEALTH CARE EDUCATION/TRAINING PROGRAM

## 2024-10-19 PROCEDURE — 83880 ASSAY OF NATRIURETIC PEPTIDE: CPT

## 2024-10-19 PROCEDURE — 80306 DRUG TEST PRSMV INSTRMNT: CPT

## 2024-10-19 PROCEDURE — 94664 DEMO&/EVAL PT USE INHALER: CPT

## 2024-10-19 PROCEDURE — 80053 COMPREHEN METABOLIC PANEL: CPT

## 2024-10-19 PROCEDURE — 94640 AIRWAY INHALATION TREATMENT: CPT

## 2024-10-19 PROCEDURE — 81003 URINALYSIS AUTO W/O SCOPE: CPT

## 2024-10-19 PROCEDURE — 0202U NFCT DS 22 TRGT SARS-COV-2: CPT

## 2024-10-19 PROCEDURE — 3E033XZ INTRODUCTION OF VASOPRESSOR INTO PERIPHERAL VEIN, PERCUTANEOUS APPROACH: ICD-10-PCS | Performed by: STUDENT IN AN ORGANIZED HEALTH CARE EDUCATION/TRAINING PROGRAM

## 2024-10-19 PROCEDURE — 5A0945A ASSISTANCE WITH RESPIRATORY VENTILATION, 24-96 CONSECUTIVE HOURS, HIGH NASAL FLOW/VELOCITY: ICD-10-PCS | Performed by: STUDENT IN AN ORGANIZED HEALTH CARE EDUCATION/TRAINING PROGRAM

## 2024-10-19 RX ORDER — INSULIN LISPRO 100 [IU]/ML
0-8 INJECTION, SOLUTION INTRAVENOUS; SUBCUTANEOUS
Status: DISCONTINUED | OUTPATIENT
Start: 2024-10-19 | End: 2024-10-21

## 2024-10-19 RX ORDER — POLYETHYLENE GLYCOL 3350 17 G/17G
17 POWDER, FOR SOLUTION ORAL DAILY PRN
Status: DISCONTINUED | OUTPATIENT
Start: 2024-10-19 | End: 2024-11-06 | Stop reason: HOSPADM

## 2024-10-19 RX ORDER — ACETAMINOPHEN 650 MG/1
650 SUPPOSITORY RECTAL EVERY 6 HOURS PRN
Status: DISCONTINUED | OUTPATIENT
Start: 2024-10-19 | End: 2024-10-29

## 2024-10-19 RX ORDER — ONDANSETRON 2 MG/ML
4 INJECTION INTRAMUSCULAR; INTRAVENOUS EVERY 6 HOURS PRN
Status: DISCONTINUED | OUTPATIENT
Start: 2024-10-19 | End: 2024-11-06 | Stop reason: HOSPADM

## 2024-10-19 RX ORDER — DEXTROSE MONOHYDRATE 100 MG/ML
INJECTION, SOLUTION INTRAVENOUS CONTINUOUS PRN
Status: DISCONTINUED | OUTPATIENT
Start: 2024-10-19 | End: 2024-11-06 | Stop reason: HOSPADM

## 2024-10-19 RX ORDER — GLUCAGON 1 MG/ML
1 KIT INJECTION PRN
Status: DISCONTINUED | OUTPATIENT
Start: 2024-10-19 | End: 2024-11-06 | Stop reason: HOSPADM

## 2024-10-19 RX ORDER — IPRATROPIUM BROMIDE AND ALBUTEROL SULFATE 2.5; .5 MG/3ML; MG/3ML
1 SOLUTION RESPIRATORY (INHALATION) ONCE
Status: COMPLETED | OUTPATIENT
Start: 2024-10-19 | End: 2024-10-19

## 2024-10-19 RX ORDER — LANOLIN ALCOHOL/MO/W.PET/CERES
3 CREAM (GRAM) TOPICAL NIGHTLY PRN
Status: DISCONTINUED | OUTPATIENT
Start: 2024-10-19 | End: 2024-11-06 | Stop reason: HOSPADM

## 2024-10-19 RX ORDER — ACETAMINOPHEN 325 MG/1
650 TABLET ORAL EVERY 6 HOURS PRN
Status: DISCONTINUED | OUTPATIENT
Start: 2024-10-19 | End: 2024-10-29

## 2024-10-19 RX ORDER — METHYLPREDNISOLONE SODIUM SUCCINATE 40 MG/ML
40 INJECTION, POWDER, LYOPHILIZED, FOR SOLUTION INTRAMUSCULAR; INTRAVENOUS ONCE
Status: DISCONTINUED | OUTPATIENT
Start: 2024-10-19 | End: 2024-10-19 | Stop reason: HOSPADM

## 2024-10-19 RX ORDER — SODIUM CHLORIDE 0.9 % (FLUSH) 0.9 %
5-40 SYRINGE (ML) INJECTION EVERY 12 HOURS SCHEDULED
Status: DISCONTINUED | OUTPATIENT
Start: 2024-10-19 | End: 2024-11-06 | Stop reason: HOSPADM

## 2024-10-19 RX ORDER — IPRATROPIUM BROMIDE AND ALBUTEROL SULFATE 2.5; .5 MG/3ML; MG/3ML
1 SOLUTION RESPIRATORY (INHALATION) EVERY 4 HOURS PRN
Status: DISCONTINUED | OUTPATIENT
Start: 2024-10-19 | End: 2024-11-06 | Stop reason: HOSPADM

## 2024-10-19 RX ORDER — SODIUM CHLORIDE 9 MG/ML
INJECTION, SOLUTION INTRAVENOUS PRN
Status: DISCONTINUED | OUTPATIENT
Start: 2024-10-19 | End: 2024-11-06 | Stop reason: HOSPADM

## 2024-10-19 RX ORDER — MAGNESIUM SULFATE IN WATER 40 MG/ML
2000 INJECTION, SOLUTION INTRAVENOUS PRN
Status: DISCONTINUED | OUTPATIENT
Start: 2024-10-19 | End: 2024-11-06 | Stop reason: HOSPADM

## 2024-10-19 RX ORDER — ENOXAPARIN SODIUM 100 MG/ML
30 INJECTION SUBCUTANEOUS 2 TIMES DAILY
Status: DISCONTINUED | OUTPATIENT
Start: 2024-10-19 | End: 2024-11-06 | Stop reason: HOSPADM

## 2024-10-19 RX ORDER — ONDANSETRON 4 MG/1
4 TABLET, ORALLY DISINTEGRATING ORAL EVERY 8 HOURS PRN
Status: DISCONTINUED | OUTPATIENT
Start: 2024-10-19 | End: 2024-11-06 | Stop reason: HOSPADM

## 2024-10-19 RX ORDER — SODIUM CHLORIDE 0.9 % (FLUSH) 0.9 %
5-40 SYRINGE (ML) INJECTION PRN
Status: DISCONTINUED | OUTPATIENT
Start: 2024-10-19 | End: 2024-11-06 | Stop reason: HOSPADM

## 2024-10-19 RX ADMIN — METHYLPREDNISOLONE SODIUM SUCCINATE 40 MG: 40 INJECTION INTRAMUSCULAR; INTRAVENOUS at 22:02

## 2024-10-19 RX ADMIN — ENOXAPARIN SODIUM 30 MG: 100 INJECTION SUBCUTANEOUS at 22:03

## 2024-10-19 RX ADMIN — IPRATROPIUM BROMIDE AND ALBUTEROL SULFATE 1 DOSE: 2.5; .5 SOLUTION RESPIRATORY (INHALATION) at 17:05

## 2024-10-19 RX ADMIN — Medication 10 ML: at 22:06

## 2024-10-19 RX ADMIN — Medication 3 MG: at 22:03

## 2024-10-19 ASSESSMENT — PAIN - FUNCTIONAL ASSESSMENT
PAIN_FUNCTIONAL_ASSESSMENT: NONE - DENIES PAIN
PAIN_FUNCTIONAL_ASSESSMENT: NONE - DENIES PAIN

## 2024-10-19 ASSESSMENT — PAIN SCALES - GENERAL
PAINLEVEL_OUTOF10: 0
PAINLEVEL_OUTOF10: 0

## 2024-10-19 ASSESSMENT — LIFESTYLE VARIABLES
HOW OFTEN DO YOU HAVE A DRINK CONTAINING ALCOHOL: PATIENT DECLINED
HOW MANY STANDARD DRINKS CONTAINING ALCOHOL DO YOU HAVE ON A TYPICAL DAY: PATIENT DECLINED

## 2024-10-19 NOTE — ED PROVIDER NOTES
[facility] ED  EMERGENCY DEPARTMENT ENCOUNTER      Pt Name: Anabelle Morris  MRN: [id number]  Birthdate 1955  Date of evaluation: 10/19/2024  Provider: Keith Gomez MD  Time Note started  2:16 PM EDT  10/19/24           NURSING NOTES REVIEWED     Pt evaluated in a timely manner      CURRENT MEDICATIONS       Previous Medications    ACETAMINOPHEN EXTRA STRENGTH 500 MG TABS    Take 1 tablet by mouth every 8 hours as needed (pain)    ALBUTEROL SULFATE HFA (PROVENTIL;VENTOLIN;PROAIR) 108 (90 BASE) MCG/ACT INHALER    Inhale 2 puffs into the lungs every 4 hours as needed for Wheezing or Shortness of Breath    AMLODIPINE (NORVASC) 10 MG TABLET    Take 1 tablet by mouth daily    CETIRIZINE (ZYRTEC) 10 MG TABLET    Take 1 tablet by mouth daily    ESCITALOPRAM (LEXAPRO) 10 MG TABLET    Take 1 tablet by mouth daily    FLUTICASONE (FLONASE) 50 MCG/ACT NASAL SPRAY    2 sprays by Nasal route daily    IPRATROPIUM 0.5 MG-ALBUTEROL 2.5 MG (DUONEB) 0.5-2.5 (3) MG/3ML SOLN NEBULIZER SOLUTION    Take 3 mLs by nebulization every 4 hours as needed for Shortness of Breath or Wheezing    LANCETS MISC    1 each by Does not apply route 3 times daily    LOSARTAN (COZAAR) 100 MG TABLET    Take 1 tablet by mouth daily    LOVASTATIN (MEVACOR) 40 MG TABLET    Take 1 tablet by mouth nightly    MELOXICAM (MOBIC) 15 MG TABLET    Take 1 tablet by mouth daily    METFORMIN (GLUCOPHAGE) 500 MG TABLET    Take 1 tablet by mouth 2 times daily (with meals)    MONTELUKAST (SINGULAIR) 10 MG TABLET    Take 1 tablet by mouth nightly    NICOTINE (NICODERM CQ) 21 MG/24HR    Place 1 patch onto the skin daily    PANTOPRAZOLE (PROTONIX) 40 MG TABLET    Take 1 tablet by mouth daily    SILDENAFIL (VIAGRA) 100 MG TABLET    Take 1 tablet by mouth as needed for Erectile Dysfunction    SYMBICORT 160-4.5 MCG/ACT AERO    Inhale 2 puffs into the lungs 2 times daily    TIOTROPIUM (SPIRIVA HANDIHALER) 18 MCG INHALATION CAPSULE    Inhale 1 capsule into the

## 2024-10-19 NOTE — ED TRIAGE NOTES
Patient presents to ED from home via EMS with reports of SOB that started this morning. Reports he just finished a \"3 day archer of smoking crack\".

## 2024-10-20 LAB
ALBUMIN SERPL-MCNC: 4.2 G/DL (ref 3.5–4.6)
ALP SERPL-CCNC: 107 U/L (ref 35–104)
ALT SERPL-CCNC: 11 U/L (ref 0–41)
ANION GAP SERPL CALCULATED.3IONS-SCNC: 8 MEQ/L (ref 9–15)
AST SERPL-CCNC: 16 U/L (ref 0–40)
BASE EXCESS ARTERIAL: 2
BASOPHILS # BLD: 0 K/UL (ref 0–0.2)
BASOPHILS NFR BLD: 0.1 %
BILIRUB SERPL-MCNC: <0.2 MG/DL (ref 0.2–0.7)
BUN SERPL-MCNC: 13 MG/DL (ref 8–23)
CALCIUM SERPL-MCNC: 9.4 MG/DL (ref 8.5–9.9)
CHLORIDE SERPL-SCNC: 106 MEQ/L (ref 95–107)
CO2 SERPL-SCNC: 27 MEQ/L (ref 20–31)
CREAT SERPL-MCNC: 0.79 MG/DL (ref 0.7–1.2)
EOSINOPHIL # BLD: 0 K/UL (ref 0–0.7)
EOSINOPHIL NFR BLD: 0 %
ERYTHROCYTE [DISTWIDTH] IN BLOOD BY AUTOMATED COUNT: 15.5 % (ref 11.5–14.5)
GLOBULIN SER CALC-MCNC: 3.2 G/DL (ref 2.3–3.5)
GLUCOSE BLD-MCNC: 128 MG/DL (ref 70–99)
GLUCOSE BLD-MCNC: 133 MG/DL (ref 70–99)
GLUCOSE BLD-MCNC: 133 MG/DL (ref 70–99)
GLUCOSE SERPL-MCNC: 135 MG/DL (ref 70–99)
HCO3 ARTERIAL: 26.6 MMOL/L (ref 21–29)
HCT VFR BLD AUTO: 41.8 % (ref 42–52)
HGB BLD-MCNC: 13.6 G/DL (ref 14–18)
LYMPHOCYTES # BLD: 0.6 K/UL (ref 1–4.8)
LYMPHOCYTES NFR BLD: 5.1 %
MCH RBC QN AUTO: 31.6 PG (ref 27–31.3)
MCHC RBC AUTO-ENTMCNC: 32.5 % (ref 33–37)
MCV RBC AUTO: 97.2 FL (ref 79–92.2)
MONOCYTES # BLD: 0.5 K/UL (ref 0.2–0.8)
MONOCYTES NFR BLD: 4.2 %
NEUTROPHILS # BLD: 10.4 K/UL (ref 1.4–6.5)
NEUTS SEG NFR BLD: 90.1 %
O2 SAT, ARTERIAL: 95 % (ref 93–101)
PCO2 ARTERIAL: 44 MM HG (ref 35–45)
PERFORMED ON: ABNORMAL
PH ARTERIAL: 7.39 (ref 7.35–7.45)
PLATELET # BLD AUTO: 330 K/UL (ref 130–400)
PO2 ARTERIAL: 77 MM HG (ref 75–108)
POC FIO2: 3
POC SAMPLE TYPE: ABNORMAL
POTASSIUM SERPL-SCNC: 4.6 MEQ/L (ref 3.4–4.9)
PROT SERPL-MCNC: 7.4 G/DL (ref 6.3–8)
RBC # BLD AUTO: 4.3 M/UL (ref 4.7–6.1)
SODIUM SERPL-SCNC: 141 MEQ/L (ref 135–144)
TCO2 ARTERIAL: 28 MMOL/L (ref 70–99)
URATE SERPL-MCNC: 4.9 MG/DL (ref 3.4–7)
WBC # BLD AUTO: 11.5 K/UL (ref 4.8–10.8)

## 2024-10-20 PROCEDURE — 94660 CPAP INITIATION&MGMT: CPT

## 2024-10-20 PROCEDURE — 6370000000 HC RX 637 (ALT 250 FOR IP): Performed by: INTERNAL MEDICINE

## 2024-10-20 PROCEDURE — 1210000000 HC MED SURG R&B

## 2024-10-20 PROCEDURE — 36415 COLL VENOUS BLD VENIPUNCTURE: CPT

## 2024-10-20 PROCEDURE — 94640 AIRWAY INHALATION TREATMENT: CPT

## 2024-10-20 PROCEDURE — 94761 N-INVAS EAR/PLS OXIMETRY MLT: CPT

## 2024-10-20 PROCEDURE — 2500000003 HC RX 250 WO HCPCS: Performed by: INTERNAL MEDICINE

## 2024-10-20 PROCEDURE — 84550 ASSAY OF BLOOD/URIC ACID: CPT

## 2024-10-20 PROCEDURE — 99223 1ST HOSP IP/OBS HIGH 75: CPT | Performed by: INTERNAL MEDICINE

## 2024-10-20 PROCEDURE — 85025 COMPLETE CBC W/AUTO DIFF WBC: CPT

## 2024-10-20 PROCEDURE — 6360000002 HC RX W HCPCS: Performed by: INTERNAL MEDICINE

## 2024-10-20 PROCEDURE — 80053 COMPREHEN METABOLIC PANEL: CPT

## 2024-10-20 PROCEDURE — 2700000000 HC OXYGEN THERAPY PER DAY

## 2024-10-20 PROCEDURE — 2580000003 HC RX 258: Performed by: INTERNAL MEDICINE

## 2024-10-20 RX ORDER — LOSARTAN POTASSIUM 100 MG/1
100 TABLET ORAL DAILY
Status: DISCONTINUED | OUTPATIENT
Start: 2024-10-20 | End: 2024-10-26

## 2024-10-20 RX ORDER — AMLODIPINE BESYLATE 10 MG/1
10 TABLET ORAL DAILY
Status: DISCONTINUED | OUTPATIENT
Start: 2024-10-20 | End: 2024-10-26

## 2024-10-20 RX ORDER — GUAIFENESIN 600 MG/1
600 TABLET, EXTENDED RELEASE ORAL 2 TIMES DAILY
Status: DISCONTINUED | OUTPATIENT
Start: 2024-10-20 | End: 2024-11-06 | Stop reason: HOSPADM

## 2024-10-20 RX ORDER — MONTELUKAST SODIUM 10 MG/1
10 TABLET ORAL NIGHTLY
Status: DISCONTINUED | OUTPATIENT
Start: 2024-10-20 | End: 2024-11-06 | Stop reason: HOSPADM

## 2024-10-20 RX ORDER — BUDESONIDE AND FORMOTEROL FUMARATE DIHYDRATE 160; 4.5 UG/1; UG/1
2 AEROSOL RESPIRATORY (INHALATION) 2 TIMES DAILY
Status: DISCONTINUED | OUTPATIENT
Start: 2024-10-20 | End: 2024-11-06 | Stop reason: HOSPADM

## 2024-10-20 RX ORDER — BUDESONIDE 0.5 MG/2ML
0.5 INHALANT ORAL
Status: DISCONTINUED | OUTPATIENT
Start: 2024-10-20 | End: 2024-11-06 | Stop reason: HOSPADM

## 2024-10-20 RX ORDER — ESCITALOPRAM OXALATE 10 MG/1
10 TABLET ORAL DAILY
Status: DISCONTINUED | OUTPATIENT
Start: 2024-10-20 | End: 2024-11-06 | Stop reason: HOSPADM

## 2024-10-20 RX ORDER — IPRATROPIUM BROMIDE AND ALBUTEROL SULFATE 2.5; .5 MG/3ML; MG/3ML
1 SOLUTION RESPIRATORY (INHALATION)
Status: DISCONTINUED | OUTPATIENT
Start: 2024-10-20 | End: 2024-10-20

## 2024-10-20 RX ORDER — AZITHROMYCIN 250 MG/1
500 TABLET, FILM COATED ORAL DAILY
Status: DISCONTINUED | OUTPATIENT
Start: 2024-10-20 | End: 2024-10-21

## 2024-10-20 RX ORDER — TRAZODONE HYDROCHLORIDE 50 MG/1
50 TABLET, FILM COATED ORAL NIGHTLY
Status: DISCONTINUED | OUTPATIENT
Start: 2024-10-20 | End: 2024-11-06 | Stop reason: HOSPADM

## 2024-10-20 RX ORDER — IPRATROPIUM BROMIDE AND ALBUTEROL SULFATE 2.5; .5 MG/3ML; MG/3ML
1 SOLUTION RESPIRATORY (INHALATION) EVERY 6 HOURS
Status: DISCONTINUED | OUTPATIENT
Start: 2024-10-20 | End: 2024-10-31

## 2024-10-20 RX ADMIN — BUDESONIDE AND FORMOTEROL FUMARATE DIHYDRATE 2 PUFF: 160; 4.5 AEROSOL RESPIRATORY (INHALATION) at 19:29

## 2024-10-20 RX ADMIN — METHYLPREDNISOLONE SODIUM SUCCINATE 40 MG: 40 INJECTION INTRAMUSCULAR; INTRAVENOUS at 14:48

## 2024-10-20 RX ADMIN — IPRATROPIUM BROMIDE AND ALBUTEROL SULFATE 1 DOSE: 2.5; .5 SOLUTION RESPIRATORY (INHALATION) at 07:21

## 2024-10-20 RX ADMIN — GUAIFENESIN 600 MG: 600 TABLET ORAL at 22:31

## 2024-10-20 RX ADMIN — BUDESONIDE AND FORMOTEROL FUMARATE DIHYDRATE 2 PUFF: 160; 4.5 AEROSOL RESPIRATORY (INHALATION) at 07:21

## 2024-10-20 RX ADMIN — TIOTROPIUM BROMIDE INHALATION SPRAY 2 PUFF: 3.12 SPRAY, METERED RESPIRATORY (INHALATION) at 08:34

## 2024-10-20 RX ADMIN — IPRATROPIUM BROMIDE AND ALBUTEROL SULFATE 1 DOSE: 2.5; .5 SOLUTION RESPIRATORY (INHALATION) at 19:29

## 2024-10-20 RX ADMIN — ENOXAPARIN SODIUM 30 MG: 100 INJECTION SUBCUTANEOUS at 22:30

## 2024-10-20 RX ADMIN — Medication 10 ML: at 22:32

## 2024-10-20 RX ADMIN — SALINE NASAL SPRAY 2 SPRAY: 1.5 SOLUTION NASAL at 12:08

## 2024-10-20 RX ADMIN — METHYLPREDNISOLONE SODIUM SUCCINATE 40 MG: 40 INJECTION INTRAMUSCULAR; INTRAVENOUS at 09:10

## 2024-10-20 RX ADMIN — MONTELUKAST 10 MG: 10 TABLET, FILM COATED ORAL at 00:54

## 2024-10-20 RX ADMIN — SALINE NASAL SPRAY 2 SPRAY: 1.5 SOLUTION NASAL at 22:30

## 2024-10-20 RX ADMIN — TRAZODONE HYDROCHLORIDE 50 MG: 50 TABLET ORAL at 22:31

## 2024-10-20 RX ADMIN — ESCITALOPRAM OXALATE 10 MG: 10 TABLET ORAL at 09:11

## 2024-10-20 RX ADMIN — MONTELUKAST 10 MG: 10 TABLET, FILM COATED ORAL at 22:31

## 2024-10-20 RX ADMIN — LOSARTAN POTASSIUM 100 MG: 100 TABLET, FILM COATED ORAL at 09:11

## 2024-10-20 RX ADMIN — Medication 10 ML: at 09:11

## 2024-10-20 RX ADMIN — IPRATROPIUM BROMIDE AND ALBUTEROL SULFATE 1 DOSE: 2.5; .5 SOLUTION RESPIRATORY (INHALATION) at 13:24

## 2024-10-20 RX ADMIN — GUAIFENESIN 600 MG: 600 TABLET ORAL at 09:11

## 2024-10-20 RX ADMIN — AZITHROMYCIN 500 MG: 500 TABLET, FILM COATED ORAL at 09:11

## 2024-10-20 RX ADMIN — METHYLPREDNISOLONE SODIUM SUCCINATE 40 MG: 40 INJECTION INTRAMUSCULAR; INTRAVENOUS at 22:30

## 2024-10-20 RX ADMIN — ENOXAPARIN SODIUM 30 MG: 100 INJECTION SUBCUTANEOUS at 09:10

## 2024-10-20 RX ADMIN — IPRATROPIUM BROMIDE AND ALBUTEROL SULFATE 1 DOSE: 2.5; .5 SOLUTION RESPIRATORY (INHALATION) at 00:16

## 2024-10-20 RX ADMIN — AMLODIPINE BESYLATE 10 MG: 10 TABLET ORAL at 09:11

## 2024-10-20 RX ADMIN — TRAZODONE HYDROCHLORIDE 50 MG: 50 TABLET ORAL at 00:54

## 2024-10-20 ASSESSMENT — PAIN SCALES - GENERAL
PAINLEVEL_OUTOF10: 0

## 2024-10-20 NOTE — FLOWSHEET NOTE
1:51 PM Patient complained about being SOB; skin slightly warm, moist/diaphoretic.  RR labored 18-22, use of assessory muscles noted. Patient sitting on the side of bed/dangling. Lungs diminished with expiratory wheeze noted. O2 on 3L/NC, saturation 95-95%. Patient stated \"did the DrSulma order me more solumedrol ? I always get that two times a day\" \"that's why I don't come here; they don't know how to treat me\". Will notify respiratory of patients shortness of breath and perfect serve Dr. Wrya regarding patients episode.    10/20/24 1343   Vitals   Temp 97.5 °F (36.4 °C)   Temp Source Oral   Pulse 100   Heart Rate Source Monitor   Respirations 18   BP (!) 175/82   MAP (Calculated) 113   MAP (mmHg) 103   BP Location Right upper arm   BP Upper/Lower Upper   BP Method Automatic   Patient Position Semi fowlers   Cardiac Rhythm Sinus rhythm;Sinus tachy   Oxygen Therapy   SpO2 94 %     2:07 PM Patient laying back in bed; verbalized shortness of breath is \"better\" but still feeling SOB. Lungs continue to be diminshed/faint exp wheeze. RR still 20/noted use of assessory muscles; Skin less moist/diaphoretic. Complained of his room being really \"warm\", fan continues to circulate air.  Will note deviation.Call light in reach.   210 PM Orders received from Dr. Wray. Updated patient.  Will administer solumedrol as ordered IV.  Will note changes.  230 PM Patient on BiPAP as ordered prn; put on per respiratory/orders. Updated on Dr. Wray new orders for solumedrol IV; patient aware. Patient resting in bed; RR 20. HOB elevated 45 degrees; verbalized \"I'm going to try to stay on awhile\".  Call light in reach. MP SR 90.  Will note changes. Encouraged patient to try to stay on BiPAP a few hours.

## 2024-10-20 NOTE — PROGRESS NOTES
DVT / VTE PROPHYLAXIS EVALUATION    Recent Labs     10/19/24  1438   BUN 13   CREATININE 0.80      HGB 12.8*   HCT 39.5*     ADMITTING DX OR CHIEF COMPLAINT? SOB  WARFARIN? DOAC'S? No  ANY APPARENT BLEEDING? No  SCHEDULED SURGERY? No     If yes to following, excluded from auto adjustment in Table 1 of policy - please contact provider with recommendations as appropriate.  Include condition/exception in scratch notes. Yes No   Trauma Service or Ortho Surgery []  [x]    Pregnancy []  [x]        Current order:  Enoxaparin 40 mg SUBQ once daily       ,    Estimated Creatinine Clearance: 117 mL/min (based on SCr of 0.8 mg/dL).    Plan:  Pharmacologic VTE prophylaxis modified based on patient weight per [facility]/P&T approved protocol     Patient Weight (kg)      50.9 and below .9 101-150.9 151-174.9 175 or greater   Estimated   CrCl  (ml/min) 30 or greater []   30 mg   SUBQ daily   []   40 mg   SUBQ daily (or 30 mg BID for orthopedic cases) [x]  30 mg SUBQ   BID*  []  40 mg   SUBQ   BID []  60mg SUBQ BID    15-29.9 []  UFH 5000   units SUBQ BID []  30 mg   SUBQ daily [] 30 mg SUBQ   daily []  40 mg SUBQ   daily [] 60 mg SUBQ   Daily*    Less than 15 or dialysis []  UFH 5000   units SUBQ BID [] UFH 5000 units SUBQ TID []  UFH 7500   units   SUBQ TID*   *Do not exceed enoxaparin 40mg daily or UFH 5000 units SUBQ TID in patients with epidurals,   lumbar drains, or external ventricular drains

## 2024-10-20 NOTE — FLOWSHEET NOTE
1600 PM Patient resting in bed; stated \"he is feeling much better with BiPAP on; will note deviation. Call light in reach.    10/20/24 1541   Vital Signs   Temp 97.9 °F (36.6 °C)   Temp Source Oral   Pulse 74   Heart Rate Source Monitor   Respirations 18   BP (!) 165/85   MAP (Calculated) 112   MAP (mmHg) 103   BP Location Right upper arm   BP Method Automatic   Patient Position Semi wlers   Pain Assessment   Pain Assessment 0-10   Pain Level 0   Patient's Stated Pain Goal 0 - No pain   Care Plan - Pain Goals   Verbalizes/displays adequate comfort level or baseline comfort level Encourage patient to monitor pain and request assistance   Opioid-Induced Sedation   POSS Score 1   RASS   Reilyl Agitation Sedation Scale (RASS) 0   Oxygen Therapy   SpO2 99 %   Pulse Oximetry Type Intermittent   Pulse Oximeter Device Mode Intermittent   Pulse Oximeter Device Location Right;Finger   O2 Device PAP (positive airway pressure)   FiO2  35 %   Blood Gas  Performed? No   Oxygen Therapy Supplemental oxygen   Patient Observation   Patient Observations no acute distress   Rhythm Interpretation   Cardiac Rhythm Sinus rhythm     1800 PM Patient  tolerated BiPAP well, came off to eat supper (ate 100%)  and then  resumed BiPAP after at previous settings. Patient did verbalized that the BiPAP  did make him breath better and feel more rested. Patient also voiced concerns on returning home at discharge since his family \"has virus\"; he wondered if its okay for him to go home at discharge.  Emphasized handwashing, masks, not touch face.  Presently, no one else at home hospitalized per patient. Patient presently on BiPAP  and tolerating well. Call light in reach. No complaints of pain or acute distress.  Resting in bed; BiPAP maintained.

## 2024-10-20 NOTE — RT PROTOCOL NOTE
RT Inhaler-Nebulizer Bronchodilator Protocol Note    There is a bronchodilator order in the chart from a provider indicating to follow the RT Bronchodilator Protocol and there is an “Initiate RT Inhaler-Nebulizer Bronchodilator Protocol” order as well (see protocol at bottom of note).    CXR Findings:  XR CHEST PORTABLE    Result Date: 10/19/2024  No acute cardiopulmonary findings.       The findings from the last RT Protocol Assessment were as follows:   History Pulmonary Disease: Chronic pulmonary disease  Respiratory Pattern: Dyspnea on exertion or RR 21-25 bpm  Breath Sounds: Inspiratory and expiratory or bilateral wheezing and/or rhonchi  Cough: Strong, spontaneous, non-productive  Indication for Bronchodilator Therapy: Wheezing associated with pulm disorder  Bronchodilator Assessment Score: 10    Aerosolized bronchodilator medication orders have been revised according to the RT Inhaler-Nebulizer Bronchodilator Protocol below.    Respiratory Therapist to perform RT Therapy Protocol Assessment initially then follow the protocol.  Repeat RT Therapy Protocol Assessment PRN for score 0-3 or on second treatment, BID, and PRN for scores above 3.    No Indications - adjust the frequency to every 6 hours PRN wheezing or bronchospasm, if no treatments needed after 48 hours then discontinue using Per Protocol order mode.     If indication present, adjust the RT bronchodilator orders based on the Bronchodilator Assessment Score as indicated below.  Use Inhaler orders unless patient has one or more of the following: on home nebulizer, not able to hold breath for 10 seconds, is not alert and oriented, cannot activate and use MDI correctly, or respiratory rate 25 breaths per minute or more, then use the equivalent nebulizer order(s) with same Frequency and PRN reasons based on the score.  If a patient is on this medication at home then do not decrease Frequency below that used at home.    0-3 - enter or revise RT  bronchodilator order(s) to equivalent RT Bronchodilator order with Frequency of every 4 hours PRN for wheezing or increased work of breathing using Per Protocol order mode.        4-6 - enter or revise RT Bronchodilator order(s) to two equivalent RT bronchodilator orders with one order with BID Frequency and one order with Frequency of every 4 hours PRN wheezing or increased work of breathing using Per Protocol order mode.        7-10 - enter or revise RT Bronchodilator order(s) to two equivalent RT bronchodilator orders with one order with TID Frequency and one order with Frequency of every 4 hours PRN wheezing or increased work of breathing using Per Protocol order mode.       11-13 - enter or revise RT Bronchodilator order(s) to one equivalent RT bronchodilator order with QID Frequency and an Albuterol order with Frequency of every 4 hours PRN wheezing or increased work of breathing using Per Protocol order mode.      Greater than 13 - enter or revise RT Bronchodilator order(s) to one equivalent RT bronchodilator order with every 4 hours Frequency and an Albuterol order with Frequency of every 2 hours PRN wheezing or increased work of breathing using Per Protocol order mode.     RT to enter RT Home Evaluation for COPD & MDI Assessment order using Per Protocol order mode.    Electronically signed by Luis Verdin RCP on 10/20/2024 at 12:21 AM

## 2024-10-20 NOTE — PROGRESS NOTES
10/20/24 0000   RT Protocol   History Pulmonary Disease 2   Respiratory pattern 2   Breath sounds 6   Cough 0   Indications for Bronchodilator Therapy Wheezing associated with pulm disorder   Bronchodilator Assessment Score 10

## 2024-10-20 NOTE — H&P
Hospitalist Group   History and Physical      CHIEF COMPLAINT: Shortness of breath    History of Present Illness:  68 y.o. male with a history of cocaine abuse, COPD on 3 L O2 at night, alcohol abuse, diabetes, hypertension presents with shortness of breath.  He said that he is chronically short of breath but today he woke up with severe shortness of breath and cough.  He also has sore throat and runny nose.  Denies nausea, vomiting, diarrhea.  He said for the past 3 days he has been in contact with family members that have been sick.  Currently denies chest pain.  Shortness breath is worse with exertion.  Does not feel great even after treatment.    REVIEW OF SYSTEMS:  no fevers, chills, cp, has sob, no n/v, ha, vision/hearing changes, wt changes, hot/cold flashes, other open skin lesions, diarrhea, constipation, dysuria/hematuria unless noted in HPI. Complete ROS performed with the patient and is otherwise negative.      PMH:  Past Medical History:   Diagnosis Date    Alcohol abuse 10/27/2016    Anemia     Asthma     CKD (chronic kidney disease) stage 3, GFR 30-59 ml/min ([facility]) 12/14/2021    Cocaine abuse ([facility]) 10/27/2016    Community acquired pneumonia of left lower lobe of lung 12/05/2016    COPD (chronic obstructive pulmonary disease) ([facility])     Depression 04/27/2020    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    Drug-seeking behavior 04/14/2015    Edema 12/10/2015    Erectile dysfunction     Gastroesophageal reflux disease 12/17/2018    Gout 10/27/2016    History of arthroscopy of both knees 10/27/2016    History of colon polyps 10/26/2021    House dust mite allergy 04/21/2014    Hyperlipidemia     Hypertension 10/27/2016    Injury to heart 10/27/2016    Insomnia 12/17/2018    Loculated pleural effusion     Macrocytic anemia 09/07/2013    Medical non-compliance 02/22/2014    Morbid obesity due to excess calories 12/08/2016    Osteoarthritis of both knees 12/08/2016    Personal history of tobacco use     Pleurisy

## 2024-10-20 NOTE — PROGRESS NOTES
Progress Note  Date:10/20/2024       Room:W174/W174-01  Patient Name:Anabelle Morris     YOB: 1955     Age:68 y.o.        Subjective    Subjective:  Symptoms:  He reports shortness of breath, malaise, cough and weakness.  No chest pain, headache, chest pressure, anorexia, diarrhea or anxiety.    Diet:  No nausea or vomiting.       Review of Systems   Respiratory:  Positive for cough and shortness of breath.    Cardiovascular:  Negative for chest pain.   Gastrointestinal:  Negative for anorexia, diarrhea, nausea and vomiting.   Neurological:  Positive for weakness.     Objective         Vitals Last 24 Hours:  TEMPERATURE:  Temp  Av.8 °F (36.6 °C)  Min: 97.7 °F (36.5 °C)  Max: 97.9 °F (36.6 °C)  RESPIRATIONS RANGE: Resp  Av.7  Min: 16  Max: 26  PULSE OXIMETRY RANGE: SpO2  Av %  Min: 88 %  Max: 99 %  PULSE RANGE: Pulse  Av  Min: 79  Max: 97  BLOOD PRESSURE RANGE: Systolic (24hrs), Av , Min:132 , Max:172   ; Diastolic (24hrs), Av, Min:72, Max:97    I/O (24Hr):    Intake/Output Summary (Last 24 hours) at 10/20/2024 1020  Last data filed at 10/20/2024 0800  Gross per 24 hour   Intake 360 ml   Output 1800 ml   Net -1440 ml     Objective:  General Appearance:  Comfortable, in no acute distress and well-appearing.    Vital signs: (most recent): Blood pressure (!) 172/97, pulse 81, temperature 97.7 °F (36.5 °C), temperature source Oral, resp. rate 18, height 1.829 m (6' 0.01\"), weight 108.9 kg (240 lb), SpO2 95%.    HEENT: Normal HEENT exam.    Lungs:  There are decreased breath sounds and wheezes.    Heart: S1 normal and S2 normal.    Abdomen: Abdomen is soft.  Bowel sounds are normal.   There is no epigastric area or suprapubic area tenderness.     Pulses: Distal pulses are intact.    Neurological: Patient is alert.    Pupils:  Pupils are equal, round, and reactive to light.    Skin:  Warm.      Labs/Imaging/Diagnostics    Labs:  CBC:  Recent Labs     10/19/24  1438 10/20/24  0621

## 2024-10-20 NOTE — CARE COORDINATION
Case Management Assessment  Initial Evaluation    Date/Time of Evaluation: 10/20/2024 8:59 AM  Assessment Completed by: CIARA Newton    If patient is discharged prior to next notation, then this note serves as note for discharge by case management.    Patient Name: Anabelle Morris                   YOB: 1955  Diagnosis: COPD exacerbation (HCC) [J44.1]                   Date / Time: 10/19/2024  7:10 PM    Patient Admission Status: Inpatient   Readmission Risk (Low < 19, Mod (19-27), High > 27): Readmission Risk Score: 18    Current PCP: Roby Silva MD  PCP verified by CM? Yes    Chart Reviewed: Yes      History Provided by: Patient  Patient Orientation: Alert and Oriented    Patient Cognition: Alert    Hospitalization in the last 30 days (Readmission):  No    If yes, Readmission Assessment in CM Navigator will be completed.    Advance Directives:      Code Status: Full Code   Patient's Primary Decision Maker is: Legal Next of Kin    Primary Decision Maker: Violeta Morris - Brother/Sister - [phone]    Discharge Planning:    Patient lives with: Family Members Type of Home: Apartment  Primary Care Giver: Self  Patient Support Systems include: Family Members   Current Financial resources:    Current community resources:    Current services prior to admission: None            Current DME:              Type of Home Care services:  None    ADLS  Prior functional level: Independent in ADLs/IADLs  Current functional level: Independent in ADLs/IADLs    PT AM-PAC:   /24  OT AM-PAC:   /24    Family can provide assistance at DC: No  Would you like Case Management to discuss the discharge plan with any other family members/significant others, and if so, who? No  Plans to Return to Present Housing: Yes  Other Identified Issues/Barriers to RETURNING to current housing: none  Potential Assistance needed at discharge: Other (Comment) (will evaluate during admission)            Potential DME:   Yes    CIARA Newton  Case Management Department  Ph: [phone] Fax: [phone]

## 2024-10-20 NOTE — CONSULTS
Pulmonary and Critical Care Medicine  Consult Note  Encounter Date: 10/20/2024 10:33 AM    Mr. Anabelle Morris is a 68 y.o. male  : 1955  Requesting Provider: Grace Wray MD    Reason for request: COPD exacerbation            HISTORY OF PRESENT ILLNESS:    Patient is 68 y.o. presents with worsening shortness of breath, started yesterday, with cough productive of brown phlegm, no chest pain, he did feel hot but no measured fever, no lower extremity edema, he does report sick contact, and he is a smoker.          Past Medical History:        Diagnosis Date    Alcohol abuse 10/27/2016    Anemia     Asthma     CKD (chronic kidney disease) stage 3, GFR 30-59 ml/min ([facility]) 2021    Cocaine abuse ([facility]) 10/27/2016    Community acquired pneumonia of left lower lobe of lung 2016    COPD (chronic obstructive pulmonary disease) ([facility])     Depression 2020    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    Drug-seeking behavior 2015    Edema 12/10/2015    Erectile dysfunction     Gastroesophageal reflux disease 2018    Gout 10/27/2016    History of arthroscopy of both knees 10/27/2016    History of colon polyps 10/26/2021    House dust mite allergy 2014    Hyperlipidemia     Hypertension 10/27/2016    Injury to heart 10/27/2016    Insomnia 2018    Loculated pleural effusion     Macrocytic anemia 2013    Medical non-compliance 2014    Morbid obesity due to excess calories 2016    Osteoarthritis of both knees 2016    Personal history of tobacco use     Pleurisy 2016    Pneumonia 2016    caused hospital admission    Pneumonia in infectious disease 2023    Pre-diabetes 10/27/2016    Seasonal allergies 2014    Severe persistent asthma 10/27/2016    Severe sleep apnea 2015    Supraventricular tachycardia (HCC) 10/27/2016    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

## 2024-10-20 NOTE — DISCHARGE INSTRUCTIONS
[place] Transportation Resources*  (Call [employer]/211 if need more resources.)         [address] [facility]:  What they offer:  Transportation to [facility] resident’s, 24 hrs. a day, 7 days a week. You must call for pricing and private pay by credit card.   Phone Number: 968.909.3040  Website: https://www.[facility].[employer]iCAR/transportationoptions  [employer]:  What they offer: Personal care service to airports, corporate concerts, shopping malls and more. Hours are based on customer’s needs and cost depends on riders’ location and where they are going.  Phone Number: 280.936.8583  Website: http://www.[employer]  [facility] Transportation:  What they offer: An unassisted curb to curb service for [facility] older adult residents. Rides to [facility] activities, grocery shopping, pharmacies, banking & beauty salons in [facility], area malls and voting.  Cost is free.   Phone Number: [phone]  Website: https://www.Service[employer]/159/Senior-Center  [facility] - Community Transportation:  What they offer:  Free transportation on Monday’s, Tuesday’s Wednesday’s and Thursdays for local shopping, banking, and medical appointments to [place] residents.   Phone Number: 803.171.4735  Website: https://www.[place]/city-services/community-transportation  [facility]- Office on aging:  What they offer:   Transportation for [facility] residents ages 60 and over to grocery & drug stores, hair salons, doctors, and dentist appointments. Transportation on Monday, Tuesday, and Saturday from 9am to noon, Thursday, and Friday from 9 am to 4 pm. Wednesday is social trips only. Local trips are $0.50 each way and social trips are $8.00 to $30.00 depending on the trip.   Phone Number: 965.712.5419 EXT 5  Website: https://www.[employer]/Faq.aspx?QID=70  [employer] [facility]:  What they offer:  Transportation for aging and disabled individuals for dialysis,

## 2024-10-20 NOTE — PLAN OF CARE
Problem: Chronic Conditions and Co-morbidities  Goal: Patient's chronic conditions and co-morbidity symptoms are monitored and maintained or improved  Outcome: Progressing  Flowsheets (Taken 10/19/2024 1915)  Care Plan - Patient's Chronic Conditions and Co-Morbidity Symptoms are Monitored and Maintained or Improved:   Monitor and assess patient's chronic conditions and comorbid symptoms for stability, deterioration, or improvement   Collaborate with multidisciplinary team to address chronic and comorbid conditions and prevent exacerbation or deterioration   Update acute care plan with appropriate goals if chronic or comorbid symptoms are exacerbated and prevent overall improvement and discharge     Problem: Discharge Planning  Goal: Discharge to home or other facility with appropriate resources  Outcome: Progressing  Flowsheets  Taken 10/19/2024 2158  Discharge to home or other facility with appropriate resources:   Identify barriers to discharge with patient and caregiver   Arrange for needed discharge resources and transportation as appropriate   Identify discharge learning needs (meds, wound care, etc)  Taken 10/19/2024 1915  Discharge to home or other facility with appropriate resources:   Identify barriers to discharge with patient and caregiver   Arrange for needed discharge resources and transportation as appropriate   Identify discharge learning needs (meds, wound care, etc)     Problem: Pain  Goal: Verbalizes/displays adequate comfort level or baseline comfort level  Outcome: Progressing     Problem: ABCDS Injury Assessment  Goal: Absence of physical injury  Outcome: Progressing  Flowsheets (Taken 10/19/2024 2146)  Absence of Physical Injury: Implement safety measures based on patient assessment     Problem: Safety - Adult  Goal: Free from fall injury  Outcome: Progressing

## 2024-10-20 NOTE — ACP (ADVANCE CARE PLANNING)
Advance Care Planning   Healthcare Decision Maker:    Primary Decision Maker: Violeta Morris - Brother/Sister - [phone]    Click here to complete Healthcare Decision Makers including selection of the Healthcare Decision Maker Relationship (ie \"Primary\").  Today we documented Decision Maker(s) consistent with Legal Next of Kin hierarchy.

## 2024-10-21 ENCOUNTER — CARE COORDINATION (OUTPATIENT)
Dept: CARE COORDINATION | Age: 69
End: 2024-10-21

## 2024-10-21 ENCOUNTER — APPOINTMENT (OUTPATIENT)
Dept: GENERAL RADIOLOGY | Age: 69
DRG: 207 | End: 2024-10-21
Attending: STUDENT IN AN ORGANIZED HEALTH CARE EDUCATION/TRAINING PROGRAM
Payer: MEDICARE

## 2024-10-21 LAB
ALBUMIN SERPL-MCNC: 3.9 G/DL (ref 3.5–4.6)
ALP SERPL-CCNC: 90 U/L (ref 35–104)
ALT SERPL-CCNC: 15 U/L (ref 0–41)
ANION GAP SERPL CALCULATED.3IONS-SCNC: 5 MEQ/L (ref 9–15)
AST SERPL-CCNC: 15 U/L (ref 0–40)
BASE EXCESS ARTERIAL: 6 (ref -3–3)
BASOPHILS # BLD: 0 K/UL (ref 0–0.2)
BASOPHILS NFR BLD: 0.1 %
BILIRUB SERPL-MCNC: <0.2 MG/DL (ref 0.2–0.7)
BUN SERPL-MCNC: 15 MG/DL (ref 8–23)
CALCIUM IONIZED: 1.31 MMOL/L (ref 1.12–1.32)
CALCIUM SERPL-MCNC: 9.5 MG/DL (ref 8.5–9.9)
CHLORIDE SERPL-SCNC: 108 MEQ/L (ref 95–107)
CO2 SERPL-SCNC: 30 MEQ/L (ref 20–31)
CREAT SERPL-MCNC: 0.77 MG/DL (ref 0.7–1.2)
EKG ATRIAL RATE: 91 BPM
EKG P AXIS: 87 DEGREES
EKG P-R INTERVAL: 160 MS
EKG Q-T INTERVAL: 410 MS
EKG QRS DURATION: 138 MS
EKG QTC CALCULATION (BAZETT): 501 MS
EKG R AXIS: 81 DEGREES
EKG T AXIS: 62 DEGREES
EKG VENTRICULAR RATE: 90 BPM
EOSINOPHIL # BLD: 0 K/UL (ref 0–0.7)
EOSINOPHIL NFR BLD: 0 %
ERYTHROCYTE [DISTWIDTH] IN BLOOD BY AUTOMATED COUNT: 15.5 % (ref 11.5–14.5)
GLOBULIN SER CALC-MCNC: 3.1 G/DL (ref 2.3–3.5)
GLUCOSE BLD-MCNC: 127 MG/DL (ref 70–99)
GLUCOSE BLD-MCNC: 128 MG/DL (ref 70–99)
GLUCOSE BLD-MCNC: 129 MG/DL (ref 70–99)
GLUCOSE BLD-MCNC: 183 MG/DL (ref 70–99)
GLUCOSE SERPL-MCNC: 129 MG/DL (ref 70–99)
HCO3 ARTERIAL: 31.7 MMOL/L (ref 21–29)
HCT VFR BLD AUTO: 39.2 % (ref 42–52)
HCT VFR BLD AUTO: 48 % (ref 41–53)
HGB BLD CALC-MCNC: 16.2 GM/DL (ref 13.5–17.5)
HGB BLD-MCNC: 12.8 G/DL (ref 14–18)
LACTATE: 2.69 MMOL/L (ref 0.4–2)
LYMPHOCYTES # BLD: 0.7 K/UL (ref 1–4.8)
LYMPHOCYTES NFR BLD: 4.2 %
MCH RBC QN AUTO: 31.9 PG (ref 27–31.3)
MCHC RBC AUTO-ENTMCNC: 32.7 % (ref 33–37)
MCV RBC AUTO: 97.8 FL (ref 79–92.2)
MONOCYTES # BLD: 1 K/UL (ref 0.2–0.8)
MONOCYTES NFR BLD: 6.2 %
NEUTROPHILS # BLD: 14.9 K/UL (ref 1.4–6.5)
NEUTS SEG NFR BLD: 89.1 %
O2 SAT, ARTERIAL: 93 % (ref 93–100)
PCO2 ARTERIAL: 58 MM HG (ref 35–45)
PERFORMED ON: ABNORMAL
PH ARTERIAL: 7.34 (ref 7.35–7.45)
PLATELET # BLD AUTO: 305 K/UL (ref 130–400)
PO2 ARTERIAL: 72 MM HG (ref 75–108)
POC CHLORIDE: 109 MEQ/L (ref 99–110)
POC CREATININE: 1.2 MG/DL (ref 0.8–1.3)
POC FIO2: 5
POC SAMPLE TYPE: ABNORMAL
POTASSIUM SERPL-SCNC: 4.5 MEQ/L (ref 3.5–5.1)
POTASSIUM SERPL-SCNC: 4.8 MEQ/L (ref 3.4–4.9)
PROCALCITONIN SERPL IA-MCNC: 0.04 NG/ML (ref 0–0.15)
PROT SERPL-MCNC: 7 G/DL (ref 6.3–8)
RBC # BLD AUTO: 4.01 M/UL (ref 4.7–6.1)
SODIUM BLD-SCNC: 147 MEQ/L (ref 136–145)
SODIUM SERPL-SCNC: 143 MEQ/L (ref 135–144)
TCO2 ARTERIAL: 34 MMOL/L (ref 21–32)
TROPONIN, HIGH SENSITIVITY: 10 NG/L (ref 0–19)
WBC # BLD AUTO: 16.7 K/UL (ref 4.8–10.8)

## 2024-10-21 PROCEDURE — 82435 ASSAY OF BLOOD CHLORIDE: CPT

## 2024-10-21 PROCEDURE — 84132 ASSAY OF SERUM POTASSIUM: CPT

## 2024-10-21 PROCEDURE — 2500000003 HC RX 250 WO HCPCS

## 2024-10-21 PROCEDURE — 6370000000 HC RX 637 (ALT 250 FOR IP): Performed by: INTERNAL MEDICINE

## 2024-10-21 PROCEDURE — 71045 X-RAY EXAM CHEST 1 VIEW: CPT

## 2024-10-21 PROCEDURE — 85014 HEMATOCRIT: CPT

## 2024-10-21 PROCEDURE — 6370000000 HC RX 637 (ALT 250 FOR IP): Performed by: NURSE PRACTITIONER

## 2024-10-21 PROCEDURE — 85025 COMPLETE CBC W/AUTO DIFF WBC: CPT

## 2024-10-21 PROCEDURE — 84295 ASSAY OF SERUM SODIUM: CPT

## 2024-10-21 PROCEDURE — 83605 ASSAY OF LACTIC ACID: CPT

## 2024-10-21 PROCEDURE — 93005 ELECTROCARDIOGRAM TRACING: CPT | Performed by: INTERNAL MEDICINE

## 2024-10-21 PROCEDURE — 94760 N-INVAS EAR/PLS OXIMETRY 1: CPT

## 2024-10-21 PROCEDURE — 6360000002 HC RX W HCPCS: Performed by: INTERNAL MEDICINE

## 2024-10-21 PROCEDURE — 99291 CRITICAL CARE FIRST HOUR: CPT | Performed by: INTERNAL MEDICINE

## 2024-10-21 PROCEDURE — 6360000002 HC RX W HCPCS: Performed by: NURSE PRACTITIONER

## 2024-10-21 PROCEDURE — 94002 VENT MGMT INPAT INIT DAY: CPT

## 2024-10-21 PROCEDURE — 2580000003 HC RX 258: Performed by: INTERNAL MEDICINE

## 2024-10-21 PROCEDURE — 31500 INSERT EMERGENCY AIRWAY: CPT

## 2024-10-21 PROCEDURE — 80053 COMPREHEN METABOLIC PANEL: CPT

## 2024-10-21 PROCEDURE — 82803 BLOOD GASES ANY COMBINATION: CPT

## 2024-10-21 PROCEDURE — 97161 PT EVAL LOW COMPLEX 20 MIN: CPT

## 2024-10-21 PROCEDURE — 2500000003 HC RX 250 WO HCPCS: Performed by: NURSE PRACTITIONER

## 2024-10-21 PROCEDURE — 2500000003 HC RX 250 WO HCPCS: Performed by: INTERNAL MEDICINE

## 2024-10-21 PROCEDURE — 2000000000 HC ICU R&B

## 2024-10-21 PROCEDURE — 82948 REAGENT STRIP/BLOOD GLUCOSE: CPT

## 2024-10-21 PROCEDURE — 84145 PROCALCITONIN (PCT): CPT

## 2024-10-21 PROCEDURE — 82330 ASSAY OF CALCIUM: CPT

## 2024-10-21 PROCEDURE — 94660 CPAP INITIATION&MGMT: CPT

## 2024-10-21 PROCEDURE — 82565 ASSAY OF CREATININE: CPT

## 2024-10-21 PROCEDURE — 84484 ASSAY OF TROPONIN QUANT: CPT

## 2024-10-21 PROCEDURE — 2580000003 HC RX 258: Performed by: NURSE PRACTITIONER

## 2024-10-21 PROCEDURE — 36415 COLL VENOUS BLD VENIPUNCTURE: CPT

## 2024-10-21 PROCEDURE — 94640 AIRWAY INHALATION TREATMENT: CPT

## 2024-10-21 PROCEDURE — 6360000002 HC RX W HCPCS

## 2024-10-21 PROCEDURE — 93010 ELECTROCARDIOGRAM REPORT: CPT | Performed by: INTERNAL MEDICINE

## 2024-10-21 PROCEDURE — 2700000000 HC OXYGEN THERAPY PER DAY

## 2024-10-21 PROCEDURE — 94761 N-INVAS EAR/PLS OXIMETRY MLT: CPT

## 2024-10-21 RX ORDER — CHLORHEXIDINE GLUCONATE ORAL RINSE 1.2 MG/ML
15 SOLUTION DENTAL 2 TIMES DAILY
Status: DISCONTINUED | OUTPATIENT
Start: 2024-10-21 | End: 2024-10-29

## 2024-10-21 RX ORDER — ETOMIDATE 2 MG/ML
INJECTION INTRAVENOUS
Status: COMPLETED
Start: 2024-10-21 | End: 2024-10-21

## 2024-10-21 RX ORDER — PROPOFOL 10 MG/ML
INJECTION, EMULSION INTRAVENOUS
Status: COMPLETED
Start: 2024-10-21 | End: 2024-10-21

## 2024-10-21 RX ORDER — INSULIN LISPRO 100 [IU]/ML
0-8 INJECTION, SOLUTION INTRAVENOUS; SUBCUTANEOUS EVERY 4 HOURS
Status: DISCONTINUED | OUTPATIENT
Start: 2024-10-21 | End: 2024-11-04

## 2024-10-21 RX ORDER — PROPOFOL 10 MG/ML
5-50 INJECTION, EMULSION INTRAVENOUS CONTINUOUS
Status: DISCONTINUED | OUTPATIENT
Start: 2024-10-21 | End: 2024-10-29

## 2024-10-21 RX ORDER — FENTANYL CITRATE 50 UG/ML
INJECTION, SOLUTION INTRAMUSCULAR; INTRAVENOUS
Status: COMPLETED
Start: 2024-10-21 | End: 2024-10-21

## 2024-10-21 RX ORDER — MIDAZOLAM HYDROCHLORIDE 1 MG/ML
INJECTION, SOLUTION INTRAMUSCULAR; INTRAVENOUS
Status: COMPLETED
Start: 2024-10-21 | End: 2024-10-21

## 2024-10-21 RX ORDER — SUCCINYLCHOLINE/SOD CL,ISO/PF 100 MG/5ML
SYRINGE (ML) INTRAVENOUS
Status: COMPLETED
Start: 2024-10-21 | End: 2024-10-21

## 2024-10-21 RX ORDER — HYDRALAZINE HYDROCHLORIDE 20 MG/ML
10 INJECTION INTRAMUSCULAR; INTRAVENOUS EVERY 6 HOURS PRN
Status: DISCONTINUED | OUTPATIENT
Start: 2024-10-21 | End: 2024-10-21

## 2024-10-21 RX ORDER — FENTANYL CITRATE-0.9 % NACL/PF 20 MCG/2ML
50 SYRINGE (ML) INTRAVENOUS EVERY 30 MIN PRN
Status: DISCONTINUED | OUTPATIENT
Start: 2024-10-21 | End: 2024-10-26

## 2024-10-21 RX ORDER — HYDRALAZINE HYDROCHLORIDE 20 MG/ML
10 INJECTION INTRAMUSCULAR; INTRAVENOUS EVERY 4 HOURS PRN
Status: DISCONTINUED | OUTPATIENT
Start: 2024-10-21 | End: 2024-11-06 | Stop reason: HOSPADM

## 2024-10-21 RX ORDER — FENTANYL CITRATE-0.9 % NACL/PF 10 MCG/ML
25-300 PLASTIC BAG, INJECTION (ML) INTRAVENOUS CONTINUOUS
Status: DISCONTINUED | OUTPATIENT
Start: 2024-10-21 | End: 2024-10-26

## 2024-10-21 RX ADMIN — TIOTROPIUM BROMIDE INHALATION SPRAY 2 PUFF: 3.12 SPRAY, METERED RESPIRATORY (INHALATION) at 07:06

## 2024-10-21 RX ADMIN — Medication 100 MG: at 15:20

## 2024-10-21 RX ADMIN — ENOXAPARIN SODIUM 30 MG: 100 INJECTION SUBCUTANEOUS at 21:25

## 2024-10-21 RX ADMIN — METHYLPREDNISOLONE SODIUM SUCCINATE 40 MG: 40 INJECTION INTRAMUSCULAR; INTRAVENOUS at 14:08

## 2024-10-21 RX ADMIN — FENTANYL CITRATE 100 MCG: 50 INJECTION INTRAMUSCULAR; INTRAVENOUS at 15:20

## 2024-10-21 RX ADMIN — HYDRALAZINE HYDROCHLORIDE 10 MG: 20 INJECTION INTRAMUSCULAR; INTRAVENOUS at 14:16

## 2024-10-21 RX ADMIN — AZITHROMYCIN 500 MG: 500 TABLET, FILM COATED ORAL at 09:00

## 2024-10-21 RX ADMIN — LOSARTAN POTASSIUM 100 MG: 100 TABLET, FILM COATED ORAL at 09:01

## 2024-10-21 RX ADMIN — SODIUM CHLORIDE, PRESERVATIVE FREE 20 MG: 5 INJECTION INTRAVENOUS at 21:25

## 2024-10-21 RX ADMIN — BUDESONIDE 500 MCG: 0.5 SUSPENSION RESPIRATORY (INHALATION) at 20:19

## 2024-10-21 RX ADMIN — SALINE NASAL SPRAY 2 SPRAY: 1.5 SOLUTION NASAL at 09:01

## 2024-10-21 RX ADMIN — Medication 200 MCG/HR: at 20:20

## 2024-10-21 RX ADMIN — IPRATROPIUM BROMIDE AND ALBUTEROL SULFATE 1 DOSE: 2.5; .5 SOLUTION RESPIRATORY (INHALATION) at 00:51

## 2024-10-21 RX ADMIN — ETOMIDATE 40 MG: 2 INJECTION, SOLUTION INTRAVENOUS at 15:19

## 2024-10-21 RX ADMIN — AMLODIPINE BESYLATE 10 MG: 10 TABLET ORAL at 09:01

## 2024-10-21 RX ADMIN — Medication 10 ML: at 21:25

## 2024-10-21 RX ADMIN — GUAIFENESIN 600 MG: 600 TABLET ORAL at 09:01

## 2024-10-21 RX ADMIN — PROPOFOL 200 MG: 10 INJECTION, EMULSION INTRAVENOUS at 15:21

## 2024-10-21 RX ADMIN — CEFTRIAXONE SODIUM 1000 MG: 1 INJECTION, POWDER, FOR SOLUTION INTRAMUSCULAR; INTRAVENOUS at 16:25

## 2024-10-21 RX ADMIN — PROPOFOL 30 MCG/KG/MIN: 10 INJECTION, EMULSION INTRAVENOUS at 17:48

## 2024-10-21 RX ADMIN — PROPOFOL 50 MCG/KG/MIN: 10 INJECTION, EMULSION INTRAVENOUS at 22:12

## 2024-10-21 RX ADMIN — ESCITALOPRAM OXALATE 10 MG: 10 TABLET ORAL at 09:01

## 2024-10-21 RX ADMIN — ENOXAPARIN SODIUM 30 MG: 100 INJECTION SUBCUTANEOUS at 09:00

## 2024-10-21 RX ADMIN — PROPOFOL 1000 MG: 10 INJECTION, EMULSION INTRAVENOUS at 15:23

## 2024-10-21 RX ADMIN — IPRATROPIUM BROMIDE AND ALBUTEROL SULFATE 1 DOSE: 2.5; .5 SOLUTION RESPIRATORY (INHALATION) at 20:19

## 2024-10-21 RX ADMIN — METHYLPREDNISOLONE SODIUM SUCCINATE 40 MG: 40 INJECTION INTRAMUSCULAR; INTRAVENOUS at 06:38

## 2024-10-21 RX ADMIN — MIDAZOLAM 5 MG: 1 INJECTION INTRAMUSCULAR; INTRAVENOUS at 15:20

## 2024-10-21 RX ADMIN — IPRATROPIUM BROMIDE AND ALBUTEROL SULFATE 1 DOSE: 2.5; .5 SOLUTION RESPIRATORY (INHALATION) at 07:06

## 2024-10-21 RX ADMIN — BUDESONIDE AND FORMOTEROL FUMARATE DIHYDRATE 2 PUFF: 160; 4.5 AEROSOL RESPIRATORY (INHALATION) at 07:06

## 2024-10-21 RX ADMIN — CHLORHEXIDINE GLUCONATE 15 ML: 1.2 RINSE ORAL at 21:24

## 2024-10-21 RX ADMIN — MONTELUKAST 10 MG: 10 TABLET, FILM COATED ORAL at 21:25

## 2024-10-21 RX ADMIN — TRAZODONE HYDROCHLORIDE 50 MG: 50 TABLET ORAL at 21:25

## 2024-10-21 RX ADMIN — Medication 50 MCG/HR: at 15:18

## 2024-10-21 ASSESSMENT — PULMONARY FUNCTION TESTS
PIF_VALUE: 42
PIF_VALUE: 28
PIF_VALUE: 37
PIF_VALUE: 20
PIF_VALUE: 45
PIF_VALUE: 41
PIF_VALUE: 42
PIF_VALUE: 46
PIF_VALUE: 17
PIF_VALUE: 46
PIF_VALUE: 45
PIF_VALUE: 5.4
PIF_VALUE: 43
PIF_VALUE: 42
PIF_VALUE: 41
PIF_VALUE: 37
PIF_VALUE: 5
PIF_VALUE: 41
PIF_VALUE: 41
PIF_VALUE: 4.8
PIF_VALUE: 45
PIF_VALUE: 41
PIF_VALUE: 44
PIF_VALUE: 46
PIF_VALUE: 42
PIF_VALUE: 33
PIF_VALUE: 41
PIF_VALUE: 45
PIF_VALUE: 43
PIF_VALUE: 42
PIF_VALUE: 41

## 2024-10-21 ASSESSMENT — PAIN SCALES - GENERAL
PAINLEVEL_OUTOF10: 0
PAINLEVEL_OUTOF10: 5
PAINLEVEL_OUTOF10: 0

## 2024-10-21 NOTE — PLAN OF CARE
Problem: Chronic Conditions and Co-morbidities  Goal: Patient's chronic conditions and co-morbidity symptoms are monitored and maintained or improved  10/21/2024 0002 by Tigist Mcgill RN  Outcome: Progressing  Flowsheets  Taken 10/20/2024 1929 by Tigist Mcgill RN  Care Plan - Patient's Chronic Conditions and Co-Morbidity Symptoms are Monitored and Maintained or Improved:   Monitor and assess patient's chronic conditions and comorbid symptoms for stability, deterioration, or improvement   Collaborate with multidisciplinary team to address chronic and comorbid conditions and prevent exacerbation or deterioration   Update acute care plan with appropriate goals if chronic or comorbid symptoms are exacerbated and prevent overall improvement and discharge  Taken 10/20/2024 1430 by Susan Vinson RN  Care Plan - Patient's Chronic Conditions and Co-Morbidity Symptoms are Monitored and Maintained or Improved: Monitor and assess patient's chronic conditions and comorbid symptoms for stability, deterioration, or improvement     Problem: Discharge Planning  Goal: Discharge to home or other facility with appropriate resources  Outcome: Progressing  Flowsheets  Taken 10/20/2024 1929 by Tigist Mcgill RN  Discharge to home or other facility with appropriate resources:   Identify barriers to discharge with patient and caregiver   Arrange for needed discharge resources and transportation as appropriate   Identify discharge learning needs (meds, wound care, etc)  Taken 10/20/2024 1430 by Susan Vinson RN  Discharge to home or other facility with appropriate resources: Identify barriers to discharge with patient and caregiver     Problem: Pain  Goal: Verbalizes/displays adequate comfort level or baseline comfort level  10/21/2024 0002 by Tigist Mcgill RN  Outcome: Progressing  Flowsheets  Taken 10/20/2024 1924 by Tigist Mcgill RN  Verbalizes/displays adequate comfort level or baseline comfort level:

## 2024-10-21 NOTE — FLOWSHEET NOTE
Pt arrived to unit from 1W bedside report from Kindra SINGH. Head to toe completed. Pt noted to be increased work  of breathing use of accessory muscles noted tachypnea in high 30's tachycardiac in 130's and hypertensive. Akanksha GARCIA notified of assessment cane to bedside pt was then   Intubated at 1448 for respiratory distress. RT at bedside, + color change, bilateral breath sounds noted, OG placed at 65- CXR obtained for placement verification. ST elevation noted EKG obtained Notified Akanksha GARCIA no new orders at this time      Medications titrated per Provider verbal order.

## 2024-10-21 NOTE — CARE COORDINATION
CONTINUING CURRENT TREATMENT, PO ZITHROMAX, IV SOLUMEDROL, BIPAP PRN.  PER [facility] PT HAS A CONCENTRATOR FOR HS O2 ONLY.   PT WILL NEED HOME O2 EVAL TO TEST FOR DAY USE.

## 2024-10-21 NOTE — PROGRESS NOTES
Pulmonary & Critical Care Medicine ICU Progress Note  Chief complaint : Shortness of breath    Subjunctive/24 hour events :   Patient seen and examined during multidisciplinary rounds with RN, charge nurse, RT, pharmacy, dietitian, and social service.   Patient has been having increased shortness of breath and difficulty breathing since this a.m.  Patient was able to wear BiPAP all evening.  Tried to take patient off BiPAP this morning for breakfast he was unable to tolerate it and wanted the BiPAP back on for work of breathing.  Patient was transferred to the ICU.  Currently patient is sitting on the side of the bed very dyspneic and having a hard time catching his breath.  Placing patient back on BiPAP and getting an ABG.  Patient is hypertensive 173/91.  No fever overnight O2 sats on the BiPAP were running from 95 to 96%.  He is positive for rhinovirus.  Urine output 3.4 L for 24 hours he is -2 L.  Currently he is alert and oriented able to follow all commands.      Social History     Tobacco Use    Smoking status: Light Smoker     Current packs/day: 0.00     Average packs/day: 0.3 packs/day for 30.0 years (7.5 ttl pk-yrs)     Types: Cigarettes, Cigars     Start date: 1988     Last attempt to quit: 10/1/2013     Years since quittin.0     Passive exposure: Current    Smokeless tobacco: Never   Substance Use Topics    Alcohol use: Yes     Alcohol/week: 4.0 standard drinks of alcohol     Types: 2 Cans of beer, 2 Shots of liquor per week     Comment: soc         Problem Relation Age of Onset    Arthritis Mother     Asthma Mother     High Cholesterol Mother     Other Mother         aneurysm    Diabetes Father     Stroke Maternal Grandmother     Cancer Maternal Grandfather     Hypertension Other     COPD Neg Hx        Recent Labs     10/19/24  1448   PHART 7.390   YFC2AYK 44   PO2ART 77       MV Settings:     / / /FiO2 : 35 %           IV:   sodium chloride      dextrose         Vitals:  BP (!) 173/91 Comment:  December 9, 2020; September 23, 2020    TECHNIQUE:  Thin spiral axial CT was performed from the thoracic inlet to the lung bases, without intravenous contrast administration. 2-D reformatted axial, sagittal and coronal images were obtained. 3-D MIPS images were also performed.    FINDINGS:  There is a large area of consolidation that occupies the entire left lower lobe. Some also may represent compressive atelectasis. The left lower lobe bronchi are compressed. In the superior segment of the left lower lobe there are groundglass opacities.  There is trace pleural effusion on the left. Anteriorly in the right and left upper lobes there are groundglass opacities. Mild atelectasis is seen in the right lower lobe.    No aneurysm or dissection evident on this examination. There are prominent pretracheal lymph nodes. Atherosclerotic calcifications are seen.    Degenerative changes are seen in the spine at multiple levels. No destructive bony lesion is seen.    Limited survey views of the upper abdomen are unremarkable.    Impression  Worsening infiltrate is seen on this examination. There is consolidation seen in the left lower lobe with compression of the bronchi. There is only a tiny pleural effusion. Groundglass opacities are again seen anteriorly in the upper lung fields.  Recommend follow-up to resolution since malignancy is not excluded.    All CT scans at this facility use dose modulation, iterative reconstruction, and/or weight based dosing when appropriate to reduce radiation dose to as low as reasonably achievable.      CXR      2-view: Results for orders placed during the hospital encounter of 08/29/24    XR CHEST (2 VW)    Narrative  EXAMINATION:  TWO XRAY VIEWS OF THE CHEST8/29/2024 11:02 am    COMPARISON:  Two-view chest 10/12/2020.    HISTORY:  ORDERING SYSTEM PROVIDED HISTORY: SOB  TECHNOLOGIST PROVIDED HISTORY:  Reason for exam:->SOB  What reading provider will be dictating this

## 2024-10-21 NOTE — PROGRESS NOTES
Hospitalist Progress Note      PCP: Roby Silva MD    Date of Admission: 10/19/2024    Chief Complaint:    No chief complaint on file.      Subjective:  Patient patient put himself on bipap; feels like he is tiring out; feels very short of breath after getting up to urinate; unable to move more than a little before feeling significantly short of breath.       Medications:  Reviewed    Infusion Medications    sodium chloride      dextrose       Scheduled Medications    amLODIPine  10 mg Oral Daily    escitalopram  10 mg Oral Daily    losartan  100 mg Oral Daily    montelukast  10 mg Oral Nightly    budesonide-formoterol  2 puff Inhalation BID    traZODone  50 mg Oral Nightly    tiotropium  2 puff Inhalation Daily RT    azithromycin  500 mg Oral Daily    sodium chloride  2 spray Each Nostril 4x Daily    guaiFENesin  600 mg Oral BID    ipratropium 0.5 mg-albuterol 2.5 mg  1 Dose Inhalation Q6H    budesonide  0.5 mg Nebulization BID RT    methylPREDNISolone  40 mg IntraVENous Q8H    sodium chloride flush  5-40 mL IntraVENous 2 times per day    enoxaparin  30 mg SubCUTAneous BID    insulin lispro  0-8 Units SubCUTAneous 4x Daily AC & HS     PRN Meds: sodium chloride flush, sodium chloride, magnesium sulfate, ondansetron **OR** ondansetron, melatonin, polyethylene glycol, acetaminophen **OR** acetaminophen, ipratropium 0.5 mg-albuterol 2.5 mg, glucose, dextrose bolus **OR** dextrose bolus, glucagon (rDNA), dextrose      Intake/Output Summary (Last 24 hours) at 10/21/2024 1321  Last data filed at 10/21/2024 0642  Gross per 24 hour   Intake 1800 ml   Output 2200 ml   Net -400 ml     Exam:    BP (!) 198/104   Pulse (!) 104   Temp 97.8 °F (36.6 °C) (Axillary)   Resp 22   Ht 1.829 m (6' 0.01\")   Wt 108.9 kg (240 lb)   SpO2 95%   BMI 32.54 kg/m²     General appearance: No apparent distress, appears stated age and cooperative.  HEENT:  Conjunctivae/corneas clear.  Neck: Trachea midline.  Respiratory:  Normal  specialists. Additional work up and treatment should be done in out pt setting by pt PCP and other out pt providers.     In addition to examining and evaluating pt, I spent additional time explaining care, normal and abnormal findings, and treatment plan. All of pt questions were answered. Counseling, diet and education were  provided. Case will be discussed with nursing staff when appropriate. Family will be updated if and when appropriate.      Diet: ADULT DIET; Regular; 4 carb choices (60 gm/meal)    Code Status: Full Code    Electronically signed by Alejandro Concepcion MD on 10/21/2024 at 1:21 PM

## 2024-10-21 NOTE — PROGRESS NOTES
10/20/24 2100   RT Protocol   History Pulmonary Disease 2   Respiratory pattern 0   Breath sounds 6   Cough 1   Indications for Bronchodilator Therapy On home bronchodilators   Bronchodilator Assessment Score 9

## 2024-10-21 NOTE — CARE COORDINATION
CTN to see the patient for COPD exacerbation. Patient is currently in droplet isolation for Rhinovirus. CTN to mail COPD booklet and zone and smoking cessation materials to the patients residence for his review.

## 2024-10-21 NOTE — FLOWSHEET NOTE
Pt transferred to icu bed 2 per drs order.  Report given to Nadia SINGH at bedside.   See MD notes.  Attempted to call his sister Violeta but she did not answer so message was left.

## 2024-10-21 NOTE — PROGRESS NOTES
No    Restrictions:  Restrictions/Precautions: Isolation (med falls risk)     SUBJECTIVE:        Pain  Pain: 7/10 back pain reported- nursing aware    Prior Level of Function:  Social/Functional History  Lives With: Alone  Type of Home: Apartment  Home Layout: One level  Home Access: Level entry  Home Equipment: Oxygen  ADL Assistance: Independent  Homemaking Assistance: Independent  Ambulation Assistance: Independent  Transfer Assistance: Independent  Active : No    OBJECTIVE:   Vision  Vision: Impaired  Vision Exceptions: Wears glasses for reading  Hearing: Within functional limits    Cognition:  Follows Commands: Within Functional Limits         ROM:  AROM: Within functional limits  PROM: Within functional limits    Strength:  Strength: Generally decreased, functional    Neuro:  Balance  Sitting - Static: Good  Sitting - Dynamic: Good  Comments: declines standing trial                      Coordination: Within functional limits         Bed mobility  Rolling to Left: Independent  Rolling to Right: Independent  Supine to Sit: Independent  Sit to Supine: Independent  Bed Mobility Comments: HOB elevated - due to respiratory difficulties    Transfers  Sit to Stand: Stand by assistance (pt completed partial sit to stand with weight shift - delined to fully stand due to SOB. Good knee stablty with UE use required to complete)    Ambulation  Assistance: Unable to assess  Comments: Pt declines attempt due t SOB. Pt able to bear weight thru L's in partial sit to stand. Anticipate p to complete standing and gait with min assist however will have UE support device available for future trial                   Activity Tolerance  Activity Tolerance: Patient limited by endurance  Activity Tolerance Comments: Pt initially on O2 by cnaula - requested BiPap placement due to SOB  - nursing applied BiPap for pt use. Actvity limited due t SOB    Patient Education  Education Given To: Patient  Education Provided: Role of

## 2024-10-21 NOTE — PROGRESS NOTES
Went to put pt on his BIPAP and pt stated that he wanted to wait until his 1:30am tx to go on the BIPAP. This RT explained that he could have the tx inline with the BIPAP and that he would not have to take the mask off to take the tx. Pt stated he understood, but still wanted to wait to have the BIPAP put on and would have the tx inline at 1:30 when he put the BIPAP on.

## 2024-10-22 PROBLEM — R33.9 URINARY RETENTION: Status: ACTIVE | Noted: 2024-10-22

## 2024-10-22 PROBLEM — J96.00 ACUTE RESPIRATORY FAILURE: Status: ACTIVE | Noted: 2024-10-22

## 2024-10-22 PROBLEM — R94.31 ABNORMAL EKG: Status: ACTIVE | Noted: 2024-10-22

## 2024-10-22 LAB
ALBUMIN SERPL-MCNC: 3.7 G/DL (ref 3.5–4.6)
ALP SERPL-CCNC: 92 U/L (ref 35–104)
ALT SERPL-CCNC: 26 U/L (ref 0–41)
ANION GAP SERPL CALCULATED.3IONS-SCNC: 15 MEQ/L (ref 9–15)
ANISOCYTOSIS BLD QL SMEAR: ABNORMAL
AST SERPL-CCNC: 27 U/L (ref 0–40)
BASE EXCESS ARTERIAL: 5 (ref -3–3)
BASE EXCESS ARTERIAL: 5 (ref -3–3)
BASE EXCESS ARTERIAL: 6 (ref -3–3)
BASE EXCESS ARTERIAL: 9 (ref -3–3)
BASOPHILS # BLD: 0 K/UL (ref 0–0.2)
BASOPHILS NFR BLD: 0.1 %
BILIRUB SERPL-MCNC: <0.2 MG/DL (ref 0.2–0.7)
BUN SERPL-MCNC: 28 MG/DL (ref 8–23)
CALCIUM IONIZED: 1.23 MMOL/L (ref 1.12–1.32)
CALCIUM IONIZED: 1.31 MMOL/L (ref 1.12–1.32)
CALCIUM IONIZED: 1.33 MMOL/L (ref 1.12–1.32)
CALCIUM IONIZED: 1.34 MMOL/L (ref 1.12–1.32)
CALCIUM SERPL-MCNC: 9 MG/DL (ref 8.5–9.9)
CHLORIDE SERPL-SCNC: 105 MEQ/L (ref 95–107)
CO2 SERPL-SCNC: 24 MEQ/L (ref 20–31)
CREAT SERPL-MCNC: 2.08 MG/DL (ref 0.7–1.2)
EKG ATRIAL RATE: 63 BPM
EKG P AXIS: 42 DEGREES
EKG P-R INTERVAL: 116 MS
EKG Q-T INTERVAL: 446 MS
EKG QRS DURATION: 150 MS
EKG QTC CALCULATION (BAZETT): 456 MS
EKG R AXIS: 64 DEGREES
EKG T AXIS: 65 DEGREES
EKG VENTRICULAR RATE: 63 BPM
EOSINOPHIL # BLD: 0 K/UL (ref 0–0.7)
EOSINOPHIL NFR BLD: 0.1 %
ERYTHROCYTE [DISTWIDTH] IN BLOOD BY AUTOMATED COUNT: 16.1 % (ref 11.5–14.5)
GLOBULIN SER CALC-MCNC: 3.2 G/DL (ref 2.3–3.5)
GLUCOSE BLD-MCNC: 119 MG/DL (ref 70–99)
GLUCOSE BLD-MCNC: 123 MG/DL (ref 70–99)
GLUCOSE BLD-MCNC: 127 MG/DL (ref 70–99)
GLUCOSE BLD-MCNC: 128 MG/DL (ref 70–99)
GLUCOSE BLD-MCNC: 131 MG/DL (ref 70–99)
GLUCOSE BLD-MCNC: 138 MG/DL (ref 70–99)
GLUCOSE BLD-MCNC: 149 MG/DL (ref 70–99)
GLUCOSE BLD-MCNC: 155 MG/DL (ref 70–99)
GLUCOSE BLD-MCNC: 173 MG/DL (ref 70–99)
GLUCOSE SERPL-MCNC: 106 MG/DL (ref 70–99)
HCO3 ARTERIAL: 30.6 MMOL/L (ref 21–29)
HCO3 ARTERIAL: 33.3 MMOL/L (ref 21–29)
HCO3 ARTERIAL: 34 MMOL/L (ref 21–29)
HCO3 ARTERIAL: 37.2 MMOL/L (ref 21–29)
HCT VFR BLD AUTO: 41 % (ref 41–53)
HCT VFR BLD AUTO: 42 % (ref 41–53)
HCT VFR BLD AUTO: 42.8 % (ref 42–52)
HCT VFR BLD AUTO: 44 % (ref 41–53)
HCT VFR BLD AUTO: 45 % (ref 41–53)
HGB BLD CALC-MCNC: 13.9 GM/DL (ref 13.5–17.5)
HGB BLD CALC-MCNC: 14.4 GM/DL (ref 13.5–17.5)
HGB BLD CALC-MCNC: 15 GM/DL (ref 13.5–17.5)
HGB BLD CALC-MCNC: 15.4 GM/DL (ref 13.5–17.5)
HGB BLD-MCNC: 12.8 G/DL (ref 14–18)
LACTATE: 0.87 MMOL/L (ref 0.4–2)
LACTATE: 1.01 MMOL/L (ref 0.4–2)
LACTATE: 1.02 MMOL/L (ref 0.4–2)
LACTATE: <0.3 MMOL/L (ref 0.4–2)
LYMPHOCYTES # BLD: 1.2 K/UL (ref 1–4.8)
LYMPHOCYTES NFR BLD: 7 %
MACROCYTES BLD QL SMEAR: ABNORMAL
MCH RBC QN AUTO: 31.3 PG (ref 27–31.3)
MCHC RBC AUTO-ENTMCNC: 29.9 % (ref 33–37)
MCV RBC AUTO: 104.6 FL (ref 79–92.2)
METAMYELOCYTES NFR BLD MANUAL: 1 %
MONOCYTES # BLD: 2.5 K/UL (ref 0.2–0.8)
MONOCYTES NFR BLD: 14.3 %
NEUTROPHILS # BLD: 13.9 K/UL (ref 1.4–6.5)
NEUTS SEG NFR BLD: 78 %
O2 SAT, ARTERIAL: 100 % (ref 93–100)
O2 SAT, ARTERIAL: 93 % (ref 93–100)
O2 SAT, ARTERIAL: 94 % (ref 93–100)
O2 SAT, ARTERIAL: 96 % (ref 93–100)
OVALOCYTES BLD QL SMEAR: ABNORMAL
PCO2 ARTERIAL: 54 MM HG (ref 35–45)
PCO2 ARTERIAL: 91 MM HG (ref 35–45)
PCO2 ARTERIAL: 93 MM HG (ref 35–45)
PCO2 ARTERIAL: 99 MM HG (ref 35–45)
PERFORMED ON: ABNORMAL
PH ARTERIAL: 7.17 (ref 7.35–7.45)
PH ARTERIAL: 7.17 (ref 7.35–7.45)
PH ARTERIAL: 7.18 (ref 7.35–7.45)
PH ARTERIAL: 7.37 (ref 7.35–7.45)
PLATELET # BLD AUTO: 330 K/UL (ref 130–400)
PLATELET BLD QL SMEAR: ADEQUATE
PO2 ARTERIAL: 473 MM HG (ref 75–108)
PO2 ARTERIAL: 87 MM HG (ref 75–108)
PO2 ARTERIAL: 89 MM HG (ref 75–108)
PO2 ARTERIAL: 94 MM HG (ref 75–108)
POC CHLORIDE: 106 MEQ/L (ref 99–110)
POC CHLORIDE: 106 MEQ/L (ref 99–110)
POC CHLORIDE: 107 MEQ/L (ref 99–110)
POC CHLORIDE: 108 MEQ/L (ref 99–110)
POC CREATININE: 1.8 MG/DL (ref 0.8–1.3)
POC CREATININE: 1.8 MG/DL (ref 0.8–1.3)
POC CREATININE: 1.9 MG/DL (ref 0.8–1.3)
POC CREATININE: 2.1 MG/DL (ref 0.8–1.3)
POC FIO2: 100
POC FIO2: 50
POC SAMPLE TYPE: ABNORMAL
POIKILOCYTOSIS BLD QL SMEAR: ABNORMAL
POLYCHROMASIA BLD QL SMEAR: ABNORMAL
POTASSIUM SERPL-SCNC: 4.3 MEQ/L (ref 3.5–5.1)
POTASSIUM SERPL-SCNC: 5.1 MEQ/L (ref 3.5–5.1)
POTASSIUM SERPL-SCNC: 5.2 MEQ/L (ref 3.5–5.1)
POTASSIUM SERPL-SCNC: 5.2 MEQ/L (ref 3.5–5.1)
POTASSIUM SERPL-SCNC: 5.3 MEQ/L (ref 3.4–4.9)
PROT SERPL-MCNC: 6.9 G/DL (ref 6.3–8)
RBC # BLD AUTO: 4.09 M/UL (ref 4.7–6.1)
SLIDE REVIEW: ABNORMAL
SMUDGE CELLS BLD QL SMEAR: 3.8
SODIUM BLD-SCNC: 142 MEQ/L (ref 136–145)
SODIUM BLD-SCNC: 142 MEQ/L (ref 136–145)
SODIUM BLD-SCNC: 144 MEQ/L (ref 136–145)
SODIUM BLD-SCNC: 145 MEQ/L (ref 136–145)
SODIUM SERPL-SCNC: 144 MEQ/L (ref 135–144)
TCO2 ARTERIAL: 32 MMOL/L (ref 21–32)
TCO2 ARTERIAL: 36 MMOL/L (ref 21–32)
TCO2 ARTERIAL: 37 MMOL/L (ref 21–32)
TCO2 ARTERIAL: 40 MMOL/L (ref 21–32)
TROPONIN, HIGH SENSITIVITY: 10 NG/L (ref 0–19)
WBC # BLD AUTO: 17.6 K/UL (ref 4.8–10.8)

## 2024-10-22 PROCEDURE — 6360000002 HC RX W HCPCS: Performed by: INTERNAL MEDICINE

## 2024-10-22 PROCEDURE — 93010 ELECTROCARDIOGRAM REPORT: CPT | Performed by: INTERNAL MEDICINE

## 2024-10-22 PROCEDURE — 82330 ASSAY OF CALCIUM: CPT

## 2024-10-22 PROCEDURE — 94640 AIRWAY INHALATION TREATMENT: CPT

## 2024-10-22 PROCEDURE — 51701 INSERT BLADDER CATHETER: CPT

## 2024-10-22 PROCEDURE — 99221 1ST HOSP IP/OBS SF/LOW 40: CPT | Performed by: PHYSICIAN ASSISTANT

## 2024-10-22 PROCEDURE — 83605 ASSAY OF LACTIC ACID: CPT

## 2024-10-22 PROCEDURE — 2580000003 HC RX 258: Performed by: INTERNAL MEDICINE

## 2024-10-22 PROCEDURE — 82565 ASSAY OF CREATININE: CPT

## 2024-10-22 PROCEDURE — 84484 ASSAY OF TROPONIN QUANT: CPT

## 2024-10-22 PROCEDURE — 6370000000 HC RX 637 (ALT 250 FOR IP): Performed by: NURSE PRACTITIONER

## 2024-10-22 PROCEDURE — 82435 ASSAY OF BLOOD CHLORIDE: CPT

## 2024-10-22 PROCEDURE — 84295 ASSAY OF SERUM SODIUM: CPT

## 2024-10-22 PROCEDURE — 2500000003 HC RX 250 WO HCPCS: Performed by: INTERNAL MEDICINE

## 2024-10-22 PROCEDURE — 99223 1ST HOSP IP/OBS HIGH 75: CPT | Performed by: INTERNAL MEDICINE

## 2024-10-22 PROCEDURE — 82803 BLOOD GASES ANY COMBINATION: CPT

## 2024-10-22 PROCEDURE — 2580000003 HC RX 258: Performed by: NURSE PRACTITIONER

## 2024-10-22 PROCEDURE — 51798 US URINE CAPACITY MEASURE: CPT

## 2024-10-22 PROCEDURE — 85025 COMPLETE CBC W/AUTO DIFF WBC: CPT

## 2024-10-22 PROCEDURE — 2700000000 HC OXYGEN THERAPY PER DAY

## 2024-10-22 PROCEDURE — 82948 REAGENT STRIP/BLOOD GLUCOSE: CPT

## 2024-10-22 PROCEDURE — 93005 ELECTROCARDIOGRAM TRACING: CPT | Performed by: INTERNAL MEDICINE

## 2024-10-22 PROCEDURE — 94761 N-INVAS EAR/PLS OXIMETRY MLT: CPT

## 2024-10-22 PROCEDURE — 36600 WITHDRAWAL OF ARTERIAL BLOOD: CPT

## 2024-10-22 PROCEDURE — 6370000000 HC RX 637 (ALT 250 FOR IP): Performed by: INTERNAL MEDICINE

## 2024-10-22 PROCEDURE — 2500000003 HC RX 250 WO HCPCS

## 2024-10-22 PROCEDURE — 84132 ASSAY OF SERUM POTASSIUM: CPT

## 2024-10-22 PROCEDURE — 94003 VENT MGMT INPAT SUBQ DAY: CPT

## 2024-10-22 PROCEDURE — 89220 SPUTUM SPECIMEN COLLECTION: CPT

## 2024-10-22 PROCEDURE — 6360000002 HC RX W HCPCS: Performed by: NURSE PRACTITIONER

## 2024-10-22 PROCEDURE — 85014 HEMATOCRIT: CPT

## 2024-10-22 PROCEDURE — 51702 INSERT TEMP BLADDER CATH: CPT

## 2024-10-22 PROCEDURE — 2500000003 HC RX 250 WO HCPCS: Performed by: NURSE PRACTITIONER

## 2024-10-22 PROCEDURE — 36415 COLL VENOUS BLD VENIPUNCTURE: CPT

## 2024-10-22 PROCEDURE — 80053 COMPREHEN METABOLIC PANEL: CPT

## 2024-10-22 PROCEDURE — 99291 CRITICAL CARE FIRST HOUR: CPT | Performed by: INTERNAL MEDICINE

## 2024-10-22 PROCEDURE — 2000000000 HC ICU R&B

## 2024-10-22 PROCEDURE — APPSS45 APP SPLIT SHARED TIME 31-45 MINUTES: Performed by: PHYSICIAN ASSISTANT

## 2024-10-22 RX ORDER — NOREPINEPHRINE BITARTRATE 0.06 MG/ML
1-100 INJECTION, SOLUTION INTRAVENOUS CONTINUOUS
Status: DISCONTINUED | OUTPATIENT
Start: 2024-10-22 | End: 2024-10-24

## 2024-10-22 RX ORDER — NOREPINEPHRINE BITARTRATE 0.06 MG/ML
INJECTION, SOLUTION INTRAVENOUS
Status: COMPLETED
Start: 2024-10-22 | End: 2024-10-22

## 2024-10-22 RX ORDER — SODIUM CHLORIDE, SODIUM LACTATE, POTASSIUM CHLORIDE, CALCIUM CHLORIDE 600; 310; 30; 20 MG/100ML; MG/100ML; MG/100ML; MG/100ML
INJECTION, SOLUTION INTRAVENOUS CONTINUOUS
Status: DISCONTINUED | OUTPATIENT
Start: 2024-10-22 | End: 2024-10-23

## 2024-10-22 RX ORDER — CISATRACURIUM BESYLATE 2 MG/ML
0.15 INJECTION, SOLUTION INTRAVENOUS ONCE
Status: COMPLETED | OUTPATIENT
Start: 2024-10-22 | End: 2024-10-22

## 2024-10-22 RX ADMIN — SODIUM CHLORIDE, POTASSIUM CHLORIDE, SODIUM LACTATE AND CALCIUM CHLORIDE: 600; 310; 30; 20 INJECTION, SOLUTION INTRAVENOUS at 20:19

## 2024-10-22 RX ADMIN — IPRATROPIUM BROMIDE AND ALBUTEROL SULFATE 1 DOSE: 2.5; .5 SOLUTION RESPIRATORY (INHALATION) at 19:29

## 2024-10-22 RX ADMIN — BUDESONIDE 500 MCG: 0.5 SUSPENSION RESPIRATORY (INHALATION) at 14:49

## 2024-10-22 RX ADMIN — PROPOFOL 30 MCG/KG/MIN: 10 INJECTION, EMULSION INTRAVENOUS at 23:35

## 2024-10-22 RX ADMIN — NOREPINEPHRINE BITARTRATE 5 MCG/MIN: 64 SOLUTION INTRAVENOUS at 06:13

## 2024-10-22 RX ADMIN — MIDAZOLAM HYDROCHLORIDE 2 MG/HR: 5 INJECTION, SOLUTION INTRAMUSCULAR; INTRAVENOUS at 09:39

## 2024-10-22 RX ADMIN — Medication 150 MCG/HR: at 22:55

## 2024-10-22 RX ADMIN — TRAZODONE HYDROCHLORIDE 50 MG: 50 TABLET ORAL at 20:37

## 2024-10-22 RX ADMIN — IPRATROPIUM BROMIDE AND ALBUTEROL SULFATE 1 DOSE: 2.5; .5 SOLUTION RESPIRATORY (INHALATION) at 11:11

## 2024-10-22 RX ADMIN — CHLORHEXIDINE GLUCONATE 15 ML: 1.2 RINSE ORAL at 20:36

## 2024-10-22 RX ADMIN — SODIUM CHLORIDE, PRESERVATIVE FREE 20 MG: 5 INJECTION INTRAVENOUS at 08:00

## 2024-10-22 RX ADMIN — ESCITALOPRAM OXALATE 10 MG: 10 TABLET ORAL at 07:59

## 2024-10-22 RX ADMIN — Medication 150 MCG/HR: at 14:30

## 2024-10-22 RX ADMIN — Medication 250 MCG/HR: at 00:30

## 2024-10-22 RX ADMIN — CEFTRIAXONE SODIUM 1000 MG: 1 INJECTION, POWDER, FOR SOLUTION INTRAMUSCULAR; INTRAVENOUS at 15:26

## 2024-10-22 RX ADMIN — CISATRACURIUM BESYLATE 2 MCG/KG/MIN: 200 INJECTION, SOLUTION INTRAVENOUS at 06:54

## 2024-10-22 RX ADMIN — Medication 10 ML: at 20:37

## 2024-10-22 RX ADMIN — Medication 275 MCG/HR: at 05:59

## 2024-10-22 RX ADMIN — CISATRACURIUM BESYLATE 16.4 MG: 2 INJECTION, SOLUTION INTRAVENOUS at 05:53

## 2024-10-22 RX ADMIN — ENOXAPARIN SODIUM 30 MG: 100 INJECTION SUBCUTANEOUS at 07:59

## 2024-10-22 RX ADMIN — PROPOFOL 30 MCG/KG/MIN: 10 INJECTION, EMULSION INTRAVENOUS at 01:57

## 2024-10-22 RX ADMIN — Medication 250 MCG/HR: at 09:18

## 2024-10-22 RX ADMIN — Medication 10 ML: at 08:01

## 2024-10-22 RX ADMIN — SODIUM CHLORIDE, PRESERVATIVE FREE 20 MG: 5 INJECTION INTRAVENOUS at 20:36

## 2024-10-22 RX ADMIN — PROPOFOL 30 MCG/KG/MIN: 10 INJECTION, EMULSION INTRAVENOUS at 13:35

## 2024-10-22 RX ADMIN — PROPOFOL 30 MCG/KG/MIN: 10 INJECTION, EMULSION INTRAVENOUS at 05:08

## 2024-10-22 RX ADMIN — MONTELUKAST 10 MG: 10 TABLET, FILM COATED ORAL at 20:20

## 2024-10-22 RX ADMIN — BACTERIOSTATIC WATER 40 MG: 1 INJECTION, SOLUTION INTRAMUSCULAR; INTRAVENOUS; SUBCUTANEOUS at 08:00

## 2024-10-22 RX ADMIN — SODIUM CHLORIDE, POTASSIUM CHLORIDE, SODIUM LACTATE AND CALCIUM CHLORIDE: 600; 310; 30; 20 INJECTION, SOLUTION INTRAVENOUS at 10:46

## 2024-10-22 RX ADMIN — CHLORHEXIDINE GLUCONATE 15 ML: 1.2 RINSE ORAL at 07:59

## 2024-10-22 RX ADMIN — PROPOFOL 30 MCG/KG/MIN: 10 INJECTION, EMULSION INTRAVENOUS at 09:43

## 2024-10-22 RX ADMIN — IPRATROPIUM BROMIDE AND ALBUTEROL SULFATE 1 DOSE: 2.5; .5 SOLUTION RESPIRATORY (INHALATION) at 03:27

## 2024-10-22 RX ADMIN — BUDESONIDE 500 MCG: 0.5 SUSPENSION RESPIRATORY (INHALATION) at 03:28

## 2024-10-22 RX ADMIN — PROPOFOL 30 MCG/KG/MIN: 10 INJECTION, EMULSION INTRAVENOUS at 18:05

## 2024-10-22 RX ADMIN — IPRATROPIUM BROMIDE AND ALBUTEROL SULFATE 1 DOSE: 2.5; .5 SOLUTION RESPIRATORY (INHALATION) at 14:47

## 2024-10-22 ASSESSMENT — PULMONARY FUNCTION TESTS
PIF_VALUE: 41
PIF_VALUE: 45
PIF_VALUE: 43
PIF_VALUE: 45
PIF_VALUE: 44
PIF_VALUE: 45
PIF_VALUE: 45
PIF_VALUE: 46
PIF_VALUE: 45
PIF_VALUE: 43
PIF_VALUE: 42
PIF_VALUE: 41
PIF_VALUE: 40
PIF_VALUE: 4.9
PIF_VALUE: 45
PIF_VALUE: 45
PIF_VALUE: 46
PIF_VALUE: 45
PIF_VALUE: 45
PIF_VALUE: 46
PIF_VALUE: 45
PIF_VALUE: 46
PIF_VALUE: 45
PIF_VALUE: 7.2
PIF_VALUE: 45
PIF_VALUE: 46
PIF_VALUE: 45
PIF_VALUE: 45
PIF_VALUE: 44
PIF_VALUE: 45
PIF_VALUE: 45
PIF_VALUE: 46
PIF_VALUE: 45
PIF_VALUE: 44
PIF_VALUE: 45
PIF_VALUE: 44
PIF_VALUE: 45
PIF_VALUE: 46
PIF_VALUE: 45
PIF_VALUE: 46
PIF_VALUE: 45
PIF_VALUE: 44
PIF_VALUE: 44
PIF_VALUE: 45
PIF_VALUE: 43
PIF_VALUE: 46
PIF_VALUE: 46
PIF_VALUE: 44
PIF_VALUE: 45
PIF_VALUE: 46
PIF_VALUE: 42
PIF_VALUE: 45
PIF_VALUE: 45
PIF_VALUE: 46
PIF_VALUE: 45
PIF_VALUE: 43
PIF_VALUE: 46
PIF_VALUE: 45
PIF_VALUE: 44
PIF_VALUE: 46
PIF_VALUE: 45
PIF_VALUE: 46
PIF_VALUE: 43
PIF_VALUE: 40
PIF_VALUE: 46
PIF_VALUE: 46
PIF_VALUE: 45
PIF_VALUE: 44
PIF_VALUE: 44
PIF_VALUE: 45
PIF_VALUE: 46
PIF_VALUE: 43
PIF_VALUE: 45
PIF_VALUE: 46
PIF_VALUE: 45
PIF_VALUE: 46
PIF_VALUE: 6

## 2024-10-22 ASSESSMENT — PAIN SCALES - GENERAL
PAINLEVEL_OUTOF10: 0
PAINLEVEL_OUTOF10: 1
PAINLEVEL_OUTOF10: 0

## 2024-10-22 NOTE — PROGRESS NOTES
Pulmonary & Critical Care Medicine ICU Progress Note  Chief complaint : Shortness of breath    Subjunctive/24 hour events :   Patient seen and examined during multidisciplinary rounds with RN, charge nurse, RT, pharmacy, dietitian, and social service.   Patient was restless thoughout the night, fentanyl drip increase with no improvement. He was started on Nimbex drip, was d/c early this am. He is on FiO2 50% with peep of 5 and O2 sats are 100%. Sedated with fentanyl and propofol. Levophed is infusing at 5 mcg/min. No fever overnight and urine output 350. Last BM 10/21/2024.   Social History     Tobacco Use    Smoking status: Light Smoker     Current packs/day: 0.00     Average packs/day: 0.3 packs/day for 30.0 years (7.5 ttl pk-yrs)     Types: Cigarettes, Cigars     Start date: 1988     Last attempt to quit: 10/1/2013     Years since quittin.0     Passive exposure: Current    Smokeless tobacco: Never   Substance Use Topics    Alcohol use: Yes     Alcohol/week: 4.0 standard drinks of alcohol     Types: 2 Cans of beer, 2 Shots of liquor per week     Comment: soc         Problem Relation Age of Onset    Arthritis Mother     Asthma Mother     High Cholesterol Mother     Other Mother         aneurysm    Diabetes Father     Stroke Maternal Grandmother     Cancer Maternal Grandfather     Hypertension Other     COPD Neg Hx        Recent Labs     10/22/24  0328 10/22/24  0510   PHART 7.171* 7.171*   JPV9DON 91* 93*   PO2ART 89 94       MV Settings:  Vent Mode: AC/VC Resp Rate (Set): 30 bpm/Vt (Set, mL): 480 mL/ /FiO2 : 50 %           IV:   cisatracurium besylate (NIMBEX) 200 mg in sodium chloride 0.9 % 100 mL infusion 2 mcg/kg/min (10/22/24 0728)    norepinephrine 5 mcg/min (10/22/24 0728)    fentaNYL 275 mcg/hr (10/22/24 0728)    propofol 30 mcg/kg/min (10/22/24 0728)    sodium chloride      dextrose         Vitals:  /63   Pulse 65   Temp 98.9 °F (37.2 °C) (Oral)   Resp 30   Ht 1.829 m (6')   Wt 108.9 kg  (240 lb)   SpO2 100%   BMI 32.55 kg/m²    Tmax:       Intake/Output Summary (Last 24 hours) at 10/22/2024 0825  Last data filed at 10/22/2024 0728  Gross per 24 hour   Intake 854.22 ml   Output 350 ml   Net 504.22 ml       EXAM:    General: sedated   Head: normocephalic, atraumatic  Eyes:No gross abnormalities.  ENT:  MMM no lesions  Neck:  supple and no masses  Chest : Diminished air movement no wheezing no rales nontender tympanic  Heart:: Heart sounds are normal.  Regular rate and rhythm without murmur, gallop or rub.  ABD:  bowel sounds normal, soft, non-tender  Musculoskeletal : no cyanosis, no clubbing, and trace edema  Neuro:  Grossly normal  Skin: No rashes or nodules noted.  Lymph node:  no cervical nodes  Urology: No Holcomb   Psychiatric: calm    Medications:  Scheduled Meds:   chlorhexidine  15 mL Mouth/Throat BID    famotidine (PEPCID) injection  20 mg IntraVENous BID    insulin lispro  0-8 Units SubCUTAneous Q4H    cefTRIAXone (ROCEPHIN) IV  1,000 mg IntraVENous Q24H    methylPREDNISolone  40 mg IntraVENous Daily    amLODIPine  10 mg Oral Daily    escitalopram  10 mg Oral Daily    losartan  100 mg Oral Daily    montelukast  10 mg Oral Nightly    budesonide-formoterol  2 puff Inhalation BID    traZODone  50 mg Oral Nightly    tiotropium  2 puff Inhalation Daily RT    sodium chloride  2 spray Each Nostril 4x Daily    guaiFENesin  600 mg Oral BID    ipratropium 0.5 mg-albuterol 2.5 mg  1 Dose Inhalation Q6H    budesonide  0.5 mg Nebulization BID RT    sodium chloride flush  5-40 mL IntraVENous 2 times per day    enoxaparin  30 mg SubCUTAneous BID       PRN Meds:  hydrALAZINE, fentaNYL **AND** fentaNYL, sodium chloride flush, sodium chloride, magnesium sulfate, ondansetron **OR** ondansetron, melatonin, polyethylene glycol, acetaminophen **OR** acetaminophen, ipratropium 0.5 mg-albuterol 2.5 mg, glucose, dextrose bolus **OR** dextrose bolus, glucagon (rDNA), dextrose    Results: reviewed by me   CBC:

## 2024-10-22 NOTE — PROGRESS NOTES
Comprehensive Nutrition Assessment    Type and Reason for Visit:  Initial, Consult (tube feeding order and manage)    Nutrition Recommendations/Plan:   Begin prptide based TF (Vital AF 1.2) @ 20 ml/hr   100 ml water flush every 4 hrs      Malnutrition Assessment:  Malnutrition Status:  Insufficient data (10/22/24 1216)      Nutrition Assessment:    Nutritional status adequate prior to admission, currently with inadequate energy and protein intake related to impaired respiratory function.  Intubated 10/21, will begin trophic TF    Nutrition Related Findings:    history of cocaine abuse, COPD on 3 L O2 at night, alcohol abuse, diabetes, hypertension presents with shortness of breath,He is positive for rhinovirus. Required intubation 10/21, abnormal labs noted, relevant meds reviewed, poor po since admission related to biPap requirements. Wound Type: None       Current Nutrition Intake & Therapies:    Current Tube Feeding (TF) Orders:  Feeding Route: Orogastric  Formula: Peptide Based (Vital AF 1.2)  Schedule: Continuous  Feeding Regimen: 20 ml/hr (480 ml)  Additives/Modulars: None  Water Flushes: 100 ml q 4 hrs (600 ml)  Current TF & Flush Orders Provides: 576 kcal, 36 gm protein ~ 900 ml free water  Goal TF & Flush Orders Provides: 576 kcal, 36 gm protein ~ 900 ml free water    Anthropometric Measures:  Height: 182.9 cm (6')  Ideal Body Weight (IBW): 178 lbs (81 kg)    Admission Body Weight: 107 kg (236 lb)  Current Body Weight: 108.9 kg (240 lb), 134.8 % Weight Source: Bed Scale  Current BMI (kg/m2): 32.5  Usual Body Weight: 114.8 kg (253 lb) (( 11/2023), 256# ( 7/2024))  % Weight Change (Calculated): -5.1  Weight Adjustment For: No Adjustment                 BMI Categories: Obese Class 1 (BMI 30.0-34.9)    Estimated Daily Nutrient Needs:  Energy Requirements Based On: Kcal/kg  Weight Used for Energy Requirements: Current  Energy (kcal/day): 3724-8563 kcals @ 11-14 kcal/lg  Weight Used for Protein Requirements:

## 2024-10-22 NOTE — PROGRESS NOTES
Report received from Tiara SINGH and Sosa RN. Continuous monitoring equipment in place. Cardiac rhythm abnormal, EKG completed, notified Dr. Montesinos of results. Dr. Montesinos at bedside on telephone with Dr. Peña, per Dr. Peña continue to monitor. Patient thrashing around, fentanyl gtt and propofol gtt at max rates , verbal order to modify fentanyl gtt to a max rate of 300mcg.     0000: Patient reassessed, following commands, trashing around, sedation titrated.     0130: Patient's vent alarming for \"airway obstruction\" Patient breathing asynchronous with ventilator, suctioned patient, no cough noted. Notified Dr. Montesinos of change, order obtained for ABG, RT at bedside to draw ABG. Notified Dr. Montesinos of results, at bedside. Ventilator rate changed from 26 to 30 per Dr. Montesinos, RT notified. RASS -5, patient not responding to pain, Discussed RASS with Dr. Montesinos, per Dr. Yamel castillo for RASS of -4 to -5 to help with synchronization of ventilator.     0330: RT at bedside for repeat ABG. This RN notified Dr. Michaels, per Brando castillo to put Fentanyl gtt up to 250mcg and a RASS of -5 to help with synchronization of ventilator, repeat ABG in 1 hour and notify of results. Notified Dr. Montesinos of ABG results.     0440: Dr. Montesinos at bedside to assess patient, per Dr. Montesinos patient needs to be more sedated, RASS -5. Patient still experiencing ventilator desynchrony, order obtained for loading dose Nimbex.    0500: Straight cath performed, 350cc out.    0553: Baseline train of four at 7 amps on R eyebrow. Nimbex given via IV push, RT at bedside. Patient's respirations in sync with the ventilator. Notified Dr. Montesinos, order received for nimbex gtt.     0654: Nimbex gtt hung    Report given to oncoming RN.

## 2024-10-22 NOTE — PROGRESS NOTES
Hospitalist Progress Note      PCP: Roby Silva MD    Date of Admission: 10/19/2024    Chief Complaint:    No chief complaint on file.      Subjective:  Patient is intubated and sedated       Medications:  Reviewed    Infusion Medications    norepinephrine 5 mcg/min (10/22/24 1129)    midazolam 2 mg/hr (10/22/24 1129)    lactated ringers IV soln 100 mL/hr at 10/22/24 1129    fentaNYL 175 mcg/hr (10/22/24 1129)    propofol 30 mcg/kg/min (10/22/24 1129)    sodium chloride      dextrose       Scheduled Medications    chlorhexidine  15 mL Mouth/Throat BID    famotidine (PEPCID) injection  20 mg IntraVENous BID    insulin lispro  0-8 Units SubCUTAneous Q4H    cefTRIAXone (ROCEPHIN) IV  1,000 mg IntraVENous Q24H    methylPREDNISolone  40 mg IntraVENous Daily    [Held by provider] amLODIPine  10 mg Oral Daily    escitalopram  10 mg Oral Daily    [Held by provider] losartan  100 mg Oral Daily    montelukast  10 mg Oral Nightly    [Held by provider] budesonide-formoterol  2 puff Inhalation BID    traZODone  50 mg Oral Nightly    [Held by provider] tiotropium  2 puff Inhalation Daily RT    [Held by provider] guaiFENesin  600 mg Oral BID    ipratropium 0.5 mg-albuterol 2.5 mg  1 Dose Inhalation Q6H    budesonide  0.5 mg Nebulization BID RT    sodium chloride flush  5-40 mL IntraVENous 2 times per day    enoxaparin  30 mg SubCUTAneous BID     PRN Meds: hydrALAZINE, fentaNYL **AND** fentaNYL, sodium chloride flush, sodium chloride, magnesium sulfate, ondansetron **OR** ondansetron, melatonin, polyethylene glycol, acetaminophen **OR** acetaminophen, ipratropium 0.5 mg-albuterol 2.5 mg, glucose, dextrose bolus **OR** dextrose bolus, glucagon (rDNA), dextrose      Intake/Output Summary (Last 24 hours) at 10/22/2024 1302  Last data filed at 10/22/2024 1129  Gross per 24 hour   Intake 1122.08 ml   Output 950 ml   Net 172.08 ml     Exam:    /62   Pulse 54   Temp 98.9 °F (37.2 °C) (Oral)   Resp (!) 32   Ht 1.829 m (6')

## 2024-10-22 NOTE — CONSULTS
Consult Note  Patient: Anabelle Morris  Unit/Bed: IC02/IC02-01  YOB: 1955  MRN: [id number]  Acct: [id number]   Admitting Diagnosis: COPD exacerbation (HCC) [J44.1]  Date:  10/19/2024  Hospital Day: 3      Chief Complaint:  SOB    Reason for consult:  EKG changes    History of Present Illness:    This is a 68-year-old -American male with past medical history significant for hypertension, dyslipidemia, diabetes, COPD on 3 L O2, history of alcohol abuse, history of cocaine abuse, sleep apnea and multiple medical problems who originally presented to [facility] on 10/19/2024 with chief complaint of shortness of breath.  History is obtained from chart review as patient is currently intubated.  Apparently he had told ER physician that he had been drinking alcohol and using cocaine on a regular basis prior to presentation.  Apparently while in ER patient became hypoxic with O2 sat dropping into the 70s with ambulation to the restroom and he was resumed on 3 L O2 per nasal cannula with improvement in SpO2 to 97%.  Chest x-ray in ER had shown no acute cardiopulmonary findings.  Initial BMP in ER unremarkable.  NT proBNP within normal range of 40.  High-sensitivity troponin negative at 18.  CBC showed WBC mildly elevated at 10.4, hemoglobin 12.8.  Urine drug screen positive for cocaine and marijuana.  He was subsequently transferred from [facility] and admitted to [facility] with diagnosis of COPD exacerbation.    Patient was transferred to ICU yesterday afternoon due to worsening respiratory distress and was subsequently intubated.  EKG completed yesterday evening at 2056 showed ST elevations in leads II, III, aVF and V3 through V6 and cardiology consulted for further evaluation.  He was on low-dose Levophed earlier today but this was recently stopped.     At time my evaluation, patient is intubated and sedated on mechanical ventilation.  He is on 50% FiO2 with SpO2 of 98%.  Patient was  nightly  Avoid any nephrotoxic agents secondary to JOHANA  Optimize cardiac meds when able/BP tolerates  Check 2D echocardiogram  Check repeat EKG  Monitor on telemetry for any tachycardia or bradycardia arrhythmias  Maintain potassium greater than 4, magnesium greater than 2  GI/DVT prophylaxis  Further recommendations to follow               Electronically signed by James Peña DO on 10/22/24 at 5:59 PM EDT

## 2024-10-22 NOTE — CONSULTS
Urology Consult      Anabelle Morris  1955  [id number]    Date of Admission:  10/19/2024  7:10 PM  Date of Consultation:  10/22/2024    Consultant: Paul Duenas PA-C  SupervisingPhysician: Dr. Ann  PCP:  Roby Silva MD       Reason for Consultation: urinary retention      C/C: No chief complaint on file.      History of Present Illness: Urinary Retention  Patient complains of urinary retention. Onset of retention was 1 day ago and was unknown in onset. Patient currently does have a urinary catheter in place.   ml of urine were drained when catheter was placed. Prior to this event voiding symptoms consisted of slow stream. Prior treatments include n/a. Recent medications that may have affected his voiding include none.    Allergies:  Allergies   Allergen Reactions    Fish-Derived Products Anaphylaxis    Iodine Anaphylaxis     Iodine-containing products, dyes, contrast dye    Seasonal Shortness Of Breath    Other      Other reaction(s): Swelling/Edema  Other reaction(s): Swelling/Edema    Penicillin G Other (See Comments)    Shellfish Allergy      Other reaction(s): Swelling/Edema    Shellfish-Derived Products      Other reaction(s): Swelling/Edema    Pcn [Penicillins] Nausea And Vomiting       PMH: Patient has a past medical history of Alcohol abuse, Anemia, Asthma, CKD (chronic kidney disease) stage 3, GFR 30-59 ml/min ([facility]), Cocaine abuse ([facility]), Community acquired pneumonia of left lower lobe of lung, COPD (chronic obstructive pulmonary disease) ([facility]), Depression, Disorder of pharynx, Drug-seeking behavior, Edema, Erectile dysfunction, Gastroesophageal reflux disease, Gout, History of arthroscopy of both knees, History of colon polyps, House dust mite allergy, Hyperlipidemia, Hypertension, Injury to heart, Insomnia, Loculated pleural effusion, Macrocytic anemia, Medical non-compliance, Morbid obesity due to excess calories, Osteoarthritis of both knees, Personal history of tobacco use,

## 2024-10-22 NOTE — PROGRESS NOTES
[facility]  Occupational Therapy        NAME:  Anabelle Morris  ROOM: IC02/IC02-01  :  1955  DATE: 10/22/2024    Pt has transferred to ICU for change in respiratory status overnight. Pt currently intubated and sedated, not yet ready for participation in therapy tx. Will continue to follow for ability to participate.    Electronically signed by CYRIL Galicia on 10/22/24 at 11:03 AM EDT

## 2024-10-22 NOTE — CARE COORDINATION
Quality round completed with care management team. Pt currently on vent. No family at bedside.   DC plan: TBD: on vent     Electronically signed by CIARA Bangura on 10/22/2024 at 10:52 AM

## 2024-10-22 NOTE — PLAN OF CARE
Nutrition Problem #1: Inadequate oral intake  Intervention: Food and/or Nutrient Delivery: Start Tube Feeding (Begin prptide based TF (Vital AF 1.2) @ 20 ml/hr  100 ml water flush every 4 hrs)

## 2024-10-22 NOTE — INTERDISCIPLINARY ROUNDS
Spiritual Care Services     Summary of Visit:  Attended interdisciplinary rounds. Pt is intubated and sedated. No family present at this time. Pt is Yazidism. His sister Violeta is his next-of-kin.     Encounter Summary  Encounter Overview/Reason: Interdisciplinary rounds  Service Provided For: Patient  Referral/Consult From: Rounding  Support System: Family members        Spiritual Care Services   Electronically signed by KAROLINA Nichols on 10/22/2024 at 12:05 PM.    To reach a  for emotional and spiritual support, place an EPIC consult request.   If a  is needed immediately, dial “0” and ask to page the on-call .

## 2024-10-22 NOTE — PROGRESS NOTES
Patient on ventilator with nimbex, propofol and fentanyl.     0750- Dr. Ovalle wants nimbex shut off    0939- started patient on versed drip per Dr. Ovalle.     1100- bladder scanned patient 698 and did ISC for 600ml. Upon insertion patient had blood clots come out with hematuria. Notified Akanksha NP- zamora catheter was placed.

## 2024-10-23 ENCOUNTER — APPOINTMENT (OUTPATIENT)
Age: 69
DRG: 207 | End: 2024-10-23
Attending: STUDENT IN AN ORGANIZED HEALTH CARE EDUCATION/TRAINING PROGRAM
Payer: MEDICARE

## 2024-10-23 ENCOUNTER — APPOINTMENT (OUTPATIENT)
Dept: GENERAL RADIOLOGY | Age: 69
DRG: 207 | End: 2024-10-23
Attending: STUDENT IN AN ORGANIZED HEALTH CARE EDUCATION/TRAINING PROGRAM
Payer: MEDICARE

## 2024-10-23 LAB
ALBUMIN SERPL-MCNC: 3.3 G/DL (ref 3.5–4.6)
ALP SERPL-CCNC: 87 U/L (ref 35–104)
ALT SERPL-CCNC: 28 U/L (ref 0–41)
ANION GAP SERPL CALCULATED.3IONS-SCNC: 7 MEQ/L (ref 9–15)
AST SERPL-CCNC: 22 U/L (ref 0–40)
BASE EXCESS ARTERIAL: 6 (ref -3–3)
BASE EXCESS ARTERIAL: 6 (ref -3–3)
BASE EXCESS ARTERIAL: 9 (ref -3–3)
BASOPHILS # BLD: 0 K/UL (ref 0–0.2)
BASOPHILS NFR BLD: 0.1 %
BILIRUB SERPL-MCNC: 0.3 MG/DL (ref 0.2–0.7)
BUN SERPL-MCNC: 24 MG/DL (ref 8–23)
CALCIUM IONIZED: 1.24 MMOL/L (ref 1.12–1.32)
CALCIUM IONIZED: 1.34 MMOL/L (ref 1.12–1.32)
CALCIUM IONIZED: 1.35 MMOL/L (ref 1.12–1.32)
CALCIUM SERPL-MCNC: 8.7 MG/DL (ref 8.5–9.9)
CHLORIDE SERPL-SCNC: 110 MEQ/L (ref 95–107)
CO2 SERPL-SCNC: 26 MEQ/L (ref 20–31)
CREAT SERPL-MCNC: 1.03 MG/DL (ref 0.7–1.2)
ECHO AO ROOT DIAM: 3.8 CM
ECHO AO ROOT INDEX: 1.65 CM/M2
ECHO AV AREA PEAK VELOCITY: 1.7 CM2
ECHO AV AREA VTI: 2.1 CM2
ECHO AV AREA/BSA PEAK VELOCITY: 0.7 CM2/M2
ECHO AV AREA/BSA VTI: 0.9 CM2/M2
ECHO AV MEAN GRADIENT: 12 MMHG
ECHO AV MEAN VELOCITY: 1.6 M/S
ECHO AV PEAK GRADIENT: 26 MMHG
ECHO AV PEAK VELOCITY: 2.8 M/S
ECHO AV VELOCITY RATIO: 0.54
ECHO AV VTI: 45.7 CM
ECHO BSA: 2.33 M2
ECHO EST RA PRESSURE: 8 MMHG
ECHO LA DIAMETER INDEX: 1.61 CM/M2
ECHO LA DIAMETER: 3.7 CM
ECHO LA TO AORTIC ROOT RATIO: 0.97
ECHO LA VOL A-L A2C: 85 ML (ref 18–58)
ECHO LA VOL A-L A4C: 61 ML (ref 18–58)
ECHO LA VOL MOD A2C: 82 ML (ref 18–58)
ECHO LA VOL MOD A4C: 57 ML (ref 18–58)
ECHO LA VOLUME AREA LENGTH: 75 ML
ECHO LA VOLUME INDEX A-L A2C: 37 ML/M2 (ref 16–34)
ECHO LA VOLUME INDEX A-L A4C: 27 ML/M2 (ref 16–34)
ECHO LA VOLUME INDEX AREA LENGTH: 33 ML/M2 (ref 16–34)
ECHO LA VOLUME INDEX MOD A2C: 36 ML/M2 (ref 16–34)
ECHO LA VOLUME INDEX MOD A4C: 25 ML/M2 (ref 16–34)
ECHO LV E' LATERAL VELOCITY: 8.6 CM/S
ECHO LV E' SEPTAL VELOCITY: 7.7 CM/S
ECHO LV EDV A2C: 134 ML
ECHO LV EDV A4C: 156 ML
ECHO LV EDV BP: 146 ML (ref 67–155)
ECHO LV EDV INDEX A4C: 68 ML/M2
ECHO LV EDV INDEX BP: 63 ML/M2
ECHO LV EDV NDEX A2C: 58 ML/M2
ECHO LV EJECTION FRACTION A2C: 63 %
ECHO LV EJECTION FRACTION A4C: 62 %
ECHO LV EJECTION FRACTION BIPLANE: 63 % (ref 55–100)
ECHO LV ESV A2C: 50 ML
ECHO LV ESV A4C: 60 ML
ECHO LV ESV BP: 54 ML (ref 22–58)
ECHO LV ESV INDEX A2C: 22 ML/M2
ECHO LV ESV INDEX A4C: 26 ML/M2
ECHO LV ESV INDEX BP: 23 ML/M2
ECHO LV FRACTIONAL SHORTENING: 33 % (ref 28–44)
ECHO LV INTERNAL DIMENSION DIASTOLE INDEX: 1.7 CM/M2
ECHO LV INTERNAL DIMENSION DIASTOLIC: 3.9 CM (ref 4.2–5.9)
ECHO LV INTERNAL DIMENSION SYSTOLIC INDEX: 1.13 CM/M2
ECHO LV INTERNAL DIMENSION SYSTOLIC: 2.6 CM
ECHO LV IVSD: 1.6 CM (ref 0.6–1)
ECHO LV IVSS: 1.9 CM
ECHO LV MASS 2D: 236.6 G (ref 88–224)
ECHO LV MASS INDEX 2D: 102.9 G/M2 (ref 49–115)
ECHO LV POSTERIOR WALL DIASTOLIC: 1.5 CM (ref 0.6–1)
ECHO LV POSTERIOR WALL SYSTOLIC: 2 CM
ECHO LV RELATIVE WALL THICKNESS RATIO: 0.77
ECHO LVOT AREA: 3.1 CM2
ECHO LVOT AV VTI INDEX: 0.69
ECHO LVOT DIAM: 2 CM
ECHO LVOT MEAN GRADIENT: 5 MMHG
ECHO LVOT PEAK GRADIENT: 9 MMHG
ECHO LVOT PEAK VELOCITY: 1.5 M/S
ECHO LVOT STROKE VOLUME INDEX: 43.1 ML/M2
ECHO LVOT SV: 99.2 ML
ECHO LVOT VTI: 31.6 CM
ECHO MV A VELOCITY: 1.29 M/S
ECHO MV AREA PHT: 2.6 CM2
ECHO MV AREA VTI: 2.5 CM2
ECHO MV E DECELERATION TIME (DT): 410.4 MS
ECHO MV E VELOCITY: 1.12 M/S
ECHO MV E/A RATIO: 0.87
ECHO MV E/E' LATERAL: 13.02
ECHO MV E/E' RATIO (AVERAGED): 13.78
ECHO MV E/E' SEPTAL: 14.55
ECHO MV LVOT VTI INDEX: 1.28
ECHO MV MAX VELOCITY: 1.5 M/S
ECHO MV MEAN GRADIENT: 4 MMHG
ECHO MV MEAN VELOCITY: 0.9 M/S
ECHO MV PEAK GRADIENT: 9 MMHG
ECHO MV PRESSURE HALF TIME (PHT): 86.1 MS
ECHO MV VTI: 40.3 CM
ECHO RIGHT VENTRICULAR SYSTOLIC PRESSURE (RVSP): 36 MMHG
ECHO RV INTERNAL DIMENSION: 3.9 CM
ECHO TV REGURGITANT MAX VELOCITY: 2.64 M/S
ECHO TV REGURGITANT PEAK GRADIENT: 28 MMHG
EOSINOPHIL # BLD: 0.1 K/UL (ref 0–0.7)
EOSINOPHIL NFR BLD: 0.5 %
ERYTHROCYTE [DISTWIDTH] IN BLOOD BY AUTOMATED COUNT: 16.2 % (ref 11.5–14.5)
GLOBULIN SER CALC-MCNC: 3.3 G/DL (ref 2.3–3.5)
GLUCOSE BLD-MCNC: 115 MG/DL (ref 70–99)
GLUCOSE BLD-MCNC: 123 MG/DL (ref 70–99)
GLUCOSE BLD-MCNC: 137 MG/DL (ref 70–99)
GLUCOSE BLD-MCNC: 150 MG/DL (ref 70–99)
GLUCOSE BLD-MCNC: 158 MG/DL (ref 70–99)
GLUCOSE BLD-MCNC: 86 MG/DL (ref 70–99)
GLUCOSE BLD-MCNC: 93 MG/DL (ref 70–99)
GLUCOSE SERPL-MCNC: 88 MG/DL (ref 70–99)
HCO3 ARTERIAL: 29.8 MMOL/L (ref 21–29)
HCO3 ARTERIAL: 34.4 MMOL/L (ref 21–29)
HCO3 ARTERIAL: 35.5 MMOL/L (ref 21–29)
HCT VFR BLD AUTO: 41 % (ref 41–53)
HCT VFR BLD AUTO: 41 % (ref 41–53)
HCT VFR BLD AUTO: 42 % (ref 41–53)
HCT VFR BLD AUTO: 44.3 % (ref 42–52)
HGB BLD CALC-MCNC: 13.9 GM/DL (ref 13.5–17.5)
HGB BLD CALC-MCNC: 14 GM/DL (ref 13.5–17.5)
HGB BLD CALC-MCNC: 14.4 GM/DL (ref 13.5–17.5)
HGB BLD-MCNC: 13.7 G/DL (ref 14–18)
LACTATE: 0.48 MMOL/L (ref 0.4–2)
LACTATE: 0.55 MMOL/L (ref 0.4–2)
LACTATE: 0.87 MMOL/L (ref 0.4–2)
LYMPHOCYTES # BLD: 1.8 K/UL (ref 1–4.8)
LYMPHOCYTES NFR BLD: 11.1 %
MCH RBC QN AUTO: 31.5 PG (ref 27–31.3)
MCHC RBC AUTO-ENTMCNC: 30.9 % (ref 33–37)
MCV RBC AUTO: 101.8 FL (ref 79–92.2)
MONOCYTES # BLD: 2.3 K/UL (ref 0.2–0.8)
MONOCYTES NFR BLD: 14 %
NEUTROPHILS # BLD: 12.1 K/UL (ref 1.4–6.5)
NEUTS SEG NFR BLD: 73.9 %
O2 SAT, ARTERIAL: 93 % (ref 93–100)
O2 SAT, ARTERIAL: 95 % (ref 93–100)
O2 SAT, ARTERIAL: 98 % (ref 93–100)
PCO2 ARTERIAL: 43 MM HG (ref 35–45)
PCO2 ARTERIAL: 78 MM HG (ref 35–45)
PCO2 ARTERIAL: 93 MM HG (ref 35–45)
PERFORMED ON: ABNORMAL
PERFORMED ON: NORMAL
PH ARTERIAL: 7.18 (ref 7.35–7.45)
PH ARTERIAL: 7.27 (ref 7.35–7.45)
PH ARTERIAL: 7.45 (ref 7.35–7.45)
PLATELET # BLD AUTO: 259 K/UL (ref 130–400)
PO2 ARTERIAL: 87 MM HG (ref 75–108)
PO2 ARTERIAL: 93 MM HG (ref 75–108)
PO2 ARTERIAL: 95 MM HG (ref 75–108)
POC CHLORIDE: 105 MEQ/L (ref 99–110)
POC CHLORIDE: 105 MEQ/L (ref 99–110)
POC CHLORIDE: 106 MEQ/L (ref 99–110)
POC CREATININE: 1.1 MG/DL (ref 0.8–1.3)
POC CREATININE: 1.2 MG/DL (ref 0.8–1.3)
POC CREATININE: 1.3 MG/DL (ref 0.8–1.3)
POC FIO2: 40
POC FIO2: 45
POC FIO2: 45
POC SAMPLE TYPE: ABNORMAL
POTASSIUM SERPL-SCNC: 4.1 MEQ/L (ref 3.5–5.1)
POTASSIUM SERPL-SCNC: 4.3 MEQ/L (ref 3.5–5.1)
POTASSIUM SERPL-SCNC: 4.6 MEQ/L (ref 3.5–5.1)
POTASSIUM SERPL-SCNC: 5.1 MEQ/L (ref 3.4–4.9)
PROT SERPL-MCNC: 6.6 G/DL (ref 6.3–8)
RBC # BLD AUTO: 4.35 M/UL (ref 4.7–6.1)
SODIUM BLD-SCNC: 145 MEQ/L (ref 136–145)
SODIUM BLD-SCNC: 145 MEQ/L (ref 136–145)
SODIUM BLD-SCNC: 146 MEQ/L (ref 136–145)
SODIUM SERPL-SCNC: 143 MEQ/L (ref 135–144)
TCO2 ARTERIAL: 31 MMOL/L (ref 21–32)
TCO2 ARTERIAL: 37 MMOL/L (ref 21–32)
TCO2 ARTERIAL: 38 MMOL/L (ref 21–32)
WBC # BLD AUTO: 16.3 K/UL (ref 4.8–10.8)

## 2024-10-23 PROCEDURE — 2500000003 HC RX 250 WO HCPCS

## 2024-10-23 PROCEDURE — 6370000000 HC RX 637 (ALT 250 FOR IP): Performed by: INTERNAL MEDICINE

## 2024-10-23 PROCEDURE — 2500000003 HC RX 250 WO HCPCS: Performed by: INTERNAL MEDICINE

## 2024-10-23 PROCEDURE — 6370000000 HC RX 637 (ALT 250 FOR IP): Performed by: NURSE PRACTITIONER

## 2024-10-23 PROCEDURE — 2580000003 HC RX 258: Performed by: INTERNAL MEDICINE

## 2024-10-23 PROCEDURE — 6360000002 HC RX W HCPCS: Performed by: INTERNAL MEDICINE

## 2024-10-23 PROCEDURE — 93306 TTE W/DOPPLER COMPLETE: CPT

## 2024-10-23 PROCEDURE — 2500000003 HC RX 250 WO HCPCS: Performed by: NURSE PRACTITIONER

## 2024-10-23 PROCEDURE — 82330 ASSAY OF CALCIUM: CPT

## 2024-10-23 PROCEDURE — 85025 COMPLETE CBC W/AUTO DIFF WBC: CPT

## 2024-10-23 PROCEDURE — 36415 COLL VENOUS BLD VENIPUNCTURE: CPT

## 2024-10-23 PROCEDURE — 80053 COMPREHEN METABOLIC PANEL: CPT

## 2024-10-23 PROCEDURE — 94761 N-INVAS EAR/PLS OXIMETRY MLT: CPT

## 2024-10-23 PROCEDURE — 85014 HEMATOCRIT: CPT

## 2024-10-23 PROCEDURE — 84132 ASSAY OF SERUM POTASSIUM: CPT

## 2024-10-23 PROCEDURE — 82803 BLOOD GASES ANY COMBINATION: CPT

## 2024-10-23 PROCEDURE — 6360000002 HC RX W HCPCS: Performed by: NURSE PRACTITIONER

## 2024-10-23 PROCEDURE — 2700000000 HC OXYGEN THERAPY PER DAY

## 2024-10-23 PROCEDURE — 84295 ASSAY OF SERUM SODIUM: CPT

## 2024-10-23 PROCEDURE — 99231 SBSQ HOSP IP/OBS SF/LOW 25: CPT | Performed by: PHYSICIAN ASSISTANT

## 2024-10-23 PROCEDURE — 82565 ASSAY OF CREATININE: CPT

## 2024-10-23 PROCEDURE — 93306 TTE W/DOPPLER COMPLETE: CPT | Performed by: INTERNAL MEDICINE

## 2024-10-23 PROCEDURE — 94640 AIRWAY INHALATION TREATMENT: CPT

## 2024-10-23 PROCEDURE — 2000000000 HC ICU R&B

## 2024-10-23 PROCEDURE — 82948 REAGENT STRIP/BLOOD GLUCOSE: CPT

## 2024-10-23 PROCEDURE — 99233 SBSQ HOSP IP/OBS HIGH 50: CPT | Performed by: INTERNAL MEDICINE

## 2024-10-23 PROCEDURE — 94003 VENT MGMT INPAT SUBQ DAY: CPT

## 2024-10-23 PROCEDURE — 83605 ASSAY OF LACTIC ACID: CPT

## 2024-10-23 PROCEDURE — 36600 WITHDRAWAL OF ARTERIAL BLOOD: CPT

## 2024-10-23 PROCEDURE — 99291 CRITICAL CARE FIRST HOUR: CPT | Performed by: INTERNAL MEDICINE

## 2024-10-23 PROCEDURE — 93005 ELECTROCARDIOGRAM TRACING: CPT | Performed by: INTERNAL MEDICINE

## 2024-10-23 PROCEDURE — 2580000003 HC RX 258: Performed by: NURSE PRACTITIONER

## 2024-10-23 PROCEDURE — 82435 ASSAY OF BLOOD CHLORIDE: CPT

## 2024-10-23 PROCEDURE — 89220 SPUTUM SPECIMEN COLLECTION: CPT

## 2024-10-23 PROCEDURE — 71045 X-RAY EXAM CHEST 1 VIEW: CPT

## 2024-10-23 RX ORDER — POLYETHYLENE GLYCOL 3350 17 G/17G
17 POWDER, FOR SOLUTION ORAL DAILY
Status: DISCONTINUED | OUTPATIENT
Start: 2024-10-23 | End: 2024-11-06 | Stop reason: HOSPADM

## 2024-10-23 RX ORDER — BACTERIOSTATIC WATER 1 ML/ML
INJECTION, SOLUTION INTRAMUSCULAR; INTRAVENOUS; SUBCUTANEOUS
Status: COMPLETED
Start: 2024-10-23 | End: 2024-10-23

## 2024-10-23 RX ORDER — CISATRACURIUM BESYLATE 2 MG/ML
10 INJECTION, SOLUTION INTRAVENOUS ONCE
Status: COMPLETED | OUTPATIENT
Start: 2024-10-23 | End: 2024-10-23

## 2024-10-23 RX ADMIN — METHYLPREDNISOLONE SODIUM SUCCINATE 40 MG: 40 INJECTION INTRAMUSCULAR; INTRAVENOUS at 20:52

## 2024-10-23 RX ADMIN — BUDESONIDE 500 MCG: 0.5 SUSPENSION RESPIRATORY (INHALATION) at 04:47

## 2024-10-23 RX ADMIN — TRAZODONE HYDROCHLORIDE 50 MG: 50 TABLET ORAL at 20:46

## 2024-10-23 RX ADMIN — DOCUSATE SODIUM 100 MG: 50 LIQUID ORAL at 09:40

## 2024-10-23 RX ADMIN — Medication 100 MCG/HR: at 21:20

## 2024-10-23 RX ADMIN — PROPOFOL 5 MCG/KG/MIN: 10 INJECTION, EMULSION INTRAVENOUS at 06:30

## 2024-10-23 RX ADMIN — CISATRACURIUM BESYLATE 10 MG: 10 INJECTION, SOLUTION INTRAVENOUS at 11:45

## 2024-10-23 RX ADMIN — CEFTRIAXONE SODIUM 1000 MG: 1 INJECTION, POWDER, FOR SOLUTION INTRAMUSCULAR; INTRAVENOUS at 16:19

## 2024-10-23 RX ADMIN — MONTELUKAST 10 MG: 10 TABLET, FILM COATED ORAL at 20:46

## 2024-10-23 RX ADMIN — CHLORHEXIDINE GLUCONATE 15 ML: 1.2 RINSE ORAL at 20:46

## 2024-10-23 RX ADMIN — Medication 200 MCG/HR: at 11:09

## 2024-10-23 RX ADMIN — CHLORHEXIDINE GLUCONATE 15 ML: 1.2 RINSE ORAL at 08:59

## 2024-10-23 RX ADMIN — IPRATROPIUM BROMIDE AND ALBUTEROL SULFATE 1 DOSE: 2.5; .5 SOLUTION RESPIRATORY (INHALATION) at 07:39

## 2024-10-23 RX ADMIN — ESCITALOPRAM OXALATE 10 MG: 10 TABLET ORAL at 08:59

## 2024-10-23 RX ADMIN — NOREPINEPHRINE BITARTRATE 5 MCG/MIN: 64 SOLUTION INTRAVENOUS at 12:04

## 2024-10-23 RX ADMIN — ENOXAPARIN SODIUM 30 MG: 100 INJECTION SUBCUTANEOUS at 20:46

## 2024-10-23 RX ADMIN — CISATRACURIUM BESYLATE 2 MCG/KG/MIN: 200 INJECTION, SOLUTION INTRAVENOUS at 11:53

## 2024-10-23 RX ADMIN — BUDESONIDE 500 MCG: 0.5 SUSPENSION RESPIRATORY (INHALATION) at 15:49

## 2024-10-23 RX ADMIN — Medication 10 ML: at 09:00

## 2024-10-23 RX ADMIN — DOCUSATE SODIUM 100 MG: 50 LIQUID ORAL at 20:47

## 2024-10-23 RX ADMIN — BACTERIOSTATIC WATER 40 MG: 1 INJECTION, SOLUTION INTRAMUSCULAR; INTRAVENOUS; SUBCUTANEOUS at 09:00

## 2024-10-23 RX ADMIN — SODIUM CHLORIDE, PRESERVATIVE FREE 20 MG: 5 INJECTION INTRAVENOUS at 08:59

## 2024-10-23 RX ADMIN — BACTERIOSTATIC WATER: 1 INJECTION, SOLUTION INTRAMUSCULAR; INTRAVENOUS; SUBCUTANEOUS at 21:05

## 2024-10-23 RX ADMIN — MIDAZOLAM HYDROCHLORIDE 10 MG/HR: 5 INJECTION, SOLUTION INTRAMUSCULAR; INTRAVENOUS at 11:12

## 2024-10-23 RX ADMIN — IPRATROPIUM BROMIDE AND ALBUTEROL SULFATE 1 DOSE: 2.5; .5 SOLUTION RESPIRATORY (INHALATION) at 04:47

## 2024-10-23 RX ADMIN — POLYETHYLENE GLYCOL 3350 17 G: 17 POWDER, FOR SOLUTION ORAL at 09:40

## 2024-10-23 RX ADMIN — PROPOFOL 20 MCG/KG/MIN: 10 INJECTION, EMULSION INTRAVENOUS at 16:08

## 2024-10-23 RX ADMIN — SODIUM CHLORIDE, PRESERVATIVE FREE 20 MG: 5 INJECTION INTRAVENOUS at 20:46

## 2024-10-23 RX ADMIN — IPRATROPIUM BROMIDE AND ALBUTEROL SULFATE 1 DOSE: 2.5; .5 SOLUTION RESPIRATORY (INHALATION) at 15:49

## 2024-10-23 RX ADMIN — SODIUM CHLORIDE, POTASSIUM CHLORIDE, SODIUM LACTATE AND CALCIUM CHLORIDE: 600; 310; 30; 20 INJECTION, SOLUTION INTRAVENOUS at 07:18

## 2024-10-23 RX ADMIN — Medication 10 ML: at 21:04

## 2024-10-23 RX ADMIN — IPRATROPIUM BROMIDE AND ALBUTEROL SULFATE 1 DOSE: 2.5; .5 SOLUTION RESPIRATORY (INHALATION) at 21:09

## 2024-10-23 RX ADMIN — PROPOFOL 25 MCG/KG/MIN: 10 INJECTION, EMULSION INTRAVENOUS at 23:23

## 2024-10-23 RX ADMIN — Medication 175 MCG/HR: at 06:27

## 2024-10-23 ASSESSMENT — PULMONARY FUNCTION TESTS
PIF_VALUE: 50
PIF_VALUE: 21
PIF_VALUE: 46
PIF_VALUE: 45
PIF_VALUE: 35
PIF_VALUE: 41
PIF_VALUE: 47
PIF_VALUE: 49
PIF_VALUE: 41
PIF_VALUE: 39
PIF_VALUE: 59
PIF_VALUE: 43
PIF_VALUE: 10
PIF_VALUE: 41
PIF_VALUE: 40
PIF_VALUE: 42
PIF_VALUE: 45
PIF_VALUE: 40
PIF_VALUE: 45
PIF_VALUE: 46
PIF_VALUE: 45
PIF_VALUE: 45
PIF_VALUE: 44
PIF_VALUE: 47
PIF_VALUE: 35
PIF_VALUE: 45
PIF_VALUE: 46
PIF_VALUE: 43
PIF_VALUE: 17
PIF_VALUE: 46
PIF_VALUE: 46
PIF_VALUE: 37
PIF_VALUE: 9.2
PIF_VALUE: 45
PIF_VALUE: 31
PIF_VALUE: 45
PIF_VALUE: 5.2
PIF_VALUE: 41
PIF_VALUE: 44
PIF_VALUE: 46
PIF_VALUE: 40
PIF_VALUE: 38

## 2024-10-23 ASSESSMENT — PAIN SCALES - GENERAL
PAINLEVEL_OUTOF10: 0

## 2024-10-23 NOTE — PROGRESS NOTES
Subjective:      Patient ID: Anabelle Morris is a 68 y.o. male    HPI 68 year old male who is intubated in the ICU. His zamora catheter is draining clear yellow urine    Past Medical History:   Diagnosis Date    Alcohol abuse 10/27/2016    Anemia     Asthma     CKD (chronic kidney disease) stage 3, GFR 30-59 ml/min ([facility]) 12/14/2021    Cocaine abuse ([facility]) 10/27/2016    Community acquired pneumonia of left lower lobe of lung 12/05/2016    COPD (chronic obstructive pulmonary disease) ([facility])     Depression 04/27/2020    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    Drug-seeking behavior 04/14/2015    Edema 12/10/2015    Erectile dysfunction     Gastroesophageal reflux disease 12/17/2018    Gout 10/27/2016    History of arthroscopy of both knees 10/27/2016    History of colon polyps 10/26/2021    House dust mite allergy 04/21/2014    Hyperlipidemia     Hypertension 10/27/2016    Injury to heart 10/27/2016    Insomnia 12/17/2018    Loculated pleural effusion     Macrocytic anemia 09/07/2013    Medical non-compliance 02/22/2014    Morbid obesity due to excess calories 12/08/2016    Osteoarthritis of both knees 12/08/2016    Personal history of tobacco use     Pleurisy 12/12/2016    Pneumonia 12/04/2016    caused hospital admission    Pneumonia in infectious disease 11/29/2023    Pre-diabetes 10/27/2016    Seasonal allergies 04/21/2014    Severe persistent asthma 10/27/2016    Severe sleep apnea 04/14/2015    Supraventricular tachycardia (HCC) 10/27/2016    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

## 2024-10-23 NOTE — CARE COORDINATION
This LSW completed quality rounds with care management team.  Patient remains on vent and sedated, no family present at bedside.  DC plan TBD, remains on vent.  Electronically signed by CIARA Barrera on 10/23/24 at 10:08 AM EDT

## 2024-10-23 NOTE — PROGRESS NOTES
Physical Therapy Missed Treatment   Facility/Department: [facility] MED SURG IC02/IC02-01    NAME: Anabelle Morris    : 1955 (68 y.o.)  MRN: [id number]    Account: [id number]  Gender: male      Pt remains on ventilator - hold PT today - confirmed with RN      Will follow and attempt PT evaluation again at earliest availability.       Macarena Swartz, PT, 10/23/24 at 2:30 PM

## 2024-10-23 NOTE — PROGRESS NOTES
Pulmonary & Critical Care Medicine ICU Progress Note  Chief complaint : Shortness of breath    Subjunctive/24 hour events :   Patient seen and examined during multidisciplinary rounds with RN, charge nurse, RT, pharmacy, dietitian, and social service.   Patient had an uneventful night, after his bed bath he started biting on the ET tube and sats dropped to 70's. He recovered and is on  FiO2 45% with peep of 5 and O2 sats are 95%. Sedated with fentanyl, versed and propofol. Levophed has been weaned off. No fever overnight and urine output 2500 for 24 hours.Tolerating tube feeding and  last BM 10/21/2024.   Social History     Tobacco Use    Smoking status: Light Smoker     Current packs/day: 0.00     Average packs/day: 0.3 packs/day for 30.0 years (7.5 ttl pk-yrs)     Types: Cigarettes, Cigars     Start date: 1988     Last attempt to quit: 10/1/2013     Years since quittin.0     Passive exposure: Current    Smokeless tobacco: Never   Substance Use Topics    Alcohol use: Yes     Alcohol/week: 4.0 standard drinks of alcohol     Types: 2 Cans of beer, 2 Shots of liquor per week     Comment: soc         Problem Relation Age of Onset    Arthritis Mother     Asthma Mother     High Cholesterol Mother     Other Mother         aneurysm    Diabetes Father     Stroke Maternal Grandmother     Cancer Maternal Grandfather     Hypertension Other     COPD Neg Hx        Recent Labs     10/22/24  0510 10/22/24  0928   PHART 7.171* 7.365   [id number] 93* 54*   PO2ART 94 87       MV Settings:  Vent Mode: AC/VC Resp Rate (Set): 32 bpm/Vt (Set, mL): 480 mL/ /FiO2 : 45 %           IV:   norepinephrine Stopped (10/22/24 170)    midazolam 3 mg/hr (10/23/24 0653)    lactated ringers IV soln 100 mL/hr at 10/23/24 0718    fentaNYL 175 mcg/hr (10/23/24 0653)    propofol 15 mcg/kg/min (10/23/24 0653)    sodium chloride      dextrose         Vitals:  /85   Pulse 89   Temp 99 °F (37.2 °C)   Resp 11   Ht 1.829 m (6')   Wt 108.9 kg

## 2024-10-23 NOTE — PROGRESS NOTES
Hospitalist Progress Note      PCP: Roby Silva MD    Date of Admission: 10/19/2024    Chief Complaint:    No chief complaint on file.      Subjective:  Patient is intubated and sedated       Medications:  Reviewed    Infusion Medications    cisatracurium besylate (NIMBEX) 200 mg in sodium chloride 0.9 % 100 mL infusion 2 mcg/kg/min (10/23/24 1153)    norepinephrine 5 mcg/min (10/23/24 1204)    midazolam 10 mg/hr (10/23/24 1112)    fentaNYL 200 mcg/hr (10/23/24 1109)    propofol 15 mcg/kg/min (10/23/24 0653)    sodium chloride      dextrose       Scheduled Medications    docusate  100 mg Oral BID    polyethylene glycol  17 g Oral Daily    methylPREDNISolone  40 mg IntraVENous Q12H    chlorhexidine  15 mL Mouth/Throat BID    famotidine (PEPCID) injection  20 mg IntraVENous BID    insulin lispro  0-8 Units SubCUTAneous Q4H    cefTRIAXone (ROCEPHIN) IV  1,000 mg IntraVENous Q24H    [Held by provider] amLODIPine  10 mg Oral Daily    escitalopram  10 mg Oral Daily    [Held by provider] losartan  100 mg Oral Daily    montelukast  10 mg Oral Nightly    [Held by provider] budesonide-formoterol  2 puff Inhalation BID    traZODone  50 mg Oral Nightly    [Held by provider] tiotropium  2 puff Inhalation Daily RT    [Held by provider] guaiFENesin  600 mg Oral BID    ipratropium 0.5 mg-albuterol 2.5 mg  1 Dose Inhalation Q6H    budesonide  0.5 mg Nebulization BID RT    sodium chloride flush  5-40 mL IntraVENous 2 times per day    enoxaparin  30 mg SubCUTAneous BID     PRN Meds: hydrALAZINE, fentaNYL **AND** fentaNYL, sodium chloride flush, sodium chloride, magnesium sulfate, ondansetron **OR** ondansetron, melatonin, polyethylene glycol, acetaminophen **OR** acetaminophen, ipratropium 0.5 mg-albuterol 2.5 mg, glucose, dextrose bolus **OR** dextrose bolus, glucagon (rDNA), dextrose      Intake/Output Summary (Last 24 hours) at 10/23/2024 1400  Last data filed at 10/23/2024 0653  Gross per 24 hour   Intake 2794.2 ml   Output

## 2024-10-23 NOTE — FLOWSHEET NOTE
Patient has nimbex infusing for dyssynchronous ventilator breathing. His baseline for the TOF is 4 amps. Nimbex is infusing at 1 and patient is breathing in sync with ventilator. His twitches remain at 4 with goal of therapy being TOF 1-4 and breathing with vent. He does have involuntary movements and restraints remain in place for patient safety.

## 2024-10-23 NOTE — PROGRESS NOTES
Nutrition Note    Current tube feeding delivers ~ 52 g CHO per day, Glucose currently below desirable range. Will change tube feeding to standard with fiber formula ( Jevity 1.5) and begin progression to goal rate to increase CHO content    Current Tube Feeding (TF) Orders:   Feeding Route: Orogastric  Formula: Standard with Fiber (Jevity 1.5)  Schedule: Continuous  Additives/Modulars: Protein (x2 = 208 kcals, 52 g protein , 100 ml water)  Water Flushes: 100 ml q 4 hrs (600 ml)  Current TF & Flush Orders Provides: 720 kcals ( + propofol kcals)  31 g protein, 965 ml free water  Goal TF & Flush Orders Provides: @ 30 ml/hr + 2 Protein shots = 1288 kcals, 98 g protein, ~1250 ml free water      Electronically signed by ANYA ORLANDO RD, LD on 10/23/24 at 8:05 AM EDT

## 2024-10-23 NOTE — PROGRESS NOTES
Updates received from Saud RN. Assessments completed, see flow sheets for details. Patient not following commands, does withdrawal from pain in all four extremities. OGT to TF, minimal residuals. Full bed bath and linen change complete, patient began to bite down on tube causing his SpO2 to drop to the 70's, SpO2 quickly increased to within normal limits. 0400 patient noted to be breathing asynchronous with ventilator, no response to pain and no cough with suctioning attempts, sedation titrated, see emar. Dr. Montesinos notified of situation, no new orders. Report given to oncoming RN.

## 2024-10-23 NOTE — PROGRESS NOTES
Chief Complaint:  SOB     Reason for consult:  EKG changes     History of Present Illness:     This is a 68-year-old -American male with past medical history significant for hypertension, dyslipidemia, diabetes, COPD on 3 L O2, history of alcohol abuse, history of cocaine abuse, sleep apnea and multiple medical problems who originally presented to [facility] on 10/19/2024 with chief complaint of shortness of breath.  History is obtained from chart review as patient is currently intubated.  Apparently he had told ER physician that he had been drinking alcohol and using cocaine on a regular basis prior to presentation.  Apparently while in ER patient became hypoxic with O2 sat dropping into the 70s with ambulation to the restroom and he was resumed on 3 L O2 per nasal cannula with improvement in SpO2 to 97%.  Chest x-ray in ER had shown no acute cardiopulmonary findings.  Initial BMP in ER unremarkable.  NT proBNP within normal range of 40.  High-sensitivity troponin negative at 18.  CBC showed WBC mildly elevated at 10.4, hemoglobin 12.8.  Urine drug screen positive for cocaine and marijuana.  He was subsequently transferred from [facility] and admitted to [facility] with diagnosis of COPD exacerbation.     Patient was transferred to ICU yesterday afternoon due to worsening respiratory distress and was subsequently intubated.  EKG completed yesterday evening at 2056 showed ST elevations in leads II, III, aVF and V3 through V6 and cardiology consulted for further evaluation.  He was on low-dose Levophed earlier today but this was recently stopped.      At time my evaluation, patient is intubated and sedated on mechanical ventilation.  He is on 50% FiO2 with SpO2 of 98%.  Patient was noted to have hematuria following straight cath this morning.  Urology evaluated patient today.  Repeat EKG completed this afternoon shows some improvement in previously noted ST elevations.  Repeat troponin ordered and  CALCIUM 9.0 10/22/2024 05:04 AM     BILITOT <0.2 10/22/2024 05:04 AM     ALKPHOS 92 10/22/2024 05:04 AM     AST 27 10/22/2024 05:04 AM     ALT 26 10/22/2024 05:04 AM      BMP:          Lab Results   Component Value Date/Time      10/22/2024 05:04 AM     K 5.3 10/22/2024 05:04 AM      10/22/2024 05:04 AM     CO2 24 10/22/2024 05:04 AM     BUN 28 10/22/2024 05:04 AM     CREATININE 1.8 10/22/2024 09:28 AM     CREATININE 2.08 10/22/2024 05:04 AM     CALCIUM 9.0 10/22/2024 05:04 AM     GFRAA >60.0 05/17/2022 05:49 AM     LABGLOM 41 10/22/2024 09:28 AM     LABGLOM >90.0 03/27/2024 10:18 AM     GLUCOSE 106 10/22/2024 05:04 AM     GLUCOSE 135 04/20/2022 05:45 AM      Magnesium:          Lab Results   Component Value Date/Time     MG 2.0 11/29/2023 07:51 AM      Troponin:          Lab Results   Component Value Date/Time     TROPONINI <0.010 08/31/2023 06:35 PM         Radiology:  XR CHEST PORTABLE     Result Date: 10/21/2024  EXAMINATION: ONE XRAY VIEW OF THE CHEST 10/21/2024 3:17 pm COMPARISON: October 19th HISTORY: ORDERING SYSTEM PROVIDED HISTORY: intubation TECHNOLOGIST PROVIDED HISTORY: Reason for exam:->intubation What reading provider will be dictating this exam?->CRC FINDINGS: ET tube 7.4 cm above kelly.  NG tube in the stomach. The patient is rotated.  There is scar-like density in the left lung.  An oval density is seen along the lateral left chest wall      1. ET tube 7.4 cm above kelly. 2. NG tube in the stomach. 3. Scar-like density in the left lung. 4. Oval density along the lateral left chest wall.         Echocardiogram 5/26/20:  Conclusions   Summary   Normal left ventricular systolic function, no regional wall motion   abnormalities, estimated ejection fraction of 60%.   Normal left ventricular size and function.   Mild concentric left ventricular hypertrophy.   Impaired relaxation compatible with diastolic dysfunction. ( reversed E/A   ratio)   No evidence of mitral regurgitation.   No

## 2024-10-23 NOTE — INTERDISCIPLINARY ROUNDS
Spiritual Care Services     Summary of Visit:   participated in ICU rounds. Patient intubated and no family present.  to follow as needed.    Encounter Summary  Encounter Overview/Reason: Interdisciplinary rounds  Service Provided For: Patient  Referral/Consult From: Rounding  Support System: Family members                               Spiritual Assessment/Intervention/Outcomes:                     Care Plan:         Spiritual care to provide support as needed or requested      Spiritual Care Services   Electronically signed by Chaplain Jh on 10/23/2024 at 9:17 AM.    To reach a  for emotional and spiritual support, place an EPIC consult request.   If a  is needed immediately, dial “0” and ask to page the on-call .

## 2024-10-24 ENCOUNTER — APPOINTMENT (OUTPATIENT)
Dept: GENERAL RADIOLOGY | Age: 69
DRG: 207 | End: 2024-10-24
Attending: STUDENT IN AN ORGANIZED HEALTH CARE EDUCATION/TRAINING PROGRAM
Payer: MEDICARE

## 2024-10-24 LAB
ALBUMIN SERPL-MCNC: 2.9 G/DL (ref 3.5–4.6)
ALP SERPL-CCNC: 79 U/L (ref 35–104)
ALT SERPL-CCNC: 28 U/L (ref 0–41)
ANION GAP SERPL CALCULATED.3IONS-SCNC: 6 MEQ/L (ref 9–15)
AST SERPL-CCNC: 18 U/L (ref 0–40)
BASOPHILS # BLD: 0 K/UL (ref 0–0.2)
BASOPHILS NFR BLD: 0 %
BILIRUB SERPL-MCNC: 0.3 MG/DL (ref 0.2–0.7)
BUN SERPL-MCNC: 20 MG/DL (ref 8–23)
CALCIUM SERPL-MCNC: 8.9 MG/DL (ref 8.5–9.9)
CHLORIDE SERPL-SCNC: 107 MEQ/L (ref 95–107)
CO2 SERPL-SCNC: 26 MEQ/L (ref 20–31)
CREAT SERPL-MCNC: 0.75 MG/DL (ref 0.7–1.2)
EOSINOPHIL # BLD: 0 K/UL (ref 0–0.7)
EOSINOPHIL NFR BLD: 0.1 %
ERYTHROCYTE [DISTWIDTH] IN BLOOD BY AUTOMATED COUNT: 15.9 % (ref 11.5–14.5)
GLOBULIN SER CALC-MCNC: 3.2 G/DL (ref 2.3–3.5)
GLUCOSE BLD-MCNC: 102 MG/DL (ref 70–99)
GLUCOSE BLD-MCNC: 103 MG/DL (ref 70–99)
GLUCOSE BLD-MCNC: 113 MG/DL (ref 70–99)
GLUCOSE BLD-MCNC: 116 MG/DL (ref 70–99)
GLUCOSE SERPL-MCNC: 124 MG/DL (ref 70–99)
HCT VFR BLD AUTO: 37 % (ref 42–52)
HGB BLD-MCNC: 12.1 G/DL (ref 14–18)
LYMPHOCYTES # BLD: 0.7 K/UL (ref 1–4.8)
LYMPHOCYTES NFR BLD: 6.6 %
MCH RBC QN AUTO: 32.6 PG (ref 27–31.3)
MCHC RBC AUTO-ENTMCNC: 32.7 % (ref 33–37)
MCV RBC AUTO: 99.7 FL (ref 79–92.2)
MONOCYTES # BLD: 0.8 K/UL (ref 0.2–0.8)
MONOCYTES NFR BLD: 7.7 %
NEUTROPHILS # BLD: 9.3 K/UL (ref 1.4–6.5)
NEUTS SEG NFR BLD: 85.3 %
PERFORMED ON: ABNORMAL
PLATELET # BLD AUTO: 231 K/UL (ref 130–400)
POTASSIUM SERPL-SCNC: 5.1 MEQ/L (ref 3.4–4.9)
PROT SERPL-MCNC: 6.1 G/DL (ref 6.3–8)
RBC # BLD AUTO: 3.71 M/UL (ref 4.7–6.1)
SODIUM SERPL-SCNC: 139 MEQ/L (ref 135–144)
WBC # BLD AUTO: 10.9 K/UL (ref 4.8–10.8)

## 2024-10-24 PROCEDURE — 2000000000 HC ICU R&B

## 2024-10-24 PROCEDURE — 94640 AIRWAY INHALATION TREATMENT: CPT

## 2024-10-24 PROCEDURE — 99233 SBSQ HOSP IP/OBS HIGH 50: CPT | Performed by: INTERNAL MEDICINE

## 2024-10-24 PROCEDURE — 93005 ELECTROCARDIOGRAM TRACING: CPT | Performed by: INTERNAL MEDICINE

## 2024-10-24 PROCEDURE — 85025 COMPLETE CBC W/AUTO DIFF WBC: CPT

## 2024-10-24 PROCEDURE — 2700000000 HC OXYGEN THERAPY PER DAY

## 2024-10-24 PROCEDURE — 6370000000 HC RX 637 (ALT 250 FOR IP): Performed by: INTERNAL MEDICINE

## 2024-10-24 PROCEDURE — 2500000003 HC RX 250 WO HCPCS

## 2024-10-24 PROCEDURE — 6360000002 HC RX W HCPCS: Performed by: INTERNAL MEDICINE

## 2024-10-24 PROCEDURE — 99231 SBSQ HOSP IP/OBS SF/LOW 25: CPT | Performed by: PHYSICIAN ASSISTANT

## 2024-10-24 PROCEDURE — 6360000002 HC RX W HCPCS: Performed by: NURSE PRACTITIONER

## 2024-10-24 PROCEDURE — 36415 COLL VENOUS BLD VENIPUNCTURE: CPT

## 2024-10-24 PROCEDURE — 80053 COMPREHEN METABOLIC PANEL: CPT

## 2024-10-24 PROCEDURE — 2500000003 HC RX 250 WO HCPCS: Performed by: NURSE PRACTITIONER

## 2024-10-24 PROCEDURE — 2580000003 HC RX 258: Performed by: NURSE PRACTITIONER

## 2024-10-24 PROCEDURE — 2500000003 HC RX 250 WO HCPCS: Performed by: INTERNAL MEDICINE

## 2024-10-24 PROCEDURE — 71045 X-RAY EXAM CHEST 1 VIEW: CPT

## 2024-10-24 PROCEDURE — 94761 N-INVAS EAR/PLS OXIMETRY MLT: CPT

## 2024-10-24 PROCEDURE — 31500 INSERT EMERGENCY AIRWAY: CPT | Performed by: INTERNAL MEDICINE

## 2024-10-24 PROCEDURE — 6370000000 HC RX 637 (ALT 250 FOR IP): Performed by: NURSE PRACTITIONER

## 2024-10-24 PROCEDURE — 2580000003 HC RX 258: Performed by: INTERNAL MEDICINE

## 2024-10-24 PROCEDURE — 94003 VENT MGMT INPAT SUBQ DAY: CPT

## 2024-10-24 PROCEDURE — 99291 CRITICAL CARE FIRST HOUR: CPT | Performed by: INTERNAL MEDICINE

## 2024-10-24 RX ORDER — MIDAZOLAM HYDROCHLORIDE 1 MG/ML
INJECTION, SOLUTION INTRAMUSCULAR; INTRAVENOUS
Status: DISCONTINUED
Start: 2024-10-24 | End: 2024-10-24 | Stop reason: WASHOUT

## 2024-10-24 RX ORDER — NOREPINEPHRINE BITARTRATE 0.06 MG/ML
1-100 INJECTION, SOLUTION INTRAVENOUS CONTINUOUS
Status: DISCONTINUED | OUTPATIENT
Start: 2024-10-24 | End: 2024-10-29

## 2024-10-24 RX ORDER — NOREPINEPHRINE BITARTRATE 0.06 MG/ML
INJECTION, SOLUTION INTRAVENOUS
Status: DISPENSED
Start: 2024-10-24 | End: 2024-10-25

## 2024-10-24 RX ORDER — ROCURONIUM BROMIDE 10 MG/ML
INJECTION, SOLUTION INTRAVENOUS
Status: DISCONTINUED
Start: 2024-10-24 | End: 2024-10-24 | Stop reason: WASHOUT

## 2024-10-24 RX ADMIN — Medication 10 ML: at 08:37

## 2024-10-24 RX ADMIN — Medication 125 MCG/HR: at 07:03

## 2024-10-24 RX ADMIN — CHLORHEXIDINE GLUCONATE 15 ML: 1.2 RINSE ORAL at 08:35

## 2024-10-24 RX ADMIN — SODIUM CHLORIDE, PRESERVATIVE FREE 20 MG: 5 INJECTION INTRAVENOUS at 08:35

## 2024-10-24 RX ADMIN — PROPOFOL 30 MCG/KG/MIN: 10 INJECTION, EMULSION INTRAVENOUS at 16:15

## 2024-10-24 RX ADMIN — DOCUSATE SODIUM 100 MG: 50 LIQUID ORAL at 08:35

## 2024-10-24 RX ADMIN — METHYLPREDNISOLONE SODIUM SUCCINATE 40 MG: 40 INJECTION INTRAMUSCULAR; INTRAVENOUS at 21:46

## 2024-10-24 RX ADMIN — IPRATROPIUM BROMIDE AND ALBUTEROL SULFATE 1 DOSE: 2.5; .5 SOLUTION RESPIRATORY (INHALATION) at 15:55

## 2024-10-24 RX ADMIN — CHLORHEXIDINE GLUCONATE 15 ML: 1.2 RINSE ORAL at 21:45

## 2024-10-24 RX ADMIN — DOCUSATE SODIUM 100 MG: 50 LIQUID ORAL at 21:45

## 2024-10-24 RX ADMIN — PROPOFOL 30 MCG/KG/MIN: 10 INJECTION, EMULSION INTRAVENOUS at 03:18

## 2024-10-24 RX ADMIN — BUDESONIDE 500 MCG: 0.5 SUSPENSION RESPIRATORY (INHALATION) at 15:58

## 2024-10-24 RX ADMIN — MONTELUKAST 10 MG: 10 TABLET, FILM COATED ORAL at 21:45

## 2024-10-24 RX ADMIN — PROPOFOL 30 MCG/KG/MIN: 10 INJECTION, EMULSION INTRAVENOUS at 07:34

## 2024-10-24 RX ADMIN — NOREPINEPHRINE BITARTRATE 5 MCG/MIN: 64 SOLUTION INTRAVENOUS at 23:07

## 2024-10-24 RX ADMIN — Medication 150 MCG/HR: at 23:24

## 2024-10-24 RX ADMIN — IPRATROPIUM BROMIDE AND ALBUTEROL SULFATE 1 DOSE: 2.5; .5 SOLUTION RESPIRATORY (INHALATION) at 03:06

## 2024-10-24 RX ADMIN — Medication 10 ML: at 21:47

## 2024-10-24 RX ADMIN — ENOXAPARIN SODIUM 30 MG: 100 INJECTION SUBCUTANEOUS at 08:35

## 2024-10-24 RX ADMIN — SODIUM CHLORIDE, PRESERVATIVE FREE 20 MG: 5 INJECTION INTRAVENOUS at 21:46

## 2024-10-24 RX ADMIN — METHYLPREDNISOLONE SODIUM SUCCINATE 40 MG: 40 INJECTION INTRAMUSCULAR; INTRAVENOUS at 08:35

## 2024-10-24 RX ADMIN — CISATRACURIUM BESYLATE 2 MCG/KG/MIN: 200 INJECTION, SOLUTION INTRAVENOUS at 12:34

## 2024-10-24 RX ADMIN — ESCITALOPRAM OXALATE 10 MG: 10 TABLET ORAL at 08:35

## 2024-10-24 RX ADMIN — ENOXAPARIN SODIUM 30 MG: 100 INJECTION SUBCUTANEOUS at 21:46

## 2024-10-24 RX ADMIN — Medication 175 MCG/HR: at 14:45

## 2024-10-24 RX ADMIN — WATER 1000 MG: 1 INJECTION INTRAMUSCULAR; INTRAVENOUS; SUBCUTANEOUS at 17:04

## 2024-10-24 RX ADMIN — IPRATROPIUM BROMIDE AND ALBUTEROL SULFATE 1 DOSE: 2.5; .5 SOLUTION RESPIRATORY (INHALATION) at 22:48

## 2024-10-24 RX ADMIN — PROPOFOL 40 MCG/KG/MIN: 10 INJECTION, EMULSION INTRAVENOUS at 12:22

## 2024-10-24 RX ADMIN — POLYETHYLENE GLYCOL 3350 17 G: 17 POWDER, FOR SOLUTION ORAL at 08:34

## 2024-10-24 RX ADMIN — TRAZODONE HYDROCHLORIDE 50 MG: 50 TABLET ORAL at 21:45

## 2024-10-24 RX ADMIN — Medication 150 MCG/HR: at 23:14

## 2024-10-24 RX ADMIN — IPRATROPIUM BROMIDE AND ALBUTEROL SULFATE 1 DOSE: 2.5; .5 SOLUTION RESPIRATORY (INHALATION) at 07:27

## 2024-10-24 RX ADMIN — BUDESONIDE 500 MCG: 0.5 SUSPENSION RESPIRATORY (INHALATION) at 03:06

## 2024-10-24 RX ADMIN — PROPOFOL 35 MCG/KG/MIN: 10 INJECTION, EMULSION INTRAVENOUS at 20:49

## 2024-10-24 ASSESSMENT — PULMONARY FUNCTION TESTS
PIF_VALUE: 45
PIF_VALUE: 52
PIF_VALUE: 44
PIF_VALUE: 43
PIF_VALUE: 36
PIF_VALUE: 40
PIF_VALUE: 42
PIF_VALUE: 44
PIF_VALUE: 49
PIF_VALUE: 43
PIF_VALUE: 46
PIF_VALUE: 53
PIF_VALUE: 49
PIF_VALUE: 43
PIF_VALUE: 49
PIF_VALUE: 48
PIF_VALUE: 47
PIF_VALUE: 57
PIF_VALUE: 49
PIF_VALUE: 55
PIF_VALUE: 53
PIF_VALUE: 52
PIF_VALUE: 40
PIF_VALUE: 46
PIF_VALUE: 44
PIF_VALUE: 39
PIF_VALUE: 44
PIF_VALUE: 43
PIF_VALUE: 41
PIF_VALUE: 42
PIF_VALUE: 48
PIF_VALUE: 46
PIF_VALUE: 37
PIF_VALUE: 51
PIF_VALUE: 46
PIF_VALUE: 47
PIF_VALUE: 51
PIF_VALUE: 46
PIF_VALUE: 32
PIF_VALUE: 49
PIF_VALUE: 45
PIF_VALUE: 49
PIF_VALUE: 39
PIF_VALUE: 47
PIF_VALUE: 48
PIF_VALUE: 39
PIF_VALUE: 49
PIF_VALUE: 51
PIF_VALUE: 48
PIF_VALUE: 39
PIF_VALUE: 38
PIF_VALUE: 42
PIF_VALUE: 40
PIF_VALUE: 51
PIF_VALUE: 43
PIF_VALUE: 49
PIF_VALUE: 44
PIF_VALUE: 44
PIF_VALUE: 47
PIF_VALUE: 59
PIF_VALUE: 58
PIF_VALUE: 54
PIF_VALUE: 45
PIF_VALUE: 50
PIF_VALUE: 48
PIF_VALUE: 52
PIF_VALUE: 48
PIF_VALUE: 44
PIF_VALUE: 47
PIF_VALUE: 49
PIF_VALUE: 46
PIF_VALUE: 46
PIF_VALUE: 53
PIF_VALUE: 44
PIF_VALUE: 46

## 2024-10-24 ASSESSMENT — PAIN SCALES - GENERAL
PAINLEVEL_OUTOF10: 0

## 2024-10-24 NOTE — FLOWSHEET NOTE
0800 Assessment complete, see flow sheet. Patient remains intubated and sedated, see MAR. Nimbex infusing for Dyssynchronous ventilator breathing as ordered, see MAR,. Patient suctioned , mouth care done and repositioned for comfort.  0830 Decreasing sedation per order of Dr Ovalle for SBT trial. See MAR.  1100 Patient fighting vent /116, SPO2 in the 80's biting down on ET tube, making very hard for this nurse and or resp therapy to suction. Akanksha Alvarado notified, sedation increased per order, see MAR.  1420 Leak in endo tube cuff resp therapy notified, they notified Akanksha Alvarado, no new orders received.  1645 Patients SPO2 dropping in to the high 80's, air leak in cuff remains present, Meng from resp therapy notified Dr Ovalle, Dr Ovalle changed out endo tube, good color change and bilateral breath sounds heard, SPO2 100%. New OG also placed at 60. STAT PCXR obtained checked and verified by Dr Ovalle.   1800 Patient resting more comfortably at present time, VSS, SPO2 97%. No changes in assessment noted.

## 2024-10-24 NOTE — PROGRESS NOTES
[facility] Occupational Therapy Department  Change in Status Communication Form      To the referring physician:    Pt has been intubated since 10/21 and unable to participate in therapy program. Pt will require new orders when medically able to participate. Please reorder when pt. is medically stable to resume OOB activity, as appropriate.    We thank you for your referral.     Regards,   [facility] OT Department.     Electronically signed by Yesi Bazan OTR/L on 10/24/24 at 9:28 AM EDT

## 2024-10-24 NOTE — INTERDISCIPLINARY ROUNDS
Spiritual Care Services     Summary of Visit:  ICU rounds with IDT. Pt is intubated and sedated under droplet precautions. No family present. Pt is Roman Catholic. His sister Violeta is his next-of-kin.     Encounter Summary  Encounter Overview/Reason: Interdisciplinary rounds  Service Provided For: Patient  Referral/Consult From: Rounding  Support System: Family members        Spiritual Care Services   Electronically signed by KAROLINA Nichols on 10/24/2024 at 9:59 AM.    To reach a  for emotional and spiritual support, place an EPIC consult request.   If a  is needed immediately, dial “0” and ask to page the on-call .

## 2024-10-24 NOTE — PROCEDURES
PROCEDURE NOTE  Date: 10/24/2024   Name: Anabelle Morris  YOB: 1955    Procedures    PROCEDURE:   Endotracheal intubation.       INDICATIONS:   Acute Respiratory Failure.  Air leak from ET tube unable to maintain tidal volume and adequate ventilation    Consent   Unable to be obtained due to the emergent nature of this procedure.    PROCEDURE SUMMARY:   Permit was implied secondary to emergent situation. A bougie catheter was inserted into the oropharynx of her ET tube and then all the ET tube removed and the new ET tube advanced I could not advance initially, so I used laryngoscope to visualize vocal cord and direct the ET tube into the vocal cord which was advanced at that point easily.   The endotracheal tube was placed at 24 cm, measured at the teeth.    COMPLICATIONS:   None    ESTIMATED BLOOD LOSS:   None      Kathi Ovalle MD 5:08 PM 10/24/2024 .

## 2024-10-24 NOTE — PROGRESS NOTES
Pulmonary & Critical Care Medicine ICU Progress Note  Chief complaint : Shortness of breath    Subjunctive/24 hour events :   Patient seen and examined during multidisciplinary rounds with RN, charge nurse, RT, pharmacy, dietitian, and social service.   Patient had an uneventful night, FiO2 45% with peep of 5 and O2 sats are 99%. Sedated with fentanyl and propofol. Nimbex was started yesterday. No fever overnight and urine output 1700 for 24 hours.Tolerating tube feeding and  last BM 10/21/2024.   Social History     Tobacco Use    Smoking status: Light Smoker     Current packs/day: 0.00     Average packs/day: 0.3 packs/day for 30.0 years (7.5 ttl pk-yrs)     Types: Cigarettes, Cigars     Start date: 1988     Last attempt to quit: 10/1/2013     Years since quittin.0     Passive exposure: Current    Smokeless tobacco: Never   Substance Use Topics    Alcohol use: Yes     Alcohol/week: 4.0 standard drinks of alcohol     Types: 2 Cans of beer, 2 Shots of liquor per week     Comment: soc         Problem Relation Age of Onset    Arthritis Mother     Asthma Mother     High Cholesterol Mother     Other Mother         aneurysm    Diabetes Father     Stroke Maternal Grandmother     Cancer Maternal Grandfather     Hypertension Other     COPD Neg Hx        Recent Labs     10/23/24  1111 10/23/24  1415   PHART 7.176* 7.446   [id number] 93* 43   PO2ART 87 95       MV Settings:  Vent Mode: AC/VC Resp Rate (Set): 30 bpm/Vt (Set, mL): 480 mL/ /FiO2 : 40 %           IV:   cisatracurium besylate (NIMBEX) 200 mg in sodium chloride 0.9 % 100 mL infusion 1 mcg/kg/min (10/24/24 0643)    norepinephrine Stopped (10/23/24 1209)    midazolam Stopped (10/23/24 1453)    fentaNYL 125 mcg/hr (10/24/24 0703)    propofol 30 mcg/kg/min (10/24/24 0734)    sodium chloride      dextrose         Vitals:  /80   Pulse (!) 48   Temp 97.9 °F (36.6 °C)   Resp 30   Ht 1.829 m (6')   Wt 108.9 kg (240 lb)   SpO2 99%   BMI 32.55 kg/m²    Tmax:  bolus **OR** dextrose bolus, glucagon (rDNA), dextrose    Results: reviewed by me   CBC:   Recent Labs     10/22/24  0504 10/22/24  0510 10/23/24  0524 10/23/24  0848 10/23/24  1111 10/23/24  1415 10/24/24  0545   WBC 17.6*  --  16.3*  --   --   --  10.9*   HGB 12.8*   < > 13.7*   < > 14.4 13.9 12.1*   HCT 42.8  --  44.3  --   --   --  37.0*   .6*  --  101.8*  --   --   --  99.7*     --  259  --   --   --  231    < > = values in this interval not displayed.     BMP:   Recent Labs     10/22/24  0504 10/22/24  0510 10/23/24  0524 10/23/24  0848 10/23/24  1111 10/23/24  1415 10/24/24  0545     --  143  --   --   --  139   K 5.3*  --  5.1*  --   --   --  5.1*     --  110*  --   --   --  107   CO2 24  --  26  --   --   --  26   BUN 28*  --  24*  --   --   --  20   CREATININE 2.08*   < > 1.03   < > 1.3 1.2 0.75    < > = values in this interval not displayed.     LIVER PROFILE:   Recent Labs     10/22/24  0504 10/23/24  0524 10/24/24  0545   AST 27 22 18   ALT 26 28 28   BILITOT <0.2 0.3 0.3   ALKPHOS 92 87 79     PT/INR: No results for input(s): \"PROTIME\", \"INR\" in the last 72 hours.  APTT: No results for input(s): \"APTT\" in the last 72 hours.  UA:  No results for input(s): \"NITRITE\", \"COLORU\", \"PHUR\", \"LABCAST\", \"WBCUA\", \"RBCUA\", \"MUCUS\", \"TRICHOMONAS\", \"YEAST\", \"BACTERIA\", \"CLARITYU\", \"SPECGRAV\", \"LEUKOCYTESUR\", \"UROBILINOGEN\", \"BILIRUBINUR\", \"BLOODU\", \"GLUCOSEU\", \"AMORPHOUS\" in the last 72 hours.    Invalid input(s): \"KETONESU\"      Cultures:    XR CHEST PORTABLE    Result Date: 10/19/2024  EXAMINATION: ONE XRAY VIEW OF THE CHEST 10/19/2024 2:28 pm COMPARISON: CT chest 07/18/2023 and chest radiographs 08/29/2024. HISTORY: ORDERING SYSTEM PROVIDED HISTORY: SOB TECHNOLOGIST PROVIDED HISTORY: Reason for exam:->SOB What reading provider will be dictating this exam?->CRC FINDINGS: No focal consolidation.  Chronic blunting of the left costophrenic angle is compatible with scarring.  Similar

## 2024-10-24 NOTE — PLAN OF CARE
Documentation  Taken 10/23/2024 2000 by Maycol Turcios, RN  Maintains adequate nutritional intake: Monitor intake and output, weight and lab values     Problem: Genitourinary - Adult  Goal: Absence of urinary retention  Outcome: Progressing  Flowsheets (Taken 10/23/2024 2000)  Absence of urinary retention: Monitor intake/output and perform bladder scan as needed     Problem: Infection - Adult  Goal: Absence of infection at discharge  Recent Flowsheet Documentation  Taken 10/23/2024 2000 by Maycol Turcios RN  Absence of infection at discharge:   Assess and monitor for signs and symptoms of infection   Monitor lab/diagnostic results   Monitor all insertion sites i.e., indwelling lines, tubes and drains   Administer medications as ordered     Problem: Metabolic/Fluid and Electrolytes - Adult  Goal: Electrolytes maintained within normal limits  Recent Flowsheet Documentation  Taken 10/23/2024 2000 by Maycol Turcios RN  Electrolytes maintained within normal limits: Monitor labs and assess patient for signs and symptoms of electrolyte imbalances  Goal: Hemodynamic stability and optimal renal function maintained  Recent Flowsheet Documentation  Taken 10/23/2024 2000 by Maycol Turcios RN  Hemodynamic stability and optimal renal function maintained:   Monitor labs and assess for signs and symptoms of volume excess or deficit   Monitor intake, output and patient weight  Goal: Glucose maintained within prescribed range  Outcome: Progressing  Flowsheets (Taken 10/23/2024 2000)  Glucose maintained within prescribed range:   Monitor blood glucose as ordered   Assess for signs and symptoms of hyperglycemia and hypoglycemia   Administer ordered medications to maintain glucose within target range     Problem: Hematologic - Adult  Goal: Maintains hematologic stability  Recent Flowsheet Documentation  Taken 10/23/2024 2000 by Maycol Turcios RN  Maintains hematologic stability:   Assess for signs and symptoms of bleeding or

## 2024-10-24 NOTE — PROGRESS NOTES
Hospitalist Progress Note      PCP: Roby Silva MD    Date of Admission: 10/19/2024    Chief Complaint:    No chief complaint on file.      Subjective:  Patient is intubated and sedated       Medications:  Reviewed    Infusion Medications    cisatracurium besylate (NIMBEX) 200 mg in sodium chloride 0.9 % 100 mL infusion 2 mcg/kg/min (10/24/24 1234)    fentaNYL 75 mcg/hr (10/24/24 1019)    propofol 40 mcg/kg/min (10/24/24 1222)    sodium chloride      dextrose       Scheduled Medications    docusate  100 mg Oral BID    polyethylene glycol  17 g Oral Daily    methylPREDNISolone  40 mg IntraVENous Q12H    cefTRIAXone (ROCEPHIN) 1,000 mg in sterile water 10 mL IV syringe  1,000 mg IntraVENous Q24H    chlorhexidine  15 mL Mouth/Throat BID    famotidine (PEPCID) injection  20 mg IntraVENous BID    insulin lispro  0-8 Units SubCUTAneous Q4H    [Held by provider] amLODIPine  10 mg Oral Daily    escitalopram  10 mg Oral Daily    [Held by provider] losartan  100 mg Oral Daily    montelukast  10 mg Oral Nightly    [Held by provider] budesonide-formoterol  2 puff Inhalation BID    traZODone  50 mg Oral Nightly    [Held by provider] tiotropium  2 puff Inhalation Daily RT    [Held by provider] guaiFENesin  600 mg Oral BID    ipratropium 0.5 mg-albuterol 2.5 mg  1 Dose Inhalation Q6H    budesonide  0.5 mg Nebulization BID RT    sodium chloride flush  5-40 mL IntraVENous 2 times per day    enoxaparin  30 mg SubCUTAneous BID     PRN Meds: hydrALAZINE, fentaNYL **AND** fentaNYL, sodium chloride flush, sodium chloride, magnesium sulfate, ondansetron **OR** ondansetron, melatonin, polyethylene glycol, acetaminophen **OR** acetaminophen, ipratropium 0.5 mg-albuterol 2.5 mg, glucose, dextrose bolus **OR** dextrose bolus, glucagon (rDNA), dextrose      Intake/Output Summary (Last 24 hours) at 10/24/2024 1252  Last data filed at 10/24/2024 1019  Gross per 24 hour   Intake 1834.64 ml   Output 1700 ml   Net 134.64 ml     Exam:    BP

## 2024-10-24 NOTE — PROGRESS NOTES
Chief Complaint:  SOB     Reason for consult:  EKG changes     History of Present Illness:     This is a 68-year-old -American male with past medical history significant for hypertension, dyslipidemia, diabetes, COPD on 3 L O2, history of alcohol abuse, history of cocaine abuse, sleep apnea and multiple medical problems who originally presented to [facility] on 10/19/2024 with chief complaint of shortness of breath.  History is obtained from chart review as patient is currently intubated.  Apparently he had told ER physician that he had been drinking alcohol and using cocaine on a regular basis prior to presentation.  Apparently while in ER patient became hypoxic with O2 sat dropping into the 70s with ambulation to the restroom and he was resumed on 3 L O2 per nasal cannula with improvement in SpO2 to 97%.  Chest x-ray in ER had shown no acute cardiopulmonary findings.  Initial BMP in ER unremarkable.  NT proBNP within normal range of 40.  High-sensitivity troponin negative at 18.  CBC showed WBC mildly elevated at 10.4, hemoglobin 12.8.  Urine drug screen positive for cocaine and marijuana.  He was subsequently transferred from [facility] and admitted to [facility] with diagnosis of COPD exacerbation.     Patient was transferred to ICU yesterday afternoon due to worsening respiratory distress and was subsequently intubated.  EKG completed yesterday evening at 2056 showed ST elevations in leads II, III, aVF and V3 through V6 and cardiology consulted for further evaluation.  He was on low-dose Levophed earlier today but this was recently stopped.      At time my evaluation, patient is intubated and sedated on mechanical ventilation.  He is on 50% FiO2 with SpO2 of 98%.  Patient was noted to have hematuria following straight cath this morning.  Urology evaluated patient today.  Repeat EKG completed this afternoon shows some improvement in previously noted ST elevations.  Repeat troponin ordered and  Transportation Needs (10/19/2024)     PRAPARE - Transportation      Lack of Transportation (Medical): No      Lack of Transportation (Non-Medical): No   Physical Activity: Inactive (6/28/2024)     Exercise Vital Sign      Days of Exercise per Week: 0 days      Minutes of Exercise per Session: 0 min   Stress: No Stress Concern Present (10/17/2022)     [employer] - Occupational Stress Questionnaire      Feeling of Stress : Not at all   Social Connections: Socially Isolated (10/17/2022)     Social Connection and Isolation Panel [NHANES]      Frequency of Communication with Friends and Family: Once a week      Frequency of Social Gatherings with Friends and Family: Never      Attends Restorationist Services: Never      Active Member of Clubs or Organizations: No      Attends Club or Organization Meetings: Never      Marital Status:    Housing Stability: Low Risk  (10/19/2024)     Housing Stability Vital Sign      Unable to Pay for Housing in the Last Year: No      Number of Times Moved in the Last Year: 1      Homeless in the Last Year: No            Family Hx:  Family History         Family History   Problem Relation Age of Onset    Arthritis Mother      Asthma Mother      High Cholesterol Mother      Other Mother           aneurysm    Diabetes Father      Stroke Maternal Grandmother      Cancer Maternal Grandfather      Hypertension Other      COPD Neg Hx              Review of Systems:   Review of Systems   Unable to perform ROS: Intubated            Physical Examination:    /62   Pulse 53   Temp 98.4 °F (36.9 °C)   Resp (!) 32   Ht 1.829 m (6')   Wt 108.9 kg (240 lb)   SpO2 98%   BMI 32.55 kg/m²    Physical Exam  Constitutional:       Comments: Sedated on vent   HENT:      Head: Normocephalic and atraumatic.   Cardiovascular:      Rate and Rhythm: Regular rhythm. Bradycardia present.      Comments: Distant heart sounds  Pulmonary:      Effort: No respiratory distress.

## 2024-10-24 NOTE — PROGRESS NOTES
Physical Therapy Interdisciplinary Communication Note  [place] [facility]      NAME: Anabelle Morris  : 1955 (68 y.o.)  MRN: [id number]  CODE STATUS: Full Code    Date of Service: 10/24/2024    DC pt from current PT program. Pt vented and sedated since 10/21/24 with no immediate plans for extubation at this time. Requesting new orders once pt extubated and appropriate for OOB activity. Thank you.     Genevieve Santoro, PT, 10/24/24 at 9:50 AM

## 2024-10-24 NOTE — PROGRESS NOTES
Subjective:      Patient ID: Anabelle Morris is a 68 y.o. male    HPI 68 year old male who is intubated in the ICU. His zamora catheter is draining clear yellow urine            Past Medical History:   Diagnosis Date    Alcohol abuse 10/27/2016    Anemia     Asthma     CKD (chronic kidney disease) stage 3, GFR 30-59 ml/min ([facility]) 12/14/2021    Cocaine abuse ([facility]) 10/27/2016    Community acquired pneumonia of left lower lobe of lung 12/05/2016    COPD (chronic obstructive pulmonary disease) ([facility])     Depression 04/27/2020    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    Drug-seeking behavior 04/14/2015    Edema 12/10/2015    Erectile dysfunction     Gastroesophageal reflux disease 12/17/2018    Gout 10/27/2016    History of arthroscopy of both knees 10/27/2016    History of colon polyps 10/26/2021    House dust mite allergy 04/21/2014    Hyperlipidemia     Hypertension 10/27/2016    Injury to heart 10/27/2016    Insomnia 12/17/2018    Loculated pleural effusion     Macrocytic anemia 09/07/2013    Medical non-compliance 02/22/2014    Morbid obesity due to excess calories 12/08/2016    Osteoarthritis of both knees 12/08/2016    Personal history of tobacco use     Pleurisy 12/12/2016    Pneumonia 12/04/2016    caused hospital admission    Pneumonia in infectious disease 11/29/2023    Pre-diabetes 10/27/2016    Seasonal allergies 04/21/2014    Severe persistent asthma 10/27/2016    Severe sleep apnea 04/14/2015    Supraventricular tachycardia ([facility]) 10/27/2016    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    Type 2 diabetes mellitus without complication, without long-term current use of insulin (HCC) 10/26/2021     Past Surgical History:   Procedure Laterality Date    ANTERIOR CRUCIATE LIGAMENT REPAIR      CARDIAC CATHETERIZATION      COLONOSCOPY N/A 07/15/2020    11 colon polyps, 2 rectal polyps, hemorrhoids, 2y repeat (DR MARTINEZ)  COLONOSCOPY WITH POLYPECTOMY performed by Alonso Martinez MD at [facility] OR    CT NEEDLE  inhaler 2 puff  2 puff Inhalation Daily RT Grace Wray MD   2 puff at 10/21/24 0706    [Held by provider] guaiFENesin (MUCINEX) extended release tablet 600 mg  600 mg Oral BID Grace Wray MD   600 mg at 10/21/24 0901    ipratropium 0.5 mg-albuterol 2.5 mg (DUONEB) nebulizer solution 1 Dose  1 Dose Inhalation Q6H Kathi Ovalle MD   1 Dose at 10/24/24 0727    budesonide (PULMICORT) nebulizer suspension 500 mcg  0.5 mg Nebulization BID RT Grace Wray MD   500 mcg at 10/24/24 0306    sodium chloride flush 0.9 % injection 5-40 mL  5-40 mL IntraVENous 2 times per day Sylvester Montesinos MD   10 mL at 10/24/24 0837    sodium chloride flush 0.9 % injection 5-40 mL  5-40 mL IntraVENous PRN Sylvester Montesinos MD        0.9 % sodium chloride infusion   IntraVENous PRN Sylvester Montesinos MD        magnesium sulfate 2000 mg in 50 mL IVPB premix  2,000 mg IntraVENous PRN Sylvester Montesinos MD        enoxaparin Sodium (LOVENOX) injection 30 mg  30 mg SubCUTAneous BID Akanksha Alvarado APRN - CNP   30 mg at 10/24/24 0835    ondansetron (ZOFRAN-ODT) disintegrating tablet 4 mg  4 mg Oral Q8H PRN Sylvester Montesinos MD        Or    ondansetron (ZOFRAN) injection 4 mg  4 mg IntraVENous Q6H PRN Sylvester Montesinos MD        melatonin tablet 3 mg  3 mg Oral Nightly PRN Sylvester Montesinos MD   3 mg at 10/19/24 2203    polyethylene glycol (GLYCOLAX) packet 17 g  17 g Oral Daily PRN Sylvester Montesinos MD        acetaminophen (TYLENOL) tablet 650 mg  650 mg Oral Q6H PRN Sylvester Montesinos MD        Or    acetaminophen (TYLENOL) suppository 650 mg  650 mg Rectal Q6H PRN Sylvester Montesinos MD        ipratropium 0.5 mg-albuterol 2.5 mg (DUONEB) nebulizer solution 1 Dose  1 Dose Inhalation Q4H PRN Sylvester Montesinos MD   1 Dose at 10/20/24 0016    glucose chewable tablet 16 g  4 tablet Oral PRN Sylvester Montesinos MD        dextrose bolus 10% 125 mL  125 mL IntraVENous PRN Sylvester Montesinos MD        Or    dextrose bolus 10% 250 mL  250 mL IntraVENous PRN Sylvester Montesinos MD

## 2024-10-24 NOTE — CARE COORDINATION
This LSW completed quality rounds with care management team.  Patient remains on vent and sedated.  No family present at bedside.  Electronically signed by CIARA Barrera on 10/24/24 at 10:55 AM EDT'

## 2024-10-25 ENCOUNTER — APPOINTMENT (OUTPATIENT)
Dept: GENERAL RADIOLOGY | Age: 69
DRG: 207 | End: 2024-10-25
Attending: STUDENT IN AN ORGANIZED HEALTH CARE EDUCATION/TRAINING PROGRAM
Payer: MEDICARE

## 2024-10-25 LAB
ALBUMIN SERPL-MCNC: 3.1 G/DL (ref 3.5–4.6)
ALP SERPL-CCNC: 79 U/L (ref 35–104)
ALT SERPL-CCNC: 30 U/L (ref 0–41)
ANION GAP SERPL CALCULATED.3IONS-SCNC: 9 MEQ/L (ref 9–15)
AST SERPL-CCNC: 21 U/L (ref 0–40)
BASOPHILS # BLD: 0 K/UL (ref 0–0.2)
BASOPHILS NFR BLD: 0.1 %
BILIRUB SERPL-MCNC: 0.3 MG/DL (ref 0.2–0.7)
BUN SERPL-MCNC: 21 MG/DL (ref 8–23)
CALCIUM SERPL-MCNC: 9.1 MG/DL (ref 8.5–9.9)
CHLORIDE SERPL-SCNC: 107 MEQ/L (ref 95–107)
CO2 SERPL-SCNC: 26 MEQ/L (ref 20–31)
CREAT SERPL-MCNC: 0.82 MG/DL (ref 0.7–1.2)
EOSINOPHIL # BLD: 0 K/UL (ref 0–0.7)
EOSINOPHIL NFR BLD: 0.1 %
ERYTHROCYTE [DISTWIDTH] IN BLOOD BY AUTOMATED COUNT: 15.9 % (ref 11.5–14.5)
GLOBULIN SER CALC-MCNC: 3 G/DL (ref 2.3–3.5)
GLUCOSE BLD-MCNC: 103 MG/DL (ref 70–99)
GLUCOSE BLD-MCNC: 107 MG/DL (ref 70–99)
GLUCOSE BLD-MCNC: 128 MG/DL (ref 70–99)
GLUCOSE BLD-MCNC: 132 MG/DL (ref 70–99)
GLUCOSE BLD-MCNC: 138 MG/DL (ref 70–99)
GLUCOSE SERPL-MCNC: 129 MG/DL (ref 70–99)
HCT VFR BLD AUTO: 36.2 % (ref 42–52)
HGB BLD-MCNC: 11.7 G/DL (ref 14–18)
LYMPHOCYTES # BLD: 0.5 K/UL (ref 1–4.8)
LYMPHOCYTES NFR BLD: 4.9 %
MCH RBC QN AUTO: 31.8 PG (ref 27–31.3)
MCHC RBC AUTO-ENTMCNC: 32.3 % (ref 33–37)
MCV RBC AUTO: 98.4 FL (ref 79–92.2)
MONOCYTES # BLD: 0.6 K/UL (ref 0.2–0.8)
MONOCYTES NFR BLD: 5.2 %
NEUTROPHILS # BLD: 9.8 K/UL (ref 1.4–6.5)
NEUTS SEG NFR BLD: 89.4 %
PERFORMED ON: ABNORMAL
PLATELET # BLD AUTO: 266 K/UL (ref 130–400)
POTASSIUM SERPL-SCNC: 5.1 MEQ/L (ref 3.4–4.9)
PROT SERPL-MCNC: 6.1 G/DL (ref 6.3–8)
RBC # BLD AUTO: 3.68 M/UL (ref 4.7–6.1)
SODIUM SERPL-SCNC: 142 MEQ/L (ref 135–144)
WBC # BLD AUTO: 10.9 K/UL (ref 4.8–10.8)

## 2024-10-25 PROCEDURE — 2580000003 HC RX 258: Performed by: NURSE PRACTITIONER

## 2024-10-25 PROCEDURE — 6360000002 HC RX W HCPCS: Performed by: INTERNAL MEDICINE

## 2024-10-25 PROCEDURE — 85025 COMPLETE CBC W/AUTO DIFF WBC: CPT

## 2024-10-25 PROCEDURE — 2500000003 HC RX 250 WO HCPCS: Performed by: NURSE PRACTITIONER

## 2024-10-25 PROCEDURE — 99291 CRITICAL CARE FIRST HOUR: CPT | Performed by: INTERNAL MEDICINE

## 2024-10-25 PROCEDURE — 6370000000 HC RX 637 (ALT 250 FOR IP): Performed by: INTERNAL MEDICINE

## 2024-10-25 PROCEDURE — 93005 ELECTROCARDIOGRAM TRACING: CPT | Performed by: INTERNAL MEDICINE

## 2024-10-25 PROCEDURE — 99233 SBSQ HOSP IP/OBS HIGH 50: CPT | Performed by: INTERNAL MEDICINE

## 2024-10-25 PROCEDURE — 6370000000 HC RX 637 (ALT 250 FOR IP): Performed by: NURSE PRACTITIONER

## 2024-10-25 PROCEDURE — 80053 COMPREHEN METABOLIC PANEL: CPT

## 2024-10-25 PROCEDURE — 36415 COLL VENOUS BLD VENIPUNCTURE: CPT

## 2024-10-25 PROCEDURE — 6360000002 HC RX W HCPCS: Performed by: NURSE PRACTITIONER

## 2024-10-25 PROCEDURE — 6360000002 HC RX W HCPCS

## 2024-10-25 PROCEDURE — 2580000003 HC RX 258: Performed by: INTERNAL MEDICINE

## 2024-10-25 PROCEDURE — 71045 X-RAY EXAM CHEST 1 VIEW: CPT

## 2024-10-25 PROCEDURE — 94003 VENT MGMT INPAT SUBQ DAY: CPT

## 2024-10-25 PROCEDURE — 2700000000 HC OXYGEN THERAPY PER DAY

## 2024-10-25 PROCEDURE — 2000000000 HC ICU R&B

## 2024-10-25 PROCEDURE — 94640 AIRWAY INHALATION TREATMENT: CPT

## 2024-10-25 PROCEDURE — 94761 N-INVAS EAR/PLS OXIMETRY MLT: CPT

## 2024-10-25 PROCEDURE — 2500000003 HC RX 250 WO HCPCS: Performed by: INTERNAL MEDICINE

## 2024-10-25 RX ORDER — MIDAZOLAM HYDROCHLORIDE 1 MG/ML
INJECTION, SOLUTION INTRAMUSCULAR; INTRAVENOUS
Status: COMPLETED
Start: 2024-10-25 | End: 2024-10-25

## 2024-10-25 RX ORDER — MIDAZOLAM HYDROCHLORIDE 2 MG/2ML
2 INJECTION, SOLUTION INTRAMUSCULAR; INTRAVENOUS ONCE
Status: COMPLETED | OUTPATIENT
Start: 2024-10-25 | End: 2024-10-25

## 2024-10-25 RX ORDER — LACTULOSE 10 G/15ML
20 SOLUTION ORAL 3 TIMES DAILY
Status: DISCONTINUED | OUTPATIENT
Start: 2024-10-25 | End: 2024-10-29

## 2024-10-25 RX ORDER — ATROPINE SULFATE 0.1 MG/ML
1 INJECTION INTRAVENOUS PRN
Status: DISCONTINUED | OUTPATIENT
Start: 2024-10-25 | End: 2024-11-06 | Stop reason: HOSPADM

## 2024-10-25 RX ADMIN — ESCITALOPRAM OXALATE 10 MG: 10 TABLET ORAL at 09:46

## 2024-10-25 RX ADMIN — LACTULOSE 20 G: 20 SOLUTION ORAL at 16:33

## 2024-10-25 RX ADMIN — CHLORHEXIDINE GLUCONATE 15 ML: 1.2 RINSE ORAL at 20:57

## 2024-10-25 RX ADMIN — BUDESONIDE 500 MCG: 0.5 SUSPENSION RESPIRATORY (INHALATION) at 16:34

## 2024-10-25 RX ADMIN — CISATRACURIUM BESYLATE 1 MCG/KG/MIN: 200 INJECTION, SOLUTION INTRAVENOUS at 11:54

## 2024-10-25 RX ADMIN — WATER 1000 MG: 1 INJECTION INTRAMUSCULAR; INTRAVENOUS; SUBCUTANEOUS at 16:33

## 2024-10-25 RX ADMIN — METHYLPREDNISOLONE SODIUM SUCCINATE 40 MG: 40 INJECTION INTRAMUSCULAR; INTRAVENOUS at 09:46

## 2024-10-25 RX ADMIN — PROPOFOL 30 MCG/KG/MIN: 10 INJECTION, EMULSION INTRAVENOUS at 18:44

## 2024-10-25 RX ADMIN — PROPOFOL 50 MCG/KG/MIN: 10 INJECTION, EMULSION INTRAVENOUS at 12:08

## 2024-10-25 RX ADMIN — IPRATROPIUM BROMIDE AND ALBUTEROL SULFATE 1 DOSE: 2.5; .5 SOLUTION RESPIRATORY (INHALATION) at 12:42

## 2024-10-25 RX ADMIN — Medication 200 MCG/HR: at 23:08

## 2024-10-25 RX ADMIN — IPRATROPIUM BROMIDE AND ALBUTEROL SULFATE 1 DOSE: 2.5; .5 SOLUTION RESPIRATORY (INHALATION) at 04:04

## 2024-10-25 RX ADMIN — PROPOFOL 50 MCG/KG/MIN: 10 INJECTION, EMULSION INTRAVENOUS at 15:05

## 2024-10-25 RX ADMIN — Medication 150 MCG/HR: at 04:08

## 2024-10-25 RX ADMIN — Medication 10 ML: at 21:10

## 2024-10-25 RX ADMIN — PROPOFOL 30 MCG/KG/MIN: 10 INJECTION, EMULSION INTRAVENOUS at 23:25

## 2024-10-25 RX ADMIN — ENOXAPARIN SODIUM 30 MG: 100 INJECTION SUBCUTANEOUS at 20:59

## 2024-10-25 RX ADMIN — ENOXAPARIN SODIUM 30 MG: 100 INJECTION SUBCUTANEOUS at 09:37

## 2024-10-25 RX ADMIN — BUDESONIDE 500 MCG: 0.5 SUSPENSION RESPIRATORY (INHALATION) at 04:04

## 2024-10-25 RX ADMIN — DOCUSATE SODIUM 100 MG: 50 LIQUID ORAL at 09:37

## 2024-10-25 RX ADMIN — SODIUM CHLORIDE, PRESERVATIVE FREE 20 MG: 5 INJECTION INTRAVENOUS at 20:59

## 2024-10-25 RX ADMIN — SODIUM CHLORIDE, PRESERVATIVE FREE 20 MG: 5 INJECTION INTRAVENOUS at 09:37

## 2024-10-25 RX ADMIN — TRAZODONE HYDROCHLORIDE 50 MG: 50 TABLET ORAL at 20:59

## 2024-10-25 RX ADMIN — MONTELUKAST 10 MG: 10 TABLET, FILM COATED ORAL at 20:59

## 2024-10-25 RX ADMIN — MIDAZOLAM HYDROCHLORIDE 2 MG: 1 INJECTION, SOLUTION INTRAMUSCULAR; INTRAVENOUS at 11:15

## 2024-10-25 RX ADMIN — IPRATROPIUM BROMIDE AND ALBUTEROL SULFATE 1 DOSE: 2.5; .5 SOLUTION RESPIRATORY (INHALATION) at 16:34

## 2024-10-25 RX ADMIN — HYDRALAZINE HYDROCHLORIDE 10 MG: 20 INJECTION INTRAMUSCULAR; INTRAVENOUS at 10:07

## 2024-10-25 RX ADMIN — METHYLPREDNISOLONE SODIUM SUCCINATE 40 MG: 40 INJECTION INTRAMUSCULAR; INTRAVENOUS at 21:07

## 2024-10-25 RX ADMIN — PROPOFOL 35 MCG/KG/MIN: 10 INJECTION, EMULSION INTRAVENOUS at 04:04

## 2024-10-25 RX ADMIN — Medication 200 MCG/HR: at 11:52

## 2024-10-25 RX ADMIN — POLYETHYLENE GLYCOL 3350 17 G: 17 POWDER, FOR SOLUTION ORAL at 09:37

## 2024-10-25 RX ADMIN — Medication 200 MCG/HR: at 17:44

## 2024-10-25 RX ADMIN — CHLORHEXIDINE GLUCONATE 15 ML: 1.2 RINSE ORAL at 09:38

## 2024-10-25 RX ADMIN — CISATRACURIUM BESYLATE 1 MCG/KG/MIN: 200 INJECTION, SOLUTION INTRAVENOUS at 23:27

## 2024-10-25 RX ADMIN — IPRATROPIUM BROMIDE AND ALBUTEROL SULFATE 1 DOSE: 2.5; .5 SOLUTION RESPIRATORY (INHALATION) at 21:58

## 2024-10-25 RX ADMIN — DOCUSATE SODIUM 100 MG: 50 LIQUID ORAL at 20:58

## 2024-10-25 RX ADMIN — Medication 10 ML: at 09:40

## 2024-10-25 RX ADMIN — PROPOFOL 35 MCG/KG/MIN: 10 INJECTION, EMULSION INTRAVENOUS at 00:16

## 2024-10-25 RX ADMIN — MIDAZOLAM HYDROCHLORIDE 2 MG: 2 INJECTION, SOLUTION INTRAMUSCULAR; INTRAVENOUS at 11:15

## 2024-10-25 RX ADMIN — LACTULOSE 20 G: 20 SOLUTION ORAL at 09:37

## 2024-10-25 ASSESSMENT — PAIN SCALES - GENERAL
PAINLEVEL_OUTOF10: 0
PAINLEVEL_OUTOF10: 0

## 2024-10-25 ASSESSMENT — PULMONARY FUNCTION TESTS
PIF_VALUE: 45
PIF_VALUE: 36
PIF_VALUE: 51
PIF_VALUE: 34
PIF_VALUE: 41
PIF_VALUE: 45
PIF_VALUE: 35
PIF_VALUE: 45
PIF_VALUE: 42
PIF_VALUE: 37
PIF_VALUE: 35
PIF_VALUE: 32
PIF_VALUE: 44
PIF_VALUE: 34
PIF_VALUE: 45
PIF_VALUE: 44
PIF_VALUE: 39
PIF_VALUE: 40
PIF_VALUE: 36
PIF_VALUE: 44
PIF_VALUE: 41
PIF_VALUE: 35
PIF_VALUE: 40
PIF_VALUE: 36
PIF_VALUE: 49
PIF_VALUE: 38
PIF_VALUE: 40
PIF_VALUE: 45

## 2024-10-25 NOTE — PROGRESS NOTES
Comprehensive Nutrition Assessment    Type and Reason for Visit:  Reassess    Nutrition Recommendations/Plan:   Continue standard with fiber tube feeding ( Jevity 1.5)  Continue current rate of 30 ml/hr via OG  Continue to add 2 protein modulars, mixed with 100 ml water and give via OG , 1 x daily  Water flushes 100 ml every 4hrs  Monitor for changes in propofol rate for adjustments to EN goal rate       Malnutrition Assessment:  Malnutrition Status:  At risk for malnutrition (Comment) (10/25/24 1050)    Context:  Acute Illness     Findings of the 6 clinical characteristics of malnutrition:  Energy Intake:  75% or less of estimated energy requirements for 7 or more days  Weight Loss:  No significant weight loss     Body Fat Loss:  No significant body fat loss     Muscle Mass Loss:  No significant muscle mass loss    Fluid Accumulation:  No significant fluid accumulation     Strength:  Not Performed    Nutrition Assessment:    Pt remains intubated, continues to receive ~ 50% of estimated caloric needs from propofol infusion, blood sugars slightly improved, however remain below the goal of 140-180, remains constipated, will trial tube feeding increase once pt has BM    Nutrition Related Findings:    history includes :cocaine abuse, COPD , alcohol abuse, diabetes, presents with shortness of breath, ( +) rhinovirus. Intubated ( 10/21), OG in place, trophic tube feeding started ( 10/21), formula cahnge ( 10/23) to increase CHO; sedated with fentanyl, & propofol @ 22.9 ( ~ 600 kcals), low dose of levophed, no IVF, required paralytic yesterday, ( currently off) Last BM ( 10/21), chronulac added to daily bowel regimen, , abnormal labs noted, relevant meds reviewed ( Glucose 102-132/hyperkalemia). Wound Type: None       Current Nutrition Intake & Therapies:    Average Meal Intake: NPO  Average Supplements Intake: NPO  ADULT TUBE FEEDING; Orogastric; Standard with Fiber; Continuous; 20; Yes; 10; Q 4 hours; 30; 100; Q 4

## 2024-10-25 NOTE — PROGRESS NOTES
Pulmonary & Critical Care Medicine ICU Progress Note  Chief complaint : Shortness of breath    Subjunctive/24 hour events :   Patient seen and examined during multidisciplinary rounds with RN, charge nurse, RT, pharmacy, dietitian, and social service.   Patient had an uneventful night, FiO2 40% with peep of 5 and O2 sats are 97%. Sedated with fentanyl and propofol. Nimbex was restarted yesterday for high amount of secretions and unable to tolerate without sedation.  No fever overnight and urine output 1350 for 24 hours.Tolerating tube feeding and  last BM 10/21/2024.   Social History     Tobacco Use    Smoking status: Light Smoker     Current packs/day: 0.00     Average packs/day: 0.3 packs/day for 30.0 years (7.5 ttl pk-yrs)     Types: Cigarettes, Cigars     Start date: 1988     Last attempt to quit: 10/1/2013     Years since quittin.0     Passive exposure: Current    Smokeless tobacco: Never   Substance Use Topics    Alcohol use: Yes     Alcohol/week: 4.0 standard drinks of alcohol     Types: 2 Cans of beer, 2 Shots of liquor per week     Comment: soc         Problem Relation Age of Onset    Arthritis Mother     Asthma Mother     High Cholesterol Mother     Other Mother         aneurysm    Diabetes Father     Stroke Maternal Grandmother     Cancer Maternal Grandfather     Hypertension Other     COPD Neg Hx        Recent Labs     10/23/24  1111 10/23/24  1415   PHART 7.176* 7.446   SCH9PXG 93* 43   PO2ART 87 95       MV Settings:  Vent Mode: AC/VC Resp Rate (Set): 30 bpm/Vt (Set, mL): 480 mL/ /FiO2 : 40 %           IV:   norepinephrine 5 mcg/min (10/24/24 2307)    cisatracurium besylate (NIMBEX) 200 mg in sodium chloride 0.9 % 100 mL infusion 1 mcg/kg/min (10/24/24 1807)    fentaNYL 150 mcg/hr (10/25/24 0408)    propofol 35 mcg/kg/min (10/25/24 040)    sodium chloride      dextrose         Vitals:  /63   Pulse 50   Temp 99.5 °F (37.5 °C)   Resp 30   Ht 1.829 m (6')   Wt 108.9 kg (240 lb)    iterative reconstruction, and/or weight based dosing when appropriate to reduce radiation dose to as low as reasonably achievable.      CXR      2-view: Results for orders placed during the hospital encounter of 08/29/24    XR CHEST (2 VW)    Narrative  EXAMINATION:  TWO XRAY VIEWS OF THE CHEST8/29/2024 11:02 am    COMPARISON:  Two-view chest 10/12/2020.    HISTORY:  ORDERING SYSTEM PROVIDED HISTORY: SOB  TECHNOLOGIST PROVIDED HISTORY:  Reason for exam:->SOB  What reading provider will be dictating this exam?->CRC    FINDINGS:  There is partial resection of the left 7th rib.  There is minor strandy  scarring in the left lower lobe.  There is no evidence of pneumonia or  pulmonary edema there is stable left basilar pleural scarring.  There are  changes of COPD.  Heart size is stable.  There is no pneumothorax.    Impression  1. Stable chronic changes.  2. No evidence of an acute intrathoracic process.      Results for orders placed during the hospital encounter of 05/28/23    XR CHEST (2 VW)    Narrative  EXAMINATION:  TWO XRAY VIEWS OF THE CHEST    5/28/2023 3:38 pm    COMPARISON:  04/06/2023    HISTORY:  ORDERING SYSTEM PROVIDED HISTORY: copd, cough, sob  TECHNOLOGIST PROVIDED HISTORY:  Reason for exam:->copd, cough, sob  What reading provider will be dictating this exam?->CRC    FINDINGS:  Two views of the chest demonstrate satisfactory expansion lungs with  increased markings especially in the left lung with blunting of the left  costophrenic angle.  There are multiple chronically healing left rib  fractures consistent with old trauma.  There are atherosclerotic changes of  the aorta without cardiomegaly.    Impression  No acute disease       Portable: Results for orders placed during the hospital encounter of 10/19/24    XR CHEST PORTABLE    Narrative  EXAMINATION:  ONE XRAY VIEW OF THE CHEST    10/19/2024 2:28 pm    COMPARISON:  CT chest 07/18/2023 and chest radiographs 08/29/2024.    HISTORY:  ORDERING SYSTEM

## 2024-10-25 NOTE — CARE COORDINATION
Quality round completed with care management team. Pt remain on vent. No family at bedside. Continue to need ICU care.     DC plan: TBD; Pending medical course.     Electronically signed by CIARA Bangura on 10/25/2024 at 10:19 AM

## 2024-10-25 NOTE — PROGRESS NOTES
function, no regional wall motion   abnormalities, estimated ejection fraction of 60%.   Normal left ventricular size and function.   Mild concentric left ventricular hypertrophy.   Impaired relaxation compatible with diastolic dysfunction. ( reversed E/A   ratio)   No evidence of mitral regurgitation.   No evidence of mitral valve stenosis.   No evidence of aortic valve regurgitation .   No evidence of aortic valve stenosis.   The left atrium is Mildly dilated.   Signature   ----------------------------------------------------------------   Electronically signed by Mariana POSADAS DO(Interpreting   physician) on 05/26/2020 03:41 PM     Stress test 9/8/20:  IMPRESSION:  1.  Technically difficult study.  2.  Moderate size perfusion defect in the inferior wall which appears to  be attention artifact.  3.  Wall motion appears to be normal.  4.  Normal left ventricular ejection fraction 59%.  5.  TID ratio 1.04, which is within normal limits.     JULISSA CHAN MD        EKG 10/21/24 at 20:56: SR 63, ST elevation leads II, III, aVF and V3-V6, QTc 456ms  EKG 10/22/24: SB 53, ST elevation in leads II, III, aVF and V3-V6, T wave inversion in V1-V2, QTc 427ms--ST elevations mildly improved compared to EKG 10/21/24     Telemetry 10/22/24: SB 49-50s         Assessment:    Acute respiratory failure requiring intubation  Abnormal EKG-early repolarization. Rule out ischemia. No reciprocal changes or dynamic EKG changes.   Sinus nidia--likely sedation related  JOHANA  Acute exacerbation of COPD  Hyperkalemia  History of GASPER  Obesity  Substance abuse-patient cocaine positive on admission  History hypertension--BP low normal currently  History of dyslipidemia  Tobacco abuse  Severe RV dilatation/RV dysfunction  Mild aortic stenosis  EF of 60 to 65% by echo on 10/22/2024        Plan:  ICU management and supportive care.   Wean vent off as dajuan  Coronary/ischemic evaluation when clinically feasible.  Maximize cardiac medications  Avoid

## 2024-10-25 NOTE — PLAN OF CARE
Nutrition Problem #1: Inadequate oral intake  Intervention: Food and/or Nutrient Delivery: Continue Current Tube Feeding  Nutritional

## 2024-10-25 NOTE — PROGRESS NOTES
Patient remains sedated and intubated at this time. EKG taken by this RN with abnormal results at 0830 and transmitted to database.Dr. Jeffrey and Dr. Peña alerted and reviewed, NNO. K elevated, NNO.     Pt failed SBT this date.     Pt on paralytic at low dose with 4/4 train of 4s for vent compliance.

## 2024-10-26 LAB
ALBUMIN SERPL-MCNC: 3.1 G/DL (ref 3.5–4.6)
ALP SERPL-CCNC: 80 U/L (ref 35–104)
ALT SERPL-CCNC: 33 U/L (ref 0–41)
ANION GAP SERPL CALCULATED.3IONS-SCNC: 8 MEQ/L (ref 9–15)
AST SERPL-CCNC: 15 U/L (ref 0–40)
BASE EXCESS ARTERIAL: 7 (ref -3–3)
BASOPHILS # BLD: 0 K/UL (ref 0–0.2)
BASOPHILS NFR BLD: 0.1 %
BILIRUB SERPL-MCNC: 0.3 MG/DL (ref 0.2–0.7)
BUN SERPL-MCNC: 20 MG/DL (ref 8–23)
CALCIUM IONIZED: 1.31 MMOL/L (ref 1.12–1.32)
CALCIUM SERPL-MCNC: 9.1 MG/DL (ref 8.5–9.9)
CHLORIDE SERPL-SCNC: 104 MEQ/L (ref 95–107)
CO2 SERPL-SCNC: 25 MEQ/L (ref 20–31)
CREAT SERPL-MCNC: 0.8 MG/DL (ref 0.7–1.2)
EKG ATRIAL RATE: 47 BPM
EKG ATRIAL RATE: 48 BPM
EKG ATRIAL RATE: 51 BPM
EKG ATRIAL RATE: 53 BPM
EKG ATRIAL RATE: 85 BPM
EKG P AXIS: 50 DEGREES
EKG P AXIS: 74 DEGREES
EKG P AXIS: 74 DEGREES
EKG P AXIS: 85 DEGREES
EKG P AXIS: 91 DEGREES
EKG P-R INTERVAL: 108 MS
EKG P-R INTERVAL: 112 MS
EKG P-R INTERVAL: 114 MS
EKG P-R INTERVAL: 136 MS
EKG P-R INTERVAL: 144 MS
EKG Q-T INTERVAL: 398 MS
EKG Q-T INTERVAL: 442 MS
EKG Q-T INTERVAL: 456 MS
EKG Q-T INTERVAL: 460 MS
EKG Q-T INTERVAL: 464 MS
EKG QRS DURATION: 124 MS
EKG QRS DURATION: 132 MS
EKG QRS DURATION: 140 MS
EKG QRS DURATION: 144 MS
EKG QRS DURATION: 146 MS
EKG QTC CALCULATION (BAZETT): 407 MS
EKG QTC CALCULATION (BAZETT): 407 MS
EKG QTC CALCULATION (BAZETT): 414 MS
EKG QTC CALCULATION (BAZETT): 427 MS
EKG QTC CALCULATION (BAZETT): 473 MS
EKG R AXIS: 110 DEGREES
EKG R AXIS: 59 DEGREES
EKG R AXIS: 62 DEGREES
EKG R AXIS: 71 DEGREES
EKG R AXIS: 75 DEGREES
EKG T AXIS: 117 DEGREES
EKG T AXIS: 53 DEGREES
EKG T AXIS: 62 DEGREES
EKG T AXIS: 62 DEGREES
EKG T AXIS: 67 DEGREES
EKG VENTRICULAR RATE: 47 BPM
EKG VENTRICULAR RATE: 48 BPM
EKG VENTRICULAR RATE: 51 BPM
EKG VENTRICULAR RATE: 53 BPM
EKG VENTRICULAR RATE: 85 BPM
EOSINOPHIL # BLD: 0 K/UL (ref 0–0.7)
EOSINOPHIL NFR BLD: 0 %
ERYTHROCYTE [DISTWIDTH] IN BLOOD BY AUTOMATED COUNT: 15.7 % (ref 11.5–14.5)
GLOBULIN SER CALC-MCNC: 3.4 G/DL (ref 2.3–3.5)
GLUCOSE BLD-MCNC: 117 MG/DL (ref 70–99)
GLUCOSE BLD-MCNC: 121 MG/DL (ref 70–99)
GLUCOSE BLD-MCNC: 125 MG/DL (ref 70–99)
GLUCOSE BLD-MCNC: 141 MG/DL (ref 70–99)
GLUCOSE BLD-MCNC: 143 MG/DL (ref 70–99)
GLUCOSE BLD-MCNC: 151 MG/DL (ref 70–99)
GLUCOSE SERPL-MCNC: 133 MG/DL (ref 70–99)
HCO3 ARTERIAL: 30.6 MMOL/L (ref 21–29)
HCT VFR BLD AUTO: 38 % (ref 41–53)
HCT VFR BLD AUTO: 39.2 % (ref 42–52)
HGB BLD CALC-MCNC: 12.9 GM/DL (ref 13.5–17.5)
HGB BLD-MCNC: 12.9 G/DL (ref 14–18)
LACTATE: 0.98 MMOL/L (ref 0.4–2)
LYMPHOCYTES # BLD: 0.8 K/UL (ref 1–4.8)
LYMPHOCYTES NFR BLD: 7.2 %
MCH RBC QN AUTO: 32.2 PG (ref 27–31.3)
MCHC RBC AUTO-ENTMCNC: 32.9 % (ref 33–37)
MCV RBC AUTO: 97.8 FL (ref 79–92.2)
MONOCYTES # BLD: 0.8 K/UL (ref 0.2–0.8)
MONOCYTES NFR BLD: 7.7 %
NEUTROPHILS # BLD: 9.2 K/UL (ref 1.4–6.5)
NEUTS SEG NFR BLD: 84.7 %
O2 SAT, ARTERIAL: 98 % (ref 93–100)
PCO2 ARTERIAL: 41 MM HG (ref 35–45)
PERFORMED ON: ABNORMAL
PH ARTERIAL: 7.48 (ref 7.35–7.45)
PLATELET # BLD AUTO: 277 K/UL (ref 130–400)
PO2 ARTERIAL: 90 MM HG (ref 75–108)
POC CHLORIDE: 106 MEQ/L (ref 99–110)
POC CREATININE: 1 MG/DL (ref 0.8–1.3)
POC FIO2: 30
POC SAMPLE TYPE: ABNORMAL
POTASSIUM SERPL-SCNC: 4.3 MEQ/L (ref 3.5–5.1)
POTASSIUM SERPL-SCNC: 4.7 MEQ/L (ref 3.4–4.9)
PROT SERPL-MCNC: 6.5 G/DL (ref 6.3–8)
RBC # BLD AUTO: 4.01 M/UL (ref 4.7–6.1)
SODIUM BLD-SCNC: 144 MEQ/L (ref 136–145)
SODIUM SERPL-SCNC: 137 MEQ/L (ref 135–144)
TCO2 ARTERIAL: 32 MMOL/L (ref 21–32)
TRIGL SERPL-MCNC: 143 MG/DL (ref 0–150)
WBC # BLD AUTO: 10.9 K/UL (ref 4.8–10.8)

## 2024-10-26 PROCEDURE — 82803 BLOOD GASES ANY COMBINATION: CPT

## 2024-10-26 PROCEDURE — 6370000000 HC RX 637 (ALT 250 FOR IP): Performed by: NURSE PRACTITIONER

## 2024-10-26 PROCEDURE — 82330 ASSAY OF CALCIUM: CPT

## 2024-10-26 PROCEDURE — 82565 ASSAY OF CREATININE: CPT

## 2024-10-26 PROCEDURE — 84132 ASSAY OF SERUM POTASSIUM: CPT

## 2024-10-26 PROCEDURE — 6370000000 HC RX 637 (ALT 250 FOR IP): Performed by: INTERNAL MEDICINE

## 2024-10-26 PROCEDURE — 2580000003 HC RX 258: Performed by: NURSE PRACTITIONER

## 2024-10-26 PROCEDURE — 99291 CRITICAL CARE FIRST HOUR: CPT | Performed by: INTERNAL MEDICINE

## 2024-10-26 PROCEDURE — 2000000000 HC ICU R&B

## 2024-10-26 PROCEDURE — 83605 ASSAY OF LACTIC ACID: CPT

## 2024-10-26 PROCEDURE — 84478 ASSAY OF TRIGLYCERIDES: CPT

## 2024-10-26 PROCEDURE — 99232 SBSQ HOSP IP/OBS MODERATE 35: CPT | Performed by: INTERNAL MEDICINE

## 2024-10-26 PROCEDURE — 2500000003 HC RX 250 WO HCPCS: Performed by: INTERNAL MEDICINE

## 2024-10-26 PROCEDURE — 93010 ELECTROCARDIOGRAM REPORT: CPT | Performed by: INTERNAL MEDICINE

## 2024-10-26 PROCEDURE — 6360000002 HC RX W HCPCS: Performed by: INTERNAL MEDICINE

## 2024-10-26 PROCEDURE — 80053 COMPREHEN METABOLIC PANEL: CPT

## 2024-10-26 PROCEDURE — 36415 COLL VENOUS BLD VENIPUNCTURE: CPT

## 2024-10-26 PROCEDURE — 2500000003 HC RX 250 WO HCPCS

## 2024-10-26 PROCEDURE — 84295 ASSAY OF SERUM SODIUM: CPT

## 2024-10-26 PROCEDURE — 85014 HEMATOCRIT: CPT

## 2024-10-26 PROCEDURE — 2580000003 HC RX 258: Performed by: INTERNAL MEDICINE

## 2024-10-26 PROCEDURE — 2500000003 HC RX 250 WO HCPCS: Performed by: NURSE PRACTITIONER

## 2024-10-26 PROCEDURE — 94003 VENT MGMT INPAT SUBQ DAY: CPT

## 2024-10-26 PROCEDURE — 82435 ASSAY OF BLOOD CHLORIDE: CPT

## 2024-10-26 PROCEDURE — 94640 AIRWAY INHALATION TREATMENT: CPT

## 2024-10-26 PROCEDURE — 85025 COMPLETE CBC W/AUTO DIFF WBC: CPT

## 2024-10-26 PROCEDURE — 2700000000 HC OXYGEN THERAPY PER DAY

## 2024-10-26 PROCEDURE — 6360000002 HC RX W HCPCS: Performed by: NURSE PRACTITIONER

## 2024-10-26 PROCEDURE — 82948 REAGENT STRIP/BLOOD GLUCOSE: CPT

## 2024-10-26 PROCEDURE — 6360000002 HC RX W HCPCS

## 2024-10-26 PROCEDURE — 94761 N-INVAS EAR/PLS OXIMETRY MLT: CPT

## 2024-10-26 RX ORDER — MIDAZOLAM HYDROCHLORIDE 2 MG/2ML
2 INJECTION, SOLUTION INTRAMUSCULAR; INTRAVENOUS ONCE
Status: COMPLETED | OUTPATIENT
Start: 2024-10-26 | End: 2024-10-26

## 2024-10-26 RX ORDER — FENTANYL CITRATE-0.9 % NACL/PF 20 MCG/2ML
50 SYRINGE (ML) INTRAVENOUS EVERY 30 MIN PRN
Status: DISCONTINUED | OUTPATIENT
Start: 2024-10-26 | End: 2024-10-26

## 2024-10-26 RX ORDER — FENTANYL CITRATE-0.9 % NACL/PF 20 MCG/2ML
50 SYRINGE (ML) INTRAVENOUS EVERY 30 MIN PRN
Status: DISCONTINUED | OUTPATIENT
Start: 2024-10-26 | End: 2024-10-29

## 2024-10-26 RX ORDER — FENTANYL CITRATE-0.9 % NACL/PF 10 MCG/ML
25-300 PLASTIC BAG, INJECTION (ML) INTRAVENOUS CONTINUOUS
Status: DISCONTINUED | OUTPATIENT
Start: 2024-10-26 | End: 2024-10-29

## 2024-10-26 RX ORDER — FENTANYL CITRATE-0.9 % NACL/PF 10 MCG/ML
25-300 PLASTIC BAG, INJECTION (ML) INTRAVENOUS CONTINUOUS
Status: DISCONTINUED | OUTPATIENT
Start: 2024-10-26 | End: 2024-10-26

## 2024-10-26 RX ORDER — AMLODIPINE BESYLATE 10 MG/1
10 TABLET ORAL DAILY
Status: DISCONTINUED | OUTPATIENT
Start: 2024-10-26 | End: 2024-10-30

## 2024-10-26 RX ORDER — LOSARTAN POTASSIUM 100 MG/1
100 TABLET ORAL DAILY
Status: DISCONTINUED | OUTPATIENT
Start: 2024-10-26 | End: 2024-11-06

## 2024-10-26 RX ORDER — DEXMEDETOMIDINE HYDROCHLORIDE 4 UG/ML
.1-1.5 INJECTION, SOLUTION INTRAVENOUS CONTINUOUS
Status: DISCONTINUED | OUTPATIENT
Start: 2024-10-26 | End: 2024-10-29

## 2024-10-26 RX ORDER — FENTANYL CITRATE-0.9 % NACL/PF 10 MCG/ML
25-200 PLASTIC BAG, INJECTION (ML) INTRAVENOUS CONTINUOUS
Status: DISCONTINUED | OUTPATIENT
Start: 2024-10-26 | End: 2024-10-26

## 2024-10-26 RX ADMIN — HYDRALAZINE HYDROCHLORIDE 10 MG: 20 INJECTION INTRAMUSCULAR; INTRAVENOUS at 12:34

## 2024-10-26 RX ADMIN — HYDRALAZINE HYDROCHLORIDE 10 MG: 20 INJECTION INTRAMUSCULAR; INTRAVENOUS at 16:39

## 2024-10-26 RX ADMIN — LOSARTAN POTASSIUM 100 MG: 100 TABLET, FILM COATED ORAL at 16:09

## 2024-10-26 RX ADMIN — IPRATROPIUM BROMIDE AND ALBUTEROL SULFATE 1 DOSE: 2.5; .5 SOLUTION RESPIRATORY (INHALATION) at 19:37

## 2024-10-26 RX ADMIN — DEXMEDETOMIDINE HYDROCHLORIDE 0.9 MCG/KG/HR: 400 INJECTION, SOLUTION INTRAVENOUS at 16:42

## 2024-10-26 RX ADMIN — LACTULOSE 20 G: 20 SOLUTION ORAL at 13:28

## 2024-10-26 RX ADMIN — IPRATROPIUM BROMIDE AND ALBUTEROL SULFATE 1 DOSE: 2.5; .5 SOLUTION RESPIRATORY (INHALATION) at 09:17

## 2024-10-26 RX ADMIN — ESCITALOPRAM OXALATE 10 MG: 10 TABLET ORAL at 08:54

## 2024-10-26 RX ADMIN — METHYLPREDNISOLONE SODIUM SUCCINATE 40 MG: 40 INJECTION INTRAMUSCULAR; INTRAVENOUS at 20:03

## 2024-10-26 RX ADMIN — PROPOFOL 50 MCG/KG/MIN: 10 INJECTION, EMULSION INTRAVENOUS at 22:34

## 2024-10-26 RX ADMIN — Medication 10 ML: at 20:17

## 2024-10-26 RX ADMIN — Medication 200 MCG/HR: at 04:28

## 2024-10-26 RX ADMIN — ENOXAPARIN SODIUM 30 MG: 100 INJECTION SUBCUTANEOUS at 21:30

## 2024-10-26 RX ADMIN — Medication 200 MCG/HR: at 15:54

## 2024-10-26 RX ADMIN — PROPOFOL 30 MCG/KG/MIN: 10 INJECTION, EMULSION INTRAVENOUS at 09:26

## 2024-10-26 RX ADMIN — PROPOFOL 50 MCG/KG/MIN: 10 INJECTION, EMULSION INTRAVENOUS at 15:51

## 2024-10-26 RX ADMIN — METHYLPREDNISOLONE SODIUM SUCCINATE 40 MG: 40 INJECTION INTRAMUSCULAR; INTRAVENOUS at 09:05

## 2024-10-26 RX ADMIN — LACTULOSE 20 G: 20 SOLUTION ORAL at 20:03

## 2024-10-26 RX ADMIN — PROPOFOL 50 MCG/KG/MIN: 10 INJECTION, EMULSION INTRAVENOUS at 19:18

## 2024-10-26 RX ADMIN — Medication 10 ML: at 09:09

## 2024-10-26 RX ADMIN — BUDESONIDE 500 MCG: 0.5 SUSPENSION RESPIRATORY (INHALATION) at 03:43

## 2024-10-26 RX ADMIN — IPRATROPIUM BROMIDE AND ALBUTEROL SULFATE 1 DOSE: 2.5; .5 SOLUTION RESPIRATORY (INHALATION) at 16:49

## 2024-10-26 RX ADMIN — DOCUSATE SODIUM 100 MG: 50 LIQUID ORAL at 20:03

## 2024-10-26 RX ADMIN — DOCUSATE SODIUM 100 MG: 50 LIQUID ORAL at 08:54

## 2024-10-26 RX ADMIN — DEXMEDETOMIDINE HYDROCHLORIDE 0.2 MCG/KG/HR: 400 INJECTION, SOLUTION INTRAVENOUS at 12:54

## 2024-10-26 RX ADMIN — CHLORHEXIDINE GLUCONATE 15 ML: 1.2 RINSE ORAL at 20:04

## 2024-10-26 RX ADMIN — CHLORHEXIDINE GLUCONATE 15 ML: 1.2 RINSE ORAL at 09:04

## 2024-10-26 RX ADMIN — AMLODIPINE BESYLATE 10 MG: 10 TABLET ORAL at 15:09

## 2024-10-26 RX ADMIN — BUDESONIDE 500 MCG: 0.5 SUSPENSION RESPIRATORY (INHALATION) at 16:49

## 2024-10-26 RX ADMIN — Medication 200 MCG/HR: at 21:19

## 2024-10-26 RX ADMIN — TRAZODONE HYDROCHLORIDE 50 MG: 50 TABLET ORAL at 20:02

## 2024-10-26 RX ADMIN — WATER 1000 MG: 1 INJECTION INTRAMUSCULAR; INTRAVENOUS; SUBCUTANEOUS at 16:12

## 2024-10-26 RX ADMIN — SODIUM CHLORIDE, PRESERVATIVE FREE 20 MG: 5 INJECTION INTRAVENOUS at 09:00

## 2024-10-26 RX ADMIN — LACTULOSE 20 G: 20 SOLUTION ORAL at 08:54

## 2024-10-26 RX ADMIN — DEXMEDETOMIDINE HYDROCHLORIDE 1 MCG/KG/HR: 400 INJECTION, SOLUTION INTRAVENOUS at 21:12

## 2024-10-26 RX ADMIN — MONTELUKAST 10 MG: 10 TABLET, FILM COATED ORAL at 20:02

## 2024-10-26 RX ADMIN — Medication 200 MCG/HR: at 09:22

## 2024-10-26 RX ADMIN — IPRATROPIUM BROMIDE AND ALBUTEROL SULFATE 1 DOSE: 2.5; .5 SOLUTION RESPIRATORY (INHALATION) at 03:43

## 2024-10-26 RX ADMIN — PROPOFOL 50 MCG/KG/MIN: 10 INJECTION, EMULSION INTRAVENOUS at 12:58

## 2024-10-26 RX ADMIN — ENOXAPARIN SODIUM 30 MG: 100 INJECTION SUBCUTANEOUS at 09:03

## 2024-10-26 RX ADMIN — SODIUM CHLORIDE, PRESERVATIVE FREE 20 MG: 5 INJECTION INTRAVENOUS at 20:02

## 2024-10-26 RX ADMIN — PROPOFOL 30 MCG/KG/MIN: 10 INJECTION, EMULSION INTRAVENOUS at 04:31

## 2024-10-26 RX ADMIN — MIDAZOLAM 2 MG: 1 INJECTION INTRAMUSCULAR; INTRAVENOUS at 13:12

## 2024-10-26 RX ADMIN — HYDRALAZINE HYDROCHLORIDE 10 MG: 20 INJECTION INTRAMUSCULAR; INTRAVENOUS at 23:00

## 2024-10-26 ASSESSMENT — PULMONARY FUNCTION TESTS
PIF_VALUE: 33
PIF_VALUE: 37
PIF_VALUE: 33
PIF_VALUE: 41
PIF_VALUE: 39
PIF_VALUE: 36
PIF_VALUE: 29
PIF_VALUE: 29
PIF_VALUE: 42
PIF_VALUE: 42
PIF_VALUE: 31
PIF_VALUE: 32
PIF_VALUE: 39
PIF_VALUE: 30
PIF_VALUE: 43
PIF_VALUE: 29
PIF_VALUE: 32
PIF_VALUE: 33
PIF_VALUE: 32
PIF_VALUE: 11
PIF_VALUE: 41
PIF_VALUE: 41
PIF_VALUE: 29
PIF_VALUE: 33
PIF_VALUE: 16
PIF_VALUE: 29
PIF_VALUE: 37
PIF_VALUE: 30
PIF_VALUE: 38
PIF_VALUE: 37
PIF_VALUE: 34
PIF_VALUE: 40
PIF_VALUE: 42
PIF_VALUE: 33
PIF_VALUE: 33
PIF_VALUE: 39
PIF_VALUE: 33
PIF_VALUE: 40
PIF_VALUE: 29
PIF_VALUE: 52
PIF_VALUE: 37
PIF_VALUE: 36
PIF_VALUE: 42
PIF_VALUE: 32
PIF_VALUE: 37
PIF_VALUE: 37
PIF_VALUE: 36
PIF_VALUE: 40
PIF_VALUE: 31
PIF_VALUE: 33
PIF_VALUE: 41
PIF_VALUE: 37
PIF_VALUE: 42
PIF_VALUE: 55
PIF_VALUE: 29
PIF_VALUE: 32
PIF_VALUE: 32
PIF_VALUE: 47
PIF_VALUE: 42
PIF_VALUE: 26
PIF_VALUE: 22
PIF_VALUE: 45
PIF_VALUE: 39
PIF_VALUE: 46
PIF_VALUE: 44
PIF_VALUE: 45
PIF_VALUE: 73
PIF_VALUE: 33
PIF_VALUE: 45
PIF_VALUE: 43
PIF_VALUE: 32
PIF_VALUE: 25
PIF_VALUE: 43
PIF_VALUE: 40
PIF_VALUE: 37
PIF_VALUE: 36
PIF_VALUE: 33
PIF_VALUE: 40
PIF_VALUE: 43
PIF_VALUE: 36
PIF_VALUE: 44
PIF_VALUE: 39
PIF_VALUE: 37
PIF_VALUE: 39

## 2024-10-26 ASSESSMENT — PAIN SCALES - GENERAL
PAINLEVEL_OUTOF10: 0

## 2024-10-26 NOTE — PROGRESS NOTES
Chief Complaint:  SOB     Reason for consult:  EKG changes     History of Present Illness:     This is a 68-year-old -American male with past medical history significant for hypertension, dyslipidemia, diabetes, COPD on 3 L O2, history of alcohol abuse, history of cocaine abuse, sleep apnea and multiple medical problems who originally presented to [facility] on 10/19/2024 with chief complaint of shortness of breath.  History is obtained from chart review as patient is currently intubated.  Apparently he had told ER physician that he had been drinking alcohol and using cocaine on a regular basis prior to presentation.  Apparently while in ER patient became hypoxic with O2 sat dropping into the 70s with ambulation to the restroom and he was resumed on 3 L O2 per nasal cannula with improvement in SpO2 to 97%.  Chest x-ray in ER had shown no acute cardiopulmonary findings.  Initial BMP in ER unremarkable.  NT proBNP within normal range of 40.  High-sensitivity troponin negative at 18.  CBC showed WBC mildly elevated at 10.4, hemoglobin 12.8.  Urine drug screen positive for cocaine and marijuana.  He was subsequently transferred from [facility] and admitted to [facility] with diagnosis of COPD exacerbation.     Patient was transferred to ICU yesterday afternoon due to worsening respiratory distress and was subsequently intubated.  EKG completed yesterday evening at 2056 showed ST elevations in leads II, III, aVF and V3 through V6 and cardiology consulted for further evaluation.  He was on low-dose Levophed earlier today but this was recently stopped.      At time my evaluation, patient is intubated and sedated on mechanical ventilation.  He is on 50% FiO2 with SpO2 of 98%.  Patient was noted to have hematuria following straight cath this morning.  Urology evaluated patient today.  Repeat EKG completed this afternoon shows some improvement in previously noted ST elevations.  Repeat troponin ordered and  pending at this time.  On telemetry, he is sinus bradycardia with heart rate 40s to 50s. Most recent blood pressure 94/56 with MAP of 67.    10/23/2024: Remains in isolation.  On vent and sedated.  Sinus rhythm on telemetry    10/24/2024: Sinus bradycardia on telemetry.  No new issues overnight.  Remains in isolation as well as on vent and sedated  Echocardiogram ejection fraction of 60 to 65%.  Moderate LVH.  Severely dilated right ventricle mild aortic stenosis       10-25-24: Off of levo overnight. SB on tele in 40's. On vent, sedated.     10-26-24: No new issues overnight.  Remains on vent and sedated.  Normal sinus rhythm on telemetry.  EKG reviewed      Allergies         Allergies   Allergen Reactions    Fish-Derived Products Anaphylaxis    Iodine Anaphylaxis       Iodine-containing products, dyes, contrast dye    Seasonal Shortness Of Breath    Other         Other reaction(s): Swelling/Edema  Other reaction(s): Swelling/Edema    Penicillin G Other (See Comments)    Shellfish Allergy         Other reaction(s): Swelling/Edema    Shellfish-Derived Products         Other reaction(s): Swelling/Edema    Pcn [Penicillins] Nausea And Vomiting            Current Facility-Administered Medications             Current Facility-Administered Medications   Medication Dose Route Frequency Provider Last Rate Last Admin    norepinephrine (LEVOPHED) 16 mg in sodium chloride 0.9 % 250 mL infusion  1-100 mcg/min IntraVENous Continuous Sylvester Montesinos MD   Stopped at 10/22/24 1701    midazolam (VERSED) 100 mg in sodium chloride 0.9 % 100 mL infusion  1-10 mg/hr IntraVENous Continuous Kathi Ovalle MD 2 mL/hr at 10/22/24 1730 2 mg/hr at 10/22/24 1730    lactated ringers IV soln infusion SOLN   IntraVENous Continuous Akanksha Alvarado APRN -  mL/hr at 10/22/24 1730 Rate Verify at 10/22/24 1730    hydrALAZINE (APRESOLINE) injection 10 mg  10 mg IntraVENous Q4H PRN Akanksha Alvarado APRN - CNP   10 mg at 10/21/24 1416

## 2024-10-26 NOTE — PROGRESS NOTES
Pulmonary & Critical Care Medicine ICU Progress Note    Subjective:     No events reported overnight.  He does continue on support of the ventilator, FiO2 is currently at 30% with a PEEP of 5.  He does continue on Nimbex, propofol, and fentanyl.  He also continues on Levophed at 5 mcg.    EXAM:  General: Resting comfortably on the ventilator  HEENT: Normocephalic, atraumatic.  Pupils equal round and reactive to light.  Oral ETT/OG in place.  Lungs : Expiratory wheezes noted bilaterally.  No rhonchi or rales  Heart: Sinus bradycardia  ABD: Obese.  Soft, nontender, nondistended.  Bowel sounds hypoactive  Extremities : No edema noted  Neuro: Sedated on fentanyl and propofol.  Nimbex also in place.  Skin: No rashes    MV Settings:  Vent Mode: AC/VC Resp Rate (Set): 30 bpm/Vt (Set, mL): 480 mL/ /FiO2 : 30 %     Recent Labs     10/23/24  1111 10/23/24  1415   PHART 7.176* 7.446   ESN4WPT 93* 43   PO2ART 87 95     IV:   norepinephrine 5 mcg/min (10/24/24 2307)    cisatracurium besylate (NIMBEX) 200 mg in sodium chloride 0.9 % 100 mL infusion 1 mcg/kg/min (10/26/24 0658)    fentaNYL 200 mcg/hr (10/26/24 0658)    propofol 30 mcg/kg/min (10/26/24 0658)    sodium chloride      dextrose       Vitals:  BP (!) 155/94   Pulse 57   Temp 98.8 °F (37.1 °C)   Resp 24   Ht 1.829 m (6')   Wt 108.9 kg (240 lb)   SpO2 96%   BMI 32.55 kg/m²      Intake/Output Summary (Last 24 hours) at 10/26/2024 0900  Last data filed at 10/26/2024 0658  Gross per 24 hour   Intake 2092.62 ml   Output 1600 ml   Net 492.62 ml     Medications:  Scheduled Meds:   lactulose  20 g Oral TID    docusate  100 mg Oral BID    polyethylene glycol  17 g Oral Daily    methylPREDNISolone  40 mg IntraVENous Q12H    cefTRIAXone (ROCEPHIN) 1,000 mg in sterile water 10 mL IV syringe  1,000 mg IntraVENous Q24H    chlorhexidine  15 mL Mouth/Throat BID    famotidine (PEPCID) injection  20 mg IntraVENous BID    insulin lispro  0-8 Units SubCUTAneous Q4H    [Held by  is partial resection of the left 7th rib.  There is minor strandy  scarring in the left lower lobe.  There is no evidence of pneumonia or  pulmonary edema there is stable left basilar pleural scarring.  There are  changes of COPD.  Heart size is stable.  There is no pneumothorax.    Impression  1. Stable chronic changes.  2. No evidence of an acute intrathoracic process.      Results for orders placed during the hospital encounter of 05/28/23    XR CHEST (2 VW)    Narrative  EXAMINATION:  TWO XRAY VIEWS OF THE CHEST    5/28/2023 3:38 pm    COMPARISON:  04/06/2023    HISTORY:  ORDERING SYSTEM PROVIDED HISTORY: copd, cough, sob  TECHNOLOGIST PROVIDED HISTORY:  Reason for exam:->copd, cough, sob  What reading provider will be dictating this exam?->CRC    FINDINGS:  Two views of the chest demonstrate satisfactory expansion lungs with  increased markings especially in the left lung with blunting of the left  costophrenic angle.  There are multiple chronically healing left rib  fractures consistent with old trauma.  There are atherosclerotic changes of  the aorta without cardiomegaly.    Impression  No acute disease              Portable: Results for orders placed during the hospital encounter of 10/19/24    XR CHEST PORTABLE    Narrative  EXAMINATION:  ONE XRAY VIEW OF THE CHEST    10/24/2024 4:59 pm    COMPARISON:  Portable chest from yesterday.  Low-dose CT of the chest from 07/18/2023.    HISTORY:  ORDERING SYSTEM PROVIDED HISTORY: tube placement  TECHNOLOGIST PROVIDED HISTORY:  Reason for exam:->tube placement  What reading provider will be dictating this exam?->CRC    FINDINGS:  The lung bases are not included on today's study.    During the interval, an endotracheal tube has been placed with its tip in  satisfactory position 5 cm above the kelly.    A nasogastric tube is again seen passing into the distal thoracic esophagus.  The tip is not included on today's study.    There is increased consolidation of the

## 2024-10-26 NOTE — FLOWSHEET NOTE
Vent sedated with Fentanyl Propofol Nimbex  Train of 4  4 of 4  twitches.  Oral care given large amt of oral secretions  Suctioning per ETT tan secretions.  Tube feed at 30 cc hr with water flushes. Complete bath and linen change.  Vitals stable

## 2024-10-26 NOTE — PROGRESS NOTES
0845 am assessment completed as noted.   0850 decreased nimbex.   0938 nimbex stopped.   1230 pt fluttering eyes to my voice. Pt bp 218/135, medicated with prn apresoline. Notified Dr. Goodrich. Sedation increased.  1254 pt belly breathing,  precedex started. 1305 precedex increased to 0.5 per doroteo lakingston.  1312 pt given one time dose of versed. Electronically signed by Tomasa Chin RN on 10/26/2024 at 2:25 PM  1400 Pt belly breathing improved. Vss, will continue to monitor. Pt can be removed from isolation per dr. goodrichElectronically signed by Tomasa Chin RN on 10/26/2024 at 3:13 PM  1655 wasted 80ml of nimbex with Malu FELDER. Electronically signed by Tomasa Chin RN on 10/26/2024 at 4:56 PM  1700 rechecked bp 148/79. Electronically signed by Tomasa Chin RN on 10/26/2024 at 5:08 PM

## 2024-10-26 NOTE — PROGRESS NOTES
Pulse 51   Temp 99.3 °F (37.4 °C)   Resp 24   Ht 1.829 m (6')   Wt 108.9 kg (240 lb)   SpO2 100%   BMI 32.55 kg/m²     General appearance: intubated  HEENT:  Conjunctivae/corneas clear.  Neck: Trachea midline.  Respiratory:  Normal respiratory effort. Clear to auscultation  Cardiovascular: Regular rate and rhythm  Abdomen: Soft, non-tender, non-distended with normal bowel sounds.  Musculoskeletal: No clubbing, cyanosis or edema bilaterally  Neuro: sedated.     Labs:   Recent Labs     10/24/24  0545 10/25/24  0442 10/26/24  0325 10/26/24  0907   WBC 10.9* 10.9* 10.9*  --    HGB 12.1* 11.7* 12.9* 12.9*   HCT 37.0* 36.2* 39.2*  --     266 277  --      Recent Labs     10/24/24  0545 10/25/24  0442 10/26/24  0325 10/26/24  0907    142 137  --    K 5.1* 5.1* 4.7  --     107 104  --    CO2 26 26 25  --    BUN 20 21 20  --    CREATININE 0.75 0.82 0.80 1.0   CALCIUM 8.9 9.1 9.1  --      Recent Labs     10/24/24  0545 10/25/24  0442 10/26/24  0325   AST 18 21 15   ALT 28 30 33   BILITOT 0.3 0.3 0.3   ALKPHOS 79 79 80     No results for input(s): \"INR\" in the last 72 hours.  No results for input(s): \"CKTOTAL\", \"TROPONINI\" in the last 72 hours.    Urinalysis:      Lab Results   Component Value Date/Time    NITRU Negative 10/19/2024 04:03 PM    WBCUA 0-2 09/16/2021 11:40 AM    BACTERIA None 10/05/2017 11:32 AM    RBCUA 0-2 09/16/2021 11:40 AM    BLOODU Negative 10/19/2024 04:03 PM    GLUCOSEU Negative 10/19/2024 04:03 PM       Radiology:  XR CHEST PORTABLE   Final Result   Cardiomegaly with stable airspace disease seen within the left lung base with   small left pleural effusion. Lines and tubes in satisfactory position.         XR CHEST PORTABLE   Final Result   1. Endotracheal tube in satisfactory position.   2. Increased consolidation of the retrocardiac left lower lobe, remaining   moderate, consistent with increasing left lower lobe atelectasis or pneumonia.         XR CHEST PORTABLE   Final Result

## 2024-10-27 ENCOUNTER — APPOINTMENT (OUTPATIENT)
Dept: GENERAL RADIOLOGY | Age: 69
DRG: 207 | End: 2024-10-27
Attending: STUDENT IN AN ORGANIZED HEALTH CARE EDUCATION/TRAINING PROGRAM
Payer: MEDICARE

## 2024-10-27 LAB
ALBUMIN SERPL-MCNC: 3.3 G/DL (ref 3.5–4.6)
ALP SERPL-CCNC: 85 U/L (ref 35–104)
ALT SERPL-CCNC: 34 U/L (ref 0–41)
ANION GAP SERPL CALCULATED.3IONS-SCNC: 8 MEQ/L (ref 9–15)
ANISOCYTOSIS BLD QL SMEAR: ABNORMAL
AST SERPL-CCNC: 14 U/L (ref 0–40)
BASE EXCESS ARTERIAL: 3 (ref -3–3)
BASOPHILS # BLD: 0 K/UL (ref 0–0.2)
BASOPHILS # BLD: 0 K/UL (ref 0–0.2)
BASOPHILS NFR BLD: 0 %
BASOPHILS NFR BLD: 0.2 %
BILIRUB SERPL-MCNC: <0.2 MG/DL (ref 0.2–0.7)
BUN SERPL-MCNC: 18 MG/DL (ref 8–23)
CALCIUM IONIZED: 1.3 MMOL/L (ref 1.12–1.32)
CALCIUM SERPL-MCNC: 9.4 MG/DL (ref 8.5–9.9)
CHLORIDE SERPL-SCNC: 105 MEQ/L (ref 95–107)
CO2 SERPL-SCNC: 25 MEQ/L (ref 20–31)
CREAT SERPL-MCNC: 0.65 MG/DL (ref 0.7–1.2)
EOSINOPHIL # BLD: 0 K/UL (ref 0–0.7)
EOSINOPHIL # BLD: 0.2 K/UL (ref 0–0.7)
EOSINOPHIL NFR BLD: 0.2 %
EOSINOPHIL NFR BLD: 1 %
ERYTHROCYTE [DISTWIDTH] IN BLOOD BY AUTOMATED COUNT: 15.3 % (ref 11.5–14.5)
ERYTHROCYTE [DISTWIDTH] IN BLOOD BY AUTOMATED COUNT: 15.6 % (ref 11.5–14.5)
GLOBULIN SER CALC-MCNC: 3.6 G/DL (ref 2.3–3.5)
GLUCOSE BLD-MCNC: 111 MG/DL (ref 70–99)
GLUCOSE BLD-MCNC: 113 MG/DL (ref 70–99)
GLUCOSE BLD-MCNC: 154 MG/DL (ref 70–99)
GLUCOSE BLD-MCNC: 155 MG/DL (ref 70–99)
GLUCOSE BLD-MCNC: 175 MG/DL (ref 70–99)
GLUCOSE BLD-MCNC: 180 MG/DL (ref 70–99)
GLUCOSE BLD-MCNC: 52 MG/DL (ref 70–99)
GLUCOSE BLD-MCNC: 63 MG/DL (ref 70–99)
GLUCOSE BLD-MCNC: 89 MG/DL (ref 70–99)
GLUCOSE SERPL-MCNC: 158 MG/DL (ref 70–99)
HCO3 ARTERIAL: 27.3 MMOL/L (ref 21–29)
HCT VFR BLD AUTO: 40.9 % (ref 42–52)
HCT VFR BLD AUTO: 42 % (ref 41–53)
HCT VFR BLD AUTO: 44.5 % (ref 42–52)
HGB BLD CALC-MCNC: 14.2 GM/DL (ref 13.5–17.5)
HGB BLD-MCNC: 13.3 G/DL (ref 14–18)
HGB BLD-MCNC: 14.3 G/DL (ref 14–18)
LACTATE: 1.2 MMOL/L (ref 0.4–2)
LYMPHOCYTES # BLD: 1.1 K/UL (ref 1–4.8)
LYMPHOCYTES # BLD: 1.2 K/UL (ref 1–4.8)
LYMPHOCYTES NFR BLD: 10.9 %
LYMPHOCYTES NFR BLD: 4 %
MACROCYTES BLD QL SMEAR: ABNORMAL
MAGNESIUM SERPL-MCNC: 2.3 MG/DL (ref 1.7–2.4)
MCH RBC QN AUTO: 31.4 PG (ref 27–31.3)
MCH RBC QN AUTO: 32 PG (ref 27–31.3)
MCHC RBC AUTO-ENTMCNC: 32.1 % (ref 33–37)
MCHC RBC AUTO-ENTMCNC: 32.5 % (ref 33–37)
MCV RBC AUTO: 96.5 FL (ref 79–92.2)
MCV RBC AUTO: 99.6 FL (ref 79–92.2)
MONOCYTES # BLD: 1 K/UL (ref 0.2–0.8)
MONOCYTES # BLD: 3.8 K/UL (ref 0.2–0.8)
MONOCYTES NFR BLD: 10.1 %
MONOCYTES NFR BLD: 20 %
NEUTROPHILS # BLD: 14 K/UL (ref 1.4–6.5)
NEUTROPHILS # BLD: 7.7 K/UL (ref 1.4–6.5)
NEUTS BAND NFR BLD MANUAL: 1 %
NEUTS SEG NFR BLD: 72 %
NEUTS SEG NFR BLD: 78.3 %
NEUTS VAC BLD QL SMEAR: ABNORMAL
O2 SAT, ARTERIAL: 100 % (ref 93–100)
PCO2 ARTERIAL: 42 MM HG (ref 35–45)
PERFORMED ON: ABNORMAL
PERFORMED ON: NORMAL
PH ARTERIAL: 7.42 (ref 7.35–7.45)
PLATELET # BLD AUTO: 272 K/UL (ref 130–400)
PLATELET # BLD AUTO: 329 K/UL (ref 130–400)
PLATELET BLD QL SMEAR: ADEQUATE
PO2 ARTERIAL: 165 MM HG (ref 75–108)
POC CHLORIDE: 105 MEQ/L (ref 99–110)
POC CREATININE: 0.8 MG/DL (ref 0.8–1.3)
POC FIO2: 50
POC SAMPLE TYPE: ABNORMAL
POIKILOCYTOSIS BLD QL SMEAR: ABNORMAL
POTASSIUM SERPL-SCNC: 4.8 MEQ/L (ref 3.4–4.9)
POTASSIUM SERPL-SCNC: 4.8 MEQ/L (ref 3.5–5.1)
PROCALCITONIN SERPL IA-MCNC: 0.15 NG/ML (ref 0–0.15)
PROT SERPL-MCNC: 6.9 G/DL (ref 6.3–8)
RBC # BLD AUTO: 4.24 M/UL (ref 4.7–6.1)
RBC # BLD AUTO: 4.47 M/UL (ref 4.7–6.1)
SLIDE REVIEW: ABNORMAL
SMUDGE CELLS BLD QL SMEAR: 10.5
SODIUM BLD-SCNC: 142 MEQ/L (ref 136–145)
SODIUM SERPL-SCNC: 138 MEQ/L (ref 135–144)
TCO2 ARTERIAL: 29 MMOL/L (ref 21–32)
VARIANT LYMPHS NFR BLD: 2 %
WBC # BLD AUTO: 19.2 K/UL (ref 4.8–10.8)
WBC # BLD AUTO: 9.8 K/UL (ref 4.8–10.8)

## 2024-10-27 PROCEDURE — 85014 HEMATOCRIT: CPT

## 2024-10-27 PROCEDURE — 6360000002 HC RX W HCPCS: Performed by: INTERNAL MEDICINE

## 2024-10-27 PROCEDURE — 85025 COMPLETE CBC W/AUTO DIFF WBC: CPT

## 2024-10-27 PROCEDURE — 6360000002 HC RX W HCPCS: Performed by: NURSE PRACTITIONER

## 2024-10-27 PROCEDURE — 2580000003 HC RX 258: Performed by: INTERNAL MEDICINE

## 2024-10-27 PROCEDURE — 36415 COLL VENOUS BLD VENIPUNCTURE: CPT

## 2024-10-27 PROCEDURE — 6370000000 HC RX 637 (ALT 250 FOR IP): Performed by: INTERNAL MEDICINE

## 2024-10-27 PROCEDURE — 99291 CRITICAL CARE FIRST HOUR: CPT | Performed by: INTERNAL MEDICINE

## 2024-10-27 PROCEDURE — 94761 N-INVAS EAR/PLS OXIMETRY MLT: CPT

## 2024-10-27 PROCEDURE — 82948 REAGENT STRIP/BLOOD GLUCOSE: CPT

## 2024-10-27 PROCEDURE — 71045 X-RAY EXAM CHEST 1 VIEW: CPT

## 2024-10-27 PROCEDURE — 2000000000 HC ICU R&B

## 2024-10-27 PROCEDURE — 83605 ASSAY OF LACTIC ACID: CPT

## 2024-10-27 PROCEDURE — 82565 ASSAY OF CREATININE: CPT

## 2024-10-27 PROCEDURE — 2500000003 HC RX 250 WO HCPCS

## 2024-10-27 PROCEDURE — 82435 ASSAY OF BLOOD CHLORIDE: CPT

## 2024-10-27 PROCEDURE — 2700000000 HC OXYGEN THERAPY PER DAY

## 2024-10-27 PROCEDURE — 84145 PROCALCITONIN (PCT): CPT

## 2024-10-27 PROCEDURE — 82803 BLOOD GASES ANY COMBINATION: CPT

## 2024-10-27 PROCEDURE — 6370000000 HC RX 637 (ALT 250 FOR IP)

## 2024-10-27 PROCEDURE — 89220 SPUTUM SPECIMEN COLLECTION: CPT

## 2024-10-27 PROCEDURE — 6360000002 HC RX W HCPCS

## 2024-10-27 PROCEDURE — 6370000000 HC RX 637 (ALT 250 FOR IP): Performed by: NURSE PRACTITIONER

## 2024-10-27 PROCEDURE — 80053 COMPREHEN METABOLIC PANEL: CPT

## 2024-10-27 PROCEDURE — 83735 ASSAY OF MAGNESIUM: CPT

## 2024-10-27 PROCEDURE — 84132 ASSAY OF SERUM POTASSIUM: CPT

## 2024-10-27 PROCEDURE — 94003 VENT MGMT INPAT SUBQ DAY: CPT

## 2024-10-27 PROCEDURE — 2580000003 HC RX 258: Performed by: NURSE PRACTITIONER

## 2024-10-27 PROCEDURE — 84295 ASSAY OF SERUM SODIUM: CPT

## 2024-10-27 PROCEDURE — 94640 AIRWAY INHALATION TREATMENT: CPT

## 2024-10-27 PROCEDURE — 99233 SBSQ HOSP IP/OBS HIGH 50: CPT | Performed by: INTERNAL MEDICINE

## 2024-10-27 PROCEDURE — 2500000003 HC RX 250 WO HCPCS: Performed by: NURSE PRACTITIONER

## 2024-10-27 PROCEDURE — 82330 ASSAY OF CALCIUM: CPT

## 2024-10-27 RX ORDER — METHYLPREDNISOLONE SODIUM SUCCINATE 40 MG/ML
40 INJECTION, POWDER, LYOPHILIZED, FOR SOLUTION INTRAMUSCULAR; INTRAVENOUS EVERY 12 HOURS
Status: DISCONTINUED | OUTPATIENT
Start: 2024-10-27 | End: 2024-10-29

## 2024-10-27 RX ORDER — MIDAZOLAM HYDROCHLORIDE 2 MG/2ML
2 INJECTION, SOLUTION INTRAMUSCULAR; INTRAVENOUS
Status: COMPLETED | OUTPATIENT
Start: 2024-10-27 | End: 2024-10-27

## 2024-10-27 RX ADMIN — Medication 200 MCG/HR: at 08:16

## 2024-10-27 RX ADMIN — DEXMEDETOMIDINE HYDROCHLORIDE 1.3 MCG/KG/HR: 400 INJECTION, SOLUTION INTRAVENOUS at 23:10

## 2024-10-27 RX ADMIN — DEXMEDETOMIDINE HYDROCHLORIDE 0.9 MCG/KG/HR: 400 INJECTION, SOLUTION INTRAVENOUS at 12:47

## 2024-10-27 RX ADMIN — LACTULOSE 20 G: 20 SOLUTION ORAL at 08:27

## 2024-10-27 RX ADMIN — IPRATROPIUM BROMIDE AND ALBUTEROL SULFATE 1 DOSE: 2.5; .5 SOLUTION RESPIRATORY (INHALATION) at 03:32

## 2024-10-27 RX ADMIN — LACTULOSE 20 G: 20 SOLUTION ORAL at 12:48

## 2024-10-27 RX ADMIN — DEXMEDETOMIDINE HYDROCHLORIDE 0.7 MCG/KG/HR: 400 INJECTION, SOLUTION INTRAVENOUS at 17:34

## 2024-10-27 RX ADMIN — Medication 200 MCG/HR: at 02:54

## 2024-10-27 RX ADMIN — DOCUSATE SODIUM 100 MG: 50 LIQUID ORAL at 21:16

## 2024-10-27 RX ADMIN — DEXMEDETOMIDINE HYDROCHLORIDE 1 MCG/KG/HR: 400 INJECTION, SOLUTION INTRAVENOUS at 01:04

## 2024-10-27 RX ADMIN — DEXMEDETOMIDINE HYDROCHLORIDE 0.7 MCG/KG/HR: 400 INJECTION, SOLUTION INTRAVENOUS at 08:39

## 2024-10-27 RX ADMIN — MIDAZOLAM 2 MG: 1 INJECTION INTRAMUSCULAR; INTRAVENOUS at 10:27

## 2024-10-27 RX ADMIN — NOREPINEPHRINE BITARTRATE 5 MCG/MIN: 64 SOLUTION INTRAVENOUS at 19:50

## 2024-10-27 RX ADMIN — MAGNESIUM CITRATE 150 ML: 1.75 LIQUID ORAL at 10:02

## 2024-10-27 RX ADMIN — LACTULOSE 20 G: 20 SOLUTION ORAL at 21:17

## 2024-10-27 RX ADMIN — ENOXAPARIN SODIUM 30 MG: 100 INJECTION SUBCUTANEOUS at 21:17

## 2024-10-27 RX ADMIN — DOCUSATE SODIUM 100 MG: 50 LIQUID ORAL at 08:27

## 2024-10-27 RX ADMIN — ESCITALOPRAM OXALATE 10 MG: 10 TABLET ORAL at 08:26

## 2024-10-27 RX ADMIN — BUDESONIDE 500 MCG: 0.5 SUSPENSION RESPIRATORY (INHALATION) at 15:19

## 2024-10-27 RX ADMIN — DEXMEDETOMIDINE HYDROCHLORIDE 1 MCG/KG/HR: 400 INJECTION, SOLUTION INTRAVENOUS at 04:52

## 2024-10-27 RX ADMIN — METHYLPREDNISOLONE SODIUM SUCCINATE 40 MG: 40 INJECTION INTRAMUSCULAR; INTRAVENOUS at 21:51

## 2024-10-27 RX ADMIN — PROPOFOL 45 MCG/KG/MIN: 10 INJECTION, EMULSION INTRAVENOUS at 01:23

## 2024-10-27 RX ADMIN — PROPOFOL 40 MCG/KG/MIN: 10 INJECTION, EMULSION INTRAVENOUS at 04:59

## 2024-10-27 RX ADMIN — SODIUM CHLORIDE, PRESERVATIVE FREE 20 MG: 5 INJECTION INTRAVENOUS at 21:17

## 2024-10-27 RX ADMIN — ENOXAPARIN SODIUM 30 MG: 100 INJECTION SUBCUTANEOUS at 08:34

## 2024-10-27 RX ADMIN — CHLORHEXIDINE GLUCONATE 15 ML: 1.2 RINSE ORAL at 08:30

## 2024-10-27 RX ADMIN — IPRATROPIUM BROMIDE AND ALBUTEROL SULFATE 1 DOSE: 2.5; .5 SOLUTION RESPIRATORY (INHALATION) at 22:01

## 2024-10-27 RX ADMIN — METHYLPREDNISOLONE SODIUM SUCCINATE 40 MG: 40 INJECTION INTRAMUSCULAR; INTRAVENOUS at 08:35

## 2024-10-27 RX ADMIN — HYDRALAZINE HYDROCHLORIDE 10 MG: 20 INJECTION INTRAMUSCULAR; INTRAVENOUS at 03:26

## 2024-10-27 RX ADMIN — LOSARTAN POTASSIUM 100 MG: 100 TABLET, FILM COATED ORAL at 12:48

## 2024-10-27 RX ADMIN — DEXTROSE MONOHYDRATE 125 ML: 100 INJECTION, SOLUTION INTRAVENOUS at 21:47

## 2024-10-27 RX ADMIN — PROPOFOL 45 MCG/KG/MIN: 10 INJECTION, EMULSION INTRAVENOUS at 12:48

## 2024-10-27 RX ADMIN — MONTELUKAST 10 MG: 10 TABLET, FILM COATED ORAL at 21:17

## 2024-10-27 RX ADMIN — Medication 10 ML: at 08:45

## 2024-10-27 RX ADMIN — CHLORHEXIDINE GLUCONATE 15 ML: 1.2 RINSE ORAL at 21:16

## 2024-10-27 RX ADMIN — AMLODIPINE BESYLATE 10 MG: 10 TABLET ORAL at 08:26

## 2024-10-27 RX ADMIN — BUDESONIDE 500 MCG: 0.5 SUSPENSION RESPIRATORY (INHALATION) at 03:32

## 2024-10-27 RX ADMIN — SODIUM CHLORIDE, PRESERVATIVE FREE 20 MG: 5 INJECTION INTRAVENOUS at 08:32

## 2024-10-27 RX ADMIN — IPRATROPIUM BROMIDE AND ALBUTEROL SULFATE 1 DOSE: 2.5; .5 SOLUTION RESPIRATORY (INHALATION) at 15:19

## 2024-10-27 RX ADMIN — POLYETHYLENE GLYCOL 3350 17 G: 17 POWDER, FOR SOLUTION ORAL at 21:20

## 2024-10-27 RX ADMIN — Medication 200 MCG/HR: at 14:13

## 2024-10-27 RX ADMIN — PROPOFOL 35 MCG/KG/MIN: 10 INJECTION, EMULSION INTRAVENOUS at 10:21

## 2024-10-27 RX ADMIN — WATER 1000 MG: 1 INJECTION INTRAMUSCULAR; INTRAVENOUS; SUBCUTANEOUS at 16:11

## 2024-10-27 RX ADMIN — Medication 10 ML: at 21:18

## 2024-10-27 RX ADMIN — Medication 200 MCG/HR: at 20:20

## 2024-10-27 RX ADMIN — DEXTROSE MONOHYDRATE 125 ML: 100 INJECTION, SOLUTION INTRAVENOUS at 23:26

## 2024-10-27 RX ADMIN — PROPOFOL 30 MCG/KG/MIN: 10 INJECTION, EMULSION INTRAVENOUS at 16:08

## 2024-10-27 RX ADMIN — IPRATROPIUM BROMIDE AND ALBUTEROL SULFATE 1 DOSE: 2.5; .5 SOLUTION RESPIRATORY (INHALATION) at 09:30

## 2024-10-27 RX ADMIN — TRAZODONE HYDROCHLORIDE 50 MG: 50 TABLET ORAL at 21:17

## 2024-10-27 ASSESSMENT — PULMONARY FUNCTION TESTS
PIF_VALUE: 29
PIF_VALUE: 39
PIF_VALUE: 45
PIF_VALUE: 31
PIF_VALUE: 32
PIF_VALUE: 31
PIF_VALUE: 35
PIF_VALUE: 28
PIF_VALUE: 27
PIF_VALUE: 31
PIF_VALUE: 35
PIF_VALUE: 40
PIF_VALUE: 35
PIF_VALUE: 45
PIF_VALUE: 36
PIF_VALUE: 14
PIF_VALUE: 45
PIF_VALUE: 28
PIF_VALUE: 31
PIF_VALUE: 38
PIF_VALUE: 29
PIF_VALUE: 37
PIF_VALUE: 33
PIF_VALUE: 31
PIF_VALUE: 32
PIF_VALUE: 39
PIF_VALUE: 20
PIF_VALUE: 29
PIF_VALUE: 37
PIF_VALUE: 34
PIF_VALUE: 31
PIF_VALUE: 30
PIF_VALUE: 36
PIF_VALUE: 29
PIF_VALUE: 22
PIF_VALUE: 37
PIF_VALUE: 9.3
PIF_VALUE: 28
PIF_VALUE: 28
PIF_VALUE: 32
PIF_VALUE: 30
PIF_VALUE: 28
PIF_VALUE: 26
PIF_VALUE: 12
PIF_VALUE: 33
PIF_VALUE: 28
PIF_VALUE: 30
PIF_VALUE: 31
PIF_VALUE: 27
PIF_VALUE: 34
PIF_VALUE: 34
PIF_VALUE: 29
PIF_VALUE: 33
PIF_VALUE: 50
PIF_VALUE: 39
PIF_VALUE: 39
PIF_VALUE: 40
PIF_VALUE: 38
PIF_VALUE: 28
PIF_VALUE: 33
PIF_VALUE: 34
PIF_VALUE: 28
PIF_VALUE: 31
PIF_VALUE: 37
PIF_VALUE: 29
PIF_VALUE: 22
PIF_VALUE: 29
PIF_VALUE: 33
PIF_VALUE: 28
PIF_VALUE: 31
PIF_VALUE: 38
PIF_VALUE: 33
PIF_VALUE: 34
PIF_VALUE: 15
PIF_VALUE: 37
PIF_VALUE: 32
PIF_VALUE: 9.2
PIF_VALUE: 27
PIF_VALUE: 33
PIF_VALUE: 32
PIF_VALUE: 33
PIF_VALUE: 30
PIF_VALUE: 29
PIF_VALUE: 32
PIF_VALUE: 29
PIF_VALUE: 32
PIF_VALUE: 37
PIF_VALUE: 32
PIF_VALUE: 31
PIF_VALUE: 32
PIF_VALUE: 21
PIF_VALUE: 30
PIF_VALUE: 11
PIF_VALUE: 40
PIF_VALUE: 40
PIF_VALUE: 37
PIF_VALUE: 18
PIF_VALUE: 32

## 2024-10-27 ASSESSMENT — PAIN SCALES - GENERAL
PAINLEVEL_OUTOF10: 0

## 2024-10-27 NOTE — FLOWSHEET NOTE
Vent sedated with Propofol Fentanyl and Precedex.  Tolerating tube feed . Complete bath and linen change. HR 40-50's  Med x 2 with Hydralazine -180's

## 2024-10-27 NOTE — PROGRESS NOTES
Chief Complaint:  SOB     Reason for consult:  EKG changes     History of Present Illness:     This is a 68-year-old -American male with past medical history significant for hypertension, dyslipidemia, diabetes, COPD on 3 L O2, history of alcohol abuse, history of cocaine abuse, sleep apnea and multiple medical problems who originally presented to [facility] on 10/19/2024 with chief complaint of shortness of breath.  History is obtained from chart review as patient is currently intubated.  Apparently he had told ER physician that he had been drinking alcohol and using cocaine on a regular basis prior to presentation.  Apparently while in ER patient became hypoxic with O2 sat dropping into the 70s with ambulation to the restroom and he was resumed on 3 L O2 per nasal cannula with improvement in SpO2 to 97%.  Chest x-ray in ER had shown no acute cardiopulmonary findings.  Initial BMP in ER unremarkable.  NT proBNP within normal range of 40.  High-sensitivity troponin negative at 18.  CBC showed WBC mildly elevated at 10.4, hemoglobin 12.8.  Urine drug screen positive for cocaine and marijuana.  He was subsequently transferred from [facility] and admitted to [facility] with diagnosis of COPD exacerbation.     Patient was transferred to ICU yesterday afternoon due to worsening respiratory distress and was subsequently intubated.  EKG completed yesterday evening at 2056 showed ST elevations in leads II, III, aVF and V3 through V6 and cardiology consulted for further evaluation.  He was on low-dose Levophed earlier today but this was recently stopped.      At time my evaluation, patient is intubated and sedated on mechanical ventilation.  He is on 50% FiO2 with SpO2 of 98%.  Patient was noted to have hematuria following straight cath this morning.  Urology evaluated patient today.  Repeat EKG completed this afternoon shows some improvement in previously noted ST elevations.  Repeat troponin ordered and  10/22/2024 05:04 AM     EOSABS 0.0 10/22/2024 05:04 AM     BASOSABS 0.0 10/22/2024 05:04 AM      CMP:          Lab Results   Component Value Date/Time      10/22/2024 05:04 AM     K 5.3 10/22/2024 05:04 AM      10/22/2024 05:04 AM     CO2 24 10/22/2024 05:04 AM     BUN 28 10/22/2024 05:04 AM     CREATININE 1.8 10/22/2024 09:28 AM     CREATININE 2.08 10/22/2024 05:04 AM     GFRAA >60.0 05/17/2022 05:49 AM     LABGLOM 41 10/22/2024 09:28 AM     LABGLOM >90.0 03/27/2024 10:18 AM     GLUCOSE 106 10/22/2024 05:04 AM     GLUCOSE 135 04/20/2022 05:45 AM     CALCIUM 9.0 10/22/2024 05:04 AM     BILITOT <0.2 10/22/2024 05:04 AM     ALKPHOS 92 10/22/2024 05:04 AM     AST 27 10/22/2024 05:04 AM     ALT 26 10/22/2024 05:04 AM      BMP:          Lab Results   Component Value Date/Time      10/22/2024 05:04 AM     K 5.3 10/22/2024 05:04 AM      10/22/2024 05:04 AM     CO2 24 10/22/2024 05:04 AM     BUN 28 10/22/2024 05:04 AM     CREATININE 1.8 10/22/2024 09:28 AM     CREATININE 2.08 10/22/2024 05:04 AM     CALCIUM 9.0 10/22/2024 05:04 AM     GFRAA >60.0 05/17/2022 05:49 AM     LABGLOM 41 10/22/2024 09:28 AM     LABGLOM >90.0 03/27/2024 10:18 AM     GLUCOSE 106 10/22/2024 05:04 AM     GLUCOSE 135 04/20/2022 05:45 AM      Magnesium:          Lab Results   Component Value Date/Time     MG 2.0 11/29/2023 07:51 AM      Troponin:          Lab Results   Component Value Date/Time     TROPONINI <0.010 08/31/2023 06:35 PM         Radiology:  XR CHEST PORTABLE     Result Date: 10/21/2024  EXAMINATION: ONE XRAY VIEW OF THE CHEST 10/21/2024 3:17 pm COMPARISON: October 19th HISTORY: ORDERING SYSTEM PROVIDED HISTORY: intubation TECHNOLOGIST PROVIDED HISTORY: Reason for exam:->intubation What reading provider will be dictating this exam?->CRC FINDINGS: ET tube 7.4 cm above kelly.  NG tube in the stomach. The patient is rotated.  There is scar-like density in the left lung.  An oval density is seen along the lateral left

## 2024-10-27 NOTE — PROGRESS NOTES
0718 sedation decreased for sbt.   0900 am assessment completed as noted. Notified Keyana Jungkingston pt still hasn't had bm.   0949 pt not breathing in sync with the ventilator. Pt using abd muscles with breathing. sedation increased to sync pt breathing with ventilator.   1002 magnesium citrate given.  1027 2mg of versed given to help sync pt breathing and sedation increased.   1130 Pt breathing synced with vent. Will continue to monitor.   Daughters Phone number [phone] Jerel Anne. Electronically signed by Tomasa Chin RN on 10/27/2024 at 3:21 PM

## 2024-10-27 NOTE — PROGRESS NOTES
Hospitalist Progress Note      PCP: Roby Silva MD    Date of Admission: 10/19/2024    Chief Complaint:    No chief complaint on file.    Subjective:  Patient is intubated and sedated       Medications:  Reviewed    Infusion Medications    dexmedeTOMIDine HCl in NaCl 0.7 mcg/kg/hr (10/27/24 1622)    fentaNYL 200 mcg/hr (10/27/24 1622)    norepinephrine 5 mcg/min (10/24/24 2307)    propofol 30 mcg/kg/min (10/27/24 1622)    sodium chloride      dextrose       Scheduled Medications    amLODIPine  10 mg Oral Daily    losartan  100 mg Oral Daily    lactulose  20 g Oral TID    docusate  100 mg Oral BID    polyethylene glycol  17 g Oral Daily    methylPREDNISolone  40 mg IntraVENous Q12H    chlorhexidine  15 mL Mouth/Throat BID    famotidine (PEPCID) injection  20 mg IntraVENous BID    insulin lispro  0-8 Units SubCUTAneous Q4H    escitalopram  10 mg Oral Daily    montelukast  10 mg Oral Nightly    [Held by provider] budesonide-formoterol  2 puff Inhalation BID    traZODone  50 mg Oral Nightly    [Held by provider] tiotropium  2 puff Inhalation Daily RT    [Held by provider] guaiFENesin  600 mg Oral BID    ipratropium 0.5 mg-albuterol 2.5 mg  1 Dose Inhalation Q6H    budesonide  0.5 mg Nebulization BID RT    sodium chloride flush  5-40 mL IntraVENous 2 times per day    enoxaparin  30 mg SubCUTAneous BID     PRN Meds: fentaNYL **AND** fentaNYL, atropine, hydrALAZINE, sodium chloride flush, sodium chloride, magnesium sulfate, ondansetron **OR** ondansetron, melatonin, polyethylene glycol, acetaminophen **OR** acetaminophen, ipratropium 0.5 mg-albuterol 2.5 mg, glucose, dextrose bolus **OR** dextrose bolus, glucagon (rDNA), dextrose      Intake/Output Summary (Last 24 hours) at 10/27/2024 1655  Last data filed at 10/27/2024 1622  Gross per 24 hour   Intake 3099.5 ml   Output 2875 ml   Net 224.5 ml     Exam:    BP 93/66   Pulse 62   Temp 98.2 °F (36.8 °C)   Resp 26   Ht 1.829 m (6')   Wt 108.9 kg (240 lb)   SpO2

## 2024-10-27 NOTE — PROGRESS NOTES
Pulmonary & Critical Care Medicine ICU Progress Note    Subjective:     No events reported overnight.  His Nimbex drip was able to be discontinued yesterday.  He does continue on support of the ventilator, FiO2 is currently at 30% with a PEEP of 5.  He is currently sedated with fentanyl, propofol, and Precedex.    EXAM:  General: Resting comfortably on the ventilator.  No ventilator dyssynchrony noted.  HEENT: Normocephalic, atraumatic.  Pupils equal round and reactive to light.  Oral ETT/OG in place.  Lungs : Rhonchi noted bilaterally, no wheezes or rales  Heart: Regular rate and rhythm  ABD: Obese.  Soft, nontender, nondistended.  Bowel sounds within normal limits  Extremities : Nonpitting edema noted to bilateral upper and lower extremities  Neuro: Sedated on propofol, fentanyl, and Precedex  Skin: No rashes    MV Settings:  Vent Mode: AC/VC Resp Rate (Set): 26 bpm/Vt (Set, mL): 480 mL/ /FiO2 : 30 %     Recent Labs     10/26/24  0907 10/27/24  0432   PHART 7.479* 7.422   [id number] 41 42   PO2ART 90 165*     IV:   dexmedeTOMIDine HCl in NaCl 1 mcg/kg/hr (10/27/24 0653)    fentaNYL 200 mcg/hr (10/27/24 0816)    norepinephrine 5 mcg/min (10/24/24 2307)    propofol 35 mcg/kg/min (10/27/24 0653)    sodium chloride      dextrose       Vitals:  BP (!) 148/77   Pulse (!) 49   Temp 97.3 °F (36.3 °C)   Resp 26   Ht 1.829 m (6')   Wt 108.9 kg (240 lb)   SpO2 100%   BMI 32.55 kg/m²      Intake/Output Summary (Last 24 hours) at 10/27/2024 0828  Last data filed at 10/27/2024 0655  Gross per 24 hour   Intake 3126.21 ml   Output 2725 ml   Net 401.21 ml     Medications:  Scheduled Meds:   amLODIPine  10 mg Oral Daily    losartan  100 mg Oral Daily    lactulose  20 g Oral TID    docusate  100 mg Oral BID    polyethylene glycol  17 g Oral Daily    methylPREDNISolone  40 mg IntraVENous Q12H    cefTRIAXone (ROCEPHIN) 1,000 mg in sterile water 10 mL IV syringe  1,000 mg IntraVENous Q24H    chlorhexidine  15 mL Mouth/Throat BID

## 2024-10-28 ENCOUNTER — CARE COORDINATION (OUTPATIENT)
Dept: CARE COORDINATION | Age: 69
End: 2024-10-28

## 2024-10-28 ENCOUNTER — APPOINTMENT (OUTPATIENT)
Dept: GENERAL RADIOLOGY | Age: 69
DRG: 207 | End: 2024-10-28
Attending: STUDENT IN AN ORGANIZED HEALTH CARE EDUCATION/TRAINING PROGRAM
Payer: MEDICARE

## 2024-10-28 PROBLEM — D72.829 LEUKOCYTOSIS: Status: ACTIVE | Noted: 2024-10-28

## 2024-10-28 PROBLEM — I95.9 HYPOTENSION: Status: ACTIVE | Noted: 2024-10-28

## 2024-10-28 LAB
ALBUMIN SERPL-MCNC: 3.2 G/DL (ref 3.5–4.6)
ALP SERPL-CCNC: 84 U/L (ref 35–104)
ALT SERPL-CCNC: 43 U/L (ref 0–41)
ANION GAP SERPL CALCULATED.3IONS-SCNC: 11 MEQ/L (ref 9–15)
AST SERPL-CCNC: 20 U/L (ref 0–40)
BASE EXCESS ARTERIAL: 5 (ref -3–3)
BASOPHILS # BLD: 0 K/UL (ref 0–0.2)
BASOPHILS NFR BLD: 0.1 %
BILIRUB SERPL-MCNC: 0.3 MG/DL (ref 0.2–0.7)
BUN SERPL-MCNC: 30 MG/DL (ref 8–23)
CALCIUM IONIZED: 1.3 MMOL/L (ref 1.12–1.32)
CALCIUM SERPL-MCNC: 9.3 MG/DL (ref 8.5–9.9)
CHLORIDE SERPL-SCNC: 106 MEQ/L (ref 95–107)
CO2 SERPL-SCNC: 23 MEQ/L (ref 20–31)
CREAT SERPL-MCNC: 1 MG/DL (ref 0.7–1.2)
EOSINOPHIL # BLD: 0 K/UL (ref 0–0.7)
EOSINOPHIL NFR BLD: 0 %
ERYTHROCYTE [DISTWIDTH] IN BLOOD BY AUTOMATED COUNT: 15.4 % (ref 11.5–14.5)
GLOBULIN SER CALC-MCNC: 3.6 G/DL (ref 2.3–3.5)
GLUCOSE BLD-MCNC: 100 MG/DL (ref 70–99)
GLUCOSE BLD-MCNC: 113 MG/DL (ref 70–99)
GLUCOSE BLD-MCNC: 118 MG/DL (ref 70–99)
GLUCOSE BLD-MCNC: 127 MG/DL (ref 70–99)
GLUCOSE BLD-MCNC: 131 MG/DL (ref 70–99)
GLUCOSE BLD-MCNC: 160 MG/DL (ref 70–99)
GLUCOSE BLD-MCNC: 87 MG/DL (ref 70–99)
GLUCOSE SERPL-MCNC: 140 MG/DL (ref 70–99)
HCO3 ARTERIAL: 28.6 MMOL/L (ref 21–29)
HCT VFR BLD AUTO: 43 % (ref 42–52)
HCT VFR BLD AUTO: 48 % (ref 41–53)
HGB BLD CALC-MCNC: 16.3 GM/DL (ref 13.5–17.5)
HGB BLD-MCNC: 14.1 G/DL (ref 14–18)
LACTATE BLDV-SCNC: 1 MMOL/L (ref 0.5–2.2)
LACTATE: 0.78 MMOL/L (ref 0.4–2)
LYMPHOCYTES # BLD: 1 K/UL (ref 1–4.8)
LYMPHOCYTES NFR BLD: 4.7 %
MCH RBC QN AUTO: 32.2 PG (ref 27–31.3)
MCHC RBC AUTO-ENTMCNC: 32.8 % (ref 33–37)
MCV RBC AUTO: 98.2 FL (ref 79–92.2)
MONOCYTES # BLD: 2 K/UL (ref 0.2–0.8)
MONOCYTES NFR BLD: 9.8 %
NEUTROPHILS # BLD: 17.2 K/UL (ref 1.4–6.5)
NEUTS SEG NFR BLD: 84.7 %
O2 SAT, ARTERIAL: 90 % (ref 93–100)
PCO2 ARTERIAL: 42 MM HG (ref 35–45)
PERFORMED ON: ABNORMAL
PERFORMED ON: NORMAL
PH ARTERIAL: 7.44 (ref 7.35–7.45)
PLATELET # BLD AUTO: 306 K/UL (ref 130–400)
PO2 ARTERIAL: 57 MM HG (ref 75–108)
POC CHLORIDE: 107 MEQ/L (ref 99–110)
POC CREATININE: 1 MG/DL (ref 0.8–1.3)
POC FIO2: 30
POC SAMPLE TYPE: ABNORMAL
POTASSIUM SERPL-SCNC: 4.3 MEQ/L (ref 3.5–5.1)
POTASSIUM SERPL-SCNC: 5 MEQ/L (ref 3.4–4.9)
PROCALCITONIN SERPL IA-MCNC: 0.29 NG/ML (ref 0–0.15)
PROLACTIN SERPL-MCNC: 11.5 NG/ML (ref 4–15.2)
PROT SERPL-MCNC: 6.8 G/DL (ref 6.3–8)
RBC # BLD AUTO: 4.38 M/UL (ref 4.7–6.1)
SODIUM BLD-SCNC: 145 MEQ/L (ref 136–145)
SODIUM SERPL-SCNC: 140 MEQ/L (ref 135–144)
TCO2 ARTERIAL: 30 MMOL/L (ref 21–32)
WBC # BLD AUTO: 20.3 K/UL (ref 4.8–10.8)

## 2024-10-28 PROCEDURE — 31500 INSERT EMERGENCY AIRWAY: CPT

## 2024-10-28 PROCEDURE — 36415 COLL VENOUS BLD VENIPUNCTURE: CPT

## 2024-10-28 PROCEDURE — 6360000002 HC RX W HCPCS: Performed by: NURSE PRACTITIONER

## 2024-10-28 PROCEDURE — 6360000002 HC RX W HCPCS: Performed by: INTERNAL MEDICINE

## 2024-10-28 PROCEDURE — 99222 1ST HOSP IP/OBS MODERATE 55: CPT | Performed by: INTERNAL MEDICINE

## 2024-10-28 PROCEDURE — 85025 COMPLETE CBC W/AUTO DIFF WBC: CPT

## 2024-10-28 PROCEDURE — 87040 BLOOD CULTURE FOR BACTERIA: CPT

## 2024-10-28 PROCEDURE — 94640 AIRWAY INHALATION TREATMENT: CPT

## 2024-10-28 PROCEDURE — 6370000000 HC RX 637 (ALT 250 FOR IP): Performed by: INTERNAL MEDICINE

## 2024-10-28 PROCEDURE — 82435 ASSAY OF BLOOD CHLORIDE: CPT

## 2024-10-28 PROCEDURE — 94644 CONT INHLJ TX 1ST HOUR: CPT

## 2024-10-28 PROCEDURE — 2700000000 HC OXYGEN THERAPY PER DAY

## 2024-10-28 PROCEDURE — 99232 SBSQ HOSP IP/OBS MODERATE 35: CPT | Performed by: UROLOGY

## 2024-10-28 PROCEDURE — 2500000003 HC RX 250 WO HCPCS: Performed by: NURSE PRACTITIONER

## 2024-10-28 PROCEDURE — 94760 N-INVAS EAR/PLS OXIMETRY 1: CPT

## 2024-10-28 PROCEDURE — 82948 REAGENT STRIP/BLOOD GLUCOSE: CPT

## 2024-10-28 PROCEDURE — 94003 VENT MGMT INPAT SUBQ DAY: CPT

## 2024-10-28 PROCEDURE — 84145 PROCALCITONIN (PCT): CPT

## 2024-10-28 PROCEDURE — 6370000000 HC RX 637 (ALT 250 FOR IP): Performed by: NURSE PRACTITIONER

## 2024-10-28 PROCEDURE — 2000000000 HC ICU R&B

## 2024-10-28 PROCEDURE — 2580000003 HC RX 258: Performed by: INTERNAL MEDICINE

## 2024-10-28 PROCEDURE — 2580000003 HC RX 258: Performed by: NURSE PRACTITIONER

## 2024-10-28 PROCEDURE — 84132 ASSAY OF SERUM POTASSIUM: CPT

## 2024-10-28 PROCEDURE — 99232 SBSQ HOSP IP/OBS MODERATE 35: CPT | Performed by: INTERNAL MEDICINE

## 2024-10-28 PROCEDURE — 71045 X-RAY EXAM CHEST 1 VIEW: CPT

## 2024-10-28 PROCEDURE — 36600 WITHDRAWAL OF ARTERIAL BLOOD: CPT

## 2024-10-28 PROCEDURE — 94761 N-INVAS EAR/PLS OXIMETRY MLT: CPT

## 2024-10-28 PROCEDURE — 84295 ASSAY OF SERUM SODIUM: CPT

## 2024-10-28 PROCEDURE — 82565 ASSAY OF CREATININE: CPT

## 2024-10-28 PROCEDURE — 84146 ASSAY OF PROLACTIN: CPT

## 2024-10-28 PROCEDURE — 82803 BLOOD GASES ANY COMBINATION: CPT

## 2024-10-28 PROCEDURE — 83605 ASSAY OF LACTIC ACID: CPT

## 2024-10-28 PROCEDURE — 85014 HEMATOCRIT: CPT

## 2024-10-28 PROCEDURE — 99291 CRITICAL CARE FIRST HOUR: CPT | Performed by: INTERNAL MEDICINE

## 2024-10-28 PROCEDURE — 80053 COMPREHEN METABOLIC PANEL: CPT

## 2024-10-28 PROCEDURE — 2500000003 HC RX 250 WO HCPCS

## 2024-10-28 PROCEDURE — 82330 ASSAY OF CALCIUM: CPT

## 2024-10-28 PROCEDURE — 94660 CPAP INITIATION&MGMT: CPT

## 2024-10-28 RX ORDER — METOCLOPRAMIDE HYDROCHLORIDE 5 MG/ML
10 INJECTION INTRAMUSCULAR; INTRAVENOUS 3 TIMES DAILY
Status: DISCONTINUED | OUTPATIENT
Start: 2024-10-28 | End: 2024-10-29

## 2024-10-28 RX ADMIN — DEXMEDETOMIDINE HYDROCHLORIDE 1.4 MCG/KG/HR: 400 INJECTION, SOLUTION INTRAVENOUS at 19:36

## 2024-10-28 RX ADMIN — DOCUSATE SODIUM 100 MG: 50 LIQUID ORAL at 08:52

## 2024-10-28 RX ADMIN — BUDESONIDE 500 MCG: 0.5 SUSPENSION RESPIRATORY (INHALATION) at 03:13

## 2024-10-28 RX ADMIN — LOSARTAN POTASSIUM 100 MG: 100 TABLET, FILM COATED ORAL at 09:16

## 2024-10-28 RX ADMIN — CHLORHEXIDINE GLUCONATE 15 ML: 1.2 RINSE ORAL at 08:52

## 2024-10-28 RX ADMIN — Medication 125 MCG/HR: at 18:09

## 2024-10-28 RX ADMIN — SODIUM CHLORIDE, PRESERVATIVE FREE 20 MG: 5 INJECTION INTRAVENOUS at 21:16

## 2024-10-28 RX ADMIN — DEXMEDETOMIDINE HYDROCHLORIDE 1.3 MCG/KG/HR: 400 INJECTION, SOLUTION INTRAVENOUS at 01:50

## 2024-10-28 RX ADMIN — LACTULOSE 20 G: 20 SOLUTION ORAL at 08:52

## 2024-10-28 RX ADMIN — PROPOFOL 18 MCG/KG/MIN: 10 INJECTION, EMULSION INTRAVENOUS at 00:47

## 2024-10-28 RX ADMIN — DEXMEDETOMIDINE HYDROCHLORIDE 1.4 MCG/KG/HR: 400 INJECTION, SOLUTION INTRAVENOUS at 16:57

## 2024-10-28 RX ADMIN — POLYETHYLENE GLYCOL 3350 17 G: 17 POWDER, FOR SOLUTION ORAL at 08:52

## 2024-10-28 RX ADMIN — BUDESONIDE 500 MCG: 0.5 SUSPENSION RESPIRATORY (INHALATION) at 16:01

## 2024-10-28 RX ADMIN — DEXMEDETOMIDINE HYDROCHLORIDE 1.3 MCG/KG/HR: 400 INJECTION, SOLUTION INTRAVENOUS at 04:18

## 2024-10-28 RX ADMIN — Medication 175 MCG/HR: at 10:00

## 2024-10-28 RX ADMIN — Medication 250 MCG/HR: at 00:42

## 2024-10-28 RX ADMIN — CHLORHEXIDINE GLUCONATE 15 ML: 1.2 RINSE ORAL at 21:15

## 2024-10-28 RX ADMIN — DEXMEDETOMIDINE HYDROCHLORIDE 1.4 MCG/KG/HR: 400 INJECTION, SOLUTION INTRAVENOUS at 22:49

## 2024-10-28 RX ADMIN — PROPOFOL 20 MCG/KG/MIN: 10 INJECTION, EMULSION INTRAVENOUS at 05:39

## 2024-10-28 RX ADMIN — ENOXAPARIN SODIUM 30 MG: 100 INJECTION SUBCUTANEOUS at 08:52

## 2024-10-28 RX ADMIN — METHYLPREDNISOLONE SODIUM SUCCINATE 40 MG: 40 INJECTION INTRAMUSCULAR; INTRAVENOUS at 21:15

## 2024-10-28 RX ADMIN — PIPERACILLIN AND TAZOBACTAM 3375 MG: 3; .375 INJECTION, POWDER, LYOPHILIZED, FOR SOLUTION INTRAVENOUS at 23:45

## 2024-10-28 RX ADMIN — ESCITALOPRAM OXALATE 10 MG: 10 TABLET ORAL at 09:16

## 2024-10-28 RX ADMIN — SODIUM CHLORIDE, PRESERVATIVE FREE 20 MG: 5 INJECTION INTRAVENOUS at 08:52

## 2024-10-28 RX ADMIN — LACTULOSE 20 G: 20 SOLUTION ORAL at 13:33

## 2024-10-28 RX ADMIN — METOCLOPRAMIDE HYDROCHLORIDE 10 MG: 5 INJECTION, SOLUTION INTRAMUSCULAR; INTRAVENOUS at 09:20

## 2024-10-28 RX ADMIN — Medication 10 ML: at 21:17

## 2024-10-28 RX ADMIN — IPRATROPIUM BROMIDE AND ALBUTEROL SULFATE 1 DOSE: 2.5; .5 SOLUTION RESPIRATORY (INHALATION) at 03:13

## 2024-10-28 RX ADMIN — DEXMEDETOMIDINE HYDROCHLORIDE 1.4 MCG/KG/HR: 400 INJECTION, SOLUTION INTRAVENOUS at 14:36

## 2024-10-28 RX ADMIN — Medication 10 ML: at 09:17

## 2024-10-28 RX ADMIN — PIPERACILLIN AND TAZOBACTAM 4500 MG: 4; .5 INJECTION, POWDER, FOR SOLUTION INTRAVENOUS; PARENTERAL at 09:27

## 2024-10-28 RX ADMIN — METHYLPREDNISOLONE SODIUM SUCCINATE 40 MG: 40 INJECTION INTRAMUSCULAR; INTRAVENOUS at 09:16

## 2024-10-28 RX ADMIN — IPRATROPIUM BROMIDE AND ALBUTEROL SULFATE 1 DOSE: 2.5; .5 SOLUTION RESPIRATORY (INHALATION) at 16:02

## 2024-10-28 RX ADMIN — DEXMEDETOMIDINE HYDROCHLORIDE 0.6 MCG/KG/HR: 400 INJECTION, SOLUTION INTRAVENOUS at 08:52

## 2024-10-28 RX ADMIN — METOCLOPRAMIDE HYDROCHLORIDE 10 MG: 5 INJECTION, SOLUTION INTRAMUSCULAR; INTRAVENOUS at 21:16

## 2024-10-28 RX ADMIN — TRAZODONE HYDROCHLORIDE 50 MG: 50 TABLET ORAL at 21:16

## 2024-10-28 RX ADMIN — PIPERACILLIN AND TAZOBACTAM 3375 MG: 3; .375 INJECTION, POWDER, LYOPHILIZED, FOR SOLUTION INTRAVENOUS at 14:44

## 2024-10-28 RX ADMIN — LACTULOSE 20 G: 20 SOLUTION ORAL at 21:15

## 2024-10-28 RX ADMIN — Medication 250 MCG/HR: at 04:54

## 2024-10-28 RX ADMIN — AMLODIPINE BESYLATE 10 MG: 10 TABLET ORAL at 08:52

## 2024-10-28 RX ADMIN — IPRATROPIUM BROMIDE AND ALBUTEROL SULFATE 1 DOSE: 2.5; .5 SOLUTION RESPIRATORY (INHALATION) at 19:44

## 2024-10-28 RX ADMIN — METOCLOPRAMIDE HYDROCHLORIDE 10 MG: 5 INJECTION, SOLUTION INTRAMUSCULAR; INTRAVENOUS at 14:41

## 2024-10-28 RX ADMIN — MONTELUKAST 10 MG: 10 TABLET, FILM COATED ORAL at 21:16

## 2024-10-28 RX ADMIN — IPRATROPIUM BROMIDE AND ALBUTEROL SULFATE 1 DOSE: 2.5; .5 SOLUTION RESPIRATORY (INHALATION) at 09:57

## 2024-10-28 RX ADMIN — ENOXAPARIN SODIUM 30 MG: 100 INJECTION SUBCUTANEOUS at 21:16

## 2024-10-28 RX ADMIN — DOCUSATE SODIUM 100 MG: 50 LIQUID ORAL at 21:15

## 2024-10-28 ASSESSMENT — PULMONARY FUNCTION TESTS
PIF_VALUE: 36
PIF_VALUE: 33
PIF_VALUE: 54
PIF_VALUE: 49
PIF_VALUE: 30
PIF_VALUE: 10
PIF_VALUE: 16
PIF_VALUE: 34
PIF_VALUE: 54
PIF_VALUE: 37
PIF_VALUE: 7.7
PIF_VALUE: 17
PIF_VALUE: 14
PIF_VALUE: 38
PIF_VALUE: 9.1
PIF_VALUE: 36
PIF_VALUE: 9.2
PIF_VALUE: 11
PIF_VALUE: 54
PIF_VALUE: 33
PIF_VALUE: 32
PIF_VALUE: 16
PIF_VALUE: 54
PIF_VALUE: 24
PIF_VALUE: 31
PIF_VALUE: 37
PIF_VALUE: 13
PIF_VALUE: 34
PIF_VALUE: 7.4
PIF_VALUE: 36
PIF_VALUE: 54
PIF_VALUE: 21
PIF_VALUE: 36
PIF_VALUE: 37
PIF_VALUE: 54
PIF_VALUE: 18
PIF_VALUE: 38
PIF_VALUE: 9.4
PIF_VALUE: 37
PIF_VALUE: 35
PIF_VALUE: 54
PIF_VALUE: 50
PIF_VALUE: 30
PIF_VALUE: 8.4
PIF_VALUE: 24
PIF_VALUE: 35
PIF_VALUE: 5
PIF_VALUE: 25
PIF_VALUE: 54
PIF_VALUE: 29
PIF_VALUE: 32
PIF_VALUE: 28
PIF_VALUE: 7.8
PIF_VALUE: 54
PIF_VALUE: 30
PIF_VALUE: 22
PIF_VALUE: 24
PIF_VALUE: 30
PIF_VALUE: 32
PIF_VALUE: 21
PIF_VALUE: 24
PIF_VALUE: 54
PIF_VALUE: 54
PIF_VALUE: 31
PIF_VALUE: 35
PIF_VALUE: 26
PIF_VALUE: 35
PIF_VALUE: 21
PIF_VALUE: 16
PIF_VALUE: 54
PIF_VALUE: 30
PIF_VALUE: 7.9
PIF_VALUE: 35
PIF_VALUE: 37
PIF_VALUE: 54
PIF_VALUE: 54
PIF_VALUE: 35
PIF_VALUE: 9.7
PIF_VALUE: 36
PIF_VALUE: 16
PIF_VALUE: 15
PIF_VALUE: 7.1
PIF_VALUE: 54
PIF_VALUE: 14
PIF_VALUE: 35
PIF_VALUE: 40
PIF_VALUE: 32
PIF_VALUE: 8.1
PIF_VALUE: 50
PIF_VALUE: 37
PIF_VALUE: 54
PIF_VALUE: 40
PIF_VALUE: 35
PIF_VALUE: 23
PIF_VALUE: 24

## 2024-10-28 ASSESSMENT — PAIN SCALES - GENERAL
PAINLEVEL_OUTOF10: 0

## 2024-10-28 NOTE — PROGRESS NOTES
Hospitalist Progress Note      PCP: Roby Silva MD    Date of Admission: 10/19/2024    Chief Complaint:    No chief complaint on file.    Subjective:  Patient is intubated and sedated       Medications:  Reviewed    Infusion Medications    dexmedeTOMIDine HCl in NaCl 1.4 mcg/kg/hr (10/28/24 1436)    fentaNYL 175 mcg/hr (10/28/24 1000)    norepinephrine 1.5 mcg/min (10/28/24 0540)    propofol 20 mcg/kg/min (10/28/24 0540)    sodium chloride      dextrose       Scheduled Medications    metoclopramide  10 mg IntraVENous TID    piperacillin-tazobactam  3,375 mg IntraVENous Q8H    methylPREDNISolone  40 mg IntraVENous Q12H    amLODIPine  10 mg Oral Daily    losartan  100 mg Oral Daily    lactulose  20 g Oral TID    docusate  100 mg Oral BID    polyethylene glycol  17 g Oral Daily    chlorhexidine  15 mL Mouth/Throat BID    famotidine (PEPCID) injection  20 mg IntraVENous BID    insulin lispro  0-8 Units SubCUTAneous Q4H    escitalopram  10 mg Oral Daily    montelukast  10 mg Oral Nightly    [Held by provider] budesonide-formoterol  2 puff Inhalation BID    traZODone  50 mg Oral Nightly    [Held by provider] tiotropium  2 puff Inhalation Daily RT    [Held by provider] guaiFENesin  600 mg Oral BID    ipratropium 0.5 mg-albuterol 2.5 mg  1 Dose Inhalation Q6H    budesonide  0.5 mg Nebulization BID RT    sodium chloride flush  5-40 mL IntraVENous 2 times per day    enoxaparin  30 mg SubCUTAneous BID     PRN Meds: fentaNYL **AND** fentaNYL, atropine, hydrALAZINE, sodium chloride flush, sodium chloride, magnesium sulfate, ondansetron **OR** ondansetron, melatonin, polyethylene glycol, acetaminophen **OR** acetaminophen, ipratropium 0.5 mg-albuterol 2.5 mg, glucose, dextrose bolus **OR** dextrose bolus, glucagon (rDNA), dextrose      Intake/Output Summary (Last 24 hours) at 10/28/2024 1509  Last data filed at 10/28/2024 0540  Gross per 24 hour   Intake 1861.91 ml   Output 2125 ml   Net -263.09 ml     Exam:    /76

## 2024-10-28 NOTE — PROGRESS NOTES
Critical Care Progress Note    10/28/2024 8:06 AM    Subjective:   Admit Date: 10/19/2024  PCP: Roby Silva MD    No chief complaint on file.    Interval History: Continues to be on mechanical ventilation at low settings.  Sedated with Precedex, fentanyl, propofol.  Has been on Levophed.  Urine output is 2100 cc in 24 hours.  Failed SBT yesterday.    Medications:   Scheduled Meds:   methylPREDNISolone  40 mg IntraVENous Q12H    amLODIPine  10 mg Oral Daily    losartan  100 mg Oral Daily    lactulose  20 g Oral TID    docusate  100 mg Oral BID    polyethylene glycol  17 g Oral Daily    chlorhexidine  15 mL Mouth/Throat BID    famotidine (PEPCID) injection  20 mg IntraVENous BID    insulin lispro  0-8 Units SubCUTAneous Q4H    escitalopram  10 mg Oral Daily    montelukast  10 mg Oral Nightly    [Held by provider] budesonide-formoterol  2 puff Inhalation BID    traZODone  50 mg Oral Nightly    [Held by provider] tiotropium  2 puff Inhalation Daily RT    [Held by provider] guaiFENesin  600 mg Oral BID    ipratropium 0.5 mg-albuterol 2.5 mg  1 Dose Inhalation Q6H    budesonide  0.5 mg Nebulization BID RT    sodium chloride flush  5-40 mL IntraVENous 2 times per day    enoxaparin  30 mg SubCUTAneous BID     Continuous Infusions:   dexmedeTOMIDine HCl in NaCl 1.3 mcg/kg/hr (10/28/24 0540)    fentaNYL 250 mcg/hr (10/28/24 0540)    norepinephrine 1.5 mcg/min (10/28/24 0540)    propofol 20 mcg/kg/min (10/28/24 0540)    sodium chloride      dextrose           Objective:   Vitals:   Temp (24hrs), Av.8 °F (37.1 °C), Min:97.2 °F (36.2 °C), Max:100.8 °F (38.2 °C)    BP (!) 101/55   Pulse 60   Temp 97.5 °F (36.4 °C)   Resp 26   Ht 1.829 m (6')   Wt 108.9 kg (240 lb)   SpO2 98%   BMI 32.55 kg/m²   I/O:24HR INTAKE/OUTPUT:    Intake/Output Summary (Last 24 hours) at 10/28/2024 0806  Last data filed at 10/28/2024 0540  Gross per 24 hour   Intake 2302.55 ml   Output 2125 ml   Net 177.55 ml            Ventilator  output closely.  -Continue lactulose.    -Add Reglan.  -Tight glucose control.  -DVT and GI prophylaxis         Prophylaxis:  Stress ulcer: [] PPI Agent [] H2RA [] Sucralfate [] Other:   VTE: [] Enoxaparin  [] SC Heparin  [] SCD      Full Code      Excluding procedures, the total critical care time caring for this patient with life threatening, unstable organ failure, including direct patient contact, review of medical record, management of life support systems, review of data including imaging and labs, discussions with other team members, patient's family and physicians at least 32 minutes so far today.        Electronically signed by Judith Poole MD on 10/28/2024 at 8:06 AM

## 2024-10-28 NOTE — PROGRESS NOTES
Chief Complaint:  SOB     Reason for consult:  EKG changes     History of Present Illness:     This is a 68-year-old -American male with past medical history significant for hypertension, dyslipidemia, diabetes, COPD on 3 L O2, history of alcohol abuse, history of cocaine abuse, sleep apnea and multiple medical problems who originally presented to [facility] on 10/19/2024 with chief complaint of shortness of breath.  History is obtained from chart review as patient is currently intubated.  Apparently he had told ER physician that he had been drinking alcohol and using cocaine on a regular basis prior to presentation.  Apparently while in ER patient became hypoxic with O2 sat dropping into the 70s with ambulation to the restroom and he was resumed on 3 L O2 per nasal cannula with improvement in SpO2 to 97%.  Chest x-ray in ER had shown no acute cardiopulmonary findings.  Initial BMP in ER unremarkable.  NT proBNP within normal range of 40.  High-sensitivity troponin negative at 18.  CBC showed WBC mildly elevated at 10.4, hemoglobin 12.8.  Urine drug screen positive for cocaine and marijuana.  He was subsequently transferred from [facility] and admitted to [facility] with diagnosis of COPD exacerbation.     Patient was transferred to ICU yesterday afternoon due to worsening respiratory distress and was subsequently intubated.  EKG completed yesterday evening at 2056 showed ST elevations in leads II, III, aVF and V3 through V6 and cardiology consulted for further evaluation.  He was on low-dose Levophed earlier today but this was recently stopped.      At time my evaluation, patient is intubated and sedated on mechanical ventilation.  He is on 50% FiO2 with SpO2 of 98%.  Patient was noted to have hematuria following straight cath this morning.  Urology evaluated patient today.  Repeat EKG completed this afternoon shows some improvement in previously noted ST elevations.  Repeat troponin ordered and

## 2024-10-28 NOTE — CONSULTS
CO2 25 10/29/2024 05:03 AM    BUN 29 10/29/2024 05:03 AM    CREATININE 0.9 10/29/2024 12:04 PM    CREATININE 0.81 10/29/2024 05:03 AM    CALCIUM 8.6 10/29/2024 05:03 AM    GFRAA >60.0 05/17/2022 05:49 AM    LABGLOM >90 10/29/2024 12:04 PM    LABGLOM >90.0 03/27/2024 10:18 AM    GLUCOSE 146 10/29/2024 05:03 AM    GLUCOSE 135 04/20/2022 05:45 AM     TSH:   Lab Results   Component Value Date/Time    TSH 1.670 03/27/2024 10:18 AM    TSH 1.540 04/15/2021 09:16 AM     Urinalysis:   Lab Results   Component Value Date/Time    NITRU Negative 10/19/2024 04:03 PM    WBCUA 0-2 09/16/2021 11:40 AM    BACTERIA None 10/05/2017 11:32 AM    RBCUA 0-2 09/16/2021 11:40 AM    BLOODU Negative 10/19/2024 04:03 PM    GLUCOSEU Negative 10/19/2024 04:03 PM     Albumin: No results found for: \"LABPROT\", \"LABALBU\"  Weight Trends  Wt Readings from Last 10 Encounters:   10/29/24 117.5 kg (259 lb 0.7 oz)   10/19/24 111.1 kg (245 lb)   09/10/24 117.9 kg (260 lb)   08/29/24 117.9 kg (260 lb)   07/26/24 116.9 kg (257 lb 12.8 oz)   07/12/24 116.1 kg (256 lb)   06/28/24 114.7 kg (252 lb 12.8 oz)   02/14/24 127.9 kg (282 lb)   12/05/23 119.8 kg (264 lb 3.2 oz)   11/29/23 115 kg (253 lb 8.5 oz)          FUNCTIONAL ADL´S:     Independent: [  ] Eating, [   ] Dressing, [   ] Transferring, [   ] Toileting, [   ] Bathing, [   ] Continence  Dependent   : [ X ] Eating, [ X ] Dressing, [ X ] Transferring, [ X ] Toileting, [ X ] Bathing, [ X ] Continence  W. assistant : [  ] Eating, [   ] Dressing, [   ] Transferring, [   ] Toileting, [   ] Bathing, [   ] Continence    Radiology: Reviewed      XR CHEST PORTABLE    Result Date: 10/28/2024  EXAMINATION: ONE XRAY VIEW OF THE CHEST 10/28/2024 7:33 am COMPARISON: October 27th HISTORY: ORDERING SYSTEM PROVIDED HISTORY: Intubated.  WBC rapid rise.  Concern for possible tube feed aspiration TECHNOLOGIST PROVIDED HISTORY: Reason for exam:->Intubated.  WBC rapid rise.  Concern for possible tube feed aspiration What  responsibilities and decision making given patient's condition where he is not able to make decisions or speak for himself in the setting of intubation.    Resuscitation Status:  Full Code    Outcomes / Completed Documentation:  An explanation of advance directives and their importance was provided and the following forms completed:    -No new documents completed.    If new document completed, original was provided to patient and/or family member.    Copy was placed for scanning into the [facility] EMR.      I spent 15 minutes providing separately identifiable ACP services with the patient and/or surrogate decision maker in a voluntary, in-person conversation discussing the patient's wishes and goals as detailed in the above note.       BHARATI Romero CNP        I have discussed/reviewed the patient's case with nursing staff, attending intensivist and case management.    -Patient is currently being treated for multiple medical conditions by other providers  Acute hypercapnic respiratory failure  Acute on chronic COPD exacerbation  Leukocytosis  Metabolic encephalopathy  Hyperkalemia  Substance abuse, cocaine  Constipation  Urinary retention  Hematuria  JOHANA  Sinus bradycardia  T2DM  Alcohol abuse  HTN    MDM: High    Electronically signed by BHARATI Romero CNP on 10/29/2024 at 3:07 PM    Collaborating physician: Dr. Paez     Please note this report is partially produced by using speech recognition hardware.  It may contain errors related to the system, including grammar, punctuation and spelling as well as words and phrases that may seem inaccurate.  For any questions or concerns feel free to contact me for clarification.    Thanks for the opportunity you have allowed us to provide palliative care to Anabelle Morris. We will be in touch as care progresses. Please feel free to reach out to us should you have any questions or requests.

## 2024-10-28 NOTE — PROGRESS NOTES
PHARMACY NOTE:   ICU Rounds Attended (10-15 minutes in patient room):    Pt diagnosis: COPD    IV Fluids: none   Renal:   Recent Labs     10/27/24  0432 10/27/24  0627 10/28/24  0447   CREATININE 0.8 0.65* 1.00    Estimated Creatinine Clearance: 93 mL/min (based on SCr of 1 mg/dL).        Antimicrobial Therapy:   Day 1   Antimicrobial agents: zosyn  Cultures none   ID on consult: yes    Recent Labs     10/26/24  0325 10/27/24  0627 10/27/24  2246 10/28/24  0447   WBC 10.9* 9.8 19.2* 20.3*           Pressors:   norepinephrine @ 1.5 mcg/min     Sedation:   Precedex   Fentanyl  Propofol     Insulin Therapy (goal: 140-180): medium SSI  0 units given past 24 hours     Recent Labs     10/26/24  0325 10/27/24  0627 10/28/24  0447   GLUCOSE 133* 158* 140*     Steroid Therapy: solu-medrol 40mg q12h day 2  Stress Ulcer Prophylaxis:   Famotidine   On at home: protonix    DVT Prophylaxis/Anticoagulant Therapy: lovenox 30mg BID  Recent Labs     10/27/24  0627 10/27/24  2246 10/28/24  0447    329 306     No results for input(s): \"INR\" in the last 72 hours.    Bowel Regimen:   Colace BID  Miralax daily  Lactulose TID    Follow up/Changes:   -add reglan 10 TID per verbal from dr. Acevedo on rounds  -start zosyn d/t increased wbc and pro michael per verbal from Akanksha on rounds   -follow up steroid LOT-day 6 q12  Home meds reviewed: follow up reusme home amlodipine, losartan, meloxicam, metformin as appropriate.     Lina Vinson, [facility] PharmD

## 2024-10-28 NOTE — CONSULTS
Infectious Disease     Patient Name: Anabelle Morris  Date: 10/28/2024  YOB: 1955  Medical Record Number: [id number]              History of Present Illness:    Past medical history cocaine COPD on home O2 alcohol abuse diabetes hypertension  Morbid obesity diabetes sleep apnea tobacco abuse hyperlipidemia  History of empyema with VATS surgery          Presented with shortness of breath 10/18/2024  Ongoing for several days worse with exertion  Productive sputum  Found to have rhinovirus/enterovirus infection  Required admission to the intensive care unit intubated 10/21/2024      10/27/2024 increasing fever to 100.8.  Described as having increasing tracheal secretions  Increasing white cell count 20,000  Procalcitonin 0.29  Lactic acid not elevated    Chest x-ray shows persistent left lower lung infiltrate possibly slightly less dense                    Review of Systems   Unable to perform ROS: Intubated       Review of Systems: All 14 review of systems negative other than as stated above    Social History     Tobacco Use    Smoking status: Light Smoker     Current packs/day: 0.00     Average packs/day: 0.3 packs/day for 30.0 years (7.5 ttl pk-yrs)     Types: Cigarettes, Cigars     Start date: 1988     Last attempt to quit: 10/1/2013     Years since quittin.0     Passive exposure: Current    Smokeless tobacco: Never   Vaping Use    Vaping status: Never Used   Substance Use Topics    Alcohol use: Yes     Alcohol/week: 4.0 standard drinks of alcohol     Types: 2 Cans of beer, 2 Shots of liquor per week     Comment: soc    Drug use: Yes     Types: Cocaine, Marijuana (Weed)     Comment: 3 day archer of smoking crack         Past Medical History:   Diagnosis Date    Alcohol abuse 10/27/2016    Anemia     Asthma     CKD (chronic kidney disease) stage 3, GFR 30-59 ml/min ([facility]) 2021    Cocaine abuse ([facility]) 10/27/2016    Community acquired pneumonia of left lower lobe of lung 2016    COPD

## 2024-10-28 NOTE — CARE COORDINATION
Review of chart indicated patient is inpatient at [facility]/ICU and is on a ventilator.

## 2024-10-28 NOTE — INTERDISCIPLINARY ROUNDS
Spiritual Care Services     Summary of Visit:   participated in ICU rounds. Patient continues to be intubated. No family were present.    Encounter Summary  Encounter Overview/Reason: Interdisciplinary rounds  Service Provided For: Patient  Referral/Consult From: Rounding  Support System: Family members                               Spiritual Assessment/Intervention/Outcomes:                     Care Plan:          to provide continued support as needed or requested      Spiritual Care Services   Electronically signed by Chaplain Jh on 10/28/2024 at 10:09 AM.    To reach a  for emotional and spiritual support, place an EPIC consult request.   If a  is needed immediately, dial “0” and ask to page the on-call .

## 2024-10-28 NOTE — CARE COORDINATION
Quality round completed with care management team. Pt remain on vent. No family at bedside. LSW tried to Call both Dtr and Sister on file. No answer. Left VM for both.     DC plan: TBD; on vent. Pending medical progress.     Electronically signed by CIARA Bangura on 10/28/2024 at 10:12 AM

## 2024-10-28 NOTE — PLAN OF CARE
Problem: Nutrition Deficit:  Goal: Optimize nutritional status  10/28/2024 1030 by Catalina Alva, KT, LD  Outcome: Not Progressing  Flowsheets (Taken 10/22/2024 1217 by Olga Foster RD, EVELIA)  Nutrient intake appropriate for improving, restoring, or maintaining nutritional needs:   Assess nutritional status and recommend course of action   Monitor oral intake, labs, and treatment plans   Recommend appropriate diets, oral nutritional supplements, and vitamin/mineral supplements   Recommend, monitor, and adjust tube feedings and TPN/PPN based on assessed needs  10/28/2024 0111 by Milla Julien RN  Outcome: Progressing

## 2024-10-28 NOTE — PROGRESS NOTES
Comprehensive Nutrition Assessment    Type and Reason for Visit:  Reassess    Nutrition Recommendations/Plan:   Resume trophic rate tube feeding  Standard with fiber formula ( Jevity 1.5) @ 20 ml/hr via OG  Add 2 protein modulars, mixed with 100 ml water and give 1 x daily via OG  Water flushes 100 ml every 4 hrs     Malnutrition Assessment:  Malnutrition Status:  At risk for malnutrition (Comment) (10/25/24 1050)    Context:  Acute Illness     Findings of the 6 clinical characteristics of malnutrition:  Energy Intake:  75% or less of estimated energy requirements for 7 or more days  Weight Loss:  No significant weight loss     Body Fat Loss:  No significant body fat loss     Muscle Mass Loss:  No significant muscle mass loss    Fluid Accumulation:  No significant fluid accumulation     Strength:  Not Performed    Nutrition Assessment:    Pt remains intubated no progress towards nutrition related goals, had high residuals and tube feeding held, finally had DM last night, propofol dose lowered, per rounds reglan to be added and tube feeding can be resumed at trophic rate    Nutrition Related Findings:    history includes :cocaine abuse, COPD , alcohol abuse, diabetes ( +) rhinovirus. Intubated ( 10/21), OG in place, trophic tube feeding started ( 10/21), formula change ( 10/23) to increase CHO; sedated with fentanyl, precedex & propofol @ 13.1 ml/hr ( ~ 345 kcals), low dose of levophed, no IVF,  Last BM ( 10/27 per rounds), after aggressive bowel regimen, , abnormal labs noted ( K = 5, glucose 100-140), relevant meds reviewed . Wound Type: None       Current Nutrition Intake & Therapies:    Average Meal Intake: NPO  Average Supplements Intake: NPO  ADULT TUBE FEEDING; Orogastric; Standard with Fiber; Continuous; 20; Yes; 10; Q 4 hours; 30; 100; Q 4 hours; Protein; 2 Doses; Daily  Current Tube Feeding (TF) Orders:  Feeding Route: Orogastric  Formula: Standard with Fiber (Jevity 1.5)  Schedule: Continuous  Feeding

## 2024-10-28 NOTE — PROGRESS NOTES
Progress Note    10/28/2024   9:21 AM    Name:  Anabelle Morris  MRN:    [id number]     Acct:     [id number]   Room:  [address] Day: 9     Admit Date: 10/19/2024  7:10 PM  PCP: Roby Silva MD    Subjective:     C/C: No chief complaint on file.      Interval History: Status: This is a 67 yo male with COPD, CKD, HTN, GASPER, DM and intubated in ICU for respiratory failure and has Zamora for urinary retention. The urine is clear in the zamora bag.     Past Medical History:   Diagnosis Date    Alcohol abuse 10/27/2016    Anemia     Asthma     CKD (chronic kidney disease) stage 3, GFR 30-59 ml/min ([facility]) 12/14/2021    Cocaine abuse ([facility]) 10/27/2016    Community acquired pneumonia of left lower lobe of lung 12/05/2016    COPD (chronic obstructive pulmonary disease) ([facility])     Depression 04/27/2020    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    Drug-seeking behavior 04/14/2015    Edema 12/10/2015    Erectile dysfunction     Gastroesophageal reflux disease 12/17/2018    Gout 10/27/2016    History of arthroscopy of both knees 10/27/2016    History of colon polyps 10/26/2021    House dust mite allergy 04/21/2014    Hyperlipidemia     Hypertension 10/27/2016    Injury to heart 10/27/2016    Insomnia 12/17/2018    Loculated pleural effusion     Macrocytic anemia 09/07/2013    Medical non-compliance 02/22/2014    Morbid obesity due to excess calories 12/08/2016    Osteoarthritis of both knees 12/08/2016    Personal history of tobacco use     Pleurisy 12/12/2016    Pneumonia 12/04/2016    caused hospital admission    Pneumonia in infectious disease 11/29/2023    Pre-diabetes 10/27/2016    Seasonal allergies 04/21/2014    Severe persistent asthma 10/27/2016    Severe sleep apnea 04/14/2015    Supraventricular tachycardia (HCC) 10/27/2016    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

## 2024-10-28 NOTE — FLOWSHEET NOTE
7am-7pm Shift Assessment    Patient tolerated SBT trial from 1038 am to 1300  and than placed back on original vent setting by resp therapy per order of Akanksha Alvarado after ABGs were drawn and viewed by same. Patient had increased HR and decreased SPO2 from 89-91% during this time and Akanksha Alvarado was aware. See MAR for sedation titration. Also during SBT trial patient did open eyes to name called and squeeze hands to commands. Tube feeding restarted at 1300 per order with minimal residuals throughout the day. Patient suctioned and mouth care done, repositioned for comfort, see flow sheet for assessments.

## 2024-10-28 NOTE — CARE COORDINATION
Team ICU rounds done this am at bedside and pt has new consult for Pall care. Pt still on vent with multiple IV meds. Family not present for rounds .

## 2024-10-29 ENCOUNTER — APPOINTMENT (OUTPATIENT)
Dept: GENERAL RADIOLOGY | Age: 69
DRG: 207 | End: 2024-10-29
Attending: STUDENT IN AN ORGANIZED HEALTH CARE EDUCATION/TRAINING PROGRAM
Payer: MEDICARE

## 2024-10-29 PROBLEM — Z51.5 PALLIATIVE CARE ENCOUNTER: Status: ACTIVE | Noted: 2024-10-29

## 2024-10-29 PROBLEM — Z71.89 ADVANCED CARE PLANNING/COUNSELING DISCUSSION: Status: ACTIVE | Noted: 2024-10-29

## 2024-10-29 PROBLEM — Z71.89 GOALS OF CARE, COUNSELING/DISCUSSION: Status: ACTIVE | Noted: 2024-10-29

## 2024-10-29 PROBLEM — Z78.9 FULL CODE STATUS: Status: ACTIVE | Noted: 2024-10-29

## 2024-10-29 LAB
ALBUMIN SERPL-MCNC: 2.6 G/DL (ref 3.5–4.6)
ALP SERPL-CCNC: 64 U/L (ref 35–104)
ALT SERPL-CCNC: 29 U/L (ref 0–41)
ANION GAP SERPL CALCULATED.3IONS-SCNC: 7 MEQ/L (ref 9–15)
AST SERPL-CCNC: 15 U/L (ref 0–40)
BASE EXCESS ARTERIAL: 4 (ref -3–3)
BASE EXCESS ARTERIAL: 4 (ref -3–3)
BASOPHILS # BLD: 0 K/UL (ref 0–0.2)
BASOPHILS NFR BLD: 0.1 %
BILIRUB SERPL-MCNC: 0.4 MG/DL (ref 0.2–0.7)
BUN SERPL-MCNC: 29 MG/DL (ref 8–23)
CALCIUM IONIZED: 1.35 MMOL/L (ref 1.12–1.32)
CALCIUM IONIZED: 1.36 MMOL/L (ref 1.12–1.32)
CALCIUM SERPL-MCNC: 8.6 MG/DL (ref 8.5–9.9)
CHLORIDE SERPL-SCNC: 108 MEQ/L (ref 95–107)
CO2 SERPL-SCNC: 25 MEQ/L (ref 20–31)
CREAT SERPL-MCNC: 0.81 MG/DL (ref 0.7–1.2)
EOSINOPHIL # BLD: 0 K/UL (ref 0–0.7)
EOSINOPHIL NFR BLD: 0 %
ERYTHROCYTE [DISTWIDTH] IN BLOOD BY AUTOMATED COUNT: 15.3 % (ref 11.5–14.5)
GLOBULIN SER CALC-MCNC: 2.9 G/DL (ref 2.3–3.5)
GLUCOSE BLD-MCNC: 124 MG/DL (ref 70–99)
GLUCOSE BLD-MCNC: 134 MG/DL (ref 70–99)
GLUCOSE BLD-MCNC: 169 MG/DL (ref 70–99)
GLUCOSE BLD-MCNC: 170 MG/DL (ref 70–99)
GLUCOSE BLD-MCNC: 175 MG/DL (ref 70–99)
GLUCOSE BLD-MCNC: 94 MG/DL (ref 70–99)
GLUCOSE BLD-MCNC: 98 MG/DL (ref 70–99)
GLUCOSE SERPL-MCNC: 146 MG/DL (ref 70–99)
HCO3 ARTERIAL: 28.3 MMOL/L (ref 21–29)
HCO3 ARTERIAL: 28.6 MMOL/L (ref 21–29)
HCT VFR BLD AUTO: 36.9 % (ref 42–52)
HCT VFR BLD AUTO: 41 % (ref 41–53)
HCT VFR BLD AUTO: 41 % (ref 41–53)
HGB BLD CALC-MCNC: 13.9 GM/DL (ref 13.5–17.5)
HGB BLD CALC-MCNC: 13.9 GM/DL (ref 13.5–17.5)
HGB BLD-MCNC: 12.3 G/DL (ref 14–18)
LACTATE: 0.82 MMOL/L (ref 0.4–2)
LACTATE: 0.99 MMOL/L (ref 0.4–2)
LYMPHOCYTES # BLD: 0.8 K/UL (ref 1–4.8)
LYMPHOCYTES NFR BLD: 5.3 %
MCH RBC QN AUTO: 32.5 PG (ref 27–31.3)
MCHC RBC AUTO-ENTMCNC: 33.3 % (ref 33–37)
MCV RBC AUTO: 97.4 FL (ref 79–92.2)
MONOCYTES # BLD: 1 K/UL (ref 0.2–0.8)
MONOCYTES NFR BLD: 7.3 %
NEUTROPHILS # BLD: 12.3 K/UL (ref 1.4–6.5)
NEUTS SEG NFR BLD: 86.6 %
O2 SAT, ARTERIAL: 94 % (ref 93–100)
O2 SAT, ARTERIAL: 96 % (ref 93–100)
PCO2 ARTERIAL: 43 MM HG (ref 35–45)
PCO2 ARTERIAL: 45 MM HG (ref 35–45)
PERFORMED ON: ABNORMAL
PERFORMED ON: NORMAL
PERFORMED ON: NORMAL
PH ARTERIAL: 7.4 (ref 7.35–7.45)
PH ARTERIAL: 7.43 (ref 7.35–7.45)
PLATELET # BLD AUTO: 221 K/UL (ref 130–400)
PO2 ARTERIAL: 70 MM HG (ref 75–108)
PO2 ARTERIAL: 79 MM HG (ref 75–108)
POC CHLORIDE: 105 MEQ/L (ref 99–110)
POC CHLORIDE: 107 MEQ/L (ref 99–110)
POC CREATININE: 0.9 MG/DL (ref 0.8–1.3)
POC CREATININE: 0.9 MG/DL (ref 0.8–1.3)
POC FIO2: 30
POC FIO2: 30
POC SAMPLE TYPE: ABNORMAL
POC SAMPLE TYPE: ABNORMAL
POTASSIUM SERPL-SCNC: 4.6 MEQ/L (ref 3.5–5.1)
POTASSIUM SERPL-SCNC: 5.1 MEQ/L (ref 3.5–5.1)
POTASSIUM SERPL-SCNC: 5.4 MEQ/L (ref 3.4–4.9)
PROCALCITONIN SERPL IA-MCNC: 0.25 NG/ML (ref 0–0.15)
PROT SERPL-MCNC: 5.5 G/DL (ref 6.3–8)
RBC # BLD AUTO: 3.79 M/UL (ref 4.7–6.1)
SODIUM BLD-SCNC: 142 MEQ/L (ref 136–145)
SODIUM BLD-SCNC: 143 MEQ/L (ref 136–145)
SODIUM SERPL-SCNC: 140 MEQ/L (ref 135–144)
TCO2 ARTERIAL: 30 MMOL/L (ref 21–32)
TCO2 ARTERIAL: 30 MMOL/L (ref 21–32)
WBC # BLD AUTO: 14.2 K/UL (ref 4.8–10.8)

## 2024-10-29 PROCEDURE — 2000000000 HC ICU R&B

## 2024-10-29 PROCEDURE — 6370000000 HC RX 637 (ALT 250 FOR IP): Performed by: NURSE PRACTITIONER

## 2024-10-29 PROCEDURE — 6370000000 HC RX 637 (ALT 250 FOR IP): Performed by: INTERNAL MEDICINE

## 2024-10-29 PROCEDURE — 99231 SBSQ HOSP IP/OBS SF/LOW 25: CPT | Performed by: PHYSICIAN ASSISTANT

## 2024-10-29 PROCEDURE — 2700000000 HC OXYGEN THERAPY PER DAY

## 2024-10-29 PROCEDURE — 6360000002 HC RX W HCPCS: Performed by: INTERNAL MEDICINE

## 2024-10-29 PROCEDURE — 82948 REAGENT STRIP/BLOOD GLUCOSE: CPT

## 2024-10-29 PROCEDURE — 6360000002 HC RX W HCPCS: Performed by: NURSE PRACTITIONER

## 2024-10-29 PROCEDURE — 99291 CRITICAL CARE FIRST HOUR: CPT | Performed by: INTERNAL MEDICINE

## 2024-10-29 PROCEDURE — 2500000003 HC RX 250 WO HCPCS

## 2024-10-29 PROCEDURE — 2500000003 HC RX 250 WO HCPCS: Performed by: NURSE PRACTITIONER

## 2024-10-29 PROCEDURE — 84145 PROCALCITONIN (PCT): CPT

## 2024-10-29 PROCEDURE — 36415 COLL VENOUS BLD VENIPUNCTURE: CPT

## 2024-10-29 PROCEDURE — 99232 SBSQ HOSP IP/OBS MODERATE 35: CPT | Performed by: INTERNAL MEDICINE

## 2024-10-29 PROCEDURE — 6370000000 HC RX 637 (ALT 250 FOR IP): Performed by: STUDENT IN AN ORGANIZED HEALTH CARE EDUCATION/TRAINING PROGRAM

## 2024-10-29 PROCEDURE — 31500 INSERT EMERGENCY AIRWAY: CPT

## 2024-10-29 PROCEDURE — 94761 N-INVAS EAR/PLS OXIMETRY MLT: CPT

## 2024-10-29 PROCEDURE — 94660 CPAP INITIATION&MGMT: CPT

## 2024-10-29 PROCEDURE — 51702 INSERT TEMP BLADDER CATH: CPT

## 2024-10-29 PROCEDURE — 83605 ASSAY OF LACTIC ACID: CPT

## 2024-10-29 PROCEDURE — 80053 COMPREHEN METABOLIC PANEL: CPT

## 2024-10-29 PROCEDURE — 94003 VENT MGMT INPAT SUBQ DAY: CPT

## 2024-10-29 PROCEDURE — 2580000003 HC RX 258: Performed by: NURSE PRACTITIONER

## 2024-10-29 PROCEDURE — 82435 ASSAY OF BLOOD CHLORIDE: CPT

## 2024-10-29 PROCEDURE — 87070 CULTURE OTHR SPECIMN AEROBIC: CPT

## 2024-10-29 PROCEDURE — 94640 AIRWAY INHALATION TREATMENT: CPT

## 2024-10-29 PROCEDURE — 6360000002 HC RX W HCPCS

## 2024-10-29 PROCEDURE — 99223 1ST HOSP IP/OBS HIGH 75: CPT

## 2024-10-29 PROCEDURE — 82803 BLOOD GASES ANY COMBINATION: CPT

## 2024-10-29 PROCEDURE — 85025 COMPLETE CBC W/AUTO DIFF WBC: CPT

## 2024-10-29 PROCEDURE — 82565 ASSAY OF CREATININE: CPT

## 2024-10-29 PROCEDURE — 2580000003 HC RX 258: Performed by: INTERNAL MEDICINE

## 2024-10-29 PROCEDURE — 84295 ASSAY OF SERUM SODIUM: CPT

## 2024-10-29 PROCEDURE — 85014 HEMATOCRIT: CPT

## 2024-10-29 PROCEDURE — 71045 X-RAY EXAM CHEST 1 VIEW: CPT

## 2024-10-29 PROCEDURE — 84132 ASSAY OF SERUM POTASSIUM: CPT

## 2024-10-29 PROCEDURE — 36600 WITHDRAWAL OF ARTERIAL BLOOD: CPT

## 2024-10-29 PROCEDURE — 82330 ASSAY OF CALCIUM: CPT

## 2024-10-29 RX ORDER — PREDNISONE 10 MG/1
10 TABLET ORAL DAILY
Status: DISCONTINUED | OUTPATIENT
Start: 2024-11-05 | End: 2024-11-06 | Stop reason: HOSPADM

## 2024-10-29 RX ORDER — SUCCINYLCHOLINE/SOD CL,ISO/PF 100 MG/5ML
SYRINGE (ML) INTRAVENOUS
Status: COMPLETED
Start: 2024-10-29 | End: 2024-10-29

## 2024-10-29 RX ORDER — ACETAMINOPHEN 160 MG/5ML
1000 LIQUID ORAL EVERY 6 HOURS PRN
Status: DISCONTINUED | OUTPATIENT
Start: 2024-10-29 | End: 2024-11-06 | Stop reason: HOSPADM

## 2024-10-29 RX ORDER — PREDNISONE 10 MG/1
20 TABLET ORAL DAILY
Status: DISCONTINUED | OUTPATIENT
Start: 2024-10-30 | End: 2024-10-29

## 2024-10-29 RX ORDER — PREDNISONE 10 MG/1
5 TABLET ORAL DAILY
Status: DISCONTINUED | OUTPATIENT
Start: 2024-11-05 | End: 2024-10-29

## 2024-10-29 RX ORDER — PREDNISONE 20 MG/1
20 TABLET ORAL DAILY
Status: COMPLETED | OUTPATIENT
Start: 2024-11-02 | End: 2024-11-04

## 2024-10-29 RX ORDER — SUCCINYLCHOLINE/SOD CL,ISO/PF 100 MG/5ML
100 SYRINGE (ML) INTRAVENOUS ONCE
Status: COMPLETED | OUTPATIENT
Start: 2024-10-29 | End: 2024-10-29

## 2024-10-29 RX ORDER — PREDNISONE 10 MG/1
10 TABLET ORAL DAILY
Status: DISCONTINUED | OUTPATIENT
Start: 2024-11-02 | End: 2024-10-29

## 2024-10-29 RX ADMIN — DEXMEDETOMIDINE HYDROCHLORIDE 1.4 MCG/KG/HR: 400 INJECTION, SOLUTION INTRAVENOUS at 07:42

## 2024-10-29 RX ADMIN — ACETAMINOPHEN 1000 MG: 160 SOLUTION ORAL at 21:22

## 2024-10-29 RX ADMIN — PIPERACILLIN AND TAZOBACTAM 3375 MG: 3; .375 INJECTION, POWDER, LYOPHILIZED, FOR SOLUTION INTRAVENOUS at 15:18

## 2024-10-29 RX ADMIN — Medication 10 ML: at 20:42

## 2024-10-29 RX ADMIN — CHLORHEXIDINE GLUCONATE 15 ML: 1.2 RINSE ORAL at 09:00

## 2024-10-29 RX ADMIN — Medication 125 MCG/HR: at 08:28

## 2024-10-29 RX ADMIN — SODIUM CHLORIDE, PRESERVATIVE FREE 20 MG: 5 INJECTION INTRAVENOUS at 08:29

## 2024-10-29 RX ADMIN — PROPOFOL 20 MCG/KG/MIN: 10 INJECTION, EMULSION INTRAVENOUS at 01:29

## 2024-10-29 RX ADMIN — PROPOFOL 30 MCG/KG/MIN: 10 INJECTION, EMULSION INTRAVENOUS at 05:37

## 2024-10-29 RX ADMIN — LOSARTAN POTASSIUM 100 MG: 100 TABLET, FILM COATED ORAL at 08:29

## 2024-10-29 RX ADMIN — ESCITALOPRAM OXALATE 10 MG: 10 TABLET ORAL at 08:29

## 2024-10-29 RX ADMIN — METHYLPREDNISOLONE SODIUM SUCCINATE 40 MG: 40 INJECTION INTRAMUSCULAR; INTRAVENOUS at 08:30

## 2024-10-29 RX ADMIN — DEXMEDETOMIDINE HYDROCHLORIDE 1.4 MCG/KG/HR: 400 INJECTION, SOLUTION INTRAVENOUS at 01:28

## 2024-10-29 RX ADMIN — IPRATROPIUM BROMIDE AND ALBUTEROL SULFATE 1 DOSE: 2.5; .5 SOLUTION RESPIRATORY (INHALATION) at 22:13

## 2024-10-29 RX ADMIN — METOCLOPRAMIDE HYDROCHLORIDE 10 MG: 5 INJECTION, SOLUTION INTRAMUSCULAR; INTRAVENOUS at 08:29

## 2024-10-29 RX ADMIN — DEXMEDETOMIDINE HYDROCHLORIDE 1.3 MCG/KG/HR: 400 INJECTION, SOLUTION INTRAVENOUS at 04:32

## 2024-10-29 RX ADMIN — PIPERACILLIN AND TAZOBACTAM 3375 MG: 3; .375 INJECTION, POWDER, LYOPHILIZED, FOR SOLUTION INTRAVENOUS at 08:36

## 2024-10-29 RX ADMIN — BUDESONIDE 500 MCG: 0.5 SUSPENSION RESPIRATORY (INHALATION) at 16:16

## 2024-10-29 RX ADMIN — Medication 10 ML: at 08:30

## 2024-10-29 RX ADMIN — SODIUM ZIRCONIUM CYCLOSILICATE 10 G: 10 POWDER, FOR SUSPENSION ORAL at 08:29

## 2024-10-29 RX ADMIN — ENOXAPARIN SODIUM 30 MG: 100 INJECTION SUBCUTANEOUS at 08:29

## 2024-10-29 RX ADMIN — IPRATROPIUM BROMIDE AND ALBUTEROL SULFATE 1 DOSE: 2.5; .5 SOLUTION RESPIRATORY (INHALATION) at 03:37

## 2024-10-29 RX ADMIN — DEXMEDETOMIDINE HYDROCHLORIDE 1.4 MCG/KG/HR: 400 INJECTION, SOLUTION INTRAVENOUS at 10:28

## 2024-10-29 RX ADMIN — ENOXAPARIN SODIUM 30 MG: 100 INJECTION SUBCUTANEOUS at 20:40

## 2024-10-29 RX ADMIN — IPRATROPIUM BROMIDE AND ALBUTEROL SULFATE 1 DOSE: 2.5; .5 SOLUTION RESPIRATORY (INHALATION) at 10:03

## 2024-10-29 RX ADMIN — Medication 125 MCG/HR: at 01:57

## 2024-10-29 RX ADMIN — Medication 100 MG: at 00:55

## 2024-10-29 RX ADMIN — IPRATROPIUM BROMIDE AND ALBUTEROL SULFATE 1 DOSE: 2.5; .5 SOLUTION RESPIRATORY (INHALATION) at 16:16

## 2024-10-29 RX ADMIN — BUDESONIDE 500 MCG: 0.5 SUSPENSION RESPIRATORY (INHALATION) at 03:36

## 2024-10-29 RX ADMIN — AMLODIPINE BESYLATE 10 MG: 10 TABLET ORAL at 08:29

## 2024-10-29 RX ADMIN — SODIUM CHLORIDE, PRESERVATIVE FREE 20 MG: 5 INJECTION INTRAVENOUS at 20:40

## 2024-10-29 ASSESSMENT — PULMONARY FUNCTION TESTS
PIF_VALUE: 11
PIF_VALUE: 26
PIF_VALUE: 14
PIF_VALUE: 14
PIF_VALUE: 25
PIF_VALUE: 14
PIF_VALUE: 53
PIF_VALUE: 28
PIF_VALUE: 35
PIF_VALUE: 27
PIF_VALUE: 61
PIF_VALUE: 26
PIF_VALUE: 12
PIF_VALUE: 13
PIF_VALUE: 12
PIF_VALUE: 11
PIF_VALUE: 11
PIF_VALUE: 32
PIF_VALUE: 15
PIF_VALUE: 23
PIF_VALUE: 25
PIF_VALUE: 14
PIF_VALUE: 26
PIF_VALUE: 16
PIF_VALUE: 4.6
PIF_VALUE: 54
PIF_VALUE: 8.1
PIF_VALUE: 11
PIF_VALUE: 21
PIF_VALUE: 26
PIF_VALUE: 6.5
PIF_VALUE: 27
PIF_VALUE: 28
PIF_VALUE: 26
PIF_VALUE: 50
PIF_VALUE: 27
PIF_VALUE: 17
PIF_VALUE: 54

## 2024-10-29 ASSESSMENT — PAIN SCALES - GENERAL
PAINLEVEL_OUTOF10: 0
PAINLEVEL_OUTOF10: 2
PAINLEVEL_OUTOF10: 0
PAINLEVEL_OUTOF10: 10
PAINLEVEL_OUTOF10: 0
PAINLEVEL_OUTOF10: 0
PAINLEVEL_OUTOF10: 5
PAINLEVEL_OUTOF10: 0

## 2024-10-29 ASSESSMENT — PAIN DESCRIPTION - ORIENTATION: ORIENTATION: LOWER

## 2024-10-29 ASSESSMENT — PAIN DESCRIPTION - LOCATION: LOCATION: BACK

## 2024-10-29 ASSESSMENT — PAIN DESCRIPTION - DESCRIPTORS: DESCRIPTORS: ACHING

## 2024-10-29 NOTE — FLOWSHEET NOTE
0800 assessment complete, see flow sheet. Patient remains intubated and sedated, but decreasing sedation for SBT trial today, see MAR. Patient suctioned and mouth acre done, repositioned for comfort.  1000 Patient placed on CPAP by resp therapy for SBT trial, will cont to monitor.  1300 Patient with eyes open and following all commands, appears to tracking with eyes.  1341 Patient extubated per resp therapy, and placed on NC O2 4L, tolerating well at present time. Patient able to state his name, and nod appropriately to questions asked, moving all extremities to command. Will cont to monitor.  1445 SPO2 decreased to 89% NC O2 increased to 6L, Akanksha Alvarado aware, no further orders received.  1500 Patient repositioned, complete bed linen change done, kwame care given, mepilex applied to sacrum, mouth car done and patient suctioned for minimal about of thin creamy secretions. Resting at present time without complaints.  1730 Patients daughter at bedside, update given and questions answered, voiced understanding. Patient resting at present time, call bell within reach and bed alarm on. No changes in assessment noted.

## 2024-10-29 NOTE — PROGRESS NOTES
Subjective:      Patient ID: Anabelle Morris is a 68 y.o. male    HPI 68 year old male who is intubated in the ICU. His zamora catheter is draining clear yellow urine    Past Medical History:   Diagnosis Date    Alcohol abuse 10/27/2016    Anemia     Asthma     CKD (chronic kidney disease) stage 3, GFR 30-59 ml/min ([facility]) 12/14/2021    Cocaine abuse ([facility]) 10/27/2016    Community acquired pneumonia of left lower lobe of lung 12/05/2016    COPD (chronic obstructive pulmonary disease) ([facility])     Depression 04/27/2020    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    Drug-seeking behavior 04/14/2015    Edema 12/10/2015    Erectile dysfunction     Gastroesophageal reflux disease 12/17/2018    Gout 10/27/2016    History of arthroscopy of both knees 10/27/2016    History of colon polyps 10/26/2021    House dust mite allergy 04/21/2014    Hyperlipidemia     Hypertension 10/27/2016    Injury to heart 10/27/2016    Insomnia 12/17/2018    Loculated pleural effusion     Macrocytic anemia 09/07/2013    Medical non-compliance 02/22/2014    Morbid obesity due to excess calories 12/08/2016    Osteoarthritis of both knees 12/08/2016    Personal history of tobacco use     Pleurisy 12/12/2016    Pneumonia 12/04/2016    caused hospital admission    Pneumonia in infectious disease 11/29/2023    Pre-diabetes 10/27/2016    Seasonal allergies 04/21/2014    Severe persistent asthma 10/27/2016    Severe sleep apnea 04/14/2015    Supraventricular tachycardia (HCC) 10/27/2016    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

## 2024-10-29 NOTE — PROGRESS NOTES
2543: This RN precepting new ICU RN marsha Loyola through entire shift [id number]. All documentation reviewed and agreed upon by this RN. Assessments verified and also agreed on by this RN

## 2024-10-29 NOTE — CARE COORDINATION
LILIAW received a call from pt's dtr, Escobar. Discussed goals with care with Escobar. Per Escobar, her and her dad never really discussed trach and peg. Unsure if pt would ever want it.   Escobar report, pt have HPOA paper work. The decision maker is pt's sister, Violeta.   LILIAW explained at this time no HPOA paper work on file, decision maker remain LNOK.     Escobar report, will see if her aunt, Violeta will email the HPOA paper work to LSW.   Email Address provided.     Electronically signed by CIARA Bangura on 10/29/2024 at 11:40 AM

## 2024-10-29 NOTE — PROGRESS NOTES
1930: assumed care of pt at RN shift change after receiving bedside report from SAMANTHA Mendoza. Pt remains intubated, air leak is noted in ETT. Sedation medications in place, restraints UTD and order in place   2030: pt assessment completed see flow sheet   0000: reassessment without change  0400: pt reassessed, changes noted since previous assessment due to pt having to have ETT changed, medications being adjusted accordingly. Pt was given 2 full baths with linen changes. Abscess noted to left groin folds with positive purulent drainage, wound cx obtained and sent. Pt with multiple loose Bms this shift, order received for rectal tube and placed   0730: This RN precepting RN Milla throughout shift. Documentation reviewed, this RN agrees with all charting

## 2024-10-29 NOTE — PROGRESS NOTES
Chief Complaint:  SOB     Reason for consult:  EKG changes     History of Present Illness:     This is a 68-year-old -American male with past medical history significant for hypertension, dyslipidemia, diabetes, COPD on 3 L O2, history of alcohol abuse, history of cocaine abuse, sleep apnea and multiple medical problems who originally presented to [facility] on 10/19/2024 with chief complaint of shortness of breath.  History is obtained from chart review as patient is currently intubated.  Apparently he had told ER physician that he had been drinking alcohol and using cocaine on a regular basis prior to presentation.  Apparently while in ER patient became hypoxic with O2 sat dropping into the 70s with ambulation to the restroom and he was resumed on 3 L O2 per nasal cannula with improvement in SpO2 to 97%.  Chest x-ray in ER had shown no acute cardiopulmonary findings.  Initial BMP in ER unremarkable.  NT proBNP within normal range of 40.  High-sensitivity troponin negative at 18.  CBC showed WBC mildly elevated at 10.4, hemoglobin 12.8.  Urine drug screen positive for cocaine and marijuana.  He was subsequently transferred from [facility] and admitted to [facility] with diagnosis of COPD exacerbation.     Patient was transferred to ICU yesterday afternoon due to worsening respiratory distress and was subsequently intubated.  EKG completed yesterday evening at 2056 showed ST elevations in leads II, III, aVF and V3 through V6 and cardiology consulted for further evaluation.  He was on low-dose Levophed earlier today but this was recently stopped.      At time my evaluation, patient is intubated and sedated on mechanical ventilation.  He is on 50% FiO2 with SpO2 of 98%.  Patient was noted to have hematuria following straight cath this morning.  Urology evaluated patient today.  Repeat EKG completed this afternoon shows some improvement in previously noted ST elevations.  Repeat troponin ordered and  EOSPCT 0.1 10/22/2024 05:04 AM     BASOPCT 0.1 10/22/2024 05:04 AM     MONOSABS 2.5 10/22/2024 05:04 AM     LYMPHSABS 1.2 10/22/2024 05:04 AM     EOSABS 0.0 10/22/2024 05:04 AM     BASOSABS 0.0 10/22/2024 05:04 AM      CMP:          Lab Results   Component Value Date/Time      10/22/2024 05:04 AM     K 5.3 10/22/2024 05:04 AM      10/22/2024 05:04 AM     CO2 24 10/22/2024 05:04 AM     BUN 28 10/22/2024 05:04 AM     CREATININE 1.8 10/22/2024 09:28 AM     CREATININE 2.08 10/22/2024 05:04 AM     GFRAA >60.0 05/17/2022 05:49 AM     LABGLOM 41 10/22/2024 09:28 AM     LABGLOM >90.0 03/27/2024 10:18 AM     GLUCOSE 106 10/22/2024 05:04 AM     GLUCOSE 135 04/20/2022 05:45 AM     CALCIUM 9.0 10/22/2024 05:04 AM     BILITOT <0.2 10/22/2024 05:04 AM     ALKPHOS 92 10/22/2024 05:04 AM     AST 27 10/22/2024 05:04 AM     ALT 26 10/22/2024 05:04 AM      BMP:          Lab Results   Component Value Date/Time      10/22/2024 05:04 AM     K 5.3 10/22/2024 05:04 AM      10/22/2024 05:04 AM     CO2 24 10/22/2024 05:04 AM     BUN 28 10/22/2024 05:04 AM     CREATININE 1.8 10/22/2024 09:28 AM     CREATININE 2.08 10/22/2024 05:04 AM     CALCIUM 9.0 10/22/2024 05:04 AM     GFRAA >60.0 05/17/2022 05:49 AM     LABGLOM 41 10/22/2024 09:28 AM     LABGLOM >90.0 03/27/2024 10:18 AM     GLUCOSE 106 10/22/2024 05:04 AM     GLUCOSE 135 04/20/2022 05:45 AM      Magnesium:          Lab Results   Component Value Date/Time     MG 2.0 11/29/2023 07:51 AM      Troponin:          Lab Results   Component Value Date/Time     TROPONINI <0.010 08/31/2023 06:35 PM         Radiology:  XR CHEST PORTABLE     Result Date: 10/21/2024  EXAMINATION: ONE XRAY VIEW OF THE CHEST 10/21/2024 3:17 pm COMPARISON: October 19th HISTORY: ORDERING SYSTEM PROVIDED HISTORY: intubation TECHNOLOGIST PROVIDED HISTORY: Reason for exam:->intubation What reading provider will be dictating this exam?->CRC FINDINGS: ET tube 7.4 cm above kelly.  NG tube in the

## 2024-10-29 NOTE — PROGRESS NOTES
Infectious Disease     Patient Name: Anabelle Morris  Date: 10/29/2024  YOB: 1955  Medical Record Number: [id number]              History of Present Illness:    Past medical history cocaine COPD on home O2 alcohol abuse diabetes hypertension  Morbid obesity diabetes sleep apnea tobacco abuse hyperlipidemia  History of empyema with VATS surgery          Presented with shortness of breath 10/18/2024  Ongoing for several days worse with exertion  Productive sputum  Found to have rhinovirus/enterovirus infection  Required admission to the intensive care unit intubated 10/21/2024      10/27/2024 increasing fever to 100.8.  Described as having increasing tracheal secretions  Increasing white cell count 20,000  Procalcitonin 0.29  Lactic acid not elevated    Chest x-ray shows persistent left lower lung infiltrate possibly slightly less dense        Review of Systems   Constitutional: Negative.    Respiratory:  Positive for cough and shortness of breath.    Cardiovascular: Negative.    Gastrointestinal: Negative.         Physical Exam  Constitutional:       Appearance: He is ill-appearing.   HENT:      Mouth/Throat:      Comments: Orally intubated  Cardiovascular:      Heart sounds: Normal heart sounds. No murmur heard.  Pulmonary:      Effort: No respiratory distress.      Breath sounds: Rhonchi present. No wheezing or rales.   Chest:      Chest wall: No tenderness.   Abdominal:      General: Bowel sounds are normal. There is no distension.      Palpations: Abdomen is soft. There is no mass.      Tenderness: There is no abdominal tenderness. There is no guarding.   Genitourinary:     Comments: Holcomb catheter urine appearing clear        Blood pressure 129/76, pulse (!) 102, temperature 99.5 °F (37.5 °C), resp. rate 25, height 1.829 m (6'), weight 117.5 kg (259 lb 0.7 oz), SpO2 96%.      .   Lab Results   Component Value Date    WBC 14.2 (H) 10/29/2024    HGB 13.9 10/29/2024    HCT 36.9 (L) 10/29/2024    MCV

## 2024-10-29 NOTE — PROCEDURES
PROCEDURE NOTE  Date: 10/29/2024   Name: Anabelle Morris  YOB: 1955    Intubation    Date/Time: 10/29/2024 1:07 AM    Performed by: Valarie Chaney DO  Authorized by: Valarie Chaney DO  Consent: The procedure was performed in an emergent situation.  Required items: required blood products, implants, devices, and special equipment available  Patient identity confirmed: arm band and hospital-assigned identification number  Time out: Immediately prior to procedure a \"time out\" was called to verify the correct patient, procedure, equipment, support staff and site/side marked as required.  Indications: airway protection (ETT exchange for large volume air leak with pre-existing ETT)  Intubation method: video-assisted  Patient status: sedated  Preoxygenation: BVM  Pretreatment medications: fentanyl  Paralytic: succinylcholine  Laryngoscope size: Mac 3  Tube size: 7.0 mm  Tube type: cuffed  Number of attempts: 2  Ventilation between attempts: BVM  Cricoid pressure: no  Cords visualized: yes  Post-procedure assessment: CO2 detector  Breath sounds: equal  Cuff inflated: yes  ETT to teeth: 24 cm  Tube secured with: ETT pierson and bite block  Chest x-ray interpreted by me.  Chest x-ray findings: endotracheal tube in appropriate position  Patient tolerance: patient tolerated the procedure well with no immediate complications

## 2024-10-29 NOTE — CARE COORDINATION
Team ICU rounds done this am at bedside and family not present. Pt remains on vent and SBT being done. Pall care following and did attempt to call family and left message. Pt continues to require ICU care and monitoring.

## 2024-10-29 NOTE — PROGRESS NOTES
Critical Care Progress Note    10/29/2024 9:04 AM    Subjective:   Admit Date: 10/19/2024  PCP: Roby Silva MD    No chief complaint on file.    Interval History: Continues to be on mechanical ventilation at low settings. Failed SBT yesterdya. Had changed tube due to cuff leak.   Off of Levophed.       Medications:   Scheduled Meds:   metoclopramide  10 mg IntraVENous TID    piperacillin-tazobactam  3,375 mg IntraVENous Q8H    methylPREDNISolone  40 mg IntraVENous Q12H    amLODIPine  10 mg Oral Daily    losartan  100 mg Oral Daily    lactulose  20 g Oral TID    docusate  100 mg Oral BID    polyethylene glycol  17 g Oral Daily    chlorhexidine  15 mL Mouth/Throat BID    famotidine (PEPCID) injection  20 mg IntraVENous BID    insulin lispro  0-8 Units SubCUTAneous Q4H    escitalopram  10 mg Oral Daily    montelukast  10 mg Oral Nightly    [Held by provider] budesonide-formoterol  2 puff Inhalation BID    traZODone  50 mg Oral Nightly    [Held by provider] tiotropium  2 puff Inhalation Daily RT    [Held by provider] guaiFENesin  600 mg Oral BID    ipratropium 0.5 mg-albuterol 2.5 mg  1 Dose Inhalation Q6H    budesonide  0.5 mg Nebulization BID RT    sodium chloride flush  5-40 mL IntraVENous 2 times per day    enoxaparin  30 mg SubCUTAneous BID     Continuous Infusions:   dexmedeTOMIDine HCl in NaCl 1.4 mcg/kg/hr (10/29/24 0742)    fentaNYL 125 mcg/hr (10/29/24 0828)    norepinephrine Stopped (10/29/24 0255)    propofol 25 mcg/kg/min (10/29/24 0711)    sodium chloride      dextrose           Objective:   Vitals:   Temp (24hrs), Av °F (37.2 °C), Min:97.5 °F (36.4 °C), Max:99.9 °F (37.7 °C)    /72   Pulse (!) 46   Temp 98.4 °F (36.9 °C)   Resp 26   Ht 1.829 m (6')   Wt 117.5 kg (259 lb 0.7 oz)   SpO2 100%   BMI 35.13 kg/m²   I/O:24HR INTAKE/OUTPUT:    Intake/Output Summary (Last 24 hours) at 10/29/2024 0904  Last data filed at 10/29/2024 0711  Gross per 24 hour   Intake 2507.25 ml   Output 2000  ml   Net 507.25 ml            Ventilator Settings:  Vent Mode: AC/VC  Resp Rate (Set): 26 bpm   Vt (Set, mL): 480 mL       PEEP/CPAP (cmH2O): 5   FiO2 : 30 %     Physical Exam:  General appearance - ill appearing.   Mental status - intubated and sedated.   Eyes - pupils equal and reactive, extraocular eye   Nose - normal and patent, no erythema   Neck - supple, no significant adenopathy  Chest - clear to auscultation, no wheezes, rales or rhonchi   Heart - normal rate, regular rhythm, normal S1, S2   Abdomen - soft, nontender, nondistended   Rectal - deferred, not clinically indicated  Neurological - intubated and sedated.   Musculoskeletal - no joint tenderness, deformity or swelling  Extremities - peripheral pulses normal, no pedal edema   Skin - normal coloration and turgor, no rashes                Recent Labs     10/27/24  0627 10/28/24  0447 10/28/24  1245 10/29/24  0503 10/29/24  0516    140  --  140  --    K 4.8 5.0*  --  5.4*  --     106  --  108*  --    CO2 25 23  --  25  --    BUN 18 30*  --  29*  --    CREATININE 0.65* 1.00   < > 0.81 0.9   GLUCOSE 158* 140*  --  146*  --    MG 2.3  --   --   --   --     < > = values in this interval not displayed.    .  MG:3,PHOS:3)@  Ionized Calcium: No components found for: \"IONCA\"  CBC:   Recent Labs     10/28/24  0447 10/28/24  1245 10/29/24  0503 10/29/24  0516   WBC 20.3*  --  14.2*  --    HGB 14.1   < > 12.3* 13.9     --  221  --     < > = values in this interval not displayed.      ABG: No results for input(s): \"PH\", \"PCO2\", \"PO2\" in the last 72 hours.        Assessment and Plan:     Impression:    - Acute hypercapnic respiratory failure, due to COPD exacerbation.  - COPD exacerbation.  Currently on mechanical ventilation.  -Leukocytosis.  Likely reactive.  -Metabolic encephalopathy.  -Mild hyperkalemia.  -Substance abuse.  -Constipation.             Recommendations:    - Continue current care in the ICU for hemodynamic and airway

## 2024-10-29 NOTE — PROCEDURES
0045 - RT informed RN to notify provider that ETT not maintaining tidal volume due to air leak even with repair kit attempt. Dr Gordon called; in route to unit. TF turned off.    0050 - Dr Chaney bedside to replace ETT. 100 mg succinylcholine given. 100 mcg fentanyl bolus ordered per Dr Chaney. Levophed paused at this time.    0052 - Attempted exchange with bougie catheter unsuccessful.    0053 - 7.5 ETT attempted with CMAC. Tube too large.    0054 - Patient easily bagged.    0055 - 7.0 ETT placed with CMAC by Dr Chaney at 24 cm at teeth. Positive color change. Positive bilateral breath sounds. OG remains at 58 cm.    Portable CXR ordered.    Electronically signed by Milla Surgeon, RN on 10/29/2024 at 1:08 AM

## 2024-10-29 NOTE — INTERDISCIPLINARY ROUNDS
Spiritual Care Services     Summary of Visit:   participated in ICU rounds patient intubated and no family present. Patient going trough a breathing trial today.    Encounter Summary  Encounter Overview/Reason: Interdisciplinary rounds, Palliative Care  Service Provided For: Patient  Referral/Consult From: Rounding  Support System: Family members                       Palliative Care  Type: Palliative Care, Interdisciplinary Rounds       Spiritual Assessment/Intervention/Outcomes:                     Care Plan:          will provide support as needed or requeted      Spiritual Care Services   Electronically signed by Chaplain Jh on 10/29/2024 at 9:47 AM.    To reach a  for emotional and spiritual support, place an EPIC consult request.   If a  is needed immediately, dial “0” and ask to page the on-call .

## 2024-10-29 NOTE — PROGRESS NOTES
PHARMACY NOTE:   Interdisciplinary Rounds Completed     Changes made today by Pharmacy:   Dc lactulose per verbal on rounds  Dc solu medrol change to pred taper per verbal on rounds  Follow up dc levo and prop-both off   Home meds reviewed: follow up resume lovastatin, meloxicam, metformin as appropriate     Additional information:   Steroid: changed to prednisone per above  Insulin coverage: medium sliding scale with Humalog, utilized 0 units per sliding scale over the past 24 hours  Pressors: levo off  Sedation: precedex, fentanyl, propofol off  Antimicrobial therapy, day #2/7: zosyn. ID on consult. Cultures in process  Core measures assessed/met    Lina Vinson RPH PharmD

## 2024-10-29 NOTE — PROGRESS NOTES
Ett exchanged due to major air leak, attempts were made to repair cuff but they were unsuccessful. Over 40ml of air were taken out of cuff prior to exchange but cuff still inflated when being removed. Ett was exchanged to a 7.0 tube due to being unable to place a 7.5. ett verified by positive color change and positive breath sounds.

## 2024-10-29 NOTE — SIGNIFICANT EVENT
Notified by room RN and RT that patient had developed large ETT air leak and needed urgent ETT exchange.  Small air leak had reportedly been noted on day shift and ETT repair had been attempted, which appears to have functioned until now, with development of blatant air leak and loss of normal TV on vent.  7.5 ETT exchanged for 7.0 ETT in setting of narrow cord opening and mild epiglottitis.  Please see separate procedural note for additional procedural details.       Electronically signed by Valarie Chaney DO on 10/29/2024 at 1:10 AM

## 2024-10-29 NOTE — PROGRESS NOTES
Hospitalist Progress Note      PCP: Roby Silva MD    Date of Admission: 10/19/2024    Chief Complaint:    No chief complaint on file.    Subjective:  Patient is intubated and sedated. ETT replaced due to air leak early this morning, refer to Dr. Chaney's note for details     Medications:  Reviewed    Infusion Medications    sodium chloride      dextrose       Scheduled Medications    [START ON 10/30/2024] predniSONE  30 mg Oral Daily    Followed by    [START ON 11/2/2024] predniSONE  20 mg Oral Daily    Followed by    [START ON 11/5/2024] predniSONE  10 mg Oral Daily    piperacillin-tazobactam  3,375 mg IntraVENous Q8H    amLODIPine  10 mg Oral Daily    losartan  100 mg Oral Daily    docusate  100 mg Oral BID    polyethylene glycol  17 g Oral Daily    famotidine (PEPCID) injection  20 mg IntraVENous BID    insulin lispro  0-8 Units SubCUTAneous Q4H    escitalopram  10 mg Oral Daily    montelukast  10 mg Oral Nightly    [Held by provider] budesonide-formoterol  2 puff Inhalation BID    traZODone  50 mg Oral Nightly    [Held by provider] tiotropium  2 puff Inhalation Daily RT    [Held by provider] guaiFENesin  600 mg Oral BID    ipratropium 0.5 mg-albuterol 2.5 mg  1 Dose Inhalation Q6H    budesonide  0.5 mg Nebulization BID RT    sodium chloride flush  5-40 mL IntraVENous 2 times per day    enoxaparin  30 mg SubCUTAneous BID     PRN Meds: atropine, hydrALAZINE, sodium chloride flush, sodium chloride, magnesium sulfate, ondansetron **OR** ondansetron, melatonin, polyethylene glycol, acetaminophen **OR** acetaminophen, ipratropium 0.5 mg-albuterol 2.5 mg, glucose, dextrose bolus **OR** dextrose bolus, glucagon (rDNA), dextrose      Intake/Output Summary (Last 24 hours) at 10/29/2024 1448  Last data filed at 10/29/2024 1351  Gross per 24 hour   Intake 2817.9 ml   Output 2000 ml   Net 817.9 ml     Exam:    /77   Pulse (!) 102   Temp 99.3 °F (37.4 °C)   Resp 18   Ht 1.829 m (6')   Wt 117.5 kg (259 lb

## 2024-10-30 ENCOUNTER — APPOINTMENT (OUTPATIENT)
Dept: GENERAL RADIOLOGY | Age: 69
DRG: 207 | End: 2024-10-30
Attending: STUDENT IN AN ORGANIZED HEALTH CARE EDUCATION/TRAINING PROGRAM
Payer: MEDICARE

## 2024-10-30 LAB
ALBUMIN SERPL-MCNC: 3 G/DL (ref 3.5–4.6)
ALP SERPL-CCNC: 75 U/L (ref 35–104)
ALT SERPL-CCNC: 31 U/L (ref 0–41)
ANION GAP SERPL CALCULATED.3IONS-SCNC: 8 MEQ/L (ref 9–15)
ANISOCYTOSIS BLD QL SMEAR: ABNORMAL
AST SERPL-CCNC: 24 U/L (ref 0–40)
BASE EXCESS ARTERIAL: 6 (ref -3–3)
BASOPHILS # BLD: 0 K/UL (ref 0–0.2)
BASOPHILS NFR BLD: 0.1 %
BILIRUB SERPL-MCNC: 0.9 MG/DL (ref 0.2–0.7)
BUN SERPL-MCNC: 22 MG/DL (ref 8–23)
CALCIUM IONIZED: 1.31 MMOL/L (ref 1.12–1.32)
CALCIUM SERPL-MCNC: 9.1 MG/DL (ref 8.5–9.9)
CHLORIDE SERPL-SCNC: 104 MEQ/L (ref 95–107)
CO2 SERPL-SCNC: 26 MEQ/L (ref 20–31)
CREAT SERPL-MCNC: 0.82 MG/DL (ref 0.7–1.2)
EOSINOPHIL # BLD: 0.5 K/UL (ref 0–0.7)
EOSINOPHIL NFR BLD: 3 %
ERYTHROCYTE [DISTWIDTH] IN BLOOD BY AUTOMATED COUNT: 15.6 % (ref 11.5–14.5)
GLOBULIN SER CALC-MCNC: 3.5 G/DL (ref 2.3–3.5)
GLUCOSE BLD-MCNC: 105 MG/DL (ref 70–99)
GLUCOSE BLD-MCNC: 108 MG/DL (ref 70–99)
GLUCOSE BLD-MCNC: 108 MG/DL (ref 70–99)
GLUCOSE BLD-MCNC: 109 MG/DL (ref 70–99)
GLUCOSE BLD-MCNC: 109 MG/DL (ref 70–99)
GLUCOSE BLD-MCNC: 123 MG/DL (ref 70–99)
GLUCOSE BLD-MCNC: 130 MG/DL (ref 70–99)
GLUCOSE SERPL-MCNC: 115 MG/DL (ref 70–99)
HCO3 ARTERIAL: 30 MMOL/L (ref 21–29)
HCT VFR BLD AUTO: 41.9 % (ref 42–52)
HCT VFR BLD AUTO: 46 % (ref 41–53)
HGB BLD CALC-MCNC: 15.7 GM/DL (ref 13.5–17.5)
HGB BLD-MCNC: 13.8 G/DL (ref 14–18)
LACTATE: 0.85 MMOL/L (ref 0.4–2)
LYMPHOCYTES # BLD: 1 K/UL (ref 1–4.8)
LYMPHOCYTES NFR BLD: 6 %
MACROCYTES BLD QL SMEAR: ABNORMAL
MCH RBC QN AUTO: 32.2 PG (ref 27–31.3)
MCHC RBC AUTO-ENTMCNC: 32.9 % (ref 33–37)
MCV RBC AUTO: 97.7 FL (ref 79–92.2)
MONOCYTES # BLD: 2.1 K/UL (ref 0.2–0.8)
MONOCYTES NFR BLD: 12.4 %
NEUTROPHILS # BLD: 13.7 K/UL (ref 1.4–6.5)
NEUTS SEG NFR BLD: 79 %
NEUTS VAC BLD QL SMEAR: ABNORMAL
O2 SAT, ARTERIAL: 95 % (ref 93–100)
PCO2 ARTERIAL: 43 MM HG (ref 35–45)
PERFORMED ON: ABNORMAL
PH ARTERIAL: 7.45 (ref 7.35–7.45)
PLATELET # BLD AUTO: 269 K/UL (ref 130–400)
PLATELET BLD QL SMEAR: ADEQUATE
PO2 ARTERIAL: 74 MM HG (ref 75–108)
POC CHLORIDE: 108 MEQ/L (ref 99–110)
POC CREATININE: 0.9 MG/DL (ref 0.8–1.3)
POC FIO2: 50
POC SAMPLE TYPE: ABNORMAL
POIKILOCYTOSIS BLD QL SMEAR: ABNORMAL
POLYCHROMASIA BLD QL SMEAR: ABNORMAL
POTASSIUM SERPL-SCNC: 4.2 MEQ/L (ref 3.5–5.1)
POTASSIUM SERPL-SCNC: 5.3 MEQ/L (ref 3.4–4.9)
PROCALCITONIN SERPL IA-MCNC: 0.17 NG/ML (ref 0–0.15)
PROT SERPL-MCNC: 6.5 G/DL (ref 6.3–8)
RBC # BLD AUTO: 4.29 M/UL (ref 4.7–6.1)
SMUDGE CELLS BLD QL SMEAR: 3.8
SODIUM BLD-SCNC: 145 MEQ/L (ref 136–145)
SODIUM SERPL-SCNC: 138 MEQ/L (ref 135–144)
TCO2 ARTERIAL: 31 MMOL/L (ref 21–32)
WBC # BLD AUTO: 17.3 K/UL (ref 4.8–10.8)

## 2024-10-30 PROCEDURE — 99232 SBSQ HOSP IP/OBS MODERATE 35: CPT | Performed by: INTERNAL MEDICINE

## 2024-10-30 PROCEDURE — 6370000000 HC RX 637 (ALT 250 FOR IP): Performed by: INTERNAL MEDICINE

## 2024-10-30 PROCEDURE — 36600 WITHDRAWAL OF ARTERIAL BLOOD: CPT

## 2024-10-30 PROCEDURE — 84295 ASSAY OF SERUM SODIUM: CPT

## 2024-10-30 PROCEDURE — 6360000002 HC RX W HCPCS: Performed by: NURSE PRACTITIONER

## 2024-10-30 PROCEDURE — 2700000000 HC OXYGEN THERAPY PER DAY

## 2024-10-30 PROCEDURE — 85014 HEMATOCRIT: CPT

## 2024-10-30 PROCEDURE — 2500000003 HC RX 250 WO HCPCS: Performed by: NURSE PRACTITIONER

## 2024-10-30 PROCEDURE — 85025 COMPLETE CBC W/AUTO DIFF WBC: CPT

## 2024-10-30 PROCEDURE — 82330 ASSAY OF CALCIUM: CPT

## 2024-10-30 PROCEDURE — 82803 BLOOD GASES ANY COMBINATION: CPT

## 2024-10-30 PROCEDURE — 2580000003 HC RX 258: Performed by: INTERNAL MEDICINE

## 2024-10-30 PROCEDURE — 36415 COLL VENOUS BLD VENIPUNCTURE: CPT

## 2024-10-30 PROCEDURE — 99291 CRITICAL CARE FIRST HOUR: CPT | Performed by: INTERNAL MEDICINE

## 2024-10-30 PROCEDURE — 80053 COMPREHEN METABOLIC PANEL: CPT

## 2024-10-30 PROCEDURE — 82435 ASSAY OF BLOOD CHLORIDE: CPT

## 2024-10-30 PROCEDURE — 94760 N-INVAS EAR/PLS OXIMETRY 1: CPT

## 2024-10-30 PROCEDURE — 2000000000 HC ICU R&B

## 2024-10-30 PROCEDURE — 6360000002 HC RX W HCPCS: Performed by: INTERNAL MEDICINE

## 2024-10-30 PROCEDURE — 71045 X-RAY EXAM CHEST 1 VIEW: CPT

## 2024-10-30 PROCEDURE — 2580000003 HC RX 258: Performed by: NURSE PRACTITIONER

## 2024-10-30 PROCEDURE — 82565 ASSAY OF CREATININE: CPT

## 2024-10-30 PROCEDURE — 82948 REAGENT STRIP/BLOOD GLUCOSE: CPT

## 2024-10-30 PROCEDURE — 94640 AIRWAY INHALATION TREATMENT: CPT

## 2024-10-30 PROCEDURE — 84145 PROCALCITONIN (PCT): CPT

## 2024-10-30 PROCEDURE — 99231 SBSQ HOSP IP/OBS SF/LOW 25: CPT | Performed by: PHYSICIAN ASSISTANT

## 2024-10-30 PROCEDURE — 83605 ASSAY OF LACTIC ACID: CPT

## 2024-10-30 PROCEDURE — 94761 N-INVAS EAR/PLS OXIMETRY MLT: CPT

## 2024-10-30 PROCEDURE — 84132 ASSAY OF SERUM POTASSIUM: CPT

## 2024-10-30 PROCEDURE — 99232 SBSQ HOSP IP/OBS MODERATE 35: CPT

## 2024-10-30 RX ORDER — FUROSEMIDE 10 MG/ML
20 INJECTION INTRAMUSCULAR; INTRAVENOUS ONCE
Status: COMPLETED | OUTPATIENT
Start: 2024-10-30 | End: 2024-10-30

## 2024-10-30 RX ORDER — DEXMEDETOMIDINE HYDROCHLORIDE 4 UG/ML
.1-1.5 INJECTION, SOLUTION INTRAVENOUS CONTINUOUS
Status: DISCONTINUED | OUTPATIENT
Start: 2024-10-30 | End: 2024-10-31

## 2024-10-30 RX ORDER — METOCLOPRAMIDE HYDROCHLORIDE 5 MG/ML
10 INJECTION INTRAMUSCULAR; INTRAVENOUS 3 TIMES DAILY
Status: DISCONTINUED | OUTPATIENT
Start: 2024-10-30 | End: 2024-11-02

## 2024-10-30 RX ORDER — DILTIAZEM HYDROCHLORIDE 120 MG/1
120 CAPSULE, COATED, EXTENDED RELEASE ORAL DAILY
Status: DISCONTINUED | OUTPATIENT
Start: 2024-10-30 | End: 2024-11-06 | Stop reason: HOSPADM

## 2024-10-30 RX ADMIN — ENOXAPARIN SODIUM 30 MG: 100 INJECTION SUBCUTANEOUS at 09:42

## 2024-10-30 RX ADMIN — Medication 10 ML: at 20:54

## 2024-10-30 RX ADMIN — BUDESONIDE 500 MCG: 0.5 SUSPENSION RESPIRATORY (INHALATION) at 04:07

## 2024-10-30 RX ADMIN — PIPERACILLIN AND TAZOBACTAM 3375 MG: 3; .375 INJECTION, POWDER, LYOPHILIZED, FOR SOLUTION INTRAVENOUS at 00:38

## 2024-10-30 RX ADMIN — PIPERACILLIN AND TAZOBACTAM 3375 MG: 3; .375 INJECTION, POWDER, LYOPHILIZED, FOR SOLUTION INTRAVENOUS at 23:23

## 2024-10-30 RX ADMIN — ENOXAPARIN SODIUM 30 MG: 100 INJECTION SUBCUTANEOUS at 20:53

## 2024-10-30 RX ADMIN — Medication 10 ML: at 08:48

## 2024-10-30 RX ADMIN — METOCLOPRAMIDE HYDROCHLORIDE 10 MG: 5 INJECTION, SOLUTION INTRAMUSCULAR; INTRAVENOUS at 17:00

## 2024-10-30 RX ADMIN — IPRATROPIUM BROMIDE AND ALBUTEROL SULFATE 1 DOSE: 2.5; .5 SOLUTION RESPIRATORY (INHALATION) at 20:03

## 2024-10-30 RX ADMIN — SODIUM CHLORIDE, PRESERVATIVE FREE 20 MG: 5 INJECTION INTRAVENOUS at 09:42

## 2024-10-30 RX ADMIN — DEXMEDETOMIDINE HYDROCHLORIDE 0.2 MCG/KG/HR: 400 INJECTION, SOLUTION INTRAVENOUS at 08:58

## 2024-10-30 RX ADMIN — IPRATROPIUM BROMIDE AND ALBUTEROL SULFATE 1 DOSE: 2.5; .5 SOLUTION RESPIRATORY (INHALATION) at 15:41

## 2024-10-30 RX ADMIN — IPRATROPIUM BROMIDE AND ALBUTEROL SULFATE 1 DOSE: 2.5; .5 SOLUTION RESPIRATORY (INHALATION) at 04:07

## 2024-10-30 RX ADMIN — SODIUM CHLORIDE, PRESERVATIVE FREE 20 MG: 5 INJECTION INTRAVENOUS at 20:54

## 2024-10-30 RX ADMIN — BUDESONIDE 500 MCG: 0.5 SUSPENSION RESPIRATORY (INHALATION) at 15:41

## 2024-10-30 RX ADMIN — METOCLOPRAMIDE HYDROCHLORIDE 10 MG: 5 INJECTION, SOLUTION INTRAMUSCULAR; INTRAVENOUS at 20:53

## 2024-10-30 RX ADMIN — FUROSEMIDE 20 MG: 10 INJECTION, SOLUTION INTRAMUSCULAR; INTRAVENOUS at 08:48

## 2024-10-30 RX ADMIN — PIPERACILLIN AND TAZOBACTAM 3375 MG: 3; .375 INJECTION, POWDER, LYOPHILIZED, FOR SOLUTION INTRAVENOUS at 07:27

## 2024-10-30 RX ADMIN — METOCLOPRAMIDE HYDROCHLORIDE 10 MG: 5 INJECTION, SOLUTION INTRAMUSCULAR; INTRAVENOUS at 09:42

## 2024-10-30 RX ADMIN — IPRATROPIUM BROMIDE AND ALBUTEROL SULFATE 1 DOSE: 2.5; .5 SOLUTION RESPIRATORY (INHALATION) at 08:35

## 2024-10-30 RX ADMIN — PIPERACILLIN AND TAZOBACTAM 3375 MG: 3; .375 INJECTION, POWDER, LYOPHILIZED, FOR SOLUTION INTRAVENOUS at 16:59

## 2024-10-30 ASSESSMENT — PAIN SCALES - GENERAL
PAINLEVEL_OUTOF10: 0

## 2024-10-30 NOTE — PROGRESS NOTES
Palliative Care Progress Note  Patient: Anabelle Morris  Gender: male  YOB: 1955  Unit/Bed: IC02/IC02-01  CodeStatus: Full Code  Inpatient Treatment Team: Treatment Team:   Willam Mejia MD Zaizafoun, Manaf, MD Holiday, Wes J, DO West, Robin M, RN Haas, Christopher A, MD Murry, Paul J, MD Salisbury, Mary Jo C Ferris, Amanda, RN Visnyai, Lisa, RN  Admit Date:  10/19/2024    Chief Complaint: Shortness of breath    History of Presenting Illness:      Anabelle Morris is a 68 y.o. male on hospital day 11 with a history of cocaine abuse, COPD on 3 L O2 at night, CKD stage III, alcohol abuse, T2DM, HTN HLD, asthma, SVT, pneumonia.    Patient was brought to the emergency room with shortness of breath. Patient was admitted in the setting of acute on chronic COPD exacerbation, T2DM and alcohol abuse.    Palliative care was consulted by provider Akanksha Alvarado CNP for goals of care.     Patient was previously living home alone and up independently per EMR documentation.    Upon entering the room I find the patient intubated and sedated with no one at bedside. Called and attempted telephone conversation with patient's sister Violeta, no answer and left voicemail to return call.  Patient's previous functional status is A&O X4 up independently per telephone conversation with patient's sister Violeta. Patient denies any unintentional weight loss or decreased appetite.  Reviewed patient's current goals of care, advance care planning and CODE STATUS with patient's Sister Violeta.  The patient's sister Violeta admits that the patient has 1 daughter and that she goes by Tia.  Reviewed contact information with patient's Sister Violeta during telephone call and answered all questions to the patient's sisters satisfaction. Called and attempted telephone conversation with patient's daughter Marsha again with success.  Reviewed patient's current condition, advance care planning, CODE STATUS and legal next of kin  concerns feel free to contact me for clarification.    Thanks for the opportunity you have allowed us to provide palliative care to Anabelle Morris. We will be in touch as care progresses. Please feel free to reach out to us should you have any questions or requests.

## 2024-10-30 NOTE — PROGRESS NOTES
Chief Complaint:  SOB     Reason for consult:  EKG changes     History of Present Illness:     This is a 68-year-old -American male with past medical history significant for hypertension, dyslipidemia, diabetes, COPD on 3 L O2, history of alcohol abuse, history of cocaine abuse, sleep apnea and multiple medical problems who originally presented to [facility] on 10/19/2024 with chief complaint of shortness of breath.  History is obtained from chart review as patient is currently intubated.  Apparently he had told ER physician that he had been drinking alcohol and using cocaine on a regular basis prior to presentation.  Apparently while in ER patient became hypoxic with O2 sat dropping into the 70s with ambulation to the restroom and he was resumed on 3 L O2 per nasal cannula with improvement in SpO2 to 97%.  Chest x-ray in ER had shown no acute cardiopulmonary findings.  Initial BMP in ER unremarkable.  NT proBNP within normal range of 40.  High-sensitivity troponin negative at 18.  CBC showed WBC mildly elevated at 10.4, hemoglobin 12.8.  Urine drug screen positive for cocaine and marijuana.  He was subsequently transferred from [facility] and admitted to [facility] with diagnosis of COPD exacerbation.     Patient was transferred to ICU yesterday afternoon due to worsening respiratory distress and was subsequently intubated.  EKG completed yesterday evening at 2056 showed ST elevations in leads II, III, aVF and V3 through V6 and cardiology consulted for further evaluation.  He was on low-dose Levophed earlier today but this was recently stopped.      At time my evaluation, patient is intubated and sedated on mechanical ventilation.  He is on 50% FiO2 with SpO2 of 98%.  Patient was noted to have hematuria following straight cath this morning.  Urology evaluated patient today.  Repeat EKG completed this afternoon shows some improvement in previously noted ST elevations.  Repeat troponin ordered and  budesonide-formoterol (SYMBICORT) 160-4.5 MCG/ACT inhaler 2 puff  2 puff Inhalation BID Sylvester Montesinos MD   2 puff at 10/21/24 0706    traZODone (DESYREL) tablet 50 mg  50 mg Oral Nightly Sylvester Montesinos MD   50 mg at 10/21/24 2125    [Held by provider] tiotropium (SPIRIVA RESPIMAT) 2.5 MCG/ACT inhaler 2 puff  2 puff Inhalation Daily RT Grace Wray MD   2 puff at 10/21/24 0706    [Held by provider] guaiFENesin (MUCINEX) extended release tablet 600 mg  600 mg Oral BID Grace Wray MD   600 mg at 10/21/24 0901    ipratropium 0.5 mg-albuterol 2.5 mg (DUONEB) nebulizer solution 1 Dose  1 Dose Inhalation Q6H Kathi Ovalle MD   1 Dose at 10/22/24 1447    budesonide (PULMICORT) nebulizer suspension 500 mcg  0.5 mg Nebulization BID RT Grace Wray MD   500 mcg at 10/22/24 1449    sodium chloride flush 0.9 % injection 5-40 mL  5-40 mL IntraVENous 2 times per day Sylvester Montesinos MD   10 mL at 10/22/24 0801    sodium chloride flush 0.9 % injection 5-40 mL  5-40 mL IntraVENous PRN Sylvester Montesinos MD        0.9 % sodium chloride infusion   IntraVENous PRN Sylvester Montesinos MD        magnesium sulfate 2000 mg in 50 mL IVPB premix  2,000 mg IntraVENous PRN Sylvester Montesinos MD        [Held by provider] enoxaparin Sodium (LOVENOX) injection 30 mg  30 mg SubCUTAneous BID Sylvester Montesinos MD   30 mg at 10/22/24 0759    ondansetron (ZOFRAN-ODT) disintegrating tablet 4 mg  4 mg Oral Q8H PRN Sylvester Montesinos MD         Or    ondansetron (ZOFRAN) injection 4 mg  4 mg IntraVENous Q6H PRN Sylvester Montesinos MD        melatonin tablet 3 mg  3 mg Oral Nightly PRN Sylvester Montesinos MD   3 mg at 10/19/24 2203    polyethylene glycol (GLYCOLAX) packet 17 g  17 g Oral Daily PRN Sylvester Montesinos MD        acetaminophen (TYLENOL) tablet 650 mg  650 mg Oral Q6H PRN Sylvester Montesinos MD         Or    acetaminophen (TYLENOL) suppository 650 mg  650 mg Rectal Q6H PRN Sylvester Montesinos MD        ipratropium 0.5 mg-albuterol 2.5 mg (DUONEB) nebulizer

## 2024-10-30 NOTE — PROGRESS NOTES
Blood gas drawn on patient this morning due to work of breathing. After ABG patient placed on BiPaP. While in room with patient he vomited with BiPaP mask on. Mask was quickly removed and patient suctioned. Patient given nausea medications and placed back on heated high flow.     Patient has very weak cough, patient will not cough when encouraged and patient has audible gurgling from fluid in back of throat. NT suction performed and patient cleared of thick white frothy secretions. Patient spitting out secretions versus swallowing, states he cannot swallow, denies throat pain, no obstructions noted in throat/back of mouth.     Patient febrile. Unable to give oral or rectal medication at this time. Patient given tepid bed bath and given ice packs. Patient also has fan in room and minimal layers    Patient on HHF throughout day, precedex started, see MAR. Patient less tachypneic.     Patient mouth swabbed and oral care performed throughout day.

## 2024-10-30 NOTE — PROGRESS NOTES
Critical Care Progress Note    10/30/2024 9:06 AM    Subjective:   Admit Date: 10/19/2024  PCP: Roby Silva MD    No chief complaint on file.    Interval History: Extubated yesterday.  Has been a bit confused and struggling overnight.  Was on BiPAP.  Vomited this morning and switched to high flow.  Had low-grade fever.  Continues to be on antibiotics.  Urine output is 1700 cc in 24 hours.    Medications:   Scheduled Meds:   predniSONE  30 mg Oral Daily    Followed by    [START ON 2024] predniSONE  20 mg Oral Daily    Followed by    [START ON 2024] predniSONE  10 mg Oral Daily    piperacillin-tazobactam  3,375 mg IntraVENous Q8H    amLODIPine  10 mg Oral Daily    losartan  100 mg Oral Daily    docusate  100 mg Oral BID    polyethylene glycol  17 g Oral Daily    famotidine (PEPCID) injection  20 mg IntraVENous BID    insulin lispro  0-8 Units SubCUTAneous Q4H    escitalopram  10 mg Oral Daily    montelukast  10 mg Oral Nightly    [Held by provider] budesonide-formoterol  2 puff Inhalation BID    traZODone  50 mg Oral Nightly    [Held by provider] tiotropium  2 puff Inhalation Daily RT    guaiFENesin  600 mg Oral BID    ipratropium 0.5 mg-albuterol 2.5 mg  1 Dose Inhalation Q6H    budesonide  0.5 mg Nebulization BID RT    sodium chloride flush  5-40 mL IntraVENous 2 times per day    enoxaparin  30 mg SubCUTAneous BID     Continuous Infusions:   dexmedeTOMIDine HCl in NaCl 0.2 mcg/kg/hr (10/30/24 0858)    sodium chloride      dextrose           Objective:   Vitals:   Temp (24hrs), Av.9 °F (37.7 °C), Min:98.8 °F (37.1 °C), Max:101.1 °F (38.4 °C)    /74   Pulse (!) 121   Temp (!) 101.1 °F (38.4 °C) (Bladder)   Resp 29   Ht 1.829 m (6')   Wt 117.4 kg (258 lb 13.1 oz)   SpO2 96%   BMI 35.10 kg/m²   I/O:24HR INTAKE/OUTPUT:    Intake/Output Summary (Last 24 hours) at 10/30/2024 0906  Last data filed at 10/30/2024 0514  Gross per 24 hour   Intake 410.83 ml   Output 1760 ml   Net -1349.17 ml

## 2024-10-30 NOTE — CARE COORDINATION
ICU team rounds done this am at bedside. Pt off vent now on HHF 50L/50% and IV Precedex still requiring ICU care and close monitoring. Pall care following. Pt will need PT/OT eval once able to help determine plan.

## 2024-10-30 NOTE — PROGRESS NOTES
Hospitalist Progress Note      PCP: Roby Silva MD    Date of Admission: 10/19/2024    Chief Complaint:    No chief complaint on file.    Subjective:  Patient is on HHF this am. Was febrile this am as well. He was able to answer most of my questions and followed simple commands. Still reporting some dyspnea.     Medications:  Reviewed    Infusion Medications    dexmedeTOMIDine HCl in NaCl 0.4 mcg/kg/hr (10/30/24 0951)    sodium chloride      dextrose       Scheduled Medications    metoclopramide  10 mg IntraVENous TID    dilTIAZem  120 mg Oral Daily    predniSONE  30 mg Oral Daily    Followed by    [START ON 11/2/2024] predniSONE  20 mg Oral Daily    Followed by    [START ON 11/5/2024] predniSONE  10 mg Oral Daily    piperacillin-tazobactam  3,375 mg IntraVENous Q8H    losartan  100 mg Oral Daily    docusate  100 mg Oral BID    polyethylene glycol  17 g Oral Daily    famotidine (PEPCID) injection  20 mg IntraVENous BID    insulin lispro  0-8 Units SubCUTAneous Q4H    escitalopram  10 mg Oral Daily    montelukast  10 mg Oral Nightly    [Held by provider] budesonide-formoterol  2 puff Inhalation BID    traZODone  50 mg Oral Nightly    [Held by provider] tiotropium  2 puff Inhalation Daily RT    guaiFENesin  600 mg Oral BID    ipratropium 0.5 mg-albuterol 2.5 mg  1 Dose Inhalation Q6H    budesonide  0.5 mg Nebulization BID RT    sodium chloride flush  5-40 mL IntraVENous 2 times per day    enoxaparin  30 mg SubCUTAneous BID     PRN Meds: acetaminophen, atropine, hydrALAZINE, sodium chloride flush, sodium chloride, magnesium sulfate, ondansetron **OR** ondansetron, melatonin, polyethylene glycol, ipratropium 0.5 mg-albuterol 2.5 mg, glucose, dextrose bolus **OR** dextrose bolus, glucagon (rDNA), dextrose      Intake/Output Summary (Last 24 hours) at 10/30/2024 1507  Last data filed at 10/30/2024 0514  Gross per 24 hour   Intake 100.18 ml   Output 1760 ml   Net -1659.82 ml     Exam:    /72   Pulse (!) 105   - urology is managing    #JOHANA    - resolved    #Sinus bradycardia    - likely due to sedation; will need an ischemic workup when able    #Substance abuse    - cocaine use;  when appropriate    Active Hospital Problems    Diagnosis Date Noted    Palliative care encounter [Z51.5] 10/29/2024    Goals of care, counseling/discussion [Z71.89] 10/29/2024    Advanced care planning/counseling discussion [Z71.89] 10/29/2024    Full code status [Z78.9] 10/29/2024    Leukocytosis [D72.829] 10/28/2024    Hypotension [I95.9] 10/28/2024    Urinary retention [R33.9] 10/22/2024    Acute respiratory failure [J96.00] 10/22/2024    Abnormal EKG [R94.31] 10/22/2024    COPD exacerbation (HCC) [J44.1] 08/29/2024        Additional work up or/and treatment plan may be added today or then after based on clinical progression. I am managing a portion of pt care. Some medical issues are handled by other specialists. Additional work up and treatment should be done in out pt setting by pt PCP and other out pt providers.     In addition to examining and evaluating pt, I spent additional time explaining care, normal and abnormal findings, and treatment plan. All of pt questions were answered. Counseling, diet and education were  provided. Case will be discussed with nursing staff when appropriate. Family will be updated if and when appropriate.      Diet: No diet orders on file    Code Status: Full Code    Electronically signed by Willam Mejia MD on 10/30/2024 at 3:07 PM

## 2024-10-30 NOTE — PLAN OF CARE
Problem: Chronic Conditions and Co-morbidities  Goal: Patient's chronic conditions and co-morbidity symptoms are monitored and maintained or improved  Outcome: Progressing     Problem: Discharge Planning  Goal: Discharge to home or other facility with appropriate resources  Outcome: Progressing     Problem: Pain  Goal: Verbalizes/displays adequate comfort level or baseline comfort level  Outcome: Progressing  Flowsheets (Taken 10/30/2024 0400 by Danielle Londono RN)  Verbalizes/displays adequate comfort level or baseline comfort level: Assess pain using appropriate pain scale     Problem: ABCDS Injury Assessment  Goal: Absence of physical injury  Outcome: Progressing     Problem: Safety - Adult  Goal: Free from fall injury  Outcome: Progressing     Problem: Safety - Medical Restraint  Goal: Remains free of injury from restraints (Restraint for Interference with Medical Device)  Description: INTERVENTIONS:  1. Determine that other, less restrictive measures have been tried or would not be effective before applying the restraint  2. Evaluate the patient's condition at the time of restraint application  3. Inform patient/family regarding the reason for restraint  4. Q2H: Monitor safety, psychosocial status, comfort, nutrition and hydration  Outcome: Progressing     Problem: Neurosensory - Adult  Goal: Achieves stable or improved neurological status  Outcome: Progressing     Problem: Respiratory - Adult  Goal: Achieves optimal ventilation and oxygenation  Outcome: Progressing     Problem: Cardiovascular - Adult  Goal: Maintains optimal cardiac output and hemodynamic stability  Outcome: Progressing  Goal: Absence of cardiac dysrhythmias or at baseline  Outcome: Progressing     Problem: Skin/Tissue Integrity - Adult  Goal: Skin integrity remains intact  Outcome: Progressing     Problem: Musculoskeletal - Adult  Goal: Return mobility to safest level of function  Outcome: Progressing     Problem: Gastrointestinal -

## 2024-10-30 NOTE — PROGRESS NOTES
0350  Spoke with Dr. Chaney over the phone at this time. Informed him of temp of 101.1 resistant to tylenol. Patient packed with ice packs. Maintenance called for box fan. This RN also expressed concerns for patient not being able to clear his secretions as well as earlier in the shift, not appropriate to give oral tylenol. Very frequent suctioning needed, patient still able to spit some sputum out of mouth on to chest. Per Dr. Chaney, if temperature continues to increase, we will take out the patient's FMS and try rectal tylenol.    0400 Box fan put in room, ice packs in place.

## 2024-10-30 NOTE — PROGRESS NOTES
Subjective:      Patient ID: Anabelle Morris is a 68 y.o. male    HPI 68 year old male who's zamora catheter is draining clear yellow urine. He voices no other urological complaints    Past Medical History:   Diagnosis Date    Alcohol abuse 10/27/2016    Anemia     Asthma     CKD (chronic kidney disease) stage 3, GFR 30-59 ml/min ([facility]) 12/14/2021    Cocaine abuse ([facility]) 10/27/2016    Community acquired pneumonia of left lower lobe of lung 12/05/2016    COPD (chronic obstructive pulmonary disease) ([facility])     Depression 04/27/2020    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    Drug-seeking behavior 04/14/2015    Edema 12/10/2015    Erectile dysfunction     Gastroesophageal reflux disease 12/17/2018    Gout 10/27/2016    History of arthroscopy of both knees 10/27/2016    History of colon polyps 10/26/2021    House dust mite allergy 04/21/2014    Hyperlipidemia     Hypertension 10/27/2016    Injury to heart 10/27/2016    Insomnia 12/17/2018    Loculated pleural effusion     Macrocytic anemia 09/07/2013    Medical non-compliance 02/22/2014    Morbid obesity due to excess calories 12/08/2016    Osteoarthritis of both knees 12/08/2016    Personal history of tobacco use     Pleurisy 12/12/2016    Pneumonia 12/04/2016    caused hospital admission    Pneumonia in infectious disease 11/29/2023    Pre-diabetes 10/27/2016    Seasonal allergies 04/21/2014    Severe persistent asthma 10/27/2016    Severe sleep apnea 04/14/2015    Supraventricular tachycardia (HCC) 10/27/2016    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    Type 2 diabetes mellitus without complication, without long-term current use of insulin ([facility]) 10/26/2021     Past Surgical History:   Procedure Laterality Date    ANTERIOR CRUCIATE LIGAMENT REPAIR      CARDIAC CATHETERIZATION      COLONOSCOPY N/A 07/15/2020    11 colon polyps, 2 rectal polyps, hemorrhoids, 2y repeat (DR MARTINEZ)  COLONOSCOPY WITH POLYPECTOMY performed by Alonso [facility] MD at [facility] OR    CT  Inhalation Daily RT Grace Wray MD   2 puff at 10/21/24 0706    guaiFENesin (MUCINEX) extended release tablet 600 mg  600 mg Oral BID Willam Mejia MD   600 mg at 10/21/24 0901    ipratropium 0.5 mg-albuterol 2.5 mg (DUONEB) nebulizer solution 1 Dose  1 Dose Inhalation Q6H Kathi Ovalle MD   1 Dose at 10/30/24 0835    budesonide (PULMICORT) nebulizer suspension 500 mcg  0.5 mg Nebulization BID RT Grcae Wray MD   500 mcg at 10/30/24 0407    sodium chloride flush 0.9 % injection 5-40 mL  5-40 mL IntraVENous 2 times per day Sylvester Montesinos MD   10 mL at 10/30/24 0848    sodium chloride flush 0.9 % injection 5-40 mL  5-40 mL IntraVENous PRN Sylvester Montesinos MD        0.9 % sodium chloride infusion   IntraVENous PRN Sylvester Montesinos MD        magnesium sulfate 2000 mg in 50 mL IVPB premix  2,000 mg IntraVENous PRN Sylvester Montesinos MD        enoxaparin Sodium (LOVENOX) injection 30 mg  30 mg SubCUTAneous BID Akanksha Alvarado APRN - CNP   30 mg at 10/30/24 0942    ondansetron (ZOFRAN-ODT) disintegrating tablet 4 mg  4 mg Oral Q8H PRN Sylvester Montesinos MD        Or    ondansetron (ZOFRAN) injection 4 mg  4 mg IntraVENous Q6H PRN Sylvester Montesinos MD        melatonin tablet 3 mg  3 mg Oral Nightly PRN Sylvester Montesinos MD   3 mg at 10/19/24 2203    polyethylene glycol (GLYCOLAX) packet 17 g  17 g Oral Daily PRN Sylvester Montesinos MD        ipratropium 0.5 mg-albuterol 2.5 mg (DUONEB) nebulizer solution 1 Dose  1 Dose Inhalation Q4H PRN Sylvester Montesinos MD   1 Dose at 10/20/24 0016    glucose chewable tablet 16 g  4 tablet Oral PRN Sylvester Montesinos MD        dextrose bolus 10% 125 mL  125 mL IntraVENous PRN Sylvester Montesinos MD   Stopped at 10/27/24 2334    Or    dextrose bolus 10% 250 mL  250 mL IntraVENous PRN Sylvester Montesinos MD        glucagon injection 1 mg  1 mg SubCUTAneous PRSylvester Christensen MD        dextrose 10 % infusion   IntraVENous Continuous PRSylvester Christensen MD         Fish-derived products, Iodine, Seasonal,

## 2024-10-30 NOTE — PROGRESS NOTES
Infectious Disease     Patient Name: Anabelle Morris  Date: 10/30/2024  YOB: 1955  Medical Record Number: [id number]              History of Present Illness:    Past medical history cocaine COPD on home O2 alcohol abuse diabetes hypertension  Morbid obesity diabetes sleep apnea tobacco abuse hyperlipidemia  History of empyema with VATS surgery          Presented with shortness of breath 10/18/2024  Ongoing for several days worse with exertion  Productive sputum  Found to have rhinovirus/enterovirus infection  Required admission to the intensive care unit intubated 10/21/2024      10/27/2024 increasing fever to 100.8.  Described as having increasing tracheal secretions  Increasing white cell count 20,000  Procalcitonin 0.29  Lactic acid not elevated    Chest x-ray shows persistent left lower lung infiltrate possibly slightly less dense    Increasing temperature throughout the day reaching as high as 101.3    Had episode of vomiting while wearing mask    Now on high flow O2    Review of Systems   Constitutional: Negative.    Respiratory:  Positive for cough and shortness of breath.    Cardiovascular: Negative.    Gastrointestinal: Negative.         Physical Exam  Constitutional:       Appearance: He is ill-appearing.   Cardiovascular:      Heart sounds: Normal heart sounds. No murmur heard.  Pulmonary:      Effort: No respiratory distress.      Breath sounds: Rhonchi present. No wheezing or rales.   Chest:      Chest wall: No tenderness.   Abdominal:      General: Bowel sounds are normal. There is no distension.      Palpations: Abdomen is soft. There is no mass.      Tenderness: There is no abdominal tenderness. There is no guarding.   Genitourinary:     Comments: Holcomb catheter urine appearing clear        Blood pressure 120/72, pulse (!) 105, temperature (!) 101.1 °F (38.4 °C), resp. rate 30, height 1.829 m (6'), weight 117.4 kg (258 lb 13.1 oz), SpO2 98%.      .   Lab Results   Component Value Date     WBC 17.3 (H) 10/30/2024    HGB 15.7 10/30/2024    HCT 41.9 (L) 10/30/2024    MCV 97.7 (H) 10/30/2024     10/30/2024     Lab Results   Component Value Date/Time     10/30/2024 04:55 AM    K 5.3 10/30/2024 04:55 AM     10/30/2024 04:55 AM    CO2 26 10/30/2024 04:55 AM    BUN 22 10/30/2024 04:55 AM    CREATININE 0.9 10/30/2024 08:24 AM    CREATININE 0.82 10/30/2024 04:55 AM    GLUCOSE 115 10/30/2024 04:55 AM    GLUCOSE 135 04/20/2022 05:45 AM    CALCIUM 9.1 10/30/2024 04:55 AM    LABGLOM >90 10/30/2024 08:24 AM    LABGLOM >90.0 03/27/2024 10:18 AM                ASSESSMENT:  PLAN:    Leukocytosis  Fever  Hypotensive requiring pressors        Pending blood cultures    Currently on Zosyn

## 2024-10-30 NOTE — INTERDISCIPLINARY ROUNDS
Spiritual Care Services     Summary of Visit:   participated in ICU rounds. Patient extubated yesterday and still experiencing some confusion today. No family were present.  to follow as needed.     Encounter Summary  Encounter Overview/Reason: Interdisciplinary rounds  Service Provided For: Patient  Referral/Consult From: Rounding  Support System: Family members                       Palliative Care  Type: Palliative Care, Interdisciplinary Rounds       Spiritual Assessment/Intervention/Outcomes:                     Care Plan:         Spiritual care to provide support as needed or requested      Spiritual Care Services   Electronically signed by Chaplain Jh on 10/30/2024 at 10:26 AM.    To reach a  for emotional and spiritual support, place an EPIC consult request.   If a  is needed immediately, dial “0” and ask to page the on-call .

## 2024-10-30 NOTE — PROGRESS NOTES
Pt noted to have copious secretions and requiring frequent oral suctioning. Dr. Chaney made aware. Ok to hold off on BiPAP for now and place patient on a HHFNC per Dr. Chaney. Pt placed on 50L 50% HHFNC, spo2 now 95%.

## 2024-10-31 LAB
ALBUMIN SERPL-MCNC: 2.9 G/DL (ref 3.5–4.6)
ALP SERPL-CCNC: 74 U/L (ref 35–104)
ALT SERPL-CCNC: 27 U/L (ref 0–41)
ANION GAP SERPL CALCULATED.3IONS-SCNC: 7 MEQ/L (ref 9–15)
AST SERPL-CCNC: 16 U/L (ref 0–40)
BACTERIA SPEC ANAEROBE+AEROBE CULT: NORMAL
BASOPHILS # BLD: 0 K/UL (ref 0–0.2)
BASOPHILS NFR BLD: 0.2 %
BILIRUB SERPL-MCNC: 1 MG/DL (ref 0.2–0.7)
BUN SERPL-MCNC: 20 MG/DL (ref 8–23)
CALCIUM SERPL-MCNC: 9.3 MG/DL (ref 8.5–9.9)
CHLORIDE SERPL-SCNC: 108 MEQ/L (ref 95–107)
CO2 SERPL-SCNC: 28 MEQ/L (ref 20–31)
CREAT SERPL-MCNC: 0.81 MG/DL (ref 0.7–1.2)
EOSINOPHIL # BLD: 0.3 K/UL (ref 0–0.7)
EOSINOPHIL NFR BLD: 1.8 %
ERYTHROCYTE [DISTWIDTH] IN BLOOD BY AUTOMATED COUNT: 15.4 % (ref 11.5–14.5)
GLOBULIN SER CALC-MCNC: 3.3 G/DL (ref 2.3–3.5)
GLUCOSE BLD-MCNC: 104 MG/DL (ref 70–99)
GLUCOSE BLD-MCNC: 106 MG/DL (ref 70–99)
GLUCOSE BLD-MCNC: 111 MG/DL (ref 70–99)
GLUCOSE BLD-MCNC: 128 MG/DL (ref 70–99)
GLUCOSE BLD-MCNC: 89 MG/DL (ref 70–99)
GLUCOSE BLD-MCNC: 98 MG/DL (ref 70–99)
GLUCOSE SERPL-MCNC: 109 MG/DL (ref 70–99)
HCT VFR BLD AUTO: 41 % (ref 42–52)
HGB BLD-MCNC: 13.2 G/DL (ref 14–18)
LYMPHOCYTES # BLD: 1.9 K/UL (ref 1–4.8)
LYMPHOCYTES NFR BLD: 10.6 %
MCH RBC QN AUTO: 31.8 PG (ref 27–31.3)
MCHC RBC AUTO-ENTMCNC: 32.2 % (ref 33–37)
MCV RBC AUTO: 98.8 FL (ref 79–92.2)
MONOCYTES # BLD: 1.9 K/UL (ref 0.2–0.8)
MONOCYTES NFR BLD: 10.5 %
NEUTROPHILS # BLD: 13.6 K/UL (ref 1.4–6.5)
NEUTS SEG NFR BLD: 76.4 %
PERFORMED ON: ABNORMAL
PERFORMED ON: NORMAL
PERFORMED ON: NORMAL
PLATELET # BLD AUTO: 269 K/UL (ref 130–400)
POTASSIUM SERPL-SCNC: 4.1 MEQ/L (ref 3.4–4.9)
PROT SERPL-MCNC: 6.2 G/DL (ref 6.3–8)
RBC # BLD AUTO: 4.15 M/UL (ref 4.7–6.1)
SODIUM SERPL-SCNC: 143 MEQ/L (ref 135–144)
WBC # BLD AUTO: 17.8 K/UL (ref 4.8–10.8)

## 2024-10-31 PROCEDURE — 99232 SBSQ HOSP IP/OBS MODERATE 35: CPT | Performed by: INTERNAL MEDICINE

## 2024-10-31 PROCEDURE — 6370000000 HC RX 637 (ALT 250 FOR IP): Performed by: INTERNAL MEDICINE

## 2024-10-31 PROCEDURE — 80053 COMPREHEN METABOLIC PANEL: CPT

## 2024-10-31 PROCEDURE — 2580000003 HC RX 258: Performed by: INTERNAL MEDICINE

## 2024-10-31 PROCEDURE — 2500000003 HC RX 250 WO HCPCS: Performed by: NURSE PRACTITIONER

## 2024-10-31 PROCEDURE — 92610 EVALUATE SWALLOWING FUNCTION: CPT

## 2024-10-31 PROCEDURE — 85025 COMPLETE CBC W/AUTO DIFF WBC: CPT

## 2024-10-31 PROCEDURE — 99231 SBSQ HOSP IP/OBS SF/LOW 25: CPT | Performed by: PHYSICIAN ASSISTANT

## 2024-10-31 PROCEDURE — 6370000000 HC RX 637 (ALT 250 FOR IP)

## 2024-10-31 PROCEDURE — 2060000000 HC ICU INTERMEDIATE R&B

## 2024-10-31 PROCEDURE — 2700000000 HC OXYGEN THERAPY PER DAY

## 2024-10-31 PROCEDURE — 6360000002 HC RX W HCPCS: Performed by: INTERNAL MEDICINE

## 2024-10-31 PROCEDURE — 36415 COLL VENOUS BLD VENIPUNCTURE: CPT

## 2024-10-31 PROCEDURE — 6370000000 HC RX 637 (ALT 250 FOR IP): Performed by: NURSE PRACTITIONER

## 2024-10-31 PROCEDURE — 6360000002 HC RX W HCPCS: Performed by: NURSE PRACTITIONER

## 2024-10-31 PROCEDURE — 2580000003 HC RX 258: Performed by: NURSE PRACTITIONER

## 2024-10-31 PROCEDURE — 94640 AIRWAY INHALATION TREATMENT: CPT

## 2024-10-31 PROCEDURE — 94761 N-INVAS EAR/PLS OXIMETRY MLT: CPT

## 2024-10-31 PROCEDURE — 6370000000 HC RX 637 (ALT 250 FOR IP): Performed by: PHYSICIAN ASSISTANT

## 2024-10-31 PROCEDURE — 99291 CRITICAL CARE FIRST HOUR: CPT | Performed by: INTERNAL MEDICINE

## 2024-10-31 PROCEDURE — 6370000000 HC RX 637 (ALT 250 FOR IP): Performed by: STUDENT IN AN ORGANIZED HEALTH CARE EDUCATION/TRAINING PROGRAM

## 2024-10-31 RX ORDER — METOPROLOL TARTRATE 25 MG/1
12.5 TABLET, FILM COATED ORAL 2 TIMES DAILY
Status: DISCONTINUED | OUTPATIENT
Start: 2024-10-31 | End: 2024-11-06 | Stop reason: HOSPADM

## 2024-10-31 RX ORDER — IPRATROPIUM BROMIDE AND ALBUTEROL SULFATE 2.5; .5 MG/3ML; MG/3ML
1 SOLUTION RESPIRATORY (INHALATION)
Status: DISCONTINUED | OUTPATIENT
Start: 2024-11-01 | End: 2024-11-02

## 2024-10-31 RX ORDER — TAMSULOSIN HYDROCHLORIDE 0.4 MG/1
0.4 CAPSULE ORAL DAILY
Status: DISCONTINUED | OUTPATIENT
Start: 2024-10-31 | End: 2024-11-06 | Stop reason: HOSPADM

## 2024-10-31 RX ADMIN — IPRATROPIUM BROMIDE AND ALBUTEROL SULFATE 1 DOSE: 2.5; .5 SOLUTION RESPIRATORY (INHALATION) at 09:39

## 2024-10-31 RX ADMIN — IPRATROPIUM BROMIDE AND ALBUTEROL SULFATE 1 DOSE: 2.5; .5 SOLUTION RESPIRATORY (INHALATION) at 03:32

## 2024-10-31 RX ADMIN — METOPROLOL TARTRATE 12.5 MG: 25 TABLET, FILM COATED ORAL at 12:43

## 2024-10-31 RX ADMIN — TAMSULOSIN HYDROCHLORIDE 0.4 MG: 0.4 CAPSULE ORAL at 15:01

## 2024-10-31 RX ADMIN — LOSARTAN POTASSIUM 100 MG: 100 TABLET, FILM COATED ORAL at 11:12

## 2024-10-31 RX ADMIN — PREDNISONE 30 MG: 10 TABLET ORAL at 11:14

## 2024-10-31 RX ADMIN — PIPERACILLIN AND TAZOBACTAM 3375 MG: 3; .375 INJECTION, POWDER, LYOPHILIZED, FOR SOLUTION INTRAVENOUS at 06:52

## 2024-10-31 RX ADMIN — METOCLOPRAMIDE HYDROCHLORIDE 10 MG: 5 INJECTION, SOLUTION INTRAMUSCULAR; INTRAVENOUS at 22:00

## 2024-10-31 RX ADMIN — METOCLOPRAMIDE HYDROCHLORIDE 10 MG: 5 INJECTION, SOLUTION INTRAMUSCULAR; INTRAVENOUS at 14:59

## 2024-10-31 RX ADMIN — GUAIFENESIN 600 MG: 600 TABLET ORAL at 21:59

## 2024-10-31 RX ADMIN — PIPERACILLIN AND TAZOBACTAM 3375 MG: 3; .375 INJECTION, POWDER, LYOPHILIZED, FOR SOLUTION INTRAVENOUS at 15:03

## 2024-10-31 RX ADMIN — METOCLOPRAMIDE HYDROCHLORIDE 10 MG: 5 INJECTION, SOLUTION INTRAMUSCULAR; INTRAVENOUS at 11:13

## 2024-10-31 RX ADMIN — SODIUM CHLORIDE, PRESERVATIVE FREE 20 MG: 5 INJECTION INTRAVENOUS at 22:00

## 2024-10-31 RX ADMIN — SODIUM CHLORIDE, PRESERVATIVE FREE 20 MG: 5 INJECTION INTRAVENOUS at 12:42

## 2024-10-31 RX ADMIN — ESCITALOPRAM OXALATE 10 MG: 10 TABLET ORAL at 11:18

## 2024-10-31 RX ADMIN — ENOXAPARIN SODIUM 30 MG: 100 INJECTION SUBCUTANEOUS at 21:59

## 2024-10-31 RX ADMIN — METOPROLOL TARTRATE 12.5 MG: 25 TABLET, FILM COATED ORAL at 21:59

## 2024-10-31 RX ADMIN — IPRATROPIUM BROMIDE AND ALBUTEROL SULFATE 1 DOSE: 2.5; .5 SOLUTION RESPIRATORY (INHALATION) at 16:27

## 2024-10-31 RX ADMIN — ONDANSETRON 4 MG: 2 INJECTION, SOLUTION INTRAMUSCULAR; INTRAVENOUS at 06:55

## 2024-10-31 RX ADMIN — MONTELUKAST 10 MG: 10 TABLET, FILM COATED ORAL at 21:59

## 2024-10-31 RX ADMIN — Medication 10 ML: at 12:21

## 2024-10-31 RX ADMIN — IPRATROPIUM BROMIDE AND ALBUTEROL SULFATE 1 DOSE: 2.5; .5 SOLUTION RESPIRATORY (INHALATION) at 19:44

## 2024-10-31 RX ADMIN — TRAZODONE HYDROCHLORIDE 50 MG: 50 TABLET ORAL at 21:59

## 2024-10-31 RX ADMIN — BUDESONIDE 500 MCG: 0.5 SUSPENSION RESPIRATORY (INHALATION) at 16:27

## 2024-10-31 RX ADMIN — DILTIAZEM HYDROCHLORIDE 120 MG: 120 CAPSULE, COATED, EXTENDED RELEASE ORAL at 11:19

## 2024-10-31 RX ADMIN — BUDESONIDE 500 MCG: 0.5 SUSPENSION RESPIRATORY (INHALATION) at 03:32

## 2024-10-31 RX ADMIN — GUAIFENESIN 600 MG: 600 TABLET ORAL at 11:18

## 2024-10-31 RX ADMIN — Medication 10 ML: at 22:00

## 2024-10-31 RX ADMIN — DOCUSATE SODIUM 100 MG: 50 LIQUID ORAL at 21:59

## 2024-10-31 RX ADMIN — ENOXAPARIN SODIUM 30 MG: 100 INJECTION SUBCUTANEOUS at 11:12

## 2024-10-31 ASSESSMENT — PAIN SCALES - GENERAL
PAINLEVEL_OUTOF10: 0
PAINLEVEL_OUTOF10: 0
PAINLEVEL_OUTOF10: 5
PAINLEVEL_OUTOF10: 0

## 2024-10-31 ASSESSMENT — PAIN DESCRIPTION - LOCATION
LOCATION: BACK

## 2024-10-31 ASSESSMENT — PAIN DESCRIPTION - ORIENTATION
ORIENTATION: LOWER

## 2024-10-31 ASSESSMENT — PAIN DESCRIPTION - DESCRIPTORS
DESCRIPTORS: ACHING

## 2024-10-31 NOTE — PROGRESS NOTES
[facility]   Facility/Department: [facility]  Speech Language Pathology  Clinical Bedside Swallow Evaluation    NAME:Anabelle Morris  : 1955 (68 y.o.)   [x]   confirmed    MRN: [id number]  ROOM: [place] [address]  ADMISSION DATE: 10/19/2024  PATIENT DIAGNOSIS(ES): COPD exacerbation (HCC) [J44.1]  No chief complaint on file.    Patient Active Problem List    Diagnosis Date Noted    GASPER (obstructive sleep apnea) 2015    Chest pain 2023    Chronic right-sided low back pain with right-sided sciatica 11/15/2022    DDD (degenerative disc disease), lumbar 11/15/2022    Marijuana use 2021    Drug-seeking behavior 2015    Medical non-compliance 2014    Palliative care encounter 10/29/2024    Goals of care, counseling/discussion 10/29/2024    Advanced care planning/counseling discussion 10/29/2024    Full code status 10/29/2024    Leukocytosis 10/28/2024    Hypotension 10/28/2024    Urinary retention 10/22/2024    Acute respiratory failure 10/22/2024    Abnormal EKG 10/22/2024    COPD exacerbation (HCC) 2024    Type 2 diabetes mellitus with diabetic nephropathy, without long-term current use of insulin (HCC) 10/26/2021    History of colon polyps 10/26/2021    Spinal stenosis of lumbar region 2021    Umbilical hernia without obstruction and without gangrene 2020    Anxiety with depression 2020    Status post total knee replacement, left 2019    Tobacco use 2019    Insomnia 2018    Gastroesophageal reflux disease 2018    Allergy to seafood 2018    Chest wall pain 2016    Obesity 2016    COPD (chronic obstructive pulmonary disease) ([facility])     Alcohol abuse 10/27/2016    Cocaine abuse ([facility]) 10/27/2016    Gout 10/27/2016    History of arthroscopy of both knees 10/27/2016    Hypertension 10/27/2016    SVT (supraventricular tachycardia) ([facility]) 10/27/2016    Erectile dysfunction 10/27/2016    Hyperlipidemia 12/10/2015    House dust mite  HERNIA REPAIR      THORACOTOMY Left 11/27/2021    VATS/thoracotomy    TOTAL KNEE ARTHROPLASTY Left 08/09/2019    LEFT TOTAL KNEE ARTHROPLASTY performed by Stew Renteria MD at [facility] OR    UMBILICAL HERNIA REPAIR N/A 11/30/2020    UMBILICAL HERNIA REPAIR WITH MESH performed by Robert Zee MD at [facility] OR     Allergies   Allergen Reactions    Fish-Derived Products Anaphylaxis    Iodine Anaphylaxis     Iodine-containing products, dyes, contrast dye    Seasonal Shortness Of Breath    Other      Other reaction(s): Swelling/Edema  Other reaction(s): Swelling/Edema    Penicillin G Other (See Comments)    Shellfish Allergy      Other reaction(s): Swelling/Edema    Shellfish-Derived Products      Other reaction(s): Swelling/Edema    Pcn [Penicillins] Nausea And Vomiting       DATE ONSET: 10/19/21    Date of Evaluation: 10/31/2024   Evaluating Therapist: REINA Landin    Dysphagia Diagnosis  Dysphagia Diagnosis: Mild oral stage dysphagia;Suspected needs further assessment  Dysphagia Impression : Clinical indicators of oral dysphagia and suspected pharyngeal dysphagia at bedside; likely acute due to current admission, intubation, and respiratory function. A PO diet is not recommended at this time due to risk of aspiration per pts current health status and poor secretion managment.  Patient's prognosis for diet advancement is good at this time given the patient's PLOF.   MBS recommended and warranted due to clinical indicators of oropharyngeal dysphagia.    Recommended Diet  Recommendations: NPO;Modified barium swallow study;Consider ice chips PRN  Diet Solids Recommendation: NPO  Liquid Consistency Recommendation: NPO  Recommended Form of Meds: PO       Reason for Referral  Anabelle Morris was referred for a bedside swallow evaluation to assess the efficiency of his swallow function, identify signs and symptoms of aspiration, identify risk factors, and make recommendations regarding safe dietary consistencies,

## 2024-10-31 NOTE — PROGRESS NOTES
Subjective:      Patient ID: Anabelle Morris is a 68 y.o. male    HPI68 year old male who's zamora catheter is draining clear yellow urine. He voices no other urological complaints       Past Medical History:   Diagnosis Date    Alcohol abuse 10/27/2016    Anemia     Asthma     CKD (chronic kidney disease) stage 3, GFR 30-59 ml/min ([facility]) 12/14/2021    Cocaine abuse ([facility]) 10/27/2016    Community acquired pneumonia of left lower lobe of lung 12/05/2016    COPD (chronic obstructive pulmonary disease) ([facility])     Depression 04/27/2020    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    Drug-seeking behavior 04/14/2015    Edema 12/10/2015    Erectile dysfunction     Gastroesophageal reflux disease 12/17/2018    Gout 10/27/2016    History of arthroscopy of both knees 10/27/2016    History of colon polyps 10/26/2021    House dust mite allergy 04/21/2014    Hyperlipidemia     Hypertension 10/27/2016    Injury to heart 10/27/2016    Insomnia 12/17/2018    Loculated pleural effusion     Macrocytic anemia 09/07/2013    Medical non-compliance 02/22/2014    Morbid obesity due to excess calories 12/08/2016    Osteoarthritis of both knees 12/08/2016    Personal history of tobacco use     Pleurisy 12/12/2016    Pneumonia 12/04/2016    caused hospital admission    Pneumonia in infectious disease 11/29/2023    Pre-diabetes 10/27/2016    Seasonal allergies 04/21/2014    Severe persistent asthma 10/27/2016    Severe sleep apnea 04/14/2015    Supraventricular tachycardia (HCC) 10/27/2016    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

## 2024-10-31 NOTE — PROGRESS NOTES
Hospitalist Progress Note      PCP: Roby Silva MD    Date of Admission: 10/19/2024    Chief Complaint:    No chief complaint on file.    Subjective:  Patient is on NC this am. He is feeling better today, eager to try liquids.     Medications:  Reviewed    Infusion Medications    dexmedeTOMIDine HCl in NaCl Stopped (10/31/24 0541)    sodium chloride      dextrose       Scheduled Medications    metoprolol tartrate  12.5 mg Oral BID    tamsulosin  0.4 mg Oral Daily    metoclopramide  10 mg IntraVENous TID    dilTIAZem  120 mg Oral Daily    predniSONE  30 mg Oral Daily    Followed by    [START ON 11/2/2024] predniSONE  20 mg Oral Daily    Followed by    [START ON 11/5/2024] predniSONE  10 mg Oral Daily    piperacillin-tazobactam  3,375 mg IntraVENous Q8H    losartan  100 mg Oral Daily    docusate  100 mg Oral BID    polyethylene glycol  17 g Oral Daily    famotidine (PEPCID) injection  20 mg IntraVENous BID    insulin lispro  0-8 Units SubCUTAneous Q4H    escitalopram  10 mg Oral Daily    montelukast  10 mg Oral Nightly    [Held by provider] budesonide-formoterol  2 puff Inhalation BID    traZODone  50 mg Oral Nightly    [Held by provider] tiotropium  2 puff Inhalation Daily RT    guaiFENesin  600 mg Oral BID    ipratropium 0.5 mg-albuterol 2.5 mg  1 Dose Inhalation Q6H    budesonide  0.5 mg Nebulization BID RT    sodium chloride flush  5-40 mL IntraVENous 2 times per day    enoxaparin  30 mg SubCUTAneous BID     PRN Meds: acetaminophen, atropine, hydrALAZINE, sodium chloride flush, sodium chloride, magnesium sulfate, ondansetron **OR** ondansetron, melatonin, polyethylene glycol, ipratropium 0.5 mg-albuterol 2.5 mg, glucose, dextrose bolus **OR** dextrose bolus, glucagon (rDNA), dextrose      Intake/Output Summary (Last 24 hours) at 10/31/2024 1358  Last data filed at 10/31/2024 0703  Gross per 24 hour   Intake 342.1 ml   Output 1650 ml   Net -1307.9 ml     Exam:    BP (!) 140/77   Pulse 93   Temp 99.7 °F

## 2024-10-31 NOTE — FLOWSHEET NOTE
1530-[facility] completed recommending MBS order placed per Dr. Mejia.Electronically signed by Sosa Ferrell RN on 10/31/2024 at 3:42 PM

## 2024-10-31 NOTE — PLAN OF CARE
Problem: Nutrition Deficit:  Goal: Optimize nutritional status  Outcome: Not Progressing  Flowsheets (Taken 10/22/2024 1217 by Olga Foster, RD, LD)  Nutrient intake appropriate for improving, restoring, or maintaining nutritional needs:   Assess nutritional status and recommend course of action   Monitor oral intake, labs, and treatment plans   Recommend appropriate diets, oral nutritional supplements, and vitamin/mineral supplements   Recommend, monitor, and adjust tube feedings and TPN/PPN based on assessed needs

## 2024-10-31 NOTE — PROGRESS NOTES
100 mg in sodium chloride 0.9 % 100 mL infusion  1-10 mg/hr IntraVENous Continuous Kathi Ovalle MD 2 mL/hr at 10/22/24 1730 2 mg/hr at 10/22/24 1730    lactated ringers IV soln infusion SOLN   IntraVENous Continuous Akanksha Alvarado APRN -  mL/hr at 10/22/24 1730 Rate Verify at 10/22/24 1730    hydrALAZINE (APRESOLINE) injection 10 mg  10 mg IntraVENous Q4H PRN Akanksha Alvarado APRN - CNP   10 mg at 10/21/24 1416    chlorhexidine (PERIDEX) 0.12 % solution 15 mL  15 mL Mouth/Throat BID Akanksha Alvarado APRN - CNP   15 mL at 10/22/24 0759    famotidine (PEPCID) 20 mg in sodium chloride (PF) 0.9 % 10 mL injection  20 mg IntraVENous BID Akanksha Alvarado APRN - CNP   20 mg at 10/22/24 0800    fentaNYL (SUBLIMAZE) 1,000 mcg in sodium chloride 0.9% 100 mL infusion   mcg/hr IntraVENous Continuous Sylvester Montesinos MD 15 mL/hr at 10/22/24 1730 150 mcg/hr at 10/22/24 1730     And    fentaNYL (SUBLIMAZE) bolus from bag  50 mcg IntraVENous Q30 Min PRN Sylvester Montesinos MD        insulin lispro (HUMALOG,ADMELOG) injection vial 0-8 Units  0-8 Units SubCUTAneous Q4H Akanksha Alvarado APRN - CNP        propofol infusion  5-50 mcg/kg/min IntraVENous Continuous Akanksha Alvarado APRN - CNP 19.6 mL/hr at 10/22/24 1730 30 mcg/kg/min at 10/22/24 1730    cefTRIAXone (ROCEPHIN) 1,000 mg in sodium chloride 0.9 % 50 mL IVPB (mini-bag)  1,000 mg IntraVENous Q24H Akanksha Alvarado APRN - CNP   Stopped at 10/22/24 1554    methylPREDNISolone sodium succ (SOLU-MEDROL) 40 mg in bacteriostatic water 1 mL injection  40 mg IntraVENous Daily Kathi Ovalle MD   40 mg at 10/22/24 0800    [Held by provider] amLODIPine (NORVASC) tablet 10 mg  10 mg Oral Daily Sylvester Montesinos MD   10 mg at 10/21/24 0901    escitalopram (LEXAPRO) tablet 10 mg  10 mg Oral Daily Sylvester Montesinos MD   10 mg at 10/22/24 0759    [Held by provider] losartan (COZAAR) tablet 100 mg  100 mg Oral Daily Sylvester Montesinos MD   100 mg at 10/21/24 0901    montelukast (SINGULAIR)  repolarization. Rule out ischemia. No reciprocal changes or dynamic EKG changes.   JOHANA- improved   Acute exacerbation of COPD  Hyperkalemia  History of AGSPER  Obesity  Substance abuse-patient cocaine positive on admission  History hypertension  History of dyslipidemia  Tobacco abuse  Severe RV dilatation/RV dysfunction  Mild aortic stenosis  EF of 60 to 65% by echo on 10/22/2024        Plan:  ICU management and supportive care.   Coronary/ischemic evaluation when clinically feasible.  Continue  Cardizem for heart rate control  Maximize cardiac medications  Avoid any nephrotoxic agents secondary to JOHANA  Optimize cardiac meds when able/BP tolerates  Start lopressor 12.5mg po BID  Continue Losartan   Monitor on telemetry for any tachycardia or bradycardia arrhythmias  Maintain potassium greater than 4, magnesium greater than 2  GI/DVT prophylaxis           Electronically signed by BHARATI Sheets CNP on 10/31/2024 at 11:05 AM     Attending Supervising Physician's Attestation Statement  The patient is a 68 y.o. male. I have performed a history and physical examination of the patient. I discussed the case with the nurse practitioner.    I reviewed the patient's Past Medical History, Past Surgical History, Medications, and Allergies.     Physical Exam:  Vitals:    10/31/24 1145 10/31/24 1200 10/31/24 1215 10/31/24 1230   BP:  127/82  (!) 140/77   Pulse: 98 96 100 93   Resp: 26 23 27 25   Temp: 99.9 °F (37.7 °C) 99.9 °F (37.7 °C) 99.9 °F (37.7 °C) 99.7 °F (37.6 °C)   TempSrc:       SpO2: 95% 93% 94% 96%   Weight:       Height:             Pulmonary/Chest: decreased breath sounds noted  Cardiovascular: normal rate, normal S1 and S2, no gallops, intact distal pulses, and no carotid bruits  Abdomen: soft, non-tender, non-distended, normal bowel sounds, no masses or organomegaly    Active Hospital Problems    Diagnosis Date Noted    Abnormal EKG [R94.31] 10/22/2024     Priority: High    Palliative care encounter [Z51.5]

## 2024-10-31 NOTE — FLOWSHEET NOTE
Report called to 1W transferring to .Electronically signed by Sosa Ferrell RN on 10/31/2024 at 6:06 PM

## 2024-10-31 NOTE — PROGRESS NOTES
Date infectious Disease     Patient Name: Anabelle Morris  Date: 10/31/2024  YOB: 1955  Medical Record Number: [id number]              Past medical history cocaine COPD on home O2 alcohol abuse diabetes hypertension  Morbid obesity diabetes sleep apnea tobacco abuse hyperlipidemia  History of empyema with VATS surgery          Presented with shortness of breath 10/18/2024  Ongoing for several days worse with exertion  Productive sputum  Found to have rhinovirus/enterovirus infection  Required admission to the intensive care unit intubated 10/21/2024      10/27/2024 increasing fever to 100.8.  Described as having increasing tracheal secretions  Increasing white cell count 20,000  Procalcitonin 0.29  Lactic acid not elevated    Chest x-ray shows persistent left lower lung infiltrate possibly slightly less dense    Increasing temperature throughout the day reaching as high as 101.3    Had episode of vomiting while wearing mask    Now on high flow O2    Review of Systems   Constitutional: Negative.    Respiratory:  Positive for cough and shortness of breath.    Cardiovascular: Negative.    Gastrointestinal: Negative.         Physical Exam  Constitutional:       Appearance: He is ill-appearing.   Cardiovascular:      Heart sounds: Normal heart sounds. No murmur heard.  Pulmonary:      Effort: No respiratory distress.      Breath sounds: Rhonchi present. No wheezing or rales.   Chest:      Chest wall: No tenderness.   Abdominal:      General: Bowel sounds are normal. There is no distension.      Palpations: Abdomen is soft. There is no mass.      Tenderness: There is no abdominal tenderness. There is no guarding.   Genitourinary:     Comments: Holcomb catheter urine appearing clear        Blood pressure (!) 140/77, pulse 93, temperature 99.7 °F (37.6 °C), resp. rate 25, height 1.829 m (6'), weight 117.4 kg (258 lb 13.1 oz), SpO2 96%.      .   Lab Results   Component Value Date    WBC 17.8 (H) 10/31/2024    HGB  13.2 (L) 10/31/2024    HCT 41.0 (L) 10/31/2024    MCV 98.8 (H) 10/31/2024     10/31/2024     Lab Results   Component Value Date/Time     10/31/2024 04:39 AM    K 4.1 10/31/2024 04:39 AM     10/31/2024 04:39 AM    CO2 28 10/31/2024 04:39 AM    BUN 20 10/31/2024 04:39 AM    CREATININE 0.81 10/31/2024 04:39 AM    GLUCOSE 109 10/31/2024 04:39 AM    GLUCOSE 135 04/20/2022 05:45 AM    CALCIUM 9.3 10/31/2024 04:39 AM    LABGLOM >90.0 10/31/2024 04:39 AM    LABGLOM >90.0 03/27/2024 10:18 AM                ASSESSMENT:  PLAN:    Leukocytosis  Fever  Hypotensive requiring pressors      Pending blood cultures    Currently on Zosyn

## 2024-10-31 NOTE — CARE COORDINATION
AM TEAM QUALITY ICU ROUNDS AT BEDSIDE. NO FAMILY PRESENT. PT IS AWAKE, ALERT, TALKING. ON BIPAP CURRENTLY. ON ZOSYN, SOLUMEDROL, PRECEDEX. WILL LIKELY NEED PT/OT EVALUATION WHEN ABLE. PT IS INDEPENDENT AT BASELINE; HAS HOME O2 AND DME.

## 2024-10-31 NOTE — FLOWSHEET NOTE
0900-Assessment completed, see flow sheets.VSS. Alert et oriented x4. Pt settings changed to 40L 40% HHFNC, SPO2 95 oral secretions have decreased denies n/v a this time.Electronically signed by Sosa Ferrell RN on 10/31/2024 at 1:57 PM

## 2024-10-31 NOTE — INTERDISCIPLINARY ROUNDS
Spiritual Care Services     Summary of Visit:   participated in ICU rounds. No immediate spiritual care needs identified.    Encounter Summary  Encounter Overview/Reason: Interdisciplinary rounds, Palliative Care  Service Provided For: Patient  Referral/Consult From: Rounding  Support System: Family members                       Palliative Care  Type: Palliative Care, Interdisciplinary Rounds       Spiritual Assessment/Intervention/Outcomes:                     Care Plan:          to provide continued spiritual support as needed or requested      Spiritual Care Services   Electronically signed by Chaplain Jh on 10/31/2024 at 10:02 AM.    To reach a  for emotional and spiritual support, place an EPIC consult request.   If a  is needed immediately, dial “0” and ask to page the on-call .

## 2024-10-31 NOTE — FLOWSHEET NOTE
1330- Dr Duenas at bedside per order to continue zamora cath for retention and start Flomax.Electronically signed by Sosa Ferrell RN on 10/31/2024 at 2:10 PM

## 2024-10-31 NOTE — PROGRESS NOTES
Patient refusing bipap for the night.  States they were nauseous during the day and did not want to wear.

## 2024-10-31 NOTE — PROGRESS NOTES
Comprehensive Nutrition Assessment    Type and Reason for Visit:  Reassess    Nutrition Recommendations/Plan:   Continue NPO  Monitor for ability to initiate and tolerate po diet , versus need for nutrition support     Malnutrition Assessment:  Malnutrition Status:  At risk for malnutrition (10/31/24 1423)    Context:  Chronic Illness     Findings of the 6 clinical characteristics of malnutrition:  Energy Intake:  Mild decrease in energy intake  Weight Loss:  Unable to assess     Body Fat Loss:  No body fat loss     Muscle Mass Loss:  No muscle mass loss    Fluid Accumulation:  Mild Generalized   Strength:  Not Performed    Nutrition Assessment:    No progress towards nutrition related goals, currently day 10 meeting < 50% of energy and protein needs, pt did not tolerate progression of tube feeding to goal rate prior to extubation,. possible ileus per notes, currently extubated, had N/V yesterday, continues on reglan, on Bipap at time of visit, loose stool yesterday    Nutrition Related Findings:    history includes :cocaine abuse, COPD , alcohol abuse, diabetes,  Intubated ( 10/21- 10/30), trophic tube feeding started ( 10/21), did not tolerate advance. N/V/BiPap post extubation, ? ilues. Labs unremarkable, Meds reviewed . Nursing reports mild -moderate generalized edema Wound Type: None       Current Nutrition Intake & Therapies:    Average Meal Intake: NPO  Average Supplements Intake: NPO  Diet NPO Exceptions are: Sips of Water with Meds    Anthropometric Measures:  Height: 182.9 cm (6')  Ideal Body Weight (IBW): 178 lbs (81 kg)    Admission Body Weight: 107 kg (236 lb)  Current Body Weight: 117 kg (258 lb) (* edema), 134.8 % IBW. Weight Source: Bed Scale  Current BMI (kg/m2): 35  Usual Body Weight: 114.8 kg (253 lb) (( 11/2023), 256# ( 7/2024))     % Weight Change (Calculated): -5.1  Weight Adjustment For: No Adjustment                 BMI Categories: Obese Class 1 (BMI 30.0-34.9)    Estimated Daily Nutrient

## 2024-10-31 NOTE — PROGRESS NOTES
Physician Progress Note      PATIENT:               NAKIA RAMIREZ  CSN #:                  [id number]  :                       1955  ADMIT DATE:       10/19/2024 7:10 PM  DISCH DATE:  RESPONDING  PROVIDER #:        Judith Poole MD          QUERY TEXT:    Pt admitted with Acute bronchitis due to Rhinovirus and Acute exacerbation of   COPD, Metabolic encephalopathy and acute hypoxic and hypercapnic respiratory   failure.  Noted to have Temp 100.4-100.8, BP 82/49, MAP 60, Glucose 52,   Procalcitonin .29, WBC 19.-20.3 on 10/28 with pO2 57 on 30% FiO2.  Pt noted on   10/30 to have bladder temp  101.5, -131, RR 19-30, Procalcitonin .17,   WBC 17.3, pO2 74 on HHF FiO2 50%, Was on BiPAP.  Vomited this morning and   switched to high flow.  Had low-grade fever.\" If possible, please document in   the progress notes and discharge summary if you    The medical record reflects the following:  Risk Factors: Acute bronchitis due to Rhinovirus and Acute exacerbation of   COPD,  Metabolic encephalopathy and acute hypoxic and hypercapnic respiratory   failure  Clinical Indicators: Noted to have Temp 100.4-100.8, BP 82/49, MAP 60, Glucose   52, Procalcitonin .29, WBC 19.-20.3 on 10/28 with pO2 57 on 30% FiO2.  Pt   noted on 10/29 to have ETT exchange from 7.5 ETT to 7 ETT due to narrow cord   opening and mild epiglottitis.  ID 10/28 consult: \"10/27/2024 increasing fever to 100.8.  Described as having   increasing tracheal secretions. Increasing white cell count 20,000.   Procalcitonin 0.29. Lactic acid not elevated. Chest x-ray shows persistent   left lower lung infiltrate possibly slightly less dense.  Leukocytosis  Fever    Hypotensive requiring pressors. Doubt pneumonia\"  10/30:  Bladder temp 100.6-101.5, -131, RR 19-30, Procalcitonin .17, WBC   17.3, pO2 74 on HHF FiO2 50%;  Pulmonology PN: \"Extubated yesterday.  Has   been a bit confused and struggling overnight.  Was on BiPAP.  Vomited this   morning and

## 2024-10-31 NOTE — PROGRESS NOTES
Critical Care Progress Note    10/31/2024 9:38 AM    Subjective:   Admit Date: 10/19/2024  PCP: oRby Silva MD    No chief complaint on file.    Interval History: Has been doing better overall.  Continues to be on high flow.  Nausea is better.  Continues to be on antibiotics.  On tapering steroids now.  Precedex was on but has been weaned off this morning.    Medications:   Scheduled Meds:   metoclopramide  10 mg IntraVENous TID    dilTIAZem  120 mg Oral Daily    predniSONE  30 mg Oral Daily    Followed by    [START ON 2024] predniSONE  20 mg Oral Daily    Followed by    [START ON 2024] predniSONE  10 mg Oral Daily    piperacillin-tazobactam  3,375 mg IntraVENous Q8H    losartan  100 mg Oral Daily    docusate  100 mg Oral BID    polyethylene glycol  17 g Oral Daily    famotidine (PEPCID) injection  20 mg IntraVENous BID    insulin lispro  0-8 Units SubCUTAneous Q4H    escitalopram  10 mg Oral Daily    montelukast  10 mg Oral Nightly    [Held by provider] budesonide-formoterol  2 puff Inhalation BID    traZODone  50 mg Oral Nightly    [Held by provider] tiotropium  2 puff Inhalation Daily RT    guaiFENesin  600 mg Oral BID    ipratropium 0.5 mg-albuterol 2.5 mg  1 Dose Inhalation Q6H    budesonide  0.5 mg Nebulization BID RT    sodium chloride flush  5-40 mL IntraVENous 2 times per day    enoxaparin  30 mg SubCUTAneous BID     Continuous Infusions:   dexmedeTOMIDine HCl in NaCl Stopped (10/31/24 0541)    sodium chloride      dextrose           Objective:   Vitals:   Temp (24hrs), Av.4 °F (38 °C), Min:99.9 °F (37.7 °C), Max:101.5 °F (38.6 °C)    /78   Pulse 94   Temp 100.4 °F (38 °C)   Resp 27   Ht 1.829 m (6')   Wt 117.4 kg (258 lb 13.1 oz)   SpO2 93%   BMI 35.10 kg/m²   I/O:24HR INTAKE/OUTPUT:    Intake/Output Summary (Last 24 hours) at 10/31/2024 0938  Last data filed at 10/31/2024 0703  Gross per 24 hour   Intake 342.1 ml   Output 1650 ml   Net -1307.9 ml            Ventilator

## 2024-11-01 ENCOUNTER — APPOINTMENT (OUTPATIENT)
Dept: GENERAL RADIOLOGY | Age: 69
DRG: 207 | End: 2024-11-01
Attending: STUDENT IN AN ORGANIZED HEALTH CARE EDUCATION/TRAINING PROGRAM
Payer: MEDICARE

## 2024-11-01 LAB
ALBUMIN SERPL-MCNC: 3.2 G/DL (ref 3.5–4.6)
ALP SERPL-CCNC: 81 U/L (ref 35–104)
ALT SERPL-CCNC: 33 U/L (ref 0–41)
ANION GAP SERPL CALCULATED.3IONS-SCNC: 6 MEQ/L (ref 9–15)
ANISOCYTOSIS BLD QL SMEAR: ABNORMAL
AST SERPL-CCNC: 21 U/L (ref 0–40)
BASOPHILS # BLD: 0 K/UL (ref 0–0.2)
BASOPHILS NFR BLD: 0.2 %
BILIRUB SERPL-MCNC: 0.7 MG/DL (ref 0.2–0.7)
BUN SERPL-MCNC: 20 MG/DL (ref 8–23)
CALCIUM SERPL-MCNC: 9.6 MG/DL (ref 8.5–9.9)
CHLORIDE SERPL-SCNC: 108 MEQ/L (ref 95–107)
CO2 SERPL-SCNC: 30 MEQ/L (ref 20–31)
CREAT SERPL-MCNC: 0.87 MG/DL (ref 0.7–1.2)
EOSINOPHIL # BLD: 0.2 K/UL (ref 0–0.7)
EOSINOPHIL NFR BLD: 1 %
ERYTHROCYTE [DISTWIDTH] IN BLOOD BY AUTOMATED COUNT: 15.2 % (ref 11.5–14.5)
GLOBULIN SER CALC-MCNC: 3.5 G/DL (ref 2.3–3.5)
GLUCOSE BLD-MCNC: 110 MG/DL (ref 70–99)
GLUCOSE BLD-MCNC: 196 MG/DL (ref 70–99)
GLUCOSE BLD-MCNC: 85 MG/DL (ref 70–99)
GLUCOSE BLD-MCNC: 92 MG/DL (ref 70–99)
GLUCOSE BLD-MCNC: 98 MG/DL (ref 70–99)
GLUCOSE SERPL-MCNC: 102 MG/DL (ref 70–99)
HCT VFR BLD AUTO: 40.3 % (ref 42–52)
HGB BLD-MCNC: 12.6 G/DL (ref 14–18)
LYMPHOCYTES # BLD: 1.9 K/UL (ref 1–4.8)
LYMPHOCYTES NFR BLD: 11 %
MACROCYTES BLD QL SMEAR: ABNORMAL
MCH RBC QN AUTO: 31.4 PG (ref 27–31.3)
MCHC RBC AUTO-ENTMCNC: 31.3 % (ref 33–37)
MCV RBC AUTO: 100.5 FL (ref 79–92.2)
METAMYELOCYTES NFR BLD MANUAL: 1 %
MONOCYTES # BLD: 1.2 K/UL (ref 0.2–0.8)
MONOCYTES NFR BLD: 6.7 %
NEUTROPHILS # BLD: 14.5 K/UL (ref 1.4–6.5)
NEUTS SEG NFR BLD: 81 %
PERFORMED ON: ABNORMAL
PERFORMED ON: ABNORMAL
PERFORMED ON: NORMAL
PLATELET # BLD AUTO: 297 K/UL (ref 130–400)
PLATELET BLD QL SMEAR: ADEQUATE
POTASSIUM SERPL-SCNC: 4 MEQ/L (ref 3.4–4.9)
PROT SERPL-MCNC: 6.7 G/DL (ref 6.3–8)
RBC # BLD AUTO: 4.01 M/UL (ref 4.7–6.1)
REASON FOR REJECTION: NORMAL
REJECTED TEST: NORMAL
SODIUM SERPL-SCNC: 144 MEQ/L (ref 135–144)
WBC # BLD AUTO: 17.7 K/UL (ref 4.8–10.8)

## 2024-11-01 PROCEDURE — 80053 COMPREHEN METABOLIC PANEL: CPT

## 2024-11-01 PROCEDURE — 6370000000 HC RX 637 (ALT 250 FOR IP): Performed by: STUDENT IN AN ORGANIZED HEALTH CARE EDUCATION/TRAINING PROGRAM

## 2024-11-01 PROCEDURE — 6370000000 HC RX 637 (ALT 250 FOR IP): Performed by: INTERNAL MEDICINE

## 2024-11-01 PROCEDURE — 74230 X-RAY XM SWLNG FUNCJ C+: CPT

## 2024-11-01 PROCEDURE — 2500000003 HC RX 250 WO HCPCS: Performed by: NURSE PRACTITIONER

## 2024-11-01 PROCEDURE — 99232 SBSQ HOSP IP/OBS MODERATE 35: CPT | Performed by: INTERNAL MEDICINE

## 2024-11-01 PROCEDURE — 6360000002 HC RX W HCPCS: Performed by: INTERNAL MEDICINE

## 2024-11-01 PROCEDURE — 2580000003 HC RX 258: Performed by: INTERNAL MEDICINE

## 2024-11-01 PROCEDURE — 2700000000 HC OXYGEN THERAPY PER DAY

## 2024-11-01 PROCEDURE — 6360000002 HC RX W HCPCS: Performed by: NURSE PRACTITIONER

## 2024-11-01 PROCEDURE — 94667 MNPJ CHEST WALL 1ST: CPT

## 2024-11-01 PROCEDURE — 51702 INSERT TEMP BLADDER CATH: CPT

## 2024-11-01 PROCEDURE — 2500000003 HC RX 250 WO HCPCS: Performed by: STUDENT IN AN ORGANIZED HEALTH CARE EDUCATION/TRAINING PROGRAM

## 2024-11-01 PROCEDURE — 94668 MNPJ CHEST WALL SBSQ: CPT

## 2024-11-01 PROCEDURE — 6370000000 HC RX 637 (ALT 250 FOR IP): Performed by: NURSE PRACTITIONER

## 2024-11-01 PROCEDURE — 94761 N-INVAS EAR/PLS OXIMETRY MLT: CPT

## 2024-11-01 PROCEDURE — 94640 AIRWAY INHALATION TREATMENT: CPT

## 2024-11-01 PROCEDURE — 36415 COLL VENOUS BLD VENIPUNCTURE: CPT

## 2024-11-01 PROCEDURE — 6370000000 HC RX 637 (ALT 250 FOR IP)

## 2024-11-01 PROCEDURE — 99231 SBSQ HOSP IP/OBS SF/LOW 25: CPT | Performed by: PHYSICIAN ASSISTANT

## 2024-11-01 PROCEDURE — 2580000003 HC RX 258: Performed by: NURSE PRACTITIONER

## 2024-11-01 PROCEDURE — 2060000000 HC ICU INTERMEDIATE R&B

## 2024-11-01 PROCEDURE — 85025 COMPLETE CBC W/AUTO DIFF WBC: CPT

## 2024-11-01 PROCEDURE — 92611 MOTION FLUOROSCOPY/SWALLOW: CPT

## 2024-11-01 PROCEDURE — 92526 ORAL FUNCTION THERAPY: CPT

## 2024-11-01 RX ADMIN — PIPERACILLIN AND TAZOBACTAM 3375 MG: 3; .375 INJECTION, POWDER, LYOPHILIZED, FOR SOLUTION INTRAVENOUS at 10:07

## 2024-11-01 RX ADMIN — IPRATROPIUM BROMIDE AND ALBUTEROL SULFATE 1 DOSE: 2.5; .5 SOLUTION RESPIRATORY (INHALATION) at 07:08

## 2024-11-01 RX ADMIN — METOCLOPRAMIDE HYDROCHLORIDE 10 MG: 5 INJECTION, SOLUTION INTRAMUSCULAR; INTRAVENOUS at 09:53

## 2024-11-01 RX ADMIN — Medication 10 ML: at 21:40

## 2024-11-01 RX ADMIN — BARIUM SULFATE 50 ML: 0.81 POWDER, FOR SUSPENSION ORAL at 13:50

## 2024-11-01 RX ADMIN — ENOXAPARIN SODIUM 30 MG: 100 INJECTION SUBCUTANEOUS at 21:35

## 2024-11-01 RX ADMIN — MONTELUKAST 10 MG: 10 TABLET, FILM COATED ORAL at 21:35

## 2024-11-01 RX ADMIN — GUAIFENESIN 600 MG: 600 TABLET ORAL at 21:35

## 2024-11-01 RX ADMIN — SODIUM CHLORIDE, PRESERVATIVE FREE 20 MG: 5 INJECTION INTRAVENOUS at 21:34

## 2024-11-01 RX ADMIN — INSULIN LISPRO 2 UNITS: 100 INJECTION, SOLUTION INTRAVENOUS; SUBCUTANEOUS at 21:39

## 2024-11-01 RX ADMIN — METOCLOPRAMIDE HYDROCHLORIDE 10 MG: 5 INJECTION, SOLUTION INTRAMUSCULAR; INTRAVENOUS at 21:35

## 2024-11-01 RX ADMIN — BUDESONIDE 500 MCG: 0.5 SUSPENSION RESPIRATORY (INHALATION) at 19:40

## 2024-11-01 RX ADMIN — PIPERACILLIN AND TAZOBACTAM 3375 MG: 3; .375 INJECTION, POWDER, LYOPHILIZED, FOR SOLUTION INTRAVENOUS at 17:33

## 2024-11-01 RX ADMIN — IPRATROPIUM BROMIDE AND ALBUTEROL SULFATE 1 DOSE: 2.5; .5 SOLUTION RESPIRATORY (INHALATION) at 19:40

## 2024-11-01 RX ADMIN — PIPERACILLIN AND TAZOBACTAM 3375 MG: 3; .375 INJECTION, POWDER, LYOPHILIZED, FOR SOLUTION INTRAVENOUS at 00:00

## 2024-11-01 RX ADMIN — PIPERACILLIN AND TAZOBACTAM 3375 MG: 3; .375 INJECTION, POWDER, LYOPHILIZED, FOR SOLUTION INTRAVENOUS at 23:45

## 2024-11-01 RX ADMIN — SODIUM CHLORIDE, PRESERVATIVE FREE 20 MG: 5 INJECTION INTRAVENOUS at 09:52

## 2024-11-01 RX ADMIN — METOPROLOL TARTRATE 12.5 MG: 25 TABLET, FILM COATED ORAL at 21:35

## 2024-11-01 RX ADMIN — METOCLOPRAMIDE HYDROCHLORIDE 10 MG: 5 INJECTION, SOLUTION INTRAMUSCULAR; INTRAVENOUS at 15:03

## 2024-11-01 RX ADMIN — IPRATROPIUM BROMIDE AND ALBUTEROL SULFATE 1 DOSE: 2.5; .5 SOLUTION RESPIRATORY (INHALATION) at 12:34

## 2024-11-01 RX ADMIN — TRAZODONE HYDROCHLORIDE 50 MG: 50 TABLET ORAL at 21:35

## 2024-11-01 RX ADMIN — BARIUM SULFATE 80 ML: 400 SUSPENSION ORAL at 13:51

## 2024-11-01 RX ADMIN — ENOXAPARIN SODIUM 30 MG: 100 INJECTION SUBCUTANEOUS at 09:53

## 2024-11-01 RX ADMIN — DOCUSATE SODIUM 100 MG: 50 LIQUID ORAL at 21:35

## 2024-11-01 RX ADMIN — BUDESONIDE 500 MCG: 0.5 SUSPENSION RESPIRATORY (INHALATION) at 07:08

## 2024-11-01 ASSESSMENT — PAIN SCALES - GENERAL: PAINLEVEL_OUTOF10: 0

## 2024-11-01 NOTE — PROGRESS NOTES
[facility]  Facility/Department: [facility] 1[facility] TELEMETRY  Speech Language Pathology   Treatment Note      Anabelle Morris  1955  [id number]  [x]   confirmed      Date: 2024    COPD exacerbation (HCC) [J44.1]    Restrictions/Precautions: Isolation (med falls risk)    Diet NPO Exceptions are: Sips of Water with Meds, Ice Chips     Respiratory Status:O2 Flow Rate (L/min): 5 L/min (24 0624)   No active isolations      Subjective:  Alert and Cooperative        Interventions used this date:  Dysphagia Treatment and Instruction in Compensatory Strategies      Objective/Assessment:  Patient progressing towards goals:  Short-term Goals  Timeframe for Short-term Goals: 1 week  Goal 1: Pt will complete oral motor exercises targeting labial/lingual/buccal strength and coordination x10/each given cues to improve oral phase of swallow.  Pt completed lingual press exercise with tongue depressor x5 with mild verbal cues from SLP, pt showing decreased lingual strength  Goal 2: Pt will complete tongue base exercises x10 to promote bolus propulsion and bolus control to prevent premature spillage to the pharynx and improve swallow function.  Goal 3: Pt will complete x10 effortful swallow exercises to improve hyolaryngeal elevation and reduce pharyngeal residue to improve pharyngeal phase of swallow.  Goal 4: Pt will tolerate PO trials deemed appropriate by treating SLP to assess readiness for instrumental assessment.  Pt had mild moist breath quality prior to any trials this date  Pt tolerated ice chip trials x4 with no overt s/s of aspiration  Pt tolerated puree x3 with no overt s/s of aspiration    Recommend continued NPO with ice chips PRN with nursing only. Pt is appropriate for [facility] this date. SAMANTHA Hooper verbalized understanding.    Treatment/Activity Tolerance:  Patient tolerated treatment well    Plan:  Continue per POC    Pain Assessment:  Patient does not c/o pain.    Pain Re-assessment:  Patient does not c/o

## 2024-11-01 NOTE — CARE COORDINATION
SPOKE WITH PATIENT. HE WOULD LIKE TO DC HOME WITH SISTER. WILL NEED PT/OT. DISCUSSED SNF BUT PT WOULD LIKE TO DC HOME. WILL FOLLOW. PT WEANED TO 2L NC. WOULD ALSO NEED 02 EVAL PRIOR TO DC.

## 2024-11-01 NOTE — PLAN OF CARE
Problem: Chronic Conditions and Co-morbidities  Goal: Patient's chronic conditions and co-morbidity symptoms are monitored and maintained or improved  Outcome: Progressing     Problem: Discharge Planning  Goal: Discharge to home or other facility with appropriate resources  Outcome: Progressing     Problem: Pain  Goal: Verbalizes/displays adequate comfort level or baseline comfort level  Outcome: Progressing     Problem: ABCDS Injury Assessment  Goal: Absence of physical injury  Outcome: Progressing     Problem: Safety - Adult  Goal: Free from fall injury  Outcome: Progressing     Problem: Safety - Medical Restraint  Goal: Remains free of injury from restraints (Restraint for Interference with Medical Device)  Description: INTERVENTIONS:  1. Determine that other, less restrictive measures have been tried or would not be effective before applying the restraint  2. Evaluate the patient's condition at the time of restraint application  3. Inform patient/family regarding the reason for restraint  4. Q2H: Monitor safety, psychosocial status, comfort, nutrition and hydration  Outcome: Progressing     Problem: Neurosensory - Adult  Goal: Achieves stable or improved neurological status  Outcome: Progressing     Problem: Respiratory - Adult  Goal: Achieves optimal ventilation and oxygenation  Outcome: Progressing     Problem: Cardiovascular - Adult  Goal: Maintains optimal cardiac output and hemodynamic stability  Outcome: Progressing  Goal: Absence of cardiac dysrhythmias or at baseline  Outcome: Progressing     Problem: Skin/Tissue Integrity - Adult  Goal: Skin integrity remains intact  Outcome: Progressing  Flowsheets (Taken 10/31/2024 1500 by Sosa Ferrell RN)  Skin Integrity Remains Intact: Monitor for areas of redness and/or skin breakdown     Problem: Musculoskeletal - Adult  Goal: Return mobility to safest level of function  Outcome: Progressing     Problem: Gastrointestinal - Adult  Goal: Minimal or absence of  intake, labs, and treatment plans   Recommend appropriate diets, oral nutritional supplements, and vitamin/mineral supplements   Recommend, monitor, and adjust tube feedings and TPN/PPN based on assessed needs     Problem: Skin/Tissue Integrity  Goal: Absence of new skin breakdown  Description: 1.  Monitor for areas of redness and/or skin breakdown  2.  Assess vascular access sites hourly  3.  Every 4-6 hours minimum:  Change oxygen saturation probe site  4.  Every 4-6 hours:  If on nasal continuous positive airway pressure, respiratory therapy assess nares and determine need for appliance change or resting period.  Outcome: Progressing     Problem: SLP Adult - Impaired Swallowing  Goal: By Discharge: Advance to least restrictive diet without signs or symptoms of aspiration for planned discharge setting.  See evaluation for individualized goals.  10/31/2024 1518 by Carito Rizo, SLP  Outcome: Progressing  10/31/2024 1517 by Carito Rizo, SLP  Outcome: Progressing     Problem: Nutrition Deficit:  Goal: Optimize nutritional status  11/1/2024 0042 by Evie Kidd RN  Outcome: Progressing  10/31/2024 1425 by Catalina Alva, RD, LD  Outcome: Not Progressing  Flowsheets (Taken 10/22/2024 1217 by Olga Foster RD, LD)  Nutrient intake appropriate for improving, restoring, or maintaining nutritional needs:   Assess nutritional status and recommend course of action   Monitor oral intake, labs, and treatment plans   Recommend appropriate diets, oral nutritional supplements, and vitamin/mineral supplements   Recommend, monitor, and adjust tube feedings and TPN/PPN based on assessed needs

## 2024-11-01 NOTE — PROGRESS NOTES
failure status post extubation  Abnormal EKG-early repolarization. Rule out ischemia. No reciprocal changes or dynamic EKG changes.   Sinus nidia--likely sedation related Precedex-- resolved   JOHANA  Acute exacerbation of COPD  Hyperkalemia  History of GASPER  Obesity  Substance abuse-patient cocaine positive on admission  History hypertension  History of dyslipidemia  Tobacco abuse  Severe RV dilatation/RV dysfunction  Mild aortic stenosis  EF of 60 to 65% by echo on 10/22/2024        Plan:  ICU management and supportive care.   Coronary/ischemic evaluation when clinically feasible. Most likely outpatient. No anginal symptoms.   Continue Cardziem   Continue beta blocker  Continue ARB  Maximize cardiac medications  Avoid any nephrotoxic agents secondary to JOHANA-- resolved.   Optimize cardiac meds when able/BP tolerates  Monitor on telemetry for any tachycardia or bradycardia arrhythmias  Maintain potassium greater than 4, magnesium greater than 2  GI/DVT prophylaxis  Patient to follow up with dr. Peña post discharge. Will arrange.  Stable from cardiology standpoint at this time.       Attending Supervising Physician's Attestation Statement  The patient is a 68 y.o. male. I have performed a history and physical examination of the patient. I discussed the case with the nurse practitioner.     I reviewed the patient's Past Medical History, Past Surgical History, Medications, and Allergies.      Physical Exam:  Vitals          Vitals:     10/31/24 1145 10/31/24 1200 10/31/24 1215 10/31/24 1230   BP:   127/82   (!) 140/77   Pulse: 98 96 100 93   Resp: 26 23 27 25   Temp: 99.9 °F (37.7 °C) 99.9 °F (37.7 °C) 99.9 °F (37.7 °C) 99.7 °F (37.6 °C)   TempSrc:           SpO2: 95% 93% 94% 96%   Weight:           Height:                       Pulmonary/Chest: decreased breath sounds noted  Cardiovascular: normal rate, normal S1 and S2, no gallops, intact distal pulses, and no carotid bruits  Abdomen: soft, non-tender, non-distended,  normal bowel sounds, no masses or organomegaly           Active Hospital Problems     Diagnosis Date Noted    Abnormal EKG [R94.31] 10/22/2024       Priority: High    Palliative care encounter [Z51.5] 10/29/2024       Priority: Low    Goals of care, counseling/discussion [Z71.89] 10/29/2024       Priority: Low    Advanced care planning/counseling discussion [Z71.89] 10/29/2024       Priority: Low    Full code status [Z78.9] 10/29/2024       Priority: Low    Leukocytosis [D72.829] 10/28/2024       Priority: Low    Hypotension [I95.9] 10/28/2024       Priority: Low    Urinary retention [R33.9] 10/22/2024       Priority: Low    Acute respiratory failure [J96.00] 10/22/2024       Priority: Low    COPD exacerbation (HCC) [J44.1] 08/29/2024       Priority: Low         I reviewed and agree with the findings and plan documented in her note .             Assessment:    Acute respiratory failure status post extubation  Abnormal EKG-early repolarization. Rule out ischemia. No reciprocal changes or dynamic EKG changes.   Sinus nidia--likely sedation related Precedex  JOHANA  Acute exacerbation of COPD  Hyperkalemia  History of GASPER  Obesity  Substance abuse-patient cocaine positive on admission  History hypertension  History of dyslipidemia  Tobacco abuse  Severe RV dilatation/RV dysfunction  Mild aortic stenosis  EF of 60 to 65% by echo on 10/22/2024        Plan:  ICU management and supportive care.   Coronary/ischemic evaluation when clinically feasible. Most likely outpatient. Concern for medical compliance given substance abuse.   Change Norvasc to Cardizem for heart rate control  Maximize cardiac medications  Avoid any nephrotoxic agents secondary to JOHANA  Optimize cardiac meds when able/BP tolerates  Monitor on telemetry for any tachycardia or bradycardia arrhythmias  Maintain potassium greater than 4, magnesium greater than 2  GI/DVT prophylaxis         Electronically signed by James Peña DO on 11/1/2024 at 4:48 PM

## 2024-11-01 NOTE — PROGRESS NOTES
Ran Out of Food in the Last Year: Never true   Transportation Needs: No Transportation Needs (10/19/2024)    PRAPARE - Transportation     Lack of Transportation (Medical): No     Lack of Transportation (Non-Medical): No   Physical Activity: Inactive (6/28/2024)    Exercise Vital Sign     Days of Exercise per Week: 0 days     Minutes of Exercise per Session: 0 min   Stress: No Stress Concern Present (10/17/2022)    [employer] - Occupational Stress Questionnaire     Feeling of Stress : Not at all   Social Connections: Socially Isolated (10/17/2022)    Social Connection and Isolation Panel [NHANES]     Frequency of Communication with Friends and Family: Once a week     Frequency of Social Gatherings with Friends and Family: Never     Attends Alevism Services: Never     Active Member of Clubs or Organizations: No     Attends Club or Organization Meetings: Never     Marital Status:    Housing Stability: Low Risk  (10/19/2024)    Housing Stability Vital Sign     Unable to Pay for Housing in the Last Year: No     Number of Times Moved in the Last Year: 1     Homeless in the Last Year: No     Family History   Problem Relation Age of Onset    Arthritis Mother     Asthma Mother     High Cholesterol Mother     Other Mother         aneurysm    Diabetes Father     Stroke Maternal Grandmother     Cancer Maternal Grandfather     Hypertension Other     COPD Neg Hx      Current Facility-Administered Medications   Medication Dose Route Frequency Provider Last Rate Last Admin    metoprolol tartrate (LOPRESSOR) tablet 12.5 mg  12.5 mg Oral BID Dee Corona APRN - CNP   12.5 mg at 10/31/24 2159    tamsulosin (FLOMAX) capsule 0.4 mg  0.4 mg Oral Daily Paul Duenas PA   0.4 mg at 10/31/24 1501    ipratropium 0.5 mg-albuterol 2.5 mg (DUONEB) nebulizer solution 1 Dose  1 Dose Inhalation TID RT Willam Mejia MD   1 Dose at 11/01/24 1234    metoclopramide (REGLAN) injection 10 mg   Inhalation BID Sylvester Montesinos MD   2 puff at 10/21/24 0706    traZODone (DESYREL) tablet 50 mg  50 mg Oral Nightly Sylvester Montesinos MD   50 mg at 10/31/24 2159    [Held by provider] tiotropium (SPIRIVA RESPIMAT) 2.5 MCG/ACT inhaler 2 puff  2 puff Inhalation Daily RT Grace Wray MD   2 puff at 10/21/24 0706    guaiFENesin (MUCINEX) extended release tablet 600 mg  600 mg Oral BID Willam Mejia MD   600 mg at 10/31/24 2159    budesonide (PULMICORT) nebulizer suspension 500 mcg  0.5 mg Nebulization BID RT Grace Wray MD   500 mcg at 11/01/24 0708    sodium chloride flush 0.9 % injection 5-40 mL  5-40 mL IntraVENous 2 times per day Sylvester Montesinos MD   10 mL at 10/31/24 2200    sodium chloride flush 0.9 % injection 5-40 mL  5-40 mL IntraVENous PRN Sylvester Montesinos MD        0.9 % sodium chloride infusion   IntraVENous PRN Sylvester Montesinos MD        magnesium sulfate 2000 mg in 50 mL IVPB premix  2,000 mg IntraVENous PRN Sylvester Montesinos MD        enoxaparin Sodium (LOVENOX) injection 30 mg  30 mg SubCUTAneous BID Akanksha Alvarado APRN - CNP   30 mg at 11/01/24 0953    ondansetron (ZOFRAN-ODT) disintegrating tablet 4 mg  4 mg Oral Q8H PRN Sylvester Montesinos MD        Or    ondansetron (ZOFRAN) injection 4 mg  4 mg IntraVENous Q6H PRN Sylvester Montesinos MD   4 mg at 10/31/24 0655    melatonin tablet 3 mg  3 mg Oral Nightly PRN Sylvester Montesinos MD   3 mg at 10/19/24 2203    polyethylene glycol (GLYCOLAX) packet 17 g  17 g Oral Daily PRN Sylvester Montesinos MD        ipratropium 0.5 mg-albuterol 2.5 mg (DUONEB) nebulizer solution 1 Dose  1 Dose Inhalation Q4H PRN Sylvester Montesinos MD   1 Dose at 10/20/24 0016    glucose chewable tablet 16 g  4 tablet Oral PRN Sylvester Montesinos MD        dextrose bolus 10% 125 mL  125 mL IntraVENous PRN Sylvester Montesinos MD   Stopped at 10/27/24 7754    Or    dextrose bolus 10% 250 mL  250 mL IntraVENous PRN Sylvester Montesinos MD        glucagon injection 1 mg  1 mg SubCUTAneous PRN Sylvester Montesinos MD

## 2024-11-01 NOTE — PROGRESS NOTES
Date infectious Disease     Patient Name: Anabelle Morris  Date: 11/1/2024  YOB: 1955  Medical Record Number: [id number]              Past medical history cocaine COPD on home O2 alcohol abuse diabetes hypertension  Morbid obesity diabetes sleep apnea tobacco abuse hyperlipidemia  History of empyema with VATS surgery          Presented with shortness of breath 10/18/2024  Ongoing for several days worse with exertion  Productive sputum  Found to have rhinovirus/enterovirus infection  Required admission to the intensive care unit intubated 10/21/2024      10/27/2024 increasing fever to 100.8.  Described as having increasing tracheal secretions  Increasing white cell count 20,000  Procalcitonin 0.29  Lactic acid not elevated    Chest x-ray shows persistent left lower lung infiltrate possibly slightly less dense    Increasing temperature throughout the day reaching as high as 101.3    Had episode of vomiting while wearing mask    Now on high flow O2    Review of Systems   Constitutional: Negative.    Respiratory:  Positive for cough and shortness of breath.    Cardiovascular: Negative.    Gastrointestinal: Negative.         Physical Exam  Constitutional:       Appearance: He is ill-appearing.   Cardiovascular:      Heart sounds: Normal heart sounds. No murmur heard.  Pulmonary:      Effort: No respiratory distress.      Breath sounds: Rhonchi present. No wheezing or rales.   Chest:      Chest wall: No tenderness.   Abdominal:      General: Bowel sounds are normal. There is no distension.      Palpations: Abdomen is soft. There is no mass.      Tenderness: There is no abdominal tenderness. There is no guarding.   Genitourinary:     Comments: Holcomb catheter urine appearing clear        Blood pressure (!) 148/82, pulse 96, temperature 97.7 °F (36.5 °C), temperature source Oral, resp. rate 18, height 1.829 m (6'), weight 117.4 kg (258 lb 13.1 oz), SpO2 98%.      .   Lab Results   Component Value Date    WBC  17.7 (H) 11/01/2024    HGB 12.6 (L) 11/01/2024    HCT 40.3 (L) 11/01/2024    .5 (H) 11/01/2024     11/01/2024     Lab Results   Component Value Date/Time     11/01/2024 07:20 AM    K 4.0 11/01/2024 07:20 AM     11/01/2024 07:20 AM    CO2 30 11/01/2024 07:20 AM    BUN 20 11/01/2024 07:20 AM    CREATININE 0.87 11/01/2024 07:20 AM    GLUCOSE 102 11/01/2024 07:20 AM    GLUCOSE 135 04/20/2022 05:45 AM    CALCIUM 9.6 11/01/2024 07:20 AM    LABGLOM >90.0 11/01/2024 07:20 AM    LABGLOM >90.0 03/27/2024 10:18 AM                ASSESSMENT:  PLAN:    Leukocytosis  Fever  Hypotensive requiring pressors      Pending blood cultures     Zosyn x 2 days more

## 2024-11-01 NOTE — FLOWSHEET NOTE
0932:Speech notified RN, patient should remain NPO until barium swallow. RN notified MD PO medications can not be given this am due to this. Oxygen decreased to 4L nasal cannula.    1439: Barium swallow complete. Speech therapy recommending minced and moist diet, thin liquids, no straws, single sips and no mixed consistencies. MD notified and orders placed.Electronically signed by Sandie Canchola RN on 11/1/2024 at 5:50 PM

## 2024-11-01 NOTE — PROCEDURES
[facility]   Facility/Department: [address] TELEMETRY  Speech Language Pathology  Modified Barium Swallow (MBS)    NAME:Anabelle Morris  : 1955 (68 y.o.)   [x]   confirmed    MRN: [id number]  ROOM: [place] [address]  ADMISSION DATE: 10/19/2024  PATIENT DIAGNOSIS(ES): COPD exacerbation (HCC) [J44.1]  No chief complaint on file.    Patient Active Problem List    Diagnosis Date Noted    GASPER (obstructive sleep apnea) 2015    Chest pain 2023    Chronic right-sided low back pain with right-sided sciatica 11/15/2022    DDD (degenerative disc disease), lumbar 11/15/2022    Marijuana use 2021    Drug-seeking behavior 2015    Medical non-compliance 2014    Palliative care encounter 10/29/2024    Goals of care, counseling/discussion 10/29/2024    Advanced care planning/counseling discussion 10/29/2024    Full code status 10/29/2024    Leukocytosis 10/28/2024    Hypotension 10/28/2024    Urinary retention 10/22/2024    Acute respiratory failure 10/22/2024    Abnormal EKG 10/22/2024    COPD exacerbation (HCC) 2024    Type 2 diabetes mellitus with diabetic nephropathy, without long-term current use of insulin (HCC) 10/26/2021    History of colon polyps 10/26/2021    Spinal stenosis of lumbar region 2021    Umbilical hernia without obstruction and without gangrene 2020    Anxiety with depression 2020    Status post total knee replacement, left 2019    Tobacco use 2019    Insomnia 2018    Gastroesophageal reflux disease 2018    Allergy to seafood 2018    Chest wall pain 2016    Obesity 2016    COPD (chronic obstructive pulmonary disease) ([facility])     Alcohol abuse 10/27/2016    Cocaine abuse ([facility]) 10/27/2016    Gout 10/27/2016    History of arthroscopy of both knees 10/27/2016    Hypertension 10/27/2016    SVT (supraventricular tachycardia) ([facility]) 10/27/2016    Erectile dysfunction 10/27/2016    Hyperlipidemia 12/10/2015    House dust  mite allergy 04/21/2014    Seasonal allergies 04/21/2014    Macrocytic anemia 09/07/2013     Past Medical History:   Diagnosis Date    Alcohol abuse 10/27/2016    Anemia     Asthma     CKD (chronic kidney disease) stage 3, GFR 30-59 ml/min ([facility]) 12/14/2021    Cocaine abuse ([facility]) 10/27/2016    Community acquired pneumonia of left lower lobe of lung 12/05/2016    COPD (chronic obstructive pulmonary disease) ([facility])     Depression 04/27/2020    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    Drug-seeking behavior 04/14/2015    Edema 12/10/2015    Erectile dysfunction     Gastroesophageal reflux disease 12/17/2018    Gout 10/27/2016    History of arthroscopy of both knees 10/27/2016    History of colon polyps 10/26/2021    House dust mite allergy 04/21/2014    Hyperlipidemia     Hypertension 10/27/2016    Injury to heart 10/27/2016    Insomnia 12/17/2018    Loculated pleural effusion     Macrocytic anemia 09/07/2013    Medical non-compliance 02/22/2014    Morbid obesity due to excess calories 12/08/2016    Osteoarthritis of both knees 12/08/2016    Personal history of tobacco use     Pleurisy 12/12/2016    Pneumonia 12/04/2016    caused hospital admission    Pneumonia in infectious disease 11/29/2023    Pre-diabetes 10/27/2016    Seasonal allergies 04/21/2014    Severe persistent asthma 10/27/2016    Severe sleep apnea 04/14/2015    Supraventricular tachycardia (HCC) 10/27/2016    Type 2 diabetes mellitus with albuminuria ([facility]) 10/26/2021    Type 2 diabetes mellitus without complication, without long-term current use of insulin (HCC) 10/26/2021     Past Surgical History:   Procedure Laterality Date    ANTERIOR CRUCIATE LIGAMENT REPAIR      CARDIAC CATHETERIZATION      COLONOSCOPY N/A 07/15/2020    11 colon polyps, 2 rectal polyps, hemorrhoids, 2y repeat (DR MARTINEZ)  COLONOSCOPY WITH POLYPECTOMY performed by Alonso Martinez MD at [facility] OR    CT NEEDLE BIOPSY LUNG PERCUTANEOUS  11/26/2021    CT NEEDLE BIOPSY LUNG PERCUTANEOUS 11/26/2021

## 2024-11-01 NOTE — PROGRESS NOTES
INPATIENT PROGRESS NOTES    PATIENT NAME: Anabelle Morris  MRN: [id number]  SERVICE DATE:  2024   SERVICE TIME:  9:03 AM      PRIMARY SERVICE: Pulmonary Disease    CHIEF COMPLAINTS: COPD exacerbation    INTERVAL HPI: Patient seen and examined at bedside, Interval Notes, orders reviewed. Nursing notes noted    Patient report feeling better, still short of breath but improved, cough productive of clear phlegm, no chest pain, no fever.   Currently on 5 L O2 saturation 96%.  New information updated in the note today, rest of the examination did not change compared to yesterday.    Review of system:     GI Abdominal pain No  Skin Rash No    Social History     Tobacco Use    Smoking status: Light Smoker     Current packs/day: 0.00     Average packs/day: 0.3 packs/day for 30.0 years (7.5 ttl pk-yrs)     Types: Cigarettes, Cigars     Start date: 1988     Last attempt to quit: 10/1/2013     Years since quittin.0     Passive exposure: Current    Smokeless tobacco: Never   Substance Use Topics    Alcohol use: Yes     Alcohol/week: 4.0 standard drinks of alcohol     Types: 2 Cans of beer, 2 Shots of liquor per week     Comment: soc         Problem Relation Age of Onset    Arthritis Mother     Asthma Mother     High Cholesterol Mother     Other Mother         aneurysm    Diabetes Father     Stroke Maternal Grandmother     Cancer Maternal Grandfather     Hypertension Other     COPD Neg Hx          OBJECTIVE    Body mass index is 35.1 kg/m².    PHYSICAL EXAM:  Vitals:  /80   Pulse 98   Temp 97.9 °F (36.6 °C) (Oral)   Resp 22   Ht 1.829 m (6')   Wt 117.4 kg (258 lb 13.1 oz)   SpO2 95%   BMI 35.10 kg/m²     General: alert, cooperative, no distress  Head: normocephalic, atraumatic  Eyes:No gross abnormalities.  ENT:  MMM no lesions  Neck:  supple and no masses  Chest : Decreased air movement, wheezing, rhonchi, nontender, tympanic  Heart:: Heart sounds are normal.  Regular rate and rhythm without  murmur, gallop or rub.  ABD:  symmetric, soft, non-tender  Musculoskeletal : no cyanosis, no clubbing, and no edema  Neuro:  Grossly normal  Skin: No rashes or nodules noted.  Lymph node:  no cervical nodes  Urology: No Holcomb   Psychiatric: appropriate    DATA:   Recent Labs     10/31/24  0439 11/01/24  0721   WBC 17.8* 17.7*   HGB 13.2* 12.6*   HCT 41.0* 40.3*   MCV 98.8* 100.5*    297     Recent Labs     10/31/24  0439 11/01/24  0720    144   K 4.1 4.0   * 108*   CO2 28 30   BUN 20 20   CREATININE 0.81 0.87   GLUCOSE 109* 102*   CALCIUM 9.3 9.6   BILITOT 1.0* 0.7   ALKPHOS 74 81   AST 16 21   ALT 27 33   LABGLOM >90.0 >90.0   GLOB 3.3 3.5       MV Settings:     Vent Mode: (S) CPAP/PS  Vt (Set, mL): 480 mL  Resp Rate (Set): 26 bpm  FiO2 : 40 %  PEEP/CPAP (cmH2O): 5  Peak Inspiratory Pressure (cmH2O): 15 cmH2O  Mean Airway Pressure (cmH2O): 11 cmH20  I:E Ratio: 1:1.7    Recent Labs     10/30/24  0824   PHART 7.447   JGT6VGZ 43   PO2ART 74*   VWW3GOA 30.0*   BEART 6*   G5SDVPOQ 95       O2 Device: Nasal cannula  O2 Flow Rate (L/min): 5 L/min    Diet NPO Exceptions are: Sips of Water with Meds, Ice Chips     MEDICATIONS during current hospitalization:    Continuous Infusions:   sodium chloride      dextrose         Scheduled Meds:   metoprolol tartrate  12.5 mg Oral BID    tamsulosin  0.4 mg Oral Daily    ipratropium 0.5 mg-albuterol 2.5 mg  1 Dose Inhalation TID RT    metoclopramide  10 mg IntraVENous TID    dilTIAZem  120 mg Oral Daily    predniSONE  30 mg Oral Daily    Followed by    [START ON 11/2/2024] predniSONE  20 mg Oral Daily    Followed by    [START ON 11/5/2024] predniSONE  10 mg Oral Daily    piperacillin-tazobactam  3,375 mg IntraVENous Q8H    losartan  100 mg Oral Daily    docusate  100 mg Oral BID    polyethylene glycol  17 g Oral Daily    famotidine (PEPCID) injection  20 mg IntraVENous BID    insulin lispro  0-8 Units SubCUTAneous Q4H    escitalopram  10 mg Oral Daily

## 2024-11-01 NOTE — PROGRESS NOTES
Hospitalist Progress Note      Date of Admission: 10/19/2024  Chief Complaint:    No chief complaint on file.    Subjective:  No new complaints.  No nausea, vomiting, chest pain, or headache\    Medications:    Infusion Medications    sodium chloride      dextrose       Scheduled Medications    metoprolol tartrate  12.5 mg Oral BID    tamsulosin  0.4 mg Oral Daily    ipratropium 0.5 mg-albuterol 2.5 mg  1 Dose Inhalation TID RT    metoclopramide  10 mg IntraVENous TID    dilTIAZem  120 mg Oral Daily    predniSONE  30 mg Oral Daily    Followed by    [START ON 11/2/2024] predniSONE  20 mg Oral Daily    Followed by    [START ON 11/5/2024] predniSONE  10 mg Oral Daily    piperacillin-tazobactam  3,375 mg IntraVENous Q8H    losartan  100 mg Oral Daily    docusate  100 mg Oral BID    polyethylene glycol  17 g Oral Daily    famotidine (PEPCID) injection  20 mg IntraVENous BID    insulin lispro  0-8 Units SubCUTAneous Q4H    escitalopram  10 mg Oral Daily    montelukast  10 mg Oral Nightly    [Held by provider] budesonide-formoterol  2 puff Inhalation BID    traZODone  50 mg Oral Nightly    [Held by provider] tiotropium  2 puff Inhalation Daily RT    guaiFENesin  600 mg Oral BID    budesonide  0.5 mg Nebulization BID RT    sodium chloride flush  5-40 mL IntraVENous 2 times per day    enoxaparin  30 mg SubCUTAneous BID     PRN Meds: acetaminophen, atropine, hydrALAZINE, sodium chloride flush, sodium chloride, magnesium sulfate, ondansetron **OR** ondansetron, melatonin, polyethylene glycol, ipratropium 0.5 mg-albuterol 2.5 mg, glucose, dextrose bolus **OR** dextrose bolus, glucagon (rDNA), dextrose    Intake/Output Summary (Last 24 hours) at 11/1/2024 1001  Last data filed at 11/1/2024 0631  Gross per 24 hour   Intake --   Output 1240 ml   Net -1240 ml     Exam:  /63   Pulse (!) 102   Temp 97.9 °F (36.6 °C) (Oral)   Resp 22   Ht 1.829 m (6')   Wt 117.4 kg (258 lb 13.1 oz)   SpO2 96%   BMI 35.10 kg/m²   Head:  atelectasis or pneumonia.         XR CHEST PORTABLE   Final Result   No significant change         XR CHEST PORTABLE   Final Result   1. ET tube 7.4 cm above kelly.   2. NG tube in the stomach.   3. Scar-like density in the left lung.   4. Oval density along the lateral left chest wall.         Fluoroscopy modified barium swallow with video    (Results Pending)     Assessment/Plan:    Copd exac: steroids, bronchodilators.  Wean down oxygen as tolerated.    Dysphagia: Concern for aspiration.  Appreciate SLP evaluation, currently n.p.o. with MBS scheduled for this afternoon.    Acute hypoxic respiratory failure secondary to COPD exacerbation likely from viral infection, rhinovirus positive.  5 L 96% nasal cannula, wean as tolerated.    HTN: monitor BP, adjust meds as needed  Hematuria: zamora in place.  To follow w urology  UDS cocaine and cannabinoid positive noted.     MAGDALENE DYKES MD

## 2024-11-01 NOTE — PROGRESS NOTES
11/01/24 0900   RT Protocol   History Pulmonary Disease 2   Respiratory pattern 0   Breath sounds 6   Cough 1   Indications for Bronchodilator Therapy On home bronchodilators   Bronchodilator Assessment Score 9

## 2024-11-01 NOTE — PROGRESS NOTES
10/31/24 2100   RT Protocol   History Pulmonary Disease 2   Respiratory pattern 0   Breath sounds 6   Cough 1   Indications for Bronchodilator Therapy On home bronchodilators   Bronchodilator Assessment Score 9

## 2024-11-02 LAB
ALBUMIN SERPL-MCNC: 2.9 G/DL (ref 3.5–4.6)
ALP SERPL-CCNC: 74 U/L (ref 35–104)
ALT SERPL-CCNC: 29 U/L (ref 0–41)
ANION GAP SERPL CALCULATED.3IONS-SCNC: 8 MEQ/L (ref 9–15)
AST SERPL-CCNC: 18 U/L (ref 0–40)
BACTERIA BLD CULT ORG #2: NORMAL
BACTERIA BLD CULT: NORMAL
BASOPHILS # BLD: 0 K/UL (ref 0–0.2)
BASOPHILS NFR BLD: 0.3 %
BILIRUB SERPL-MCNC: 0.7 MG/DL (ref 0.2–0.7)
BUN SERPL-MCNC: 17 MG/DL (ref 8–23)
CALCIUM SERPL-MCNC: 9 MG/DL (ref 8.5–9.9)
CHLORIDE SERPL-SCNC: 108 MEQ/L (ref 95–107)
CO2 SERPL-SCNC: 28 MEQ/L (ref 20–31)
CREAT SERPL-MCNC: 0.77 MG/DL (ref 0.7–1.2)
EOSINOPHIL # BLD: 0.3 K/UL (ref 0–0.7)
EOSINOPHIL NFR BLD: 2.8 %
ERYTHROCYTE [DISTWIDTH] IN BLOOD BY AUTOMATED COUNT: 15.1 % (ref 11.5–14.5)
GLOBULIN SER CALC-MCNC: 3.3 G/DL (ref 2.3–3.5)
GLUCOSE BLD-MCNC: 128 MG/DL (ref 70–99)
GLUCOSE BLD-MCNC: 138 MG/DL (ref 70–99)
GLUCOSE BLD-MCNC: 81 MG/DL (ref 70–99)
GLUCOSE BLD-MCNC: 92 MG/DL (ref 70–99)
GLUCOSE BLD-MCNC: 95 MG/DL (ref 70–99)
GLUCOSE SERPL-MCNC: 86 MG/DL (ref 70–99)
HCT VFR BLD AUTO: 39.7 % (ref 42–52)
HGB BLD-MCNC: 12.6 G/DL (ref 14–18)
LYMPHOCYTES # BLD: 1.7 K/UL (ref 1–4.8)
LYMPHOCYTES NFR BLD: 14.7 %
MCH RBC QN AUTO: 31.5 PG (ref 27–31.3)
MCHC RBC AUTO-ENTMCNC: 31.7 % (ref 33–37)
MCV RBC AUTO: 99.3 FL (ref 79–92.2)
MONOCYTES # BLD: 1.3 K/UL (ref 0.2–0.8)
MONOCYTES NFR BLD: 11.3 %
NEUTROPHILS # BLD: 8.2 K/UL (ref 1.4–6.5)
NEUTS SEG NFR BLD: 70.6 %
PERFORMED ON: ABNORMAL
PERFORMED ON: ABNORMAL
PERFORMED ON: NORMAL
PLATELET # BLD AUTO: 289 K/UL (ref 130–400)
POTASSIUM SERPL-SCNC: 3.8 MEQ/L (ref 3.4–4.9)
PROT SERPL-MCNC: 6.2 G/DL (ref 6.3–8)
RBC # BLD AUTO: 4 M/UL (ref 4.7–6.1)
SODIUM SERPL-SCNC: 144 MEQ/L (ref 135–144)
WBC # BLD AUTO: 11.6 K/UL (ref 4.8–10.8)

## 2024-11-02 PROCEDURE — 6370000000 HC RX 637 (ALT 250 FOR IP)

## 2024-11-02 PROCEDURE — 6370000000 HC RX 637 (ALT 250 FOR IP): Performed by: INTERNAL MEDICINE

## 2024-11-02 PROCEDURE — 36415 COLL VENOUS BLD VENIPUNCTURE: CPT

## 2024-11-02 PROCEDURE — 6360000002 HC RX W HCPCS: Performed by: NURSE PRACTITIONER

## 2024-11-02 PROCEDURE — 99232 SBSQ HOSP IP/OBS MODERATE 35: CPT | Performed by: INTERNAL MEDICINE

## 2024-11-02 PROCEDURE — 6370000000 HC RX 637 (ALT 250 FOR IP): Performed by: STUDENT IN AN ORGANIZED HEALTH CARE EDUCATION/TRAINING PROGRAM

## 2024-11-02 PROCEDURE — 85025 COMPLETE CBC W/AUTO DIFF WBC: CPT

## 2024-11-02 PROCEDURE — 2500000003 HC RX 250 WO HCPCS: Performed by: NURSE PRACTITIONER

## 2024-11-02 PROCEDURE — 94668 MNPJ CHEST WALL SBSQ: CPT

## 2024-11-02 PROCEDURE — 94640 AIRWAY INHALATION TREATMENT: CPT

## 2024-11-02 PROCEDURE — 2580000003 HC RX 258: Performed by: INTERNAL MEDICINE

## 2024-11-02 PROCEDURE — 6360000002 HC RX W HCPCS: Performed by: INTERNAL MEDICINE

## 2024-11-02 PROCEDURE — 6370000000 HC RX 637 (ALT 250 FOR IP): Performed by: NURSE PRACTITIONER

## 2024-11-02 PROCEDURE — 94761 N-INVAS EAR/PLS OXIMETRY MLT: CPT

## 2024-11-02 PROCEDURE — 2700000000 HC OXYGEN THERAPY PER DAY

## 2024-11-02 PROCEDURE — 2580000003 HC RX 258: Performed by: NURSE PRACTITIONER

## 2024-11-02 PROCEDURE — 2060000000 HC ICU INTERMEDIATE R&B

## 2024-11-02 PROCEDURE — 6370000000 HC RX 637 (ALT 250 FOR IP): Performed by: PHYSICIAN ASSISTANT

## 2024-11-02 PROCEDURE — 80053 COMPREHEN METABOLIC PANEL: CPT

## 2024-11-02 RX ORDER — IPRATROPIUM BROMIDE AND ALBUTEROL SULFATE 2.5; .5 MG/3ML; MG/3ML
1 SOLUTION RESPIRATORY (INHALATION)
Status: DISCONTINUED | OUTPATIENT
Start: 2024-11-02 | End: 2024-11-06 | Stop reason: HOSPADM

## 2024-11-02 RX ADMIN — IPRATROPIUM BROMIDE AND ALBUTEROL SULFATE 1 DOSE: 2.5; .5 SOLUTION RESPIRATORY (INHALATION) at 02:47

## 2024-11-02 RX ADMIN — BUDESONIDE 500 MCG: 0.5 SUSPENSION RESPIRATORY (INHALATION) at 07:06

## 2024-11-02 RX ADMIN — TAMSULOSIN HYDROCHLORIDE 0.4 MG: 0.4 CAPSULE ORAL at 08:19

## 2024-11-02 RX ADMIN — DOCUSATE SODIUM 100 MG: 50 LIQUID ORAL at 19:58

## 2024-11-02 RX ADMIN — METOPROLOL TARTRATE 12.5 MG: 25 TABLET, FILM COATED ORAL at 08:18

## 2024-11-02 RX ADMIN — PREDNISONE 20 MG: 20 TABLET ORAL at 08:45

## 2024-11-02 RX ADMIN — ENOXAPARIN SODIUM 30 MG: 100 INJECTION SUBCUTANEOUS at 19:58

## 2024-11-02 RX ADMIN — IPRATROPIUM BROMIDE AND ALBUTEROL SULFATE 1 DOSE: 2.5; .5 SOLUTION RESPIRATORY (INHALATION) at 13:06

## 2024-11-02 RX ADMIN — PIPERACILLIN AND TAZOBACTAM 3375 MG: 3; .375 INJECTION, POWDER, LYOPHILIZED, FOR SOLUTION INTRAVENOUS at 14:33

## 2024-11-02 RX ADMIN — IPRATROPIUM BROMIDE AND ALBUTEROL SULFATE 1 DOSE: 2.5; .5 SOLUTION RESPIRATORY (INHALATION) at 19:46

## 2024-11-02 RX ADMIN — METOPROLOL TARTRATE 12.5 MG: 25 TABLET, FILM COATED ORAL at 19:57

## 2024-11-02 RX ADMIN — BUDESONIDE 500 MCG: 0.5 SUSPENSION RESPIRATORY (INHALATION) at 19:46

## 2024-11-02 RX ADMIN — SODIUM CHLORIDE, PRESERVATIVE FREE 20 MG: 5 INJECTION INTRAVENOUS at 19:58

## 2024-11-02 RX ADMIN — LOSARTAN POTASSIUM 100 MG: 100 TABLET, FILM COATED ORAL at 08:19

## 2024-11-02 RX ADMIN — GUAIFENESIN 600 MG: 600 TABLET ORAL at 19:58

## 2024-11-02 RX ADMIN — POLYETHYLENE GLYCOL 3350 17 G: 17 POWDER, FOR SOLUTION ORAL at 08:19

## 2024-11-02 RX ADMIN — ENOXAPARIN SODIUM 30 MG: 100 INJECTION SUBCUTANEOUS at 08:44

## 2024-11-02 RX ADMIN — GUAIFENESIN 600 MG: 600 TABLET ORAL at 08:19

## 2024-11-02 RX ADMIN — Medication 10 ML: at 08:58

## 2024-11-02 RX ADMIN — Medication 10 ML: at 19:59

## 2024-11-02 RX ADMIN — PIPERACILLIN AND TAZOBACTAM 3375 MG: 3; .375 INJECTION, POWDER, LYOPHILIZED, FOR SOLUTION INTRAVENOUS at 23:38

## 2024-11-02 RX ADMIN — SODIUM CHLORIDE, PRESERVATIVE FREE 20 MG: 5 INJECTION INTRAVENOUS at 08:18

## 2024-11-02 RX ADMIN — DOCUSATE SODIUM 100 MG: 50 LIQUID ORAL at 08:44

## 2024-11-02 RX ADMIN — IPRATROPIUM BROMIDE AND ALBUTEROL SULFATE 1 DOSE: 2.5; .5 SOLUTION RESPIRATORY (INHALATION) at 07:06

## 2024-11-02 RX ADMIN — DILTIAZEM HYDROCHLORIDE 120 MG: 120 CAPSULE, COATED, EXTENDED RELEASE ORAL at 08:18

## 2024-11-02 RX ADMIN — TRAZODONE HYDROCHLORIDE 50 MG: 50 TABLET ORAL at 19:57

## 2024-11-02 RX ADMIN — METOCLOPRAMIDE HYDROCHLORIDE 10 MG: 5 INJECTION, SOLUTION INTRAMUSCULAR; INTRAVENOUS at 08:18

## 2024-11-02 RX ADMIN — ESCITALOPRAM OXALATE 10 MG: 10 TABLET ORAL at 08:19

## 2024-11-02 RX ADMIN — PIPERACILLIN AND TAZOBACTAM 3375 MG: 3; .375 INJECTION, POWDER, LYOPHILIZED, FOR SOLUTION INTRAVENOUS at 08:49

## 2024-11-02 RX ADMIN — MONTELUKAST 10 MG: 10 TABLET, FILM COATED ORAL at 19:57

## 2024-11-02 ASSESSMENT — PAIN SCALES - GENERAL
PAINLEVEL_OUTOF10: 0

## 2024-11-02 NOTE — PLAN OF CARE
Problem: Chronic Conditions and Co-morbidities  Goal: Patient's chronic conditions and co-morbidity symptoms are monitored and maintained or improved  11/1/2024 2353 by Maycol Cottrell RN  Outcome: Progressing  Flowsheets (Taken 11/1/2024 2353)  Care Plan - Patient's Chronic Conditions and Co-Morbidity Symptoms are Monitored and Maintained or Improved:   Monitor and assess patient's chronic conditions and comorbid symptoms for stability, deterioration, or improvement   Collaborate with multidisciplinary team to address chronic and comorbid conditions and prevent exacerbation or deterioration   Update acute care plan with appropriate goals if chronic or comorbid symptoms are exacerbated and prevent overall improvement and discharge  11/1/2024 1112 by Sandie Canchola RN  Outcome: Progressing     Problem: Discharge Planning  Goal: Discharge to home or other facility with appropriate resources  11/1/2024 2353 by Maycol Cottrell RN  Outcome: Progressing  11/1/2024 1112 by Sandie Canchola RN  Outcome: Progressing     Problem: Pain  Goal: Verbalizes/displays adequate comfort level or baseline comfort level  11/1/2024 2353 by Maycol Cottrell RN  Outcome: Progressing  11/1/2024 1112 by Sandie Canchola RN  Outcome: Progressing     Problem: ABCDS Injury Assessment  Goal: Absence of physical injury  11/1/2024 1112 by Sandie Canchola RN  Outcome: Progressing     Problem: Safety - Adult  Goal: Free from fall injury  11/1/2024 1112 by Sandie Canchola RN  Outcome: Progressing     Problem: Safety - Medical Restraint  Goal: Remains free of injury from restraints (Restraint for Interference with Medical Device)  Description: INTERVENTIONS:  1. Determine that other, less restrictive measures have been tried or would not be effective before applying the restraint  2. Evaluate the patient's condition at the time of restraint application  3. Inform patient/family regarding the reason for restraint  4. Q2H: Monitor safety, psychosocial status,

## 2024-11-02 NOTE — PROGRESS NOTES
Chief Complaint:  SOB     Reason for consult:  EKG changes     History of Present Illness:     This is a 68-year-old -American male with past medical history significant for hypertension, dyslipidemia, diabetes, COPD on 3 L O2, history of alcohol abuse, history of cocaine abuse, sleep apnea and multiple medical problems who originally presented to [facility] on 10/19/2024 with chief complaint of shortness of breath.  History is obtained from chart review as patient is currently intubated.  Apparently he had told ER physician that he had been drinking alcohol and using cocaine on a regular basis prior to presentation.  Apparently while in ER patient became hypoxic with O2 sat dropping into the 70s with ambulation to the restroom and he was resumed on 3 L O2 per nasal cannula with improvement in SpO2 to 97%.  Chest x-ray in ER had shown no acute cardiopulmonary findings.  Initial BMP in ER unremarkable.  NT proBNP within normal range of 40.  High-sensitivity troponin negative at 18.  CBC showed WBC mildly elevated at 10.4, hemoglobin 12.8.  Urine drug screen positive for cocaine and marijuana.  He was subsequently transferred from [facility] and admitted to [facility] with diagnosis of COPD exacerbation.     Patient was transferred to ICU yesterday afternoon due to worsening respiratory distress and was subsequently intubated.  EKG completed yesterday evening at 2056 showed ST elevations in leads II, III, aVF and V3 through V6 and cardiology consulted for further evaluation.  He was on low-dose Levophed earlier today but this was recently stopped.      At time my evaluation, patient is intubated and sedated on mechanical ventilation.  He is on 50% FiO2 with SpO2 of 98%.  Patient was noted to have hematuria following straight cath this morning.  Urology evaluated patient today.  Repeat EKG completed this afternoon shows some improvement in previously noted ST elevations.  Repeat troponin ordered and  10/22/2024 05:04 AM      10/22/2024 05:04 AM     .6 10/22/2024 05:04 AM     MCH 31.3 10/22/2024 05:04 AM     MCHC 29.9 10/22/2024 05:04 AM     RDW 16.1 10/22/2024 05:04 AM     NRBC 0.0 12/12/2021 09:01 AM     BANDSPCT 2 08/29/2024 11:06 AM     METASPCT 1 10/22/2024 05:04 AM     LYMPHOPCT 7.0 10/22/2024 05:04 AM     MONOPCT 14.3 10/22/2024 05:04 AM     MYELOPCT 2 11/17/2021 12:03 PM     EOSPCT 0.1 10/22/2024 05:04 AM     BASOPCT 0.1 10/22/2024 05:04 AM     MONOSABS 2.5 10/22/2024 05:04 AM     LYMPHSABS 1.2 10/22/2024 05:04 AM     EOSABS 0.0 10/22/2024 05:04 AM     BASOSABS 0.0 10/22/2024 05:04 AM      CMP:          Lab Results   Component Value Date/Time      10/22/2024 05:04 AM     K 5.3 10/22/2024 05:04 AM      10/22/2024 05:04 AM     CO2 24 10/22/2024 05:04 AM     BUN 28 10/22/2024 05:04 AM     CREATININE 1.8 10/22/2024 09:28 AM     CREATININE 2.08 10/22/2024 05:04 AM     GFRAA >60.0 05/17/2022 05:49 AM     LABGLOM 41 10/22/2024 09:28 AM     LABGLOM >90.0 03/27/2024 10:18 AM     GLUCOSE 106 10/22/2024 05:04 AM     GLUCOSE 135 04/20/2022 05:45 AM     CALCIUM 9.0 10/22/2024 05:04 AM     BILITOT <0.2 10/22/2024 05:04 AM     ALKPHOS 92 10/22/2024 05:04 AM     AST 27 10/22/2024 05:04 AM     ALT 26 10/22/2024 05:04 AM      BMP:          Lab Results   Component Value Date/Time      10/22/2024 05:04 AM     K 5.3 10/22/2024 05:04 AM      10/22/2024 05:04 AM     CO2 24 10/22/2024 05:04 AM     BUN 28 10/22/2024 05:04 AM     CREATININE 1.8 10/22/2024 09:28 AM     CREATININE 2.08 10/22/2024 05:04 AM     CALCIUM 9.0 10/22/2024 05:04 AM     GFRAA >60.0 05/17/2022 05:49 AM     LABGLOM 41 10/22/2024 09:28 AM     LABGLOM >90.0 03/27/2024 10:18 AM     GLUCOSE 106 10/22/2024 05:04 AM     GLUCOSE 135 04/20/2022 05:45 AM      Magnesium:          Lab Results   Component Value Date/Time     MG 2.0 11/29/2023 07:51 AM      Troponin:          Lab Results   Component Value Date/Time     TROPONINI <0.010

## 2024-11-02 NOTE — PLAN OF CARE
Problem: Chronic Conditions and Co-morbidities  Goal: Patient's chronic conditions and co-morbidity symptoms are monitored and maintained or improved  11/2/2024 1026 by Sosa Ferrell RN  Outcome: Progressing     Problem: Discharge Planning  Goal: Discharge to home or other facility with appropriate resources  11/2/2024 1026 by Sosa Ferrell, RN  Outcome: Progressing     Problem: Pain  Goal: Verbalizes/displays adequate comfort level or baseline comfort level  11/2/2024 1026 by Sosa Ferrell RN  Outcome: Progressing     Problem: ABCDS Injury Assessment  Goal: Absence of physical injury  Outcome: Progressing

## 2024-11-02 NOTE — PROGRESS NOTES
Physical Therapy Missed Treatment   Facility/Department: [facility] MED SURG W173/W173-01    NAME: Anabelle Morris    : 1955 (68 y.o.)  MRN: [id number]    Account: [id number]  Gender: male    Attempted PT evaluation at 1415, patient was asleep upon arrival and declined therapy evaluation stating \"I'm too tired\", Agreeable to try tomorrow.     Will follow and attempt PT evaluation again at earliest availability.       Bindu Schaefer, PT, 24 at 2:17 PM

## 2024-11-02 NOTE — PROGRESS NOTES
Date infectious Disease     Patient Name: Anabelle Morris  Date: 11/2/2024  YOB: 1955  Medical Record Number: [id number]              Past medical history cocaine COPD on home O2 alcohol abuse diabetes hypertension  Morbid obesity diabetes sleep apnea tobacco abuse hyperlipidemia  History of empyema with VATS surgery          Presented with shortness of breath 10/18/2024  Ongoing for several days worse with exertion  Productive sputum  Found to have rhinovirus/enterovirus infection  Required admission to the intensive care unit intubated 10/21/2024      10/27/2024 increasing fever to 100.8.  Described as having increasing tracheal secretions  Increasing white cell count 20,000  Procalcitonin 0.29  Lactic acid not elevated      1029 extubated  High flow O2    Down to 3 L nasal cannula much more alert talking interactive        Review of Systems   Constitutional: Negative.    Respiratory:  Negative for cough and shortness of breath.    Cardiovascular: Negative.    Gastrointestinal: Negative.         Physical Exam  Cardiovascular:      Heart sounds: Normal heart sounds. No murmur heard.  Pulmonary:      Effort: No respiratory distress.      Breath sounds: No wheezing, rhonchi or rales.   Chest:      Chest wall: No tenderness.   Abdominal:      General: Bowel sounds are normal. There is no distension.      Palpations: Abdomen is soft. There is no mass.      Tenderness: There is no abdominal tenderness. There is no guarding.   Genitourinary:     Comments: Holcomb catheter urine appearing clear        Blood pressure (!) 154/83, pulse 73, temperature 97.3 °F (36.3 °C), temperature source Oral, resp. rate 19, height 1.829 m (6'), weight 114.3 kg (252 lb), SpO2 99%.      .   Lab Results   Component Value Date    WBC 11.6 (H) 11/02/2024    HGB 12.6 (L) 11/02/2024    HCT 39.7 (L) 11/02/2024    MCV 99.3 (H) 11/02/2024     11/02/2024     Lab Results   Component Value Date/Time     11/02/2024 05:22 AM     K 3.8 11/02/2024 05:22 AM     11/02/2024 05:22 AM    CO2 28 11/02/2024 05:22 AM    BUN 17 11/02/2024 05:22 AM    CREATININE 0.77 11/02/2024 05:22 AM    GLUCOSE 86 11/02/2024 05:22 AM    GLUCOSE 135 04/20/2022 05:45 AM    CALCIUM 9.0 11/02/2024 05:22 AM    LABGLOM >90.0 11/02/2024 05:22 AM    LABGLOM >90.0 03/27/2024 10:18 AM                ASSESSMENT:  PLAN:    Leukocytosis  Fever  Hypotensive requiring pressors      Pending blood cultures     Zosyn x 1 day more

## 2024-11-02 NOTE — PROGRESS NOTES
intermittent gurgling.    Labs:   Recent Labs     10/31/24  0439 11/01/24  0721 11/02/24  0522   WBC 17.8* 17.7* 11.6*   HGB 13.2* 12.6* 12.6*   HCT 41.0* 40.3* 39.7*    297 289     Recent Labs     10/31/24  0439 11/01/24  0720 11/02/24  0522    144 144   K 4.1 4.0 3.8   * 108* 108*   CO2 28 30 28   BUN 20 20 17   CREATININE 0.81 0.87 0.77   CALCIUM 9.3 9.6 9.0   AST 16 21 18   ALT 27 33 29   BILITOT 1.0* 0.7 0.7   ALKPHOS 74 81 74     No results for input(s): \"INR\" in the last 72 hours.  No results for input(s): \"CKTOTAL\", \"TROPONINI\" in the last 72 hours.  Radiology:  XR CHEST PORTABLE   Final Result   1.  Interval removal of the endotracheal and NG tubes, when compared to the   previous study performed earlier in the day.      2.  The remainder of the study is without significant interval change.         XR CHEST PORTABLE   Final Result   1. Tip of the endotracheal tube projects approximately 5 cm above the kelly.   2. Interval worsening of left basilar opacities.         XR CHEST PORTABLE   Final Result   1. ETT 0.6 cm above the kelly.   2. Central bronchial wall thickening.   3. Trace left pleural fluid/thickening.         XR CHEST PORTABLE   Final Result   1. Persistent left basilar opacities.   2. Lucency extending beyond on the left ribs, suggesting lung herniation.         XR CHEST PORTABLE   Final Result   Cardiomegaly with stable airspace disease seen within the left lung base with   small left pleural effusion. Lines and tubes in satisfactory position.         XR CHEST PORTABLE   Final Result   1. Endotracheal tube in satisfactory position.   2. Increased consolidation of the retrocardiac left lower lobe, remaining   moderate, consistent with increasing left lower lobe atelectasis or pneumonia.         XR CHEST PORTABLE   Final Result   No significant change         XR CHEST PORTABLE   Final Result   1. ET tube 7.4 cm above kelly.   2. NG tube in the stomach.   3. Scar-like density  in the left lung.   4. Oval density along the lateral left chest wall.         Fluoroscopy modified barium swallow with video    (Results Pending)     Assessment/Plan:    Copd exac: steroids, bronchodilators.  Wean down oxygen as tolerated.  Continues to downtrend.  On 3L NC.      Dysphagia: Concern for aspiration.  Appreciate SLP evaluation, diet modified.     Acute hypoxic respiratory failure secondary to COPD exacerbation likely from viral infection, rhinovirus positive.  5 L 96% nasal cannula, wean as tolerated.    HTN: monitor BP, adjust meds as needed  Hematuria: zamora in place.  To follow w urology  UDS cocaine and cannabinoid positive noted.     Dc plan: encouraged to work with PT OT for disposition planning.     MAGDALENE DYKES MD

## 2024-11-02 NOTE — PROGRESS NOTES
Pulmonary Progress Note    2024 9:41 AM    Subjective:       Admit Date: 10/19/2024  PCP: Roby Silva MD         Interval History: Has been doing better overall.  Weaned down to 3 L of oxygen.  Vitals are overall stable.  No fever overnight.    12 point review of systems has been obtained and negative except as mentioned in HPI.  Medications:   Scheduled Meds:   metoprolol tartrate  12.5 mg Oral BID    tamsulosin  0.4 mg Oral Daily    ipratropium 0.5 mg-albuterol 2.5 mg  1 Dose Inhalation TID RT    metoclopramide  10 mg IntraVENous TID    dilTIAZem  120 mg Oral Daily    predniSONE  20 mg Oral Daily    Followed by    [START ON 2024] predniSONE  10 mg Oral Daily    piperacillin-tazobactam  3,375 mg IntraVENous Q8H    losartan  100 mg Oral Daily    docusate  100 mg Oral BID    polyethylene glycol  17 g Oral Daily    famotidine (PEPCID) injection  20 mg IntraVENous BID    insulin lispro  0-8 Units SubCUTAneous Q4H    escitalopram  10 mg Oral Daily    montelukast  10 mg Oral Nightly    [Held by provider] budesonide-formoterol  2 puff Inhalation BID    traZODone  50 mg Oral Nightly    [Held by provider] tiotropium  2 puff Inhalation Daily RT    guaiFENesin  600 mg Oral BID    budesonide  0.5 mg Nebulization BID RT    sodium chloride flush  5-40 mL IntraVENous 2 times per day    enoxaparin  30 mg SubCUTAneous BID     Continuous Infusions:   sodium chloride      dextrose           Objective:   Vitals:   Temp (24hrs), Av °F (36.7 °C), Min:97.5 °F (36.4 °C), Max:98.4 °F (36.9 °C)    BP (!) 151/90   Pulse 79   Temp 98.4 °F (36.9 °C) (Oral)   Resp 20   Ht 1.829 m (6')   Wt 114.3 kg (252 lb)   SpO2 96%   BMI 34.18 kg/m²   I/O:24HR INTAKE/OUTPUT:    Intake/Output Summary (Last 24 hours) at 2024 0941  Last data filed at 2024 0636  Gross per 24 hour   Intake 410 ml   Output 750 ml   Net -340 ml            Ventilator Settings:  Vent Mode: (S) CPAP/PS  Resp Rate (Set): 26 bpm   Vt (Set, mL):

## 2024-11-03 PROBLEM — I50.9 HEART FAILURE (HCC): Status: ACTIVE | Noted: 2024-11-03

## 2024-11-03 LAB
ALBUMIN SERPL-MCNC: 3.2 G/DL (ref 3.5–4.6)
ALP SERPL-CCNC: 71 U/L (ref 35–104)
ALT SERPL-CCNC: 26 U/L (ref 0–41)
ANION GAP SERPL CALCULATED.3IONS-SCNC: 8 MEQ/L (ref 9–15)
AST SERPL-CCNC: 19 U/L (ref 0–40)
BASOPHILS # BLD: 0 K/UL (ref 0–0.2)
BASOPHILS NFR BLD: 0.2 %
BILIRUB SERPL-MCNC: 0.6 MG/DL (ref 0.2–0.7)
BUN SERPL-MCNC: 12 MG/DL (ref 8–23)
CALCIUM SERPL-MCNC: 9.1 MG/DL (ref 8.5–9.9)
CHLORIDE SERPL-SCNC: 106 MEQ/L (ref 95–107)
CO2 SERPL-SCNC: 27 MEQ/L (ref 20–31)
CREAT SERPL-MCNC: 0.66 MG/DL (ref 0.7–1.2)
EOSINOPHIL # BLD: 0.2 K/UL (ref 0–0.7)
EOSINOPHIL NFR BLD: 2.3 %
ERYTHROCYTE [DISTWIDTH] IN BLOOD BY AUTOMATED COUNT: 14.6 % (ref 11.5–14.5)
GLOBULIN SER CALC-MCNC: 3 G/DL (ref 2.3–3.5)
GLUCOSE BLD-MCNC: 100 MG/DL (ref 70–99)
GLUCOSE BLD-MCNC: 109 MG/DL (ref 70–99)
GLUCOSE BLD-MCNC: 145 MG/DL (ref 70–99)
GLUCOSE BLD-MCNC: 166 MG/DL (ref 70–99)
GLUCOSE BLD-MCNC: 97 MG/DL (ref 70–99)
GLUCOSE SERPL-MCNC: 92 MG/DL (ref 70–99)
HCT VFR BLD AUTO: 37.4 % (ref 42–52)
HGB BLD-MCNC: 12.3 G/DL (ref 14–18)
LYMPHOCYTES # BLD: 2 K/UL (ref 1–4.8)
LYMPHOCYTES NFR BLD: 19.2 %
MCH RBC QN AUTO: 32.1 PG (ref 27–31.3)
MCHC RBC AUTO-ENTMCNC: 32.9 % (ref 33–37)
MCV RBC AUTO: 97.7 FL (ref 79–92.2)
MONOCYTES # BLD: 1 K/UL (ref 0.2–0.8)
MONOCYTES NFR BLD: 10 %
NEUTROPHILS # BLD: 7 K/UL (ref 1.4–6.5)
NEUTS SEG NFR BLD: 67.9 %
PERFORMED ON: ABNORMAL
PERFORMED ON: NORMAL
PLATELET # BLD AUTO: 298 K/UL (ref 130–400)
POTASSIUM SERPL-SCNC: 4.2 MEQ/L (ref 3.4–4.9)
PROT SERPL-MCNC: 6.2 G/DL (ref 6.3–8)
RBC # BLD AUTO: 3.83 M/UL (ref 4.7–6.1)
SODIUM SERPL-SCNC: 141 MEQ/L (ref 135–144)
WBC # BLD AUTO: 10.3 K/UL (ref 4.8–10.8)

## 2024-11-03 PROCEDURE — 6370000000 HC RX 637 (ALT 250 FOR IP): Performed by: INTERNAL MEDICINE

## 2024-11-03 PROCEDURE — 6370000000 HC RX 637 (ALT 250 FOR IP)

## 2024-11-03 PROCEDURE — 94640 AIRWAY INHALATION TREATMENT: CPT

## 2024-11-03 PROCEDURE — 99232 SBSQ HOSP IP/OBS MODERATE 35: CPT | Performed by: INTERNAL MEDICINE

## 2024-11-03 PROCEDURE — 51702 INSERT TEMP BLADDER CATH: CPT

## 2024-11-03 PROCEDURE — 36415 COLL VENOUS BLD VENIPUNCTURE: CPT

## 2024-11-03 PROCEDURE — 6370000000 HC RX 637 (ALT 250 FOR IP): Performed by: NURSE PRACTITIONER

## 2024-11-03 PROCEDURE — 97162 PT EVAL MOD COMPLEX 30 MIN: CPT

## 2024-11-03 PROCEDURE — 85025 COMPLETE CBC W/AUTO DIFF WBC: CPT

## 2024-11-03 PROCEDURE — 2060000000 HC ICU INTERMEDIATE R&B

## 2024-11-03 PROCEDURE — 2580000003 HC RX 258: Performed by: NURSE PRACTITIONER

## 2024-11-03 PROCEDURE — 97166 OT EVAL MOD COMPLEX 45 MIN: CPT

## 2024-11-03 PROCEDURE — 6360000002 HC RX W HCPCS: Performed by: INTERNAL MEDICINE

## 2024-11-03 PROCEDURE — 94668 MNPJ CHEST WALL SBSQ: CPT

## 2024-11-03 PROCEDURE — 2580000003 HC RX 258: Performed by: INTERNAL MEDICINE

## 2024-11-03 PROCEDURE — 94761 N-INVAS EAR/PLS OXIMETRY MLT: CPT

## 2024-11-03 PROCEDURE — 6360000002 HC RX W HCPCS: Performed by: NURSE PRACTITIONER

## 2024-11-03 PROCEDURE — 2500000003 HC RX 250 WO HCPCS: Performed by: NURSE PRACTITIONER

## 2024-11-03 PROCEDURE — 6370000000 HC RX 637 (ALT 250 FOR IP): Performed by: PHYSICIAN ASSISTANT

## 2024-11-03 PROCEDURE — 2700000000 HC OXYGEN THERAPY PER DAY

## 2024-11-03 PROCEDURE — 80053 COMPREHEN METABOLIC PANEL: CPT

## 2024-11-03 PROCEDURE — 6370000000 HC RX 637 (ALT 250 FOR IP): Performed by: STUDENT IN AN ORGANIZED HEALTH CARE EDUCATION/TRAINING PROGRAM

## 2024-11-03 RX ORDER — FUROSEMIDE 10 MG/ML
20 INJECTION INTRAMUSCULAR; INTRAVENOUS 2 TIMES DAILY
Status: DISCONTINUED | OUTPATIENT
Start: 2024-11-03 | End: 2024-11-06 | Stop reason: HOSPADM

## 2024-11-03 RX ADMIN — Medication 10 ML: at 21:06

## 2024-11-03 RX ADMIN — DILTIAZEM HYDROCHLORIDE 120 MG: 120 CAPSULE, COATED, EXTENDED RELEASE ORAL at 08:46

## 2024-11-03 RX ADMIN — Medication 10 ML: at 09:06

## 2024-11-03 RX ADMIN — PIPERACILLIN AND TAZOBACTAM 3375 MG: 3; .375 INJECTION, POWDER, LYOPHILIZED, FOR SOLUTION INTRAVENOUS at 23:06

## 2024-11-03 RX ADMIN — FUROSEMIDE 20 MG: 10 INJECTION, SOLUTION INTRAMUSCULAR; INTRAVENOUS at 18:29

## 2024-11-03 RX ADMIN — IPRATROPIUM BROMIDE AND ALBUTEROL SULFATE 1 DOSE: 2.5; .5 SOLUTION RESPIRATORY (INHALATION) at 02:00

## 2024-11-03 RX ADMIN — GUAIFENESIN 600 MG: 600 TABLET ORAL at 08:46

## 2024-11-03 RX ADMIN — BUDESONIDE 500 MCG: 0.5 SUSPENSION RESPIRATORY (INHALATION) at 19:10

## 2024-11-03 RX ADMIN — ESCITALOPRAM OXALATE 10 MG: 10 TABLET ORAL at 08:47

## 2024-11-03 RX ADMIN — POLYETHYLENE GLYCOL 3350 17 G: 17 POWDER, FOR SOLUTION ORAL at 08:58

## 2024-11-03 RX ADMIN — FUROSEMIDE 20 MG: 10 INJECTION, SOLUTION INTRAMUSCULAR; INTRAVENOUS at 13:59

## 2024-11-03 RX ADMIN — LOSARTAN POTASSIUM 100 MG: 100 TABLET, FILM COATED ORAL at 08:46

## 2024-11-03 RX ADMIN — BUDESONIDE 500 MCG: 0.5 SUSPENSION RESPIRATORY (INHALATION) at 07:18

## 2024-11-03 RX ADMIN — PIPERACILLIN AND TAZOBACTAM 3375 MG: 3; .375 INJECTION, POWDER, LYOPHILIZED, FOR SOLUTION INTRAVENOUS at 15:20

## 2024-11-03 RX ADMIN — TRAZODONE HYDROCHLORIDE 50 MG: 50 TABLET ORAL at 21:05

## 2024-11-03 RX ADMIN — IPRATROPIUM BROMIDE AND ALBUTEROL SULFATE 1 DOSE: 2.5; .5 SOLUTION RESPIRATORY (INHALATION) at 13:37

## 2024-11-03 RX ADMIN — ENOXAPARIN SODIUM 30 MG: 100 INJECTION SUBCUTANEOUS at 21:05

## 2024-11-03 RX ADMIN — PREDNISONE 20 MG: 20 TABLET ORAL at 08:47

## 2024-11-03 RX ADMIN — PIPERACILLIN AND TAZOBACTAM 3375 MG: 3; .375 INJECTION, POWDER, LYOPHILIZED, FOR SOLUTION INTRAVENOUS at 09:07

## 2024-11-03 RX ADMIN — IPRATROPIUM BROMIDE AND ALBUTEROL SULFATE 1 DOSE: 2.5; .5 SOLUTION RESPIRATORY (INHALATION) at 19:10

## 2024-11-03 RX ADMIN — SODIUM CHLORIDE, PRESERVATIVE FREE 20 MG: 5 INJECTION INTRAVENOUS at 21:05

## 2024-11-03 RX ADMIN — MONTELUKAST 10 MG: 10 TABLET, FILM COATED ORAL at 21:05

## 2024-11-03 RX ADMIN — IPRATROPIUM BROMIDE AND ALBUTEROL SULFATE 1 DOSE: 2.5; .5 SOLUTION RESPIRATORY (INHALATION) at 07:18

## 2024-11-03 RX ADMIN — DOCUSATE SODIUM 100 MG: 50 LIQUID ORAL at 08:58

## 2024-11-03 RX ADMIN — GUAIFENESIN 600 MG: 600 TABLET ORAL at 21:05

## 2024-11-03 RX ADMIN — ENOXAPARIN SODIUM 30 MG: 100 INJECTION SUBCUTANEOUS at 08:59

## 2024-11-03 RX ADMIN — METOPROLOL TARTRATE 12.5 MG: 25 TABLET, FILM COATED ORAL at 08:46

## 2024-11-03 RX ADMIN — METOPROLOL TARTRATE 12.5 MG: 25 TABLET, FILM COATED ORAL at 21:05

## 2024-11-03 RX ADMIN — SODIUM CHLORIDE, PRESERVATIVE FREE 20 MG: 5 INJECTION INTRAVENOUS at 09:00

## 2024-11-03 RX ADMIN — TAMSULOSIN HYDROCHLORIDE 0.4 MG: 0.4 CAPSULE ORAL at 08:46

## 2024-11-03 ASSESSMENT — PAIN SCALES - GENERAL
PAINLEVEL_OUTOF10: 0

## 2024-11-03 NOTE — PROGRESS NOTES
Date/Time     WBC 17.6 10/22/2024 05:04 AM     RBC 4.09 10/22/2024 05:04 AM     RBC 3.94 01/04/2019 01:40 PM     HGB 13.9 10/22/2024 09:28 AM     HCT 42.8 10/22/2024 05:04 AM      10/22/2024 05:04 AM     .6 10/22/2024 05:04 AM     MCH 31.3 10/22/2024 05:04 AM     MCHC 29.9 10/22/2024 05:04 AM     RDW 16.1 10/22/2024 05:04 AM     NRBC 0.0 12/12/2021 09:01 AM     BANDSPCT 2 08/29/2024 11:06 AM     METASPCT 1 10/22/2024 05:04 AM     LYMPHOPCT 7.0 10/22/2024 05:04 AM     MONOPCT 14.3 10/22/2024 05:04 AM     MYELOPCT 2 11/17/2021 12:03 PM     EOSPCT 0.1 10/22/2024 05:04 AM     BASOPCT 0.1 10/22/2024 05:04 AM     MONOSABS 2.5 10/22/2024 05:04 AM     LYMPHSABS 1.2 10/22/2024 05:04 AM     EOSABS 0.0 10/22/2024 05:04 AM     BASOSABS 0.0 10/22/2024 05:04 AM      CMP:          Lab Results   Component Value Date/Time      10/22/2024 05:04 AM     K 5.3 10/22/2024 05:04 AM      10/22/2024 05:04 AM     CO2 24 10/22/2024 05:04 AM     BUN 28 10/22/2024 05:04 AM     CREATININE 1.8 10/22/2024 09:28 AM     CREATININE 2.08 10/22/2024 05:04 AM     GFRAA >60.0 05/17/2022 05:49 AM     LABGLOM 41 10/22/2024 09:28 AM     LABGLOM >90.0 03/27/2024 10:18 AM     GLUCOSE 106 10/22/2024 05:04 AM     GLUCOSE 135 04/20/2022 05:45 AM     CALCIUM 9.0 10/22/2024 05:04 AM     BILITOT <0.2 10/22/2024 05:04 AM     ALKPHOS 92 10/22/2024 05:04 AM     AST 27 10/22/2024 05:04 AM     ALT 26 10/22/2024 05:04 AM      BMP:          Lab Results   Component Value Date/Time      10/22/2024 05:04 AM     K 5.3 10/22/2024 05:04 AM      10/22/2024 05:04 AM     CO2 24 10/22/2024 05:04 AM     BUN 28 10/22/2024 05:04 AM     CREATININE 1.8 10/22/2024 09:28 AM     CREATININE 2.08 10/22/2024 05:04 AM     CALCIUM 9.0 10/22/2024 05:04 AM     GFRAA >60.0 05/17/2022 05:49 AM     LABGLOM 41 10/22/2024 09:28 AM     LABGLOM >90.0 03/27/2024 10:18 AM     GLUCOSE 106 10/22/2024 05:04 AM     GLUCOSE 135 04/20/2022 05:45 AM      Magnesium:           Lab Results   Component Value Date/Time     MG 2.0 11/29/2023 07:51 AM      Troponin:          Lab Results   Component Value Date/Time     TROPONINI <0.010 08/31/2023 06:35 PM         Radiology:  XR CHEST PORTABLE     Result Date: 10/21/2024  EXAMINATION: ONE XRAY VIEW OF THE CHEST 10/21/2024 3:17 pm COMPARISON: October 19th HISTORY: ORDERING SYSTEM PROVIDED HISTORY: intubation TECHNOLOGIST PROVIDED HISTORY: Reason for exam:->intubation What reading provider will be dictating this exam?->CRC FINDINGS: ET tube 7.4 cm above kelly.  NG tube in the stomach. The patient is rotated.  There is scar-like density in the left lung.  An oval density is seen along the lateral left chest wall      1. ET tube 7.4 cm above kelly. 2. NG tube in the stomach. 3. Scar-like density in the left lung. 4. Oval density along the lateral left chest wall.         Echocardiogram 5/26/20:  Conclusions   Summary   Normal left ventricular systolic function, no regional wall motion   abnormalities, estimated ejection fraction of 60%.   Normal left ventricular size and function.   Mild concentric left ventricular hypertrophy.   Impaired relaxation compatible with diastolic dysfunction. ( reversed E/A   ratio)   No evidence of mitral regurgitation.   No evidence of mitral valve stenosis.   No evidence of aortic valve regurgitation .   No evidence of aortic valve stenosis.   The left atrium is Mildly dilated.   Signature   ----------------------------------------------------------------   Electronically signed by Mariana POSADAS DO(Interpreting   physician) on 05/26/2020 03:41 PM     Stress test 9/8/20:  IMPRESSION:  1.  Technically difficult study.  2.  Moderate size perfusion defect in the inferior wall which appears to  be attention artifact.  3.  Wall motion appears to be normal.  4.  Normal left ventricular ejection fraction 59%.  5.  TID ratio 1.04, which is within normal limits.     JULISSA CHAN MD        EKG 10/21/24 at 20:56: SR 63,

## 2024-11-03 NOTE — PLAN OF CARE
Problem: Chronic Conditions and Co-morbidities  Goal: Patient's chronic conditions and co-morbidity symptoms are monitored and maintained or improved  Outcome: Progressing  Flowsheets (Taken 11/2/2024 2002)  Care Plan - Patient's Chronic Conditions and Co-Morbidity Symptoms are Monitored and Maintained or Improved:   Monitor and assess patient's chronic conditions and comorbid symptoms for stability, deterioration, or improvement   Collaborate with multidisciplinary team to address chronic and comorbid conditions and prevent exacerbation or deterioration   Update acute care plan with appropriate goals if chronic or comorbid symptoms are exacerbated and prevent overall improvement and discharge     Problem: Discharge Planning  Goal: Discharge to home or other facility with appropriate resources  Outcome: Progressing  Flowsheets (Taken 11/2/2024 2002)  Discharge to home or other facility with appropriate resources:   Identify barriers to discharge with patient and caregiver   Arrange for needed discharge resources and transportation as appropriate   Identify discharge learning needs (meds, wound care, etc)     Problem: Pain  Goal: Verbalizes/displays adequate comfort level or baseline comfort level  Outcome: Progressing  Flowsheets (Taken 11/2/2024 1906)  Verbalizes/displays adequate comfort level or baseline comfort level: Encourage patient to monitor pain and request assistance     Problem: Safety - Adult  Goal: Free from fall injury  Outcome: Progressing     Problem: Neurosensory - Adult  Goal: Achieves stable or improved neurological status  Outcome: Progressing     Problem: Respiratory - Adult  Goal: Achieves optimal ventilation and oxygenation  Outcome: Progressing     Problem: Cardiovascular - Adult  Goal: Maintains optimal cardiac output and hemodynamic stability  Outcome: Progressing     Problem: Cardiovascular - Adult  Goal: Absence of cardiac dysrhythmias or at baseline  Outcome: Progressing     Problem:  Skin/Tissue Integrity - Adult  Goal: Skin integrity remains intact  Outcome: Progressing     Problem: Musculoskeletal - Adult  Goal: Return mobility to safest level of function  Outcome: Progressing     Problem: Genitourinary - Adult  Goal: Absence of urinary retention  Outcome: Progressing     Problem: Hematologic - Adult  Goal: Maintains hematologic stability  Outcome: Progressing     Problem: Skin/Tissue Integrity  Goal: Absence of new skin breakdown  Description: 1.  Monitor for areas of redness and/or skin breakdown  2.  Assess vascular access sites hourly  3.  Every 4-6 hours minimum:  Change oxygen saturation probe site  4.  Every 4-6 hours:  If on nasal continuous positive airway pressure, respiratory therapy assess nares and determine need for appliance change or resting period.  Outcome: Progressing

## 2024-11-03 NOTE — PROGRESS NOTES
Date infectious Disease     Patient Name: Anabelle Morris  Date: 11/3/2024  YOB: 1955  Medical Record Number: [id number]              Past medical history cocaine COPD on home O2 alcohol abuse diabetes hypertension  Morbid obesity diabetes sleep apnea tobacco abuse hyperlipidemia  History of empyema with VATS surgery          Presented with shortness of breath 10/18/2024  Ongoing for several days worse with exertion  Productive sputum  Found to have rhinovirus/enterovirus infection  Required admission to the intensive care unit intubated 10/21/2024      10/27/2024 increasing fever to 100.8.  Described as having increasing tracheal secretions  Increasing white cell count 20,000  Procalcitonin 0.29  Lactic acid not elevated      1029 extubated  High flow O2    Down to 3 L nasal cannula much more alert talking interactive        Review of Systems   Constitutional: Negative.    Respiratory:  Negative for cough and shortness of breath.    Cardiovascular: Negative.    Gastrointestinal: Negative.         Physical Exam  Cardiovascular:      Heart sounds: Normal heart sounds. No murmur heard.  Pulmonary:      Effort: No respiratory distress.      Breath sounds: No wheezing, rhonchi or rales.   Chest:      Chest wall: No tenderness.   Abdominal:      General: Bowel sounds are normal. There is no distension.      Palpations: Abdomen is soft. There is no mass.      Tenderness: There is no abdominal tenderness. There is no guarding.   Genitourinary:     Comments: Holcomb catheter urine appearing clear        Blood pressure 113/71, pulse 82, temperature 97.9 °F (36.6 °C), temperature source Oral, resp. rate 19, height 1.829 m (6'), weight 115.2 kg (254 lb), SpO2 97%.      .   Lab Results   Component Value Date    WBC 10.3 11/03/2024    HGB 12.3 (L) 11/03/2024    HCT 37.4 (L) 11/03/2024    MCV 97.7 (H) 11/03/2024     11/03/2024     Lab Results   Component Value Date/Time     11/03/2024 06:01 AM    K 4.2  11/03/2024 06:01 AM     11/03/2024 06:01 AM    CO2 27 11/03/2024 06:01 AM    BUN 12 11/03/2024 06:01 AM    CREATININE 0.66 11/03/2024 06:01 AM    GLUCOSE 92 11/03/2024 06:01 AM    GLUCOSE 135 04/20/2022 05:45 AM    CALCIUM 9.1 11/03/2024 06:01 AM    LABGLOM >90.0 11/03/2024 06:01 AM    LABGLOM >90.0 03/27/2024 10:18 AM                ASSESSMENT:  PLAN:    Leukocytosis  Fever  Hypotensive requiring pressors      Pending blood cultures    Dc  Zosyn

## 2024-11-03 NOTE — PROGRESS NOTES
11/03/24 0800   RT Protocol   History Pulmonary Disease 2   Respiratory pattern 0   Breath sounds 6   Cough 0   Indications for Bronchodilator Therapy On home bronchodilators   Bronchodilator Assessment Score 8

## 2024-11-03 NOTE — PROGRESS NOTES
Hospitalist Progress Note      Date of Admission: 10/19/2024  Chief Complaint:    No chief complaint on file.    Subjective:  No new complaints.  No nausea, vomiting, chest pain, or headache    Medications:    Infusion Medications    sodium chloride      dextrose       Scheduled Medications    furosemide  20 mg IntraVENous BID    ipratropium 0.5 mg-albuterol 2.5 mg  1 Dose Inhalation Q6H RT    metoprolol tartrate  12.5 mg Oral BID    tamsulosin  0.4 mg Oral Daily    dilTIAZem  120 mg Oral Daily    predniSONE  20 mg Oral Daily    Followed by    [START ON 11/5/2024] predniSONE  10 mg Oral Daily    piperacillin-tazobactam  3,375 mg IntraVENous Q8H    losartan  100 mg Oral Daily    docusate  100 mg Oral BID    polyethylene glycol  17 g Oral Daily    famotidine (PEPCID) injection  20 mg IntraVENous BID    insulin lispro  0-8 Units SubCUTAneous Q4H    escitalopram  10 mg Oral Daily    montelukast  10 mg Oral Nightly    [Held by provider] budesonide-formoterol  2 puff Inhalation BID    traZODone  50 mg Oral Nightly    [Held by provider] tiotropium  2 puff Inhalation Daily RT    guaiFENesin  600 mg Oral BID    budesonide  0.5 mg Nebulization BID RT    sodium chloride flush  5-40 mL IntraVENous 2 times per day    enoxaparin  30 mg SubCUTAneous BID     PRN Meds: acetaminophen, atropine, hydrALAZINE, sodium chloride flush, sodium chloride, magnesium sulfate, ondansetron **OR** ondansetron, melatonin, polyethylene glycol, ipratropium 0.5 mg-albuterol 2.5 mg, glucose, dextrose bolus **OR** dextrose bolus, glucagon (rDNA), dextrose    Intake/Output Summary (Last 24 hours) at 11/3/2024 1812  Last data filed at 11/3/2024 1650  Gross per 24 hour   Intake 220 ml   Output 2400 ml   Net -2180 ml     Exam:  /62   Pulse 90   Temp 98.2 °F (36.8 °C) (Oral)   Resp 17   Ht 1.829 m (6')   Wt 115.2 kg (254 lb)   SpO2 93%   BMI 34.45 kg/m²   Head: Normocephalic, atraumatic  Sclera clear  Neck JVD flat  Lungs: normal effort of  stomach.   3. Scar-like density in the left lung.   4. Oval density along the lateral left chest wall.         Fluoroscopy modified barium swallow with video    (Results Pending)     Assessment/Plan:    Copd exac: steroids, bronchodilators.  Wean down oxygen as tolerated.  Continues to downtrend.  On 3L NC.      Dysphagia: Concern for aspiration.  Appreciate SLP evaluation, diet modified.     Acute hypoxic respiratory failure secondary to COPD exacerbation likely from viral infection, rhinovirus positive.  5 L 96% nasal cannula, wean as tolerated.    HTN: monitor BP, adjust meds as needed  Hematuria: zamora in place.  To follow w urology  UDS cocaine and cannabinoid positive noted.     Dc plan: encouraged to work with PT OT for disposition planning.     MAGDALENE DYKES MD

## 2024-11-03 NOTE — PROGRESS NOTES
[facility] OCCUPATIONAL THERAPY EVALUATION - ACUTE     NAME: Anabelle Morris  : 1955 (68 y.o.)  MRN: [id number]  CODE STATUS: Full Code  Room: 73/W173-    Date of Service: 11/3/2024    Patient Diagnosis(es): COPD exacerbation (HCC) [J44.1]   Patient Active Problem List    Diagnosis Date Noted    Heart failure (HCC) 2024    GASPER (obstructive sleep apnea) 2015    Chest pain 2023    Chronic right-sided low back pain with right-sided sciatica 11/15/2022    DDD (degenerative disc disease), lumbar 11/15/2022    Marijuana use 2021    Drug-seeking behavior 2015    Medical non-compliance 2014    Palliative care encounter 10/29/2024    Goals of care, counseling/discussion 10/29/2024    Advanced care planning/counseling discussion 10/29/2024    Full code status 10/29/2024    Leukocytosis 10/28/2024    Hypotension 10/28/2024    Urinary retention 10/22/2024    Acute respiratory failure 10/22/2024    Abnormal EKG 10/22/2024    COPD exacerbation (HCC) 2024    Type 2 diabetes mellitus with diabetic nephropathy, without long-term current use of insulin ([facility]) 10/26/2021    History of colon polyps 10/26/2021    Spinal stenosis of lumbar region 2021    Umbilical hernia without obstruction and without gangrene 2020    Anxiety with depression 2020    Status post total knee replacement, left 2019    Tobacco use 2019    Insomnia 2018    Gastroesophageal reflux disease 2018    Allergy to seafood 2018    Chest wall pain 2016    Obesity 2016    COPD (chronic obstructive pulmonary disease) ([facility])     Alcohol abuse 10/27/2016    Cocaine abuse ([facility]) 10/27/2016    Gout 10/27/2016    History of arthroscopy of both knees 10/27/2016    Hypertension 10/27/2016    SVT (supraventricular tachycardia) ([facility]) 10/27/2016    Erectile dysfunction 10/27/2016    Hyperlipidemia 12/10/2015    House dust mite allergy 2014    Seasonal allergies  04/21/2014    Macrocytic anemia 09/07/2013        Past Medical History:   Diagnosis Date    Alcohol abuse 10/27/2016    Anemia     Asthma     CKD (chronic kidney disease) stage 3, GFR 30-59 ml/min ([facility]) 12/14/2021    Cocaine abuse ([facility]) 10/27/2016    Community acquired pneumonia of left lower lobe of lung 12/05/2016    COPD (chronic obstructive pulmonary disease) ([facility])     Depression 04/27/2020    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    Drug-seeking behavior 04/14/2015    Edema 12/10/2015    Erectile dysfunction     Gastroesophageal reflux disease 12/17/2018    Gout 10/27/2016    History of arthroscopy of both knees 10/27/2016    History of colon polyps 10/26/2021    House dust mite allergy 04/21/2014    Hyperlipidemia     Hypertension 10/27/2016    Injury to heart 10/27/2016    Insomnia 12/17/2018    Loculated pleural effusion     Macrocytic anemia 09/07/2013    Medical non-compliance 02/22/2014    Morbid obesity due to excess calories 12/08/2016    Osteoarthritis of both knees 12/08/2016    Personal history of tobacco use     Pleurisy 12/12/2016    Pneumonia 12/04/2016    caused hospital admission    Pneumonia in infectious disease 11/29/2023    Pre-diabetes 10/27/2016    Seasonal allergies 04/21/2014    Severe persistent asthma 10/27/2016    Severe sleep apnea 04/14/2015    Supraventricular tachycardia ([facility]) 10/27/2016    Type 2 diabetes mellitus with albuminuria ([facility]) 10/26/2021    Type 2 diabetes mellitus without complication, without long-term current use of insulin ([facility]) 10/26/2021     Past Surgical History:   Procedure Laterality Date    ANTERIOR CRUCIATE LIGAMENT REPAIR      CARDIAC CATHETERIZATION      COLONOSCOPY N/A 07/15/2020    11 colon polyps, 2 rectal polyps, hemorrhoids, 2y repeat (DR MARTINEZ)  COLONOSCOPY WITH POLYPECTOMY performed by Alonso Martinez MD at [facility] OR    CT NEEDLE BIOPSY LUNG PERCUTANEOUS  11/26/2021    CT NEEDLE BIOPSY LUNG PERCUTANEOUS 11/26/2021    HERNIA REPAIR      THORACOTOMY Left  Within functional limits Decreased anterior-posterior movement of the bolus/Delayed oral transit time

## 2024-11-03 NOTE — PROGRESS NOTES
Dynamic:  (unsafe to assess)    Sensation: Intact    Bed mobility  Supine to Sit: Stand by assistance  Sit to Supine: Moderate assistance (assistance to lift LEs into bed)  Bed Mobility Comments: HOB elevated    Transfers  Sit to Stand: Moderate Assistance  Stand to Sit: Moderate Assistance  Comment: min A in standing; WW used    Ambulation  Comments: attempted to side step; however pt unable to weight shift into SLS R or L to advance LEs to take a step with mod A and use of WW    Stairs/Curb  Stairs?: No    Activity Tolerance  Activity Tolerance: Patient limited by fatigue;Patient limited by endurance    Patient Education  Education Given To: Patient  Education Provided: Role of Therapy;Plan of Care  Education Method: Verbal  Education Outcome: Continued education needed     ASSESSMENT:   Body Structures, Functions, Activity Limitations Requiring Skilled Therapeutic Intervention: Decreased functional mobility ;Decreased strength;Decreased safe awareness;Decreased endurance;Decreased balance  Decision Making: Medium Complexity  History: high  Exam: med  Clinical Presentation: med    Therapy Prognosis: Good    DISCHARGE RECOMMENDATIONS:  Discharge Recommendations: Continue to assess pending progress    Assessment: Pt is 68 y.o. male to hospital due to SOB.  Pt exhibits severe weakness B LEs, decreased activity and balance and requires increased assistance for all mobility at this time.  Pt plans to live with sister at d/c.  Pt was indep without use of AD at [facility].  Continued PT is required to progress toward indep and safe d/c.  Requires PT Follow-Up: Yes       PLAN OF CARE:  Physical Therapy Plan  General Plan: 1 time a day 3-6 times a week  Current Treatment Recommendations: Strengthening, Balance training, Functional mobility training, Transfer training, Gait training, Neuromuscular re-education, Safety education & training, Patient/Caregiver education & training, Equipment evaluation, education, &  procurement  Additional Comments: consider rollator    Safety Devices  Type of Devices: All fall risk precautions in place, Bed alarm in place, Call light within reach, Left in bed  Restraints  Restraints Initially in Place: No    Goals:  Long Term Goals  Long Term Goal 1: Pt will demonstrate bed mobility indep  Long Term Goal 2: Pt will demonstrate transfers SBA  Long Term Goal 3: Pt will demonstrate amb >/= 25ft min A with safest AD    AMPAC (6 CLICK) BASIC MOBILITY  AM-PAC Inpatient Mobility Raw Score : 9     Therapy Time:   Individual   Time In 0900   Time Out 0926   Minutes 26       Eval x 26 min    Consuelo Hardy, PT, 11/03/24 at 10:29 AM         Definitions for assistance levels  Independent = pt does not require any physical supervision or assistance from another person for activity completion. Device may be needed.  Stand by assistance = pt requires verbal cues or instructions from another person, close to but not touching, to perform the activity  Minimal assistance= pt performs 75% or more of the activity; assistance is required to complete the activity  Moderate assistance= pt performs 50% of the activity; assistance is required to complete the activity  Maximal assistance = pt performs 25% of the activity; assistance is required to complete the activity  Dependent = pt requires total physical assistance to accomplish the task

## 2024-11-03 NOTE — PROGRESS NOTES
Pulmonary Progress Note    11/3/2024 12:24 PM    Subjective:       Admit Date: 10/19/2024  PCP: Roby Silva MD         Interval History: Stable overnight.  Continues to be on 3 L of oxygen.  Vitals are stable.    12 point review of systems has been obtained and negative except as mentioned in HPI.  Medications:   Scheduled Meds:   ipratropium 0.5 mg-albuterol 2.5 mg  1 Dose Inhalation Q6H RT    metoprolol tartrate  12.5 mg Oral BID    tamsulosin  0.4 mg Oral Daily    dilTIAZem  120 mg Oral Daily    predniSONE  20 mg Oral Daily    Followed by    [START ON 2024] predniSONE  10 mg Oral Daily    piperacillin-tazobactam  3,375 mg IntraVENous Q8H    losartan  100 mg Oral Daily    docusate  100 mg Oral BID    polyethylene glycol  17 g Oral Daily    famotidine (PEPCID) injection  20 mg IntraVENous BID    insulin lispro  0-8 Units SubCUTAneous Q4H    escitalopram  10 mg Oral Daily    montelukast  10 mg Oral Nightly    [Held by provider] budesonide-formoterol  2 puff Inhalation BID    traZODone  50 mg Oral Nightly    [Held by provider] tiotropium  2 puff Inhalation Daily RT    guaiFENesin  600 mg Oral BID    budesonide  0.5 mg Nebulization BID RT    sodium chloride flush  5-40 mL IntraVENous 2 times per day    enoxaparin  30 mg SubCUTAneous BID     Continuous Infusions:   sodium chloride      dextrose           Objective:   Vitals:   Temp (24hrs), Av.8 °F (36.6 °C), Min:97.4 °F (36.3 °C), Max:98.1 °F (36.7 °C)    /71   Pulse 82   Temp 97.9 °F (36.6 °C) (Oral)   Resp 19   Ht 1.829 m (6')   Wt 115.2 kg (254 lb)   SpO2 97%   BMI 34.45 kg/m²   I/O:24HR INTAKE/OUTPUT:    Intake/Output Summary (Last 24 hours) at 11/3/2024 1224  Last data filed at 11/3/2024 0600  Gross per 24 hour   Intake 700 ml   Output 900 ml   Net -200 ml            Ventilator Settings:  Vent Mode: (S) CPAP/PS  Resp Rate (Set): 26 bpm   Vt (Set, mL): 480 mL       PEEP/CPAP (cmH2O): 5   FiO2 : 40 %     Physical Exam:  General

## 2024-11-03 NOTE — PLAN OF CARE
of redness and/or skin breakdown  2.  Assess vascular access sites hourly  3.  Every 4-6 hours minimum:  Change oxygen saturation probe site  4.  Every 4-6 hours:  If on nasal continuous positive airway pressure, respiratory therapy assess nares and determine need for appliance change or resting period.  11/3/2024 1207 by Arely Garcia, RN  Outcome: Progressing  11/3/2024 0027 by Tigist Mcgill, RN  Outcome: Progressing

## 2024-11-03 NOTE — PROGRESS NOTES
11/02/24 1946   RT Protocol   History Pulmonary Disease 2   Respiratory pattern 0   Breath sounds 6   Cough 1   Indications for Bronchodilator Therapy On home bronchodilators   Bronchodilator Assessment Score 9

## 2024-11-04 ENCOUNTER — CARE COORDINATION (OUTPATIENT)
Dept: CARE COORDINATION | Age: 69
End: 2024-11-04

## 2024-11-04 LAB
ALBUMIN SERPL-MCNC: 3.3 G/DL (ref 3.5–4.6)
ALP SERPL-CCNC: 72 U/L (ref 35–104)
ALT SERPL-CCNC: 26 U/L (ref 0–41)
ANION GAP SERPL CALCULATED.3IONS-SCNC: 9 MEQ/L (ref 9–15)
AST SERPL-CCNC: 16 U/L (ref 0–40)
BASOPHILS # BLD: 0 K/UL (ref 0–0.2)
BASOPHILS NFR BLD: 0.3 %
BILIRUB SERPL-MCNC: 0.6 MG/DL (ref 0.2–0.7)
BUN SERPL-MCNC: 13 MG/DL (ref 8–23)
CALCIUM SERPL-MCNC: 9.2 MG/DL (ref 8.5–9.9)
CHLORIDE SERPL-SCNC: 103 MEQ/L (ref 95–107)
CO2 SERPL-SCNC: 27 MEQ/L (ref 20–31)
CREAT SERPL-MCNC: 0.94 MG/DL (ref 0.7–1.2)
EOSINOPHIL # BLD: 0.3 K/UL (ref 0–0.7)
EOSINOPHIL NFR BLD: 2.4 %
ERYTHROCYTE [DISTWIDTH] IN BLOOD BY AUTOMATED COUNT: 14.6 % (ref 11.5–14.5)
GLOBULIN SER CALC-MCNC: 3.2 G/DL (ref 2.3–3.5)
GLUCOSE BLD-MCNC: 114 MG/DL (ref 70–99)
GLUCOSE BLD-MCNC: 119 MG/DL (ref 70–99)
GLUCOSE BLD-MCNC: 123 MG/DL (ref 70–99)
GLUCOSE BLD-MCNC: 88 MG/DL (ref 70–99)
GLUCOSE BLD-MCNC: 99 MG/DL (ref 70–99)
GLUCOSE SERPL-MCNC: 119 MG/DL (ref 70–99)
HCT VFR BLD AUTO: 37.6 % (ref 42–52)
HGB BLD-MCNC: 12.4 G/DL (ref 14–18)
LYMPHOCYTES # BLD: 2.8 K/UL (ref 1–4.8)
LYMPHOCYTES NFR BLD: 24.6 %
MCH RBC QN AUTO: 31.9 PG (ref 27–31.3)
MCHC RBC AUTO-ENTMCNC: 33 % (ref 33–37)
MCV RBC AUTO: 96.7 FL (ref 79–92.2)
MONOCYTES # BLD: 0.9 K/UL (ref 0.2–0.8)
MONOCYTES NFR BLD: 8 %
NEUTROPHILS # BLD: 7.4 K/UL (ref 1.4–6.5)
NEUTS SEG NFR BLD: 64.4 %
PERFORMED ON: ABNORMAL
PERFORMED ON: NORMAL
PERFORMED ON: NORMAL
PLATELET # BLD AUTO: 348 K/UL (ref 130–400)
POTASSIUM SERPL-SCNC: 3.6 MEQ/L (ref 3.4–4.9)
PROT SERPL-MCNC: 6.5 G/DL (ref 6.3–8)
RBC # BLD AUTO: 3.89 M/UL (ref 4.7–6.1)
SODIUM SERPL-SCNC: 139 MEQ/L (ref 135–144)
WBC # BLD AUTO: 11.4 K/UL (ref 4.8–10.8)

## 2024-11-04 PROCEDURE — 6360000002 HC RX W HCPCS: Performed by: INTERNAL MEDICINE

## 2024-11-04 PROCEDURE — 99231 SBSQ HOSP IP/OBS SF/LOW 25: CPT | Performed by: PHYSICIAN ASSISTANT

## 2024-11-04 PROCEDURE — 97116 GAIT TRAINING THERAPY: CPT

## 2024-11-04 PROCEDURE — 6370000000 HC RX 637 (ALT 250 FOR IP): Performed by: INTERNAL MEDICINE

## 2024-11-04 PROCEDURE — 80053 COMPREHEN METABOLIC PANEL: CPT

## 2024-11-04 PROCEDURE — 6370000000 HC RX 637 (ALT 250 FOR IP): Performed by: STUDENT IN AN ORGANIZED HEALTH CARE EDUCATION/TRAINING PROGRAM

## 2024-11-04 PROCEDURE — 85025 COMPLETE CBC W/AUTO DIFF WBC: CPT

## 2024-11-04 PROCEDURE — 36415 COLL VENOUS BLD VENIPUNCTURE: CPT

## 2024-11-04 PROCEDURE — 2580000003 HC RX 258: Performed by: INTERNAL MEDICINE

## 2024-11-04 PROCEDURE — 2500000003 HC RX 250 WO HCPCS: Performed by: NURSE PRACTITIONER

## 2024-11-04 PROCEDURE — 99232 SBSQ HOSP IP/OBS MODERATE 35: CPT | Performed by: INTERNAL MEDICINE

## 2024-11-04 PROCEDURE — 6370000000 HC RX 637 (ALT 250 FOR IP): Performed by: PHYSICIAN ASSISTANT

## 2024-11-04 PROCEDURE — 6370000000 HC RX 637 (ALT 250 FOR IP): Performed by: NURSE PRACTITIONER

## 2024-11-04 PROCEDURE — 2580000003 HC RX 258: Performed by: NURSE PRACTITIONER

## 2024-11-04 PROCEDURE — 99232 SBSQ HOSP IP/OBS MODERATE 35: CPT | Performed by: NURSE PRACTITIONER

## 2024-11-04 PROCEDURE — 2060000000 HC ICU INTERMEDIATE R&B

## 2024-11-04 PROCEDURE — 94761 N-INVAS EAR/PLS OXIMETRY MLT: CPT

## 2024-11-04 PROCEDURE — 6370000000 HC RX 637 (ALT 250 FOR IP)

## 2024-11-04 PROCEDURE — 6360000002 HC RX W HCPCS: Performed by: NURSE PRACTITIONER

## 2024-11-04 PROCEDURE — 94640 AIRWAY INHALATION TREATMENT: CPT

## 2024-11-04 PROCEDURE — 92526 ORAL FUNCTION THERAPY: CPT

## 2024-11-04 PROCEDURE — 2700000000 HC OXYGEN THERAPY PER DAY

## 2024-11-04 PROCEDURE — 94668 MNPJ CHEST WALL SBSQ: CPT

## 2024-11-04 RX ORDER — INSULIN LISPRO 100 [IU]/ML
0-8 INJECTION, SOLUTION INTRAVENOUS; SUBCUTANEOUS
Status: DISCONTINUED | OUTPATIENT
Start: 2024-11-04 | End: 2024-11-06 | Stop reason: HOSPADM

## 2024-11-04 RX ADMIN — IPRATROPIUM BROMIDE AND ALBUTEROL SULFATE 1 DOSE: 2.5; .5 SOLUTION RESPIRATORY (INHALATION) at 12:51

## 2024-11-04 RX ADMIN — DILTIAZEM HYDROCHLORIDE 120 MG: 120 CAPSULE, COATED, EXTENDED RELEASE ORAL at 09:21

## 2024-11-04 RX ADMIN — Medication 10 ML: at 09:09

## 2024-11-04 RX ADMIN — TRAZODONE HYDROCHLORIDE 50 MG: 50 TABLET ORAL at 20:34

## 2024-11-04 RX ADMIN — IPRATROPIUM BROMIDE AND ALBUTEROL SULFATE 1 DOSE: 2.5; .5 SOLUTION RESPIRATORY (INHALATION) at 07:03

## 2024-11-04 RX ADMIN — ENOXAPARIN SODIUM 30 MG: 100 INJECTION SUBCUTANEOUS at 09:05

## 2024-11-04 RX ADMIN — PREDNISONE 20 MG: 20 TABLET ORAL at 09:05

## 2024-11-04 RX ADMIN — IPRATROPIUM BROMIDE AND ALBUTEROL SULFATE 1 DOSE: 2.5; .5 SOLUTION RESPIRATORY (INHALATION) at 19:13

## 2024-11-04 RX ADMIN — BUDESONIDE 500 MCG: 0.5 SUSPENSION RESPIRATORY (INHALATION) at 19:13

## 2024-11-04 RX ADMIN — FUROSEMIDE 20 MG: 10 INJECTION, SOLUTION INTRAMUSCULAR; INTRAVENOUS at 17:45

## 2024-11-04 RX ADMIN — ESCITALOPRAM OXALATE 10 MG: 10 TABLET ORAL at 09:07

## 2024-11-04 RX ADMIN — GUAIFENESIN 600 MG: 600 TABLET ORAL at 20:34

## 2024-11-04 RX ADMIN — SODIUM CHLORIDE, PRESERVATIVE FREE 20 MG: 5 INJECTION INTRAVENOUS at 20:34

## 2024-11-04 RX ADMIN — DOCUSATE SODIUM 100 MG: 50 LIQUID ORAL at 20:34

## 2024-11-04 RX ADMIN — METOPROLOL TARTRATE 12.5 MG: 25 TABLET, FILM COATED ORAL at 20:34

## 2024-11-04 RX ADMIN — ENOXAPARIN SODIUM 30 MG: 100 INJECTION SUBCUTANEOUS at 20:34

## 2024-11-04 RX ADMIN — LOSARTAN POTASSIUM 100 MG: 100 TABLET, FILM COATED ORAL at 09:07

## 2024-11-04 RX ADMIN — SODIUM CHLORIDE, PRESERVATIVE FREE 20 MG: 5 INJECTION INTRAVENOUS at 09:07

## 2024-11-04 RX ADMIN — GUAIFENESIN 600 MG: 600 TABLET ORAL at 09:05

## 2024-11-04 RX ADMIN — METOPROLOL TARTRATE 12.5 MG: 25 TABLET, FILM COATED ORAL at 09:05

## 2024-11-04 RX ADMIN — TAMSULOSIN HYDROCHLORIDE 0.4 MG: 0.4 CAPSULE ORAL at 09:05

## 2024-11-04 RX ADMIN — BUDESONIDE 500 MCG: 0.5 SUSPENSION RESPIRATORY (INHALATION) at 07:03

## 2024-11-04 RX ADMIN — MONTELUKAST 10 MG: 10 TABLET, FILM COATED ORAL at 20:34

## 2024-11-04 RX ADMIN — Medication 10 ML: at 20:35

## 2024-11-04 RX ADMIN — FUROSEMIDE 20 MG: 10 INJECTION, SOLUTION INTRAMUSCULAR; INTRAVENOUS at 09:07

## 2024-11-04 ASSESSMENT — PAIN SCALES - GENERAL
PAINLEVEL_OUTOF10: 0
PAINLEVEL_OUTOF10: 0

## 2024-11-04 ASSESSMENT — ENCOUNTER SYMPTOMS
GASTROINTESTINAL NEGATIVE: 1
APNEA: 0
TROUBLE SWALLOWING: 0

## 2024-11-04 NOTE — DISCHARGE INSTR - COC
Continuity of Care Form    Patient Name: Anabelle Morris   :  1955  MRN:  [id number]    Admit date:  10/19/2024  Discharge date:  2024    Code Status Order: Full Code   Advance Directives:   Advance Care Flowsheet Documentation             Admitting Physician:  Willam Mejia MD  PCP: Roby Silva MD    Discharging Nurse: Arely Juarez RN  Discharging Hospital Unit/Room#: W173/W173-01  Discharging Unit Phone Number: [phone]    Emergency Contact:   Extended Emergency Contact Information  Primary Emergency Contact: Jerel Anne  Mobile Phone: [phone]  Relation: Child  Secondary Emergency Contact: Violeta Morris  Home Phone: [phone]  Mobile Phone: [phone]  Relation: Brother/Sister    Past Surgical History:  Past Surgical History:   Procedure Laterality Date    ANTERIOR CRUCIATE LIGAMENT REPAIR      CARDIAC CATHETERIZATION      COLONOSCOPY N/A 07/15/2020    11 colon polyps, 2 rectal polyps, hemorrhoids, 2y repeat (DR MARTINEZ)  COLONOSCOPY WITH POLYPECTOMY performed by Alonso Martinez MD at [facility] OR    CT NEEDLE BIOPSY LUNG PERCUTANEOUS  2021    CT NEEDLE BIOPSY LUNG PERCUTANEOUS 2021    HERNIA REPAIR      THORACOTOMY Left 2021    VATS/thoracotomy    TOTAL KNEE ARTHROPLASTY Left 2019    LEFT TOTAL KNEE ARTHROPLASTY performed by Stew Renteria MD at [facility] OR    UMBILICAL HERNIA REPAIR N/A 2020    UMBILICAL HERNIA REPAIR WITH MESH performed by Robert Zee MD at [facility]       Immunization History:   Immunization History   Administered Date(s) Administered    COVID-19, PFIZER PURPLE top, DILUTE for use, (age 12 y+), 30mcg/0.3mL 2021, 07/10/2021    Influenza Virus Vaccine 10/06/2014, 2015, 2020    Influenza, AFLURIA (age 3 y+), FLUZONE, (age 6 mo+), Quadv MDV, 0.5mL 2017, 10/07/2019    Influenza, FLUAD, (age 65 y+), IM, Quadv, 0.5mL 2024    Influenza, FLUAD, (age 65 y+), IM, Trivalent PF, 0.5mL 10/10/2024    Influenza,  Risk    Impairments/Disabilities:      Hearing    Nutrition Therapy:  Current Nutrition Therapy:   - Oral Diet:  Dysphagia - Minced and Moist    Routes of Feeding: Oral  Liquids: Thin Liquids  Daily Fluid Restriction: no  Last Modified Barium Swallow with Video (Video Swallowing Test): done on 11/01/2024    Treatments at the Time of Hospital Discharge:   Respiratory Treatments: Inhaler  Oxygen Therapy:  is not on home oxygen therapy.  Ventilator:    - No ventilator support    Rehab Therapies: Physical Therapy and Occupational Therapy  Weight Bearing Status/Restrictions: No weight bearing restrictions  Other Medical Equipment (for information only, NOT a DME order):  wheelchair and walker  Other Treatments: N/a    Patient's personal belongings (please select all that are sent with patient):  Cell phone, , pants, footwear, undergarments, shirt, ad socks    RN SIGNATURE:  Electronically signed by Arely Juarez RN on 11/6/24 at 1:17 PM EST    CASE MANAGEMENT/SOCIAL WORK SECTION    Inpatient Status Date: 10/19/24    Readmission Risk Assessment Score:  Readmission Risk              Risk of Unplanned Readmission:  30           Discharging to Facility/ Agency   Name: [facility] [place]  Address: [facility] [address], [place] [address]   Phone: [phone]   Fax:    Dialysis Facility (if applicable)   Name:  Address:  Dialysis Schedule:  Phone:  Fax:    / signature: Electronically signed by CIARA Newton on 11/4/24 at 1:46 PM EST    PHYSICIAN SECTION    Prognosis: Fair    Condition at Discharge: Stable  Physician Certification: I certify the above information and transfer of Anabelle Morris  is necessary for the continuing treatment of the diagnosis listed and that he requires Skilled Nursing Facility for less 30 days.   The individual is being discharged to a nursing facility directly from the hospital after receiving acute patient care at the hospital; and    Requires nursing facility

## 2024-11-04 NOTE — PROGRESS NOTES
Comprehensive Nutrition Assessment    Type and Reason for Visit:  Reassess    Nutrition Recommendations/Plan:   Continue with Minced and Moist dysphagia diet, per SLP recommendations     Malnutrition Assessment:  Malnutrition Status:  No malnutrition (11/04/24 1214)        Nutrition Assessment:    Pt progressing towards nutrition related goals, diet has been advaned to a dysphagia modified texture diet with fair to good intake    Nutrition Related Findings:    history includes :cocaine abuse, COPD , alcohol abuse, diabetes, Intubated ( 10/21- 10/30), trophic tube feeding started ( 10/21), did not tolerate advance. N/V/BiPap post extubation,  ( 11/1) po advacned to minced and moist,  Labs unremarkable, Meds reviewed . Nursing reports mild -moderate generalized edema Wound Type: None       Current Nutrition Intake & Therapies:    Average Meal Intake: %  Average Supplements Intake: None Ordered  ADULT DIET; Dysphagia - Minced and Moist    Anthropometric Measures:  Height: 182.9 cm (6')  Ideal Body Weight (IBW): 178 lbs (81 kg)    Admission Body Weight: 107 kg (236 lb)  Current Body Weight: 117 kg (258 lb) (* edema), 134.8 % IBW. Weight Source: Bed Scale  Current BMI (kg/m2): 35  Usual Body Weight: 114.8 kg (253 lb) (( 11/2023), 256# ( 7/2024))     % Weight Change (Calculated): -5.1  Weight Adjustment For: No Adjustment                 BMI Categories: Obese Class 1 (BMI 30.0-34.9)    Estimated Daily Nutrient Needs:  Energy Requirements Based On: Kcal/kg  Weight Used for Energy Requirements: Current  Energy (kcal/day): 6221-6743 kcals @ 15 kcal/kg  Weight Used for Protein Requirements: Ideal  Protein (g/day): ~105 g protein @ 1.3 g/kg  Method Used for Fluid Requirements: 1 ml/kcal  Fluid (ml/day): 1800    Nutrition Diagnosis:   No nutrition diagnosis at this time   Nutrition Interventions:   Food and/or Nutrient Delivery: Continue Current Diet  Nutrition Education/Counseling: Education not indicated  Coordination  of Nutrition Care: Continue to monitor while inpatient       Goals:  Goals: PO intake 75% or greater, prior to discharge  Type of Goal: New goal  Previous Goal Met: Goal(s) Achieved    Nutrition Monitoring and Evaluation:      Food/Nutrient Intake Outcomes: Diet Advancement/Tolerance  Physical Signs/Symptoms Outcomes: Nausea or Vomiting    Discharge Planning:    Continue current diet     ANYA ORLANDO RD, LD

## 2024-11-04 NOTE — PROGRESS NOTES
Hospitalist Progress Note      Date of Admission: 10/19/2024  Chief Complaint:    No chief complaint on file.    Subjective:  No new complaints.  No nausea, vomiting, chest pain, or headache    Medications:    Infusion Medications    sodium chloride      dextrose       Scheduled Medications    furosemide  20 mg IntraVENous BID    ipratropium 0.5 mg-albuterol 2.5 mg  1 Dose Inhalation Q6H RT    metoprolol tartrate  12.5 mg Oral BID    tamsulosin  0.4 mg Oral Daily    dilTIAZem  120 mg Oral Daily    [START ON 11/5/2024] predniSONE  10 mg Oral Daily    losartan  100 mg Oral Daily    docusate  100 mg Oral BID    polyethylene glycol  17 g Oral Daily    famotidine (PEPCID) injection  20 mg IntraVENous BID    insulin lispro  0-8 Units SubCUTAneous Q4H    escitalopram  10 mg Oral Daily    montelukast  10 mg Oral Nightly    [Held by provider] budesonide-formoterol  2 puff Inhalation BID    traZODone  50 mg Oral Nightly    [Held by provider] tiotropium  2 puff Inhalation Daily RT    guaiFENesin  600 mg Oral BID    budesonide  0.5 mg Nebulization BID RT    sodium chloride flush  5-40 mL IntraVENous 2 times per day    enoxaparin  30 mg SubCUTAneous BID     PRN Meds: acetaminophen, atropine, hydrALAZINE, sodium chloride flush, sodium chloride, magnesium sulfate, ondansetron **OR** ondansetron, melatonin, polyethylene glycol, ipratropium 0.5 mg-albuterol 2.5 mg, glucose, dextrose bolus **OR** dextrose bolus, glucagon (rDNA), dextrose    Intake/Output Summary (Last 24 hours) at 11/4/2024 1339  Last data filed at 11/4/2024 1004  Gross per 24 hour   Intake 200 ml   Output 3500 ml   Net -3300 ml     Exam:  /87   Pulse 84   Temp 97.9 °F (36.6 °C) (Oral)   Resp 21   Ht 1.829 m (6')   Wt 115.2 kg (254 lb)   SpO2 94%   BMI 34.45 kg/m²   Head: Normocephalic, atraumatic  Sclera clear  Neck JVD flat  Lungs: normal effort of breathing, scattered crackles, intermittent gurgling.    Labs:   Recent Labs     11/02/24  3421  change         XR CHEST PORTABLE   Final Result   1. ET tube 7.4 cm above kelly.   2. NG tube in the stomach.   3. Scar-like density in the left lung.   4. Oval density along the lateral left chest wall.           Assessment/Plan:    Copd exac: steroids, bronchodilators.  Wean down oxygen as tolerated.  Continues to downtrend.  On 3L NC.      Dysphagia: Concern for aspiration.  Appreciate SLP evaluation, diet modified.     Acute hypoxic respiratory failure secondary to COPD exacerbation likely from viral infection, rhinovirus positive.  O2 weaned to room air o2 sat 94-99    HTN: monitor BP, adjust meds as needed  Hematuria: zamora in place.  To follow w urology  UDS cocaine and cannabinoid positive noted.     Dc plan: encouraged to work with PT OT for disposition planning.     MAGDALENE DYKES MD

## 2024-11-04 NOTE — PROGRESS NOTES
11/04/24 0700   RT Protocol   History Pulmonary Disease 2   Respiratory pattern 0   Breath sounds 6   Cough 0   Indications for Bronchodilator Therapy Decreased or absent breath sounds   Bronchodilator Assessment Score 8

## 2024-11-04 NOTE — PROGRESS NOTES
Subjective:      Patient ID: Anabelle Morris is a 68 y.o. male    HPI68 year old male who's zamora catheter is draining clear yellow urine. He voices no other urological complaints       Past Medical History:   Diagnosis Date    Alcohol abuse 10/27/2016    Anemia     Asthma     CKD (chronic kidney disease) stage 3, GFR 30-59 ml/min ([facility]) 12/14/2021    Cocaine abuse ([facility]) 10/27/2016    Community acquired pneumonia of left lower lobe of lung 12/05/2016    COPD (chronic obstructive pulmonary disease) ([facility])     Depression 04/27/2020    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    Drug-seeking behavior 04/14/2015    Edema 12/10/2015    Erectile dysfunction     Gastroesophageal reflux disease 12/17/2018    Gout 10/27/2016    History of arthroscopy of both knees 10/27/2016    History of colon polyps 10/26/2021    House dust mite allergy 04/21/2014    Hyperlipidemia     Hypertension 10/27/2016    Injury to heart 10/27/2016    Insomnia 12/17/2018    Loculated pleural effusion     Macrocytic anemia 09/07/2013    Medical non-compliance 02/22/2014    Morbid obesity due to excess calories 12/08/2016    Osteoarthritis of both knees 12/08/2016    Personal history of tobacco use     Pleurisy 12/12/2016    Pneumonia 12/04/2016    caused hospital admission    Pneumonia in infectious disease 11/29/2023    Pre-diabetes 10/27/2016    Seasonal allergies 04/21/2014    Severe persistent asthma 10/27/2016    Severe sleep apnea 04/14/2015    Supraventricular tachycardia (HCC) 10/27/2016    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         Objective:   Physical Exam  HENT:      Mouth/Throat:      Mouth: Mucous membranes are moist.   Pulmonary:      Effort: Pulmonary effort is normal.   Neurological:      Mental Status: He is alert and oriented to person, place, and time.          Assessment:      68 year old male who's zamora catheter is draining clear yellow urine. He voices no other urological complaints         Plan:      Maintain zamora catheter  Start flomax                      Paul Duenas PA-C

## 2024-11-04 NOTE — CARE COORDINATION
LILIAW met with patient to discuss his discharge plan. He was evaluated by therapy and they recommended SNF before returning home. LSW mentioned this to patient and he said he would be agreeable. [facility] of choice was offered and he chose [facility]. Referral provided to admissions for review. He will require a pre cert if accepted.     Notified by Gunjan Baez that they are able to accept patient and will start pre cert today.

## 2024-11-04 NOTE — PLAN OF CARE
Problem: Chronic Conditions and Co-morbidities  Goal: Patient's chronic conditions and co-morbidity symptoms are monitored and maintained or improved  11/4/2024 0041 by Tigist Mcgill RN  Outcome: Progressing     Problem: Discharge Planning  Goal: Discharge to home or other facility with appropriate resources  11/4/2024 0041 by Tigist Mcgill RN  Outcome: Progressing     Problem: Pain  Goal: Verbalizes/displays adequate comfort level or baseline comfort level  11/4/2024 0041 by Tigist Mcgill RN  Outcome: Progressing  Flowsheets (Taken 11/3/2024 1923)  Verbalizes/displays adequate comfort level or baseline comfort level: Encourage patient to monitor pain and request assistance     Problem: Safety - Adult  Goal: Free from fall injury  11/4/2024 0041 by Tigist Mcgill RN  Outcome: Progressing     Problem: Neurosensory - Adult  Goal: Achieves stable or improved neurological status  11/4/2024 0041 by Tigist Mcgill RN  Outcome: Progressing     Problem: Respiratory - Adult  Goal: Achieves optimal ventilation and oxygenation  11/4/2024 0041 by Tigist Mcgill RN  Outcome: Progressing     Problem: Cardiovascular - Adult  Goal: Maintains optimal cardiac output and hemodynamic stability  11/4/2024 0041 by Tigist Mcgill RN  Outcome: Progressing     Problem: Skin/Tissue Integrity - Adult  Goal: Skin integrity remains intact  11/4/2024 0041 by Tigist Mcgill RN  Outcome: Progressing  Flowsheets (Taken 11/3/2024 1923)  Skin Integrity Remains Intact: Monitor for areas of redness and/or skin breakdown     Problem: Musculoskeletal - Adult  Goal: Return mobility to safest level of function  11/4/2024 0041 by Tigist Mcgill RN  Outcome: Progressing     Problem: Genitourinary - Adult  Goal: Absence of urinary retention  11/4/2024 0041 by Tigist Mcgill RN  Outcome: Progressing     Problem: Infection - Adult  Goal: Absence of infection at discharge  11/4/2024 0041 by Tigist Mcgill

## 2024-11-04 NOTE — PROGRESS NOTES
[facility]  Facility/Department: [facility] [place] 1W TELEMETRY  Speech Language Pathology   Treatment Note      Anabelle Morris  1955  W173/W173-01  [x]   confirmed      Date: 2024    COPD exacerbation (HCC) [J44.1]    Restrictions/Precautions: Fall Risk    ADULT DIET; Dysphagia - Minced and Moist     Respiratory Status:room air  No active isolations      Subjective:  Alert, Cooperative, and Pleasant        Interventions used this date:  Dysphagia Treatment and Instruction in Compensatory Strategies      Objective/Assessment:  Patient progressing towards goals:  Short-term Goals  Timeframe for Short-term Goals: 1 week  Goal 1: Patient will tolerate a minced and moist diet with thin liquids with adequate mastication and oral clearance with no overt s/s of difficulty or aspiration.  Goal 2: Patient will tolerate trials of soft and bite size with adequate mastication and oral clearance with no overt s/s of difficulty or aspiration.  Goal 3: Patient will demonstrate recommended swallow strategies for safe and efficient swallow at spervision level.  SLP re-educated on use of No straws, small sips from cup and throat clear and re-swallow, pt verbalized understanding  Goal 4: Pt will complete tongue press, tongue pull back, effortful swallow exercises 10x/each with min cues in order to strengthen the muscles of the swallow to improve bolus control and increase base of tongue/posterior wall approximation.  Pt completed tongue press x10 after min cues from SLP  Pt completed tongue pullbacks x10 after min cues from SLP    Goal 5: Pt will complete chin tuck against resistance, Mendelsohn maneuver, supraglottic swallow exercises 10x/each with min cues in order to strengthen the muscles of the swallow to decrease bolus residue in the pyriform sinuses and to increase laryngeal vestibule closure.  Pt completed CTAR x10 with min cues from SLP    Treatment/Activity Tolerance:  Patient tolerated treatment well    Plan:  Continue  per POC    Pain Assessment:  Patient does not c/o pain.    Pain Re-assessment:  Patient does not c/o pain.    Patient/Caregiver Education:  Patient educated on session and progression towards goals.  Education needs reinforcement.    Safety Devices:  All fall risk precautions in place      Therapy Time  SLP Individual Minutes  Time In: 1549  Time Out: 1602  Minutes: 13            Signature: Electronically signed by REINA Callahan on 11/4/2024 at 4:10 PM

## 2024-11-04 NOTE — PROGRESS NOTES
Palliative Care Progress Note  Patient: Anabelle Morris  Gender: male  YOB: 1955  Unit/Bed: W173/W173-01  CodeStatus: Full Code  Inpatient Treatment Team: Treatment Team:   Sage Mosher MD Zaizafoun, Manaf, MD Holiday, Wes J, DO Haas, Christopher A, MD Murry, Paul J, MD Singh, Tigist Rodriguez RN Rosso, SAMANTHA Trujillo Sasha Greely, Lori P, RN Grandberry, Catalina Kemp RN  Admit Date:  10/19/2024    Chief Complaint: Shortness of breath    History of Presenting Illness:      Anabelle Morris is a 68 y.o. male on hospital day 16 with a history of cocaine abuse, COPD on 3 L O2 at night, CKD stage III, alcohol abuse, T2DM, HTN HLD, asthma, SVT, pneumonia.    Patient was brought to the emergency room with shortness of breath. Patient was admitted in the setting of acute on chronic COPD exacerbation, T2DM and alcohol abuse.    Palliative care was consulted by provider Akanksha Alvarado CNP for goals of care.     Patient was previously living home alone and up independently per EMR documentation.    Upon entering the room I find the patient intubated and sedated with no one at bedside. Called and attempted telephone conversation with patient's sister Violeta, no answer and left voicemail to return call.  Patient's previous functional status is A&O X4 up independently per telephone conversation with patient's sister Violeta. Patient denies any unintentional weight loss or decreased appetite.  Reviewed patient's current goals of care, advance care planning and CODE STATUS with patient's Sister Violeta.  The patient's sister Violeta admits that the patient has 1 daughter and that she goes by Tia.  Reviewed contact information with patient's Sister Violeta during telephone call and answered all questions to the patient's sisters satisfaction. Called and attempted telephone conversation with patient's daughter Marsha again with success.  Reviewed patient's current condition,

## 2024-11-04 NOTE — CARE COORDINATION
Review of chart indicates patient continues to be inpatient at [facility].  He is off ventilator and is on oxygen.  He wants to be discharge to sister's and not a SNF.

## 2024-11-04 NOTE — PROGRESS NOTES
11/03/24 1900   RT Protocol   History Pulmonary Disease 2   Respiratory pattern 0   Breath sounds 6   Cough 0   Indications for Bronchodilator Therapy Decreased or absent breath sounds   Bronchodilator Assessment Score 8

## 2024-11-04 NOTE — PROGRESS NOTES
Physical Therapy Med Surg Daily Treatment Note  Facility/Department: [address] TELEMETRY  Room: [place] [address]       NAME: Anabelle Morris  : 1955 (68 y.o.)  MRN: [id number]  CODE STATUS: Full Code    Date of Service: 2024    Patient Diagnosis(es): COPD exacerbation (HCC) [J44.1]   No chief complaint on file.    Patient Active Problem List    Diagnosis Date Noted    Heart failure (HCC) 2024    GASPER (obstructive sleep apnea) 2015    Chest pain 2023    Chronic right-sided low back pain with right-sided sciatica 11/15/2022    DDD (degenerative disc disease), lumbar 11/15/2022    Marijuana use 2021    Drug-seeking behavior 2015    Medical non-compliance 2014    Palliative care encounter 10/29/2024    Goals of care, counseling/discussion 10/29/2024    Advanced care planning/counseling discussion 10/29/2024    Full code status 10/29/2024    Leukocytosis 10/28/2024    Hypotension 10/28/2024    Urinary retention 10/22/2024    Acute respiratory failure 10/22/2024    Abnormal EKG 10/22/2024    COPD exacerbation (HCC) 2024    Type 2 diabetes mellitus with diabetic nephropathy, without long-term current use of insulin ([facility]) 10/26/2021    History of colon polyps 10/26/2021    Spinal stenosis of lumbar region 2021    Umbilical hernia without obstruction and without gangrene 2020    Anxiety with depression 2020    Status post total knee replacement, left 2019    Tobacco use 2019    Insomnia 2018    Gastroesophageal reflux disease 2018    Allergy to seafood 2018    Chest wall pain 2016    Obesity 2016    COPD (chronic obstructive pulmonary disease) ([facility])     Alcohol abuse 10/27/2016    Cocaine abuse ([facility]) 10/27/2016    Gout 10/27/2016    History of arthroscopy of both knees 10/27/2016    Hypertension 10/27/2016    SVT (supraventricular tachycardia) ([facility]) 10/27/2016    Erectile dysfunction 10/27/2016    Hyperlipidemia  LUNG PERCUTANEOUS 11/26/2021    HERNIA REPAIR      THORACOTOMY Left 11/27/2021    VATS/thoracotomy    TOTAL KNEE ARTHROPLASTY Left 08/09/2019    LEFT TOTAL KNEE ARTHROPLASTY performed by Stew Renteria MD at [facility] OR    UMBILICAL HERNIA REPAIR N/A 11/30/2020    UMBILICAL HERNIA REPAIR WITH MESH performed by Robert Zee MD at [facility] OR       Chart Reviewed: Yes    Restrictions:  Restrictions/Precautions: Fall Risk    SUBJECTIVE:   Subjective: \"I tried walking to the bathroom\" Patient reports feeling stronger.    Pain  Pain: Denies    OBJECTIVE:        Bed mobility  Supine to Sit: Minimal assistance  Sit to Supine: Minimal assistance  Bed Mobility Comments: Min A to complete bed mobility, increased effort    Transfers  Sit to Stand: Minimal Assistance  Stand to Sit: Minimal Assistance  Comment: Min A to steady, VCs for safe hand placement    Ambulation  Surface: Level tile  Device: Rolling Walker  Assistance: Minimal assistance  Quality of Gait: Steadying assistance, decreased James foot clearance  Distance: 4 lateral steps, additional 8ft forward  Comments: Too fatigued to progress distance                   PT Exercises  Exercise Treatment: Saated: LAQ and marching x5  A/AROM Exercises: Standing hip flexion to improve foot clearance x10                     ASSESSMENT   Body Structures, Functions, Activity Limitations Requiring Skilled Therapeutic Intervention: Decreased functional mobility ;Decreased strength;Decreased safe awareness;Decreased endurance;Decreased balance  Assessment: Patient able to progress ambulation with good tolerance. VCs to increase foot clearance, patient displays decreased on Lt vs Rt. Easily fatigued with exercises and ambulation. Patient education on importance of getting up with assistance only to decrease risks of falls.     Discharge Recommendations:  Continue to assess pending progress         Goals  Long Term Goals  Long Term Goal 1: Pt will demonstrate bed mobility indep  Long Term

## 2024-11-04 NOTE — PLAN OF CARE
Nutrition Problem #1: No nutrition diagnosis at this time  Intervention: Food and/or Nutrient Delivery: Continue Current Diet  Nutritional

## 2024-11-04 NOTE — PROGRESS NOTES
Pulmonary Progress Note    2024 10:20 AM    Subjective:       Admit Date: 10/19/2024  PCP: Roby Silva MD         Interval History: No major changes overnight.  A bit tachycardic.  Between room air and 2 L of oxygen.    12 point review of systems has been obtained and negative except as mentioned in HPI.  Medications:   Scheduled Meds:   furosemide  20 mg IntraVENous BID    ipratropium 0.5 mg-albuterol 2.5 mg  1 Dose Inhalation Q6H RT    metoprolol tartrate  12.5 mg Oral BID    tamsulosin  0.4 mg Oral Daily    dilTIAZem  120 mg Oral Daily    [START ON 2024] predniSONE  10 mg Oral Daily    losartan  100 mg Oral Daily    docusate  100 mg Oral BID    polyethylene glycol  17 g Oral Daily    famotidine (PEPCID) injection  20 mg IntraVENous BID    insulin lispro  0-8 Units SubCUTAneous Q4H    escitalopram  10 mg Oral Daily    montelukast  10 mg Oral Nightly    [Held by provider] budesonide-formoterol  2 puff Inhalation BID    traZODone  50 mg Oral Nightly    [Held by provider] tiotropium  2 puff Inhalation Daily RT    guaiFENesin  600 mg Oral BID    budesonide  0.5 mg Nebulization BID RT    sodium chloride flush  5-40 mL IntraVENous 2 times per day    enoxaparin  30 mg SubCUTAneous BID     Continuous Infusions:   sodium chloride      dextrose           Objective:   Vitals:   Temp (24hrs), Av.2 °F (36.8 °C), Min:97.9 °F (36.6 °C), Max:98.6 °F (37 °C)    BP (!) 134/90   Pulse (!) 106   Temp 97.9 °F (36.6 °C) (Oral)   Resp 18   Ht 1.829 m (6')   Wt 115.2 kg (254 lb)   SpO2 99%   BMI 34.45 kg/m²   I/O:24HR INTAKE/OUTPUT:    Intake/Output Summary (Last 24 hours) at 2024 1020  Last data filed at 2024 1004  Gross per 24 hour   Intake 200 ml   Output 3500 ml   Net -3300 ml            Ventilator Settings:  Vent Mode: (S) CPAP/PS  Resp Rate (Set): 26 bpm   Vt (Set, mL): 480 mL       PEEP/CPAP (cmH2O): 5   FiO2 : 40 %     Physical Exam:  General appearance - ill appearing.   Mental status  -alert and oriented x 3  Eyes - pupils equal and reactive, extraocular eye   Nose - normal and patent, no erythema   Neck - supple, no significant adenopathy  Chest - clear to auscultation, no wheezes, rales or rhonchi   Heart - normal rate, regular rhythm, normal S1, S2   Abdomen - soft, nontender, nondistended   Rectal - deferred, not clinically indicated  Neurological -alert, normal speech.  Nonfocal.  Musculoskeletal - no joint tenderness, deformity or swelling  Extremities - peripheral pulses normal, no pedal edema   Skin - normal coloration and turgor, no rashes                Recent Labs     11/02/24  0522 11/03/24  0601 11/04/24  0552    141 139   K 3.8 4.2 3.6   * 106 103   CO2 28 27 27   BUN 17 12 13   CREATININE 0.77 0.66* 0.94   GLUCOSE 86 92 119*    .  MG:3,PHOS:3)@  Ionized Calcium: No components found for: \"IONCA\"  CBC:   Recent Labs     11/03/24  0601 11/04/24  0552   WBC 10.3 11.4*   HGB 12.3* 12.4*    348      ABG: No results for input(s): \"PH\", \"PCO2\", \"PO2\" in the last 72 hours.        Assessment and Plan:     Impression:    - Acute hypercapnic respiratory failure, due to COPD exacerbation.  This continues to improve.  Was on mechanical ventilation, extubated and now on nasal cannula.  - COPD exacerbation.  Much better  -Nausea and vomiting.  Solved  - Metabolic encephalopathy.  This has resolved  - Substance abuse.  - GASPER.  Noncompliant with CPAP               Recommendations:    -  Continue to wean off of oxygen.  Goal saturation above 90%.  - Continue taper oral steroids   - Bronchodilators around the clock  - Strict intake and output measurement.  Watch urine output closely.  - Continue bowel regimen   -Oxygen assessment with ambulation  - Tight glucose control.  - DVT and GI prophylaxis            Electronically signed by Judith Poole MD on 11/4/2024 at 10:20 AM

## 2024-11-04 NOTE — PROGRESS NOTES
Chief Complaint:  SOB     Reason for consult:  EKG changes     History of Present Illness:     This is a 68-year-old -American male with past medical history significant for hypertension, dyslipidemia, diabetes, COPD on 3 L O2, history of alcohol abuse, history of cocaine abuse, sleep apnea and multiple medical problems who originally presented to [facility] on 10/19/2024 with chief complaint of shortness of breath.  History is obtained from chart review as patient is currently intubated.  Apparently he had told ER physician that he had been drinking alcohol and using cocaine on a regular basis prior to presentation.  Apparently while in ER patient became hypoxic with O2 sat dropping into the 70s with ambulation to the restroom and he was resumed on 3 L O2 per nasal cannula with improvement in SpO2 to 97%.  Chest x-ray in ER had shown no acute cardiopulmonary findings.  Initial BMP in ER unremarkable.  NT proBNP within normal range of 40.  High-sensitivity troponin negative at 18.  CBC showed WBC mildly elevated at 10.4, hemoglobin 12.8.  Urine drug screen positive for cocaine and marijuana.  He was subsequently transferred from [facility] and admitted to [facility] with diagnosis of COPD exacerbation.     Patient was transferred to ICU yesterday afternoon due to worsening respiratory distress and was subsequently intubated.  EKG completed yesterday evening at 2056 showed ST elevations in leads II, III, aVF and V3 through V6 and cardiology consulted for further evaluation.  He was on low-dose Levophed earlier today but this was recently stopped.      At time my evaluation, patient is intubated and sedated on mechanical ventilation.  He is on 50% FiO2 with SpO2 of 98%.  Patient was noted to have hematuria following straight cath this morning.  Urology evaluated patient today.  Repeat EKG completed this afternoon shows some improvement in previously noted ST elevations.  Repeat troponin ordered and

## 2024-11-04 NOTE — PLAN OF CARE
Problem: Chronic Conditions and Co-morbidities  Goal: Patient's chronic conditions and co-morbidity symptoms are monitored and maintained or improved  11/4/2024 1023 by Zuleima Huffman RN  Outcome: Progressing  11/4/2024 0041 by Tigist Mcgill RN  Outcome: Progressing     Problem: Discharge Planning  Goal: Discharge to home or other facility with appropriate resources  11/4/2024 1023 by Zuleima Huffman RN  Outcome: Progressing  11/4/2024 0041 by Tigist Mcgill RN  Outcome: Progressing     Problem: Pain  Goal: Verbalizes/displays adequate comfort level or baseline comfort level  11/4/2024 1023 by Zuleima Huffman RN  Outcome: Progressing  11/4/2024 0041 by Tigist Mcgill RN  Outcome: Progressing  Flowsheets (Taken 11/3/2024 1923)  Verbalizes/displays adequate comfort level or baseline comfort level: Encourage patient to monitor pain and request assistance     Problem: ABCDS Injury Assessment  Goal: Absence of physical injury  11/4/2024 1023 by Zuleima Huffman RN  Outcome: Progressing  11/4/2024 0041 by Tigist Mcgill RN  Outcome: Progressing     Problem: Safety - Adult  Goal: Free from fall injury  11/4/2024 1023 by Zuleima Huffman RN  Outcome: Progressing  11/4/2024 0041 by Tigist Mcgill RN  Outcome: Progressing     Problem: Safety - Medical Restraint  Goal: Remains free of injury from restraints (Restraint for Interference with Medical Device)  Description: INTERVENTIONS:  1. Determine that other, less restrictive measures have been tried or would not be effective before applying the restraint  2. Evaluate the patient's condition at the time of restraint application  3. Inform patient/family regarding the reason for restraint  4. Q2H: Monitor safety, psychosocial status, comfort, nutrition and hydration  11/4/2024 1023 by Zuleima Huffman RN  Outcome: Progressing  11/4/2024 0041 by Tigist Mcgill RN  Outcome: Progressing     Problem: Neurosensory - Adult  Goal: Achieves stable or improved  RN  Outcome: Progressing  11/4/2024 0041 by Tigist Mcgill RN  Outcome: Progressing     Problem: Infection - Adult  Goal: Absence of infection at discharge  11/4/2024 1023 by Zuleima Huffman RN  Outcome: Progressing  11/4/2024 0041 by Tigist Mcgill RN  Outcome: Progressing     Problem: Metabolic/Fluid and Electrolytes - Adult  Goal: Electrolytes maintained within normal limits  11/4/2024 1023 by Zuleima Huffman RN  Outcome: Progressing  11/4/2024 0041 by Tigist Mcgill RN  Outcome: Progressing  Goal: Hemodynamic stability and optimal renal function maintained  11/4/2024 1023 by Zuleima Huffman RN  Outcome: Progressing  11/4/2024 0041 by Tigist Mcgill RN  Outcome: Progressing  Goal: Glucose maintained within prescribed range  11/4/2024 1023 by Zuleima Huffman RN  Outcome: Progressing  11/4/2024 0041 by Tigist Mcgill RN  Outcome: Progressing     Problem: Hematologic - Adult  Goal: Maintains hematologic stability  11/4/2024 1023 by Zuleima Huffman RN  Outcome: Progressing  11/4/2024 0041 by Tigist Mcgill RN  Outcome: Progressing     Problem: Decision Making  Goal: Pt/Family able to effectively weigh alternatives and participate in decision making related to treatment and care  Description: INTERVENTIONS:  1. Determine when there are differences between patient's view, family's view, and healthcare provider's view of condition  2. Facilitate patient and family articulation of goals for care  3. Help patient and family identify pros/cons of alternative solutions  4. Provide information as requested by patient/family  5. Respect patient/family right to receive or not to receive information  6. Serve as a liaison between patient and family and health care team  7. Initiate Consults from Ethics, Palliative Care or initiate Family Care Conference as is appropriate  11/4/2024 1023 by Zuleima Huffman RN  Outcome: Progressing  11/4/2024 0041 by Tigist Mcgill RN  Outcome: Progressing     Problem:

## 2024-11-05 LAB
ALBUMIN SERPL-MCNC: 3.2 G/DL (ref 3.5–4.6)
ALP SERPL-CCNC: 77 U/L (ref 35–104)
ALT SERPL-CCNC: 28 U/L (ref 0–41)
ANION GAP SERPL CALCULATED.3IONS-SCNC: 12 MEQ/L (ref 9–15)
AST SERPL-CCNC: 16 U/L (ref 0–40)
BASOPHILS # BLD: 0 K/UL (ref 0–0.2)
BASOPHILS NFR BLD: 0.2 %
BILIRUB SERPL-MCNC: 0.7 MG/DL (ref 0.2–0.7)
BUN SERPL-MCNC: 14 MG/DL (ref 8–23)
CALCIUM SERPL-MCNC: 9.4 MG/DL (ref 8.5–9.9)
CHLORIDE SERPL-SCNC: 102 MEQ/L (ref 95–107)
CO2 SERPL-SCNC: 26 MEQ/L (ref 20–31)
CREAT SERPL-MCNC: 0.87 MG/DL (ref 0.7–1.2)
EOSINOPHIL # BLD: 0.3 K/UL (ref 0–0.7)
EOSINOPHIL NFR BLD: 1.6 %
ERYTHROCYTE [DISTWIDTH] IN BLOOD BY AUTOMATED COUNT: 15 % (ref 11.5–14.5)
GLOBULIN SER CALC-MCNC: 3.1 G/DL (ref 2.3–3.5)
GLUCOSE BLD-MCNC: 131 MG/DL (ref 70–99)
GLUCOSE BLD-MCNC: 131 MG/DL (ref 70–99)
GLUCOSE BLD-MCNC: 164 MG/DL (ref 70–99)
GLUCOSE BLD-MCNC: 89 MG/DL (ref 70–99)
GLUCOSE BLD-MCNC: 93 MG/DL (ref 70–99)
GLUCOSE SERPL-MCNC: 98 MG/DL (ref 70–99)
HCT VFR BLD AUTO: 38.7 % (ref 42–52)
HGB BLD-MCNC: 12.9 G/DL (ref 14–18)
LYMPHOCYTES # BLD: 2.7 K/UL (ref 1–4.8)
LYMPHOCYTES NFR BLD: 16.9 %
MAGNESIUM SERPL-MCNC: 2 MG/DL (ref 1.7–2.4)
MCH RBC QN AUTO: 31.8 PG (ref 27–31.3)
MCHC RBC AUTO-ENTMCNC: 33.3 % (ref 33–37)
MCV RBC AUTO: 95.3 FL (ref 79–92.2)
MONOCYTES # BLD: 1.3 K/UL (ref 0.2–0.8)
MONOCYTES NFR BLD: 8 %
NEUTROPHILS # BLD: 11.8 K/UL (ref 1.4–6.5)
NEUTS SEG NFR BLD: 72.9 %
PERFORMED ON: ABNORMAL
PERFORMED ON: NORMAL
PERFORMED ON: NORMAL
PLATELET # BLD AUTO: 371 K/UL (ref 130–400)
POTASSIUM SERPL-SCNC: 3.3 MEQ/L (ref 3.4–4.9)
PROT SERPL-MCNC: 6.3 G/DL (ref 6.3–8)
RBC # BLD AUTO: 4.06 M/UL (ref 4.7–6.1)
SODIUM SERPL-SCNC: 140 MEQ/L (ref 135–144)
WBC # BLD AUTO: 16.2 K/UL (ref 4.8–10.8)

## 2024-11-05 PROCEDURE — 2060000000 HC ICU INTERMEDIATE R&B

## 2024-11-05 PROCEDURE — 6360000002 HC RX W HCPCS: Performed by: INTERNAL MEDICINE

## 2024-11-05 PROCEDURE — 94761 N-INVAS EAR/PLS OXIMETRY MLT: CPT

## 2024-11-05 PROCEDURE — 36415 COLL VENOUS BLD VENIPUNCTURE: CPT

## 2024-11-05 PROCEDURE — 2500000003 HC RX 250 WO HCPCS: Performed by: NURSE PRACTITIONER

## 2024-11-05 PROCEDURE — 6370000000 HC RX 637 (ALT 250 FOR IP): Performed by: PHYSICIAN ASSISTANT

## 2024-11-05 PROCEDURE — 94640 AIRWAY INHALATION TREATMENT: CPT

## 2024-11-05 PROCEDURE — 99232 SBSQ HOSP IP/OBS MODERATE 35: CPT | Performed by: INTERNAL MEDICINE

## 2024-11-05 PROCEDURE — 6370000000 HC RX 637 (ALT 250 FOR IP): Performed by: INTERNAL MEDICINE

## 2024-11-05 PROCEDURE — 94668 MNPJ CHEST WALL SBSQ: CPT

## 2024-11-05 PROCEDURE — 97116 GAIT TRAINING THERAPY: CPT

## 2024-11-05 PROCEDURE — 99231 SBSQ HOSP IP/OBS SF/LOW 25: CPT | Performed by: PHYSICIAN ASSISTANT

## 2024-11-05 PROCEDURE — 2580000003 HC RX 258: Performed by: NURSE PRACTITIONER

## 2024-11-05 PROCEDURE — 83735 ASSAY OF MAGNESIUM: CPT

## 2024-11-05 PROCEDURE — 6370000000 HC RX 637 (ALT 250 FOR IP)

## 2024-11-05 PROCEDURE — 80053 COMPREHEN METABOLIC PANEL: CPT

## 2024-11-05 PROCEDURE — 6370000000 HC RX 637 (ALT 250 FOR IP): Performed by: STUDENT IN AN ORGANIZED HEALTH CARE EDUCATION/TRAINING PROGRAM

## 2024-11-05 PROCEDURE — 6370000000 HC RX 637 (ALT 250 FOR IP): Performed by: NURSE PRACTITIONER

## 2024-11-05 PROCEDURE — 85025 COMPLETE CBC W/AUTO DIFF WBC: CPT

## 2024-11-05 PROCEDURE — 6360000002 HC RX W HCPCS: Performed by: NURSE PRACTITIONER

## 2024-11-05 PROCEDURE — 2580000003 HC RX 258: Performed by: INTERNAL MEDICINE

## 2024-11-05 RX ADMIN — BUDESONIDE 500 MCG: 0.5 SUSPENSION RESPIRATORY (INHALATION) at 06:56

## 2024-11-05 RX ADMIN — METOPROLOL TARTRATE 12.5 MG: 25 TABLET, FILM COATED ORAL at 09:07

## 2024-11-05 RX ADMIN — SODIUM CHLORIDE, PRESERVATIVE FREE 20 MG: 5 INJECTION INTRAVENOUS at 20:33

## 2024-11-05 RX ADMIN — TAMSULOSIN HYDROCHLORIDE 0.4 MG: 0.4 CAPSULE ORAL at 09:07

## 2024-11-05 RX ADMIN — Medication 10 ML: at 20:39

## 2024-11-05 RX ADMIN — ENOXAPARIN SODIUM 30 MG: 100 INJECTION SUBCUTANEOUS at 20:33

## 2024-11-05 RX ADMIN — LOSARTAN POTASSIUM 100 MG: 100 TABLET, FILM COATED ORAL at 09:08

## 2024-11-05 RX ADMIN — METOPROLOL TARTRATE 12.5 MG: 25 TABLET, FILM COATED ORAL at 20:33

## 2024-11-05 RX ADMIN — ENOXAPARIN SODIUM 30 MG: 100 INJECTION SUBCUTANEOUS at 09:06

## 2024-11-05 RX ADMIN — SODIUM CHLORIDE, PRESERVATIVE FREE 20 MG: 5 INJECTION INTRAVENOUS at 09:07

## 2024-11-05 RX ADMIN — ESCITALOPRAM OXALATE 10 MG: 10 TABLET ORAL at 09:07

## 2024-11-05 RX ADMIN — MONTELUKAST 10 MG: 10 TABLET, FILM COATED ORAL at 20:33

## 2024-11-05 RX ADMIN — GUAIFENESIN 600 MG: 600 TABLET ORAL at 09:07

## 2024-11-05 RX ADMIN — TRAZODONE HYDROCHLORIDE 50 MG: 50 TABLET ORAL at 20:33

## 2024-11-05 RX ADMIN — IPRATROPIUM BROMIDE AND ALBUTEROL SULFATE 1 DOSE: 2.5; .5 SOLUTION RESPIRATORY (INHALATION) at 06:56

## 2024-11-05 RX ADMIN — PREDNISONE 10 MG: 10 TABLET ORAL at 09:07

## 2024-11-05 RX ADMIN — BUDESONIDE 500 MCG: 0.5 SUSPENSION RESPIRATORY (INHALATION) at 20:00

## 2024-11-05 RX ADMIN — IPRATROPIUM BROMIDE AND ALBUTEROL SULFATE 1 DOSE: 2.5; .5 SOLUTION RESPIRATORY (INHALATION) at 14:27

## 2024-11-05 RX ADMIN — IPRATROPIUM BROMIDE AND ALBUTEROL SULFATE 1 DOSE: 2.5; .5 SOLUTION RESPIRATORY (INHALATION) at 04:04

## 2024-11-05 RX ADMIN — FUROSEMIDE 20 MG: 10 INJECTION, SOLUTION INTRAMUSCULAR; INTRAVENOUS at 09:07

## 2024-11-05 RX ADMIN — GUAIFENESIN 600 MG: 600 TABLET ORAL at 20:33

## 2024-11-05 RX ADMIN — DILTIAZEM HYDROCHLORIDE 120 MG: 120 CAPSULE, COATED, EXTENDED RELEASE ORAL at 09:07

## 2024-11-05 RX ADMIN — IPRATROPIUM BROMIDE AND ALBUTEROL SULFATE 1 DOSE: 2.5; .5 SOLUTION RESPIRATORY (INHALATION) at 20:00

## 2024-11-05 RX ADMIN — Medication 10 ML: at 09:09

## 2024-11-05 ASSESSMENT — ENCOUNTER SYMPTOMS: APNEA: 0

## 2024-11-05 NOTE — FLOWSHEET NOTE
0955- Perfect served attending. Patients K+ 3.3 this morning and no PRN potassium orders in. Asked if orders can be placed. Attending in a code   1610- PCA reached out concerned about patients BP 66/41. Went to assess patient, patient up in chair. Patient stating he feels tired. Returned patient back to bed in trendelenburg and rechecked BP 94/53.   1622- Rechecked patient BP 82/57 , 91/60   1627- Perfect served cardiology consult regarding patient being hypotensive.   1630- Messaged attending if PRN Potassium orders can be placed.     Electronically signed by Corinne Espinoza RN on 11/5/2024 at 5:29 PM

## 2024-11-05 NOTE — PLAN OF CARE
Problem: Chronic Conditions and Co-morbidities  Goal: Patient's chronic conditions and co-morbidity symptoms are monitored and maintained or improved  Outcome: Progressing  Flowsheets (Taken 11/4/2024 2015)  Care Plan - Patient's Chronic Conditions and Co-Morbidity Symptoms are Monitored and Maintained or Improved:   Monitor and assess patient's chronic conditions and comorbid symptoms for stability, deterioration, or improvement   Collaborate with multidisciplinary team to address chronic and comorbid conditions and prevent exacerbation or deterioration   Update acute care plan with appropriate goals if chronic or comorbid symptoms are exacerbated and prevent overall improvement and discharge     Problem: Discharge Planning  Goal: Discharge to home or other facility with appropriate resources  Outcome: Progressing  Flowsheets (Taken 11/4/2024 2015)  Discharge to home or other facility with appropriate resources:   Identify barriers to discharge with patient and caregiver   Arrange for needed discharge resources and transportation as appropriate   Identify discharge learning needs (meds, wound care, etc)     Problem: Pain  Goal: Verbalizes/displays adequate comfort level or baseline comfort level  Outcome: Progressing     Problem: ABCDS Injury Assessment  Goal: Absence of physical injury  Outcome: Progressing     Problem: Safety - Adult  Goal: Free from fall injury  Outcome: Progressing     Problem: Safety - Medical Restraint  Goal: Remains free of injury from restraints (Restraint for Interference with Medical Device)  Description: INTERVENTIONS:  1. Determine that other, less restrictive measures have been tried or would not be effective before applying the restraint  2. Evaluate the patient's condition at the time of restraint application  3. Inform patient/family regarding the reason for restraint  4. Q2H: Monitor safety, psychosocial status, comfort, nutrition and hydration  Outcome: Progressing     Problem:  Patient given Rx for glasses.

## 2024-11-05 NOTE — PROGRESS NOTES
Hospitalist Progress Note      Date of Admission: 10/19/2024  Chief Complaint:    No chief complaint on file.    Subjective:  No new complaints.  No nausea, vomiting, chest pain, or headache    Medications:    Infusion Medications    sodium chloride      dextrose       Scheduled Medications    insulin lispro  0-8 Units SubCUTAneous 4x Daily AC & HS    furosemide  20 mg IntraVENous BID    ipratropium 0.5 mg-albuterol 2.5 mg  1 Dose Inhalation Q6H RT    metoprolol tartrate  12.5 mg Oral BID    tamsulosin  0.4 mg Oral Daily    dilTIAZem  120 mg Oral Daily    predniSONE  10 mg Oral Daily    losartan  100 mg Oral Daily    docusate  100 mg Oral BID    polyethylene glycol  17 g Oral Daily    famotidine (PEPCID) injection  20 mg IntraVENous BID    escitalopram  10 mg Oral Daily    montelukast  10 mg Oral Nightly    [Held by provider] budesonide-formoterol  2 puff Inhalation BID    traZODone  50 mg Oral Nightly    [Held by provider] tiotropium  2 puff Inhalation Daily RT    guaiFENesin  600 mg Oral BID    budesonide  0.5 mg Nebulization BID RT    sodium chloride flush  5-40 mL IntraVENous 2 times per day    enoxaparin  30 mg SubCUTAneous BID     PRN Meds: acetaminophen, atropine, hydrALAZINE, sodium chloride flush, sodium chloride, magnesium sulfate, ondansetron **OR** ondansetron, melatonin, polyethylene glycol, ipratropium 0.5 mg-albuterol 2.5 mg, glucose, dextrose bolus **OR** dextrose bolus, glucagon (rDNA), dextrose    Intake/Output Summary (Last 24 hours) at 11/5/2024 1852  Last data filed at 11/5/2024 1655  Gross per 24 hour   Intake 280 ml   Output 700 ml   Net -420 ml     Exam:  BP 91/60   Pulse 82   Temp 98.4 °F (36.9 °C)   Resp 18   Ht 1.829 m (6')   Wt 115.2 kg (254 lb)   SpO2 93%   BMI 34.45 kg/m²   Head: Normocephalic, atraumatic  Sclera clear  Neck JVD flat  Lungs: normal effort of breathing, scattered crackles, intermittent gurgling.    Labs:   Recent Labs     11/03/24  0601 11/04/24  0552

## 2024-11-05 NOTE — PROGRESS NOTES
[facility]   Facility/Department: [facility] TELEMETRY  Speech Language Pathology    Anabelle Morris  1955  [id number]    Date: 11/5/2024      Speech Therapy attempted to see Anabelle Morris on this date for a/an:    Treatment    Pt was unable to be seen due to:   Patient refused, secondary to nausea and having poor sleep from night before.  Per patient report, he had large emesis from eating ham deli wrap.  Bell RN confirmed pt was given deli wrap last night.   Educated pt is on a minced and moist diet.  To re-attempt therapy at a later time/date.        Electronically signed by Brandin Cotton, SLP on 11/5/24 at 10:30 AM EST

## 2024-11-05 NOTE — PROGRESS NOTES
2100: assessment and med pass completed. Patient has no complaints currently. Will continue to monitor.     0005: patient had episode of emesis. Stated he was coughing and became nausea. Denies nausea at this time. Full bed linen changed and patient cleaned up.

## 2024-11-05 NOTE — PROGRESS NOTES
11/04/24 1900   RT Protocol   History Pulmonary Disease 2   Respiratory pattern 0   Breath sounds 6   Cough 0   Indications for Bronchodilator Therapy Decreased or absent breath sounds   Bronchodilator Assessment Score 8

## 2024-11-05 NOTE — PROGRESS NOTES
11/05/24 0700   RT Protocol   History Pulmonary Disease 2   Respiratory pattern 0   Breath sounds 6   Cough 0   Indications for Bronchodilator Therapy Decreased or absent breath sounds   Bronchodilator Assessment Score 8

## 2024-11-05 NOTE — PROGRESS NOTES
Pulmonary Progress Note    2024 12:40 PM    Subjective:       Admit Date: 10/19/2024  PCP: Roby Silva MD         Interval History: No major changes overnight.  Has been off of O2 but was on 2 L last night.     12 point review of systems has been obtained and negative except as mentioned in HPI.  Medications:   Scheduled Meds:   insulin lispro  0-8 Units SubCUTAneous 4x Daily AC & HS    furosemide  20 mg IntraVENous BID    ipratropium 0.5 mg-albuterol 2.5 mg  1 Dose Inhalation Q6H RT    metoprolol tartrate  12.5 mg Oral BID    tamsulosin  0.4 mg Oral Daily    dilTIAZem  120 mg Oral Daily    predniSONE  10 mg Oral Daily    losartan  100 mg Oral Daily    docusate  100 mg Oral BID    polyethylene glycol  17 g Oral Daily    famotidine (PEPCID) injection  20 mg IntraVENous BID    escitalopram  10 mg Oral Daily    montelukast  10 mg Oral Nightly    [Held by provider] budesonide-formoterol  2 puff Inhalation BID    traZODone  50 mg Oral Nightly    [Held by provider] tiotropium  2 puff Inhalation Daily RT    guaiFENesin  600 mg Oral BID    budesonide  0.5 mg Nebulization BID RT    sodium chloride flush  5-40 mL IntraVENous 2 times per day    enoxaparin  30 mg SubCUTAneous BID     Continuous Infusions:   sodium chloride      dextrose           Objective:   Vitals:   Temp (24hrs), Av.6 °F (37 °C), Min:97.8 °F (36.6 °C), Max:99.9 °F (37.7 °C)    BP (!) 93/54   Pulse (!) 105   Temp 98.4 °F (36.9 °C)   Resp 18   Ht 1.829 m (6')   Wt 115.2 kg (254 lb)   SpO2 93%   BMI 34.45 kg/m²   I/O:24HR INTAKE/OUTPUT:    Intake/Output Summary (Last 24 hours) at 2024 1240  Last data filed at 2024 1158  Gross per 24 hour   Intake 240 ml   Output 1400 ml   Net -1160 ml            Ventilator Settings:  Vent Mode: (S) CPAP/PS  Resp Rate (Set): 26 bpm   Vt (Set, mL): 480 mL       PEEP/CPAP (cmH2O): 5   FiO2 : 40 %     Physical Exam:  General appearance - ill appearing.   Mental status -alert and oriented x 3  Eyes  - pupils equal and reactive, extraocular eye   Nose - normal and patent, no erythema   Neck - supple, no significant adenopathy  Chest - clear to auscultation, no wheezes, rales or rhonchi   Heart - normal rate, regular rhythm, normal S1, S2   Abdomen - soft, nontender, nondistended   Rectal - deferred, not clinically indicated  Neurological -alert, normal speech.  Nonfocal.  Musculoskeletal - no joint tenderness, deformity or swelling  Extremities - peripheral pulses normal, no pedal edema   Skin - normal coloration and turgor, no rashes                Recent Labs     11/03/24  0601 11/04/24  0552 11/05/24  0504    139 140   K 4.2 3.6 3.3*    103 102   CO2 27 27 26   BUN 12 13 14   CREATININE 0.66* 0.94 0.87   GLUCOSE 92 119* 98   MG  --   --  2.0    .  MG:3,PHOS:3)@  Ionized Calcium: No components found for: \"IONCA\"  CBC:   Recent Labs     11/04/24  0552 11/05/24  0504   WBC 11.4* 16.2*   HGB 12.4* 12.9*    371      ABG: No results for input(s): \"PH\", \"PCO2\", \"PO2\" in the last 72 hours.        Assessment and Plan:     Impression:    - Acute hypercapnic respiratory failure, due to COPD exacerbation.  This continues to improve.    - COPD exacerbation.  Much better  -Nausea and vomiting.  resolved  - Metabolic encephalopathy.  This has resolved  - Substance abuse.  - GASPER.  Noncompliant with CPAP    Recommendations:    -  Continue to wean off of oxygen.  Goal saturation above 90%.  - Continue taper oral steroids   - Bronchodilators around the clock  - Strict intake and output measurement.  Watch urine output closely.  - Continue bowel regimen   -Oxygen assessment with ambulation  - Tight glucose control.  - DVT and GI prophylaxis            Electronically signed by Judith Poole MD on 11/5/2024 at 12:40 PM

## 2024-11-05 NOTE — PROGRESS NOTES
Physical Therapy Med Surg Daily Treatment Note  Facility/Department: [address] TELEMETRY  Room: [place] [address]       NAME: Anabelle Morris  : 1955 (68 y.o.)  MRN: [id number]  CODE STATUS: Full Code    Date of Service: 2024    Patient Diagnosis(es): COPD exacerbation (HCC) [J44.1]   No chief complaint on file.    Patient Active Problem List    Diagnosis Date Noted    Heart failure (HCC) 2024    GASPER (obstructive sleep apnea) 2015    Chest pain 2023    Chronic right-sided low back pain with right-sided sciatica 11/15/2022    DDD (degenerative disc disease), lumbar 11/15/2022    Marijuana use 2021    Drug-seeking behavior 2015    Medical non-compliance 2014    Palliative care encounter 10/29/2024    Goals of care, counseling/discussion 10/29/2024    Advanced care planning/counseling discussion 10/29/2024    Full code status 10/29/2024    Leukocytosis 10/28/2024    Hypotension 10/28/2024    Urinary retention 10/22/2024    Acute respiratory failure 10/22/2024    Abnormal EKG 10/22/2024    COPD exacerbation (HCC) 2024    Type 2 diabetes mellitus with diabetic nephropathy, without long-term current use of insulin ([facility]) 10/26/2021    History of colon polyps 10/26/2021    Spinal stenosis of lumbar region 2021    Umbilical hernia without obstruction and without gangrene 2020    Anxiety with depression 2020    Status post total knee replacement, left 2019    Tobacco use 2019    Insomnia 2018    Gastroesophageal reflux disease 2018    Allergy to seafood 2018    Chest wall pain 2016    Obesity 2016    COPD (chronic obstructive pulmonary disease) ([facility])     Alcohol abuse 10/27/2016    Cocaine abuse ([facility]) 10/27/2016    Gout 10/27/2016    History of arthroscopy of both knees 10/27/2016    Hypertension 10/27/2016    SVT (supraventricular tachycardia) ([facility]) 10/27/2016    Erectile dysfunction 10/27/2016    Hyperlipidemia

## 2024-11-05 NOTE — CARE COORDINATION
Plan remains [facility] at discharge. Pre cert was started yesterday, 11/4, and is still pending at this time.     1206: Notified by [facility] admissions that patient's pre cert was approved. Dr. Mosher was updated via [employer].

## 2024-11-06 ENCOUNTER — CARE COORDINATION (OUTPATIENT)
Dept: CARE COORDINATION | Age: 69
End: 2024-11-06

## 2024-11-06 VITALS
HEIGHT: 72 IN | SYSTOLIC BLOOD PRESSURE: 92 MMHG | BODY MASS INDEX: 36.29 KG/M2 | HEART RATE: 96 BPM | TEMPERATURE: 98.8 F | OXYGEN SATURATION: 100 % | DIASTOLIC BLOOD PRESSURE: 47 MMHG | RESPIRATION RATE: 17 BRPM | WEIGHT: 267.9 LBS

## 2024-11-06 LAB
ALBUMIN SERPL-MCNC: 3 G/DL (ref 3.5–4.6)
ALP SERPL-CCNC: 78 U/L (ref 35–104)
ALT SERPL-CCNC: 25 U/L (ref 0–41)
ANION GAP SERPL CALCULATED.3IONS-SCNC: 11 MEQ/L (ref 9–15)
AST SERPL-CCNC: 16 U/L (ref 0–40)
BASOPHILS # BLD: 0 K/UL (ref 0–0.2)
BASOPHILS NFR BLD: 0.3 %
BILIRUB SERPL-MCNC: 0.9 MG/DL (ref 0.2–0.7)
BUN SERPL-MCNC: 13 MG/DL (ref 8–23)
CALCIUM SERPL-MCNC: 9 MG/DL (ref 8.5–9.9)
CHLORIDE SERPL-SCNC: 105 MEQ/L (ref 95–107)
CO2 SERPL-SCNC: 25 MEQ/L (ref 20–31)
CREAT SERPL-MCNC: 0.83 MG/DL (ref 0.7–1.2)
EOSINOPHIL # BLD: 0.3 K/UL (ref 0–0.7)
EOSINOPHIL NFR BLD: 2.2 %
ERYTHROCYTE [DISTWIDTH] IN BLOOD BY AUTOMATED COUNT: 14.9 % (ref 11.5–14.5)
GLOBULIN SER CALC-MCNC: 3.1 G/DL (ref 2.3–3.5)
GLUCOSE BLD-MCNC: 101 MG/DL (ref 70–99)
GLUCOSE BLD-MCNC: 123 MG/DL (ref 70–99)
GLUCOSE SERPL-MCNC: 98 MG/DL (ref 70–99)
HCT VFR BLD AUTO: 35.9 % (ref 42–52)
HGB BLD-MCNC: 12 G/DL (ref 14–18)
LYMPHOCYTES # BLD: 2.6 K/UL (ref 1–4.8)
LYMPHOCYTES NFR BLD: 20.2 %
MAGNESIUM SERPL-MCNC: 1.9 MG/DL (ref 1.7–2.4)
MCH RBC QN AUTO: 31.9 PG (ref 27–31.3)
MCHC RBC AUTO-ENTMCNC: 33.4 % (ref 33–37)
MCV RBC AUTO: 95.5 FL (ref 79–92.2)
MONOCYTES # BLD: 1.1 K/UL (ref 0.2–0.8)
MONOCYTES NFR BLD: 8.2 %
NEUTROPHILS # BLD: 8.9 K/UL (ref 1.4–6.5)
NEUTS SEG NFR BLD: 68.8 %
PERFORMED ON: ABNORMAL
PERFORMED ON: ABNORMAL
PLATELET # BLD AUTO: 331 K/UL (ref 130–400)
POTASSIUM SERPL-SCNC: 3.4 MEQ/L (ref 3.4–4.9)
PROT SERPL-MCNC: 6.1 G/DL (ref 6.3–8)
RBC # BLD AUTO: 3.76 M/UL (ref 4.7–6.1)
SODIUM SERPL-SCNC: 141 MEQ/L (ref 135–144)
WBC # BLD AUTO: 12.9 K/UL (ref 4.8–10.8)

## 2024-11-06 PROCEDURE — 6370000000 HC RX 637 (ALT 250 FOR IP): Performed by: INTERNAL MEDICINE

## 2024-11-06 PROCEDURE — 94761 N-INVAS EAR/PLS OXIMETRY MLT: CPT

## 2024-11-06 PROCEDURE — 6370000000 HC RX 637 (ALT 250 FOR IP)

## 2024-11-06 PROCEDURE — 80053 COMPREHEN METABOLIC PANEL: CPT

## 2024-11-06 PROCEDURE — 99231 SBSQ HOSP IP/OBS SF/LOW 25: CPT | Performed by: PHYSICIAN ASSISTANT

## 2024-11-06 PROCEDURE — 36415 COLL VENOUS BLD VENIPUNCTURE: CPT

## 2024-11-06 PROCEDURE — 94668 MNPJ CHEST WALL SBSQ: CPT

## 2024-11-06 PROCEDURE — 2580000003 HC RX 258: Performed by: INTERNAL MEDICINE

## 2024-11-06 PROCEDURE — 6360000002 HC RX W HCPCS: Performed by: INTERNAL MEDICINE

## 2024-11-06 PROCEDURE — 85025 COMPLETE CBC W/AUTO DIFF WBC: CPT

## 2024-11-06 PROCEDURE — 6370000000 HC RX 637 (ALT 250 FOR IP): Performed by: PHYSICIAN ASSISTANT

## 2024-11-06 PROCEDURE — 83735 ASSAY OF MAGNESIUM: CPT

## 2024-11-06 PROCEDURE — 6360000002 HC RX W HCPCS: Performed by: NURSE PRACTITIONER

## 2024-11-06 PROCEDURE — 94640 AIRWAY INHALATION TREATMENT: CPT

## 2024-11-06 PROCEDURE — 2580000003 HC RX 258: Performed by: NURSE PRACTITIONER

## 2024-11-06 PROCEDURE — 6370000000 HC RX 637 (ALT 250 FOR IP): Performed by: STUDENT IN AN ORGANIZED HEALTH CARE EDUCATION/TRAINING PROGRAM

## 2024-11-06 PROCEDURE — 2500000003 HC RX 250 WO HCPCS: Performed by: NURSE PRACTITIONER

## 2024-11-06 RX ORDER — MIDODRINE HYDROCHLORIDE 5 MG/1
5 TABLET ORAL ONCE
Status: COMPLETED | OUTPATIENT
Start: 2024-11-06 | End: 2024-11-06

## 2024-11-06 RX ORDER — LOSARTAN POTASSIUM 50 MG/1
50 TABLET ORAL DAILY
DISCHARGE
Start: 2024-11-07

## 2024-11-06 RX ORDER — FAMOTIDINE 20 MG/1
20 TABLET, FILM COATED ORAL 2 TIMES DAILY
DISCHARGE
Start: 2024-11-06

## 2024-11-06 RX ORDER — FAMOTIDINE 20 MG/1
20 TABLET, FILM COATED ORAL 2 TIMES DAILY
Status: DISCONTINUED | OUTPATIENT
Start: 2024-11-06 | End: 2024-11-06 | Stop reason: HOSPADM

## 2024-11-06 RX ORDER — METOPROLOL TARTRATE 25 MG/1
12.5 TABLET, FILM COATED ORAL 2 TIMES DAILY
DISCHARGE
Start: 2024-11-06

## 2024-11-06 RX ORDER — DILTIAZEM HYDROCHLORIDE 120 MG/1
120 CAPSULE, COATED, EXTENDED RELEASE ORAL DAILY
Qty: 30 CAPSULE | Refills: 3 | Status: SHIPPED | OUTPATIENT
Start: 2024-11-07

## 2024-11-06 RX ORDER — LOSARTAN POTASSIUM 50 MG/1
50 TABLET ORAL DAILY
Status: DISCONTINUED | OUTPATIENT
Start: 2024-11-07 | End: 2024-11-06 | Stop reason: HOSPADM

## 2024-11-06 RX ORDER — FUROSEMIDE 40 MG/1
40 TABLET ORAL DAILY
Qty: 60 TABLET | Refills: 0 | Status: SHIPPED | OUTPATIENT
Start: 2024-11-06

## 2024-11-06 RX ADMIN — MIDODRINE HYDROCHLORIDE 5 MG: 5 TABLET ORAL at 15:00

## 2024-11-06 RX ADMIN — ACETAMINOPHEN 1000 MG: 160 SOLUTION ORAL at 09:24

## 2024-11-06 RX ADMIN — DILTIAZEM HYDROCHLORIDE 120 MG: 120 CAPSULE, COATED, EXTENDED RELEASE ORAL at 09:19

## 2024-11-06 RX ADMIN — Medication 10 ML: at 09:24

## 2024-11-06 RX ADMIN — ESCITALOPRAM OXALATE 10 MG: 10 TABLET ORAL at 09:20

## 2024-11-06 RX ADMIN — GUAIFENESIN 600 MG: 600 TABLET ORAL at 09:20

## 2024-11-06 RX ADMIN — BUDESONIDE 500 MCG: 0.5 SUSPENSION RESPIRATORY (INHALATION) at 07:21

## 2024-11-06 RX ADMIN — IPRATROPIUM BROMIDE AND ALBUTEROL SULFATE 1 DOSE: 2.5; .5 SOLUTION RESPIRATORY (INHALATION) at 07:21

## 2024-11-06 RX ADMIN — LOSARTAN POTASSIUM 100 MG: 100 TABLET, FILM COATED ORAL at 09:20

## 2024-11-06 RX ADMIN — PREDNISONE 10 MG: 10 TABLET ORAL at 09:12

## 2024-11-06 RX ADMIN — IPRATROPIUM BROMIDE AND ALBUTEROL SULFATE 1 DOSE: 2.5; .5 SOLUTION RESPIRATORY (INHALATION) at 13:22

## 2024-11-06 RX ADMIN — FUROSEMIDE 20 MG: 10 INJECTION, SOLUTION INTRAMUSCULAR; INTRAVENOUS at 09:21

## 2024-11-06 RX ADMIN — METOPROLOL TARTRATE 12.5 MG: 25 TABLET, FILM COATED ORAL at 09:20

## 2024-11-06 RX ADMIN — SODIUM CHLORIDE, PRESERVATIVE FREE 20 MG: 5 INJECTION INTRAVENOUS at 09:21

## 2024-11-06 RX ADMIN — IPRATROPIUM BROMIDE AND ALBUTEROL SULFATE 1 DOSE: 2.5; .5 SOLUTION RESPIRATORY (INHALATION) at 02:17

## 2024-11-06 RX ADMIN — TAMSULOSIN HYDROCHLORIDE 0.4 MG: 0.4 CAPSULE ORAL at 09:12

## 2024-11-06 RX ADMIN — ENOXAPARIN SODIUM 30 MG: 100 INJECTION SUBCUTANEOUS at 09:21

## 2024-11-06 ASSESSMENT — PAIN SCALES - GENERAL
PAINLEVEL_OUTOF10: 8
PAINLEVEL_OUTOF10: 0

## 2024-11-06 ASSESSMENT — ENCOUNTER SYMPTOMS: APNEA: 0

## 2024-11-06 ASSESSMENT — PAIN DESCRIPTION - LOCATION: LOCATION: BACK

## 2024-11-06 ASSESSMENT — PAIN DESCRIPTION - ORIENTATION: ORIENTATION: LOWER

## 2024-11-06 ASSESSMENT — PAIN DESCRIPTION - DESCRIPTORS: DESCRIPTORS: ACHING

## 2024-11-06 NOTE — PROGRESS NOTES
Pt Holcomb removed Per order at 0600. Will continue to monitor output for remainder of shift. Pt was given Urinal for use

## 2024-11-06 NOTE — PLAN OF CARE
Problem: Chronic Conditions and Co-morbidities  Goal: Patient's chronic conditions and co-morbidity symptoms are monitored and maintained or improved  11/6/2024 1101 by Arely Garcia RN  Outcome: Progressing  Flowsheets (Taken 11/6/2024 0900)  Care Plan - Patient's Chronic Conditions and Co-Morbidity Symptoms are Monitored and Maintained or Improved: Monitor and assess patient's chronic conditions and comorbid symptoms for stability, deterioration, or improvement  11/5/2024 2134 by Lucio Eaton RN  Outcome: Progressing     Problem: Discharge Planning  Goal: Discharge to home or other facility with appropriate resources  11/6/2024 1101 by Arely Garcia RN  Outcome: Progressing  Flowsheets (Taken 11/6/2024 0900)  Discharge to home or other facility with appropriate resources: Identify barriers to discharge with patient and caregiver  11/5/2024 2134 by Lucio Eaton RN  Outcome: Progressing     Problem: Pain  Goal: Verbalizes/displays adequate comfort level or baseline comfort level  11/6/2024 1101 by Arely Garcia RN  Outcome: Progressing  Flowsheets (Taken 11/6/2024 0750)  Verbalizes/displays adequate comfort level or baseline comfort level: Encourage patient to monitor pain and request assistance  11/5/2024 2134 by Lucio Eaton RN  Outcome: Progressing     Problem: ABCDS Injury Assessment  Goal: Absence of physical injury  11/6/2024 1101 by Arely Garcia RN  Outcome: Progressing  11/5/2024 2134 by Lucio Eaton RN  Outcome: Progressing     Problem: Safety - Adult  Goal: Free from fall injury  11/6/2024 1101 by Arely Garcia RN  Outcome: Progressing  11/5/2024 2134 by Lucio Eaton RN  Outcome: Progressing     Problem: Safety - Medical Restraint  Goal: Remains free of injury from restraints (Restraint for Interference with Medical Device)  Description: INTERVENTIONS:  1. Determine that other, less restrictive measures have been tried or would not be effective before applying the

## 2024-11-06 NOTE — PLAN OF CARE
adequate nutritional intake  Outcome: Progressing     Problem: Genitourinary - Adult  Goal: Absence of urinary retention  Outcome: Progressing     Problem: Infection - Adult  Goal: Absence of infection at discharge  Outcome: Progressing     Problem: Metabolic/Fluid and Electrolytes - Adult  Goal: Electrolytes maintained within normal limits  Outcome: Progressing  Goal: Hemodynamic stability and optimal renal function maintained  Outcome: Progressing  Goal: Glucose maintained within prescribed range  Outcome: Progressing     Problem: Hematologic - Adult  Goal: Maintains hematologic stability  Outcome: Progressing     Problem: Decision Making  Goal: Pt/Family able to effectively weigh alternatives and participate in decision making related to treatment and care  Description: INTERVENTIONS:  1. Determine when there are differences between patient's view, family's view, and healthcare provider's view of condition  2. Facilitate patient and family articulation of goals for care  3. Help patient and family identify pros/cons of alternative solutions  4. Provide information as requested by patient/family  5. Respect patient/family right to receive or not to receive information  6. Serve as a liaison between patient and family and health care team  7. Initiate Consults from Ethics, Palliative Care or initiate Family Care Conference as is appropriate  Outcome: Progressing     Problem: Nutrition Deficit:  Goal: Optimize nutritional status  Outcome: Progressing     Problem: Skin/Tissue Integrity  Goal: Absence of new skin breakdown  Description: 1.  Monitor for areas of redness and/or skin breakdown  2.  Assess vascular access sites hourly  3.  Every 4-6 hours minimum:  Change oxygen saturation probe site  4.  Every 4-6 hours:  If on nasal continuous positive airway pressure, respiratory therapy assess nares and determine need for appliance change or resting period.  Outcome: Progressing

## 2024-11-06 NOTE — DISCHARGE SUMMARY
Hospital Medicine Discharge Summary    Anabelle Morris  :  1955  MRN:  [id number]    Admit date:  10/19/2024  Discharge date:  2024    Admitting Physician:  Willam Mejia MD  Primary Care Physician:  Roby Silva MD    Anabelle Morris is a 68 y.o. male that was admitted and treated for the following medical issues:     Principal Problem:    COPD exacerbation (HCC)  Active Problems:    Heart failure (HCC)    Urinary retention    Acute respiratory failure    Abnormal EKG    Leukocytosis    Hypotension    Palliative care encounter    Goals of care, counseling/discussion    Advanced care planning/counseling discussion    Full code status  Resolved Problems:    * No resolved hospital problems. *      Discharge Diagnoses:    Principal Problem:    COPD exacerbation (HCC)  Active Problems:    Heart failure (HCC)    Urinary retention    Acute respiratory failure    Abnormal EKG    Leukocytosis    Hypotension    Palliative care encounter    Goals of care, counseling/discussion    Advanced care planning/counseling discussion    Full code status  Resolved Problems:    * No resolved hospital problems. *    No chief complaint on file.      Hospital Course:   Anabelle Morris is a 68 y.o. male who was admitted with Copd exac: steroids course complete, bronchodilators prn. Stable on room air    Dysphagia: Concern for aspiration.  Appreciate SLP evaluation, diet modified.      Acute hypoxic respiratory failure secondary to COPD exacerbation likely from viral infection, rhinovirus positive.  O2 weaned to room air o2 sat 94-99     HTN: monitor BP, adjust meds as needed  Hematuria: zamora in place.  To follow w urology  UDS cocaine and cannabinoid positive noted.      Dc plan: awaiting placement   Pt was discharge in a stable condition.       BP 91/60   Pulse 96   Temp 98.8 °F (37.1 °C) (Oral)   Resp 17   Ht 1.829 m (6')   Wt 121.5 kg (267 lb 14.4 oz)   SpO2 100%   BMI 36.33 kg/m²     Patient was seen by the  following consultants  Consults:  IP CONSULT TO PULMONOLOGY  IP CONSULT TO CRITICAL CARE  IP CONSULT TO DIETITIAN  IP CONSULT TO CARDIOLOGY  IP CONSULT TO DIETITIAN  IP CONSULT TO UROLOGY  IP CONSULT TO INFECTIOUS DISEASES  IP CONSULT TO PALLIATIVE CARE    Significant Diagnostic Studies:    Refer to chart if  XR CHEST PORTABLE    Result Date: 10/19/2024  EXAMINATION: ONE XRAY VIEW OF THE CHEST 10/19/2024 2:28 pm COMPARISON: CT chest 07/18/2023 and chest radiographs 08/29/2024. HISTORY: ORDERING SYSTEM PROVIDED HISTORY: SOB TECHNOLOGIST PROVIDED HISTORY: Reason for exam:->SOB What reading provider will be dictating this exam?->CRC FINDINGS: No focal consolidation.  Chronic blunting of the left costophrenic angle is compatible with scarring.  Similar appearing linear opacities of the left greater than right lung bases also compatible with scarring or fibrosis.  No pneumothorax. Stable cardiomediastinal silhouette. No acute osseous abnormality.     No acute cardiopulmonary findings.       Discharge Medications:         Medication List        START taking these medications      dilTIAZem 120 MG extended release capsule  Commonly known as: CARDIZEM CD  Take 1 capsule by mouth daily  Start taking on: November 7, 2024     famotidine 20 MG tablet  Commonly known as: PEPCID  Take 1 tablet by mouth 2 times daily     furosemide 40 MG tablet  Commonly known as: Lasix  Take 1 tablet by mouth daily     metoprolol tartrate 25 MG tablet  Commonly known as: LOPRESSOR  Take 0.5 tablets by mouth 2 times daily            CHANGE how you take these medications      losartan 50 MG tablet  Commonly known as: COZAAR  Take 1 tablet by mouth daily  Start taking on: November 7, 2024  What changed:   medication strength  how much to take            CONTINUE taking these medications      Acetaminophen Extra Strength 500 MG Tabs  Take 1 tablet by mouth every 8 hours as needed (pain)     albuterol sulfate  (90 Base) MCG/ACT

## 2024-11-06 NOTE — PROGRESS NOTES
Subjective:      Patient ID: Anabelle Morris is a 68 y.o. male    HPI 68 year old male who is attempting a trial to void today. He is currently voicing no urological complaints.    Past Medical History:   Diagnosis Date    Alcohol abuse 10/27/2016    Anemia     Asthma     CKD (chronic kidney disease) stage 3, GFR 30-59 ml/min ([facility]) 12/14/2021    Cocaine abuse ([facility]) 10/27/2016    Community acquired pneumonia of left lower lobe of lung 12/05/2016    COPD (chronic obstructive pulmonary disease) ([facility])     Depression 04/27/2020    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    Drug-seeking behavior 04/14/2015    Edema 12/10/2015    Erectile dysfunction     Gastroesophageal reflux disease 12/17/2018    Gout 10/27/2016    History of arthroscopy of both knees 10/27/2016    History of colon polyps 10/26/2021    House dust mite allergy 04/21/2014    Hyperlipidemia     Hypertension 10/27/2016    Injury to heart 10/27/2016    Insomnia 12/17/2018    Loculated pleural effusion     Macrocytic anemia 09/07/2013    Medical non-compliance 02/22/2014    Morbid obesity due to excess calories 12/08/2016    Osteoarthritis of both knees 12/08/2016    Personal history of tobacco use     Pleurisy 12/12/2016    Pneumonia 12/04/2016    caused hospital admission    Pneumonia in infectious disease 11/29/2023    Pre-diabetes 10/27/2016    Seasonal allergies 04/21/2014    Severe persistent asthma 10/27/2016    Severe sleep apnea 04/14/2015    Supraventricular tachycardia (HCC) 10/27/2016    Type 2 diabetes mellitus with albuminuria ([facility]) 10/26/2021    Type 2 diabetes mellitus without complication, without long-term current use of insulin ([facility]) 10/26/2021     Past Surgical History:   Procedure Laterality Date    ANTERIOR CRUCIATE LIGAMENT REPAIR      CARDIAC CATHETERIZATION      COLONOSCOPY N/A 07/15/2020    11 colon polyps, 2 rectal polyps, hemorrhoids, 2y repeat (DR MARTINEZ)  COLONOSCOPY WITH POLYPECTOMY performed by Alonso Martinez MD at [facility] OR    CT     budesonide (PULMICORT) nebulizer suspension 500 mcg  0.5 mg Nebulization BID RT Grace Wray MD   500 mcg at 11/06/24 0721    sodium chloride flush 0.9 % injection 5-40 mL  5-40 mL IntraVENous 2 times per day Sylvester Montesinos MD   10 mL at 11/06/24 0924    sodium chloride flush 0.9 % injection 5-40 mL  5-40 mL IntraVENous PRN Sylvester Montesinos MD        0.9 % sodium chloride infusion   IntraVENous PRN Sylvester Montesinos MD        magnesium sulfate 2000 mg in 50 mL IVPB premix  2,000 mg IntraVENous PRN Sylvester Montesinos MD        enoxaparin Sodium (LOVENOX) injection 30 mg  30 mg SubCUTAneous BID Akanksha Alvarado APRN - CNP   30 mg at 11/06/24 0921    ondansetron (ZOFRAN-ODT) disintegrating tablet 4 mg  4 mg Oral Q8H PRN Sylvester Montesinos MD        Or    ondansetron (ZOFRAN) injection 4 mg  4 mg IntraVENous Q6H PRN Sylvester Montesinos MD   4 mg at 10/31/24 0655    melatonin tablet 3 mg  3 mg Oral Nightly PRN Sylvester Montesinos MD   3 mg at 10/19/24 2203    polyethylene glycol (GLYCOLAX) packet 17 g  17 g Oral Daily PRN Sylvester Montesinos MD        ipratropium 0.5 mg-albuterol 2.5 mg (DUONEB) nebulizer solution 1 Dose  1 Dose Inhalation Q4H PRN Sylvester Montesinos MD   1 Dose at 11/05/24 0404    glucose chewable tablet 16 g  4 tablet Oral PRN Sylvester Montesinos MD        dextrose bolus 10% 125 mL  125 mL IntraVENous PRN Sylvester Montesinos MD   Stopped at 10/27/24 2334    Or    dextrose bolus 10% 250 mL  250 mL IntraVENous PRN Sylvester Montesinos MD        glucagon injection 1 mg  1 mg SubCUTAneous PRSylvester Christensen MD        dextrose 10 % infusion   IntraVENous Continuous PRN Sylvester Montesinos MD         Fish-derived products, Iodine, Seasonal, Other, Penicillin g, Shellfish allergy, Shellfish-derived products, and Pcn [penicillins]  reviewed      Review of Systems   Constitutional:  Negative for fever.   HENT:  Negative for congestion.    Respiratory:  Negative for apnea.    Genitourinary:  Negative for hematuria.   Neurological:  Negative for

## 2024-11-06 NOTE — CARE COORDINATION
Quality round completed with care management team. Plan remain to [facility]. Auth is good through today, 11/6/2024.     Electronically signed by CIARA Bangura on 11/6/2024 at 9:11 AM    Transportation set up through [facility].  time [id number].   Notify RN, facility and pt. Pt report will notify family.     Electronically signed by CIARA Bangura on 11/6/2024 at 12:16 PM    [id number] completed.     Electronically signed by CIARA Bangura on 11/6/2024 at 2:30 PM

## 2024-11-06 NOTE — PROGRESS NOTES
Spoke with pt about using the BIPAP. Pt stated that he would not be wearing it at all for the remainder of his stay in the hospital. BIPAP pulled from room.

## 2024-11-06 NOTE — CARE COORDINATION
Telephone call with patient.  He stated that he is still in the hospital.  He is in need of communicating with [facility] and recommended  he ask hospital .  He is considering moving in with sister.  He wants to move out of [place] due to drug issues and wants to move to [place].Discussed talking to  at hospital to help with discharge planning.

## 2024-11-07 ENCOUNTER — OFFICE VISIT (OUTPATIENT)
Dept: GERIATRIC MEDICINE | Age: 69
End: 2024-11-07

## 2024-11-07 VITALS
SYSTOLIC BLOOD PRESSURE: 108 MMHG | HEART RATE: 77 BPM | OXYGEN SATURATION: 96 % | RESPIRATION RATE: 18 BRPM | DIASTOLIC BLOOD PRESSURE: 30 MMHG | WEIGHT: 236.2 LBS | BODY MASS INDEX: 32.03 KG/M2 | TEMPERATURE: 97.3 F

## 2024-11-07 DIAGNOSIS — F17.209 NICOTINE DEPENDENCE WITH NICOTINE-INDUCED DISORDER, UNSPECIFIED NICOTINE PRODUCT TYPE: ICD-10-CM

## 2024-11-07 DIAGNOSIS — K21.9 GASTROESOPHAGEAL REFLUX DISEASE WITHOUT ESOPHAGITIS: ICD-10-CM

## 2024-11-07 DIAGNOSIS — F32.9 MAJOR DEPRESSIVE DISORDER WITH CURRENT ACTIVE EPISODE, UNSPECIFIED DEPRESSION EPISODE SEVERITY, UNSPECIFIED WHETHER RECURRENT: ICD-10-CM

## 2024-11-07 DIAGNOSIS — E78.5 HYPERLIPIDEMIA, UNSPECIFIED HYPERLIPIDEMIA TYPE: ICD-10-CM

## 2024-11-07 DIAGNOSIS — R53.1 GENERALIZED WEAKNESS: ICD-10-CM

## 2024-11-07 DIAGNOSIS — J44.1 CHRONIC OBSTRUCTIVE PULMONARY DISEASE WITH ACUTE EXACERBATION (HCC): Primary | Chronic | ICD-10-CM

## 2024-11-07 DIAGNOSIS — R79.89 ELEVATED LFTS: ICD-10-CM

## 2024-11-07 DIAGNOSIS — G47.33 OSA (OBSTRUCTIVE SLEEP APNEA): Chronic | ICD-10-CM

## 2024-11-07 DIAGNOSIS — D72.829 LEUKOCYTOSIS, UNSPECIFIED TYPE: ICD-10-CM

## 2024-11-07 DIAGNOSIS — E11.21 TYPE 2 DIABETES MELLITUS WITH DIABETIC NEPHROPATHY, WITHOUT LONG-TERM CURRENT USE OF INSULIN (HCC): ICD-10-CM

## 2024-11-07 DIAGNOSIS — I10 PRIMARY HYPERTENSION: Chronic | ICD-10-CM

## 2024-11-08 NOTE — PROGRESS NOTES
Physical Therapy  Facility/Department: [facility] MED SURG W173/W173-01  Physical Therapy Discharge      NAME: Anabelle Morris    : 1955 (68 y.o.)  MRN: [id number]    Account: [id number]  Gender: male      Patient has been discharged from acute care hospital. DC patient from current PT program.      Electronically signed by Doris Cole PT on 24 at 11:36 AM EST

## 2024-11-08 NOTE — PROGRESS NOTES
applicable) and other individuals in attendance at the appointment consented to the use of AI, including the recording.      An electronic signature was used to authenticate this note.    --Stew Oviedo MD

## 2024-11-12 ENCOUNTER — OFFICE VISIT (OUTPATIENT)
Dept: PALLATIVE CARE | Age: 69
End: 2024-11-12
Payer: MEDICARE

## 2024-11-12 VITALS — OXYGEN SATURATION: 96 % | SYSTOLIC BLOOD PRESSURE: 108 MMHG | DIASTOLIC BLOOD PRESSURE: 70 MMHG | HEART RATE: 80 BPM

## 2024-11-12 DIAGNOSIS — Z51.5 PALLIATIVE CARE ENCOUNTER: ICD-10-CM

## 2024-11-12 DIAGNOSIS — Z71.89 ACP (ADVANCE CARE PLANNING): ICD-10-CM

## 2024-11-12 DIAGNOSIS — J44.9 CHRONIC OBSTRUCTIVE PULMONARY DISEASE, UNSPECIFIED COPD TYPE (HCC): Primary | Chronic | ICD-10-CM

## 2024-11-12 PROCEDURE — G8482 FLU IMMUNIZE ORDER/ADMIN: HCPCS | Performed by: NURSE PRACTITIONER

## 2024-11-12 PROCEDURE — 1123F ACP DISCUSS/DSCN MKR DOCD: CPT | Performed by: NURSE PRACTITIONER

## 2024-11-12 PROCEDURE — 99310 SBSQ NF CARE HIGH MDM 45: CPT | Performed by: NURSE PRACTITIONER

## 2024-11-12 ASSESSMENT — ENCOUNTER SYMPTOMS
CONSTIPATION: 1
SHORTNESS OF BREATH: 0
BACK PAIN: 1
TROUBLE SWALLOWING: 0
COUGH: 1
WHEEZING: 1

## 2024-11-12 NOTE — PROGRESS NOTES
includes reviewing labs and tests, reviewing history, medications, and allergies, counseling patient, performing medically appropriate evaluation and examination, and documenting in the medical record.    BHARATI Zaman - CNP    Collaborating physician: Dr. Paez

## 2024-11-13 ENCOUNTER — CARE COORDINATION (OUTPATIENT)
Dept: CARE COORDINATION | Age: 69
End: 2024-11-13

## 2024-11-13 NOTE — CARE COORDINATION
Telephone call with patient.  He spoke of his discharge from [facility] yesterday.  He took the bus to [facility] yesterday.  He claims he is sober.  He claims he has a doctor appointment on Friday. He opted not to go to his sisters.  He feels he is doing okay.

## 2024-11-13 NOTE — CARE COORDINATION
Telephone call to [facility].  They indicated that patient was discharged yesterday.

## 2024-11-15 ENCOUNTER — OFFICE VISIT (OUTPATIENT)
Dept: FAMILY MEDICINE CLINIC | Age: 69
End: 2024-11-15

## 2024-11-15 ENCOUNTER — HOSPITAL ENCOUNTER (OUTPATIENT)
Dept: LAB | Age: 69
Discharge: HOME | End: 2024-11-15
Payer: MEDICARE

## 2024-11-15 VITALS
HEIGHT: 72 IN | TEMPERATURE: 97.8 F | BODY MASS INDEX: 33.83 KG/M2 | WEIGHT: 249.8 LBS | DIASTOLIC BLOOD PRESSURE: 68 MMHG | OXYGEN SATURATION: 98 % | HEART RATE: 87 BPM | SYSTOLIC BLOOD PRESSURE: 132 MMHG

## 2024-11-15 DIAGNOSIS — M54.41 CHRONIC RIGHT-SIDED LOW BACK PAIN WITH RIGHT-SIDED SCIATICA: ICD-10-CM

## 2024-11-15 DIAGNOSIS — Z12.5 SCREENING FOR PROSTATE CANCER: ICD-10-CM

## 2024-11-15 DIAGNOSIS — I10 PRIMARY HYPERTENSION: Chronic | ICD-10-CM

## 2024-11-15 DIAGNOSIS — M48.061 SPINAL STENOSIS OF LUMBAR REGION, UNSPECIFIED WHETHER NEUROGENIC CLAUDICATION PRESENT: ICD-10-CM

## 2024-11-15 DIAGNOSIS — K21.9 GASTROESOPHAGEAL REFLUX DISEASE, UNSPECIFIED WHETHER ESOPHAGITIS PRESENT: ICD-10-CM

## 2024-11-15 DIAGNOSIS — G89.29 CHRONIC RIGHT-SIDED LOW BACK PAIN WITH RIGHT-SIDED SCIATICA: ICD-10-CM

## 2024-11-15 DIAGNOSIS — E11.21 TYPE 2 DIABETES MELLITUS WITH DIABETIC NEPHROPATHY, WITHOUT LONG-TERM CURRENT USE OF INSULIN (HCC): ICD-10-CM

## 2024-11-15 DIAGNOSIS — F10.10 ALCOHOL ABUSE: Chronic | ICD-10-CM

## 2024-11-15 DIAGNOSIS — G47.33 OSA (OBSTRUCTIVE SLEEP APNEA): Chronic | ICD-10-CM

## 2024-11-15 DIAGNOSIS — M10.9 GOUT, UNSPECIFIED CAUSE, UNSPECIFIED CHRONICITY, UNSPECIFIED SITE: Chronic | ICD-10-CM

## 2024-11-15 DIAGNOSIS — E66.811 CLASS 1 OBESITY DUE TO EXCESS CALORIES WITH SERIOUS COMORBIDITY AND BODY MASS INDEX (BMI) OF 33.0 TO 33.9 IN ADULT: ICD-10-CM

## 2024-11-15 DIAGNOSIS — E78.49 OTHER HYPERLIPIDEMIA: Chronic | ICD-10-CM

## 2024-11-15 DIAGNOSIS — J44.9 CHRONIC OBSTRUCTIVE PULMONARY DISEASE, UNSPECIFIED COPD TYPE (HCC): Chronic | ICD-10-CM

## 2024-11-15 DIAGNOSIS — I47.10 SVT (SUPRAVENTRICULAR TACHYCARDIA) (HCC): ICD-10-CM

## 2024-11-15 DIAGNOSIS — M51.369 DEGENERATION OF INTERVERTEBRAL DISC OF LUMBAR REGION, UNSPECIFIED WHETHER PAIN PRESENT: ICD-10-CM

## 2024-11-15 DIAGNOSIS — E66.09 CLASS 1 OBESITY DUE TO EXCESS CALORIES WITH SERIOUS COMORBIDITY AND BODY MASS INDEX (BMI) OF 34.0 TO 34.9 IN ADULT: ICD-10-CM

## 2024-11-15 DIAGNOSIS — J44.1 CHRONIC OBSTRUCTIVE PULMONARY DISEASE WITH ACUTE EXACERBATION (HCC): Primary | Chronic | ICD-10-CM

## 2024-11-15 DIAGNOSIS — K59.00 CONSTIPATION, UNSPECIFIED CONSTIPATION TYPE: ICD-10-CM

## 2024-11-15 DIAGNOSIS — D53.9 MACROCYTIC ANEMIA: ICD-10-CM

## 2024-11-15 DIAGNOSIS — E66.09 CLASS 1 OBESITY DUE TO EXCESS CALORIES WITH SERIOUS COMORBIDITY AND BODY MASS INDEX (BMI) OF 33.0 TO 33.9 IN ADULT: ICD-10-CM

## 2024-11-15 DIAGNOSIS — F12.90 MARIJUANA USE: ICD-10-CM

## 2024-11-15 DIAGNOSIS — E66.811 CLASS 1 OBESITY DUE TO EXCESS CALORIES WITH SERIOUS COMORBIDITY AND BODY MASS INDEX (BMI) OF 34.0 TO 34.9 IN ADULT: ICD-10-CM

## 2024-11-15 DIAGNOSIS — F14.10 COCAINE ABUSE (HCC): Chronic | ICD-10-CM

## 2024-11-15 PROBLEM — I50.30 (HFPEF) HEART FAILURE WITH PRESERVED EJECTION FRACTION (HCC): Status: ACTIVE | Noted: 2024-11-03

## 2024-11-15 LAB
ALBUMIN SERPL-MCNC: 3.5 G/DL (ref 3.5–4.6)
ALP SERPL-CCNC: 70 U/L (ref 35–104)
ALT SERPL-CCNC: 13 U/L (ref 0–41)
ANION GAP SERPL CALCULATED.3IONS-SCNC: 11 MEQ/L (ref 9–15)
AST SERPL-CCNC: 10 U/L (ref 0–40)
BASOPHILS # BLD: 0 K/UL (ref 0–0.2)
BASOPHILS NFR BLD: 0.3 %
BILIRUB SERPL-MCNC: 0.4 MG/DL (ref 0.2–0.7)
BUN SERPL-MCNC: 13 MG/DL (ref 8–23)
CALCIUM SERPL-MCNC: 9 MG/DL (ref 8.5–9.9)
CHLORIDE SERPL-SCNC: 109 MEQ/L (ref 95–107)
CHOLEST SERPL-MCNC: 171 MG/DL (ref 0–199)
CO2 SERPL-SCNC: 26 MEQ/L (ref 20–31)
CREAT SERPL-MCNC: 0.83 MG/DL (ref 0.7–1.2)
CREAT UR-MCNC: 200.1 MG/DL
EOSINOPHIL # BLD: 0.3 K/UL (ref 0–0.7)
EOSINOPHIL NFR BLD: 4 %
ERYTHROCYTE [DISTWIDTH] IN BLOOD BY AUTOMATED COUNT: 15.3 % (ref 11.5–14.5)
FOLATE: 6.1 NG/ML (ref 4.8–24.2)
GLOBULIN SER CALC-MCNC: 2.7 G/DL (ref 2.3–3.5)
GLUCOSE SERPL-MCNC: 80 MG/DL (ref 70–99)
HBA1C MFR BLD: 3.5 %
HCT VFR BLD AUTO: 33.4 % (ref 42–52)
HDLC SERPL-MCNC: 57 MG/DL (ref 40–59)
HGB BLD-MCNC: 10.8 G/DL (ref 14–18)
LDLC SERPL CALC-MCNC: 101 MG/DL (ref 0–129)
LYMPHOCYTES # BLD: 1.7 K/UL (ref 1–4.8)
LYMPHOCYTES NFR BLD: 21.7 %
MCH RBC QN AUTO: 32.1 PG (ref 27–31.3)
MCHC RBC AUTO-ENTMCNC: 32.3 % (ref 33–37)
MCV RBC AUTO: 99.4 FL (ref 79–92.2)
MICROALBUMIN UR-MCNC: <1.2 MG/DL
MICROALBUMIN/CREAT UR-RTO: NORMAL MG/G (ref 0–30)
MONOCYTES # BLD: 0.7 K/UL (ref 0.2–0.8)
MONOCYTES NFR BLD: 9.5 %
NEUTROPHILS # BLD: 4.9 K/UL (ref 1.4–6.5)
NEUTS SEG NFR BLD: 64.4 %
PLATELET # BLD AUTO: 318 K/UL (ref 130–400)
POTASSIUM SERPL-SCNC: 3.5 MEQ/L (ref 3.4–4.9)
PROT SERPL-MCNC: 6.2 G/DL (ref 6.3–8)
PSA SERPL-MCNC: 1.98 NG/ML (ref 0–4)
RBC # BLD AUTO: 3.36 M/UL (ref 4.7–6.1)
SODIUM SERPL-SCNC: 146 MEQ/L (ref 135–144)
T4 FREE SERPL-MCNC: 0.94 NG/DL (ref 0.84–1.68)
TRIGL SERPL-MCNC: 67 MG/DL (ref 0–150)
TSH SERPL-MCNC: 1.22 UIU/ML (ref 0.44–3.86)
URATE SERPL-MCNC: 6.2 MG/DL (ref 3.4–7)
VITAMIN B-12: 613 PG/ML (ref 232–1245)
WBC # BLD AUTO: 7.7 K/UL (ref 4.8–10.8)

## 2024-11-15 PROCEDURE — 82746 ASSAY OF FOLIC ACID SERUM: CPT

## 2024-11-15 PROCEDURE — 36415 COLL VENOUS BLD VENIPUNCTURE: CPT

## 2024-11-15 PROCEDURE — 82607 VITAMIN B-12: CPT

## 2024-11-15 PROCEDURE — 82043 UR ALBUMIN QUANTITATIVE: CPT

## 2024-11-15 PROCEDURE — 85025 COMPLETE CBC W/AUTO DIFF WBC: CPT

## 2024-11-15 PROCEDURE — 84443 ASSAY THYROID STIM HORMONE: CPT

## 2024-11-15 PROCEDURE — 80053 COMPREHEN METABOLIC PANEL: CPT

## 2024-11-15 PROCEDURE — 84153 ASSAY OF PSA TOTAL: CPT

## 2024-11-15 PROCEDURE — 84550 ASSAY OF BLOOD/URIC ACID: CPT

## 2024-11-15 PROCEDURE — 80061 LIPID PANEL: CPT

## 2024-11-15 PROCEDURE — 82570 ASSAY OF URINE CREATININE: CPT

## 2024-11-15 PROCEDURE — 84439 ASSAY OF FREE THYROXINE: CPT

## 2024-11-15 RX ORDER — TIOTROPIUM BROMIDE 18 UG/1
18 CAPSULE ORAL; RESPIRATORY (INHALATION) DAILY
Qty: 90 CAPSULE | Refills: 1 | Status: SHIPPED | OUTPATIENT
Start: 2024-11-15

## 2024-11-15 RX ORDER — DOCUSATE SODIUM 100 MG/1
100 CAPSULE, LIQUID FILLED ORAL 2 TIMES DAILY PRN
Qty: 180 CAPSULE | Refills: 1 | Status: SHIPPED | OUTPATIENT
Start: 2024-11-15

## 2024-11-15 RX ORDER — PSEUDOEPHED/ACETAMINOPH/DIPHEN 30MG-500MG
1 TABLET ORAL EVERY 8 HOURS PRN
Qty: 100 TABLET | Refills: 1 | Status: SHIPPED | OUTPATIENT
Start: 2024-11-15

## 2024-11-15 RX ORDER — FAMOTIDINE 20 MG/1
20 TABLET, FILM COATED ORAL 2 TIMES DAILY
Qty: 180 TABLET | Refills: 1 | Status: SHIPPED | OUTPATIENT
Start: 2024-11-15

## 2024-11-15 RX ORDER — BUDESONIDE AND FORMOTEROL FUMARATE DIHYDRATE 160; 4.5 UG/1; UG/1
2 AEROSOL RESPIRATORY (INHALATION) 2 TIMES DAILY
Qty: 30.6 G | Refills: 1 | Status: SHIPPED | OUTPATIENT
Start: 2024-11-15

## 2024-11-15 RX ORDER — ALBUTEROL SULFATE 90 UG/1
2 INHALANT RESPIRATORY (INHALATION) EVERY 4 HOURS PRN
Qty: 18 G | Refills: 1 | Status: SHIPPED | OUTPATIENT
Start: 2024-11-15

## 2024-11-15 ASSESSMENT — ENCOUNTER SYMPTOMS
ABDOMINAL PAIN: 0
CHEST TIGHTNESS: 0
CONSTIPATION: 1
WHEEZING: 0
VOMITING: 0
DIARRHEA: 0
BLOOD IN STOOL: 0
NAUSEA: 0
COUGH: 0
ANAL BLEEDING: 0
SHORTNESS OF BREATH: 1

## 2024-11-15 NOTE — PROGRESS NOTES
Anabelle Morris (: 1955) is a 68 y.o. male, Established patient, who presents today for:    Chief Complaint   Patient presents with    Follow-Up from Hospital     Pt was in the hospital for 18 days, is feeling better but is still tired          Assessment & Plan     1. Chronic obstructive pulmonary disease with acute exacerbation (HCC)  Assessment & Plan:  Exacerbation clinically resolved. Patient to continue with spiriva and symbicort as prescribed.  Patient encouraged to remain tobacco free.   Orders:  -     tiotropium (SPIRIVA HANDIHALER) 18 MCG inhalation capsule; Inhale 1 capsule into the lungs daily, Disp-90 capsule, R-1Normal  -     SYMBICORT 160-4.5 MCG/ACT AERO; Inhale 2 puffs into the lungs 2 times daily, Disp-30.6 g, R-1, DAWNormal  -     albuterol sulfate HFA (PROVENTIL;VENTOLIN;PROAIR) 108 (90 Base) MCG/ACT inhaler; Inhale 2 puffs into the lungs every 4 hours as needed for Wheezing or Shortness of Breath, Disp-18 g, R-1DX Code Needed  .Normal  2. Primary hypertension  Assessment & Plan:  Blood pressure within normal limits today in the office. Patient instructed to continue with current doses of losartan, metoprolol, and diltiazem.    Orders:  -     Ambulatory referral to Cardiology  3. Type 2 diabetes mellitus with diabetic nephropathy, without long-term current use of insulin ([facility])  Assessment & Plan:  A1c today in office at goal control. Patient instructed to continue with current dose of metformin.    Orders:  -     POCT glycosylated hemoglobin (Hb A1C)  4. Constipation, unspecified constipation type  -     docusate sodium (COLACE) 100 MG capsule; Take 1 capsule by mouth 2 times daily as needed for Constipation, Disp-180 capsule, R-1Normal  5. Gastroesophageal reflux disease, unspecified whether esophagitis present  -     famotidine (PEPCID) 20 MG tablet; Take 1 tablet by mouth 2 times daily, Disp-180 tablet, R-1Normal  6. Cocaine abuse (HCC)  Assessment & Plan:  Patient urged to

## 2024-11-17 DIAGNOSIS — D64.9 ANEMIA, UNSPECIFIED TYPE: Primary | ICD-10-CM

## 2024-11-20 ENCOUNTER — CARE COORDINATION (OUTPATIENT)
Dept: CARE COORDINATION | Age: 69
End: 2024-11-20

## 2024-11-20 NOTE — CARE COORDINATION
Telephone call with patient.  He feels he is doing better.  He uses the bus to get to PCP appointments. He stated that he called health insurance and they gave him the same number for the bus he uses.  Discussed transportation for medical appointments with [facility].  Provided him with phone number for [facility].Provided patient with phone number for National Do Not Call Registry.  He is tired of getting SPAM calls.

## 2024-11-23 DIAGNOSIS — J30.2 SEASONAL ALLERGIES: Chronic | ICD-10-CM

## 2024-11-23 RX ORDER — MONTELUKAST SODIUM 10 MG/1
10 TABLET ORAL NIGHTLY
Qty: 90 TABLET | Refills: 1 | Status: SHIPPED | OUTPATIENT
Start: 2024-11-23

## 2024-11-23 NOTE — TELEPHONE ENCOUNTER
Comments:     Last Office Visit (last PCP visit):   11/15/2024    Next Visit Date:  Future Appointments   Date Time Provider Department Center   1/2/2025 11:00 AM Katia Escamilla APRN - CNP [facility]   1/15/2025 12:00 PM Parrish Lemos MD [facility]         Rx requested:  Requested Prescriptions     Pending Prescriptions Disp Refills    montelukast (SINGULAIR) 10 MG tablet [Pharmacy Med Name: MONTELUKAST SOD 10 MG TABLET] 90 tablet 1     Sig: Take 1 tablet by mouth nightly

## 2024-12-04 ENCOUNTER — CARE COORDINATION (OUTPATIENT)
Dept: CARE COORDINATION | Age: 69
End: 2024-12-04

## 2024-12-04 DIAGNOSIS — J44.1 CHRONIC OBSTRUCTIVE PULMONARY DISEASE WITH ACUTE EXACERBATION (HCC): Chronic | ICD-10-CM

## 2024-12-04 RX ORDER — ALBUTEROL SULFATE 90 UG/1
2 INHALANT RESPIRATORY (INHALATION) EVERY 6 HOURS PRN
Qty: 18 EACH | Refills: 1 | Status: SHIPPED | OUTPATIENT
Start: 2024-12-04

## 2024-12-04 NOTE — CARE COORDINATION
Telephone call with patient.  He returned the application for assistance for  transportation to medical appointments with [facility] on [facility].

## 2024-12-04 NOTE — TELEPHONE ENCOUNTER
Comments:     Last Office Visit (last PCP visit):   11/15/2024    Next Visit Date:  Future Appointments   Date Time Provider Department Center   1/2/2025 11:00 AM Katia Escamilla APRN - CNP [facility]   1/15/2025 12:00 PM Parrish Lemos MD [facility]       **If hasn't been seen in over a year OR hasn't followed up according to last diabetes/ADHD visit, make appointment for patient before sending refill to provider.    Rx requested:  Requested Prescriptions     Pending Prescriptions Disp Refills    albuterol sulfate HFA (PROVENTIL;VENTOLIN;PROAIR) 108 (90 Base) MCG/ACT inhaler [Pharmacy Med Name: ALBUTEROL HFA (VENTOLIN) INH] 18 each 2     Sig: INHALE 2 PUFFS BY MOUTH EVERY 4 HOURS AS NEEDED FOR WHEEZE OR FOR SHORTNESS OF BREATH

## 2024-12-04 NOTE — CARE COORDINATION
Telephone call to patient.  He stated things are going okay.  No new community resource needs at this time.

## 2024-12-18 ENCOUNTER — CARE COORDINATION (OUTPATIENT)
Dept: CARE COORDINATION | Age: 69
End: 2024-12-18

## 2024-12-18 NOTE — CARE COORDINATION
Telephone call with patient.He feels he is doing okay.  Discussed that he sent application back to [facility] on A[facility] but has not heard anything.  Discussed calling [facility] to check on status of application.  He also stated that one of his inhalers is too costly.  Provided him with contact information for Drug Repository Program/[facility].

## 2025-01-02 ENCOUNTER — OFFICE VISIT (OUTPATIENT)
Dept: PALLATIVE CARE | Age: 70
End: 2025-01-02

## 2025-01-02 VITALS — HEART RATE: 83 BPM | SYSTOLIC BLOOD PRESSURE: 130 MMHG | DIASTOLIC BLOOD PRESSURE: 70 MMHG | OXYGEN SATURATION: 94 %

## 2025-01-02 DIAGNOSIS — J96.11 CHRONIC RESPIRATORY FAILURE WITH HYPOXIA: ICD-10-CM

## 2025-01-02 DIAGNOSIS — J44.9 CHRONIC OBSTRUCTIVE PULMONARY DISEASE, UNSPECIFIED COPD TYPE (HCC): Primary | ICD-10-CM

## 2025-01-02 DIAGNOSIS — Z59.86 PATIENT CANNOT AFFORD MEDICATIONS: ICD-10-CM

## 2025-01-02 DIAGNOSIS — Z71.89 ENCOUNTER FOR MEDICATION COUNSELING: ICD-10-CM

## 2025-01-02 DIAGNOSIS — E11.21 TYPE 2 DIABETES MELLITUS WITH DIABETIC NEPHROPATHY, WITHOUT LONG-TERM CURRENT USE OF INSULIN (HCC): ICD-10-CM

## 2025-01-02 DIAGNOSIS — Z59.82 TRANSPORTATION UNAVAILABLE: ICD-10-CM

## 2025-01-02 DIAGNOSIS — I51.7 RIGHT VENTRICULAR DILATION: ICD-10-CM

## 2025-01-02 DIAGNOSIS — Z51.5 PALLIATIVE CARE ENCOUNTER: ICD-10-CM

## 2025-01-02 DIAGNOSIS — F14.10 COCAINE ABUSE (HCC): ICD-10-CM

## 2025-01-02 DIAGNOSIS — F19.90 SUBSTANCE USE DISORDER: ICD-10-CM

## 2025-01-02 PROBLEM — I49.5 SICK SINUS SYNDROME (HCC): Status: ACTIVE | Noted: 2025-01-02

## 2025-01-02 RX ORDER — DOXYCYCLINE HYCLATE 100 MG
100 TABLET ORAL 2 TIMES DAILY
Qty: 14 TABLET | Refills: 0 | Status: SHIPPED | OUTPATIENT
Start: 2025-01-02 | End: 2025-01-09

## 2025-01-02 RX ORDER — AMLODIPINE BESYLATE 10 MG/1
10 TABLET ORAL DAILY
COMMUNITY
Start: 2024-12-31

## 2025-01-02 RX ORDER — PANTOPRAZOLE SODIUM 40 MG/1
40 TABLET, DELAYED RELEASE ORAL DAILY
COMMUNITY

## 2025-01-02 RX ORDER — PREDNISONE 20 MG/1
20 TABLET ORAL 2 TIMES DAILY
Qty: 10 TABLET | Refills: 0 | Status: SHIPPED | OUTPATIENT
Start: 2025-01-02 | End: 2025-01-07

## 2025-01-02 SDOH — ECONOMIC STABILITY - INCOME SECURITY: FINANCIAL INSECURITY: Z59.86

## 2025-01-02 SDOH — ECONOMIC STABILITY - TRANSPORTATION SECURITY: TRANSPORTATION INSECURITY: Z59.82

## 2025-01-02 ASSESSMENT — ENCOUNTER SYMPTOMS
COUGH: 1
CONSTIPATION: 1
WHEEZING: 1
BACK PAIN: 1
TROUBLE SWALLOWING: 0
SHORTNESS OF BREATH: 0

## 2025-01-02 NOTE — PROGRESS NOTES
(LOPRESSOR) 25 MG tablet Take 0.5 tablets by mouth 2 times daily      nicotine (NICODERM CQ) 21 MG/24HR Place 1 patch onto the skin daily (Patient not taking: Reported on 9/10/2024) 42 patch 0    sildenafil (VIAGRA) 100 MG tablet Take 1 tablet by mouth as needed for Erectile Dysfunction (Patient not taking: Reported on 9/10/2024) 6 tablet 1    Lancets MISC 1 each by Does not apply route 3 times daily 200 each 5     No current facility-administered medications on file prior to visit.       Review of Systems   Constitutional:  Negative for activity change, appetite change and unexpected weight change.   HENT:  Negative for trouble swallowing.    Respiratory:  Positive for cough and wheezing. Negative for shortness of breath.    Gastrointestinal:  Positive for constipation.   Genitourinary: Negative.    Musculoskeletal:  Positive for back pain.   Skin: Negative.            Objective:   /70   Pulse 83   SpO2 94%    Wt Readings from Last 3 Encounters:   11/15/24 113.3 kg (249 lb 12.8 oz)   11/07/24 107.1 kg (236 lb 3.2 oz)   11/06/24 121.5 kg (267 lb 14.4 oz)     Physical Exam  Constitutional:       General: He is not in acute distress.  HENT:      Head: Normocephalic and atraumatic.      Nose: No rhinorrhea.   Eyes:      General: No scleral icterus.        Right eye: No discharge.         Left eye: No discharge.      Extraocular Movements: Extraocular movements intact.      Conjunctiva/sclera: Conjunctivae normal.   Cardiovascular:      Rate and Rhythm: Normal rate and regular rhythm.   Pulmonary:      Effort: Pulmonary effort is normal.      Breath sounds: Wheezing (mild) present. No rales.   Abdominal:      General: Bowel sounds are normal. There is no distension.      Palpations: Abdomen is soft.      Tenderness: There is no abdominal tenderness.   Musculoskeletal:      Right lower leg: No edema.      Left lower leg: No edema.   Skin:     General: Skin is warm and dry.   Neurological:      General: No focal

## 2025-01-03 PROBLEM — J44.1 COPD EXACERBATION (HCC): Status: RESOLVED | Noted: 2024-08-29 | Resolved: 2025-01-03

## 2025-01-03 PROBLEM — I95.9 HYPOTENSION: Status: RESOLVED | Noted: 2024-10-28 | Resolved: 2025-01-03

## 2025-01-03 PROBLEM — D72.829 LEUKOCYTOSIS: Status: RESOLVED | Noted: 2024-10-28 | Resolved: 2025-01-03

## 2025-01-03 PROBLEM — I49.5 SICK SINUS SYNDROME (HCC): Status: RESOLVED | Noted: 2025-01-02 | Resolved: 2025-01-03

## 2025-01-03 PROBLEM — R07.9 CHEST PAIN: Status: RESOLVED | Noted: 2023-01-03 | Resolved: 2025-01-03

## 2025-01-03 PROBLEM — J96.00 ACUTE RESPIRATORY FAILURE: Status: RESOLVED | Noted: 2024-10-22 | Resolved: 2025-01-03

## 2025-01-03 RX ORDER — NICOTINE 21 MG/24HR
21 PATCH, TRANSDERMAL 24 HOURS TRANSDERMAL EVERY 24 HOURS
COMMUNITY
Start: 2024-11-07

## 2025-01-03 RX ORDER — IPRATROPIUM BROMIDE AND ALBUTEROL SULFATE 2.5; .5 MG/3ML; MG/3ML
1 SOLUTION RESPIRATORY (INHALATION) EVERY 4 HOURS PRN
COMMUNITY
Start: 2024-12-14

## 2025-01-03 RX ORDER — LORATADINE 10 MG/1
1 TABLET ORAL DAILY
COMMUNITY
Start: 2024-11-07

## 2025-01-03 RX ORDER — LOSARTAN POTASSIUM 100 MG/1
100 TABLET ORAL DAILY
COMMUNITY
Start: 2024-12-29

## 2025-01-07 ENCOUNTER — CARE COORDINATION (OUTPATIENT)
Dept: CARE COORDINATION | Age: 70
End: 2025-01-07

## 2025-01-08 ENCOUNTER — CARE COORDINATION (OUTPATIENT)
Dept: CARE COORDINATION | Age: 70
End: 2025-01-08

## 2025-01-08 NOTE — CARE COORDINATION
Telephone call to patient.  He stated that he is going to take LCT. Made joint phone call [place] [facility] and left voicemail Apryl [phone] to return phone call regarding  transportation.  Discussed medications and he is getting his Spiriva from Drug .  He spoke of his father's death and unable to get to  out of state..

## 2025-01-09 NOTE — CARE COORDINATION
Ambulatory Care Coordination Note     2025 2:10 PM     Patient Current Location:  Home: [address] [place] [id number]     This patient was received as a referral from Provider.    ACM contacted the patient by telephone. Verified name and  with patient as identifiers. Provided introduction to self, and explanation of the ACM role.   Patient declined care management services at this time.          ACM: Sharifa Farah RN     Challenges to be reviewed by the provider   Additional needs identified to be addressed with provider No  none               Method of communication with provider: none.    Utilization: Patient has not had any utilization since our last call.     Care Summary Note: ACM spoke to patient.  ACM is familiar with this patient as care coordination has been encouraged several times over the last several years.  This recent referral was from palliative care provider.  ACM and patient discussed current health status ACM attempted to discuss his medications patient has a long list of medications on his profile and admits he is not taking all of them.  He states he has not taken some of  them since he left the nursing home and that he was advised that he does not need some of the medications.  Patient could not during this encounter review his medications.  Patient has a PCP appointment 1/15/2025 with his new provider Dr. Hunter patient is encouraged to take all the bottles of medication to that appointment and the provider and staff will assist going through a medication reconciliation.  Medications that he is no longer needing the office can dispose of for him safely. he needs to make sure he is clear on what medications he is to take on a daily basis and obtain any refills at that time.  patient verbalizes understanding.  Patient  is working with [facility]  [place] for transportation issues.  He stated today he has made arrangements for his 1/15/2025 appointment.  And will make arrangements

## 2025-01-10 ENCOUNTER — CARE COORDINATION (OUTPATIENT)
Dept: CARE COORDINATION | Age: 70
End: 2025-01-10

## 2025-01-10 NOTE — CARE COORDINATION
Telephone call to [facility] on Aging/transportation.  Left voicemail of purpose of call with request for return phone call.  Call back number was provided.

## 2025-01-10 NOTE — CARE COORDINATION
Telephone call with patient.  He stated that LCT is going to take him to his appointment on the 15th and he is going to have LCT take him on the appointment 28th too.  Verified that he did return application to [facility] on Aging/Transportation.  Discussed left transportation person a voicemail about transportation/[facility] on Aging.  His food stamps have been cut to 64 dollars due to increase in Social Security .  He does receive a weekly food box and Senior Box.  Discussed medications and Sprivia he got this medication for 35 dollars that he believes was from  Drug .  He is not sure if he is going to get that this one time or if it is continuing.  Provided patient with contact information for Drug Repository Program.  Discussed how he got [employer] Medicare and he got it in open enrollment.  He can't remember who signed him up for it as it was a year ago.He chose [employer] because it saves him 100 dollars a month.  Discussed Medicaid and he doesn't think he would be eligible.  He is unsure of his amount he gets a month as he has a payee.  He believes his income is around 2,000 a month.

## 2025-01-13 ENCOUNTER — CARE COORDINATION (OUTPATIENT)
Dept: CARE COORDINATION | Age: 70
End: 2025-01-13

## 2025-01-13 NOTE — CARE COORDINATION
Telephone call with [facility] Office on Aging/Transportation.  She indicated they have received patient's application and he can call to schedule appointment.  Best phone number to reach her is [phone].

## 2025-01-14 ENCOUNTER — CARE COORDINATION (OUTPATIENT)
Dept: CARE COORDINATION | Age: 70
End: 2025-01-14

## 2025-01-14 NOTE — CARE COORDINATION
Telephone call with patient.  Explained that [facility] Office on [facility] did get his application for transportation.  Provided patient with Apryl/[facility] Office on Aging contact information/[phone].

## 2025-01-16 ENCOUNTER — CARE COORDINATION (OUTPATIENT)
Dept: CARE COORDINATION | Age: 70
End: 2025-01-16

## 2025-01-16 NOTE — CARE COORDINATION
Ambulatory Care Coordination Note     2025 9:33 AM     Patient Current Location:  Home: [address] [place] [id number]     This patient was received as a referral from Provider.    ACM contacted the patient by telephone. Verified name and  with patient as identifiers. Provided introduction to self, and explanation of the ACM role.   Patient declined care management services at this time.          ACM: Sharifa Farah RN     Challenges to be reviewed by the provider   Additional needs identified to be addressed with provider No  none               Method of communication with provider: none.    Utilization: Patient has not had any utilization since our last call.     Care Summary Note: ACM was scheduled to follow-up with patient today as he was to see PCP yesterday.  Last out reach patient could not/would not review medications patient was advised to take his pills to PCP appointment.  Patient states he had to cancel that appointment because [facility] does not specifically  he was told he would have to stand outside and wait they gave him a 40-minute window.  Patient could not stand out in the cold and snow for 40 minutes waiting for the bus to come.  Patient is going to use office of aging resources for rescheduled appointment 2025 with PCP and at already scheduled appointment with cardiology in 2025.  ACM again attempted to do a medication review to do a assessment and history for care coordination enrollment.  Patient does not want to engage.  ACM encouraged patient to take his medications to the office for reconciliation patient stated he is not taking all his bottles to the office.  Patient has declined care coordination.  Patient encouraged to continue to work with .     PCP/Specialist follow up:   Future Appointments         Provider Specialty Dept Phone    2025 12:20 PM Geovanni Greco DO Cardiology [phone]    2025 2:00 PM Aminta

## 2025-01-21 ENCOUNTER — CARE COORDINATION (OUTPATIENT)
Dept: CARE COORDINATION | Age: 70
End: 2025-01-21

## 2025-01-21 NOTE — CARE COORDINATION
Discussed transportation and LCT made him wait outside for 40 minutes and he could not wait in the cold so long so he cancelled.  .He called [facility] Office on Aging could take him to rescheduled appointment on 1/29 and not 1/28.  Friend will be taking him to do this appointment.  He is going to try and get his Spiriva refilled and he is hoping will get the same price.  Discussed Drug Repository Program [facility] and explained process and he would need to do an application.  Provided patient with contact information for this program.. Also discussed could look at doing Social Security Extra Help Program to help with medications since his income is around 1,800 dollars a month.  Could do this application if Drug Repository Program does not work out.

## 2025-01-28 ENCOUNTER — CARE COORDINATION (OUTPATIENT)
Dept: CARE COORDINATION | Age: 70
End: 2025-01-28

## 2025-01-28 ENCOUNTER — OFFICE VISIT (OUTPATIENT)
Dept: CARDIOLOGY CLINIC | Age: 70
End: 2025-01-28
Payer: MEDICARE

## 2025-01-28 VITALS
WEIGHT: 276.4 LBS | OXYGEN SATURATION: 92 % | SYSTOLIC BLOOD PRESSURE: 106 MMHG | HEART RATE: 86 BPM | BODY MASS INDEX: 37.49 KG/M2 | DIASTOLIC BLOOD PRESSURE: 62 MMHG

## 2025-01-28 DIAGNOSIS — I10 PRIMARY HYPERTENSION: Primary | Chronic | ICD-10-CM

## 2025-01-28 DIAGNOSIS — E78.49 OTHER HYPERLIPIDEMIA: Chronic | ICD-10-CM

## 2025-01-28 DIAGNOSIS — R07.9 CHEST PAIN, UNSPECIFIED TYPE: ICD-10-CM

## 2025-01-28 PROCEDURE — 4004F PT TOBACCO SCREEN RCVD TLK: CPT | Performed by: INTERNAL MEDICINE

## 2025-01-28 PROCEDURE — 3017F COLORECTAL CA SCREEN DOC REV: CPT | Performed by: INTERNAL MEDICINE

## 2025-01-28 PROCEDURE — 3078F DIAST BP <80 MM HG: CPT | Performed by: INTERNAL MEDICINE

## 2025-01-28 PROCEDURE — 1123F ACP DISCUSS/DSCN MKR DOCD: CPT | Performed by: INTERNAL MEDICINE

## 2025-01-28 PROCEDURE — 3074F SYST BP LT 130 MM HG: CPT | Performed by: INTERNAL MEDICINE

## 2025-01-28 PROCEDURE — 99204 OFFICE O/P NEW MOD 45 MIN: CPT | Performed by: INTERNAL MEDICINE

## 2025-01-28 PROCEDURE — 93000 ELECTROCARDIOGRAM COMPLETE: CPT | Performed by: INTERNAL MEDICINE

## 2025-01-28 PROCEDURE — G8427 DOCREV CUR MEDS BY ELIG CLIN: HCPCS | Performed by: INTERNAL MEDICINE

## 2025-01-28 PROCEDURE — 1159F MED LIST DOCD IN RCRD: CPT | Performed by: INTERNAL MEDICINE

## 2025-01-28 PROCEDURE — G8417 CALC BMI ABV UP PARAM F/U: HCPCS | Performed by: INTERNAL MEDICINE

## 2025-01-28 NOTE — CARE COORDINATION
Telephone call with patient.  He is going to use the [facility] Office on Aging for transportation tomorrow.  Discussed medication and his friend picked up and he is not sure if he needs help with them.  Discussed if he ends up needing help could look at applying for Social Security Extra Help Program..  Also discussed Drug Repository Program and provided contact information for this program.

## 2025-01-28 NOTE — PATIENT INSTRUCTIONS
Stress test to be arranged  Continue same medications  Quit smoking!  Follow up in 4 weeks, sooner if needed.

## 2025-01-28 NOTE — CARE COORDINATION
Telephone call to patient..  He is getting ready for a physician appointment and will call back at a better time,

## 2025-01-28 NOTE — PROGRESS NOTES
2025    Roby Silva MD  No address on file    RE: Anabelle Morris   : 1955     Dear Roby Suresh MD :    I had the pleasure of seeing your patient, Anabelle Morris in the office today on 2025.    As you are well aware, Mr. Morris is a pleasant 69 y.o.  male with history of HFpEF, PSVT, chronic cor pulmonale, and severe COPD with longstanding tobacco abuse who was kindly referred to me for evaluation of hypertension.  He was hospitalized last October with severe COPD exacerbation requiring intubation.  She had an echocardiogram showing preserved LV function but severe RV enlargement and moderate RV dysfunction.  Currently, he reports dyspnea with more physical degrees of activity which has worsened as of late.  He reports occasional left-sided chest pain.  He denies any palpitations.  No near-syncope or syncope.      Past Medical History: He  has a past medical history of (HFpEF) heart failure with preserved ejection fraction (HCC), Acute respiratory failure, Alcohol abuse, Anemia, Asthma, CKD (chronic kidney disease) stage 3, GFR 30-59 ml/min (HCC), Cocaine abuse (HCC), Community acquired pneumonia of left lower lobe of lung, COPD (chronic obstructive pulmonary disease) (HCC), COPD exacerbation (HCC), Depression, Disorder of pharynx, Drug-seeking behavior, Edema, Erectile dysfunction, Gastroesophageal reflux disease, Gout, History of arthroscopy of both knees, History of colon polyps, House dust mite allergy, Hyperlipidemia, Hypertension, Injury to heart, Insomnia, Loculated pleural effusion, Macrocytic anemia, Medical non-compliance, Morbid obesity due to excess calories, Osteoarthritis of both knees, Personal history of tobacco use, Pleurisy, Pneumonia, Pneumonia in infectious disease, Pre-diabetes, Seasonal allergies, Severe persistent asthma, Severe sleep apnea, Sick sinus syndrome (HCC), Supraventricular tachycardia (HCC), SVT (supraventricular tachycardia)

## 2025-01-29 ENCOUNTER — OFFICE VISIT (OUTPATIENT)
Dept: FAMILY MEDICINE CLINIC | Age: 70
End: 2025-01-29

## 2025-01-29 VITALS
WEIGHT: 270.8 LBS | HEART RATE: 107 BPM | DIASTOLIC BLOOD PRESSURE: 66 MMHG | OXYGEN SATURATION: 91 % | TEMPERATURE: 97.3 F | SYSTOLIC BLOOD PRESSURE: 114 MMHG | BODY MASS INDEX: 36.68 KG/M2 | HEIGHT: 72 IN

## 2025-01-29 DIAGNOSIS — E11.21 TYPE 2 DIABETES MELLITUS WITH DIABETIC NEPHROPATHY, WITHOUT LONG-TERM CURRENT USE OF INSULIN (HCC): ICD-10-CM

## 2025-01-29 DIAGNOSIS — E11.59 HYPERTENSION ASSOCIATED WITH DIABETES (HCC): ICD-10-CM

## 2025-01-29 DIAGNOSIS — F10.10 EXCESSIVE DRINKING OF ALCOHOL: ICD-10-CM

## 2025-01-29 DIAGNOSIS — D36.9 TUBULAR ADENOMA: ICD-10-CM

## 2025-01-29 DIAGNOSIS — Z12.5 SCREENING FOR MALIGNANT NEOPLASM OF PROSTATE: ICD-10-CM

## 2025-01-29 DIAGNOSIS — Z00.00 MEDICARE ANNUAL WELLNESS VISIT, SUBSEQUENT: Primary | ICD-10-CM

## 2025-01-29 DIAGNOSIS — E78.5 HYPERLIPIDEMIA ASSOCIATED WITH TYPE 2 DIABETES MELLITUS (HCC): ICD-10-CM

## 2025-01-29 DIAGNOSIS — J44.1 CHRONIC OBSTRUCTIVE PULMONARY DISEASE WITH ACUTE EXACERBATION (HCC): ICD-10-CM

## 2025-01-29 DIAGNOSIS — E11.69 HYPERLIPIDEMIA ASSOCIATED WITH TYPE 2 DIABETES MELLITUS (HCC): ICD-10-CM

## 2025-01-29 DIAGNOSIS — Z71.89 ACP (ADVANCE CARE PLANNING): ICD-10-CM

## 2025-01-29 DIAGNOSIS — F19.10 SUBSTANCE ABUSE (HCC): ICD-10-CM

## 2025-01-29 DIAGNOSIS — Z13.6 SCREENING FOR CARDIOVASCULAR CONDITION: ICD-10-CM

## 2025-01-29 DIAGNOSIS — Z87.891 PERSONAL HISTORY OF TOBACCO USE, PRESENTING HAZARDS TO HEALTH: ICD-10-CM

## 2025-01-29 DIAGNOSIS — I15.2 HYPERTENSION ASSOCIATED WITH DIABETES (HCC): ICD-10-CM

## 2025-01-29 PROBLEM — F32.0 CURRENT MILD EPISODE OF MAJOR DEPRESSIVE DISORDER WITHOUT PRIOR EPISODE (HCC): Status: ACTIVE | Noted: 2025-01-29

## 2025-01-29 PROBLEM — F41.8 ANXIETY WITH DEPRESSION: Status: RESOLVED | Noted: 2020-04-27 | Resolved: 2025-01-29

## 2025-01-29 PROBLEM — Z78.9 FULL CODE STATUS: Status: RESOLVED | Noted: 2024-10-29 | Resolved: 2025-01-29

## 2025-01-29 PROBLEM — M54.41 CHRONIC RIGHT-SIDED LOW BACK PAIN WITH RIGHT-SIDED SCIATICA: Status: RESOLVED | Noted: 2022-11-15 | Resolved: 2025-01-29

## 2025-01-29 PROBLEM — R33.9 URINARY RETENTION: Status: RESOLVED | Noted: 2024-10-22 | Resolved: 2025-01-29

## 2025-01-29 PROBLEM — G89.29 CHRONIC RIGHT-SIDED LOW BACK PAIN WITH RIGHT-SIDED SCIATICA: Status: RESOLVED | Noted: 2022-11-15 | Resolved: 2025-01-29

## 2025-01-29 PROBLEM — R94.31 ABNORMAL EKG: Status: RESOLVED | Noted: 2024-10-22 | Resolved: 2025-01-29

## 2025-01-29 PROBLEM — Z86.0100 HISTORY OF COLON POLYPS: Chronic | Status: RESOLVED | Noted: 2021-10-26 | Resolved: 2025-01-29

## 2025-01-29 PROBLEM — Z51.5 PALLIATIVE CARE ENCOUNTER: Status: RESOLVED | Noted: 2024-10-29 | Resolved: 2025-01-29

## 2025-01-29 RX ORDER — AZITHROMYCIN 500 MG/1
500 TABLET, FILM COATED ORAL DAILY
Qty: 7 TABLET | Refills: 0 | Status: SHIPPED | OUTPATIENT
Start: 2025-01-29 | End: 2025-02-05

## 2025-01-29 RX ORDER — METHYLPREDNISOLONE 4 MG/1
TABLET ORAL
Qty: 1 KIT | Refills: 0 | Status: SHIPPED | OUTPATIENT
Start: 2025-01-29

## 2025-01-29 ASSESSMENT — COLUMBIA-SUICIDE SEVERITY RATING SCALE - C-SSRS
2. HAVE YOU ACTUALLY HAD ANY THOUGHTS OF KILLING YOURSELF?: NO
3. HAVE YOU BEEN THINKING ABOUT HOW YOU MIGHT KILL YOURSELF?: NO
6. HAVE YOU EVER DONE ANYTHING, STARTED TO DO ANYTHING, OR PREPARED TO DO ANYTHING TO END YOUR LIFE?: NO
1. WITHIN THE PAST MONTH, HAVE YOU WISHED YOU WERE DEAD OR WISHED YOU COULD GO TO SLEEP AND NOT WAKE UP?: NO
5. HAVE YOU STARTED TO WORK OUT OR WORKED OUT THE DETAILS OF HOW TO KILL YOURSELF? DO YOU INTEND TO CARRY OUT THIS PLAN?: NO
4. HAVE YOU HAD THESE THOUGHTS AND HAD SOME INTENTION OF ACTING ON THEM?: NO

## 2025-01-29 ASSESSMENT — ENCOUNTER SYMPTOMS
SHORTNESS OF BREATH: 1
CONSTIPATION: 0
COUGH: 1
PHOTOPHOBIA: 0
ABDOMINAL PAIN: 0
BLOOD IN STOOL: 0
EYE PAIN: 0
VOICE CHANGE: 0
NAUSEA: 0
COLOR CHANGE: 0

## 2025-01-29 ASSESSMENT — PATIENT HEALTH QUESTIONNAIRE - PHQ9
8. MOVING OR SPEAKING SO SLOWLY THAT OTHER PEOPLE COULD HAVE NOTICED. OR THE OPPOSITE, BEING SO FIGETY OR RESTLESS THAT YOU HAVE BEEN MOVING AROUND A LOT MORE THAN USUAL: NOT AT ALL
4. FEELING TIRED OR HAVING LITTLE ENERGY: NOT AT ALL
SUM OF ALL RESPONSES TO PHQ QUESTIONS 1-9: 0
SUM OF ALL RESPONSES TO PHQ QUESTIONS 1-9: 0
6. FEELING BAD ABOUT YOURSELF - OR THAT YOU ARE A FAILURE OR HAVE LET YOURSELF OR YOUR FAMILY DOWN: NOT AT ALL
SUM OF ALL RESPONSES TO PHQ QUESTIONS 1-9: 0
10. IF YOU CHECKED OFF ANY PROBLEMS, HOW DIFFICULT HAVE THESE PROBLEMS MADE IT FOR YOU TO DO YOUR WORK, TAKE CARE OF THINGS AT HOME, OR GET ALONG WITH OTHER PEOPLE: NOT DIFFICULT AT ALL
SUM OF ALL RESPONSES TO PHQ9 QUESTIONS 1 & 2: 0
9. THOUGHTS THAT YOU WOULD BE BETTER OFF DEAD, OR OF HURTING YOURSELF: NOT AT ALL
2. FEELING DOWN, DEPRESSED OR HOPELESS: NOT AT ALL
7. TROUBLE CONCENTRATING ON THINGS, SUCH AS READING THE NEWSPAPER OR WATCHING TELEVISION: NOT AT ALL
5. POOR APPETITE OR OVEREATING: NOT AT ALL
1. LITTLE INTEREST OR PLEASURE IN DOING THINGS: NOT AT ALL
SUM OF ALL RESPONSES TO PHQ QUESTIONS 1-9: 0
3. TROUBLE FALLING OR STAYING ASLEEP: NOT AT ALL

## 2025-01-29 ASSESSMENT — LIFESTYLE VARIABLES
HOW OFTEN DURING THE LAST YEAR HAVE YOU HAD A FEELING OF GUILT OR REMORSE AFTER DRINKING: NEVER
HOW MANY STANDARD DRINKS CONTAINING ALCOHOL DO YOU HAVE ON A TYPICAL DAY: 1 OR 2
HOW OFTEN DURING THE LAST YEAR HAVE YOU FAILED TO DO WHAT WAS NORMALLY EXPECTED FROM YOU BECAUSE OF DRINKING: NEVER
HOW OFTEN DURING THE LAST YEAR HAVE YOU BEEN UNABLE TO REMEMBER WHAT HAPPENED THE NIGHT BEFORE BECAUSE YOU HAD BEEN DRINKING: NEVER
HAS A RELATIVE, FRIEND, DOCTOR, OR ANOTHER HEALTH PROFESSIONAL EXPRESSED CONCERN ABOUT YOUR DRINKING OR SUGGESTED YOU CUT DOWN: NO
HAVE YOU OR SOMEONE ELSE BEEN INJURED AS A RESULT OF YOUR DRINKING: NO
HOW OFTEN DURING THE LAST YEAR HAVE YOU FOUND THAT YOU WERE NOT ABLE TO STOP DRINKING ONCE YOU HAD STARTED: NEVER
HOW OFTEN DURING THE LAST YEAR HAVE YOU NEEDED AN ALCOHOLIC DRINK FIRST THING IN THE MORNING TO GET YOURSELF GOING AFTER A NIGHT OF HEAVY DRINKING: NEVER
HOW OFTEN DO YOU HAVE A DRINK CONTAINING ALCOHOL: 2-3 TIMES A WEEK

## 2025-01-29 NOTE — PROGRESS NOTES
[facility]  [address]   [address]  [place] [facility] [place] [facility] [address]  Dept: [phone]  Dept Fax: [phone]  Loc: [phone]     1/29/2025    Visit type: Office visit/AWV    The patient (or guardian, if applicable) and other individuals in attendance with the patient were advised that Artificial Intelligence will be utilized during this visit to record, process the conversation to generate a clinical note, and support improvement of the AI technology. The patient (or guardian, if applicable) and other individuals in attendance at the appointment consented to the use of AI, including the recording.      Reason for Visit: Medicare AWV       ASSESSMENT/PLAN     Assessment & Plan  1. Chronic Obstructive Pulmonary Disease (COPD).  He reports increased wheezing and shortness of breath over the past week, with white phlegm production. He was previously on antibiotics and prednisone 2 weeks ago, which initially improved his symptoms. He continues to smoke 2-3 cigarettes daily and uses marijuana. He is on Singulair, DuoNeb, and Spiriva for COPD management. He is advised to quit smoking and use a nicotine patch if needed, ensuring not to smoke while using the patch. He is also advised to cut down on carbohydrates by 90%, eat lean meat, and increase vegetable intake to aid in weight loss, which will help manage COPD. A prescription for Zithromax and a tapering dose of prednisone will be sent to [facility] in [place]. Blood work has been ordered.    2. Diabetes Mellitus.  His A1c level is well-controlled at 3.5. He is currently taking metformin twice a day. He is advised to continue his current medication regimen. Blood work has been ordered.    3. Hyperlipidemia.  He is advised to continue his current medication regimen and maintain a healthy diet. Blood work has been ordered.    4. Hypertension.  He is advised to continue his current medication regimen and monitor his blood pressure

## 2025-02-04 ENCOUNTER — CARE COORDINATION (OUTPATIENT)
Dept: CARE COORDINATION | Age: 70
End: 2025-02-04

## 2025-02-04 NOTE — CARE COORDINATION
Telephone call with patient.  He did use [facility] Office on Aging for transportation to medical appointments. He has all of his prescriptions.  Doesn't need help with prescriptions at this time.  He has a follow up appointment with PCP this week.

## 2025-02-06 ENCOUNTER — HOSPITAL ENCOUNTER (EMERGENCY)
Age: 70
Discharge: HOME | End: 2025-02-06
Attending: EMERGENCY MEDICINE | Admitting: EMERGENCY MEDICINE
Payer: MEDICARE

## 2025-02-06 ENCOUNTER — HOSPITAL ENCOUNTER (OUTPATIENT)
Dept: LAB | Age: 70
Discharge: HOME | End: 2025-02-06
Payer: MEDICARE

## 2025-02-06 ENCOUNTER — OFFICE VISIT (OUTPATIENT)
Dept: FAMILY MEDICINE CLINIC | Age: 70
End: 2025-02-06
Payer: MEDICARE

## 2025-02-06 ENCOUNTER — APPOINTMENT (OUTPATIENT)
Dept: GENERAL RADIOLOGY | Age: 70
End: 2025-02-06
Payer: MEDICARE

## 2025-02-06 VITALS
SYSTOLIC BLOOD PRESSURE: 119 MMHG | DIASTOLIC BLOOD PRESSURE: 73 MMHG | RESPIRATION RATE: 18 BRPM | HEIGHT: 72 IN | TEMPERATURE: 97.8 F | HEART RATE: 79 BPM | OXYGEN SATURATION: 96 % | WEIGHT: 269 LBS | BODY MASS INDEX: 36.44 KG/M2

## 2025-02-06 VITALS
OXYGEN SATURATION: 96 % | SYSTOLIC BLOOD PRESSURE: 126 MMHG | TEMPERATURE: 97.1 F | HEART RATE: 96 BPM | DIASTOLIC BLOOD PRESSURE: 62 MMHG | WEIGHT: 269.1 LBS | HEIGHT: 72 IN | BODY MASS INDEX: 36.45 KG/M2

## 2025-02-06 DIAGNOSIS — Z87.891 PERSONAL HISTORY OF TOBACCO USE, PRESENTING HAZARDS TO HEALTH: ICD-10-CM

## 2025-02-06 DIAGNOSIS — E66.01 MORBID (SEVERE) OBESITY DUE TO EXCESS CALORIES: ICD-10-CM

## 2025-02-06 DIAGNOSIS — E11.69 HYPERLIPIDEMIA ASSOCIATED WITH TYPE 2 DIABETES MELLITUS (HCC): ICD-10-CM

## 2025-02-06 DIAGNOSIS — Z12.5 SCREENING FOR MALIGNANT NEOPLASM OF PROSTATE: ICD-10-CM

## 2025-02-06 DIAGNOSIS — J44.1 COPD EXACERBATION (HCC): ICD-10-CM

## 2025-02-06 DIAGNOSIS — I15.2 HYPERTENSION ASSOCIATED WITH DIABETES (HCC): ICD-10-CM

## 2025-02-06 DIAGNOSIS — E11.21 TYPE 2 DIABETES MELLITUS WITH DIABETIC NEPHROPATHY, WITHOUT LONG-TERM CURRENT USE OF INSULIN (HCC): ICD-10-CM

## 2025-02-06 DIAGNOSIS — E11.59 HYPERTENSION ASSOCIATED WITH DIABETES (HCC): ICD-10-CM

## 2025-02-06 DIAGNOSIS — E11.21 TYPE 2 DIABETES MELLITUS WITH DIABETIC NEPHROPATHY, WITHOUT LONG-TERM CURRENT USE OF INSULIN (HCC): Primary | ICD-10-CM

## 2025-02-06 DIAGNOSIS — J44.1 COPD EXACERBATION (HCC): Primary | ICD-10-CM

## 2025-02-06 DIAGNOSIS — E78.5 HYPERLIPIDEMIA ASSOCIATED WITH TYPE 2 DIABETES MELLITUS (HCC): ICD-10-CM

## 2025-02-06 PROBLEM — Z72.0 TOBACCO USE: Status: RESOLVED | Noted: 2019-05-20 | Resolved: 2025-02-06

## 2025-02-06 PROBLEM — Z71.89 ACP (ADVANCE CARE PLANNING): Status: RESOLVED | Noted: 2025-01-29 | Resolved: 2025-02-06

## 2025-02-06 PROBLEM — Z13.6 SCREENING FOR CARDIOVASCULAR CONDITION: Status: RESOLVED | Noted: 2025-01-29 | Resolved: 2025-02-06

## 2025-02-06 LAB
ALBUMIN SERPL-MCNC: 4 G/DL (ref 3.5–4.6)
ALP SERPL-CCNC: 94 U/L (ref 35–104)
ALT SERPL-CCNC: 29 U/L (ref 0–41)
ANION GAP SERPL CALCULATED.3IONS-SCNC: 10 MEQ/L (ref 9–15)
AST SERPL-CCNC: 18 U/L (ref 0–40)
BASOPHILS # BLD: 0.1 K/UL (ref 0–0.2)
BASOPHILS NFR BLD: 0.5 %
BILIRUB SERPL-MCNC: 0.6 MG/DL (ref 0.2–0.7)
BUN SERPL-MCNC: 26 MG/DL (ref 8–23)
CALCIUM SERPL-MCNC: 9.5 MG/DL (ref 8.5–9.9)
CHLORIDE SERPL-SCNC: 104 MEQ/L (ref 95–107)
CHOLEST SERPL-MCNC: 171 MG/DL (ref 0–199)
CO2 SERPL-SCNC: 26 MEQ/L (ref 20–31)
CREAT SERPL-MCNC: 1.44 MG/DL (ref 0.7–1.2)
EOSINOPHIL # BLD: 0.7 K/UL (ref 0–0.7)
EOSINOPHIL NFR BLD: 5.5 %
ERYTHROCYTE [DISTWIDTH] IN BLOOD BY AUTOMATED COUNT: 15.2 % (ref 11.5–14.5)
GLOBULIN SER CALC-MCNC: 3 G/DL (ref 2.3–3.5)
GLUCOSE FASTING: 87 MG/DL (ref 70–99)
HCT VFR BLD AUTO: 43.5 % (ref 42–52)
HDLC SERPL-MCNC: 60 MG/DL (ref 40–59)
HGB BLD-MCNC: 14 G/DL (ref 14–18)
INR PPP: 1.1
LDL CHOLESTEROL: 89 MG/DL (ref 0–129)
LYMPHOCYTES # BLD: 3 K/UL (ref 1–4.8)
LYMPHOCYTES NFR BLD: 25.7 %
MCH RBC QN AUTO: 32.3 PG (ref 27–31.3)
MCHC RBC AUTO-ENTMCNC: 32.2 % (ref 33–37)
MCV RBC AUTO: 100.2 FL (ref 79–92.2)
MONOCYTES # BLD: 1 K/UL (ref 0.2–0.8)
MONOCYTES NFR BLD: 8.7 %
NEUTROPHILS # BLD: 7 K/UL (ref 1.4–6.5)
NEUTS SEG NFR BLD: 59.3 %
PLATELET # BLD AUTO: 392 K/UL (ref 130–400)
POTASSIUM SERPL-SCNC: 4.2 MEQ/L (ref 3.4–4.9)
PROT SERPL-MCNC: 7 G/DL (ref 6.3–8)
PROTHROMBIN TIME: 14.7 SEC (ref 12.3–14.9)
PSA SERPL-MCNC: 1.79 NG/ML (ref 0–4)
RBC # BLD AUTO: 4.34 M/UL (ref 4.7–6.1)
SODIUM SERPL-SCNC: 140 MEQ/L (ref 135–144)
TRIGLYCERIDE, FASTING: 109 MG/DL (ref 0–150)
TROPONIN, HIGH SENSITIVITY: 23 NG/L (ref 0–19)
TROPONIN, HIGH SENSITIVITY: 23 NG/L (ref 0–19)
TSH SERPL-MCNC: 1.37 UIU/ML (ref 0.44–3.86)
WBC # BLD AUTO: 11.8 K/UL (ref 4.8–10.8)

## 2025-02-06 PROCEDURE — 82570 ASSAY OF URINE CREATININE: CPT

## 2025-02-06 PROCEDURE — 1123F ACP DISCUSS/DSCN MKR DOCD: CPT | Performed by: INTERNAL MEDICINE

## 2025-02-06 PROCEDURE — 94640 AIRWAY INHALATION TREATMENT: CPT

## 2025-02-06 PROCEDURE — 99213 OFFICE O/P EST LOW 20 MIN: CPT | Performed by: INTERNAL MEDICINE

## 2025-02-06 PROCEDURE — 6360000002 HC RX W HCPCS: Performed by: EMERGENCY MEDICINE

## 2025-02-06 PROCEDURE — 99285 EMERGENCY DEPT VISIT HI MDM: CPT

## 2025-02-06 PROCEDURE — 85025 COMPLETE CBC W/AUTO DIFF WBC: CPT

## 2025-02-06 PROCEDURE — 36415 COLL VENOUS BLD VENIPUNCTURE: CPT

## 2025-02-06 PROCEDURE — 83036 HEMOGLOBIN GLYCOSYLATED A1C: CPT

## 2025-02-06 PROCEDURE — 3017F COLORECTAL CA SCREEN DOC REV: CPT | Performed by: INTERNAL MEDICINE

## 2025-02-06 PROCEDURE — 84443 ASSAY THYROID STIM HORMONE: CPT

## 2025-02-06 PROCEDURE — 85610 PROTHROMBIN TIME: CPT

## 2025-02-06 PROCEDURE — 3046F HEMOGLOBIN A1C LEVEL >9.0%: CPT | Performed by: INTERNAL MEDICINE

## 2025-02-06 PROCEDURE — 71045 X-RAY EXAM CHEST 1 VIEW: CPT

## 2025-02-06 PROCEDURE — 3074F SYST BP LT 130 MM HG: CPT | Performed by: INTERNAL MEDICINE

## 2025-02-06 PROCEDURE — 80061 LIPID PANEL: CPT

## 2025-02-06 PROCEDURE — 4004F PT TOBACCO SCREEN RCVD TLK: CPT | Performed by: INTERNAL MEDICINE

## 2025-02-06 PROCEDURE — 80053 COMPREHEN METABOLIC PANEL: CPT

## 2025-02-06 PROCEDURE — 2022F DILAT RTA XM EVC RTNOPTHY: CPT | Performed by: INTERNAL MEDICINE

## 2025-02-06 PROCEDURE — 84484 ASSAY OF TROPONIN QUANT: CPT

## 2025-02-06 PROCEDURE — G8427 DOCREV CUR MEDS BY ELIG CLIN: HCPCS | Performed by: INTERNAL MEDICINE

## 2025-02-06 PROCEDURE — 96374 THER/PROPH/DIAG INJ IV PUSH: CPT

## 2025-02-06 PROCEDURE — 1159F MED LIST DOCD IN RCRD: CPT | Performed by: INTERNAL MEDICINE

## 2025-02-06 PROCEDURE — 82043 UR ALBUMIN QUANTITATIVE: CPT

## 2025-02-06 PROCEDURE — 84153 ASSAY OF PSA TOTAL: CPT

## 2025-02-06 PROCEDURE — 94664 DEMO&/EVAL PT USE INHALER: CPT

## 2025-02-06 PROCEDURE — 6370000000 HC RX 637 (ALT 250 FOR IP): Performed by: EMERGENCY MEDICINE

## 2025-02-06 PROCEDURE — 3078F DIAST BP <80 MM HG: CPT | Performed by: INTERNAL MEDICINE

## 2025-02-06 PROCEDURE — 1160F RVW MEDS BY RX/DR IN RCRD: CPT | Performed by: INTERNAL MEDICINE

## 2025-02-06 PROCEDURE — G8417 CALC BMI ABV UP PARAM F/U: HCPCS | Performed by: INTERNAL MEDICINE

## 2025-02-06 PROCEDURE — 3023F SPIROM DOC REV: CPT | Performed by: INTERNAL MEDICINE

## 2025-02-06 RX ORDER — IPRATROPIUM BROMIDE AND ALBUTEROL SULFATE 2.5; .5 MG/3ML; MG/3ML
1 SOLUTION RESPIRATORY (INHALATION) ONCE
Status: COMPLETED | OUTPATIENT
Start: 2025-02-06 | End: 2025-02-06

## 2025-02-06 RX ORDER — DOXYCYCLINE HYCLATE 100 MG
100 TABLET ORAL 2 TIMES DAILY
Qty: 14 TABLET | Refills: 0 | Status: SHIPPED | OUTPATIENT
Start: 2025-02-06 | End: 2025-02-13

## 2025-02-06 RX ORDER — PREDNISONE 20 MG/1
20 TABLET ORAL 2 TIMES DAILY
Qty: 10 TABLET | Refills: 0 | Status: SHIPPED | OUTPATIENT
Start: 2025-02-06 | End: 2025-02-11

## 2025-02-06 RX ORDER — METHYLPREDNISOLONE SODIUM SUCCINATE 125 MG/2ML
125 INJECTION INTRAMUSCULAR; INTRAVENOUS ONCE
Status: COMPLETED | OUTPATIENT
Start: 2025-02-06 | End: 2025-02-06

## 2025-02-06 RX ADMIN — METHYLPREDNISOLONE SODIUM SUCCINATE 125 MG: 125 INJECTION INTRAMUSCULAR; INTRAVENOUS at 15:36

## 2025-02-06 RX ADMIN — IPRATROPIUM BROMIDE AND ALBUTEROL SULFATE 1 DOSE: 2.5; .5 SOLUTION RESPIRATORY (INHALATION) at 15:57

## 2025-02-06 ASSESSMENT — ENCOUNTER SYMPTOMS
SHORTNESS OF BREATH: 1
NAUSEA: 0
VOICE CHANGE: 0
PHOTOPHOBIA: 0
EYE PAIN: 0
COLOR CHANGE: 0
ABDOMINAL PAIN: 0
COUGH: 1
BLOOD IN STOOL: 0
CONSTIPATION: 0

## 2025-02-06 ASSESSMENT — LIFESTYLE VARIABLES
HOW OFTEN DO YOU HAVE A DRINK CONTAINING ALCOHOL: 2-3 TIMES A WEEK
HOW MANY STANDARD DRINKS CONTAINING ALCOHOL DO YOU HAVE ON A TYPICAL DAY: 1 OR 2

## 2025-02-06 ASSESSMENT — PAIN - FUNCTIONAL ASSESSMENT: PAIN_FUNCTIONAL_ASSESSMENT: NONE - DENIES PAIN

## 2025-02-06 NOTE — PROGRESS NOTES
[facility] PRIMARY [facility]  [address]  [address]  [place] [address]  Dept: [phone]  Dept Fax: [phone]  Loc: [phone]     2/6/2025    Visit type: Follow up    The patient (or guardian, if applicable) and other individuals in attendance with the patient were advised that Artificial Intelligence will be utilized during this visit to record, process the conversation to generate a clinical note, and support improvement of the AI technology. The patient (or guardian, if applicable) and other individuals in attendance at the appointment consented to the use of AI, including the recording.      Reason for Visit: Follow-up       ASSESSMENT/PLAN     Assessment & Plan  1. Chronic Obstructive Pulmonary Disease (COPD).  He continues to experience shortness of breath and wheezing, especially when walking short distances. He is advised to reduce smoking to improve his condition. A prescription for an antibiotic to be taken twice daily has been issued. A chest x-ray has been ordered for further evaluation. He is instructed to schedule an appointment with Dr. Ribeiro. If symptoms worsen, he should seek immediate medical attention at the emergency room.    2. Nicotine Dependence.  He admits to still smoking a little bit. He is advised to cut down on smoking as it exacerbates his COPD symptoms.    3. Neck Spasm.  He reports experiencing pain from the back of his neck to the front of his head, which is identified as a neck spasm.    4. Health Maintenance.  He has a colonoscopy scheduled for next month. Blood work has been done, but results are pending.    Follow-up  The patient will follow up in 1 week.     1. Type 2 diabetes mellitus with diabetic nephropathy, without long-term current use of insulin (HCC)  2. COPD exacerbation (HCC)  -     XR CHEST (2 VW); Future  3. Personal history of tobacco use, presenting hazards to health  -     XR CHEST (2 VW); Future  4. Body mass

## 2025-02-06 NOTE — ED PROVIDER NOTES
CC/HPI: 69-year-old male with history of COPD to the emergency department with chief complaint of cough and shortness of breath.  Patient is on home oxygen.  States that over the last 2 to 3 weeks he has noticed increased cough and shortness of breath and wheezing.  Had outpatient lab work done earlier today and also saw his primary care physician.  He has a pulmonologist and it was recommended that the patient have an outpatient x-ray and schedule an appointment with his pulmonologist.  Also recommended stopping smoking and a prescription for an antibiotic was given to the patient.  Patient states that he has breathing medications at home and does not need a refill.  Patient states that Solu-Medrol helps so decided that instead of getting the outpatient x-ray and following up with his pulmonologist that he would come to the emergency department.  Denies fever.  Patient states he has some chest tightness especially when exerting himself.  No calf swelling or tenderness.      VITALS/PMH/PSH: Reviewed per nurses notes    REVIEW OF SYSTEMS: As in chief complaint history of present illness, otherwise all other systems are reviewed and negative the total 10 systems reviewed    PHYSICAL EXAM:  GEN: Pt alert and oriented, no acute distress.  Appears mildly dyspneic  HEENT:         Normocephalic/Atramatic        PERRL, EOMI       Throat non-edematous.  No erythema noted.  No exudates noted.  Moist membranes  NECK: Nontender, no signs of trauma, no lymphadenopathy  HEART: Reg S1/S2, without murmer, rub or gallop  LUNGS: Slightly diminished throughout without rales or rhonchi.  Scattered faint end expiratory wheeze.  ABDOMEN: Morbidly obese bowel sounds positive, soft, nondistended.  Non-tender to palpation.  No guarding rebound or rigidity  MUSCULOSKELETAL/EXTREMITITES:  No signs of trauma, cyanosis or edema.  No CVA tenderness  LYMPH: no peripheral lympadenopathy noted  SKIN:  Warm & dry, no rash  NEUROLOGIC:  Alert and  oriented.  Speech clear no conversational dyspnea    Medical decision making/ED course;  69-year-old to COPD to the emergency department with 2 to 3-week history of increased cough and wheezing    Patient taken to room 3 on arrival.  IV was established and lab work was obtained and reviewed.  Also lab work done from earlier today outpatient was reviewed.  Patient with a mild elevated troponin which has been elevated before.  Repeat troponin was unchanged.      Chest x-ray was obtained which was interpreted by radiologist as follows;  Narrative & Impression  EXAMINATION:  ONE XRAY VIEW OF THE CHEST     2/6/2025 3:04 pm     COMPARISON:  October 30, 2024     HISTORY:  ORDERING SYSTEM PROVIDED HISTORY: SOB, hx COPD  TECHNOLOGIST PROVIDED HISTORY:  Reason for exam:->SOB, hx COPD  What reading provider will be dictating this exam?->CRC     FINDINGS:  Stable blunting of left costophrenic sulcus.  Areas of subsegmental  atelectasis or scarring are present in the lower lung fields.  Redemonstration of chronic left-sided rib fractures.  No pneumothorax.     IMPRESSION:  Opacities are present in left lower lung field appearing similar compared to  prior which may indicate subsegmental atelectasis versus small left pleural  effusion.    Patient was given 125 mg of IV Solu-Medrol and also DuoNeb aerosol in the emergency department.  At time of discharge patient stated that he is feeling better and feeling less short of breath.  Discussed need for follow-up and recommended make an appointment with his pulmonologist as soon as possible.    Final Clinical impression;  1) COPD exacerbation    Disposition/plan; patient discharged home in stable and improved condition given discharge instructions on COPD.  Patient to return for worsening and or changes to symptoms.  Continue antibiotic as prescribed by primary care physician.  Patient given a prescription for 5 days worth of prednisone 20 mg twice daily which he states has helped in

## 2025-02-06 NOTE — ED TRIAGE NOTES
Pt arrived from PMD office after being seen. Per pt he ahs ahd cough and chest tightness with sob x 2 weeks and was given steroids and abx which helped\"a little bit\". Pt was at f/u and after leaving just came to er.pt a/o x 3 skin pink w/d resp non labored,lungs I/E wheezes throughout.neg edema to legs.

## 2025-02-07 LAB
CREAT UR-MCNC: 60.9 MG/DL
ESTIMATED AVERAGE GLUCOSE: 100 MG/DL
HBA1C MFR BLD: 5.1 % (ref 4–6)
MICROALBUMIN UR-MCNC: <1.2 MG/DL
MICROALBUMIN/CREAT UR-RTO: NORMAL MG/G (ref 0–30)

## 2025-02-10 DIAGNOSIS — J30.2 SEASONAL ALLERGIES: Chronic | ICD-10-CM

## 2025-02-10 RX ORDER — CETIRIZINE HYDROCHLORIDE 10 MG/1
10 TABLET ORAL DAILY
Qty: 90 TABLET | Refills: 1 | Status: SHIPPED | OUTPATIENT
Start: 2025-02-10

## 2025-02-10 NOTE — TELEPHONE ENCOUNTER
Pharmacy is requesting medication refill. Please approve or deny this request.    Rx requested:  Requested Prescriptions     Pending Prescriptions Disp Refills    cetirizine (ZYRTEC) 10 MG tablet 90 tablet 1     Sig: Take 1 tablet by mouth daily         Last Office Visit:   2/6/2025      Next Visit Date:  Future Appointments   Date Time Provider Department Center   2/12/2025 11:15 AM French Michaels MD [facility]   2/13/2025  2:45 PM Parrish Lemos MD [facility] DEP   2/18/2025  2:00 PM MLO STRESS LAB 1 [facility]  [facility] Fac RAD   2/27/2025  1:00 PM Katia Escamilla APRN - CNP [facility] PH[facility]   3/4/2025 11:00 AM Geovanni Greco DO [facility]

## 2025-02-11 ENCOUNTER — CARE COORDINATION (OUTPATIENT)
Dept: CARE COORDINATION | Age: 70
End: 2025-02-11

## 2025-02-11 NOTE — CARE COORDINATION
Telephone call with patient. He has several appointments this week but has a friend to help him with rides.  He is in need of assistance with his Spiriva and Symbicort..  He called the Drug Repository Program but he left them a message and never heard back.  Discussed making referral to Malu Canchola to see if can get assistance with these inhalers and he should expect a phone call from her..  Sent in basket message to Malu Canchola/.   Discussed Social Security Extra Help and he said he was denied in past.

## 2025-02-11 NOTE — CARE COORDINATION
Attempted to call patient but mailbox was full and no answer.      Malu Canchola [facility]  CC   Medication Assistance  [facility]    (P) [phone]  (F) [phone]

## 2025-02-11 NOTE — CARE COORDINATION
Telephone call to [facility].  They do carry Spiriva and Symbicort inhalers but they are always not in stock.

## 2025-02-12 ENCOUNTER — CARE COORDINATION (OUTPATIENT)
Dept: CARE COORDINATION | Age: 70
End: 2025-02-12

## 2025-02-12 NOTE — CARE COORDINATION
Telephone call to patient explained that Symbicort is generic form so would need to apply to Drug Repository Program for this medication.Provided patient with contact information for Drug Repository Program.  Explained this information was obtained from Malu Canchola.  Also discussed Malu is trying to reach I'm to discuss Spiriva and to  the call..

## 2025-02-12 NOTE — CARE COORDINATION
Second attempt in reaching the patient, no answer.      I have been unable to reach the patient after multiple attempts.

## 2025-02-19 ENCOUNTER — CARE COORDINATION (OUTPATIENT)
Dept: CARE COORDINATION | Age: 70
End: 2025-02-19

## 2025-02-19 NOTE — CARE COORDINATION
Telephone call to patient.  Message indicated that this number has calling restrictions which prevents completion of call.

## 2025-02-24 ENCOUNTER — TELEPHONE (OUTPATIENT)
Dept: PALLATIVE CARE | Age: 70
End: 2025-02-24

## 2025-02-24 ENCOUNTER — CARE COORDINATION (OUTPATIENT)
Dept: CARE COORDINATION | Age: 70
End: 2025-02-24

## 2025-02-24 NOTE — CARE COORDINATION
Telephone call with patient.  Provided patient with contact information [facility] Office on Aging. His payee is ill and he wants to become his own person.  He called Social Security for interview and the first  appointment is April 11.  He doesn't know what to do until them.  [facility].  Discussed Drug Repository could help him with inhalers.  Also discussed that Malu Canchola is trying to reach him about Spiriva.

## 2025-02-24 NOTE — TELEPHONE ENCOUNTER
Patient called stating someone from our number called. I stated it was most likley an automated call reminding of his upcoming appointment this Thursday at 1PM.     Patient asked for Jolynn to \"bring out sample inhalers for him to try.\"

## 2025-02-26 ENCOUNTER — TELEPHONE (OUTPATIENT)
Dept: CARDIOLOGY CLINIC | Age: 70
End: 2025-02-26

## 2025-02-26 NOTE — TELEPHONE ENCOUNTER
Patient scheduled with Dr. Greco on 3/4/25  Stress test was to be done prior to the follow up appointment.  Would patient like to reschedule appointment with Dr. Greco until stress test has been completed?

## 2025-02-27 ENCOUNTER — OFFICE VISIT (OUTPATIENT)
Dept: PALLATIVE CARE | Age: 70
End: 2025-02-27
Payer: MEDICARE

## 2025-02-27 DIAGNOSIS — I50.32 CHRONIC HEART FAILURE WITH PRESERVED EJECTION FRACTION (HCC): ICD-10-CM

## 2025-02-27 DIAGNOSIS — J96.11 CHRONIC RESPIRATORY FAILURE WITH HYPOXIA (HCC): ICD-10-CM

## 2025-02-27 DIAGNOSIS — I27.81 CHRONIC COR PULMONALE (HCC): ICD-10-CM

## 2025-02-27 DIAGNOSIS — Z51.5 PALLIATIVE CARE ENCOUNTER: ICD-10-CM

## 2025-02-27 DIAGNOSIS — F19.90 SUBSTANCE USE DISORDER: ICD-10-CM

## 2025-02-27 DIAGNOSIS — F14.10 COCAINE ABUSE (HCC): ICD-10-CM

## 2025-02-27 DIAGNOSIS — Z59.82 TRANSPORTATION UNAVAILABLE: ICD-10-CM

## 2025-02-27 DIAGNOSIS — I51.7 RIGHT VENTRICULAR ENLARGEMENT: ICD-10-CM

## 2025-02-27 DIAGNOSIS — J44.9 CHRONIC OBSTRUCTIVE PULMONARY DISEASE, UNSPECIFIED COPD TYPE (HCC): Primary | ICD-10-CM

## 2025-02-27 DIAGNOSIS — Z59.86 PATIENT CANNOT AFFORD MEDICATIONS: ICD-10-CM

## 2025-02-27 PROCEDURE — 4004F PT TOBACCO SCREEN RCVD TLK: CPT | Performed by: NURSE PRACTITIONER

## 2025-02-27 PROCEDURE — G8417 CALC BMI ABV UP PARAM F/U: HCPCS | Performed by: NURSE PRACTITIONER

## 2025-02-27 PROCEDURE — 99349 HOME/RES VST EST MOD MDM 40: CPT | Performed by: NURSE PRACTITIONER

## 2025-02-27 PROCEDURE — 3017F COLORECTAL CA SCREEN DOC REV: CPT | Performed by: NURSE PRACTITIONER

## 2025-02-27 PROCEDURE — 1123F ACP DISCUSS/DSCN MKR DOCD: CPT | Performed by: NURSE PRACTITIONER

## 2025-02-27 RX ORDER — PREDNISONE 20 MG/1
20 TABLET ORAL 2 TIMES DAILY
Qty: 10 TABLET | Refills: 0 | Status: SHIPPED | OUTPATIENT
Start: 2025-02-27 | End: 2025-03-04

## 2025-02-27 RX ORDER — LEVOFLOXACIN 500 MG/1
500 TABLET, FILM COATED ORAL DAILY
Qty: 7 TABLET | Refills: 0 | Status: SHIPPED | OUTPATIENT
Start: 2025-02-27 | End: 2025-03-06

## 2025-02-27 SDOH — ECONOMIC STABILITY - TRANSPORTATION SECURITY: TRANSPORTATION INSECURITY: Z59.82

## 2025-02-27 SDOH — ECONOMIC STABILITY - INCOME SECURITY: FINANCIAL INSECURITY: Z59.86

## 2025-02-27 NOTE — PROGRESS NOTES
Subjective:      Patient Id: Seen Anabelle at  home in [place] , for follow-up palliative medicine visit. He was accompanied to the appointment by: friend.    Chief Complaint   Patient presents with    Shortness of Breath    Follow-up      HPI       Anabelle Morris is a 69 y.o. male with a complex medical history that includes cocaine abuse, COPD on 3 L O2 at night, JOHANA, alcohol abuse, T2DM, HTN HLD, asthma, SVT, pneumonia.     General: Currently alert, oriented x 4, in NAD. Patient with recent hospitalization in the setting of acute on chronic COPD exacerbation, T2DM, and alcohol abuse. From hospitalization, he was transferred to SNF for therapy at [place]. Patient did require mechanical intubation during stay.     Functional status: Up independent. Lives alone. Has to take frequent rest periods in the setting of chronic back pain exacerbated by activity. Reports he has transportation issues as he does not drive. He reports that he has difficulty getting to appointments due to lack of transportation.     COPD: SOB at baseline. Has some wheezing and chronic cough productive of white sputum at baseline. Wears 3 L oxygen via [place] at . Follows with Dr. Michaels. Has had multiple exacerbations requiring ER visits in the last 6 months. Using duoneb q 6 hours. Reports difficulty affording prescribed inhalers. Smoking anywhere from 1-4 cigarettes a day. Also smoking mariajuana. Has nicotine patches but does not wear consistently. Knows not to smoke with these on.     GI: Constipation controlled with stool softeners. Takes colace BID prn.     Substance use disorder: Goes to . Still drinking beer and smokes marijuana. Has not used cocaine since prior to hospitalization.     Update 2/27/25:    History of Present Illness  Patient reports recently experiencing increased SOB. He also noted an increase in wheezing and coughing, with the expectoration of white phlegm. His symptoms have remained stable since his last visit to the

## 2025-02-27 NOTE — TELEPHONE ENCOUNTER
Melvin for you.    Spoke with the patient  We spoke about his upcoming appointment and that Dr. Greco would like for him to have the stress test prior to his appointment. He was scheduled for the stress test on 2/18/25 but was marked as a No Show.    He said he would like to cancel his appointment and reschedule after his stress test. Central scheduling phone number was given.  I advised him to call and schedule the stress test and then call us back to get rescheduled.     He then stated he was having some sob when walking. I asked him if he would like to keep his appointment to discuss his sob. He stated no, that it was normal for him.  I advised him if it worsens to go to the ER or call back for an appointment.     He verbalized and understood.

## 2025-02-28 PROBLEM — Z00.00 MEDICARE ANNUAL WELLNESS VISIT, SUBSEQUENT: Status: RESOLVED | Noted: 2025-01-29 | Resolved: 2025-02-28

## 2025-03-03 ENCOUNTER — CARE COORDINATION (OUTPATIENT)
Dept: CARE COORDINATION | Age: 70
End: 2025-03-03

## 2025-03-03 NOTE — CARE COORDINATION
Telephone call with patient. Someone brought him money from his payee.  He has a ride to his appointment.  He has all of his prescriptions.

## 2025-03-04 RX ORDER — MONTELUKAST SODIUM 10 MG/1
10 TABLET ORAL NIGHTLY
COMMUNITY
Start: 2025-02-21

## 2025-03-04 ASSESSMENT — ENCOUNTER SYMPTOMS
GASTROINTESTINAL NEGATIVE: 1
SHORTNESS OF BREATH: 1

## 2025-03-07 DIAGNOSIS — E78.5 HYPERLIPIDEMIA, UNSPECIFIED HYPERLIPIDEMIA TYPE: Chronic | ICD-10-CM

## 2025-03-07 RX ORDER — LOVASTATIN 40 MG/1
40 TABLET ORAL NIGHTLY
Qty: 90 TABLET | Refills: 1 | Status: SHIPPED | OUTPATIENT
Start: 2025-03-07

## 2025-03-07 NOTE — TELEPHONE ENCOUNTER
Pharmacy is requesting medication refill. Please approve or deny this request.    Rx requested:  Requested Prescriptions     Pending Prescriptions Disp Refills    lovastatin (MEVACOR) 40 MG tablet 90 tablet 1     Sig: Take 1 tablet by mouth nightly         Last Office Visit:   2/6/2025      Next Visit Date:  Future Appointments   Date Time Provider Department Center   3/25/2025  2:00 PM [facility] STRESS LAB 1 MLMANJEET GUZMÁN [facility] Fac RAD   4/1/2025  4:30 PM Parrish Lemos MD [facility] ECC DEP   4/21/2025 11:00 AM Wu Meadows, BHARATI - CNP PC [facility]

## 2025-03-10 ENCOUNTER — CARE COORDINATION (OUTPATIENT)
Dept: CARE COORDINATION | Age: 70
End: 2025-03-10

## 2025-03-10 NOTE — CARE COORDINATION
Telephone call to patient.  Someone is going to [facility] and getting him his money.He has an appointment with Social Security on 4/11/2025.  He will need medications by end of week but his friend will pick with up.  He is going to call the [facility] Office on Aging for transportation.

## 2025-03-11 DIAGNOSIS — J44.1 CHRONIC OBSTRUCTIVE PULMONARY DISEASE WITH ACUTE EXACERBATION (HCC): Chronic | ICD-10-CM

## 2025-03-11 RX ORDER — ALBUTEROL SULFATE 90 UG/1
2 INHALANT RESPIRATORY (INHALATION) EVERY 6 HOURS PRN
Qty: 18 EACH | Refills: 1 | Status: SHIPPED | OUTPATIENT
Start: 2025-03-11

## 2025-03-11 RX ORDER — IPRATROPIUM BROMIDE AND ALBUTEROL SULFATE 2.5; .5 MG/3ML; MG/3ML
1 SOLUTION RESPIRATORY (INHALATION) EVERY 4 HOURS PRN
Qty: 360 ML | Refills: 1 | Status: SHIPPED | OUTPATIENT
Start: 2025-03-11

## 2025-03-11 NOTE — TELEPHONE ENCOUNTER
Patient is requesting medication refill. Please approve or deny this request.    Rx requested:  Requested Prescriptions     Pending Prescriptions Disp Refills    albuterol sulfate HFA (PROVENTIL;VENTOLIN;PROAIR) 108 (90 Base) MCG/ACT inhaler 18 each 1     Sig: Inhale 2 puffs into the lungs every 6 hours as needed for Wheezing or Shortness of Breath    ipratropium 0.5 mg-albuterol 2.5 mg (DUONEB) 0.5-2.5 (3) MG/3ML SOLN nebulizer solution 360 mL 1     Sig: Inhale 3 mLs into the lungs every 4 hours as needed for Shortness of Breath or Wheezing         Last Office Visit:   2/6/2025      Next Visit Date:  Future Appointments   Date Time Provider Department Center   3/25/2025  2:00 PM [facility] STRESS LAB 1 SORAYA BEAUCHAMP Fac RAD   4/1/2025  8:15 AM Parrish Lemos MD [facility] DEP   4/21/2025 11:00 AM Wu Meadows APRN - CNP PC [facility]

## 2025-03-17 ENCOUNTER — CARE COORDINATION (OUTPATIENT)
Dept: CARE COORDINATION | Age: 70
End: 2025-03-17

## 2025-03-17 NOTE — CARE COORDINATION
Telephone call with patient.  He stated he is wearing his oxygen. He has to go to payee/[employer] to get money released.  [facility] Office on Aging is going to provide transportation to medical appointment on 4/1/2025.  Encouraged patient to call the Drug Repository program for medications.

## 2025-03-21 ENCOUNTER — HOSPITAL ENCOUNTER (INPATIENT)
Age: 70
LOS: 3 days | Discharge: HOME | DRG: 190 | End: 2025-03-24
Attending: INTERNAL MEDICINE | Admitting: INTERNAL MEDICINE
Payer: MEDICARE

## 2025-03-21 ENCOUNTER — APPOINTMENT (OUTPATIENT)
Dept: GENERAL RADIOLOGY | Age: 70
DRG: 190 | End: 2025-03-21
Payer: MEDICARE

## 2025-03-21 DIAGNOSIS — J96.01 ACUTE RESPIRATORY FAILURE WITH HYPOXIA: Primary | ICD-10-CM

## 2025-03-21 DIAGNOSIS — J44.1 COPD EXACERBATION (HCC): ICD-10-CM

## 2025-03-21 LAB
ALBUMIN SERPL-MCNC: 4 G/DL (ref 3.5–4.6)
ALP SERPL-CCNC: 103 U/L (ref 35–104)
ALT SERPL-CCNC: 18 U/L (ref 0–41)
ANION GAP SERPL CALCULATED.3IONS-SCNC: 8 MEQ/L (ref 9–15)
AST SERPL-CCNC: 20 U/L (ref 0–40)
BASE EXCESS ARTERIAL: 0
BASOPHILS # BLD: 0 K/UL (ref 0–0.1)
BASOPHILS NFR BLD: 0.4 % (ref 0.2–1.2)
BILIRUB SERPL-MCNC: <0.2 MG/DL (ref 0.2–0.7)
BNP BLD-MCNC: <36 PG/ML
BUN SERPL-MCNC: 15 MG/DL (ref 8–23)
CALCIUM SERPL-MCNC: 9.5 MG/DL (ref 8.5–9.9)
CHLORIDE SERPL-SCNC: 110 MEQ/L (ref 95–107)
CO2 SERPL-SCNC: 25 MEQ/L (ref 20–31)
CREAT SERPL-MCNC: 1 MG/DL (ref 0.7–1.2)
EOSINOPHIL # BLD: 0.1 K/UL (ref 0–0.5)
EOSINOPHIL NFR BLD: 0.5 % (ref 0.8–7)
ERYTHROCYTE [DISTWIDTH] IN BLOOD BY AUTOMATED COUNT: 14.5 % (ref 11.6–14.4)
GLOBULIN SER CALC-MCNC: 2.8 G/DL (ref 2.3–3.5)
GLUCOSE BLD-MCNC: 113 MG/DL (ref 70–99)
GLUCOSE SERPL-MCNC: 167 MG/DL (ref 70–99)
HCO3 ARTERIAL: 24.8 MMOL/L (ref 21–29)
HCT VFR BLD AUTO: 40.5 % (ref 42–52)
HGB BLD-MCNC: 12.7 G/DL (ref 13.7–17.5)
IMM GRANULOCYTES # BLD: 0 K/UL
IMM GRANULOCYTES NFR BLD: 0.2 %
INFLUENZA A BY PCR: NEGATIVE
INFLUENZA B BY PCR: NEGATIVE
LACTATE BLDV-SCNC: 1.8 MMOL/L (ref 0.5–2.2)
LYMPHOCYTES # BLD: 0.7 K/UL (ref 1.3–3.6)
LYMPHOCYTES NFR BLD: 7 %
MAGNESIUM SERPL-MCNC: 2.1 MG/DL (ref 1.7–2.4)
MCH RBC QN AUTO: 30.7 PG (ref 25.7–32.2)
MCHC RBC AUTO-ENTMCNC: 31.4 % (ref 32.3–36.5)
MCV RBC AUTO: 97.8 FL (ref 79–92.2)
MONOCYTES # BLD: 0.6 K/UL (ref 0.3–0.8)
MONOCYTES NFR BLD: 6.6 % (ref 5.3–12.2)
NEUTROPHILS # BLD: 7.9 K/UL (ref 1.8–5.4)
NEUTS SEG NFR BLD: 85.3 % (ref 34–67.9)
O2 SAT, ARTERIAL: 91 % (ref 93–101)
PCO2 ARTERIAL: 42 MM HG (ref 35–45)
PERFORMED ON: ABNORMAL
PERFORMED ON: ABNORMAL
PH ARTERIAL: 7.38 (ref 7.35–7.45)
PLATELET # BLD AUTO: 302 K/UL (ref 163–337)
PO2 ARTERIAL: 63 MM HG (ref 75–108)
POC FIO2: 21
POC SAMPLE TYPE: ABNORMAL
POTASSIUM SERPL-SCNC: 4.2 MEQ/L (ref 3.4–4.9)
PROCALCITONIN SERPL IA-MCNC: 0.04 NG/ML (ref 0–0.15)
PROT SERPL-MCNC: 6.8 G/DL (ref 6.3–8)
RBC # BLD AUTO: 4.14 M/UL (ref 4.63–6.08)
SARS-COV-2 RDRP RESP QL NAA+PROBE: NOT DETECTED
SODIUM SERPL-SCNC: 143 MEQ/L (ref 135–144)
TCO2 ARTERIAL: 26 MMOL/L (ref 70–99)
TROPONIN, HIGH SENSITIVITY: 13 NG/L (ref 0–19)
WBC # BLD AUTO: 9.3 K/UL (ref 4.2–9)

## 2025-03-21 PROCEDURE — 96375 TX/PRO/DX INJ NEW DRUG ADDON: CPT

## 2025-03-21 PROCEDURE — 84484 ASSAY OF TROPONIN QUANT: CPT

## 2025-03-21 PROCEDURE — 87635 SARS-COV-2 COVID-19 AMP PRB: CPT

## 2025-03-21 PROCEDURE — 36600 WITHDRAWAL OF ARTERIAL BLOOD: CPT

## 2025-03-21 PROCEDURE — 6360000002 HC RX W HCPCS: Performed by: INTERNAL MEDICINE

## 2025-03-21 PROCEDURE — 6370000000 HC RX 637 (ALT 250 FOR IP): Performed by: INTERNAL MEDICINE

## 2025-03-21 PROCEDURE — 93005 ELECTROCARDIOGRAM TRACING: CPT

## 2025-03-21 PROCEDURE — 87502 INFLUENZA DNA AMP PROBE: CPT

## 2025-03-21 PROCEDURE — 82803 BLOOD GASES ANY COMBINATION: CPT

## 2025-03-21 PROCEDURE — 94664 DEMO&/EVAL PT USE INHALER: CPT

## 2025-03-21 PROCEDURE — 94760 N-INVAS EAR/PLS OXIMETRY 1: CPT

## 2025-03-21 PROCEDURE — 36415 COLL VENOUS BLD VENIPUNCTURE: CPT

## 2025-03-21 PROCEDURE — 2500000003 HC RX 250 WO HCPCS: Performed by: INTERNAL MEDICINE

## 2025-03-21 PROCEDURE — 94640 AIRWAY INHALATION TREATMENT: CPT

## 2025-03-21 PROCEDURE — 6360000002 HC RX W HCPCS

## 2025-03-21 PROCEDURE — 83880 ASSAY OF NATRIURETIC PEPTIDE: CPT

## 2025-03-21 PROCEDURE — 99285 EMERGENCY DEPT VISIT HI MDM: CPT

## 2025-03-21 PROCEDURE — 1210000000 HC MED SURG R&B

## 2025-03-21 PROCEDURE — 83605 ASSAY OF LACTIC ACID: CPT

## 2025-03-21 PROCEDURE — 71045 X-RAY EXAM CHEST 1 VIEW: CPT

## 2025-03-21 PROCEDURE — 83735 ASSAY OF MAGNESIUM: CPT

## 2025-03-21 PROCEDURE — 6370000000 HC RX 637 (ALT 250 FOR IP)

## 2025-03-21 PROCEDURE — 85025 COMPLETE CBC W/AUTO DIFF WBC: CPT

## 2025-03-21 PROCEDURE — 2580000003 HC RX 258

## 2025-03-21 PROCEDURE — 2700000000 HC OXYGEN THERAPY PER DAY

## 2025-03-21 PROCEDURE — 96365 THER/PROPH/DIAG IV INF INIT: CPT

## 2025-03-21 PROCEDURE — 84145 PROCALCITONIN (PCT): CPT

## 2025-03-21 PROCEDURE — 80053 COMPREHEN METABOLIC PANEL: CPT

## 2025-03-21 RX ORDER — MONTELUKAST SODIUM 10 MG/1
10 TABLET ORAL NIGHTLY
Status: DISCONTINUED | OUTPATIENT
Start: 2025-03-21 | End: 2025-03-24 | Stop reason: HOSPADM

## 2025-03-21 RX ORDER — MAGNESIUM SULFATE IN WATER 40 MG/ML
2000 INJECTION, SOLUTION INTRAVENOUS PRN
Status: DISCONTINUED | OUTPATIENT
Start: 2025-03-21 | End: 2025-03-22

## 2025-03-21 RX ORDER — ACETAMINOPHEN 650 MG/1
650 SUPPOSITORY RECTAL EVERY 6 HOURS PRN
Status: DISCONTINUED | OUTPATIENT
Start: 2025-03-21 | End: 2025-03-24 | Stop reason: HOSPADM

## 2025-03-21 RX ORDER — BUDESONIDE AND FORMOTEROL FUMARATE DIHYDRATE 160; 4.5 UG/1; UG/1
2 AEROSOL RESPIRATORY (INHALATION) 2 TIMES DAILY
Status: DISCONTINUED | OUTPATIENT
Start: 2025-03-21 | End: 2025-03-21

## 2025-03-21 RX ORDER — POLYETHYLENE GLYCOL 3350 17 G/17G
17 POWDER, FOR SOLUTION ORAL DAILY PRN
Status: DISCONTINUED | OUTPATIENT
Start: 2025-03-21 | End: 2025-03-24 | Stop reason: HOSPADM

## 2025-03-21 RX ORDER — ESCITALOPRAM OXALATE 10 MG/1
10 TABLET ORAL DAILY
Status: DISCONTINUED | OUTPATIENT
Start: 2025-03-22 | End: 2025-03-24 | Stop reason: HOSPADM

## 2025-03-21 RX ORDER — DILTIAZEM HYDROCHLORIDE 120 MG/1
120 CAPSULE, COATED, EXTENDED RELEASE ORAL DAILY
Status: DISCONTINUED | OUTPATIENT
Start: 2025-03-22 | End: 2025-03-24 | Stop reason: HOSPADM

## 2025-03-21 RX ORDER — LOSARTAN POTASSIUM 50 MG/1
100 TABLET ORAL DAILY
Status: DISCONTINUED | OUTPATIENT
Start: 2025-03-22 | End: 2025-03-24 | Stop reason: HOSPADM

## 2025-03-21 RX ORDER — AMLODIPINE BESYLATE 10 MG/1
10 TABLET ORAL DAILY
Status: DISCONTINUED | OUTPATIENT
Start: 2025-03-22 | End: 2025-03-24 | Stop reason: HOSPADM

## 2025-03-21 RX ORDER — ONDANSETRON 2 MG/ML
4 INJECTION INTRAMUSCULAR; INTRAVENOUS EVERY 6 HOURS PRN
Status: DISCONTINUED | OUTPATIENT
Start: 2025-03-21 | End: 2025-03-24 | Stop reason: HOSPADM

## 2025-03-21 RX ORDER — GLUCAGON 1 MG/ML
1 KIT INJECTION PRN
Status: DISCONTINUED | OUTPATIENT
Start: 2025-03-21 | End: 2025-03-24 | Stop reason: HOSPADM

## 2025-03-21 RX ORDER — METHYLPREDNISOLONE SODIUM SUCCINATE 40 MG/ML
40 INJECTION INTRAMUSCULAR; INTRAVENOUS DAILY
Status: DISCONTINUED | OUTPATIENT
Start: 2025-03-21 | End: 2025-03-22

## 2025-03-21 RX ORDER — FUROSEMIDE 40 MG/1
40 TABLET ORAL DAILY
Status: DISCONTINUED | OUTPATIENT
Start: 2025-03-22 | End: 2025-03-24 | Stop reason: HOSPADM

## 2025-03-21 RX ORDER — METHYLPREDNISOLONE SODIUM SUCCINATE 125 MG/2ML
125 INJECTION INTRAMUSCULAR; INTRAVENOUS ONCE
Status: COMPLETED | OUTPATIENT
Start: 2025-03-21 | End: 2025-03-21

## 2025-03-21 RX ORDER — SODIUM CHLORIDE 0.9 % (FLUSH) 0.9 %
5-40 SYRINGE (ML) INJECTION EVERY 12 HOURS SCHEDULED
Status: DISCONTINUED | OUTPATIENT
Start: 2025-03-21 | End: 2025-03-22

## 2025-03-21 RX ORDER — DOCUSATE SODIUM 100 MG/1
100 CAPSULE, LIQUID FILLED ORAL 2 TIMES DAILY PRN
Status: DISCONTINUED | OUTPATIENT
Start: 2025-03-21 | End: 2025-03-24 | Stop reason: HOSPADM

## 2025-03-21 RX ORDER — ONDANSETRON 4 MG/1
4 TABLET, ORALLY DISINTEGRATING ORAL EVERY 8 HOURS PRN
Status: DISCONTINUED | OUTPATIENT
Start: 2025-03-21 | End: 2025-03-24 | Stop reason: HOSPADM

## 2025-03-21 RX ORDER — IPRATROPIUM BROMIDE AND ALBUTEROL SULFATE 2.5; .5 MG/3ML; MG/3ML
1 SOLUTION RESPIRATORY (INHALATION) EVERY 4 HOURS PRN
Status: DISCONTINUED | OUTPATIENT
Start: 2025-03-21 | End: 2025-03-24 | Stop reason: HOSPADM

## 2025-03-21 RX ORDER — HYDRALAZINE HYDROCHLORIDE 20 MG/ML
10 INJECTION INTRAMUSCULAR; INTRAVENOUS EVERY 4 HOURS PRN
Status: DISCONTINUED | OUTPATIENT
Start: 2025-03-21 | End: 2025-03-24 | Stop reason: HOSPADM

## 2025-03-21 RX ORDER — SODIUM CHLORIDE 0.9 % (FLUSH) 0.9 %
5-40 SYRINGE (ML) INJECTION PRN
Status: DISCONTINUED | OUTPATIENT
Start: 2025-03-21 | End: 2025-03-24 | Stop reason: HOSPADM

## 2025-03-21 RX ORDER — INSULIN LISPRO 100 [IU]/ML
0-8 INJECTION, SOLUTION INTRAVENOUS; SUBCUTANEOUS
Status: DISCONTINUED | OUTPATIENT
Start: 2025-03-21 | End: 2025-03-24 | Stop reason: HOSPADM

## 2025-03-21 RX ORDER — IPRATROPIUM BROMIDE AND ALBUTEROL SULFATE 2.5; .5 MG/3ML; MG/3ML
1 SOLUTION RESPIRATORY (INHALATION) PRN
Status: DISCONTINUED | OUTPATIENT
Start: 2025-03-21 | End: 2025-03-22

## 2025-03-21 RX ORDER — POTASSIUM CHLORIDE 7.45 MG/ML
10 INJECTION INTRAVENOUS PRN
Status: DISCONTINUED | OUTPATIENT
Start: 2025-03-21 | End: 2025-03-22

## 2025-03-21 RX ORDER — POTASSIUM CHLORIDE 1500 MG/1
40 TABLET, EXTENDED RELEASE ORAL PRN
Status: DISCONTINUED | OUTPATIENT
Start: 2025-03-21 | End: 2025-03-22

## 2025-03-21 RX ORDER — ENOXAPARIN SODIUM 100 MG/ML
30 INJECTION SUBCUTANEOUS 2 TIMES DAILY
Status: DISCONTINUED | OUTPATIENT
Start: 2025-03-21 | End: 2025-03-24 | Stop reason: HOSPADM

## 2025-03-21 RX ORDER — MAGNESIUM SULFATE IN WATER 40 MG/ML
2000 INJECTION, SOLUTION INTRAVENOUS ONCE
Status: COMPLETED | OUTPATIENT
Start: 2025-03-21 | End: 2025-03-21

## 2025-03-21 RX ORDER — BUDESONIDE AND FORMOTEROL FUMARATE DIHYDRATE 160; 4.5 UG/1; UG/1
2 AEROSOL RESPIRATORY (INHALATION) 2 TIMES DAILY
Status: DISCONTINUED | OUTPATIENT
Start: 2025-03-22 | End: 2025-03-24 | Stop reason: HOSPADM

## 2025-03-21 RX ORDER — ACETAMINOPHEN 325 MG/1
650 TABLET ORAL EVERY 6 HOURS PRN
Status: DISCONTINUED | OUTPATIENT
Start: 2025-03-21 | End: 2025-03-24 | Stop reason: HOSPADM

## 2025-03-21 RX ORDER — DEXTROSE MONOHYDRATE 100 MG/ML
INJECTION, SOLUTION INTRAVENOUS CONTINUOUS PRN
Status: DISCONTINUED | OUTPATIENT
Start: 2025-03-21 | End: 2025-03-24 | Stop reason: HOSPADM

## 2025-03-21 RX ORDER — SODIUM CHLORIDE 9 MG/ML
INJECTION, SOLUTION INTRAVENOUS PRN
Status: DISCONTINUED | OUTPATIENT
Start: 2025-03-21 | End: 2025-03-24 | Stop reason: HOSPADM

## 2025-03-21 RX ADMIN — MONTELUKAST 10 MG: 10 TABLET, FILM COATED ORAL at 21:23

## 2025-03-21 RX ADMIN — CEFTRIAXONE 1000 MG: 1 INJECTION, POWDER, FOR SOLUTION INTRAMUSCULAR; INTRAVENOUS at 20:16

## 2025-03-21 RX ADMIN — IPRATROPIUM BROMIDE AND ALBUTEROL SULFATE 1 DOSE: .5; 2.5 SOLUTION RESPIRATORY (INHALATION) at 18:29

## 2025-03-21 RX ADMIN — SODIUM CHLORIDE, PRESERVATIVE FREE 10 ML: 5 INJECTION INTRAVENOUS at 21:01

## 2025-03-21 RX ADMIN — METHYLPREDNISOLONE SODIUM SUCCINATE 125 MG: 125 INJECTION INTRAMUSCULAR; INTRAVENOUS at 17:53

## 2025-03-21 RX ADMIN — IPRATROPIUM BROMIDE AND ALBUTEROL SULFATE 1 DOSE: .5; 2.5 SOLUTION RESPIRATORY (INHALATION) at 20:06

## 2025-03-21 RX ADMIN — METHYLPREDNISOLONE SODIUM SUCCINATE 40 MG: 40 INJECTION INTRAMUSCULAR; INTRAVENOUS at 21:00

## 2025-03-21 RX ADMIN — MAGNESIUM SULFATE HEPTAHYDRATE 2000 MG: 40 INJECTION, SOLUTION INTRAVENOUS at 17:55

## 2025-03-21 RX ADMIN — ENOXAPARIN SODIUM 30 MG: 30 INJECTION SUBCUTANEOUS at 21:23

## 2025-03-21 ASSESSMENT — LIFESTYLE VARIABLES
HOW MANY STANDARD DRINKS CONTAINING ALCOHOL DO YOU HAVE ON A TYPICAL DAY: 1 OR 2
HOW OFTEN DO YOU HAVE A DRINK CONTAINING ALCOHOL: 2-4 TIMES A MONTH

## 2025-03-21 ASSESSMENT — PAIN - FUNCTIONAL ASSESSMENT: PAIN_FUNCTIONAL_ASSESSMENT: NONE - DENIES PAIN

## 2025-03-21 NOTE — PROGRESS NOTES
Anabelle Morris is a 69 years old male with past medical history of COPD, diabetes presented to the ED with shortness of breath.  He has been having increasing shortness of breath for the past week.  He tried his inhalers with no improvement in symptoms.  He wears oxygen 3 L at night only.  He has been having productive cough with milky white sputum.  No reported fever.    Patient was not seen or examined    Assessment and plan  Acute COPD exacerbation-bilateral send with shortness of breath and cough.  Hypoxia on ambulation.  Continue DuoNeb, Solu-Medrol.  Check procalcitonin.    Diabetes-viral medication.  Insulin sliding scale.    Electronically signed by Sylvester Montesinos MD on 3/21/2025 at 7:59 PM

## 2025-03-21 NOTE — ED TRIAGE NOTES
Pt a/o x 3 skin pink w/d resp slt labored,lungs I/e wheezes noted with scattered rhonchi. Pt reports increased sob x 1+ week. Pt has been using o2 for just moving around. Denies chest pain. 1+ edema to lower leg.pt was put on Z pack by pulmonology.

## 2025-03-21 NOTE — ED PROVIDER NOTES
baseline.   Psychiatric:         Mood and Affect: Mood normal.         DIAGNOSTIC RESULTS     EKG: All EKG's are interpreted by the Emergency Department Physician who either signs or Co-signs this chart in the absence of a cardiologist.    EKG shows NSR with RBBB; HR 89, normal axis, normal intervals, no ST changes.  In comparison to EKG performed on 01/28/2025, no significant change noted.  No STEMI.    RADIOLOGY:   Non-plain film images such as CT, Ultrasound and MRI are read by the radiologist. Plain radiographic images are visualized and preliminarily interpreted by the emergency physician with the below findings:    Interpretation per the Radiologist below, if available at the time of this note:    XR CHEST PORTABLE   Final Result   Chronic findings without appreciable acute process.               ED BEDSIDE ULTRASOUND:   Performed by ED Physician - none    LABS:  Labs Reviewed   CBC WITH AUTO DIFFERENTIAL - Abnormal; Notable for the following components:       Result Value    WBC 9.3 (*)     RBC 4.14 (*)     Hemoglobin 12.7 (*)     Hematocrit 40.5 (*)     MCV 97.8 (*)     MCHC 31.4 (*)     RDW 14.5 (*)     Neutrophils % 85.3 (*)     Eosinophils % 0.5 (*)     Neutrophils Absolute 7.9 (*)     Lymphocytes Absolute 0.7 (*)     All other components within normal limits   COMPREHENSIVE METABOLIC PANEL - Abnormal; Notable for the following components:    Chloride 110 (*)     Anion Gap 8 (*)     Glucose 167 (*)     All other components within normal limits   POCT ARTERIAL - Abnormal; Notable for the following components:    pO2, Arterial 63 (*)     O2 Sat, Arterial 91 (*)     TCO2, Arterial 26 (*)     All other components within normal limits   RAPID INFLUENZA A/B ANTIGENS   COVID-19, RAPID   MAGNESIUM   TROPONIN   LACTIC ACID   BRAIN NATRIURETIC PEPTIDE   TROPONIN   PROCALCITONIN   POCT EPOC BLOOD GAS, LACTIC ACID, ICA       All other labs were within normal range or not returned as of this dictation.    EMERGENCY

## 2025-03-22 LAB
ALBUMIN SERPL-MCNC: 3.9 G/DL (ref 3.5–4.6)
ALP SERPL-CCNC: 100 U/L (ref 35–104)
ALT SERPL-CCNC: 15 U/L (ref 0–41)
ANION GAP SERPL CALCULATED.3IONS-SCNC: 9 MEQ/L (ref 9–15)
AST SERPL-CCNC: 18 U/L (ref 0–40)
BASOPHILS # BLD: 0 K/UL (ref 0–0.1)
BASOPHILS NFR BLD: 0.1 % (ref 0.2–1.2)
BILIRUB SERPL-MCNC: <0.2 MG/DL (ref 0.2–0.7)
BUN SERPL-MCNC: 12 MG/DL (ref 8–23)
CALCIUM SERPL-MCNC: 9.4 MG/DL (ref 8.5–9.9)
CHLORIDE SERPL-SCNC: 108 MEQ/L (ref 95–107)
CO2 SERPL-SCNC: 23 MEQ/L (ref 20–31)
CREAT SERPL-MCNC: 0.8 MG/DL (ref 0.7–1.2)
EOSINOPHIL # BLD: 0 K/UL (ref 0–0.5)
EOSINOPHIL NFR BLD: 0 % (ref 0.8–7)
ERYTHROCYTE [DISTWIDTH] IN BLOOD BY AUTOMATED COUNT: 14.2 % (ref 11.6–14.4)
GLOBULIN SER CALC-MCNC: 2.8 G/DL (ref 2.3–3.5)
GLUCOSE BLD-MCNC: 114 MG/DL (ref 70–99)
GLUCOSE BLD-MCNC: 116 MG/DL (ref 70–99)
GLUCOSE BLD-MCNC: 142 MG/DL (ref 70–99)
GLUCOSE BLD-MCNC: 207 MG/DL (ref 70–99)
GLUCOSE SERPL-MCNC: 155 MG/DL (ref 70–99)
HCT VFR BLD AUTO: 40.9 % (ref 42–52)
HGB BLD-MCNC: 13.1 G/DL (ref 13.7–17.5)
IMM GRANULOCYTES # BLD: 0 K/UL
IMM GRANULOCYTES NFR BLD: 0.4 %
LYMPHOCYTES # BLD: 0.6 K/UL (ref 1.3–3.6)
LYMPHOCYTES NFR BLD: 7.7 %
MCH RBC QN AUTO: 30.8 PG (ref 25.7–32.2)
MCHC RBC AUTO-ENTMCNC: 32 % (ref 32.3–36.5)
MCV RBC AUTO: 96.2 FL (ref 79–92.2)
MONOCYTES # BLD: 0.3 K/UL (ref 0.3–0.8)
MONOCYTES NFR BLD: 3.9 % (ref 5.3–12.2)
NEUTROPHILS # BLD: 6.4 K/UL (ref 1.8–5.4)
NEUTS SEG NFR BLD: 87.9 % (ref 34–67.9)
PERFORMED ON: ABNORMAL
PLATELET # BLD AUTO: 297 K/UL (ref 163–337)
POTASSIUM SERPL-SCNC: 4.4 MEQ/L (ref 3.4–4.9)
PROCALCITONIN SERPL IA-MCNC: 0.03 NG/ML (ref 0–0.15)
PROT SERPL-MCNC: 6.7 G/DL (ref 6.3–8)
RBC # BLD AUTO: 4.25 M/UL (ref 4.63–6.08)
SODIUM SERPL-SCNC: 140 MEQ/L (ref 135–144)
TROPONIN, HIGH SENSITIVITY: 8 NG/L (ref 0–19)
WBC # BLD AUTO: 7.3 K/UL (ref 4.2–9)

## 2025-03-22 PROCEDURE — 94640 AIRWAY INHALATION TREATMENT: CPT

## 2025-03-22 PROCEDURE — 1210000000 HC MED SURG R&B

## 2025-03-22 PROCEDURE — 80053 COMPREHEN METABOLIC PANEL: CPT

## 2025-03-22 PROCEDURE — 2500000003 HC RX 250 WO HCPCS

## 2025-03-22 PROCEDURE — 85025 COMPLETE CBC W/AUTO DIFF WBC: CPT

## 2025-03-22 PROCEDURE — 6370000000 HC RX 637 (ALT 250 FOR IP): Performed by: INTERNAL MEDICINE

## 2025-03-22 PROCEDURE — 6360000002 HC RX W HCPCS: Performed by: INTERNAL MEDICINE

## 2025-03-22 PROCEDURE — 2700000000 HC OXYGEN THERAPY PER DAY

## 2025-03-22 PROCEDURE — 94760 N-INVAS EAR/PLS OXIMETRY 1: CPT

## 2025-03-22 PROCEDURE — 2580000003 HC RX 258: Performed by: INTERNAL MEDICINE

## 2025-03-22 PROCEDURE — 84145 PROCALCITONIN (PCT): CPT

## 2025-03-22 PROCEDURE — 36415 COLL VENOUS BLD VENIPUNCTURE: CPT

## 2025-03-22 PROCEDURE — 84484 ASSAY OF TROPONIN QUANT: CPT

## 2025-03-22 PROCEDURE — 2500000003 HC RX 250 WO HCPCS: Performed by: INTERNAL MEDICINE

## 2025-03-22 RX ORDER — METHYLPREDNISOLONE SODIUM SUCCINATE 40 MG/ML
40 INJECTION INTRAMUSCULAR; INTRAVENOUS EVERY 12 HOURS
Status: DISCONTINUED | OUTPATIENT
Start: 2025-03-22 | End: 2025-03-24 | Stop reason: HOSPADM

## 2025-03-22 RX ORDER — ATORVASTATIN CALCIUM 10 MG/1
10 TABLET, FILM COATED ORAL DAILY
Status: DISCONTINUED | OUTPATIENT
Start: 2025-03-22 | End: 2025-03-24 | Stop reason: HOSPADM

## 2025-03-22 RX ORDER — FAMOTIDINE 20 MG/1
20 TABLET, FILM COATED ORAL 2 TIMES DAILY
Status: DISCONTINUED | OUTPATIENT
Start: 2025-03-22 | End: 2025-03-24 | Stop reason: HOSPADM

## 2025-03-22 RX ORDER — TRAZODONE HYDROCHLORIDE 50 MG/1
50 TABLET ORAL NIGHTLY
Status: DISCONTINUED | OUTPATIENT
Start: 2025-03-22 | End: 2025-03-24 | Stop reason: HOSPADM

## 2025-03-22 RX ORDER — IPRATROPIUM BROMIDE AND ALBUTEROL SULFATE 2.5; .5 MG/3ML; MG/3ML
1 SOLUTION RESPIRATORY (INHALATION) 3 TIMES DAILY
Status: DISCONTINUED | OUTPATIENT
Start: 2025-03-22 | End: 2025-03-24 | Stop reason: HOSPADM

## 2025-03-22 RX ORDER — WATER 10 ML/10ML
INJECTION INTRAMUSCULAR; INTRAVENOUS; SUBCUTANEOUS
Status: COMPLETED
Start: 2025-03-22 | End: 2025-03-22

## 2025-03-22 RX ADMIN — IPRATROPIUM BROMIDE AND ALBUTEROL SULFATE 1 DOSE: 2.5; .5 SOLUTION RESPIRATORY (INHALATION) at 08:01

## 2025-03-22 RX ADMIN — DILTIAZEM HYDROCHLORIDE 120 MG: 120 CAPSULE, EXTENDED RELEASE ORAL at 07:43

## 2025-03-22 RX ADMIN — FUROSEMIDE 40 MG: 40 TABLET ORAL at 07:43

## 2025-03-22 RX ADMIN — AMLODIPINE BESYLATE 10 MG: 10 TABLET ORAL at 07:43

## 2025-03-22 RX ADMIN — FAMOTIDINE 20 MG: 20 TABLET, FILM COATED ORAL at 07:43

## 2025-03-22 RX ADMIN — ENOXAPARIN SODIUM 30 MG: 30 INJECTION SUBCUTANEOUS at 20:44

## 2025-03-22 RX ADMIN — CEFTRIAXONE 1000 MG: 1 INJECTION, POWDER, FOR SOLUTION INTRAMUSCULAR; INTRAVENOUS at 20:44

## 2025-03-22 RX ADMIN — ESCITALOPRAM OXALATE 10 MG: 10 TABLET ORAL at 07:43

## 2025-03-22 RX ADMIN — MONTELUKAST 10 MG: 10 TABLET, FILM COATED ORAL at 20:40

## 2025-03-22 RX ADMIN — IPRATROPIUM BROMIDE AND ALBUTEROL SULFATE 1 DOSE: 2.5; .5 SOLUTION RESPIRATORY (INHALATION) at 11:13

## 2025-03-22 RX ADMIN — INSULIN LISPRO 2 UNITS: 100 INJECTION, SOLUTION INTRAVENOUS; SUBCUTANEOUS at 17:06

## 2025-03-22 RX ADMIN — IPRATROPIUM BROMIDE AND ALBUTEROL SULFATE 1 DOSE: 2.5; .5 SOLUTION RESPIRATORY (INHALATION) at 18:06

## 2025-03-22 RX ADMIN — WATER: 1 INJECTION INTRAMUSCULAR; INTRAVENOUS; SUBCUTANEOUS at 21:43

## 2025-03-22 RX ADMIN — METHYLPREDNISOLONE SODIUM SUCCINATE 40 MG: 40 INJECTION INTRAMUSCULAR; INTRAVENOUS at 07:43

## 2025-03-22 RX ADMIN — SODIUM CHLORIDE, PRESERVATIVE FREE 10 ML: 5 INJECTION INTRAVENOUS at 07:44

## 2025-03-22 RX ADMIN — METFORMIN HYDROCHLORIDE 500 MG: 500 TABLET ORAL at 17:06

## 2025-03-22 RX ADMIN — METFORMIN HYDROCHLORIDE 500 MG: 500 TABLET ORAL at 07:43

## 2025-03-22 RX ADMIN — BUDESONIDE AND FORMOTEROL FUMARATE DIHYDRATE 2 PUFF: 160; 4.5 AEROSOL RESPIRATORY (INHALATION) at 06:13

## 2025-03-22 RX ADMIN — ENOXAPARIN SODIUM 30 MG: 30 INJECTION SUBCUTANEOUS at 07:43

## 2025-03-22 RX ADMIN — BUDESONIDE AND FORMOTEROL FUMARATE DIHYDRATE 2 PUFF: 160; 4.5 AEROSOL RESPIRATORY (INHALATION) at 18:07

## 2025-03-22 RX ADMIN — FAMOTIDINE 20 MG: 20 TABLET, FILM COATED ORAL at 20:40

## 2025-03-22 RX ADMIN — TRAZODONE HYDROCHLORIDE 50 MG: 50 TABLET ORAL at 20:40

## 2025-03-22 RX ADMIN — METHYLPREDNISOLONE SODIUM SUCCINATE 40 MG: 40 INJECTION INTRAMUSCULAR; INTRAVENOUS at 20:41

## 2025-03-22 RX ADMIN — LOSARTAN POTASSIUM 100 MG: 50 TABLET, FILM COATED ORAL at 07:43

## 2025-03-22 RX ADMIN — ATORVASTATIN CALCIUM 10 MG: 10 TABLET, FILM COATED ORAL at 07:43

## 2025-03-22 NOTE — PROGRESS NOTES
Attempted to see pt for PT evaluation at 830 this AM- pt said\" Not  now. Come back later.\" Attempted to see pt again at 100 AM, pt states\"I am too tired. I can't do that (PT) right now.\".  Pt known to this PT. Pt usually defers PT and OT evaluations first day of admission. Per discussion RN and MD, pt close to baseline. Will attempt PT and OT evaluations tomorrow as tolerated, appropriate and if agreeable to participate.     Cookie Peoples, PT [facility] Therapy Manager

## 2025-03-22 NOTE — PROGRESS NOTES
DVT / VTE PROPHYLAXIS EVALUATION    Recent Labs     03/21/25  1749   BUN 15   CREATININE 1.00      HGB 12.7*   HCT 40.5*     ADMITTING DX OR CHIEF COMPLAINT? COPD exacc  WARFARIN? DOAC'S? no  ANY APPARENT BLEEDING? no  SCHEDULED SURGERY? no     If yes to following, excluded from auto adjustment in Table 1 of policy - please contact provider with recommendations as appropriate.  Include condition/exception in scratch notes. Yes No   Trauma Service or Ortho Surgery []  []    Pregnancy []  []        Current order:  Enoxaparin 30 mg SUBQ twice daily      Height: 182.9 cm (6'), Weight - Scale: 117.9 kg (260 lb)  Estimated Creatinine Clearance: 95 mL/min (based on SCr of 1 mg/dL).    Plan:  No intervention recommended, continue current VTE prophylaxis as ordered     Patient Weight (kg)      50.9 and below .9 101-150.9 151-174.9 175 or greater   Estimated   CrCl  (ml/min) 30 or greater []   30 mg   SUBQ daily   []   40 mg   SUBQ daily (or 30 mg BID for orthopedic cases) [x]  30 mg SUBQ   BID*  []  40 mg   SUBQ   BID []  60mg SUBQ BID    15-29.9 []  UFH 5000   units SUBQ BID []  30 mg   SUBQ daily [] 30 mg SUBQ   daily []  40 mg SUBQ   daily [] 60 mg SUBQ   Daily*    Less than 15 or dialysis []  UFH 5000   units SUBQ BID [] UFH 5000 units SUBQ TID []  UFH 7500   units   SUBQ TID*   *Do not exceed enoxaparin 40mg daily or UFH 5000 units SUBQ TID in patients with epidurals,   lumbar drains, or external ventricular drains    Lina Vinson, YEN PharmD   Problem Visit     Patient Name: Kristin Haider  : 1970   MRN: [id number]   Care Team: Patient Care Team:  Nichelle Gutierrez APRN as PCP - General (Nurse Practitioner)    Chief Complaint:    Perimenopause  Anxiety/depression    HPI: Kristin Haider is a 51 y.o. year old  presenting to be seen for 3 month f/u ALETHA start.  Junel 1/20 x 3 months now   Menorrhagia and irregular cycles resolved now - light and short period x 2-3 days with method     Perimenopausal - was experiencing hot flashes and night sweats as well   Also very bothersome mood changes   Anxiety and depression - anxiety is the most bothersome  Junel has been somewhat helpful for the mood changes   She does notice worsening of anxiety during the inactive pills    She tried Prozac and Buspar with PCP   Prozac made her jittery and anxiety worse   Buspar was helpful but she had persistent nausea   Would like to discuss other options   Hasn't had labs done in the last year     Vasectomy     Subjective      /70   Resp 16   Wt 65.3 kg (144 lb)   LMP 2021   Breastfeeding No   BMI 23.96 kg/m²     BMI reviewed: Body mass index is 23.96 kg/m².      Objective     Physical Exam      Neuro: alert and oriented to person, place and time   General:  alert; cooperative; well developed; well nourished   Skin:  Not performed.   Thyroid: not examined   Lungs:  breathing is unlabored   Heart:  Not performed.   Breasts:  Not performed.   Abdomen: Not performed.   Pelvis: Not performed.         Assessment / Plan      Assessment  Problems Addressed This Visit    ICD-10-CM ICD-9-CM   1. Menorrhagia with irregular cycle  N92.1 626.2   2. Perimenopausal symptoms  N95.1 627.2   3. Anxiety  F41.9 300.00   4. Menorrhagia with regular cycle  N92.0 626.2   5. Mood swings  R45.86 799.24       Plan    In depth discussion of options today   Considering menorrhagia and irregular cycles have resolved with COCs, will cont - no C/is  Will try continuous use -  w/drawl bldg q 3 months     Discussed additional medication options for anxiety/depression   Trial of Trintellix 10mg qd - will start with 1/2 tablet qd x 4-5 days then if tolerating well, increase to 1 tablet qd   She will f/u with [facility] - we discussed the genetic testing they offer to help determine which meds would work best for her   Will check CBC, CMP, and TSH today as well   Denies SI or HI   She will call with any questions/concerns before her f/u with [facility]     AV due July 2022             Follow Up  Return in about 8 months (around 7/25/2022) for Annual physical.  Patient was given instructions and counseling regarding her condition or for health maintenance advice. Please see specific information pulled into the AVS if appropriate.     Marva Lou, APRN  November 19, 2021  10:38 EST

## 2025-03-22 NOTE — H&P
Hospital Medicine History & Physical      PCP: Parrish Lemos MD    Date of Admission: 3/21/2025    Date of Service: 3/22/25      Chief Complaint:  dyspnea/SOB       History Of Present Illness:  69 y.o. male who presented to [facility] with above complains. Has long standing history COPD, on 3L O2 and was doing well until 5 days ago when he developed dyspnea/SOB/wheezing, was taking dose pack with minimal improvement, eventually came to ER and after initial stabilization was admitted over night   Denied fever/CP, still smoking at home     Past Medical History:          Diagnosis Date    (HFpEF) heart failure with preserved ejection fraction ([facility]) 11/03/2024    Acute respiratory failure ([facility]) 10/22/2024    Alcohol abuse 10/27/2016    Anemia     Asthma     CKD (chronic kidney disease) stage 3, GFR 30-59 ml/min ([facility]) 12/14/2021    Cocaine abuse ([facility]) 10/27/2016    Community acquired pneumonia of left lower lobe of lung 12/05/2016    COPD (chronic obstructive pulmonary disease) ([facility])     COPD exacerbation ([facility]) 08/29/2024    Depression 04/27/2020    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    Drug-seeking behavior 04/14/2015    Edema 12/10/2015    Erectile dysfunction     Gastroesophageal reflux disease 12/17/2018    Gout 10/27/2016    History of arthroscopy of both knees 10/27/2016    History of colon polyps 10/26/2021    House dust mite allergy 04/21/2014    Hyperlipidemia     Hypertension 10/27/2016    Injury to heart 10/27/2016    Insomnia 12/17/2018    Loculated pleural effusion     Macrocytic anemia 09/07/2013    Medical non-compliance 02/22/2014    Morbid obesity due to excess calories 12/08/2016    Osteoarthritis of both knees 12/08/2016    Personal history of tobacco use     Pleurisy 12/12/2016    Pneumonia 12/04/2016    caused hospital admission    Pneumonia in infectious disease 11/29/2023    Pre-diabetes 10/27/2016    Seasonal allergies 04/21/2014    Severe persistent asthma (HCC) 10/27/2016

## 2025-03-23 LAB
ALBUMIN SERPL-MCNC: 4 G/DL (ref 3.5–4.6)
ALP SERPL-CCNC: 97 U/L (ref 35–104)
ALT SERPL-CCNC: 20 U/L (ref 0–41)
ANION GAP SERPL CALCULATED.3IONS-SCNC: 8 MEQ/L (ref 9–15)
AST SERPL-CCNC: 17 U/L (ref 0–40)
BASOPHILS # BLD: 0 K/UL (ref 0–0.1)
BASOPHILS NFR BLD: 0.1 % (ref 0.2–1.2)
BILIRUB SERPL-MCNC: <0.2 MG/DL (ref 0.2–0.7)
BUN SERPL-MCNC: 13 MG/DL (ref 8–23)
CALCIUM SERPL-MCNC: 9.5 MG/DL (ref 8.5–9.9)
CHLORIDE SERPL-SCNC: 107 MEQ/L (ref 95–107)
CO2 SERPL-SCNC: 26 MEQ/L (ref 20–31)
CREAT SERPL-MCNC: 0.8 MG/DL (ref 0.7–1.2)
EKG ATRIAL RATE: 89 BPM
EKG P AXIS: 82 DEGREES
EKG P-R INTERVAL: 152 MS
EKG Q-T INTERVAL: 382 MS
EKG QRS DURATION: 134 MS
EKG QTC CALCULATION (BAZETT): 464 MS
EKG R AXIS: 65 DEGREES
EKG T AXIS: 52 DEGREES
EKG VENTRICULAR RATE: 89 BPM
EOSINOPHIL # BLD: 0 K/UL (ref 0–0.5)
EOSINOPHIL NFR BLD: 0.1 % (ref 0.8–7)
ERYTHROCYTE [DISTWIDTH] IN BLOOD BY AUTOMATED COUNT: 14.2 % (ref 11.6–14.4)
GLOBULIN SER CALC-MCNC: 2.8 G/DL (ref 2.3–3.5)
GLUCOSE BLD-MCNC: 102 MG/DL (ref 70–99)
GLUCOSE BLD-MCNC: 111 MG/DL (ref 70–99)
GLUCOSE BLD-MCNC: 126 MG/DL (ref 70–99)
GLUCOSE BLD-MCNC: 153 MG/DL (ref 70–99)
GLUCOSE SERPL-MCNC: 131 MG/DL (ref 70–99)
HCT VFR BLD AUTO: 40.9 % (ref 42–52)
HGB BLD-MCNC: 13.2 G/DL (ref 13.7–17.5)
IMM GRANULOCYTES # BLD: 0.1 K/UL
IMM GRANULOCYTES NFR BLD: 0.6 %
LYMPHOCYTES # BLD: 1 K/UL (ref 1.3–3.6)
LYMPHOCYTES NFR BLD: 8.3 %
MCH RBC QN AUTO: 30.9 PG (ref 25.7–32.2)
MCHC RBC AUTO-ENTMCNC: 32.3 % (ref 32.3–36.5)
MCV RBC AUTO: 95.8 FL (ref 79–92.2)
MONOCYTES # BLD: 0.9 K/UL (ref 0.3–0.8)
MONOCYTES NFR BLD: 7.2 % (ref 5.3–12.2)
NEUTROPHILS # BLD: 10.3 K/UL (ref 1.8–5.4)
NEUTS SEG NFR BLD: 83.7 % (ref 34–67.9)
PERFORMED ON: ABNORMAL
PLATELET # BLD AUTO: 311 K/UL (ref 163–337)
POTASSIUM SERPL-SCNC: 4.2 MEQ/L (ref 3.4–4.9)
PROT SERPL-MCNC: 6.8 G/DL (ref 6.3–8)
RBC # BLD AUTO: 4.27 M/UL (ref 4.63–6.08)
SODIUM SERPL-SCNC: 141 MEQ/L (ref 135–144)
WBC # BLD AUTO: 12.3 K/UL (ref 4.2–9)

## 2025-03-23 PROCEDURE — 80053 COMPREHEN METABOLIC PANEL: CPT

## 2025-03-23 PROCEDURE — 6370000000 HC RX 637 (ALT 250 FOR IP): Performed by: INTERNAL MEDICINE

## 2025-03-23 PROCEDURE — 94760 N-INVAS EAR/PLS OXIMETRY 1: CPT

## 2025-03-23 PROCEDURE — 85025 COMPLETE CBC W/AUTO DIFF WBC: CPT

## 2025-03-23 PROCEDURE — 2700000000 HC OXYGEN THERAPY PER DAY

## 2025-03-23 PROCEDURE — 6360000002 HC RX W HCPCS: Performed by: INTERNAL MEDICINE

## 2025-03-23 PROCEDURE — 94640 AIRWAY INHALATION TREATMENT: CPT

## 2025-03-23 PROCEDURE — 36415 COLL VENOUS BLD VENIPUNCTURE: CPT

## 2025-03-23 PROCEDURE — 2580000003 HC RX 258: Performed by: INTERNAL MEDICINE

## 2025-03-23 PROCEDURE — 1210000000 HC MED SURG R&B

## 2025-03-23 RX ORDER — FLUTICASONE PROPIONATE 50 MCG
1 SPRAY, SUSPENSION (ML) NASAL DAILY PRN
Status: DISCONTINUED | OUTPATIENT
Start: 2025-03-23 | End: 2025-03-24 | Stop reason: HOSPADM

## 2025-03-23 RX ORDER — HYDROCHLOROTHIAZIDE 25 MG/1
25 TABLET ORAL DAILY
Status: DISCONTINUED | OUTPATIENT
Start: 2025-03-23 | End: 2025-03-24 | Stop reason: HOSPADM

## 2025-03-23 RX ADMIN — METHYLPREDNISOLONE SODIUM SUCCINATE 40 MG: 40 INJECTION INTRAMUSCULAR; INTRAVENOUS at 08:21

## 2025-03-23 RX ADMIN — ACETAMINOPHEN 650 MG: 325 TABLET ORAL at 15:51

## 2025-03-23 RX ADMIN — IPRATROPIUM BROMIDE AND ALBUTEROL SULFATE 1 DOSE: 2.5; .5 SOLUTION RESPIRATORY (INHALATION) at 02:53

## 2025-03-23 RX ADMIN — ATORVASTATIN CALCIUM 10 MG: 10 TABLET, FILM COATED ORAL at 08:21

## 2025-03-23 RX ADMIN — BUDESONIDE AND FORMOTEROL FUMARATE DIHYDRATE 2 PUFF: 160; 4.5 AEROSOL RESPIRATORY (INHALATION) at 18:13

## 2025-03-23 RX ADMIN — FAMOTIDINE 20 MG: 20 TABLET, FILM COATED ORAL at 08:21

## 2025-03-23 RX ADMIN — MONTELUKAST 10 MG: 10 TABLET, FILM COATED ORAL at 20:20

## 2025-03-23 RX ADMIN — IPRATROPIUM BROMIDE AND ALBUTEROL SULFATE 1 DOSE: 2.5; .5 SOLUTION RESPIRATORY (INHALATION) at 11:26

## 2025-03-23 RX ADMIN — ENOXAPARIN SODIUM 30 MG: 30 INJECTION SUBCUTANEOUS at 20:20

## 2025-03-23 RX ADMIN — METHYLPREDNISOLONE SODIUM SUCCINATE 40 MG: 40 INJECTION INTRAMUSCULAR; INTRAVENOUS at 20:20

## 2025-03-23 RX ADMIN — FUROSEMIDE 40 MG: 40 TABLET ORAL at 08:21

## 2025-03-23 RX ADMIN — BUDESONIDE AND FORMOTEROL FUMARATE DIHYDRATE 2 PUFF: 160; 4.5 AEROSOL RESPIRATORY (INHALATION) at 06:05

## 2025-03-23 RX ADMIN — FAMOTIDINE 20 MG: 20 TABLET, FILM COATED ORAL at 20:20

## 2025-03-23 RX ADMIN — TRAZODONE HYDROCHLORIDE 50 MG: 50 TABLET ORAL at 20:20

## 2025-03-23 RX ADMIN — HYDROCHLOROTHIAZIDE 25 MG: 25 TABLET ORAL at 08:20

## 2025-03-23 RX ADMIN — IPRATROPIUM BROMIDE AND ALBUTEROL SULFATE 1 DOSE: 2.5; .5 SOLUTION RESPIRATORY (INHALATION) at 06:05

## 2025-03-23 RX ADMIN — LOSARTAN POTASSIUM 100 MG: 50 TABLET, FILM COATED ORAL at 08:20

## 2025-03-23 RX ADMIN — METFORMIN HYDROCHLORIDE 500 MG: 500 TABLET ORAL at 16:53

## 2025-03-23 RX ADMIN — ENOXAPARIN SODIUM 30 MG: 30 INJECTION SUBCUTANEOUS at 08:22

## 2025-03-23 RX ADMIN — DILTIAZEM HYDROCHLORIDE 120 MG: 120 CAPSULE, EXTENDED RELEASE ORAL at 08:20

## 2025-03-23 RX ADMIN — METFORMIN HYDROCHLORIDE 500 MG: 500 TABLET ORAL at 08:21

## 2025-03-23 RX ADMIN — IPRATROPIUM BROMIDE AND ALBUTEROL SULFATE 1 DOSE: 2.5; .5 SOLUTION RESPIRATORY (INHALATION) at 18:13

## 2025-03-23 RX ADMIN — ESCITALOPRAM OXALATE 10 MG: 10 TABLET ORAL at 08:21

## 2025-03-23 RX ADMIN — AMLODIPINE BESYLATE 10 MG: 10 TABLET ORAL at 08:20

## 2025-03-23 RX ADMIN — CEFTRIAXONE 1000 MG: 1 INJECTION, POWDER, FOR SOLUTION INTRAMUSCULAR; INTRAVENOUS at 20:25

## 2025-03-23 ASSESSMENT — PAIN SCALES - GENERAL: PAINLEVEL_OUTOF10: 3

## 2025-03-23 ASSESSMENT — PAIN DESCRIPTION - DESCRIPTORS: DESCRIPTORS: ACHING

## 2025-03-23 ASSESSMENT — PAIN DESCRIPTION - LOCATION: LOCATION: BACK

## 2025-03-23 NOTE — PROGRESS NOTES
Facility/Department: [facility] MED SURG UNIT  Daily Treatment Note  NAME: Anabelle Morris  : 1955  MRN: [id number]    Date of Service: 3/23/2025    Patient Diagnosis(es): The primary encounter diagnosis was Acute respiratory failure with hypoxia (HCC). A diagnosis of COPD exacerbation (HCC) was also pertinent to this visit.    Subjective      Upon entering room, pt walking out of bathroom and reports not feeling well- \"wheezing a lot this morning\" and deferred PT and OT evaluations for today, plan for possible D/C home tomorrow per pt.          Therapy Time   Individual Concurrent Group Co-treatment   Time In           Time Out           Minutes  5                 Juju See, PT, DScPT, CMPT 2689

## 2025-03-23 NOTE — PLAN OF CARE
Problem: Chronic Conditions and Co-morbidities  Goal: Patient's chronic conditions and co-morbidity symptoms are monitored and maintained or improved  3/22/2025 2155 by Therese Salazar RN  Outcome: Progressing

## 2025-03-23 NOTE — PLAN OF CARE
Problem: Chronic Conditions and Co-morbidities  Goal: Patient's chronic conditions and co-morbidity symptoms are monitored and maintained or improved  3/23/2025 0833 by James Guajardo, RN  Outcome: Progressing  3/22/2025 2155 by Therese Salazar, RN  Outcome: Progressing     Problem: Discharge Planning  Goal: Discharge to home or other facility with appropriate resources  Outcome: Progressing     Problem: Safety - Adult  Goal: Free from fall injury  Outcome: Progressing     Problem: ABCDS Injury Assessment  Goal: Absence of physical injury  Outcome: Progressing

## 2025-03-23 NOTE — PROGRESS NOTES
Hospitalist Progress Note      PCP: Parrish Lemos MD    Date of Admission: 3/21/2025    Chief Complaint: dyspnea/wheezing    Subjective: patient awake/alert     Medications:  Reviewed    Infusion Medications    sodium chloride      dextrose       Scheduled Medications    hydroCHLOROthiazide  25 mg Oral Daily    cefTRIAXone (ROCEPHIN) IV  1,000 mg IntraVENous Q24H    methylPREDNISolone  40 mg IntraVENous Q12H    famotidine  20 mg Oral BID    atorvastatin  10 mg Oral Daily    metFORMIN  500 mg Oral BID WC    traZODone  50 mg Oral Nightly    ipratropium 0.5 mg-albuterol 2.5 mg  1 Dose Inhalation TID    enoxaparin  30 mg SubCUTAneous BID    insulin lispro  0-8 Units SubCUTAneous 4x Daily AC & HS    amLODIPine  10 mg Oral Daily    dilTIAZem  120 mg Oral Daily    escitalopram  10 mg Oral Daily    furosemide  40 mg Oral Daily    losartan  100 mg Oral Daily    montelukast  10 mg Oral Nightly    budesonide-formoterol  2 puff Inhalation BID     PRN Meds: sodium chloride flush, sodium chloride, ondansetron **OR** ondansetron, melatonin, polyethylene glycol, acetaminophen **OR** acetaminophen, ipratropium 0.5 mg-albuterol 2.5 mg, glucose, dextrose bolus **OR** dextrose bolus, glucagon (rDNA), dextrose, hydrALAZINE, docusate sodium      Intake/Output Summary (Last 24 hours) at 3/23/2025 0736  Last data filed at 3/22/2025 1328  Gross per 24 hour   Intake 550 ml   Output --   Net 550 ml       Exam:    BP (!) 156/98   Pulse 68   Temp 97.5 °F (36.4 °C) (Oral)   Resp 18   Ht 1.829 m (6')   Wt 117.9 kg (260 lb)   SpO2 100%   BMI 35.26 kg/m²     General appearance: No apparent distress, appears stated age and cooperative.  HEENT: Pupils equal, round, and reactive to light. Conjunctivae/corneas clear.  Neck: Supple, with full range of motion. No jugular venous distention. Trachea midline.  Respiratory:    bilaterally with  Wheezes   Cardiovascular: Regular rate and rhythm with normal S1/S2 without murmurs, rubs

## 2025-03-24 ENCOUNTER — CARE COORDINATION (OUTPATIENT)
Dept: CARE COORDINATION | Age: 70
End: 2025-03-24

## 2025-03-24 VITALS
WEIGHT: 260 LBS | HEART RATE: 60 BPM | BODY MASS INDEX: 35.21 KG/M2 | DIASTOLIC BLOOD PRESSURE: 76 MMHG | OXYGEN SATURATION: 96 % | HEIGHT: 72 IN | RESPIRATION RATE: 18 BRPM | SYSTOLIC BLOOD PRESSURE: 137 MMHG | TEMPERATURE: 97.6 F

## 2025-03-24 LAB
ALBUMIN SERPL-MCNC: 3.9 G/DL (ref 3.5–4.6)
ALP SERPL-CCNC: 98 U/L (ref 35–104)
ALT SERPL-CCNC: 40 U/L (ref 0–41)
ANION GAP SERPL CALCULATED.3IONS-SCNC: 9 MEQ/L (ref 9–15)
AST SERPL-CCNC: 33 U/L (ref 0–40)
BASOPHILS # BLD: 0 K/UL (ref 0–0.1)
BASOPHILS NFR BLD: 0.1 % (ref 0.2–1.2)
BILIRUB SERPL-MCNC: <0.2 MG/DL (ref 0.2–0.7)
BUN SERPL-MCNC: 19 MG/DL (ref 8–23)
CALCIUM SERPL-MCNC: 9.8 MG/DL (ref 8.5–9.9)
CHLORIDE SERPL-SCNC: 103 MEQ/L (ref 95–107)
CO2 SERPL-SCNC: 28 MEQ/L (ref 20–31)
CREAT SERPL-MCNC: 1 MG/DL (ref 0.7–1.2)
EOSINOPHIL # BLD: 0 K/UL (ref 0–0.5)
EOSINOPHIL NFR BLD: 0 % (ref 0.8–7)
ERYTHROCYTE [DISTWIDTH] IN BLOOD BY AUTOMATED COUNT: 14.2 % (ref 11.6–14.4)
GLOBULIN SER CALC-MCNC: 3.1 G/DL (ref 2.3–3.5)
GLUCOSE BLD-MCNC: 105 MG/DL (ref 70–99)
GLUCOSE BLD-MCNC: 106 MG/DL (ref 70–99)
GLUCOSE SERPL-MCNC: 131 MG/DL (ref 70–99)
HCT VFR BLD AUTO: 42.3 % (ref 42–52)
HGB BLD-MCNC: 13.4 G/DL (ref 13.7–17.5)
IMM GRANULOCYTES # BLD: 0.1 K/UL
IMM GRANULOCYTES NFR BLD: 0.5 %
LYMPHOCYTES # BLD: 1 K/UL (ref 1.3–3.6)
LYMPHOCYTES NFR BLD: 7.6 %
MCH RBC QN AUTO: 30.3 PG (ref 25.7–32.2)
MCHC RBC AUTO-ENTMCNC: 31.7 % (ref 32.3–36.5)
MCV RBC AUTO: 95.7 FL (ref 79–92.2)
MONOCYTES # BLD: 1.1 K/UL (ref 0.3–0.8)
MONOCYTES NFR BLD: 8.6 % (ref 5.3–12.2)
NEUTROPHILS # BLD: 10.8 K/UL (ref 1.8–5.4)
NEUTS SEG NFR BLD: 83.2 % (ref 34–67.9)
PERFORMED ON: ABNORMAL
PERFORMED ON: ABNORMAL
PLATELET # BLD AUTO: 325 K/UL (ref 163–337)
POTASSIUM SERPL-SCNC: 4.5 MEQ/L (ref 3.4–4.9)
PROT SERPL-MCNC: 7 G/DL (ref 6.3–8)
RBC # BLD AUTO: 4.42 M/UL (ref 4.63–6.08)
SODIUM SERPL-SCNC: 140 MEQ/L (ref 135–144)
WBC # BLD AUTO: 13 K/UL (ref 4.2–9)

## 2025-03-24 PROCEDURE — 85025 COMPLETE CBC W/AUTO DIFF WBC: CPT

## 2025-03-24 PROCEDURE — 6370000000 HC RX 637 (ALT 250 FOR IP): Performed by: INTERNAL MEDICINE

## 2025-03-24 PROCEDURE — 80053 COMPREHEN METABOLIC PANEL: CPT

## 2025-03-24 PROCEDURE — 36415 COLL VENOUS BLD VENIPUNCTURE: CPT

## 2025-03-24 PROCEDURE — 94760 N-INVAS EAR/PLS OXIMETRY 1: CPT

## 2025-03-24 PROCEDURE — 2700000000 HC OXYGEN THERAPY PER DAY

## 2025-03-24 PROCEDURE — 6360000002 HC RX W HCPCS: Performed by: INTERNAL MEDICINE

## 2025-03-24 PROCEDURE — 94640 AIRWAY INHALATION TREATMENT: CPT

## 2025-03-24 RX ORDER — DOXYCYCLINE HYCLATE 100 MG
100 TABLET ORAL 2 TIMES DAILY
Qty: 6 TABLET | Refills: 0 | Status: SHIPPED | OUTPATIENT
Start: 2025-03-24 | End: 2025-03-27

## 2025-03-24 RX ORDER — PREDNISONE 20 MG/1
20 TABLET ORAL 2 TIMES DAILY
Qty: 10 TABLET | Refills: 0 | Status: SHIPPED | OUTPATIENT
Start: 2025-03-24 | End: 2025-03-29

## 2025-03-24 RX ADMIN — METFORMIN HYDROCHLORIDE 500 MG: 500 TABLET ORAL at 08:03

## 2025-03-24 RX ADMIN — AMLODIPINE BESYLATE 10 MG: 10 TABLET ORAL at 08:03

## 2025-03-24 RX ADMIN — FAMOTIDINE 20 MG: 20 TABLET, FILM COATED ORAL at 08:03

## 2025-03-24 RX ADMIN — HYDROCHLOROTHIAZIDE 25 MG: 25 TABLET ORAL at 08:03

## 2025-03-24 RX ADMIN — IPRATROPIUM BROMIDE AND ALBUTEROL SULFATE 1 DOSE: 2.5; .5 SOLUTION RESPIRATORY (INHALATION) at 06:42

## 2025-03-24 RX ADMIN — LOSARTAN POTASSIUM 100 MG: 50 TABLET, FILM COATED ORAL at 08:03

## 2025-03-24 RX ADMIN — FUROSEMIDE 40 MG: 40 TABLET ORAL at 08:03

## 2025-03-24 RX ADMIN — METHYLPREDNISOLONE SODIUM SUCCINATE 40 MG: 40 INJECTION INTRAMUSCULAR; INTRAVENOUS at 08:04

## 2025-03-24 RX ADMIN — IPRATROPIUM BROMIDE AND ALBUTEROL SULFATE 1 DOSE: 2.5; .5 SOLUTION RESPIRATORY (INHALATION) at 11:10

## 2025-03-24 RX ADMIN — ESCITALOPRAM OXALATE 10 MG: 10 TABLET ORAL at 08:03

## 2025-03-24 RX ADMIN — ATORVASTATIN CALCIUM 10 MG: 10 TABLET, FILM COATED ORAL at 08:03

## 2025-03-24 RX ADMIN — BUDESONIDE AND FORMOTEROL FUMARATE DIHYDRATE 2 PUFF: 160; 4.5 AEROSOL RESPIRATORY (INHALATION) at 06:42

## 2025-03-24 RX ADMIN — ENOXAPARIN SODIUM 30 MG: 30 INJECTION SUBCUTANEOUS at 08:03

## 2025-03-24 RX ADMIN — DILTIAZEM HYDROCHLORIDE 120 MG: 120 CAPSULE, EXTENDED RELEASE ORAL at 08:03

## 2025-03-24 RX ADMIN — IPRATROPIUM BROMIDE AND ALBUTEROL SULFATE 1 DOSE: 2.5; .5 SOLUTION RESPIRATORY (INHALATION) at 01:22

## 2025-03-24 NOTE — PROGRESS NOTES
Discharge instructions complete. All questions and concerns answered. IV removed. Family member to transfer patient home

## 2025-03-24 NOTE — PLAN OF CARE
Problem: Chronic Conditions and Co-morbidities  Goal: Patient's chronic conditions and co-morbidity symptoms are monitored and maintained or improved  3/24/2025 0850 by Francis Espinal RN  Outcome: Progressing  3/23/2025 2331 by Tamar Tomlin RN  Outcome: Progressing     Problem: Discharge Planning  Goal: Discharge to home or other facility with appropriate resources  3/24/2025 0850 by Francis Espinal RN  Outcome: Progressing  3/23/2025 2331 by Tamar Tomlin RN  Outcome: Progressing     Problem: Safety - Adult  Goal: Free from fall injury  3/24/2025 0850 by Francis Espinal RN  Outcome: Progressing  3/23/2025 2331 by Tamar Tomlin RN  Outcome: Progressing     Problem: ABCDS Injury Assessment  Goal: Absence of physical injury  3/24/2025 0850 by Francis Espinal RN  Outcome: Progressing  3/23/2025 2331 by Tamar Tomlin RN  Outcome: Progressing

## 2025-03-24 NOTE — DISCHARGE SUMMARY
symmetric air entry  Heart - normal rate, regular rhythm, normal S1, S2, no murmurs, rubs, clicks or gallops  Abdomen - soft, nontender, nondistended, no masses or organomegaly  Obese: Yes; Protuberant: No   Neurological - alert, oriented, normal speech, no focal findings or movement disorder noted  Extremities - peripheral pulses normal, no pedal edema, no clubbing or cyanosis  Skin - normal coloration and turgor, no rashes, no suspicious skin lesions noted    Procedures:      Diagnostic Test:      Radiology reports as per the Radiologist  Radiology: XR CHEST PORTABLE  Result Date: 3/21/2025  EXAMINATION: ONE XRAY VIEW OF THE CHEST 3/21/2025 6:19 pm COMPARISON: 10/30/2024 HISTORY: ORDERING SYSTEM PROVIDED HISTORY: shortness of breath TECHNOLOGIST PROVIDED HISTORY: Reason for exam:->shortness of breath What reading provider will be dictating this exam?->CRC FINDINGS: Hyperinflation with central bronchial wall thickening and chronic coarsening of the interstitium.  Bibasilar subsegmental atelectasis or scarring. Atherosclerotic vascular calcifications.  Cardiac silhouette borderline enlarged, accentuated by portable projection.  Old left-sided rib trauma. Trace bilateral pleural thickening in the costophrenic angles, greater on the left.  Osteopenia.  Bilateral shoulder degenerative changes with degenerative loose bodies on the left.     Chronic findings without appreciable acute process.        Results for orders placed or performed during the hospital encounter of 03/21/25   Rapid Influenza A/B Antigens    Specimen: Nasopharyngeal   Result Value Ref Range    Influenza A by PCR Negative     Influenza B by PCR Negative    COVID-19, Rapid    Specimen: Nasopharyngeal Swab   Result Value Ref Range    SARS-CoV-2, NAAT Not Detected Not Detected   CBC with Auto Differential   Result Value Ref Range    WBC 9.3 (H) 4.2 - 9.0 K/uL    RBC 4.14 (L) 4.63 - 6.08 M/uL    Hemoglobin 12.7 (L) 13.7 - 17.5 g/dL    Hematocrit 40.5 (L)

## 2025-03-24 NOTE — PROGRESS NOTES
Pt to discharge home today. No PT or OT evaluations performed as pt deferred evaluations on Saturday  3/22 and Jose F 3/23/25.    Cookie Peoples, PT [facility] Therapy Manager

## 2025-03-27 ENCOUNTER — CARE COORDINATION (OUTPATIENT)
Dept: CARE COORDINATION | Age: 70
End: 2025-03-27

## 2025-03-27 NOTE — CARE COORDINATION
Telephone call with patient.  He spoke of being in the hospital. He is in need of a certain inhaler -Vretri and has coupon for it and is going to ask pulmonologist for prescription sent to pharmacy.  Discussed with patient if this does not work out to contact this writer.

## 2025-03-31 DIAGNOSIS — F41.8 ANXIETY WITH DEPRESSION: ICD-10-CM

## 2025-03-31 DIAGNOSIS — K21.9 GASTROESOPHAGEAL REFLUX DISEASE, UNSPECIFIED WHETHER ESOPHAGITIS PRESENT: ICD-10-CM

## 2025-03-31 RX ORDER — ESCITALOPRAM OXALATE 10 MG/1
10 TABLET ORAL DAILY
Qty: 90 TABLET | Refills: 1 | Status: SHIPPED | OUTPATIENT
Start: 2025-03-31

## 2025-03-31 RX ORDER — FAMOTIDINE 20 MG/1
20 TABLET, FILM COATED ORAL 2 TIMES DAILY
Qty: 180 TABLET | Refills: 1 | Status: SHIPPED | OUTPATIENT
Start: 2025-03-31

## 2025-03-31 RX ORDER — LOSARTAN POTASSIUM 100 MG/1
100 TABLET ORAL DAILY
Qty: 90 TABLET | Refills: 0 | Status: SHIPPED | OUTPATIENT
Start: 2025-03-31

## 2025-03-31 NOTE — TELEPHONE ENCOUNTER
Pharmacy is requesting medication refill. Please approve or deny this request.    Rx requested:  Requested Prescriptions     Pending Prescriptions Disp Refills    escitalopram (LEXAPRO) 10 MG tablet 90 tablet 1     Sig: Take 1 tablet by mouth daily    famotidine (PEPCID) 20 MG tablet 180 tablet 1     Sig: Take 1 tablet by mouth 2 times daily    losartan (COZAAR) 100 MG tablet 90 tablet 0     Sig: Take 1 tablet by mouth daily         Last Office Visit:   2/6/2025      Next Visit Date:  Future Appointments   Date Time Provider Department Center   4/1/2025  8:15 AM Parrish Lemos MD [facility] DE[facility]   4/15/2025  2:00 PM [facility] RAD   4/21/2025 11:00 AM Wu Meadows APRN - CNP [facility] PH[facility]

## 2025-04-01 ENCOUNTER — OFFICE VISIT (OUTPATIENT)
Dept: FAMILY MEDICINE CLINIC | Age: 70
End: 2025-04-01

## 2025-04-01 VITALS
DIASTOLIC BLOOD PRESSURE: 56 MMHG | HEIGHT: 72 IN | BODY MASS INDEX: 38.33 KG/M2 | SYSTOLIC BLOOD PRESSURE: 118 MMHG | HEART RATE: 64 BPM | OXYGEN SATURATION: 96 % | TEMPERATURE: 98.1 F | WEIGHT: 283 LBS

## 2025-04-01 DIAGNOSIS — J44.1 CHRONIC OBSTRUCTIVE PULMONARY DISEASE WITH ACUTE EXACERBATION (HCC): Chronic | ICD-10-CM

## 2025-04-01 DIAGNOSIS — E08.00 DIABETES MELLITUS DUE TO UNDERLYING CONDITION WITH HYPEROSMOLARITY WITHOUT COMA, WITHOUT LONG-TERM CURRENT USE OF INSULIN (HCC): ICD-10-CM

## 2025-04-01 DIAGNOSIS — Z09 HOSPITAL DISCHARGE FOLLOW-UP: Primary | ICD-10-CM

## 2025-04-01 PROBLEM — Z91.013 ALLERGY TO SEAFOOD: Status: RESOLVED | Noted: 2018-05-20 | Resolved: 2025-04-01

## 2025-04-01 RX ORDER — LORATADINE 10 MG/1
TABLET ORAL
COMMUNITY
Start: 2024-11-07 | End: 2025-04-01

## 2025-04-01 RX ORDER — BUDESONIDE AND FORMOTEROL FUMARATE DIHYDRATE 160; 4.5 UG/1; UG/1
2 AEROSOL RESPIRATORY (INHALATION) 2 TIMES DAILY
Qty: 30.6 G | Refills: 1 | Status: SHIPPED | OUTPATIENT
Start: 2025-04-01

## 2025-04-01 NOTE — PROGRESS NOTES
Post-Discharge Transitional Care  Follow Up      Anabelle Morris   YOB: 1955    Date of Office Visit:  4/1/2025  Date of Hospital Admission: 3/21/25  Date of Hospital Discharge: 3/24/25  Risk of hospital readmission (high >=14%. Medium >=10%) :Readmission Risk Score: 15.9      Care management risk score Rising risk (score 2-5) and Complex Care (Scores >=6): No Risk Score On File     Non face to face  following discharge, date last encounter closed (first attempt may have been earlier): *No documented post hospital discharge outreach found in the last 14 days    Call initiated 2 business days of discharge: *No response recorded in the last 14 days    ASSESSMENT/PLAN:   Hospital discharge follow-up  -     ID DISCHARGE MEDS RECONCILED W/ CURRENT OUTPATIENT MED LIST  Chronic obstructive pulmonary disease with acute exacerbation (HCC)  The following orders have not been finalized:  -     SYMBICORT 160-4.5 MCG/ACT AERO      Medical Decision Making: moderate complexity  Return in 3 months (on 7/1/2025).    On this date 4/1/2025 I have spent 30 minutes reviewing previous notes, test results and face to face with the patient discussing the diagnosis and importance of compliance with the treatment plan as well as documenting on the day of the visit.       Subjective:   HPI:  Follow up of Hospital problems/diagnosis(es): COPD exacerbation    Inpatient course: Discharge summary reviewed- see chart.    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

## 2025-04-03 ENCOUNTER — CARE COORDINATION (OUTPATIENT)
Dept: CARE COORDINATION | Age: 70
End: 2025-04-03

## 2025-04-03 NOTE — CARE COORDINATION
Telephone call to patient.  He is waiting for a ride to get his inhaler today at the pharmacy.  He still has the same payee who is in a nursing home. He has money in the bank he cannot get.  He has been recommended to call  to help with payee situation and him become his own person.He has  phone number.  Encouraged him to call.

## 2025-04-09 DIAGNOSIS — E11.21 TYPE 2 DIABETES MELLITUS WITH DIABETIC NEPHROPATHY, WITHOUT LONG-TERM CURRENT USE OF INSULIN (HCC): ICD-10-CM

## 2025-04-09 RX ORDER — AMLODIPINE BESYLATE 10 MG/1
10 TABLET ORAL DAILY
Qty: 90 TABLET | Refills: 0 | Status: SHIPPED | OUTPATIENT
Start: 2025-04-09

## 2025-04-09 RX ORDER — PANTOPRAZOLE SODIUM 40 MG/1
40 TABLET, DELAYED RELEASE ORAL DAILY
Qty: 90 TABLET | Refills: 0 | Status: SHIPPED | OUTPATIENT
Start: 2025-04-09

## 2025-04-09 NOTE — TELEPHONE ENCOUNTER
Pharmacy is requesting medication refill. Please approve or deny this request.    Rx requested:  Requested Prescriptions     Pending Prescriptions Disp Refills    pantoprazole (PROTONIX) 40 MG tablet 90 tablet 0     Sig: Take 1 tablet by mouth daily    metFORMIN (GLUCOPHAGE) 500 MG tablet 180 tablet 1     Sig: Take 1 tablet by mouth 2 times daily (with meals)    amLODIPine (NORVASC) 10 MG tablet 90 tablet 0     Sig: Take 1 tablet by mouth daily         Last Office Visit:   4/1/2025      Next Visit Date:  Future Appointments   Date Time Provider Department Center   4/15/2025  2:00 PM [facility] Fac RAD   4/21/2025 11:00 AM Wu Meadows APRN - CNP [facility]   7/1/2025  9:00 AM Parrish Lemos MD [facility] DEP

## 2025-04-10 ENCOUNTER — CARE COORDINATION (OUTPATIENT)
Dept: CARE COORDINATION | Age: 70
End: 2025-04-10

## 2025-04-10 NOTE — CARE COORDINATION
Telephone call to patient. He states some females are stressing him out.  He has everything he needs.  He still has to have payee sign off and he is in a nursing home. He has someone dropping off money to him weekly and paying his bills.  He cannot get money from out of the bank.  He has appointment with Social Security on 4/15/2024 to discuss him becoming his own person.  Discussed calling  for advice.

## 2025-04-17 ENCOUNTER — CARE COORDINATION (OUTPATIENT)
Dept: CARE COORDINATION | Age: 70
End: 2025-04-17

## 2025-04-17 NOTE — CARE COORDINATION
Telephone call to patient.  He feels is doing okay.  He has all of his medications.  He has an appointment with Social Security at their office at noon today to become his own representative.  He is waiting on payment from his payee.  He is to call this writer and let her know how things went with Social Security.

## 2025-04-21 ENCOUNTER — OFFICE VISIT (OUTPATIENT)
Age: 70
End: 2025-04-21
Payer: MEDICARE

## 2025-04-21 VITALS — OXYGEN SATURATION: 96 % | SYSTOLIC BLOOD PRESSURE: 146 MMHG | DIASTOLIC BLOOD PRESSURE: 76 MMHG | HEART RATE: 63 BPM

## 2025-04-21 DIAGNOSIS — Z71.9 HEALTH EDUCATION/COUNSELING: ICD-10-CM

## 2025-04-21 DIAGNOSIS — J44.9 CHRONIC OBSTRUCTIVE PULMONARY DISEASE, UNSPECIFIED COPD TYPE (HCC): Primary | ICD-10-CM

## 2025-04-21 DIAGNOSIS — Z51.5 PALLIATIVE CARE ENCOUNTER: ICD-10-CM

## 2025-04-21 DIAGNOSIS — F17.210 CIGARETTE SMOKER: ICD-10-CM

## 2025-04-21 PROCEDURE — 4004F PT TOBACCO SCREEN RCVD TLK: CPT

## 2025-04-21 PROCEDURE — 3077F SYST BP >= 140 MM HG: CPT

## 2025-04-21 PROCEDURE — 3078F DIAST BP <80 MM HG: CPT

## 2025-04-21 PROCEDURE — 99349 HOME/RES VST EST MOD MDM 40: CPT

## 2025-04-21 PROCEDURE — 1159F MED LIST DOCD IN RCRD: CPT

## 2025-04-21 PROCEDURE — 1126F AMNT PAIN NOTED NONE PRSNT: CPT

## 2025-04-21 PROCEDURE — 1123F ACP DISCUSS/DSCN MKR DOCD: CPT

## 2025-04-21 PROCEDURE — 3017F COLORECTAL CA SCREEN DOC REV: CPT

## 2025-04-21 PROCEDURE — G8417 CALC BMI ABV UP PARAM F/U: HCPCS

## 2025-04-21 ASSESSMENT — ENCOUNTER SYMPTOMS
COUGH: 1
GASTROINTESTINAL NEGATIVE: 1
WHEEZING: 1
TROUBLE SWALLOWING: 0

## 2025-04-21 NOTE — PROGRESS NOTES
current transmitted upper airway sounds and decreased breath sounds during auscultation.  - DME Order for (Specify) as OP    2. Cigarette smoker  -Ongoing  - The patient reports that he continues to intermittently smoke especially when individuals are smoking in his home. Anabelle Morris is aware of the health benefits of continuing to smoke and is actively trying to quit per conversation.  - Educated patient on benefits of smoking cessation in regards to prognosis and long term outcomes    3. Health education/counseling  -Provided much information and education related to his current condition, benefits of Mucinex and benefits of smart vest with COPD during visit.  -The patient was appreciative of information given and time spent but continues to struggle with his socioeconomic status and pending obtaining food, supplies, medications and getting to doctors visits per conversation.  The patient currently works with [employer] but this only provides transportation to and from his appointments which is still very limiting per conversation.  Ordered ambulatory referral to care management for community resources for further evaluation and treatment of potential benefits.  Palliative care staff will assist by faxing order to LSW/care management to promote continuity of care.  - Ambulatory Referral to Care Management    4. Palliative care encounter  -Discussed symptom management related to chronic disease/condition. Provided emotional support and active listening. Patient understands and is agreeable to current plan.  - Palliative care will continue to follow and work with the patient in the outpatient setting.  - DME Order for (Specify) as OP  - Ambulatory Referral to Care Management      Opioid contract signed: No    Due to acuity, symptomatology and high-risk medication management, I advised patient to Return in 3 months (on 7/21/2025).     MDM: BHARATI Easley - CNP    Collaborating physician:

## 2025-04-24 ENCOUNTER — CARE COORDINATION (OUTPATIENT)
Dept: CARE COORDINATION | Age: 70
End: 2025-04-24

## 2025-04-24 DIAGNOSIS — J44.1 CHRONIC OBSTRUCTIVE PULMONARY DISEASE WITH ACUTE EXACERBATION (HCC): Chronic | ICD-10-CM

## 2025-04-24 RX ORDER — ALBUTEROL SULFATE 90 UG/1
2 INHALANT RESPIRATORY (INHALATION) EVERY 6 HOURS PRN
Qty: 18 EACH | Refills: 1 | Status: SHIPPED | OUTPATIENT
Start: 2025-04-24

## 2025-04-24 NOTE — CARE COORDINATION
Telephone call with patient.  He said that his Social Security Appointment went well.  He is his own person now.  They will be putting money on a debit card. He is going to use [facility] for transportation.  He is going today to  his medications.

## 2025-04-24 NOTE — TELEPHONE ENCOUNTER
Comments:     Last Office Visit (last PCP visit):   4/1/2025    Next Visit Date:  Future Appointments   Date Time Provider Department Center   7/1/2025  9:00 AM Parrish Lemos MD [facility] DEP   7/21/2025 11:00 AM Wu Meadows APRN - CNP [facility]       **If hasn't been seen in over a year OR hasn't followed up according to last diabetes/ADHD visit, make appointment for patient before sending refill to provider.    Rx requested:  Requested Prescriptions     Pending Prescriptions Disp Refills    albuterol sulfate HFA (PROVENTIL;VENTOLIN;PROAIR) 108 (90 Base) MCG/ACT inhaler [Pharmacy Med Name: ALBUTEROL HFA (VENTOLIN) INH] 18 each 1     Sig: INHALE 2 PUFFS INTO THE LUNGS EVERY 6 HOURS AS NEEDED FOR WHEEZING OR SHORTNESS OF BREATH

## 2025-04-25 RX ORDER — IPRATROPIUM BROMIDE AND ALBUTEROL SULFATE 2.5; .5 MG/3ML; MG/3ML
SOLUTION RESPIRATORY (INHALATION)
Qty: 360 ML | Refills: 1 | Status: SHIPPED | OUTPATIENT
Start: 2025-04-25

## 2025-04-25 NOTE — TELEPHONE ENCOUNTER
Pharmacy is requesting medication refill. Please approve or deny this request.    Rx requested:  Requested Prescriptions     Pending Prescriptions Disp Refills    ipratropium 0.5 mg-albuterol 2.5 mg (DUONEB) 0.5-2.5 (3) MG/3ML SOLN nebulizer solution [Pharmacy Med Name: IPRAT-ALBUT 0.5-3(2.5) MG/3 ML] 360 mL 1     Sig: INHALE 3 MLS INTO THE LUNGS VIA NEBULIZER EVERY 4 HOURS AS NEEDED FOR SHORTNESS OF BREATH OR WHEEZING         Last Office Visit:   4/1/2025      Next Visit Date:  Future Appointments   Date Time Provider Department Center   7/1/2025  9:00 AM Parrish Lemos MD [facility]   7/21/2025 11:00 AM Wu Meadows APRN - CNP PC [facility]

## 2025-05-01 ENCOUNTER — CARE COORDINATION (OUTPATIENT)
Dept: CARE COORDINATION | Age: 70
End: 2025-05-01

## 2025-05-01 NOTE — CARE COORDINATION
Telephone call to patient.  He is waiting for a letter from Social Security saying he no longer needs a payee.  He can then go to his bank and open an account.  [facility] brings him food on Monday.  He states his food source is getting low. Provided patient with "[employer] for food distributions centers by his home. He feels he can afford his medications.

## 2025-05-08 ENCOUNTER — CARE COORDINATION (OUTPATIENT)
Dept: CARE COORDINATION | Age: 70
End: 2025-05-08

## 2025-05-15 ENCOUNTER — CARE COORDINATION (OUTPATIENT)
Dept: CARE COORDINATION | Age: 70
End: 2025-05-15

## 2025-05-15 NOTE — CARE COORDINATION
Telephone call with patient.  He states things are going good at this time. He is his own payee now. No new needs or concerns.   No other concerns or needs expressed at time of phone call.  Discussed plan to discharge from [facility]/SW and patient was in agreement.  He has this writer's phone number for future reference.

## 2025-05-21 DIAGNOSIS — M48.061 SPINAL STENOSIS OF LUMBAR REGION, UNSPECIFIED WHETHER NEUROGENIC CLAUDICATION PRESENT: ICD-10-CM

## 2025-05-21 RX ORDER — MELOXICAM 15 MG/1
15 TABLET ORAL DAILY
Qty: 90 TABLET | Refills: 1 | Status: SHIPPED | OUTPATIENT
Start: 2025-05-21

## 2025-05-21 NOTE — TELEPHONE ENCOUNTER
[facility] in [place] requesting refill for meloxicam (MOBIC) 15 MG tablet. Please advise.    LOV: 4/1/2025  Future appts: 7/1/2025    [facility] Ph: [phone]

## 2025-05-23 DIAGNOSIS — M48.061 SPINAL STENOSIS OF LUMBAR REGION, UNSPECIFIED WHETHER NEUROGENIC CLAUDICATION PRESENT: ICD-10-CM

## 2025-05-23 DIAGNOSIS — M54.41 CHRONIC RIGHT-SIDED LOW BACK PAIN WITH RIGHT-SIDED SCIATICA: ICD-10-CM

## 2025-05-23 DIAGNOSIS — M51.369 DEGENERATION OF INTERVERTEBRAL DISC OF LUMBAR REGION, UNSPECIFIED WHETHER PAIN PRESENT: ICD-10-CM

## 2025-05-23 DIAGNOSIS — G89.29 CHRONIC RIGHT-SIDED LOW BACK PAIN WITH RIGHT-SIDED SCIATICA: ICD-10-CM

## 2025-05-23 RX ORDER — PSEUDOEPHED/ACETAMINOPH/DIPHEN 30MG-500MG
1 TABLET ORAL EVERY 8 HOURS PRN
Qty: 100 TABLET | Refills: 1 | Status: SHIPPED | OUTPATIENT
Start: 2025-05-23

## 2025-05-23 NOTE — TELEPHONE ENCOUNTER
[facility] in [place] requesting refill for Acetaminophen Extra Strength 500 MG TABS. Please advise.    LOV: 4/1/2025  Future appts: 7/1/2025    [facility] Ph: [phone]

## 2025-06-09 DIAGNOSIS — G47.00 INSOMNIA, UNSPECIFIED TYPE: ICD-10-CM

## 2025-06-09 DIAGNOSIS — J44.1 CHRONIC OBSTRUCTIVE PULMONARY DISEASE WITH ACUTE EXACERBATION (HCC): Chronic | ICD-10-CM

## 2025-06-09 DIAGNOSIS — F41.8 ANXIETY WITH DEPRESSION: ICD-10-CM

## 2025-06-10 RX ORDER — TRAZODONE HYDROCHLORIDE 50 MG/1
50 TABLET ORAL NIGHTLY
Qty: 90 TABLET | Refills: 1 | Status: SHIPPED | OUTPATIENT
Start: 2025-06-10

## 2025-06-10 RX ORDER — ALBUTEROL SULFATE 90 UG/1
2 INHALANT RESPIRATORY (INHALATION) EVERY 6 HOURS PRN
Qty: 18 EACH | Refills: 1 | Status: SHIPPED | OUTPATIENT
Start: 2025-06-10

## 2025-06-10 NOTE — TELEPHONE ENCOUNTER
Comments:     Last Office Visit (last PCP visit):   4/1/2025    Next Visit Date:  Future Appointments   Date Time Provider Department Center   7/1/2025  9:00 AM Parrish Lemos MD [facility] [place] [facility]   7/21/2025 11:00 AM Wu Meadows APRN - CNP [facility]       **If hasn't been seen in over a year OR hasn't followed up according to last diabetes/ADHD visit, make appointment for patient before sending refill to provider.    Rx requested:  Requested Prescriptions     Pending Prescriptions Disp Refills    traZODone (DESYREL) 50 MG tablet [Pharmacy Med Name: TRAZODONE 50 MG TABLET] 90 tablet 1     Sig: TAKE 1 TABLET BY MOUTH EVERY DAY AT NIGHT    albuterol sulfate HFA (PROVENTIL;VENTOLIN;PROAIR) 108 (90 Base) MCG/ACT inhaler [Pharmacy Med Name: ALBUTEROL HFA (VENTOLIN) INH] 18 each 1     Sig: INHALE 2 PUFFS INTO THE LUNGS EVERY 6 HOURS AS NEEDED FOR WHEEZING OR SHORTNESS OF BREATH

## 2025-06-30 RX ORDER — LOSARTAN POTASSIUM 100 MG/1
100 TABLET ORAL DAILY
Qty: 90 TABLET | Refills: 0 | Status: SHIPPED | OUTPATIENT
Start: 2025-06-30

## 2025-06-30 NOTE — TELEPHONE ENCOUNTER
Comments:     Last Office Visit (last PCP visit):   Visit date not found    Next Visit Date:  Future Appointments   Date Time Provider Department Center   7/1/2025  9:00 AM Parrish Lemos MD [facility] [place] [facility]   7/21/2025 11:00 AM Wu Meadows APRN - CNP [facility]       **If hasn't been seen in over a year OR hasn't followed up according to last diabetes/ADHD visit, make appointment for patient before sending refill to provider.    Rx requested:  Requested Prescriptions     Pending Prescriptions Disp Refills    losartan (COZAAR) 100 MG tablet [Pharmacy Med Name: LOSARTAN POTASSIUM 100 MG TAB] 90 tablet 0     Sig: TAKE 1 TABLET BY MOUTH EVERY DAY

## 2025-07-07 RX ORDER — AMLODIPINE BESYLATE 10 MG/1
10 TABLET ORAL DAILY
Qty: 90 TABLET | Refills: 0 | Status: SHIPPED | OUTPATIENT
Start: 2025-07-07

## 2025-07-07 NOTE — TELEPHONE ENCOUNTER
Comments:     Last Office Visit (last PCP visit):   4/1/2025    Next Visit Date:  Future Appointments   Date Time Provider Department Center   7/21/2025 11:00 AM Bursley, Wu, APRN - CNP [facility]       **If hasn't been seen in over a year OR hasn't followed up according to last diabetes/ADHD visit, make appointment for patient before sending refill to provider.    Rx requested:  Requested Prescriptions     Pending Prescriptions Disp Refills    amLODIPine (NORVASC) 10 MG tablet [Pharmacy Med Name: AMLODIPINE BESYLATE 10 MG TAB] 90 tablet 0     Sig: TAKE 1 TABLET BY MOUTH EVERY DAY

## 2025-07-21 ENCOUNTER — OFFICE VISIT (OUTPATIENT)
Age: 70
End: 2025-07-21
Payer: MEDICARE

## 2025-07-21 ENCOUNTER — CARE COORDINATION (OUTPATIENT)
Dept: CARE COORDINATION | Age: 70
End: 2025-07-21

## 2025-07-21 VITALS
DIASTOLIC BLOOD PRESSURE: 74 MMHG | HEART RATE: 71 BPM | OXYGEN SATURATION: 94 % | SYSTOLIC BLOOD PRESSURE: 132 MMHG | RESPIRATION RATE: 14 BRPM

## 2025-07-21 DIAGNOSIS — Z71.9 HEALTH EDUCATION/COUNSELING: ICD-10-CM

## 2025-07-21 DIAGNOSIS — J44.9 CHRONIC OBSTRUCTIVE PULMONARY DISEASE, UNSPECIFIED COPD TYPE (HCC): Primary | ICD-10-CM

## 2025-07-21 DIAGNOSIS — Z51.5 PALLIATIVE CARE ENCOUNTER: ICD-10-CM

## 2025-07-21 DIAGNOSIS — K59.00 CONSTIPATION, UNSPECIFIED CONSTIPATION TYPE: ICD-10-CM

## 2025-07-21 DIAGNOSIS — N52.9 ERECTILE DYSFUNCTION, UNSPECIFIED ERECTILE DYSFUNCTION TYPE: ICD-10-CM

## 2025-07-21 DIAGNOSIS — F17.210 CIGARETTE SMOKER: ICD-10-CM

## 2025-07-21 PROCEDURE — 1123F ACP DISCUSS/DSCN MKR DOCD: CPT

## 2025-07-21 PROCEDURE — 99348 HOME/RES VST EST LOW MDM 30: CPT

## 2025-07-21 PROCEDURE — 4004F PT TOBACCO SCREEN RCVD TLK: CPT

## 2025-07-21 PROCEDURE — G8417 CALC BMI ABV UP PARAM F/U: HCPCS

## 2025-07-21 PROCEDURE — 3075F SYST BP GE 130 - 139MM HG: CPT

## 2025-07-21 PROCEDURE — 1159F MED LIST DOCD IN RCRD: CPT

## 2025-07-21 PROCEDURE — 3017F COLORECTAL CA SCREEN DOC REV: CPT

## 2025-07-21 PROCEDURE — 3078F DIAST BP <80 MM HG: CPT

## 2025-07-21 PROCEDURE — 1126F AMNT PAIN NOTED NONE PRSNT: CPT

## 2025-07-21 RX ORDER — PREDNISONE 50 MG/1
50 TABLET ORAL DAILY
Qty: 5 TABLET | Refills: 0 | Status: SHIPPED | OUTPATIENT
Start: 2025-07-21 | End: 2025-07-26

## 2025-07-21 RX ORDER — DOXYCYCLINE 100 MG/1
100 CAPSULE ORAL 2 TIMES DAILY
Qty: 14 CAPSULE | Refills: 0 | Status: SHIPPED | OUTPATIENT
Start: 2025-07-21 | End: 2025-07-28

## 2025-07-21 RX ORDER — DOCUSATE SODIUM 100 MG/1
100 CAPSULE, LIQUID FILLED ORAL 2 TIMES DAILY PRN
Qty: 180 CAPSULE | Refills: 1 | Status: SHIPPED | OUTPATIENT
Start: 2025-07-21

## 2025-07-21 RX ORDER — BUDESONIDE, GLYCOPYRROLATE, AND FORMOTEROL FUMARATE 160; 9; 4.8 UG/1; UG/1; UG/1
10.7 AEROSOL, METERED RESPIRATORY (INHALATION)
COMMUNITY
Start: 2025-07-13

## 2025-07-21 ASSESSMENT — PATIENT HEALTH QUESTIONNAIRE - PHQ9
2. FEELING DOWN, DEPRESSED OR HOPELESS: NOT AT ALL
SUM OF ALL RESPONSES TO PHQ QUESTIONS 1-9: 0
1. LITTLE INTEREST OR PLEASURE IN DOING THINGS: NOT AT ALL
SUM OF ALL RESPONSES TO PHQ QUESTIONS 1-9: 0

## 2025-07-21 ASSESSMENT — ENCOUNTER SYMPTOMS
GASTROINTESTINAL NEGATIVE: 1
COUGH: 1

## 2025-07-21 NOTE — TELEPHONE ENCOUNTER
Comments: [facility] in [place] requesting refill for docusate sodium (COLACE) 100 MG capsule. Please advise.    Last Office Visit (last PCP visit):   4/1/2025    Next Visit Date:  Future Appointments   Date Time Provider Department Center   7/21/2025 11:00 AM Wu Meadows APRN - CNP [facility]       **If hasn't been seen in over a year OR hasn't followed up according to last diabetes/ADHD visit, make appointment for patient before sending refill to provider.    Rx requested:  Requested Prescriptions     Pending Prescriptions Disp Refills    docusate sodium (COLACE) 100 MG capsule 180 capsule 1     Sig: Take 1 capsule by mouth 2 times daily as needed for Constipation

## 2025-07-21 NOTE — PROGRESS NOTES
inhaler INHALE 2 PUFFS INTO THE LUNGS EVERY 6 HOURS AS NEEDED FOR WHEEZING OR SHORTNESS OF BREATH 18 each 1    Acetaminophen Extra Strength 500 MG TABS Take 1 tablet by mouth every 8 hours as needed (pain) 100 tablet 1    meloxicam (MOBIC) 15 MG tablet Take 1 tablet by mouth daily 90 tablet 1    ipratropium 0.5 mg-albuterol 2.5 mg (DUONEB) 0.5-2.5 (3) MG/3ML SOLN nebulizer solution INHALE 3 MLS INTO THE LUNGS VIA NEBULIZER EVERY 4 HOURS AS NEEDED FOR SHORTNESS OF BREATH OR WHEEZING 360 mL 1    pantoprazole (PROTONIX) 40 MG tablet Take 1 tablet by mouth daily 90 tablet 0    metFORMIN (GLUCOPHAGE) 500 MG tablet Take 1 tablet by mouth 2 times daily (with meals) 180 tablet 1    SYMBICORT 160-4.5 MCG/ACT AERO Inhale 2 puffs into the lungs 2 times daily 30.6 g 1    escitalopram (LEXAPRO) 10 MG tablet Take 1 tablet by mouth daily 90 tablet 1    famotidine (PEPCID) 20 MG tablet Take 1 tablet by mouth 2 times daily 180 tablet 1    lovastatin (MEVACOR) 40 MG tablet Take 1 tablet by mouth nightly 90 tablet 1    montelukast (SINGULAIR) 10 MG tablet Take 1 tablet by mouth nightly      cetirizine (ZYRTEC) 10 MG tablet Take 1 tablet by mouth daily 90 tablet 1    dilTIAZem (CARDIZEM CD) 120 MG extended release capsule Take 1 capsule by mouth daily 30 capsule 3    furosemide (LASIX) 40 MG tablet Take 1 tablet by mouth daily 60 tablet 0    fluticasone (FLONASE) 50 MCG/ACT nasal spray 2 sprays by Nasal route daily 48 g 1    Lancets MISC 1 each by Does not apply route 3 times daily 200 each 5    BREZTRI AEROSPHERE 160-9-4.8 MCG/ACT AERO Take 10.7 g by mouth (Patient not taking: Reported on 7/21/2025)       No current facility-administered medications on file prior to visit.       Review of Systems   Constitutional: Negative.    HENT: Negative.     Respiratory:  Positive for cough (Chronic).    Gastrointestinal: Negative.    Genitourinary: Negative.    Skin: Negative.        Objective:   /74 (BP Site: Right Upper Arm, Patient

## 2025-07-21 NOTE — CARE COORDINATION
Patient calling for phone number for Drug Repository. Contact information for Drug Repository was provided.

## 2025-07-30 ENCOUNTER — HOSPITAL ENCOUNTER (EMERGENCY)
Age: 70
Discharge: HOME | End: 2025-07-30
Attending: EMERGENCY MEDICINE
Payer: MEDICARE

## 2025-07-30 ENCOUNTER — APPOINTMENT (OUTPATIENT)
Dept: GENERAL RADIOLOGY | Age: 70
End: 2025-07-30
Payer: MEDICARE

## 2025-07-30 VITALS
RESPIRATION RATE: 22 BRPM | BODY MASS INDEX: 36.57 KG/M2 | OXYGEN SATURATION: 95 % | TEMPERATURE: 98.1 F | HEART RATE: 98 BPM | DIASTOLIC BLOOD PRESSURE: 91 MMHG | SYSTOLIC BLOOD PRESSURE: 147 MMHG | WEIGHT: 270 LBS | HEIGHT: 72 IN

## 2025-07-30 DIAGNOSIS — J44.1 COPD EXACERBATION (HCC): Primary | ICD-10-CM

## 2025-07-30 LAB
ALBUMIN SERPL-MCNC: 4.2 G/DL (ref 3.5–4.6)
ALP SERPL-CCNC: 96 U/L (ref 35–104)
ALT SERPL-CCNC: 14 U/L (ref 0–41)
ANION GAP SERPL CALCULATED.3IONS-SCNC: 11 MEQ/L (ref 9–15)
AST SERPL-CCNC: 19 U/L (ref 0–40)
BASOPHILS # BLD: 0.1 K/UL (ref 0–0.1)
BASOPHILS NFR BLD: 0.7 % (ref 0.2–1.2)
BILIRUB SERPL-MCNC: 0.4 MG/DL (ref 0.2–0.7)
BNP BLD-MCNC: 103 PG/ML
BUN SERPL-MCNC: 10 MG/DL (ref 8–23)
CALCIUM SERPL-MCNC: 9.7 MG/DL (ref 8.5–9.9)
CHLORIDE SERPL-SCNC: 107 MEQ/L (ref 95–107)
CO2 SERPL-SCNC: 24 MEQ/L (ref 20–31)
CREAT SERPL-MCNC: 1.1 MG/DL (ref 0.7–1.2)
EKG ATRIAL RATE: 73 BPM
EKG DIAGNOSIS: NORMAL
EKG P AXIS: 80 DEGREES
EKG P-R INTERVAL: 164 MS
EKG Q-T INTERVAL: 418 MS
EKG QRS DURATION: 136 MS
EKG QTC CALCULATION (BAZETT): 460 MS
EKG R AXIS: 62 DEGREES
EKG T AXIS: 59 DEGREES
EKG VENTRICULAR RATE: 73 BPM
EOSINOPHIL # BLD: 0.6 K/UL (ref 0–0.5)
EOSINOPHIL NFR BLD: 8.2 % (ref 0.8–7)
ERYTHROCYTE [DISTWIDTH] IN BLOOD BY AUTOMATED COUNT: 14.7 % (ref 11.6–14.4)
GLOBULIN SER CALC-MCNC: 3.1 G/DL (ref 2.3–3.5)
GLUCOSE SERPL-MCNC: 137 MG/DL (ref 70–99)
HCT VFR BLD AUTO: 42.9 % (ref 42–52)
HGB BLD-MCNC: 13.3 G/DL (ref 13.7–17.5)
IMM GRANULOCYTES # BLD: 0 K/UL
IMM GRANULOCYTES NFR BLD: 0.1 %
INR PPP: 1.1
LYMPHOCYTES # BLD: 1.6 K/UL (ref 1.3–3.6)
LYMPHOCYTES NFR BLD: 22.9 %
MAGNESIUM SERPL-MCNC: 2.2 MG/DL (ref 1.7–2.4)
MCH RBC QN AUTO: 30.2 PG (ref 25.7–32.2)
MCHC RBC AUTO-ENTMCNC: 31 % (ref 32.3–36.5)
MCV RBC AUTO: 97.3 FL (ref 79–92.2)
MONOCYTES # BLD: 0.8 K/UL (ref 0.3–0.8)
MONOCYTES NFR BLD: 10.8 % (ref 5.3–12.2)
NEUTROPHILS # BLD: 4 K/UL (ref 1.8–5.4)
NEUTS SEG NFR BLD: 57.3 % (ref 34–67.9)
PLATELET # BLD AUTO: 318 K/UL (ref 163–337)
POTASSIUM SERPL-SCNC: 4 MEQ/L (ref 3.4–4.9)
PROT SERPL-MCNC: 7.3 G/DL (ref 6.3–8)
PROTHROMBIN TIME: 14.5 SEC (ref 12.3–14.9)
RBC # BLD AUTO: 4.41 M/UL (ref 4.63–6.08)
SODIUM SERPL-SCNC: 142 MEQ/L (ref 135–144)
TROPONIN, HIGH SENSITIVITY: 21 NG/L (ref 0–19)
TROPONIN, HIGH SENSITIVITY: 24 NG/L (ref 0–19)
WBC # BLD AUTO: 6.9 K/UL (ref 4.2–9)

## 2025-07-30 PROCEDURE — 96365 THER/PROPH/DIAG IV INF INIT: CPT

## 2025-07-30 PROCEDURE — 6360000002 HC RX W HCPCS: Performed by: EMERGENCY MEDICINE

## 2025-07-30 PROCEDURE — 80053 COMPREHEN METABOLIC PANEL: CPT

## 2025-07-30 PROCEDURE — 94664 DEMO&/EVAL PT USE INHALER: CPT

## 2025-07-30 PROCEDURE — 93005 ELECTROCARDIOGRAM TRACING: CPT

## 2025-07-30 PROCEDURE — 83880 ASSAY OF NATRIURETIC PEPTIDE: CPT

## 2025-07-30 PROCEDURE — 96375 TX/PRO/DX INJ NEW DRUG ADDON: CPT

## 2025-07-30 PROCEDURE — 85025 COMPLETE CBC W/AUTO DIFF WBC: CPT

## 2025-07-30 PROCEDURE — 99285 EMERGENCY DEPT VISIT HI MDM: CPT

## 2025-07-30 PROCEDURE — 94640 AIRWAY INHALATION TREATMENT: CPT

## 2025-07-30 PROCEDURE — 84484 ASSAY OF TROPONIN QUANT: CPT

## 2025-07-30 PROCEDURE — 96366 THER/PROPH/DIAG IV INF ADDON: CPT

## 2025-07-30 PROCEDURE — 83735 ASSAY OF MAGNESIUM: CPT

## 2025-07-30 PROCEDURE — 6370000000 HC RX 637 (ALT 250 FOR IP): Performed by: EMERGENCY MEDICINE

## 2025-07-30 PROCEDURE — 36415 COLL VENOUS BLD VENIPUNCTURE: CPT

## 2025-07-30 PROCEDURE — 85610 PROTHROMBIN TIME: CPT

## 2025-07-30 PROCEDURE — 71045 X-RAY EXAM CHEST 1 VIEW: CPT

## 2025-07-30 RX ORDER — IPRATROPIUM BROMIDE AND ALBUTEROL SULFATE 2.5; .5 MG/3ML; MG/3ML
SOLUTION RESPIRATORY (INHALATION)
Status: DISCONTINUED
Start: 2025-07-30 | End: 2025-07-30 | Stop reason: HOSPADM

## 2025-07-30 RX ORDER — MAGNESIUM SULFATE 1 G/100ML
1000 INJECTION INTRAVENOUS ONCE
Status: COMPLETED | OUTPATIENT
Start: 2025-07-30 | End: 2025-07-30

## 2025-07-30 RX ORDER — METHYLPREDNISOLONE SODIUM SUCCINATE 125 MG/2ML
125 INJECTION INTRAMUSCULAR; INTRAVENOUS ONCE
Status: COMPLETED | OUTPATIENT
Start: 2025-07-30 | End: 2025-07-30

## 2025-07-30 RX ORDER — PREDNISONE 20 MG/1
40 TABLET ORAL DAILY
Qty: 10 TABLET | Refills: 0 | Status: SHIPPED | OUTPATIENT
Start: 2025-07-30 | End: 2025-08-04

## 2025-07-30 RX ORDER — IPRATROPIUM BROMIDE AND ALBUTEROL SULFATE 2.5; .5 MG/3ML; MG/3ML
1 SOLUTION RESPIRATORY (INHALATION) ONCE
Status: COMPLETED | OUTPATIENT
Start: 2025-07-30 | End: 2025-07-30

## 2025-07-30 RX ORDER — AZITHROMYCIN 250 MG/1
500 TABLET, FILM COATED ORAL ONCE
Status: COMPLETED | OUTPATIENT
Start: 2025-07-30 | End: 2025-07-30

## 2025-07-30 RX ORDER — AZITHROMYCIN 250 MG/1
TABLET, FILM COATED ORAL
Qty: 1 PACKET | Refills: 0 | Status: SHIPPED | OUTPATIENT
Start: 2025-07-30 | End: 2025-08-03

## 2025-07-30 RX ADMIN — MAGNESIUM SULFATE HEPTAHYDRATE 1000 MG: 1 INJECTION, SOLUTION INTRAVENOUS at 14:38

## 2025-07-30 RX ADMIN — METHYLPREDNISOLONE SODIUM SUCCINATE 125 MG: 125 INJECTION INTRAMUSCULAR; INTRAVENOUS at 14:37

## 2025-07-30 RX ADMIN — AZITHROMYCIN MONOHYDRATE 500 MG: 250 TABLET ORAL at 16:21

## 2025-07-30 RX ADMIN — IPRATROPIUM BROMIDE AND ALBUTEROL SULFATE 1 DOSE: .5; 2.5 SOLUTION RESPIRATORY (INHALATION) at 15:55

## 2025-07-30 ASSESSMENT — ENCOUNTER SYMPTOMS
VOICE CHANGE: 0
TROUBLE SWALLOWING: 0
WHEEZING: 1
ABDOMINAL PAIN: 0
EYE REDNESS: 0
CHEST TIGHTNESS: 0
EYE PAIN: 0
COUGH: 1
CHOKING: 0
VOMITING: 0
STRIDOR: 0
DIARRHEA: 0
SINUS PRESSURE: 0
BLOOD IN STOOL: 0
BACK PAIN: 0
EYE DISCHARGE: 0
CONSTIPATION: 0
FACIAL SWELLING: 0
SHORTNESS OF BREATH: 1
SORE THROAT: 0

## 2025-07-30 ASSESSMENT — PAIN - FUNCTIONAL ASSESSMENT: PAIN_FUNCTIONAL_ASSESSMENT: NONE - DENIES PAIN

## 2025-07-30 NOTE — ED PROVIDER NOTES
[facility] EMERGENCY DEPARTMENT  eMERGENCY dEPARTMENT eNCOUnter      Pt Name: Anabelle Morris  MRN: [id number]  Birthdate 1955  Date of evaluation: 7/30/2025  Provider: Bhavna Ramirez MD    CHIEF COMPLAINT       Chief Complaint   Patient presents with    Shortness of Breath     SOB last week getting increasingly worse.          HISTORY OF PRESENT ILLNESS   (Location/Symptom, Timing/Onset,Context/Setting, Quality, Duration, Modifying Factors, Severity)  Note limiting factors.   Anabelle Morris is a 69 y.o. male who presents to the emergency department patient well-known to me from previous multiple encounters for similar situation in the past patient well-known COPD trying to cut down the smoking still smoke having increasing short of breath for the past 1 week time patient does take oxygen at nighttime denies any chest tightness fever chills not able to produce much phlegm as per patient patient paramedics noted he was 90% on room air en route patient was given aerosol treatment with much improvement by the time came here hide no nausea vomiting no abdominal pain no dizziness no passing out    HPI    NursingNotes were reviewed.    REVIEW OF SYSTEMS    (2-9 systems for level 4, 10 or more for level 5)     Review of Systems   Constitutional: Negative.  Negative for activity change and fever.   HENT:  Positive for congestion. Negative for drooling, facial swelling, mouth sores, nosebleeds, sinus pressure, sore throat, trouble swallowing and voice change.    Eyes:  Negative for pain, discharge, redness and visual disturbance.   Respiratory:  Positive for cough, shortness of breath and wheezing. Negative for choking, chest tightness and stridor.    Cardiovascular:  Negative for chest pain, palpitations and leg swelling.   Gastrointestinal:  Negative for abdominal pain, blood in stool, constipation, diarrhea and vomiting.   Endocrine: Negative for cold intolerance, polyphagia and polyuria.   Genitourinary:  Negative

## 2025-08-06 ENCOUNTER — OFFICE VISIT (OUTPATIENT)
Dept: FAMILY MEDICINE CLINIC | Age: 70
End: 2025-08-06
Payer: MEDICARE

## 2025-08-06 ENCOUNTER — HOSPITAL ENCOUNTER (EMERGENCY)
Age: 70
Discharge: HOME | End: 2025-08-06
Attending: EMERGENCY MEDICINE
Payer: MEDICARE

## 2025-08-06 VITALS
SYSTOLIC BLOOD PRESSURE: 159 MMHG | WEIGHT: 266 LBS | BODY MASS INDEX: 30.78 KG/M2 | OXYGEN SATURATION: 91 % | HEIGHT: 78 IN | RESPIRATION RATE: 22 BRPM | TEMPERATURE: 98.1 F | HEART RATE: 83 BPM | DIASTOLIC BLOOD PRESSURE: 96 MMHG

## 2025-08-06 VITALS
RESPIRATION RATE: 24 BRPM | DIASTOLIC BLOOD PRESSURE: 74 MMHG | SYSTOLIC BLOOD PRESSURE: 126 MMHG | OXYGEN SATURATION: 96 % | WEIGHT: 268 LBS | HEART RATE: 76 BPM | BODY MASS INDEX: 36.35 KG/M2

## 2025-08-06 DIAGNOSIS — J44.1 COPD EXACERBATION (HCC): ICD-10-CM

## 2025-08-06 DIAGNOSIS — F32.0 CURRENT MILD EPISODE OF MAJOR DEPRESSIVE DISORDER WITHOUT PRIOR EPISODE: ICD-10-CM

## 2025-08-06 DIAGNOSIS — E11.59 HYPERTENSION ASSOCIATED WITH DIABETES (HCC): ICD-10-CM

## 2025-08-06 DIAGNOSIS — Z09 HOSPITAL DISCHARGE FOLLOW-UP: ICD-10-CM

## 2025-08-06 DIAGNOSIS — G47.33 OSA (OBSTRUCTIVE SLEEP APNEA): Chronic | ICD-10-CM

## 2025-08-06 DIAGNOSIS — Z76.0 ENCOUNTER FOR MEDICATION REFILL: ICD-10-CM

## 2025-08-06 DIAGNOSIS — F10.10 EXCESSIVE DRINKING OF ALCOHOL: ICD-10-CM

## 2025-08-06 DIAGNOSIS — J44.1 COPD EXACERBATION (HCC): Primary | ICD-10-CM

## 2025-08-06 DIAGNOSIS — I15.2 HYPERTENSION ASSOCIATED WITH DIABETES (HCC): ICD-10-CM

## 2025-08-06 DIAGNOSIS — J43.8 OTHER EMPHYSEMA (HCC): Chronic | ICD-10-CM

## 2025-08-06 DIAGNOSIS — E11.21 TYPE 2 DIABETES MELLITUS WITH DIABETIC NEPHROPATHY, WITHOUT LONG-TERM CURRENT USE OF INSULIN (HCC): Primary | ICD-10-CM

## 2025-08-06 PROCEDURE — 3078F DIAST BP <80 MM HG: CPT | Performed by: INTERNAL MEDICINE

## 2025-08-06 PROCEDURE — 99283 EMERGENCY DEPT VISIT LOW MDM: CPT

## 2025-08-06 PROCEDURE — 3017F COLORECTAL CA SCREEN DOC REV: CPT | Performed by: INTERNAL MEDICINE

## 2025-08-06 PROCEDURE — 3074F SYST BP LT 130 MM HG: CPT | Performed by: INTERNAL MEDICINE

## 2025-08-06 PROCEDURE — 1123F ACP DISCUSS/DSCN MKR DOCD: CPT | Performed by: INTERNAL MEDICINE

## 2025-08-06 PROCEDURE — G8427 DOCREV CUR MEDS BY ELIG CLIN: HCPCS | Performed by: INTERNAL MEDICINE

## 2025-08-06 PROCEDURE — 6360000002 HC RX W HCPCS: Performed by: EMERGENCY MEDICINE

## 2025-08-06 PROCEDURE — 6370000000 HC RX 637 (ALT 250 FOR IP): Performed by: EMERGENCY MEDICINE

## 2025-08-06 PROCEDURE — 3044F HG A1C LEVEL LT 7.0%: CPT | Performed by: INTERNAL MEDICINE

## 2025-08-06 PROCEDURE — 1159F MED LIST DOCD IN RCRD: CPT | Performed by: INTERNAL MEDICINE

## 2025-08-06 PROCEDURE — 99214 OFFICE O/P EST MOD 30 MIN: CPT | Performed by: INTERNAL MEDICINE

## 2025-08-06 PROCEDURE — 1160F RVW MEDS BY RX/DR IN RCRD: CPT | Performed by: INTERNAL MEDICINE

## 2025-08-06 PROCEDURE — 4004F PT TOBACCO SCREEN RCVD TLK: CPT | Performed by: INTERNAL MEDICINE

## 2025-08-06 PROCEDURE — 2022F DILAT RTA XM EVC RTNOPTHY: CPT | Performed by: INTERNAL MEDICINE

## 2025-08-06 PROCEDURE — 3023F SPIROM DOC REV: CPT | Performed by: INTERNAL MEDICINE

## 2025-08-06 PROCEDURE — G8417 CALC BMI ABV UP PARAM F/U: HCPCS | Performed by: INTERNAL MEDICINE

## 2025-08-06 RX ORDER — IPRATROPIUM BROMIDE AND ALBUTEROL SULFATE 2.5; .5 MG/3ML; MG/3ML
1 SOLUTION RESPIRATORY (INHALATION) ONCE
Status: COMPLETED | OUTPATIENT
Start: 2025-08-06 | End: 2025-08-06

## 2025-08-06 RX ORDER — DEXAMETHASONE 4 MG/1
8 TABLET ORAL ONCE
Status: COMPLETED | OUTPATIENT
Start: 2025-08-06 | End: 2025-08-06

## 2025-08-06 RX ORDER — AZITHROMYCIN 250 MG/1
500 TABLET, FILM COATED ORAL ONCE
Status: COMPLETED | OUTPATIENT
Start: 2025-08-06 | End: 2025-08-06

## 2025-08-06 RX ADMIN — AZITHROMYCIN DIHYDRATE 500 MG: 250 TABLET ORAL at 15:46

## 2025-08-06 RX ADMIN — IPRATROPIUM BROMIDE AND ALBUTEROL SULFATE 1 DOSE: .5; 2.5 SOLUTION RESPIRATORY (INHALATION) at 15:50

## 2025-08-06 RX ADMIN — DEXAMETHASONE 8 MG: 4 TABLET ORAL at 15:46

## 2025-08-06 ASSESSMENT — ENCOUNTER SYMPTOMS
SHORTNESS OF BREATH: 1
FACIAL SWELLING: 0
CHOKING: 0
BLOOD IN STOOL: 0
NAUSEA: 0
SHORTNESS OF BREATH: 1
EYE PAIN: 0
CONSTIPATION: 0
BACK PAIN: 0
EYE REDNESS: 0
VOICE CHANGE: 0
PHOTOPHOBIA: 0
BLOOD IN STOOL: 0
DIARRHEA: 0
CHEST TIGHTNESS: 0
EYE PAIN: 0
STRIDOR: 0
TROUBLE SWALLOWING: 0
ABDOMINAL PAIN: 0
VOICE CHANGE: 0
CONSTIPATION: 0
WHEEZING: 1
SINUS PRESSURE: 0
VOMITING: 0
COUGH: 1
EYE DISCHARGE: 0
WHEEZING: 0
COLOR CHANGE: 0
ABDOMINAL PAIN: 0
SORE THROAT: 0

## 2025-08-06 ASSESSMENT — PAIN - FUNCTIONAL ASSESSMENT
PAIN_FUNCTIONAL_ASSESSMENT: NONE - DENIES PAIN
PAIN_FUNCTIONAL_ASSESSMENT: NONE - DENIES PAIN

## 2025-08-06 ASSESSMENT — LIFESTYLE VARIABLES
HOW MANY STANDARD DRINKS CONTAINING ALCOHOL DO YOU HAVE ON A TYPICAL DAY: PATIENT DOES NOT DRINK
HOW OFTEN DO YOU HAVE A DRINK CONTAINING ALCOHOL: NEVER

## 2025-08-11 DIAGNOSIS — J30.2 SEASONAL ALLERGIES: Chronic | ICD-10-CM

## 2025-08-11 RX ORDER — FLUTICASONE PROPIONATE 50 MCG
2 SPRAY, SUSPENSION (ML) NASAL DAILY
Qty: 48 G | Refills: 1 | Status: SHIPPED | OUTPATIENT
Start: 2025-08-11

## 2025-08-16 DIAGNOSIS — J30.2 SEASONAL ALLERGIES: Chronic | ICD-10-CM

## 2025-08-18 RX ORDER — CETIRIZINE HYDROCHLORIDE 10 MG/1
10 TABLET ORAL DAILY
Qty: 90 TABLET | Refills: 1 | Status: SHIPPED | OUTPATIENT
Start: 2025-08-18

## 2025-08-30 DIAGNOSIS — E78.5 HYPERLIPIDEMIA, UNSPECIFIED HYPERLIPIDEMIA TYPE: Chronic | ICD-10-CM

## 2025-09-02 RX ORDER — LOVASTATIN 40 MG/1
40 TABLET ORAL NIGHTLY
Qty: 90 TABLET | Refills: 1 | Status: SHIPPED | OUTPATIENT
Start: 2025-09-02

## 2025-09-05 PROBLEM — Z09 HOSPITAL DISCHARGE FOLLOW-UP: Status: RESOLVED | Noted: 2025-08-06 | Resolved: 2025-09-05

## (undated) DEVICE — COUNTER NDL 40 COUNT HLD 70 FOAM BLK ADH W/ MAG

## (undated) DEVICE — COVER LT HNDL BLU PLAS

## (undated) DEVICE — ADAPTER FLSH PMP FLD MGMT GI IRRIG OFP 2 DISPOSABLE

## (undated) DEVICE — ZIMMER® STERILE DISPOSABLE TOURNIQUET CUFF WITH PLC, DUAL PORT, SINGLE BLADDER, 34 IN. (86 CM)

## (undated) DEVICE — SUTURE ABSRB BRAID COAT UD CT NO 1 36IN VCRL J959H

## (undated) DEVICE — ELASTIC BANDAGE: Brand: DEROYAL

## (undated) DEVICE — SPONGE,LAP,18"X18",DLX,XR,ST,5/PK,40/PK: Brand: MEDLINE

## (undated) DEVICE — TOWEL,OR,DSP,ST,BLUE,STD,4/PK,20PK/CS: Brand: MEDLINE

## (undated) DEVICE — SNARE ENDOSCP AD L240CM LOOP W10MM SHTH DIA2.4MM RND INSUL

## (undated) DEVICE — INTENDED FOR TISSUE SEPARATION, AND OTHER PROCEDURES THAT REQUIRE A SHARP SURGICAL BLADE TO PUNCTURE OR CUT.: Brand: BARD-PARKER ® CARBON RIB-BACK BLADES

## (undated) DEVICE — DRAPE SURG EXT FEN REINF ST W O FLD PCH STD

## (undated) DEVICE — MARKER SURG SKIN GENTIAN VLT REG TIP W/ 6IN RUL

## (undated) DEVICE — GOWN,AURORA,NONREINFORCED,LARGE: Brand: MEDLINE

## (undated) DEVICE — GOWN,SIRUS,NONRNF,SETINSLV,2XL,18/CS: Brand: MEDLINE

## (undated) DEVICE — PENCIL SMOKE EVAC PUSH BUTTON COATED

## (undated) DEVICE — TAPE MED W3XL2.5YD STRETCHED ELASTIKON

## (undated) DEVICE — 3M™ IOBAN™ 2 ANTIMICROBIAL INCISE DRAPE 6650EZ: Brand: IOBAN™ 2

## (undated) DEVICE — UNDERCAST PADDING: Brand: DEROYAL

## (undated) DEVICE — SUTURE VCRL SZ 3-0 L27IN ABSRB UD L26MM SH 1/2 CIR J416H

## (undated) DEVICE — TUBE SET 96 MM 64 MM H2O PERISTALTIC STD AUX CHANNEL

## (undated) DEVICE — BLADE CLIPPER GEN PURP NS

## (undated) DEVICE — INSTINCT ENDOSCOPIC HEMOCLIP: Brand: INSTINCT

## (undated) DEVICE — PAD THER XL AD MULTIUSE W E STRP WRAPON

## (undated) DEVICE — GLOVE ORANGE PI 7 1/2   MSG9075

## (undated) DEVICE — APPLICATOR MEDICATED 26 CC SOLUTION HI LT ORNG CHLORAPREP

## (undated) DEVICE — BLADE RMR L38MM PAT PILOT H

## (undated) DEVICE — BW-412T DISP COMBO CLEANING BRUSH: Brand: SINGLE USE COMBINATION CLEANING BRUSH

## (undated) DEVICE — TRAYS TRANSPORT SCOPE OASIS W/LID

## (undated) DEVICE — Device: Brand: ENDO SMARTCAP

## (undated) DEVICE — SUTURE VCRL SZ 2-0 L36IN ABSRB UD L40MM CT 1/2 CIR J957H

## (undated) DEVICE — MEDI-VAC NON-CONDUCTIVE SUCTION TUBING: Brand: CARDINAL HEALTH

## (undated) DEVICE — ELECTRODE PT RET AD L9FT HI MOIST COND ADH HYDRGEL CORDED

## (undated) DEVICE — GLOVE ORANGE PI 8   MSG9080

## (undated) DEVICE — 2000CC GUARDIAN II: Brand: GUARDIAN

## (undated) DEVICE — TABLE COVER: Brand: CONVERTORS

## (undated) DEVICE — FLEXIBLE YANKAUER,LARGE TIP, NO VACUUM CONTROL: Brand: ARGYLE

## (undated) DEVICE — T4 HOOD

## (undated) DEVICE — Device: Brand: STABLECUT®

## (undated) DEVICE — 4-PORT MANIFOLD: Brand: NEPTUNE 2

## (undated) DEVICE — STERILE PATIENT PROTECTIVE PAD FOR IMP® KNEE POSITIONERS & COHESIVE WRAP (10 / CASE): Brand: DE MAYO KNEE POSITIONER®

## (undated) DEVICE — ENDO CARRY-ON PROCEDURE KIT INCLUDES LUBRICANT, DEFENDO OLYMPUS AIR, WATER, SUCTION, BIOPSY VALVE KIT, ENZYMATIC SPONGE, AND BASIN.: Brand: ENDO CARRY-ON PROCEDURE KIT

## (undated) DEVICE — PACK,LAPAROTOMY,NO GOWNS: Brand: MEDLINE

## (undated) DEVICE — TRAP POLYP BALEEN

## (undated) DEVICE — SUTURE MCRYL SZ 4-0 L27IN ABSRB UD L19MM PS-2 1/2 CIR PRIM Y426H

## (undated) DEVICE — SCREWDRIVER SURG OD2MM HEX FEM CANN KNEE SET S STL NXGN
Type: IMPLANTABLE DEVICE | Site: KNEE | Status: NON-FUNCTIONAL
Removed: 2019-08-09

## (undated) DEVICE — TUBE ENDOSCP COLON CHANNEL

## (undated) DEVICE — Z DISCONTINUED W/NO SUB ELECTRODE PT RET L9FT HI MOIST COND ADH HYDRGEL CORDED

## (undated) DEVICE — CATHETER SCLERO L240CM NDL 25GA L4MM SHTH DIA2.3MM CNTRST

## (undated) DEVICE — BOWL BNE CEM MIX SPAT CURET SMARTMIX CTS

## (undated) DEVICE — BANDAGE COMPR W6INXL9FT BLU 1 LAYR NO CLSR ESMARCH

## (undated) DEVICE — SPLINT ORTH 22IN KNEE BASIC

## (undated) DEVICE — LABEL MED MINI W/ MARKER

## (undated) DEVICE — GLOVE SURG SZ 8 L12IN FNGR THK94MIL TRNSLUC YEL LTX HYDRGEL

## (undated) DEVICE — CHLORAPREP 26ML ORANGE

## (undated) DEVICE — 3M™ STERI-DRAPE™ U-DRAPE 1015: Brand: STERI-DRAPE™

## (undated) DEVICE — MAT FLR SURG QUICKWICK 28X54 IN DISP